# Patient Record
Sex: MALE | Race: BLACK OR AFRICAN AMERICAN | Employment: OTHER | URBAN - METROPOLITAN AREA
[De-identification: names, ages, dates, MRNs, and addresses within clinical notes are randomized per-mention and may not be internally consistent; named-entity substitution may affect disease eponyms.]

---

## 2017-01-02 ENCOUNTER — ANTICOAGULATION THERAPY VISIT (OUTPATIENT)
Dept: ANTICOAGULATION | Facility: CLINIC | Age: 77
End: 2017-01-02

## 2017-01-02 DIAGNOSIS — Z79.01 LONG-TERM (CURRENT) USE OF ANTICOAGULANTS: ICD-10-CM

## 2017-01-02 DIAGNOSIS — I82.503 CHRONIC DEEP VEIN THROMBOSIS (DVT) OF BOTH LOWER EXTREMITIES, UNSPECIFIED VEIN (H): Primary | ICD-10-CM

## 2017-01-02 DIAGNOSIS — N17.9 ACUTE KIDNEY INJURY (H): ICD-10-CM

## 2017-01-02 LAB — INR PPP: 1.6

## 2017-01-02 NOTE — PROGRESS NOTES
ANTICOAGULATION FOLLOW-UP CLINIC VISIT    Patient Name:  Priscilla Way  Date:  1/2/2017  Contact Type:  Telephone    SUBJECTIVE:     Patient Findings     Positives Missed doses (missed warfarin dose 12/31/16)           OBJECTIVE    INR   Date Value Ref Range Status   01/02/2017 1.6  Final       ASSESSMENT / PLAN  INR assessment SUB    Recheck INR In: 4 DAYS    [facility]R Location [facility]      Anticoagulation Summary as of 1/2/2017     INR goal 2.0-3.0   Selected INR 1.6! (1/2/2017)   Maintenance plan 2.5 mg (2.5 mg x 1), then 3.75 mg (2.5 mg x 1.5) repeating every 2 days   Full instructions 1/2: 3.75 mg; Otherwise 2.5 mg, then 3.75 mg repeating every 2 days   Average weekly total 21.88 mg   Plan last modified Shabana Sorto RN (12/13/2016)   Next INR check 1/6/2017   Priority INR   Target end date     Indications   Acute kidney injury (H) [N17.9]  Deep vein thrombosis (DVT) (H) [I82.409] [I82.409]  Long-term (current) use of anticoagulants [Z79.01] [Z79.01]         Anticoagulation Episode Summary     INR check location     Preferred lab     Send [facility] reminders [facility]    Comments [facility] to draw INR's  Pt has dementia please contact daughter with any questions. Contact daughter May at [phone].  Has 2.5mg tablets       Anticoagulation Care Providers     Provider Role Specialty Phone number    Randy Fuentes MD [facility] [phone]            See the Encounter Report to view Anticoagulation Flowsheet and Dosing Calendar (Go to Encounters tab in chart review, and find the Anticoagulation Therapy Visit)    Spoke with Agapito PÉREZ.  Requested next INR in 1 week, but Priscilla's family wants it checked 1/6/17.      Jodee Tirado, RN

## 2017-01-03 ENCOUNTER — TELEPHONE (OUTPATIENT)
Dept: PHARMACY | Facility: CLINIC | Age: 77
End: 2017-01-03

## 2017-01-03 ENCOUNTER — MEDICAL CORRESPONDENCE (OUTPATIENT)
Dept: HEALTH INFORMATION MANAGEMENT | Facility: CLINIC | Age: 77
End: 2017-01-03

## 2017-01-04 NOTE — TELEPHONE ENCOUNTER
SAMI for jennifer Arroyo, to call me back to discuss enrollment in anemia service.     Call date: 1/10    Yanet ChurchD, [facility]  [phone]  January 4, 2017

## 2017-01-09 ENCOUNTER — ANTICOAGULATION THERAPY VISIT (OUTPATIENT)
Dept: ANTICOAGULATION | Facility: CLINIC | Age: 77
End: 2017-01-09

## 2017-01-09 DIAGNOSIS — N17.9 ACUTE KIDNEY INJURY (H): ICD-10-CM

## 2017-01-09 DIAGNOSIS — I82.503 CHRONIC DEEP VEIN THROMBOSIS (DVT) OF BOTH LOWER EXTREMITIES, UNSPECIFIED VEIN (H): Primary | ICD-10-CM

## 2017-01-09 DIAGNOSIS — Z79.01 LONG-TERM (CURRENT) USE OF ANTICOAGULANTS: ICD-10-CM

## 2017-01-10 ENCOUNTER — TELEPHONE (OUTPATIENT)
Dept: PHARMACY | Facility: CLINIC | Age: 77
End: 2017-01-10

## 2017-01-10 ENCOUNTER — ANTICOAGULATION THERAPY VISIT (OUTPATIENT)
Dept: ANTICOAGULATION | Facility: CLINIC | Age: 77
End: 2017-01-10

## 2017-01-10 DIAGNOSIS — I82.503 CHRONIC DEEP VEIN THROMBOSIS (DVT) OF BOTH LOWER EXTREMITIES, UNSPECIFIED VEIN (H): Primary | ICD-10-CM

## 2017-01-10 DIAGNOSIS — Z79.01 LONG-TERM (CURRENT) USE OF ANTICOAGULANTS: ICD-10-CM

## 2017-01-10 DIAGNOSIS — N17.9 ACUTE KIDNEY INJURY (H): ICD-10-CM

## 2017-01-10 LAB — INR PPP: 1.7

## 2017-01-10 NOTE — PROGRESS NOTES
ANTICOAGULATION FOLLOW-UP CLINIC VISIT    Patient Name:  Priscilla Way  Date:  1/10/2017  Contact Type:  Telephone    SUBJECTIVE:     Patient Findings     Positives Missed doses (missed coumadin dose 1/6/17)           OBJECTIVE    INR   Date Value Ref Range Status   01/10/2017 1.7  Final       ASSESSMENT / PLAN  INR assessment SUB    Recheck INR In: 1 WEEK    [facility]R Location [facility]      Anticoagulation Summary as of 1/10/2017     INR goal 2.0-3.0   Selected INR 1.7! (1/10/2017)   Maintenance plan 2.5 mg (2.5 mg x 1), then 3.75 mg (2.5 mg x 1.5) repeating every 2 days   Full instructions 1/10: 3.75 mg; 1/11: 2.5 mg; 1/12: 3.75 mg; 1/13: 2.5 mg; 1/14: 3.75 mg; 1/15: 2.5 mg; 1/16: 3.75 mg; Otherwise 2.5 mg, then 3.75 mg repeating every 2 days   Average weekly total 21.88 mg   Plan last modified Shabana Sorto RN (12/13/2016)   Next INR check 1/17/2017   Priority INR   Target end date     Indications   Acute kidney injury (H) [N17.9]  Deep vein thrombosis (DVT) (H) [I82.409] [I82.409]  Long-term (current) use of anticoagulants [Z79.01] [Z79.01]         Anticoagulation Episode Summary     INR check location     Preferred lab     [facility]    Comments [facility] to draw INR's  Pt has dementia please contact daughter with any questions. Contact daughter May at [phone].  Has 2.5mg tablets       Anticoagulation Care Providers     Provider Role Specialty Phone number    Randy Fuentes MD [facility] [phone]            See the Encounter Report to view Anticoagulation Flowsheet and Dosing Calendar (Go to Encounters tab in chart review, and find the Anticoagulation Therapy Visit)    Spoke with Sam MENDEZ.      Jodee Tirado RN

## 2017-01-10 NOTE — Clinical Note
[facility]  [address]  [address] [place] [facility] [address] [phone]      January 10, 2017      Priscilla Way  [address]  [address]  [place] [address]        Dear Priscilla,    My records show that I have been unable to contact you by phone. The primary task of the Anemia Clinic is to help you get optimal results from your anemia medication by regularly speaking with you and following lab values. I work in cooperation with you and your doctors to ensure the safe and effective use of the anemia medication.  Currently, I do not have current contact information for you. I would greatly appreciate it if you would contact me at your earliest convenience to discuss your recent health, and more specifically how I can help you get the most from your anemia therapy. The Anemia Clinic is open Monday thru Thursday, 8:30-5 and Friday 7-3:30.   If we do not hear back from you within the next two weeks, at that time we will take you off our call list and notify the referring provider that we are no longer following you in the Anemia Clinic.    I hope to hear from you soon!    Trish Hankins,PharmD  [phone]

## 2017-01-10 NOTE — TELEPHONE ENCOUNTER
We have been unable to reach patient for enrollment in anemia service.  Sent letter to patient.  If we do not hear from him in 4 weeks will remove from our list and notify provider.     Call date:  2/7    Trish Hankins, PharmD, [facility]  [phone]  January 10, 2017

## 2017-01-12 DIAGNOSIS — Z79.01 LONG TERM CURRENT USE OF ANTICOAGULANT THERAPY: Primary | ICD-10-CM

## 2017-01-13 RX ORDER — WARFARIN SODIUM 2.5 MG/1
TABLET ORAL
Qty: 113 TABLET | Refills: 3 | Status: SHIPPED | OUTPATIENT
Start: 2017-01-13 | End: 2017-12-05

## 2017-01-17 ENCOUNTER — TELEPHONE (OUTPATIENT)
Dept: INTERNAL MEDICINE | Facility: CLINIC | Age: 77
End: 2017-01-17

## 2017-01-17 ENCOUNTER — ANTICOAGULATION THERAPY VISIT (OUTPATIENT)
Dept: ANTICOAGULATION | Facility: CLINIC | Age: 77
End: 2017-01-17

## 2017-01-17 ENCOUNTER — MEDICAL CORRESPONDENCE (OUTPATIENT)
Dept: HEALTH INFORMATION MANAGEMENT | Facility: CLINIC | Age: 77
End: 2017-01-17

## 2017-01-17 DIAGNOSIS — I82.503 CHRONIC DEEP VEIN THROMBOSIS (DVT) OF BOTH LOWER EXTREMITIES, UNSPECIFIED VEIN (H): Primary | ICD-10-CM

## 2017-01-17 DIAGNOSIS — Z79.01 LONG-TERM (CURRENT) USE OF ANTICOAGULANTS: ICD-10-CM

## 2017-01-17 DIAGNOSIS — N17.9 ACUTE KIDNEY INJURY (H): ICD-10-CM

## 2017-01-17 LAB — INR PPP: 3.1

## 2017-01-17 NOTE — PROGRESS NOTES
ANTICOAGULATION FOLLOW-UP CLINIC VISIT    Patient Name:  Priscilla Way  Date:  1/17/2017  Contact Type:  Telephone    SUBJECTIVE:     Patient Findings     Comments Pt is bedridden, and has a heel ulcer that is healing.  It is not infected.           OBJECTIVE    INR   Date Value Ref Range Status   01/17/2017 3.1  Final       ASSESSMENT / PLAN  INR assessment THER    Recheck INR In: 1 WEEK    [facility]R Location [facility]      Anticoagulation Summary as of 1/17/2017     INR goal 2.0-3.0   Selected INR 3.1! (1/17/2017)   Maintenance plan 3.75 mg (2.5 mg x 1.5) on Mon, Wed, Fri; 2.5 mg (2.5 mg x 1) all other days   Full instructions 3.75 mg on Mon, Wed, Fri; 2.5 mg all other days   Weekly total 21.25 mg   Plan last modified Sherry Georges RN (1/17/2017)   Next INR check 1/24/2017   Priority INR   Target end date     Indications   Acute kidney injury (H) [N17.9]  Deep vein thrombosis (DVT) (H) [I82.409] [I82.409]  Long-term (current) use of anticoagulants [Z79.01] [Z79.01]         Anticoagulation Episode Summary     INR check location     Preferred lab     Send [facility]ers to [facility]    Comments [facility] to draw INR's  Pt has dementia please contact daughter with any questions. Contact daughter May at [phone].  Has 2.5mg tablets       Anticoagulation Care Providers     Provider Role Specialty Phone number    Beatrizpalmira Randy PERKINS MD [facility] [phone]            See the Encounter Report to view Anticoagulation Flowsheet and Dosing Calendar (Go to Encounters tab in chart review, and find the Anticoagulation Therapy Visit)  Spoke with Carina [facility] nurse.    Sherry Georges RN

## 2017-01-17 NOTE — TELEPHONE ENCOUNTER
Spoke with home care nurse ([phone]) and she will check BP again on 1/19 and call me.  -----------------------------------------    ----- Message from Mirlande Tellez RN sent at 1/17/2017  1:44 PM CST -----  Regarding: bp  Pt's home care nurse called to let  know that pt's bp was 158/90, pulse 88, offer no c/o.  Rx Amlodipine was d/c'd on 12/23/16.  Mirlande Tellez RN

## 2017-01-19 NOTE — TELEPHONE ENCOUNTER
Spoke with [facility], Carina again.  She updated the blood pressure reading today 156/88, pulse 88. Pt does not show any distress or symptoms.    Dr. Fuentes    Amlodipine was discontinued on 12/23 during the office visit.  I think it was a mistake by LISSA.  Did you mean to discontinue ?    Soon-Mi

## 2017-01-24 ENCOUNTER — ANTICOAGULATION THERAPY VISIT (OUTPATIENT)
Dept: ANTICOAGULATION | Facility: CLINIC | Age: 77
End: 2017-01-24

## 2017-01-24 ENCOUNTER — DOCUMENTATION ONLY (OUTPATIENT)
Dept: FAMILY MEDICINE | Facility: CLINIC | Age: 77
End: 2017-01-24

## 2017-01-24 DIAGNOSIS — N17.9 ACUTE KIDNEY INJURY (H): ICD-10-CM

## 2017-01-24 DIAGNOSIS — Z79.01 LONG-TERM (CURRENT) USE OF ANTICOAGULANTS: ICD-10-CM

## 2017-01-24 DIAGNOSIS — I82.503 CHRONIC DEEP VEIN THROMBOSIS (DVT) OF BOTH LOWER EXTREMITIES, UNSPECIFIED VEIN (H): Primary | ICD-10-CM

## 2017-01-24 LAB — INR PPP: 1.9

## 2017-01-24 NOTE — PROGRESS NOTES
"Visit to the client's home on 1/20/17 for annual health risk assessment and PCA assessment.  An  was present.  RP is also present due to member's confusion.    Current situation/living environment/hospitalization: Member lives alone in an independent apartment.  Has supportive family in area who work with PCA to provide 24 hour coverage for member.  Home is neat with clear pathways.  Member has had 2 ER visits/hospitalizations in the past year.    Activities of daily living (ADL)/instrumental activities of daily living (IADL) and functional issues: Member is in his hospital bed this visit with pressure relieving boots on both feet.  Member has a pressure ulcer on left heal currently being assessed and treated by nursing and exhibits much pain with attempt to assess it this visit.  It is covered with dressing, so did not uncover.  Reported to be a draining pressure ulcer.  Member is oriented to self, however verbalized that he \"was in the hospital\" and isn't able to verbalize day/time of day or season.  Has ongoing confusion and requires 24 hour supervision.  Member demonstrated weakness with any movement.  Moderate hand grasp, however was unable to raise his arms past the height of his chin.  RP reports member requires assistance with turning in bed- unable to position self at this time.  Daughter is working with [employer] to get a alexandrea lift, wheelchair and another hospital bed.  Dependent with dressing, bathing, grooming, transfers- can bear weight, but unable to ambulate.  Is also dependent with eating and toileting.  Is incontinent of both bowel and bladder.   Caregivers provide all shopping, meals prep, transportation, homemaking and laundry.  Nurse comes weekly to set medications and assess wound.  Caregivers give medications from med box.     Client's Plan of Care consists of:  PCA (12 hours a day) and nursing weekly.    Carri Anderson RN  [phone]        "

## 2017-01-24 NOTE — PROGRESS NOTES
ANTICOAGULATION FOLLOW-UP CLINIC VISIT    Patient Name:  Priscilla Way  Date:  1/24/2017  Contact Type:  Telephone    SUBJECTIVE:     Patient Findings     Positives No Problem Findings    Comments Abbi is never sure if pt takes all his medication, but they (home care nurse) fills the pill box           OBJECTIVE    INR   Date Value Ref Range Status   01/24/2017 1.90  Final     Comment:     Outside Lab        ASSESSMENT / PLAN  INR assessment THER    Recheck INR In: 1 WEEK    [facility]R Location [facility]      Anticoagulation Summary as of 1/24/2017     INR goal 2.0-3.0   Selected INR 1.90! (1/24/2017)   Maintenance plan 3.75 mg (2.5 mg x 1.5) on Mon, Wed, Fri; 2.5 mg (2.5 mg x 1) all other days   Full instructions 3.75 mg on Mon, Wed, Fri; 2.5 mg all other days   Weekly total 21.25 mg   Plan last modified Sherry Georges RN (1/17/2017)   Next INR check 1/31/2017   Priority INR   Target end date     Indications   Acute kidney injury (H) [N17.9]  Deep vein thrombosis (DVT) (H) [I82.409] [I82.409]  Long-term (current) use of anticoagulants [Z79.01] [Z79.01]         Anticoagulation Episode Summary     INR check location     Preferred lab     Send [facility] remind[facility] to [facility]    Comments [facility] to draw INR's  Pt has dementia please contact daughter with any questions. Contact daughter May at [phone].  Has 2.5mg tablets       Anticoagulation Care Providers     Provider Role Specialty Phone number    Randy Fuentes MD [facility] [phone]            See the Encounter Report to view Anticoagulation Flowsheet and Dosing Calendar (Go to Encounters tab in chart review, and find the Anticoagulation Therapy Visit)    Spoke with PRATIMA Go, TED

## 2017-01-25 ENCOUNTER — DOCUMENTATION ONLY (OUTPATIENT)
Dept: CARE COORDINATION | Facility: CLINIC | Age: 77
End: 2017-01-25

## 2017-01-25 NOTE — PROGRESS NOTES
[facility] now requests orders and shares plan of care/discharge summaries for some patients through [facility].  Please REPLY TO THIS MESSAGE in order to give authorization for orders when needed.  This is considered a verbal order, you will still receive a faxed copy of orders for signature.  Thank you for your assistance in improving collaboration for our patients.    ORDER    SN visits 1  Week 9   3 as needed

## 2017-01-28 ENCOUNTER — MEDICAL CORRESPONDENCE (OUTPATIENT)
Dept: HEALTH INFORMATION MANAGEMENT | Facility: CLINIC | Age: 77
End: 2017-01-28

## 2017-01-31 ENCOUNTER — ANTICOAGULATION THERAPY VISIT (OUTPATIENT)
Dept: ANTICOAGULATION | Facility: CLINIC | Age: 77
End: 2017-01-31

## 2017-01-31 DIAGNOSIS — I82.503 CHRONIC DEEP VEIN THROMBOSIS (DVT) OF BOTH LOWER EXTREMITIES, UNSPECIFIED VEIN (H): Primary | ICD-10-CM

## 2017-01-31 DIAGNOSIS — N17.9 ACUTE KIDNEY INJURY (H): ICD-10-CM

## 2017-01-31 DIAGNOSIS — Z79.01 LONG-TERM (CURRENT) USE OF ANTICOAGULANTS: ICD-10-CM

## 2017-01-31 LAB — INR PPP: 2.3

## 2017-01-31 NOTE — PROGRESS NOTES
ANTICOAGULATION FOLLOW-UP CLINIC VISIT    Patient Name:  Priscilla Way  Date:  1/31/2017  Contact Type:  Telephone    SUBJECTIVE:     Patient Findings     Positives No Problem Findings           OBJECTIVE    INR   Date Value Ref Range Status   01/31/2017 2.3  Final       ASSESSMENT / PLAN  INR assessment THER    Recheck INR In: 1 WEEK    INR Location Clinic      Anticoagulation Summary as of 1/31/2017     INR goal 2.0-3.0   Selected INR 2.3 (1/31/2017)   Maintenance plan 3.75 mg (2.5 mg x 1.5) on Mon, Wed, Fri; 2.5 mg (2.5 mg x 1) all other days   Full instructions 3.75 mg on Mon, Wed, Fri; 2.5 mg all other days   Weekly total 21.25 mg   Plan last modified Sherry Georges RN (1/17/2017)   Next INR check 2/7/2017   Priority INR   Target end date     Indications   Acute kidney injury (H) [N17.9]  Deep vein thrombosis (DVT) (H) [I82.409] [I82.409]  Long-term (current) use of anticoagulants [Z79.01] [Z79.01]         Anticoagulation Episode Summary     INR check location     Preferred lab     [facility]    Comments [facility] to draw INR's  Pt has dementia please contact daughter with any questions. Contact daughter May at [phone].  Has 2.5mg tablets       Anticoagulation Care Providers     Provider Role Specialty Phone number    Randy Fuentes MD [facility] [phone]            See the Encounter Report to view Anticoagulation Flowsheet and Dosing Calendar (Go to Encounters tab in chart review, and find the Anticoagulation Therapy Visit)    Spoke with Agapito PÉREZ.    Dayana Ervin RN

## 2017-02-03 ENCOUNTER — TELEPHONE (OUTPATIENT)
Dept: PHARMACY | Facility: CLINIC | Age: 77
End: 2017-02-03

## 2017-02-07 ENCOUNTER — ANTICOAGULATION THERAPY VISIT (OUTPATIENT)
Dept: ANTICOAGULATION | Facility: CLINIC | Age: 77
End: 2017-02-07

## 2017-02-07 DIAGNOSIS — Z79.01 LONG-TERM (CURRENT) USE OF ANTICOAGULANTS: ICD-10-CM

## 2017-02-07 DIAGNOSIS — I82.503 CHRONIC DEEP VEIN THROMBOSIS (DVT) OF BOTH LOWER EXTREMITIES, UNSPECIFIED VEIN (H): Primary | ICD-10-CM

## 2017-02-07 DIAGNOSIS — N17.9 ACUTE KIDNEY INJURY (H): ICD-10-CM

## 2017-02-07 LAB — INR PPP: 2.3

## 2017-02-07 NOTE — PROGRESS NOTES
ANTICOAGULATION FOLLOW-UP CLINIC VISIT    Patient Name:  Priscilla Way  Date:  2/7/2017  Contact Type:  Telephone    SUBJECTIVE:        OBJECTIVE    INR   Date Value Ref Range Status   02/07/2017 2.3  Final       ASSESSMENT / PLAN  No question data found.  Anticoagulation Summary as of 2/7/2017     INR goal 2.0-3.0   Selected INR 2.3 (2/7/2017)   Maintenance plan 3.75 mg (2.5 mg x 1.5) on Mon, Wed, Fri; 2.5 mg (2.5 mg x 1) all other days   Full instructions 3.75 mg on Mon, Wed, Fri; 2.5 mg all other days   Weekly total 21.25 mg   Plan last modified Sherry Georges RN (1/17/2017)   Next INR check 2/14/2017   Priority INR   Target end date     Indications   Acute kidney injury (H) [N17.9]  Deep vein thrombosis (DVT) (H) [I82.409] [I82.409]  Long-term (current) use of anticoagulants [Z79.01] [Z79.01]         Anticoagulation Episode Summary     INR check location     Preferred lab     Send INR reminders to [facility]    Comments [facility] to draw INR's  Pt has dementia please contact daughter with any questions. Contact daughter May at [phone].  Has 2.5mg tablets       Anticoagulation Care Providers     Provider Role Specialty Phone number    Randy Fuentes MD [facility] [phone]            See the Encounter Report to view Anticoagulation Flowsheet and Dosing Calendar (Go to Encounters tab in chart review, and find the Anticoagulation Therapy Visit)    [facility] today is 2.3, which is within goal of 2-3. Continue with current regimen, which is 3.75 mg on MWF and 2.5 mg all other days. Next INR is 2/14/17.    Laura Khanna, pharmacy intern  [facility]ccall[facility]

## 2017-02-07 NOTE — TELEPHONE ENCOUNTER
Follow-up with anemia management service:    We have made several calls to enroll in anemia service, sent f/u letter, have not had any communication from patient or daughter.  We will not continue to follow-up for enrollment in anemia service.  Please feel free to refer again in the future if needed.     [facility]    Anemia Management Service  Yanet GarrettD and Roxana Tay CPhT  Phone: [phone]  Fax: [phone]     Cc:  MD Randy Domínguez MD Jamie Budd, RN

## 2017-02-14 ENCOUNTER — ANTICOAGULATION THERAPY VISIT (OUTPATIENT)
Dept: ANTICOAGULATION | Facility: CLINIC | Age: 77
End: 2017-02-14

## 2017-02-14 DIAGNOSIS — I82.503 CHRONIC DEEP VEIN THROMBOSIS (DVT) OF BOTH LOWER EXTREMITIES, UNSPECIFIED VEIN (H): ICD-10-CM

## 2017-02-14 DIAGNOSIS — Z79.01 LONG-TERM (CURRENT) USE OF ANTICOAGULANTS: ICD-10-CM

## 2017-02-14 DIAGNOSIS — N17.9 ACUTE KIDNEY INJURY (H): ICD-10-CM

## 2017-02-14 LAB — INR PPP: 2.8

## 2017-02-14 NOTE — PROGRESS NOTES
ANTICOAGULATION FOLLOW-UP CLINIC VISIT    Patient Name:  Priscilla Way  Date:  2/14/2017  Contact Type:  Telephone    SUBJECTIVE:     Patient Findings     Comments Rocío is unsure of warfarin dose Priscilla took this week.  She found warfarin tablets in pill box on 2/10/17 and 2/12/17.  However, he has several caregivers who do not communicate very well with each other.  He may have been given warfarin by someone instead of out of his pill box.             OBJECTIVE    INR   Date Value Ref Range Status   02/14/2017 2.8  Final       ASSESSMENT / PLAN  INR assessment THER    Recheck INR In: 1 WEEK    [facility]R Location [facility]      Anticoagulation Summary as of 2/14/2017     INR goal 2.0-3.0   Today's INR 2.8   Maintenance plan 3.75 mg (2.5 mg x 1.5) on Mon, Fri; 2.5 mg (2.5 mg x 1) all other days   Full instructions 3.75 mg on Mon, Fri; 2.5 mg all other days   Weekly total 20 mg   Plan last modified Jodee Tirado RN (2/14/2017)   Next INR check 2/21/2017   Priority INR   Target end date     Indications   Acute kidney injury (H) [N17.9]  Deep vein thrombosis (DVT) (H) [I82.409] [I82.409]  Long-term (current) use of anticoagulants [Z79.01] [Z79.01]         Anticoagulation Episode Summary     INR check location     Preferred lab     Send INR reminders to [facility]    Comments [facility] to draw INR's  Pt has dementia please contact daughter with any questions. Contact daughter May at [phone].  Has 2.5mg tablets       Anticoagulation Care Providers     Provider Role Specialty Phone number    Randy Fuentes MD [facility] [phone]            See the Encounter Report to view Anticoagulation Flowsheet and Dosing Calendar (Go to Encounters tab in chart review, and find the Anticoagulation Therapy Visit)    Spoke with HHNRocío.  Unsure if Priscilla missed doses of warfarin this week.  Decreased dose of warfarin slightly and will recheck INR in one week.      Jodee Tirado,  RN

## 2017-02-14 NOTE — MR AVS SNAPSHOT
Priscilla CHAU Seamus   2/14/2017   Anticoagulation Therapy Visit    Description:  77 year old male   Provider:  Jodee Tirado, RN   Department:  [facility]           INR as of 2/14/2017     Today's INR 2.8      Anticoagulation Summary as of 2/14/2017     INR goal 2.0-3.0   Today's INR 2.8   Full instructions 3.75 mg on Mon, Fri; 2.5 mg all other days   Next INR check 2/21/2017    Indications   Acute kidney injury (H) [N17.9]  Deep vein thrombosis (DVT) (H) [I82.409] [I82.409]  Long-term (current) use of anticoagulants [Z79.01] [Z79.01]         Description     Rocío is unsure of warfarin dose Priscilla took this week.  She found warfarin tablets in pill box on 2/10/17 and 2/12/17.  However, he has several caregivers who do not communicate very well with each other.  He may have been given warfarin by someone instead of out of his pill box.       February 2017 Details    Sun Mon Tue Wed Thu Fri Sat        1               2               3               4                 5               6               7               8               9               10               11                 12               13               14      2.5 mg   See details      15      2.5 mg         16      2.5 mg         17      3.75 mg         18      2.5 mg           19      2.5 mg         20      3.75 mg         21            22               23               24               25                 26               27               28                    Date Details   02/14 This INR check       Date of next INR:  2/21/2017         How to take your warfarin dose     To take:  2.5 mg Take 1 of the 2.5 mg tablets.    To take:  3.75 mg Take 1.5 of the 2.5 mg tablets.

## 2017-02-21 ENCOUNTER — ANTICOAGULATION THERAPY VISIT (OUTPATIENT)
Dept: ANTICOAGULATION | Facility: CLINIC | Age: 77
End: 2017-02-21

## 2017-02-21 DIAGNOSIS — I82.503 CHRONIC DEEP VEIN THROMBOSIS (DVT) OF BOTH LOWER EXTREMITIES, UNSPECIFIED VEIN (H): ICD-10-CM

## 2017-02-21 DIAGNOSIS — Z79.01 LONG-TERM (CURRENT) USE OF ANTICOAGULANTS: ICD-10-CM

## 2017-02-21 DIAGNOSIS — N17.9 ACUTE KIDNEY INJURY (H): ICD-10-CM

## 2017-02-21 LAB — INR PPP: 3.9

## 2017-02-21 NOTE — PROGRESS NOTES
ANTICOAGULATION FOLLOW-UP CLINIC VISIT    Patient Name:  Priscilla Way  Date:  2/21/2017  Contact Type:  Telephone    SUBJECTIVE:     Patient Findings     Positives Other complaints (Priscilla is not himself today.  He is usually combative and today he is very mellow.  Rocío will let the [facility] know if anything changes before next Monday)           OBJECTIVE    INR   Date Value Ref Range Status   02/21/2017 3.9  Final       ASSESSMENT / PLAN  INR assessment SUPRA    Recheck INR In: 6 DAYS    [facility]R Location [facility]      Anticoagulation Summary as of 2/21/2017     INR goal 2.0-3.0   Today's INR 3.9!   Maintenance plan 3.75 mg (2.5 mg x 1.5) on Mon, Fri; 2.5 mg (2.5 mg x 1) all other days   Full instructions 2/21: Hold; Otherwise 3.75 mg on Mon, Fri; 2.5 mg all other days   Weekly total 20 mg   Plan last modified Jodee Tirado RN (2/14/2017)   Next INR check 2/27/2017   Priority INR   Target end date     Indications   Acute kidney injury (H) [N17.9]  Deep vein thrombosis (DVT) (H) [I82.409] [I82.409]  Long-term (current) use of anticoagulants [Z79.01] [Z79.01]         Anticoagulation Episode Summary     INR check location     Preferred lab     Send IN[facility] reminders to [facility]    Comments [facility] to draw INR's  Pt has dementia please contact daughter with any questions. Contact daughter May at [phone].  Has 2.5mg tablets       Anticoagulation Care Providers     Provider Role Specialty Phone number    Randy Fuentes MD [facility] [phone]            See the Encounter Report to view Anticoagulation Flowsheet and Dosing Calendar (Go to Encounters tab in chart review, and find the Anticoagulation Therapy Visit)    Spoke with Rocío BAER.  She reports Priscilla is not himself today, he is very mellow.  She will contact the [facility] before 2/27/17 if anything changes in his health, diet, medications.      Jodee Tirado RN

## 2017-02-21 NOTE — MR AVS SNAPSHOT
Priscilla Way   2/21/2017   Anticoagulation Therapy Visit    Description:  77 year old male   Provider:  Jodee Tirado, RN   Department:  [facility]           INR as of 2/21/2017     Today's INR 3.9!      Anticoagulation Summary as of 2/21/2017     INR goal 2.0-3.0   Today's INR 3.9!   Full instructions 2/21: Hold; Otherwise 3.75 mg on Mon, Fri; 2.5 mg all other days   Next INR check 2/27/2017    Indications   Acute kidney injury (H) [N17.9]  Deep vein thrombosis (DVT) (H) [I82.409] [I82.409]  Long-term (current) use of anticoagulants [Z79.01] [Z79.01]         Description     There were no warfarin tablets left in his pill box this week.  Usually, there are some left, so never know for sure what dose of coumadin he is getting.  Also, he has several caregivers that do not communicate very well.        February 2017 Details    Sun Mon Tue Wed Thu Fri Sat        1               2               3               4                 5               6               7               8               9               10               11                 12               13               14               15               16               17               18                 19               20               21      Hold   See details      22      2.5 mg         23      2.5 mg         24      3.75 mg         25      2.5 mg           26      2.5 mg         27            28                    Date Details   02/21 This INR check       Date of next INR:  2/27/2017         How to take your warfarin dose     To take:  2.5 mg Take 1 of the 2.5 mg tablets.    To take:  3.75 mg Take 1.5 of the 2.5 mg tablets.    Hold Do not take your warfarin dose. See the Details table to the right for additional instructions.

## 2017-02-27 ENCOUNTER — MEDICAL CORRESPONDENCE (OUTPATIENT)
Dept: HEALTH INFORMATION MANAGEMENT | Facility: CLINIC | Age: 77
End: 2017-02-27

## 2017-02-27 DIAGNOSIS — D64.9 ANEMIA, UNSPECIFIED TYPE: ICD-10-CM

## 2017-02-27 NOTE — TELEPHONE ENCOUNTER
Ferrous Sulfate 325mg      Last Written Prescription Date: 11/12/16  Last Fill Quantity: 100,  # refills: 0   Last Office Visit with [facility], [facility] or [facility] prescribing provider: 12/23/2016      Migel Valera  [facility]  [phone]

## 2017-03-01 RX ORDER — FERROUS SULFATE 325(65) MG
325 TABLET ORAL DAILY
Qty: 100 TABLET | Refills: 3 | Status: SHIPPED | OUTPATIENT
Start: 2017-03-01 | End: 2018-01-01

## 2017-03-03 ENCOUNTER — ANTICOAGULATION THERAPY VISIT (OUTPATIENT)
Dept: ANTICOAGULATION | Facility: CLINIC | Age: 77
End: 2017-03-03

## 2017-03-03 DIAGNOSIS — N17.9 ACUTE KIDNEY INJURY (H): ICD-10-CM

## 2017-03-03 DIAGNOSIS — Z79.01 LONG-TERM (CURRENT) USE OF ANTICOAGULANTS: ICD-10-CM

## 2017-03-03 DIAGNOSIS — I82.403 DEEP VEIN THROMBOSIS (DVT) OF BOTH LOWER EXTREMITIES, UNSPECIFIED CHRONICITY, UNSPECIFIED VEIN (H): ICD-10-CM

## 2017-03-03 LAB — INR PPP: 1.9

## 2017-03-03 NOTE — PROGRESS NOTES
ANTICOAGULATION FOLLOW-UP CLINIC VISIT    Patient Name:  Priscilla Way  Date:  3/3/2017  Contact Type:  Telephone    SUBJECTIVE:     Patient Findings     Comments Patient was more cooperative with fingerstick today.           OBJECTIVE    INR   Date Value Ref Range Status   03/03/2017 1.9  Final       ASSESSMENT / PLAN  INR assessment SUB    Recheck INR In: 1 WEEK    INR Location [facility]      Anticoagulation Summary as of 3/3/2017     INR goal 2.0-3.0   Today's INR 1.9!   Maintenance plan 3.75 mg (2.5 mg x 1.5) on Fri; 2.5 mg (2.5 mg x 1) all other days   Full instructions 3.75 mg on Fri; 2.5 mg all other days   Weekly total 18.75 mg   Plan last modified Sherry Georges RN (3/3/2017)   Next INR check 3/10/2017   Priority INR   Target end date     Indications   Acute kidney injury (H) [N17.9]  Deep vein thrombosis (DVT) (H) [I82.409] [I82.409]  Long-term (current) use of anticoagulants [Z79.01] [Z79.01]         Anticoagulation Episode Summary     INR check location     Preferred lab     Send [facility] reminders to [facility]    Comments [facility] to draw INR's  Pt has dementia please contact daughter with any questions. Contact daughter May at [phone].  Has 2.5mg tablets       Anticoagulation Care Providers     Provider Role Specialty Phone number    IngridromanRandy chavis MD [facility] [phone]            See the Encounter Report to view Anticoagulation Flowsheet and Dosing Calendar (Go to Encounters tab in chart review, and find the Anticoagulation Therapy Visit)    Spoke with Abbi [facility] nurse.    Sherry Georges, TED

## 2017-03-03 NOTE — MR AVS SNAPSHOT
Priscilla Way   3/3/2017   Anticoagulation Therapy Visit    Description:  77 year old male   Provider:  Sherry Georges, RN   Department:  [facility]           INR as of 3/3/2017     Today's INR 1.9!      Anticoagulation Summary as of 3/3/2017     INR goal 2.0-3.0   Today's INR 1.9!   Full instructions 3.75 mg on Fri; 2.5 mg all other days   Next INR check 3/10/2017    Indications   Acute kidney injury (H) [N17.9]  Deep vein thrombosis (DVT) (H) [I82.409] [I82.409]  Long-term (current) use of anticoagulants [Z79.01] [Z79.01]         March 2017 Details    Sun Mon Tue Wed Thu Fri Sat        1               2               3      3.75 mg   See details      4      2.5 mg           5      2.5 mg         6      2.5 mg         7      2.5 mg         8      2.5 mg         9      2.5 mg         10            11                 12               13               14               15               16               17               18                 19               20               21               22               23               24               25                 26               27               28               29               30               31                 Date Details   03/03 This INR check       Date of next INR:  3/10/2017         How to take your warfarin dose     To take:  2.5 mg Take 1 of the 2.5 mg tablets.    To take:  3.75 mg Take 1.5 of the 2.5 mg tablets.

## 2017-03-06 DIAGNOSIS — G47.00 INSOMNIA: ICD-10-CM

## 2017-03-07 NOTE — TELEPHONE ENCOUNTER
QUEtiapine (SEROQUEL) 25 MG      Last Written Prescription Date:  11/12/16  Last Fill Quantity: 180,   # refills: 1  Last Office Visit : 12/23/16  Future Office visit:  none  Routing refill request to provider for review/approval because:  Drug not active on patient's medication list  DC'D @ DISCHARGE  11/12/16

## 2017-03-08 RX ORDER — QUETIAPINE FUMARATE 25 MG/1
TABLET, FILM COATED ORAL
Qty: 180 TABLET | Refills: 1 | Status: SHIPPED | OUTPATIENT
Start: 2017-03-08 | End: 2017-09-11

## 2017-03-10 ENCOUNTER — ANTICOAGULATION THERAPY VISIT (OUTPATIENT)
Dept: ANTICOAGULATION | Facility: CLINIC | Age: 77
End: 2017-03-10

## 2017-03-10 DIAGNOSIS — I82.403 DEEP VEIN THROMBOSIS (DVT) OF BOTH LOWER EXTREMITIES, UNSPECIFIED CHRONICITY, UNSPECIFIED VEIN (H): ICD-10-CM

## 2017-03-10 DIAGNOSIS — N17.9 ACUTE KIDNEY INJURY (H): ICD-10-CM

## 2017-03-10 DIAGNOSIS — Z79.01 LONG-TERM (CURRENT) USE OF ANTICOAGULANTS: ICD-10-CM

## 2017-03-10 LAB — INR PPP: 2.3

## 2017-03-10 NOTE — PROGRESS NOTES
ANTICOAGULATION FOLLOW-UP CLINIC VISIT    Patient Name:  Priscilla Way  Date:  3/10/2017  Contact Type:  Telephone    SUBJECTIVE:     Patient Findings     Positives No Problem Findings           OBJECTIVE    INR   Date Value Ref Range Status   03/10/2017 2.3  Final       ASSESSMENT / PLAN  INR assessment THER    Recheck INR In: 1 WEEK    [facility]R Location [facility]      Anticoagulation Summary as of 3/10/2017     INR goal 2.0-3.0   Today's INR 2.3   Maintenance plan 3.75 mg (2.5 mg x 1.5) on Fri; 2.5 mg (2.5 mg x 1) all other days   Full instructions 3.75 mg on Fri; 2.5 mg all other days   Weekly total 18.75 mg   No change documented Jodee Tirado RN   Plan last modified Sherry Georges RN (3/3/2017)   Next INR check 3/17/2017   Priority INR   Target end date     Indications   Acute kidney injury (H) [N17.9]  Deep vein thrombosis (DVT) (H) [I82.409] [I82.409]  Long-term (current) use of anticoagulants [Z79.01] [Z79.01]         Anticoagulation Episode Summary     INR check location     Preferred lab     Send IN[facility] reminders to [facility]    Comments [facility] to draw INR's  Pt has dementia please contact daughter with any questions. Contact daughter May at [phone].  Has 2.5mg tablets       Anticoagulation Care Providers     Provider Role Specialty Phone number    Randy Fuentes MD [facility] [phone]            See the Encounter Report to view Anticoagulation Flowsheet and Dosing Calendar (Go to Encounters tab in chart review, and find the Anticoagulation Therapy Visit)    Spoke with Vickey PÉREZ.  She does not think Priscilla missed any doses of warfarin this week.    Jodee Tirado, RN

## 2017-03-10 NOTE — MR AVS SNAPSHOT
Priscilla Way   3/10/2017   Anticoagulation Therapy Visit    Description:  77 year old male   Provider:  Jodee Tirado RN   Department:  [facility]           INR as of 3/10/2017     Today's INR 2.3      Anticoagulation Summary as of 3/10/2017     INR goal 2.0-3.0   Today's INR 2.3   Full instructions 3.75 mg on Fri; 2.5 mg all other days   Next INR check 3/17/2017    Indications   Acute kidney injury (H) [N17.9]  Deep vein thrombosis (DVT) (H) [I82.409] [I82.409]  Long-term (current) use of anticoagulants [Z79.01] [Z79.01]         March 2017 Details    Sun Mon Tue Wed Thu Fri Sat        1               2               3               4                 5               6               7               8               9               10      3.75 mg   See details      11      2.5 mg           12      2.5 mg         13      2.5 mg         14      2.5 mg         15      2.5 mg         16      2.5 mg         17            18                 19               20               21               22               23               24               25                 26               27               28               29               30               31                 Date Details   03/10 This INR check       Date of next INR:  3/17/2017         How to take your warfarin dose     To take:  2.5 mg Take 1 of the 2.5 mg tablets.    To take:  3.75 mg Take 1.5 of the 2.5 mg tablets.

## 2017-03-13 ENCOUNTER — DOCUMENTATION ONLY (OUTPATIENT)
Dept: CARE COORDINATION | Facility: CLINIC | Age: 77
End: 2017-03-13

## 2017-03-14 ENCOUNTER — MEDICAL CORRESPONDENCE (OUTPATIENT)
Dept: HEALTH INFORMATION MANAGEMENT | Facility: CLINIC | Age: 77
End: 2017-03-14

## 2017-03-17 ENCOUNTER — TELEPHONE (OUTPATIENT)
Dept: INTERNAL MEDICINE | Facility: CLINIC | Age: 77
End: 2017-03-17

## 2017-03-17 ENCOUNTER — ANTICOAGULATION THERAPY VISIT (OUTPATIENT)
Dept: ANTICOAGULATION | Facility: CLINIC | Age: 77
End: 2017-03-17

## 2017-03-17 DIAGNOSIS — N17.9 ACUTE KIDNEY INJURY (H): ICD-10-CM

## 2017-03-17 DIAGNOSIS — I82.403 DEEP VEIN THROMBOSIS (DVT) OF BOTH LOWER EXTREMITIES, UNSPECIFIED CHRONICITY, UNSPECIFIED VEIN (H): ICD-10-CM

## 2017-03-17 DIAGNOSIS — Z79.01 LONG-TERM (CURRENT) USE OF ANTICOAGULANTS: ICD-10-CM

## 2017-03-17 DIAGNOSIS — L89.609: Primary | ICD-10-CM

## 2017-03-17 LAB — INR PPP: 1.8

## 2017-03-17 NOTE — TELEPHONE ENCOUNTER
[facility] nurse is reporting that pt is having strong odor from the left heel wound and encouraged the family to set up the appt with wound clinic.

## 2017-03-17 NOTE — PROGRESS NOTES
ANTICOAGULATION FOLLOW-UP CLINIC VISIT    Patient Name:  Priscilla Way  Date:  3/17/2017  Contact Type:  Telephone    SUBJECTIVE:     Patient Findings     Comments 3/16-missed coumadin 2.5mg dose.           OBJECTIVE    INR   Date Value Ref Range Status   03/17/2017 1.8  Final       ASSESSMENT / PLAN  No question data found.  Anticoagulation Summary as of 3/17/2017     INR goal 2.0-3.0   Today's INR 1.8!   Maintenance plan 3.75 mg (2.5 mg x 1.5) on Fri; 2.5 mg (2.5 mg x 1) all other days   Full instructions 3/18: 3.75 mg; 3/19: 3.75 mg; Otherwise 3.75 mg on Fri; 2.5 mg all other days   Weekly total 18.75 mg   Plan last modified Sherry Georges RN (3/3/2017)   Next INR check 3/24/2017   Priority INR   Target end date     Indications   Acute kidney injury (H) [N17.9]  Deep vein thrombosis (DVT) (H) [I82.409] [I82.409]  Long-term (current) use of anticoagulants [Z79.01] [Z79.01]         Anticoagulation Episode Summary     INR check location     Preferred lab     Send INR reminders to [facility]    Comments [facility] to draw INR's  Pt has dementia please contact daughter with any questions. Contact daughter May at [phone].  Has 2.5mg tablets       Anticoagulation Care Providers     Provider Role Specialty Phone number    Randy Fuentes MD [facility] [phone]            See the Encounter Report to view Anticoagulation Flowsheet and Dosing Calendar (Go to Encounters tab in chart review, and find the Anticoagulation Therapy Visit)    Spoke with Abbi [facility] nurse.    Sherry Georges, TED

## 2017-03-17 NOTE — MR AVS SNAPSHOT
Priscilla Way   3/17/2017   Anticoagulation Therapy Visit    Description:  77 year old male   Provider:  Sherry Georges RN   Department:  [facility]           INR as of 3/17/2017     Today's INR 1.8!      Anticoagulation Summary as of 3/17/2017     INR goal 2.0-3.0   Today's INR 1.8!   Full instructions 3/18: 3.75 mg; 3/19: 3.75 mg; Otherwise 3.75 mg on Fri; 2.5 mg all other days   Next INR check 3/24/2017    Indications   Acute kidney injury (H) [N17.9]  Deep vein thrombosis (DVT) (H) [I82.409] [I82.409]  Long-term (current) use of anticoagulants [Z79.01] [Z79.01]         March 2017 Details    Sun Mon Tue Wed Thu Fri Sat        1               2               3               4                 5               6               7               8               9               10               11                 12               13               14               15               16               17      3.75 mg   See details      18      3.75 mg           19      3.75 mg         20      2.5 mg         21      2.5 mg         22      2.5 mg         23      2.5 mg         24            25                 26               27               28               29               30               31                 Date Details   03/17 This INR check       Date of next INR:  3/24/2017         How to take your warfarin dose     To take:  2.5 mg Take 1 of the 2.5 mg tablets.    To take:  3.75 mg Take 1.5 of the 2.5 mg tablets.

## 2017-03-24 ENCOUNTER — HOSPITAL ENCOUNTER (OUTPATIENT)
Dept: WOUND CARE | Facility: CLINIC | Age: 77
Discharge: HOME | End: 2017-03-24
Attending: PODIATRIST | Admitting: PODIATRIST
Payer: COMMERCIAL

## 2017-03-24 ENCOUNTER — ANTICOAGULATION THERAPY VISIT (OUTPATIENT)
Dept: ANTICOAGULATION | Facility: CLINIC | Age: 77
End: 2017-03-24

## 2017-03-24 DIAGNOSIS — I82.403 DEEP VEIN THROMBOSIS (DVT) OF BOTH LOWER EXTREMITIES, UNSPECIFIED CHRONICITY, UNSPECIFIED VEIN (H): ICD-10-CM

## 2017-03-24 DIAGNOSIS — N17.9 ACUTE KIDNEY INJURY (H): ICD-10-CM

## 2017-03-24 DIAGNOSIS — Z79.01 LONG-TERM (CURRENT) USE OF ANTICOAGULANTS: ICD-10-CM

## 2017-03-24 DIAGNOSIS — L89.623 PRESSURE ULCER OF LEFT HEEL, STAGE 3 (H): Primary | ICD-10-CM

## 2017-03-24 LAB — INR PPP: 1.4

## 2017-03-24 PROCEDURE — 99202 OFFICE O/P NEW SF 15 MIN: CPT | Mod: 25 | Performed by: PODIATRIST

## 2017-03-24 PROCEDURE — 93923 UPR/LXTR ART STDY 3+ LVLS: CPT

## 2017-03-24 PROCEDURE — 11042 DBRDMT SUBQ TIS 1ST 20SQCM/<: CPT

## 2017-03-24 PROCEDURE — 27211158 ZZH IODOSORB GEL, 40 GM

## 2017-03-24 PROCEDURE — 99214 OFFICE O/P EST MOD 30 MIN: CPT | Mod: 25

## 2017-03-24 PROCEDURE — A6212 FOAM DRG <=16 SQ IN W/BORDER: HCPCS

## 2017-03-24 PROCEDURE — 11042 DBRDMT SUBQ TIS 1ST 20SQCM/<: CPT | Performed by: PODIATRIST

## 2017-03-24 NOTE — PROGRESS NOTES
[facility]      DATE OF VISIT:  03/24/2017      REQUESTING PHYSICIAN:  None stated.      SUBJECTIVE:  Mr. Priscilla Way is a 77-year-old male with dementia, we are seeing for a left posterior heel ulceration.  He is here with an  and his daughter.  He lives by himself with wound nurses that come in to help take care of him.  He has had this heel wound for a while and was told to come in to get it debrided.  Daughter is wondering about infection and notes that while they were here today, they accidentally caught his toe in his wheelchair and it was bleeding.  She would like that looked at as well.  Currently, no fevers noted.      PAST MEDICAL HISTORY:  Dementia, diabetes, liver and kidney transplants, chronic kidney disease.      MEDICATIONS:  Seroquel, Coumadin, Xanax, Prilosec, Prograf, Lasix, Coreg, Colace.      ALLERGIES:  No known drug allergies.      SOCIAL HISTORY:  Former smoker.      OBJECTIVE:    VITAL SIGNS:  Blood pressure is 184/74, respirations are 16, pulse is 81 and temperature is 98.4.     EXTREMITIES:  On physical exam, we are not able to palpate pulses, probably due to swelling; however, they appear biphasic on Doppler.  There is pitting edema noted to the feet and legs.  Small laceration noted to the medial aspect of the left great toe.  This is superficial.  The left posterior heel ulceration has a black eschar to the area.  After debridement measures 2.8 x 4.8 x 0.2 cm.  The base is fibrous.  No surrounding erythema, purulent drainage or malodor noted.  Fat layer is exposed but no necrosis of muscle or bone noted.      ASSESSMENT:  A 77-year-old diabetic male with edema and pressure ulceration, left heel with laceration of left great toe.      PLAN:  To the left great toe just put bacitracin and a bandage on it.  To the left heel we are doing Iodosorb dressing changes with Mepilex that will be changed by his home nurses 3 times a week.  We recommend offloading in a heel  suspension boot.  He was given an order for this.  He was given an order for ABIs to assess wound healing as he may need a possible vascular consult.  We will have him follow up in 1 month.         GENOVEVA PLEITEZ DPM             D: 2017 10:56   T: 2017 11:11   MT: SANCHEZ      Name:     YADIRA BREAUX   MRN:      [id number]        Account:      [id number]   :      1940           Visit Date:   2017      Document: [id number]

## 2017-03-24 NOTE — PROGRESS NOTES
ANTICOAGULATION FOLLOW-UP CLINIC VISIT    Patient Name:  Priscilla Way  Date:  3/24/2017  Contact Type:  Telephone    SUBJECTIVE:     Patient Findings     Positives Missed doses           OBJECTIVE    INR   Date Value Ref Range Status   03/24/2017 1.4  Final       ASSESSMENT / PLAN  INR assessment SUB    Recheck INR In: 3 DAYS    [facility] Location [facility]      Anticoagulation Summary as of 3/24/2017     INR goal 2.0-3.0   Today's INR 1.4!   Maintenance plan 3.75 mg (2.5 mg x 1.5) on Fri; 2.5 mg (2.5 mg x 1) all other days   Full instructions 3/24: 5 mg; 3/25: 3.75 mg; 3/26: 3.75 mg; Otherwise 3.75 mg on Fri; 2.5 mg all other days   Weekly total 18.75 mg   Plan last modified Sherry Georges RN (3/3/2017)   Next INR check 3/27/2017   Priority INR   Target end date     Indications   Acute kidney injury (H) [N17.9]  Deep vein thrombosis (DVT) (H) [I82.409] [I82.409]  Long-term (current) use of anticoagulants [Z79.01] [Z79.01]         Anticoagulation Episode Summary     INR check location     Preferred lab     [facility]    Comments [facility] to draw INR's  Pt has dementia please contact daughter with any questions. Contact daughter May at [phone].  Has 2.5mg tablets       Anticoagulation Care Providers     Provider Role Specialty Phone number    Randy Fuentes MD [facility] [phone]            See the Encounter Report to view Anticoagulation Flowsheet and Dosing Calendar (Go to Encounters tab in chart review, and find the Anticoagulation Therapy Visit)    Spoke with Home care nurse    Jodee Barron RN

## 2017-03-24 NOTE — MR AVS SNAPSHOT
Priscilla Way   3/24/2017   Anticoagulation Therapy Visit    Description:  77 year old male   Provider:  Jodee Barron, RN   Department:  [facility]           INR as of 3/24/2017     Today's INR 1.4!      Anticoagulation Summary as of 3/24/2017     INR goal 2.0-3.0   Today's INR 1.4!   Full instructions 3/24: 5 mg; 3/25: 3.75 mg; 3/26: 3.75 mg; Otherwise 3.75 mg on Fri; 2.5 mg all other days   Next INR check 3/27/2017    Indications   Acute kidney injury (H) [N17.9]  Deep vein thrombosis (DVT) (H) [I82.409] [I82.409]  Long-term (current) use of anticoagulants [Z79.01] [Z79.01]         March 2017 Details    Sun Mon Tue Wed Thu Fri Sat        1               2               3               4                 5               6               7               8               9               10               11                 12               13               14               15               16               17               18                 19               20               21               22               23               24      5 mg   See details      25      3.75 mg           26      3.75 mg         27            28               29               30               31                 Date Details   03/24 This INR check       Date of next INR:  3/27/2017         How to take your warfarin dose     To take:  2.5 mg Take 1 of the 2.5 mg tablets.    To take:  3.75 mg Take 1.5 of the 2.5 mg tablets.    To take:  5 mg Take 2 of the 2.5 mg tablets.

## 2017-03-27 ENCOUNTER — ANTICOAGULATION THERAPY VISIT (OUTPATIENT)
Dept: ANTICOAGULATION | Facility: CLINIC | Age: 77
End: 2017-03-27

## 2017-03-27 DIAGNOSIS — I82.403 DEEP VEIN THROMBOSIS (DVT) OF BOTH LOWER EXTREMITIES, UNSPECIFIED CHRONICITY, UNSPECIFIED VEIN (H): ICD-10-CM

## 2017-03-27 DIAGNOSIS — Z79.01 LONG-TERM (CURRENT) USE OF ANTICOAGULANTS: ICD-10-CM

## 2017-03-27 DIAGNOSIS — N17.9 ACUTE KIDNEY INJURY (H): ICD-10-CM

## 2017-03-27 LAB — INR POINT OF CARE: 1.7 (ref 0.86–1.14)

## 2017-03-27 NOTE — PROGRESS NOTES
ANTICOAGULATION FOLLOW-UP CLINIC VISIT    Patient Name:  Priscilla Way  Date:  3/27/2017  Contact Type:  Telephone    SUBJECTIVE:     Patient Findings     Positives No Problem Findings           OBJECTIVE    INR Protime   Date Value Ref Range Status   03/27/2017 1.7 (A) 0.86 - 1.14 Final       ASSESSMENT / PLAN  INR assessment SUB    Recheck INR In: 3 DAYS    [facility] Location [facility]      Anticoagulation Summary as of 3/27/2017     INR goal 2.0-3.0   Today's INR 1.7!   Maintenance plan 3.75 mg (2.5 mg x 1.5) on Fri; 2.5 mg (2.5 mg x 1) all other days   Full instructions 3/27: 5 mg; 3/28: 5 mg; 3/29: 3.75 mg; Otherwise 3.75 mg on Fri; 2.5 mg all other days   Weekly total 18.75 mg   Plan last modified Sherry Georges RN (3/3/2017)   Next INR check 3/30/2017   Priority INR   Target end date     Indications   Acute kidney injury (H) [N17.9]  Deep vein thrombosis (DVT) (H) [I82.409] [I82.409]  Long-term (current) use of anticoagulants [Z79.01] [Z79.01]         Anticoagulation Episode Summary     INR check location     Preferred lab     Send INR reminders to [facility]    Comments [facility] to draw INR's  Pt has dementia please contact daughter with any questions. Contact daughter May at [phone].  Has 2.5mg tablets       Anticoagulation Care Providers     Provider Role Specialty Phone number    Randy Fuentes MD [facility] [phone]            See the Encounter Report to view Anticoagulation Flowsheet and Dosing Calendar (Go to Encounters tab in chart review, and find the Anticoagulation Therapy Visit)    Message is left for home care nurse Amelia Bernal RN

## 2017-03-27 NOTE — MR AVS SNAPSHOT
Priscilla Way   3/27/2017   Anticoagulation Therapy Visit    Description:  77 year old male   Provider:  Jennifer Bernal, RN   Department:  [facility]           INR as of 3/27/2017     Today's INR 1.7!      Anticoagulation Summary as of 3/27/2017     INR goal 2.0-3.0   Today's INR 1.7!   Full instructions 3/27: 5 mg; 3/28: 5 mg; 3/29: 3.75 mg; Otherwise 3.75 mg on Fri; 2.5 mg all other days   Next INR check 3/30/2017    Indications   Acute kidney injury (H) [N17.9]  Deep vein thrombosis (DVT) (H) [I82.409] [I82.409]  Long-term (current) use of anticoagulants [Z79.01] [Z79.01]         March 2017 Details    Sun Mon Tue Wed Thu Fri Sat        1               2               3               4                 5               6               7               8               9               10               11                 12               13               14               15               16               17               18                 19               20               21               22               23               24               25                 26               27      5 mg   See details      28      5 mg         29      3.75 mg         30            31                 Date Details   03/27 This INR check       Date of next INR:  3/30/2017         How to take your warfarin dose     To take:  2.5 mg Take 1 of the 2.5 mg tablets.    To take:  3.75 mg Take 1.5 of the 2.5 mg tablets.    To take:  5 mg Take 2 of the 2.5 mg tablets.

## 2017-03-28 ENCOUNTER — TELEPHONE (OUTPATIENT)
Dept: INTERNAL MEDICINE | Facility: CLINIC | Age: 77
End: 2017-03-28

## 2017-03-28 NOTE — TELEPHONE ENCOUNTER
Called back with verbal auth.  ---------------------------------------      ----- Message from Fabienne Canela RN sent at 3/27/2017  7:25 PM CDT -----  From voice mail    Call from RN with [facility] requesting orders:   - Skilled nursing visits     - 1 time per week for 8 weeks     - 3 PRN    Phone #[phone]

## 2017-03-29 ENCOUNTER — MEDICAL CORRESPONDENCE (OUTPATIENT)
Dept: HEALTH INFORMATION MANAGEMENT | Facility: CLINIC | Age: 77
End: 2017-03-29

## 2017-03-29 DIAGNOSIS — Z76.0 MEDICATION REFILL: Primary | ICD-10-CM

## 2017-03-30 ENCOUNTER — TELEPHONE (OUTPATIENT)
Dept: INTERNAL MEDICINE | Facility: CLINIC | Age: 77
End: 2017-03-30

## 2017-03-30 NOTE — TELEPHONE ENCOUNTER
Called back with verbal auth.  --------------------------------------        ----- Message from August Linda sent at 3/29/2017  3:48 PM CDT -----  Regarding: orders  Contact: [phone]  Vickey calling from [facility]    Requesting verbal order for Wound care to R hallux trauma wound     Cleanse with NS, apply adaptic, cover with gauze and tape.     Dressing to be changed MWF and PRN.    Can leave voicemail if you can't get a hold of her.

## 2017-03-31 ENCOUNTER — ANTICOAGULATION THERAPY VISIT (OUTPATIENT)
Dept: ANTICOAGULATION | Facility: CLINIC | Age: 77
End: 2017-03-31

## 2017-03-31 DIAGNOSIS — K21.9 ESOPHAGEAL REFLUX: ICD-10-CM

## 2017-03-31 DIAGNOSIS — N17.9 ACUTE KIDNEY INJURY (H): ICD-10-CM

## 2017-03-31 DIAGNOSIS — Z79.01 LONG-TERM (CURRENT) USE OF ANTICOAGULANTS: ICD-10-CM

## 2017-03-31 DIAGNOSIS — I82.403 DEEP VEIN THROMBOSIS (DVT) OF BOTH LOWER EXTREMITIES, UNSPECIFIED CHRONICITY, UNSPECIFIED VEIN (H): ICD-10-CM

## 2017-03-31 DIAGNOSIS — R41.3 LOSS OF MEMORY: ICD-10-CM

## 2017-03-31 LAB — INR PPP: 3.2

## 2017-03-31 NOTE — PROGRESS NOTES
ANTICOAGULATION FOLLOW-UP CLINIC VISIT    Patient Name:  Priscilla Way  Date:  3/31/2017  Contact Type:  Telephone    SUBJECTIVE:     Patient Findings     Comments Pradeep reports that patient took all doses.           OBJECTIVE    INR   Date Value Ref Range Status   03/31/2017 3.2  Final       ASSESSMENT / PLAN  INR assessment SUPRA    Recheck INR In: 3 DAYS    [facility]R Location [facility]      Anticoagulation Summary as of 3/31/2017     INR goal 2.0-3.0   Today's INR 3.2!   Maintenance plan No maintenance plan   Full instructions 3/31: 2.5 mg; 4/1: 2.5 mg; 4/2: 3.75 mg   Plan last modified Dayana Ervin, RN (3/31/2017)   Next INR check 4/3/2017   Priority INR   Target end date     Indications   Acute kidney injury (H) [N17.9]  Deep vein thrombosis (DVT) (H) [I82.409] [I82.409]  Long-term (current) use of anticoagulants [Z79.01] [Z79.01]         Anticoagulation Episode Summary     INR check location     Preferred lab     Send INR reminders to [facility]    Comments [facility] to draw INR's  Pt has dementia please contact daughter with any questions. Contact daughter May at [phone].  Has 2.5mg tablets       Anticoagulation Care Providers     Provider Role Specialty Phone number    Randy Fuentes MD [facility] [phone]            See the Encounter Report to view Anticoagulation Flowsheet and Dosing Calendar (Go to Encounters tab in chart review, and find the Anticoagulation Therapy Visit)    Spoke with Pradeep.    Dayana Ervin, TED

## 2017-03-31 NOTE — MR AVS SNAPSHOT
Priscilla Way   3/31/2017   Anticoagulation Therapy Visit    Description:  77 year old male   Provider:  Dayana Ervin, RN   [facility]           INR as of 3/31/2017     Today's INR 3.2!      Anticoagulation Summary as of 3/31/2017     INR goal 2.0-3.0   Today's INR 3.2!   Full instructions 3/31: 2.5 mg; 4/1: 2.5 mg; 4/2: 3.75 mg   Next INR check 4/3/2017    Indications   Acute kidney injury (H) [N17.9]  Deep vein thrombosis (DVT) (H) [I82.409] [I82.409]  Long-term (current) use of anticoagulants [Z79.01] [Z79.01]         March 2017 Details    Sun Mon Tue Wed Thu Fri Sat        1               2               3               4                 5               6               7               8               9               10               11                 12               13               14               15               16               17               18                 19               20               21               22               23               24               25                 26               27               28               29               30               31      2.5 mg   See details        Date Details   03/31 This INR check               How to take your warfarin dose     To take:  2.5 mg Take 1 of the 2.5 mg tablets.           April 2017 Details    Sun Mon Tue Wed Thu Fri Sat           1      2.5 mg           2      3.75 mg         3            4               5               6               7               8                 9               10               11               12               13               14               15                 16               17               18               19               20               21               22                 23               24               25               26               27               28               29                 30                      Date Details   No additional details    Date of next INR:   4/3/2017         How to take your warfarin dose     To take:  2.5 mg Take 1 of the 2.5 mg tablets.    To take:  3.75 mg Take 1.5 of the 2.5 mg tablets.

## 2017-04-03 ENCOUNTER — ANTICOAGULATION THERAPY VISIT (OUTPATIENT)
Dept: ANTICOAGULATION | Facility: CLINIC | Age: 77
End: 2017-04-03

## 2017-04-03 DIAGNOSIS — Z79.01 LONG-TERM (CURRENT) USE OF ANTICOAGULANTS: ICD-10-CM

## 2017-04-03 DIAGNOSIS — I82.403 DEEP VEIN THROMBOSIS (DVT) OF BOTH LOWER EXTREMITIES, UNSPECIFIED CHRONICITY, UNSPECIFIED VEIN (H): ICD-10-CM

## 2017-04-03 DIAGNOSIS — N17.9 ACUTE KIDNEY INJURY (H): ICD-10-CM

## 2017-04-03 LAB — INR PPP: 3.3

## 2017-04-03 RX ORDER — MEMANTINE HYDROCHLORIDE 21 MG/1
21 CAPSULE, EXTENDED RELEASE ORAL DAILY
Qty: 90 CAPSULE | Refills: 1 | Status: SHIPPED | OUTPATIENT
Start: 2017-04-03 | End: 2017-09-28

## 2017-04-03 NOTE — TELEPHONE ENCOUNTER
omeprazole (PRILOSEC) 20 MG      Last Written Prescription Date:  12/9/16  Last Fill Quantity: 30,   # refills: 0  Last Office Visit : 12/23/16  Future Office visit:  0     memantine XR (NAMENDA XR) 21 MG      Last Written Prescription Date:  12/29/16  Last Fill Quantity: 90,   # refills: 0  Creatinine   Date Value Ref Range Status   12/23/2016 1.28 (H) 0.66 - 1.25 mg/dL Final

## 2017-04-03 NOTE — PROGRESS NOTES
ANTICOAGULATION FOLLOW-UP CLINIC VISIT    Patient Name:  Priscilla Way  Date:  4/3/2017  Contact Type:  Telephone    SUBJECTIVE:     Patient Findings     Positives No Problem Findings           OBJECTIVE    INR   Date Value Ref Range Status   04/03/2017 3.3  Final       ASSESSMENT / PLAN  INR assessment SUPRA    Recheck INR In: 1 WEEK    INR Location [facility]      Anticoagulation Summary as of 4/3/2017     INR goal 2.0-3.0   Today's INR 3.3!   Maintenance plan No maintenance plan   Full instructions 4/3: 2.5 mg; 4/4: 3.75 mg; 4/5: 2.5 mg; 4/6: 3.75 mg; 4/7: 2.5 mg; 4/8: 3.75 mg; 4/9: 2.5 mg   Plan last modified Dayana Ervin RN (3/31/2017)   Next INR check 4/10/2017   Priority INR   Target end date     Indications   Acute kidney injury (H) [N17.9]  Deep vein thrombosis (DVT) (H) [I82.409] [I82.409]  Long-term (current) use of anticoagulants [Z79.01] [Z79.01]         Anticoagulation Episode Summary     INR check location     Preferred lab     [facility] reminders [facility]    Comments [facility] to draw INR's  Pt has dementia please contact daughter with any questions. Contact daughter May at [phone].  Has 2.5mg tablets       Anticoagulation Care Providers     Provider Role Specialty Phone number    Randy Fuentes MD [facility] [phone]            See the Encounter Report to view Anticoagulation Flowsheet and Dosing Calendar (Go to Encounters tab in chart review, and find the Anticoagulation Therapy Visit)    Spoke with [facility] nurse.    Sherry Georges RN

## 2017-04-07 ENCOUNTER — TELEPHONE (OUTPATIENT)
Dept: TRANSPLANT | Facility: CLINIC | Age: 77
End: 2017-04-07

## 2017-04-07 DIAGNOSIS — Z94.4 HISTORY OF LIVER TRANSPLANT (H): Primary | ICD-10-CM

## 2017-04-07 DIAGNOSIS — Z79.899 ENCOUNTER FOR LONG-TERM (CURRENT) USE OF HIGH-RISK MEDICATION: ICD-10-CM

## 2017-04-07 RX ORDER — TACROLIMUS 0.5 MG/1
CAPSULE ORAL
Qty: 150 CAPSULE | Refills: 11 | Status: SHIPPED | OUTPATIENT
Start: 2017-04-07 | End: 2018-01-03

## 2017-04-10 ENCOUNTER — ANTICOAGULATION THERAPY VISIT (OUTPATIENT)
Dept: ANTICOAGULATION | Facility: CLINIC | Age: 77
End: 2017-04-10

## 2017-04-10 DIAGNOSIS — N17.9 ACUTE KIDNEY INJURY (H): ICD-10-CM

## 2017-04-10 DIAGNOSIS — Z79.01 LONG-TERM (CURRENT) USE OF ANTICOAGULANTS: ICD-10-CM

## 2017-04-10 DIAGNOSIS — I82.403 DEEP VEIN THROMBOSIS (DVT) OF BOTH LOWER EXTREMITIES, UNSPECIFIED CHRONICITY, UNSPECIFIED VEIN (H): ICD-10-CM

## 2017-04-10 LAB — INR PPP: 1.5

## 2017-04-10 NOTE — PROGRESS NOTES
ANTICOAGULATION FOLLOW-UP CLINIC VISIT    Patient Name:  Priscilla Way  Date:  4/10/2017  Contact Type:  Telephone    SUBJECTIVE:     Patient Findings     Comments 4/9-Missed a dose of coumadin 3.75mg.             OBJECTIVE    INR   Date Value Ref Range Status   04/03/2017 3.3  Final       ASSESSMENT / PLAN  INR assessment SUB    Recheck INR In: 4 DAYS    INR Location [facility]      Anticoagulation Summary as of 4/10/2017     INR goal 2.0-3.0   Today's INR    Maintenance plan No maintenance plan   Full instructions 4/10: 5 mg; 4/11: 5 mg; 4/12: 2.5 mg; 4/13: 3.75 mg   Plan last modified Dayana Ervin RN (3/31/2017)   Next INR check 4/14/2017   Priority INR   Target end date     Indications   Acute kidney injury (H) [N17.9]  Deep vein thrombosis (DVT) (H) [I82.409] [I82.409]  Long-term (current) use of anticoagulants [Z79.01] [Z79.01]         Anticoagulation Episode Summary     INR check location     Preferred lab     Send [facility]ers [facility]    Comments [facility] to draw INR's  Pt has dementia please contact daughter with any questions. Contact daughter May at [phone].  Has 2.5mg tablets       Anticoagulation Care Providers     Provider Role Specialty Phone number    Randy Fuentes MD [facility] [phone]            See the Encounter Report to view Anticoagulation Flowsheet and Dosing Calendar (Go to Encounters tab in chart review, and find the Anticoagulation Therapy Visit)  Spoke with Alison [facility] nurse.    Sherry Georges RN

## 2017-04-10 NOTE — MR AVS SNAPSHOT
Priscilla Way   4/10/2017   Anticoagulation Therapy Visit    Description:  77 year old male   Provider:  Sherry Georges, RN   Department:  [facility]           INR as of 4/10/2017     Today's INR       Anticoagulation Summary as of 4/10/2017     INR goal 2.0-3.0   Today's INR    Full instructions 4/10: 5 mg; 4/11: 5 mg; 4/12: 2.5 mg; 4/13: 3.75 mg   Next INR check 4/14/2017    Indications   Acute kidney injury (H) [N17.9]  Deep vein thrombosis (DVT) (H) [I82.409] [I82.409]  Long-term (current) use of anticoagulants [Z79.01] [Z79.01]         April 2017 Details    Sun Mon Tue Wed Thu Fri Sat           1                 2               3               4               5               6               7               8                 9               10      5 mg   See details      11      5 mg         12      2.5 mg         13      3.75 mg         14            15                 16               17               18               19               20               21               22                 23               24               25               26               27               28               29                 30                      Date Details   04/10 This INR check       Date of next INR:  4/14/2017         How to take your warfarin dose     To take:  2.5 mg Take 1 of the 2.5 mg tablets.    To take:  3.75 mg Take 1.5 of the 2.5 mg tablets.    To take:  5 mg Take 2 of the 2.5 mg tablets.

## 2017-04-14 ENCOUNTER — ANTICOAGULATION THERAPY VISIT (OUTPATIENT)
Dept: ANTICOAGULATION | Facility: CLINIC | Age: 77
End: 2017-04-14

## 2017-04-14 DIAGNOSIS — N17.9 ACUTE KIDNEY INJURY (H): ICD-10-CM

## 2017-04-14 DIAGNOSIS — I82.403 DEEP VEIN THROMBOSIS (DVT) OF BOTH LOWER EXTREMITIES, UNSPECIFIED CHRONICITY, UNSPECIFIED VEIN (H): ICD-10-CM

## 2017-04-14 DIAGNOSIS — Z79.01 LONG-TERM (CURRENT) USE OF ANTICOAGULANTS: ICD-10-CM

## 2017-04-14 LAB — INR PPP: 2.5

## 2017-04-14 NOTE — PROGRESS NOTES
ANTICOAGULATION FOLLOW-UP CLINIC VISIT    Patient Name:  Priscilla Way  Date:  4/14/2017  Contact Type:  Telephone    SUBJECTIVE:     Patient Findings     Positives No Problem Findings           OBJECTIVE    INR   Date Value Ref Range Status   04/14/2017 2.5  Final       ASSESSMENT / PLAN  INR assessment THER    Recheck INR In: 1 WEEK    INR Location [facility]      Anticoagulation Summary as of 4/14/2017     INR goal 2.0-3.0   Today's INR 2.5   Maintenance plan 3.75 mg (2.5 mg x 1.5) on Tue, Thu, Sat; 2.5 mg (2.5 mg x 1) all other days   Full instructions 3.75 mg on Tue, Thu, Sat; 2.5 mg all other days   Weekly total 21.25 mg   Plan last modified Jodee Barron RN (4/14/2017)   Next INR check 4/21/2017   Priority INR   Target end date     Indications   Acute kidney injury (H) [N17.9]  Deep vein thrombosis (DVT) (H) [I82.409] [I82.409]  Long-term (current) use of anticoagulants [Z79.01] [Z79.01]         Anticoagulation Episode Summary     INR check location     Preferred lab     Send [facility]ers [facility]    Comments [facility] to draw INR's  Pt has dementia please contact daughter with any questions. Contact daughter May at [phone].  Has 2.5mg tablets       Anticoagulation Care Providers     Provider Role Specialty Phone number    Randy Fuentes MD [facility] [phone]            See the Encounter Report to view Anticoagulation Flowsheet and Dosing Calendar (Go to Encounters tab in chart review, and find the Anticoagulation Therapy Visit)    Spoke with ELISA Alford. Set a maintenance dose today to try, as it has become difficult to follow his dosing. This can be changed as indicated. Assume that the INR on Monday was low due to missing the dose on 4/9. For this reason, propose trying again with that recommended dose to see if it is adequate when all doses are given.     Jodee Barron, TED

## 2017-04-14 NOTE — MR AVS SNAPSHOT
Priscilla Way   4/14/2017   Anticoagulation Therapy Visit    Description:  77 year old male   Provider:  Jodee Barron, RN   [facility]           INR as of 4/14/2017     Today's INR 2.5      Anticoagulation Summary as of 4/14/2017     INR goal 2.0-3.0   Today's INR 2.5   Full instructions 3.75 mg on Tue, Thu, Sat; 2.5 mg all other days   Next INR check 4/21/2017    Indications   Acute kidney injury (H) [N17.9]  Deep vein thrombosis (DVT) (H) [I82.409] [I82.409]  Long-term (current) use of anticoagulants [Z79.01] [Z79.01]         April 2017 Details    Sun Mon Tue Wed Thu Fri Sat           1                 2               3               4               5               6               7               8                 9               10               11               12               13               14      2.5 mg   See details      15      3.75 mg           16      2.5 mg         17      2.5 mg         18      3.75 mg         19      2.5 mg         20      3.75 mg         21            22                 23               24               25               26               27               28               29                 30                      Date Details   04/14 This INR check       Date of next INR:  4/21/2017         How to take your warfarin dose     To take:  2.5 mg Take 1 of the 2.5 mg tablets.    To take:  3.75 mg Take 1.5 of the 2.5 mg tablets.

## 2017-04-21 ENCOUNTER — ANTICOAGULATION THERAPY VISIT (OUTPATIENT)
Dept: ANTICOAGULATION | Facility: CLINIC | Age: 77
End: 2017-04-21

## 2017-04-21 DIAGNOSIS — Z94.4 HISTORY OF LIVER TRANSPLANT (H): Primary | ICD-10-CM

## 2017-04-21 DIAGNOSIS — Z79.01 LONG-TERM (CURRENT) USE OF ANTICOAGULANTS: ICD-10-CM

## 2017-04-21 DIAGNOSIS — I82.403 DEEP VEIN THROMBOSIS (DVT) OF BOTH LOWER EXTREMITIES, UNSPECIFIED CHRONICITY, UNSPECIFIED VEIN (H): ICD-10-CM

## 2017-04-21 DIAGNOSIS — N17.9 ACUTE KIDNEY INJURY (H): ICD-10-CM

## 2017-04-21 LAB
ERYTHROCYTE [DISTWIDTH] IN BLOOD BY AUTOMATED COUNT: 14.3 % (ref 10–15)
HCT VFR BLD AUTO: 31 % (ref 40–53)
HGB BLD-MCNC: 10 G/DL (ref 13.3–17.7)
INR PPP: 2.68 (ref 0.86–1.14)
MCH RBC QN AUTO: 30.3 PG (ref 26.5–33)
MCHC RBC AUTO-ENTMCNC: 32.3 G/DL (ref 31.5–36.5)
MCV RBC AUTO: 94 FL (ref 78–100)
PLATELET # BLD AUTO: 371 10E9/L (ref 150–450)
RBC # BLD AUTO: 3.3 10E12/L (ref 4.4–5.9)
WBC # BLD AUTO: 5.3 10E9/L (ref 4–11)

## 2017-04-21 PROCEDURE — 80197 ASSAY OF TACROLIMUS: CPT | Performed by: INTERNAL MEDICINE

## 2017-04-21 PROCEDURE — 85610 PROTHROMBIN TIME: CPT | Performed by: INTERNAL MEDICINE

## 2017-04-21 PROCEDURE — 85027 COMPLETE CBC AUTOMATED: CPT | Performed by: INTERNAL MEDICINE

## 2017-04-21 NOTE — PROGRESS NOTES
ANTICOAGULATION FOLLOW-UP CLINIC VISIT    Patient Name:  Priscilla Way  Date:  4/21/2017  Contact Type:  Telephone    SUBJECTIVE:     Patient Findings     Positives No Problem Findings           OBJECTIVE    INR   Date Value Ref Range Status   04/21/2017 2.68 (H) 0.86 - 1.14 Final       ASSESSMENT / PLAN  INR assessment THER    Recheck INR In: 1 WEEK    [facility] Location [facility]      Anticoagulation Summary as of 4/21/2017     INR goal 2.0-3.0   Today's INR 2.68   Maintenance plan 3.75 mg (2.5 mg x 1.5) on Tue, Thu, Sat; 2.5 mg (2.5 mg x 1) all other days   Full instructions 3.75 mg on Tue, Thu, Sat; 2.5 mg all other days   Weekly total 21.25 mg   No change documented Jodee Barron RN   Plan last modified Jodee Barron RN (4/14/2017)   Next INR check 4/28/2017   Priority INR   Target end date     Indications   Acute kidney injury (H) [N17.9]  Deep vein thrombosis (DVT) (H) [I82.409] [I82.409]  Long-term (current) use of anticoagulants [Z79.01] [Z79.01]         Anticoagulation Episode Summary     INR check location     Preferred lab     Send [facility]ers [facility]    Comments [facility] to draw INR's  Pt has dementia please contact daughter with any questions. Contact daughter May at [phone].  Has 2.5mg tablets       Anticoagulation Care Providers     Provider Role Specialty Phone number    Randy Fuentes MD [facility] [phone]            See the Encounter Report to view Anticoagulation Flowsheet and Dosing Calendar (Go to Encounters tab in chart review, and find the Anticoagulation Therapy Visit)    Spoke with ELISA Alford RN

## 2017-04-21 NOTE — MR AVS SNAPSHOT
Priscilla Way   4/21/2017   Anticoagulation Therapy Visit    Description:  77 year old male   Provider:  Jodee Barron, RN   [facility]           INR as of 4/21/2017     Today's INR 2.68      Anticoagulation Summary as of 4/21/2017     INR goal 2.0-3.0   Today's INR 2.68   Full instructions 3.75 mg on Tue, Thu, Sat; 2.5 mg all other days   Next INR check 4/28/2017    Indications   Acute kidney injury (H) [N17.9]  Deep vein thrombosis (DVT) (H) [I82.409] [I82.409]  Long-term (current) use of anticoagulants [Z79.01] [Z79.01]         April 2017 Details    Sun Mon Tue Wed Thu Fri Sat           1                 2               3               4               5               6               7               8                 9               10               11               12               13               14               15                 16               17               18               19               20               21      2.5 mg   See details      22      3.75 mg           23      2.5 mg         24      2.5 mg         25      3.75 mg         26      2.5 mg         27      3.75 mg         28            29                 30                      Date Details   04/21 This INR check       Date of next INR:  4/28/2017         How to take your warfarin dose     To take:  2.5 mg Take 1 of the 2.5 mg tablets.    To take:  3.75 mg Take 1.5 of the 2.5 mg tablets.

## 2017-04-22 LAB
TACROLIMUS BLD-MCNC: 3.5 UG/L (ref 5–15)
TME LAST DOSE: ABNORMAL H

## 2017-04-28 ENCOUNTER — ANTICOAGULATION THERAPY VISIT (OUTPATIENT)
Dept: ANTICOAGULATION | Facility: CLINIC | Age: 77
End: 2017-04-28

## 2017-04-28 DIAGNOSIS — I82.403 DEEP VEIN THROMBOSIS (DVT) OF BOTH LOWER EXTREMITIES, UNSPECIFIED CHRONICITY, UNSPECIFIED VEIN (H): ICD-10-CM

## 2017-04-28 DIAGNOSIS — Z79.01 LONG-TERM (CURRENT) USE OF ANTICOAGULANTS: ICD-10-CM

## 2017-04-28 DIAGNOSIS — N17.9 ACUTE KIDNEY INJURY (H): ICD-10-CM

## 2017-04-28 LAB — INR PPP: 3.9

## 2017-04-28 NOTE — MR AVS SNAPSHOT
Priscilla Way   4/28/2017   Anticoagulation Therapy Visit    Description:  77 year old male   Provider:  Shabana Sorto, RN   [facility]           INR as of 4/28/2017     Today's INR 3.90!      Anticoagulation Summary as of 4/28/2017     INR goal 2.0-3.0   Today's INR 3.90!   Full instructions 4/28: 1.25 mg; 4/29: 2.5 mg; Otherwise 3.75 mg on Tue, Thu, Sat; 2.5 mg all other days   Next INR check 5/2/2017    Indications   Acute kidney injury (H) [N17.9]  Deep vein thrombosis (DVT) (H) [I82.409] [I82.409]  Long-term (current) use of anticoagulants [Z79.01] [Z79.01]         April 2017 Details    Sun Mon Tue Wed Thu Fri Sat           1                 2               3               4               5               6               7               8                 9               10               11               12               13               14               15                 16               17               18               19               20               21               22                 23               24               25               26               27               28      1.25 mg   See details      29      2.5 mg           30      2.5 mg                Date Details   04/28 This INR check               How to take your warfarin dose     To take:  1.25 mg Take 0.5 of a 2.5 mg tablet.    To take:  2.5 mg Take 1 of the 2.5 mg tablets.           May 2017 Details    Sun Mon Tue Wed Thu Fri Sat      1      2.5 mg         2            3               4               5               6                 7               8               9               10               11               12               13                 14               15               16               17               18               19               20                 21               22               23               24               25               26               27                 28               29                30               31                   Date Details   No additional details    Date of next INR:  5/2/2017         How to take your warfarin dose     To take:  2.5 mg Take 1 of the 2.5 mg tablets.    To take:  3.75 mg Take 1.5 of the 2.5 mg tablets.

## 2017-04-28 NOTE — PROGRESS NOTES
ANTICOAGULATION FOLLOW-UP CLINIC VISIT    Patient Name:  Priscilla Way  Date:  4/28/2017  Contact Type:  Telephone    SUBJECTIVE:     Patient Findings     Positives Unexplained INR or factor level change    Comments Home Care Nurse assessed pt and denies any bleeding or bruising            OBJECTIVE    INR   Date Value Ref Range Status   04/28/2017 3.90  Final     Comment:     Home [facility]        ASSESSMENT / PLAN  INR assessment SUPRA    Recheck INR In: 4 DAYS    INR Location [facility]R      Anticoagulation Summary as of 4/28/2017     INR goal 2.0-3.0   Today's INR 3.90!   Maintenance plan 3.75 mg (2.5 mg x 1.5) on Tue, Thu, Sat; 2.5 mg (2.5 mg x 1) all other days   Full instructions 4/28: 1.25 mg; 4/29: 2.5 mg; Otherwise 3.75 mg on Tue, Thu, Sat; 2.5 mg all other days   Weekly total 21.25 mg   Plan last modified Jodee Barron RN (4/14/2017)   Next INR check 5/2/2017   Priority INR   Target end date     Indications   Acute kidney injury (H) [N17.9]  Deep vein thrombosis (DVT) (H) [I82.409] [I82.409]  Long-term (current) use of anticoagulants [Z79.01] [Z79.01]         Anticoagulation Episode Summary     INR check location     Preferred lab     Send INR reminders to [facility]    Comments [facility] to draw INR's  Pt has dementia please contact daughter with any questions. Contact daughter May at [phone].  Has 2.5mg tablets       Anticoagulation Care Providers     Provider Role Specialty Phone number    Randy Fuentes MD [facility] [phone]            See the Encounter Report to view Anticoagulation Flowsheet and Dosing Calendar (Go to Encounters tab in chart review, and find the Anticoagulation Therapy Visit)    Spoke with PRATIMA Rosario. Gave them their lab results and new warfarin recommendation.  No changes in health, medication, or diet. No missed doses, no falls. No signs or symptoms of bleed or clotting.     Shabana Sorto, RN

## 2017-05-02 ENCOUNTER — ANTICOAGULATION THERAPY VISIT (OUTPATIENT)
Dept: ANTICOAGULATION | Facility: CLINIC | Age: 77
End: 2017-05-02

## 2017-05-02 DIAGNOSIS — N17.9 ACUTE KIDNEY INJURY (H): ICD-10-CM

## 2017-05-02 DIAGNOSIS — Z79.01 LONG-TERM (CURRENT) USE OF ANTICOAGULANTS: ICD-10-CM

## 2017-05-02 DIAGNOSIS — I82.403 DEEP VEIN THROMBOSIS (DVT) OF BOTH LOWER EXTREMITIES, UNSPECIFIED CHRONICITY, UNSPECIFIED VEIN (H): ICD-10-CM

## 2017-05-02 LAB — INR PPP: 4.5

## 2017-05-02 NOTE — PROGRESS NOTES
ANTICOAGULATION FOLLOW-UP CLINIC VISIT    Patient Name:  Priscilla Way  Date:  5/2/2017  Contact Type:  Telephone    SUBJECTIVE:     Patient Findings     Positives No Problem Findings           OBJECTIVE    INR   Date Value Ref Range Status   05/02/2017 4.5  Final       ASSESSMENT / PLAN  INR assessment SUPRA    Recheck INR In: 3 DAYS    INR Location [facility]      Anticoagulation Summary as of 5/2/2017     INR goal 2.0-3.0   Today's INR 4.5!   Maintenance plan 3.75 mg (2.5 mg x 1.5) on Tue, Thu, Sat; 2.5 mg (2.5 mg x 1) all other days   Full instructions 5/2: Hold; Otherwise 3.75 mg on Tue, Thu, Sat; 2.5 mg all other days   Weekly total 21.25 mg   Plan last modified Jodee Barron RN (4/14/2017)   Next INR check 5/5/2017   Priority INR   Target end date     Indications   Acute kidney injury (H) [N17.9]  Deep vein thrombosis (DVT) (H) [I82.409] [I82.409]  Long-term (current) use of anticoagulants [Z79.01] [Z79.01]         Anticoagulation Episode Summary     INR check location     Preferred lab     [facility]    Comments [facility] to draw INR's  Pt has dementia please contact daughter with any questions. Contact daughter May at [phone].  Has 2.5mg tablets       Anticoagulation Care Providers     Provider Role Specialty Phone number    Randy Fuentes MD [facility] [phone]            See the Encounter Report to view Anticoagulation Flowsheet and Dosing Calendar (Go to Encounters tab in chart review, and find the Anticoagulation Therapy Visit)    Spoke with [facility] nurse.    Sherry Georges RN

## 2017-05-02 NOTE — MR AVS SNAPSHOT
Priscilla Way   5/2/2017   Anticoagulation Therapy Visit    Description:  77 year old male   Provider:  Sherry Georges RN   Department:  [facility]           INR as of 5/2/2017     Today's INR 4.5!      Anticoagulation Summary as of 5/2/2017     INR goal 2.0-3.0   Today's INR 4.5!   Full instructions 5/2: Hold; Otherwise 3.75 mg on Tue, Thu, Sat; 2.5 mg all other days   Next INR check 5/5/2017    Indications   Acute kidney injury (H) [N17.9]  Deep vein thrombosis (DVT) (H) [I82.409] [I82.409]  Long-term (current) use of anticoagulants [Z79.01] [Z79.01]         May 2017 Details    Sun Mon Tue Wed Thu Fri Sat      1               2      Hold   See details      3      2.5 mg         4      3.75 mg         5            6                 7               8               9               10               11               12               13                 14               15               16               17               18               19               20                 21               22               23               24               25               26               27                 28               29               30               31                   Date Details   05/02 This INR check       Date of next INR:  5/5/2017         How to take your warfarin dose     To take:  2.5 mg Take 1 of the 2.5 mg tablets.    To take:  3.75 mg Take 1.5 of the 2.5 mg tablets.    Hold Do not take your warfarin dose. See the Details table to the right for additional instructions.

## 2017-05-03 DIAGNOSIS — Z29.9 PREVENTIVE MEASURE: ICD-10-CM

## 2017-05-03 RX ORDER — PEDI MULTIVIT NO.25/FOLIC ACID 300 MCG
1 TABLET,CHEWABLE ORAL DAILY
Qty: 90 TABLET | Refills: 2 | Status: SHIPPED | OUTPATIENT
Start: 2017-05-03 | End: 2017-09-29

## 2017-05-03 NOTE — TELEPHONE ENCOUNTER
mult[employer]     Last Written Prescription Date:  4/26/16  Last Fill Quantity: 90,   # refills: 3  Last Office Visit : 12/23/16  Future Office visit:  no

## 2017-05-05 ENCOUNTER — ANTICOAGULATION THERAPY VISIT (OUTPATIENT)
Dept: ANTICOAGULATION | Facility: CLINIC | Age: 77
End: 2017-05-05

## 2017-05-05 DIAGNOSIS — I82.403 DEEP VEIN THROMBOSIS (DVT) OF BOTH LOWER EXTREMITIES, UNSPECIFIED CHRONICITY, UNSPECIFIED VEIN (H): ICD-10-CM

## 2017-05-05 DIAGNOSIS — Z79.01 LONG-TERM (CURRENT) USE OF ANTICOAGULANTS: ICD-10-CM

## 2017-05-05 DIAGNOSIS — N17.9 ACUTE KIDNEY INJURY (H): ICD-10-CM

## 2017-05-05 LAB — INR PPP: 2.4

## 2017-05-05 NOTE — PROGRESS NOTES
ANTICOAGULATION FOLLOW-UP CLINIC VISIT    Patient Name:  Priscilla Way  Date:  5/5/2017  Contact Type:  Telephone    SUBJECTIVE:     Patient Findings     Positives No Problem Findings           OBJECTIVE    INR   Date Value Ref Range Status   05/05/2017 2.4  Final       ASSESSMENT / PLAN  INR assessment THER    Recheck INR In: 4 DAYS    [facility]R Location [facility]      Anticoagulation Summary as of 5/5/2017     INR goal 2.0-3.0   Today's INR 2.4   Maintenance plan 3.75 mg (2.5 mg x 1.5) on Tue, Thu; 2.5 mg (2.5 mg x 1) all other days   Full instructions 3.75 mg on Tue, Thu; 2.5 mg all other days   Weekly total 20 mg   Plan last modified Jodee Barron RN (5/5/2017)   Next INR check 5/9/2017   Priority INR   Target end date     Indications   Acute kidney injury (H) [N17.9]  Deep vein thrombosis (DVT) (H) [I82.409] [I82.409]  Long-term (current) use of anticoagulants [Z79.01] [Z79.01]         Anticoagulation Episode Summary     INR check location     Preferred lab     [facility]    Comments [facility] to draw INR's  Pt has dementia please contact daughter with any questions. Contact daughter May at [phone].  Has 2.5mg tablets       Anticoagulation Care Providers     Provider Role Specialty Phone number    Randy Fuetnes MD [facility] [phone]            See the Encounter Report to view Anticoagulation Flowsheet and Dosing Calendar (Go to Encounters tab in chart review, and find the Anticoagulation Therapy Visit)    Spoke with home care nurse. There was no explanation for the recent high INRs.     Jodee Barron, TED

## 2017-05-05 NOTE — MR AVS SNAPSHOT
Priscilla Way   5/5/2017   Anticoagulation Therapy Visit    Description:  77 year old male   Provider:  Jodee Barron, RN   [facility]  [facility]           INR as of 5/5/2017     Today's INR 2.4      Anticoagulation Summary as of 5/5/2017     INR goal 2.0-3.0   Today's INR 2.4   Full instructions 3.75 mg on Tue, Thu; 2.5 mg all other days   Next INR check 5/9/2017    Indications   Acute kidney injury (H) [N17.9]  Deep vein thrombosis (DVT) (H) [I82.409] [I82.409]  Long-term (current) use of anticoagulants [Z79.01] [Z79.01]         May 2017 Details    Sun Mon Tue Wed Thu Fri Sat      1               2               3               4               5      2.5 mg   See details      6      2.5 mg           7      2.5 mg         8      2.5 mg         9            10               11               12               13                 14               15               16               17               18               19               20                 21               22               23               24               25               26               27                 28               29               30               31                   Date Details   05/05 This INR check       Date of next INR:  5/9/2017         How to take your warfarin dose     To take:  2.5 mg Take 1 of the 2.5 mg tablets.    To take:  3.75 mg Take 1.5 of the 2.5 mg tablets.

## 2017-05-09 ENCOUNTER — ANTICOAGULATION THERAPY VISIT (OUTPATIENT)
Dept: ANTICOAGULATION | Facility: CLINIC | Age: 77
End: 2017-05-09

## 2017-05-09 DIAGNOSIS — Z79.01 LONG-TERM (CURRENT) USE OF ANTICOAGULANTS: ICD-10-CM

## 2017-05-09 DIAGNOSIS — N17.9 ACUTE KIDNEY INJURY (H): ICD-10-CM

## 2017-05-09 DIAGNOSIS — I82.403 DEEP VEIN THROMBOSIS (DVT) OF BOTH LOWER EXTREMITIES, UNSPECIFIED CHRONICITY, UNSPECIFIED VEIN (H): ICD-10-CM

## 2017-05-09 LAB — INR PPP: 2.3

## 2017-05-09 NOTE — PROGRESS NOTES
ANTICOAGULATION FOLLOW-UP CLINIC VISIT    Patient Name:  Priscilla Way  Date:  5/9/2017  Contact Type:  Telephone    SUBJECTIVE:     Patient Findings     Positives No Problem Findings           OBJECTIVE    INR   Date Value Ref Range Status   05/09/2017 2.3  Final       ASSESSMENT / PLAN  INR assessment THER    Recheck INR In: 1 WEEK    [facility]R Location [facility]R      Anticoagulation Summary as of 5/9/2017     INR goal 2.0-3.0   Today's INR 2.3   Maintenance plan 3.75 mg (2.5 mg x 1.5) on Tue, Thu; 2.5 mg (2.5 mg x 1) all other days   Full instructions 3.75 mg on Tue, Thu; 2.5 mg all other days   Weekly total 20 mg   Plan last modified Jodee Barron RN (5/5/2017)   Next INR check 5/16/2017   Priority INR   Target end date     Indications   Acute kidney injury (H) [N17.9]  Deep vein thrombosis (DVT) (H) [I82.409] [I82.409]  Long-term (current) use of anticoagulants [Z79.01] [Z79.01]         Anticoagulation Episode Summary     INR check location     Preferred lab     Send [facility]ers to  [facility]    Comments [facility] to draw INR's  Pt has dementia please contact daughter with any questions. Contact daughter May at [phone].  Has 2.5mg tablets       Anticoagulation Care Providers     Provider Role Specialty Phone number    Randy Fuentes MD [facility] [phone]            See the Encounter Report to view Anticoagulation Flowsheet and Dosing Calendar (Go to Encounters tab in chart review, and find the Anticoagulation Therapy Visit)    Spoke with Alison [facility] nurse.    Sherry Georges RN

## 2017-05-09 NOTE — MR AVS SNAPSHOT
Priscilla Way   5/9/2017   Anticoagulation Therapy Visit    Description:  77 year old male   Provider:  Sherry Georges, RN   Department:  [facility]           INR as of 5/9/2017     Today's INR 2.3      Anticoagulation Summary as of 5/9/2017     INR goal 2.0-3.0   Today's INR 2.3   Full instructions 3.75 mg on Tue, Thu; 2.5 mg all other days   Next INR check 5/16/2017    Indications   Acute kidney injury (H) [N17.9]  Deep vein thrombosis (DVT) (H) [I82.409] [I82.409]  Long-term (current) use of anticoagulants [Z79.01] [Z79.01]         May 2017 Details    Sun Mon Tue Wed Thu Fri Sat      1               2               3               4               5               6                 7               8               9      3.75 mg   See details      10      2.5 mg         11      3.75 mg         12      2.5 mg         13      2.5 mg           14      2.5 mg         15      2.5 mg         16            17               18               19               20                 21               22               23               24               25               26               27                 28               29               30               31                   Date Details   05/09 This INR check       Date of next INR:  5/16/2017         How to take your warfarin dose     To take:  2.5 mg Take 1 of the 2.5 mg tablets.    To take:  3.75 mg Take 1.5 of the 2.5 mg tablets.

## 2017-05-16 ENCOUNTER — ANTICOAGULATION THERAPY VISIT (OUTPATIENT)
Dept: ANTICOAGULATION | Facility: CLINIC | Age: 77
End: 2017-05-16

## 2017-05-16 DIAGNOSIS — Z79.01 LONG-TERM (CURRENT) USE OF ANTICOAGULANTS: ICD-10-CM

## 2017-05-16 DIAGNOSIS — I82.403 DEEP VEIN THROMBOSIS (DVT) OF BOTH LOWER EXTREMITIES, UNSPECIFIED CHRONICITY, UNSPECIFIED VEIN (H): ICD-10-CM

## 2017-05-16 DIAGNOSIS — N17.9 ACUTE KIDNEY INJURY (H): ICD-10-CM

## 2017-05-16 LAB — INR PPP: 2

## 2017-05-16 NOTE — MR AVS SNAPSHOT
Priscilla Way   5/16/2017   Anticoagulation Therapy Visit    Description:  77 year old male   Provider:  Shabana Sorto, RN   Department:  [facility]           INR as of 5/16/2017     Today's INR 2.00      Anticoagulation Summary as of 5/16/2017     INR goal 2.0-3.0   Today's INR 2.00   Full instructions 3.75 mg on Tue, Thu; 2.5 mg all other days   Next INR check 5/23/2017    Indications   Acute kidney injury (H) [N17.9]  Deep vein thrombosis (DVT) (H) [I82.409] [I82.409]  Long-term (current) use of anticoagulants [Z79.01] [Z79.01]         May 2017 Details    Sun Mon Tue Wed Thu Fri Sat      1               2               3               4               5               6                 7               8               9               10               11               12               13                 14               15               16      3.75 mg   See details      17      2.5 mg         18      3.75 mg         19      2.5 mg         20      2.5 mg           21      2.5 mg         22      2.5 mg         23            24               25               26               27                 28               29               30               31                   Date Details   05/16 This INR check       Date of next INR:  5/23/2017         How to take your warfarin dose     To take:  2.5 mg Take 1 of the 2.5 mg tablets.    To take:  3.75 mg Take 1.5 of the 2.5 mg tablets.

## 2017-05-16 NOTE — PROGRESS NOTES
ANTICOAGULATION FOLLOW-UP CLINIC VISIT    Patient Name:  Priscilla Way  Date:  5/16/2017  Contact Type:  Telephone    SUBJECTIVE:     Patient Findings     Positives Med error    Comments Home care nurse Destiny reports that her orders are to have pt take 3.75mg Tu only and not TuTh. This could be the reason for the INR on the low end of goal range.            OBJECTIVE    INR   Date Value Ref Range Status   05/16/2017 2.00  Final     Comment:     Home Care        ASSESSMENT / PLAN  INR assessment THER    Recheck INR In: 1 WEEK    INR Location [facility]R      Anticoagulation Summary as of 5/16/2017     INR goal 2.0-3.0   Today's INR 2.00   Maintenance plan 3.75 mg (2.5 mg x 1.5) on Tue, Thu; 2.5 mg (2.5 mg x 1) all other days   Full instructions 3.75 mg on Tue, Thu; 2.5 mg all other days   Weekly total 20 mg   Plan last modified Jodee Barron RN (5/5/2017)   Next INR check 5/23/2017   Priority INR   Target end date     Indications   Acute kidney injury (H) [N17.9]  Deep vein thrombosis (DVT) (H) [I82.409] [I82.409]  Long-term (current) use of anticoagulants [Z79.01] [Z79.01]         Anticoagulation Episode Summary     INR check location     Preferred lab     [facility]    Comments [facility] to draw INR's  Pt has dementia please contact daughter with any questions. Contact daughter May at [phone].  Has 2.5mg tablets       Anticoagulation Care Providers     Provider Role Specialty Phone number    Randy Fuentes MD [facility] [phone]            See the Encounter Report to view Anticoagulation Flowsheet and Dosing Calendar (Go to Encounters tab in chart review, and find the Anticoagulation Therapy Visit)    Spoke with PRATIMA Dixon. Gave them new warfarin recommendation.  No changes in health, medication, or diet. No missed doses, no falls. No signs or symptoms of bleed or clotting.     Shabana Sorto RN

## 2017-05-23 ENCOUNTER — ANTICOAGULATION THERAPY VISIT (OUTPATIENT)
Dept: ANTICOAGULATION | Facility: CLINIC | Age: 77
End: 2017-05-23

## 2017-05-23 DIAGNOSIS — Z79.01 LONG-TERM (CURRENT) USE OF ANTICOAGULANTS: ICD-10-CM

## 2017-05-23 DIAGNOSIS — I82.403 DEEP VEIN THROMBOSIS (DVT) OF BOTH LOWER EXTREMITIES, UNSPECIFIED CHRONICITY, UNSPECIFIED VEIN (H): ICD-10-CM

## 2017-05-23 DIAGNOSIS — N17.9 ACUTE KIDNEY INJURY (H): ICD-10-CM

## 2017-05-23 LAB — INR PPP: 3.6

## 2017-05-23 NOTE — MR AVS SNAPSHOT
Priscilla Way   5/23/2017   Anticoagulation Therapy Visit    Description:  77 year old male   Provider:  Dayana Ervin, RN   Department:  [facility]           INR as of 5/23/2017     Today's INR 3.6!      Anticoagulation Summary as of 5/23/2017     INR goal 2.0-3.0   Today's INR 3.6!   Full instructions 5/23: 1.25 mg; Otherwise 3.75 mg on Tue, Thu; 2.5 mg all other days   Next INR check 5/30/2017    Indications   Acute kidney injury (H) [N17.9]  Deep vein thrombosis (DVT) (H) [I82.409] [I82.409]  Long-term (current) use of anticoagulants [Z79.01] [Z79.01]         May 2017 Details    Sun Mon Tue Wed Thu Fri Sat      1               2               3               4               5               6                 7               8               9               10               11               12               13                 14               15               16               17               18               19               20                 21               22               23      1.25 mg   See details      24      2.5 mg         25      3.75 mg         26      2.5 mg         27      2.5 mg           28      2.5 mg         29      2.5 mg         30            31                   Date Details   05/23 This INR check       Date of next INR:  5/30/2017         How to take your warfarin dose     To take:  1.25 mg Take 0.5 of a 2.5 mg tablet.    To take:  2.5 mg Take 1 of the 2.5 mg tablets.    To take:  3.75 mg Take 1.5 of the 2.5 mg tablets.

## 2017-05-23 NOTE — PROGRESS NOTES
ANTICOAGULATION FOLLOW-UP CLINIC VISIT    Patient Name:  Priscilla Way  Date:  5/23/2017  Contact Type:  Telephone    SUBJECTIVE:     Patient Findings     Positives Unexplained INR or factor level change           OBJECTIVE    INR   Date Value Ref Range Status   05/23/2017 3.6  Final       ASSESSMENT / PLAN  INR assessment SUPRA    Recheck INR In: 1 WEEK    INR Location [facility]R      Anticoagulation Summary as of 5/23/2017     INR goal 2.0-3.0   Today's INR 3.6!   Maintenance plan 3.75 mg (2.5 mg x 1.5) on Tue, Thu; 2.5 mg (2.5 mg x 1) all other days   Full instructions 5/23: 1.25 mg; Otherwise 3.75 mg on Tue, Thu; 2.5 mg all other days   Weekly total 20 mg   Plan last modified Jodee Barron RN (5/5/2017)   Next INR check 5/30/2017   Priority INR   Target end date     Indications   Acute kidney injury (H) [N17.9]  Deep vein thrombosis (DVT) (H) [I82.409] [I82.409]  Long-term (current) use of anticoagulants [Z79.01] [Z79.01]         Anticoagulation Episode Summary     INR check location     Preferred lab     [facility]    Comments [facility] to draw INR's  Pt has dementia please contact daughter with any questions. Contact daughter May at [phone].  Has 2.5mg tablets       Anticoagulation Care Providers     Provider Role Specialty Phone number    Randy Fuentes MD [facility] [phone]            See the Encounter Report to view Anticoagulation Flowsheet and Dosing Calendar (Go to Encounters tab in chart review, and find the Anticoagulation Therapy Visit)    Spoke with Abbi PÉREZ.    Dayana Ervin RN

## 2017-05-28 ENCOUNTER — MEDICAL CORRESPONDENCE (OUTPATIENT)
Dept: HEALTH INFORMATION MANAGEMENT | Facility: CLINIC | Age: 77
End: 2017-05-28

## 2017-05-30 ENCOUNTER — ANTICOAGULATION THERAPY VISIT (OUTPATIENT)
Dept: ANTICOAGULATION | Facility: CLINIC | Age: 77
End: 2017-05-30

## 2017-05-30 DIAGNOSIS — I82.403 DEEP VEIN THROMBOSIS (DVT) OF BOTH LOWER EXTREMITIES, UNSPECIFIED CHRONICITY, UNSPECIFIED VEIN (H): ICD-10-CM

## 2017-05-30 DIAGNOSIS — N17.9 ACUTE KIDNEY INJURY (H): ICD-10-CM

## 2017-05-30 DIAGNOSIS — Z79.01 LONG-TERM (CURRENT) USE OF ANTICOAGULANTS: ICD-10-CM

## 2017-05-30 LAB — INR PPP: 2.6

## 2017-05-30 NOTE — PROGRESS NOTES
ANTICOAGULATION FOLLOW-UP CLINIC VISIT    Patient Name:  Priscilla Way  Date:  5/30/2017  Contact Type:  Telephone    SUBJECTIVE:     Patient Findings     Positives No Problem Findings           OBJECTIVE    INR   Date Value Ref Range Status   05/30/2017 2.6  Final       ASSESSMENT / PLAN  INR assessment THER    Recheck INR In: 1 WEEK    [facility]R Location [facility]      Anticoagulation Summary as of 5/30/2017     INR goal 2.0-3.0   Today's INR 2.6   Maintenance plan 3.75 mg (2.5 mg x 1.5) on Tue, Thu; 2.5 mg (2.5 mg x 1) all other days   Full instructions 3.75 mg on Tue, Thu; 2.5 mg all other days   Weekly total 20 mg   No change documented Jodee Tirado RN   Plan last modified Jodee Barron RN (5/5/2017)   Next INR check 6/6/2017   Priority INR   Target end date     Indications   Acute kidney injury (H) [N17.9]  Deep vein thrombosis (DVT) (H) [I82.409] [I82.409]  Long-term (current) use of anticoagulants [Z79.01] [Z79.01]         Anticoagulation Episode Summary     INR check location     Preferred lab     [facility]    Comments [facility] to draw INR's  Pt has dementia please contact daughter with any questions. Contact daughter May at [phone].  Has 2.5mg tablets       Anticoagulation Care Providers     Provider Role Specialty Phone number    IngridromanRandy chavis MD [facility] [phone]            See the Encounter Report to view Anticoagulation Flowsheet and Dosing Calendar (Go to Encounters tab in chart review, and find the Anticoagulation Therapy Visit)    Spoke with Jennifer PÉREZ.  She reports no changes in health, diet, medications.      Jodee Tirado, RN

## 2017-05-30 NOTE — PROGRESS NOTES
Shelly from home care calling.  A home care nurse will not be able to see Priscilla until after 5 PM today.  For dosing, if his INR is between 1.9 - 3.1, he will take 3.75 mg today and they will call the [facility] with the result in the AM 5/31/17.  If INR is between 3.2 - 3.9, he will take 1.25 mg today (5/30/17) and will call [facility] in AM with result.  If INR is out of the above parameters, home care will call the on call provider for Dr. Fuentes for dosing.  Jodee Tirado RN  4:50 PM:  Jennifer PÉREZ was able to see Priscilla before 5 PM.  Dosing recommendations given.  Jodee Tirado RN

## 2017-06-02 ENCOUNTER — DOCUMENTATION ONLY (OUTPATIENT)
Dept: CARE COORDINATION | Facility: CLINIC | Age: 77
End: 2017-06-02

## 2017-06-02 NOTE — PROGRESS NOTES
[facility] now requests orders and shares plan of care/discharge summaries for some patients through [facility].  Please REPLY TO THIS MESSAGE in order to give authorization for orders when needed.  This is considered a verbal order, you will still receive a faxed copy of orders for signature.  Thank you for your assistance in improving collaboration for our patients.    ORDER    WOUND Care  Clean wound and the skin around it with saline or wound cleanser  Cover the left heel with Iodosorb gel  Apply 4x4 mepilex border dressing  Change dressing M, W, F  Offload with foam boots  Instruct Caregivers to perform on non nurse visit days

## 2017-06-06 ENCOUNTER — ANTICOAGULATION THERAPY VISIT (OUTPATIENT)
Dept: ANTICOAGULATION | Facility: CLINIC | Age: 77
End: 2017-06-06

## 2017-06-06 DIAGNOSIS — Z79.01 LONG-TERM (CURRENT) USE OF ANTICOAGULANTS: ICD-10-CM

## 2017-06-06 DIAGNOSIS — N17.9 ACUTE KIDNEY INJURY (H): ICD-10-CM

## 2017-06-06 DIAGNOSIS — I82.403 DEEP VEIN THROMBOSIS (DVT) OF BOTH LOWER EXTREMITIES, UNSPECIFIED CHRONICITY, UNSPECIFIED VEIN (H): ICD-10-CM

## 2017-06-06 LAB — INR PPP: 1.8

## 2017-06-06 NOTE — MR AVS SNAPSHOT
Priscilla Way   6/6/2017   Anticoagulation Therapy Visit    Description:  77 year old male   Provider:  Dayana Ervin, RN   Department:  [facility]           INR as of 6/6/2017     Today's INR 1.8!      Anticoagulation Summary as of 6/6/2017     INR goal 2.0-3.0   Today's INR 1.8!   Full instructions 6/6: 5 mg; Otherwise 3.75 mg on Tue, Thu; 2.5 mg all other days   Next INR check 6/13/2017    Indications   Acute kidney injury (H) [N17.9]  Deep vein thrombosis (DVT) (H) [I82.409] [I82.409]  Long-term (current) use of anticoagulants [Z79.01] [Z79.01]         June 2017 Details    Sun Mon Tue Wed Thu Fri Sat         1               2               3                 4               5               6      5 mg   See details      7      2.5 mg         8      3.75 mg         9      2.5 mg         10      2.5 mg           11      2.5 mg         12      2.5 mg         13            14               15               16               17                 18               19               20               21               22               23               24                 25               26               27               28               29               30                 Date Details   06/06 This INR check       Date of next INR:  6/13/2017         How to take your warfarin dose     To take:  2.5 mg Take 1 of the 2.5 mg tablets.    To take:  3.75 mg Take 1.5 of the 2.5 mg tablets.    To take:  5 mg Take 2 of the 2.5 mg tablets.

## 2017-06-06 NOTE — PROGRESS NOTES
ANTICOAGULATION FOLLOW-UP CLINIC VISIT    Patient Name:  Priscilla Way  Date:  6/6/2017  Contact Type:  Telephone    SUBJECTIVE:     Patient Findings     Positives Missed doses    Comments Missed warfarin on 6/1.           OBJECTIVE    INR   Date Value Ref Range Status   06/06/2017 1.8  Final       ASSESSMENT / PLAN  INR assessment SUB    Recheck INR In: 1 WEEK    INR Location [facility]      Anticoagulation Summary as of 6/6/2017     INR goal 2.0-3.0   Today's INR 1.8!   Maintenance plan 3.75 mg (2.5 mg x 1.5) on Tue, Thu; 2.5 mg (2.5 mg x 1) all other days   Full instructions 6/6: 5 mg; Otherwise 3.75 mg on Tue, Thu; 2.5 mg all other days   Weekly total 20 mg   Plan last modified Jodee Barron RN (5/5/2017)   Next INR check 6/13/2017   Priority INR   Target end date     Indications   Acute kidney injury (H) [N17.9]  Deep vein thrombosis (DVT) (H) [I82.409] [I82.409]  Long-term (current) use of anticoagulants [Z79.01] [Z79.01]         Anticoagulation Episode Summary     INR check location     Preferred lab     [facility]    Comments [facility] to [facility]'s  Pt has dementia please contact daughter with any questions. Contact daughter May at [phone].  Has 2.5mg tablets       Anticoagulation Care Providers     Provider Role Specialty Phone number    Randy Fuentes MD [facility] [phone]            See the Encounter Report to view Anticoagulation Flowsheet and Dosing Calendar (Go to Encounters tab in chart review, and find the Anticoagulation Therapy Visit)    Spoke with Monae PÉREZ.    Dayana Ervin RN

## 2017-06-13 ENCOUNTER — ANTICOAGULATION THERAPY VISIT (OUTPATIENT)
Dept: ANTICOAGULATION | Facility: CLINIC | Age: 77
End: 2017-06-13

## 2017-06-13 DIAGNOSIS — N17.9 ACUTE KIDNEY INJURY (H): ICD-10-CM

## 2017-06-13 DIAGNOSIS — I82.403 DEEP VEIN THROMBOSIS (DVT) OF BOTH LOWER EXTREMITIES, UNSPECIFIED CHRONICITY, UNSPECIFIED VEIN (H): ICD-10-CM

## 2017-06-13 DIAGNOSIS — Z79.01 LONG-TERM (CURRENT) USE OF ANTICOAGULANTS: ICD-10-CM

## 2017-06-13 LAB — INR PPP: 2.2

## 2017-06-13 NOTE — PROGRESS NOTES
ANTICOAGULATION FOLLOW-UP CLINIC VISIT    Patient Name:  Priscilla Way  Date:  6/13/2017  Contact Type:  Telephone    SUBJECTIVE:     Patient Findings     Positives No Problem Findings           OBJECTIVE    INR   Date Value Ref Range Status   06/13/2017 2.20  Final     Comment:     Home Care        ASSESSMENT / PLAN  INR assessment THER    Recheck INR In: 1 WEEK    INR Location [facility]R      Anticoagulation Summary as of 6/13/2017     INR goal 2.0-3.0   Today's INR 2.20   Maintenance plan 3.75 mg (2.5 mg x 1.5) on Tue, Thu; 2.5 mg (2.5 mg x 1) all other days   Full instructions 3.75 mg on Tue, Thu; 2.5 mg all other days   Weekly total 20 mg   Plan last modified Jodee Barron RN (5/5/2017)   Next INR check 6/20/2017   Priority INR   Target end date     Indications   Acute kidney injury (H) [N17.9]  Deep vein thrombosis (DVT) (H) [I82.409] [I82.409]  Long-term (current) use of anticoagulants [Z79.01] [Z79.01]         Anticoagulation Episode Summary     INR check location     Preferred lab     [facility]    Comments [facility] to draw INR's  Pt has dementia please contact daughter with any questions. Contact daughter May at [phone].  Has 2.5mg tablets       Anticoagulation Care Providers     Provider Role Specialty Phone number    Randy Fuentes MD [facility] [phone]            See the Encounter Report to view Anticoagulation Flowsheet and Dosing Calendar (Go to Encounters tab in chart review, and find the Anticoagulation Therapy Visit)    Spoke with PRATIMA Hoffmann. Gave them new warfarin recommendation.  No changes in health, medication, or diet. No missed doses, no falls. No signs or symptoms of bleed or clotting.     Shabana Sorto, TED

## 2017-06-13 NOTE — MR AVS SNAPSHOT
Priscilla Way   6/13/2017   Anticoagulation Therapy Visit    Description:  77 year old male   Provider:  Shabana Sorto, RN   [facility]           INR as of 6/13/2017     Today's INR 2.20      Anticoagulation Summary as of 6/13/2017     INR goal 2.0-3.0   Today's INR 2.20   Full instructions 3.75 mg on Tue, Thu; 2.5 mg all other days   Next INR check 6/20/2017    Indications   Acute kidney injury (H) [N17.9]  Deep vein thrombosis (DVT) (H) [I82.409] [I82.409]  Long-term (current) use of anticoagulants [Z79.01] [Z79.01]         June 2017 Details    Sun Mon Tue Wed Thu Fri Sat         1               2               3                 4               5               6               7               8               9               10                 11               12               13      3.75 mg   See details      14      2.5 mg         15      3.75 mg         16      2.5 mg         17      2.5 mg           18      2.5 mg         19      2.5 mg         20            21               22               23               24                 25               26               27               28               29               30                 Date Details   06/13 This INR check       Date of next INR:  6/20/2017         How to take your warfarin dose     To take:  2.5 mg Take 1 of the 2.5 mg tablets.    To take:  3.75 mg Take 1.5 of the 2.5 mg tablets.

## 2017-06-20 ENCOUNTER — ANTICOAGULATION THERAPY VISIT (OUTPATIENT)
Dept: ANTICOAGULATION | Facility: CLINIC | Age: 77
End: 2017-06-20

## 2017-06-20 DIAGNOSIS — Z79.01 LONG-TERM (CURRENT) USE OF ANTICOAGULANTS: ICD-10-CM

## 2017-06-20 DIAGNOSIS — I82.403 DEEP VEIN THROMBOSIS (DVT) OF BOTH LOWER EXTREMITIES, UNSPECIFIED CHRONICITY, UNSPECIFIED VEIN (H): ICD-10-CM

## 2017-06-20 DIAGNOSIS — N17.9 ACUTE KIDNEY INJURY (H): ICD-10-CM

## 2017-06-20 LAB — INR PPP: 2.1

## 2017-06-20 NOTE — PROGRESS NOTES
ANTICOAGULATION FOLLOW-UP CLINIC VISIT    Patient Name:  Priscilla Way  Date:  6/20/2017  Contact Type:  Telephone    SUBJECTIVE:     Patient Findings     Positives No Problem Findings           OBJECTIVE    INR   Date Value Ref Range Status   06/20/2017 2.10  Final     Comment:     Home Care        ASSESSMENT / PLAN  INR assessment THER    Recheck INR In: 1 WEEK    INR Location [facility] INR      Anticoagulation Summary as of 6/20/2017     INR goal 2.0-3.0   Today's INR 2.10   Maintenance plan 3.75 mg (2.5 mg x 1.5) on Tue, Thu; 2.5 mg (2.5 mg x 1) all other days   Full instructions 3.75 mg on Tue, Thu; 2.5 mg all other days   Weekly total 20 mg   Plan last modified Jodee Barron RN (5/5/2017)   Next INR check 6/27/2017   Priority INR   Target end date     Indications   Acute kidney injury (H) [N17.9]  Deep vein thrombosis (DVT) (H) [I82.409] [I82.409]  Long-term (current) use of anticoagulants [Z79.01] [Z79.01]         Anticoagulation Episode Summary     INR check location     Preferred lab     [facility]    Comments [facility] to draw INR's  Pt has dementia please contact daughter with any questions. Contact daughter May at [phone].  Has 2.5mg tablets       Anticoagulation Care Providers     Provider Role Specialty Phone number    Randy Fuentes MD [facility] [phone]            See the Encounter Report to view Anticoagulation Flowsheet and Dosing Calendar (Go to Encounters tab in chart review, and find the Anticoagulation Therapy Visit)    Spoke with PRATIMA Young. Gave them new warfarin recommendation.  No changes in health, medication, or diet. No missed doses, no falls. No signs or symptoms of bleed or clotting.     Shabana Sorto, TED

## 2017-06-20 NOTE — MR AVS SNAPSHOT
Priscilla Way   6/20/2017   Anticoagulation Therapy Visit    Description:  77 year old male   Provider:  Shabana Sorto, RN   [facility]           INR as of 6/20/2017     Today's INR 2.10      Anticoagulation Summary as of 6/20/2017     INR goal 2.0-3.0   Today's INR 2.10   Full instructions 3.75 mg on Tue, Thu; 2.5 mg all other days   Next INR check 6/27/2017    Indications   Acute kidney injury (H) [N17.9]  Deep vein thrombosis (DVT) (H) [I82.409] [I82.409]  Long-term (current) use of anticoagulants [Z79.01] [Z79.01]         June 2017 Details    Sun Mon Tue Wed Thu Fri Sat         1               2               3                 4               5               6               7               8               9               10                 11               12               13               14               15               16               17                 18               19               20      3.75 mg   See details      21      2.5 mg         22      3.75 mg         23      2.5 mg         24      2.5 mg           25      2.5 mg         26      2.5 mg         27            28               29               30                 Date Details   06/20 This INR check       Date of next INR:  6/27/2017         How to take your warfarin dose     To take:  2.5 mg Take 1 of the 2.5 mg tablets.    To take:  3.75 mg Take 1.5 of the 2.5 mg tablets.

## 2017-06-27 ENCOUNTER — ANTICOAGULATION THERAPY VISIT (OUTPATIENT)
Dept: ANTICOAGULATION | Facility: CLINIC | Age: 77
End: 2017-06-27

## 2017-06-27 DIAGNOSIS — I82.403 DEEP VEIN THROMBOSIS (DVT) OF BOTH LOWER EXTREMITIES, UNSPECIFIED CHRONICITY, UNSPECIFIED VEIN (H): ICD-10-CM

## 2017-06-27 DIAGNOSIS — N17.9 ACUTE KIDNEY INJURY (H): ICD-10-CM

## 2017-06-27 DIAGNOSIS — Z79.01 LONG-TERM (CURRENT) USE OF ANTICOAGULANTS: ICD-10-CM

## 2017-06-27 LAB — INR PPP: 1.7

## 2017-06-27 NOTE — PROGRESS NOTES
ANTICOAGULATION FOLLOW-UP CLINIC VISIT    Patient Name:  Priscilla Way  Date:  6/27/2017  Contact Type:  Telephone    SUBJECTIVE:     Patient Findings     Positives Unexplained INR or factor level change           OBJECTIVE    INR   Date Value Ref Range Status   06/27/2017 1.70  Final     Comment:     Home Care        ASSESSMENT / PLAN  INR assessment SUB    Recheck INR In: 2 WEEKS    INR Location [facility]R      Anticoagulation Summary as of 6/27/2017     INR goal 2.0-3.0   Today's INR 1.70!   Maintenance plan 3.75 mg (2.5 mg x 1.5) on Tue, Thu; 2.5 mg (2.5 mg x 1) all other days   Full instructions 6/27: 5 mg; Otherwise 3.75 mg on Tue, Thu; 2.5 mg all other days   Weekly total 20 mg   Plan last modified Jodee Barron RN (5/5/2017)   Next INR check 7/11/2017   Priority INR   Target end date     Indications   Acute kidney injury (H) [N17.9]  Deep vein thrombosis (DVT) (H) [I82.409] [I82.409]  Long-term (current) use of anticoagulants [Z79.01] [Z79.01]         Anticoagulation Episode Summary     INR check location     Preferred lab     [facility]    Comments [facility] to [facility]  Pt has dementia please contact daughter with any questions. Contact daughter May at [phone].  Has 2.5mg tablets       Anticoagulation Care Providers     Provider Role Specialty Phone number    Randy Fuentes MD [facility] [phone]            See the Encounter Report to view Anticoagulation Flowsheet and Dosing Calendar (Go to Encounters tab in chart review, and find the Anticoagulation Therapy Visit)    Spoke with PRATIMA Young. Gave them new warfarin recommendation.  No changes in health, medication, or diet. No missed doses, no falls. No signs or symptoms of bleed or clotting.     Shabana Sorto, RN

## 2017-06-27 NOTE — MR AVS SNAPSHOT
Priscilla Way   6/27/2017   Anticoagulation Therapy Visit    Description:  77 year old male   Provider:  Shabana Sorto, RN   [facility]           INR as of 6/27/2017     Today's INR 1.70!      Anticoagulation Summary as of 6/27/2017     INR goal 2.0-3.0   Today's INR 1.70!   Full instructions 6/27: 5 mg; Otherwise 3.75 mg on Tue, Thu; 2.5 mg all other days   Next INR check 7/11/2017    Indications   Acute kidney injury (H) [N17.9]  Deep vein thrombosis (DVT) (H) [I82.409] [I82.409]  Long-term (current) use of anticoagulants [Z79.01] [Z79.01]         June 2017 Details    Sun Mon Tue Wed Thu Fri Sat         1               2               3                 4               5               6               7               8               9               10                 11               12               13               14               15               16               17                 18               19               20               21               22               23               24                 25               26               27      5 mg   See details      28      2.5 mg         29      3.75 mg         30      2.5 mg           Date Details   06/27 This INR check               How to take your warfarin dose     To take:  2.5 mg Take 1 of the 2.5 mg tablets.    To take:  3.75 mg Take 1.5 of the 2.5 mg tablets.    To take:  5 mg Take 2 of the 2.5 mg tablets.           July 2017 Details    Sun Mon Tue Wed Thu Fri Sat           1      2.5 mg           2      2.5 mg         3      2.5 mg         4      3.75 mg         5      2.5 mg         6      3.75 mg         7      2.5 mg         8      2.5 mg           9      2.5 mg         10      2.5 mg         11            12               13               14               15                 16               17               18               19               20               21               22                 23               24                25               26               27               28               29                 30               31                     Date Details   No additional details    Date of next INR:  7/11/2017         How to take your warfarin dose     To take:  2.5 mg Take 1 of the 2.5 mg tablets.    To take:  3.75 mg Take 1.5 of the 2.5 mg tablets.

## 2017-07-11 ENCOUNTER — ANTICOAGULATION THERAPY VISIT (OUTPATIENT)
Dept: ANTICOAGULATION | Facility: CLINIC | Age: 77
End: 2017-07-11

## 2017-07-11 DIAGNOSIS — I82.403 DEEP VEIN THROMBOSIS (DVT) OF BOTH LOWER EXTREMITIES, UNSPECIFIED CHRONICITY, UNSPECIFIED VEIN (H): ICD-10-CM

## 2017-07-11 DIAGNOSIS — Z79.01 LONG-TERM (CURRENT) USE OF ANTICOAGULANTS: ICD-10-CM

## 2017-07-11 DIAGNOSIS — N17.9 ACUTE KIDNEY INJURY (H): ICD-10-CM

## 2017-07-11 LAB — INR PPP: 2.1

## 2017-07-11 NOTE — PROGRESS NOTES
ANTICOAGULATION FOLLOW-UP CLINIC VISIT    Patient Name:  Priscilla Way  Date:  7/11/2017  Contact Type:  Telephone    SUBJECTIVE:     Patient Findings     Positives No Problem Findings           OBJECTIVE    INR   Date Value Ref Range Status   07/11/2017 2.1  Final       ASSESSMENT / PLAN  INR assessment THER    Recheck INR In: 2 WEEKS    [facility] Location [facility]      Anticoagulation Summary as of 7/11/2017     INR goal 2.0-3.0   Today's INR 2.1   Maintenance plan 3.75 mg (2.5 mg x 1.5) on Tue, Thu; 2.5 mg (2.5 mg x 1) all other days   Full instructions 3.75 mg on Tue, Thu; 2.5 mg all other days   Weekly total 20 mg   Plan last modified Jodee Barron RN (5/5/2017)   Next INR check 7/25/2017   Priority INR   Target end date     Indications   Acute kidney injury (H) [N17.9]  Deep vein thrombosis (DVT) (H) [I82.409] [I82.409]  Long-term (current) use of anticoagulants [Z79.01] [Z79.01]         Anticoagulation Episode Summary     INR check location     Preferred lab     [facility]    Comments [facility] to draw INR's  Pt has dementia please contact daughter with any questions. Contact daughter May at [phone].  Has 2.5mg tablets       Anticoagulation Care Providers     Provider Role Specialty Phone number    Randy Fuentes MD [facility] [phone]            See the Encounter Report to view Anticoagulation Flowsheet and Dosing Calendar (Go to Encounters tab in chart review, and find the Anticoagulation Therapy Visit)  Spoke with [facility] nurse.    Sherry Georges RN

## 2017-07-11 NOTE — MR AVS SNAPSHOT
Priscilla Way   7/11/2017   Anticoagulation Therapy Visit    Description:  77 year old male   Provider:  Sherry Georges, RN   Department:  [facility]           INR as of 7/11/2017     Today's INR 2.1      Anticoagulation Summary as of 7/11/2017     INR goal 2.0-3.0   Today's INR 2.1   Full instructions 3.75 mg on Tue, Thu; 2.5 mg all other days   Next INR check 7/25/2017    Indications   Acute kidney injury (H) [N17.9]  Deep vein thrombosis (DVT) (H) [I82.409] [I82.409]  Long-term (current) use of anticoagulants [Z79.01] [Z79.01]         July 2017 Details    Sun Mon Tue Wed Thu Fri Sat           1                 2               3               4               5               6               7               8                 9               10               11      3.75 mg   See details      12      2.5 mg         13      3.75 mg         14      2.5 mg         15      2.5 mg           16      2.5 mg         17      2.5 mg         18      3.75 mg         19      2.5 mg         20      3.75 mg         21      2.5 mg         22      2.5 mg           23      2.5 mg         24      2.5 mg         25            26               27               28               29                 30               31                     Date Details   07/11 This INR check       Date of next INR:  7/25/2017         How to take your warfarin dose     To take:  2.5 mg Take 1 of the 2.5 mg tablets.    To take:  3.75 mg Take 1.5 of the 2.5 mg tablets.

## 2017-07-20 ENCOUNTER — TELEPHONE (OUTPATIENT)
Dept: INTERNAL MEDICINE | Facility: CLINIC | Age: 77
End: 2017-07-20

## 2017-07-20 NOTE — TELEPHONE ENCOUNTER
Noted.  ------------------------      ----- Message from Alicia Gross sent at 7/20/2017  1:06 PM CDT -----  Juan Manuel from [facility] [phone] calling to adivse that patient  wound dressing is no longer on, they are sending a Wound nurse out to see him this week to see if it is healed enough to not have dressing put back on

## 2017-07-21 DIAGNOSIS — K21.9 ESOPHAGEAL REFLUX: ICD-10-CM

## 2017-07-21 NOTE — TELEPHONE ENCOUNTER
Omeprazole 20 mg caps      Last Written Prescription Date:  4-3-17  Last Fill Quantity: 90,   # refills: 12-23-16  Last Office Visit : 90  Future Office visit:  0    Kathleen M Doege RN

## 2017-07-25 ENCOUNTER — ANTICOAGULATION THERAPY VISIT (OUTPATIENT)
Dept: ANTICOAGULATION | Facility: CLINIC | Age: 77
End: 2017-07-25

## 2017-07-25 DIAGNOSIS — I82.403 DEEP VEIN THROMBOSIS (DVT) OF BOTH LOWER EXTREMITIES, UNSPECIFIED CHRONICITY, UNSPECIFIED VEIN (H): ICD-10-CM

## 2017-07-25 DIAGNOSIS — N17.9 ACUTE KIDNEY INJURY (H): ICD-10-CM

## 2017-07-25 DIAGNOSIS — Z79.01 LONG-TERM (CURRENT) USE OF ANTICOAGULANTS: ICD-10-CM

## 2017-07-25 LAB — INR PPP: 1.9

## 2017-07-25 NOTE — PROGRESS NOTES
ANTICOAGULATION FOLLOW-UP CLINIC VISIT    Patient Name:  Priscilla Way  Date:  7/25/2017  Contact Type:  Telephone    SUBJECTIVE:     Patient Findings     Positives No Problem Findings           OBJECTIVE    INR   Date Value Ref Range Status   07/25/2017 1.90  Final     Comment:     [facility]        ASSESSMENT / PLAN  INR assessment THER    Recheck INR In: 2 WEEKS    [facility]R Location [facility]      Anticoagulation Summary as of 7/25/2017     INR goal 2.0-3.0   Today's INR 1.90!   Maintenance plan 3.75 mg (2.5 mg x 1.5) on Tue, Thu; 2.5 mg (2.5 mg x 1) all other days   Full instructions 7/25: 5 mg; Otherwise 3.75 mg on Tue, Thu; 2.5 mg all other days   Weekly total 20 mg   Plan last modified Jodee Barron RN (5/5/2017)   Next INR check 8/8/2017   Priority INR   Target end date     Indications   Acute kidney injury (H) [N17.9]  Deep vein thrombosis (DVT) (H) [I82.409] [I82.409]  Long-term (current) use of anticoagulants [Z79.01] [Z79.01]         Anticoagulation Episode Summary     INR check location     Preferred lab     Send [facility]    Comments [facility] to draw INR's  Pt has dementia please contact daughter with any questions. Contact daughter May at [phone].  Has 2.5mg tablets       Anticoagulation Care Providers     Provider Role Specialty Phone number    Randy Fuentes MD [facility] [phone]            See the Encounter Report to view Anticoagulation Flowsheet and Dosing Calendar (Go to Encounters tab in chart review, and find the Anticoagulation Therapy Visit)    Spoke with PRATIMA Young. Gave them new warfarin recommendation.  No changes in health, medication, or diet. No missed doses, no falls. No signs or symptoms of bleed or clotting.     Shabana Sorto RN

## 2017-07-25 NOTE — MR AVS SNAPSHOT
Priscilla Way   7/25/2017   Anticoagulation Therapy Visit    Description:  77 year old male   Provider:  Shabana Sorto, RN   Department:  [facility]           INR as of 7/25/2017     Today's INR 1.90!      Anticoagulation Summary as of 7/25/2017     INR goal 2.0-3.0   Today's INR 1.90!   Full instructions 7/25: 5 mg; Otherwise 3.75 mg on Tue, Thu; 2.5 mg all other days   Next INR check 8/8/2017    Indications   Acute kidney injury (H) [N17.9]  Deep vein thrombosis (DVT) (H) [I82.409] [I82.409]  Long-term (current) use of anticoagulants [Z79.01] [Z79.01]         July 2017 Details    Sun Mon Tue Wed Thu Fri Sat           1                 2               3               4               5               6               7               8                 9               10               11               12               13               14               15                 16               17               18               19               20               21               22                 23               24               25      5 mg   See details      26      2.5 mg         27      3.75 mg         28      2.5 mg         29      2.5 mg           30      2.5 mg         31      2.5 mg               Date Details   07/25 This INR check               How to take your warfarin dose     To take:  2.5 mg Take 1 of the 2.5 mg tablets.    To take:  3.75 mg Take 1.5 of the 2.5 mg tablets.    To take:  5 mg Take 2 of the 2.5 mg tablets.           August 2017 Details    Sun Mon Tue Wed Thu Fri Sat       1      3.75 mg         2      2.5 mg         3      3.75 mg         4      2.5 mg         5      2.5 mg           6      2.5 mg         7      2.5 mg         8            9               10               11               12                 13               14               15               16               17               18               19                 20               21               22               23                24               25               26                 27               28               29               30               31                  Date Details   No additional details    Date of next INR:  8/8/2017         How to take your warfarin dose     To take:  2.5 mg Take 1 of the 2.5 mg tablets.    To take:  3.75 mg Take 1.5 of the 2.5 mg tablets.

## 2017-07-27 ENCOUNTER — MEDICAL CORRESPONDENCE (OUTPATIENT)
Dept: HEALTH INFORMATION MANAGEMENT | Facility: CLINIC | Age: 77
End: 2017-07-27

## 2017-07-29 DIAGNOSIS — R60.1 GENERALIZED EDEMA: ICD-10-CM

## 2017-07-29 DIAGNOSIS — N18.9 CHRONIC KIDNEY DISEASE: ICD-10-CM

## 2017-07-29 DIAGNOSIS — I12.9 RENAL HYPERTENSION, STAGE 1-4 OR UNSPECIFIED CHRONIC KIDNEY DISEASE: Primary | ICD-10-CM

## 2017-07-29 NOTE — TELEPHONE ENCOUNTER
carvedilol (COREG) 6.25 MG tablet   Last Written Prescription Date:  9/12/16  Last Fill Quantity: 180,   # refills: 3  Last Office Visit with [facility], [facility] or [facility] prescribing provider: 12/23/16  Future Office visit:   None    BP Readings from Last 3 Encounters:   12/23/16 168/85   11/12/16 144/59   09/12/16 162/82       furosemide (LASIX) 20 MG tablet  Last Written Prescription Date:  9/12/16  Last Fill Quantity: 90,   # refills: 3    Potassium   Date Value Ref Range Status   12/23/2016 4.8 3.4 - 5.3 mmol/L Final   ]  Sodium 135  133 - 144 mmol/L Final 12/23/2016  3:20 PM 1740     Creatinine   Date Value Ref Range Status   12/23/2016 1.28 (H) 0.66 - 1.25 mg/dL Final   ]    Routing refill request to provider for review/approval because:   carvedilol (COREG) 6.25 MG tablet .  bp > 140/90.  Med end date  3/24/17.  If approved need   MD entry # qty, rfs?. Please verify entry.        furosemide (LASIX) 20 MG tablet . Creat  HIGH

## 2017-08-01 RX ORDER — CARVEDILOL 6.25 MG/1
6.25 TABLET ORAL 2 TIMES DAILY WITH MEALS
Qty: 180 TABLET | Refills: 1 | Status: SHIPPED | OUTPATIENT
Start: 2017-08-01 | End: 2018-01-01

## 2017-08-01 RX ORDER — FUROSEMIDE 20 MG
20 TABLET ORAL DAILY
Qty: 90 TABLET | Refills: 1 | Status: SHIPPED | OUTPATIENT
Start: 2017-08-01 | End: 2018-01-01

## 2017-08-08 ENCOUNTER — ANTICOAGULATION THERAPY VISIT (OUTPATIENT)
Dept: ANTICOAGULATION | Facility: CLINIC | Age: 77
End: 2017-08-08

## 2017-08-08 DIAGNOSIS — I82.403 DEEP VEIN THROMBOSIS (DVT) OF BOTH LOWER EXTREMITIES, UNSPECIFIED CHRONICITY, UNSPECIFIED VEIN (H): ICD-10-CM

## 2017-08-08 DIAGNOSIS — N17.9 ACUTE KIDNEY INJURY (H): ICD-10-CM

## 2017-08-08 DIAGNOSIS — Z79.01 LONG-TERM (CURRENT) USE OF ANTICOAGULANTS: ICD-10-CM

## 2017-08-08 LAB — INR PPP: 2.7

## 2017-08-08 NOTE — MR AVS SNAPSHOT
Priscilla Way   8/8/2017   Anticoagulation Therapy Visit    Description:  77 year old male   Provider:  Sherry Georges, RN   Department:  [facility]           INR as of 8/8/2017     Today's INR       Anticoagulation Summary as of 8/8/2017     INR goal 2.0-3.0   Today's INR    Full instructions 3.75 mg on Tue, Thu; 2.5 mg all other days   Next INR check 8/22/2017    Indications   Acute kidney injury (H) [N17.9]  Deep vein thrombosis (DVT) (H) [I82.409] [I82.409]  Long-term (current) use of anticoagulants [Z79.01] [Z79.01]         August 2017 Details    Sun Mon Tue Wed Thu Fri Sat       1               2               3               4               5                 6               7               8      3.75 mg   See details      9      2.5 mg         10      3.75 mg         11      2.5 mg         12      2.5 mg           13      2.5 mg         14      2.5 mg         15      3.75 mg         16      2.5 mg         17      3.75 mg         18      2.5 mg         19      2.5 mg           20      2.5 mg         21      2.5 mg         22            23               24               25               26                 27               28               29               30               31                  Date Details   08/08 This INR check       Date of next INR:  8/22/2017         How to take your warfarin dose     To take:  2.5 mg Take 1 of the 2.5 mg tablets.    To take:  3.75 mg Take 1.5 of the 2.5 mg tablets.

## 2017-08-22 ENCOUNTER — ANTICOAGULATION THERAPY VISIT (OUTPATIENT)
Dept: ANTICOAGULATION | Facility: CLINIC | Age: 77
End: 2017-08-22

## 2017-08-22 DIAGNOSIS — N17.9 ACUTE KIDNEY INJURY (H): ICD-10-CM

## 2017-08-22 DIAGNOSIS — Z79.01 LONG-TERM (CURRENT) USE OF ANTICOAGULANTS: ICD-10-CM

## 2017-08-22 DIAGNOSIS — I82.403 DEEP VEIN THROMBOSIS (DVT) OF BOTH LOWER EXTREMITIES, UNSPECIFIED CHRONICITY, UNSPECIFIED VEIN (H): ICD-10-CM

## 2017-08-22 LAB — INR PPP: 3.4

## 2017-08-22 NOTE — PROGRESS NOTES
ANTICOAGULATION FOLLOW-UP CLINIC VISIT    Patient Name:  Priscilla Way  Date:  8/22/2017  Contact Type:  Telephone    SUBJECTIVE:     Patient Findings     Positives No Problem Findings           OBJECTIVE    INR   Date Value Ref Range Status   08/22/2017 3.4  Final       ASSESSMENT / PLAN  INR assessment SUPRA    Recheck INR In: 2 WEEKS    [facility] Location [facility]      Anticoagulation Summary as of 8/22/2017     INR goal 2.0-3.0   Today's INR 3.4!   Maintenance plan 3.75 mg (2.5 mg x 1.5) on Tue, Thu; 2.5 mg (2.5 mg x 1) all other days   Full instructions 8/22: 2.5 mg; Otherwise 3.75 mg on Tue, Thu; 2.5 mg all other days   Weekly total 20 mg   Plan last modified Jodee Barron RN (5/5/2017)   Next INR check 9/5/2017   Priority INR   Target end date     Indications   Acute kidney injury (H) [N17.9]  Deep vein thrombosis (DVT) (H) [I82.409] [I82.409]  Long-term (current) use of anticoagulants [Z79.01] [Z79.01]         Anticoagulation Episode Summary     INR check location     Preferred lab     Send [facility]ers [facility]    Comments [facility] to draw INR's  Pt has dementia please contact daughter with any questions. Contact daughter May at [phone].  Has 2.5mg tablets       Anticoagulation Care Providers     Provider Role Specialty Phone number    Randy Fuentes MD [facility] [phone]            See the Encounter Report to view Anticoagulation Flowsheet and Dosing Calendar (Go to Encounters tab in chart review, and find the Anticoagulation Therapy Visit)  Spoke with Sam [facility] nurse.    Sherry Goerges RN

## 2017-08-22 NOTE — MR AVS SNAPSHOT
Priscilla Way   8/22/2017   Anticoagulation Therapy Visit    Description:  77 year old male   Provider:  Sherry Georges, RN   Department:  [facility]           INR as of 8/22/2017     Today's INR 3.4!      Anticoagulation Summary as of 8/22/2017     INR goal 2.0-3.0   Today's INR 3.4!   Full instructions 8/22: 2.5 mg; Otherwise 3.75 mg on Tue, Thu; 2.5 mg all other days   Next INR check 9/5/2017    Indications   Acute kidney injury (H) [N17.9]  Deep vein thrombosis (DVT) (H) [I82.409] [I82.409]  Long-term (current) use of anticoagulants [Z79.01] [Z79.01]         August 2017 Details    Sun Mon Tue Wed Thu Fri Sat       1               2               3               4               5                 6               7               8               9               10               11               12                 13               14               15               16               17               18               19                 20               21               22      2.5 mg   See details      23      2.5 mg         24      3.75 mg         25      2.5 mg         26      2.5 mg           27      2.5 mg         28      2.5 mg         29      3.75 mg         30      2.5 mg         31      3.75 mg            Date Details   08/22 This INR check               How to take your warfarin dose     To take:  2.5 mg Take 1 of the 2.5 mg tablets.    To take:  3.75 mg Take 1.5 of the 2.5 mg tablets.           September 2017 Details    Sun Mon Tue Wed Thu Fri Sat          1      2.5 mg         2      2.5 mg           3      2.5 mg         4      2.5 mg         5            6               7               8               9                 10               11               12               13               14               15               16                 17               18               19               20               21               22               23                 24               25                26               27               28               29               30                Date Details   No additional details    Date of next INR:  9/5/2017         How to take your warfarin dose     To take:  2.5 mg Take 1 of the 2.5 mg tablets.    To take:  3.75 mg Take 1.5 of the 2.5 mg tablets.

## 2017-09-05 ENCOUNTER — ANTICOAGULATION THERAPY VISIT (OUTPATIENT)
Dept: ANTICOAGULATION | Facility: CLINIC | Age: 77
End: 2017-09-05

## 2017-09-05 DIAGNOSIS — N17.9 ACUTE KIDNEY INJURY (H): ICD-10-CM

## 2017-09-05 DIAGNOSIS — I82.403 DEEP VEIN THROMBOSIS (DVT) OF BOTH LOWER EXTREMITIES, UNSPECIFIED CHRONICITY, UNSPECIFIED VEIN (H): ICD-10-CM

## 2017-09-05 DIAGNOSIS — Z79.01 LONG-TERM (CURRENT) USE OF ANTICOAGULANTS: ICD-10-CM

## 2017-09-05 LAB — INR PPP: 3

## 2017-09-05 NOTE — MR AVS SNAPSHOT
Priscilla Way   9/5/2017   Anticoagulation Therapy Visit    Description:  77 year old male   Provider:  Sherry Georges, RN   Department:  [facility]           INR as of 9/5/2017     Today's INR 3.0      Anticoagulation Summary as of 9/5/2017     INR goal 2.0-3.0   Today's INR 3.0   Full instructions 3.75 mg on Tue, Thu; 2.5 mg all other days   Next INR check 9/19/2017    Indications   Acute kidney injury (H) [N17.9]  Deep vein thrombosis (DVT) (H) [I82.409] [I82.409]  Long-term (current) use of anticoagulants [Z79.01] [Z79.01]         September 2017 Details    Sun Mon Tue Wed Thu Fri Sat          1               2                 3               4               5      3.75 mg   See details      6      2.5 mg         7      3.75 mg         8      2.5 mg         9      2.5 mg           10      2.5 mg         11      2.5 mg         12      3.75 mg         13      2.5 mg         14      3.75 mg         15      2.5 mg         16      2.5 mg           17      2.5 mg         18      2.5 mg         19            20               21               22               23                 24               25               26               27               28               29               30                Date Details   09/05 This INR check       Date of next INR:  9/19/2017         How to take your warfarin dose     To take:  2.5 mg Take 1 of the 2.5 mg tablets.    To take:  3.75 mg Take 1.5 of the 2.5 mg tablets.

## 2017-09-05 NOTE — PROGRESS NOTES
ANTICOAGULATION FOLLOW-UP CLINIC VISIT    Patient Name:  Priscilla Way  Date:  9/5/2017  Contact Type:  Telephone    SUBJECTIVE:     Patient Findings     Positives No Problem Findings           OBJECTIVE    INR   Date Value Ref Range Status   09/05/2017 3.0  Final       ASSESSMENT / PLAN  INR assessment THER    Recheck INR In: 2 WEEKS    [facility] Location [facility]      Anticoagulation Summary as of 9/5/2017     INR goal 2.0-3.0   Today's INR 3.0   Maintenance plan 3.75 mg (2.5 mg x 1.5) on Tue, Thu; 2.5 mg (2.5 mg x 1) all other days   Full instructions 3.75 mg on Tue, Thu; 2.5 mg all other days   Weekly total 20 mg   Plan last modified Jodee Barron RN (5/5/2017)   Next INR check 9/19/2017   Priority INR   Target end date     Indications   Acute kidney injury (H) [N17.9]  Deep vein thrombosis (DVT) (H) [I82.409] [I82.409]  Long-term (current) use of anticoagulants [Z79.01] [Z79.01]         Anticoagulation Episode Summary     INR check location     Preferred lab     Send [facility]ers [facility]    Comments [facility] to draw INR's  Pt has dementia please contact daughter with any questions. Contact daughter May at [phone].  Has 2.5mg tablets       Anticoagulation Care Providers     Provider Role Specialty Phone number    Randy Fuentes MD [facility] [phone]            See the Encounter Report to view Anticoagulation Flowsheet and Dosing Calendar (Go to Encounters tab in chart review, and find the Anticoagulation Therapy Visit)  Spoke with Sam [facility] nurse.    Sherry Georges RN

## 2017-09-11 DIAGNOSIS — G47.00 INSOMNIA: ICD-10-CM

## 2017-09-11 NOTE — TELEPHONE ENCOUNTER
QUEtiapine (SEROQUEL) 25 MG tablet  Last Written Prescription Date:  3/8/17  Last Fill Quantity: 180,   # refills: 1  Last Office Visit with [facility], [facility] or [facility] prescribing provider: 12/23/16  Future Office visit:   None    Lab Results   Component Value Date    A1C 7.3 08/22/2016    A1C 7.0 08/20/2016    A1C 7.5 04/14/2015    A1C 7.9 10/03/2014    A1C 7.9 07/25/2014     BP Readings from Last 3 Encounters:   12/23/16 168/85   11/12/16 144/59   09/12/16 162/82     Recent Labs   Lab Test  09/12/16   1532  04/14/15   1300  07/25/14   1019   CHOL  186  192  165   HDL  50  34*  22*   LDL  99  113  87   TRIG  185*  227*  283*   CHOLHDLRATIO   --   5.7*  7.4*         Routing refill request to provider for review/approval because:   QUEtiapine (SEROQUEL) 25 MG tablet. Labs past due. bp > 140/90 rf?

## 2017-09-12 DIAGNOSIS — G47.00 INSOMNIA: ICD-10-CM

## 2017-09-12 RX ORDER — QUETIAPINE FUMARATE 25 MG/1
TABLET, FILM COATED ORAL
Qty: 180 TABLET | Refills: 1 | Status: SHIPPED | OUTPATIENT
Start: 2017-09-12 | End: 2018-01-01

## 2017-09-15 RX ORDER — QUETIAPINE FUMARATE 25 MG/1
TABLET, FILM COATED ORAL
Qty: 180 TABLET | Refills: 1 | OUTPATIENT
Start: 2017-09-15

## 2017-09-19 ENCOUNTER — ANTICOAGULATION THERAPY VISIT (OUTPATIENT)
Dept: ANTICOAGULATION | Facility: CLINIC | Age: 77
End: 2017-09-19

## 2017-09-19 DIAGNOSIS — N17.9 ACUTE KIDNEY INJURY (H): ICD-10-CM

## 2017-09-19 DIAGNOSIS — I82.403 DEEP VEIN THROMBOSIS (DVT) OF BOTH LOWER EXTREMITIES, UNSPECIFIED CHRONICITY, UNSPECIFIED VEIN (H): ICD-10-CM

## 2017-09-19 DIAGNOSIS — Z79.01 LONG-TERM (CURRENT) USE OF ANTICOAGULANTS: ICD-10-CM

## 2017-09-19 LAB — INR PPP: 2.2

## 2017-09-19 NOTE — MR AVS SNAPSHOT
Priscilla Way   9/19/2017   Anticoagulation Therapy Visit    Description:  77 year old male   Provider:  Sherry Georges, RN   Department:  [facility]           INR as of 9/19/2017     Today's INR 2.2      Anticoagulation Summary as of 9/19/2017     INR goal 2.0-3.0   Today's INR 2.2   Full instructions 3.75 mg on Tue, Thu; 2.5 mg all other days   Next INR check 9/26/2017    Indications   Acute kidney injury (H) [N17.9]  Deep vein thrombosis (DVT) (H) [I82.409] [I82.409]  Long-term (current) use of anticoagulants [Z79.01] [Z79.01]         September 2017 Details    Sun Mon Tue Wed Thu Fri Sat          1               2                 3               4               5               6               7               8               9                 10               11               12               13               14               15               16                 17               18               19      3.75 mg   See details      20      2.5 mg         21      3.75 mg         22      2.5 mg         23      2.5 mg           24      2.5 mg         25      2.5 mg         26            27               28               29               30                Date Details   09/19 This INR check       Date of next INR:  9/26/2017         How to take your warfarin dose     To take:  2.5 mg Take 1 of the 2.5 mg tablets.    To take:  3.75 mg Take 1.5 of the 2.5 mg tablets.

## 2017-09-19 NOTE — PROGRESS NOTES
ANTICOAGULATION FOLLOW-UP CLINIC VISIT    Patient Name:  Priscilla Way  Date:  9/19/2017  Contact Type:  Telephone    SUBJECTIVE:     Patient Findings     Positives No Problem Findings           OBJECTIVE    INR   Date Value Ref Range Status   09/19/2017 2.2  Final       ASSESSMENT / PLAN  INR assessment THER    Recheck INR In: 1 WEEK    [facility] Location [facility]      Anticoagulation Summary as of 9/19/2017     INR goal 2.0-3.0   Today's INR 2.2   Maintenance plan 3.75 mg (2.5 mg x 1.5) on Tue, Thu; 2.5 mg (2.5 mg x 1) all other days   Full instructions 3.75 mg on Tue, Thu; 2.5 mg all other days   Weekly total 20 mg   Plan last modified Jodee Barron RN (5/5/2017)   Next INR check 9/26/2017   Priority INR   Target end date     Indications   Acute kidney injury (H) [N17.9]  Deep vein thrombosis (DVT) (H) [I82.409] [I82.409]  Long-term (current) use of anticoagulants [Z79.01] [Z79.01]         Anticoagulation Episode Summary     INR check location     Preferred lab     Send IN[facility] reminders to [facility]    Comments [facility] to draw INR's  Pt has dementia please contact daughter with any questions. Contact daughter May at [phone].  Has 2.5mg tablets       Anticoagulation Care Providers     Provider Role Specialty Phone number    Randy Fuentes MD [facility] [phone]            See the Encounter Report to view Anticoagulation Flowsheet and Dosing Calendar (Go to Encounters tab in chart review, and find the Anticoagulation Therapy Visit)  Spoke with Sam [facility] nurse.    Sherry Georges RN

## 2017-09-22 ENCOUNTER — TELEPHONE (OUTPATIENT)
Dept: INTERNAL MEDICINE | Facility: CLINIC | Age: 77
End: 2017-09-22

## 2017-09-22 NOTE — TELEPHONE ENCOUNTER
----- Message from Alicia Gross sent at 9/22/2017  8:31 AM CDT -----  Regarding: Home Care Orders  Sam from [facility] [phone] calling honme care orders      Skilled nursing    1x 9 weks  4 prns    9/22/17 Sam given verbal approval for orders above.  Leslie Gardner RN

## 2017-09-25 ENCOUNTER — MEDICAL CORRESPONDENCE (OUTPATIENT)
Dept: HEALTH INFORMATION MANAGEMENT | Facility: CLINIC | Age: 77
End: 2017-09-25

## 2017-09-26 ENCOUNTER — ANTICOAGULATION THERAPY VISIT (OUTPATIENT)
Dept: ANTICOAGULATION | Facility: CLINIC | Age: 77
End: 2017-09-26

## 2017-09-26 ENCOUNTER — TELEPHONE (OUTPATIENT)
Dept: TRANSPLANT | Facility: CLINIC | Age: 77
End: 2017-09-26

## 2017-09-26 DIAGNOSIS — Z79.01 LONG-TERM (CURRENT) USE OF ANTICOAGULANTS: ICD-10-CM

## 2017-09-26 DIAGNOSIS — N17.9 ACUTE KIDNEY INJURY (H): ICD-10-CM

## 2017-09-26 DIAGNOSIS — I82.403 DEEP VEIN THROMBOSIS (DVT) OF BOTH LOWER EXTREMITIES, UNSPECIFIED CHRONICITY, UNSPECIFIED VEIN (H): ICD-10-CM

## 2017-09-26 LAB — INR PPP: 1.2

## 2017-09-26 NOTE — MR AVS SNAPSHOT
Priscilla Way   9/26/2017   Anticoagulation Therapy Visit    Description:  77 year old male   Provider:  Dayana Ervin, RN   Department:  [facility]           INR as of 9/26/2017     Today's INR 1.2!      Anticoagulation Summary as of 9/26/2017     INR goal 2.0-3.0   Today's INR 1.2!   Full instructions 9/26: 5 mg; 9/27: 3.75 mg; Otherwise 3.75 mg on Tue, Thu; 2.5 mg all other days   Next INR check 10/3/2017    Indications   Acute kidney injury (H) [N17.9]  Deep vein thrombosis (DVT) (H) [I82.409] [I82.409]  Long-term (current) use of anticoagulants [Z79.01] [Z79.01]         September 2017 Details    Sun Mon Tue Wed Thu Fri Sat          1               2                 3               4               5               6               7               8               9                 10               11               12               13               14               15               16                 17               18               19               20               21               22               23                 24               25               26      5 mg   See details      27      3.75 mg         28      3.75 mg         29      2.5 mg         30      2.5 mg          Date Details   09/26 This INR check               How to take your warfarin dose     To take:  2.5 mg Take 1 of the 2.5 mg tablets.    To take:  3.75 mg Take 1.5 of the 2.5 mg tablets.    To take:  5 mg Take 2 of the 2.5 mg tablets.           October 2017 Details    Sun Mon Tue Wed Thu Fri Sat     1      2.5 mg         2      2.5 mg         3            4               5               6               7                 8               9               10               11               12               13               14                 15               16               17               18               19               20               21                 22               23               24               25               26                27               28                 29               30               31                    Date Details   No additional details    Date of next INR:  10/3/2017         How to take your warfarin dose     To take:  2.5 mg Take 1 of the 2.5 mg tablets.    To take:  3.75 mg Take 1.5 of the 2.5 mg tablets.

## 2017-09-26 NOTE — PROGRESS NOTES
ANTICOAGULATION FOLLOW-UP CLINIC VISIT    Patient Name:  Priscilla Way  Date:  9/26/2017  Contact Type:  Telephone    SUBJECTIVE:     Patient Findings     Positives Missed doses    Comments Sam confirmed that patient missed 2 doses.           OBJECTIVE    INR   Date Value Ref Range Status   09/26/2017 1.2  Final       ASSESSMENT / PLAN  INR assessment SUB    Recheck INR In: 1 WEEK    INR Location [facility]      Anticoagulation Summary as of 9/26/2017     INR goal 2.0-3.0   Today's INR 1.2!   Maintenance plan 3.75 mg (2.5 mg x 1.5) on Tue, Thu; 2.5 mg (2.5 mg x 1) all other days   Full instructions 9/26: 5 mg; 9/27: 3.75 mg; Otherwise 3.75 mg on Tue, Thu; 2.5 mg all other days   Weekly total 20 mg   Plan last modified Jodee Barron RN (5/5/2017)   Next INR check 10/3/2017   Priority INR   Target end date     Indications   Acute kidney injury (H) [N17.9]  Deep vein thrombosis (DVT) (H) [I82.409] [I82.409]  Long-term (current) use of anticoagulants [Z79.01] [Z79.01]         Anticoagulation Episode Summary     INR check location     Preferred lab     [facility]    Comments [facility] to draw INR's  Pt has dementia please contact daughter with any questions. Contact daughter May at [phone].  Has 2.5mg tablets       Anticoagulation Care Providers     Provider Role Specialty Phone number    Beatrizpalmira Randy PERKINS MD [facility] [phone]            See the Encounter Report to view Anticoagulation Flowsheet and Dosing Calendar (Go to Encounters tab in chart review, and find the Anticoagulation Therapy Visit)    Spoke with Sam Ervin RN

## 2017-09-28 DIAGNOSIS — R41.3 LOSS OF MEMORY: ICD-10-CM

## 2017-09-28 RX ORDER — MEMANTINE HYDROCHLORIDE 21 MG/1
21 CAPSULE, EXTENDED RELEASE ORAL DAILY
Qty: 90 CAPSULE | Refills: 0 | Status: SHIPPED | OUTPATIENT
Start: 2017-09-28 | End: 2017-12-25

## 2017-09-28 NOTE — TELEPHONE ENCOUNTER
Namenda XR 21 mg caps      Last Written Prescription Date:  4-3-17  Last Fill Quantity: 90,   # refills: 1  Last Office Visit : 12-23-16  Future Office visit:  none    Creatinine   Date Value Ref Range Status   12/23/2016 1.28 (H) 0.66 - 1.25 mg/dL Final   ]      Kathleen M Doege RN

## 2017-09-29 DIAGNOSIS — Z29.9 PREVENTIVE MEASURE: ICD-10-CM

## 2017-09-29 RX ORDER — PEDI MULTIVIT NO.25/FOLIC ACID 300 MCG
1 TABLET,CHEWABLE ORAL DAILY
Qty: 90 TABLET | Refills: 0 | Status: SHIPPED | OUTPATIENT
Start: 2017-09-29 | End: 2017-12-26

## 2017-09-29 NOTE — TELEPHONE ENCOUNTER
Multi vit  Last Written Prescription Date:  1/16/15  Last Fill Quantity: 90,   # refills: 3  Last Office Visit : 12/23/16  Future Office visit:  No future appt

## 2017-10-03 ENCOUNTER — ANTICOAGULATION THERAPY VISIT (OUTPATIENT)
Dept: ANTICOAGULATION | Facility: CLINIC | Age: 77
End: 2017-10-03

## 2017-10-03 DIAGNOSIS — N17.9 ACUTE KIDNEY INJURY (H): ICD-10-CM

## 2017-10-03 DIAGNOSIS — Z79.01 LONG-TERM (CURRENT) USE OF ANTICOAGULANTS: ICD-10-CM

## 2017-10-03 DIAGNOSIS — I82.403 DEEP VEIN THROMBOSIS (DVT) OF BOTH LOWER EXTREMITIES, UNSPECIFIED CHRONICITY, UNSPECIFIED VEIN (H): ICD-10-CM

## 2017-10-03 LAB — INR PPP: 1.6

## 2017-10-03 NOTE — MR AVS SNAPSHOT
Priscilla Way   10/3/2017   Anticoagulation Therapy Visit    Description:  77 year old male   Provider:  Jodee Tirado RN   [facility]           INR as of 10/3/2017     Today's INR 1.6!      Anticoagulation Summary as of 10/3/2017     INR goal 2.0-3.0   Today's INR 1.6!   Full instructions 10/3: 5 mg; Otherwise 3.75 mg on Tue, Thu; 2.5 mg all other days   Next INR check 10/10/2017    Indications   Acute kidney injury (H) [N17.9]  Deep vein thrombosis (DVT) (H) [I82.409] [I82.409]  Long-term (current) use of anticoagulants [Z79.01] [Z79.01]         October 2017 Details    Sun Mon Tue Wed Thu Fri Sat     1               2               3      5 mg   See details      4      2.5 mg         5      3.75 mg         6      2.5 mg         7      2.5 mg           8      2.5 mg         9      2.5 mg         10            11               12               13               14                 15               16               17               18               19               20               21                 22               23               24               25               26               27               28                 29               30               31                    Date Details   10/03 This INR check       Date of next INR:  10/10/2017         How to take your warfarin dose     To take:  2.5 mg Take 1 of the 2.5 mg tablets.    To take:  3.75 mg Take 1.5 of the 2.5 mg tablets.    To take:  5 mg Take 2 of the 2.5 mg tablets.

## 2017-10-03 NOTE — PROGRESS NOTES
ANTICOAGULATION FOLLOW-UP CLINIC VISIT    Patient Name:  Priscilla Way  Date:  10/3/2017  Contact Type:  Telephone    SUBJECTIVE:     Patient Findings     Positives Missed doses (missed part of his dose 9/26/17 (1.25 mg was in med box),  missed dose 9/29/17)    Comments It is difficult for home care to draw his INR because he does not cooperate/gets combative.  ELISA is going to speak with Priscilla's caregiver to make sure he gets his doses of warfarin.             OBJECTIVE    INR   Date Value Ref Range Status   10/03/2017 1.6  Final       ASSESSMENT / PLAN  INR assessment SUB    Recheck INR In: 1 WEEK    INR Location [facility]R      Anticoagulation Summary as of 10/3/2017     INR goal 2.0-3.0   Today's INR 1.6!   Maintenance plan 3.75 mg (2.5 mg x 1.5) on Tue, Thu; 2.5 mg (2.5 mg x 1) all other days   Full instructions 10/3: 5 mg; Otherwise 3.75 mg on Tue, Thu; 2.5 mg all other days   Weekly total 20 mg   Plan last modified Jodee Barron, RN (5/5/2017)   Next INR check 10/10/2017   Priority INR   Target end date     Indications   Acute kidney injury (H) [N17.9]  Deep vein thrombosis (DVT) (H) [I82.409] [I82.409]  Long-term (current) use of anticoagulants [Z79.01] [Z79.01]         Anticoagulation Episode Summary     INR check location     Preferred lab     [facility]    Comments [facility] to [facility]'s  Pt has dementia please contact daughter with any questions. Contact daughter May at [phone].  Has 2.5mg tablets       Anticoagulation Care Providers     Provider Role Specialty Phone number    Randy Fuentes MD Responsible [facility] [phone]            See the Encounter Report to view Anticoagulation Flowsheet and Dosing Calendar (Go to Encounters tab in chart review, and find the Anticoagulation Therapy Visit)    Spoke with Delia PÉREZ.  She will try to draw INR in one week.  Priscilla is more combative with lab draws, so it is difficult to draw.  If he is in  range, she would like 2 weeks between INR checks.      Jodee Tirado RN

## 2017-10-10 ENCOUNTER — ANTICOAGULATION THERAPY VISIT (OUTPATIENT)
Dept: ANTICOAGULATION | Facility: CLINIC | Age: 77
End: 2017-10-10

## 2017-10-10 DIAGNOSIS — Z79.01 LONG-TERM (CURRENT) USE OF ANTICOAGULANTS: ICD-10-CM

## 2017-10-10 DIAGNOSIS — I82.403 DEEP VEIN THROMBOSIS (DVT) OF BOTH LOWER EXTREMITIES, UNSPECIFIED CHRONICITY, UNSPECIFIED VEIN (H): ICD-10-CM

## 2017-10-10 DIAGNOSIS — N17.9 ACUTE KIDNEY INJURY (H): ICD-10-CM

## 2017-10-10 LAB — INR PPP: 2.3

## 2017-10-10 NOTE — MR AVS SNAPSHOT
Priscilla Way   10/10/2017   Anticoagulation Therapy Visit    Description:  77 year old male   Provider:  Dayana Ervin, RN   [facility]           INR as of 10/10/2017     Today's INR 2.3      Anticoagulation Summary as of 10/10/2017     INR goal 2.0-3.0   Today's INR 2.3   Full instructions 3.75 mg on Tue, Thu; 2.5 mg all other days   Next INR check 10/24/2017    Indications   Acute kidney injury (H) [N17.9]  Deep vein thrombosis (DVT) (H) [I82.409] [I82.409]  Long-term (current) use of anticoagulants [Z79.01] [Z79.01]         October 2017 Details    Sun Mon Tue Wed Thu Fri Sat     1               2               3               4               5               6               7                 8               9               10      3.75 mg   See details      11      2.5 mg         12      3.75 mg         13      2.5 mg         14      2.5 mg           15      2.5 mg         16      2.5 mg         17      3.75 mg         18      2.5 mg         19      3.75 mg         20      2.5 mg         21      2.5 mg           22      2.5 mg         23      2.5 mg         24            25               26               27               28                 29               30               31                    Date Details   10/10 This INR check       Date of next INR:  10/24/2017         How to take your warfarin dose     To take:  2.5 mg Take 1 of the 2.5 mg tablets.    To take:  3.75 mg Take 1.5 of the 2.5 mg tablets.

## 2017-10-10 NOTE — PROGRESS NOTES
ANTICOAGULATION FOLLOW-UP CLINIC VISIT    Patient Name:  Priscilla Way  Date:  10/10/2017  Contact Type:  Telephone    SUBJECTIVE:     Patient Findings     Positives No Problem Findings           OBJECTIVE    INR   Date Value Ref Range Status   10/10/2017 2.3  Final       ASSESSMENT / PLAN  INR assessment THER    Recheck INR In: 2 WEEKS    [facility]R Location [facility]      Anticoagulation Summary as of 10/10/2017     INR goal 2.0-3.0   Today's INR 2.3   Maintenance plan 3.75 mg (2.5 mg x 1.5) on Tue, Thu; 2.5 mg (2.5 mg x 1) all other days   Full instructions 3.75 mg on Tue, Thu; 2.5 mg all other days   Weekly total 20 mg   Plan last modified Jodee Barron RN (5/5/2017)   Next INR check 10/24/2017   Priority INR   Target end date     Indications   Acute kidney injury (H) [N17.9]  Deep vein thrombosis (DVT) (H) [I82.409] [I82.409]  Long-term (current) use of anticoagulants [Z79.01] [Z79.01]         Anticoagulation Episode Summary     INR check location     Preferred lab     [facility]    Comments [facility] to draw INR's  Pt has dementia please contact daughter with any questions. Contact daughter May at [phone].  Has 2.5mg tablets       Anticoagulation Care Providers     Provider Role Specialty Phone number    Randy Fuentes MD [facility] [phone]            See the Encounter Report to view Anticoagulation Flowsheet and Dosing Calendar (Go to Encounters tab in chart review, and find the Anticoagulation Therapy Visit)    Spoke with Delia PÉREZ.    Dayana Ervin RN

## 2017-10-24 ENCOUNTER — ANTICOAGULATION THERAPY VISIT (OUTPATIENT)
Dept: ANTICOAGULATION | Facility: CLINIC | Age: 77
End: 2017-10-24

## 2017-10-24 DIAGNOSIS — Z79.01 LONG-TERM (CURRENT) USE OF ANTICOAGULANTS: ICD-10-CM

## 2017-10-24 DIAGNOSIS — N17.9 ACUTE KIDNEY INJURY (H): ICD-10-CM

## 2017-10-24 DIAGNOSIS — I82.403 DEEP VEIN THROMBOSIS (DVT) OF BOTH LOWER EXTREMITIES, UNSPECIFIED CHRONICITY, UNSPECIFIED VEIN (H): ICD-10-CM

## 2017-10-24 LAB — INR PPP: 2.9

## 2017-10-24 NOTE — PROGRESS NOTES
ANTICOAGULATION FOLLOW-UP CLINIC VISIT    Patient Name:  Priscilla Way  Date:  10/24/2017  Contact Type:  Telephone    SUBJECTIVE:     Patient Findings     Positives No Problem Findings           OBJECTIVE    INR   Date Value Ref Range Status   10/24/2017 2.9  Final       ASSESSMENT / PLAN  INR assessment THER    Recheck INR In: 2 WEEKS    INR Location [facility]      Anticoagulation Summary as of 10/24/2017     INR goal 2.0-3.0   Today's INR 2.9   Maintenance plan 3.75 mg (2.5 mg x 1.5) on Tue, Thu; 2.5 mg (2.5 mg x 1) all other days   Full instructions 3.75 mg on Tue, Thu; 2.5 mg all other days   Weekly total 20 mg   No change documented Dayana Ervin, RN   Plan last modified Jodee Barron RN (5/5/2017)   Next INR check 11/7/2017   Priority INR   Target end date     Indications   Acute kidney injury (H) [N17.9]  Deep vein thrombosis (DVT) (H) [I82.409] [I82.409]  Long-term (current) use of anticoagulants [Z79.01] [Z79.01]         Anticoagulation Episode Summary     INR check location     Preferred lab     [facility]    Comments [facility] to draw INR's  Pt has dementia please contact daughter with any questions. Contact daughter May at [phone].  Has 2.5mg tablets       Anticoagulation Care Providers     Provider Role Specialty Phone number    Randy Fuentes MD [facility] [phone]            See the Encounter Report to view Anticoagulation Flowsheet and Dosing Calendar (Go to Encounters tab in chart review, and find the Anticoagulation Therapy Visit)    Spoke with Sam PÉREZ.    Dayana Ervin, RN

## 2017-10-24 NOTE — MR AVS SNAPSHOT
Priscilla Way   10/24/2017   Anticoagulation Therapy Visit    Description:  77 year old male   Provider:  Dayana Ervin, RN   Department:  [facility]           INR as of 10/24/2017     Today's INR 2.9      Anticoagulation Summary as of 10/24/2017     INR goal 2.0-3.0   Today's INR 2.9   Full instructions 3.75 mg on Tue, Thu; 2.5 mg all other days   Next INR check 11/7/2017    Indications   Acute kidney injury (H) [N17.9]  Deep vein thrombosis (DVT) (H) [I82.409] [I82.409]  Long-term (current) use of anticoagulants [Z79.01] [Z79.01]         October 2017 Details    Sun Mon Tue Wed Thu Fri Sat     1               2               3               4               5               6               7                 8               9               10               11               12               13               14                 15               16               17               18               19               20               21                 22               23               24      3.75 mg   See details      25      2.5 mg         26      3.75 mg         27      2.5 mg         28      2.5 mg           29      2.5 mg         30      2.5 mg         31      3.75 mg              Date Details   10/24 This INR check               How to take your warfarin dose     To take:  2.5 mg Take 1 of the 2.5 mg tablets.    To take:  3.75 mg Take 1.5 of the 2.5 mg tablets.           November 2017 Details    Sun Mon Tue Wed Thu Fri Sat        1      2.5 mg         2      3.75 mg         3      2.5 mg         4      2.5 mg           5      2.5 mg         6      2.5 mg         7            8               9               10               11                 12               13               14               15               16               17               18                 19               20               21               22               23               24               25                 26               27                28               29               30                  Date Details   No additional details    Date of next INR:  11/7/2017         How to take your warfarin dose     To take:  2.5 mg Take 1 of the 2.5 mg tablets.    To take:  3.75 mg Take 1.5 of the 2.5 mg tablets.

## 2017-11-06 ENCOUNTER — ANTICOAGULATION THERAPY VISIT (OUTPATIENT)
Dept: ANTICOAGULATION | Facility: CLINIC | Age: 77
End: 2017-11-06

## 2017-11-06 DIAGNOSIS — N17.9 ACUTE KIDNEY INJURY (H): ICD-10-CM

## 2017-11-06 DIAGNOSIS — I82.403 DEEP VEIN THROMBOSIS (DVT) OF BOTH LOWER EXTREMITIES, UNSPECIFIED CHRONICITY, UNSPECIFIED VEIN (H): ICD-10-CM

## 2017-11-06 DIAGNOSIS — Z79.01 LONG-TERM (CURRENT) USE OF ANTICOAGULANTS: ICD-10-CM

## 2017-11-06 LAB — INR PPP: 3.5

## 2017-11-06 NOTE — MR AVS SNAPSHOT
Priscilla Way   11/6/2017   Anticoagulation Therapy Visit    Description:  77 year old male   Provider:  Shabana Sorto, RN   [facility]  [facility]           INR as of 11/6/2017     Today's INR 3.50!      Anticoagulation Summary as of 11/6/2017     INR goal 2.0-3.0   Today's INR 3.50!   Full instructions 11/6: Hold; Otherwise 3.75 mg on Tue, Thu; 2.5 mg all other days   Next INR check 11/14/2017    Indications   Acute kidney injury (H) [N17.9]  Deep vein thrombosis (DVT) (H) [I82.409] [I82.409]  Long-term (current) use of anticoagulants [Z79.01] [Z79.01]         November 2017 Details    Sun Mon Tue Wed Thu Fri Sat        1               2               3               4                 5               6      Hold   See details      7      3.75 mg         8      2.5 mg         9      3.75 mg         10      2.5 mg         11      2.5 mg           12      2.5 mg         13      2.5 mg         14            15               16               17               18                 19               20               21               22               23               24               25                 26               27               28               29               30                  Date Details   11/06 This INR check       Date of next INR:  11/14/2017         How to take your warfarin dose     To take:  2.5 mg Take 1 of the 2.5 mg tablets.    To take:  3.75 mg Take 1.5 of the 2.5 mg tablets.    Hold Do not take your warfarin dose. See the Details table to the right for additional instructions.

## 2017-11-06 NOTE — PROGRESS NOTES
ANTICOAGULATION FOLLOW-UP CLINIC VISIT    Patient Name:  Priscilla Way  Date:  11/6/2017  Contact Type:  Telephone    SUBJECTIVE:     Patient Findings     Positives Change in diet/appetite    Comments Pt has poor appetite. Home health nurse is unable to do another visit for pt and pt is hard to bring into clinic.           OBJECTIVE    INR   Date Value Ref Range Status   11/06/2017 3.50  Final     Comment:     Home Care        ASSESSMENT / PLAN  INR assessment SUPRA    Recheck INR In: 1 WEEK    INR Location [facility] INR      Anticoagulation Summary as of 11/6/2017     INR goal 2.0-3.0   Today's INR 3.50!   Maintenance plan 3.75 mg (2.5 mg x 1.5) on Tue, Thu; 2.5 mg (2.5 mg x 1) all other days   Full instructions 11/6: Hold; Otherwise 3.75 mg on Tue, Thu; 2.5 mg all other days   Weekly total 20 mg   Plan last modified Jodee Barron RN (5/5/2017)   Next INR check 11/14/2017   Priority INR   Target end date     Indications   Acute kidney injury (H) [N17.9]  Deep vein thrombosis (DVT) (H) [I82.409] [I82.409]  Long-term (current) use of anticoagulants [Z79.01] [Z79.01]         Anticoagulation Episode Summary     INR check location     Preferred lab     Send INR reminders to [facility]    Comments [facility] to draw INR's  Pt has dementia please contact daughter with any questions. Contact daughter May at [phone].  Has 2.5mg tablets       Anticoagulation Care Providers     Provider Role Specialty Phone number    Randy Fuentes MD [facility] [phone]            See the Encounter Report to view Anticoagulation Flowsheet and Dosing Calendar (Go to Encounters tab in chart review, and find the Anticoagulation Therapy Visit)    Spoke with PRATIMA Vargas. Gave them new warfarin recommendation.  No changes in health, medication, or diet. No missed doses, no falls. No signs or symptoms of bleed or clotting.     Shabana Sorto RN

## 2017-11-14 ENCOUNTER — ANTICOAGULATION THERAPY VISIT (OUTPATIENT)
Dept: ANTICOAGULATION | Facility: CLINIC | Age: 77
End: 2017-11-14

## 2017-11-14 DIAGNOSIS — Z79.01 LONG-TERM (CURRENT) USE OF ANTICOAGULANTS: ICD-10-CM

## 2017-11-14 DIAGNOSIS — N17.9 ACUTE KIDNEY INJURY (H): ICD-10-CM

## 2017-11-14 DIAGNOSIS — I82.403 DEEP VEIN THROMBOSIS (DVT) OF BOTH LOWER EXTREMITIES, UNSPECIFIED CHRONICITY, UNSPECIFIED VEIN (H): ICD-10-CM

## 2017-11-14 LAB — INR PPP: 2.4

## 2017-11-14 NOTE — MR AVS SNAPSHOT
Priscilla Way   11/14/2017   Anticoagulation Therapy Visit    Description:  77 year old male   Provider:  Sherry Georges, RN   Department:  [facility]           INR as of 11/14/2017     Today's INR 2.4      Anticoagulation Summary as of 11/14/2017     INR goal 2.0-3.0   Today's INR 2.4   Full instructions 3.75 mg on Tue, Thu; 2.5 mg all other days   Next INR check 11/28/2017    Indications   Acute kidney injury (H) [N17.9]  Deep vein thrombosis (DVT) (H) [I82.409] [I82.409]  Long-term (current) use of anticoagulants [Z79.01] [Z79.01]         November 2017 Details    Sun Mon Tue Wed Thu Fri Sat        1               2               3               4                 5               6               7               8               9               10               11                 12               13               14      3.75 mg   See details      15      2.5 mg         16      3.75 mg         17      2.5 mg         18      2.5 mg           19      2.5 mg         20      2.5 mg         21      3.75 mg         22      2.5 mg         23      3.75 mg         24      2.5 mg         25      2.5 mg           26      2.5 mg         27      2.5 mg         28            29               30                  Date Details   11/14 This INR check       Date of next INR:  11/28/2017         How to take your warfarin dose     To take:  2.5 mg Take 1 of the 2.5 mg tablets.    To take:  3.75 mg Take 1.5 of the 2.5 mg tablets.

## 2017-11-14 NOTE — PROGRESS NOTES
ANTICOAGULATION FOLLOW-UP CLINIC VISIT    Patient Name:  Priscilla Way  Date:  11/14/2017  Contact Type:  Telephone    SUBJECTIVE:     Patient Findings     Positives Change in diet/appetite (Appetite slightly better.)           OBJECTIVE    INR   Date Value Ref Range Status   11/14/2017 2.4  Final       ASSESSMENT / PLAN  INR assessment THER    Recheck INR In: 2 WEEKS    INR Location [facility]      Anticoagulation Summary as of 11/14/2017     INR goal 2.0-3.0   Today's INR 2.4   Maintenance plan 3.75 mg (2.5 mg x 1.5) on Tue, Thu; 2.5 mg (2.5 mg x 1) all other days   Full instructions 3.75 mg on Tue, Thu; 2.5 mg all other days   Weekly total 20 mg   Plan last modified Jodee Barron RN (5/5/2017)   Next INR check 11/28/2017   Priority INR   Target end date     Indications   Acute kidney injury (H) [N17.9]  Deep vein thrombosis (DVT) (H) [I82.409] [I82.409]  Long-term (current) use of anticoagulants [Z79.01] [Z79.01]         Anticoagulation Episode Summary     INR check location     Preferred lab     [facility]    Comments [facility] to draw INR's  Pt has dementia please contact daughter with any questions. Contact daughter May at [phone].  Has 2.5mg tablets       Anticoagulation Care Providers     Provider Role Specialty Phone number    Randy Fuentes MD [facility] [phone]            See the Encounter Report to view Anticoagulation Flowsheet and Dosing Calendar (Go to Encounters tab in chart review, and find the Anticoagulation Therapy Visit)    Spoke with Sam [facility] nurse.    Sherry Georges RN

## 2017-11-22 ENCOUNTER — TELEPHONE (OUTPATIENT)
Dept: INTERNAL MEDICINE | Facility: CLINIC | Age: 77
End: 2017-11-22

## 2017-11-22 NOTE — TELEPHONE ENCOUNTER
----- Message from Alicia Gross sent at 11/22/2017 10:46 AM CST -----  Regarding: Verbal Orders  Sam [facility]  [phone]    Skilled Nursing    1 x 9 weeks  3prns    11/22/17 Sam given verbal approval for orders above.  Leslie Gardner, RN

## 2017-11-24 ENCOUNTER — MEDICAL CORRESPONDENCE (OUTPATIENT)
Dept: HEALTH INFORMATION MANAGEMENT | Facility: CLINIC | Age: 77
End: 2017-11-24

## 2017-11-28 ENCOUNTER — ANTICOAGULATION THERAPY VISIT (OUTPATIENT)
Dept: ANTICOAGULATION | Facility: CLINIC | Age: 77
End: 2017-11-28

## 2017-11-28 DIAGNOSIS — Z79.01 LONG-TERM (CURRENT) USE OF ANTICOAGULANTS: ICD-10-CM

## 2017-11-28 DIAGNOSIS — I82.403 DEEP VEIN THROMBOSIS (DVT) OF BOTH LOWER EXTREMITIES, UNSPECIFIED CHRONICITY, UNSPECIFIED VEIN (H): ICD-10-CM

## 2017-11-28 DIAGNOSIS — N17.9 ACUTE KIDNEY INJURY (H): ICD-10-CM

## 2017-11-28 LAB — INR PPP: 3.2

## 2017-11-28 NOTE — PROGRESS NOTES
ANTICOAGULATION FOLLOW-UP CLINIC VISIT    Patient Name:  Priscilla Way  Date:  11/28/2017  Contact Type:  Telephone    SUBJECTIVE:     Patient Findings     Positives Unexplained INR or factor level change           OBJECTIVE    INR   Date Value Ref Range Status   11/28/2017 3.2  Final       ASSESSMENT / PLAN  INR assessment SUPRA    Recheck INR In: 2 WEEKS    INR Location [facility]      Anticoagulation Summary as of 11/28/2017     INR goal 2.0-3.0   Today's INR 3.2!   Maintenance plan 3.75 mg (2.5 mg x 1.5) on Thu; 2.5 mg (2.5 mg x 1) all other days   Full instructions 3.75 mg on Thu; 2.5 mg all other days   Weekly total 18.75 mg   Plan last modified Jodee Tirado RN (11/28/2017)   Next INR check 12/12/2017   Priority INR   Target end date     Indications   Acute kidney injury (H) [N17.9]  Deep vein thrombosis (DVT) (H) [I82.409] [I82.409]  Long-term (current) use of anticoagulants [Z79.01] [Z79.01]         Anticoagulation Episode Summary     INR check location     Preferred lab     Send INR reminders to [facility]    Comments [facility] to draw INR's  Pt has dementia please contact daughter with any questions. Contact daughter May at [phone].  Has 2.5mg tablets       Anticoagulation Care Providers     Provider Role Specialty Phone number    Randy Fuentes MD [facility] [phone]            See the Encounter Report to view Anticoagulation Flowsheet and Dosing Calendar (Go to Encounters tab in chart review, and find the Anticoagulation Therapy Visit)    Spoke with Ivy PÉREZ.  She reports no changes in health, diet, medications.  His INR has been higher lately, will decrease warfarin dose by 6 % and will recheck INR in 2 weeks.      Jodee Tirado, TED

## 2017-11-28 NOTE — MR AVS SNAPSHOT
Priscilla Way   11/28/2017   Anticoagulation Therapy Visit    Description:  77 year old male   Provider:  Jodee Tirado, RN   Department:  [facility]           INR as of 11/28/2017     Today's INR 3.2!      Anticoagulation Summary as of 11/28/2017     INR goal 2.0-3.0   Today's INR 3.2!   Full instructions 3.75 mg on Thu; 2.5 mg all other days   Next INR check 12/12/2017    Indications   Acute kidney injury (H) [N17.9]  Deep vein thrombosis (DVT) (H) [I82.409] [I82.409]  Long-term (current) use of anticoagulants [Z79.01] [Z79.01]         November 2017 Details    Sun Mon Tue Wed Thu Fri Sat        1               2               3               4                 5               6               7               8               9               10               11                 12               13               14               15               16               17               18                 19               20               21               22               23               24               25                 26               27               28      2.5 mg   See details      29      2.5 mg         30      3.75 mg            Date Details   11/28 This INR check               How to take your warfarin dose     To take:  2.5 mg Take 1 of the 2.5 mg tablets.    To take:  3.75 mg Take 1.5 of the 2.5 mg tablets.           December 2017 Details    Sun Mon Tue Wed Thu Fri Sat          1      2.5 mg         2      2.5 mg           3      2.5 mg         4      2.5 mg         5      2.5 mg         6      2.5 mg         7      3.75 mg         8      2.5 mg         9      2.5 mg           10      2.5 mg         11      2.5 mg         12            13               14               15               16                 17               18               19               20               21               22               23                 24               25               26               27               28                29               30                 31                      Date Details   No additional details    Date of next INR:  12/12/2017         How to take your warfarin dose     To take:  2.5 mg Take 1 of the 2.5 mg tablets.    To take:  3.75 mg Take 1.5 of the 2.5 mg tablets.

## 2017-12-05 DIAGNOSIS — Z79.01 LONG TERM CURRENT USE OF ANTICOAGULANT THERAPY: ICD-10-CM

## 2017-12-05 RX ORDER — WARFARIN SODIUM 2.5 MG/1
TABLET ORAL
Qty: 113 TABLET | Refills: 3 | Status: ON HOLD | OUTPATIENT
Start: 2017-12-05 | End: 2018-01-01

## 2017-12-12 ENCOUNTER — ANTICOAGULATION THERAPY VISIT (OUTPATIENT)
Dept: ANTICOAGULATION | Facility: CLINIC | Age: 77
End: 2017-12-12

## 2017-12-12 DIAGNOSIS — N17.9 ACUTE KIDNEY INJURY (H): ICD-10-CM

## 2017-12-12 DIAGNOSIS — Z79.01 LONG-TERM (CURRENT) USE OF ANTICOAGULANTS: ICD-10-CM

## 2017-12-12 DIAGNOSIS — I82.403 DEEP VEIN THROMBOSIS (DVT) OF BOTH LOWER EXTREMITIES, UNSPECIFIED CHRONICITY, UNSPECIFIED VEIN (H): ICD-10-CM

## 2017-12-12 LAB — INR PPP: 3.4

## 2017-12-12 NOTE — PROGRESS NOTES
ANTICOAGULATION FOLLOW-UP CLINIC VISIT    Patient Name:  Priscilla Way  Date:  12/12/2017  Contact Type:  Telephone    SUBJECTIVE:     Patient Findings     Positives No Problem Findings           OBJECTIVE    INR   Date Value Ref Range Status   12/12/2017 3.4  Final       ASSESSMENT / PLAN  INR assessment SUPRA    Recheck INR In: 1 WEEK    [facility] Location [facility]      Anticoagulation Summary as of 12/12/2017     INR goal 2.0-3.0   Today's INR 3.4!   Maintenance plan 3.75 mg (2.5 mg x 1.5) on Thu; 2.5 mg (2.5 mg x 1) all other days   Full instructions 12/12: 1.25 mg; 12/14: 2.5 mg; Otherwise 3.75 mg on Thu; 2.5 mg all other days   Weekly total 18.75 mg   Plan last modified Jodee Tirado RN (11/28/2017)   Next INR check 12/19/2017   Priority INR   Target end date     Indications   Acute kidney injury (H) [N17.9]  Deep vein thrombosis (DVT) (H) [I82.409] [I82.409]  Long-term (current) use of anticoagulants [Z79.01] [Z79.01]         Anticoagulation Episode Summary     INR check location     Preferred lab     Send INR reminders to [facility]    Comments [facility] to draw INR's  Pt has dementia please contact daughter with any questions. Contact daughter May at [phone].  Has 2.5mg tablets       Anticoagulation Care Providers     Provider Role Specialty Phone number    Randy Fuentes MD [facility] [phone]            See the Encounter Report to view Anticoagulation Flowsheet and Dosing Calendar (Go to Encounters tab in chart review, and find the Anticoagulation Therapy Visit)    Spoke with Sam [facility] nurse.    Sherry Georges RN

## 2017-12-12 NOTE — MR AVS SNAPSHOT
Priscilla Way   12/12/2017   Anticoagulation Therapy Visit    Description:  77 year old male   Provider:  Sherry Georges RN   Department:  [facility]           INR as of 12/12/2017     Today's INR 3.4!      Anticoagulation Summary as of 12/12/2017     INR goal 2.0-3.0   Today's INR 3.4!   Full instructions 12/12: 1.25 mg; 12/14: 2.5 mg; Otherwise 3.75 mg on Thu; 2.5 mg all other days   Next INR check 12/19/2017    Indications   Acute kidney injury (H) [N17.9]  Deep vein thrombosis (DVT) (H) [I82.409] [I82.409]  Long-term (current) use of anticoagulants [Z79.01] [Z79.01]         December 2017 Details    Sun Mon Tue Wed Thu Fri Sat          1               2                 3               4               5               6               7               8               9                 10               11               12      1.25 mg   See details      13      2.5 mg         14      2.5 mg         15      2.5 mg         16      2.5 mg           17      2.5 mg         18      2.5 mg         19            20               21               22               23                 24               25               26               27               28               29               30                 31                      Date Details   12/12 This INR check       Date of next INR:  12/19/2017         How to take your warfarin dose     To take:  1.25 mg Take 0.5 of a 2.5 mg tablet.    To take:  2.5 mg Take 1 of the 2.5 mg tablets.

## 2017-12-13 DIAGNOSIS — E55.9 VITAMIN D DEFICIENCY: ICD-10-CM

## 2017-12-13 NOTE — LETTER
Priscilla Way  [address]  [facility] [address]        December 15, 2017    This letter is a reminder that you are due to see your Primary Care Provider for an Annual Visit and needed labs. You must be seen by your Primary Care Provider on a yearly basis and have appropriate labs drawn for continued care and prescription refills. Please call [phone] to schedule an appointment for an Annual Visit with Dr Alfredo FERNANDEZ.       You have been given a 30 day supply/refill of your  Cholecalciferol (VITAMIN D-400) 400 UNITS TABS   while you get your clinic visit/labs completed.      Regards,    [facility]

## 2017-12-14 ENCOUNTER — MEDICAL CORRESPONDENCE (OUTPATIENT)
Dept: HEALTH INFORMATION MANAGEMENT | Facility: CLINIC | Age: 77
End: 2017-12-14

## 2017-12-15 NOTE — TELEPHONE ENCOUNTER
Cholecalciferol (VITAMIN D-400) 400 UNITS TABS     Last Written Prescription Date:  11/2/16  Last Fill Quantity: 60,   # refills: 11  Last Office Visit :12/23/16  Future Office visit:  none    appt letter sent. 30 day to pharmacy.

## 2017-12-19 ENCOUNTER — ANTICOAGULATION THERAPY VISIT (OUTPATIENT)
Dept: ANTICOAGULATION | Facility: CLINIC | Age: 77
End: 2017-12-19

## 2017-12-19 DIAGNOSIS — Z79.01 LONG-TERM (CURRENT) USE OF ANTICOAGULANTS: ICD-10-CM

## 2017-12-19 DIAGNOSIS — N17.9 ACUTE KIDNEY INJURY (H): ICD-10-CM

## 2017-12-19 DIAGNOSIS — I82.403 DEEP VEIN THROMBOSIS (DVT) OF BOTH LOWER EXTREMITIES, UNSPECIFIED CHRONICITY, UNSPECIFIED VEIN (H): ICD-10-CM

## 2017-12-19 LAB — INR PPP: 2.1

## 2017-12-19 NOTE — MR AVS SNAPSHOT
Priscilla Way   12/19/2017   Anticoagulation Therapy Visit    Description:  77 year old male   Provider:  Jodee Tirado, RN   Department:  [facility]           INR as of 12/19/2017     Today's INR 2.1      Anticoagulation Summary as of 12/19/2017     INR goal 2.0-3.0   Today's INR 2.1   Full instructions 3.75 mg on Thu; 2.5 mg all other days   Next INR check 1/2/2018    Indications   Acute kidney injury (H) [N17.9]  Deep vein thrombosis (DVT) (H) [I82.409] [I82.409]  Long-term (current) use of anticoagulants [Z79.01] [Z79.01]         December 2017 Details    Sun Mon Tue Wed Thu Fri Sat          1               2                 3               4               5               6               7               8               9                 10               11               12               13               14               15               16                 17               18               19      2.5 mg   See details      20      2.5 mg         21      3.75 mg         22      2.5 mg         23      2.5 mg           24      2.5 mg         25      2.5 mg         26      2.5 mg         27      2.5 mg         28      3.75 mg         29      2.5 mg         30      2.5 mg           31      2.5 mg                Date Details   12/19 This INR check               How to take your warfarin dose     To take:  2.5 mg Take 1 of the 2.5 mg tablets.    To take:  3.75 mg Take 1.5 of the 2.5 mg tablets.           January 2018 Details    Sun Mon Tue Wed Thu Fri Sat      1      2.5 mg         2            3               4               5               6                 7               8               9               10               11               12               13                 14               15               16               17               18               19               20                 21               22               23               24               25               26               27                  28               29               30               31                   Date Details   No additional details    Date of next INR:  1/2/2018         How to take your warfarin dose     To take:  2.5 mg Take 1 of the 2.5 mg tablets.

## 2017-12-19 NOTE — PROGRESS NOTES
ANTICOAGULATION FOLLOW-UP CLINIC VISIT    Patient Name:  Priscilla Way  Date:  12/19/2017  Contact Type:  Telephone    SUBJECTIVE:     Patient Findings     Positives No Problem Findings           OBJECTIVE    INR   Date Value Ref Range Status   12/19/2017 2.1  Final       ASSESSMENT / PLAN  INR assessment THER    Recheck INR In: 2 WEEKS    [facility]R Location [facility]      Anticoagulation Summary as of 12/19/2017     INR goal 2.0-3.0   Today's INR 2.1   Maintenance plan 3.75 mg (2.5 mg x 1.5) on Thu; 2.5 mg (2.5 mg x 1) all other days   Full instructions 3.75 mg on Thu; 2.5 mg all other days   Weekly total 18.75 mg   No change documented Jodee Tirado RN   Plan last modified Jodee Tirado RN (11/28/2017)   Next INR check 1/2/2018   Priority INR   Target end date     Indications   Acute kidney injury (H) [N17.9]  Deep vein thrombosis (DVT) (H) [I82.409] [I82.409]  Long-term (current) use of anticoagulants [Z79.01] [Z79.01]         Anticoagulation Episode Summary     INR check location     Preferred lab     Send INR reminders to [facility]    Comments [facility] to draw INR's  Pt has dementia please contact daughter with any questions. Contact daughter May at [phone].  Has 2.5mg tablets       Anticoagulation Care Providers     Provider Role Specialty Phone number    IngridromanRandy chavis MD [facility] [phone]            See the Encounter Report to view Anticoagulation Flowsheet and Dosing Calendar (Go to Encounters tab in chart review, and find the Anticoagulation Therapy Visit)    Spoke with Sam PÉREZ.  His INR went from 3.4 to 2.1 in one week.  Will try warfarin 3.75 mg Th and 2.5 mg all other days of the week.  Sam is requesting 2 weeks between INR checks.    Jodee Tirado RN

## 2017-12-25 DIAGNOSIS — R41.3 LOSS OF MEMORY: ICD-10-CM

## 2017-12-26 DIAGNOSIS — Z29.9 PREVENTIVE MEASURE: ICD-10-CM

## 2017-12-26 DIAGNOSIS — K21.9 ESOPHAGEAL REFLUX: ICD-10-CM

## 2017-12-26 NOTE — LETTER
Priscilla Way  [address]  [facility] [address]        December 27, 2017    This letter is a reminder that you are overdue to see your Primary Care Provider for an Annual Visit and needed labs. You must be seen by your Primary Care Provider on a yearly basis and have appropriate labs drawn for continued care and prescription refills. Please call [phone] to schedule an appointment for an Annual Visit with Dr Alfredo FERNANDEZ.     [facility],    Primary Care Center

## 2017-12-27 DIAGNOSIS — K21.9 ESOPHAGEAL REFLUX: ICD-10-CM

## 2017-12-27 RX ORDER — MEMANTINE HYDROCHLORIDE 21 MG/1
21 CAPSULE, EXTENDED RELEASE ORAL DAILY
Qty: 30 CAPSULE | Refills: 0 | Status: SHIPPED | OUTPATIENT
Start: 2017-12-27 | End: 2018-01-01

## 2017-12-27 NOTE — TELEPHONE ENCOUNTER
omeprazole (PRILOSEC) 20 MG CR     Last Written Prescription Date:  7/21/17  Last Fill Quantity: 90,   # refills: 1  Last Office Visit : 12/23/16  Future Office visit:  NONE    Routing refill request for review/approval because: OVER DUE MD APPT.  REMINDER LETTER SENT  12/15/17   NO PT RTC APPT.

## 2017-12-27 NOTE — TELEPHONE ENCOUNTER
memantine XR (NAMENDA XR) 21 MG    Last Written Prescription Date:  9/28/17  Last Fill Quantity: 90,   # refills: 0  Last Office Visit : 12/23/16  Future Office visit:  NONE    Routing refill request for review/approval because:  OVER DUE MD APPT. LETTER SENT  12/15/17    NO PT RTC APPT.

## 2017-12-27 NOTE — TELEPHONE ENCOUNTER
Pediatric Multiple Vit-C-FA (CHILDRENS CHEWABLE MULTI VITS) CHEW  Last Written Prescription Date:  9/29/17  Last Fill Quantity: 90,   # refills: 0  Last Office Visit : 12/23/16  Future Office visit: none      appt letter sent.

## 2018-01-01 ENCOUNTER — OFFICE VISIT (OUTPATIENT)
Dept: INTERPRETER SERVICES | Facility: CLINIC | Age: 78
End: 2018-01-01
Payer: COMMERCIAL

## 2018-01-01 ENCOUNTER — MEDICAL CORRESPONDENCE (OUTPATIENT)
Dept: HEALTH INFORMATION MANAGEMENT | Facility: CLINIC | Age: 78
End: 2018-01-01

## 2018-01-01 ENCOUNTER — TELEPHONE (OUTPATIENT)
Dept: INTERNAL MEDICINE | Facility: CLINIC | Age: 78
End: 2018-01-01

## 2018-01-01 ENCOUNTER — ANTICOAGULATION THERAPY VISIT (OUTPATIENT)
Dept: ANTICOAGULATION | Facility: CLINIC | Age: 78
End: 2018-01-01

## 2018-01-01 ENCOUNTER — DOCUMENTATION ONLY (OUTPATIENT)
Dept: FAMILY MEDICINE | Facility: CLINIC | Age: 78
End: 2018-01-01

## 2018-01-01 ENCOUNTER — ALLIED HEALTH/NURSE VISIT (OUTPATIENT)
Dept: NEUROLOGY | Facility: CLINIC | Age: 78
DRG: 698 | End: 2018-01-01
Attending: PSYCHIATRY & NEUROLOGY
Payer: COMMERCIAL

## 2018-01-01 ENCOUNTER — APPOINTMENT (OUTPATIENT)
Dept: SPEECH THERAPY | Facility: CLINIC | Age: 78
DRG: 177 | End: 2018-01-01
Attending: INTERNAL MEDICINE
Payer: COMMERCIAL

## 2018-01-01 ENCOUNTER — APPOINTMENT (OUTPATIENT)
Dept: ULTRASOUND IMAGING | Facility: CLINIC | Age: 78
DRG: 371 | End: 2018-01-01
Attending: PHYSICIAN ASSISTANT
Payer: COMMERCIAL

## 2018-01-01 ENCOUNTER — ALLIED HEALTH/NURSE VISIT (OUTPATIENT)
Dept: NEUROLOGY | Facility: CLINIC | Age: 78
DRG: 177 | End: 2018-01-01
Attending: PSYCHIATRY & NEUROLOGY
Payer: COMMERCIAL

## 2018-01-01 ENCOUNTER — APPOINTMENT (OUTPATIENT)
Dept: GENERAL RADIOLOGY | Facility: CLINIC | Age: 78
DRG: 177 | End: 2018-01-01
Attending: INTERNAL MEDICINE
Payer: COMMERCIAL

## 2018-01-01 ENCOUNTER — APPOINTMENT (OUTPATIENT)
Dept: SPEECH THERAPY | Facility: CLINIC | Age: 78
DRG: 177 | End: 2018-01-01
Attending: PEDIATRICS
Payer: COMMERCIAL

## 2018-01-01 ENCOUNTER — APPOINTMENT (OUTPATIENT)
Dept: ULTRASOUND IMAGING | Facility: CLINIC | Age: 78
DRG: 698 | End: 2018-01-01
Attending: STUDENT IN AN ORGANIZED HEALTH CARE EDUCATION/TRAINING PROGRAM
Payer: COMMERCIAL

## 2018-01-01 ENCOUNTER — TELEPHONE (OUTPATIENT)
Dept: FAMILY MEDICINE | Facility: CLINIC | Age: 78
End: 2018-01-01

## 2018-01-01 ENCOUNTER — HOME INFUSION (PRE-WILLOW HOME INFUSION) (OUTPATIENT)
Dept: PHARMACY | Facility: CLINIC | Age: 78
End: 2018-01-01

## 2018-01-01 ENCOUNTER — APPOINTMENT (OUTPATIENT)
Dept: GENERAL RADIOLOGY | Facility: CLINIC | Age: 78
DRG: 698 | End: 2018-01-01
Attending: INTERNAL MEDICINE
Payer: COMMERCIAL

## 2018-01-01 ENCOUNTER — APPOINTMENT (OUTPATIENT)
Dept: GENERAL RADIOLOGY | Facility: CLINIC | Age: 78
DRG: 698 | End: 2018-01-01
Attending: STUDENT IN AN ORGANIZED HEALTH CARE EDUCATION/TRAINING PROGRAM
Payer: COMMERCIAL

## 2018-01-01 ENCOUNTER — APPOINTMENT (OUTPATIENT)
Dept: GENERAL RADIOLOGY | Facility: CLINIC | Age: 78
DRG: 177 | End: 2018-01-01
Attending: EMERGENCY MEDICINE
Payer: COMMERCIAL

## 2018-01-01 ENCOUNTER — TELEPHONE (OUTPATIENT)
Dept: TRANSPLANT | Facility: CLINIC | Age: 78
End: 2018-01-01

## 2018-01-01 ENCOUNTER — OFFICE VISIT (OUTPATIENT)
Dept: INTERPRETER SERVICES | Facility: CLINIC | Age: 78
End: 2018-01-01

## 2018-01-01 ENCOUNTER — APPOINTMENT (OUTPATIENT)
Dept: GENERAL RADIOLOGY | Facility: CLINIC | Age: 78
DRG: 177 | End: 2018-01-01
Attending: PHYSICIAN ASSISTANT
Payer: COMMERCIAL

## 2018-01-01 ENCOUNTER — APPOINTMENT (OUTPATIENT)
Dept: GENERAL RADIOLOGY | Facility: CLINIC | Age: 78
DRG: 371 | End: 2018-01-01
Attending: EMERGENCY MEDICINE
Payer: COMMERCIAL

## 2018-01-01 ENCOUNTER — DOCUMENTATION ONLY (OUTPATIENT)
Dept: CARE COORDINATION | Facility: CLINIC | Age: 78
End: 2018-01-01

## 2018-01-01 ENCOUNTER — APPOINTMENT (OUTPATIENT)
Dept: ULTRASOUND IMAGING | Facility: CLINIC | Age: 78
DRG: 371 | End: 2018-01-01
Attending: EMERGENCY MEDICINE
Payer: COMMERCIAL

## 2018-01-01 ENCOUNTER — DOCUMENTATION ONLY (OUTPATIENT)
Dept: TRANSPLANT | Facility: CLINIC | Age: 78
End: 2018-01-01

## 2018-01-01 ENCOUNTER — CARE COORDINATION (OUTPATIENT)
Dept: CARE COORDINATION | Facility: CLINIC | Age: 78
End: 2018-01-01

## 2018-01-01 ENCOUNTER — APPOINTMENT (OUTPATIENT)
Dept: CT IMAGING | Facility: CLINIC | Age: 78
DRG: 698 | End: 2018-01-01
Attending: EMERGENCY MEDICINE
Payer: COMMERCIAL

## 2018-01-01 ENCOUNTER — PATIENT OUTREACH (OUTPATIENT)
Dept: CARE COORDINATION | Facility: CLINIC | Age: 78
End: 2018-01-01

## 2018-01-01 ENCOUNTER — APPOINTMENT (OUTPATIENT)
Dept: GENERAL RADIOLOGY | Facility: CLINIC | Age: 78
DRG: 698 | End: 2018-01-01
Attending: EMERGENCY MEDICINE
Payer: COMMERCIAL

## 2018-01-01 ENCOUNTER — APPOINTMENT (OUTPATIENT)
Dept: GENERAL RADIOLOGY | Facility: CLINIC | Age: 78
DRG: 177 | End: 2018-01-01
Attending: PEDIATRICS
Payer: COMMERCIAL

## 2018-01-01 ENCOUNTER — APPOINTMENT (OUTPATIENT)
Dept: SPEECH THERAPY | Facility: CLINIC | Age: 78
DRG: 371 | End: 2018-01-01
Attending: PHYSICIAN ASSISTANT
Payer: COMMERCIAL

## 2018-01-01 ENCOUNTER — HOSPITAL ENCOUNTER (INPATIENT)
Facility: CLINIC | Age: 78
LOS: 48 days | Discharge: HOME HEALTH | DRG: 177 | End: 2018-10-29
Attending: EMERGENCY MEDICINE | Admitting: INTERNAL MEDICINE
Payer: COMMERCIAL

## 2018-01-01 ENCOUNTER — APPOINTMENT (OUTPATIENT)
Dept: SPEECH THERAPY | Facility: CLINIC | Age: 78
DRG: 371 | End: 2018-01-01
Attending: PSYCHIATRY & NEUROLOGY
Payer: COMMERCIAL

## 2018-01-01 ENCOUNTER — APPOINTMENT (OUTPATIENT)
Dept: MRI IMAGING | Facility: CLINIC | Age: 78
DRG: 177 | End: 2018-01-01
Attending: INTERNAL MEDICINE
Payer: COMMERCIAL

## 2018-01-01 ENCOUNTER — TELEPHONE (OUTPATIENT)
Dept: ORTHOPEDICS | Facility: CLINIC | Age: 78
End: 2018-01-01

## 2018-01-01 ENCOUNTER — TELEPHONE (OUTPATIENT)
Dept: NEPHROLOGY | Facility: CLINIC | Age: 78
End: 2018-01-01

## 2018-01-01 ENCOUNTER — APPOINTMENT (OUTPATIENT)
Dept: CARDIOLOGY | Facility: CLINIC | Age: 78
DRG: 177 | End: 2018-01-01
Attending: INTERNAL MEDICINE
Payer: COMMERCIAL

## 2018-01-01 ENCOUNTER — APPOINTMENT (OUTPATIENT)
Dept: GENERAL RADIOLOGY | Facility: CLINIC | Age: 78
DRG: 177 | End: 2018-01-01
Attending: HOSPITALIST
Payer: COMMERCIAL

## 2018-01-01 ENCOUNTER — HOSPITAL ENCOUNTER (INPATIENT)
Facility: CLINIC | Age: 78
LOS: 3 days | Discharge: HOME HEALTH | DRG: 371 | End: 2018-03-24
Attending: EMERGENCY MEDICINE | Admitting: INTERNAL MEDICINE
Payer: COMMERCIAL

## 2018-01-01 ENCOUNTER — HOSPITAL ENCOUNTER (INPATIENT)
Facility: CLINIC | Age: 78
LOS: 7 days | Discharge: HOME HEALTH | DRG: 371 | End: 2018-06-07
Attending: EMERGENCY MEDICINE | Admitting: HOSPITALIST
Payer: COMMERCIAL

## 2018-01-01 ENCOUNTER — TELEPHONE (OUTPATIENT)
Dept: PHARMACY | Facility: OTHER | Age: 78
End: 2018-01-01

## 2018-01-01 ENCOUNTER — OFFICE VISIT (OUTPATIENT)
Dept: FAMILY MEDICINE | Facility: CLINIC | Age: 78
End: 2018-01-01
Payer: COMMERCIAL

## 2018-01-01 ENCOUNTER — APPOINTMENT (OUTPATIENT)
Dept: CT IMAGING | Facility: CLINIC | Age: 78
DRG: 371 | End: 2018-01-01
Attending: EMERGENCY MEDICINE
Payer: COMMERCIAL

## 2018-01-01 ENCOUNTER — APPOINTMENT (OUTPATIENT)
Dept: CT IMAGING | Facility: CLINIC | Age: 78
DRG: 177 | End: 2018-01-01
Attending: EMERGENCY MEDICINE
Payer: COMMERCIAL

## 2018-01-01 ENCOUNTER — DOCUMENTATION ONLY (OUTPATIENT)
Dept: PHARMACY | Facility: CLINIC | Age: 78
End: 2018-01-01

## 2018-01-01 ENCOUNTER — ALLIED HEALTH/NURSE VISIT (OUTPATIENT)
Dept: NEUROLOGY | Facility: CLINIC | Age: 78
DRG: 371 | End: 2018-01-01
Attending: PSYCHIATRY & NEUROLOGY
Payer: COMMERCIAL

## 2018-01-01 ENCOUNTER — HOSPITAL ENCOUNTER (INPATIENT)
Facility: CLINIC | Age: 78
LOS: 20 days | Discharge: HOME HEALTH | DRG: 698 | End: 2018-11-22
Attending: EMERGENCY MEDICINE | Admitting: INTERNAL MEDICINE
Payer: COMMERCIAL

## 2018-01-01 ENCOUNTER — TELEPHONE (OUTPATIENT)
Dept: CARE COORDINATION | Facility: CLINIC | Age: 78
End: 2018-01-01

## 2018-01-01 ENCOUNTER — APPOINTMENT (OUTPATIENT)
Dept: SPEECH THERAPY | Facility: CLINIC | Age: 78
DRG: 177 | End: 2018-01-01
Attending: PHYSICIAN ASSISTANT
Payer: COMMERCIAL

## 2018-01-01 ENCOUNTER — APPOINTMENT (OUTPATIENT)
Dept: ULTRASOUND IMAGING | Facility: CLINIC | Age: 78
DRG: 177 | End: 2018-01-01
Attending: INTERNAL MEDICINE
Payer: COMMERCIAL

## 2018-01-01 VITALS
HEART RATE: 76 BPM | OXYGEN SATURATION: 99 % | DIASTOLIC BLOOD PRESSURE: 74 MMHG | BODY MASS INDEX: 23.09 KG/M2 | TEMPERATURE: 96.5 F | RESPIRATION RATE: 12 BRPM | WEIGHT: 160.94 LBS | SYSTOLIC BLOOD PRESSURE: 166 MMHG

## 2018-01-01 VITALS
RESPIRATION RATE: 16 BRPM | DIASTOLIC BLOOD PRESSURE: 79 MMHG | SYSTOLIC BLOOD PRESSURE: 199 MMHG | HEART RATE: 76 BPM | TEMPERATURE: 99.1 F

## 2018-01-01 VITALS
HEIGHT: 70 IN | DIASTOLIC BLOOD PRESSURE: 54 MMHG | HEART RATE: 73 BPM | TEMPERATURE: 96.1 F | BODY MASS INDEX: 22.35 KG/M2 | SYSTOLIC BLOOD PRESSURE: 142 MMHG | RESPIRATION RATE: 16 BRPM | OXYGEN SATURATION: 100 % | WEIGHT: 156.1 LBS

## 2018-01-01 VITALS
DIASTOLIC BLOOD PRESSURE: 60 MMHG | HEART RATE: 83 BPM | RESPIRATION RATE: 18 BRPM | TEMPERATURE: 96 F | WEIGHT: 171.8 LBS | BODY MASS INDEX: 26.04 KG/M2 | HEIGHT: 68 IN | OXYGEN SATURATION: 99 % | SYSTOLIC BLOOD PRESSURE: 135 MMHG

## 2018-01-01 VITALS
TEMPERATURE: 96.8 F | HEIGHT: 68 IN | RESPIRATION RATE: 18 BRPM | WEIGHT: 154.1 LBS | SYSTOLIC BLOOD PRESSURE: 149 MMHG | BODY MASS INDEX: 23.36 KG/M2 | DIASTOLIC BLOOD PRESSURE: 78 MMHG | HEART RATE: 77 BPM | OXYGEN SATURATION: 100 %

## 2018-01-01 DIAGNOSIS — I82.409 DEEP VEIN THROMBOSIS (DVT) (H): ICD-10-CM

## 2018-01-01 DIAGNOSIS — N17.9 ACUTE KIDNEY INJURY (H): ICD-10-CM

## 2018-01-01 DIAGNOSIS — Z79.01 LONG-TERM (CURRENT) USE OF ANTICOAGULANTS: ICD-10-CM

## 2018-01-01 DIAGNOSIS — I82.403 DEEP VEIN THROMBOSIS (DVT) OF BOTH LOWER EXTREMITIES, UNSPECIFIED CHRONICITY, UNSPECIFIED VEIN (H): ICD-10-CM

## 2018-01-01 DIAGNOSIS — R41.82 ALTERED MENTAL STATUS, UNSPECIFIED ALTERED MENTAL STATUS TYPE: Primary | ICD-10-CM

## 2018-01-01 DIAGNOSIS — K21.9 ESOPHAGEAL REFLUX: ICD-10-CM

## 2018-01-01 DIAGNOSIS — R56.9 SEIZURE (H): Primary | ICD-10-CM

## 2018-01-01 DIAGNOSIS — Z94.4 HISTORY OF LIVER TRANSPLANT (H): Primary | ICD-10-CM

## 2018-01-01 DIAGNOSIS — Z94.0 KIDNEY REPLACED BY TRANSPLANT: ICD-10-CM

## 2018-01-01 DIAGNOSIS — E55.9 VITAMIN D DEFICIENCY: ICD-10-CM

## 2018-01-01 DIAGNOSIS — I25.10 CARDIOVASCULAR DISEASE: ICD-10-CM

## 2018-01-01 DIAGNOSIS — N17.9 AKI (ACUTE KIDNEY INJURY) (H): Primary | ICD-10-CM

## 2018-01-01 DIAGNOSIS — G47.00 INSOMNIA: ICD-10-CM

## 2018-01-01 DIAGNOSIS — Z94.4 HISTORY OF LIVER TRANSPLANT (H): ICD-10-CM

## 2018-01-01 DIAGNOSIS — F01.53 VASCULAR DEMENTIA WITH DEPRESSED MOOD (H): ICD-10-CM

## 2018-01-01 DIAGNOSIS — Z29.9 PREVENTIVE MEASURE: ICD-10-CM

## 2018-01-01 DIAGNOSIS — I10 HYPERTENSION, UNSPECIFIED TYPE: ICD-10-CM

## 2018-01-01 DIAGNOSIS — R41.3 LOSS OF MEMORY: ICD-10-CM

## 2018-01-01 DIAGNOSIS — K59.01 SLOW TRANSIT CONSTIPATION: ICD-10-CM

## 2018-01-01 DIAGNOSIS — R56.9 SEIZURE (H): ICD-10-CM

## 2018-01-01 DIAGNOSIS — R41.0 DELIRIUM: ICD-10-CM

## 2018-01-01 DIAGNOSIS — E87.1 HYPONATREMIA: ICD-10-CM

## 2018-01-01 DIAGNOSIS — R32 URINARY INCONTINENCE, UNSPECIFIED TYPE: ICD-10-CM

## 2018-01-01 DIAGNOSIS — R19.7 DIARRHEA, UNSPECIFIED TYPE: ICD-10-CM

## 2018-01-01 DIAGNOSIS — N39.42 URINARY INCONTINENCE WITHOUT SENSORY AWARENESS: ICD-10-CM

## 2018-01-01 DIAGNOSIS — R60.1 GENERALIZED EDEMA: ICD-10-CM

## 2018-01-01 DIAGNOSIS — R41.82 ALTERED MENTAL STATUS: Primary | ICD-10-CM

## 2018-01-01 DIAGNOSIS — R41.89 DECREASED LEVEL OF CONSCIOUSNESS: ICD-10-CM

## 2018-01-01 DIAGNOSIS — Z78.9 ON TUBE FEEDING DIET: ICD-10-CM

## 2018-01-01 DIAGNOSIS — J18.9 PNEUMONIA OF RIGHT MIDDLE LOBE DUE TO INFECTIOUS ORGANISM: ICD-10-CM

## 2018-01-01 DIAGNOSIS — R41.82 ALTERED MENTAL STATE: ICD-10-CM

## 2018-01-01 DIAGNOSIS — D63.1 ANEMIA IN STAGE 3 CHRONIC KIDNEY DISEASE (H): ICD-10-CM

## 2018-01-01 DIAGNOSIS — J69.0 ASPIRATION PNEUMONIA DUE TO REGURGITATED FOOD, UNSPECIFIED LATERALITY, UNSPECIFIED PART OF LUNG (H): ICD-10-CM

## 2018-01-01 DIAGNOSIS — Z79.899 ENCOUNTER FOR LONG-TERM (CURRENT) USE OF HIGH-RISK MEDICATION: ICD-10-CM

## 2018-01-01 DIAGNOSIS — Z13.5 SCREENING FOR DIABETIC RETINOPATHY: ICD-10-CM

## 2018-01-01 DIAGNOSIS — N28.9 FUNCTION KIDNEY DECREASED: ICD-10-CM

## 2018-01-01 DIAGNOSIS — F01.53 VASCULAR DEMENTIA WITH DEPRESSED MOOD (H): Primary | ICD-10-CM

## 2018-01-01 DIAGNOSIS — N17.9 ACUTE RENAL FAILURE, UNSPECIFIED ACUTE RENAL FAILURE TYPE (H): ICD-10-CM

## 2018-01-01 DIAGNOSIS — Z79.60 LONG-TERM USE OF IMMUNOSUPPRESSANT MEDICATION: ICD-10-CM

## 2018-01-01 DIAGNOSIS — Z94.0 STATUS POST KIDNEY TRANSPLANT: ICD-10-CM

## 2018-01-01 DIAGNOSIS — Z79.01 LONG TERM CURRENT USE OF ANTICOAGULANT THERAPY: ICD-10-CM

## 2018-01-01 DIAGNOSIS — Z13.89 SCREENING FOR DIABETIC PERIPHERAL NEUROPATHY: ICD-10-CM

## 2018-01-01 DIAGNOSIS — A04.72 COLITIS DUE TO CLOSTRIDIUM DIFFICILE: ICD-10-CM

## 2018-01-01 DIAGNOSIS — R40.4 TRANSIENT ALTERATION OF AWARENESS: Primary | ICD-10-CM

## 2018-01-01 DIAGNOSIS — F03.91 DEMENTIA WITH BEHAVIORAL DISTURBANCE, UNSPECIFIED DEMENTIA TYPE: ICD-10-CM

## 2018-01-01 DIAGNOSIS — E11.8 TYPE 2 DIABETES MELLITUS WITH COMPLICATION, WITHOUT LONG-TERM CURRENT USE OF INSULIN (H): ICD-10-CM

## 2018-01-01 DIAGNOSIS — E86.0 DEHYDRATION: ICD-10-CM

## 2018-01-01 DIAGNOSIS — J69.0 ASPIRATION PNEUMONITIS (H): ICD-10-CM

## 2018-01-01 DIAGNOSIS — I10 ESSENTIAL HYPERTENSION: ICD-10-CM

## 2018-01-01 DIAGNOSIS — A04.72 COLITIS DUE TO CLOSTRIDIUM DIFFICILE: Primary | ICD-10-CM

## 2018-01-01 DIAGNOSIS — Z86.19 H/O CLOSTRIDIUM DIFFICILE INFECTION: ICD-10-CM

## 2018-01-01 DIAGNOSIS — N18.30 ANEMIA IN STAGE 3 CHRONIC KIDNEY DISEASE (H): ICD-10-CM

## 2018-01-01 DIAGNOSIS — I12.9 RENAL HYPERTENSION, STAGE 1-4 OR UNSPECIFIED CHRONIC KIDNEY DISEASE: ICD-10-CM

## 2018-01-01 DIAGNOSIS — R45.1 AGITATION: Primary | ICD-10-CM

## 2018-01-01 DIAGNOSIS — D50.9 ANEMIA, IRON DEFICIENCY: ICD-10-CM

## 2018-01-01 DIAGNOSIS — R63.4 LOSS OF WEIGHT: ICD-10-CM

## 2018-01-01 DIAGNOSIS — R46.89 COMBATIVE BEHAVIOR: ICD-10-CM

## 2018-01-01 DIAGNOSIS — N39.0 URINARY TRACT INFECTION WITHOUT HEMATURIA, SITE UNSPECIFIED: ICD-10-CM

## 2018-01-01 DIAGNOSIS — N18.30 CKD (CHRONIC KIDNEY DISEASE) STAGE 3, GFR 30-59 ML/MIN (H): ICD-10-CM

## 2018-01-01 DIAGNOSIS — A04.72 C. DIFFICILE COLITIS: Primary | ICD-10-CM

## 2018-01-01 DIAGNOSIS — Z94.4 LIVER REPLACED BY TRANSPLANT (H): ICD-10-CM

## 2018-01-01 LAB
ABO + RH BLD: NORMAL
ABO + RH BLD: NORMAL
ALBUMIN SERPL-MCNC: 1.9 G/DL (ref 3.4–5)
ALBUMIN SERPL-MCNC: 2 G/DL (ref 3.4–5)
ALBUMIN SERPL-MCNC: 2 G/DL (ref 3.4–5)
ALBUMIN SERPL-MCNC: 2.1 G/DL (ref 3.4–5)
ALBUMIN SERPL-MCNC: 2.2 G/DL (ref 3.4–5)
ALBUMIN SERPL-MCNC: 2.3 G/DL (ref 3.4–5)
ALBUMIN SERPL-MCNC: 2.3 G/DL (ref 3.4–5)
ALBUMIN SERPL-MCNC: 2.4 G/DL (ref 3.4–5)
ALBUMIN SERPL-MCNC: 2.4 G/DL (ref 3.4–5)
ALBUMIN SERPL-MCNC: 2.5 G/DL (ref 3.4–5)
ALBUMIN SERPL-MCNC: 2.6 G/DL (ref 3.4–5)
ALBUMIN UR-MCNC: 10 MG/DL
ALBUMIN UR-MCNC: 10 MG/DL
ALBUMIN UR-MCNC: 100 MG/DL
ALBUMIN UR-MCNC: 30 MG/DL
ALBUMIN UR-MCNC: >600 MG/DL
ALBUMIN UR-MCNC: NEGATIVE MG/DL
ALP SERPL-CCNC: 102 U/L (ref 40–150)
ALP SERPL-CCNC: 106 U/L (ref 40–150)
ALP SERPL-CCNC: 113 U/L (ref 40–150)
ALP SERPL-CCNC: 115 U/L (ref 40–150)
ALP SERPL-CCNC: 130 U/L (ref 40–150)
ALP SERPL-CCNC: 77 U/L (ref 40–150)
ALP SERPL-CCNC: 78 U/L (ref 40–150)
ALP SERPL-CCNC: 80 U/L (ref 40–150)
ALP SERPL-CCNC: 80 U/L (ref 40–150)
ALP SERPL-CCNC: 90 U/L (ref 40–150)
ALP SERPL-CCNC: 94 U/L (ref 40–150)
ALP SERPL-CCNC: 94 U/L (ref 40–150)
ALP SERPL-CCNC: 95 U/L (ref 40–150)
ALP SERPL-CCNC: 97 U/L (ref 40–150)
ALT SERPL W P-5'-P-CCNC: 15 U/L (ref 0–70)
ALT SERPL W P-5'-P-CCNC: 15 U/L (ref 0–70)
ALT SERPL W P-5'-P-CCNC: 17 U/L (ref 0–70)
ALT SERPL W P-5'-P-CCNC: 18 U/L (ref 0–70)
ALT SERPL W P-5'-P-CCNC: 18 U/L (ref 0–70)
ALT SERPL W P-5'-P-CCNC: 20 U/L (ref 0–70)
ALT SERPL W P-5'-P-CCNC: 21 U/L (ref 0–70)
ALT SERPL W P-5'-P-CCNC: 24 U/L (ref 0–70)
ALT SERPL W P-5'-P-CCNC: 26 U/L (ref 0–70)
ALT SERPL W P-5'-P-CCNC: 27 U/L (ref 0–70)
ALT SERPL W P-5'-P-CCNC: 32 U/L (ref 0–70)
ALT SERPL W P-5'-P-CCNC: 8 U/L (ref 0–70)
AMMONIA PLAS-SCNC: 29 UMOL/L (ref 10–50)
AMMONIA PLAS-SCNC: <10 UMOL/L (ref 10–50)
ANION GAP SERPL CALCULATED.3IONS-SCNC: 10 MMOL/L (ref 3–14)
ANION GAP SERPL CALCULATED.3IONS-SCNC: 11 MMOL/L (ref 3–14)
ANION GAP SERPL CALCULATED.3IONS-SCNC: 12 MMOL/L (ref 3–14)
ANION GAP SERPL CALCULATED.3IONS-SCNC: 13 MMOL/L (ref 3–14)
ANION GAP SERPL CALCULATED.3IONS-SCNC: 14 MMOL/L (ref 3–14)
ANION GAP SERPL CALCULATED.3IONS-SCNC: 15 MMOL/L (ref 3–14)
ANION GAP SERPL CALCULATED.3IONS-SCNC: 5 MMOL/L (ref 3–14)
ANION GAP SERPL CALCULATED.3IONS-SCNC: 6 MMOL/L (ref 3–14)
ANION GAP SERPL CALCULATED.3IONS-SCNC: 7 MMOL/L (ref 3–14)
ANION GAP SERPL CALCULATED.3IONS-SCNC: 8 MMOL/L (ref 3–14)
ANION GAP SERPL CALCULATED.3IONS-SCNC: 9 MMOL/L (ref 3–14)
APPEARANCE UR: ABNORMAL
APPEARANCE UR: CLEAR
APTT PPP: 22 SEC (ref 22–37)
AST SERPL W P-5'-P-CCNC: 13 U/L (ref 0–45)
AST SERPL W P-5'-P-CCNC: 19 U/L (ref 0–45)
AST SERPL W P-5'-P-CCNC: 19 U/L (ref 0–45)
AST SERPL W P-5'-P-CCNC: 23 U/L (ref 0–45)
AST SERPL W P-5'-P-CCNC: 24 U/L (ref 0–45)
AST SERPL W P-5'-P-CCNC: 24 U/L (ref 0–45)
AST SERPL W P-5'-P-CCNC: 25 U/L (ref 0–45)
AST SERPL W P-5'-P-CCNC: 26 U/L (ref 0–45)
AST SERPL W P-5'-P-CCNC: 26 U/L (ref 0–45)
AST SERPL W P-5'-P-CCNC: 28 U/L (ref 0–45)
AST SERPL W P-5'-P-CCNC: 28 U/L (ref 0–45)
AST SERPL W P-5'-P-CCNC: 29 U/L (ref 0–45)
AST SERPL W P-5'-P-CCNC: 30 U/L (ref 0–45)
AST SERPL W P-5'-P-CCNC: 34 U/L (ref 0–45)
B-HCG FREE SERPL-ACNC: 1.2 [IU]/L (ref 0.86–1.14)
BACTERIA #/AREA URNS HPF: ABNORMAL /HPF
BACTERIA SPEC CULT: ABNORMAL
BACTERIA SPEC CULT: NO GROWTH
BACTERIA SPEC CULT: NORMAL
BACTERIA SPEC CULT: NORMAL
BASE EXCESS BLDV CALC-SCNC: 4.2 MMOL/L
BASOPHILS # BLD AUTO: 0 10E9/L (ref 0–0.2)
BASOPHILS NFR BLD AUTO: 0.1 %
BASOPHILS NFR BLD AUTO: 0.2 %
BASOPHILS NFR BLD AUTO: 0.3 %
BASOPHILS NFR BLD AUTO: 0.4 %
BASOPHILS NFR BLD AUTO: 0.4 %
BILIRUB DIRECT SERPL-MCNC: <0.1 MG/DL (ref 0–0.2)
BILIRUB SERPL-MCNC: 0.2 MG/DL (ref 0.2–1.3)
BILIRUB SERPL-MCNC: 0.3 MG/DL (ref 0.2–1.3)
BILIRUB SERPL-MCNC: 0.4 MG/DL (ref 0.2–1.3)
BILIRUB SERPL-MCNC: 0.6 MG/DL (ref 0.2–1.3)
BILIRUB UR QL STRIP: NEGATIVE
BKV DNA # SPEC NAA+PROBE: NORMAL COPIES/ML
BKV DNA SPEC NAA+PROBE-LOG#: NORMAL LOG COPIES/ML
BLD GP AB SCN SERPL QL: NORMAL
BLD PROD TYP BPU: NORMAL
BLD PROD TYP BPU: NORMAL
BLD UNIT ID BPU: 0
BLOOD BANK CMNT PATIENT-IMP: NORMAL
BLOOD PRODUCT CODE: NORMAL
BPU ID: NORMAL
BUN SERPL-MCNC: 10 MG/DL (ref 7–30)
BUN SERPL-MCNC: 10 MG/DL (ref 7–30)
BUN SERPL-MCNC: 105 MG/DL (ref 7–30)
BUN SERPL-MCNC: 11 MG/DL (ref 7–30)
BUN SERPL-MCNC: 115 MG/DL (ref 7–30)
BUN SERPL-MCNC: 116 MG/DL (ref 7–30)
BUN SERPL-MCNC: 117 MG/DL (ref 7–30)
BUN SERPL-MCNC: 119 MG/DL (ref 7–30)
BUN SERPL-MCNC: 120 MG/DL (ref 7–30)
BUN SERPL-MCNC: 121 MG/DL (ref 7–30)
BUN SERPL-MCNC: 122 MG/DL (ref 7–30)
BUN SERPL-MCNC: 122 MG/DL (ref 7–30)
BUN SERPL-MCNC: 124 MG/DL (ref 7–30)
BUN SERPL-MCNC: 124 MG/DL (ref 7–30)
BUN SERPL-MCNC: 125 MG/DL (ref 7–30)
BUN SERPL-MCNC: 126 MG/DL (ref 7–30)
BUN SERPL-MCNC: 126 MG/DL (ref 7–30)
BUN SERPL-MCNC: 129 MG/DL (ref 7–30)
BUN SERPL-MCNC: 13 MG/DL (ref 7–30)
BUN SERPL-MCNC: 130 MG/DL (ref 7–30)
BUN SERPL-MCNC: 131 MG/DL (ref 7–30)
BUN SERPL-MCNC: 132 MG/DL (ref 7–30)
BUN SERPL-MCNC: 132 MG/DL (ref 7–30)
BUN SERPL-MCNC: 134 MG/DL (ref 7–30)
BUN SERPL-MCNC: 137 MG/DL (ref 7–30)
BUN SERPL-MCNC: 137 MG/DL (ref 7–30)
BUN SERPL-MCNC: 138 MG/DL (ref 7–30)
BUN SERPL-MCNC: 14 MG/DL (ref 7–30)
BUN SERPL-MCNC: 14 MG/DL (ref 7–30)
BUN SERPL-MCNC: 142 MG/DL (ref 7–30)
BUN SERPL-MCNC: 144 MG/DL (ref 7–30)
BUN SERPL-MCNC: 17 MG/DL (ref 7–30)
BUN SERPL-MCNC: 17 MG/DL (ref 7–30)
BUN SERPL-MCNC: 19 MG/DL (ref 7–30)
BUN SERPL-MCNC: 20 MG/DL (ref 7–30)
BUN SERPL-MCNC: 21 MG/DL (ref 7–30)
BUN SERPL-MCNC: 21 MG/DL (ref 7–30)
BUN SERPL-MCNC: 22 MG/DL (ref 7–30)
BUN SERPL-MCNC: 23 MG/DL (ref 7–30)
BUN SERPL-MCNC: 24 MG/DL (ref 7–30)
BUN SERPL-MCNC: 24 MG/DL (ref 7–30)
BUN SERPL-MCNC: 26 MG/DL (ref 7–30)
BUN SERPL-MCNC: 27 MG/DL (ref 7–30)
BUN SERPL-MCNC: 28 MG/DL (ref 7–30)
BUN SERPL-MCNC: 28 MG/DL (ref 7–30)
BUN SERPL-MCNC: 29 MG/DL (ref 7–30)
BUN SERPL-MCNC: 31 MG/DL (ref 7–30)
BUN SERPL-MCNC: 32 MG/DL (ref 7–30)
BUN SERPL-MCNC: 34 MG/DL (ref 7–30)
BUN SERPL-MCNC: 34 MG/DL (ref 7–30)
BUN SERPL-MCNC: 35 MG/DL (ref 7–30)
BUN SERPL-MCNC: 36 MG/DL (ref 7–30)
BUN SERPL-MCNC: 40 MG/DL (ref 7–30)
BUN SERPL-MCNC: 40 MG/DL (ref 7–30)
BUN SERPL-MCNC: 41 MG/DL (ref 7–30)
BUN SERPL-MCNC: 42 MG/DL (ref 7–30)
BUN SERPL-MCNC: 44 MG/DL (ref 7–30)
BUN SERPL-MCNC: 45 MG/DL (ref 7–30)
BUN SERPL-MCNC: 47 MG/DL (ref 7–30)
BUN SERPL-MCNC: 47 MG/DL (ref 7–30)
BUN SERPL-MCNC: 50 MG/DL (ref 7–30)
BUN SERPL-MCNC: 50 MG/DL (ref 7–30)
BUN SERPL-MCNC: 54 MG/DL (ref 7–30)
BUN SERPL-MCNC: 58 MG/DL (ref 7–30)
BUN SERPL-MCNC: 58 MG/DL (ref 7–30)
BUN SERPL-MCNC: 59 MG/DL (ref 7–30)
BUN SERPL-MCNC: 60 MG/DL (ref 7–30)
BUN SERPL-MCNC: 60 MG/DL (ref 7–30)
BUN SERPL-MCNC: 64 MG/DL (ref 7–30)
BUN SERPL-MCNC: 66 MG/DL (ref 7–30)
BUN SERPL-MCNC: 74 MG/DL (ref 7–30)
BUN SERPL-MCNC: 8 MG/DL (ref 7–30)
BUN SERPL-MCNC: 82 MG/DL (ref 7–30)
BUN SERPL-MCNC: 84 MG/DL (ref 7–30)
BUN SERPL-MCNC: 85 MG/DL (ref 7–30)
BUN SERPL-MCNC: 85 MG/DL (ref 7–30)
BUN SERPL-MCNC: 87 MG/DL (ref 7–30)
BUN SERPL-MCNC: 88 MG/DL (ref 7–30)
BUN SERPL-MCNC: 9 MG/DL (ref 7–30)
BUN SERPL-MCNC: 9 MG/DL (ref 7–30)
BUN SERPL-MCNC: 90 MG/DL (ref 7–30)
BUN SERPL-MCNC: 91 MG/DL (ref 7–30)
BUN SERPL-MCNC: 92 MG/DL (ref 7–30)
BUN SERPL-MCNC: 94 MG/DL (ref 7–30)
BUN SERPL-MCNC: 98 MG/DL (ref 7–30)
C COLI+JEJUNI+LARI FUSA STL QL NAA+PROBE: NOT DETECTED
C DIFF TOX B STL QL: NEGATIVE
C DIFF TOX B STL QL: POSITIVE
CA-I BLD-SCNC: 4.6 MG/DL (ref 4.4–5.2)
CALCIUM SERPL-MCNC: 7.2 MG/DL (ref 8.5–10.1)
CALCIUM SERPL-MCNC: 7.2 MG/DL (ref 8.5–10.1)
CALCIUM SERPL-MCNC: 7.3 MG/DL (ref 8.5–10.1)
CALCIUM SERPL-MCNC: 7.3 MG/DL (ref 8.5–10.1)
CALCIUM SERPL-MCNC: 7.4 MG/DL (ref 8.5–10.1)
CALCIUM SERPL-MCNC: 7.6 MG/DL (ref 8.5–10.1)
CALCIUM SERPL-MCNC: 7.7 MG/DL (ref 8.5–10.1)
CALCIUM SERPL-MCNC: 7.7 MG/DL (ref 8.5–10.1)
CALCIUM SERPL-MCNC: 7.8 MG/DL (ref 8.5–10.1)
CALCIUM SERPL-MCNC: 7.9 MG/DL (ref 8.5–10.1)
CALCIUM SERPL-MCNC: 8 MG/DL (ref 8.5–10.1)
CALCIUM SERPL-MCNC: 8.1 MG/DL (ref 8.5–10.1)
CALCIUM SERPL-MCNC: 8.2 MG/DL (ref 8.5–10.1)
CALCIUM SERPL-MCNC: 8.3 MG/DL (ref 8.5–10.1)
CALCIUM SERPL-MCNC: 8.4 MG/DL (ref 8.5–10.1)
CALCIUM SERPL-MCNC: 8.5 MG/DL (ref 8.5–10.1)
CALCIUM SERPL-MCNC: 8.6 MG/DL (ref 8.5–10.1)
CALCIUM SERPL-MCNC: 8.7 MG/DL (ref 8.5–10.1)
CALCIUM SERPL-MCNC: 8.8 MG/DL (ref 8.5–10.1)
CALCIUM SERPL-MCNC: 8.9 MG/DL (ref 8.5–10.1)
CALCIUM SERPL-MCNC: 9 MG/DL (ref 8.5–10.1)
CALCIUM SERPL-MCNC: 9.1 MG/DL (ref 8.5–10.1)
CALCIUM SERPL-MCNC: 9.2 MG/DL (ref 8.5–10.1)
CALCIUM SERPL-MCNC: 9.2 MG/DL (ref 8.5–10.1)
CALCIUM SERPL-MCNC: 9.3 MG/DL (ref 8.5–10.1)
CHLORIDE SERPL-SCNC: 100 MMOL/L (ref 94–109)
CHLORIDE SERPL-SCNC: 101 MMOL/L (ref 94–109)
CHLORIDE SERPL-SCNC: 102 MMOL/L (ref 94–109)
CHLORIDE SERPL-SCNC: 103 MMOL/L (ref 94–109)
CHLORIDE SERPL-SCNC: 104 MMOL/L (ref 94–109)
CHLORIDE SERPL-SCNC: 105 MMOL/L (ref 94–109)
CHLORIDE SERPL-SCNC: 106 MMOL/L (ref 94–109)
CHLORIDE SERPL-SCNC: 107 MMOL/L (ref 94–109)
CHLORIDE SERPL-SCNC: 108 MMOL/L (ref 94–109)
CHLORIDE SERPL-SCNC: 109 MMOL/L (ref 94–109)
CHLORIDE SERPL-SCNC: 109 MMOL/L (ref 94–109)
CHLORIDE SERPL-SCNC: 110 MMOL/L (ref 94–109)
CHLORIDE SERPL-SCNC: 110 MMOL/L (ref 94–109)
CHLORIDE SERPL-SCNC: 111 MMOL/L (ref 94–109)
CHLORIDE SERPL-SCNC: 111 MMOL/L (ref 94–109)
CHLORIDE SERPL-SCNC: 112 MMOL/L (ref 94–109)
CHLORIDE SERPL-SCNC: 114 MMOL/L (ref 94–109)
CHLORIDE SERPL-SCNC: 115 MMOL/L (ref 94–109)
CHLORIDE SERPL-SCNC: 116 MMOL/L (ref 94–109)
CHLORIDE SERPL-SCNC: 118 MMOL/L (ref 94–109)
CHLORIDE SERPL-SCNC: 83 MMOL/L (ref 94–109)
CHLORIDE SERPL-SCNC: 86 MMOL/L (ref 94–109)
CHLORIDE SERPL-SCNC: 89 MMOL/L (ref 94–109)
CHLORIDE SERPL-SCNC: 91 MMOL/L (ref 94–109)
CHLORIDE SERPL-SCNC: 92 MMOL/L (ref 94–109)
CHLORIDE SERPL-SCNC: 93 MMOL/L (ref 94–109)
CHLORIDE SERPL-SCNC: 93 MMOL/L (ref 94–109)
CHLORIDE SERPL-SCNC: 95 MMOL/L (ref 94–109)
CHLORIDE SERPL-SCNC: 96 MMOL/L (ref 94–109)
CHLORIDE SERPL-SCNC: 96 MMOL/L (ref 94–109)
CHLORIDE SERPL-SCNC: 97 MMOL/L (ref 94–109)
CHLORIDE SERPL-SCNC: 97 MMOL/L (ref 94–109)
CHLORIDE SERPL-SCNC: 98 MMOL/L (ref 94–109)
CHLORIDE SERPL-SCNC: 99 MMOL/L (ref 94–109)
CK SERPL-CCNC: 28 U/L (ref 30–300)
CK SERPL-CCNC: 30 U/L (ref 30–300)
CO2 BLDCOV-SCNC: 22 MMOL/L (ref 21–28)
CO2 BLDCOV-SCNC: 23 MMOL/L (ref 21–28)
CO2 SERPL-SCNC: 16 MMOL/L (ref 20–32)
CO2 SERPL-SCNC: 17 MMOL/L (ref 20–32)
CO2 SERPL-SCNC: 18 MMOL/L (ref 20–32)
CO2 SERPL-SCNC: 19 MMOL/L (ref 20–32)
CO2 SERPL-SCNC: 20 MMOL/L (ref 20–32)
CO2 SERPL-SCNC: 21 MMOL/L (ref 20–32)
CO2 SERPL-SCNC: 22 MMOL/L (ref 20–32)
CO2 SERPL-SCNC: 23 MMOL/L (ref 20–32)
CO2 SERPL-SCNC: 24 MMOL/L (ref 20–32)
CO2 SERPL-SCNC: 25 MMOL/L (ref 20–32)
CO2 SERPL-SCNC: 26 MMOL/L (ref 20–32)
CO2 SERPL-SCNC: 27 MMOL/L (ref 20–32)
CO2 SERPL-SCNC: 28 MMOL/L (ref 20–32)
CO2 SERPL-SCNC: 29 MMOL/L (ref 20–32)
CO2 SERPL-SCNC: 30 MMOL/L (ref 20–32)
CO2 SERPL-SCNC: 31 MMOL/L (ref 20–32)
CO2 SERPL-SCNC: 34 MMOL/L (ref 20–32)
CO2 SERPL-SCNC: 35 MMOL/L (ref 20–32)
COLOR UR AUTO: ABNORMAL
COLOR UR AUTO: YELLOW
COPATH REPORT: NORMAL
CREAT BLD-MCNC: 1.3 MG/DL (ref 0.66–1.25)
CREAT SERPL-MCNC: 0.85 MG/DL (ref 0.66–1.25)
CREAT SERPL-MCNC: 0.92 MG/DL (ref 0.66–1.25)
CREAT SERPL-MCNC: 1.03 MG/DL (ref 0.66–1.25)
CREAT SERPL-MCNC: 1.07 MG/DL (ref 0.66–1.25)
CREAT SERPL-MCNC: 1.11 MG/DL (ref 0.66–1.25)
CREAT SERPL-MCNC: 1.12 MG/DL (ref 0.66–1.25)
CREAT SERPL-MCNC: 1.22 MG/DL (ref 0.66–1.25)
CREAT SERPL-MCNC: 1.26 MG/DL (ref 0.66–1.25)
CREAT SERPL-MCNC: 1.27 MG/DL (ref 0.66–1.25)
CREAT SERPL-MCNC: 1.28 MG/DL (ref 0.66–1.25)
CREAT SERPL-MCNC: 1.35 MG/DL (ref 0.66–1.25)
CREAT SERPL-MCNC: 1.36 MG/DL (ref 0.66–1.25)
CREAT SERPL-MCNC: 1.37 MG/DL (ref 0.66–1.25)
CREAT SERPL-MCNC: 1.39 MG/DL (ref 0.66–1.25)
CREAT SERPL-MCNC: 1.41 MG/DL (ref 0.66–1.25)
CREAT SERPL-MCNC: 1.42 MG/DL (ref 0.66–1.25)
CREAT SERPL-MCNC: 1.43 MG/DL (ref 0.66–1.25)
CREAT SERPL-MCNC: 1.44 MG/DL (ref 0.66–1.25)
CREAT SERPL-MCNC: 1.45 MG/DL (ref 0.66–1.25)
CREAT SERPL-MCNC: 1.47 MG/DL (ref 0.66–1.25)
CREAT SERPL-MCNC: 1.47 MG/DL (ref 0.66–1.25)
CREAT SERPL-MCNC: 1.5 MG/DL (ref 0.66–1.25)
CREAT SERPL-MCNC: 1.51 MG/DL (ref 0.66–1.25)
CREAT SERPL-MCNC: 1.51 MG/DL (ref 0.66–1.25)
CREAT SERPL-MCNC: 1.53 MG/DL (ref 0.66–1.25)
CREAT SERPL-MCNC: 1.55 MG/DL (ref 0.66–1.25)
CREAT SERPL-MCNC: 1.56 MG/DL (ref 0.66–1.25)
CREAT SERPL-MCNC: 1.57 MG/DL (ref 0.66–1.25)
CREAT SERPL-MCNC: 1.58 MG/DL (ref 0.66–1.25)
CREAT SERPL-MCNC: 1.59 MG/DL (ref 0.66–1.25)
CREAT SERPL-MCNC: 1.6 MG/DL (ref 0.66–1.25)
CREAT SERPL-MCNC: 1.6 MG/DL (ref 0.66–1.25)
CREAT SERPL-MCNC: 1.61 MG/DL (ref 0.66–1.25)
CREAT SERPL-MCNC: 1.67 MG/DL (ref 0.66–1.25)
CREAT SERPL-MCNC: 1.68 MG/DL (ref 0.66–1.25)
CREAT SERPL-MCNC: 1.69 MG/DL (ref 0.66–1.25)
CREAT SERPL-MCNC: 1.72 MG/DL (ref 0.66–1.25)
CREAT SERPL-MCNC: 1.76 MG/DL (ref 0.66–1.25)
CREAT SERPL-MCNC: 1.84 MG/DL (ref 0.66–1.25)
CREAT SERPL-MCNC: 1.84 MG/DL (ref 0.66–1.25)
CREAT SERPL-MCNC: 1.85 MG/DL (ref 0.66–1.25)
CREAT SERPL-MCNC: 1.86 MG/DL (ref 0.66–1.25)
CREAT SERPL-MCNC: 1.88 MG/DL (ref 0.66–1.25)
CREAT SERPL-MCNC: 1.89 MG/DL (ref 0.66–1.25)
CREAT SERPL-MCNC: 1.9 MG/DL (ref 0.66–1.25)
CREAT SERPL-MCNC: 1.92 MG/DL (ref 0.66–1.25)
CREAT SERPL-MCNC: 1.93 MG/DL (ref 0.66–1.25)
CREAT SERPL-MCNC: 1.94 MG/DL (ref 0.66–1.25)
CREAT SERPL-MCNC: 1.97 MG/DL (ref 0.66–1.25)
CREAT SERPL-MCNC: 1.98 MG/DL (ref 0.66–1.25)
CREAT SERPL-MCNC: 1.99 MG/DL (ref 0.66–1.25)
CREAT SERPL-MCNC: 2 MG/DL (ref 0.66–1.25)
CREAT SERPL-MCNC: 2 MG/DL (ref 0.66–1.25)
CREAT SERPL-MCNC: 2.02 MG/DL (ref 0.66–1.25)
CREAT SERPL-MCNC: 2.03 MG/DL (ref 0.66–1.25)
CREAT SERPL-MCNC: 2.05 MG/DL (ref 0.66–1.25)
CREAT SERPL-MCNC: 2.06 MG/DL (ref 0.66–1.25)
CREAT SERPL-MCNC: 2.08 MG/DL (ref 0.66–1.25)
CREAT SERPL-MCNC: 2.08 MG/DL (ref 0.66–1.25)
CREAT SERPL-MCNC: 2.1 MG/DL (ref 0.66–1.25)
CREAT SERPL-MCNC: 2.12 MG/DL (ref 0.66–1.25)
CREAT SERPL-MCNC: 2.13 MG/DL (ref 0.66–1.25)
CREAT SERPL-MCNC: 2.13 MG/DL (ref 0.66–1.25)
CREAT SERPL-MCNC: 2.14 MG/DL (ref 0.66–1.25)
CREAT SERPL-MCNC: 2.16 MG/DL (ref 0.66–1.25)
CREAT SERPL-MCNC: 2.19 MG/DL (ref 0.66–1.25)
CREAT SERPL-MCNC: 2.19 MG/DL (ref 0.66–1.25)
CREAT SERPL-MCNC: 2.21 MG/DL (ref 0.66–1.25)
CREAT SERPL-MCNC: 2.23 MG/DL (ref 0.66–1.25)
CREAT SERPL-MCNC: 2.25 MG/DL (ref 0.66–1.25)
CREAT SERPL-MCNC: 2.26 MG/DL (ref 0.66–1.25)
CREAT SERPL-MCNC: 2.28 MG/DL (ref 0.66–1.25)
CREAT SERPL-MCNC: 2.32 MG/DL (ref 0.66–1.25)
CREAT SERPL-MCNC: 2.45 MG/DL (ref 0.66–1.25)
CREAT SERPL-MCNC: 2.51 MG/DL (ref 0.66–1.25)
CREAT SERPL-MCNC: 2.51 MG/DL (ref 0.66–1.25)
CREAT SERPL-MCNC: 2.59 MG/DL (ref 0.66–1.25)
CREAT SERPL-MCNC: 2.69 MG/DL (ref 0.66–1.25)
CREAT SERPL-MCNC: 2.89 MG/DL (ref 0.66–1.25)
CREAT SERPL-MCNC: 2.91 MG/DL (ref 0.66–1.25)
CREAT SERPL-MCNC: 3.31 MG/DL (ref 0.66–1.25)
CREAT UR-MCNC: 150 MG/DL
CREAT UR-MCNC: 17 MG/DL
CREAT UR-MCNC: 23 MG/DL
CREAT UR-MCNC: 66 MG/DL
CRP SERPL-MCNC: 16 MG/L (ref 0–8)
CRP SERPL-MCNC: 23 MG/L (ref 0–8)
DEPRECATED CALCIDIOL+CALCIFEROL SERPL-MC: <30 UG/L (ref 20–75)
DIFFERENTIAL METHOD BLD: ABNORMAL
EC STX1 GENE STL QL NAA+PROBE: NOT DETECTED
EC STX2 GENE STL QL NAA+PROBE: NOT DETECTED
ENTERIC PATHOGEN COMMENT: NORMAL
EOSINOPHIL # BLD AUTO: 0 10E9/L (ref 0–0.7)
EOSINOPHIL # BLD AUTO: 0.1 10E9/L (ref 0–0.7)
EOSINOPHIL # BLD AUTO: 0.2 10E9/L (ref 0–0.7)
EOSINOPHIL # BLD AUTO: 0.3 10E9/L (ref 0–0.7)
EOSINOPHIL # BLD AUTO: 0.3 10E9/L (ref 0–0.7)
EOSINOPHIL # BLD AUTO: 0.4 10E9/L (ref 0–0.7)
EOSINOPHIL NFR BLD AUTO: 0.3 %
EOSINOPHIL NFR BLD AUTO: 1.2 %
EOSINOPHIL NFR BLD AUTO: 1.8 %
EOSINOPHIL NFR BLD AUTO: 1.9 %
EOSINOPHIL NFR BLD AUTO: 1.9 %
EOSINOPHIL NFR BLD AUTO: 2 %
EOSINOPHIL NFR BLD AUTO: 2.2 %
EOSINOPHIL NFR BLD AUTO: 2.3 %
EOSINOPHIL NFR BLD AUTO: 2.7 %
EOSINOPHIL NFR BLD AUTO: 2.9 %
EOSINOPHIL NFR BLD AUTO: 2.9 %
EOSINOPHIL NFR BLD AUTO: 6.3 %
EOSINOPHIL NFR BLD AUTO: 8 %
EPO SERPL-ACNC: 38 MU/ML (ref 4–27)
EPO SERPL-ACNC: 56 MU/ML (ref 4–27)
ERYTHROCYTE [DISTWIDTH] IN BLOOD BY AUTOMATED COUNT: 14.3 % (ref 10–15)
ERYTHROCYTE [DISTWIDTH] IN BLOOD BY AUTOMATED COUNT: 14.7 % (ref 10–15)
ERYTHROCYTE [DISTWIDTH] IN BLOOD BY AUTOMATED COUNT: 14.8 % (ref 10–15)
ERYTHROCYTE [DISTWIDTH] IN BLOOD BY AUTOMATED COUNT: 14.9 % (ref 10–15)
ERYTHROCYTE [DISTWIDTH] IN BLOOD BY AUTOMATED COUNT: 15.1 % (ref 10–15)
ERYTHROCYTE [DISTWIDTH] IN BLOOD BY AUTOMATED COUNT: 15.5 % (ref 10–15)
ERYTHROCYTE [DISTWIDTH] IN BLOOD BY AUTOMATED COUNT: 16.1 % (ref 10–15)
ERYTHROCYTE [DISTWIDTH] IN BLOOD BY AUTOMATED COUNT: 16.2 % (ref 10–15)
ERYTHROCYTE [DISTWIDTH] IN BLOOD BY AUTOMATED COUNT: 16.3 % (ref 10–15)
ERYTHROCYTE [DISTWIDTH] IN BLOOD BY AUTOMATED COUNT: 16.3 % (ref 10–15)
ERYTHROCYTE [DISTWIDTH] IN BLOOD BY AUTOMATED COUNT: 16.4 % (ref 10–15)
ERYTHROCYTE [DISTWIDTH] IN BLOOD BY AUTOMATED COUNT: 16.5 % (ref 10–15)
ERYTHROCYTE [DISTWIDTH] IN BLOOD BY AUTOMATED COUNT: 16.6 % (ref 10–15)
ERYTHROCYTE [DISTWIDTH] IN BLOOD BY AUTOMATED COUNT: 16.7 % (ref 10–15)
ERYTHROCYTE [DISTWIDTH] IN BLOOD BY AUTOMATED COUNT: 16.8 % (ref 10–15)
ERYTHROCYTE [DISTWIDTH] IN BLOOD BY AUTOMATED COUNT: 16.8 % (ref 10–15)
ERYTHROCYTE [DISTWIDTH] IN BLOOD BY AUTOMATED COUNT: 17 % (ref 10–15)
ERYTHROCYTE [DISTWIDTH] IN BLOOD BY AUTOMATED COUNT: 17.1 % (ref 10–15)
ERYTHROCYTE [DISTWIDTH] IN BLOOD BY AUTOMATED COUNT: 17.2 % (ref 10–15)
ERYTHROCYTE [DISTWIDTH] IN BLOOD BY AUTOMATED COUNT: 17.2 % (ref 10–15)
ERYTHROCYTE [DISTWIDTH] IN BLOOD BY AUTOMATED COUNT: 17.4 % (ref 10–15)
ERYTHROCYTE [DISTWIDTH] IN BLOOD BY AUTOMATED COUNT: 17.5 % (ref 10–15)
ERYTHROCYTE [DISTWIDTH] IN BLOOD BY AUTOMATED COUNT: 17.5 % (ref 10–15)
ERYTHROCYTE [DISTWIDTH] IN BLOOD BY AUTOMATED COUNT: 17.7 % (ref 10–15)
ERYTHROCYTE [DISTWIDTH] IN BLOOD BY AUTOMATED COUNT: 17.8 % (ref 10–15)
ERYTHROCYTE [DISTWIDTH] IN BLOOD BY AUTOMATED COUNT: 17.9 % (ref 10–15)
ERYTHROCYTE [DISTWIDTH] IN BLOOD BY AUTOMATED COUNT: 17.9 % (ref 10–15)
ERYTHROCYTE [DISTWIDTH] IN BLOOD BY AUTOMATED COUNT: 18 % (ref 10–15)
ERYTHROCYTE [DISTWIDTH] IN BLOOD BY AUTOMATED COUNT: 18.3 % (ref 10–15)
ERYTHROCYTE [DISTWIDTH] IN BLOOD BY AUTOMATED COUNT: 18.5 % (ref 10–15)
ERYTHROCYTE [DISTWIDTH] IN BLOOD BY AUTOMATED COUNT: 18.5 % (ref 10–15)
ERYTHROCYTE [DISTWIDTH] IN BLOOD BY AUTOMATED COUNT: 18.8 % (ref 10–15)
ERYTHROCYTE [DISTWIDTH] IN BLOOD BY AUTOMATED COUNT: 19 % (ref 10–15)
ERYTHROCYTE [DISTWIDTH] IN BLOOD BY AUTOMATED COUNT: 19 % (ref 10–15)
ERYTHROCYTE [DISTWIDTH] IN BLOOD BY AUTOMATED COUNT: 19.6 % (ref 10–15)
ERYTHROCYTE [DISTWIDTH] IN BLOOD BY AUTOMATED COUNT: 19.7 % (ref 10–15)
ERYTHROCYTE [DISTWIDTH] IN BLOOD BY AUTOMATED COUNT: 19.8 % (ref 10–15)
ERYTHROCYTE [DISTWIDTH] IN BLOOD BY AUTOMATED COUNT: 20 % (ref 10–15)
ERYTHROCYTE [DISTWIDTH] IN BLOOD BY AUTOMATED COUNT: 20 % (ref 10–15)
ERYTHROCYTE [DISTWIDTH] IN BLOOD BY AUTOMATED COUNT: 20.1 % (ref 10–15)
ERYTHROCYTE [DISTWIDTH] IN BLOOD BY AUTOMATED COUNT: 20.1 % (ref 10–15)
FERRITIN SERPL-MCNC: 502 NG/ML (ref 26–388)
FIBRINOGEN PPP-MCNC: 428 MG/DL (ref 200–420)
FLUAV H1 2009 PAND RNA SPEC QL NAA+PROBE: NEGATIVE
FLUAV H1 RNA SPEC QL NAA+PROBE: NEGATIVE
FLUAV H3 RNA SPEC QL NAA+PROBE: NEGATIVE
FLUAV RNA SPEC QL NAA+PROBE: NEGATIVE
FLUAV+FLUBV RNA SPEC QL NAA+PROBE: NEGATIVE
FLUAV+FLUBV RNA SPEC QL NAA+PROBE: NEGATIVE
FLUBV RNA SPEC QL NAA+PROBE: NEGATIVE
FRACT EXCRET NA UR+SERPL-RTO: 0.9 %
FRACT EXCRET NA UR+SERPL-RTO: 5.1 %
GFR SERPL CREATININE-BSD FRML MDRD: 18 ML/MIN/1.7M2
GFR SERPL CREATININE-BSD FRML MDRD: 21 ML/MIN/1.7M2
GFR SERPL CREATININE-BSD FRML MDRD: 21 ML/MIN/1.7M2
GFR SERPL CREATININE-BSD FRML MDRD: 24 ML/MIN/1.7M2
GFR SERPL CREATININE-BSD FRML MDRD: 25 ML/MIN/1.7M2
GFR SERPL CREATININE-BSD FRML MDRD: 25 ML/MIN/1.7M2
GFR SERPL CREATININE-BSD FRML MDRD: 26 ML/MIN/1.7M2
GFR SERPL CREATININE-BSD FRML MDRD: 27 ML/MIN/1.7M2
GFR SERPL CREATININE-BSD FRML MDRD: 28 ML/MIN/1.7M2
GFR SERPL CREATININE-BSD FRML MDRD: 29 ML/MIN/1.7M2
GFR SERPL CREATININE-BSD FRML MDRD: 30 ML/MIN/1.7M2
GFR SERPL CREATININE-BSD FRML MDRD: 31 ML/MIN/1.7M2
GFR SERPL CREATININE-BSD FRML MDRD: 32 ML/MIN/1.7M2
GFR SERPL CREATININE-BSD FRML MDRD: 33 ML/MIN/1.7M2
GFR SERPL CREATININE-BSD FRML MDRD: 34 ML/MIN/1.7M2
GFR SERPL CREATININE-BSD FRML MDRD: 35 ML/MIN/1.7M2
GFR SERPL CREATININE-BSD FRML MDRD: 36 ML/MIN/1.7M2
GFR SERPL CREATININE-BSD FRML MDRD: 36 ML/MIN/1.7M2
GFR SERPL CREATININE-BSD FRML MDRD: 37 ML/MIN/1.7M2
GFR SERPL CREATININE-BSD FRML MDRD: 38 ML/MIN/1.7M2
GFR SERPL CREATININE-BSD FRML MDRD: 39 ML/MIN/1.7M2
GFR SERPL CREATININE-BSD FRML MDRD: 40 ML/MIN/1.7M2
GFR SERPL CREATININE-BSD FRML MDRD: 40 ML/MIN/1.7M2
GFR SERPL CREATININE-BSD FRML MDRD: 42 ML/MIN/1.7M2
GFR SERPL CREATININE-BSD FRML MDRD: 43 ML/MIN/1.7M2
GFR SERPL CREATININE-BSD FRML MDRD: 44 ML/MIN/1.7M2
GFR SERPL CREATININE-BSD FRML MDRD: 45 ML/MIN/1.7M2
GFR SERPL CREATININE-BSD FRML MDRD: 46 ML/MIN/1.7M2
GFR SERPL CREATININE-BSD FRML MDRD: 46 ML/MIN/1.7M2
GFR SERPL CREATININE-BSD FRML MDRD: 47 ML/MIN/1.7M2
GFR SERPL CREATININE-BSD FRML MDRD: 47 ML/MIN/1.7M2
GFR SERPL CREATININE-BSD FRML MDRD: 48 ML/MIN/1.7M2
GFR SERPL CREATININE-BSD FRML MDRD: 49 ML/MIN/1.7M2
GFR SERPL CREATININE-BSD FRML MDRD: 50 ML/MIN/1.7M2
GFR SERPL CREATININE-BSD FRML MDRD: 50 ML/MIN/1.7M2
GFR SERPL CREATININE-BSD FRML MDRD: 51 ML/MIN/1.7M2
GFR SERPL CREATININE-BSD FRML MDRD: 53 ML/MIN/1.7M2
GFR SERPL CREATININE-BSD FRML MDRD: 54 ML/MIN/1.7M2
GFR SERPL CREATININE-BSD FRML MDRD: 55 ML/MIN/1.7M2
GFR SERPL CREATININE-BSD FRML MDRD: 55 ML/MIN/1.7M2
GFR SERPL CREATININE-BSD FRML MDRD: 57 ML/MIN/1.7M2
GFR SERPL CREATININE-BSD FRML MDRD: 63 ML/MIN/1.7M2
GFR SERPL CREATININE-BSD FRML MDRD: 64 ML/MIN/1.7M2
GFR SERPL CREATININE-BSD FRML MDRD: 67 ML/MIN/1.7M2
GFR SERPL CREATININE-BSD FRML MDRD: 70 ML/MIN/1.7M2
GFR SERPL CREATININE-BSD FRML MDRD: 79 ML/MIN/1.7M2
GFR SERPL CREATININE-BSD FRML MDRD: 87 ML/MIN/1.7M2
GLUCOSE BLD-MCNC: 176 MG/DL (ref 70–99)
GLUCOSE BLDC GLUCOMTR-MCNC: 100 MG/DL (ref 70–99)
GLUCOSE BLDC GLUCOMTR-MCNC: 102 MG/DL (ref 70–99)
GLUCOSE BLDC GLUCOMTR-MCNC: 103 MG/DL (ref 70–99)
GLUCOSE BLDC GLUCOMTR-MCNC: 104 MG/DL (ref 70–99)
GLUCOSE BLDC GLUCOMTR-MCNC: 105 MG/DL (ref 70–99)
GLUCOSE BLDC GLUCOMTR-MCNC: 105 MG/DL (ref 70–99)
GLUCOSE BLDC GLUCOMTR-MCNC: 106 MG/DL (ref 70–99)
GLUCOSE BLDC GLUCOMTR-MCNC: 108 MG/DL (ref 70–99)
GLUCOSE BLDC GLUCOMTR-MCNC: 109 MG/DL (ref 70–99)
GLUCOSE BLDC GLUCOMTR-MCNC: 110 MG/DL (ref 70–99)
GLUCOSE BLDC GLUCOMTR-MCNC: 111 MG/DL (ref 70–99)
GLUCOSE BLDC GLUCOMTR-MCNC: 112 MG/DL (ref 70–99)
GLUCOSE BLDC GLUCOMTR-MCNC: 113 MG/DL (ref 70–99)
GLUCOSE BLDC GLUCOMTR-MCNC: 113 MG/DL (ref 70–99)
GLUCOSE BLDC GLUCOMTR-MCNC: 114 MG/DL (ref 70–99)
GLUCOSE BLDC GLUCOMTR-MCNC: 114 MG/DL (ref 70–99)
GLUCOSE BLDC GLUCOMTR-MCNC: 115 MG/DL (ref 70–99)
GLUCOSE BLDC GLUCOMTR-MCNC: 116 MG/DL (ref 70–99)
GLUCOSE BLDC GLUCOMTR-MCNC: 117 MG/DL (ref 70–99)
GLUCOSE BLDC GLUCOMTR-MCNC: 118 MG/DL (ref 70–99)
GLUCOSE BLDC GLUCOMTR-MCNC: 119 MG/DL (ref 70–99)
GLUCOSE BLDC GLUCOMTR-MCNC: 119 MG/DL (ref 70–99)
GLUCOSE BLDC GLUCOMTR-MCNC: 120 MG/DL (ref 70–99)
GLUCOSE BLDC GLUCOMTR-MCNC: 120 MG/DL (ref 70–99)
GLUCOSE BLDC GLUCOMTR-MCNC: 121 MG/DL (ref 70–99)
GLUCOSE BLDC GLUCOMTR-MCNC: 122 MG/DL (ref 70–99)
GLUCOSE BLDC GLUCOMTR-MCNC: 122 MG/DL (ref 70–99)
GLUCOSE BLDC GLUCOMTR-MCNC: 123 MG/DL (ref 70–99)
GLUCOSE BLDC GLUCOMTR-MCNC: 123 MG/DL (ref 70–99)
GLUCOSE BLDC GLUCOMTR-MCNC: 124 MG/DL (ref 70–99)
GLUCOSE BLDC GLUCOMTR-MCNC: 124 MG/DL (ref 70–99)
GLUCOSE BLDC GLUCOMTR-MCNC: 125 MG/DL (ref 70–99)
GLUCOSE BLDC GLUCOMTR-MCNC: 126 MG/DL (ref 70–99)
GLUCOSE BLDC GLUCOMTR-MCNC: 127 MG/DL (ref 70–99)
GLUCOSE BLDC GLUCOMTR-MCNC: 128 MG/DL (ref 70–99)
GLUCOSE BLDC GLUCOMTR-MCNC: 129 MG/DL (ref 70–99)
GLUCOSE BLDC GLUCOMTR-MCNC: 130 MG/DL (ref 70–99)
GLUCOSE BLDC GLUCOMTR-MCNC: 131 MG/DL (ref 70–99)
GLUCOSE BLDC GLUCOMTR-MCNC: 132 MG/DL (ref 70–99)
GLUCOSE BLDC GLUCOMTR-MCNC: 133 MG/DL (ref 70–99)
GLUCOSE BLDC GLUCOMTR-MCNC: 134 MG/DL (ref 70–99)
GLUCOSE BLDC GLUCOMTR-MCNC: 135 MG/DL (ref 70–99)
GLUCOSE BLDC GLUCOMTR-MCNC: 135 MG/DL (ref 70–99)
GLUCOSE BLDC GLUCOMTR-MCNC: 136 MG/DL (ref 70–99)
GLUCOSE BLDC GLUCOMTR-MCNC: 137 MG/DL (ref 70–99)
GLUCOSE BLDC GLUCOMTR-MCNC: 138 MG/DL (ref 70–99)
GLUCOSE BLDC GLUCOMTR-MCNC: 139 MG/DL (ref 70–99)
GLUCOSE BLDC GLUCOMTR-MCNC: 139 MG/DL (ref 70–99)
GLUCOSE BLDC GLUCOMTR-MCNC: 140 MG/DL (ref 70–99)
GLUCOSE BLDC GLUCOMTR-MCNC: 141 MG/DL (ref 70–99)
GLUCOSE BLDC GLUCOMTR-MCNC: 142 MG/DL (ref 70–99)
GLUCOSE BLDC GLUCOMTR-MCNC: 143 MG/DL (ref 70–99)
GLUCOSE BLDC GLUCOMTR-MCNC: 143 MG/DL (ref 70–99)
GLUCOSE BLDC GLUCOMTR-MCNC: 144 MG/DL (ref 70–99)
GLUCOSE BLDC GLUCOMTR-MCNC: 145 MG/DL (ref 70–99)
GLUCOSE BLDC GLUCOMTR-MCNC: 146 MG/DL (ref 70–99)
GLUCOSE BLDC GLUCOMTR-MCNC: 147 MG/DL (ref 70–99)
GLUCOSE BLDC GLUCOMTR-MCNC: 148 MG/DL (ref 70–99)
GLUCOSE BLDC GLUCOMTR-MCNC: 149 MG/DL (ref 70–99)
GLUCOSE BLDC GLUCOMTR-MCNC: 150 MG/DL (ref 70–99)
GLUCOSE BLDC GLUCOMTR-MCNC: 151 MG/DL (ref 70–99)
GLUCOSE BLDC GLUCOMTR-MCNC: 152 MG/DL (ref 70–99)
GLUCOSE BLDC GLUCOMTR-MCNC: 153 MG/DL (ref 70–99)
GLUCOSE BLDC GLUCOMTR-MCNC: 153 MG/DL (ref 70–99)
GLUCOSE BLDC GLUCOMTR-MCNC: 154 MG/DL (ref 70–99)
GLUCOSE BLDC GLUCOMTR-MCNC: 155 MG/DL (ref 70–99)
GLUCOSE BLDC GLUCOMTR-MCNC: 156 MG/DL (ref 70–99)
GLUCOSE BLDC GLUCOMTR-MCNC: 157 MG/DL (ref 70–99)
GLUCOSE BLDC GLUCOMTR-MCNC: 158 MG/DL (ref 70–99)
GLUCOSE BLDC GLUCOMTR-MCNC: 159 MG/DL (ref 70–99)
GLUCOSE BLDC GLUCOMTR-MCNC: 159 MG/DL (ref 70–99)
GLUCOSE BLDC GLUCOMTR-MCNC: 160 MG/DL (ref 70–99)
GLUCOSE BLDC GLUCOMTR-MCNC: 161 MG/DL (ref 70–99)
GLUCOSE BLDC GLUCOMTR-MCNC: 162 MG/DL (ref 70–99)
GLUCOSE BLDC GLUCOMTR-MCNC: 163 MG/DL (ref 70–99)
GLUCOSE BLDC GLUCOMTR-MCNC: 164 MG/DL (ref 70–99)
GLUCOSE BLDC GLUCOMTR-MCNC: 165 MG/DL (ref 70–99)
GLUCOSE BLDC GLUCOMTR-MCNC: 166 MG/DL (ref 70–99)
GLUCOSE BLDC GLUCOMTR-MCNC: 166 MG/DL (ref 70–99)
GLUCOSE BLDC GLUCOMTR-MCNC: 167 MG/DL (ref 70–99)
GLUCOSE BLDC GLUCOMTR-MCNC: 168 MG/DL (ref 70–99)
GLUCOSE BLDC GLUCOMTR-MCNC: 169 MG/DL (ref 70–99)
GLUCOSE BLDC GLUCOMTR-MCNC: 170 MG/DL (ref 70–99)
GLUCOSE BLDC GLUCOMTR-MCNC: 172 MG/DL (ref 70–99)
GLUCOSE BLDC GLUCOMTR-MCNC: 172 MG/DL (ref 70–99)
GLUCOSE BLDC GLUCOMTR-MCNC: 173 MG/DL (ref 70–99)
GLUCOSE BLDC GLUCOMTR-MCNC: 174 MG/DL (ref 70–99)
GLUCOSE BLDC GLUCOMTR-MCNC: 175 MG/DL (ref 70–99)
GLUCOSE BLDC GLUCOMTR-MCNC: 176 MG/DL (ref 70–99)
GLUCOSE BLDC GLUCOMTR-MCNC: 177 MG/DL (ref 70–99)
GLUCOSE BLDC GLUCOMTR-MCNC: 178 MG/DL (ref 70–99)
GLUCOSE BLDC GLUCOMTR-MCNC: 178 MG/DL (ref 70–99)
GLUCOSE BLDC GLUCOMTR-MCNC: 179 MG/DL (ref 70–99)
GLUCOSE BLDC GLUCOMTR-MCNC: 180 MG/DL (ref 70–99)
GLUCOSE BLDC GLUCOMTR-MCNC: 181 MG/DL (ref 70–99)
GLUCOSE BLDC GLUCOMTR-MCNC: 182 MG/DL (ref 70–99)
GLUCOSE BLDC GLUCOMTR-MCNC: 182 MG/DL (ref 70–99)
GLUCOSE BLDC GLUCOMTR-MCNC: 183 MG/DL (ref 70–99)
GLUCOSE BLDC GLUCOMTR-MCNC: 183 MG/DL (ref 70–99)
GLUCOSE BLDC GLUCOMTR-MCNC: 184 MG/DL (ref 70–99)
GLUCOSE BLDC GLUCOMTR-MCNC: 185 MG/DL (ref 70–99)
GLUCOSE BLDC GLUCOMTR-MCNC: 185 MG/DL (ref 70–99)
GLUCOSE BLDC GLUCOMTR-MCNC: 186 MG/DL (ref 70–99)
GLUCOSE BLDC GLUCOMTR-MCNC: 186 MG/DL (ref 70–99)
GLUCOSE BLDC GLUCOMTR-MCNC: 187 MG/DL (ref 70–99)
GLUCOSE BLDC GLUCOMTR-MCNC: 188 MG/DL (ref 70–99)
GLUCOSE BLDC GLUCOMTR-MCNC: 189 MG/DL (ref 70–99)
GLUCOSE BLDC GLUCOMTR-MCNC: 190 MG/DL (ref 70–99)
GLUCOSE BLDC GLUCOMTR-MCNC: 191 MG/DL (ref 70–99)
GLUCOSE BLDC GLUCOMTR-MCNC: 191 MG/DL (ref 70–99)
GLUCOSE BLDC GLUCOMTR-MCNC: 192 MG/DL (ref 70–99)
GLUCOSE BLDC GLUCOMTR-MCNC: 192 MG/DL (ref 70–99)
GLUCOSE BLDC GLUCOMTR-MCNC: 193 MG/DL (ref 70–99)
GLUCOSE BLDC GLUCOMTR-MCNC: 194 MG/DL (ref 70–99)
GLUCOSE BLDC GLUCOMTR-MCNC: 194 MG/DL (ref 70–99)
GLUCOSE BLDC GLUCOMTR-MCNC: 195 MG/DL (ref 70–99)
GLUCOSE BLDC GLUCOMTR-MCNC: 196 MG/DL (ref 70–99)
GLUCOSE BLDC GLUCOMTR-MCNC: 197 MG/DL (ref 70–99)
GLUCOSE BLDC GLUCOMTR-MCNC: 199 MG/DL (ref 70–99)
GLUCOSE BLDC GLUCOMTR-MCNC: 200 MG/DL (ref 70–99)
GLUCOSE BLDC GLUCOMTR-MCNC: 200 MG/DL (ref 70–99)
GLUCOSE BLDC GLUCOMTR-MCNC: 201 MG/DL (ref 70–99)
GLUCOSE BLDC GLUCOMTR-MCNC: 202 MG/DL (ref 70–99)
GLUCOSE BLDC GLUCOMTR-MCNC: 204 MG/DL (ref 70–99)
GLUCOSE BLDC GLUCOMTR-MCNC: 205 MG/DL (ref 70–99)
GLUCOSE BLDC GLUCOMTR-MCNC: 206 MG/DL (ref 70–99)
GLUCOSE BLDC GLUCOMTR-MCNC: 207 MG/DL (ref 70–99)
GLUCOSE BLDC GLUCOMTR-MCNC: 207 MG/DL (ref 70–99)
GLUCOSE BLDC GLUCOMTR-MCNC: 208 MG/DL (ref 70–99)
GLUCOSE BLDC GLUCOMTR-MCNC: 214 MG/DL (ref 70–99)
GLUCOSE BLDC GLUCOMTR-MCNC: 215 MG/DL (ref 70–99)
GLUCOSE BLDC GLUCOMTR-MCNC: 219 MG/DL (ref 70–99)
GLUCOSE BLDC GLUCOMTR-MCNC: 223 MG/DL (ref 70–99)
GLUCOSE BLDC GLUCOMTR-MCNC: 226 MG/DL (ref 70–99)
GLUCOSE BLDC GLUCOMTR-MCNC: 228 MG/DL (ref 70–99)
GLUCOSE BLDC GLUCOMTR-MCNC: 231 MG/DL (ref 70–99)
GLUCOSE BLDC GLUCOMTR-MCNC: 232 MG/DL (ref 70–99)
GLUCOSE BLDC GLUCOMTR-MCNC: 235 MG/DL (ref 70–99)
GLUCOSE BLDC GLUCOMTR-MCNC: 236 MG/DL (ref 70–99)
GLUCOSE BLDC GLUCOMTR-MCNC: 236 MG/DL (ref 70–99)
GLUCOSE BLDC GLUCOMTR-MCNC: 243 MG/DL (ref 70–99)
GLUCOSE BLDC GLUCOMTR-MCNC: 264 MG/DL (ref 70–99)
GLUCOSE BLDC GLUCOMTR-MCNC: 68 MG/DL (ref 70–99)
GLUCOSE BLDC GLUCOMTR-MCNC: 74 MG/DL (ref 70–99)
GLUCOSE BLDC GLUCOMTR-MCNC: 75 MG/DL (ref 70–99)
GLUCOSE BLDC GLUCOMTR-MCNC: 77 MG/DL (ref 70–99)
GLUCOSE BLDC GLUCOMTR-MCNC: 77 MG/DL (ref 70–99)
GLUCOSE BLDC GLUCOMTR-MCNC: 78 MG/DL (ref 70–99)
GLUCOSE BLDC GLUCOMTR-MCNC: 79 MG/DL (ref 70–99)
GLUCOSE BLDC GLUCOMTR-MCNC: 79 MG/DL (ref 70–99)
GLUCOSE BLDC GLUCOMTR-MCNC: 80 MG/DL (ref 70–99)
GLUCOSE BLDC GLUCOMTR-MCNC: 81 MG/DL (ref 70–99)
GLUCOSE BLDC GLUCOMTR-MCNC: 82 MG/DL (ref 70–99)
GLUCOSE BLDC GLUCOMTR-MCNC: 82 MG/DL (ref 70–99)
GLUCOSE BLDC GLUCOMTR-MCNC: 85 MG/DL (ref 70–99)
GLUCOSE BLDC GLUCOMTR-MCNC: 86 MG/DL (ref 70–99)
GLUCOSE BLDC GLUCOMTR-MCNC: 88 MG/DL (ref 70–99)
GLUCOSE BLDC GLUCOMTR-MCNC: 88 MG/DL (ref 70–99)
GLUCOSE BLDC GLUCOMTR-MCNC: 90 MG/DL (ref 70–99)
GLUCOSE BLDC GLUCOMTR-MCNC: 92 MG/DL (ref 70–99)
GLUCOSE BLDC GLUCOMTR-MCNC: 94 MG/DL (ref 70–99)
GLUCOSE BLDC GLUCOMTR-MCNC: 94 MG/DL (ref 70–99)
GLUCOSE BLDC GLUCOMTR-MCNC: 96 MG/DL (ref 70–99)
GLUCOSE BLDC GLUCOMTR-MCNC: 97 MG/DL (ref 70–99)
GLUCOSE BLDC GLUCOMTR-MCNC: 98 MG/DL (ref 70–99)
GLUCOSE SERPL-MCNC: 102 MG/DL (ref 70–99)
GLUCOSE SERPL-MCNC: 102 MG/DL (ref 70–99)
GLUCOSE SERPL-MCNC: 110 MG/DL (ref 70–99)
GLUCOSE SERPL-MCNC: 112 MG/DL (ref 70–99)
GLUCOSE SERPL-MCNC: 112 MG/DL (ref 70–99)
GLUCOSE SERPL-MCNC: 114 MG/DL (ref 70–99)
GLUCOSE SERPL-MCNC: 114 MG/DL (ref 70–99)
GLUCOSE SERPL-MCNC: 116 MG/DL (ref 70–99)
GLUCOSE SERPL-MCNC: 118 MG/DL (ref 70–99)
GLUCOSE SERPL-MCNC: 119 MG/DL (ref 70–99)
GLUCOSE SERPL-MCNC: 120 MG/DL (ref 70–99)
GLUCOSE SERPL-MCNC: 121 MG/DL (ref 70–99)
GLUCOSE SERPL-MCNC: 121 MG/DL (ref 70–99)
GLUCOSE SERPL-MCNC: 122 MG/DL (ref 70–99)
GLUCOSE SERPL-MCNC: 122 MG/DL (ref 70–99)
GLUCOSE SERPL-MCNC: 123 MG/DL (ref 70–99)
GLUCOSE SERPL-MCNC: 124 MG/DL (ref 70–99)
GLUCOSE SERPL-MCNC: 124 MG/DL (ref 70–99)
GLUCOSE SERPL-MCNC: 126 MG/DL (ref 70–99)
GLUCOSE SERPL-MCNC: 127 MG/DL (ref 70–99)
GLUCOSE SERPL-MCNC: 128 MG/DL (ref 70–99)
GLUCOSE SERPL-MCNC: 129 MG/DL (ref 70–99)
GLUCOSE SERPL-MCNC: 129 MG/DL (ref 70–99)
GLUCOSE SERPL-MCNC: 130 MG/DL (ref 70–99)
GLUCOSE SERPL-MCNC: 131 MG/DL (ref 70–99)
GLUCOSE SERPL-MCNC: 132 MG/DL (ref 70–99)
GLUCOSE SERPL-MCNC: 132 MG/DL (ref 70–99)
GLUCOSE SERPL-MCNC: 134 MG/DL (ref 70–99)
GLUCOSE SERPL-MCNC: 135 MG/DL (ref 70–99)
GLUCOSE SERPL-MCNC: 137 MG/DL (ref 70–99)
GLUCOSE SERPL-MCNC: 139 MG/DL (ref 70–99)
GLUCOSE SERPL-MCNC: 142 MG/DL (ref 70–99)
GLUCOSE SERPL-MCNC: 143 MG/DL (ref 70–99)
GLUCOSE SERPL-MCNC: 143 MG/DL (ref 70–99)
GLUCOSE SERPL-MCNC: 144 MG/DL (ref 70–99)
GLUCOSE SERPL-MCNC: 145 MG/DL (ref 70–99)
GLUCOSE SERPL-MCNC: 147 MG/DL (ref 70–99)
GLUCOSE SERPL-MCNC: 149 MG/DL (ref 70–99)
GLUCOSE SERPL-MCNC: 151 MG/DL (ref 70–99)
GLUCOSE SERPL-MCNC: 152 MG/DL (ref 70–99)
GLUCOSE SERPL-MCNC: 152 MG/DL (ref 70–99)
GLUCOSE SERPL-MCNC: 155 MG/DL (ref 70–99)
GLUCOSE SERPL-MCNC: 155 MG/DL (ref 70–99)
GLUCOSE SERPL-MCNC: 156 MG/DL (ref 70–99)
GLUCOSE SERPL-MCNC: 157 MG/DL (ref 70–99)
GLUCOSE SERPL-MCNC: 159 MG/DL (ref 70–99)
GLUCOSE SERPL-MCNC: 161 MG/DL (ref 70–99)
GLUCOSE SERPL-MCNC: 162 MG/DL (ref 70–99)
GLUCOSE SERPL-MCNC: 164 MG/DL (ref 70–99)
GLUCOSE SERPL-MCNC: 165 MG/DL (ref 70–99)
GLUCOSE SERPL-MCNC: 165 MG/DL (ref 70–99)
GLUCOSE SERPL-MCNC: 167 MG/DL (ref 70–99)
GLUCOSE SERPL-MCNC: 168 MG/DL (ref 70–99)
GLUCOSE SERPL-MCNC: 171 MG/DL (ref 70–99)
GLUCOSE SERPL-MCNC: 173 MG/DL (ref 70–99)
GLUCOSE SERPL-MCNC: 174 MG/DL (ref 70–99)
GLUCOSE SERPL-MCNC: 176 MG/DL (ref 70–99)
GLUCOSE SERPL-MCNC: 177 MG/DL (ref 70–99)
GLUCOSE SERPL-MCNC: 178 MG/DL (ref 70–99)
GLUCOSE SERPL-MCNC: 180 MG/DL (ref 70–99)
GLUCOSE SERPL-MCNC: 180 MG/DL (ref 70–99)
GLUCOSE SERPL-MCNC: 181 MG/DL (ref 70–99)
GLUCOSE SERPL-MCNC: 181 MG/DL (ref 70–99)
GLUCOSE SERPL-MCNC: 183 MG/DL (ref 70–99)
GLUCOSE SERPL-MCNC: 186 MG/DL (ref 70–99)
GLUCOSE SERPL-MCNC: 187 MG/DL (ref 70–99)
GLUCOSE SERPL-MCNC: 192 MG/DL (ref 70–99)
GLUCOSE SERPL-MCNC: 192 MG/DL (ref 70–99)
GLUCOSE SERPL-MCNC: 193 MG/DL (ref 70–99)
GLUCOSE SERPL-MCNC: 195 MG/DL (ref 70–99)
GLUCOSE SERPL-MCNC: 199 MG/DL (ref 70–99)
GLUCOSE SERPL-MCNC: 201 MG/DL (ref 70–99)
GLUCOSE SERPL-MCNC: 206 MG/DL (ref 70–99)
GLUCOSE SERPL-MCNC: 213 MG/DL (ref 70–99)
GLUCOSE SERPL-MCNC: 226 MG/DL (ref 70–99)
GLUCOSE SERPL-MCNC: 228 MG/DL (ref 70–99)
GLUCOSE SERPL-MCNC: 285 MG/DL (ref 70–99)
GLUCOSE SERPL-MCNC: 78 MG/DL (ref 70–99)
GLUCOSE SERPL-MCNC: 80 MG/DL (ref 70–99)
GLUCOSE SERPL-MCNC: 90 MG/DL (ref 70–99)
GLUCOSE SERPL-MCNC: 91 MG/DL (ref 70–99)
GLUCOSE SERPL-MCNC: 92 MG/DL (ref 70–99)
GLUCOSE SERPL-MCNC: 96 MG/DL (ref 70–99)
GLUCOSE SERPL-MCNC: 97 MG/DL (ref 70–99)
GLUCOSE UR STRIP-MCNC: 150 MG/DL
GLUCOSE UR STRIP-MCNC: NEGATIVE MG/DL
HADV DNA SPEC QL NAA+PROBE: NEGATIVE
HADV DNA SPEC QL NAA+PROBE: NEGATIVE
HAPTOGLOB SERPL-MCNC: 194 MG/DL (ref 35–175)
HBA1C MFR BLD: 5.4 % (ref 0–5.6)
HBA1C MFR BLD: 6.1 % (ref 0–5.6)
HBA1C MFR BLD: 6.4 % (ref 4.3–6)
HBV SURFACE AB SERPL IA-ACNC: 0.26 M[IU]/ML
HBV SURFACE AG SERPL QL IA: NONREACTIVE
HCO3 BLDV-SCNC: 30 MMOL/L (ref 21–28)
HCT VFR BLD AUTO: 20.2 % (ref 40–53)
HCT VFR BLD AUTO: 21.3 % (ref 40–53)
HCT VFR BLD AUTO: 21.6 % (ref 40–53)
HCT VFR BLD AUTO: 21.8 % (ref 40–53)
HCT VFR BLD AUTO: 22.2 % (ref 40–53)
HCT VFR BLD AUTO: 24.4 % (ref 40–53)
HCT VFR BLD AUTO: 24.8 % (ref 40–53)
HCT VFR BLD AUTO: 24.8 % (ref 40–53)
HCT VFR BLD AUTO: 24.9 % (ref 40–53)
HCT VFR BLD AUTO: 25 % (ref 40–53)
HCT VFR BLD AUTO: 25.4 % (ref 40–53)
HCT VFR BLD AUTO: 25.5 % (ref 40–53)
HCT VFR BLD AUTO: 25.6 % (ref 40–53)
HCT VFR BLD AUTO: 25.6 % (ref 40–53)
HCT VFR BLD AUTO: 25.7 % (ref 40–53)
HCT VFR BLD AUTO: 25.8 % (ref 40–53)
HCT VFR BLD AUTO: 25.9 % (ref 40–53)
HCT VFR BLD AUTO: 26 % (ref 40–53)
HCT VFR BLD AUTO: 26.1 % (ref 40–53)
HCT VFR BLD AUTO: 26.2 % (ref 40–53)
HCT VFR BLD AUTO: 26.3 % (ref 40–53)
HCT VFR BLD AUTO: 26.3 % (ref 40–53)
HCT VFR BLD AUTO: 26.7 % (ref 40–53)
HCT VFR BLD AUTO: 26.8 % (ref 40–53)
HCT VFR BLD AUTO: 27 % (ref 40–53)
HCT VFR BLD AUTO: 27 % (ref 40–53)
HCT VFR BLD AUTO: 27.2 % (ref 40–53)
HCT VFR BLD AUTO: 27.2 % (ref 40–53)
HCT VFR BLD AUTO: 27.3 % (ref 40–53)
HCT VFR BLD AUTO: 27.3 % (ref 40–53)
HCT VFR BLD AUTO: 27.4 % (ref 40–53)
HCT VFR BLD AUTO: 27.5 % (ref 40–53)
HCT VFR BLD AUTO: 27.6 % (ref 40–53)
HCT VFR BLD AUTO: 27.8 % (ref 40–53)
HCT VFR BLD AUTO: 28 % (ref 40–53)
HCT VFR BLD AUTO: 28.2 % (ref 40–53)
HCT VFR BLD AUTO: 28.6 % (ref 40–53)
HCT VFR BLD AUTO: 29.2 % (ref 40–53)
HCT VFR BLD AUTO: 29.2 % (ref 40–53)
HCT VFR BLD AUTO: 29.3 % (ref 40–53)
HCT VFR BLD AUTO: 29.8 % (ref 40–53)
HCT VFR BLD AUTO: 29.8 % (ref 40–53)
HCT VFR BLD AUTO: 30 % (ref 40–53)
HCT VFR BLD AUTO: 30.1 % (ref 40–53)
HCT VFR BLD AUTO: 30.5 % (ref 40–53)
HCT VFR BLD AUTO: 30.6 % (ref 40–53)
HCT VFR BLD AUTO: 31 % (ref 40–53)
HCT VFR BLD AUTO: 31.1 % (ref 40–53)
HCT VFR BLD AUTO: 31.6 % (ref 40–53)
HCT VFR BLD CALC: 29 %PCV (ref 40–53)
HEMOCCULT STL QL IA: NEGATIVE
HGB BLD CALC-MCNC: 9.9 G/DL (ref 13.3–17.7)
HGB BLD-MCNC: 10 G/DL (ref 13.3–17.7)
HGB BLD-MCNC: 10 G/DL (ref 13.3–17.7)
HGB BLD-MCNC: 6.7 G/DL (ref 13.3–17.7)
HGB BLD-MCNC: 6.9 G/DL (ref 13.3–17.7)
HGB BLD-MCNC: 7.2 G/DL (ref 13.3–17.7)
HGB BLD-MCNC: 7.2 G/DL (ref 13.3–17.7)
HGB BLD-MCNC: 7.4 G/DL (ref 13.3–17.7)
HGB BLD-MCNC: 7.7 G/DL (ref 13.3–17.7)
HGB BLD-MCNC: 7.7 G/DL (ref 13.3–17.7)
HGB BLD-MCNC: 7.8 G/DL (ref 13.3–17.7)
HGB BLD-MCNC: 7.9 G/DL (ref 13.3–17.7)
HGB BLD-MCNC: 8 G/DL (ref 13.3–17.7)
HGB BLD-MCNC: 8.1 G/DL (ref 13.3–17.7)
HGB BLD-MCNC: 8.2 G/DL (ref 13.3–17.7)
HGB BLD-MCNC: 8.3 G/DL (ref 13.3–17.7)
HGB BLD-MCNC: 8.4 G/DL (ref 13.3–17.7)
HGB BLD-MCNC: 8.5 G/DL (ref 13.3–17.7)
HGB BLD-MCNC: 8.6 G/DL (ref 13.3–17.7)
HGB BLD-MCNC: 8.7 G/DL (ref 13.3–17.7)
HGB BLD-MCNC: 8.8 G/DL (ref 13.3–17.7)
HGB BLD-MCNC: 8.9 G/DL (ref 13.3–17.7)
HGB BLD-MCNC: 9 G/DL (ref 13.3–17.7)
HGB BLD-MCNC: 9.2 G/DL (ref 13.3–17.7)
HGB BLD-MCNC: 9.2 G/DL (ref 13.3–17.7)
HGB BLD-MCNC: 9.3 G/DL (ref 13.3–17.7)
HGB BLD-MCNC: 9.4 G/DL (ref 13.3–17.7)
HGB BLD-MCNC: 9.5 G/DL (ref 13.3–17.7)
HGB BLD-MCNC: 9.5 G/DL (ref 13.3–17.7)
HGB BLD-MCNC: 9.6 G/DL (ref 13.3–17.7)
HGB BLD-MCNC: 9.6 G/DL (ref 13.3–17.7)
HGB BLD-MCNC: 9.8 G/DL (ref 13.3–17.7)
HGB UR QL STRIP: ABNORMAL
HMPV RNA SPEC QL NAA+PROBE: NEGATIVE
HPIV1 RNA SPEC QL NAA+PROBE: NEGATIVE
HPIV2 RNA SPEC QL NAA+PROBE: NEGATIVE
HPIV3 RNA SPEC QL NAA+PROBE: NEGATIVE
HYALINE CASTS #/AREA URNS LPF: 1 /LPF (ref 0–2)
HYALINE CASTS #/AREA URNS LPF: 126 /LPF (ref 0–2)
HYALINE CASTS #/AREA URNS LPF: 6 /LPF (ref 0–2)
HYALINE CASTS #/AREA URNS LPF: 60 /LPF (ref 0–2)
HYALINE CASTS #/AREA URNS LPF: <1 /LPF (ref 0–2)
IMM GRANULOCYTES # BLD: 0 10E9/L (ref 0–0.4)
IMM GRANULOCYTES # BLD: 0.1 10E9/L (ref 0–0.4)
IMM GRANULOCYTES # BLD: 0.3 10E9/L (ref 0–0.4)
IMM GRANULOCYTES NFR BLD: 0.2 %
IMM GRANULOCYTES NFR BLD: 0.4 %
IMM GRANULOCYTES NFR BLD: 0.4 %
IMM GRANULOCYTES NFR BLD: 0.5 %
IMM GRANULOCYTES NFR BLD: 0.7 %
IMM GRANULOCYTES NFR BLD: 1.1 %
IMM GRANULOCYTES NFR BLD: 3.1 %
INR POINT OF CARE: 1.4 (ref 0.86–1.14)
INR POINT OF CARE: 1.5 (ref 0.86–1.14)
INR POINT OF CARE: 1.9 (ref 0.86–1.14)
INR POINT OF CARE: 2 (ref 0.86–1.14)
INR POINT OF CARE: 2.1 (ref 0.86–1.14)
INR POINT OF CARE: 3.5 (ref 0.86–1.14)
INR PPP: 1.06 (ref 0.86–1.14)
INR PPP: 1.07 (ref 0.86–1.14)
INR PPP: 1.1
INR PPP: 1.1 (ref 0.86–1.14)
INR PPP: 1.16 (ref 0.86–1.14)
INR PPP: 1.2
INR PPP: 1.2 (ref 0.86–1.14)
INR PPP: 1.2 (ref 0.8–1.2)
INR PPP: 1.21 (ref 0.86–1.14)
INR PPP: 1.22 (ref 0.86–1.14)
INR PPP: 1.22 (ref 0.86–1.14)
INR PPP: 1.24 (ref 0.86–1.14)
INR PPP: 1.27 (ref 0.86–1.14)
INR PPP: 1.27 (ref 0.86–1.14)
INR PPP: 1.28 (ref 0.86–1.14)
INR PPP: 1.3
INR PPP: 1.3
INR PPP: 1.33 (ref 0.86–1.14)
INR PPP: 1.34 (ref 0.86–1.14)
INR PPP: 1.4 (ref 0.86–1.14)
INR PPP: 1.4 (ref 0.86–1.14)
INR PPP: 1.4 (ref 0.8–1.1)
INR PPP: 1.44 (ref 0.86–1.14)
INR PPP: 1.48 (ref 0.86–1.14)
INR PPP: 1.5
INR PPP: 1.5
INR PPP: 1.51 (ref 0.86–1.14)
INR PPP: 1.54 (ref 0.86–1.14)
INR PPP: 1.55 (ref 0.86–1.14)
INR PPP: 1.6 (ref 0.86–1.14)
INR PPP: 1.63 (ref 0.86–1.14)
INR PPP: 1.65 (ref 0.86–1.14)
INR PPP: 1.69 (ref 0.86–1.14)
INR PPP: 1.7
INR PPP: 1.7 (ref 0.86–1.14)
INR PPP: 1.71 (ref 0.86–1.14)
INR PPP: 1.71 (ref 0.86–1.14)
INR PPP: 1.75 (ref 0.86–1.14)
INR PPP: 1.76 (ref 0.86–1.14)
INR PPP: 1.82 (ref 0.86–1.14)
INR PPP: 1.83 (ref 0.86–1.14)
INR PPP: 1.86 (ref 0.86–1.14)
INR PPP: 1.9
INR PPP: 1.95 (ref 0.86–1.14)
INR PPP: 1.95 (ref 0.86–1.14)
INR PPP: 1.96 (ref 0.86–1.14)
INR PPP: 1.96 (ref 0.86–1.14)
INR PPP: 2 (ref 0.8–1.2)
INR PPP: 2.02 (ref 0.86–1.14)
INR PPP: 2.06 (ref 0.86–1.14)
INR PPP: 2.08 (ref 0.86–1.14)
INR PPP: 2.1
INR PPP: 2.1
INR PPP: 2.12 (ref 0.86–1.14)
INR PPP: 2.16 (ref 0.86–1.14)
INR PPP: 2.16 (ref 0.86–1.14)
INR PPP: 2.19 (ref 0.86–1.14)
INR PPP: 2.2 (ref 0.86–1.14)
INR PPP: 2.22 (ref 0.86–1.14)
INR PPP: 2.23 (ref 0.86–1.14)
INR PPP: 2.24 (ref 0.86–1.14)
INR PPP: 2.26 (ref 0.86–1.14)
INR PPP: 2.26 (ref 0.86–1.14)
INR PPP: 2.27 (ref 0.86–1.14)
INR PPP: 2.29 (ref 0.86–1.14)
INR PPP: 2.29 (ref 0.86–1.14)
INR PPP: 2.3
INR PPP: 2.3
INR PPP: 2.3 (ref 0.86–1.14)
INR PPP: 2.3 (ref 0.86–1.14)
INR PPP: 2.31 (ref 0.86–1.14)
INR PPP: 2.33 (ref 0.86–1.14)
INR PPP: 2.34 (ref 0.86–1.14)
INR PPP: 2.37 (ref 0.86–1.14)
INR PPP: 2.44 (ref 0.86–1.14)
INR PPP: 2.56 (ref 0.86–1.14)
INR PPP: 2.6
INR PPP: 2.63 (ref 0.86–1.14)
INR PPP: 2.71 (ref 0.86–1.14)
INR PPP: 2.76 (ref 0.86–1.14)
INR PPP: 2.8
INR PPP: 2.9
INR PPP: 2.9
INR PPP: 2.91 (ref 0.86–1.14)
INR PPP: 2.92 (ref 0.86–1.14)
INR PPP: 3
INR PPP: 3
INR PPP: 3.1 (ref 0.86–1.14)
INR PPP: 3.13 (ref 0.86–1.14)
INR PPP: 3.14 (ref 0.86–1.14)
INR PPP: 3.2
INR PPP: 3.2
INR PPP: 3.3
INR PPP: 3.4
INR PPP: 3.46 (ref 0.86–1.14)
INR PPP: 3.7
INR PPP: 3.8
INR PPP: 3.9
INR PPP: 4 (ref 0.86–1.14)
INR PPP: 4.03 (ref 0.86–1.14)
INR PPP: 4.17 (ref 0.86–1.14)
INR PPP: 4.18 (ref 0.86–1.14)
INR PPP: 4.22 (ref 0.86–1.14)
INR PPP: 4.23 (ref 0.86–1.14)
INR PPP: 4.28 (ref 0.86–1.14)
INR PPP: 4.52 (ref 0.86–1.14)
INR PPP: 4.57 (ref 0.86–1.14)
INR PPP: 4.7
INR PPP: 4.9
INR PPP: 5
INR PPP: 5.5
INR PPP: 6.4
INTERPRETATION ECG - MUSE: NORMAL
INTERPRETATION ECG - MUSE: NORMAL
IRON SATN MFR SERPL: 11 % (ref 15–46)
IRON SERPL-MCNC: 29 UG/DL (ref 35–180)
KETONES UR STRIP-MCNC: 10 MG/DL
KETONES UR STRIP-MCNC: 10 MG/DL
KETONES UR STRIP-MCNC: NEGATIVE MG/DL
L PNEUMO1 AG UR QL IA: NORMAL
LACOSAMIDE SERPL-MCNC: 10.1 UG/ML (ref 5–10)
LACTATE BLD-SCNC: 0.6 MMOL/L (ref 0.7–2)
LACTATE BLD-SCNC: 0.7 MMOL/L (ref 0.7–2)
LACTATE BLD-SCNC: 0.8 MMOL/L (ref 0.7–2)
LACTATE BLD-SCNC: 1 MMOL/L (ref 0.7–2)
LACTATE BLD-SCNC: 1 MMOL/L (ref 0.7–2)
LACTATE BLD-SCNC: 1.1 MMOL/L (ref 0.7–2)
LACTATE BLD-SCNC: 1.2 MMOL/L (ref 0.7–2)
LACTATE BLD-SCNC: 1.2 MMOL/L (ref 0.7–2)
LACTATE BLD-SCNC: 2.5 MMOL/L (ref 0.7–2.1)
LDH SERPL L TO P-CCNC: 141 U/L (ref 85–227)
LEUKOCYTE ESTERASE UR QL STRIP: ABNORMAL
LEUKOCYTE ESTERASE UR QL STRIP: NEGATIVE
LEVETIRACETAM SERPL-MCNC: 62 UG/ML (ref 12–46)
LEVETIRACETAM SERPL-MCNC: 77 UG/ML (ref 12–46)
LEVETIRACETAM SERPL-MCNC: 85 UG/ML (ref 12–46)
LEVETIRACETAM SERPL-MCNC: 88 UG/ML (ref 12–46)
LEVETIRACETAM SERPL-MCNC: 95 UG/ML (ref 12–46)
LMWH PPP CHRO-ACNC: 0.17 IU/ML
LMWH PPP CHRO-ACNC: 0.21 IU/ML
LMWH PPP CHRO-ACNC: 0.21 IU/ML
LMWH PPP CHRO-ACNC: 0.24 IU/ML
LMWH PPP CHRO-ACNC: 0.31 IU/ML
LMWH PPP CHRO-ACNC: 0.32 IU/ML
LMWH PPP CHRO-ACNC: 0.32 IU/ML
LMWH PPP CHRO-ACNC: 0.34 IU/ML
LMWH PPP CHRO-ACNC: 0.35 IU/ML
LMWH PPP CHRO-ACNC: 0.4 IU/ML
LMWH PPP CHRO-ACNC: 0.41 IU/ML
LMWH PPP CHRO-ACNC: 0.43 IU/ML
LMWH PPP CHRO-ACNC: 0.5 IU/ML
LMWH PPP CHRO-ACNC: 0.51 IU/ML
LMWH PPP CHRO-ACNC: 0.56 IU/ML
LMWH PPP CHRO-ACNC: 0.57 IU/ML
LMWH PPP CHRO-ACNC: 0.57 IU/ML
LMWH PPP CHRO-ACNC: 0.61 IU/ML
LMWH PPP CHRO-ACNC: 0.64 IU/ML
LMWH PPP CHRO-ACNC: 0.7 IU/ML
LMWH PPP CHRO-ACNC: 0.78 IU/ML
LMWH PPP CHRO-ACNC: 0.91 IU/ML
LMWH PPP CHRO-ACNC: 0.93 IU/ML
LMWH PPP CHRO-ACNC: 0.94 IU/ML
LMWH PPP CHRO-ACNC: 1.13 IU/ML
LMWH PPP CHRO-ACNC: 1.19 IU/ML
LMWH PPP CHRO-ACNC: 1.23 IU/ML
LMWH PPP CHRO-ACNC: 1.24 IU/ML
LMWH PPP CHRO-ACNC: 1.28 IU/ML
LMWH PPP CHRO-ACNC: 1.48 IU/ML
LMWH PPP CHRO-ACNC: <0.1 IU/ML
LYMPHOCYTES # BLD AUTO: 1 10E9/L (ref 0.8–5.3)
LYMPHOCYTES # BLD AUTO: 1.2 10E9/L (ref 0.8–5.3)
LYMPHOCYTES # BLD AUTO: 1.3 10E9/L (ref 0.8–5.3)
LYMPHOCYTES # BLD AUTO: 1.3 10E9/L (ref 0.8–5.3)
LYMPHOCYTES # BLD AUTO: 1.6 10E9/L (ref 0.8–5.3)
LYMPHOCYTES # BLD AUTO: 1.6 10E9/L (ref 0.8–5.3)
LYMPHOCYTES # BLD AUTO: 1.8 10E9/L (ref 0.8–5.3)
LYMPHOCYTES # BLD AUTO: 1.9 10E9/L (ref 0.8–5.3)
LYMPHOCYTES # BLD AUTO: 1.9 10E9/L (ref 0.8–5.3)
LYMPHOCYTES # BLD AUTO: 2 10E9/L (ref 0.8–5.3)
LYMPHOCYTES # BLD AUTO: 2.3 10E9/L (ref 0.8–5.3)
LYMPHOCYTES # BLD AUTO: 2.3 10E9/L (ref 0.8–5.3)
LYMPHOCYTES # BLD AUTO: 3.2 10E9/L (ref 0.8–5.3)
LYMPHOCYTES NFR BLD AUTO: 10.7 %
LYMPHOCYTES NFR BLD AUTO: 15.1 %
LYMPHOCYTES NFR BLD AUTO: 15.7 %
LYMPHOCYTES NFR BLD AUTO: 19.5 %
LYMPHOCYTES NFR BLD AUTO: 22.3 %
LYMPHOCYTES NFR BLD AUTO: 26.9 %
LYMPHOCYTES NFR BLD AUTO: 31.7 %
LYMPHOCYTES NFR BLD AUTO: 31.7 %
LYMPHOCYTES NFR BLD AUTO: 34.4 %
LYMPHOCYTES NFR BLD AUTO: 39.3 %
LYMPHOCYTES NFR BLD AUTO: 40 %
LYMPHOCYTES NFR BLD AUTO: 40.3 %
LYMPHOCYTES NFR BLD AUTO: 45.7 %
Lab: ABNORMAL
Lab: NORMAL
MAGNESIUM SERPL-MCNC: 1.5 MG/DL (ref 1.6–2.3)
MAGNESIUM SERPL-MCNC: 1.6 MG/DL (ref 1.6–2.3)
MAGNESIUM SERPL-MCNC: 1.6 MG/DL (ref 1.6–2.3)
MAGNESIUM SERPL-MCNC: 1.8 MG/DL (ref 1.6–2.3)
MAGNESIUM SERPL-MCNC: 2 MG/DL (ref 1.6–2.3)
MAGNESIUM SERPL-MCNC: 2.1 MG/DL (ref 1.6–2.3)
MAGNESIUM SERPL-MCNC: 2.2 MG/DL (ref 1.6–2.3)
MAGNESIUM SERPL-MCNC: 2.3 MG/DL (ref 1.6–2.3)
MAGNESIUM SERPL-MCNC: 2.4 MG/DL (ref 1.6–2.3)
MAGNESIUM SERPL-MCNC: 2.5 MG/DL (ref 1.6–2.3)
MAGNESIUM SERPL-MCNC: 2.7 MG/DL (ref 1.6–2.3)
MCH RBC QN AUTO: 29.2 PG (ref 26.5–33)
MCH RBC QN AUTO: 29.3 PG (ref 26.5–33)
MCH RBC QN AUTO: 29.3 PG (ref 26.5–33)
MCH RBC QN AUTO: 29.4 PG (ref 26.5–33)
MCH RBC QN AUTO: 29.4 PG (ref 26.5–33)
MCH RBC QN AUTO: 29.5 PG (ref 26.5–33)
MCH RBC QN AUTO: 29.6 PG (ref 26.5–33)
MCH RBC QN AUTO: 29.7 PG (ref 26.5–33)
MCH RBC QN AUTO: 29.7 PG (ref 26.5–33)
MCH RBC QN AUTO: 29.8 PG (ref 26.5–33)
MCH RBC QN AUTO: 29.9 PG (ref 26.5–33)
MCH RBC QN AUTO: 29.9 PG (ref 26.5–33)
MCH RBC QN AUTO: 30 PG (ref 26.5–33)
MCH RBC QN AUTO: 30.1 PG (ref 26.5–33)
MCH RBC QN AUTO: 30.2 PG (ref 26.5–33)
MCH RBC QN AUTO: 30.3 PG (ref 26.5–33)
MCH RBC QN AUTO: 30.4 PG (ref 26.5–33)
MCH RBC QN AUTO: 30.5 PG (ref 26.5–33)
MCH RBC QN AUTO: 30.6 PG (ref 26.5–33)
MCH RBC QN AUTO: 30.6 PG (ref 26.5–33)
MCH RBC QN AUTO: 30.7 PG (ref 26.5–33)
MCH RBC QN AUTO: 30.8 PG (ref 26.5–33)
MCH RBC QN AUTO: 30.8 PG (ref 26.5–33)
MCH RBC QN AUTO: 30.9 PG (ref 26.5–33)
MCH RBC QN AUTO: 31 PG (ref 26.5–33)
MCH RBC QN AUTO: 31 PG (ref 26.5–33)
MCH RBC QN AUTO: 31.4 PG (ref 26.5–33)
MCHC RBC AUTO-ENTMCNC: 29.9 G/DL (ref 31.5–36.5)
MCHC RBC AUTO-ENTMCNC: 30.1 G/DL (ref 31.5–36.5)
MCHC RBC AUTO-ENTMCNC: 30.2 G/DL (ref 31.5–36.5)
MCHC RBC AUTO-ENTMCNC: 30.4 G/DL (ref 31.5–36.5)
MCHC RBC AUTO-ENTMCNC: 30.5 G/DL (ref 31.5–36.5)
MCHC RBC AUTO-ENTMCNC: 30.8 G/DL (ref 31.5–36.5)
MCHC RBC AUTO-ENTMCNC: 30.8 G/DL (ref 31.5–36.5)
MCHC RBC AUTO-ENTMCNC: 30.9 G/DL (ref 31.5–36.5)
MCHC RBC AUTO-ENTMCNC: 31 G/DL (ref 31.5–36.5)
MCHC RBC AUTO-ENTMCNC: 31 G/DL (ref 31.5–36.5)
MCHC RBC AUTO-ENTMCNC: 31.1 G/DL (ref 31.5–36.5)
MCHC RBC AUTO-ENTMCNC: 31.2 G/DL (ref 31.5–36.5)
MCHC RBC AUTO-ENTMCNC: 31.3 G/DL (ref 31.5–36.5)
MCHC RBC AUTO-ENTMCNC: 31.4 G/DL (ref 31.5–36.5)
MCHC RBC AUTO-ENTMCNC: 31.5 G/DL (ref 31.5–36.5)
MCHC RBC AUTO-ENTMCNC: 31.6 G/DL (ref 31.5–36.5)
MCHC RBC AUTO-ENTMCNC: 31.7 G/DL (ref 31.5–36.5)
MCHC RBC AUTO-ENTMCNC: 31.7 G/DL (ref 31.5–36.5)
MCHC RBC AUTO-ENTMCNC: 31.8 G/DL (ref 31.5–36.5)
MCHC RBC AUTO-ENTMCNC: 31.9 G/DL (ref 31.5–36.5)
MCHC RBC AUTO-ENTMCNC: 32 G/DL (ref 31.5–36.5)
MCHC RBC AUTO-ENTMCNC: 32.1 G/DL (ref 31.5–36.5)
MCHC RBC AUTO-ENTMCNC: 32.2 G/DL (ref 31.5–36.5)
MCHC RBC AUTO-ENTMCNC: 32.3 G/DL (ref 31.5–36.5)
MCHC RBC AUTO-ENTMCNC: 32.4 G/DL (ref 31.5–36.5)
MCHC RBC AUTO-ENTMCNC: 32.6 G/DL (ref 31.5–36.5)
MCHC RBC AUTO-ENTMCNC: 32.9 G/DL (ref 31.5–36.5)
MCHC RBC AUTO-ENTMCNC: 32.9 G/DL (ref 31.5–36.5)
MCHC RBC AUTO-ENTMCNC: 33 G/DL (ref 31.5–36.5)
MCHC RBC AUTO-ENTMCNC: 33 G/DL (ref 31.5–36.5)
MCHC RBC AUTO-ENTMCNC: 33.2 G/DL (ref 31.5–36.5)
MCHC RBC AUTO-ENTMCNC: 33.3 G/DL (ref 31.5–36.5)
MCHC RBC AUTO-ENTMCNC: 33.8 G/DL (ref 31.5–36.5)
MCV RBC AUTO: 100 FL (ref 78–100)
MCV RBC AUTO: 101 FL (ref 78–100)
MCV RBC AUTO: 102 FL (ref 78–100)
MCV RBC AUTO: 88 FL (ref 78–100)
MCV RBC AUTO: 90 FL (ref 78–100)
MCV RBC AUTO: 90 FL (ref 78–100)
MCV RBC AUTO: 91 FL (ref 78–100)
MCV RBC AUTO: 91 FL (ref 78–100)
MCV RBC AUTO: 92 FL (ref 78–100)
MCV RBC AUTO: 93 FL (ref 78–100)
MCV RBC AUTO: 94 FL (ref 78–100)
MCV RBC AUTO: 95 FL (ref 78–100)
MCV RBC AUTO: 96 FL (ref 78–100)
MCV RBC AUTO: 97 FL (ref 78–100)
MCV RBC AUTO: 98 FL (ref 78–100)
MCV RBC AUTO: 99 FL (ref 78–100)
MICROBIOLOGIST REVIEW: NORMAL
MONOCYTES # BLD AUTO: 0.1 10E9/L (ref 0–1.3)
MONOCYTES # BLD AUTO: 0.4 10E9/L (ref 0–1.3)
MONOCYTES # BLD AUTO: 0.4 10E9/L (ref 0–1.3)
MONOCYTES # BLD AUTO: 0.5 10E9/L (ref 0–1.3)
MONOCYTES # BLD AUTO: 0.7 10E9/L (ref 0–1.3)
MONOCYTES # BLD AUTO: 0.8 10E9/L (ref 0–1.3)
MONOCYTES # BLD AUTO: 0.9 10E9/L (ref 0–1.3)
MONOCYTES # BLD AUTO: 1 10E9/L (ref 0–1.3)
MONOCYTES # BLD AUTO: 1.6 10E9/L (ref 0–1.3)
MONOCYTES NFR BLD AUTO: 10 %
MONOCYTES NFR BLD AUTO: 10.2 %
MONOCYTES NFR BLD AUTO: 13.1 %
MONOCYTES NFR BLD AUTO: 13.2 %
MONOCYTES NFR BLD AUTO: 13.4 %
MONOCYTES NFR BLD AUTO: 13.6 %
MONOCYTES NFR BLD AUTO: 13.9 %
MONOCYTES NFR BLD AUTO: 14.1 %
MONOCYTES NFR BLD AUTO: 2.1 %
MONOCYTES NFR BLD AUTO: 7.3 %
MONOCYTES NFR BLD AUTO: 7.8 %
MONOCYTES NFR BLD AUTO: 7.9 %
MONOCYTES NFR BLD AUTO: 8.9 %
MRSA DNA SPEC QL NAA+PROBE: NEGATIVE
MUCOUS THREADS #/AREA URNS LPF: PRESENT /LPF
NEUTROPHILS # BLD AUTO: 1.6 10E9/L (ref 1.6–8.3)
NEUTROPHILS # BLD AUTO: 11 10E9/L (ref 1.6–8.3)
NEUTROPHILS # BLD AUTO: 2.4 10E9/L (ref 1.6–8.3)
NEUTROPHILS # BLD AUTO: 3 10E9/L (ref 1.6–8.3)
NEUTROPHILS # BLD AUTO: 3.2 10E9/L (ref 1.6–8.3)
NEUTROPHILS # BLD AUTO: 3.2 10E9/L (ref 1.6–8.3)
NEUTROPHILS # BLD AUTO: 3.3 10E9/L (ref 1.6–8.3)
NEUTROPHILS # BLD AUTO: 3.8 10E9/L (ref 1.6–8.3)
NEUTROPHILS # BLD AUTO: 4.3 10E9/L (ref 1.6–8.3)
NEUTROPHILS # BLD AUTO: 5 10E9/L (ref 1.6–8.3)
NEUTROPHILS # BLD AUTO: 9.7 10E9/L (ref 1.6–8.3)
NEUTROPHILS NFR BLD AUTO: 31.6 %
NEUTROPHILS NFR BLD AUTO: 47.3 %
NEUTROPHILS NFR BLD AUTO: 49.7 %
NEUTROPHILS NFR BLD AUTO: 49.9 %
NEUTROPHILS NFR BLD AUTO: 51.8 %
NEUTROPHILS NFR BLD AUTO: 52.1 %
NEUTROPHILS NFR BLD AUTO: 52.4 %
NEUTROPHILS NFR BLD AUTO: 55.1 %
NEUTROPHILS NFR BLD AUTO: 60.6 %
NEUTROPHILS NFR BLD AUTO: 67.5 %
NEUTROPHILS NFR BLD AUTO: 72.6 %
NEUTROPHILS NFR BLD AUTO: 73 %
NEUTROPHILS NFR BLD AUTO: 80.8 %
NITRATE UR QL: NEGATIVE
NOROV GI+II ORF1-ORF2 JNC STL QL NAA+PR: NOT DETECTED
NRBC # BLD AUTO: 0 10*3/UL
NRBC BLD AUTO-RTO: 0 /100
NUM BPU REQUESTED: 1
O2/TOTAL GAS SETTING VFR VENT: 21 %
OSMOLALITY SERPL: 298 MMOL/KG (ref 280–301)
OSMOLALITY SERPL: 301 MMOL/KG (ref 280–301)
OSMOLALITY UR: 284 MMOL/KG (ref 100–1200)
OSMOLALITY UR: 405 MMOL/KG (ref 100–1200)
PCO2 BLDV: 32 MM HG (ref 40–50)
PCO2 BLDV: 33 MM HG (ref 40–50)
PCO2 BLDV: 47 MM HG (ref 40–50)
PH BLDV: 7.4 PH (ref 7.32–7.43)
PH BLDV: 7.44 PH (ref 7.32–7.43)
PH BLDV: 7.44 PH (ref 7.32–7.43)
PH UR STRIP: 5 PH (ref 5–7)
PH UR STRIP: 5 PH (ref 5–7)
PH UR STRIP: 5.5 PH (ref 5–7)
PH UR STRIP: 5.5 PH (ref 5–7)
PH UR STRIP: 6 PH (ref 5–7)
PH UR STRIP: 6 PH (ref 5–7)
PH UR STRIP: 6.5 PH (ref 5–7)
PHOSPHATE SERPL-MCNC: 1.2 MG/DL (ref 2.5–4.5)
PHOSPHATE SERPL-MCNC: 1.2 MG/DL (ref 2.5–4.5)
PHOSPHATE SERPL-MCNC: 1.6 MG/DL (ref 2.5–4.5)
PHOSPHATE SERPL-MCNC: 1.9 MG/DL (ref 2.5–4.5)
PHOSPHATE SERPL-MCNC: 2.3 MG/DL (ref 2.5–4.5)
PHOSPHATE SERPL-MCNC: 2.4 MG/DL (ref 2.5–4.5)
PHOSPHATE SERPL-MCNC: 2.4 MG/DL (ref 2.5–4.5)
PHOSPHATE SERPL-MCNC: 2.5 MG/DL (ref 2.5–4.5)
PHOSPHATE SERPL-MCNC: 2.5 MG/DL (ref 2.5–4.5)
PHOSPHATE SERPL-MCNC: 2.8 MG/DL (ref 2.5–4.5)
PHOSPHATE SERPL-MCNC: 3 MG/DL (ref 2.5–4.5)
PHOSPHATE SERPL-MCNC: 3 MG/DL (ref 2.5–4.5)
PHOSPHATE SERPL-MCNC: 3.1 MG/DL (ref 2.5–4.5)
PHOSPHATE SERPL-MCNC: 3.3 MG/DL (ref 2.5–4.5)
PHOSPHATE SERPL-MCNC: 3.4 MG/DL (ref 2.5–4.5)
PHOSPHATE SERPL-MCNC: 3.4 MG/DL (ref 2.5–4.5)
PHOSPHATE SERPL-MCNC: 3.8 MG/DL (ref 2.5–4.5)
PHOSPHATE SERPL-MCNC: 3.9 MG/DL (ref 2.5–4.5)
PHOSPHATE SERPL-MCNC: 4 MG/DL (ref 2.5–4.5)
PHOSPHATE SERPL-MCNC: 4 MG/DL (ref 2.5–4.5)
PHOSPHATE SERPL-MCNC: 4.3 MG/DL (ref 2.5–4.5)
PHOSPHATE SERPL-MCNC: 4.6 MG/DL (ref 2.5–4.5)
PHOSPHATE SERPL-MCNC: 5 MG/DL (ref 2.5–4.5)
PHOSPHATE SERPL-MCNC: 5.2 MG/DL (ref 2.5–4.5)
PHOSPHATE SERPL-MCNC: 5.4 MG/DL (ref 2.5–4.5)
PHOSPHATE SERPL-MCNC: 6.3 MG/DL (ref 2.5–4.5)
PHOSPHATE SERPL-MCNC: NORMAL MG/DL (ref 2.5–4.5)
PLATELET # BLD AUTO: 133 10E9/L (ref 150–450)
PLATELET # BLD AUTO: 142 10E9/L (ref 150–450)
PLATELET # BLD AUTO: 153 10E9/L (ref 150–450)
PLATELET # BLD AUTO: 160 10E9/L (ref 150–450)
PLATELET # BLD AUTO: 160 10E9/L (ref 150–450)
PLATELET # BLD AUTO: 166 10E9/L (ref 150–450)
PLATELET # BLD AUTO: 170 10E9/L (ref 150–450)
PLATELET # BLD AUTO: 170 10E9/L (ref 150–450)
PLATELET # BLD AUTO: 184 10E9/L (ref 150–450)
PLATELET # BLD AUTO: 195 10E9/L (ref 150–450)
PLATELET # BLD AUTO: 199 10E9/L (ref 150–450)
PLATELET # BLD AUTO: 212 10E9/L (ref 150–450)
PLATELET # BLD AUTO: 228 10E9/L (ref 150–450)
PLATELET # BLD AUTO: 233 10E9/L (ref 150–450)
PLATELET # BLD AUTO: 237 10E9/L (ref 150–450)
PLATELET # BLD AUTO: 242 10E9/L (ref 150–450)
PLATELET # BLD AUTO: 242 10E9/L (ref 150–450)
PLATELET # BLD AUTO: 244 10E9/L (ref 150–450)
PLATELET # BLD AUTO: 246 10E9/L (ref 150–450)
PLATELET # BLD AUTO: 250 10E9/L (ref 150–450)
PLATELET # BLD AUTO: 250 10E9/L (ref 150–450)
PLATELET # BLD AUTO: 254 10E9/L (ref 150–450)
PLATELET # BLD AUTO: 255 10E9/L (ref 150–450)
PLATELET # BLD AUTO: 255 10E9/L (ref 150–450)
PLATELET # BLD AUTO: 258 10E9/L (ref 150–450)
PLATELET # BLD AUTO: 263 10E9/L (ref 150–450)
PLATELET # BLD AUTO: 265 10E9/L (ref 150–450)
PLATELET # BLD AUTO: 270 10E9/L (ref 150–450)
PLATELET # BLD AUTO: 272 10E9/L (ref 150–450)
PLATELET # BLD AUTO: 278 10E9/L (ref 150–450)
PLATELET # BLD AUTO: 280 10E9/L (ref 150–450)
PLATELET # BLD AUTO: 282 10E9/L (ref 150–450)
PLATELET # BLD AUTO: 286 10E9/L (ref 150–450)
PLATELET # BLD AUTO: 287 10E9/L (ref 150–450)
PLATELET # BLD AUTO: 292 10E9/L (ref 150–450)
PLATELET # BLD AUTO: 292 10E9/L (ref 150–450)
PLATELET # BLD AUTO: 296 10E9/L (ref 150–450)
PLATELET # BLD AUTO: 302 10E9/L (ref 150–450)
PLATELET # BLD AUTO: 302 10E9/L (ref 150–450)
PLATELET # BLD AUTO: 306 10E9/L (ref 150–450)
PLATELET # BLD AUTO: 317 10E9/L (ref 150–450)
PLATELET # BLD AUTO: 331 10E9/L (ref 150–450)
PLATELET # BLD AUTO: 333 10E9/L (ref 150–450)
PLATELET # BLD AUTO: 345 10E9/L (ref 150–450)
PLATELET # BLD AUTO: 352 10E9/L (ref 150–450)
PLATELET # BLD AUTO: 361 10E9/L (ref 150–450)
PLATELET # BLD AUTO: 364 10E9/L (ref 150–450)
PLATELET # BLD AUTO: 365 10E9/L (ref 150–450)
PLATELET # BLD AUTO: 369 10E9/L (ref 150–450)
PLATELET # BLD AUTO: 372 10E9/L (ref 150–450)
PLATELET # BLD AUTO: 380 10E9/L (ref 150–450)
PLATELET # BLD AUTO: 380 10E9/L (ref 150–450)
PLATELET # BLD AUTO: 382 10E9/L (ref 150–450)
PLATELET # BLD AUTO: 386 10E9/L (ref 150–450)
PLATELET # BLD AUTO: 398 10E9/L (ref 150–450)
PLATELET # BLD AUTO: 407 10E9/L (ref 150–450)
PLATELET # BLD AUTO: 415 10E9/L (ref 150–450)
PLATELET # BLD AUTO: 423 10E9/L (ref 150–450)
PLATELET # BLD AUTO: 425 10E9/L (ref 150–450)
PLATELET # BLD AUTO: 444 10E9/L (ref 150–450)
PLATELET # BLD AUTO: 449 10E9/L (ref 150–450)
PLATELET # BLD AUTO: 480 10E9/L (ref 150–450)
PLATELET # BLD AUTO: 533 10E9/L (ref 150–450)
PLATELET # BLD EST: ABNORMAL 10*3/UL
PO2 BLDV: 31 MM HG (ref 25–47)
PO2 BLDV: 47 MM HG (ref 25–47)
PO2 BLDV: 48 MM HG (ref 25–47)
POTASSIUM BLD-SCNC: 4.4 MMOL/L (ref 3.4–5.3)
POTASSIUM SERPL-SCNC: 3.3 MMOL/L (ref 3.4–5.3)
POTASSIUM SERPL-SCNC: 3.4 MMOL/L (ref 3.4–5.3)
POTASSIUM SERPL-SCNC: 3.5 MMOL/L (ref 3.4–5.3)
POTASSIUM SERPL-SCNC: 3.6 MMOL/L (ref 3.4–5.3)
POTASSIUM SERPL-SCNC: 3.7 MMOL/L (ref 3.4–5.3)
POTASSIUM SERPL-SCNC: 3.8 MMOL/L (ref 3.4–5.3)
POTASSIUM SERPL-SCNC: 3.9 MMOL/L (ref 3.4–5.3)
POTASSIUM SERPL-SCNC: 4 MMOL/L (ref 3.4–5.3)
POTASSIUM SERPL-SCNC: 4.1 MMOL/L (ref 3.4–5.3)
POTASSIUM SERPL-SCNC: 4.2 MMOL/L (ref 3.4–5.3)
POTASSIUM SERPL-SCNC: 4.3 MMOL/L (ref 3.4–5.3)
POTASSIUM SERPL-SCNC: 4.4 MMOL/L (ref 3.4–5.3)
POTASSIUM SERPL-SCNC: 4.5 MMOL/L (ref 3.4–5.3)
POTASSIUM SERPL-SCNC: 4.6 MMOL/L (ref 3.4–5.3)
POTASSIUM SERPL-SCNC: 4.7 MMOL/L (ref 3.4–5.3)
POTASSIUM SERPL-SCNC: 4.9 MMOL/L (ref 3.4–5.3)
POTASSIUM SERPL-SCNC: 5 MMOL/L (ref 3.4–5.3)
POTASSIUM SERPL-SCNC: 5.1 MMOL/L (ref 3.4–5.3)
POTASSIUM SERPL-SCNC: 5.1 MMOL/L (ref 3.4–5.3)
POTASSIUM SERPL-SCNC: 5.2 MMOL/L (ref 3.4–5.3)
POTASSIUM SERPL-SCNC: 5.3 MMOL/L (ref 3.4–5.3)
POTASSIUM SERPL-SCNC: 5.6 MMOL/L (ref 3.4–5.3)
POTASSIUM SERPL-SCNC: 5.6 MMOL/L (ref 3.4–5.3)
POTASSIUM SERPL-SCNC: 5.7 MMOL/L (ref 3.4–5.3)
POTASSIUM SERPL-SCNC: 5.8 MMOL/L (ref 3.4–5.3)
POTASSIUM UR-SCNC: 9 MMOL/L
PREALB SERPL IA-MCNC: 8 MG/DL (ref 15–45)
PROCALCITONIN SERPL-MCNC: 0.23 NG/ML
PROCALCITONIN SERPL-MCNC: 0.26 NG/ML
PROCALCITONIN SERPL-MCNC: 0.31 NG/ML
PROCALCITONIN SERPL-MCNC: 0.35 NG/ML
PROCALCITONIN SERPL-MCNC: 0.39 NG/ML
PROCALCITONIN SERPL-MCNC: 0.47 NG/ML
PROCALCITONIN SERPL-MCNC: 0.77 NG/ML
PROCALCITONIN SERPL-MCNC: 0.87 NG/ML
PROCALCITONIN SERPL-MCNC: 1.04 NG/ML
PROCALCITONIN SERPL-MCNC: 1.67 NG/ML
PROCALCITONIN SERPL-MCNC: 1.93 NG/ML
PROCALCITONIN SERPL-MCNC: 2.69 NG/ML
PROT SERPL-MCNC: 5.5 G/DL (ref 6.8–8.8)
PROT SERPL-MCNC: 5.9 G/DL (ref 6.8–8.8)
PROT SERPL-MCNC: 6.1 G/DL (ref 6.8–8.8)
PROT SERPL-MCNC: 6.4 G/DL (ref 6.8–8.8)
PROT SERPL-MCNC: 6.7 G/DL (ref 6.8–8.8)
PROT SERPL-MCNC: 6.7 G/DL (ref 6.8–8.8)
PROT SERPL-MCNC: 6.8 G/DL (ref 6.8–8.8)
PROT SERPL-MCNC: 6.8 G/DL (ref 6.8–8.8)
PROT SERPL-MCNC: 6.9 G/DL (ref 6.8–8.8)
PROT SERPL-MCNC: 6.9 G/DL (ref 6.8–8.8)
PROT SERPL-MCNC: 7 G/DL (ref 6.8–8.8)
PROT SERPL-MCNC: 7.2 G/DL (ref 6.8–8.8)
PROT UR-MCNC: 0.11 G/L
PROT UR-MCNC: 16.34 G/L
PROT/CREAT 24H UR: 0.47 G/G CR (ref 0–0.2)
PROT/CREAT 24H UR: 24.91 G/G CR (ref 0–0.2)
PTH-INTACT SERPL-MCNC: 78 PG/ML (ref 18–80)
RBC # BLD AUTO: 2.23 10E12/L (ref 4.4–5.9)
RBC # BLD AUTO: 2.29 10E12/L (ref 4.4–5.9)
RBC # BLD AUTO: 2.32 10E12/L (ref 4.4–5.9)
RBC # BLD AUTO: 2.41 10E12/L (ref 4.4–5.9)
RBC # BLD AUTO: 2.52 10E12/L (ref 4.4–5.9)
RBC # BLD AUTO: 2.58 10E12/L (ref 4.4–5.9)
RBC # BLD AUTO: 2.58 10E12/L (ref 4.4–5.9)
RBC # BLD AUTO: 2.59 10E12/L (ref 4.4–5.9)
RBC # BLD AUTO: 2.6 10E12/L (ref 4.4–5.9)
RBC # BLD AUTO: 2.64 10E12/L (ref 4.4–5.9)
RBC # BLD AUTO: 2.65 10E12/L (ref 4.4–5.9)
RBC # BLD AUTO: 2.66 10E12/L (ref 4.4–5.9)
RBC # BLD AUTO: 2.68 10E12/L (ref 4.4–5.9)
RBC # BLD AUTO: 2.69 10E12/L (ref 4.4–5.9)
RBC # BLD AUTO: 2.7 10E12/L (ref 4.4–5.9)
RBC # BLD AUTO: 2.71 10E12/L (ref 4.4–5.9)
RBC # BLD AUTO: 2.72 10E12/L (ref 4.4–5.9)
RBC # BLD AUTO: 2.73 10E12/L (ref 4.4–5.9)
RBC # BLD AUTO: 2.73 10E12/L (ref 4.4–5.9)
RBC # BLD AUTO: 2.74 10E12/L (ref 4.4–5.9)
RBC # BLD AUTO: 2.75 10E12/L (ref 4.4–5.9)
RBC # BLD AUTO: 2.76 10E12/L (ref 4.4–5.9)
RBC # BLD AUTO: 2.77 10E12/L (ref 4.4–5.9)
RBC # BLD AUTO: 2.77 10E12/L (ref 4.4–5.9)
RBC # BLD AUTO: 2.78 10E12/L (ref 4.4–5.9)
RBC # BLD AUTO: 2.79 10E12/L (ref 4.4–5.9)
RBC # BLD AUTO: 2.84 10E12/L (ref 4.4–5.9)
RBC # BLD AUTO: 2.86 10E12/L (ref 4.4–5.9)
RBC # BLD AUTO: 2.87 10E12/L (ref 4.4–5.9)
RBC # BLD AUTO: 2.88 10E12/L (ref 4.4–5.9)
RBC # BLD AUTO: 2.88 10E12/L (ref 4.4–5.9)
RBC # BLD AUTO: 2.89 10E12/L (ref 4.4–5.9)
RBC # BLD AUTO: 2.9 10E12/L (ref 4.4–5.9)
RBC # BLD AUTO: 2.9 10E12/L (ref 4.4–5.9)
RBC # BLD AUTO: 2.91 10E12/L (ref 4.4–5.9)
RBC # BLD AUTO: 2.92 10E12/L (ref 4.4–5.9)
RBC # BLD AUTO: 2.92 10E12/L (ref 4.4–5.9)
RBC # BLD AUTO: 2.95 10E12/L (ref 4.4–5.9)
RBC # BLD AUTO: 2.97 10E12/L (ref 4.4–5.9)
RBC # BLD AUTO: 2.97 10E12/L (ref 4.4–5.9)
RBC # BLD AUTO: 2.99 10E12/L (ref 4.4–5.9)
RBC # BLD AUTO: 3.02 10E12/L (ref 4.4–5.9)
RBC # BLD AUTO: 3.04 10E12/L (ref 4.4–5.9)
RBC # BLD AUTO: 3.09 10E12/L (ref 4.4–5.9)
RBC # BLD AUTO: 3.11 10E12/L (ref 4.4–5.9)
RBC # BLD AUTO: 3.15 10E12/L (ref 4.4–5.9)
RBC # BLD AUTO: 3.16 10E12/L (ref 4.4–5.9)
RBC # BLD AUTO: 3.21 10E12/L (ref 4.4–5.9)
RBC # BLD AUTO: 3.25 10E12/L (ref 4.4–5.9)
RBC # BLD AUTO: 3.29 10E12/L (ref 4.4–5.9)
RBC # BLD AUTO: 3.35 10E12/L (ref 4.4–5.9)
RBC # BLD AUTO: 3.4 10E12/L (ref 4.4–5.9)
RBC #/AREA URNS AUTO: 1 /HPF (ref 0–2)
RBC #/AREA URNS AUTO: 118 /HPF (ref 0–2)
RBC #/AREA URNS AUTO: 182 /HPF (ref 0–2)
RBC #/AREA URNS AUTO: 19 /HPF (ref 0–2)
RBC #/AREA URNS AUTO: 2 /HPF (ref 0–2)
RBC #/AREA URNS AUTO: 5 /HPF (ref 0–2)
RBC #/AREA URNS AUTO: 7 /HPF (ref 0–2)
RBC #/AREA URNS AUTO: 9 /HPF (ref 0–2)
RBC #/AREA URNS AUTO: >182 /HPF (ref 0–2)
RETICS # AUTO: 26.2 10E9/L (ref 25–95)
RETICS/RBC NFR AUTO: 0.9 % (ref 0.5–2)
RHINOVIRUS RNA SPEC QL NAA+PROBE: NEGATIVE
RSV RNA SPEC NAA+PROBE: NEGATIVE
RSV RNA SPEC QL NAA+PROBE: NEGATIVE
RSV RNA SPEC QL NAA+PROBE: NEGATIVE
RVA NSP5 STL QL NAA+PROBE: NOT DETECTED
S PNEUM AG SPEC QL: NORMAL
SALMONELLA SP RPOD STL QL NAA+PROBE: NOT DETECTED
SAO2 % BLDV FROM PO2: 85 %
SAO2 % BLDV FROM PO2: 86 %
SHIGELLA SP+EIEC IPAH STL QL NAA+PROBE: NOT DETECTED
SODIUM BLD-SCNC: 138 MMOL/L (ref 133–144)
SODIUM SERPL-SCNC: 121 MMOL/L (ref 133–144)
SODIUM SERPL-SCNC: 122 MMOL/L (ref 133–144)
SODIUM SERPL-SCNC: 123 MMOL/L (ref 133–144)
SODIUM SERPL-SCNC: 125 MMOL/L (ref 133–144)
SODIUM SERPL-SCNC: 128 MMOL/L (ref 133–144)
SODIUM SERPL-SCNC: 128 MMOL/L (ref 133–144)
SODIUM SERPL-SCNC: 129 MMOL/L (ref 133–144)
SODIUM SERPL-SCNC: 130 MMOL/L (ref 133–144)
SODIUM SERPL-SCNC: 132 MMOL/L (ref 133–144)
SODIUM SERPL-SCNC: 132 MMOL/L (ref 133–144)
SODIUM SERPL-SCNC: 133 MMOL/L (ref 133–144)
SODIUM SERPL-SCNC: 134 MMOL/L (ref 133–144)
SODIUM SERPL-SCNC: 135 MMOL/L (ref 133–144)
SODIUM SERPL-SCNC: 136 MMOL/L (ref 133–144)
SODIUM SERPL-SCNC: 137 MMOL/L (ref 133–144)
SODIUM SERPL-SCNC: 138 MMOL/L (ref 133–144)
SODIUM SERPL-SCNC: 139 MMOL/L (ref 133–144)
SODIUM SERPL-SCNC: 140 MMOL/L (ref 133–144)
SODIUM SERPL-SCNC: 141 MMOL/L (ref 133–144)
SODIUM SERPL-SCNC: 142 MMOL/L (ref 133–144)
SODIUM SERPL-SCNC: 143 MMOL/L (ref 133–144)
SODIUM SERPL-SCNC: 144 MMOL/L (ref 133–144)
SODIUM SERPL-SCNC: 145 MMOL/L (ref 133–144)
SODIUM SERPL-SCNC: 146 MMOL/L (ref 133–144)
SODIUM UR-SCNC: 31 MMOL/L
SODIUM UR-SCNC: 54 MMOL/L
SODIUM UR-SCNC: 70 MMOL/L
SOURCE: ABNORMAL
SP GR UR STRIP: 1.01 (ref 1–1.03)
SP GR UR STRIP: 1.02 (ref 1–1.03)
SP GR UR STRIP: 1.02 (ref 1–1.03)
SP GR UR STRIP: 1.03 (ref 1–1.03)
SPECIMEN EXP DATE BLD: NORMAL
SPECIMEN SOURCE: ABNORMAL
SPECIMEN SOURCE: NORMAL
SQUAMOUS #/AREA URNS AUTO: 1 /HPF (ref 0–1)
SQUAMOUS #/AREA URNS AUTO: 1 /HPF (ref 0–1)
SQUAMOUS #/AREA URNS AUTO: 2 /HPF (ref 0–1)
SQUAMOUS #/AREA URNS AUTO: <1 /HPF (ref 0–1)
T4 FREE SERPL-MCNC: 1.39 NG/DL (ref 0.76–1.46)
TACROLIMUS BLD-MCNC: 3.3 UG/L (ref 5–15)
TACROLIMUS BLD-MCNC: 3.7 UG/L (ref 5–15)
TACROLIMUS BLD-MCNC: 3.9 UG/L (ref 5–15)
TACROLIMUS BLD-MCNC: 4 UG/L (ref 5–15)
TACROLIMUS BLD-MCNC: 4.9 UG/L (ref 5–15)
TACROLIMUS BLD-MCNC: 5.3 UG/L (ref 5–15)
TACROLIMUS BLD-MCNC: 5.6 UG/L (ref 5–15)
TACROLIMUS BLD-MCNC: 5.7 UG/L (ref 5–15)
TACROLIMUS BLD-MCNC: 5.7 UG/L (ref 5–15)
TACROLIMUS BLD-MCNC: 5.9 UG/L (ref 5–15)
TACROLIMUS BLD-MCNC: 5.9 UG/L (ref 5–15)
TACROLIMUS BLD-MCNC: 6.8 UG/L (ref 5–15)
TACROLIMUS BLD-MCNC: 7.1 UG/L (ref 5–15)
TACROLIMUS BLD-MCNC: 8.1 UG/L (ref 5–15)
TACROLIMUS BLD-MCNC: 9 UG/L (ref 5–15)
TACROLIMUS BLD-MCNC: 9.9 UG/L (ref 5–15)
TACROLIMUS BLD-MCNC: <3 UG/L (ref 5–15)
TIBC SERPL-MCNC: 271 UG/DL (ref 240–430)
TME LAST DOSE: ABNORMAL H
TME LAST DOSE: NORMAL H
TRANS CELLS #/AREA URNS HPF: <1 /HPF (ref 0–1)
TRANSFUSION STATUS PATIENT QL: NORMAL
TRANSFUSION STATUS PATIENT QL: NORMAL
TROPONIN I BLD-MCNC: 0.01 UG/L (ref 0–0.1)
TROPONIN I SERPL-MCNC: 0.02 UG/L (ref 0–0.04)
TROPONIN I SERPL-MCNC: <0.015 UG/L (ref 0–0.04)
TSH SERPL DL<=0.005 MIU/L-ACNC: 1.46 MU/L (ref 0.4–4)
TSH SERPL DL<=0.005 MIU/L-ACNC: 2.56 MU/L (ref 0.4–4)
TSH SERPL DL<=0.005 MIU/L-ACNC: 5.44 MU/L (ref 0.4–4)
UROBILINOGEN UR STRIP-MCNC: 0.2 MG/DL (ref 0–2)
UROBILINOGEN UR STRIP-MCNC: NORMAL MG/DL (ref 0–2)
UUN UR-MCNC: 640 MG/DL
UUN/CREAT 24H UR: 4 G/G CR
V CHOL+PARA RFBL+TRKH+TNAA STL QL NAA+PR: NOT DETECTED
VANCOMYCIN SERPL-MCNC: 11.2 MG/L
VANCOMYCIN SERPL-MCNC: 17.6 MG/L
VANCOMYCIN SERPL-MCNC: 17.8 MG/L
VANCOMYCIN SERPL-MCNC: 20 MG/L
VITAMIN D2 SERPL-MCNC: <5 UG/L
VITAMIN D3 SERPL-MCNC: 25 UG/L
WBC # BLD AUTO: 10.4 10E9/L (ref 4–11)
WBC # BLD AUTO: 12 10E9/L (ref 4–11)
WBC # BLD AUTO: 12.8 10E9/L (ref 4–11)
WBC # BLD AUTO: 15.2 10E9/L (ref 4–11)
WBC # BLD AUTO: 15.3 10E9/L (ref 4–11)
WBC # BLD AUTO: 3.4 10E9/L (ref 4–11)
WBC # BLD AUTO: 3.7 10E9/L (ref 4–11)
WBC # BLD AUTO: 4 10E9/L (ref 4–11)
WBC # BLD AUTO: 4.4 10E9/L (ref 4–11)
WBC # BLD AUTO: 4.4 10E9/L (ref 4–11)
WBC # BLD AUTO: 4.5 10E9/L (ref 4–11)
WBC # BLD AUTO: 4.6 10E9/L (ref 4–11)
WBC # BLD AUTO: 4.7 10E9/L (ref 4–11)
WBC # BLD AUTO: 4.8 10E9/L (ref 4–11)
WBC # BLD AUTO: 4.9 10E9/L (ref 4–11)
WBC # BLD AUTO: 5 10E9/L (ref 4–11)
WBC # BLD AUTO: 5 10E9/L (ref 4–11)
WBC # BLD AUTO: 5.1 10E9/L (ref 4–11)
WBC # BLD AUTO: 5.2 10E9/L (ref 4–11)
WBC # BLD AUTO: 5.3 10E9/L (ref 4–11)
WBC # BLD AUTO: 5.4 10E9/L (ref 4–11)
WBC # BLD AUTO: 5.5 10E9/L (ref 4–11)
WBC # BLD AUTO: 5.6 10E9/L (ref 4–11)
WBC # BLD AUTO: 5.7 10E9/L (ref 4–11)
WBC # BLD AUTO: 5.7 10E9/L (ref 4–11)
WBC # BLD AUTO: 5.8 10E9/L (ref 4–11)
WBC # BLD AUTO: 5.9 10E9/L (ref 4–11)
WBC # BLD AUTO: 5.9 10E9/L (ref 4–11)
WBC # BLD AUTO: 6.3 10E9/L (ref 4–11)
WBC # BLD AUTO: 6.4 10E9/L (ref 4–11)
WBC # BLD AUTO: 6.8 10E9/L (ref 4–11)
WBC # BLD AUTO: 6.9 10E9/L (ref 4–11)
WBC # BLD AUTO: 7.3 10E9/L (ref 4–11)
WBC # BLD AUTO: 7.4 10E9/L (ref 4–11)
WBC # BLD AUTO: 7.4 10E9/L (ref 4–11)
WBC # BLD AUTO: 7.6 10E9/L (ref 4–11)
WBC # BLD AUTO: 8 10E9/L (ref 4–11)
WBC # BLD AUTO: 8.3 10E9/L (ref 4–11)
WBC # BLD AUTO: 8.4 10E9/L (ref 4–11)
WBC # BLD AUTO: 8.4 10E9/L (ref 4–11)
WBC # BLD AUTO: 8.5 10E9/L (ref 4–11)
WBC # BLD AUTO: 8.5 10E9/L (ref 4–11)
WBC # BLD AUTO: 9 10E9/L (ref 4–11)
WBC # BLD AUTO: 9.1 10E9/L (ref 4–11)
WBC # BLD AUTO: 9.2 10E9/L (ref 4–11)
WBC # BLD AUTO: 9.4 10E9/L (ref 4–11)
WBC # BLD AUTO: 9.6 10E9/L (ref 4–11)
WBC # BLD AUTO: 9.7 10E9/L (ref 4–11)
WBC #/AREA URNS AUTO: 159 /HPF (ref 0–5)
WBC #/AREA URNS AUTO: 2 /HPF (ref 0–5)
WBC #/AREA URNS AUTO: 2 /HPF (ref 0–5)
WBC #/AREA URNS AUTO: 34 /HPF (ref 0–5)
WBC #/AREA URNS AUTO: 38 /HPF (ref 0–5)
WBC #/AREA URNS AUTO: 49 /HPF (ref 0–5)
WBC #/AREA URNS AUTO: 7 /HPF (ref 0–5)
WBC #/AREA URNS AUTO: >182 /HPF (ref 0–5)
WBC #/AREA URNS AUTO: >182 /HPF (ref 0–5)
WBC CLUMPS #/AREA URNS HPF: PRESENT /HPF
Y ENTERO RECN STL QL NAA+PROBE: NOT DETECTED
YEAST #/AREA URNS HPF: ABNORMAL /HPF

## 2018-01-01 PROCEDURE — 96365 THER/PROPH/DIAG IV INF INIT: CPT | Performed by: EMERGENCY MEDICINE

## 2018-01-01 PROCEDURE — 40000275 ZZH STATISTIC RCP TIME EA 10 MIN

## 2018-01-01 PROCEDURE — 99233 SBSQ HOSP IP/OBS HIGH 50: CPT | Performed by: HOSPITALIST

## 2018-01-01 PROCEDURE — 83735 ASSAY OF MAGNESIUM: CPT | Performed by: STUDENT IN AN ORGANIZED HEALTH CARE EDUCATION/TRAINING PROGRAM

## 2018-01-01 PROCEDURE — 40000225 ZZH STATISTIC SLP WARD VISIT

## 2018-01-01 PROCEDURE — 87186 SC STD MICRODIL/AGAR DIL: CPT | Performed by: EMERGENCY MEDICINE

## 2018-01-01 PROCEDURE — 25000132 ZZH RX MED GY IP 250 OP 250 PS 637: Performed by: INTERNAL MEDICINE

## 2018-01-01 PROCEDURE — 87040 BLOOD CULTURE FOR BACTERIA: CPT | Performed by: PHYSICIAN ASSISTANT

## 2018-01-01 PROCEDURE — 25000131 ZZH RX MED GY IP 250 OP 636 PS 637: Performed by: STUDENT IN AN ORGANIZED HEALTH CARE EDUCATION/TRAINING PROGRAM

## 2018-01-01 PROCEDURE — 92526 ORAL FUNCTION THERAPY: CPT | Mod: GN

## 2018-01-01 PROCEDURE — 25000125 ZZHC RX 250: Performed by: INTERNAL MEDICINE

## 2018-01-01 PROCEDURE — 25000128 H RX IP 250 OP 636: Performed by: STUDENT IN AN ORGANIZED HEALTH CARE EDUCATION/TRAINING PROGRAM

## 2018-01-01 PROCEDURE — 25000125 ZZHC RX 250: Performed by: PEDIATRICS

## 2018-01-01 PROCEDURE — 00000146 ZZHCL STATISTIC GLUCOSE BY METER IP

## 2018-01-01 PROCEDURE — 85610 PROTHROMBIN TIME: CPT | Performed by: INTERNAL MEDICINE

## 2018-01-01 PROCEDURE — 25000132 ZZH RX MED GY IP 250 OP 250 PS 637: Performed by: HOSPITALIST

## 2018-01-01 PROCEDURE — C9113 INJ PANTOPRAZOLE SODIUM, VIA: HCPCS | Performed by: PHYSICIAN ASSISTANT

## 2018-01-01 PROCEDURE — 25000132 ZZH RX MED GY IP 250 OP 250 PS 637: Performed by: STUDENT IN AN ORGANIZED HEALTH CARE EDUCATION/TRAINING PROGRAM

## 2018-01-01 PROCEDURE — 12000001 ZZH R&B MED SURG/OB UMMC

## 2018-01-01 PROCEDURE — 36415 COLL VENOUS BLD VENIPUNCTURE: CPT | Performed by: INTERNAL MEDICINE

## 2018-01-01 PROCEDURE — 25000128 H RX IP 250 OP 636: Performed by: INTERNAL MEDICINE

## 2018-01-01 PROCEDURE — 94640 AIRWAY INHALATION TREATMENT: CPT

## 2018-01-01 PROCEDURE — 85520 HEPARIN ASSAY: CPT | Performed by: INTERNAL MEDICINE

## 2018-01-01 PROCEDURE — 25000128 H RX IP 250 OP 636: Performed by: HOSPITALIST

## 2018-01-01 PROCEDURE — 84443 ASSAY THYROID STIM HORMONE: CPT | Performed by: EMERGENCY MEDICINE

## 2018-01-01 PROCEDURE — 99233 SBSQ HOSP IP/OBS HIGH 50: CPT | Performed by: PEDIATRICS

## 2018-01-01 PROCEDURE — 25000125 ZZHC RX 250: Performed by: PHYSICIAN ASSISTANT

## 2018-01-01 PROCEDURE — 83735 ASSAY OF MAGNESIUM: CPT | Performed by: INTERNAL MEDICINE

## 2018-01-01 PROCEDURE — 84145 PROCALCITONIN (PCT): CPT | Performed by: STUDENT IN AN ORGANIZED HEALTH CARE EDUCATION/TRAINING PROGRAM

## 2018-01-01 PROCEDURE — 87088 URINE BACTERIA CULTURE: CPT | Performed by: EMERGENCY MEDICINE

## 2018-01-01 PROCEDURE — 99207 ZZC APP CREDIT; MD BILLING SHARED VISIT: CPT | Performed by: NURSE PRACTITIONER

## 2018-01-01 PROCEDURE — 25000131 ZZH RX MED GY IP 250 OP 636 PS 637: Performed by: PHYSICIAN ASSISTANT

## 2018-01-01 PROCEDURE — 25000128 H RX IP 250 OP 636: Performed by: EMERGENCY MEDICINE

## 2018-01-01 PROCEDURE — 40000141 ZZH STATISTIC PERIPHERAL IV START W/O US GUIDANCE

## 2018-01-01 PROCEDURE — 87493 C DIFF AMPLIFIED PROBE: CPT | Performed by: HOSPITALIST

## 2018-01-01 PROCEDURE — 85027 COMPLETE CBC AUTOMATED: CPT | Performed by: NURSE PRACTITIONER

## 2018-01-01 PROCEDURE — 92526 ORAL FUNCTION THERAPY: CPT | Mod: GN | Performed by: SPEECH-LANGUAGE PATHOLOGIST

## 2018-01-01 PROCEDURE — 25000131 ZZH RX MED GY IP 250 OP 636 PS 637: Performed by: INTERNAL MEDICINE

## 2018-01-01 PROCEDURE — 99232 SBSQ HOSP IP/OBS MODERATE 35: CPT | Performed by: INTERNAL MEDICINE

## 2018-01-01 PROCEDURE — 71046 X-RAY EXAM CHEST 2 VIEWS: CPT

## 2018-01-01 PROCEDURE — T1013 SIGN LANG/ORAL INTERPRETER: HCPCS | Mod: U3

## 2018-01-01 PROCEDURE — 25000128 H RX IP 250 OP 636: Performed by: NURSE PRACTITIONER

## 2018-01-01 PROCEDURE — 36415 COLL VENOUS BLD VENIPUNCTURE: CPT | Performed by: PEDIATRICS

## 2018-01-01 PROCEDURE — 93005 ELECTROCARDIOGRAM TRACING: CPT | Performed by: EMERGENCY MEDICINE

## 2018-01-01 PROCEDURE — C9254 INJECTION, LACOSAMIDE: HCPCS | Performed by: INTERNAL MEDICINE

## 2018-01-01 PROCEDURE — 85025 COMPLETE CBC W/AUTO DIFF WBC: CPT | Performed by: INTERNAL MEDICINE

## 2018-01-01 PROCEDURE — 99207 ZZC DOWN CODE DUE TO SUBSEQUENT EXAM: CPT | Performed by: INTERNAL MEDICINE

## 2018-01-01 PROCEDURE — 93975 VASCULAR STUDY: CPT | Mod: TC

## 2018-01-01 PROCEDURE — 85520 HEPARIN ASSAY: CPT | Performed by: PEDIATRICS

## 2018-01-01 PROCEDURE — 85027 COMPLETE CBC AUTOMATED: CPT | Performed by: INTERNAL MEDICINE

## 2018-01-01 PROCEDURE — 83605 ASSAY OF LACTIC ACID: CPT | Performed by: PHYSICIAN ASSISTANT

## 2018-01-01 PROCEDURE — 87086 URINE CULTURE/COLONY COUNT: CPT | Performed by: EMERGENCY MEDICINE

## 2018-01-01 PROCEDURE — 81001 URINALYSIS AUTO W/SCOPE: CPT | Performed by: HOSPITALIST

## 2018-01-01 PROCEDURE — 25000132 ZZH RX MED GY IP 250 OP 250 PS 637: Performed by: PEDIATRICS

## 2018-01-01 PROCEDURE — 36415 COLL VENOUS BLD VENIPUNCTURE: CPT | Performed by: PHYSICIAN ASSISTANT

## 2018-01-01 PROCEDURE — 94640 AIRWAY INHALATION TREATMENT: CPT | Mod: 76

## 2018-01-01 PROCEDURE — 99233 SBSQ HOSP IP/OBS HIGH 50: CPT | Mod: GC | Performed by: INTERNAL MEDICINE

## 2018-01-01 PROCEDURE — 85018 HEMOGLOBIN: CPT | Performed by: INTERNAL MEDICINE

## 2018-01-01 PROCEDURE — 96367 TX/PROPH/DG ADDL SEQ IV INF: CPT | Performed by: EMERGENCY MEDICINE

## 2018-01-01 PROCEDURE — 93010 ELECTROCARDIOGRAM REPORT: CPT | Mod: Z6 | Performed by: EMERGENCY MEDICINE

## 2018-01-01 PROCEDURE — 84300 ASSAY OF URINE SODIUM: CPT | Performed by: INTERNAL MEDICINE

## 2018-01-01 PROCEDURE — 76776 US EXAM K TRANSPL W/DOPPLER: CPT

## 2018-01-01 PROCEDURE — 80048 BASIC METABOLIC PNL TOTAL CA: CPT | Performed by: PHYSICIAN ASSISTANT

## 2018-01-01 PROCEDURE — 87340 HEPATITIS B SURFACE AG IA: CPT | Performed by: INTERNAL MEDICINE

## 2018-01-01 PROCEDURE — 84100 ASSAY OF PHOSPHORUS: CPT | Performed by: INTERNAL MEDICINE

## 2018-01-01 PROCEDURE — 80197 ASSAY OF TACROLIMUS: CPT | Performed by: INTERNAL MEDICINE

## 2018-01-01 PROCEDURE — 99233 SBSQ HOSP IP/OBS HIGH 50: CPT | Mod: GC | Performed by: STUDENT IN AN ORGANIZED HEALTH CARE EDUCATION/TRAINING PROGRAM

## 2018-01-01 PROCEDURE — 96375 TX/PRO/DX INJ NEW DRUG ADDON: CPT | Performed by: EMERGENCY MEDICINE

## 2018-01-01 PROCEDURE — 87493 C DIFF AMPLIFIED PROBE: CPT | Performed by: STUDENT IN AN ORGANIZED HEALTH CARE EDUCATION/TRAINING PROGRAM

## 2018-01-01 PROCEDURE — 40000556 ZZH STATISTIC PERIPHERAL IV START W US GUIDANCE

## 2018-01-01 PROCEDURE — 36415 COLL VENOUS BLD VENIPUNCTURE: CPT | Performed by: STUDENT IN AN ORGANIZED HEALTH CARE EDUCATION/TRAINING PROGRAM

## 2018-01-01 PROCEDURE — 87506 IADNA-DNA/RNA PROBE TQ 6-11: CPT | Performed by: EMERGENCY MEDICINE

## 2018-01-01 PROCEDURE — 71045 X-RAY EXAM CHEST 1 VIEW: CPT

## 2018-01-01 PROCEDURE — 80048 BASIC METABOLIC PNL TOTAL CA: CPT | Performed by: INTERNAL MEDICINE

## 2018-01-01 PROCEDURE — 80177 DRUG SCRN QUAN LEVETIRACETAM: CPT | Performed by: STUDENT IN AN ORGANIZED HEALTH CARE EDUCATION/TRAINING PROGRAM

## 2018-01-01 PROCEDURE — 99207 ZZC CDG-CHARGE REQUIRED MANUAL ENTRY: CPT | Performed by: HOSPITALIST

## 2018-01-01 PROCEDURE — 25000128 H RX IP 250 OP 636: Performed by: GENERAL ACUTE CARE HOSPITAL

## 2018-01-01 PROCEDURE — 84134 ASSAY OF PREALBUMIN: CPT | Performed by: PEDIATRICS

## 2018-01-01 PROCEDURE — 25000128 H RX IP 250 OP 636

## 2018-01-01 PROCEDURE — 81001 URINALYSIS AUTO W/SCOPE: CPT | Performed by: PHYSICIAN ASSISTANT

## 2018-01-01 PROCEDURE — 27210429 ZZH NUTRITION PRODUCT INTERMEDIATE LITER

## 2018-01-01 PROCEDURE — 83036 HEMOGLOBIN GLYCOSYLATED A1C: CPT | Performed by: EMERGENCY MEDICINE

## 2018-01-01 PROCEDURE — 95951 ZZHC EEG VIDEO < 12 HR: CPT | Mod: 52,ZF

## 2018-01-01 PROCEDURE — 84100 ASSAY OF PHOSPHORUS: CPT | Performed by: STUDENT IN AN ORGANIZED HEALTH CARE EDUCATION/TRAINING PROGRAM

## 2018-01-01 PROCEDURE — 25000125 ZZHC RX 250: Performed by: NURSE PRACTITIONER

## 2018-01-01 PROCEDURE — 25000128 H RX IP 250 OP 636: Performed by: PHYSICIAN ASSISTANT

## 2018-01-01 PROCEDURE — 84156 ASSAY OF PROTEIN URINE: CPT | Performed by: INTERNAL MEDICINE

## 2018-01-01 PROCEDURE — 85610 PROTHROMBIN TIME: CPT | Performed by: NURSE PRACTITIONER

## 2018-01-01 PROCEDURE — 99233 SBSQ HOSP IP/OBS HIGH 50: CPT | Performed by: INTERNAL MEDICINE

## 2018-01-01 PROCEDURE — 83930 ASSAY OF BLOOD OSMOLALITY: CPT | Performed by: EMERGENCY MEDICINE

## 2018-01-01 PROCEDURE — 25000132 ZZH RX MED GY IP 250 OP 250 PS 637: Performed by: GENERAL ACUTE CARE HOSPITAL

## 2018-01-01 PROCEDURE — 83970 ASSAY OF PARATHORMONE: CPT | Performed by: PHYSICIAN ASSISTANT

## 2018-01-01 PROCEDURE — 25000131 ZZH RX MED GY IP 250 OP 636 PS 637: Performed by: PEDIATRICS

## 2018-01-01 PROCEDURE — 85610 PROTHROMBIN TIME: CPT | Performed by: PHYSICIAN ASSISTANT

## 2018-01-01 PROCEDURE — 99285 EMERGENCY DEPT VISIT HI MDM: CPT | Mod: Z6 | Performed by: EMERGENCY MEDICINE

## 2018-01-01 PROCEDURE — 40000257 ZZH STATISTIC CONSULT NO CHARGE VASC ACCESS

## 2018-01-01 PROCEDURE — 85610 PROTHROMBIN TIME: CPT | Performed by: STUDENT IN AN ORGANIZED HEALTH CARE EDUCATION/TRAINING PROGRAM

## 2018-01-01 PROCEDURE — 25000131 ZZH RX MED GY IP 250 OP 636 PS 637: Performed by: HOSPITALIST

## 2018-01-01 PROCEDURE — 25000131 ZZH RX MED GY IP 250 OP 636 PS 637: Performed by: EMERGENCY MEDICINE

## 2018-01-01 PROCEDURE — G0463 HOSPITAL OUTPT CLINIC VISIT: HCPCS

## 2018-01-01 PROCEDURE — 80048 BASIC METABOLIC PNL TOTAL CA: CPT | Performed by: STUDENT IN AN ORGANIZED HEALTH CARE EDUCATION/TRAINING PROGRAM

## 2018-01-01 PROCEDURE — 87040 BLOOD CULTURE FOR BACTERIA: CPT | Performed by: HOSPITALIST

## 2018-01-01 PROCEDURE — 25000125 ZZHC RX 250: Performed by: STUDENT IN AN ORGANIZED HEALTH CARE EDUCATION/TRAINING PROGRAM

## 2018-01-01 PROCEDURE — 80197 ASSAY OF TACROLIMUS: CPT | Performed by: NURSE PRACTITIONER

## 2018-01-01 PROCEDURE — 80202 ASSAY OF VANCOMYCIN: CPT | Performed by: INTERNAL MEDICINE

## 2018-01-01 PROCEDURE — 92610 EVALUATE SWALLOWING FUNCTION: CPT | Mod: GN | Performed by: SPEECH-LANGUAGE PATHOLOGIST

## 2018-01-01 PROCEDURE — 99285 EMERGENCY DEPT VISIT HI MDM: CPT | Mod: 25 | Performed by: EMERGENCY MEDICINE

## 2018-01-01 PROCEDURE — 40000225 ZZH STATISTIC SLP WARD VISIT: Performed by: SPEECH-LANGUAGE PATHOLOGIST

## 2018-01-01 PROCEDURE — 85027 COMPLETE CBC AUTOMATED: CPT | Performed by: STUDENT IN AN ORGANIZED HEALTH CARE EDUCATION/TRAINING PROGRAM

## 2018-01-01 PROCEDURE — 87641 MR-STAPH DNA AMP PROBE: CPT | Performed by: STUDENT IN AN ORGANIZED HEALTH CARE EDUCATION/TRAINING PROGRAM

## 2018-01-01 PROCEDURE — 82803 BLOOD GASES ANY COMBINATION: CPT

## 2018-01-01 PROCEDURE — 84443 ASSAY THYROID STIM HORMONE: CPT | Performed by: PHYSICIAN ASSISTANT

## 2018-01-01 PROCEDURE — 83605 ASSAY OF LACTIC ACID: CPT

## 2018-01-01 PROCEDURE — 80053 COMPREHEN METABOLIC PANEL: CPT | Performed by: EMERGENCY MEDICINE

## 2018-01-01 PROCEDURE — 99223 1ST HOSP IP/OBS HIGH 75: CPT | Mod: AI | Performed by: HOSPITALIST

## 2018-01-01 PROCEDURE — 83735 ASSAY OF MAGNESIUM: CPT | Performed by: PEDIATRICS

## 2018-01-01 PROCEDURE — 25000131 ZZH RX MED GY IP 250 OP 636 PS 637: Performed by: NURSE PRACTITIONER

## 2018-01-01 PROCEDURE — 85520 HEPARIN ASSAY: CPT | Performed by: STUDENT IN AN ORGANIZED HEALTH CARE EDUCATION/TRAINING PROGRAM

## 2018-01-01 PROCEDURE — 82140 ASSAY OF AMMONIA: CPT | Performed by: EMERGENCY MEDICINE

## 2018-01-01 PROCEDURE — 80202 ASSAY OF VANCOMYCIN: CPT | Performed by: HOSPITALIST

## 2018-01-01 PROCEDURE — 99223 1ST HOSP IP/OBS HIGH 75: CPT | Mod: AI | Performed by: INTERNAL MEDICINE

## 2018-01-01 PROCEDURE — 25000125 ZZHC RX 250: Performed by: HOSPITALIST

## 2018-01-01 PROCEDURE — 36415 COLL VENOUS BLD VENIPUNCTURE: CPT | Performed by: NURSE PRACTITIONER

## 2018-01-01 PROCEDURE — 86140 C-REACTIVE PROTEIN: CPT | Performed by: PEDIATRICS

## 2018-01-01 PROCEDURE — 83540 ASSAY OF IRON: CPT | Performed by: STUDENT IN AN ORGANIZED HEALTH CARE EDUCATION/TRAINING PROGRAM

## 2018-01-01 PROCEDURE — 82565 ASSAY OF CREATININE: CPT

## 2018-01-01 PROCEDURE — 95951 ZZHC EEG VIDEO EACH 24 HR: CPT | Mod: ZF

## 2018-01-01 PROCEDURE — 86923 COMPATIBILITY TEST ELECTRIC: CPT | Performed by: PHYSICIAN ASSISTANT

## 2018-01-01 PROCEDURE — 85610 PROTHROMBIN TIME: CPT

## 2018-01-01 PROCEDURE — 84145 PROCALCITONIN (PCT): CPT | Performed by: INTERNAL MEDICINE

## 2018-01-01 PROCEDURE — 40000274 ZZH STATISTIC RCP CONSULT EA 30 MIN

## 2018-01-01 PROCEDURE — 85520 HEPARIN ASSAY: CPT | Performed by: NURSE PRACTITIONER

## 2018-01-01 PROCEDURE — 83605 ASSAY OF LACTIC ACID: CPT | Performed by: NURSE PRACTITIONER

## 2018-01-01 PROCEDURE — 25000125 ZZHC RX 250: Performed by: RADIOLOGY

## 2018-01-01 PROCEDURE — 83605 ASSAY OF LACTIC ACID: CPT | Performed by: PEDIATRICS

## 2018-01-01 PROCEDURE — 99232 SBSQ HOSP IP/OBS MODERATE 35: CPT | Performed by: HOSPITALIST

## 2018-01-01 PROCEDURE — 99239 HOSP IP/OBS DSCHRG MGMT >30: CPT | Mod: GC | Performed by: INTERNAL MEDICINE

## 2018-01-01 PROCEDURE — 85027 COMPLETE CBC AUTOMATED: CPT | Performed by: FAMILY MEDICINE

## 2018-01-01 PROCEDURE — 83615 LACTATE (LD) (LDH) ENZYME: CPT | Performed by: STUDENT IN AN ORGANIZED HEALTH CARE EDUCATION/TRAINING PROGRAM

## 2018-01-01 PROCEDURE — 25000132 ZZH RX MED GY IP 250 OP 250 PS 637: Performed by: PHYSICIAN ASSISTANT

## 2018-01-01 PROCEDURE — C9254 INJECTION, LACOSAMIDE: HCPCS | Performed by: STUDENT IN AN ORGANIZED HEALTH CARE EDUCATION/TRAINING PROGRAM

## 2018-01-01 PROCEDURE — 36415 COLL VENOUS BLD VENIPUNCTURE: CPT | Performed by: HOSPITALIST

## 2018-01-01 PROCEDURE — 83550 IRON BINDING TEST: CPT | Performed by: STUDENT IN AN ORGANIZED HEALTH CARE EDUCATION/TRAINING PROGRAM

## 2018-01-01 PROCEDURE — 36415 COLL VENOUS BLD VENIPUNCTURE: CPT | Performed by: GENERAL ACUTE CARE HOSPITAL

## 2018-01-01 PROCEDURE — 25000132 ZZH RX MED GY IP 250 OP 250 PS 637

## 2018-01-01 PROCEDURE — 87631 RESP VIRUS 3-5 TARGETS: CPT | Performed by: STUDENT IN AN ORGANIZED HEALTH CARE EDUCATION/TRAINING PROGRAM

## 2018-01-01 PROCEDURE — 80069 RENAL FUNCTION PANEL: CPT | Performed by: INTERNAL MEDICINE

## 2018-01-01 PROCEDURE — 90662 IIV NO PRSV INCREASED AG IM: CPT | Performed by: INTERNAL MEDICINE

## 2018-01-01 PROCEDURE — 83930 ASSAY OF BLOOD OSMOLALITY: CPT | Performed by: PHYSICIAN ASSISTANT

## 2018-01-01 PROCEDURE — 27210432 ZZH NUTRITION PRODUCT RENAL BASIC LITER

## 2018-01-01 PROCEDURE — 85018 HEMOGLOBIN: CPT | Performed by: PEDIATRICS

## 2018-01-01 PROCEDURE — 40000502 ZZHCL STATISTIC GLUCOSE ED POCT

## 2018-01-01 PROCEDURE — 96361 HYDRATE IV INFUSION ADD-ON: CPT | Performed by: EMERGENCY MEDICINE

## 2018-01-01 PROCEDURE — 82550 ASSAY OF CK (CPK): CPT | Performed by: STUDENT IN AN ORGANIZED HEALTH CARE EDUCATION/TRAINING PROGRAM

## 2018-01-01 PROCEDURE — 36416 COLLJ CAPILLARY BLOOD SPEC: CPT | Performed by: INTERNAL MEDICINE

## 2018-01-01 PROCEDURE — 25000128 H RX IP 250 OP 636: Performed by: PSYCHIATRY & NEUROLOGY

## 2018-01-01 PROCEDURE — 25000128 H RX IP 250 OP 636: Performed by: PEDIATRICS

## 2018-01-01 PROCEDURE — 85384 FIBRINOGEN ACTIVITY: CPT | Performed by: STUDENT IN AN ORGANIZED HEALTH CARE EDUCATION/TRAINING PROGRAM

## 2018-01-01 PROCEDURE — P9047 ALBUMIN (HUMAN), 25%, 50ML: HCPCS | Performed by: INTERNAL MEDICINE

## 2018-01-01 PROCEDURE — 82728 ASSAY OF FERRITIN: CPT | Performed by: STUDENT IN AN ORGANIZED HEALTH CARE EDUCATION/TRAINING PROGRAM

## 2018-01-01 PROCEDURE — 80048 BASIC METABOLIC PNL TOTAL CA: CPT | Performed by: NURSE PRACTITIONER

## 2018-01-01 PROCEDURE — 84100 ASSAY OF PHOSPHORUS: CPT | Performed by: PEDIATRICS

## 2018-01-01 PROCEDURE — 84300 ASSAY OF URINE SODIUM: CPT | Performed by: EMERGENCY MEDICINE

## 2018-01-01 PROCEDURE — 81001 URINALYSIS AUTO W/SCOPE: CPT | Performed by: EMERGENCY MEDICINE

## 2018-01-01 PROCEDURE — 12000008 ZZH R&B INTERMEDIATE UMMC

## 2018-01-01 PROCEDURE — 85610 PROTHROMBIN TIME: CPT | Performed by: EMERGENCY MEDICINE

## 2018-01-01 PROCEDURE — 87633 RESP VIRUS 12-25 TARGETS: CPT | Performed by: STUDENT IN AN ORGANIZED HEALTH CARE EDUCATION/TRAINING PROGRAM

## 2018-01-01 PROCEDURE — 81001 URINALYSIS AUTO W/SCOPE: CPT | Performed by: STUDENT IN AN ORGANIZED HEALTH CARE EDUCATION/TRAINING PROGRAM

## 2018-01-01 PROCEDURE — 87086 URINE CULTURE/COLONY COUNT: CPT | Performed by: INTERNAL MEDICINE

## 2018-01-01 PROCEDURE — 84540 ASSAY OF URINE/UREA-N: CPT | Performed by: EMERGENCY MEDICINE

## 2018-01-01 PROCEDURE — 80053 COMPREHEN METABOLIC PANEL: CPT | Performed by: PEDIATRICS

## 2018-01-01 PROCEDURE — 87493 C DIFF AMPLIFIED PROBE: CPT | Performed by: EMERGENCY MEDICINE

## 2018-01-01 PROCEDURE — 99239 HOSP IP/OBS DSCHRG MGMT >30: CPT | Performed by: INTERNAL MEDICINE

## 2018-01-01 PROCEDURE — 73090 X-RAY EXAM OF FOREARM: CPT | Mod: LT

## 2018-01-01 PROCEDURE — 74340 X-RAY GUIDE FOR GI TUBE: CPT

## 2018-01-01 PROCEDURE — 36592 COLLECT BLOOD FROM PICC: CPT | Performed by: PHYSICIAN ASSISTANT

## 2018-01-01 PROCEDURE — 85004 AUTOMATED DIFF WBC COUNT: CPT | Performed by: STUDENT IN AN ORGANIZED HEALTH CARE EDUCATION/TRAINING PROGRAM

## 2018-01-01 PROCEDURE — 85027 COMPLETE CBC AUTOMATED: CPT | Performed by: PHYSICIAN ASSISTANT

## 2018-01-01 PROCEDURE — 83010 ASSAY OF HAPTOGLOBIN QUANT: CPT | Performed by: STUDENT IN AN ORGANIZED HEALTH CARE EDUCATION/TRAINING PROGRAM

## 2018-01-01 PROCEDURE — 80053 COMPREHEN METABOLIC PANEL: CPT | Performed by: INTERNAL MEDICINE

## 2018-01-01 PROCEDURE — 85025 COMPLETE CBC W/AUTO DIFF WBC: CPT | Performed by: EMERGENCY MEDICINE

## 2018-01-01 PROCEDURE — 27211246 XR FEEDING TUBE PLACEMENT

## 2018-01-01 PROCEDURE — 27210432 ZZH NUTRITION PRODUCT RENAL BASIC LITER: Performed by: DIETITIAN, REGISTERED

## 2018-01-01 PROCEDURE — 82306 VITAMIN D 25 HYDROXY: CPT | Performed by: PHYSICIAN ASSISTANT

## 2018-01-01 PROCEDURE — 87106 FUNGI IDENTIFICATION YEAST: CPT | Performed by: INTERNAL MEDICINE

## 2018-01-01 PROCEDURE — 05HM33Z INSERTION OF INFUSION DEVICE INTO RIGHT INTERNAL JUGULAR VEIN, PERCUTANEOUS APPROACH: ICD-10-PCS | Performed by: INTERNAL MEDICINE

## 2018-01-01 PROCEDURE — 83935 ASSAY OF URINE OSMOLALITY: CPT | Performed by: INTERNAL MEDICINE

## 2018-01-01 PROCEDURE — 99239 HOSP IP/OBS DSCHRG MGMT >30: CPT | Performed by: PEDIATRICS

## 2018-01-01 PROCEDURE — 96374 THER/PROPH/DIAG INJ IV PUSH: CPT | Performed by: EMERGENCY MEDICINE

## 2018-01-01 PROCEDURE — 25800025 ZZH RX 258: Performed by: PEDIATRICS

## 2018-01-01 PROCEDURE — 85730 THROMBOPLASTIN TIME PARTIAL: CPT | Performed by: EMERGENCY MEDICINE

## 2018-01-01 PROCEDURE — 93970 EXTREMITY STUDY: CPT

## 2018-01-01 PROCEDURE — 96360 HYDRATION IV INFUSION INIT: CPT | Performed by: EMERGENCY MEDICINE

## 2018-01-01 PROCEDURE — 93971 EXTREMITY STUDY: CPT | Mod: RT

## 2018-01-01 PROCEDURE — 84295 ASSAY OF SERUM SODIUM: CPT | Performed by: STUDENT IN AN ORGANIZED HEALTH CARE EDUCATION/TRAINING PROGRAM

## 2018-01-01 PROCEDURE — 82330 ASSAY OF CALCIUM: CPT

## 2018-01-01 PROCEDURE — 70450 CT HEAD/BRAIN W/O DYE: CPT

## 2018-01-01 PROCEDURE — 80197 ASSAY OF TACROLIMUS: CPT | Performed by: FAMILY MEDICINE

## 2018-01-01 PROCEDURE — 90937 HEMODIALYSIS REPEATED EVAL: CPT

## 2018-01-01 PROCEDURE — 80076 HEPATIC FUNCTION PANEL: CPT | Performed by: INTERNAL MEDICINE

## 2018-01-01 PROCEDURE — 80197 ASSAY OF TACROLIMUS: CPT | Performed by: PEDIATRICS

## 2018-01-01 PROCEDURE — 87040 BLOOD CULTURE FOR BACTERIA: CPT | Performed by: EMERGENCY MEDICINE

## 2018-01-01 PROCEDURE — 84484 ASSAY OF TROPONIN QUANT: CPT

## 2018-01-01 PROCEDURE — 84100 ASSAY OF PHOSPHORUS: CPT | Performed by: PHYSICIAN ASSISTANT

## 2018-01-01 PROCEDURE — 40000986 XR ABDOMEN PORT 1 VW

## 2018-01-01 PROCEDURE — 86140 C-REACTIVE PROTEIN: CPT | Performed by: STUDENT IN AN ORGANIZED HEALTH CARE EDUCATION/TRAINING PROGRAM

## 2018-01-01 PROCEDURE — 99233 SBSQ HOSP IP/OBS HIGH 50: CPT | Performed by: CLINICAL NURSE SPECIALIST

## 2018-01-01 PROCEDURE — 85025 COMPLETE CBC W/AUTO DIFF WBC: CPT | Performed by: STUDENT IN AN ORGANIZED HEALTH CARE EDUCATION/TRAINING PROGRAM

## 2018-01-01 PROCEDURE — 25000125 ZZHC RX 250

## 2018-01-01 PROCEDURE — 80197 ASSAY OF TACROLIMUS: CPT | Performed by: STUDENT IN AN ORGANIZED HEALTH CARE EDUCATION/TRAINING PROGRAM

## 2018-01-01 PROCEDURE — 40000895 ZZH STATISTIC SLP IP EVAL DEFER

## 2018-01-01 PROCEDURE — 85610 PROTHROMBIN TIME: CPT | Mod: QW

## 2018-01-01 PROCEDURE — 86850 RBC ANTIBODY SCREEN: CPT | Performed by: PHYSICIAN ASSISTANT

## 2018-01-01 PROCEDURE — 40000611 ZZHCL STATISTIC MORPHOLOGY W/INTERP HEMEPATH TC 85060: Performed by: STUDENT IN AN ORGANIZED HEALTH CARE EDUCATION/TRAINING PROGRAM

## 2018-01-01 PROCEDURE — 40000275 ZZH STATISTIC RCP TIME EA 10 MIN: Performed by: OPTOMETRIST

## 2018-01-01 PROCEDURE — P9041 ALBUMIN (HUMAN),5%, 50ML: HCPCS | Performed by: INTERNAL MEDICINE

## 2018-01-01 PROCEDURE — 82570 ASSAY OF URINE CREATININE: CPT | Performed by: EMERGENCY MEDICINE

## 2018-01-01 PROCEDURE — 87040 BLOOD CULTURE FOR BACTERIA: CPT | Performed by: INTERNAL MEDICINE

## 2018-01-01 PROCEDURE — 87899 AGENT NOS ASSAY W/OPTIC: CPT | Performed by: STUDENT IN AN ORGANIZED HEALTH CARE EDUCATION/TRAINING PROGRAM

## 2018-01-01 PROCEDURE — 25000132 ZZH RX MED GY IP 250 OP 250 PS 637: Performed by: NURSE PRACTITIONER

## 2018-01-01 PROCEDURE — 36415 COLL VENOUS BLD VENIPUNCTURE: CPT

## 2018-01-01 PROCEDURE — 80197 ASSAY OF TACROLIMUS: CPT | Performed by: HOSPITALIST

## 2018-01-01 PROCEDURE — 87800 DETECT AGNT MULT DNA DIREC: CPT | Performed by: EMERGENCY MEDICINE

## 2018-01-01 PROCEDURE — 84484 ASSAY OF TROPONIN QUANT: CPT | Performed by: EMERGENCY MEDICINE

## 2018-01-01 PROCEDURE — 81001 URINALYSIS AUTO W/SCOPE: CPT | Performed by: INTERNAL MEDICINE

## 2018-01-01 PROCEDURE — 99223 1ST HOSP IP/OBS HIGH 75: CPT | Performed by: INTERNAL MEDICINE

## 2018-01-01 PROCEDURE — P9047 ALBUMIN (HUMAN), 25%, 50ML: HCPCS | Performed by: STUDENT IN AN ORGANIZED HEALTH CARE EDUCATION/TRAINING PROGRAM

## 2018-01-01 PROCEDURE — 99232 SBSQ HOSP IP/OBS MODERATE 35: CPT | Mod: GC | Performed by: INTERNAL MEDICINE

## 2018-01-01 PROCEDURE — 40000264 ECHO COMPLETE WITH OPTISON

## 2018-01-01 PROCEDURE — 73060 X-RAY EXAM OF HUMERUS: CPT | Mod: LT

## 2018-01-01 PROCEDURE — 70551 MRI BRAIN STEM W/O DYE: CPT

## 2018-01-01 PROCEDURE — 99223 1ST HOSP IP/OBS HIGH 75: CPT | Mod: GC | Performed by: INTERNAL MEDICINE

## 2018-01-01 PROCEDURE — 87040 BLOOD CULTURE FOR BACTERIA: CPT | Performed by: NURSE PRACTITIONER

## 2018-01-01 PROCEDURE — 80202 ASSAY OF VANCOMYCIN: CPT | Performed by: STUDENT IN AN ORGANIZED HEALTH CARE EDUCATION/TRAINING PROGRAM

## 2018-01-01 PROCEDURE — 87186 SC STD MICRODIL/AGAR DIL: CPT | Performed by: INTERNAL MEDICINE

## 2018-01-01 PROCEDURE — 87077 CULTURE AEROBIC IDENTIFY: CPT | Performed by: PHYSICIAN ASSISTANT

## 2018-01-01 PROCEDURE — 82668 ASSAY OF ERYTHROPOIETIN: CPT | Performed by: PHYSICIAN ASSISTANT

## 2018-01-01 PROCEDURE — 92610 EVALUATE SWALLOWING FUNCTION: CPT | Mod: GN

## 2018-01-01 PROCEDURE — 70498 CT ANGIOGRAPHY NECK: CPT

## 2018-01-01 PROCEDURE — 93971 EXTREMITY STUDY: CPT | Mod: LT

## 2018-01-01 PROCEDURE — 25500064 ZZH RX 255 OP 636: Performed by: INTERNAL MEDICINE

## 2018-01-01 PROCEDURE — 87506 IADNA-DNA/RNA PROBE TQ 6-11: CPT | Performed by: STUDENT IN AN ORGANIZED HEALTH CARE EDUCATION/TRAINING PROGRAM

## 2018-01-01 PROCEDURE — 87186 SC STD MICRODIL/AGAR DIL: CPT | Performed by: PHYSICIAN ASSISTANT

## 2018-01-01 PROCEDURE — 40000894 ZZH STATISTIC OT IP EVAL DEFER: Performed by: OCCUPATIONAL THERAPIST

## 2018-01-01 PROCEDURE — 85025 COMPLETE CBC W/AUTO DIFF WBC: CPT | Performed by: NURSE PRACTITIONER

## 2018-01-01 PROCEDURE — 83605 ASSAY OF LACTIC ACID: CPT | Performed by: EMERGENCY MEDICINE

## 2018-01-01 PROCEDURE — 84145 PROCALCITONIN (PCT): CPT | Performed by: NURSE PRACTITIONER

## 2018-01-01 PROCEDURE — 85018 HEMOGLOBIN: CPT | Performed by: STUDENT IN AN ORGANIZED HEALTH CARE EDUCATION/TRAINING PROGRAM

## 2018-01-01 PROCEDURE — 83605 ASSAY OF LACTIC ACID: CPT | Performed by: INTERNAL MEDICINE

## 2018-01-01 PROCEDURE — 80053 COMPREHEN METABOLIC PANEL: CPT | Performed by: STUDENT IN AN ORGANIZED HEALTH CARE EDUCATION/TRAINING PROGRAM

## 2018-01-01 PROCEDURE — 84295 ASSAY OF SERUM SODIUM: CPT | Performed by: INTERNAL MEDICINE

## 2018-01-01 PROCEDURE — 80048 BASIC METABOLIC PNL TOTAL CA: CPT | Performed by: PEDIATRICS

## 2018-01-01 PROCEDURE — 80299 QUANTITATIVE ASSAY DRUG: CPT | Performed by: STUDENT IN AN ORGANIZED HEALTH CARE EDUCATION/TRAINING PROGRAM

## 2018-01-01 PROCEDURE — 74018 RADEX ABDOMEN 1 VIEW: CPT

## 2018-01-01 PROCEDURE — 86901 BLOOD TYPING SEROLOGIC RH(D): CPT | Performed by: PHYSICIAN ASSISTANT

## 2018-01-01 PROCEDURE — 84132 ASSAY OF SERUM POTASSIUM: CPT | Performed by: STUDENT IN AN ORGANIZED HEALTH CARE EDUCATION/TRAINING PROGRAM

## 2018-01-01 PROCEDURE — 25000132 ZZH RX MED GY IP 250 OP 250 PS 637: Performed by: EMERGENCY MEDICINE

## 2018-01-01 PROCEDURE — 40000498 ZZHCL STATISTIC POTASSIUM ED POCT

## 2018-01-01 PROCEDURE — 87800 DETECT AGNT MULT DNA DIREC: CPT | Performed by: PHYSICIAN ASSISTANT

## 2018-01-01 PROCEDURE — 94640 AIRWAY INHALATION TREATMENT: CPT | Mod: 76 | Performed by: OPTOMETRIST

## 2018-01-01 PROCEDURE — 40000501 ZZHCL STATISTIC HEMATOCRIT ED POCT

## 2018-01-01 PROCEDURE — 86900 BLOOD TYPING SEROLOGIC ABO: CPT | Performed by: PHYSICIAN ASSISTANT

## 2018-01-01 PROCEDURE — 40000986 XR CHEST PORT 1 VW

## 2018-01-01 PROCEDURE — 82565 ASSAY OF CREATININE: CPT | Performed by: INTERNAL MEDICINE

## 2018-01-01 PROCEDURE — 63400005 ZZH RX 634: Performed by: INTERNAL MEDICINE

## 2018-01-01 PROCEDURE — 93306 TTE W/DOPPLER COMPLETE: CPT | Mod: 26 | Performed by: INTERNAL MEDICINE

## 2018-01-01 PROCEDURE — 85018 HEMOGLOBIN: CPT | Performed by: PHYSICIAN ASSISTANT

## 2018-01-01 PROCEDURE — 80048 BASIC METABOLIC PNL TOTAL CA: CPT | Performed by: GENERAL ACUTE CARE HOSPITAL

## 2018-01-01 PROCEDURE — 12000006 ZZH R&B IMCU INTERMEDIATE UMMC

## 2018-01-01 PROCEDURE — 87077 CULTURE AEROBIC IDENTIFY: CPT | Performed by: EMERGENCY MEDICINE

## 2018-01-01 PROCEDURE — 87799 DETECT AGENT NOS DNA QUANT: CPT | Performed by: INTERNAL MEDICINE

## 2018-01-01 PROCEDURE — 87493 C DIFF AMPLIFIED PROBE: CPT | Performed by: NURSE PRACTITIONER

## 2018-01-01 PROCEDURE — 83036 HEMOGLOBIN GLYCOSYLATED A1C: CPT | Performed by: HOSPITALIST

## 2018-01-01 PROCEDURE — 85045 AUTOMATED RETICULOCYTE COUNT: CPT | Performed by: STUDENT IN AN ORGANIZED HEALTH CARE EDUCATION/TRAINING PROGRAM

## 2018-01-01 PROCEDURE — 83735 ASSAY OF MAGNESIUM: CPT | Performed by: PHYSICIAN ASSISTANT

## 2018-01-01 PROCEDURE — 82803 BLOOD GASES ANY COMBINATION: CPT | Performed by: STUDENT IN AN ORGANIZED HEALTH CARE EDUCATION/TRAINING PROGRAM

## 2018-01-01 PROCEDURE — P9016 RBC LEUKOCYTES REDUCED: HCPCS | Performed by: PHYSICIAN ASSISTANT

## 2018-01-01 PROCEDURE — 83935 ASSAY OF URINE OSMOLALITY: CPT | Performed by: EMERGENCY MEDICINE

## 2018-01-01 PROCEDURE — 82550 ASSAY OF CK (CPK): CPT | Performed by: INTERNAL MEDICINE

## 2018-01-01 PROCEDURE — 80053 COMPREHEN METABOLIC PANEL: CPT | Performed by: NURSE PRACTITIONER

## 2018-01-01 PROCEDURE — 83605 ASSAY OF LACTIC ACID: CPT | Performed by: HOSPITALIST

## 2018-01-01 PROCEDURE — 25000128 H RX IP 250 OP 636: Performed by: RADIOLOGY

## 2018-01-01 PROCEDURE — 82274 ASSAY TEST FOR BLOOD FECAL: CPT | Performed by: HOSPITALIST

## 2018-01-01 PROCEDURE — 84133 ASSAY OF URINE POTASSIUM: CPT | Performed by: INTERNAL MEDICINE

## 2018-01-01 PROCEDURE — 40000497 ZZHCL STATISTIC SODIUM ED POCT

## 2018-01-01 PROCEDURE — 87640 STAPH A DNA AMP PROBE: CPT | Performed by: STUDENT IN AN ORGANIZED HEALTH CARE EDUCATION/TRAINING PROGRAM

## 2018-01-01 PROCEDURE — 82565 ASSAY OF CREATININE: CPT | Performed by: HOSPITALIST

## 2018-01-01 PROCEDURE — 83036 HEMOGLOBIN GLYCOSYLATED A1C: CPT | Performed by: PHYSICIAN ASSISTANT

## 2018-01-01 PROCEDURE — 5A1D70Z PERFORMANCE OF URINARY FILTRATION, INTERMITTENT, LESS THAN 6 HOURS PER DAY: ICD-10-PCS | Performed by: INTERNAL MEDICINE

## 2018-01-01 PROCEDURE — 87088 URINE BACTERIA CULTURE: CPT | Performed by: INTERNAL MEDICINE

## 2018-01-01 PROCEDURE — 85025 COMPLETE CBC W/AUTO DIFF WBC: CPT | Performed by: PEDIATRICS

## 2018-01-01 PROCEDURE — 86706 HEP B SURFACE ANTIBODY: CPT | Performed by: INTERNAL MEDICINE

## 2018-01-01 PROCEDURE — 84439 ASSAY OF FREE THYROXINE: CPT | Performed by: PHYSICIAN ASSISTANT

## 2018-01-01 PROCEDURE — 40000894 ZZH STATISTIC OT IP EVAL DEFER

## 2018-01-01 PROCEDURE — 40000802 ZZH SITE CHECK

## 2018-01-01 PROCEDURE — 99207 ZZC APP CREDIT; MD BILLING SHARED VISIT: CPT | Performed by: PHYSICIAN ASSISTANT

## 2018-01-01 PROCEDURE — 99233 SBSQ HOSP IP/OBS HIGH 50: CPT | Performed by: STUDENT IN AN ORGANIZED HEALTH CARE EDUCATION/TRAINING PROGRAM

## 2018-01-01 RX ORDER — NICOTINE POLACRILEX 4 MG
15-30 LOZENGE BUCCAL
Status: DISCONTINUED | OUTPATIENT
Start: 2018-01-01 | End: 2018-01-01 | Stop reason: HOSPADM

## 2018-01-01 RX ORDER — FERROUS GLUCONATE 324(38)MG
648 TABLET ORAL
Status: ON HOLD | COMMUNITY
End: 2018-01-01

## 2018-01-01 RX ORDER — ONDANSETRON 2 MG/ML
4 INJECTION INTRAMUSCULAR; INTRAVENOUS EVERY 6 HOURS PRN
Status: DISCONTINUED | OUTPATIENT
Start: 2018-01-01 | End: 2018-01-01 | Stop reason: HOSPADM

## 2018-01-01 RX ORDER — SODIUM POLYSTYRENE SULFONATE 15 G/60ML
15 SUSPENSION ORAL; RECTAL 2 TIMES DAILY
Status: COMPLETED | OUTPATIENT
Start: 2018-01-01 | End: 2018-01-01

## 2018-01-01 RX ORDER — QUETIAPINE FUMARATE 25 MG/1
25 TABLET, FILM COATED ORAL
Status: DISCONTINUED | OUTPATIENT
Start: 2018-01-01 | End: 2018-01-01 | Stop reason: HOSPADM

## 2018-01-01 RX ORDER — LEVETIRACETAM 5 MG/ML
500 INJECTION INTRAVASCULAR 2 TIMES DAILY
Status: DISCONTINUED | OUTPATIENT
Start: 2018-01-01 | End: 2018-01-01

## 2018-01-01 RX ORDER — QUETIAPINE FUMARATE 25 MG/1
TABLET, FILM COATED ORAL
Qty: 60 TABLET | Refills: 0 | Status: SHIPPED | OUTPATIENT
Start: 2018-01-01 | End: 2018-01-01

## 2018-01-01 RX ORDER — LEVETIRACETAM 100 MG/ML
750 SOLUTION ORAL EVERY 12 HOURS
Status: DISCONTINUED | OUTPATIENT
Start: 2018-01-01 | End: 2018-01-01

## 2018-01-01 RX ORDER — WARFARIN SODIUM 5 MG/1
5 TABLET ORAL
Status: COMPLETED | OUTPATIENT
Start: 2018-01-01 | End: 2018-01-01

## 2018-01-01 RX ORDER — WARFARIN SODIUM 3 MG/1
3 TABLET ORAL
Status: COMPLETED | OUTPATIENT
Start: 2018-01-01 | End: 2018-01-01

## 2018-01-01 RX ORDER — WARFARIN SODIUM 2.5 MG/1
2.5 TABLET ORAL
Status: COMPLETED | OUTPATIENT
Start: 2018-01-01 | End: 2018-01-01

## 2018-01-01 RX ORDER — TACROLIMUS 0.5 MG/1
CAPSULE ORAL
Qty: 150 CAPSULE | Refills: 11 | Status: ON HOLD | OUTPATIENT
Start: 2018-01-01 | End: 2018-01-01

## 2018-01-01 RX ORDER — TACROLIMUS 0.5 MG/1
1 CAPSULE, GELATIN COATED ORAL 2 TIMES DAILY
Qty: 120 CAPSULE | Refills: 0 | Status: SHIPPED | OUTPATIENT
Start: 2018-01-01 | End: 2018-01-01

## 2018-01-01 RX ORDER — TACROLIMUS 0.5 MG/1
1.5 CAPSULE, GELATIN COATED ORAL 2 TIMES DAILY
Status: ON HOLD | COMMUNITY
End: 2018-01-01

## 2018-01-01 RX ORDER — PIPERACILLIN SODIUM, TAZOBACTAM SODIUM 2; .25 G/10ML; G/10ML
2.25 INJECTION, POWDER, LYOPHILIZED, FOR SOLUTION INTRAVENOUS EVERY 6 HOURS
Status: COMPLETED | OUTPATIENT
Start: 2018-01-01 | End: 2018-01-01

## 2018-01-01 RX ORDER — METHYLPREDNISOLONE SODIUM SUCCINATE 40 MG/ML
16 INJECTION, POWDER, LYOPHILIZED, FOR SOLUTION INTRAMUSCULAR; INTRAVENOUS ONCE
Status: COMPLETED | OUTPATIENT
Start: 2018-01-01 | End: 2018-01-01

## 2018-01-01 RX ORDER — PROCHLORPERAZINE 25 MG
12.5 SUPPOSITORY, RECTAL RECTAL EVERY 12 HOURS PRN
Status: DISCONTINUED | OUTPATIENT
Start: 2018-01-01 | End: 2018-01-01 | Stop reason: HOSPADM

## 2018-01-01 RX ORDER — AMLODIPINE BESYLATE 10 MG/1
10 TABLET ORAL DAILY
Status: DISCONTINUED | OUTPATIENT
Start: 2018-01-01 | End: 2018-01-01 | Stop reason: HOSPADM

## 2018-01-01 RX ORDER — TACROLIMUS 0.5 MG/1
CAPSULE, GELATIN COATED ORAL
Qty: 150 CAPSULE | Refills: 11 | Status: SHIPPED | OUTPATIENT
Start: 2018-01-01 | End: 2018-01-01

## 2018-01-01 RX ORDER — GUAR GUM
1 PACKET (EA) ORAL 3 TIMES DAILY
Status: DISCONTINUED | OUTPATIENT
Start: 2018-01-01 | End: 2018-01-01 | Stop reason: HOSPADM

## 2018-01-01 RX ORDER — BUMETANIDE 1 MG/1
4 TABLET ORAL
Status: DISCONTINUED | OUTPATIENT
Start: 2018-01-01 | End: 2018-01-01

## 2018-01-01 RX ORDER — OMEGA-3S/DHA/EPA/FISH OIL/D3 300MG-1000
CAPSULE ORAL
Qty: 100 TABLET | Refills: 0 | OUTPATIENT
Start: 2018-01-01

## 2018-01-01 RX ORDER — ALBUTEROL SULFATE 0.83 MG/ML
SOLUTION RESPIRATORY (INHALATION)
Status: COMPLETED
Start: 2018-01-01 | End: 2018-01-01

## 2018-01-01 RX ORDER — LEVETIRACETAM 100 MG/ML
500 SOLUTION ORAL 2 TIMES DAILY
Status: DISCONTINUED | OUTPATIENT
Start: 2018-01-01 | End: 2018-01-01

## 2018-01-01 RX ORDER — BUMETANIDE 0.25 MG/ML
2 INJECTION INTRAMUSCULAR; INTRAVENOUS ONCE
Status: COMPLETED | OUTPATIENT
Start: 2018-01-01 | End: 2018-01-01

## 2018-01-01 RX ORDER — ALBUMIN (HUMAN) 12.5 G/50ML
50 SOLUTION INTRAVENOUS
Status: DISCONTINUED | OUTPATIENT
Start: 2018-01-01 | End: 2018-01-01

## 2018-01-01 RX ORDER — WARFARIN SODIUM 2.5 MG/1
TABLET ORAL
Qty: 15 TABLET | Refills: 1 | Status: SHIPPED | OUTPATIENT
Start: 2018-01-01 | End: 2018-01-01

## 2018-01-01 RX ORDER — GINSENG 100 MG
CAPSULE ORAL 2 TIMES DAILY
Status: ACTIVE | OUTPATIENT
Start: 2018-01-01 | End: 2018-01-01

## 2018-01-01 RX ORDER — FOLIC ACID 5 MG/ML
1 INJECTION, SOLUTION INTRAMUSCULAR; INTRAVENOUS; SUBCUTANEOUS DAILY
Status: COMPLETED | OUTPATIENT
Start: 2018-01-01 | End: 2018-01-01

## 2018-01-01 RX ORDER — CEFTRIAXONE 1 G/1
1 INJECTION, POWDER, FOR SOLUTION INTRAMUSCULAR; INTRAVENOUS EVERY 24 HOURS
Status: DISCONTINUED | OUTPATIENT
Start: 2018-01-01 | End: 2018-01-01

## 2018-01-01 RX ORDER — WARFARIN SODIUM 4 MG/1
4 TABLET ORAL
Status: COMPLETED | OUTPATIENT
Start: 2018-01-01 | End: 2018-01-01

## 2018-01-01 RX ORDER — WARFARIN SODIUM 2.5 MG/1
2.5 TABLET ORAL
Status: DISCONTINUED | OUTPATIENT
Start: 2018-01-01 | End: 2018-01-01 | Stop reason: CLARIF

## 2018-01-01 RX ORDER — MYCOPHENOLATE MOFETIL 200 MG/ML
250 POWDER, FOR SUSPENSION ORAL
Status: DISCONTINUED | OUTPATIENT
Start: 2018-01-01 | End: 2018-01-01 | Stop reason: HOSPADM

## 2018-01-01 RX ORDER — HYDROMORPHONE HYDROCHLORIDE 1 MG/ML
0.5 SOLUTION ORAL EVERY 4 HOURS PRN
Status: DISCONTINUED | OUTPATIENT
Start: 2018-01-01 | End: 2018-01-01

## 2018-01-01 RX ORDER — MYCOPHENOLATE MOFETIL 200 MG/ML
250 POWDER, FOR SUSPENSION ORAL EVERY 12 HOURS SCHEDULED
Status: DISCONTINUED | OUTPATIENT
Start: 2018-01-01 | End: 2018-01-01

## 2018-01-01 RX ORDER — CARVEDILOL 6.25 MG/1
6.25 TABLET ORAL ONCE
Status: COMPLETED | OUTPATIENT
Start: 2018-01-01 | End: 2018-01-01

## 2018-01-01 RX ORDER — POTASSIUM CHLORIDE 1.5 G/1.58G
60 POWDER, FOR SOLUTION ORAL ONCE
Status: COMPLETED | OUTPATIENT
Start: 2018-01-01 | End: 2018-01-01

## 2018-01-01 RX ORDER — CEFAZOLIN SODIUM 1 G/50ML
1250 SOLUTION INTRAVENOUS ONCE
Status: COMPLETED | OUTPATIENT
Start: 2018-01-01 | End: 2018-01-01

## 2018-01-01 RX ORDER — WARFARIN SODIUM 5 MG/1
5 TABLET ORAL
Status: DISCONTINUED | OUTPATIENT
Start: 2018-01-01 | End: 2018-01-01 | Stop reason: HOSPADM

## 2018-01-01 RX ORDER — LEVETIRACETAM 100 MG/ML
750 SOLUTION ORAL 2 TIMES DAILY
Status: DISCONTINUED | OUTPATIENT
Start: 2018-01-01 | End: 2018-01-01

## 2018-01-01 RX ORDER — FERROUS GLUCONATE 324(38)MG
324 TABLET ORAL
Qty: 270 TABLET | Refills: 3 | Status: SHIPPED | OUTPATIENT
Start: 2018-01-01 | End: 2018-01-01

## 2018-01-01 RX ORDER — CEPHALEXIN 500 MG/1
500 CAPSULE ORAL EVERY 12 HOURS SCHEDULED
Status: DISCONTINUED | OUTPATIENT
Start: 2018-01-01 | End: 2018-01-01

## 2018-01-01 RX ORDER — MYCOPHENOLATE MOFETIL 200 MG/ML
500 POWDER, FOR SUSPENSION ORAL EVERY 12 HOURS SCHEDULED
Status: DISCONTINUED | OUTPATIENT
Start: 2018-01-01 | End: 2018-01-01

## 2018-01-01 RX ORDER — HYDROMORPHONE HYDROCHLORIDE 1 MG/ML
1-2 SOLUTION ORAL EVERY 4 HOURS PRN
Status: DISCONTINUED | OUTPATIENT
Start: 2018-01-01 | End: 2018-01-01 | Stop reason: HOSPADM

## 2018-01-01 RX ORDER — MEMANTINE HYDROCHLORIDE 21 MG/1
21 CAPSULE, EXTENDED RELEASE ORAL DAILY
Status: DISCONTINUED | OUTPATIENT
Start: 2018-01-01 | End: 2018-01-01 | Stop reason: HOSPADM

## 2018-01-01 RX ORDER — FUROSEMIDE 20 MG
20 TABLET ORAL DAILY
Qty: 90 TABLET | Refills: 3 | Status: ON HOLD | OUTPATIENT
Start: 2018-01-01 | End: 2018-01-01

## 2018-01-01 RX ORDER — ACETAMINOPHEN 325 MG/1
650 TABLET ORAL EVERY 6 HOURS PRN
Status: DISCONTINUED | OUTPATIENT
Start: 2018-01-01 | End: 2018-01-01 | Stop reason: HOSPADM

## 2018-01-01 RX ORDER — DEXTROSE MONOHYDRATE 50 MG/ML
INJECTION, SOLUTION INTRAVENOUS
Status: COMPLETED
Start: 2018-01-01 | End: 2018-01-01

## 2018-01-01 RX ORDER — LABETALOL HYDROCHLORIDE 5 MG/ML
10 INJECTION, SOLUTION INTRAVENOUS ONCE
Status: COMPLETED | OUTPATIENT
Start: 2018-01-01 | End: 2018-01-01

## 2018-01-01 RX ORDER — WARFARIN SODIUM 2.5 MG/1
2.5 TABLET ORAL
Status: DISCONTINUED | OUTPATIENT
Start: 2018-01-01 | End: 2018-01-01

## 2018-01-01 RX ORDER — AMLODIPINE BESYLATE 5 MG/1
5 TABLET ORAL DAILY
Status: DISCONTINUED | OUTPATIENT
Start: 2018-01-01 | End: 2018-01-01

## 2018-01-01 RX ORDER — CARVEDILOL 3.12 MG/1
6.25 TABLET ORAL
Status: DISCONTINUED | OUTPATIENT
Start: 2018-01-01 | End: 2018-01-01

## 2018-01-01 RX ORDER — PIPERACILLIN SODIUM, TAZOBACTAM SODIUM 2; .25 G/10ML; G/10ML
2.25 INJECTION, POWDER, LYOPHILIZED, FOR SOLUTION INTRAVENOUS EVERY 6 HOURS
Status: DISCONTINUED | OUTPATIENT
Start: 2018-01-01 | End: 2018-01-01

## 2018-01-01 RX ORDER — TACROLIMUS 1 MG/1
1 CAPSULE ORAL AT BEDTIME
Status: DISCONTINUED | OUTPATIENT
Start: 2018-01-01 | End: 2018-01-01

## 2018-01-01 RX ORDER — TACROLIMUS 1 MG/1
1 CAPSULE ORAL 2 TIMES DAILY
Status: DISCONTINUED | OUTPATIENT
Start: 2018-01-01 | End: 2018-01-01

## 2018-01-01 RX ORDER — LORAZEPAM 2 MG/ML
2 INJECTION INTRAMUSCULAR ONCE
Status: COMPLETED | OUTPATIENT
Start: 2018-01-01 | End: 2018-01-01

## 2018-01-01 RX ORDER — AMPICILLIN AND SULBACTAM 2; 1 G/1; G/1
3 INJECTION, POWDER, FOR SOLUTION INTRAMUSCULAR; INTRAVENOUS ONCE
Status: COMPLETED | OUTPATIENT
Start: 2018-01-01 | End: 2018-01-01

## 2018-01-01 RX ORDER — FUROSEMIDE 40 MG
80 TABLET ORAL DAILY
Status: DISCONTINUED | OUTPATIENT
Start: 2018-01-01 | End: 2018-01-01

## 2018-01-01 RX ORDER — VANCOMYCIN HYDROCHLORIDE 50 MG/ML
125 KIT ORAL 4 TIMES DAILY
Status: DISCONTINUED | OUTPATIENT
Start: 2018-01-01 | End: 2018-01-01

## 2018-01-01 RX ORDER — AMPICILLIN AND SULBACTAM 1; .5 G/1; G/1
1.5 INJECTION, POWDER, FOR SOLUTION INTRAMUSCULAR; INTRAVENOUS EVERY 12 HOURS
Status: DISCONTINUED | OUTPATIENT
Start: 2018-01-01 | End: 2018-01-01

## 2018-01-01 RX ORDER — LACTULOSE 10 G/15ML
20 SOLUTION ORAL ONCE
Status: COMPLETED | OUTPATIENT
Start: 2018-01-01 | End: 2018-01-01

## 2018-01-01 RX ORDER — SODIUM CHLORIDE 9 MG/ML
INJECTION, SOLUTION INTRAVENOUS CONTINUOUS
Status: DISCONTINUED | OUTPATIENT
Start: 2018-01-01 | End: 2018-01-01

## 2018-01-01 RX ORDER — LEVETIRACETAM 10 MG/ML
1000 INJECTION INTRAVASCULAR ONCE
Status: COMPLETED | OUTPATIENT
Start: 2018-01-01 | End: 2018-01-01

## 2018-01-01 RX ORDER — MEMANTINE HYDROCHLORIDE 21 MG/1
21 CAPSULE, EXTENDED RELEASE ORAL DAILY
Status: DISCONTINUED | OUTPATIENT
Start: 2018-01-01 | End: 2018-01-01

## 2018-01-01 RX ORDER — ACETYLCYSTEINE 200 MG/ML
2 SOLUTION ORAL; RESPIRATORY (INHALATION) 3 TIMES DAILY
Status: DISCONTINUED | OUTPATIENT
Start: 2018-01-01 | End: 2018-01-01

## 2018-01-01 RX ORDER — HEPARIN SODIUM 10000 [USP'U]/100ML
0-3500 INJECTION, SOLUTION INTRAVENOUS CONTINUOUS
Status: DISCONTINUED | OUTPATIENT
Start: 2018-01-01 | End: 2018-01-01

## 2018-01-01 RX ORDER — MEMANTINE HYDROCHLORIDE 21 MG/1
CAPSULE, EXTENDED RELEASE ORAL
Qty: 90 CAPSULE | Refills: 0 | OUTPATIENT
Start: 2018-01-01

## 2018-01-01 RX ORDER — GUAR GUM
1 PACKET (EA) ORAL 3 TIMES DAILY
Qty: 90 PACKET | Refills: 0 | Status: ON HOLD | OUTPATIENT
Start: 2018-01-01 | End: 2018-01-01

## 2018-01-01 RX ORDER — POLYETHYLENE GLYCOL 3350 17 G/17G
17 POWDER, FOR SOLUTION ORAL DAILY PRN
Qty: 30 PACKET | Refills: 1 | Status: SHIPPED | OUTPATIENT
Start: 2018-01-01 | End: 2018-01-01

## 2018-01-01 RX ORDER — TACROLIMUS 0.5 MG/1
CAPSULE, GELATIN COATED ORAL
Qty: 150 CAPSULE | Refills: 11 | OUTPATIENT
Start: 2018-01-01

## 2018-01-01 RX ORDER — DEXTROSE MONOHYDRATE 25 G/50ML
25-50 INJECTION, SOLUTION INTRAVENOUS
Status: DISCONTINUED | OUTPATIENT
Start: 2018-01-01 | End: 2018-01-01 | Stop reason: HOSPADM

## 2018-01-01 RX ORDER — PROCHLORPERAZINE MALEATE 5 MG
5 TABLET ORAL EVERY 6 HOURS PRN
Status: DISCONTINUED | OUTPATIENT
Start: 2018-01-01 | End: 2018-01-01 | Stop reason: HOSPADM

## 2018-01-01 RX ORDER — LEVETIRACETAM 500 MG/1
500 TABLET ORAL 2 TIMES DAILY
Status: DISCONTINUED | OUTPATIENT
Start: 2018-01-01 | End: 2018-01-01

## 2018-01-01 RX ORDER — ACETAMINOPHEN 325 MG/1
650 TABLET ORAL EVERY 4 HOURS PRN
Status: DISCONTINUED | OUTPATIENT
Start: 2018-01-01 | End: 2018-01-01 | Stop reason: HOSPADM

## 2018-01-01 RX ORDER — TACROLIMUS 0.5 MG/1
1.5 CAPSULE, GELATIN COATED ORAL EVERY 12 HOURS
Qty: 180 CAPSULE | Refills: 11 | Status: ON HOLD | OUTPATIENT
Start: 2018-01-01 | End: 2018-01-01

## 2018-01-01 RX ORDER — WARFARIN SODIUM 2 MG/1
2 TABLET ORAL
Status: COMPLETED | OUTPATIENT
Start: 2018-01-01 | End: 2018-01-01

## 2018-01-01 RX ORDER — AMLODIPINE BESYLATE 10 MG/1
10 TABLET ORAL DAILY
Status: ON HOLD | COMMUNITY
End: 2018-01-01

## 2018-01-01 RX ORDER — MAGNESIUM SULFATE HEPTAHYDRATE 40 MG/ML
4 INJECTION, SOLUTION INTRAVENOUS EVERY 4 HOURS PRN
Status: DISCONTINUED | OUTPATIENT
Start: 2018-01-01 | End: 2018-01-01 | Stop reason: HOSPADM

## 2018-01-01 RX ORDER — AMINO ACIDS/PROTEIN HYDROLYS 11G-40/45
1 LIQUID IN PACKET (ML) ORAL DAILY
Status: DISCONTINUED | OUTPATIENT
Start: 2018-01-01 | End: 2018-01-01

## 2018-01-01 RX ORDER — VANCOMYCIN HYDROCHLORIDE 50 MG/ML
125 KIT ORAL 2 TIMES DAILY
Status: COMPLETED | OUTPATIENT
Start: 2018-01-01 | End: 2018-01-01

## 2018-01-01 RX ORDER — ZINC SULFATE 50(220)MG
220 CAPSULE ORAL DAILY
Status: COMPLETED | OUTPATIENT
Start: 2018-01-01 | End: 2018-01-01

## 2018-01-01 RX ORDER — LEVETIRACETAM 100 MG/ML
750 SOLUTION ORAL EVERY 12 HOURS
Status: DISCONTINUED | OUTPATIENT
Start: 2018-01-01 | End: 2018-01-01 | Stop reason: HOSPADM

## 2018-01-01 RX ORDER — HALOPERIDOL 5 MG/ML
1 INJECTION INTRAMUSCULAR ONCE
Status: COMPLETED | OUTPATIENT
Start: 2018-01-01 | End: 2018-01-01

## 2018-01-01 RX ORDER — MEMANTINE HYDROCHLORIDE 21 MG/1
CAPSULE, EXTENDED RELEASE ORAL
Qty: 30 CAPSULE | Refills: 0 | OUTPATIENT
Start: 2018-01-01

## 2018-01-01 RX ORDER — AMLODIPINE BESYLATE 10 MG/1
10 TABLET ORAL DAILY
Qty: 30 TABLET | Refills: 1 | Status: SHIPPED | OUTPATIENT
Start: 2018-01-01 | End: 2018-01-01

## 2018-01-01 RX ORDER — CARVEDILOL 6.25 MG/1
6.25 TABLET ORAL 2 TIMES DAILY WITH MEALS
Status: DISCONTINUED | OUTPATIENT
Start: 2018-01-01 | End: 2018-01-01 | Stop reason: HOSPADM

## 2018-01-01 RX ORDER — SODIUM CHLORIDE, SODIUM LACTATE, POTASSIUM CHLORIDE, CALCIUM CHLORIDE 600; 310; 30; 20 MG/100ML; MG/100ML; MG/100ML; MG/100ML
INJECTION, SOLUTION INTRAVENOUS CONTINUOUS
Status: DISCONTINUED | OUTPATIENT
Start: 2018-01-01 | End: 2018-01-01

## 2018-01-01 RX ORDER — ALBUTEROL SULFATE 0.83 MG/ML
2.5 SOLUTION RESPIRATORY (INHALATION) EVERY 6 HOURS PRN
Status: DISCONTINUED | OUTPATIENT
Start: 2018-01-01 | End: 2018-01-01 | Stop reason: HOSPADM

## 2018-01-01 RX ORDER — CARVEDILOL 6.25 MG/1
6.25 TABLET ORAL 2 TIMES DAILY WITH MEALS
Status: DISCONTINUED | OUTPATIENT
Start: 2018-01-01 | End: 2018-01-01

## 2018-01-01 RX ORDER — QUETIAPINE FUMARATE 25 MG/1
25 TABLET, FILM COATED ORAL
Qty: 30 TABLET | Refills: 1 | Status: SHIPPED | OUTPATIENT
Start: 2018-01-01 | End: 2018-01-01

## 2018-01-01 RX ORDER — ONDANSETRON 4 MG/1
4 TABLET, ORALLY DISINTEGRATING ORAL EVERY 6 HOURS PRN
Status: DISCONTINUED | OUTPATIENT
Start: 2018-01-01 | End: 2018-01-01 | Stop reason: HOSPADM

## 2018-01-01 RX ORDER — WARFARIN SODIUM 1 MG/1
1 TABLET ORAL
Status: COMPLETED | OUTPATIENT
Start: 2018-01-01 | End: 2018-01-01

## 2018-01-01 RX ORDER — MEMANTINE HYDROCHLORIDE 21 MG/1
21 CAPSULE, EXTENDED RELEASE ORAL DAILY
Qty: 90 CAPSULE | Refills: 3 | Status: SHIPPED | OUTPATIENT
Start: 2018-01-01 | End: 2018-01-01

## 2018-01-01 RX ORDER — HYDROMORPHONE HCL/0.9% NACL/PF 0.2MG/0.2
.2-.4 SYRINGE (ML) INTRAVENOUS
Status: DISCONTINUED | OUTPATIENT
Start: 2018-01-01 | End: 2018-01-01 | Stop reason: HOSPADM

## 2018-01-01 RX ORDER — PHYTONADIONE 5 MG/1
5 TABLET ORAL ONCE
Status: DISCONTINUED | OUTPATIENT
Start: 2018-01-01 | End: 2018-01-01

## 2018-01-01 RX ORDER — NALOXONE HYDROCHLORIDE 0.4 MG/ML
.1-.4 INJECTION, SOLUTION INTRAMUSCULAR; INTRAVENOUS; SUBCUTANEOUS
Status: DISCONTINUED | OUTPATIENT
Start: 2018-01-01 | End: 2018-01-01 | Stop reason: HOSPADM

## 2018-01-01 RX ORDER — SODIUM CHLORIDE 9 MG/ML
INJECTION, SOLUTION INTRAVENOUS
Status: COMPLETED
Start: 2018-01-01 | End: 2018-01-01

## 2018-01-01 RX ORDER — HYDROMORPHONE HYDROCHLORIDE 1 MG/ML
1-2 SOLUTION ORAL EVERY 6 HOURS PRN
Qty: 80 ML | Refills: 0 | Status: SHIPPED | OUTPATIENT
Start: 2018-01-01

## 2018-01-01 RX ORDER — ASPIRIN 81 MG
TABLET, DELAYED RELEASE (ENTERIC COATED) ORAL
Qty: 30 TABLET | Refills: 0 | OUTPATIENT
Start: 2018-01-01

## 2018-01-01 RX ORDER — CEPHALEXIN 500 MG/1
500 CAPSULE ORAL ONCE
Status: COMPLETED | OUTPATIENT
Start: 2018-01-01 | End: 2018-01-01

## 2018-01-01 RX ORDER — ASPIRIN 81 MG/1
81 TABLET, CHEWABLE ORAL DAILY
Status: DISCONTINUED | OUTPATIENT
Start: 2018-01-01 | End: 2018-01-01 | Stop reason: HOSPADM

## 2018-01-01 RX ORDER — DEXTROSE MONOHYDRATE 25 G/50ML
25-50 INJECTION, SOLUTION INTRAVENOUS
Status: DISCONTINUED | OUTPATIENT
Start: 2018-01-01 | End: 2018-01-01

## 2018-01-01 RX ORDER — FUROSEMIDE 20 MG
20 TABLET ORAL DAILY
Status: DISCONTINUED | OUTPATIENT
Start: 2018-01-01 | End: 2018-01-01 | Stop reason: HOSPADM

## 2018-01-01 RX ORDER — NITROFURANTOIN 25; 75 MG/1; MG/1
100 CAPSULE ORAL ONCE
Status: DISCONTINUED | OUTPATIENT
Start: 2018-01-01 | End: 2018-01-01

## 2018-01-01 RX ORDER — METHYLPREDNISOLONE SODIUM SUCCINATE 40 MG/ML
16 INJECTION, POWDER, LYOPHILIZED, FOR SOLUTION INTRAMUSCULAR; INTRAVENOUS EVERY 24 HOURS
Status: DISCONTINUED | OUTPATIENT
Start: 2018-01-01 | End: 2018-01-01

## 2018-01-01 RX ORDER — FUROSEMIDE 10 MG/ML
40 INJECTION INTRAMUSCULAR; INTRAVENOUS ONCE
Status: COMPLETED | OUTPATIENT
Start: 2018-01-01 | End: 2018-01-01

## 2018-01-01 RX ORDER — ALBUMIN (HUMAN) 12.5 G/50ML
25 SOLUTION INTRAVENOUS ONCE
Status: COMPLETED | OUTPATIENT
Start: 2018-01-01 | End: 2018-01-01

## 2018-01-01 RX ORDER — METOLAZONE 5 MG/1
5 TABLET ORAL DAILY
Qty: 30 TABLET | Refills: 0 | Status: SHIPPED | OUTPATIENT
Start: 2018-01-01 | End: 2019-01-01

## 2018-01-01 RX ORDER — TACROLIMUS 1 MG/1
1 CAPSULE ORAL EVERY EVENING
Status: DISCONTINUED | OUTPATIENT
Start: 2018-01-01 | End: 2018-01-01

## 2018-01-01 RX ORDER — BUMETANIDE 0.25 MG/ML
3 INJECTION INTRAMUSCULAR; INTRAVENOUS ONCE
Status: COMPLETED | OUTPATIENT
Start: 2018-01-01 | End: 2018-01-01

## 2018-01-01 RX ORDER — PREDNISONE 5 MG/1
5 TABLET ORAL EVERY 24 HOURS
Status: DISCONTINUED | OUTPATIENT
Start: 2018-01-01 | End: 2018-01-01

## 2018-01-01 RX ORDER — WARFARIN SODIUM 3 MG/1
3 TABLET ORAL
Status: DISCONTINUED | OUTPATIENT
Start: 2018-01-01 | End: 2018-01-01

## 2018-01-01 RX ORDER — FERROUS GLUCONATE 324(38)MG
648 TABLET ORAL
Qty: 100 TABLET | Refills: 0 | Status: SHIPPED | OUTPATIENT
Start: 2018-01-01

## 2018-01-01 RX ORDER — HYDRALAZINE HYDROCHLORIDE 20 MG/ML
10 INJECTION INTRAMUSCULAR; INTRAVENOUS EVERY 6 HOURS PRN
Status: DISCONTINUED | OUTPATIENT
Start: 2018-01-01 | End: 2018-01-01

## 2018-01-01 RX ORDER — B COMPLEX C NO.10/FOLIC ACID 900MCG/5ML
5 LIQUID (ML) ORAL DAILY
Status: DISCONTINUED | OUTPATIENT
Start: 2018-01-01 | End: 2018-01-01

## 2018-01-01 RX ORDER — LEVETIRACETAM 100 MG/ML
500 SOLUTION ORAL 2 TIMES DAILY
Status: DISCONTINUED | OUTPATIENT
Start: 2018-01-01 | End: 2018-01-01 | Stop reason: HOSPADM

## 2018-01-01 RX ORDER — HYDRALAZINE HYDROCHLORIDE 20 MG/ML
20 INJECTION INTRAMUSCULAR; INTRAVENOUS ONCE
Status: COMPLETED | OUTPATIENT
Start: 2018-01-01 | End: 2018-01-01

## 2018-01-01 RX ORDER — TACROLIMUS 0.5 MG/1
CAPSULE ORAL
Qty: 150 CAPSULE | Refills: 11 | Status: CANCELLED | OUTPATIENT
Start: 2018-01-01

## 2018-01-01 RX ORDER — CIPROFLOXACIN 500 MG/1
500 TABLET, FILM COATED ORAL EVERY 12 HOURS SCHEDULED
Status: DISCONTINUED | OUTPATIENT
Start: 2018-01-01 | End: 2018-01-01

## 2018-01-01 RX ORDER — FUROSEMIDE 10 MG/ML
40 INJECTION INTRAMUSCULAR; INTRAVENOUS EVERY 12 HOURS
Status: DISCONTINUED | OUTPATIENT
Start: 2018-01-01 | End: 2018-01-01

## 2018-01-01 RX ORDER — FUROSEMIDE 20 MG
TABLET ORAL
Qty: 90 TABLET | Refills: 0 | OUTPATIENT
Start: 2018-01-01

## 2018-01-01 RX ORDER — PREDNISONE 5 MG/ML
5 SOLUTION ORAL DAILY
Status: DISCONTINUED | OUTPATIENT
Start: 2018-01-01 | End: 2018-01-01 | Stop reason: HOSPADM

## 2018-01-01 RX ORDER — LACTOBACILLUS RHAMNOSUS GG 10B CELL
1 CAPSULE ORAL 2 TIMES DAILY
Status: DISCONTINUED | OUTPATIENT
Start: 2018-01-01 | End: 2018-01-01 | Stop reason: HOSPADM

## 2018-01-01 RX ORDER — POTASSIUM CHLORIDE 1.5 G/1.58G
40 POWDER, FOR SOLUTION ORAL ONCE
Status: COMPLETED | OUTPATIENT
Start: 2018-01-01 | End: 2018-01-01

## 2018-01-01 RX ORDER — CARVEDILOL 6.25 MG/1
6.25 TABLET ORAL 2 TIMES DAILY WITH MEALS
Qty: 180 TABLET | Refills: 0 | Status: SHIPPED | OUTPATIENT
Start: 2018-01-01 | End: 2018-01-01

## 2018-01-01 RX ORDER — QUETIAPINE FUMARATE 25 MG/1
25 TABLET, FILM COATED ORAL DAILY
Status: DISCONTINUED | OUTPATIENT
Start: 2018-01-01 | End: 2018-01-01 | Stop reason: HOSPADM

## 2018-01-01 RX ORDER — AMOXICILLIN 250 MG
1 CAPSULE ORAL 2 TIMES DAILY PRN
Status: DISCONTINUED | OUTPATIENT
Start: 2018-01-01 | End: 2018-01-01 | Stop reason: HOSPADM

## 2018-01-01 RX ORDER — FUROSEMIDE 20 MG
20 TABLET ORAL DAILY
Status: ON HOLD | COMMUNITY
End: 2018-01-01

## 2018-01-01 RX ORDER — SCOLOPAMINE TRANSDERMAL SYSTEM 1 MG/1
1 PATCH, EXTENDED RELEASE TRANSDERMAL
Status: DISCONTINUED | OUTPATIENT
Start: 2018-01-01 | End: 2018-01-01 | Stop reason: HOSPADM

## 2018-01-01 RX ORDER — IPRATROPIUM BROMIDE AND ALBUTEROL SULFATE 2.5; .5 MG/3ML; MG/3ML
3 SOLUTION RESPIRATORY (INHALATION) EVERY 4 HOURS PRN
Status: DISCONTINUED | OUTPATIENT
Start: 2018-01-01 | End: 2018-01-01 | Stop reason: HOSPADM

## 2018-01-01 RX ORDER — LIDOCAINE 40 MG/G
CREAM TOPICAL
Status: DISCONTINUED | OUTPATIENT
Start: 2018-01-01 | End: 2018-01-01 | Stop reason: HOSPADM

## 2018-01-01 RX ORDER — ONDANSETRON 4 MG/1
4 TABLET, ORALLY DISINTEGRATING ORAL EVERY 6 HOURS PRN
Qty: 30 TABLET | Refills: 0 | Status: SHIPPED | OUTPATIENT
Start: 2018-01-01

## 2018-01-01 RX ORDER — WARFARIN SODIUM 2.5 MG/1
2.5 TABLET ORAL DAILY
Qty: 30 TABLET | Refills: 0 | Status: SHIPPED | OUTPATIENT
Start: 2018-01-01 | End: 2018-01-01

## 2018-01-01 RX ORDER — LEVETIRACETAM 100 MG/ML
500 SOLUTION ORAL EVERY 12 HOURS
Qty: 450 ML | Refills: 0 | Status: SHIPPED | OUTPATIENT
Start: 2018-01-01 | End: 2018-01-01

## 2018-01-01 RX ORDER — BUMETANIDE 0.25 MG/ML
4 INJECTION INTRAMUSCULAR; INTRAVENOUS 2 TIMES DAILY
Status: DISCONTINUED | OUTPATIENT
Start: 2018-01-01 | End: 2018-01-01 | Stop reason: DRUGHIGH

## 2018-01-01 RX ORDER — PREDNISONE 5 MG/1
5 TABLET ORAL DAILY
Status: DISCONTINUED | OUTPATIENT
Start: 2018-01-01 | End: 2018-01-01

## 2018-01-01 RX ORDER — AMLODIPINE BESYLATE 10 MG/1
10 TABLET ORAL DAILY
Qty: 90 TABLET | Refills: 1 | Status: SHIPPED | OUTPATIENT
Start: 2018-01-01 | End: 2018-01-01

## 2018-01-01 RX ORDER — WARFARIN SODIUM 2.5 MG/1
2.5 TABLET ORAL
Status: DISCONTINUED | OUTPATIENT
Start: 2018-01-01 | End: 2018-01-01 | Stop reason: HOSPADM

## 2018-01-01 RX ORDER — PREDNISONE 5 MG/1
5 TABLET ORAL EVERY 24 HOURS
Status: DISCONTINUED | OUTPATIENT
Start: 2018-01-01 | End: 2018-01-01 | Stop reason: HOSPADM

## 2018-01-01 RX ORDER — CARVEDILOL 6.25 MG/1
6.25 TABLET ORAL ONCE
Status: DISCONTINUED | OUTPATIENT
Start: 2018-01-01 | End: 2018-01-01 | Stop reason: HOSPADM

## 2018-01-01 RX ORDER — FUROSEMIDE 20 MG
20 TABLET ORAL DAILY
Qty: 30 TABLET | Refills: 0 | Status: SHIPPED | OUTPATIENT
Start: 2018-01-01 | End: 2018-01-01

## 2018-01-01 RX ORDER — HYDRALAZINE HYDROCHLORIDE 20 MG/ML
10 INJECTION INTRAMUSCULAR; INTRAVENOUS EVERY 6 HOURS
Status: DISCONTINUED | OUTPATIENT
Start: 2018-01-01 | End: 2018-01-01

## 2018-01-01 RX ORDER — NICOTINE POLACRILEX 4 MG
15-30 LOZENGE BUCCAL
Status: DISCONTINUED | OUTPATIENT
Start: 2018-01-01 | End: 2018-01-01

## 2018-01-01 RX ORDER — HYDRALAZINE HYDROCHLORIDE 20 MG/ML
10-20 INJECTION INTRAMUSCULAR; INTRAVENOUS EVERY 4 HOURS PRN
Status: DISCONTINUED | OUTPATIENT
Start: 2018-01-01 | End: 2018-01-01

## 2018-01-01 RX ORDER — IOPAMIDOL 755 MG/ML
75 INJECTION, SOLUTION INTRAVASCULAR ONCE
Status: COMPLETED | OUTPATIENT
Start: 2018-01-01 | End: 2018-01-01

## 2018-01-01 RX ORDER — LABETALOL HYDROCHLORIDE 5 MG/ML
10 INJECTION, SOLUTION INTRAVENOUS EVERY 6 HOURS
Status: DISCONTINUED | OUTPATIENT
Start: 2018-01-01 | End: 2018-01-01

## 2018-01-01 RX ORDER — ALBUMIN, HUMAN INJ 5% 5 %
250 SOLUTION INTRAVENOUS
Status: COMPLETED | OUTPATIENT
Start: 2018-01-01 | End: 2018-01-01

## 2018-01-01 RX ORDER — ALBUMIN, HUMAN INJ 5% 5 %
250 SOLUTION INTRAVENOUS
Status: DISCONTINUED | OUTPATIENT
Start: 2018-01-01 | End: 2018-01-01

## 2018-01-01 RX ORDER — GUAR GUM
1 PACKET (EA) ORAL 3 TIMES DAILY
Status: DISCONTINUED | OUTPATIENT
Start: 2018-01-01 | End: 2018-01-01

## 2018-01-01 RX ORDER — GLUCOSAMINE HCL/CHONDROITIN SU 500-400 MG
CAPSULE ORAL
Qty: 100 EACH | Refills: 11 | Status: SHIPPED | OUTPATIENT
Start: 2018-01-01

## 2018-01-01 RX ORDER — METHYLPREDNISOLONE SODIUM SUCCINATE 40 MG/ML
4 INJECTION, POWDER, LYOPHILIZED, FOR SOLUTION INTRAMUSCULAR; INTRAVENOUS EVERY 24 HOURS
Status: DISCONTINUED | OUTPATIENT
Start: 2018-01-01 | End: 2018-01-01

## 2018-01-01 RX ORDER — METOLAZONE 5 MG/1
5 TABLET ORAL DAILY
Status: DISCONTINUED | OUTPATIENT
Start: 2018-01-01 | End: 2018-01-01 | Stop reason: HOSPADM

## 2018-01-01 RX ORDER — LEVETIRACETAM 100 MG/ML
750 SOLUTION ORAL EVERY 12 HOURS
Qty: 450 ML | Refills: 0 | Status: ON HOLD | OUTPATIENT
Start: 2018-01-01 | End: 2018-01-01

## 2018-01-01 RX ORDER — AMPICILLIN AND SULBACTAM 2; 1 G/1; G/1
3 INJECTION, POWDER, FOR SOLUTION INTRAMUSCULAR; INTRAVENOUS EVERY 12 HOURS
Status: DISCONTINUED | OUTPATIENT
Start: 2018-01-01 | End: 2018-01-01

## 2018-01-01 RX ORDER — ALBUMIN (HUMAN) 12.5 G/50ML
12.5 SOLUTION INTRAVENOUS ONCE
Status: COMPLETED | OUTPATIENT
Start: 2018-01-01 | End: 2018-01-01

## 2018-01-01 RX ORDER — GUAR GUM
1 PACKET (EA) ORAL 3 TIMES DAILY
Qty: 90 PACKET | Refills: 0 | Status: SHIPPED | OUTPATIENT
Start: 2018-01-01

## 2018-01-01 RX ORDER — ACETYLCYSTEINE 200 MG/ML
2 SOLUTION ORAL; RESPIRATORY (INHALATION) 2 TIMES DAILY
Status: DISCONTINUED | OUTPATIENT
Start: 2018-01-01 | End: 2018-01-01

## 2018-01-01 RX ORDER — AMLODIPINE BESYLATE 10 MG/1
10 TABLET ORAL DAILY
Qty: 30 TABLET | Refills: 1 | OUTPATIENT
Start: 2018-01-01

## 2018-01-01 RX ORDER — ACETYLCYSTEINE 200 MG/ML
2 SOLUTION ORAL; RESPIRATORY (INHALATION) EVERY 6 HOURS PRN
Status: DISCONTINUED | OUTPATIENT
Start: 2018-01-01 | End: 2018-01-01

## 2018-01-01 RX ORDER — WARFARIN SODIUM 2.5 MG/1
1.25 TABLET ORAL DAILY
Qty: 15 TABLET | Refills: 1 | Status: SHIPPED | OUTPATIENT
Start: 2018-01-01 | End: 2018-01-01

## 2018-01-01 RX ORDER — MYCOPHENOLATE MOFETIL 250 MG/1
250 CAPSULE ORAL
Status: DISCONTINUED | OUTPATIENT
Start: 2018-01-01 | End: 2018-01-01

## 2018-01-01 RX ORDER — ELECTROLYTES/DEXTROSE
1 SOLUTION, ORAL ORAL DAILY
Status: ON HOLD | COMMUNITY
End: 2018-01-01

## 2018-01-01 RX ORDER — ACETYLCYSTEINE 200 MG/ML
2 SOLUTION ORAL; RESPIRATORY (INHALATION) DAILY
Status: DISCONTINUED | OUTPATIENT
Start: 2018-01-01 | End: 2018-01-01

## 2018-01-01 RX ORDER — IPRATROPIUM BROMIDE AND ALBUTEROL SULFATE 2.5; .5 MG/3ML; MG/3ML
3 SOLUTION RESPIRATORY (INHALATION)
Status: DISCONTINUED | OUTPATIENT
Start: 2018-01-01 | End: 2018-01-01

## 2018-01-01 RX ORDER — VANCOMYCIN HYDROCHLORIDE 50 MG/ML
125 KIT ORAL 4 TIMES DAILY
Status: COMPLETED | OUTPATIENT
Start: 2018-01-01 | End: 2018-01-01

## 2018-01-01 RX ORDER — HYDROMORPHONE HCL/0.9% NACL/PF 0.2MG/0.2
0.1 SYRINGE (ML) INTRAVENOUS
Status: COMPLETED | OUTPATIENT
Start: 2018-01-01 | End: 2018-01-01

## 2018-01-01 RX ORDER — LACOSAMIDE 10 MG/ML
100 SOLUTION ORAL 2 TIMES DAILY
Qty: 600 ML | Refills: 5 | Status: SHIPPED | OUTPATIENT
Start: 2018-01-01

## 2018-01-01 RX ORDER — CARVEDILOL 6.25 MG/1
6.25 TABLET ORAL ONCE
Status: DISCONTINUED | OUTPATIENT
Start: 2018-01-01 | End: 2018-01-01

## 2018-01-01 RX ORDER — BISACODYL 10 MG
10 SUPPOSITORY, RECTAL RECTAL DAILY PRN
Status: DISCONTINUED | OUTPATIENT
Start: 2018-01-01 | End: 2018-01-01 | Stop reason: HOSPADM

## 2018-01-01 RX ORDER — HYDRALAZINE HYDROCHLORIDE 20 MG/ML
10 INJECTION INTRAMUSCULAR; INTRAVENOUS EVERY 4 HOURS PRN
Status: DISCONTINUED | OUTPATIENT
Start: 2018-01-01 | End: 2018-01-01 | Stop reason: HOSPADM

## 2018-01-01 RX ORDER — MEMANTINE HYDROCHLORIDE 14 MG/1
14 CAPSULE, EXTENDED RELEASE ORAL DAILY
Status: DISCONTINUED | OUTPATIENT
Start: 2018-01-01 | End: 2018-01-01

## 2018-01-01 RX ORDER — ALBUTEROL SULFATE 0.83 MG/ML
2.5 SOLUTION RESPIRATORY (INHALATION) DAILY
Status: DISCONTINUED | OUTPATIENT
Start: 2018-01-01 | End: 2018-01-01

## 2018-01-01 RX ORDER — ONDANSETRON 2 MG/ML
4 INJECTION INTRAMUSCULAR; INTRAVENOUS ONCE
Status: COMPLETED | OUTPATIENT
Start: 2018-01-01 | End: 2018-01-01

## 2018-01-01 RX ORDER — ALBUTEROL SULFATE 0.83 MG/ML
2.5 SOLUTION RESPIRATORY (INHALATION) 3 TIMES DAILY
Status: DISCONTINUED | OUTPATIENT
Start: 2018-01-01 | End: 2018-01-01

## 2018-01-01 RX ORDER — CEPHALEXIN 500 MG/1
500 CAPSULE ORAL ONCE
Status: DISCONTINUED | OUTPATIENT
Start: 2018-01-01 | End: 2018-01-01

## 2018-01-01 RX ORDER — AMOXICILLIN 250 MG
2 CAPSULE ORAL 2 TIMES DAILY PRN
Status: DISCONTINUED | OUTPATIENT
Start: 2018-01-01 | End: 2018-01-01 | Stop reason: HOSPADM

## 2018-01-01 RX ORDER — SODIUM POLYSTYRENE SULFONATE 15 G/60ML
15 SUSPENSION ORAL; RECTAL ONCE
Status: COMPLETED | OUTPATIENT
Start: 2018-01-01 | End: 2018-01-01

## 2018-01-01 RX ORDER — CEFDINIR 125 MG/5ML
300 POWDER, FOR SUSPENSION ORAL 2 TIMES DAILY
Status: DISCONTINUED | OUTPATIENT
Start: 2018-01-01 | End: 2018-01-01 | Stop reason: HOSPADM

## 2018-01-01 RX ORDER — MEMANTINE HYDROCHLORIDE 21 MG/1
21 CAPSULE, EXTENDED RELEASE ORAL DAILY
Qty: 30 CAPSULE | Refills: 0 | Status: SHIPPED | OUTPATIENT
Start: 2018-01-01 | End: 2018-01-01

## 2018-01-01 RX ORDER — DOCUSATE SODIUM 100 MG/1
100 CAPSULE, LIQUID FILLED ORAL 2 TIMES DAILY PRN
Status: ON HOLD | COMMUNITY
End: 2018-01-01

## 2018-01-01 RX ORDER — MEMANTINE HYDROCHLORIDE 21 MG/1
21 CAPSULE, EXTENDED RELEASE ORAL DAILY
Qty: 30 CAPSULE | Refills: 1 | Status: SHIPPED | OUTPATIENT
Start: 2018-01-01 | End: 2018-01-01

## 2018-01-01 RX ORDER — CEFDINIR 125 MG/5ML
300 POWDER, FOR SUSPENSION ORAL 2 TIMES DAILY
Qty: 96 ML | Refills: 0 | Status: SHIPPED | OUTPATIENT
Start: 2018-01-01 | End: 2018-01-01

## 2018-01-01 RX ORDER — PIPERACILLIN SODIUM, TAZOBACTAM SODIUM 3; .375 G/15ML; G/15ML
3.38 INJECTION, POWDER, LYOPHILIZED, FOR SOLUTION INTRAVENOUS ONCE
Status: COMPLETED | OUTPATIENT
Start: 2018-01-01 | End: 2018-01-01

## 2018-01-01 RX ORDER — CONTAINER,EMPTY
EACH MISCELLANEOUS
Qty: 1 EACH | Refills: 0 | Status: SHIPPED | OUTPATIENT
Start: 2018-01-01

## 2018-01-01 RX ORDER — HYDROMORPHONE HCL/0.9% NACL/PF 0.2MG/0.2
0.2 SYRINGE (ML) INTRAVENOUS
Status: DISCONTINUED | OUTPATIENT
Start: 2018-01-01 | End: 2018-01-01

## 2018-01-01 RX ORDER — ONDANSETRON 2 MG/ML
4 INJECTION INTRAMUSCULAR; INTRAVENOUS ONCE
Status: DISCONTINUED | OUTPATIENT
Start: 2018-01-01 | End: 2018-01-01

## 2018-01-01 RX ORDER — AMPICILLIN AND SULBACTAM 2; 1 G/1; G/1
3 INJECTION, POWDER, FOR SOLUTION INTRAMUSCULAR; INTRAVENOUS EVERY 6 HOURS
Status: DISCONTINUED | OUTPATIENT
Start: 2018-01-01 | End: 2018-01-01

## 2018-01-01 RX ORDER — WARFARIN SODIUM 2.5 MG/1
TABLET ORAL
Status: ON HOLD | COMMUNITY
End: 2018-01-01

## 2018-01-01 RX ORDER — FERROUS SULFATE 325(65) MG
325 TABLET ORAL DAILY
Status: DISCONTINUED | OUTPATIENT
Start: 2018-01-01 | End: 2018-01-01

## 2018-01-01 RX ORDER — CARVEDILOL 6.25 MG/1
6.25 TABLET ORAL 2 TIMES DAILY
Status: DISCONTINUED | OUTPATIENT
Start: 2018-01-01 | End: 2018-01-01

## 2018-01-01 RX ORDER — HYDRALAZINE HYDROCHLORIDE 20 MG/ML
10 INJECTION INTRAMUSCULAR; INTRAVENOUS EVERY 6 HOURS PRN
Status: DISCONTINUED | OUTPATIENT
Start: 2018-01-01 | End: 2018-01-01 | Stop reason: HOSPADM

## 2018-01-01 RX ORDER — AMLODIPINE BESYLATE 10 MG/1
10 TABLET ORAL DAILY
Status: DISCONTINUED | OUTPATIENT
Start: 2018-01-01 | End: 2018-01-01

## 2018-01-01 RX ORDER — FUROSEMIDE 10 MG/ML
40 INJECTION INTRAMUSCULAR; INTRAVENOUS DAILY
Status: DISCONTINUED | OUTPATIENT
Start: 2018-01-01 | End: 2018-01-01

## 2018-01-01 RX ORDER — FUROSEMIDE 10 MG/ML
80 INJECTION INTRAMUSCULAR; INTRAVENOUS EVERY 12 HOURS
Status: DISCONTINUED | OUTPATIENT
Start: 2018-01-01 | End: 2018-01-01

## 2018-01-01 RX ORDER — TACROLIMUS 0.5 MG/1
CAPSULE ORAL
Qty: 150 CAPSULE | Refills: 11 | Status: SHIPPED | OUTPATIENT
Start: 2018-01-01 | End: 2018-01-01

## 2018-01-01 RX ORDER — BUMETANIDE 0.25 MG/ML
1 INJECTION INTRAMUSCULAR; INTRAVENOUS ONCE
Status: COMPLETED | OUTPATIENT
Start: 2018-01-01 | End: 2018-01-01

## 2018-01-01 RX ORDER — ASCORBIC ACID 500 MG
500 TABLET ORAL
Qty: 270 TABLET | Refills: 3 | Status: SHIPPED | OUTPATIENT
Start: 2018-01-01 | End: 2018-01-01

## 2018-01-01 RX ORDER — ALBUTEROL SULFATE 0.83 MG/ML
2.5 SOLUTION RESPIRATORY (INHALATION) 2 TIMES DAILY
Status: DISCONTINUED | OUTPATIENT
Start: 2018-01-01 | End: 2018-01-01

## 2018-01-01 RX ORDER — SODIUM CHLORIDE 9 MG/ML
1000 INJECTION, SOLUTION INTRAVENOUS CONTINUOUS
Status: DISCONTINUED | OUTPATIENT
Start: 2018-01-01 | End: 2018-01-01

## 2018-01-01 RX ORDER — POLYETHYLENE GLYCOL 3350 17 G/17G
17 POWDER, FOR SOLUTION ORAL DAILY PRN
Status: DISCONTINUED | OUTPATIENT
Start: 2018-01-01 | End: 2018-01-01 | Stop reason: HOSPADM

## 2018-01-01 RX ORDER — CEFDINIR 250 MG/5ML
300 POWDER, FOR SUSPENSION ORAL 2 TIMES DAILY
Status: DISCONTINUED | OUTPATIENT
Start: 2018-01-01 | End: 2018-01-01

## 2018-01-01 RX ORDER — LEVETIRACETAM 5 MG/ML
500 INJECTION INTRAVASCULAR EVERY 12 HOURS
Status: DISCONTINUED | OUTPATIENT
Start: 2018-01-01 | End: 2018-01-01

## 2018-01-01 RX ORDER — POTASSIUM CHLORIDE 1.5 G/1.58G
20 POWDER, FOR SOLUTION ORAL ONCE
Status: COMPLETED | OUTPATIENT
Start: 2018-01-01 | End: 2018-01-01

## 2018-01-01 RX ORDER — VANCOMYCIN HYDROCHLORIDE 50 MG/ML
250 KIT ORAL 4 TIMES DAILY
Status: DISCONTINUED | OUTPATIENT
Start: 2018-01-01 | End: 2018-01-01

## 2018-01-01 RX ORDER — LACTOBACILLUS RHAMNOSUS GG 10B CELL
1 CAPSULE ORAL 2 TIMES DAILY
Qty: 60 CAPSULE | Refills: 0 | Status: SHIPPED | OUTPATIENT
Start: 2018-01-01

## 2018-01-01 RX ORDER — PREDNISONE 5 MG/ML
5 SOLUTION ORAL DAILY
Status: DISCONTINUED | OUTPATIENT
Start: 2018-01-01 | End: 2018-01-01

## 2018-01-01 RX ORDER — ACETYLCYSTEINE 100 MG/ML
4 SOLUTION ORAL; RESPIRATORY (INHALATION) EVERY 4 HOURS
Status: DISCONTINUED | OUTPATIENT
Start: 2018-01-01 | End: 2018-01-01

## 2018-01-01 RX ORDER — THIAMINE HYDROCHLORIDE 100 MG/ML
100 INJECTION, SOLUTION INTRAMUSCULAR; INTRAVENOUS DAILY
Status: DISCONTINUED | OUTPATIENT
Start: 2018-01-01 | End: 2018-01-01

## 2018-01-01 RX ORDER — SODIUM CHLORIDE, SODIUM LACTATE, POTASSIUM CHLORIDE, CALCIUM CHLORIDE 600; 310; 30; 20 MG/100ML; MG/100ML; MG/100ML; MG/100ML
INJECTION, SOLUTION INTRAVENOUS
Status: COMPLETED
Start: 2018-01-01 | End: 2018-01-01

## 2018-01-01 RX ORDER — LORAZEPAM 2 MG/ML
1-2 INJECTION INTRAMUSCULAR
Status: DISCONTINUED | OUTPATIENT
Start: 2018-01-01 | End: 2018-01-01

## 2018-01-01 RX ORDER — PREDNISONE 5 MG/1
10 TABLET ORAL EVERY 24 HOURS
Status: DISCONTINUED | OUTPATIENT
Start: 2018-01-01 | End: 2018-01-01

## 2018-01-01 RX ORDER — ACETYLCYSTEINE 100 MG/ML
4 SOLUTION ORAL; RESPIRATORY (INHALATION) EVERY 4 HOURS PRN
Status: DISCONTINUED | OUTPATIENT
Start: 2018-01-01 | End: 2018-01-01 | Stop reason: HOSPADM

## 2018-01-01 RX ORDER — LEVETIRACETAM 100 MG/ML
500 SOLUTION ORAL EVERY 12 HOURS
Qty: 300 ML | Refills: 0 | Status: SHIPPED | OUTPATIENT
Start: 2018-01-01

## 2018-01-01 RX ORDER — TACROLIMUS 0.5 MG/1
0.5 CAPSULE ORAL
Status: DISCONTINUED | OUTPATIENT
Start: 2018-01-01 | End: 2018-01-01

## 2018-01-01 RX ORDER — QUETIAPINE FUMARATE 25 MG/1
TABLET, FILM COATED ORAL
Qty: 30 TABLET | Refills: 1 | Status: ON HOLD | OUTPATIENT
Start: 2018-01-01 | End: 2018-01-01

## 2018-01-01 RX ORDER — ASPIRIN 81 MG/1
81 TABLET ORAL DAILY
Status: DISCONTINUED | OUTPATIENT
Start: 2018-01-01 | End: 2018-01-01

## 2018-01-01 RX ORDER — CARVEDILOL 12.5 MG/1
12.5 TABLET ORAL 2 TIMES DAILY WITH MEALS
Status: DISCONTINUED | OUTPATIENT
Start: 2018-01-01 | End: 2018-01-01 | Stop reason: HOSPADM

## 2018-01-01 RX ORDER — BUMETANIDE 0.25 MG/ML
4 INJECTION INTRAMUSCULAR; INTRAVENOUS EVERY 12 HOURS
Status: DISCONTINUED | OUTPATIENT
Start: 2018-01-01 | End: 2018-01-01

## 2018-01-01 RX ORDER — BUMETANIDE 0.25 MG/ML
4 INJECTION INTRAMUSCULAR; INTRAVENOUS
Status: DISCONTINUED | OUTPATIENT
Start: 2018-01-01 | End: 2018-01-01

## 2018-01-01 RX ORDER — FUROSEMIDE 20 MG
TABLET ORAL
Qty: 30 TABLET | Refills: 0 | OUTPATIENT
Start: 2018-01-01

## 2018-01-01 RX ORDER — SCOLOPAMINE TRANSDERMAL SYSTEM 1 MG/1
1 PATCH, EXTENDED RELEASE TRANSDERMAL
Qty: 1 PATCH | Refills: 11 | Status: SHIPPED | OUTPATIENT
Start: 2018-01-01

## 2018-01-01 RX ORDER — WARFARIN SODIUM 2.5 MG/1
TABLET ORAL
Qty: 90 TABLET | Refills: 5 | Status: SHIPPED | OUTPATIENT
Start: 2018-01-01

## 2018-01-01 RX ORDER — LEVETIRACETAM 100 MG/ML
250 SOLUTION ORAL 2 TIMES DAILY
Status: DISCONTINUED | OUTPATIENT
Start: 2018-01-01 | End: 2018-01-01

## 2018-01-01 RX ORDER — METRONIDAZOLE 500 MG/1
500 TABLET ORAL EVERY 8 HOURS
Status: DISCONTINUED | OUTPATIENT
Start: 2018-01-01 | End: 2018-01-01

## 2018-01-01 RX ORDER — ERGOCALCIFEROL (VITAMIN D2) 10 MCG
800 TABLET ORAL DAILY
Status: ON HOLD | COMMUNITY
End: 2018-01-01

## 2018-01-01 RX ORDER — QUETIAPINE FUMARATE 25 MG/1
TABLET, FILM COATED ORAL
Qty: 30 TABLET | Refills: 0 | Status: SHIPPED | OUTPATIENT
Start: 2018-01-01 | End: 2018-01-01

## 2018-01-01 RX ORDER — DEXTROSE MONOHYDRATE 50 MG/ML
INJECTION, SOLUTION INTRAVENOUS
Status: DISPENSED
Start: 2018-01-01 | End: 2018-01-01

## 2018-01-01 RX ORDER — BUMETANIDE 0.25 MG/ML
2 INJECTION INTRAMUSCULAR; INTRAVENOUS
Status: DISCONTINUED | OUTPATIENT
Start: 2018-01-01 | End: 2018-01-01

## 2018-01-01 RX ORDER — ASPIRIN 81 MG/1
81 TABLET ORAL DAILY
Status: ON HOLD | COMMUNITY
End: 2018-01-01

## 2018-01-01 RX ORDER — NYSTATIN 100000/ML
500000 SUSPENSION, ORAL (FINAL DOSE FORM) ORAL 4 TIMES DAILY
Qty: 60 ML | Refills: 0 | Status: SHIPPED | OUTPATIENT
Start: 2018-01-01

## 2018-01-01 RX ORDER — VANCOMYCIN HYDROCHLORIDE 25 MG/ML
5 KIT ORAL 4 TIMES DAILY
Qty: 20 ML | Refills: 0 | Status: SHIPPED | OUTPATIENT
Start: 2018-01-01 | End: 2018-01-01

## 2018-01-01 RX ORDER — FUROSEMIDE 40 MG
80 TABLET ORAL
Status: DISCONTINUED | OUTPATIENT
Start: 2018-01-01 | End: 2018-01-01

## 2018-01-01 RX ORDER — CARVEDILOL 6.25 MG/1
6.25 TABLET ORAL
Qty: 180 TABLET | Refills: 1 | Status: ON HOLD | OUTPATIENT
Start: 2018-01-01 | End: 2018-01-01

## 2018-01-01 RX ORDER — TACROLIMUS 0.5 MG/1
CAPSULE ORAL
Qty: 150 CAPSULE | Refills: 11
Start: 2018-01-01 | End: 2018-01-01

## 2018-01-01 RX ORDER — MYCOPHENOLATE MOFETIL 200 MG/ML
250 POWDER, FOR SUSPENSION ORAL
Status: DISCONTINUED | OUTPATIENT
Start: 2018-01-01 | End: 2018-01-01

## 2018-01-01 RX ORDER — ACETAMINOPHEN 325 MG/1
650 TABLET ORAL EVERY 4 HOURS PRN
Status: DISCONTINUED | OUTPATIENT
Start: 2018-01-01 | End: 2018-01-01 | Stop reason: ALTCHOICE

## 2018-01-01 RX ORDER — PREDNISONE 5 MG/ML
5 SOLUTION ORAL DAILY
Qty: 150 ML | Refills: 0 | Status: SHIPPED | OUTPATIENT
Start: 2018-01-01

## 2018-01-01 RX ORDER — LABETALOL HYDROCHLORIDE 5 MG/ML
10 INJECTION, SOLUTION INTRAVENOUS EVERY 4 HOURS PRN
Status: DISCONTINUED | OUTPATIENT
Start: 2018-01-01 | End: 2018-01-01

## 2018-01-01 RX ORDER — VANCOMYCIN HYDROCHLORIDE 50 MG/ML
125 KIT ORAL
Status: DISCONTINUED | OUTPATIENT
Start: 2018-01-01 | End: 2018-01-01

## 2018-01-01 RX ORDER — ATROPINE SULFATE 10 MG/ML
1-2 SOLUTION/ DROPS OPHTHALMIC
Status: DISCONTINUED | OUTPATIENT
Start: 2018-01-01 | End: 2018-01-01 | Stop reason: HOSPADM

## 2018-01-01 RX ADMIN — WARFARIN SODIUM 4 MG: 4 TABLET ORAL at 18:25

## 2018-01-01 RX ADMIN — Medication 1 CAPSULE: at 09:59

## 2018-01-01 RX ADMIN — PANTOPRAZOLE SODIUM 40 MG: 40 TABLET, DELAYED RELEASE ORAL at 16:54

## 2018-01-01 RX ADMIN — FUROSEMIDE 40 MG: 10 INJECTION, SOLUTION INTRAVENOUS at 12:25

## 2018-01-01 RX ADMIN — MYCOPHENOLATE MOFETIL 500 MG: 500 INJECTION, POWDER, LYOPHILIZED, FOR SOLUTION INTRAVENOUS at 11:19

## 2018-01-01 RX ADMIN — INSULIN ASPART 2 UNITS: 100 INJECTION, SOLUTION INTRAVENOUS; SUBCUTANEOUS at 16:39

## 2018-01-01 RX ADMIN — SODIUM PHOSPHATE, MONOBASIC, MONOHYDRATE AND SODIUM PHOSPHATE, DIBASIC, ANHYDROUS 20 MMOL: 276; 142 INJECTION, SOLUTION INTRAVENOUS at 00:35

## 2018-01-01 RX ADMIN — Medication 4 MG: at 10:01

## 2018-01-01 RX ADMIN — Medication 400 UNITS: at 08:48

## 2018-01-01 RX ADMIN — MICONAZOLE NITRATE: 2 POWDER TOPICAL at 10:15

## 2018-01-01 RX ADMIN — VANCOMYCIN HYDROCHLORIDE 125 MG: KIT at 19:22

## 2018-01-01 RX ADMIN — INSULIN ASPART 2 UNITS: 100 INJECTION, SOLUTION INTRAVENOUS; SUBCUTANEOUS at 05:01

## 2018-01-01 RX ADMIN — LEVETIRACETAM 250 MG: 100 SOLUTION ORAL at 08:48

## 2018-01-01 RX ADMIN — CARVEDILOL 6.25 MG: 25 TABLET, FILM COATED ORAL at 20:05

## 2018-01-01 RX ADMIN — MICONAZOLE NITRATE: 2 POWDER TOPICAL at 20:46

## 2018-01-01 RX ADMIN — INSULIN ASPART 1 UNITS: 100 INJECTION, SOLUTION INTRAVENOUS; SUBCUTANEOUS at 20:51

## 2018-01-01 RX ADMIN — Medication 1 PACKET: at 13:19

## 2018-01-01 RX ADMIN — FERROUS SULFATE TAB 325 MG (65 MG ELEMENTAL FE) 325 MG: 325 (65 FE) TAB at 08:49

## 2018-01-01 RX ADMIN — RIFAXIMIN 400 MG: 200 TABLET ORAL at 08:48

## 2018-01-01 RX ADMIN — AMPICILLIN SODIUM AND SULBACTAM SODIUM 3 G: 2; 1 INJECTION, POWDER, FOR SOLUTION INTRAMUSCULAR; INTRAVENOUS at 03:34

## 2018-01-01 RX ADMIN — MICONAZOLE NITRATE: 2 POWDER TOPICAL at 08:36

## 2018-01-01 RX ADMIN — METHYLPREDNISOLONE SODIUM SUCCINATE 16 MG: 40 INJECTION, POWDER, FOR SOLUTION INTRAMUSCULAR; INTRAVENOUS at 13:07

## 2018-01-01 RX ADMIN — ALBUTEROL SULFATE 2.5 MG: 2.5 SOLUTION RESPIRATORY (INHALATION) at 09:49

## 2018-01-01 RX ADMIN — METHYLPREDNISOLONE SODIUM SUCCINATE 16 MG: 40 INJECTION, POWDER, FOR SOLUTION INTRAMUSCULAR; INTRAVENOUS at 13:12

## 2018-01-01 RX ADMIN — Medication 1 PACKET: at 13:43

## 2018-01-01 RX ADMIN — Medication 5 ML: at 09:38

## 2018-01-01 RX ADMIN — INSULIN ASPART 1 UNITS: 100 INJECTION, SOLUTION INTRAVENOUS; SUBCUTANEOUS at 13:20

## 2018-01-01 RX ADMIN — AMLODIPINE BESYLATE 5 MG: 5 TABLET ORAL at 09:10

## 2018-01-01 RX ADMIN — LEVETIRACETAM 500 MG: 5 INJECTION INTRAVENOUS at 21:00

## 2018-01-01 RX ADMIN — LORAZEPAM 2 MG: 2 INJECTION INTRAMUSCULAR; INTRAVENOUS at 16:10

## 2018-01-01 RX ADMIN — CARVEDILOL 6.25 MG: 6.25 TABLET, FILM COATED ORAL at 16:19

## 2018-01-01 RX ADMIN — Medication 0.2 MG: at 20:46

## 2018-01-01 RX ADMIN — VANCOMYCIN HYDROCHLORIDE 1750 MG: 1 INJECTION, POWDER, LYOPHILIZED, FOR SOLUTION INTRAVENOUS at 20:57

## 2018-01-01 RX ADMIN — DEXTROSE MONOHYDRATE 1000 ML: 50 INJECTION, SOLUTION INTRAVENOUS at 20:11

## 2018-01-01 RX ADMIN — PANTOPRAZOLE SODIUM 40 MG: 40 TABLET, DELAYED RELEASE ORAL at 07:58

## 2018-01-01 RX ADMIN — ACETYLCYSTEINE 2 ML: 200 SOLUTION ORAL; RESPIRATORY (INHALATION) at 18:30

## 2018-01-01 RX ADMIN — PANTOPRAZOLE SODIUM 40 MG: 40 TABLET, DELAYED RELEASE ORAL at 08:37

## 2018-01-01 RX ADMIN — CARVEDILOL 6.25 MG: 6.25 TABLET, FILM COATED ORAL at 22:23

## 2018-01-01 RX ADMIN — PANTOPRAZOLE SODIUM 40 MG: 40 TABLET, DELAYED RELEASE ORAL at 09:25

## 2018-01-01 RX ADMIN — Medication 1.5 MG: at 13:19

## 2018-01-01 RX ADMIN — INSULIN ASPART 1 UNITS: 100 INJECTION, SOLUTION INTRAVENOUS; SUBCUTANEOUS at 06:01

## 2018-01-01 RX ADMIN — METRONIDAZOLE 500 MG: 500 INJECTION, SOLUTION INTRAVENOUS at 17:11

## 2018-01-01 RX ADMIN — LEVETIRACETAM 500 MG: 5 INJECTION INTRAVENOUS at 07:59

## 2018-01-01 RX ADMIN — FUROSEMIDE 40 MG: 10 INJECTION, SOLUTION INTRAVENOUS at 15:13

## 2018-01-01 RX ADMIN — PIPERACILLIN SODIUM AND TAZOBACTAM SODIUM 2.25 G: 2; .25 INJECTION, POWDER, LYOPHILIZED, FOR SOLUTION INTRAVENOUS at 12:52

## 2018-01-01 RX ADMIN — MULTIVITAMIN 15 ML: LIQUID ORAL at 08:49

## 2018-01-01 RX ADMIN — MINERAL SUPPLEMENT IRON 300 MG / 5 ML STRENGTH LIQUID 100 PER BOX UNFLAVORED 600 MG: at 09:49

## 2018-01-01 RX ADMIN — ALBUTEROL SULFATE 2.5 MG: 2.5 SOLUTION RESPIRATORY (INHALATION) at 21:37

## 2018-01-01 RX ADMIN — Medication 1 CAPSULE: at 20:15

## 2018-01-01 RX ADMIN — Medication 2 MG: at 16:05

## 2018-01-01 RX ADMIN — CEFEPIME HYDROCHLORIDE 2 G: 2 INJECTION, POWDER, FOR SOLUTION INTRAVENOUS at 10:30

## 2018-01-01 RX ADMIN — BUMETANIDE 4 MG: 0.25 INJECTION INTRAMUSCULAR; INTRAVENOUS at 16:56

## 2018-01-01 RX ADMIN — Medication 125 MG: at 00:13

## 2018-01-01 RX ADMIN — PANTOPRAZOLE SODIUM 40 MG: 40 TABLET, DELAYED RELEASE ORAL at 09:14

## 2018-01-01 RX ADMIN — PANTOPRAZOLE SODIUM 40 MG: 40 TABLET, DELAYED RELEASE ORAL at 16:44

## 2018-01-01 RX ADMIN — CARVEDILOL 6.25 MG: 6.25 TABLET, FILM COATED ORAL at 20:41

## 2018-01-01 RX ADMIN — ACETYLCYSTEINE 2 ML: 200 SOLUTION ORAL; RESPIRATORY (INHALATION) at 08:58

## 2018-01-01 RX ADMIN — Medication 125 MG: at 12:36

## 2018-01-01 RX ADMIN — MYCOPHENOLATE MOFETIL 250 MG: 200 POWDER, FOR SUSPENSION ORAL at 20:19

## 2018-01-01 RX ADMIN — Medication 5 MG: at 13:19

## 2018-01-01 RX ADMIN — Medication 1 MG: at 18:33

## 2018-01-01 RX ADMIN — IPRATROPIUM BROMIDE AND ALBUTEROL SULFATE 3 ML: .5; 3 SOLUTION RESPIRATORY (INHALATION) at 12:48

## 2018-01-01 RX ADMIN — Medication 1 CAPSULE: at 08:35

## 2018-01-01 RX ADMIN — METRONIDAZOLE 500 MG: 500 INJECTION, SOLUTION INTRAVENOUS at 22:50

## 2018-01-01 RX ADMIN — Medication 1.5 MG: at 08:30

## 2018-01-01 RX ADMIN — HEPARIN SODIUM 850 UNITS/HR: 10000 INJECTION, SOLUTION INTRAVENOUS at 15:06

## 2018-01-01 RX ADMIN — CARVEDILOL 6.25 MG: 25 TABLET, FILM COATED ORAL at 21:03

## 2018-01-01 RX ADMIN — Medication 1 PACKET: at 19:04

## 2018-01-01 RX ADMIN — Medication 2 MG: at 09:01

## 2018-01-01 RX ADMIN — PANTOPRAZOLE SODIUM 40 MG: 40 TABLET, DELAYED RELEASE ORAL at 16:05

## 2018-01-01 RX ADMIN — Medication 1 PACKET: at 08:49

## 2018-01-01 RX ADMIN — VANCOMYCIN HYDROCHLORIDE 125 MG: KIT at 09:28

## 2018-01-01 RX ADMIN — Medication 1 CAPSULE: at 08:22

## 2018-01-01 RX ADMIN — VANCOMYCIN HYDROCHLORIDE 125 MG: KIT at 09:44

## 2018-01-01 RX ADMIN — HEPARIN SODIUM 700 UNITS/HR: 10000 INJECTION, SOLUTION INTRAVENOUS at 19:32

## 2018-01-01 RX ADMIN — Medication 1 PACKET: at 09:52

## 2018-01-01 RX ADMIN — LEVETIRACETAM 500 MG: 100 SOLUTION ORAL at 23:04

## 2018-01-01 RX ADMIN — CARVEDILOL 6.25 MG: 6.25 TABLET, FILM COATED ORAL at 18:06

## 2018-01-01 RX ADMIN — SODIUM POLYSTYRENE SULFONATE 15 G: 15 SUSPENSION ORAL; RECTAL at 13:31

## 2018-01-01 RX ADMIN — METRONIDAZOLE 500 MG: 500 INJECTION, SOLUTION INTRAVENOUS at 17:54

## 2018-01-01 RX ADMIN — WARFARIN SODIUM 2.5 MG: 2.5 TABLET ORAL at 17:43

## 2018-01-01 RX ADMIN — PANTOPRAZOLE SODIUM 40 MG: 40 TABLET, DELAYED RELEASE ORAL at 08:44

## 2018-01-01 RX ADMIN — ALBUTEROL SULFATE 2.5 MG: 2.5 SOLUTION RESPIRATORY (INHALATION) at 08:28

## 2018-01-01 RX ADMIN — PIPERACILLIN AND TAZOBACTAM 2.25 G: 2; .25 INJECTION, POWDER, FOR SOLUTION INTRAVENOUS at 18:11

## 2018-01-01 RX ADMIN — ALBUTEROL SULFATE 2.5 MG: 2.5 SOLUTION RESPIRATORY (INHALATION) at 19:41

## 2018-01-01 RX ADMIN — SODIUM CHLORIDE 500 ML: 0.9 INJECTION, SOLUTION INTRAVENOUS at 13:42

## 2018-01-01 RX ADMIN — Medication 0.2 MG: at 01:22

## 2018-01-01 RX ADMIN — Medication: at 18:59

## 2018-01-01 RX ADMIN — LEVETIRACETAM 500 MG: 100 SOLUTION ORAL at 09:39

## 2018-01-01 RX ADMIN — PREDNISONE 5 MG: 5 SOLUTION ORAL at 20:39

## 2018-01-01 RX ADMIN — METRONIDAZOLE 500 MG: 500 INJECTION, SOLUTION INTRAVENOUS at 10:43

## 2018-01-01 RX ADMIN — MYCOPHENOLATE MOFETIL 500 MG: 200 POWDER, FOR SUSPENSION ORAL at 20:13

## 2018-01-01 RX ADMIN — PREDNISONE 5 MG: 5 TABLET ORAL at 17:01

## 2018-01-01 RX ADMIN — ALBUTEROL SULFATE 2.5 MG: 2.5 SOLUTION RESPIRATORY (INHALATION) at 08:00

## 2018-01-01 RX ADMIN — Medication 1 PACKET: at 20:24

## 2018-01-01 RX ADMIN — SODIUM CHLORIDE: 9 INJECTION, SOLUTION INTRAVENOUS at 02:10

## 2018-01-01 RX ADMIN — ACETYLCYSTEINE 2 ML: 200 SOLUTION ORAL; RESPIRATORY (INHALATION) at 08:40

## 2018-01-01 RX ADMIN — INSULIN ASPART 1 UNITS: 100 INJECTION, SOLUTION INTRAVENOUS; SUBCUTANEOUS at 00:46

## 2018-01-01 RX ADMIN — METRONIDAZOLE 500 MG: 500 INJECTION, SOLUTION INTRAVENOUS at 22:30

## 2018-01-01 RX ADMIN — PANTOPRAZOLE SODIUM 40 MG: 40 TABLET, DELAYED RELEASE ORAL at 21:09

## 2018-01-01 RX ADMIN — BUMETANIDE 4 MG: 0.25 INJECTION INTRAMUSCULAR; INTRAVENOUS at 16:31

## 2018-01-01 RX ADMIN — Medication 1.5 MG: at 22:15

## 2018-01-01 RX ADMIN — ALBUTEROL SULFATE 2.5 MG: 2.5 SOLUTION RESPIRATORY (INHALATION) at 13:40

## 2018-01-01 RX ADMIN — BUMETANIDE 4 MG: 1 TABLET ORAL at 15:37

## 2018-01-01 RX ADMIN — MICONAZOLE NITRATE: 2 POWDER TOPICAL at 14:13

## 2018-01-01 RX ADMIN — Medication 5 ML: at 08:45

## 2018-01-01 RX ADMIN — Medication 1.5 MG: at 18:36

## 2018-01-01 RX ADMIN — MYCOPHENOLATE MOFETIL 250 MG: 500 INJECTION, POWDER, LYOPHILIZED, FOR SOLUTION INTRAVENOUS at 23:21

## 2018-01-01 RX ADMIN — Medication 1 CAPSULE: at 19:35

## 2018-01-01 RX ADMIN — Medication 1.5 MG: at 18:01

## 2018-01-01 RX ADMIN — RIFAXIMIN 400 MG: 200 TABLET ORAL at 16:25

## 2018-01-01 RX ADMIN — Medication 20 MG: at 13:13

## 2018-01-01 RX ADMIN — BUMETANIDE 4 MG: 0.25 INJECTION INTRAMUSCULAR; INTRAVENOUS at 04:15

## 2018-01-01 RX ADMIN — ALBUTEROL SULFATE 2.5 MG: 2.5 SOLUTION RESPIRATORY (INHALATION) at 19:46

## 2018-01-01 RX ADMIN — MYCOPHENOLATE MOFETIL 250 MG: 200 POWDER, FOR SUSPENSION ORAL at 20:05

## 2018-01-01 RX ADMIN — PREDNISONE 5 MG: 5 SOLUTION ORAL at 10:02

## 2018-01-01 RX ADMIN — Medication 1 CAPSULE: at 20:30

## 2018-01-01 RX ADMIN — CARVEDILOL 6.25 MG: 25 TABLET, FILM COATED ORAL at 19:37

## 2018-01-01 RX ADMIN — IPRATROPIUM BROMIDE AND ALBUTEROL SULFATE 3 ML: .5; 3 SOLUTION RESPIRATORY (INHALATION) at 11:51

## 2018-01-01 RX ADMIN — Medication 2 MG: at 08:55

## 2018-01-01 RX ADMIN — CARVEDILOL 6.25 MG: 25 TABLET, FILM COATED ORAL at 20:03

## 2018-01-01 RX ADMIN — Medication 1 CAPSULE: at 08:38

## 2018-01-01 RX ADMIN — CARVEDILOL 12.5 MG: 12.5 TABLET, FILM COATED ORAL at 10:46

## 2018-01-01 RX ADMIN — METHYLCELLULOSE 500 MG: 500 TABLET ORAL at 20:24

## 2018-01-01 RX ADMIN — CARVEDILOL 6.25 MG: 25 TABLET, FILM COATED ORAL at 21:14

## 2018-01-01 RX ADMIN — MULTIVITAMIN 15 ML: LIQUID ORAL at 08:48

## 2018-01-01 RX ADMIN — MYCOPHENOLATE MOFETIL 500 MG: 500 INJECTION, POWDER, LYOPHILIZED, FOR SOLUTION INTRAVENOUS at 08:01

## 2018-01-01 RX ADMIN — VANCOMYCIN HYDROCHLORIDE 125 MG: KIT at 04:25

## 2018-01-01 RX ADMIN — PANTOPRAZOLE SODIUM 40 MG: 40 TABLET, DELAYED RELEASE ORAL at 08:54

## 2018-01-01 RX ADMIN — Medication 1.5 MG: at 10:46

## 2018-01-01 RX ADMIN — VANCOMYCIN HYDROCHLORIDE 125 MG: KIT at 09:59

## 2018-01-01 RX ADMIN — CARVEDILOL 6.25 MG: 25 TABLET, FILM COATED ORAL at 09:59

## 2018-01-01 RX ADMIN — BUMETANIDE 4 MG: 1 TABLET ORAL at 18:19

## 2018-01-01 RX ADMIN — FUROSEMIDE 40 MG: 10 INJECTION, SOLUTION INTRAVENOUS at 16:22

## 2018-01-01 RX ADMIN — Medication 1 CAPSULE: at 09:50

## 2018-01-01 RX ADMIN — HYDRALAZINE HYDROCHLORIDE 10 MG: 20 INJECTION INTRAMUSCULAR; INTRAVENOUS at 15:53

## 2018-01-01 RX ADMIN — ACETYLCYSTEINE 2 ML: 200 SOLUTION ORAL; RESPIRATORY (INHALATION) at 09:08

## 2018-01-01 RX ADMIN — PANTOPRAZOLE SODIUM 40 MG: 40 TABLET, DELAYED RELEASE ORAL at 16:46

## 2018-01-01 RX ADMIN — Medication 5 MG: at 03:01

## 2018-01-01 RX ADMIN — MYCOPHENOLATE MOFETIL 250 MG: 500 INJECTION, POWDER, LYOPHILIZED, FOR SOLUTION INTRAVENOUS at 09:34

## 2018-01-01 RX ADMIN — MYCOPHENOLATE MOFETIL 250 MG: 500 INJECTION, POWDER, LYOPHILIZED, FOR SOLUTION INTRAVENOUS at 20:03

## 2018-01-01 RX ADMIN — MYCOPHENOLATE MOFETIL 500 MG: 500 INJECTION, POWDER, LYOPHILIZED, FOR SOLUTION INTRAVENOUS at 10:18

## 2018-01-01 RX ADMIN — RIFAXIMIN 400 MG: 200 TABLET ORAL at 23:06

## 2018-01-01 RX ADMIN — RIFAXIMIN 400 MG: 200 TABLET ORAL at 16:37

## 2018-01-01 RX ADMIN — ACETYLCYSTEINE 2 ML: 200 SOLUTION ORAL; RESPIRATORY (INHALATION) at 08:01

## 2018-01-01 RX ADMIN — LEVETIRACETAM 750 MG: 100 SOLUTION ORAL at 09:26

## 2018-01-01 RX ADMIN — VANCOMYCIN HYDROCHLORIDE 125 MG: KIT at 08:46

## 2018-01-01 RX ADMIN — MYCOPHENOLATE MOFETIL 250 MG: 200 POWDER, FOR SUSPENSION ORAL at 21:00

## 2018-01-01 RX ADMIN — ALBUMIN HUMAN 25 G: 0.25 SOLUTION INTRAVENOUS at 18:15

## 2018-01-01 RX ADMIN — Medication 100 MG: at 08:23

## 2018-01-01 RX ADMIN — BUMETANIDE 4 MG: 1 TABLET ORAL at 08:31

## 2018-01-01 RX ADMIN — MYCOPHENOLATE MOFETIL 500 MG: 500 INJECTION, POWDER, LYOPHILIZED, FOR SOLUTION INTRAVENOUS at 09:31

## 2018-01-01 RX ADMIN — LEVETIRACETAM 750 MG: 100 SOLUTION ORAL at 08:13

## 2018-01-01 RX ADMIN — LEVETIRACETAM 500 MG: 100 SOLUTION ORAL at 21:18

## 2018-01-01 RX ADMIN — CARVEDILOL 6.25 MG: 6.25 TABLET, FILM COATED ORAL at 09:13

## 2018-01-01 RX ADMIN — BACITRACIN: 500 OINTMENT TOPICAL at 08:02

## 2018-01-01 RX ADMIN — METRONIDAZOLE 500 MG: 500 INJECTION, SOLUTION INTRAVENOUS at 17:53

## 2018-01-01 RX ADMIN — Medication 1 PACKET: at 15:11

## 2018-01-01 RX ADMIN — METRONIDAZOLE 500 MG: 500 INJECTION, SOLUTION INTRAVENOUS at 02:56

## 2018-01-01 RX ADMIN — Medication 1 PACKET: at 07:48

## 2018-01-01 RX ADMIN — VANCOMYCIN HYDROCHLORIDE 125 MG: KIT at 04:02

## 2018-01-01 RX ADMIN — ACETYLCYSTEINE 2 ML: 200 SOLUTION ORAL; RESPIRATORY (INHALATION) at 08:28

## 2018-01-01 RX ADMIN — PANTOPRAZOLE SODIUM 40 MG: 40 INJECTION, POWDER, FOR SOLUTION INTRAVENOUS at 02:05

## 2018-01-01 RX ADMIN — WARFARIN SODIUM 3 MG: 3 TABLET ORAL at 18:01

## 2018-01-01 RX ADMIN — Medication 1 CAPSULE: at 20:25

## 2018-01-01 RX ADMIN — PANTOPRAZOLE SODIUM 40 MG: 40 INJECTION, POWDER, FOR SOLUTION INTRAVENOUS at 15:35

## 2018-01-01 RX ADMIN — METRONIDAZOLE 500 MG: 500 INJECTION, SOLUTION INTRAVENOUS at 12:19

## 2018-01-01 RX ADMIN — Medication 1.5 MG: at 23:06

## 2018-01-01 RX ADMIN — LEVETIRACETAM 500 MG: 100 SOLUTION ORAL at 19:47

## 2018-01-01 RX ADMIN — Medication 100 MG: at 19:37

## 2018-01-01 RX ADMIN — MYCOPHENOLATE MOFETIL 500 MG: 200 POWDER, FOR SUSPENSION ORAL at 21:44

## 2018-01-01 RX ADMIN — WARFARIN SODIUM 1 MG: 1 TABLET ORAL at 18:37

## 2018-01-01 RX ADMIN — WARFARIN SODIUM 2 MG: 2 TABLET ORAL at 17:43

## 2018-01-01 RX ADMIN — PANTOPRAZOLE SODIUM 40 MG: 40 INJECTION, POWDER, FOR SOLUTION INTRAVENOUS at 20:58

## 2018-01-01 RX ADMIN — ALBUTEROL SULFATE 2.5 MG: 2.5 SOLUTION RESPIRATORY (INHALATION) at 19:48

## 2018-01-01 RX ADMIN — INSULIN ASPART 2 UNITS: 100 INJECTION, SOLUTION INTRAVENOUS; SUBCUTANEOUS at 00:10

## 2018-01-01 RX ADMIN — MYCOPHENOLATE MOFETIL 250 MG: 200 POWDER, FOR SUSPENSION ORAL at 19:14

## 2018-01-01 RX ADMIN — VANCOMYCIN HYDROCHLORIDE 125 MG: KIT at 16:32

## 2018-01-01 RX ADMIN — LEVETIRACETAM 750 MG: 100 SOLUTION ORAL at 20:22

## 2018-01-01 RX ADMIN — AMPICILLIN SODIUM AND SULBACTAM SODIUM 3 G: 2; 1 INJECTION, POWDER, FOR SOLUTION INTRAMUSCULAR; INTRAVENOUS at 23:47

## 2018-01-01 RX ADMIN — BUMETANIDE 2 MG: 0.25 INJECTION INTRAMUSCULAR; INTRAVENOUS at 10:18

## 2018-01-01 RX ADMIN — Medication 1.5 MG: at 08:01

## 2018-01-01 RX ADMIN — CARVEDILOL 6.25 MG: 25 TABLET, FILM COATED ORAL at 20:17

## 2018-01-01 RX ADMIN — Medication 1.5 MG: at 18:48

## 2018-01-01 RX ADMIN — PANTOPRAZOLE SODIUM 40 MG: 40 TABLET, DELAYED RELEASE ORAL at 08:49

## 2018-01-01 RX ADMIN — METRONIDAZOLE 500 MG: 500 INJECTION, SOLUTION INTRAVENOUS at 11:20

## 2018-01-01 RX ADMIN — INSULIN ASPART 2 UNITS: 100 INJECTION, SOLUTION INTRAVENOUS; SUBCUTANEOUS at 12:32

## 2018-01-01 RX ADMIN — Medication 1.5 MG: at 07:31

## 2018-01-01 RX ADMIN — Medication 100 MG: at 19:47

## 2018-01-01 RX ADMIN — MYCOPHENOLATE MOFETIL 250 MG: 500 INJECTION, POWDER, LYOPHILIZED, FOR SOLUTION INTRAVENOUS at 20:18

## 2018-01-01 RX ADMIN — PIPERACILLIN SODIUM AND TAZOBACTAM SODIUM 2.25 G: 2; .25 INJECTION, POWDER, LYOPHILIZED, FOR SOLUTION INTRAVENOUS at 18:22

## 2018-01-01 RX ADMIN — QUETIAPINE FUMARATE 25 MG: 25 TABLET ORAL at 21:23

## 2018-01-01 RX ADMIN — METRONIDAZOLE 500 MG: 500 INJECTION, SOLUTION INTRAVENOUS at 19:31

## 2018-01-01 RX ADMIN — Medication 0.5 MG: at 23:45

## 2018-01-01 RX ADMIN — INSULIN ASPART 1 UNITS: 100 INJECTION, SOLUTION INTRAVENOUS; SUBCUTANEOUS at 18:37

## 2018-01-01 RX ADMIN — MICONAZOLE NITRATE: 2 POWDER TOPICAL at 10:01

## 2018-01-01 RX ADMIN — CARVEDILOL 6.25 MG: 25 TABLET, FILM COATED ORAL at 09:26

## 2018-01-01 RX ADMIN — PANTOPRAZOLE SODIUM 40 MG: 40 TABLET, DELAYED RELEASE ORAL at 09:49

## 2018-01-01 RX ADMIN — BUMETANIDE 4 MG: 0.25 INJECTION INTRAMUSCULAR; INTRAVENOUS at 17:24

## 2018-01-01 RX ADMIN — CEFEPIME HYDROCHLORIDE 2 G: 2 INJECTION, POWDER, FOR SOLUTION INTRAVENOUS at 10:41

## 2018-01-01 RX ADMIN — ALBUTEROL SULFATE 2.5 MG: 2.5 SOLUTION RESPIRATORY (INHALATION) at 09:19

## 2018-01-01 RX ADMIN — CARVEDILOL 6.25 MG: 25 TABLET, FILM COATED ORAL at 20:24

## 2018-01-01 RX ADMIN — PIPERACILLIN SODIUM AND TAZOBACTAM SODIUM 2.25 G: 2; .25 INJECTION, POWDER, LYOPHILIZED, FOR SOLUTION INTRAVENOUS at 11:17

## 2018-01-01 RX ADMIN — METRONIDAZOLE 500 MG: 500 INJECTION, SOLUTION INTRAVENOUS at 03:16

## 2018-01-01 RX ADMIN — Medication 4 MG: at 11:37

## 2018-01-01 RX ADMIN — LEVETIRACETAM 750 MG: 100 SOLUTION ORAL at 21:09

## 2018-01-01 RX ADMIN — Medication 1.5 MG: at 09:16

## 2018-01-01 RX ADMIN — CARVEDILOL 6.25 MG: 6.25 TABLET, FILM COATED ORAL at 10:32

## 2018-01-01 RX ADMIN — Medication 0.2 MG: at 13:02

## 2018-01-01 RX ADMIN — HYDRALAZINE HYDROCHLORIDE 20 MG: 20 INJECTION INTRAMUSCULAR; INTRAVENOUS at 18:48

## 2018-01-01 RX ADMIN — CARVEDILOL 6.25 MG: 6.25 TABLET, FILM COATED ORAL at 21:09

## 2018-01-01 RX ADMIN — METRONIDAZOLE 500 MG: 500 INJECTION, SOLUTION INTRAVENOUS at 18:42

## 2018-01-01 RX ADMIN — ACETYLCYSTEINE 4 ML: 100 SOLUTION ORAL; RESPIRATORY (INHALATION) at 19:50

## 2018-01-01 RX ADMIN — Medication 5 MG: at 04:27

## 2018-01-01 RX ADMIN — Medication: at 07:59

## 2018-01-01 RX ADMIN — BACITRACIN: 500 OINTMENT TOPICAL at 21:10

## 2018-01-01 RX ADMIN — Medication 1.5 MG: at 08:24

## 2018-01-01 RX ADMIN — AMPICILLIN SODIUM AND SULBACTAM SODIUM 3 G: 2; 1 INJECTION, POWDER, FOR SOLUTION INTRAMUSCULAR; INTRAVENOUS at 22:58

## 2018-01-01 RX ADMIN — PANTOPRAZOLE SODIUM 40 MG: 40 TABLET, DELAYED RELEASE ORAL at 15:38

## 2018-01-01 RX ADMIN — Medication 1 CAPSULE: at 09:02

## 2018-01-01 RX ADMIN — Medication 5 ML: at 09:06

## 2018-01-01 RX ADMIN — SODIUM CHLORIDE 300 ML: 9 INJECTION, SOLUTION INTRAVENOUS at 13:23

## 2018-01-01 RX ADMIN — SODIUM PHOSPHATE, MONOBASIC, MONOHYDRATE AND SODIUM PHOSPHATE, DIBASIC, ANHYDROUS 20 MMOL: 276; 142 INJECTION, SOLUTION INTRAVENOUS at 11:58

## 2018-01-01 RX ADMIN — Medication 10 MG: at 00:48

## 2018-01-01 RX ADMIN — ACETYLCYSTEINE 2 ML: 200 SOLUTION ORAL; RESPIRATORY (INHALATION) at 08:04

## 2018-01-01 RX ADMIN — INSULIN ASPART 1 UNITS: 100 INJECTION, SOLUTION INTRAVENOUS; SUBCUTANEOUS at 14:41

## 2018-01-01 RX ADMIN — MYCOPHENOLATE MOFETIL 250 MG: 200 POWDER, FOR SUSPENSION ORAL at 07:30

## 2018-01-01 RX ADMIN — INSULIN ASPART 2 UNITS: 100 INJECTION, SOLUTION INTRAVENOUS; SUBCUTANEOUS at 16:18

## 2018-01-01 RX ADMIN — LEVETIRACETAM 750 MG: 100 SOLUTION ORAL at 10:02

## 2018-01-01 RX ADMIN — MYCOPHENOLATE MOFETIL 500 MG: 500 INJECTION, POWDER, LYOPHILIZED, FOR SOLUTION INTRAVENOUS at 23:16

## 2018-01-01 RX ADMIN — HYDRALAZINE HYDROCHLORIDE 10 MG: 20 INJECTION INTRAMUSCULAR; INTRAVENOUS at 06:20

## 2018-01-01 RX ADMIN — HYDRALAZINE HYDROCHLORIDE 10 MG: 20 INJECTION INTRAMUSCULAR; INTRAVENOUS at 22:42

## 2018-01-01 RX ADMIN — ALBUTEROL SULFATE 2.5 MG: 2.5 SOLUTION RESPIRATORY (INHALATION) at 09:08

## 2018-01-01 RX ADMIN — Medication: at 14:19

## 2018-01-01 RX ADMIN — PANTOPRAZOLE SODIUM 40 MG: 40 INJECTION, POWDER, FOR SOLUTION INTRAVENOUS at 09:36

## 2018-01-01 RX ADMIN — VANCOMYCIN HYDROCHLORIDE 125 MG: KIT at 20:25

## 2018-01-01 RX ADMIN — Medication 1 CAPSULE: at 20:14

## 2018-01-01 RX ADMIN — LACTULOSE 20 G: 20 SOLUTION ORAL at 13:31

## 2018-01-01 RX ADMIN — VANCOMYCIN HYDROCHLORIDE 125 MG: KIT at 11:56

## 2018-01-01 RX ADMIN — POTASSIUM CHLORIDE 40 MEQ: 1.5 POWDER, FOR SOLUTION ORAL at 08:26

## 2018-01-01 RX ADMIN — MULTIVITAMIN 15 ML: LIQUID ORAL at 08:35

## 2018-01-01 RX ADMIN — BACITRACIN: 500 OINTMENT TOPICAL at 20:16

## 2018-01-01 RX ADMIN — Medication 1 PACKET: at 08:25

## 2018-01-01 RX ADMIN — Medication 1 PACKET: at 14:26

## 2018-01-01 RX ADMIN — INSULIN ASPART 1 UNITS: 100 INJECTION, SOLUTION INTRAVENOUS; SUBCUTANEOUS at 12:35

## 2018-01-01 RX ADMIN — CARVEDILOL 6.25 MG: 25 TABLET, FILM COATED ORAL at 20:19

## 2018-01-01 RX ADMIN — VANCOMYCIN HYDROCHLORIDE 125 MG: KIT at 09:53

## 2018-01-01 RX ADMIN — AMPICILLIN SODIUM AND SULBACTAM SODIUM 1.5 G: 1; .5 INJECTION, POWDER, FOR SOLUTION INTRAMUSCULAR; INTRAVENOUS at 21:03

## 2018-01-01 RX ADMIN — ALBUTEROL SULFATE 2.5 MG: 2.5 SOLUTION RESPIRATORY (INHALATION) at 13:36

## 2018-01-01 RX ADMIN — ALBUTEROL SULFATE 2.5 MG: 2.5 SOLUTION RESPIRATORY (INHALATION) at 08:58

## 2018-01-01 RX ADMIN — Medication 1.5 MG: at 17:57

## 2018-01-01 RX ADMIN — INSULIN ASPART 1 UNITS: 100 INJECTION, SOLUTION INTRAVENOUS; SUBCUTANEOUS at 07:07

## 2018-01-01 RX ADMIN — Medication 0.2 MG: at 12:47

## 2018-01-01 RX ADMIN — QUETIAPINE FUMARATE 25 MG: 25 TABLET ORAL at 20:27

## 2018-01-01 RX ADMIN — PANTOPRAZOLE SODIUM 40 MG: 40 INJECTION, POWDER, FOR SOLUTION INTRAVENOUS at 09:19

## 2018-01-01 RX ADMIN — Medication 1 PACKET: at 16:20

## 2018-01-01 RX ADMIN — AMLODIPINE BESYLATE 5 MG: 10 TABLET ORAL at 08:29

## 2018-01-01 RX ADMIN — METRONIDAZOLE 500 MG: 500 INJECTION, SOLUTION INTRAVENOUS at 00:02

## 2018-01-01 RX ADMIN — PIPERACILLIN SODIUM AND TAZOBACTAM SODIUM 2.25 G: 2; .25 INJECTION, POWDER, LYOPHILIZED, FOR SOLUTION INTRAVENOUS at 23:57

## 2018-01-01 RX ADMIN — INSULIN ASPART 1 UNITS: 100 INJECTION, SOLUTION INTRAVENOUS; SUBCUTANEOUS at 00:48

## 2018-01-01 RX ADMIN — MICONAZOLE NITRATE: 2 POWDER TOPICAL at 21:33

## 2018-01-01 RX ADMIN — VANCOMYCIN HYDROCHLORIDE 125 MG: KIT at 08:40

## 2018-01-01 RX ADMIN — MYCOPHENOLATE MOFETIL 250 MG: 500 INJECTION, POWDER, LYOPHILIZED, FOR SOLUTION INTRAVENOUS at 20:41

## 2018-01-01 RX ADMIN — TACROLIMUS 1 MG: 1 CAPSULE ORAL at 22:05

## 2018-01-01 RX ADMIN — PANTOPRAZOLE SODIUM 40 MG: 40 INJECTION, POWDER, FOR SOLUTION INTRAVENOUS at 17:22

## 2018-01-01 RX ADMIN — MEMANTINE HYDROCHLORIDE 14 MG: 14 CAPSULE, EXTENDED RELEASE ORAL at 09:36

## 2018-01-01 RX ADMIN — CEFTRIAXONE 1 G: 1 INJECTION, POWDER, FOR SOLUTION INTRAMUSCULAR; INTRAVENOUS at 12:23

## 2018-01-01 RX ADMIN — HEPARIN SODIUM 750 UNITS/HR: 10000 INJECTION, SOLUTION INTRAVENOUS at 09:42

## 2018-01-01 RX ADMIN — INSULIN ASPART 1 UNITS: 100 INJECTION, SOLUTION INTRAVENOUS; SUBCUTANEOUS at 22:09

## 2018-01-01 RX ADMIN — BUMETANIDE 4 MG: 0.25 INJECTION INTRAMUSCULAR; INTRAVENOUS at 03:30

## 2018-01-01 RX ADMIN — MYCOPHENOLATE MOFETIL 250 MG: 500 INJECTION, POWDER, LYOPHILIZED, FOR SOLUTION INTRAVENOUS at 22:22

## 2018-01-01 RX ADMIN — MYCOPHENOLATE MOFETIL 250 MG: 200 POWDER, FOR SUSPENSION ORAL at 09:58

## 2018-01-01 RX ADMIN — MICONAZOLE NITRATE: 2 POWDER TOPICAL at 08:26

## 2018-01-01 RX ADMIN — MICONAZOLE NITRATE: 2 POWDER TOPICAL at 20:04

## 2018-01-01 RX ADMIN — ACETYLCYSTEINE 2 ML: 200 SOLUTION ORAL; RESPIRATORY (INHALATION) at 09:04

## 2018-01-01 RX ADMIN — INSULIN ASPART 1 UNITS: 100 INJECTION, SOLUTION INTRAVENOUS; SUBCUTANEOUS at 13:30

## 2018-01-01 RX ADMIN — LEVETIRACETAM 500 MG: 100 SOLUTION ORAL at 19:37

## 2018-01-01 RX ADMIN — FUROSEMIDE 40 MG: 10 INJECTION, SOLUTION INTRAVENOUS at 12:30

## 2018-01-01 RX ADMIN — INSULIN ASPART 2 UNITS: 100 INJECTION, SOLUTION INTRAVENOUS; SUBCUTANEOUS at 00:00

## 2018-01-01 RX ADMIN — INSULIN ASPART 1 UNITS: 100 INJECTION, SOLUTION INTRAVENOUS; SUBCUTANEOUS at 09:04

## 2018-01-01 RX ADMIN — CARVEDILOL 6.25 MG: 25 TABLET, FILM COATED ORAL at 08:51

## 2018-01-01 RX ADMIN — Medication 0.2 MG: at 17:29

## 2018-01-01 RX ADMIN — CARVEDILOL 6.25 MG: 25 TABLET, FILM COATED ORAL at 20:18

## 2018-01-01 RX ADMIN — MULTIVITAMIN 15 ML: LIQUID ORAL at 08:56

## 2018-01-01 RX ADMIN — MYCOPHENOLATE MOFETIL 250 MG: 500 INJECTION, POWDER, LYOPHILIZED, FOR SOLUTION INTRAVENOUS at 21:15

## 2018-01-01 RX ADMIN — SODIUM CHLORIDE: 9 INJECTION, SOLUTION INTRAVENOUS at 10:55

## 2018-01-01 RX ADMIN — ALBUTEROL SULFATE 2.5 MG: 2.5 SOLUTION RESPIRATORY (INHALATION) at 08:24

## 2018-01-01 RX ADMIN — SODIUM PHOSPHATE, MONOBASIC, MONOHYDRATE AND SODIUM PHOSPHATE, DIBASIC, ANHYDROUS 20 MMOL: 276; 142 INJECTION, SOLUTION INTRAVENOUS at 17:34

## 2018-01-01 RX ADMIN — PREDNISONE 5 MG: 5 SOLUTION ORAL at 09:13

## 2018-01-01 RX ADMIN — PIPERACILLIN SODIUM AND TAZOBACTAM SODIUM 2.25 G: 2; .25 INJECTION, POWDER, LYOPHILIZED, FOR SOLUTION INTRAVENOUS at 05:53

## 2018-01-01 RX ADMIN — MULTIVITAMIN 15 ML: LIQUID ORAL at 08:44

## 2018-01-01 RX ADMIN — PIPERACILLIN AND TAZOBACTAM 2.25 G: 2; .25 INJECTION, POWDER, FOR SOLUTION INTRAVENOUS at 06:04

## 2018-01-01 RX ADMIN — METRONIDAZOLE 500 MG: 500 INJECTION, SOLUTION INTRAVENOUS at 00:30

## 2018-01-01 RX ADMIN — MINERAL SUPPLEMENT IRON 300 MG / 5 ML STRENGTH LIQUID 100 PER BOX UNFLAVORED 600 MG: at 08:43

## 2018-01-01 RX ADMIN — MICONAZOLE NITRATE: 2 POWDER TOPICAL at 08:55

## 2018-01-01 RX ADMIN — HEPARIN SODIUM 700 UNITS/HR: 10000 INJECTION, SOLUTION INTRAVENOUS at 22:17

## 2018-01-01 RX ADMIN — Medication 1 CAPSULE: at 08:55

## 2018-01-01 RX ADMIN — INSULIN ASPART 2 UNITS: 100 INJECTION, SOLUTION INTRAVENOUS; SUBCUTANEOUS at 22:40

## 2018-01-01 RX ADMIN — PANTOPRAZOLE SODIUM 40 MG: 40 TABLET, DELAYED RELEASE ORAL at 16:27

## 2018-01-01 RX ADMIN — HYDRALAZINE HYDROCHLORIDE 10 MG: 20 INJECTION INTRAMUSCULAR; INTRAVENOUS at 13:12

## 2018-01-01 RX ADMIN — PIPERACILLIN AND TAZOBACTAM 2.25 G: 2; .25 INJECTION, POWDER, FOR SOLUTION INTRAVENOUS at 11:47

## 2018-01-01 RX ADMIN — Medication 500 ML: at 08:11

## 2018-01-01 RX ADMIN — PANTOPRAZOLE SODIUM 40 MG: 40 INJECTION, POWDER, FOR SOLUTION INTRAVENOUS at 23:10

## 2018-01-01 RX ADMIN — FERROUS SULFATE TAB 325 MG (65 MG ELEMENTAL FE) 325 MG: 325 (65 FE) TAB at 09:25

## 2018-01-01 RX ADMIN — ASPIRIN 81 MG CHEWABLE TABLET 81 MG: 81 TABLET CHEWABLE at 09:16

## 2018-01-01 RX ADMIN — ACETYLCYSTEINE 2 ML: 200 SOLUTION ORAL; RESPIRATORY (INHALATION) at 09:25

## 2018-01-01 RX ADMIN — LEVETIRACETAM 750 MG: 100 SOLUTION ORAL at 20:30

## 2018-01-01 RX ADMIN — HYDROMORPHONE HYDROCHLORIDE 1 MG: 1 SOLUTION ORAL at 21:41

## 2018-01-01 RX ADMIN — Medication 125 MG: at 16:58

## 2018-01-01 RX ADMIN — Medication 1.5 MG: at 17:43

## 2018-01-01 RX ADMIN — ATROPINE SULFATE 2 DROP: 10 SOLUTION/ DROPS OPHTHALMIC at 21:16

## 2018-01-01 RX ADMIN — CARVEDILOL 6.25 MG: 25 TABLET, FILM COATED ORAL at 19:04

## 2018-01-01 RX ADMIN — INSULIN ASPART 1 UNITS: 100 INJECTION, SOLUTION INTRAVENOUS; SUBCUTANEOUS at 22:12

## 2018-01-01 RX ADMIN — INSULIN ASPART 1 UNITS: 100 INJECTION, SOLUTION INTRAVENOUS; SUBCUTANEOUS at 09:52

## 2018-01-01 RX ADMIN — INSULIN ASPART 1 UNITS: 100 INJECTION, SOLUTION INTRAVENOUS; SUBCUTANEOUS at 16:40

## 2018-01-01 RX ADMIN — INSULIN ASPART 1 UNITS: 100 INJECTION, SOLUTION INTRAVENOUS; SUBCUTANEOUS at 16:50

## 2018-01-01 RX ADMIN — MYCOPHENOLATE MOFETIL 500 MG: 500 INJECTION, POWDER, LYOPHILIZED, FOR SOLUTION INTRAVENOUS at 22:29

## 2018-01-01 RX ADMIN — VANCOMYCIN HYDROCHLORIDE 125 MG: KIT at 21:34

## 2018-01-01 RX ADMIN — HYDRALAZINE HYDROCHLORIDE 10 MG: 20 INJECTION INTRAMUSCULAR; INTRAVENOUS at 17:17

## 2018-01-01 RX ADMIN — Medication 1.5 MG: at 17:47

## 2018-01-01 RX ADMIN — HEPARIN SODIUM 1250 UNITS/HR: 10000 INJECTION, SOLUTION INTRAVENOUS at 11:02

## 2018-01-01 RX ADMIN — FUROSEMIDE 40 MG: 10 INJECTION, SOLUTION INTRAVENOUS at 09:39

## 2018-01-01 RX ADMIN — INSULIN ASPART 1 UNITS: 100 INJECTION, SOLUTION INTRAVENOUS; SUBCUTANEOUS at 04:15

## 2018-01-01 RX ADMIN — Medication 5 MG: at 02:41

## 2018-01-01 RX ADMIN — VANCOMYCIN HYDROCHLORIDE 125 MG: KIT at 21:31

## 2018-01-01 RX ADMIN — METOLAZONE 5 MG: 5 TABLET ORAL at 08:51

## 2018-01-01 RX ADMIN — MYCOPHENOLATE MOFETIL 250 MG: 500 INJECTION, POWDER, LYOPHILIZED, FOR SOLUTION INTRAVENOUS at 09:02

## 2018-01-01 RX ADMIN — VANCOMYCIN HYDROCHLORIDE 125 MG: KIT at 10:02

## 2018-01-01 RX ADMIN — Medication 125 MG: at 17:11

## 2018-01-01 RX ADMIN — Medication 1.5 MG: at 18:18

## 2018-01-01 RX ADMIN — SODIUM PHOSPHATE, MONOBASIC, MONOHYDRATE AND SODIUM PHOSPHATE, DIBASIC, ANHYDROUS 15 MMOL: 276; 142 INJECTION, SOLUTION INTRAVENOUS at 20:15

## 2018-01-01 RX ADMIN — MYCOPHENOLATE MOFETIL 500 MG: 500 INJECTION, POWDER, LYOPHILIZED, FOR SOLUTION INTRAVENOUS at 23:59

## 2018-01-01 RX ADMIN — BUMETANIDE 4 MG: 0.25 INJECTION INTRAMUSCULAR; INTRAVENOUS at 17:35

## 2018-01-01 RX ADMIN — LIDOCAINE HYDROCHLORIDE 1 G: 10 INJECTION, SOLUTION INFILTRATION; PERINEURAL at 20:04

## 2018-01-01 RX ADMIN — POTASSIUM CHLORIDE 20 MEQ: 1.5 POWDER, FOR SOLUTION ORAL at 09:41

## 2018-01-01 RX ADMIN — MICONAZOLE NITRATE: 2 POWDER TOPICAL at 09:58

## 2018-01-01 RX ADMIN — Medication 1.5 MG: at 09:15

## 2018-01-01 RX ADMIN — PANTOPRAZOLE SODIUM 40 MG: 40 TABLET, DELAYED RELEASE ORAL at 15:40

## 2018-01-01 RX ADMIN — Medication 400 UNITS: at 09:27

## 2018-01-01 RX ADMIN — HYDRALAZINE HYDROCHLORIDE 10 MG: 20 INJECTION INTRAMUSCULAR; INTRAVENOUS at 21:51

## 2018-01-01 RX ADMIN — FUROSEMIDE 40 MG: 10 INJECTION, SOLUTION INTRAVENOUS at 14:51

## 2018-01-01 RX ADMIN — PANTOPRAZOLE SODIUM 40 MG: 40 TABLET, DELAYED RELEASE ORAL at 16:10

## 2018-01-01 RX ADMIN — ALBUMIN HUMAN 25 G: 0.25 SOLUTION INTRAVENOUS at 12:25

## 2018-01-01 RX ADMIN — CEFDINIR 300 MG: 125 POWDER, FOR SUSPENSION ORAL at 21:24

## 2018-01-01 RX ADMIN — PREDNISONE 5 MG: 5 SOLUTION ORAL at 08:33

## 2018-01-01 RX ADMIN — Medication 125 MG: at 12:44

## 2018-01-01 RX ADMIN — VANCOMYCIN HYDROCHLORIDE 125 MG: KIT at 16:05

## 2018-01-01 RX ADMIN — LEVETIRACETAM 500 MG: 500 TABLET ORAL at 20:14

## 2018-01-01 RX ADMIN — Medication 1 PACKET: at 11:11

## 2018-01-01 RX ADMIN — Medication 1 PACKET: at 08:51

## 2018-01-01 RX ADMIN — Medication 1.5 MG: at 09:27

## 2018-01-01 RX ADMIN — PANTOPRAZOLE SODIUM 20 MG: 40 TABLET, DELAYED RELEASE ORAL at 09:16

## 2018-01-01 RX ADMIN — METRONIDAZOLE 500 MG: 500 INJECTION, SOLUTION INTRAVENOUS at 11:52

## 2018-01-01 RX ADMIN — PREDNISONE 5 MG: 5 TABLET ORAL at 17:37

## 2018-01-01 RX ADMIN — Medication 125 MG: at 09:35

## 2018-01-01 RX ADMIN — LEVETIRACETAM 750 MG: 100 INJECTION, SOLUTION INTRAVENOUS at 19:50

## 2018-01-01 RX ADMIN — INSULIN ASPART 1 UNITS: 100 INJECTION, SOLUTION INTRAVENOUS; SUBCUTANEOUS at 08:00

## 2018-01-01 RX ADMIN — ACETYLCYSTEINE 4 ML: 100 SOLUTION ORAL; RESPIRATORY (INHALATION) at 12:37

## 2018-01-01 RX ADMIN — SODIUM CHLORIDE, POTASSIUM CHLORIDE, SODIUM LACTATE AND CALCIUM CHLORIDE: 600; 310; 30; 20 INJECTION, SOLUTION INTRAVENOUS at 23:48

## 2018-01-01 RX ADMIN — Medication 5 MG: at 07:57

## 2018-01-01 RX ADMIN — VANCOMYCIN HYDROCHLORIDE 125 MG: KIT at 22:08

## 2018-01-01 RX ADMIN — ACETYLCYSTEINE 2 ML: 200 SOLUTION ORAL; RESPIRATORY (INHALATION) at 20:56

## 2018-01-01 RX ADMIN — MINERAL SUPPLEMENT IRON 300 MG / 5 ML STRENGTH LIQUID 100 PER BOX UNFLAVORED 600 MG: at 08:35

## 2018-01-01 RX ADMIN — Medication 1 PACKET: at 14:10

## 2018-01-01 RX ADMIN — TACROLIMUS 1.5 MG: 1 CAPSULE ORAL at 09:59

## 2018-01-01 RX ADMIN — PANTOPRAZOLE SODIUM 40 MG: 40 TABLET, DELAYED RELEASE ORAL at 12:14

## 2018-01-01 RX ADMIN — CIPROFLOXACIN HYDROCHLORIDE 500 MG: 500 TABLET, FILM COATED ORAL at 19:14

## 2018-01-01 RX ADMIN — HYDRALAZINE HYDROCHLORIDE 10 MG: 20 INJECTION INTRAMUSCULAR; INTRAVENOUS at 20:47

## 2018-01-01 RX ADMIN — MULTIVITAMIN 15 ML: LIQUID ORAL at 09:28

## 2018-01-01 RX ADMIN — Medication 5 ML: at 08:24

## 2018-01-01 RX ADMIN — Medication 1 PACKET: at 21:21

## 2018-01-01 RX ADMIN — BUMETANIDE 3 MG: 0.25 INJECTION INTRAMUSCULAR; INTRAVENOUS at 16:28

## 2018-01-01 RX ADMIN — BUMETANIDE 4 MG: 0.25 INJECTION INTRAMUSCULAR; INTRAVENOUS at 03:49

## 2018-01-01 RX ADMIN — INSULIN ASPART 1 UNITS: 100 INJECTION, SOLUTION INTRAVENOUS; SUBCUTANEOUS at 09:07

## 2018-01-01 RX ADMIN — INSULIN ASPART 2 UNITS: 100 INJECTION, SOLUTION INTRAVENOUS; SUBCUTANEOUS at 16:34

## 2018-01-01 RX ADMIN — MYCOPHENOLATE MOFETIL 500 MG: 500 INJECTION, POWDER, LYOPHILIZED, FOR SOLUTION INTRAVENOUS at 21:52

## 2018-01-01 RX ADMIN — METRONIDAZOLE 500 MG: 500 INJECTION, SOLUTION INTRAVENOUS at 12:41

## 2018-01-01 RX ADMIN — BUMETANIDE 4 MG: 1 TABLET ORAL at 09:57

## 2018-01-01 RX ADMIN — HYDROMORPHONE HYDROCHLORIDE 1 MG: 1 SOLUTION ORAL at 22:13

## 2018-01-01 RX ADMIN — MULTIVITAMIN 15 ML: LIQUID ORAL at 09:49

## 2018-01-01 RX ADMIN — Medication 5 MG: at 15:48

## 2018-01-01 RX ADMIN — PANTOPRAZOLE SODIUM 40 MG: 40 TABLET, DELAYED RELEASE ORAL at 08:41

## 2018-01-01 RX ADMIN — VANCOMYCIN HYDROCHLORIDE 125 MG: KIT at 21:37

## 2018-01-01 RX ADMIN — Medication 1.5 MG: at 18:17

## 2018-01-01 RX ADMIN — ACETYLCYSTEINE 2 ML: 200 SOLUTION ORAL; RESPIRATORY (INHALATION) at 08:23

## 2018-01-01 RX ADMIN — Medication 1 CAPSULE: at 08:47

## 2018-01-01 RX ADMIN — PREDNISONE 5 MG: 5 TABLET ORAL at 16:48

## 2018-01-01 RX ADMIN — CEFTRIAXONE 1 G: 1 INJECTION, POWDER, FOR SOLUTION INTRAMUSCULAR; INTRAVENOUS at 13:12

## 2018-01-01 RX ADMIN — Medication 5 ML: at 10:00

## 2018-01-01 RX ADMIN — PANTOPRAZOLE SODIUM 40 MG: 40 INJECTION, POWDER, FOR SOLUTION INTRAVENOUS at 01:42

## 2018-01-01 RX ADMIN — ALBUTEROL SULFATE 2.5 MG: 2.5 SOLUTION RESPIRATORY (INHALATION) at 09:35

## 2018-01-01 RX ADMIN — VANCOMYCIN HYDROCHLORIDE 125 MG: KIT at 18:17

## 2018-01-01 RX ADMIN — Medication 1 PACKET: at 10:05

## 2018-01-01 RX ADMIN — Medication 1 CAPSULE: at 20:04

## 2018-01-01 RX ADMIN — INSULIN ASPART 1 UNITS: 100 INJECTION, SOLUTION INTRAVENOUS; SUBCUTANEOUS at 01:47

## 2018-01-01 RX ADMIN — METRONIDAZOLE 500 MG: 500 INJECTION, SOLUTION INTRAVENOUS at 14:48

## 2018-01-01 RX ADMIN — INSULIN ASPART 1 UNITS: 100 INJECTION, SOLUTION INTRAVENOUS; SUBCUTANEOUS at 12:43

## 2018-01-01 RX ADMIN — WARFARIN SODIUM 2.5 MG: 2.5 TABLET ORAL at 17:10

## 2018-01-01 RX ADMIN — INSULIN ASPART 1 UNITS: 100 INJECTION, SOLUTION INTRAVENOUS; SUBCUTANEOUS at 09:00

## 2018-01-01 RX ADMIN — INSULIN ASPART 1 UNITS: 100 INJECTION, SOLUTION INTRAVENOUS; SUBCUTANEOUS at 00:37

## 2018-01-01 RX ADMIN — SODIUM CHLORIDE 250 ML: 9 INJECTION, SOLUTION INTRAVENOUS at 07:40

## 2018-01-01 RX ADMIN — PANTOPRAZOLE SODIUM 40 MG: 40 TABLET, DELAYED RELEASE ORAL at 17:29

## 2018-01-01 RX ADMIN — INSULIN ASPART 1 UNITS: 100 INJECTION, SOLUTION INTRAVENOUS; SUBCUTANEOUS at 16:04

## 2018-01-01 RX ADMIN — METRONIDAZOLE 500 MG: 500 INJECTION, SOLUTION INTRAVENOUS at 06:02

## 2018-01-01 RX ADMIN — METRONIDAZOLE 500 MG: 500 INJECTION, SOLUTION INTRAVENOUS at 02:15

## 2018-01-01 RX ADMIN — Medication 1 PACKET: at 20:30

## 2018-01-01 RX ADMIN — INSULIN ASPART 2 UNITS: 100 INJECTION, SOLUTION INTRAVENOUS; SUBCUTANEOUS at 12:35

## 2018-01-01 RX ADMIN — PANTOPRAZOLE SODIUM 40 MG: 40 INJECTION, POWDER, FOR SOLUTION INTRAVENOUS at 15:13

## 2018-01-01 RX ADMIN — Medication 1 MG: at 21:09

## 2018-01-01 RX ADMIN — VANCOMYCIN HYDROCHLORIDE 125 MG: KIT at 00:23

## 2018-01-01 RX ADMIN — IPRATROPIUM BROMIDE AND ALBUTEROL SULFATE 3 ML: .5; 3 SOLUTION RESPIRATORY (INHALATION) at 21:22

## 2018-01-01 RX ADMIN — Medication 100 MG: at 19:35

## 2018-01-01 RX ADMIN — FERROUS SULFATE TAB 325 MG (65 MG ELEMENTAL FE) 325 MG: 325 (65 FE) TAB at 08:45

## 2018-01-01 RX ADMIN — Medication: at 15:51

## 2018-01-01 RX ADMIN — METRONIDAZOLE 500 MG: 500 INJECTION, SOLUTION INTRAVENOUS at 02:14

## 2018-01-01 RX ADMIN — MYCOPHENOLATE MOFETIL 500 MG: 500 INJECTION, POWDER, LYOPHILIZED, FOR SOLUTION INTRAVENOUS at 15:28

## 2018-01-01 RX ADMIN — Medication 80 MG: at 20:40

## 2018-01-01 RX ADMIN — Medication 1 CAPSULE: at 21:22

## 2018-01-01 RX ADMIN — Medication 0.2 MG: at 02:30

## 2018-01-01 RX ADMIN — METRONIDAZOLE 500 MG: 500 INJECTION, SOLUTION INTRAVENOUS at 19:00

## 2018-01-01 RX ADMIN — METRONIDAZOLE 500 MG: 500 INJECTION, SOLUTION INTRAVENOUS at 18:29

## 2018-01-01 RX ADMIN — THIAMINE HYDROCHLORIDE 100 MG: 100 INJECTION, SOLUTION INTRAMUSCULAR; INTRAVENOUS at 09:26

## 2018-01-01 RX ADMIN — ACETAMINOPHEN 650 MG: 325 TABLET, FILM COATED ORAL at 16:56

## 2018-01-01 RX ADMIN — PANTOPRAZOLE SODIUM 40 MG: 40 TABLET, DELAYED RELEASE ORAL at 09:58

## 2018-01-01 RX ADMIN — BUMETANIDE 4 MG: 0.25 INJECTION INTRAMUSCULAR; INTRAVENOUS at 15:58

## 2018-01-01 RX ADMIN — MYCOPHENOLATE MOFETIL 500 MG: 500 INJECTION, POWDER, LYOPHILIZED, FOR SOLUTION INTRAVENOUS at 20:59

## 2018-01-01 RX ADMIN — METOLAZONE 5 MG: 5 TABLET ORAL at 09:17

## 2018-01-01 RX ADMIN — Medication 0.2 MG: at 00:50

## 2018-01-01 RX ADMIN — FUROSEMIDE 40 MG: 10 INJECTION, SOLUTION INTRAVENOUS at 01:21

## 2018-01-01 RX ADMIN — ACETAMINOPHEN 650 MG: 325 SOLUTION ORAL at 06:27

## 2018-01-01 RX ADMIN — HYDRALAZINE HYDROCHLORIDE 20 MG: 20 INJECTION INTRAMUSCULAR; INTRAVENOUS at 22:27

## 2018-01-01 RX ADMIN — METRONIDAZOLE 500 MG: 500 INJECTION, SOLUTION INTRAVENOUS at 18:49

## 2018-01-01 RX ADMIN — CARVEDILOL 6.25 MG: 6.25 TABLET, FILM COATED ORAL at 10:00

## 2018-01-01 RX ADMIN — MULTIVITAMIN 15 ML: LIQUID ORAL at 09:56

## 2018-01-01 RX ADMIN — METRONIDAZOLE 500 MG: 500 INJECTION, SOLUTION INTRAVENOUS at 08:47

## 2018-01-01 RX ADMIN — IPRATROPIUM BROMIDE AND ALBUTEROL SULFATE 3 ML: .5; 3 SOLUTION RESPIRATORY (INHALATION) at 21:01

## 2018-01-01 RX ADMIN — Medication 400 UNITS: at 08:12

## 2018-01-01 RX ADMIN — METRONIDAZOLE 500 MG: 500 INJECTION, SOLUTION INTRAVENOUS at 00:01

## 2018-01-01 RX ADMIN — Medication 0.2 MG: at 17:20

## 2018-01-01 RX ADMIN — WARFARIN SODIUM 2.5 MG: 2.5 TABLET ORAL at 20:41

## 2018-01-01 RX ADMIN — PREDNISONE 5 MG: 5 SOLUTION ORAL at 09:27

## 2018-01-01 RX ADMIN — Medication 400 UNITS: at 09:40

## 2018-01-01 RX ADMIN — SCOPOLAMINE 1 PATCH: 1 PATCH, EXTENDED RELEASE TRANSDERMAL at 21:18

## 2018-01-01 RX ADMIN — MEMANTINE HYDROCHLORIDE 21 MG: 21 CAPSULE, EXTENDED RELEASE ORAL at 09:06

## 2018-01-01 RX ADMIN — METRONIDAZOLE 500 MG: 500 INJECTION, SOLUTION INTRAVENOUS at 00:10

## 2018-01-01 RX ADMIN — Medication 5 ML: at 08:41

## 2018-01-01 RX ADMIN — HYDRALAZINE HYDROCHLORIDE 10 MG: 20 INJECTION INTRAMUSCULAR; INTRAVENOUS at 16:16

## 2018-01-01 RX ADMIN — PANTOPRAZOLE SODIUM 40 MG: 40 TABLET, DELAYED RELEASE ORAL at 07:49

## 2018-01-01 RX ADMIN — Medication 0.2 MG: at 10:49

## 2018-01-01 RX ADMIN — Medication 0.2 MG: at 00:17

## 2018-01-01 RX ADMIN — LEVETIRACETAM 750 MG: 100 SOLUTION ORAL at 08:40

## 2018-01-01 RX ADMIN — RIFAXIMIN 400 MG: 200 TABLET ORAL at 09:12

## 2018-01-01 RX ADMIN — INSULIN ASPART 1 UNITS: 100 INJECTION, SOLUTION INTRAVENOUS; SUBCUTANEOUS at 12:24

## 2018-01-01 RX ADMIN — MICONAZOLE NITRATE: 2 POWDER TOPICAL at 19:36

## 2018-01-01 RX ADMIN — BUMETANIDE 4 MG: 0.25 INJECTION INTRAMUSCULAR; INTRAVENOUS at 09:41

## 2018-01-01 RX ADMIN — Medication 2 MG: at 09:13

## 2018-01-01 RX ADMIN — Medication 1 PACKET: at 13:58

## 2018-01-01 RX ADMIN — Medication 0.5 MG: at 19:22

## 2018-01-01 RX ADMIN — METRONIDAZOLE 500 MG: 500 INJECTION, SOLUTION INTRAVENOUS at 17:06

## 2018-01-01 RX ADMIN — INSULIN ASPART 1 UNITS: 100 INJECTION, SOLUTION INTRAVENOUS; SUBCUTANEOUS at 17:25

## 2018-01-01 RX ADMIN — CARVEDILOL 6.25 MG: 25 TABLET, FILM COATED ORAL at 21:31

## 2018-01-01 RX ADMIN — METRONIDAZOLE 500 MG: 500 INJECTION, SOLUTION INTRAVENOUS at 14:14

## 2018-01-01 RX ADMIN — CARVEDILOL 6.25 MG: 6.25 TABLET, FILM COATED ORAL at 08:45

## 2018-01-01 RX ADMIN — METRONIDAZOLE 500 MG: 500 INJECTION, SOLUTION INTRAVENOUS at 09:28

## 2018-01-01 RX ADMIN — Medication 2 MG: at 15:40

## 2018-01-01 RX ADMIN — MYCOPHENOLATE MOFETIL 500 MG: 500 INJECTION, POWDER, LYOPHILIZED, FOR SOLUTION INTRAVENOUS at 19:47

## 2018-01-01 RX ADMIN — LIDOCAINE HYDROCHLORIDE 15 ML: 20 SOLUTION ORAL; TOPICAL at 11:18

## 2018-01-01 RX ADMIN — WARFARIN SODIUM 2.5 MG: 2.5 TABLET ORAL at 18:14

## 2018-01-01 RX ADMIN — Medication 5 MG: at 05:31

## 2018-01-01 RX ADMIN — METHYLCELLULOSE 500 MG: 500 TABLET ORAL at 09:33

## 2018-01-01 RX ADMIN — Medication 400 UNITS: at 08:07

## 2018-01-01 RX ADMIN — BACITRACIN: 500 OINTMENT TOPICAL at 22:12

## 2018-01-01 RX ADMIN — Medication 2 MG: at 09:58

## 2018-01-01 RX ADMIN — BUMETANIDE 4 MG: 0.25 INJECTION INTRAMUSCULAR; INTRAVENOUS at 16:49

## 2018-01-01 RX ADMIN — Medication 400 UNITS: at 08:42

## 2018-01-01 RX ADMIN — MINERAL SUPPLEMENT IRON 300 MG / 5 ML STRENGTH LIQUID 100 PER BOX UNFLAVORED 600 MG: at 08:12

## 2018-01-01 RX ADMIN — INSULIN ASPART 2 UNITS: 100 INJECTION, SOLUTION INTRAVENOUS; SUBCUTANEOUS at 16:06

## 2018-01-01 RX ADMIN — METHYLPREDNISOLONE SODIUM SUCCINATE 16 MG: 40 INJECTION, POWDER, FOR SOLUTION INTRAMUSCULAR; INTRAVENOUS at 15:54

## 2018-01-01 RX ADMIN — Medication 1 MG: at 23:11

## 2018-01-01 RX ADMIN — Medication 1.5 MG: at 09:01

## 2018-01-01 RX ADMIN — HYDRALAZINE HYDROCHLORIDE 10 MG: 20 INJECTION INTRAMUSCULAR; INTRAVENOUS at 22:13

## 2018-01-01 RX ADMIN — Medication 1.5 MG: at 17:24

## 2018-01-01 RX ADMIN — FERROUS SULFATE TAB 325 MG (65 MG ELEMENTAL FE) 325 MG: 325 (65 FE) TAB at 10:03

## 2018-01-01 RX ADMIN — CARVEDILOL 6.25 MG: 25 TABLET, FILM COATED ORAL at 21:17

## 2018-01-01 RX ADMIN — PANTOPRAZOLE SODIUM 40 MG: 40 TABLET, DELAYED RELEASE ORAL at 16:30

## 2018-01-01 RX ADMIN — Medication 1 MG: at 17:36

## 2018-01-01 RX ADMIN — INSULIN ASPART 1 UNITS: 100 INJECTION, SOLUTION INTRAVENOUS; SUBCUTANEOUS at 19:46

## 2018-01-01 RX ADMIN — SODIUM PHOSPHATE, MONOBASIC, MONOHYDRATE AND SODIUM PHOSPHATE, DIBASIC, ANHYDROUS 15 MMOL: 276; 142 INJECTION, SOLUTION INTRAVENOUS at 22:56

## 2018-01-01 RX ADMIN — VANCOMYCIN HYDROCHLORIDE 125 MG: KIT at 19:14

## 2018-01-01 RX ADMIN — PREDNISONE 5 MG: 5 SOLUTION ORAL at 08:46

## 2018-01-01 RX ADMIN — MICONAZOLE NITRATE 1 APPLICATOR: 2 POWDER TOPICAL at 20:51

## 2018-01-01 RX ADMIN — FUROSEMIDE 40 MG: 10 INJECTION, SOLUTION INTRAVENOUS at 13:51

## 2018-01-01 RX ADMIN — INSULIN ASPART 2 UNITS: 100 INJECTION, SOLUTION INTRAVENOUS; SUBCUTANEOUS at 16:03

## 2018-01-01 RX ADMIN — ACETYLCYSTEINE 2 ML: 200 SOLUTION ORAL; RESPIRATORY (INHALATION) at 08:52

## 2018-01-01 RX ADMIN — Medication 125 MG: at 16:45

## 2018-01-01 RX ADMIN — FOLIC ACID 1 MG: 5 INJECTION, SOLUTION INTRAMUSCULAR; INTRAVENOUS; SUBCUTANEOUS at 09:37

## 2018-01-01 RX ADMIN — CARVEDILOL 6.25 MG: 25 TABLET, FILM COATED ORAL at 20:43

## 2018-01-01 RX ADMIN — HEPARIN SODIUM 950 UNITS/HR: 10000 INJECTION, SOLUTION INTRAVENOUS at 18:19

## 2018-01-01 RX ADMIN — METHYLCELLULOSE 500 MG: 500 TABLET ORAL at 20:14

## 2018-01-01 RX ADMIN — CARVEDILOL 6.25 MG: 25 TABLET, FILM COATED ORAL at 20:46

## 2018-01-01 RX ADMIN — ACETYLCYSTEINE 2 ML: 200 SOLUTION ORAL; RESPIRATORY (INHALATION) at 09:19

## 2018-01-01 RX ADMIN — Medication 5 MG: at 16:35

## 2018-01-01 RX ADMIN — VANCOMYCIN HYDROCHLORIDE 125 MG: KIT at 09:20

## 2018-01-01 RX ADMIN — Medication 1 PACKET: at 13:01

## 2018-01-01 RX ADMIN — BUMETANIDE 4 MG: 0.25 INJECTION INTRAMUSCULAR; INTRAVENOUS at 15:57

## 2018-01-01 RX ADMIN — MYCOPHENOLATE MOFETIL 500 MG: 200 POWDER, FOR SUSPENSION ORAL at 20:25

## 2018-01-01 RX ADMIN — ALBUTEROL SULFATE 2.5 MG: 2.5 SOLUTION RESPIRATORY (INHALATION) at 18:30

## 2018-01-01 RX ADMIN — Medication 1 PACKET: at 13:37

## 2018-01-01 RX ADMIN — INSULIN ASPART 1 UNITS: 100 INJECTION, SOLUTION INTRAVENOUS; SUBCUTANEOUS at 12:39

## 2018-01-01 RX ADMIN — MULTIVITAMIN 15 ML: LIQUID ORAL at 08:39

## 2018-01-01 RX ADMIN — VANCOMYCIN HYDROCHLORIDE 125 MG: KIT at 00:31

## 2018-01-01 RX ADMIN — MYCOPHENOLATE MOFETIL 250 MG: 500 INJECTION, POWDER, LYOPHILIZED, FOR SOLUTION INTRAVENOUS at 20:17

## 2018-01-01 RX ADMIN — RIFAXIMIN 400 MG: 200 TABLET ORAL at 21:23

## 2018-01-01 RX ADMIN — LEVETIRACETAM 500 MG: 5 INJECTION INTRAVENOUS at 08:36

## 2018-01-01 RX ADMIN — METRONIDAZOLE 500 MG: 500 INJECTION, SOLUTION INTRAVENOUS at 15:51

## 2018-01-01 RX ADMIN — METRONIDAZOLE 500 MG: 500 INJECTION, SOLUTION INTRAVENOUS at 17:26

## 2018-01-01 RX ADMIN — Medication 1 CAPSULE: at 08:31

## 2018-01-01 RX ADMIN — MYCOPHENOLATE MOFETIL 250 MG: 200 POWDER, FOR SUSPENSION ORAL at 20:23

## 2018-01-01 RX ADMIN — VANCOMYCIN HYDROCHLORIDE 125 MG: KIT at 08:01

## 2018-01-01 RX ADMIN — INSULIN ASPART 2 UNITS: 100 INJECTION, SOLUTION INTRAVENOUS; SUBCUTANEOUS at 16:14

## 2018-01-01 RX ADMIN — MYCOPHENOLATE MOFETIL 250 MG: 200 POWDER, FOR SUSPENSION ORAL at 08:46

## 2018-01-01 RX ADMIN — PREDNISONE 5 MG: 5 SOLUTION ORAL at 08:35

## 2018-01-01 RX ADMIN — METRONIDAZOLE 500 MG: 500 INJECTION, SOLUTION INTRAVENOUS at 13:14

## 2018-01-01 RX ADMIN — FUROSEMIDE 80 MG: 40 TABLET ORAL at 16:44

## 2018-01-01 RX ADMIN — AMPICILLIN SODIUM AND SULBACTAM SODIUM 1.5 G: 1; .5 INJECTION, POWDER, FOR SOLUTION INTRAMUSCULAR; INTRAVENOUS at 15:46

## 2018-01-01 RX ADMIN — PANTOPRAZOLE SODIUM 40 MG: 40 TABLET, DELAYED RELEASE ORAL at 17:17

## 2018-01-01 RX ADMIN — WARFARIN SODIUM 2 MG: 2 TABLET ORAL at 18:11

## 2018-01-01 RX ADMIN — METRONIDAZOLE 500 MG: 500 INJECTION, SOLUTION INTRAVENOUS at 10:54

## 2018-01-01 RX ADMIN — CARVEDILOL 6.25 MG: 25 TABLET, FILM COATED ORAL at 08:49

## 2018-01-01 RX ADMIN — INSULIN ASPART 1 UNITS: 100 INJECTION, SOLUTION INTRAVENOUS; SUBCUTANEOUS at 10:35

## 2018-01-01 RX ADMIN — Medication 1.5 MG: at 08:31

## 2018-01-01 RX ADMIN — Medication 2 MG: at 15:38

## 2018-01-01 RX ADMIN — DEXTROSE MONOHYDRATE 10 ML: 50 INJECTION, SOLUTION INTRAVENOUS at 15:40

## 2018-01-01 RX ADMIN — PANTOPRAZOLE SODIUM 40 MG: 40 TABLET, DELAYED RELEASE ORAL at 13:18

## 2018-01-01 RX ADMIN — Medication 5 ML: at 09:09

## 2018-01-01 RX ADMIN — CIPROFLOXACIN HYDROCHLORIDE 500 MG: 500 TABLET, FILM COATED ORAL at 09:25

## 2018-01-01 RX ADMIN — Medication 5 ML: at 09:39

## 2018-01-01 RX ADMIN — Medication 2 G: at 10:23

## 2018-01-01 RX ADMIN — Medication 1.5 MG: at 08:55

## 2018-01-01 RX ADMIN — FOLIC ACID 1 MG: 5 INJECTION, SOLUTION INTRAMUSCULAR; INTRAVENOUS; SUBCUTANEOUS at 09:59

## 2018-01-01 RX ADMIN — CIPROFLOXACIN HYDROCHLORIDE 500 MG: 500 TABLET, FILM COATED ORAL at 19:11

## 2018-01-01 RX ADMIN — HYDRALAZINE HYDROCHLORIDE 10 MG: 20 INJECTION INTRAMUSCULAR; INTRAVENOUS at 21:19

## 2018-01-01 RX ADMIN — CARVEDILOL 6.25 MG: 25 TABLET, FILM COATED ORAL at 09:40

## 2018-01-01 RX ADMIN — SODIUM CHLORIDE 250 ML: 9 INJECTION, SOLUTION INTRAVENOUS at 14:10

## 2018-01-01 RX ADMIN — PANTOPRAZOLE SODIUM 40 MG: 40 TABLET, DELAYED RELEASE ORAL at 09:00

## 2018-01-01 RX ADMIN — BUMETANIDE 4 MG: 0.25 INJECTION INTRAMUSCULAR; INTRAVENOUS at 04:08

## 2018-01-01 RX ADMIN — ALBUTEROL SULFATE 2.5 MG: 2.5 SOLUTION RESPIRATORY (INHALATION) at 21:03

## 2018-01-01 RX ADMIN — FUROSEMIDE 80 MG: 10 INJECTION, SOLUTION INTRAVENOUS at 00:55

## 2018-01-01 RX ADMIN — PREDNISONE 5 MG: 5 TABLET ORAL at 16:46

## 2018-01-01 RX ADMIN — RIFAXIMIN 400 MG: 200 TABLET ORAL at 21:17

## 2018-01-01 RX ADMIN — PREDNISONE 5 MG: 5 SOLUTION ORAL at 08:59

## 2018-01-01 RX ADMIN — ZINC SULFATE CAP 220 MG (50 MG ELEMENTAL ZN) 220 MG: 220 (50 ZN) CAP at 09:09

## 2018-01-01 RX ADMIN — PREDNISONE 5 MG: 5 TABLET ORAL at 17:32

## 2018-01-01 RX ADMIN — MICONAZOLE NITRATE: 2 POWDER TOPICAL at 08:48

## 2018-01-01 RX ADMIN — INSULIN ASPART 1 UNITS: 100 INJECTION, SOLUTION INTRAVENOUS; SUBCUTANEOUS at 18:06

## 2018-01-01 RX ADMIN — MYCOPHENOLATE MOFETIL 500 MG: 500 INJECTION, POWDER, LYOPHILIZED, FOR SOLUTION INTRAVENOUS at 07:59

## 2018-01-01 RX ADMIN — Medication 100 MG: at 08:59

## 2018-01-01 RX ADMIN — Medication 0.2 MG: at 05:59

## 2018-01-01 RX ADMIN — CARVEDILOL 6.25 MG: 25 TABLET, FILM COATED ORAL at 07:58

## 2018-01-01 RX ADMIN — Medication 100 MG: at 22:25

## 2018-01-01 RX ADMIN — BUMETANIDE 4 MG: 0.25 INJECTION INTRAMUSCULAR; INTRAVENOUS at 17:49

## 2018-01-01 RX ADMIN — INSULIN ASPART 1 UNITS: 100 INJECTION, SOLUTION INTRAVENOUS; SUBCUTANEOUS at 16:19

## 2018-01-01 RX ADMIN — METRONIDAZOLE 500 MG: 500 INJECTION, SOLUTION INTRAVENOUS at 14:09

## 2018-01-01 RX ADMIN — AMPICILLIN SODIUM AND SULBACTAM SODIUM 3 G: 2; 1 INJECTION, POWDER, FOR SOLUTION INTRAMUSCULAR; INTRAVENOUS at 06:20

## 2018-01-01 RX ADMIN — Medication 1.5 MG: at 21:13

## 2018-01-01 RX ADMIN — INSULIN ASPART 2 UNITS: 100 INJECTION, SOLUTION INTRAVENOUS; SUBCUTANEOUS at 13:42

## 2018-01-01 RX ADMIN — LEVETIRACETAM 250 MG: 100 INJECTION, SOLUTION INTRAVENOUS at 13:43

## 2018-01-01 RX ADMIN — Medication 125 MG: at 13:14

## 2018-01-01 RX ADMIN — ALBUTEROL SULFATE 2.5 MG: 2.5 SOLUTION RESPIRATORY (INHALATION) at 08:01

## 2018-01-01 RX ADMIN — RIFAXIMIN 400 MG: 200 TABLET ORAL at 09:58

## 2018-01-01 RX ADMIN — LEVETIRACETAM 250 MG: 100 SOLUTION ORAL at 19:56

## 2018-01-01 RX ADMIN — CARVEDILOL 6.25 MG: 6.25 TABLET, FILM COATED ORAL at 14:20

## 2018-01-01 RX ADMIN — PIPERACILLIN AND TAZOBACTAM 2.25 G: 2; .25 INJECTION, POWDER, FOR SOLUTION INTRAVENOUS at 18:12

## 2018-01-01 RX ADMIN — MYCOPHENOLATE MOFETIL 250 MG: 500 INJECTION, POWDER, LYOPHILIZED, FOR SOLUTION INTRAVENOUS at 09:08

## 2018-01-01 RX ADMIN — INSULIN ASPART 1 UNITS: 100 INJECTION, SOLUTION INTRAVENOUS; SUBCUTANEOUS at 20:15

## 2018-01-01 RX ADMIN — METRONIDAZOLE 500 MG: 500 INJECTION, SOLUTION INTRAVENOUS at 12:00

## 2018-01-01 RX ADMIN — Medication 0.4 MG: at 03:30

## 2018-01-01 RX ADMIN — METRONIDAZOLE 500 MG: 500 INJECTION, SOLUTION INTRAVENOUS at 19:22

## 2018-01-01 RX ADMIN — Medication 1.5 MG: at 10:36

## 2018-01-01 RX ADMIN — Medication 1.5 MG: at 17:10

## 2018-01-01 RX ADMIN — Medication 0.5 MG: at 23:31

## 2018-01-01 RX ADMIN — THIAMINE HYDROCHLORIDE 100 MG: 100 INJECTION, SOLUTION INTRAMUSCULAR; INTRAVENOUS at 09:22

## 2018-01-01 RX ADMIN — VANCOMYCIN HYDROCHLORIDE 125 MG: KIT at 22:15

## 2018-01-01 RX ADMIN — CIPROFLOXACIN HYDROCHLORIDE 500 MG: 500 TABLET, FILM COATED ORAL at 10:02

## 2018-01-01 RX ADMIN — WARFARIN SODIUM 2.5 MG: 2.5 TABLET ORAL at 18:33

## 2018-01-01 RX ADMIN — SODIUM CHLORIDE 1000 ML: 900 INJECTION, SOLUTION INTRAVENOUS at 11:46

## 2018-01-01 RX ADMIN — Medication 0.2 MG: at 11:43

## 2018-01-01 RX ADMIN — WARFARIN SODIUM 4 MG: 4 TABLET ORAL at 18:19

## 2018-01-01 RX ADMIN — ALBUTEROL SULFATE 2.5 MG: 2.5 SOLUTION RESPIRATORY (INHALATION) at 20:01

## 2018-01-01 RX ADMIN — RIFAXIMIN 400 MG: 200 TABLET ORAL at 20:25

## 2018-01-01 RX ADMIN — Medication 1 MG: at 19:11

## 2018-01-01 RX ADMIN — LEVETIRACETAM 750 MG: 100 SOLUTION ORAL at 19:38

## 2018-01-01 RX ADMIN — CARVEDILOL 6.25 MG: 25 TABLET, FILM COATED ORAL at 10:10

## 2018-01-01 RX ADMIN — BUMETANIDE 2 MG: 0.25 INJECTION INTRAMUSCULAR; INTRAVENOUS at 16:32

## 2018-01-01 RX ADMIN — IPRATROPIUM BROMIDE AND ALBUTEROL SULFATE 3 ML: .5; 3 SOLUTION RESPIRATORY (INHALATION) at 19:49

## 2018-01-01 RX ADMIN — INSULIN ASPART 2 UNITS: 100 INJECTION, SOLUTION INTRAVENOUS; SUBCUTANEOUS at 08:41

## 2018-01-01 RX ADMIN — Medication 400 UNITS: at 07:59

## 2018-01-01 RX ADMIN — ACETYLCYSTEINE 2 ML: 200 SOLUTION ORAL; RESPIRATORY (INHALATION) at 13:46

## 2018-01-01 RX ADMIN — ALBUTEROL SULFATE 2.5 MG: 2.5 SOLUTION RESPIRATORY (INHALATION) at 08:21

## 2018-01-01 RX ADMIN — ZINC SULFATE CAP 220 MG (50 MG ELEMENTAL ZN) 220 MG: 220 (50 ZN) CAP at 08:23

## 2018-01-01 RX ADMIN — Medication 0.4 MG: at 06:26

## 2018-01-01 RX ADMIN — BUMETANIDE 4 MG: 0.25 INJECTION INTRAMUSCULAR; INTRAVENOUS at 03:37

## 2018-01-01 RX ADMIN — METRONIDAZOLE 500 MG: 500 INJECTION, SOLUTION INTRAVENOUS at 16:26

## 2018-01-01 RX ADMIN — Medication 400 UNITS: at 08:51

## 2018-01-01 RX ADMIN — MINERAL SUPPLEMENT IRON 300 MG / 5 ML STRENGTH LIQUID 100 PER BOX UNFLAVORED 600 MG: at 08:59

## 2018-01-01 RX ADMIN — CIPROFLOXACIN HYDROCHLORIDE 500 MG: 500 TABLET, FILM COATED ORAL at 09:06

## 2018-01-01 RX ADMIN — Medication 1 CAPSULE: at 09:40

## 2018-01-01 RX ADMIN — MINERAL SUPPLEMENT IRON 300 MG / 5 ML STRENGTH LIQUID 100 PER BOX UNFLAVORED 600 MG: at 08:54

## 2018-01-01 RX ADMIN — INSULIN ASPART 1 UNITS: 100 INJECTION, SOLUTION INTRAVENOUS; SUBCUTANEOUS at 21:23

## 2018-01-01 RX ADMIN — AMLODIPINE BESYLATE 5 MG: 5 TABLET ORAL at 08:38

## 2018-01-01 RX ADMIN — MYCOPHENOLATE MOFETIL 250 MG: 200 POWDER, FOR SUSPENSION ORAL at 08:24

## 2018-01-01 RX ADMIN — AMPICILLIN SODIUM AND SULBACTAM SODIUM 1.5 G: 1; .5 INJECTION, POWDER, FOR SOLUTION INTRAMUSCULAR; INTRAVENOUS at 16:42

## 2018-01-01 RX ADMIN — MULTIVITAMIN 15 ML: LIQUID ORAL at 08:12

## 2018-01-01 RX ADMIN — MYCOPHENOLATE MOFETIL 500 MG: 500 INJECTION, POWDER, LYOPHILIZED, FOR SOLUTION INTRAVENOUS at 03:39

## 2018-01-01 RX ADMIN — Medication 1 PACKET: at 10:10

## 2018-01-01 RX ADMIN — Medication 1 CAPSULE: at 08:09

## 2018-01-01 RX ADMIN — Medication 1.5 MG: at 18:14

## 2018-01-01 RX ADMIN — CARVEDILOL 6.25 MG: 25 TABLET, FILM COATED ORAL at 22:25

## 2018-01-01 RX ADMIN — LEVETIRACETAM 500 MG: 100 SOLUTION ORAL at 22:43

## 2018-01-01 RX ADMIN — VANCOMYCIN HYDROCHLORIDE 125 MG: KIT at 20:19

## 2018-01-01 RX ADMIN — ALBUTEROL SULFATE 2.5 MG: 2.5 SOLUTION RESPIRATORY (INHALATION) at 20:10

## 2018-01-01 RX ADMIN — BUMETANIDE 2 MG: 0.25 INJECTION INTRAMUSCULAR; INTRAVENOUS at 08:39

## 2018-01-01 RX ADMIN — WARFARIN SODIUM 2.5 MG: 2.5 TABLET ORAL at 17:21

## 2018-01-01 RX ADMIN — MICONAZOLE NITRATE: 2 POWDER TOPICAL at 09:30

## 2018-01-01 RX ADMIN — PIPERACILLIN AND TAZOBACTAM 2.25 G: 2; .25 INJECTION, POWDER, FOR SOLUTION INTRAVENOUS at 23:41

## 2018-01-01 RX ADMIN — CARVEDILOL 6.25 MG: 25 TABLET, FILM COATED ORAL at 21:28

## 2018-01-01 RX ADMIN — MEMANTINE HYDROCHLORIDE 14 MG: 14 CAPSULE, EXTENDED RELEASE ORAL at 09:19

## 2018-01-01 RX ADMIN — METRONIDAZOLE 500 MG: 500 INJECTION, SOLUTION INTRAVENOUS at 18:47

## 2018-01-01 RX ADMIN — AMPICILLIN SODIUM AND SULBACTAM SODIUM 3 G: 2; 1 INJECTION, POWDER, FOR SOLUTION INTRAMUSCULAR; INTRAVENOUS at 09:28

## 2018-01-01 RX ADMIN — ATROPINE SULFATE 2 DROP: 10 SOLUTION/ DROPS OPHTHALMIC at 23:38

## 2018-01-01 RX ADMIN — Medication 125 MG: at 23:48

## 2018-01-01 RX ADMIN — PREDNISONE 5 MG: 5 SOLUTION ORAL at 08:54

## 2018-01-01 RX ADMIN — VANCOMYCIN HYDROCHLORIDE 125 MG: KIT at 08:23

## 2018-01-01 RX ADMIN — Medication 1 CAPSULE: at 08:00

## 2018-01-01 RX ADMIN — VANCOMYCIN HYDROCHLORIDE 125 MG: KIT at 15:57

## 2018-01-01 RX ADMIN — FUROSEMIDE 20 MG: 20 TABLET ORAL at 09:16

## 2018-01-01 RX ADMIN — VANCOMYCIN HYDROCHLORIDE 125 MG: KIT at 21:43

## 2018-01-01 RX ADMIN — CARVEDILOL 6.25 MG: 25 TABLET, FILM COATED ORAL at 08:59

## 2018-01-01 RX ADMIN — SODIUM CHLORIDE: 9 INJECTION, SOLUTION INTRAVENOUS at 19:50

## 2018-01-01 RX ADMIN — Medication 1.5 MG: at 08:00

## 2018-01-01 RX ADMIN — PANTOPRAZOLE SODIUM 40 MG: 40 TABLET, DELAYED RELEASE ORAL at 15:57

## 2018-01-01 RX ADMIN — Medication 2 MG: at 16:02

## 2018-01-01 RX ADMIN — ACETYLCYSTEINE 2 ML: 200 SOLUTION ORAL; RESPIRATORY (INHALATION) at 20:01

## 2018-01-01 RX ADMIN — METRONIDAZOLE 500 MG: 500 INJECTION, SOLUTION INTRAVENOUS at 06:44

## 2018-01-01 RX ADMIN — RIFAXIMIN 400 MG: 200 TABLET ORAL at 14:58

## 2018-01-01 RX ADMIN — Medication 1.5 MG: at 18:41

## 2018-01-01 RX ADMIN — MYCOPHENOLATE MOFETIL 250 MG: 200 POWDER, FOR SUSPENSION ORAL at 19:11

## 2018-01-01 RX ADMIN — VANCOMYCIN HYDROCHLORIDE 125 MG: KIT at 12:33

## 2018-01-01 RX ADMIN — LEVETIRACETAM 500 MG: 100 SOLUTION ORAL at 09:00

## 2018-01-01 RX ADMIN — AMLODIPINE BESYLATE 5 MG: 5 TABLET ORAL at 13:11

## 2018-01-01 RX ADMIN — INSULIN ASPART 1 UNITS: 100 INJECTION, SOLUTION INTRAVENOUS; SUBCUTANEOUS at 12:02

## 2018-01-01 RX ADMIN — CIPROFLOXACIN HYDROCHLORIDE 500 MG: 500 TABLET, FILM COATED ORAL at 08:23

## 2018-01-01 RX ADMIN — LEVETIRACETAM 500 MG: 100 SOLUTION ORAL at 20:18

## 2018-01-01 RX ADMIN — ZINC SULFATE CAP 220 MG (50 MG ELEMENTAL ZN) 220 MG: 220 (50 ZN) CAP at 09:39

## 2018-01-01 RX ADMIN — VANCOMYCIN HYDROCHLORIDE 125 MG: KIT at 16:45

## 2018-01-01 RX ADMIN — PANTOPRAZOLE SODIUM 40 MG: 40 TABLET, DELAYED RELEASE ORAL at 08:19

## 2018-01-01 RX ADMIN — Medication 125 MG: at 11:55

## 2018-01-01 RX ADMIN — Medication 1.5 MG: at 09:38

## 2018-01-01 RX ADMIN — PANTOPRAZOLE SODIUM 40 MG: 40 TABLET, DELAYED RELEASE ORAL at 09:40

## 2018-01-01 RX ADMIN — Medication 1 MG: at 19:04

## 2018-01-01 RX ADMIN — Medication 100 MG: at 09:09

## 2018-01-01 RX ADMIN — MYCOPHENOLATE MOFETIL 250 MG: 500 INJECTION, POWDER, LYOPHILIZED, FOR SOLUTION INTRAVENOUS at 22:15

## 2018-01-01 RX ADMIN — VANCOMYCIN HYDROCHLORIDE 125 MG: KIT at 04:07

## 2018-01-01 RX ADMIN — Medication 80 MG: at 08:00

## 2018-01-01 RX ADMIN — INSULIN ASPART 1 UNITS: 100 INJECTION, SOLUTION INTRAVENOUS; SUBCUTANEOUS at 08:56

## 2018-01-01 RX ADMIN — SODIUM CHLORIDE 500 ML: 9 INJECTION, SOLUTION INTRAVENOUS at 03:31

## 2018-01-01 RX ADMIN — Medication 1.5 MG: at 17:45

## 2018-01-01 RX ADMIN — INSULIN ASPART 1 UNITS: 100 INJECTION, SOLUTION INTRAVENOUS; SUBCUTANEOUS at 12:08

## 2018-01-01 RX ADMIN — CEFEPIME HYDROCHLORIDE 2 G: 2 INJECTION, POWDER, FOR SOLUTION INTRAVENOUS at 10:25

## 2018-01-01 RX ADMIN — Medication 1 PACKET: at 20:16

## 2018-01-01 RX ADMIN — Medication 5 ML: at 08:49

## 2018-01-01 RX ADMIN — MICONAZOLE NITRATE: 2 POWDER TOPICAL at 22:14

## 2018-01-01 RX ADMIN — HYDRALAZINE HYDROCHLORIDE 10 MG: 20 INJECTION INTRAMUSCULAR; INTRAVENOUS at 20:58

## 2018-01-01 RX ADMIN — SODIUM CHLORIDE: 9 INJECTION, SOLUTION INTRAVENOUS at 04:36

## 2018-01-01 RX ADMIN — THIAMINE HYDROCHLORIDE 100 MG: 100 INJECTION, SOLUTION INTRAMUSCULAR; INTRAVENOUS at 09:35

## 2018-01-01 RX ADMIN — Medication 1 CAPSULE: at 10:15

## 2018-01-01 RX ADMIN — PREDNISONE 5 MG: 5 SOLUTION ORAL at 08:48

## 2018-01-01 RX ADMIN — BUMETANIDE 4 MG: 0.25 INJECTION INTRAMUSCULAR; INTRAVENOUS at 17:05

## 2018-01-01 RX ADMIN — Medication 1.5 MG: at 17:32

## 2018-01-01 RX ADMIN — AMLODIPINE BESYLATE 5 MG: 5 TABLET ORAL at 20:41

## 2018-01-01 RX ADMIN — HYDRALAZINE HYDROCHLORIDE 10 MG: 20 INJECTION INTRAMUSCULAR; INTRAVENOUS at 02:16

## 2018-01-01 RX ADMIN — CARVEDILOL 6.25 MG: 25 TABLET, FILM COATED ORAL at 07:31

## 2018-01-01 RX ADMIN — INSULIN ASPART 1 UNITS: 100 INJECTION, SOLUTION INTRAVENOUS; SUBCUTANEOUS at 06:07

## 2018-01-01 RX ADMIN — Medication 1.5 MG: at 10:07

## 2018-01-01 RX ADMIN — Medication 100 MG: at 08:49

## 2018-01-01 RX ADMIN — BUMETANIDE 4 MG: 1 TABLET ORAL at 17:25

## 2018-01-01 RX ADMIN — FUROSEMIDE 80 MG: 40 TABLET ORAL at 08:45

## 2018-01-01 RX ADMIN — Medication 1.5 MG: at 09:05

## 2018-01-01 RX ADMIN — ALBUTEROL SULFATE 2.5 MG: 2.5 SOLUTION RESPIRATORY (INHALATION) at 20:56

## 2018-01-01 RX ADMIN — LEVETIRACETAM 500 MG: 100 SOLUTION ORAL at 08:34

## 2018-01-01 RX ADMIN — Medication 1 PACKET: at 09:22

## 2018-01-01 RX ADMIN — Medication 20 MG: at 21:13

## 2018-01-01 RX ADMIN — PANTOPRAZOLE SODIUM 40 MG: 40 TABLET, DELAYED RELEASE ORAL at 08:50

## 2018-01-01 RX ADMIN — AMLODIPINE BESYLATE 5 MG: 10 TABLET ORAL at 08:32

## 2018-01-01 RX ADMIN — CARVEDILOL 6.25 MG: 25 TABLET, FILM COATED ORAL at 09:27

## 2018-01-01 RX ADMIN — Medication 1 CAPSULE: at 09:01

## 2018-01-01 RX ADMIN — METRONIDAZOLE 500 MG: 500 INJECTION, SOLUTION INTRAVENOUS at 01:15

## 2018-01-01 RX ADMIN — PREDNISONE 10 MG: 5 TABLET ORAL at 16:01

## 2018-01-01 RX ADMIN — INSULIN ASPART 1 UNITS: 100 INJECTION, SOLUTION INTRAVENOUS; SUBCUTANEOUS at 17:43

## 2018-01-01 RX ADMIN — CARVEDILOL 6.25 MG: 25 TABLET, FILM COATED ORAL at 19:36

## 2018-01-01 RX ADMIN — INSULIN ASPART 1 UNITS: 100 INJECTION, SOLUTION INTRAVENOUS; SUBCUTANEOUS at 04:08

## 2018-01-01 RX ADMIN — MICONAZOLE NITRATE: 2 POWDER TOPICAL at 20:24

## 2018-01-01 RX ADMIN — RIFAXIMIN 400 MG: 200 TABLET ORAL at 19:47

## 2018-01-01 RX ADMIN — CARVEDILOL 6.25 MG: 25 TABLET, FILM COATED ORAL at 08:36

## 2018-01-01 RX ADMIN — INSULIN ASPART 1 UNITS: 100 INJECTION, SOLUTION INTRAVENOUS; SUBCUTANEOUS at 08:23

## 2018-01-01 RX ADMIN — SODIUM PHOSPHATE, MONOBASIC, MONOHYDRATE AND SODIUM PHOSPHATE, DIBASIC, ANHYDROUS 25 MMOL: 276; 142 INJECTION, SOLUTION INTRAVENOUS at 19:34

## 2018-01-01 RX ADMIN — Medication 1.5 MG: at 08:35

## 2018-01-01 RX ADMIN — SODIUM CHLORIDE 300 ML: 9 INJECTION, SOLUTION INTRAVENOUS at 15:37

## 2018-01-01 RX ADMIN — MICONAZOLE NITRATE: 2 POWDER TOPICAL at 14:39

## 2018-01-01 RX ADMIN — Medication 0.2 MG: at 21:38

## 2018-01-01 RX ADMIN — MYCOPHENOLATE MOFETIL 500 MG: 200 POWDER, FOR SUSPENSION ORAL at 21:13

## 2018-01-01 RX ADMIN — CIPROFLOXACIN HYDROCHLORIDE 500 MG: 500 TABLET, FILM COATED ORAL at 12:15

## 2018-01-01 RX ADMIN — RIFAXIMIN 400 MG: 200 TABLET ORAL at 14:30

## 2018-01-01 RX ADMIN — INSULIN ASPART 1 UNITS: 100 INJECTION, SOLUTION INTRAVENOUS; SUBCUTANEOUS at 08:26

## 2018-01-01 RX ADMIN — ALBUTEROL SULFATE 2.5 MG: 2.5 SOLUTION RESPIRATORY (INHALATION) at 09:03

## 2018-01-01 RX ADMIN — CARVEDILOL 12.5 MG: 12.5 TABLET, FILM COATED ORAL at 09:06

## 2018-01-01 RX ADMIN — Medication 1 PACKET: at 10:38

## 2018-01-01 RX ADMIN — BUMETANIDE 4 MG: 1 TABLET ORAL at 07:47

## 2018-01-01 RX ADMIN — Medication 5 MG: at 16:23

## 2018-01-01 RX ADMIN — Medication 10 MG: at 10:59

## 2018-01-01 RX ADMIN — Medication 100 MG: at 10:07

## 2018-01-01 RX ADMIN — Medication 400 UNITS: at 08:34

## 2018-01-01 RX ADMIN — INSULIN ASPART 1 UNITS: 100 INJECTION, SOLUTION INTRAVENOUS; SUBCUTANEOUS at 05:24

## 2018-01-01 RX ADMIN — Medication 1.5 MG: at 08:48

## 2018-01-01 RX ADMIN — ACETYLCYSTEINE 2 ML: 200 SOLUTION ORAL; RESPIRATORY (INHALATION) at 08:02

## 2018-01-01 RX ADMIN — Medication 125 MG: at 21:41

## 2018-01-01 RX ADMIN — AMLODIPINE BESYLATE 5 MG: 5 TABLET ORAL at 09:39

## 2018-01-01 RX ADMIN — Medication 100 MG: at 20:15

## 2018-01-01 RX ADMIN — Medication 5 MG: at 16:10

## 2018-01-01 RX ADMIN — MULTIVITAMIN 15 ML: LIQUID ORAL at 09:01

## 2018-01-01 RX ADMIN — MAGNESIUM SULFATE IN WATER 4 G: 40 INJECTION, SOLUTION INTRAVENOUS at 17:29

## 2018-01-01 RX ADMIN — MYCOPHENOLATE MOFETIL 250 MG: 200 POWDER, FOR SUSPENSION ORAL at 20:26

## 2018-01-01 RX ADMIN — PANTOPRAZOLE SODIUM 40 MG: 40 TABLET, DELAYED RELEASE ORAL at 21:14

## 2018-01-01 RX ADMIN — Medication 0.2 MG: at 02:11

## 2018-01-01 RX ADMIN — HYDRALAZINE HYDROCHLORIDE 10 MG: 20 INJECTION INTRAMUSCULAR; INTRAVENOUS at 19:32

## 2018-01-01 RX ADMIN — SODIUM CHLORIDE, POTASSIUM CHLORIDE, SODIUM LACTATE AND CALCIUM CHLORIDE: 600; 310; 30; 20 INJECTION, SOLUTION INTRAVENOUS at 17:26

## 2018-01-01 RX ADMIN — INSULIN ASPART 2 UNITS: 100 INJECTION, SOLUTION INTRAVENOUS; SUBCUTANEOUS at 15:58

## 2018-01-01 RX ADMIN — INSULIN ASPART 1 UNITS: 100 INJECTION, SOLUTION INTRAVENOUS; SUBCUTANEOUS at 05:00

## 2018-01-01 RX ADMIN — LEVETIRACETAM 500 MG: 100 SOLUTION ORAL at 09:56

## 2018-01-01 RX ADMIN — MINERAL SUPPLEMENT IRON 300 MG / 5 ML STRENGTH LIQUID 100 PER BOX UNFLAVORED 600 MG: at 10:04

## 2018-01-01 RX ADMIN — Medication 5 MG: at 17:27

## 2018-01-01 RX ADMIN — MICONAZOLE NITRATE: 2 POWDER TOPICAL at 21:23

## 2018-01-01 RX ADMIN — Medication 1 CAPSULE: at 21:32

## 2018-01-01 RX ADMIN — AMLODIPINE BESYLATE 5 MG: 10 TABLET ORAL at 14:41

## 2018-01-01 RX ADMIN — BUMETANIDE 4 MG: 1 TABLET ORAL at 16:23

## 2018-01-01 RX ADMIN — Medication 1.5 MG: at 17:52

## 2018-01-01 RX ADMIN — RIFAXIMIN 400 MG: 200 TABLET ORAL at 08:55

## 2018-01-01 RX ADMIN — PREDNISONE 5 MG: 5 SOLUTION ORAL at 08:22

## 2018-01-01 RX ADMIN — PANTOPRAZOLE SODIUM 40 MG: 40 TABLET, DELAYED RELEASE ORAL at 09:33

## 2018-01-01 RX ADMIN — FUROSEMIDE 40 MG: 10 INJECTION, SOLUTION INTRAVENOUS at 14:50

## 2018-01-01 RX ADMIN — ACETYLCYSTEINE 2 ML: 200 SOLUTION ORAL; RESPIRATORY (INHALATION) at 13:36

## 2018-01-01 RX ADMIN — METHYLPREDNISOLONE SODIUM SUCCINATE 4 MG: 40 INJECTION, POWDER, FOR SOLUTION INTRAMUSCULAR; INTRAVENOUS at 16:49

## 2018-01-01 RX ADMIN — Medication 1.5 MG: at 10:32

## 2018-01-01 RX ADMIN — HEPARIN SODIUM 700 UNITS/HR: 10000 INJECTION, SOLUTION INTRAVENOUS at 02:19

## 2018-01-01 RX ADMIN — SODIUM CHLORIDE 300 ML: 9 INJECTION, SOLUTION INTRAVENOUS at 18:45

## 2018-01-01 RX ADMIN — CARVEDILOL 6.25 MG: 25 TABLET, FILM COATED ORAL at 21:21

## 2018-01-01 RX ADMIN — PANTOPRAZOLE SODIUM 40 MG: 40 TABLET, DELAYED RELEASE ORAL at 16:40

## 2018-01-01 RX ADMIN — VANCOMYCIN HYDROCHLORIDE 125 MG: KIT at 05:00

## 2018-01-01 RX ADMIN — SODIUM CHLORIDE 1000 ML: 9 INJECTION, SOLUTION INTRAVENOUS at 22:39

## 2018-01-01 RX ADMIN — Medication 1.5 MG: at 18:33

## 2018-01-01 RX ADMIN — ATROPINE SULFATE 2 DROP: 10 SOLUTION/ DROPS OPHTHALMIC at 04:15

## 2018-01-01 RX ADMIN — FERROUS SULFATE TAB 325 MG (65 MG ELEMENTAL FE) 325 MG: 325 (65 FE) TAB at 08:23

## 2018-01-01 RX ADMIN — INSULIN ASPART 1 UNITS: 100 INJECTION, SOLUTION INTRAVENOUS; SUBCUTANEOUS at 23:48

## 2018-01-01 RX ADMIN — ACETYLCYSTEINE 2 ML: 200 SOLUTION ORAL; RESPIRATORY (INHALATION) at 09:35

## 2018-01-01 RX ADMIN — ACETAMINOPHEN 650 MG: 160 SOLUTION ORAL at 14:06

## 2018-01-01 RX ADMIN — CIPROFLOXACIN HYDROCHLORIDE 500 MG: 500 TABLET, FILM COATED ORAL at 21:26

## 2018-01-01 RX ADMIN — Medication 0.5 MG: at 03:30

## 2018-01-01 RX ADMIN — Medication 1.5 MG: at 19:01

## 2018-01-01 RX ADMIN — MINERAL SUPPLEMENT IRON 300 MG / 5 ML STRENGTH LIQUID 100 PER BOX UNFLAVORED 600 MG: at 08:39

## 2018-01-01 RX ADMIN — BUMETANIDE 4 MG: 1 TABLET ORAL at 16:50

## 2018-01-01 RX ADMIN — HYDRALAZINE HYDROCHLORIDE 10 MG: 20 INJECTION INTRAMUSCULAR; INTRAVENOUS at 04:36

## 2018-01-01 RX ADMIN — METOLAZONE 5 MG: 5 TABLET ORAL at 14:58

## 2018-01-01 RX ADMIN — INSULIN ASPART 1 UNITS: 100 INJECTION, SOLUTION INTRAVENOUS; SUBCUTANEOUS at 17:02

## 2018-01-01 RX ADMIN — MICONAZOLE NITRATE: 2 POWDER TOPICAL at 19:48

## 2018-01-01 RX ADMIN — ONDANSETRON 4 MG: 2 INJECTION INTRAMUSCULAR; INTRAVENOUS at 10:39

## 2018-01-01 RX ADMIN — MICONAZOLE NITRATE: 2 POWDER TOPICAL at 21:14

## 2018-01-01 RX ADMIN — AMLODIPINE BESYLATE 5 MG: 5 TABLET ORAL at 08:24

## 2018-01-01 RX ADMIN — Medication 1 MG: at 18:41

## 2018-01-01 RX ADMIN — PREDNISONE 5 MG: 5 SOLUTION ORAL at 09:39

## 2018-01-01 RX ADMIN — Medication 1 PACKET: at 14:39

## 2018-01-01 RX ADMIN — Medication 1 PACKET: at 21:12

## 2018-01-01 RX ADMIN — ACETAMINOPHEN 650 MG: 325 TABLET, FILM COATED ORAL at 21:23

## 2018-01-01 RX ADMIN — HYDROMORPHONE HYDROCHLORIDE 1 MG: 1 SOLUTION ORAL at 21:10

## 2018-01-01 RX ADMIN — Medication 400 UNITS: at 08:41

## 2018-01-01 RX ADMIN — Medication 1 CAPSULE: at 22:24

## 2018-01-01 RX ADMIN — HYDROMORPHONE HYDROCHLORIDE 2 MG: 1 SOLUTION ORAL at 11:03

## 2018-01-01 RX ADMIN — ACETYLCYSTEINE 2 ML: 200 SOLUTION ORAL; RESPIRATORY (INHALATION) at 08:37

## 2018-01-01 RX ADMIN — Medication 5 ML: at 09:32

## 2018-01-01 RX ADMIN — Medication 1 PACKET: at 21:45

## 2018-01-01 RX ADMIN — INSULIN ASPART 2 UNITS: 100 INJECTION, SOLUTION INTRAVENOUS; SUBCUTANEOUS at 20:04

## 2018-01-01 RX ADMIN — ALBUTEROL SULFATE 2.5 MG: 2.5 SOLUTION RESPIRATORY (INHALATION) at 11:14

## 2018-01-01 RX ADMIN — Medication 1 CAPSULE: at 09:15

## 2018-01-01 RX ADMIN — PANTOPRAZOLE SODIUM 40 MG: 40 TABLET, DELAYED RELEASE ORAL at 15:51

## 2018-01-01 RX ADMIN — CARVEDILOL 6.25 MG: 6.25 TABLET, FILM COATED ORAL at 09:36

## 2018-01-01 RX ADMIN — CARVEDILOL 6.25 MG: 25 TABLET, FILM COATED ORAL at 10:05

## 2018-01-01 RX ADMIN — METHYLPREDNISOLONE SODIUM SUCCINATE 16 MG: 40 INJECTION, POWDER, FOR SOLUTION INTRAMUSCULAR; INTRAVENOUS at 12:44

## 2018-01-01 RX ADMIN — SODIUM CHLORIDE, POTASSIUM CHLORIDE, SODIUM LACTATE AND CALCIUM CHLORIDE: 600; 310; 30; 20 INJECTION, SOLUTION INTRAVENOUS at 02:08

## 2018-01-01 RX ADMIN — Medication 1.5 MG: at 19:00

## 2018-01-01 RX ADMIN — MICONAZOLE NITRATE: 2 POWDER TOPICAL at 22:27

## 2018-01-01 RX ADMIN — THIAMINE HYDROCHLORIDE 100 MG: 100 INJECTION, SOLUTION INTRAMUSCULAR; INTRAVENOUS at 10:04

## 2018-01-01 RX ADMIN — Medication 5 ML: at 10:06

## 2018-01-01 RX ADMIN — Medication 1.5 MG: at 07:58

## 2018-01-01 RX ADMIN — AMLODIPINE BESYLATE 5 MG: 5 TABLET ORAL at 08:35

## 2018-01-01 RX ADMIN — VANCOMYCIN HYDROCHLORIDE 125 MG: KIT at 11:51

## 2018-01-01 RX ADMIN — BACITRACIN: 500 OINTMENT TOPICAL at 20:35

## 2018-01-01 RX ADMIN — PREDNISONE 10 MG: 5 TABLET ORAL at 16:12

## 2018-01-01 RX ADMIN — HYDROMORPHONE HYDROCHLORIDE 2 MG: 1 SOLUTION ORAL at 09:54

## 2018-01-01 RX ADMIN — Medication 125 MG: at 21:24

## 2018-01-01 RX ADMIN — PANTOPRAZOLE SODIUM 40 MG: 40 TABLET, DELAYED RELEASE ORAL at 16:34

## 2018-01-01 RX ADMIN — Medication 1 PACKET: at 21:08

## 2018-01-01 RX ADMIN — CIPROFLOXACIN HYDROCHLORIDE 500 MG: 500 TABLET, FILM COATED ORAL at 20:43

## 2018-01-01 RX ADMIN — HYDROMORPHONE HYDROCHLORIDE 1 MG: 1 SOLUTION ORAL at 02:26

## 2018-01-01 RX ADMIN — HYDRALAZINE HYDROCHLORIDE 10 MG: 20 INJECTION INTRAMUSCULAR; INTRAVENOUS at 08:37

## 2018-01-01 RX ADMIN — PANTOPRAZOLE SODIUM 40 MG: 40 TABLET, DELAYED RELEASE ORAL at 07:59

## 2018-01-01 RX ADMIN — PANTOPRAZOLE SODIUM 40 MG: 40 TABLET, DELAYED RELEASE ORAL at 07:57

## 2018-01-01 RX ADMIN — IPRATROPIUM BROMIDE AND ALBUTEROL SULFATE 3 ML: .5; 3 SOLUTION RESPIRATORY (INHALATION) at 19:50

## 2018-01-01 RX ADMIN — INSULIN ASPART 1 UNITS: 100 INJECTION, SOLUTION INTRAVENOUS; SUBCUTANEOUS at 02:20

## 2018-01-01 RX ADMIN — LEVETIRACETAM 750 MG: 100 INJECTION, SOLUTION INTRAVENOUS at 08:40

## 2018-01-01 RX ADMIN — SODIUM CHLORIDE 250 ML: 9 INJECTION, SOLUTION INTRAVENOUS at 09:15

## 2018-01-01 RX ADMIN — Medication 1.5 MG: at 18:06

## 2018-01-01 RX ADMIN — Medication 0.5 MG: at 18:36

## 2018-01-01 RX ADMIN — VANCOMYCIN HYDROCHLORIDE 125 MG: KIT at 03:32

## 2018-01-01 RX ADMIN — INSULIN ASPART 2 UNITS: 100 INJECTION, SOLUTION INTRAVENOUS; SUBCUTANEOUS at 00:57

## 2018-01-01 RX ADMIN — Medication 125 MG: at 16:52

## 2018-01-01 RX ADMIN — IPRATROPIUM BROMIDE AND ALBUTEROL SULFATE 3 ML: .5; 3 SOLUTION RESPIRATORY (INHALATION) at 08:40

## 2018-01-01 RX ADMIN — AMLODIPINE BESYLATE 5 MG: 5 TABLET ORAL at 08:45

## 2018-01-01 RX ADMIN — METOLAZONE 5 MG: 5 TABLET ORAL at 09:01

## 2018-01-01 RX ADMIN — BACITRACIN: 500 OINTMENT TOPICAL at 08:47

## 2018-01-01 RX ADMIN — Medication 0.2 MG: at 22:30

## 2018-01-01 RX ADMIN — Medication 1 CAPSULE: at 19:47

## 2018-01-01 RX ADMIN — EPOETIN ALFA 4000 UNITS: 10000 SOLUTION INTRAVENOUS; SUBCUTANEOUS at 09:37

## 2018-01-01 RX ADMIN — Medication 1 CAPSULE: at 21:09

## 2018-01-01 RX ADMIN — MYCOPHENOLATE MOFETIL 500 MG: 200 POWDER, FOR SUSPENSION ORAL at 09:00

## 2018-01-01 RX ADMIN — INSULIN ASPART 1 UNITS: 100 INJECTION, SOLUTION INTRAVENOUS; SUBCUTANEOUS at 16:17

## 2018-01-01 RX ADMIN — Medication 100 MG: at 08:46

## 2018-01-01 RX ADMIN — VANCOMYCIN HYDROCHLORIDE 125 MG: KIT at 16:30

## 2018-01-01 RX ADMIN — MYCOPHENOLATE MOFETIL 250 MG: 200 POWDER, FOR SUSPENSION ORAL at 08:31

## 2018-01-01 RX ADMIN — WARFARIN SODIUM 1.25 MG: 2.5 TABLET ORAL at 22:51

## 2018-01-01 RX ADMIN — ATROPINE SULFATE 1 DROP: 10 SOLUTION/ DROPS OPHTHALMIC at 21:18

## 2018-01-01 RX ADMIN — CARVEDILOL 6.25 MG: 25 TABLET, FILM COATED ORAL at 19:22

## 2018-01-01 RX ADMIN — VANCOMYCIN HYDROCHLORIDE 125 MG: KIT at 16:17

## 2018-01-01 RX ADMIN — BUMETANIDE 4 MG: 1 TABLET ORAL at 15:51

## 2018-01-01 RX ADMIN — MYCOPHENOLATE MOFETIL 250 MG: 200 POWDER, FOR SUSPENSION ORAL at 09:26

## 2018-01-01 RX ADMIN — PANTOPRAZOLE SODIUM 40 MG: 40 TABLET, DELAYED RELEASE ORAL at 16:49

## 2018-01-01 RX ADMIN — CEFDINIR 300 MG: 125 POWDER, FOR SUSPENSION ORAL at 09:16

## 2018-01-01 RX ADMIN — Medication 20 MG: at 21:43

## 2018-01-01 RX ADMIN — Medication 400 UNITS: at 08:36

## 2018-01-01 RX ADMIN — ACETYLCYSTEINE 2 ML: 200 SOLUTION ORAL; RESPIRATORY (INHALATION) at 09:49

## 2018-01-01 RX ADMIN — INSULIN ASPART 1 UNITS: 100 INJECTION, SOLUTION INTRAVENOUS; SUBCUTANEOUS at 04:25

## 2018-01-01 RX ADMIN — Medication 5 MG: at 13:30

## 2018-01-01 RX ADMIN — MYCOPHENOLATE MOFETIL 500 MG: 500 INJECTION, POWDER, LYOPHILIZED, FOR SOLUTION INTRAVENOUS at 07:58

## 2018-01-01 RX ADMIN — Medication 1 CAPSULE: at 21:30

## 2018-01-01 RX ADMIN — BUMETANIDE 4 MG: 0.25 INJECTION INTRAMUSCULAR; INTRAVENOUS at 08:35

## 2018-01-01 RX ADMIN — PIPERACILLIN AND TAZOBACTAM 2.25 G: 2; .25 INJECTION, POWDER, FOR SOLUTION INTRAVENOUS at 00:23

## 2018-01-01 RX ADMIN — PIPERACILLIN AND TAZOBACTAM 2.25 G: 2; .25 INJECTION, POWDER, FOR SOLUTION INTRAVENOUS at 17:24

## 2018-01-01 RX ADMIN — Medication 1 CAPSULE: at 08:45

## 2018-01-01 RX ADMIN — LEVETIRACETAM 750 MG: 100 INJECTION, SOLUTION INTRAVENOUS at 21:19

## 2018-01-01 RX ADMIN — MEMANTINE HYDROCHLORIDE 21 MG: 21 CAPSULE, EXTENDED RELEASE ORAL at 20:27

## 2018-01-01 RX ADMIN — Medication 5 MG: at 03:34

## 2018-01-01 RX ADMIN — Medication 20 MG: at 09:47

## 2018-01-01 RX ADMIN — CARVEDILOL 6.25 MG: 25 TABLET, FILM COATED ORAL at 09:38

## 2018-01-01 RX ADMIN — BUMETANIDE 4 MG: 0.25 INJECTION INTRAMUSCULAR; INTRAVENOUS at 16:35

## 2018-01-01 RX ADMIN — LEVETIRACETAM 500 MG: 100 SOLUTION ORAL at 08:36

## 2018-01-01 RX ADMIN — Medication 1 PACKET: at 20:04

## 2018-01-01 RX ADMIN — VANCOMYCIN HYDROCHLORIDE 125 MG: KIT at 10:34

## 2018-01-01 RX ADMIN — IPRATROPIUM BROMIDE AND ALBUTEROL SULFATE 3 ML: .5; 3 SOLUTION RESPIRATORY (INHALATION) at 16:24

## 2018-01-01 RX ADMIN — MICONAZOLE NITRATE: 2 POWDER TOPICAL at 10:04

## 2018-01-01 RX ADMIN — Medication 1.5 MG: at 18:42

## 2018-01-01 RX ADMIN — PREDNISONE 5 MG: 5 TABLET ORAL at 16:31

## 2018-01-01 RX ADMIN — MICONAZOLE NITRATE: 2 POWDER TOPICAL at 20:14

## 2018-01-01 RX ADMIN — CIPROFLOXACIN HYDROCHLORIDE 500 MG: 500 TABLET, FILM COATED ORAL at 20:22

## 2018-01-01 RX ADMIN — CARVEDILOL 6.25 MG: 25 TABLET, FILM COATED ORAL at 08:07

## 2018-01-01 RX ADMIN — INSULIN ASPART 2 UNITS: 100 INJECTION, SOLUTION INTRAVENOUS; SUBCUTANEOUS at 12:08

## 2018-01-01 RX ADMIN — Medication 1 CAPSULE: at 21:17

## 2018-01-01 RX ADMIN — PREDNISONE 5 MG: 5 TABLET ORAL at 16:40

## 2018-01-01 RX ADMIN — CARVEDILOL 6.25 MG: 25 TABLET, FILM COATED ORAL at 22:15

## 2018-01-01 RX ADMIN — VANCOMYCIN HYDROCHLORIDE 125 MG: KIT at 09:22

## 2018-01-01 RX ADMIN — AMLODIPINE BESYLATE 5 MG: 5 TABLET ORAL at 07:59

## 2018-01-01 RX ADMIN — Medication 1.5 MG: at 18:19

## 2018-01-01 RX ADMIN — CARVEDILOL 6.25 MG: 25 TABLET, FILM COATED ORAL at 19:47

## 2018-01-01 RX ADMIN — Medication 100 MG: at 21:18

## 2018-01-01 RX ADMIN — WARFARIN SODIUM 2.5 MG: 2.5 TABLET ORAL at 17:47

## 2018-01-01 RX ADMIN — METRONIDAZOLE 500 MG: 500 INJECTION, SOLUTION INTRAVENOUS at 01:51

## 2018-01-01 RX ADMIN — HEPARIN SODIUM 700 UNITS/HR: 10000 INJECTION, SOLUTION INTRAVENOUS at 10:20

## 2018-01-01 RX ADMIN — BUMETANIDE 4 MG: 1 TABLET ORAL at 07:30

## 2018-01-01 RX ADMIN — AMPICILLIN SODIUM AND SULBACTAM SODIUM 3 G: 2; 1 INJECTION, POWDER, FOR SOLUTION INTRAMUSCULAR; INTRAVENOUS at 17:13

## 2018-01-01 RX ADMIN — ACETYLCYSTEINE 4 ML: 100 SOLUTION ORAL; RESPIRATORY (INHALATION) at 16:14

## 2018-01-01 RX ADMIN — INSULIN ASPART 1 UNITS: 100 INJECTION, SOLUTION INTRAVENOUS; SUBCUTANEOUS at 08:45

## 2018-01-01 RX ADMIN — LEVETIRACETAM 250 MG: 100 SOLUTION ORAL at 19:55

## 2018-01-01 RX ADMIN — Medication 0.2 MG: at 02:02

## 2018-01-01 RX ADMIN — CEFTRIAXONE 1 G: 1 INJECTION, POWDER, FOR SOLUTION INTRAMUSCULAR; INTRAVENOUS at 12:57

## 2018-01-01 RX ADMIN — Medication 0.1 MG: at 12:55

## 2018-01-01 RX ADMIN — BUMETANIDE 4 MG: 1 TABLET ORAL at 13:44

## 2018-01-01 RX ADMIN — HYDRALAZINE HYDROCHLORIDE 10 MG: 20 INJECTION INTRAMUSCULAR; INTRAVENOUS at 00:20

## 2018-01-01 RX ADMIN — METHYLPREDNISOLONE SODIUM SUCCINATE 16 MG: 40 INJECTION, POWDER, FOR SOLUTION INTRAMUSCULAR; INTRAVENOUS at 18:22

## 2018-01-01 RX ADMIN — Medication 1 PACKET: at 20:20

## 2018-01-01 RX ADMIN — VANCOMYCIN HYDROCHLORIDE 125 MG: KIT at 21:09

## 2018-01-01 RX ADMIN — MICONAZOLE NITRATE: 2 POWDER TOPICAL at 20:30

## 2018-01-01 RX ADMIN — SODIUM CHLORIDE 250 ML: 9 INJECTION, SOLUTION INTRAVENOUS at 13:24

## 2018-01-01 RX ADMIN — CEPHALEXIN 500 MG: 500 CAPSULE ORAL at 13:56

## 2018-01-01 RX ADMIN — INSULIN ASPART 2 UNITS: 100 INJECTION, SOLUTION INTRAVENOUS; SUBCUTANEOUS at 20:00

## 2018-01-01 RX ADMIN — METHYLCELLULOSE 500 MG: 500 TABLET ORAL at 09:39

## 2018-01-01 RX ADMIN — FUROSEMIDE 40 MG: 10 INJECTION, SOLUTION INTRAVENOUS at 04:18

## 2018-01-01 RX ADMIN — Medication 1 PACKET: at 14:06

## 2018-01-01 RX ADMIN — INSULIN ASPART 2 UNITS: 100 INJECTION, SOLUTION INTRAVENOUS; SUBCUTANEOUS at 21:25

## 2018-01-01 RX ADMIN — Medication 1 PACKET: at 22:06

## 2018-01-01 RX ADMIN — PREDNISONE 5 MG: 5 TABLET ORAL at 15:44

## 2018-01-01 RX ADMIN — VANCOMYCIN HYDROCHLORIDE 125 MG: KIT at 21:21

## 2018-01-01 RX ADMIN — CARVEDILOL 6.25 MG: 6.25 TABLET, FILM COATED ORAL at 08:38

## 2018-01-01 RX ADMIN — PIPERACILLIN AND TAZOBACTAM 2.25 G: 2; .25 INJECTION, POWDER, FOR SOLUTION INTRAVENOUS at 12:32

## 2018-01-01 RX ADMIN — Medication 125 MG: at 10:32

## 2018-01-01 RX ADMIN — Medication 1.5 MG: at 17:37

## 2018-01-01 RX ADMIN — BUMETANIDE 4 MG: 1 TABLET ORAL at 08:17

## 2018-01-01 RX ADMIN — MYCOPHENOLATE MOFETIL 500 MG: 500 INJECTION, POWDER, LYOPHILIZED, FOR SOLUTION INTRAVENOUS at 12:33

## 2018-01-01 RX ADMIN — Medication 4 MG: at 08:34

## 2018-01-01 RX ADMIN — DEXTROSE MONOHYDRATE 1000 ML: 50 INJECTION, SOLUTION INTRAVENOUS at 10:56

## 2018-01-01 RX ADMIN — Medication 125 MG: at 16:46

## 2018-01-01 RX ADMIN — Medication 100 MG: at 09:41

## 2018-01-01 RX ADMIN — PIPERACILLIN SODIUM AND TAZOBACTAM SODIUM 2.25 G: 2; .25 INJECTION, POWDER, LYOPHILIZED, FOR SOLUTION INTRAVENOUS at 12:25

## 2018-01-01 RX ADMIN — Medication 125 MG: at 16:44

## 2018-01-01 RX ADMIN — METHYLPREDNISOLONE SODIUM SUCCINATE 4 MG: 40 INJECTION, POWDER, FOR SOLUTION INTRAMUSCULAR; INTRAVENOUS at 17:22

## 2018-01-01 RX ADMIN — SODIUM CHLORIDE, POTASSIUM CHLORIDE, SODIUM LACTATE AND CALCIUM CHLORIDE: 600; 310; 30; 20 INJECTION, SOLUTION INTRAVENOUS at 05:19

## 2018-01-01 RX ADMIN — METRONIDAZOLE 500 MG: 500 INJECTION, SOLUTION INTRAVENOUS at 04:27

## 2018-01-01 RX ADMIN — Medication 125 MG: at 10:02

## 2018-01-01 RX ADMIN — METRONIDAZOLE 500 MG: 500 INJECTION, SOLUTION INTRAVENOUS at 12:49

## 2018-01-01 RX ADMIN — HYDRALAZINE HYDROCHLORIDE 10 MG: 20 INJECTION INTRAMUSCULAR; INTRAVENOUS at 02:14

## 2018-01-01 RX ADMIN — LEVETIRACETAM 500 MG: 100 SOLUTION ORAL at 09:13

## 2018-01-01 RX ADMIN — INSULIN ASPART 1 UNITS: 100 INJECTION, SOLUTION INTRAVENOUS; SUBCUTANEOUS at 07:59

## 2018-01-01 RX ADMIN — LEVETIRACETAM 500 MG: 100 SOLUTION ORAL at 08:59

## 2018-01-01 RX ADMIN — Medication 1.5 MG: at 09:28

## 2018-01-01 RX ADMIN — ACETYLCYSTEINE 2 ML: 200 SOLUTION ORAL; RESPIRATORY (INHALATION) at 20:10

## 2018-01-01 RX ADMIN — ALBUTEROL SULFATE 2.5 MG: 2.5 SOLUTION RESPIRATORY (INHALATION) at 13:53

## 2018-01-01 RX ADMIN — Medication 5 MG: at 16:11

## 2018-01-01 RX ADMIN — MULTIVITAMIN 15 ML: LIQUID ORAL at 08:55

## 2018-01-01 RX ADMIN — METRONIDAZOLE 500 MG: 500 INJECTION, SOLUTION INTRAVENOUS at 10:10

## 2018-01-01 RX ADMIN — VANCOMYCIN HYDROCHLORIDE 125 MG: KIT at 09:09

## 2018-01-01 RX ADMIN — VANCOMYCIN HYDROCHLORIDE 1500 MG: 10 INJECTION, POWDER, LYOPHILIZED, FOR SOLUTION INTRAVENOUS at 08:59

## 2018-01-01 RX ADMIN — BUMETANIDE 4 MG: 0.25 INJECTION INTRAMUSCULAR; INTRAVENOUS at 15:37

## 2018-01-01 RX ADMIN — METRONIDAZOLE 500 MG: 500 INJECTION, SOLUTION INTRAVENOUS at 02:58

## 2018-01-01 RX ADMIN — MICONAZOLE NITRATE: 2 POWDER TOPICAL at 08:46

## 2018-01-01 RX ADMIN — MICONAZOLE NITRATE: 2 POWDER TOPICAL at 20:06

## 2018-01-01 RX ADMIN — FUROSEMIDE 20 MG: 20 TABLET ORAL at 16:15

## 2018-01-01 RX ADMIN — ACETYLCYSTEINE 2 ML: 200 SOLUTION ORAL; RESPIRATORY (INHALATION) at 08:00

## 2018-01-01 RX ADMIN — INSULIN ASPART 1 UNITS: 100 INJECTION, SOLUTION INTRAVENOUS; SUBCUTANEOUS at 00:25

## 2018-01-01 RX ADMIN — LEVETIRACETAM 500 MG: 100 SOLUTION ORAL at 22:24

## 2018-01-01 RX ADMIN — Medication 1.5 MG: at 16:40

## 2018-01-01 RX ADMIN — VANCOMYCIN HYDROCHLORIDE 125 MG: KIT at 00:04

## 2018-01-01 RX ADMIN — Medication 125 MG: at 09:13

## 2018-01-01 RX ADMIN — Medication 1.5 MG: at 09:39

## 2018-01-01 RX ADMIN — INSULIN ASPART 1 UNITS: 100 INJECTION, SOLUTION INTRAVENOUS; SUBCUTANEOUS at 04:04

## 2018-01-01 RX ADMIN — WARFARIN SODIUM 3 MG: 3 TABLET ORAL at 19:35

## 2018-01-01 RX ADMIN — PANTOPRAZOLE SODIUM 40 MG: 40 INJECTION, POWDER, FOR SOLUTION INTRAVENOUS at 20:49

## 2018-01-01 RX ADMIN — Medication 1 PACKET: at 19:38

## 2018-01-01 RX ADMIN — METRONIDAZOLE 500 MG: 500 INJECTION, SOLUTION INTRAVENOUS at 02:51

## 2018-01-01 RX ADMIN — VANCOMYCIN HYDROCHLORIDE 125 MG: KIT at 12:08

## 2018-01-01 RX ADMIN — METRONIDAZOLE 500 MG: 500 INJECTION, SOLUTION INTRAVENOUS at 18:06

## 2018-01-01 RX ADMIN — SODIUM CHLORIDE 500 ML: 9 INJECTION, SOLUTION INTRAVENOUS at 08:11

## 2018-01-01 RX ADMIN — ACETYLCYSTEINE 2 ML: 200 SOLUTION ORAL; RESPIRATORY (INHALATION) at 20:57

## 2018-01-01 RX ADMIN — INSULIN ASPART 1 UNITS: 100 INJECTION, SOLUTION INTRAVENOUS; SUBCUTANEOUS at 04:33

## 2018-01-01 RX ADMIN — VANCOMYCIN HYDROCHLORIDE 125 MG: KIT at 22:09

## 2018-01-01 RX ADMIN — MYCOPHENOLATE MOFETIL 500 MG: 500 INJECTION, POWDER, LYOPHILIZED, FOR SOLUTION INTRAVENOUS at 09:36

## 2018-01-01 RX ADMIN — Medication 125 MG: at 19:58

## 2018-01-01 RX ADMIN — VANCOMYCIN HYDROCHLORIDE 125 MG: KIT at 00:14

## 2018-01-01 RX ADMIN — Medication 100 MG: at 09:10

## 2018-01-01 RX ADMIN — ONDANSETRON 4 MG: 2 INJECTION INTRAMUSCULAR; INTRAVENOUS at 17:02

## 2018-01-01 RX ADMIN — Medication 10 MG: at 18:11

## 2018-01-01 RX ADMIN — METRONIDAZOLE 500 MG: 500 INJECTION, SOLUTION INTRAVENOUS at 12:45

## 2018-01-01 RX ADMIN — INSULIN ASPART 1 UNITS: 100 INJECTION, SOLUTION INTRAVENOUS; SUBCUTANEOUS at 02:17

## 2018-01-01 RX ADMIN — Medication 20 MG: at 23:30

## 2018-01-01 RX ADMIN — PREDNISONE 5 MG: 5 SOLUTION ORAL at 07:58

## 2018-01-01 RX ADMIN — VANCOMYCIN HYDROCHLORIDE 125 MG: KIT at 20:05

## 2018-01-01 RX ADMIN — PANTOPRAZOLE SODIUM 40 MG: 40 TABLET, DELAYED RELEASE ORAL at 07:31

## 2018-01-01 RX ADMIN — METRONIDAZOLE 500 MG: 500 INJECTION, SOLUTION INTRAVENOUS at 08:04

## 2018-01-01 RX ADMIN — LEVETIRACETAM 500 MG: 100 SOLUTION ORAL at 09:49

## 2018-01-01 RX ADMIN — Medication 5 MG: at 09:21

## 2018-01-01 RX ADMIN — Medication 1.5 MG: at 18:11

## 2018-01-01 RX ADMIN — Medication 1 CAPSULE: at 13:19

## 2018-01-01 RX ADMIN — BACITRACIN: 500 OINTMENT TOPICAL at 19:04

## 2018-01-01 RX ADMIN — Medication 10 MG: at 18:29

## 2018-01-01 RX ADMIN — LEVETIRACETAM 500 MG: 5 INJECTION INTRAVENOUS at 19:31

## 2018-01-01 RX ADMIN — MYCOPHENOLATE MOFETIL 500 MG: 200 POWDER, FOR SUSPENSION ORAL at 10:59

## 2018-01-01 RX ADMIN — HYDRALAZINE HYDROCHLORIDE 20 MG: 20 INJECTION INTRAMUSCULAR; INTRAVENOUS at 08:19

## 2018-01-01 RX ADMIN — RIFAXIMIN 400 MG: 200 TABLET ORAL at 15:38

## 2018-01-01 RX ADMIN — BUMETANIDE 4 MG: 0.25 INJECTION INTRAMUSCULAR; INTRAVENOUS at 04:42

## 2018-01-01 RX ADMIN — CARVEDILOL 6.25 MG: 25 TABLET, FILM COATED ORAL at 22:05

## 2018-01-01 RX ADMIN — METRONIDAZOLE 500 MG: 500 INJECTION, SOLUTION INTRAVENOUS at 11:23

## 2018-01-01 RX ADMIN — WARFARIN SODIUM 2.5 MG: 2.5 TABLET ORAL at 18:07

## 2018-01-01 RX ADMIN — Medication 125 MG: at 12:48

## 2018-01-01 RX ADMIN — CARVEDILOL 6.25 MG: 25 TABLET, FILM COATED ORAL at 09:57

## 2018-01-01 RX ADMIN — Medication 1.5 MG: at 09:11

## 2018-01-01 RX ADMIN — METOLAZONE 5 MG: 5 TABLET ORAL at 08:55

## 2018-01-01 RX ADMIN — MYCOPHENOLATE MOFETIL 500 MG: 200 POWDER, FOR SUSPENSION ORAL at 09:40

## 2018-01-01 RX ADMIN — HALOPERIDOL LACTATE 1 MG: 5 INJECTION, SOLUTION INTRAMUSCULAR at 22:34

## 2018-01-01 RX ADMIN — PANTOPRAZOLE SODIUM 40 MG: 40 TABLET, DELAYED RELEASE ORAL at 15:44

## 2018-01-01 RX ADMIN — HYDRALAZINE HYDROCHLORIDE 10 MG: 20 INJECTION INTRAMUSCULAR; INTRAVENOUS at 00:50

## 2018-01-01 RX ADMIN — INSULIN ASPART 1 UNITS: 100 INJECTION, SOLUTION INTRAVENOUS; SUBCUTANEOUS at 00:12

## 2018-01-01 RX ADMIN — CARVEDILOL 6.25 MG: 25 TABLET, FILM COATED ORAL at 20:27

## 2018-01-01 RX ADMIN — CARVEDILOL 6.25 MG: 25 TABLET, FILM COATED ORAL at 23:03

## 2018-01-01 RX ADMIN — INSULIN ASPART 1 UNITS: 100 INJECTION, SOLUTION INTRAVENOUS; SUBCUTANEOUS at 04:13

## 2018-01-01 RX ADMIN — INSULIN ASPART 2 UNITS: 100 INJECTION, SOLUTION INTRAVENOUS; SUBCUTANEOUS at 13:13

## 2018-01-01 RX ADMIN — METRONIDAZOLE 500 MG: 500 INJECTION, SOLUTION INTRAVENOUS at 16:33

## 2018-01-01 RX ADMIN — CARVEDILOL 6.25 MG: 6.25 TABLET, FILM COATED ORAL at 09:16

## 2018-01-01 RX ADMIN — ALBUTEROL SULFATE 2.5 MG: 2.5 SOLUTION RESPIRATORY (INHALATION) at 12:48

## 2018-01-01 RX ADMIN — RIFAXIMIN 400 MG: 200 TABLET ORAL at 20:15

## 2018-01-01 RX ADMIN — PANTOPRAZOLE SODIUM 40 MG: 40 TABLET, DELAYED RELEASE ORAL at 16:23

## 2018-01-01 RX ADMIN — Medication 1 MG: at 23:30

## 2018-01-01 RX ADMIN — METHYLPREDNISOLONE SODIUM SUCCINATE 16 MG: 40 INJECTION, POWDER, FOR SOLUTION INTRAMUSCULAR; INTRAVENOUS at 14:14

## 2018-01-01 RX ADMIN — WARFARIN SODIUM 5 MG: 5 TABLET ORAL at 17:35

## 2018-01-01 RX ADMIN — Medication 1 PACKET: at 09:07

## 2018-01-01 RX ADMIN — VANCOMYCIN HYDROCHLORIDE 125 MG: KIT at 21:19

## 2018-01-01 RX ADMIN — LEVETIRACETAM 500 MG: 100 SOLUTION ORAL at 19:57

## 2018-01-01 RX ADMIN — LEVETIRACETAM 750 MG: 100 SOLUTION ORAL at 20:40

## 2018-01-01 RX ADMIN — QUETIAPINE FUMARATE 25 MG: 25 TABLET ORAL at 22:24

## 2018-01-01 RX ADMIN — INSULIN ASPART 2 UNITS: 100 INJECTION, SOLUTION INTRAVENOUS; SUBCUTANEOUS at 19:20

## 2018-01-01 RX ADMIN — Medication 1 CAPSULE: at 20:18

## 2018-01-01 RX ADMIN — VANCOMYCIN HYDROCHLORIDE 125 MG: KIT at 20:03

## 2018-01-01 RX ADMIN — Medication 100 MG: at 23:04

## 2018-01-01 RX ADMIN — HUMAN ALBUMIN MICROSPHERES AND PERFLUTREN 6 ML: 10; .22 INJECTION, SOLUTION INTRAVENOUS at 09:30

## 2018-01-01 RX ADMIN — CARVEDILOL 6.25 MG: 25 TABLET, FILM COATED ORAL at 08:19

## 2018-01-01 RX ADMIN — VANCOMYCIN HYDROCHLORIDE 125 MG: KIT at 16:34

## 2018-01-01 RX ADMIN — FERROUS SULFATE TAB 325 MG (65 MG ELEMENTAL FE) 325 MG: 325 (65 FE) TAB at 08:42

## 2018-01-01 RX ADMIN — ALBUTEROL SULFATE 2.5 MG: 2.5 SOLUTION RESPIRATORY (INHALATION) at 14:25

## 2018-01-01 RX ADMIN — AMPICILLIN SODIUM AND SULBACTAM SODIUM 3 G: 2; 1 INJECTION, POWDER, FOR SOLUTION INTRAMUSCULAR; INTRAVENOUS at 17:32

## 2018-01-01 RX ADMIN — PREDNISONE 5 MG: 5 TABLET ORAL at 16:56

## 2018-01-01 RX ADMIN — VANCOMYCIN HYDROCHLORIDE 125 MG: KIT at 04:54

## 2018-01-01 RX ADMIN — LEVETIRACETAM 500 MG: 100 SOLUTION ORAL at 20:24

## 2018-01-01 RX ADMIN — RIFAXIMIN 400 MG: 200 TABLET ORAL at 09:01

## 2018-01-01 RX ADMIN — HYDRALAZINE HYDROCHLORIDE 10 MG: 20 INJECTION INTRAMUSCULAR; INTRAVENOUS at 20:45

## 2018-01-01 RX ADMIN — Medication 1 PACKET: at 13:26

## 2018-01-01 RX ADMIN — FERROUS SULFATE TAB 325 MG (65 MG ELEMENTAL FE) 325 MG: 325 (65 FE) TAB at 09:39

## 2018-01-01 RX ADMIN — LEVETIRACETAM 500 MG: 100 SOLUTION ORAL at 09:26

## 2018-01-01 RX ADMIN — Medication 1.5 MG: at 17:50

## 2018-01-01 RX ADMIN — HEPARIN SODIUM 650 UNITS/HR: 10000 INJECTION, SOLUTION INTRAVENOUS at 03:33

## 2018-01-01 RX ADMIN — ONDANSETRON 4 MG: 2 INJECTION INTRAMUSCULAR; INTRAVENOUS at 20:49

## 2018-01-01 RX ADMIN — HEPARIN SODIUM 650 UNITS/HR: 10000 INJECTION, SOLUTION INTRAVENOUS at 18:50

## 2018-01-01 RX ADMIN — INSULIN ASPART 1 UNITS: 100 INJECTION, SOLUTION INTRAVENOUS; SUBCUTANEOUS at 20:07

## 2018-01-01 RX ADMIN — Medication 1.5 MG: at 20:40

## 2018-01-01 RX ADMIN — MYCOPHENOLATE MOFETIL 250 MG: 200 POWDER, FOR SUSPENSION ORAL at 08:18

## 2018-01-01 RX ADMIN — ACETYLCYSTEINE 4 ML: 100 SOLUTION ORAL; RESPIRATORY (INHALATION) at 15:44

## 2018-01-01 RX ADMIN — VANCOMYCIN HYDROCHLORIDE 125 MG: KIT at 22:05

## 2018-01-01 RX ADMIN — PANTOPRAZOLE SODIUM 40 MG: 40 TABLET, DELAYED RELEASE ORAL at 08:35

## 2018-01-01 RX ADMIN — MYCOPHENOLATE MOFETIL 250 MG: 500 INJECTION, POWDER, LYOPHILIZED, FOR SOLUTION INTRAVENOUS at 08:19

## 2018-01-01 RX ADMIN — INSULIN ASPART 1 UNITS: 100 INJECTION, SOLUTION INTRAVENOUS; SUBCUTANEOUS at 20:00

## 2018-01-01 RX ADMIN — MYCOPHENOLATE MOFETIL 250 MG: 200 POWDER, FOR SUSPENSION ORAL at 08:39

## 2018-01-01 RX ADMIN — ASPIRIN 81 MG CHEWABLE TABLET 81 MG: 81 TABLET CHEWABLE at 10:31

## 2018-01-01 RX ADMIN — Medication 1 CAPSULE: at 09:27

## 2018-01-01 RX ADMIN — PANTOPRAZOLE SODIUM 40 MG: 40 TABLET, DELAYED RELEASE ORAL at 08:23

## 2018-01-01 RX ADMIN — ZINC SULFATE CAP 220 MG (50 MG ELEMENTAL ZN) 220 MG: 220 (50 ZN) CAP at 11:08

## 2018-01-01 RX ADMIN — VANCOMYCIN HYDROCHLORIDE 125 MG: KIT at 17:37

## 2018-01-01 RX ADMIN — VANCOMYCIN HYDROCHLORIDE 125 MG: KIT at 22:07

## 2018-01-01 RX ADMIN — Medication 1 PACKET: at 13:54

## 2018-01-01 RX ADMIN — PREDNISONE 5 MG: 5 SOLUTION ORAL at 09:55

## 2018-01-01 RX ADMIN — INSULIN ASPART 1 UNITS: 100 INJECTION, SOLUTION INTRAVENOUS; SUBCUTANEOUS at 05:06

## 2018-01-01 RX ADMIN — RIFAXIMIN 400 MG: 200 TABLET ORAL at 08:59

## 2018-01-01 RX ADMIN — Medication 2 MG: at 23:07

## 2018-01-01 RX ADMIN — BUMETANIDE 4 MG: 1 TABLET ORAL at 17:31

## 2018-01-01 RX ADMIN — HEPARIN SODIUM 800 UNITS/HR: 10000 INJECTION, SOLUTION INTRAVENOUS at 17:12

## 2018-01-01 RX ADMIN — FUROSEMIDE 40 MG: 10 INJECTION, SOLUTION INTRAVENOUS at 16:43

## 2018-01-01 RX ADMIN — WARFARIN SODIUM 3 MG: 3 TABLET ORAL at 17:27

## 2018-01-01 RX ADMIN — Medication 1 MG: at 17:27

## 2018-01-01 RX ADMIN — PIPERACILLIN AND TAZOBACTAM 2.25 G: 2; .25 INJECTION, POWDER, FOR SOLUTION INTRAVENOUS at 12:19

## 2018-01-01 RX ADMIN — IPRATROPIUM BROMIDE AND ALBUTEROL SULFATE 3 ML: .5; 3 SOLUTION RESPIRATORY (INHALATION) at 12:30

## 2018-01-01 RX ADMIN — MICONAZOLE NITRATE: 2 POWDER TOPICAL at 20:42

## 2018-01-01 RX ADMIN — Medication 1 PACKET: at 08:19

## 2018-01-01 RX ADMIN — ACETYLCYSTEINE 2 ML: 200 SOLUTION ORAL; RESPIRATORY (INHALATION) at 19:41

## 2018-01-01 RX ADMIN — WARFARIN SODIUM 3 MG: 3 TABLET ORAL at 18:18

## 2018-01-01 RX ADMIN — BUMETANIDE 4 MG: 1 TABLET ORAL at 09:12

## 2018-01-01 RX ADMIN — METRONIDAZOLE 500 MG: 500 INJECTION, SOLUTION INTRAVENOUS at 09:26

## 2018-01-01 RX ADMIN — MICONAZOLE NITRATE: 2 POWDER TOPICAL at 08:38

## 2018-01-01 RX ADMIN — CEFEPIME HYDROCHLORIDE 2 G: 2 INJECTION, POWDER, FOR SOLUTION INTRAVENOUS at 11:38

## 2018-01-01 RX ADMIN — ZINC SULFATE CAP 220 MG (50 MG ELEMENTAL ZN) 220 MG: 220 (50 ZN) CAP at 10:02

## 2018-01-01 RX ADMIN — MICONAZOLE NITRATE: 2 POWDER TOPICAL at 08:33

## 2018-01-01 RX ADMIN — INSULIN ASPART 2 UNITS: 100 INJECTION, SOLUTION INTRAVENOUS; SUBCUTANEOUS at 12:13

## 2018-01-01 RX ADMIN — Medication 1 PACKET: at 21:19

## 2018-01-01 RX ADMIN — MYCOPHENOLATE MOFETIL 500 MG: 500 INJECTION, POWDER, LYOPHILIZED, FOR SOLUTION INTRAVENOUS at 17:16

## 2018-01-01 RX ADMIN — HEPARIN SODIUM 700 UNITS/HR: 10000 INJECTION, SOLUTION INTRAVENOUS at 14:41

## 2018-01-01 RX ADMIN — Medication 1 PACKET: at 19:13

## 2018-01-01 RX ADMIN — PANTOPRAZOLE SODIUM 40 MG: 40 TABLET, DELAYED RELEASE ORAL at 08:18

## 2018-01-01 RX ADMIN — CARVEDILOL 6.25 MG: 25 TABLET, FILM COATED ORAL at 08:40

## 2018-01-01 RX ADMIN — QUETIAPINE FUMARATE 25 MG: 25 TABLET ORAL at 10:01

## 2018-01-01 RX ADMIN — LORAZEPAM 2 MG: 2 INJECTION INTRAMUSCULAR; INTRAVENOUS at 15:26

## 2018-01-01 RX ADMIN — SODIUM POLYSTYRENE SULFONATE 15 G: 15 SUSPENSION ORAL; RECTAL at 21:58

## 2018-01-01 RX ADMIN — Medication 25 MG: at 23:30

## 2018-01-01 RX ADMIN — Medication 1 MG: at 18:25

## 2018-01-01 RX ADMIN — ALBUTEROL SULFATE 2.5 MG: 2.5 SOLUTION RESPIRATORY (INHALATION) at 08:02

## 2018-01-01 RX ADMIN — MYCOPHENOLATE MOFETIL 250 MG: 200 POWDER, FOR SUSPENSION ORAL at 19:01

## 2018-01-01 RX ADMIN — Medication 80 MG: at 08:43

## 2018-01-01 RX ADMIN — WARFARIN SODIUM 2 MG: 2 TABLET ORAL at 17:24

## 2018-01-01 RX ADMIN — PANTOPRAZOLE SODIUM 40 MG: 40 TABLET, DELAYED RELEASE ORAL at 10:03

## 2018-01-01 RX ADMIN — Medication 1.5 MG: at 17:48

## 2018-01-01 RX ADMIN — SODIUM CHLORIDE 1000 ML: 9 INJECTION, SOLUTION INTRAVENOUS at 20:44

## 2018-01-01 RX ADMIN — ACETYLCYSTEINE 4 ML: 100 SOLUTION ORAL; RESPIRATORY (INHALATION) at 08:08

## 2018-01-01 RX ADMIN — LEVETIRACETAM 750 MG: 100 SOLUTION ORAL at 08:32

## 2018-01-01 RX ADMIN — Medication 0.2 MG: at 18:23

## 2018-01-01 RX ADMIN — INSULIN ASPART 1 UNITS: 100 INJECTION, SOLUTION INTRAVENOUS; SUBCUTANEOUS at 16:16

## 2018-01-01 RX ADMIN — METRONIDAZOLE 500 MG: 500 INJECTION, SOLUTION INTRAVENOUS at 13:29

## 2018-01-01 RX ADMIN — MEMANTINE HYDROCHLORIDE 21 MG: 21 CAPSULE, EXTENDED RELEASE ORAL at 21:23

## 2018-01-01 RX ADMIN — MINERAL SUPPLEMENT IRON 300 MG / 5 ML STRENGTH LIQUID 100 PER BOX UNFLAVORED 600 MG: at 08:55

## 2018-01-01 RX ADMIN — PANTOPRAZOLE SODIUM 40 MG: 40 TABLET, DELAYED RELEASE ORAL at 09:02

## 2018-01-01 RX ADMIN — VANCOMYCIN HYDROCHLORIDE 125 MG: KIT at 16:27

## 2018-01-01 RX ADMIN — ACETYLCYSTEINE 4 ML: 100 SOLUTION ORAL; RESPIRATORY (INHALATION) at 12:44

## 2018-01-01 RX ADMIN — PIPERACILLIN SODIUM AND TAZOBACTAM SODIUM 2.25 G: 2; .25 INJECTION, POWDER, LYOPHILIZED, FOR SOLUTION INTRAVENOUS at 00:12

## 2018-01-01 RX ADMIN — HEPARIN SODIUM 650 UNITS/HR: 10000 INJECTION, SOLUTION INTRAVENOUS at 07:25

## 2018-01-01 RX ADMIN — SODIUM CHLORIDE 300 ML: 9 INJECTION, SOLUTION INTRAVENOUS at 07:40

## 2018-01-01 RX ADMIN — HYDRALAZINE HYDROCHLORIDE 10 MG: 20 INJECTION INTRAMUSCULAR; INTRAVENOUS at 23:11

## 2018-01-01 RX ADMIN — HEPARIN SODIUM 1000 UNITS/HR: 10000 INJECTION, SOLUTION INTRAVENOUS at 06:13

## 2018-01-01 RX ADMIN — BUMETANIDE 4 MG: 0.25 INJECTION INTRAMUSCULAR; INTRAVENOUS at 16:05

## 2018-01-01 RX ADMIN — CEFEPIME HYDROCHLORIDE 2 G: 2 INJECTION, POWDER, FOR SOLUTION INTRAVENOUS at 12:33

## 2018-01-01 RX ADMIN — PANTOPRAZOLE SODIUM 40 MG: 40 TABLET, DELAYED RELEASE ORAL at 16:02

## 2018-01-01 RX ADMIN — PIPERACILLIN SODIUM AND TAZOBACTAM SODIUM 2.25 G: 2; .25 INJECTION, POWDER, LYOPHILIZED, FOR SOLUTION INTRAVENOUS at 16:39

## 2018-01-01 RX ADMIN — DEXTROSE MONOHYDRATE 10 ML: 50 INJECTION, SOLUTION INTRAVENOUS at 22:15

## 2018-01-01 RX ADMIN — Medication 0.2 MG: at 15:36

## 2018-01-01 RX ADMIN — PIPERACILLIN SODIUM AND TAZOBACTAM SODIUM 2.25 G: 2; .25 INJECTION, POWDER, LYOPHILIZED, FOR SOLUTION INTRAVENOUS at 06:18

## 2018-01-01 RX ADMIN — CARVEDILOL 6.25 MG: 6.25 TABLET, FILM COATED ORAL at 17:54

## 2018-01-01 RX ADMIN — AMLODIPINE BESYLATE 5 MG: 5 TABLET ORAL at 09:32

## 2018-01-01 RX ADMIN — ZINC SULFATE CAP 220 MG (50 MG ELEMENTAL ZN) 220 MG: 220 (50 ZN) CAP at 16:12

## 2018-01-01 RX ADMIN — MYCOPHENOLATE MOFETIL 250 MG: 200 POWDER, FOR SUSPENSION ORAL at 10:39

## 2018-01-01 RX ADMIN — Medication 5 MG: at 21:10

## 2018-01-01 RX ADMIN — Medication 100 MG: at 10:41

## 2018-01-01 RX ADMIN — INSULIN ASPART 1 UNITS: 100 INJECTION, SOLUTION INTRAVENOUS; SUBCUTANEOUS at 12:18

## 2018-01-01 RX ADMIN — BACITRACIN: 500 OINTMENT TOPICAL at 20:17

## 2018-01-01 RX ADMIN — LEVETIRACETAM 2000 MG: 100 INJECTION, SOLUTION INTRAVENOUS at 19:57

## 2018-01-01 RX ADMIN — INSULIN ASPART 1 UNITS: 100 INJECTION, SOLUTION INTRAVENOUS; SUBCUTANEOUS at 04:17

## 2018-01-01 RX ADMIN — MYCOPHENOLATE MOFETIL 500 MG: 200 POWDER, FOR SUSPENSION ORAL at 20:43

## 2018-01-01 RX ADMIN — LEVETIRACETAM 750 MG: 100 INJECTION, SOLUTION INTRAVENOUS at 22:03

## 2018-01-01 RX ADMIN — Medication 1 PACKET: at 08:40

## 2018-01-01 RX ADMIN — LEVETIRACETAM 750 MG: 100 SOLUTION ORAL at 08:01

## 2018-01-01 RX ADMIN — VANCOMYCIN HYDROCHLORIDE 125 MG: KIT at 23:39

## 2018-01-01 RX ADMIN — INSULIN ASPART 1 UNITS: 100 INJECTION, SOLUTION INTRAVENOUS; SUBCUTANEOUS at 08:55

## 2018-01-01 RX ADMIN — PANTOPRAZOLE SODIUM 40 MG: 40 TABLET, DELAYED RELEASE ORAL at 09:28

## 2018-01-01 RX ADMIN — Medication 1 MG: at 20:29

## 2018-01-01 RX ADMIN — Medication 1 PACKET: at 13:38

## 2018-01-01 RX ADMIN — METRONIDAZOLE 500 MG: 500 INJECTION, SOLUTION INTRAVENOUS at 23:48

## 2018-01-01 RX ADMIN — ZINC SULFATE CAP 220 MG (50 MG ELEMENTAL ZN) 220 MG: 220 (50 ZN) CAP at 09:24

## 2018-01-01 RX ADMIN — RIFAXIMIN 400 MG: 200 TABLET ORAL at 17:32

## 2018-01-01 RX ADMIN — INSULIN ASPART 1 UNITS: 100 INJECTION, SOLUTION INTRAVENOUS; SUBCUTANEOUS at 12:05

## 2018-01-01 RX ADMIN — PANTOPRAZOLE SODIUM 40 MG: 40 TABLET, DELAYED RELEASE ORAL at 17:01

## 2018-01-01 RX ADMIN — VANCOMYCIN HYDROCHLORIDE 125 MG: KIT at 15:51

## 2018-01-01 RX ADMIN — PIPERACILLIN AND TAZOBACTAM 2.25 G: 2; .25 INJECTION, POWDER, FOR SOLUTION INTRAVENOUS at 05:50

## 2018-01-01 RX ADMIN — METRONIDAZOLE 500 MG: 500 INJECTION, SOLUTION INTRAVENOUS at 01:02

## 2018-01-01 RX ADMIN — WARFARIN SODIUM 5 MG: 5 TABLET ORAL at 19:00

## 2018-01-01 RX ADMIN — Medication 1 CAPSULE: at 08:36

## 2018-01-01 RX ADMIN — MICONAZOLE NITRATE: 2 POWDER TOPICAL at 20:28

## 2018-01-01 RX ADMIN — Medication 10 MG: at 02:02

## 2018-01-01 RX ADMIN — VANCOMYCIN HYDROCHLORIDE 125 MG: KIT at 00:35

## 2018-01-01 RX ADMIN — SODIUM CHLORIDE 100 MG: 9 INJECTION, SOLUTION INTRAVENOUS at 10:35

## 2018-01-01 RX ADMIN — RIFAXIMIN 400 MG: 200 TABLET ORAL at 13:48

## 2018-01-01 RX ADMIN — CIPROFLOXACIN HYDROCHLORIDE 500 MG: 500 TABLET, FILM COATED ORAL at 21:18

## 2018-01-01 RX ADMIN — CARVEDILOL 6.25 MG: 25 TABLET, FILM COATED ORAL at 08:54

## 2018-01-01 RX ADMIN — Medication 1 PACKET: at 10:12

## 2018-01-01 RX ADMIN — VANCOMYCIN HYDROCHLORIDE 125 MG: KIT at 21:00

## 2018-01-01 RX ADMIN — VANCOMYCIN HYDROCHLORIDE 125 MG: KIT at 16:59

## 2018-01-01 RX ADMIN — Medication 100 MG: at 08:39

## 2018-01-01 RX ADMIN — Medication 1.5 MG: at 09:31

## 2018-01-01 RX ADMIN — METRONIDAZOLE 500 MG: 500 INJECTION, SOLUTION INTRAVENOUS at 02:50

## 2018-01-01 RX ADMIN — Medication 1.5 MG: at 09:25

## 2018-01-01 RX ADMIN — ZINC SULFATE CAP 220 MG (50 MG ELEMENTAL ZN) 220 MG: 220 (50 ZN) CAP at 09:32

## 2018-01-01 RX ADMIN — HYDRALAZINE HYDROCHLORIDE 10 MG: 20 INJECTION INTRAMUSCULAR; INTRAVENOUS at 16:00

## 2018-01-01 RX ADMIN — INSULIN ASPART 1 UNITS: 100 INJECTION, SOLUTION INTRAVENOUS; SUBCUTANEOUS at 17:11

## 2018-01-01 RX ADMIN — SODIUM CHLORIDE, POTASSIUM CHLORIDE, SODIUM LACTATE AND CALCIUM CHLORIDE: 600; 310; 30; 20 INJECTION, SOLUTION INTRAVENOUS at 01:46

## 2018-01-01 RX ADMIN — TACROLIMUS 1 MG: 1 CAPSULE ORAL at 22:24

## 2018-01-01 RX ADMIN — Medication 0.2 MG: at 00:39

## 2018-01-01 RX ADMIN — FUROSEMIDE 40 MG: 10 INJECTION, SOLUTION INTRAVENOUS at 09:31

## 2018-01-01 RX ADMIN — WARFARIN SODIUM 4 MG: 4 TABLET ORAL at 19:04

## 2018-01-01 RX ADMIN — INSULIN ASPART 2 UNITS: 100 INJECTION, SOLUTION INTRAVENOUS; SUBCUTANEOUS at 12:27

## 2018-01-01 RX ADMIN — RIFAXIMIN 400 MG: 200 TABLET ORAL at 22:24

## 2018-01-01 RX ADMIN — WARFARIN SODIUM 4 MG: 4 TABLET ORAL at 17:46

## 2018-01-01 RX ADMIN — HEPARIN SODIUM 900 UNITS/HR: 10000 INJECTION, SOLUTION INTRAVENOUS at 06:33

## 2018-01-01 RX ADMIN — VANCOMYCIN HYDROCHLORIDE 125 MG: KIT at 12:48

## 2018-01-01 RX ADMIN — ACETYLCYSTEINE 2 ML: 200 SOLUTION ORAL; RESPIRATORY (INHALATION) at 08:51

## 2018-01-01 RX ADMIN — LEVETIRACETAM 1000 MG: 10 INJECTION INTRAVENOUS at 17:41

## 2018-01-01 RX ADMIN — VANCOMYCIN HYDROCHLORIDE 125 MG: KIT at 10:06

## 2018-01-01 RX ADMIN — ONDANSETRON 4 MG: 2 INJECTION INTRAMUSCULAR; INTRAVENOUS at 03:27

## 2018-01-01 RX ADMIN — INSULIN ASPART 1 UNITS: 100 INJECTION, SOLUTION INTRAVENOUS; SUBCUTANEOUS at 12:33

## 2018-01-01 RX ADMIN — HYDRALAZINE HYDROCHLORIDE 10 MG: 20 INJECTION INTRAMUSCULAR; INTRAVENOUS at 03:28

## 2018-01-01 RX ADMIN — INSULIN ASPART 1 UNITS: 100 INJECTION, SOLUTION INTRAVENOUS; SUBCUTANEOUS at 16:46

## 2018-01-01 RX ADMIN — CARVEDILOL 6.25 MG: 25 TABLET, FILM COATED ORAL at 08:45

## 2018-01-01 RX ADMIN — HEPARIN SODIUM 700 UNITS/HR: 10000 INJECTION, SOLUTION INTRAVENOUS at 13:37

## 2018-01-01 RX ADMIN — INSULIN ASPART 2 UNITS: 100 INJECTION, SOLUTION INTRAVENOUS; SUBCUTANEOUS at 20:15

## 2018-01-01 RX ADMIN — BUMETANIDE 2 MG: 0.25 INJECTION INTRAMUSCULAR; INTRAVENOUS at 16:30

## 2018-01-01 RX ADMIN — Medication 5 MG: at 16:14

## 2018-01-01 RX ADMIN — Medication 5 MG: at 03:16

## 2018-01-01 RX ADMIN — Medication 2 G: at 18:52

## 2018-01-01 RX ADMIN — CIPROFLOXACIN HYDROCHLORIDE 500 MG: 500 TABLET, FILM COATED ORAL at 09:01

## 2018-01-01 RX ADMIN — INSULIN ASPART 1 UNITS: 100 INJECTION, SOLUTION INTRAVENOUS; SUBCUTANEOUS at 20:26

## 2018-01-01 RX ADMIN — PANTOPRAZOLE SODIUM 40 MG: 40 TABLET, DELAYED RELEASE ORAL at 20:41

## 2018-01-01 RX ADMIN — MYCOPHENOLATE MOFETIL 250 MG: 500 INJECTION, POWDER, LYOPHILIZED, FOR SOLUTION INTRAVENOUS at 09:31

## 2018-01-01 RX ADMIN — LEVETIRACETAM 250 MG: 100 SOLUTION ORAL at 09:26

## 2018-01-01 RX ADMIN — CARVEDILOL 6.25 MG: 25 TABLET, FILM COATED ORAL at 21:30

## 2018-01-01 RX ADMIN — VANCOMYCIN HYDROCHLORIDE 125 MG: KIT at 10:33

## 2018-01-01 RX ADMIN — LORAZEPAM 2 MG: 2 INJECTION INTRAMUSCULAR; INTRAVENOUS at 19:50

## 2018-01-01 RX ADMIN — Medication 400 UNITS: at 09:00

## 2018-01-01 RX ADMIN — Medication: at 12:14

## 2018-01-01 RX ADMIN — Medication 1.5 MG: at 09:21

## 2018-01-01 RX ADMIN — CARVEDILOL 6.25 MG: 25 TABLET, FILM COATED ORAL at 08:23

## 2018-01-01 RX ADMIN — SCOPOLAMINE 1 PATCH: 1 PATCH, EXTENDED RELEASE TRANSDERMAL at 00:06

## 2018-01-01 RX ADMIN — Medication 3.5 MG: at 19:11

## 2018-01-01 RX ADMIN — BUMETANIDE 4 MG: 0.25 INJECTION INTRAMUSCULAR; INTRAVENOUS at 16:37

## 2018-01-01 RX ADMIN — MICONAZOLE NITRATE: 2 POWDER TOPICAL at 08:00

## 2018-01-01 RX ADMIN — METRONIDAZOLE 500 MG: 500 INJECTION, SOLUTION INTRAVENOUS at 17:18

## 2018-01-01 RX ADMIN — INSULIN ASPART 2 UNITS: 100 INJECTION, SOLUTION INTRAVENOUS; SUBCUTANEOUS at 14:18

## 2018-01-01 RX ADMIN — BUMETANIDE 4 MG: 0.25 INJECTION INTRAMUSCULAR; INTRAVENOUS at 16:17

## 2018-01-01 RX ADMIN — PANTOPRAZOLE SODIUM 40 MG: 40 TABLET, DELAYED RELEASE ORAL at 08:46

## 2018-01-01 RX ADMIN — CIPROFLOXACIN HYDROCHLORIDE 500 MG: 500 TABLET, FILM COATED ORAL at 08:47

## 2018-01-01 RX ADMIN — MYCOPHENOLATE MOFETIL 250 MG: 200 POWDER, FOR SUSPENSION ORAL at 08:30

## 2018-01-01 RX ADMIN — ACETYLCYSTEINE 2 ML: 200 SOLUTION ORAL; RESPIRATORY (INHALATION) at 19:47

## 2018-01-01 RX ADMIN — ACETAMINOPHEN 650 MG: 160 SOLUTION ORAL at 18:24

## 2018-01-01 RX ADMIN — INSULIN ASPART 1 UNITS: 100 INJECTION, SOLUTION INTRAVENOUS; SUBCUTANEOUS at 04:02

## 2018-01-01 RX ADMIN — INSULIN ASPART 1 UNITS: 100 INJECTION, SOLUTION INTRAVENOUS; SUBCUTANEOUS at 01:49

## 2018-01-01 RX ADMIN — CARVEDILOL 6.25 MG: 25 TABLET, FILM COATED ORAL at 09:22

## 2018-01-01 RX ADMIN — DEXTROSE MONOHYDRATE 25 ML: 500 INJECTION PARENTERAL at 13:48

## 2018-01-01 RX ADMIN — VANCOMYCIN HYDROCHLORIDE 125 MG: KIT at 10:41

## 2018-01-01 RX ADMIN — INSULIN ASPART 1 UNITS: 100 INJECTION, SOLUTION INTRAVENOUS; SUBCUTANEOUS at 13:19

## 2018-01-01 RX ADMIN — CARVEDILOL 6.25 MG: 6.25 TABLET, FILM COATED ORAL at 20:08

## 2018-01-01 RX ADMIN — IPRATROPIUM BROMIDE AND ALBUTEROL SULFATE 3 ML: .5; 3 SOLUTION RESPIRATORY (INHALATION) at 08:08

## 2018-01-01 RX ADMIN — METRONIDAZOLE 500 MG: 500 INJECTION, SOLUTION INTRAVENOUS at 22:53

## 2018-01-01 RX ADMIN — MYCOPHENOLATE MOFETIL 500 MG: 200 POWDER, FOR SUSPENSION ORAL at 08:49

## 2018-01-01 RX ADMIN — ATROPINE SULFATE 1 DROP: 10 SOLUTION/ DROPS OPHTHALMIC at 01:39

## 2018-01-01 RX ADMIN — Medication 1.5 MG: at 08:58

## 2018-01-01 RX ADMIN — PANTOPRAZOLE SODIUM 40 MG: 40 TABLET, DELAYED RELEASE ORAL at 10:08

## 2018-01-01 RX ADMIN — INSULIN ASPART 1 UNITS: 100 INJECTION, SOLUTION INTRAVENOUS; SUBCUTANEOUS at 09:30

## 2018-01-01 RX ADMIN — PREDNISONE 5 MG: 5 TABLET ORAL at 16:23

## 2018-01-01 RX ADMIN — SODIUM CHLORIDE 100 ML: 9 INJECTION, SOLUTION INTRAVENOUS at 19:48

## 2018-01-01 RX ADMIN — PANTOPRAZOLE SODIUM 40 MG: 40 INJECTION, POWDER, FOR SOLUTION INTRAVENOUS at 13:47

## 2018-01-01 RX ADMIN — BUMETANIDE 4 MG: 1 TABLET ORAL at 21:10

## 2018-01-01 RX ADMIN — Medication 1 PACKET: at 12:16

## 2018-01-01 RX ADMIN — SODIUM CHLORIDE 50 MG: 9 INJECTION, SOLUTION INTRAVENOUS at 10:36

## 2018-01-01 RX ADMIN — Medication 1 CAPSULE: at 19:37

## 2018-01-01 RX ADMIN — Medication 1 PACKET: at 14:41

## 2018-01-01 RX ADMIN — Medication 125 MG: at 19:33

## 2018-01-01 RX ADMIN — WARFARIN SODIUM 5 MG: 5 TABLET ORAL at 16:38

## 2018-01-01 RX ADMIN — INSULIN ASPART 3 UNITS: 100 INJECTION, SOLUTION INTRAVENOUS; SUBCUTANEOUS at 16:25

## 2018-01-01 RX ADMIN — HYDRALAZINE HYDROCHLORIDE 10 MG: 20 INJECTION INTRAMUSCULAR; INTRAVENOUS at 09:26

## 2018-01-01 RX ADMIN — Medication 125 MG: at 15:43

## 2018-01-01 RX ADMIN — CARVEDILOL 6.25 MG: 25 TABLET, FILM COATED ORAL at 19:57

## 2018-01-01 RX ADMIN — ALBUTEROL SULFATE 2.5 MG: 2.5 SOLUTION RESPIRATORY (INHALATION) at 08:50

## 2018-01-01 RX ADMIN — CARVEDILOL 6.25 MG: 25 TABLET, FILM COATED ORAL at 07:50

## 2018-01-01 RX ADMIN — METRONIDAZOLE 500 MG: 500 INJECTION, SOLUTION INTRAVENOUS at 05:14

## 2018-01-01 RX ADMIN — MYCOPHENOLATE MOFETIL 250 MG: 500 INJECTION, POWDER, LYOPHILIZED, FOR SOLUTION INTRAVENOUS at 10:13

## 2018-01-01 RX ADMIN — VANCOMYCIN HYDROCHLORIDE 125 MG: KIT at 03:29

## 2018-01-01 RX ADMIN — WARFARIN SODIUM 2.5 MG: 2.5 TABLET ORAL at 18:17

## 2018-01-01 RX ADMIN — MICONAZOLE NITRATE: 2 POWDER TOPICAL at 21:31

## 2018-01-01 RX ADMIN — BACITRACIN: 500 OINTMENT TOPICAL at 14:14

## 2018-01-01 RX ADMIN — Medication 5 MG: at 16:56

## 2018-01-01 RX ADMIN — BUMETANIDE 2 MG: 0.25 INJECTION INTRAMUSCULAR; INTRAVENOUS at 08:26

## 2018-01-01 RX ADMIN — ACETYLCYSTEINE 2 ML: 200 SOLUTION ORAL; RESPIRATORY (INHALATION) at 13:05

## 2018-01-01 RX ADMIN — METHYLCELLULOSE 500 MG: 500 TABLET ORAL at 09:40

## 2018-01-01 RX ADMIN — WARFARIN SODIUM 1 MG: 1 TABLET ORAL at 17:27

## 2018-01-01 RX ADMIN — CARVEDILOL 6.25 MG: 25 TABLET, FILM COATED ORAL at 08:47

## 2018-01-01 RX ADMIN — VANCOMYCIN HYDROCHLORIDE 125 MG: KIT at 13:18

## 2018-01-01 RX ADMIN — CARVEDILOL 6.25 MG: 25 TABLET, FILM COATED ORAL at 19:38

## 2018-01-01 RX ADMIN — INSULIN ASPART 2 UNITS: 100 INJECTION, SOLUTION INTRAVENOUS; SUBCUTANEOUS at 01:11

## 2018-01-01 RX ADMIN — Medication 1.5 MG: at 18:08

## 2018-01-01 RX ADMIN — Medication 0.4 MG: at 12:27

## 2018-01-01 RX ADMIN — INSULIN ASPART 1 UNITS: 100 INJECTION, SOLUTION INTRAVENOUS; SUBCUTANEOUS at 21:18

## 2018-01-01 RX ADMIN — Medication 1 CAPSULE: at 19:57

## 2018-01-01 RX ADMIN — Medication 100 MG: at 09:34

## 2018-01-01 RX ADMIN — CARVEDILOL 6.25 MG: 25 TABLET, FILM COATED ORAL at 20:15

## 2018-01-01 RX ADMIN — SODIUM PHOSPHATE, MONOBASIC, MONOHYDRATE AND SODIUM PHOSPHATE, DIBASIC, ANHYDROUS 15 MMOL: 276; 142 INJECTION, SOLUTION INTRAVENOUS at 16:40

## 2018-01-01 RX ADMIN — LEVETIRACETAM 500 MG: 100 SOLUTION ORAL at 07:57

## 2018-01-01 RX ADMIN — CARVEDILOL 6.25 MG: 25 TABLET, FILM COATED ORAL at 19:56

## 2018-01-01 RX ADMIN — ALBUTEROL SULFATE 2.5 MG: 2.5 SOLUTION RESPIRATORY (INHALATION) at 08:36

## 2018-01-01 RX ADMIN — IPRATROPIUM BROMIDE AND ALBUTEROL SULFATE 3 ML: .5; 3 SOLUTION RESPIRATORY (INHALATION) at 21:54

## 2018-01-01 RX ADMIN — Medication 25 MG: at 00:10

## 2018-01-01 RX ADMIN — SODIUM POLYSTYRENE SULFONATE 15 G: 15 SUSPENSION ORAL; RECTAL at 19:11

## 2018-01-01 RX ADMIN — FERROUS SULFATE TAB 325 MG (65 MG ELEMENTAL FE) 325 MG: 325 (65 FE) TAB at 09:09

## 2018-01-01 RX ADMIN — CIPROFLOXACIN HYDROCHLORIDE 500 MG: 500 TABLET, FILM COATED ORAL at 08:49

## 2018-01-01 RX ADMIN — CARVEDILOL 6.25 MG: 25 TABLET, FILM COATED ORAL at 08:48

## 2018-01-01 RX ADMIN — INSULIN ASPART 1 UNITS: 100 INJECTION, SOLUTION INTRAVENOUS; SUBCUTANEOUS at 00:09

## 2018-01-01 RX ADMIN — MYCOPHENOLATE MOFETIL 250 MG: 500 INJECTION, POWDER, LYOPHILIZED, FOR SOLUTION INTRAVENOUS at 09:00

## 2018-01-01 RX ADMIN — CEFTRIAXONE 1 G: 1 INJECTION, POWDER, FOR SOLUTION INTRAMUSCULAR; INTRAVENOUS at 19:49

## 2018-01-01 RX ADMIN — MEMANTINE HYDROCHLORIDE 21 MG: 21 CAPSULE, EXTENDED RELEASE ORAL at 09:13

## 2018-01-01 RX ADMIN — PREDNISONE 5 MG: 5 TABLET ORAL at 16:44

## 2018-01-01 RX ADMIN — INSULIN ASPART 1 UNITS: 100 INJECTION, SOLUTION INTRAVENOUS; SUBCUTANEOUS at 16:49

## 2018-01-01 RX ADMIN — INSULIN ASPART 1 UNITS: 100 INJECTION, SOLUTION INTRAVENOUS; SUBCUTANEOUS at 17:00

## 2018-01-01 RX ADMIN — Medication 1 MG: at 00:10

## 2018-01-01 RX ADMIN — ALBUTEROL SULFATE 2.5 MG: 2.5 SOLUTION RESPIRATORY (INHALATION) at 08:30

## 2018-01-01 RX ADMIN — ALBUTEROL SULFATE 2.5 MG: 2.5 SOLUTION RESPIRATORY (INHALATION) at 20:18

## 2018-01-01 RX ADMIN — PREDNISONE 5 MG: 5 SOLUTION ORAL at 08:09

## 2018-01-01 RX ADMIN — RIFAXIMIN 400 MG: 200 TABLET ORAL at 10:14

## 2018-01-01 RX ADMIN — BUMETANIDE 4 MG: 0.25 INJECTION INTRAMUSCULAR; INTRAVENOUS at 03:45

## 2018-01-01 RX ADMIN — MICONAZOLE NITRATE: 2 POWDER TOPICAL at 20:27

## 2018-01-01 RX ADMIN — PIPERACILLIN AND TAZOBACTAM 2.25 G: 2; .25 INJECTION, POWDER, FOR SOLUTION INTRAVENOUS at 04:49

## 2018-01-01 RX ADMIN — HEPARIN SODIUM 700 UNITS/HR: 10000 INJECTION, SOLUTION INTRAVENOUS at 02:53

## 2018-01-01 RX ADMIN — ALBUTEROL SULFATE 2.5 MG: 2.5 SOLUTION RESPIRATORY (INHALATION) at 13:45

## 2018-01-01 RX ADMIN — INSULIN ASPART 1 UNITS: 100 INJECTION, SOLUTION INTRAVENOUS; SUBCUTANEOUS at 09:29

## 2018-01-01 RX ADMIN — PANTOPRAZOLE SODIUM 40 MG: 40 INJECTION, POWDER, FOR SOLUTION INTRAVENOUS at 01:21

## 2018-01-01 RX ADMIN — Medication 1.5 MG: at 20:02

## 2018-01-01 RX ADMIN — METRONIDAZOLE 500 MG: 500 INJECTION, SOLUTION INTRAVENOUS at 10:56

## 2018-01-01 RX ADMIN — METRONIDAZOLE 500 MG: 500 INJECTION, SOLUTION INTRAVENOUS at 11:27

## 2018-01-01 RX ADMIN — Medication 1 CAPSULE: at 07:59

## 2018-01-01 RX ADMIN — Medication 125 MG: at 10:46

## 2018-01-01 RX ADMIN — MYCOPHENOLATE MOFETIL 250 MG: 200 POWDER, FOR SUSPENSION ORAL at 21:19

## 2018-01-01 RX ADMIN — PREDNISONE 5 MG: 5 TABLET ORAL at 17:28

## 2018-01-01 RX ADMIN — Medication 125 MG: at 16:15

## 2018-01-01 RX ADMIN — MYCOPHENOLATE MOFETIL 500 MG: 500 INJECTION, POWDER, LYOPHILIZED, FOR SOLUTION INTRAVENOUS at 23:27

## 2018-01-01 RX ADMIN — Medication 1 PACKET: at 10:14

## 2018-01-01 RX ADMIN — Medication 125 MG: at 22:26

## 2018-01-01 RX ADMIN — INSULIN ASPART 2 UNITS: 100 INJECTION, SOLUTION INTRAVENOUS; SUBCUTANEOUS at 15:40

## 2018-01-01 RX ADMIN — Medication 0.2 MG: at 01:38

## 2018-01-01 RX ADMIN — VANCOMYCIN HYDROCHLORIDE 125 MG: KIT at 12:15

## 2018-01-01 RX ADMIN — HYDRALAZINE HYDROCHLORIDE 10 MG: 20 INJECTION INTRAMUSCULAR; INTRAVENOUS at 13:19

## 2018-01-01 RX ADMIN — VANCOMYCIN HYDROCHLORIDE 125 MG: KIT at 15:50

## 2018-01-01 RX ADMIN — VANCOMYCIN HYDROCHLORIDE 125 MG: KIT at 04:18

## 2018-01-01 RX ADMIN — INSULIN ASPART 2 UNITS: 100 INJECTION, SOLUTION INTRAVENOUS; SUBCUTANEOUS at 20:18

## 2018-01-01 RX ADMIN — INSULIN ASPART 1 UNITS: 100 INJECTION, SOLUTION INTRAVENOUS; SUBCUTANEOUS at 04:28

## 2018-01-01 RX ADMIN — MICONAZOLE NITRATE: 2 POWDER TOPICAL at 07:30

## 2018-01-01 RX ADMIN — MYCOPHENOLATE MOFETIL 250 MG: 200 POWDER, FOR SUSPENSION ORAL at 10:07

## 2018-01-01 RX ADMIN — ACETAMINOPHEN 650 MG: 160 SOLUTION ORAL at 22:14

## 2018-01-01 RX ADMIN — FERROUS SULFATE TAB 325 MG (65 MG ELEMENTAL FE) 325 MG: 325 (65 FE) TAB at 09:06

## 2018-01-01 RX ADMIN — LEVETIRACETAM 500 MG: 5 INJECTION INTRAVENOUS at 08:47

## 2018-01-01 RX ADMIN — MICONAZOLE NITRATE: 2 POWDER TOPICAL at 20:07

## 2018-01-01 RX ADMIN — INSULIN ASPART 1 UNITS: 100 INJECTION, SOLUTION INTRAVENOUS; SUBCUTANEOUS at 21:00

## 2018-01-01 RX ADMIN — WARFARIN SODIUM 2.5 MG: 2.5 TABLET ORAL at 18:23

## 2018-01-01 RX ADMIN — MULTIVITAMIN 15 ML: LIQUID ORAL at 08:00

## 2018-01-01 RX ADMIN — Medication 5 MG: at 04:10

## 2018-01-01 RX ADMIN — DEXTROSE MONOHYDRATE 1000 ML: 50 INJECTION, SOLUTION INTRAVENOUS at 10:18

## 2018-01-01 RX ADMIN — PANTOPRAZOLE SODIUM 40 MG: 40 INJECTION, POWDER, FOR SOLUTION INTRAVENOUS at 08:05

## 2018-01-01 RX ADMIN — WARFARIN SODIUM 2.5 MG: 2.5 TABLET ORAL at 18:41

## 2018-01-01 RX ADMIN — WARFARIN SODIUM 2.5 MG: 2.5 TABLET ORAL at 17:22

## 2018-01-01 RX ADMIN — CIPROFLOXACIN HYDROCHLORIDE 500 MG: 500 TABLET, FILM COATED ORAL at 10:00

## 2018-01-01 RX ADMIN — DEXTROSE MONOHYDRATE 1000 ML: 50 INJECTION, SOLUTION INTRAVENOUS at 08:05

## 2018-01-01 RX ADMIN — BUMETANIDE 4 MG: 0.25 INJECTION INTRAMUSCULAR; INTRAVENOUS at 08:08

## 2018-01-01 RX ADMIN — WARFARIN SODIUM 5 MG: 5 TABLET ORAL at 17:32

## 2018-01-01 RX ADMIN — Medication 1.5 MG: at 08:40

## 2018-01-01 RX ADMIN — Medication 1.5 MG: at 18:28

## 2018-01-01 RX ADMIN — IPRATROPIUM BROMIDE AND ALBUTEROL SULFATE 3 ML: .5; 3 SOLUTION RESPIRATORY (INHALATION) at 15:42

## 2018-01-01 RX ADMIN — ACETYLCYSTEINE 4 ML: 100 SOLUTION ORAL; RESPIRATORY (INHALATION) at 08:40

## 2018-01-01 RX ADMIN — Medication 0.2 MG: at 00:10

## 2018-01-01 RX ADMIN — SODIUM CHLORIDE 250 ML: 9 INJECTION, SOLUTION INTRAVENOUS at 18:45

## 2018-01-01 RX ADMIN — PREDNISONE 5 MG: 5 SOLUTION ORAL at 09:50

## 2018-01-01 RX ADMIN — TACROLIMUS 1.5 MG: 1 CAPSULE, GELATIN COATED ORAL at 13:48

## 2018-01-01 RX ADMIN — WARFARIN SODIUM 5 MG: 5 TABLET ORAL at 20:02

## 2018-01-01 RX ADMIN — ACETYLCYSTEINE 2 ML: 200 SOLUTION ORAL; RESPIRATORY (INHALATION) at 08:30

## 2018-01-01 RX ADMIN — PANTOPRAZOLE SODIUM 40 MG: 40 TABLET, DELAYED RELEASE ORAL at 08:09

## 2018-01-01 RX ADMIN — AMPICILLIN SODIUM AND SULBACTAM SODIUM 3 G: 2; 1 INJECTION, POWDER, FOR SOLUTION INTRAMUSCULAR; INTRAVENOUS at 12:26

## 2018-01-01 RX ADMIN — Medication 100 MG: at 08:22

## 2018-01-01 RX ADMIN — INSULIN ASPART 1 UNITS: 100 INJECTION, SOLUTION INTRAVENOUS; SUBCUTANEOUS at 19:00

## 2018-01-01 RX ADMIN — LEVETIRACETAM 500 MG: 100 SOLUTION ORAL at 08:55

## 2018-01-01 RX ADMIN — Medication 100 MG: at 09:56

## 2018-01-01 RX ADMIN — Medication 1.5 MG: at 18:37

## 2018-01-01 RX ADMIN — PREDNISONE 5 MG: 5 SOLUTION ORAL at 09:26

## 2018-01-01 RX ADMIN — CARVEDILOL 6.25 MG: 25 TABLET, FILM COATED ORAL at 20:25

## 2018-01-01 RX ADMIN — Medication 1 MG: at 19:25

## 2018-01-01 RX ADMIN — Medication 1 PACKET: at 13:52

## 2018-01-01 RX ADMIN — Medication 1 CAPSULE: at 21:10

## 2018-01-01 RX ADMIN — SODIUM CHLORIDE 100 MG: 9 INJECTION, SOLUTION INTRAVENOUS at 00:38

## 2018-01-01 RX ADMIN — Medication 5 MG: at 05:50

## 2018-01-01 RX ADMIN — Medication 100 MG: at 09:59

## 2018-01-01 RX ADMIN — PANTOPRAZOLE SODIUM 40 MG: 40 TABLET, DELAYED RELEASE ORAL at 09:45

## 2018-01-01 RX ADMIN — PIPERACILLIN AND TAZOBACTAM 2.25 G: 2; .25 INJECTION, POWDER, FOR SOLUTION INTRAVENOUS at 23:47

## 2018-01-01 RX ADMIN — ATROPINE SULFATE 1 DROP: 10 SOLUTION/ DROPS OPHTHALMIC at 01:31

## 2018-01-01 RX ADMIN — Medication 100 MG: at 09:32

## 2018-01-01 RX ADMIN — MICONAZOLE NITRATE: 2 POWDER TOPICAL at 20:20

## 2018-01-01 RX ADMIN — PIPERACILLIN AND TAZOBACTAM 2.25 G: 2; .25 INJECTION, POWDER, FOR SOLUTION INTRAVENOUS at 00:47

## 2018-01-01 RX ADMIN — Medication 1.5 MG: at 10:02

## 2018-01-01 RX ADMIN — PANTOPRAZOLE SODIUM 40 MG: 40 TABLET, DELAYED RELEASE ORAL at 16:39

## 2018-01-01 RX ADMIN — AMLODIPINE BESYLATE 10 MG: 10 TABLET ORAL at 10:53

## 2018-01-01 RX ADMIN — INFLUENZA A VIRUSA/MICHIGAN/45/2015 X-275 (H1N1) ANTIGEN (FORMALDEHYDE INACTIVATED), INFLUENZA A VIRUS A/HONG KONG/4801/2014 X-263B (H3N2) ANTIGEN (FORMALDEHYDE INACTIVATED), AND INFLUENZA B VIRUS B/BRISBANE/60/2008 ANTIGEN (FORMALDEHYDE INACTIVATED) 0.5 ML: 60; 60; 60 INJECTION, SUSPENSION INTRAMUSCULAR at 15:36

## 2018-01-01 RX ADMIN — MYCOPHENOLATE MOFETIL 250 MG: 200 POWDER, FOR SUSPENSION ORAL at 20:13

## 2018-01-01 RX ADMIN — INSULIN ASPART 1 UNITS: 100 INJECTION, SOLUTION INTRAVENOUS; SUBCUTANEOUS at 21:42

## 2018-01-01 RX ADMIN — INSULIN ASPART 1 UNITS: 100 INJECTION, SOLUTION INTRAVENOUS; SUBCUTANEOUS at 22:25

## 2018-01-01 RX ADMIN — AMLODIPINE BESYLATE 5 MG: 5 TABLET ORAL at 09:06

## 2018-01-01 RX ADMIN — LEVETIRACETAM 750 MG: 100 SOLUTION ORAL at 20:15

## 2018-01-01 RX ADMIN — Medication 1.5 MG: at 11:06

## 2018-01-01 RX ADMIN — HYDRALAZINE HYDROCHLORIDE 10 MG: 20 INJECTION INTRAMUSCULAR; INTRAVENOUS at 23:30

## 2018-01-01 RX ADMIN — Medication 125 MG: at 21:11

## 2018-01-01 RX ADMIN — HYDRALAZINE HYDROCHLORIDE 10 MG: 20 INJECTION INTRAMUSCULAR; INTRAVENOUS at 06:17

## 2018-01-01 RX ADMIN — CARVEDILOL 6.25 MG: 25 TABLET, FILM COATED ORAL at 09:00

## 2018-01-01 RX ADMIN — WARFARIN SODIUM 2.5 MG: 2.5 TABLET ORAL at 17:57

## 2018-01-01 RX ADMIN — MINERAL SUPPLEMENT IRON 300 MG / 5 ML STRENGTH LIQUID 100 PER BOX UNFLAVORED 600 MG: at 08:49

## 2018-01-01 RX ADMIN — INSULIN ASPART 1 UNITS: 100 INJECTION, SOLUTION INTRAVENOUS; SUBCUTANEOUS at 22:37

## 2018-01-01 RX ADMIN — BUMETANIDE 4 MG: 0.25 INJECTION INTRAMUSCULAR; INTRAVENOUS at 16:46

## 2018-01-01 RX ADMIN — Medication 100 MG: at 09:38

## 2018-01-01 RX ADMIN — CARVEDILOL 6.25 MG: 25 TABLET, FILM COATED ORAL at 09:05

## 2018-01-01 RX ADMIN — WARFARIN SODIUM 2 MG: 2 TABLET ORAL at 18:06

## 2018-01-01 RX ADMIN — Medication 1 PACKET: at 13:55

## 2018-01-01 RX ADMIN — AMLODIPINE BESYLATE 5 MG: 5 TABLET ORAL at 09:50

## 2018-01-01 RX ADMIN — LORAZEPAM 2 MG: 2 INJECTION INTRAMUSCULAR; INTRAVENOUS at 22:59

## 2018-01-01 RX ADMIN — Medication 0.2 MG: at 22:31

## 2018-01-01 RX ADMIN — RIFAXIMIN 400 MG: 200 TABLET ORAL at 08:22

## 2018-01-01 RX ADMIN — MYCOPHENOLATE MOFETIL 500 MG: 500 INJECTION, POWDER, LYOPHILIZED, FOR SOLUTION INTRAVENOUS at 20:46

## 2018-01-01 RX ADMIN — PREDNISONE 5 MG: 5 TABLET ORAL at 15:49

## 2018-01-01 RX ADMIN — MICONAZOLE NITRATE: 2 POWDER TOPICAL at 13:24

## 2018-01-01 RX ADMIN — SODIUM CHLORIDE 200 MG: 9 INJECTION, SOLUTION INTRAVENOUS at 21:42

## 2018-01-01 RX ADMIN — CARVEDILOL 6.25 MG: 6.25 TABLET, FILM COATED ORAL at 09:19

## 2018-01-01 RX ADMIN — INSULIN ASPART 1 UNITS: 100 INJECTION, SOLUTION INTRAVENOUS; SUBCUTANEOUS at 00:21

## 2018-01-01 RX ADMIN — SCOPOLAMINE 1 PATCH: 1 PATCH, EXTENDED RELEASE TRANSDERMAL at 23:26

## 2018-01-01 RX ADMIN — METRONIDAZOLE 500 MG: 500 INJECTION, SOLUTION INTRAVENOUS at 05:29

## 2018-01-01 RX ADMIN — Medication 0.4 MG: at 16:37

## 2018-01-01 RX ADMIN — ACETYLCYSTEINE 2 ML: 200 SOLUTION ORAL; RESPIRATORY (INHALATION) at 14:18

## 2018-01-01 RX ADMIN — LEVETIRACETAM 250 MG: 100 SOLUTION ORAL at 08:07

## 2018-01-01 RX ADMIN — MICONAZOLE NITRATE: 2 POWDER TOPICAL at 09:55

## 2018-01-01 RX ADMIN — SODIUM CHLORIDE 300 ML: 9 INJECTION, SOLUTION INTRAVENOUS at 09:15

## 2018-01-01 RX ADMIN — PIPERACILLIN SODIUM AND TAZOBACTAM SODIUM 2.25 G: 2; .25 INJECTION, POWDER, LYOPHILIZED, FOR SOLUTION INTRAVENOUS at 21:45

## 2018-01-01 RX ADMIN — CARVEDILOL 6.25 MG: 25 TABLET, FILM COATED ORAL at 19:55

## 2018-01-01 RX ADMIN — MICONAZOLE NITRATE: 2 POWDER TOPICAL at 20:35

## 2018-01-01 RX ADMIN — LEVETIRACETAM 500 MG: 100 SOLUTION ORAL at 19:36

## 2018-01-01 RX ADMIN — PIPERACILLIN AND TAZOBACTAM 2.25 G: 2; .25 INJECTION, POWDER, FOR SOLUTION INTRAVENOUS at 06:39

## 2018-01-01 RX ADMIN — METRONIDAZOLE 500 MG: 500 INJECTION, SOLUTION INTRAVENOUS at 23:36

## 2018-01-01 RX ADMIN — SODIUM CHLORIDE 300 ML: 9 INJECTION, SOLUTION INTRAVENOUS at 09:09

## 2018-01-01 RX ADMIN — Medication 125 MG: at 09:06

## 2018-01-01 RX ADMIN — VANCOMYCIN HYDROCHLORIDE 125 MG: KIT at 04:53

## 2018-01-01 RX ADMIN — Medication 125 MG: at 00:10

## 2018-01-01 RX ADMIN — VANCOMYCIN HYDROCHLORIDE 125 MG: KIT at 12:58

## 2018-01-01 RX ADMIN — Medication 5 MG: at 13:34

## 2018-01-01 RX ADMIN — Medication 1.5 MG: at 09:26

## 2018-01-01 RX ADMIN — Medication 1 PACKET: at 20:26

## 2018-01-01 RX ADMIN — CARVEDILOL 6.25 MG: 25 TABLET, FILM COATED ORAL at 20:04

## 2018-01-01 RX ADMIN — MICONAZOLE NITRATE: 2 POWDER TOPICAL at 20:23

## 2018-01-01 RX ADMIN — CEFTRIAXONE 1 G: 1 INJECTION, POWDER, FOR SOLUTION INTRAMUSCULAR; INTRAVENOUS at 11:43

## 2018-01-01 RX ADMIN — Medication 125 MG: at 12:06

## 2018-01-01 RX ADMIN — Medication 100 MG: at 08:55

## 2018-01-01 RX ADMIN — INSULIN ASPART 2 UNITS: 100 INJECTION, SOLUTION INTRAVENOUS; SUBCUTANEOUS at 16:07

## 2018-01-01 RX ADMIN — MULTIVITAMIN 15 ML: LIQUID ORAL at 08:08

## 2018-01-01 RX ADMIN — PANTOPRAZOLE SODIUM 40 MG: 40 TABLET, DELAYED RELEASE ORAL at 17:28

## 2018-01-01 RX ADMIN — ACETAMINOPHEN 650 MG: 160 SOLUTION ORAL at 09:25

## 2018-01-01 RX ADMIN — INSULIN ASPART 1 UNITS: 100 INJECTION, SOLUTION INTRAVENOUS; SUBCUTANEOUS at 14:03

## 2018-01-01 RX ADMIN — METRONIDAZOLE 500 MG: 500 INJECTION, SOLUTION INTRAVENOUS at 06:11

## 2018-01-01 RX ADMIN — Medication 1 PACKET: at 09:26

## 2018-01-01 RX ADMIN — Medication 25 MG: at 01:44

## 2018-01-01 RX ADMIN — FUROSEMIDE 40 MG: 10 INJECTION, SOLUTION INTRAVENOUS at 03:54

## 2018-01-01 RX ADMIN — ATROPINE SULFATE 1 DROP: 10 SOLUTION/ DROPS OPHTHALMIC at 19:47

## 2018-01-01 RX ADMIN — INSULIN ASPART 2 UNITS: 100 INJECTION, SOLUTION INTRAVENOUS; SUBCUTANEOUS at 21:30

## 2018-01-01 RX ADMIN — Medication 1.5 MG: at 09:22

## 2018-01-01 RX ADMIN — AMLODIPINE BESYLATE 5 MG: 10 TABLET ORAL at 11:12

## 2018-01-01 RX ADMIN — PANTOPRAZOLE SODIUM 40 MG: 40 INJECTION, POWDER, FOR SOLUTION INTRAVENOUS at 03:54

## 2018-01-01 RX ADMIN — HYDROMORPHONE HYDROCHLORIDE 1 MG: 1 SOLUTION ORAL at 17:25

## 2018-01-01 RX ADMIN — PANTOPRAZOLE SODIUM 40 MG: 40 TABLET, DELAYED RELEASE ORAL at 09:41

## 2018-01-01 RX ADMIN — MULTIVITAMIN 15 ML: LIQUID ORAL at 10:03

## 2018-01-01 RX ADMIN — IPRATROPIUM BROMIDE AND ALBUTEROL SULFATE 3 ML: .5; 3 SOLUTION RESPIRATORY (INHALATION) at 08:39

## 2018-01-01 RX ADMIN — MYCOPHENOLATE MOFETIL 500 MG: 200 POWDER, FOR SUSPENSION ORAL at 21:08

## 2018-01-01 RX ADMIN — BUMETANIDE 4 MG: 0.25 INJECTION INTRAMUSCULAR; INTRAVENOUS at 03:06

## 2018-01-01 RX ADMIN — SODIUM CHLORIDE, POTASSIUM CHLORIDE, SODIUM LACTATE AND CALCIUM CHLORIDE: 600; 310; 30; 20 INJECTION, SOLUTION INTRAVENOUS at 15:12

## 2018-01-01 RX ADMIN — BUMETANIDE 4 MG: 0.25 INJECTION INTRAMUSCULAR; INTRAVENOUS at 09:08

## 2018-01-01 RX ADMIN — PANTOPRAZOLE SODIUM 40 MG: 40 TABLET, DELAYED RELEASE ORAL at 17:32

## 2018-01-01 RX ADMIN — INSULIN ASPART 2 UNITS: 100 INJECTION, SOLUTION INTRAVENOUS; SUBCUTANEOUS at 12:44

## 2018-01-01 RX ADMIN — MYCOPHENOLATE MOFETIL 250 MG: 250 CAPSULE ORAL at 18:17

## 2018-01-01 RX ADMIN — MEMANTINE HYDROCHLORIDE 21 MG: 21 CAPSULE, EXTENDED RELEASE ORAL at 10:46

## 2018-01-01 RX ADMIN — PANTOPRAZOLE SODIUM 40 MG: 40 TABLET, DELAYED RELEASE ORAL at 16:38

## 2018-01-01 RX ADMIN — PANTOPRAZOLE SODIUM 40 MG: 40 TABLET, DELAYED RELEASE ORAL at 18:23

## 2018-01-01 RX ADMIN — INSULIN ASPART 1 UNITS: 100 INJECTION, SOLUTION INTRAVENOUS; SUBCUTANEOUS at 21:31

## 2018-01-01 RX ADMIN — FUROSEMIDE 80 MG: 40 TABLET ORAL at 09:09

## 2018-01-01 RX ADMIN — HYDRALAZINE HYDROCHLORIDE 20 MG: 20 INJECTION INTRAMUSCULAR; INTRAVENOUS at 04:51

## 2018-01-01 RX ADMIN — AMPICILLIN SODIUM AND SULBACTAM SODIUM 3 G: 2; 1 INJECTION, POWDER, FOR SOLUTION INTRAMUSCULAR; INTRAVENOUS at 10:53

## 2018-01-01 RX ADMIN — BACITRACIN: 500 OINTMENT TOPICAL at 08:45

## 2018-01-01 RX ADMIN — CARVEDILOL 6.25 MG: 25 TABLET, FILM COATED ORAL at 09:13

## 2018-01-01 RX ADMIN — Medication 2 G: at 16:32

## 2018-01-01 RX ADMIN — INSULIN ASPART 1 UNITS: 100 INJECTION, SOLUTION INTRAVENOUS; SUBCUTANEOUS at 10:03

## 2018-01-01 RX ADMIN — Medication 1 MG: at 18:08

## 2018-01-01 RX ADMIN — INSULIN ASPART 2 UNITS: 100 INJECTION, SOLUTION INTRAVENOUS; SUBCUTANEOUS at 15:54

## 2018-01-01 RX ADMIN — TACROLIMUS 40 MCG/HR: 5 INJECTION, SOLUTION INTRAVENOUS at 02:06

## 2018-01-01 RX ADMIN — MICONAZOLE NITRATE: 2 POWDER TOPICAL at 19:55

## 2018-01-01 RX ADMIN — Medication 0.2 MG: at 21:27

## 2018-01-01 RX ADMIN — CIPROFLOXACIN HYDROCHLORIDE 500 MG: 500 TABLET, FILM COATED ORAL at 21:10

## 2018-01-01 RX ADMIN — Medication 1 PACKET: at 13:22

## 2018-01-01 RX ADMIN — MICONAZOLE NITRATE: 2 POWDER TOPICAL at 19:49

## 2018-01-01 RX ADMIN — Medication 2 G: at 08:31

## 2018-01-01 RX ADMIN — Medication 1.5 MG: at 09:10

## 2018-01-01 RX ADMIN — AMPICILLIN SODIUM AND SULBACTAM SODIUM 1.5 G: 1; .5 INJECTION, POWDER, FOR SOLUTION INTRAMUSCULAR; INTRAVENOUS at 03:54

## 2018-01-01 RX ADMIN — FUROSEMIDE 40 MG: 10 INJECTION, SOLUTION INTRAVENOUS at 02:27

## 2018-01-01 RX ADMIN — Medication 400 UNITS: at 08:55

## 2018-01-01 RX ADMIN — LEVETIRACETAM 500 MG: 100 SOLUTION ORAL at 22:09

## 2018-01-01 RX ADMIN — MYCOPHENOLATE MOFETIL 250 MG: 500 INJECTION, POWDER, LYOPHILIZED, FOR SOLUTION INTRAVENOUS at 13:19

## 2018-01-01 RX ADMIN — Medication 125 MG: at 13:32

## 2018-01-01 RX ADMIN — Medication 1.5 MG: at 09:06

## 2018-01-01 RX ADMIN — MINERAL SUPPLEMENT IRON 300 MG / 5 ML STRENGTH LIQUID 100 PER BOX UNFLAVORED 600 MG: at 08:44

## 2018-01-01 RX ADMIN — CEFEPIME HYDROCHLORIDE 2 G: 2 INJECTION, POWDER, FOR SOLUTION INTRAVENOUS at 10:18

## 2018-01-01 RX ADMIN — Medication 1.5 MG: at 08:33

## 2018-01-01 RX ADMIN — Medication 100 MG: at 20:24

## 2018-01-01 RX ADMIN — Medication 5 MG: at 09:46

## 2018-01-01 RX ADMIN — AMPICILLIN SODIUM AND SULBACTAM SODIUM 3 G: 2; 1 INJECTION, POWDER, FOR SOLUTION INTRAMUSCULAR; INTRAVENOUS at 03:37

## 2018-01-01 RX ADMIN — Medication 20 MG: at 19:33

## 2018-01-01 RX ADMIN — Medication 1.5 MG: at 08:19

## 2018-01-01 RX ADMIN — LEVETIRACETAM 500 MG: 500 TABLET ORAL at 08:45

## 2018-01-01 RX ADMIN — PANTOPRAZOLE SODIUM 40 MG: 40 TABLET, DELAYED RELEASE ORAL at 17:37

## 2018-01-01 RX ADMIN — Medication 25 MG: at 23:18

## 2018-01-01 RX ADMIN — Medication 400 UNITS: at 08:01

## 2018-01-01 RX ADMIN — MINERAL SUPPLEMENT IRON 300 MG / 5 ML STRENGTH LIQUID 100 PER BOX UNFLAVORED 600 MG: at 09:11

## 2018-01-01 RX ADMIN — Medication 1 CAPSULE: at 09:25

## 2018-01-01 RX ADMIN — SODIUM CHLORIDE 300 ML: 9 INJECTION, SOLUTION INTRAVENOUS at 14:10

## 2018-01-01 RX ADMIN — Medication 1.5 MG: at 09:45

## 2018-01-01 RX ADMIN — INSULIN ASPART 1 UNITS: 100 INJECTION, SOLUTION INTRAVENOUS; SUBCUTANEOUS at 12:13

## 2018-01-01 RX ADMIN — VANCOMYCIN HYDROCHLORIDE 125 MG: KIT at 20:13

## 2018-01-01 RX ADMIN — PANTOPRAZOLE SODIUM 40 MG: 40 TABLET, DELAYED RELEASE ORAL at 08:27

## 2018-01-01 RX ADMIN — Medication 125 MG: at 09:19

## 2018-01-01 RX ADMIN — METRONIDAZOLE 500 MG: 500 INJECTION, SOLUTION INTRAVENOUS at 08:08

## 2018-01-01 RX ADMIN — HYDROMORPHONE HYDROCHLORIDE 2 MG: 1 SOLUTION ORAL at 06:09

## 2018-01-01 RX ADMIN — Medication 5 MG: at 08:34

## 2018-01-01 RX ADMIN — METRONIDAZOLE 500 MG: 500 INJECTION, SOLUTION INTRAVENOUS at 10:20

## 2018-01-01 RX ADMIN — METHYLCELLULOSE 500 MG: 500 TABLET ORAL at 20:18

## 2018-01-01 RX ADMIN — PANTOPRAZOLE SODIUM 40 MG: 40 TABLET, DELAYED RELEASE ORAL at 09:08

## 2018-01-01 RX ADMIN — Medication 1.5 MG: at 08:22

## 2018-01-01 RX ADMIN — MYCOPHENOLATE MOFETIL 250 MG: 200 POWDER, FOR SUSPENSION ORAL at 09:32

## 2018-01-01 RX ADMIN — ACETYLCYSTEINE 2 ML: 200 SOLUTION ORAL; RESPIRATORY (INHALATION) at 11:15

## 2018-01-01 RX ADMIN — Medication 1 PACKET: at 09:51

## 2018-01-01 RX ADMIN — MYCOPHENOLATE MOFETIL 250 MG: 200 POWDER, FOR SUSPENSION ORAL at 17:50

## 2018-01-01 RX ADMIN — PREDNISONE 10 MG: 5 TABLET ORAL at 16:10

## 2018-01-01 RX ADMIN — Medication 1 CAPSULE: at 20:41

## 2018-01-01 RX ADMIN — METHYLPREDNISOLONE SODIUM SUCCINATE 16 MG: 40 INJECTION, POWDER, LYOPHILIZED, FOR SOLUTION INTRAMUSCULAR; INTRAVENOUS at 12:42

## 2018-01-01 RX ADMIN — Medication 1.5 MG: at 17:22

## 2018-01-01 RX ADMIN — LIDOCAINE HYDROCHLORIDE 15 ML: 20 SOLUTION ORAL; TOPICAL at 15:23

## 2018-01-01 RX ADMIN — BUMETANIDE 4 MG: 0.25 INJECTION INTRAMUSCULAR; INTRAVENOUS at 17:09

## 2018-01-01 RX ADMIN — LEVETIRACETAM 750 MG: 100 SOLUTION ORAL at 08:30

## 2018-01-01 RX ADMIN — LEVETIRACETAM 500 MG: 5 INJECTION INTRAVENOUS at 10:39

## 2018-01-01 RX ADMIN — IPRATROPIUM BROMIDE AND ALBUTEROL SULFATE 3 ML: .5; 3 SOLUTION RESPIRATORY (INHALATION) at 16:14

## 2018-01-01 RX ADMIN — FERROUS SULFATE TAB 325 MG (65 MG ELEMENTAL FE) 325 MG: 325 (65 FE) TAB at 10:00

## 2018-01-01 RX ADMIN — INSULIN ASPART 1 UNITS: 100 INJECTION, SOLUTION INTRAVENOUS; SUBCUTANEOUS at 08:35

## 2018-01-01 RX ADMIN — SODIUM CHLORIDE 100 MG: 9 INJECTION, SOLUTION INTRAVENOUS at 09:31

## 2018-01-01 RX ADMIN — ALBUTEROL SULFATE 2.5 MG: 2.5 SOLUTION RESPIRATORY (INHALATION) at 09:25

## 2018-01-01 RX ADMIN — BUMETANIDE 4 MG: 1 TABLET ORAL at 15:56

## 2018-01-01 RX ADMIN — PANTOPRAZOLE SODIUM 40 MG: 40 INJECTION, POWDER, FOR SOLUTION INTRAVENOUS at 01:22

## 2018-01-01 RX ADMIN — PIPERACILLIN SODIUM AND TAZOBACTAM SODIUM 2.25 G: 2; .25 INJECTION, POWDER, LYOPHILIZED, FOR SOLUTION INTRAVENOUS at 18:27

## 2018-01-01 RX ADMIN — MICONAZOLE NITRATE: 2 POWDER TOPICAL at 09:16

## 2018-01-01 RX ADMIN — METRONIDAZOLE 500 MG: 500 INJECTION, SOLUTION INTRAVENOUS at 05:59

## 2018-01-01 RX ADMIN — METHYLPREDNISOLONE SODIUM SUCCINATE 4 MG: 40 INJECTION, POWDER, FOR SOLUTION INTRAMUSCULAR; INTRAVENOUS at 17:53

## 2018-01-01 RX ADMIN — MYCOPHENOLATE MOFETIL 250 MG: 200 POWDER, FOR SUSPENSION ORAL at 09:38

## 2018-01-01 RX ADMIN — BUMETANIDE 2 MG: 0.25 INJECTION INTRAMUSCULAR; INTRAVENOUS at 10:03

## 2018-01-01 RX ADMIN — MICONAZOLE NITRATE: 2 POWDER TOPICAL at 08:02

## 2018-01-01 RX ADMIN — ALBUTEROL SULFATE 2.5 MG: 2.5 SOLUTION RESPIRATORY (INHALATION) at 13:32

## 2018-01-01 RX ADMIN — MYCOPHENOLATE MOFETIL 500 MG: 500 INJECTION, POWDER, LYOPHILIZED, FOR SOLUTION INTRAVENOUS at 03:19

## 2018-01-01 RX ADMIN — SODIUM CHLORIDE, POTASSIUM CHLORIDE, SODIUM LACTATE AND CALCIUM CHLORIDE 250 ML: 600; 310; 30; 20 INJECTION, SOLUTION INTRAVENOUS at 13:01

## 2018-01-01 RX ADMIN — SODIUM CHLORIDE 250 ML: 9 INJECTION, SOLUTION INTRAVENOUS at 15:37

## 2018-01-01 RX ADMIN — PANTOPRAZOLE SODIUM 40 MG: 40 TABLET, DELAYED RELEASE ORAL at 16:25

## 2018-01-01 RX ADMIN — METRONIDAZOLE 500 MG: 500 INJECTION, SOLUTION INTRAVENOUS at 12:43

## 2018-01-01 RX ADMIN — Medication 5 ML: at 14:51

## 2018-01-01 RX ADMIN — MINERAL SUPPLEMENT IRON 300 MG / 5 ML STRENGTH LIQUID 100 PER BOX UNFLAVORED 600 MG: at 07:58

## 2018-01-01 RX ADMIN — MICONAZOLE NITRATE: 2 POWDER TOPICAL at 19:05

## 2018-01-01 RX ADMIN — LEVETIRACETAM 500 MG: 100 SOLUTION ORAL at 08:47

## 2018-01-01 RX ADMIN — METHYLPREDNISOLONE SODIUM SUCCINATE 4 MG: 40 INJECTION, POWDER, FOR SOLUTION INTRAMUSCULAR; INTRAVENOUS at 16:17

## 2018-01-01 RX ADMIN — CARVEDILOL 6.25 MG: 25 TABLET, FILM COATED ORAL at 08:30

## 2018-01-01 RX ADMIN — MYCOPHENOLATE MOFETIL 500 MG: 200 POWDER, FOR SUSPENSION ORAL at 09:27

## 2018-01-01 RX ADMIN — PIPERACILLIN SODIUM AND TAZOBACTAM SODIUM 2.25 G: 2; .25 INJECTION, POWDER, LYOPHILIZED, FOR SOLUTION INTRAVENOUS at 20:50

## 2018-01-01 RX ADMIN — AMLODIPINE BESYLATE 5 MG: 10 TABLET ORAL at 09:26

## 2018-01-01 RX ADMIN — Medication 2 MG: at 16:25

## 2018-01-01 RX ADMIN — Medication 0.4 MG: at 01:20

## 2018-01-01 RX ADMIN — Medication 20 MG: at 09:06

## 2018-01-01 RX ADMIN — CARVEDILOL 6.25 MG: 25 TABLET, FILM COATED ORAL at 09:46

## 2018-01-01 RX ADMIN — Medication 1 PACKET: at 09:15

## 2018-01-01 RX ADMIN — Medication 400 UNITS: at 09:57

## 2018-01-01 RX ADMIN — BUMETANIDE 2 MG: 0.25 INJECTION INTRAMUSCULAR; INTRAVENOUS at 13:07

## 2018-01-01 RX ADMIN — ACETYLCYSTEINE 2 ML: 200 SOLUTION ORAL; RESPIRATORY (INHALATION) at 21:14

## 2018-01-01 RX ADMIN — ZINC SULFATE CAP 220 MG (50 MG ELEMENTAL ZN) 220 MG: 220 (50 ZN) CAP at 08:43

## 2018-01-01 RX ADMIN — ALBUTEROL SULFATE 2.5 MG: 2.5 SOLUTION RESPIRATORY (INHALATION) at 09:45

## 2018-01-01 RX ADMIN — LEVETIRACETAM 500 MG: 100 SOLUTION ORAL at 21:30

## 2018-01-01 RX ADMIN — Medication 1 MG: at 08:49

## 2018-01-01 RX ADMIN — VANCOMYCIN HYDROCHLORIDE 125 MG: KIT at 21:30

## 2018-01-01 RX ADMIN — VANCOMYCIN HYDROCHLORIDE 125 MG: KIT at 10:49

## 2018-01-01 RX ADMIN — SODIUM POLYSTYRENE SULFONATE 15 G: 15 SUSPENSION ORAL; RECTAL at 08:51

## 2018-01-01 RX ADMIN — LORAZEPAM 2 MG: 2 INJECTION INTRAMUSCULAR; INTRAVENOUS at 22:08

## 2018-01-01 RX ADMIN — VANCOMYCIN HYDROCHLORIDE 125 MG: KIT at 06:04

## 2018-01-01 RX ADMIN — METHYLCELLULOSE 500 MG: 500 TABLET ORAL at 08:45

## 2018-01-01 RX ADMIN — Medication 5 MG: at 03:30

## 2018-01-01 RX ADMIN — ACETYLCYSTEINE 2 ML: 200 SOLUTION ORAL; RESPIRATORY (INHALATION) at 13:54

## 2018-01-01 RX ADMIN — Medication 1.5 MG: at 08:49

## 2018-01-01 RX ADMIN — PANTOPRAZOLE SODIUM 40 MG: 40 TABLET, DELAYED RELEASE ORAL at 08:57

## 2018-01-01 RX ADMIN — VANCOMYCIN HYDROCHLORIDE 125 MG: KIT at 16:12

## 2018-01-01 RX ADMIN — CARVEDILOL 6.25 MG: 6.25 TABLET, FILM COATED ORAL at 17:10

## 2018-01-01 RX ADMIN — VANCOMYCIN HYDROCHLORIDE 125 MG: KIT at 03:16

## 2018-01-01 RX ADMIN — ACETYLCYSTEINE 2 ML: 200 SOLUTION ORAL; RESPIRATORY (INHALATION) at 13:41

## 2018-01-01 RX ADMIN — IPRATROPIUM BROMIDE AND ALBUTEROL SULFATE 3 ML: .5; 3 SOLUTION RESPIRATORY (INHALATION) at 08:52

## 2018-01-01 RX ADMIN — PREDNISONE 5 MG: 5 SOLUTION ORAL at 08:41

## 2018-01-01 RX ADMIN — MICONAZOLE NITRATE: 2 POWDER TOPICAL at 21:12

## 2018-01-01 RX ADMIN — PANTOPRAZOLE SODIUM 40 MG: 40 TABLET, DELAYED RELEASE ORAL at 15:58

## 2018-01-01 RX ADMIN — CARVEDILOL 6.25 MG: 25 TABLET, FILM COATED ORAL at 08:34

## 2018-01-01 RX ADMIN — INSULIN ASPART 1 UNITS: 100 INJECTION, SOLUTION INTRAVENOUS; SUBCUTANEOUS at 12:14

## 2018-01-01 RX ADMIN — PANTOPRAZOLE SODIUM 40 MG: 40 INJECTION, POWDER, FOR SOLUTION INTRAVENOUS at 16:13

## 2018-01-01 RX ADMIN — METRONIDAZOLE 500 MG: 500 INJECTION, SOLUTION INTRAVENOUS at 16:49

## 2018-01-01 RX ADMIN — AMLODIPINE BESYLATE 5 MG: 5 TABLET ORAL at 09:40

## 2018-01-01 RX ADMIN — MICONAZOLE NITRATE: 2 POWDER TOPICAL at 09:26

## 2018-01-01 RX ADMIN — INSULIN ASPART 1 UNITS: 100 INJECTION, SOLUTION INTRAVENOUS; SUBCUTANEOUS at 19:36

## 2018-01-01 RX ADMIN — CARVEDILOL 6.25 MG: 25 TABLET, FILM COATED ORAL at 21:45

## 2018-01-01 RX ADMIN — METRONIDAZOLE 500 MG: 500 INJECTION, SOLUTION INTRAVENOUS at 22:10

## 2018-01-01 RX ADMIN — PANTOPRAZOLE SODIUM 40 MG: 40 INJECTION, POWDER, FOR SOLUTION INTRAVENOUS at 03:59

## 2018-01-01 RX ADMIN — METHYLPREDNISOLONE SODIUM SUCCINATE 16 MG: 40 INJECTION, POWDER, FOR SOLUTION INTRAMUSCULAR; INTRAVENOUS at 12:19

## 2018-01-01 RX ADMIN — INSULIN ASPART 1 UNITS: 100 INJECTION, SOLUTION INTRAVENOUS; SUBCUTANEOUS at 04:50

## 2018-01-01 RX ADMIN — PIPERACILLIN SODIUM AND TAZOBACTAM SODIUM 2.25 G: 2; .25 INJECTION, POWDER, LYOPHILIZED, FOR SOLUTION INTRAVENOUS at 23:53

## 2018-01-01 RX ADMIN — Medication 1 CAPSULE: at 08:51

## 2018-01-01 RX ADMIN — CARVEDILOL 6.25 MG: 25 TABLET, FILM COATED ORAL at 08:12

## 2018-01-01 RX ADMIN — PANTOPRAZOLE SODIUM 40 MG: 40 TABLET, DELAYED RELEASE ORAL at 08:47

## 2018-01-01 RX ADMIN — SODIUM CHLORIDE 50 MG: 9 INJECTION, SOLUTION INTRAVENOUS at 21:35

## 2018-01-01 RX ADMIN — MICONAZOLE NITRATE: 2 POWDER TOPICAL at 08:20

## 2018-01-01 RX ADMIN — ALBUTEROL SULFATE 2.5 MG: 2.5 SOLUTION RESPIRATORY (INHALATION) at 15:02

## 2018-01-01 RX ADMIN — MINERAL SUPPLEMENT IRON 300 MG / 5 ML STRENGTH LIQUID 100 PER BOX UNFLAVORED 600 MG: at 08:00

## 2018-01-01 RX ADMIN — INSULIN ASPART 2 UNITS: 100 INJECTION, SOLUTION INTRAVENOUS; SUBCUTANEOUS at 05:46

## 2018-01-01 RX ADMIN — PIPERACILLIN SODIUM AND TAZOBACTAM SODIUM 2.25 G: 2; .25 INJECTION, POWDER, LYOPHILIZED, FOR SOLUTION INTRAVENOUS at 03:03

## 2018-01-01 RX ADMIN — METOLAZONE 5 MG: 5 TABLET ORAL at 09:28

## 2018-01-01 RX ADMIN — SODIUM CHLORIDE 50 MG: 9 INJECTION, SOLUTION INTRAVENOUS at 09:45

## 2018-01-01 RX ADMIN — CARVEDILOL 6.25 MG: 25 TABLET, FILM COATED ORAL at 20:26

## 2018-01-01 RX ADMIN — Medication 10 MG: at 06:07

## 2018-01-01 RX ADMIN — CARVEDILOL 6.25 MG: 6.25 TABLET, FILM COATED ORAL at 09:50

## 2018-01-01 RX ADMIN — HYDROMORPHONE HYDROCHLORIDE 2 MG: 1 SOLUTION ORAL at 00:03

## 2018-01-01 RX ADMIN — PREDNISONE 5 MG: 5 TABLET ORAL at 16:26

## 2018-01-01 RX ADMIN — CARVEDILOL 6.25 MG: 25 TABLET, FILM COATED ORAL at 21:08

## 2018-01-01 RX ADMIN — ZINC SULFATE CAP 220 MG (50 MG ELEMENTAL ZN) 220 MG: 220 (50 ZN) CAP at 08:45

## 2018-01-01 RX ADMIN — LEVETIRACETAM 750 MG: 100 INJECTION, SOLUTION INTRAVENOUS at 19:53

## 2018-01-01 RX ADMIN — CARVEDILOL 6.25 MG: 6.25 TABLET, FILM COATED ORAL at 17:02

## 2018-01-01 RX ADMIN — PANTOPRAZOLE SODIUM 40 MG: 40 INJECTION, POWDER, FOR SOLUTION INTRAVENOUS at 01:45

## 2018-01-01 RX ADMIN — Medication 25 MG: at 23:11

## 2018-01-01 RX ADMIN — Medication 2 MG: at 08:50

## 2018-01-01 RX ADMIN — SODIUM CHLORIDE, POTASSIUM CHLORIDE, SODIUM LACTATE AND CALCIUM CHLORIDE 500 ML: 600; 310; 30; 20 INJECTION, SOLUTION INTRAVENOUS at 08:46

## 2018-01-01 RX ADMIN — MICONAZOLE NITRATE: 2 POWDER TOPICAL at 08:41

## 2018-01-01 RX ADMIN — MYCOPHENOLATE MOFETIL 250 MG: 200 POWDER, FOR SUSPENSION ORAL at 21:20

## 2018-01-01 RX ADMIN — ACETYLCYSTEINE 4 ML: 100 SOLUTION ORAL; RESPIRATORY (INHALATION) at 21:01

## 2018-01-01 RX ADMIN — Medication 0.5 MG: at 10:46

## 2018-01-01 RX ADMIN — Medication 2 MG: at 09:00

## 2018-01-01 RX ADMIN — CARVEDILOL 6.25 MG: 25 TABLET, FILM COATED ORAL at 20:30

## 2018-01-01 RX ADMIN — Medication 1.5 MG: at 09:41

## 2018-01-01 RX ADMIN — Medication 5 MG: at 16:12

## 2018-01-01 RX ADMIN — CARVEDILOL 6.25 MG: 6.25 TABLET, FILM COATED ORAL at 08:01

## 2018-01-01 RX ADMIN — WARFARIN SODIUM 4 MG: 4 TABLET ORAL at 18:28

## 2018-01-01 RX ADMIN — Medication 1.5 MG: at 10:06

## 2018-01-01 RX ADMIN — Medication 1 CAPSULE: at 20:23

## 2018-01-01 RX ADMIN — METHYLPREDNISOLONE SODIUM SUCCINATE 4 MG: 40 INJECTION, POWDER, FOR SOLUTION INTRAMUSCULAR; INTRAVENOUS at 20:46

## 2018-01-01 RX ADMIN — Medication 100 MG: at 09:05

## 2018-01-01 RX ADMIN — ALBUMIN HUMAN 250 ML: 0.05 INJECTION, SOLUTION INTRAVENOUS at 09:19

## 2018-01-01 RX ADMIN — Medication 5 MG: at 09:40

## 2018-01-01 RX ADMIN — Medication 400 UNITS: at 09:49

## 2018-01-01 RX ADMIN — INSULIN ASPART 1 UNITS: 100 INJECTION, SOLUTION INTRAVENOUS; SUBCUTANEOUS at 09:28

## 2018-01-01 RX ADMIN — BUMETANIDE 2 MG: 0.25 INJECTION INTRAMUSCULAR; INTRAVENOUS at 02:56

## 2018-01-01 RX ADMIN — Medication 125 MG: at 14:20

## 2018-01-01 RX ADMIN — Medication 20 MG: at 10:47

## 2018-01-01 RX ADMIN — MYCOPHENOLATE MOFETIL 250 MG: 500 INJECTION, POWDER, LYOPHILIZED, FOR SOLUTION INTRAVENOUS at 23:05

## 2018-01-01 RX ADMIN — METRONIDAZOLE 500 MG: 500 INJECTION, SOLUTION INTRAVENOUS at 05:04

## 2018-01-01 RX ADMIN — IPRATROPIUM BROMIDE AND ALBUTEROL SULFATE 3 ML: .5; 3 SOLUTION RESPIRATORY (INHALATION) at 08:06

## 2018-01-01 RX ADMIN — PANTOPRAZOLE SODIUM 40 MG: 40 TABLET, DELAYED RELEASE ORAL at 15:49

## 2018-01-01 RX ADMIN — ACETYLCYSTEINE 4 ML: 100 SOLUTION ORAL; RESPIRATORY (INHALATION) at 19:49

## 2018-01-01 RX ADMIN — HEPARIN SODIUM 1250 UNITS/HR: 10000 INJECTION, SOLUTION INTRAVENOUS at 14:07

## 2018-01-01 RX ADMIN — CARVEDILOL 6.25 MG: 25 TABLET, FILM COATED ORAL at 13:19

## 2018-01-01 RX ADMIN — MICONAZOLE NITRATE: 2 POWDER TOPICAL at 09:03

## 2018-01-01 RX ADMIN — FUROSEMIDE 40 MG: 10 INJECTION, SOLUTION INTRAVENOUS at 03:58

## 2018-01-01 RX ADMIN — PANTOPRAZOLE SODIUM 40 MG: 40 TABLET, DELAYED RELEASE ORAL at 17:21

## 2018-01-01 RX ADMIN — MINERAL SUPPLEMENT IRON 300 MG / 5 ML STRENGTH LIQUID 100 PER BOX UNFLAVORED 600 MG: at 09:27

## 2018-01-01 RX ADMIN — CEFEPIME HYDROCHLORIDE 2 G: 2 INJECTION, POWDER, FOR SOLUTION INTRAVENOUS at 09:54

## 2018-01-01 RX ADMIN — PREDNISONE 5 MG: 5 TABLET ORAL at 16:02

## 2018-01-01 RX ADMIN — ACETYLCYSTEINE 2 ML: 200 SOLUTION ORAL; RESPIRATORY (INHALATION) at 21:37

## 2018-01-01 RX ADMIN — METRONIDAZOLE 500 MG: 500 INJECTION, SOLUTION INTRAVENOUS at 09:20

## 2018-01-01 RX ADMIN — Medication 1 PACKET: at 14:03

## 2018-01-01 RX ADMIN — ALBUTEROL SULFATE 2.5 MG: 2.5 SOLUTION RESPIRATORY (INHALATION) at 14:18

## 2018-01-01 RX ADMIN — METRONIDAZOLE 500 MG: 500 INJECTION, SOLUTION INTRAVENOUS at 22:59

## 2018-01-01 RX ADMIN — MINERAL SUPPLEMENT IRON 300 MG / 5 ML STRENGTH LIQUID 100 PER BOX UNFLAVORED 600 MG: at 09:55

## 2018-01-01 RX ADMIN — ALBUTEROL SULFATE 2.5 MG: 2.5 SOLUTION RESPIRATORY (INHALATION) at 08:40

## 2018-01-01 RX ADMIN — Medication 0.75 MG: at 18:12

## 2018-01-01 RX ADMIN — BUMETANIDE 1 MG: 0.25 INJECTION INTRAMUSCULAR; INTRAVENOUS at 01:58

## 2018-01-01 RX ADMIN — HYDRALAZINE HYDROCHLORIDE 10 MG: 20 INJECTION INTRAMUSCULAR; INTRAVENOUS at 20:15

## 2018-01-01 RX ADMIN — INSULIN ASPART 2 UNITS: 100 INJECTION, SOLUTION INTRAVENOUS; SUBCUTANEOUS at 02:56

## 2018-01-01 RX ADMIN — INSULIN ASPART 1 UNITS: 100 INJECTION, SOLUTION INTRAVENOUS; SUBCUTANEOUS at 00:20

## 2018-01-01 RX ADMIN — MYCOPHENOLATE MOFETIL 250 MG: 200 POWDER, FOR SUSPENSION ORAL at 09:05

## 2018-01-01 RX ADMIN — MYCOPHENOLATE MOFETIL 250 MG: 500 INJECTION, POWDER, LYOPHILIZED, FOR SOLUTION INTRAVENOUS at 10:00

## 2018-01-01 RX ADMIN — Medication 125 MG: at 12:07

## 2018-01-01 RX ADMIN — CARVEDILOL 12.5 MG: 12.5 TABLET, FILM COATED ORAL at 18:42

## 2018-01-01 RX ADMIN — CARVEDILOL 6.25 MG: 25 TABLET, FILM COATED ORAL at 08:32

## 2018-01-01 RX ADMIN — IPRATROPIUM BROMIDE AND ALBUTEROL SULFATE 3 ML: .5; 3 SOLUTION RESPIRATORY (INHALATION) at 15:44

## 2018-01-01 RX ADMIN — Medication 0.2 MG: at 22:48

## 2018-01-01 RX ADMIN — MINERAL SUPPLEMENT IRON 300 MG / 5 ML STRENGTH LIQUID 100 PER BOX UNFLAVORED 600 MG: at 08:09

## 2018-01-01 RX ADMIN — PANTOPRAZOLE SODIUM 40 MG: 40 TABLET, DELAYED RELEASE ORAL at 16:31

## 2018-01-01 RX ADMIN — Medication 1 PACKET: at 20:43

## 2018-01-01 RX ADMIN — AMLODIPINE BESYLATE 5 MG: 5 TABLET ORAL at 10:02

## 2018-01-01 RX ADMIN — MYCOPHENOLATE MOFETIL 250 MG: 200 POWDER, FOR SUSPENSION ORAL at 21:12

## 2018-01-01 RX ADMIN — INSULIN ASPART 2 UNITS: 100 INJECTION, SOLUTION INTRAVENOUS; SUBCUTANEOUS at 01:16

## 2018-01-01 RX ADMIN — MULTIVITAMIN 15 ML: LIQUID ORAL at 09:27

## 2018-01-01 RX ADMIN — Medication 1.5 MG: at 09:48

## 2018-01-01 RX ADMIN — OMEPRAZOLE 20 MG: 20 CAPSULE, DELAYED RELEASE ORAL at 10:01

## 2018-01-01 RX ADMIN — METRONIDAZOLE 500 MG: 500 INJECTION, SOLUTION INTRAVENOUS at 16:22

## 2018-01-01 RX ADMIN — CARVEDILOL 6.25 MG: 25 TABLET, FILM COATED ORAL at 09:24

## 2018-01-01 RX ADMIN — LEVETIRACETAM 750 MG: 100 SOLUTION ORAL at 21:21

## 2018-01-01 RX ADMIN — AMPICILLIN SODIUM AND SULBACTAM SODIUM 3 G: 2; 1 INJECTION, POWDER, FOR SOLUTION INTRAMUSCULAR; INTRAVENOUS at 18:16

## 2018-01-01 RX ADMIN — RIFAXIMIN 400 MG: 200 TABLET ORAL at 20:46

## 2018-01-01 RX ADMIN — ALBUTEROL SULFATE 2.5 MG: 2.5 SOLUTION RESPIRATORY (INHALATION) at 08:52

## 2018-01-01 RX ADMIN — MULTIVITAMIN 15 ML: LIQUID ORAL at 07:59

## 2018-01-01 RX ADMIN — ACETYLCYSTEINE 4 ML: 100 SOLUTION ORAL; RESPIRATORY (INHALATION) at 12:48

## 2018-01-01 RX ADMIN — Medication 1 CAPSULE: at 09:53

## 2018-01-01 RX ADMIN — PANTOPRAZOLE SODIUM 40 MG: 40 TABLET, DELAYED RELEASE ORAL at 16:36

## 2018-01-01 RX ADMIN — Medication 0.2 MG: at 01:41

## 2018-01-01 RX ADMIN — INSULIN ASPART 1 UNITS: 100 INJECTION, SOLUTION INTRAVENOUS; SUBCUTANEOUS at 23:42

## 2018-01-01 RX ADMIN — IOPAMIDOL 75 ML: 755 INJECTION, SOLUTION INTRAVENOUS at 19:06

## 2018-01-01 RX ADMIN — ACETYLCYSTEINE 2 ML: 200 SOLUTION ORAL; RESPIRATORY (INHALATION) at 14:26

## 2018-01-01 RX ADMIN — Medication 1 PACKET: at 08:47

## 2018-01-01 RX ADMIN — METHYLCELLULOSE 500 MG: 500 TABLET ORAL at 08:44

## 2018-01-01 RX ADMIN — IPRATROPIUM BROMIDE AND ALBUTEROL SULFATE 3 ML: .5; 3 SOLUTION RESPIRATORY (INHALATION) at 12:44

## 2018-01-01 RX ADMIN — HEPARIN SODIUM 800 UNITS/HR: 10000 INJECTION, SOLUTION INTRAVENOUS at 18:28

## 2018-01-01 RX ADMIN — DEXTROSE MONOHYDRATE 25 ML: 500 INJECTION PARENTERAL at 20:36

## 2018-01-01 RX ADMIN — PANTOPRAZOLE SODIUM 40 MG: 40 TABLET, DELAYED RELEASE ORAL at 09:05

## 2018-01-01 RX ADMIN — BUMETANIDE 4 MG: 0.25 INJECTION INTRAMUSCULAR; INTRAVENOUS at 08:00

## 2018-01-01 RX ADMIN — PREDNISONE 5 MG: 5 TABLET ORAL at 15:37

## 2018-01-01 RX ADMIN — WARFARIN SODIUM 2 MG: 2 TABLET ORAL at 18:19

## 2018-01-01 RX ADMIN — INSULIN ASPART 1 UNITS: 100 INJECTION, SOLUTION INTRAVENOUS; SUBCUTANEOUS at 10:13

## 2018-01-01 RX ADMIN — CIPROFLOXACIN HYDROCHLORIDE 500 MG: 500 TABLET, FILM COATED ORAL at 19:22

## 2018-01-01 RX ADMIN — PANTOPRAZOLE SODIUM 40 MG: 40 TABLET, DELAYED RELEASE ORAL at 09:10

## 2018-01-01 RX ADMIN — INSULIN ASPART 1 UNITS: 100 INJECTION, SOLUTION INTRAVENOUS; SUBCUTANEOUS at 10:23

## 2018-01-01 RX ADMIN — AMLODIPINE BESYLATE 10 MG: 10 TABLET ORAL at 10:46

## 2018-01-01 RX ADMIN — BACITRACIN: 500 OINTMENT TOPICAL at 22:04

## 2018-01-01 RX ADMIN — PANTOPRAZOLE SODIUM 40 MG: 40 TABLET, DELAYED RELEASE ORAL at 16:16

## 2018-01-01 RX ADMIN — ALBUMIN HUMAN 25 G: 0.25 SOLUTION INTRAVENOUS at 18:37

## 2018-01-01 RX ADMIN — ACETAMINOPHEN 650 MG: 160 SOLUTION ORAL at 20:19

## 2018-01-01 RX ADMIN — METHYLCELLULOSE 500 MG: 500 TABLET ORAL at 21:00

## 2018-01-01 RX ADMIN — THIAMINE HYDROCHLORIDE 100 MG: 100 INJECTION, SOLUTION INTRAMUSCULAR; INTRAVENOUS at 11:14

## 2018-01-01 RX ADMIN — CARVEDILOL 6.25 MG: 25 TABLET, FILM COATED ORAL at 09:15

## 2018-01-01 RX ADMIN — Medication 1.5 MG: at 08:46

## 2018-01-01 RX ADMIN — Medication 2 MG: at 09:27

## 2018-01-01 RX ADMIN — Medication 5 MG: at 16:01

## 2018-01-01 RX ADMIN — Medication: at 09:21

## 2018-01-01 RX ADMIN — PREDNISONE 10 MG: 5 TABLET ORAL at 16:11

## 2018-01-01 RX ADMIN — CARVEDILOL 6.25 MG: 25 TABLET, FILM COATED ORAL at 19:14

## 2018-01-01 RX ADMIN — CEFEPIME HYDROCHLORIDE 2 G: 2 INJECTION, POWDER, FOR SOLUTION INTRAVENOUS at 09:46

## 2018-01-01 RX ADMIN — VANCOMYCIN HYDROCHLORIDE 125 MG: KIT at 06:23

## 2018-01-01 RX ADMIN — MULTIVITAMIN 15 ML: LIQUID ORAL at 09:10

## 2018-01-01 RX ADMIN — MYCOPHENOLATE MOFETIL 250 MG: 200 POWDER, FOR SUSPENSION ORAL at 07:50

## 2018-01-01 RX ADMIN — Medication 25 MG: at 00:34

## 2018-01-01 RX ADMIN — Medication 5 ML: at 09:24

## 2018-01-01 RX ADMIN — METRONIDAZOLE 500 MG: 500 INJECTION, SOLUTION INTRAVENOUS at 16:17

## 2018-01-01 RX ADMIN — BUMETANIDE 2 MG: 0.25 INJECTION INTRAMUSCULAR; INTRAVENOUS at 20:40

## 2018-01-01 RX ADMIN — ACETYLCYSTEINE 2 ML: 200 SOLUTION ORAL; RESPIRATORY (INHALATION) at 20:18

## 2018-01-01 RX ADMIN — POTASSIUM CHLORIDE 60 MEQ: 1.5 POWDER, FOR SOLUTION ORAL at 08:34

## 2018-01-01 RX ADMIN — VANCOMYCIN HYDROCHLORIDE 125 MG: KIT at 16:54

## 2018-01-01 RX ADMIN — METRONIDAZOLE 500 MG: 500 INJECTION, SOLUTION INTRAVENOUS at 03:57

## 2018-01-01 RX ADMIN — PREDNISONE 5 MG: 5 TABLET ORAL at 16:35

## 2018-01-01 RX ADMIN — ALBUTEROL SULFATE 2.5 MG: 2.5 SOLUTION RESPIRATORY (INHALATION) at 20:57

## 2018-01-01 RX ADMIN — LEVETIRACETAM 250 MG: 100 SOLUTION ORAL at 20:04

## 2018-01-01 RX ADMIN — VANCOMYCIN HYDROCHLORIDE 125 MG: KIT at 22:34

## 2018-01-01 RX ADMIN — Medication 1 PACKET: at 14:07

## 2018-01-01 RX ADMIN — WARFARIN SODIUM 2 MG: 2 TABLET ORAL at 19:01

## 2018-01-01 RX ADMIN — METRONIDAZOLE 500 MG: 500 INJECTION, SOLUTION INTRAVENOUS at 23:30

## 2018-01-01 RX ADMIN — METRONIDAZOLE 500 MG: 500 INJECTION, SOLUTION INTRAVENOUS at 06:22

## 2018-01-01 RX ADMIN — CARVEDILOL 6.25 MG: 25 TABLET, FILM COATED ORAL at 09:32

## 2018-01-01 RX ADMIN — VANCOMYCIN HYDROCHLORIDE 1250 MG: 10 INJECTION, POWDER, LYOPHILIZED, FOR SOLUTION INTRAVENOUS at 16:46

## 2018-01-01 RX ADMIN — CARVEDILOL 6.25 MG: 25 TABLET, FILM COATED ORAL at 21:10

## 2018-01-01 RX ADMIN — ACETAMINOPHEN 325 MG: 325 TABLET, FILM COATED ORAL at 22:24

## 2018-01-01 RX ADMIN — SODIUM CHLORIDE 100 MG: 9 INJECTION, SOLUTION INTRAVENOUS at 22:07

## 2018-01-01 RX ADMIN — Medication 400 UNITS: at 09:13

## 2018-01-01 RX ADMIN — Medication 5 MG: at 15:36

## 2018-01-01 RX ADMIN — PREDNISONE 5 MG: 5 SOLUTION ORAL at 08:01

## 2018-01-01 RX ADMIN — MICONAZOLE NITRATE: 2 POWDER TOPICAL at 09:41

## 2018-01-01 RX ADMIN — ALBUMIN HUMAN 12.5 G: 0.25 SOLUTION INTRAVENOUS at 02:57

## 2018-01-01 RX ADMIN — Medication 0.5 MG: at 22:05

## 2018-01-01 RX ADMIN — WARFARIN SODIUM 2.5 MG: 2.5 TABLET ORAL at 17:24

## 2018-01-01 RX ADMIN — Medication 1 CAPSULE: at 08:30

## 2018-01-01 RX ADMIN — Medication 100 MG: at 09:07

## 2018-01-01 RX ADMIN — Medication 1.5 MG: at 08:45

## 2018-01-01 RX ADMIN — PANTOPRAZOLE SODIUM 40 MG: 40 INJECTION, POWDER, FOR SOLUTION INTRAVENOUS at 16:50

## 2018-01-01 RX ADMIN — Medication 0.2 MG: at 21:09

## 2018-01-01 RX ADMIN — PANTOPRAZOLE SODIUM 40 MG: 40 TABLET, DELAYED RELEASE ORAL at 16:32

## 2018-01-01 RX ADMIN — PANTOPRAZOLE SODIUM 40 MG: 40 TABLET, DELAYED RELEASE ORAL at 17:22

## 2018-01-01 RX ADMIN — MYCOPHENOLATE MOFETIL 500 MG: 200 POWDER, FOR SUSPENSION ORAL at 08:47

## 2018-01-01 RX ADMIN — ASPIRIN 81 MG: 81 TABLET, COATED ORAL at 10:00

## 2018-01-01 RX ADMIN — Medication 1 CAPSULE: at 19:55

## 2018-01-01 RX ADMIN — HYDROMORPHONE HYDROCHLORIDE 1 MG: 1 SOLUTION ORAL at 02:43

## 2018-01-01 RX ADMIN — LEVETIRACETAM 750 MG: 100 INJECTION, SOLUTION INTRAVENOUS at 09:26

## 2018-01-01 RX ADMIN — INSULIN ASPART 1 UNITS: 100 INJECTION, SOLUTION INTRAVENOUS; SUBCUTANEOUS at 11:55

## 2018-01-01 RX ADMIN — TACROLIMUS 40 MCG/HR: 5 INJECTION, SOLUTION INTRAVENOUS at 05:59

## 2018-01-01 RX ADMIN — MICONAZOLE NITRATE: 2 POWDER TOPICAL at 21:21

## 2018-01-01 RX ADMIN — Medication 125 MG: at 09:08

## 2018-01-01 RX ADMIN — PANTOPRAZOLE SODIUM 40 MG: 40 INJECTION, POWDER, FOR SOLUTION INTRAVENOUS at 12:06

## 2018-01-01 RX ADMIN — ALBUTEROL SULFATE 2.5 MG: 2.5 SOLUTION RESPIRATORY (INHALATION) at 08:04

## 2018-01-01 RX ADMIN — PIPERACILLIN SODIUM,TAZOBACTAM SODIUM 3.38 G: 3; .375 INJECTION, POWDER, FOR SOLUTION INTRAVENOUS at 16:39

## 2018-01-01 RX ADMIN — PANTOPRAZOLE SODIUM 40 MG: 40 INJECTION, POWDER, FOR SOLUTION INTRAVENOUS at 22:22

## 2018-01-01 RX ADMIN — Medication 1 MG: at 17:28

## 2018-01-01 RX ADMIN — Medication 1.5 MG: at 09:59

## 2018-01-01 RX ADMIN — VANCOMYCIN HYDROCHLORIDE 125 MG: KIT at 03:54

## 2018-01-01 RX ADMIN — METRONIDAZOLE 500 MG: 500 INJECTION, SOLUTION INTRAVENOUS at 05:54

## 2018-01-01 RX ADMIN — Medication 1.5 MG: at 18:07

## 2018-01-01 RX ADMIN — Medication 1.5 MG: at 09:57

## 2018-01-01 RX ADMIN — WARFARIN SODIUM 2.5 MG: 2.5 TABLET ORAL at 17:52

## 2018-01-01 RX ADMIN — Medication 2 G: at 19:08

## 2018-01-01 RX ADMIN — MYCOPHENOLATE MOFETIL 250 MG: 200 POWDER, FOR SUSPENSION ORAL at 09:22

## 2018-01-01 RX ADMIN — INSULIN ASPART 2 UNITS: 100 INJECTION, SOLUTION INTRAVENOUS; SUBCUTANEOUS at 16:45

## 2018-01-01 RX ADMIN — MICONAZOLE NITRATE: 2 POWDER TOPICAL at 09:37

## 2018-01-01 RX ADMIN — Medication 5 MG: at 16:41

## 2018-01-01 RX ADMIN — ALBUTEROL SULFATE 2.5 MG: 2.5 SOLUTION RESPIRATORY (INHALATION) at 21:14

## 2018-01-01 RX ADMIN — Medication 80 MG: at 08:40

## 2018-01-01 RX ADMIN — METHYLPREDNISOLONE SODIUM SUCCINATE 16 MG: 40 INJECTION, POWDER, FOR SOLUTION INTRAMUSCULAR; INTRAVENOUS at 16:44

## 2018-01-01 RX ADMIN — Medication 5 MG: at 02:43

## 2018-01-01 RX ADMIN — Medication 1 CAPSULE: at 20:05

## 2018-01-01 RX ADMIN — CARVEDILOL 6.25 MG: 25 TABLET, FILM COATED ORAL at 10:02

## 2018-01-01 RX ADMIN — Medication 1.5 MG: at 08:41

## 2018-01-01 RX ADMIN — Medication 1 CAPSULE: at 19:56

## 2018-01-01 RX ADMIN — Medication 1.5 MG: at 08:09

## 2018-01-01 RX ADMIN — MINERAL SUPPLEMENT IRON 300 MG / 5 ML STRENGTH LIQUID 100 PER BOX UNFLAVORED 600 MG: at 09:40

## 2018-01-01 RX ADMIN — RIFAXIMIN 400 MG: 200 TABLET ORAL at 19:35

## 2018-01-01 RX ADMIN — ACETYLCYSTEINE 2 ML: 200 SOLUTION ORAL; RESPIRATORY (INHALATION) at 15:02

## 2018-01-01 RX ADMIN — BUMETANIDE 4 MG: 0.25 INJECTION INTRAMUSCULAR; INTRAVENOUS at 04:05

## 2018-01-01 RX ADMIN — ACETYLCYSTEINE 2 ML: 200 SOLUTION ORAL; RESPIRATORY (INHALATION) at 21:06

## 2018-01-01 RX ADMIN — INSULIN ASPART 1 UNITS: 100 INJECTION, SOLUTION INTRAVENOUS; SUBCUTANEOUS at 04:54

## 2018-01-01 RX ADMIN — CARVEDILOL 6.25 MG: 25 TABLET, FILM COATED ORAL at 10:52

## 2018-01-01 RX ADMIN — CARVEDILOL 6.25 MG: 25 TABLET, FILM COATED ORAL at 09:10

## 2018-01-01 RX ADMIN — METHYLCELLULOSE 500 MG: 500 TABLET ORAL at 20:05

## 2018-01-01 RX ADMIN — ACETYLCYSTEINE 2 ML: 200 SOLUTION ORAL; RESPIRATORY (INHALATION) at 09:45

## 2018-01-01 RX ADMIN — PANTOPRAZOLE SODIUM 40 MG: 40 INJECTION, POWDER, FOR SOLUTION INTRAVENOUS at 02:26

## 2018-01-01 RX ADMIN — VANCOMYCIN HYDROCHLORIDE 125 MG: KIT at 20:46

## 2018-01-01 RX ADMIN — PANTOPRAZOLE SODIUM 40 MG: 40 TABLET, DELAYED RELEASE ORAL at 17:50

## 2018-01-01 RX ADMIN — VANCOMYCIN HYDROCHLORIDE 125 MG: KIT at 10:11

## 2018-01-01 RX ADMIN — MYCOPHENOLATE MOFETIL 500 MG: 200 POWDER, FOR SUSPENSION ORAL at 20:46

## 2018-01-01 RX ADMIN — WARFARIN SODIUM 2.5 MG: 2.5 TABLET ORAL at 17:50

## 2018-01-01 RX ADMIN — MYCOPHENOLATE MOFETIL 500 MG: 500 INJECTION, POWDER, LYOPHILIZED, FOR SOLUTION INTRAVENOUS at 16:19

## 2018-01-01 RX ADMIN — INSULIN ASPART 1 UNITS: 100 INJECTION, SOLUTION INTRAVENOUS; SUBCUTANEOUS at 04:14

## 2018-01-01 RX ADMIN — INSULIN ASPART 1 UNITS: 100 INJECTION, SOLUTION INTRAVENOUS; SUBCUTANEOUS at 14:09

## 2018-01-01 RX ADMIN — SODIUM CHLORIDE, POTASSIUM CHLORIDE, SODIUM LACTATE AND CALCIUM CHLORIDE 1000 ML: 600; 310; 30; 20 INJECTION, SOLUTION INTRAVENOUS at 13:37

## 2018-01-01 RX ADMIN — MULTIVITAMIN 15 ML: LIQUID ORAL at 09:40

## 2018-01-01 RX ADMIN — PANTOPRAZOLE SODIUM 40 MG: 40 TABLET, DELAYED RELEASE ORAL at 08:48

## 2018-01-01 RX ADMIN — Medication 1.5 MG: at 09:13

## 2018-01-01 RX ADMIN — FERROUS SULFATE TAB 325 MG (65 MG ELEMENTAL FE) 325 MG: 325 (65 FE) TAB at 18:17

## 2018-01-01 RX ADMIN — VANCOMYCIN HYDROCHLORIDE 125 MG: KIT at 01:09

## 2018-01-01 RX ADMIN — MYCOPHENOLATE MOFETIL 250 MG: 200 POWDER, FOR SUSPENSION ORAL at 19:22

## 2018-01-01 RX ADMIN — INSULIN ASPART 2 UNITS: 100 INJECTION, SOLUTION INTRAVENOUS; SUBCUTANEOUS at 13:08

## 2018-01-01 RX ADMIN — Medication 1 PACKET: at 07:32

## 2018-01-01 RX ADMIN — Medication 400 UNITS: at 10:01

## 2018-01-01 RX ADMIN — Medication 2 MG: at 17:32

## 2018-01-01 RX ADMIN — PANTOPRAZOLE SODIUM 20 MG: 40 TABLET, DELAYED RELEASE ORAL at 10:32

## 2018-01-01 RX ADMIN — RIFAXIMIN 400 MG: 200 TABLET ORAL at 09:26

## 2018-01-01 RX ADMIN — Medication 1 CAPSULE: at 08:52

## 2018-01-01 RX ADMIN — Medication 1 PACKET: at 15:04

## 2018-01-01 RX ADMIN — Medication 5 ML: at 10:59

## 2018-01-01 RX ADMIN — MYCOPHENOLATE MOFETIL 250 MG: 200 POWDER, FOR SUSPENSION ORAL at 09:09

## 2018-01-01 RX ADMIN — VANCOMYCIN HYDROCHLORIDE 125 MG: KIT at 04:34

## 2018-01-01 RX ADMIN — BACITRACIN: 500 OINTMENT TOPICAL at 07:33

## 2018-01-01 RX ADMIN — SODIUM CHLORIDE, POTASSIUM CHLORIDE, SODIUM LACTATE AND CALCIUM CHLORIDE: 600; 310; 30; 20 INJECTION, SOLUTION INTRAVENOUS at 13:14

## 2018-01-01 RX ADMIN — CARVEDILOL 6.25 MG: 6.25 TABLET, FILM COATED ORAL at 20:14

## 2018-01-01 RX ADMIN — Medication 1 CAPSULE: at 09:28

## 2018-01-01 RX ADMIN — PREDNISONE 5 MG: 5 TABLET ORAL at 16:49

## 2018-01-01 RX ADMIN — FOLIC ACID 1 MG: 5 INJECTION, SOLUTION INTRAMUSCULAR; INTRAVENOUS; SUBCUTANEOUS at 09:29

## 2018-01-01 RX ADMIN — CARVEDILOL 12.5 MG: 12.5 TABLET, FILM COATED ORAL at 17:49

## 2018-01-01 RX ADMIN — SODIUM CHLORIDE: 9 INJECTION, SOLUTION INTRAVENOUS at 13:24

## 2018-01-01 RX ADMIN — MINERAL SUPPLEMENT IRON 300 MG / 5 ML STRENGTH LIQUID 100 PER BOX UNFLAVORED 600 MG: at 09:28

## 2018-01-01 RX ADMIN — INSULIN ASPART 1 UNITS: 100 INJECTION, SOLUTION INTRAVENOUS; SUBCUTANEOUS at 17:10

## 2018-01-01 RX ADMIN — BUMETANIDE 4 MG: 1 TABLET ORAL at 08:48

## 2018-01-01 RX ADMIN — Medication 1 MG: at 18:12

## 2018-01-01 RX ADMIN — VANCOMYCIN HYDROCHLORIDE 1250 MG: 10 INJECTION, POWDER, LYOPHILIZED, FOR SOLUTION INTRAVENOUS at 21:07

## 2018-01-01 RX ADMIN — PREDNISONE 5 MG: 5 TABLET ORAL at 16:50

## 2018-01-01 RX ADMIN — Medication 5 MG: at 17:28

## 2018-01-01 ASSESSMENT — ACTIVITIES OF DAILY LIVING (ADL)
ADLS_ACUITY_SCORE: 21.5
ADLS_ACUITY_SCORE: 22.5
ADLS_ACUITY_SCORE: 41
ADLS_ACUITY_SCORE: 24.5
ADLS_ACUITY_SCORE: 22.5
ADLS_ACUITY_SCORE: 22.5
ADLS_ACUITY_SCORE: 41
ADLS_ACUITY_SCORE: 22.5
ADLS_ACUITY_SCORE: 24.5
ADLS_ACUITY_SCORE: 22.5
ADLS_ACUITY_SCORE: 41
BATHING: 4-->COMPLETELY DEPENDENT
ADLS_ACUITY_SCORE: 22.5
ADLS_ACUITY_SCORE: 24.5
ADLS_ACUITY_SCORE: 24.5
ADLS_ACUITY_SCORE: 22.5
ADLS_ACUITY_SCORE: 24.5
ADLS_ACUITY_SCORE: 22.5
ADLS_ACUITY_SCORE: 24.5
ADLS_ACUITY_SCORE: 43
ADLS_ACUITY_SCORE: 24.5
ADLS_ACUITY_SCORE: 22.5
ADLS_ACUITY_SCORE: 41
ADLS_ACUITY_SCORE: 22.5
ADLS_ACUITY_SCORE: 22.5
ADLS_ACUITY_SCORE: 41
ADLS_ACUITY_SCORE: 22.5
ADLS_ACUITY_SCORE: 41
ADLS_ACUITY_SCORE: 22.5
ADLS_ACUITY_SCORE: 22.5
TOILETING: 4-->COMPLETELY DEPENDENT
ADLS_ACUITY_SCORE: 22.5
ADLS_ACUITY_SCORE: 43
ADLS_ACUITY_SCORE: 22.5
ADLS_ACUITY_SCORE: 21.5
ADLS_ACUITY_SCORE: 41
ADLS_ACUITY_SCORE: 41
ADLS_ACUITY_SCORE: 22.5
ADLS_ACUITY_SCORE: 22.5
ADLS_ACUITY_SCORE: 41
ADLS_ACUITY_SCORE: 41
ADLS_ACUITY_SCORE: 22.5
ADLS_ACUITY_SCORE: 41
ADLS_ACUITY_SCORE: 22.5
ADLS_ACUITY_SCORE: 22.5
ADLS_ACUITY_SCORE: 24.5
ADLS_ACUITY_SCORE: 22.5
ADLS_ACUITY_SCORE: 22.5
ADLS_ACUITY_SCORE: 41
ADLS_ACUITY_SCORE: 22.5
ADLS_ACUITY_SCORE: 41
ADLS_ACUITY_SCORE: 22.5
ADLS_ACUITY_SCORE: 41
ADLS_ACUITY_SCORE: 22.5
ADLS_ACUITY_SCORE: 41
ADLS_ACUITY_SCORE: 22.5
ADLS_ACUITY_SCORE: 41
ADLS_ACUITY_SCORE: 41
ADLS_ACUITY_SCORE: 24.5
ADLS_ACUITY_SCORE: 22.5
ADLS_ACUITY_SCORE: 41
ADLS_ACUITY_SCORE: 22.5
ADLS_ACUITY_SCORE: 22.5
RETIRED_EATING: 4-->COMPLETELY DEPENDENT
ADLS_ACUITY_SCORE: 22.5
ADLS_ACUITY_SCORE: 41
ADLS_ACUITY_SCORE: 22.5
ADLS_ACUITY_SCORE: 41
ADLS_ACUITY_SCORE: 22.5
ADLS_ACUITY_SCORE: 41
ADLS_ACUITY_SCORE: 22.5
ADLS_ACUITY_SCORE: 41
ADLS_ACUITY_SCORE: 41
ADLS_ACUITY_SCORE: 22.5
ADLS_ACUITY_SCORE: 41
ADLS_ACUITY_SCORE: 22.5
ADLS_ACUITY_SCORE: 24.5
ADLS_ACUITY_SCORE: 21.5
ADLS_ACUITY_SCORE: 41
ADLS_ACUITY_SCORE: 41
ADLS_ACUITY_SCORE: 22.5
ADLS_ACUITY_SCORE: 21.5
ADLS_ACUITY_SCORE: 22.5
ADLS_ACUITY_SCORE: 24.5
ADLS_ACUITY_SCORE: 24.5
ADLS_ACUITY_SCORE: 22.5
ADLS_ACUITY_SCORE: 41
ADLS_ACUITY_SCORE: 41
ADLS_ACUITY_SCORE: 22.5
ADLS_ACUITY_SCORE: 22.5
ADLS_ACUITY_SCORE: 41
ADLS_ACUITY_SCORE: 22.5
ADLS_ACUITY_SCORE: 24.5
ADLS_ACUITY_SCORE: 22.5
COGNITION: 2 - DIFFICULTY WITH ORGANIZING THOUGHTS
ADLS_ACUITY_SCORE: 22.5
ADLS_ACUITY_SCORE: 41
ADLS_ACUITY_SCORE: 41
ADLS_ACUITY_SCORE: 24.5
ADLS_ACUITY_SCORE: 22.5
ADLS_ACUITY_SCORE: 24.5
ADLS_ACUITY_SCORE: 22.5
ADLS_ACUITY_SCORE: 24.5
ADLS_ACUITY_SCORE: 41
ADLS_ACUITY_SCORE: 22.5
ADLS_ACUITY_SCORE: 22.5
ADLS_ACUITY_SCORE: 24.5
ADLS_ACUITY_SCORE: 22.5
ADLS_ACUITY_SCORE: 41
ADLS_ACUITY_SCORE: 22.5
ADLS_ACUITY_SCORE: 41
ADLS_ACUITY_SCORE: 22.5
ADLS_ACUITY_SCORE: 22.5
ADLS_ACUITY_SCORE: 24.5
ADLS_ACUITY_SCORE: 22.5
ADLS_ACUITY_SCORE: 41
ADLS_ACUITY_SCORE: 22.5
ADLS_ACUITY_SCORE: 41
ADLS_ACUITY_SCORE: 22.5
ADLS_ACUITY_SCORE: 24.5
ADLS_ACUITY_SCORE: 22.5
ADLS_ACUITY_SCORE: 22.5
ADLS_ACUITY_SCORE: 41
ADLS_ACUITY_SCORE: 22.5
ADLS_ACUITY_SCORE: 22.5
ADLS_ACUITY_SCORE: 24.5
ADLS_ACUITY_SCORE: 41
ADLS_ACUITY_SCORE: 22.5
ADLS_ACUITY_SCORE: 41
ADLS_ACUITY_SCORE: 22.5
ADLS_ACUITY_SCORE: 41
ADLS_ACUITY_SCORE: 22.5
ADLS_ACUITY_SCORE: 22.5
ADLS_ACUITY_SCORE: 41
ADLS_ACUITY_SCORE: 41
ADLS_ACUITY_SCORE: 22.5
ADLS_ACUITY_SCORE: 41
ADLS_ACUITY_SCORE: 22.5
ADLS_ACUITY_SCORE: 22.5
ADLS_ACUITY_SCORE: 41
ADLS_ACUITY_SCORE: 41
ADLS_ACUITY_SCORE: 22.5
ADLS_ACUITY_SCORE: 41
ADLS_ACUITY_SCORE: 22.5
ADLS_ACUITY_SCORE: 24.5
ADLS_ACUITY_SCORE: 22.5
ADLS_ACUITY_SCORE: 24.5
ADLS_ACUITY_SCORE: 41
ADLS_ACUITY_SCORE: 22.5
ADLS_ACUITY_SCORE: 24.5
ADLS_ACUITY_SCORE: 22.5
ADLS_ACUITY_SCORE: 41
ADLS_ACUITY_SCORE: 22.5
ADLS_ACUITY_SCORE: 41
ADLS_ACUITY_SCORE: 22.5
ADLS_ACUITY_SCORE: 24.5
ADLS_ACUITY_SCORE: 22.5
ADLS_ACUITY_SCORE: 41
ADLS_ACUITY_SCORE: 22.5
ADLS_ACUITY_SCORE: 24.5
ADLS_ACUITY_SCORE: 24.5
ADLS_ACUITY_SCORE: 22.5
ADLS_ACUITY_SCORE: 41
ADLS_ACUITY_SCORE: 22.5
AMBULATION: 4-->COMPLETELY DEPENDENT
ADLS_ACUITY_SCORE: 22.5
ADLS_ACUITY_SCORE: 41
ADLS_ACUITY_SCORE: 22.5
TRANSFERRING: 4-->COMPLETELY DEPENDENT
ADLS_ACUITY_SCORE: 22.5
ADLS_ACUITY_SCORE: 41
ADLS_ACUITY_SCORE: 22.5
ADLS_ACUITY_SCORE: 22.5
ADLS_ACUITY_SCORE: 24.5
ADLS_ACUITY_SCORE: 41
ADLS_ACUITY_SCORE: 24.5
ADLS_ACUITY_SCORE: 22.5
ADLS_ACUITY_SCORE: 24.5
ADLS_ACUITY_SCORE: 24.5
ADLS_ACUITY_SCORE: 22.5
FALL_HISTORY_WITHIN_LAST_SIX_MONTHS: NO
ADLS_ACUITY_SCORE: 21.5
ADLS_ACUITY_SCORE: 22.5
ADLS_ACUITY_SCORE: 22.5
ADLS_ACUITY_SCORE: 24.5
ADLS_ACUITY_SCORE: 41
ADLS_ACUITY_SCORE: 22.5
ADLS_ACUITY_SCORE: 41
ADLS_ACUITY_SCORE: 22.5
ADLS_ACUITY_SCORE: 24.5
ADLS_ACUITY_SCORE: 22.5
ADLS_ACUITY_SCORE: 22.5
ADLS_ACUITY_SCORE: 41
ADLS_ACUITY_SCORE: 24.5
ADLS_ACUITY_SCORE: 22.5
ADLS_ACUITY_SCORE: 22.5
ADLS_ACUITY_SCORE: 41
ADLS_ACUITY_SCORE: 41
ADLS_ACUITY_SCORE: 22.5
ADLS_ACUITY_SCORE: 41
ADLS_ACUITY_SCORE: 22.5
ADLS_ACUITY_SCORE: 41
ADLS_ACUITY_SCORE: 41
ADLS_ACUITY_SCORE: 22.5
ADLS_ACUITY_SCORE: 41
ADLS_ACUITY_SCORE: 24.5
ADLS_ACUITY_SCORE: 41
ADLS_ACUITY_SCORE: 22.5
ADLS_ACUITY_SCORE: 22.5
ADLS_ACUITY_SCORE: 41
ADLS_ACUITY_SCORE: 24.5
ADLS_ACUITY_SCORE: 24.5
ADLS_ACUITY_SCORE: 22.5
ADLS_ACUITY_SCORE: 24.5
ADLS_ACUITY_SCORE: 22.5
ADLS_ACUITY_SCORE: 22.5
ADLS_ACUITY_SCORE: 41
ADLS_ACUITY_SCORE: 22.5
ADLS_ACUITY_SCORE: 41
ADLS_ACUITY_SCORE: 22.5
ADLS_ACUITY_SCORE: 41
ADLS_ACUITY_SCORE: 21.5
ADLS_ACUITY_SCORE: 24.5
ADLS_ACUITY_SCORE: 22.5
ADLS_ACUITY_SCORE: 41
ADLS_ACUITY_SCORE: 22.5
ADLS_ACUITY_SCORE: 22.5
ADLS_ACUITY_SCORE: 24.5
ADLS_ACUITY_SCORE: 41
ADLS_ACUITY_SCORE: 22.5
ADLS_ACUITY_SCORE: 41
ADLS_ACUITY_SCORE: 22.5
ADLS_ACUITY_SCORE: 22.5
ADLS_ACUITY_SCORE: 41
ADLS_ACUITY_SCORE: 22.5
ADLS_ACUITY_SCORE: 41
ADLS_ACUITY_SCORE: 22.5
ADLS_ACUITY_SCORE: 41
ADLS_ACUITY_SCORE: 24.5
ADLS_ACUITY_SCORE: 41
ADLS_ACUITY_SCORE: 22.5
ADLS_ACUITY_SCORE: 41
ADLS_ACUITY_SCORE: 22.5
ADLS_ACUITY_SCORE: 41
ADLS_ACUITY_SCORE: 22.5
ADLS_ACUITY_SCORE: 24.5
ADLS_ACUITY_SCORE: 41
ADLS_ACUITY_SCORE: 22.5
ADLS_ACUITY_SCORE: 22.5
ADLS_ACUITY_SCORE: 41
ADLS_ACUITY_SCORE: 22.5
ADLS_ACUITY_SCORE: 41
ADLS_ACUITY_SCORE: 21.5
ADLS_ACUITY_SCORE: 22.5
ADLS_ACUITY_SCORE: 24.5
ADLS_ACUITY_SCORE: 41
ADLS_ACUITY_SCORE: 22.5
ADLS_ACUITY_SCORE: 41
ADLS_ACUITY_SCORE: 41
ADLS_ACUITY_SCORE: 22.5
ADLS_ACUITY_SCORE: 22.5
DRESS: 4-->COMPLETELY DEPENDENT
ADLS_ACUITY_SCORE: 41
ADLS_ACUITY_SCORE: 41
ADLS_ACUITY_SCORE: 22.5
ADLS_ACUITY_SCORE: 24.5
ADLS_ACUITY_SCORE: 41
ADLS_ACUITY_SCORE: 22.5
ADLS_ACUITY_SCORE: 24.5
ADLS_ACUITY_SCORE: 41
ADLS_ACUITY_SCORE: 41
ADLS_ACUITY_SCORE: 22.5
ADLS_ACUITY_SCORE: 41
ADLS_ACUITY_SCORE: 22.5
RETIRED_COMMUNICATION: 3-->UNABLE TO UNDERSTAND OR SPEAK (NOT RELATED TO LANGUAGE BARRIER)
ADLS_ACUITY_SCORE: 41
ADLS_ACUITY_SCORE: 22.5
ADLS_ACUITY_SCORE: 24.5
ADLS_ACUITY_SCORE: 22.5
ADLS_ACUITY_SCORE: 41
ADLS_ACUITY_SCORE: 41
ADLS_ACUITY_SCORE: 22.5
ADLS_ACUITY_SCORE: 21.5
ADLS_ACUITY_SCORE: 22.5
ADLS_ACUITY_SCORE: 41
ADLS_ACUITY_SCORE: 22.5
ADLS_ACUITY_SCORE: 24.5
ADLS_ACUITY_SCORE: 22.5
ADLS_ACUITY_SCORE: 24.5
ADLS_ACUITY_SCORE: 41
ADLS_ACUITY_SCORE: 22.5
ADLS_ACUITY_SCORE: 41
ADLS_ACUITY_SCORE: 22.5
ADLS_ACUITY_SCORE: 22.5
ADLS_ACUITY_SCORE: 41
ADLS_ACUITY_SCORE: 24.5
ADLS_ACUITY_SCORE: 41
ADLS_ACUITY_SCORE: 24.5
ADLS_ACUITY_SCORE: 22.5
ADLS_ACUITY_SCORE: 22.5
ADLS_ACUITY_SCORE: 24.5
ADLS_ACUITY_SCORE: 22.5
SWALLOWING: 2-->DIFFICULTY SWALLOWING LIQUIDS
ADLS_ACUITY_SCORE: 24.5
ADLS_ACUITY_SCORE: 41
ADLS_ACUITY_SCORE: 24.5
ADLS_ACUITY_SCORE: 41
ADLS_ACUITY_SCORE: 22.5
ADLS_ACUITY_SCORE: 22.5
ADLS_ACUITY_SCORE: 41

## 2018-01-01 ASSESSMENT — PAIN DESCRIPTION - DESCRIPTORS
DESCRIPTORS: ACHING
DESCRIPTORS: ACHING
DESCRIPTORS: HEADACHE
DESCRIPTORS: ACHING
DESCRIPTORS: ACHING

## 2018-01-01 ASSESSMENT — ENCOUNTER SYMPTOMS
FEVER: 0
COUGH: 1
FEVER: 0

## 2018-01-02 DIAGNOSIS — Z79.899 ENCOUNTER FOR LONG-TERM (CURRENT) USE OF HIGH-RISK MEDICATION: ICD-10-CM

## 2018-01-02 DIAGNOSIS — Z94.4 HISTORY OF LIVER TRANSPLANT (H): ICD-10-CM

## 2018-01-02 NOTE — TELEPHONE ENCOUNTER
[facility] reporting Prograf PA  17, asking if Dr. Pineda wants it to be renewed. Please advise at [phone]. Sent to [facility].

## 2018-01-03 ENCOUNTER — ANTICOAGULATION THERAPY VISIT (OUTPATIENT)
Dept: ANTICOAGULATION | Facility: CLINIC | Age: 78
End: 2018-01-03

## 2018-01-03 DIAGNOSIS — N17.9 ACUTE KIDNEY INJURY (H): ICD-10-CM

## 2018-01-03 DIAGNOSIS — Z79.01 LONG-TERM (CURRENT) USE OF ANTICOAGULANTS: ICD-10-CM

## 2018-01-03 DIAGNOSIS — I82.403 DEEP VEIN THROMBOSIS (DVT) OF BOTH LOWER EXTREMITIES, UNSPECIFIED CHRONICITY, UNSPECIFIED VEIN (H): ICD-10-CM

## 2018-01-03 LAB — INR PPP: 2.6

## 2018-01-03 RX ORDER — TACROLIMUS 0.5 MG/1
CAPSULE ORAL
Qty: 150 CAPSULE | Refills: 11 | Status: SHIPPED | OUTPATIENT
Start: 2018-01-03 | End: 2018-01-01

## 2018-01-03 NOTE — PROGRESS NOTES
ANTICOAGULATION FOLLOW-UP CLINIC VISIT    Patient Name:  Priscilla Way  Date:  1/3/2018  Contact Type:  Telephone    SUBJECTIVE:     Patient Findings     Positives No Problem Findings           OBJECTIVE    INR   Date Value Ref Range Status   01/03/2018 2.60  Final     Comment:     Home Care        ASSESSMENT / PLAN  INR assessment THER    Recheck INR In: 2 WEEKS    INR Location [facility]R      Anticoagulation Summary as of 1/3/2018     INR goal 2.0-3.0   Today's INR 2.60   Maintenance plan 3.75 mg (2.5 mg x 1.5) on Thu; 2.5 mg (2.5 mg x 1) all other days   Full instructions 3.75 mg on Thu; 2.5 mg all other days   Weekly total 18.75 mg   Plan last modified Jodee Tirado RN (11/28/2017)   Next INR check 1/16/2018   Priority INR   Target end date     Indications   Acute kidney injury (H) [N17.9]  Deep vein thrombosis (DVT) (H) [I82.409] [I82.409]  Long-term (current) use of anticoagulants [Z79.01] [Z79.01]         Anticoagulation Episode Summary     INR check location     Preferred lab     Send INR reminders to [facility]    Comments [facility] to draw INR's  Pt has dementia please contact daughter with any questions. Contact daughter May at [phone].  Has 2.5mg tablets       Anticoagulation Care Providers     Provider Role Specialty Phone number    Randy Fuentes MD [facility] [phone]            See the Encounter Report to view Anticoagulation Flowsheet and Dosing Calendar (Go to Encounters tab in chart review, and find the Anticoagulation Therapy Visit)    Spoke with ELISA Vargas. Gave them new warfarin recommendation.  No changes in health, medication, or diet. No missed doses, no falls. No signs or symptoms of bleed or clotting.     Shabana Sorto, TED        ADDENDUM:  Pt was seen in ED on 1/12 for episodes of rectal bleeding.  Labs were stable, with no active bleeding & normal rectal exam.  INR was: 2.29.  Pt was DC to home.  Pt had 1 L of IV fluids while in the ED.   Will continue with same plan for INR with home care on 1/16.  Eleanor JEAN

## 2018-01-03 NOTE — MR AVS SNAPSHOT
Priscilla Way   1/3/2018   Anticoagulation Therapy Visit    Description:  78 year old male   Provider:  Shabana Sorto, RN   Department:  [facility]           INR as of 1/3/2018     Today's INR 2.60      Anticoagulation Summary as of 1/3/2018     INR goal 2.0-3.0   Today's INR 2.60   Full instructions 3.75 mg on Thu; 2.5 mg all other days   Next INR check 1/16/2018    Indications   Acute kidney injury (H) [N17.9]  Deep vein thrombosis (DVT) (H) [I82.409] [I82.409]  Long-term (current) use of anticoagulants [Z79.01] [Z79.01]         January 2018 Details    Sun Mon Tue Wed Thu Fri Sat      1               2               3      2.5 mg   See details      4      3.75 mg         5      2.5 mg         6      2.5 mg           7      2.5 mg         8      2.5 mg         9      2.5 mg         10      2.5 mg         11      3.75 mg         12      2.5 mg         13      2.5 mg           14      2.5 mg         15      2.5 mg         16            17               18               19               20                 21               22               23               24               25               26               27                 28               29               30               31                   Date Details   01/03 This INR check       Date of next INR:  1/16/2018         How to take your warfarin dose     To take:  2.5 mg Take 1 of the 2.5 mg tablets.    To take:  3.75 mg Take 1.5 of the 2.5 mg tablets.

## 2018-01-05 ENCOUNTER — TELEPHONE (OUTPATIENT)
Dept: TRANSPLANT | Facility: CLINIC | Age: 78
End: 2018-01-05

## 2018-01-05 NOTE — TELEPHONE ENCOUNTER
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

## 2018-01-05 NOTE — TELEPHONE ENCOUNTER
PA Initiation    Medication: tacrolimus  Insurance Company: [employer] [phone] Fax [phone]  Pharmacy Filling the Rx:    Filling Pharmacy Phone:    Filling Pharmacy Fax:    Start Date: 1/5/2018    Prior Authorization Approval    Authorization Effective Date: 12/6/2017  Authorization Expiration Date: 12/31/2099  Medication: tacrolimus  Approved Dose/Quantity: 150  Reference #: [id number]   Insurance Company: [employer] [phone] Fax [phone]  Expected CoPay: $1.25     CoPay Card Available:      Foundation Assistance Needed:    Which Pharmacy is filling the prescription (Not needed for infusion/clinic administered):    Pharmacy Notified:    Patient Notified:        [facility] Prior Authorization Team   Phone: [phone]  Fax: [phone]

## 2018-01-12 ENCOUNTER — HOSPITAL ENCOUNTER (EMERGENCY)
Facility: CLINIC | Age: 78
Discharge: HOME | End: 2018-01-12
Attending: EMERGENCY MEDICINE | Admitting: EMERGENCY MEDICINE
Payer: COMMERCIAL

## 2018-01-12 VITALS
RESPIRATION RATE: 6 BRPM | DIASTOLIC BLOOD PRESSURE: 88 MMHG | TEMPERATURE: 98 F | OXYGEN SATURATION: 92 % | SYSTOLIC BLOOD PRESSURE: 135 MMHG

## 2018-01-12 DIAGNOSIS — K92.1 BLOOD IN STOOL: ICD-10-CM

## 2018-01-12 LAB
ABO + RH BLD: NORMAL
ABO + RH BLD: NORMAL
ALBUMIN SERPL-MCNC: 2.4 G/DL (ref 3.4–5)
ALP SERPL-CCNC: 95 U/L (ref 40–150)
ALT SERPL W P-5'-P-CCNC: 20 U/L (ref 0–70)
AMMONIA PLAS-SCNC: 21 UMOL/L (ref 10–50)
ANION GAP SERPL CALCULATED.3IONS-SCNC: 8 MMOL/L (ref 3–14)
ANISOCYTOSIS BLD QL SMEAR: SLIGHT
APTT PPP: 34 SEC (ref 22–37)
AST SERPL W P-5'-P-CCNC: 39 U/L (ref 0–45)
BASOPHILS # BLD AUTO: 0 10E9/L (ref 0–0.2)
BASOPHILS NFR BLD AUTO: 0 %
BILIRUB SERPL-MCNC: 0.3 MG/DL (ref 0.2–1.3)
BLD GP AB SCN SERPL QL: NORMAL
BLOOD BANK CMNT PATIENT-IMP: NORMAL
BUN SERPL-MCNC: 49 MG/DL (ref 7–30)
CALCIUM SERPL-MCNC: 8.2 MG/DL (ref 8.5–10.1)
CHLORIDE SERPL-SCNC: 110 MMOL/L (ref 94–109)
CO2 SERPL-SCNC: 23 MMOL/L (ref 20–32)
CREAT SERPL-MCNC: 1.33 MG/DL (ref 0.66–1.25)
DIFFERENTIAL METHOD BLD: ABNORMAL
EOSINOPHIL # BLD AUTO: 0.2 10E9/L (ref 0–0.7)
EOSINOPHIL NFR BLD AUTO: 2.8 %
ERYTHROCYTE [DISTWIDTH] IN BLOOD BY AUTOMATED COUNT: 15.8 % (ref 10–15)
GFR SERPL CREATININE-BSD FRML MDRD: 52 ML/MIN/1.7M2
GLUCOSE SERPL-MCNC: 145 MG/DL (ref 70–99)
HCT VFR BLD AUTO: 30.9 % (ref 40–53)
HGB BLD-MCNC: 9.8 G/DL (ref 13.3–17.7)
INR PPP: 2.29 (ref 0.86–1.14)
LYMPHOCYTES # BLD AUTO: 1.1 10E9/L (ref 0.8–5.3)
LYMPHOCYTES NFR BLD AUTO: 16.5 %
MACROCYTES BLD QL SMEAR: PRESENT
MCH RBC QN AUTO: 31 PG (ref 26.5–33)
MCHC RBC AUTO-ENTMCNC: 31.7 G/DL (ref 31.5–36.5)
MCV RBC AUTO: 98 FL (ref 78–100)
MONOCYTES # BLD AUTO: 0.9 10E9/L (ref 0–1.3)
MONOCYTES NFR BLD AUTO: 13.8 %
MYELOCYTES # BLD: 0.1 10E9/L
MYELOCYTES NFR BLD MANUAL: 0.9 %
NEUTROPHILS # BLD AUTO: 4.3 10E9/L (ref 1.6–8.3)
NEUTROPHILS NFR BLD AUTO: 66 %
PLATELET # BLD AUTO: 346 10E9/L (ref 150–450)
PLATELET # BLD EST: ABNORMAL 10*3/UL
POIKILOCYTOSIS BLD QL SMEAR: SLIGHT
POTASSIUM SERPL-SCNC: 4.9 MMOL/L (ref 3.4–5.3)
PROT SERPL-MCNC: 7.3 G/DL (ref 6.8–8.8)
RBC # BLD AUTO: 3.16 10E12/L (ref 4.4–5.9)
SODIUM SERPL-SCNC: 140 MMOL/L (ref 133–144)
SPECIMEN EXP DATE BLD: NORMAL
WBC # BLD AUTO: 6.5 10E9/L (ref 4–11)

## 2018-01-12 PROCEDURE — 96361 HYDRATE IV INFUSION ADD-ON: CPT | Performed by: EMERGENCY MEDICINE

## 2018-01-12 PROCEDURE — 86901 BLOOD TYPING SEROLOGIC RH(D): CPT | Performed by: EMERGENCY MEDICINE

## 2018-01-12 PROCEDURE — 99283 EMERGENCY DEPT VISIT LOW MDM: CPT | Mod: Z6 | Performed by: EMERGENCY MEDICINE

## 2018-01-12 PROCEDURE — 86850 RBC ANTIBODY SCREEN: CPT | Performed by: EMERGENCY MEDICINE

## 2018-01-12 PROCEDURE — 85610 PROTHROMBIN TIME: CPT | Performed by: EMERGENCY MEDICINE

## 2018-01-12 PROCEDURE — 82140 ASSAY OF AMMONIA: CPT | Performed by: EMERGENCY MEDICINE

## 2018-01-12 PROCEDURE — 25000128 H RX IP 250 OP 636: Performed by: EMERGENCY MEDICINE

## 2018-01-12 PROCEDURE — 87209 SMEAR COMPLEX STAIN: CPT | Performed by: EMERGENCY MEDICINE

## 2018-01-12 PROCEDURE — 80053 COMPREHEN METABOLIC PANEL: CPT | Performed by: EMERGENCY MEDICINE

## 2018-01-12 PROCEDURE — 85025 COMPLETE CBC W/AUTO DIFF WBC: CPT | Performed by: EMERGENCY MEDICINE

## 2018-01-12 PROCEDURE — 99283 EMERGENCY DEPT VISIT LOW MDM: CPT | Mod: 25 | Performed by: EMERGENCY MEDICINE

## 2018-01-12 PROCEDURE — 85730 THROMBOPLASTIN TIME PARTIAL: CPT | Performed by: EMERGENCY MEDICINE

## 2018-01-12 PROCEDURE — 96360 HYDRATION IV INFUSION INIT: CPT | Performed by: EMERGENCY MEDICINE

## 2018-01-12 PROCEDURE — 87177 OVA AND PARASITES SMEARS: CPT | Performed by: EMERGENCY MEDICINE

## 2018-01-12 PROCEDURE — 87506 IADNA-DNA/RNA PROBE TQ 6-11: CPT | Performed by: EMERGENCY MEDICINE

## 2018-01-12 PROCEDURE — 86900 BLOOD TYPING SEROLOGIC ABO: CPT | Performed by: EMERGENCY MEDICINE

## 2018-01-12 RX ORDER — SODIUM CHLORIDE 9 MG/ML
1000 INJECTION, SOLUTION INTRAVENOUS CONTINUOUS
Status: DISCONTINUED | OUTPATIENT
Start: 2018-01-12 | End: 2018-01-12 | Stop reason: HOSPADM

## 2018-01-12 RX ORDER — LIDOCAINE HYDROCHLORIDE 10 MG/ML
INJECTION, SOLUTION EPIDURAL; INFILTRATION; INTRACAUDAL; PERINEURAL
Status: DISCONTINUED
Start: 2018-01-12 | End: 2018-01-12 | Stop reason: HOSPADM

## 2018-01-12 RX ADMIN — SODIUM CHLORIDE 1000 ML: 9 INJECTION, SOLUTION INTRAVENOUS at 18:38

## 2018-01-12 RX ADMIN — SODIUM CHLORIDE 1000 ML: 9 INJECTION, SOLUTION INTRAVENOUS at 17:27

## 2018-01-12 NOTE — ED AVS SNAPSHOT
[facility], [place], Emergency Department    [address] [place] [id number]    Phone:  [phone]                                       Priscilla Way   MRN: [id number]    Department:  [facility], Emergency Department   Date of Visit:  1/12/2018           After Visit Summary Signature Page     I have received my discharge instructions, and my questions have been answered. I have discussed any challenges I see with this plan with the nurse or doctor.    ..........................................................................................................................................  Patient/Patient Representative Signature      ..........................................................................................................................................  Patient Representative Print Name and Relationship to Patient    ..................................................               ................................................  Date                                            Time    ..........................................................................................................................................  Reviewed by Signature/Title    ...................................................              ..............................................  Date                                                            Time

## 2018-01-12 NOTE — ED NOTES
Bed: [facility]  Expected date: 1/12/18  Expected time:   Means of arrival: Ambulance  Comments:  [facility]  78 y M  Possible GI bleed  Hx of Dementia  Yellow

## 2018-01-12 NOTE — ED NOTES
Family called EMS as they reported patient has had velia blood in his stool. Patient is Omani speaking, has dementia at baseline. No family present,  paged.

## 2018-01-12 NOTE — ED AVS SNAPSHOT
[facility], Emergency Department    [address] [address]    Phone:  [phone]                                       [address]   MRN: [id number]    Department:  [facility], Emergency Department   Date of Visit:  1/12/2018           Patient Information     Date Of Birth          1940        Your diagnoses for this visit were:     Blood in stool        You were seen by Mann Alvarenga MD.        Discharge Instructions       Call your clinic Monday or Tuesday morning to touch base with them regarding his condition.    Stay well hydrated by pushing plenty of fluids.    Return to the emergency department for worsening symptoms or any other problems.    24 Hour Appointment Hotline       To make an appointment at any [facility] clinic, call [phone] ([phone]). If you don't have a family doctor or clinic, we will help you find one. [facility] clinics are conveniently located to serve the needs of you and your family.             Review of your medicines      Our records show that you are taking the medicines listed below. If these are incorrect, please call your family doctor or clinic.        Dose / Directions Last dose taken    acetaminophen 500 MG tablet   Commonly known as:  TYLENOL   Quantity:  100 tablet        Take  by mouth. Take one tablet by mouth every 4-6 hours as needed.   Refills:  9        ALPRAZolam 0.25 MG tablet   Commonly known as:  XANAX   Quantity:  3 tablet        Give 1/2 half tab po half an hour before home lab draw   Refills:  1        aspirin 81 MG tablet   Dose:  81 mg   Quantity:  100 tablet        Take 1 tablet (81 mg) by mouth daily   Refills:  3        blood glucose monitor Kit   Quantity:  1 kit        Use to test blood sugar 1-2 times daily or as directed.   Refills:  0        blood glucose monitoring test strip   Commonly known as:  BL TEST STRIP PACK   Quantity:  100 strip        Use to test blood sugar 1-2 times daily or as directed.    Refills:  3        carvedilol 6.25 MG tablet   Commonly known as:  COREG   Dose:  6.25 mg   Quantity:  180 tablet        Take 1 tablet (6.25 mg) by mouth 2 times daily (with meals)   Refills:  1        DAILY VITES/IRON Tabs   Dose:  1 tablet   Quantity:  90 tablet        Take 1 tablet by mouth daily   Refills:  3        darbepoetin matthew 40 MCG/0.4ML injection   Commonly known as:  ARANESP (ALBUMIN FREE)   Dose:  40 mcg   Quantity:  0.4 mL        Inject 0.4 mLs (40 mcg) Subcutaneous every 28 days As needed for hgb<10g/dL.  If Hgb increases >1 point in 2 weeks (if blood transfusion given, use hgb PRIOR to this), SYSTOLIC BP > 180 mmHg or hgb>=10g/dL, HOLD DOSE. Dose must be within 1 week of Hgb.  Per anemia protocol with Mariluz Martinez MD   Refills:  99        dimethicone-zinc oxide cream   Quantity:  142 g        Apply topically 2 times daily as needed for dry skin or other   Refills:  0        docusate sodium 100 MG capsule   Commonly known as:  COLACE   Dose:  100 mg   Quantity:  60 capsule        Take 1 capsule by mouth 2 times daily.   Refills:  12        ferrous sulfate 325 (65 FE) MG tablet   Commonly known as:  IRON   Dose:  325 mg   Quantity:  100 tablet        Take 1 tablet (325 mg) by mouth daily   Refills:  3        furosemide 20 MG tablet   Commonly known as:  LASIX   Dose:  20 mg   Quantity:  90 tablet        Take 1 tablet (20 mg) by mouth daily   Refills:  1        LANCETS MICRO THIN 33G Misc   Dose:  1 Device   Quantity:  100 each        1 Device 2 times daily Use to test blood sugar 1-2 times daily or as directed.   Refills:  3        memantine XR 21 MG 24 hr capsule   Commonly known as:  NAMENDA XR   Dose:  21 mg   Quantity:  30 capsule        Take 1 capsule (21 mg) by mouth daily .Patient needs to see primary provider for further refills.   Refills:  0        multivitamin with C and FA Chew chewable tablet   Dose:  1 tablet   Quantity:  30 tablet        Take 1 tablet by mouth daily   Refills:  0         omeprazole 20 MG CR capsule   Commonly known as:  priLOSEC   Dose:  20 mg   Quantity:  30 capsule        Take 1 capsule (20 mg) by mouth daily MUST BE SEEN IN CLINIC FOR FURTHER REFILLS* LETTER SENT 12/15/17   Refills:  0        order for DME   Quantity:  1 Units        Equipment being ordered: Other: Damian lift Treatment Diagnosis: memory loss, dementia, weakness, ARF   Refills:  0        order for DME   Quantity:  1 each        Equipment being ordered: Hospital Bed and damian lift   Refills:  0        QUEtiapine 25 MG tablet   Commonly known as:  SEROquel   Quantity:  180 tablet        TAKE 1 TABLET BY MOUTH DURING THE DAY AS NEEDED FOR AGITATION, AND ONE AT BEDTIME FOR INSOMNIA   Refills:  1        tacrolimus 0.5 MG capsule   Commonly known as:  GENERIC EQUIVALENT   Quantity:  150 capsule        Take 3 capsules (1.5mg) in the am, take 2 capsules (1.0mg) in the pm, take every 12 hours   Refills:  11        * VITAMIN D-400 400 UNITS Tabs   Quantity:  180 tablet        TAKE 2 TABLETS BY MOUTH DAILY   Refills:  0        * VITAMIN D-400 400 UNITS Tabs   Dose:  2 tablet   Quantity:  60 tablet        Take 2 tablets by mouth daily   Refills:  0        * warfarin 1 MG tablet   Commonly known as:  COUMADIN   Dose:  2.5 mg   Quantity:  30 tablet        Take 2.5 tablets (2.5 mg) by mouth daily Take 1.25 mg on Wednesday and 2.5 mg all other days.   Refills:  0        * warfarin 2.5 MG tablet   Commonly known as:  COUMADIN   Quantity:  113 tablet        Take 3.75mgs on Thursdays and 2.5mgs on all other days or as directed by the [facility] Clinic   Refills:  3        * Notice:  This list has 4 medication(s) that are the same as other medications prescribed for you. Read the directions carefully, and ask your doctor or other care provider to review them with you.            Procedures and tests performed during your visit     ABO/Rh type and screen    Ammonia    CBC with platelets differential    Cardiac Continuous  Monitoring    Comprehensive metabolic panel    Enteric Bacteria and Virus Panel by [facility] Stool    INR    Ova and Parasite Exam Routine    Partial thromboplastin time    Peripheral IV: Standard      Orders Needing Specimen Collection     None      Pending Results     No orders found from 1/10/2018 to 1/13/2018.            Pending Culture Results     No orders found from 1/10/2018 to 1/13/2018.            Pending Results Instructions     If you had any lab results that were not finalized at the time of your Discharge, you can call the ED Lab Result RN at [phone]. You will be contacted by this team for any positive Lab results or changes in treatment. The nurses are available 7 days a week from 10A to 6:30P.  You can leave a message 24 hours per day and they will return your call.        Thank you for choosing [facility]       Thank you for choosing [facility] for your care. Our goal is always to provide you with excellent care. Hearing back from our patients is one way we can continue to improve our services. Please take a few minutes to complete the written survey that you may receive in the mail after you visit with us. Thank you!        [facility] Information     [facility] gives you secure access to your electronic health record. If you see a primary care provider, you can also send messages to your care team and make appointments. If you have questions, please call your primary care clinic.  If you do not have a primary care provider, please call [phone] and they will assist you.        Care EveryWhere ID     This is your Care EveryWhere ID. This could be used by other organizations to access your [facility] medical records  [id number]        Equal Access to Services     EDUARDO PALACIOS AH: Hadii yefri Villela, savannah sibley, annmarie morrell. So [facility] [phone].    ATENCIÓN: Si habla español, tiene a staples disposición servicios gratuitos de asistencia  mitchell Reynagerber al [phone].    We comply with applicable federal civil rights laws and [place] laws. We do not discriminate on the basis of race, color, national origin, age, disability, sex, sexual orientation, or gender identity.            After Visit Summary       This is your record. Keep this with you and show to your community pharmacist(s) and doctor(s) at your next visit.

## 2018-01-13 NOTE — PROGRESS NOTES
Social Work: Assessment with Discharge Plan    Patient Name:  Yadira Way  :  1940  Age:  78 year old  MRN:  [id number]  Risk/Complexity Score:     Completed assessment with:  Chart review, ED MD, patient's PCA, Indonesian speaking     Presenting Information   Reason for Referral:  Discharge plan  Date of Intake:  2018  Referral Source:  Physician  Decision Maker:  Daughters - Cruz Way (p) [phone] and May Paige (p) [phone]  Alternate Decision Maker:  -  Health Care Directive: none on file, patient not appropriate to completed HCD given his dementia  Living Situation:  Apartment  Previous Functional Status:  Assistance with Other:  bed bound and requires 24 hour care  Patient and family understanding of hospitalization:  Blood in stool  Cultural/Language/Spiritual Considerations:  Patient is Indonesian speaking  Adjustment to Illness:  Appears confused but comfortable in bed    Physical Health  Reason for Admission:  No diagnosis found.  Services Needed/Recommended:  Other:  home with PCA and family support    Mental Health/Chemical Dependency  Diagnosis:  None reported.  Support/Services in Place:  None reported.  Services Needed/Recommended:  none    Support System  Significant relationship at present time:  PCA and daughters  Family of origin is available for support:  yes  Other support available:  Nephews, brothers  Gaps in support system:  none  Patient is caregiver to:  None     Provider Information   Primary Care Physician:  Randy Fuentes   [phone]   Clinic:  [address] [place] [address] / [facility] [address]      :  -    Financial   Income Source:  Not discussed  Financial Concerns:  None reported.  Insurance:    Payor/Plan Subscriber Name Rel Member # Group #   UCARE - [employer]* YADIRA WAY  [id number]       [id number]       Discharge Plan   Patient and family discharge goal:  Home with PCA and family support  Provided education on  discharge plan:  YES  Patient agreeable to discharge plan:  Yes  A list of Medicare Certified Facilities was provided to the patient and/or family to encourage patient choice. Patient's choices for facility are:  Not applicable at time of assessment.   Will NH provide Skilled rehabilitation or complex medical:  Not applicable at time of assessment.   General information regarding anticipated insurance coverage and possible out of pocket cost was discussed. Patient and patient's family are aware patient may incur the cost of transportation to the facility, pending insurance payment: YES  Barriers to discharge:  TBD - Pending patient's progress and further recommendation.    Discharge Recommendations   Anticipated Disposition:  Home with services  Transportation Needs:  Medical:  Wheelchair  Name of Transportation Company and Phone:  [employer] (Ph: [phone])    Additional comments   SW consulted by ED MD to assess patient's home situation to ensure he has adequate home supports. SW met with patient in the ED along with his PCA and Palauan speaking . Patient, Priscilla, is alert although not oriented and responds only briefly to verbal prompts.     Safia is a 78 year old Palauan male who typically resides in a [place] apartment. He is bed bound and is either in bed or seated in a chair. He requires a alexandrea lift for transfers. He has 12 hours of PCA services and family (incl daughters, nephews, brothers) provide remainder of 24 hour care. He has [facility] skilled RN also.     Plan: TBD - Pending patient's progress and further recommendation. If medically stable, anticipate d/c back to home setting with PCA and family support. PCA noted Priscilla would be able to use w/c transportation home.     [employer] (Ph: [phone])    MORTEZA Whitney, [facility]  Social Work Services, Emergency Dept [facility]  Pager: [phone] Mon-Sat 9 am - 9 pm,  on-call/after hours pager [phone]

## 2018-01-13 NOTE — DISCHARGE INSTRUCTIONS
Call your clinic Monday or Tuesday morning to touch base with them regarding his condition.    Stay well hydrated by pushing plenty of fluids.    Return to the emergency department for worsening symptoms or any other problems.

## 2018-01-13 NOTE — ED PROVIDER NOTES
History     Chief Complaint   Patient presents with     Rectal Bleeding     HPI  Priscilla Way is a 78 year old male with a history of type II diabetes, hepatitis C, status-post kidney and liver transplant (), CVA with residual left-sided weakness and dementia who presents via ambulance with bloody stool. The patient's PCA has noticed he has had 4 episodes of bright red blood mixed with stool, 3 yesterday and 1 today. The PCA became concerned prompting him to call 911 and the patient was brought here to the ED for further evaluation. He is anticoagulated on warfarin.     Past Medical History:   Diagnosis Date     Dementia     multiple acute infarcts, SV dis on CT     Diabetes type 2, controlled (H)      Hepatitis C      Kidney transplanted      Liver transplant      Unspecified cerebral artery occlusion with cerebral infarction        Past Surgical History:   Procedure Laterality Date     TRANSPLANT KIDNEY RECIPIENT  DONOR       TRANSPLANT LIVER RECIPIENT  DONOR         No family history on file.    Social History   Substance Use Topics     Smoking status: Former Smoker     Types: Cigarettes     Smokeless tobacco: Never Used      Comment: quit 10 years ago     Alcohol use No     Current Facility-Administered Medications   Medication     0.9% sodium chloride infusion     lidocaine (PF) (XYLOCAINE) 1 % injection     Current Outpatient Prescriptions   Medication     tacrolimus (GENERIC EQUIVALENT) 0.5 MG capsule     memantine XR (NAMENDA XR) 21 MG 24 hr capsule     multivitamin with C and FA (ANIMAL SHAPES) CHEW chewable tablet     omeprazole (PRILOSEC) 20 MG CR capsule     Cholecalciferol (VITAMIN D-400) 400 UNITS TABS     warfarin (COUMADIN) 2.5 MG tablet     QUEtiapine (SEROQUEL) 25 MG tablet     carvedilol (COREG) 6.25 MG tablet     furosemide (LASIX) 20 MG tablet     ferrous sulfate (IRON) 325 (65 FE) MG tablet     darbepoetin matthew (ARANESP, ALBUMIN FREE,) 40 MCG/0.4ML injection      ALPRAZolam (XANAX) 0.25 MG tablet     aspirin 81 MG tablet     order for DME     warfarin (COUMADIN) 1 MG tablet     dimethicone-zinc oxide (EUCERIN) cream     Cholecalciferol (VITAMIN D-400) 400 UNITS TABS     order for DME     Multiple Vitamins-Iron (DAILY VITES/IRON) TABS     blood glucose monitor KIT     blood glucose (BL TEST STRIP PACK) test strip     LANCETS MICRO THIN 33G MISC     acetaminophen (TYLENOL) 500 MG tablet     docusate sodium (COLACE) 100 MG capsule      No Known Allergies     I have reviewed the Medications, Allergies, Past Medical and Surgical History, and Social History in the Epic system.    Review of Systems   Unable to perform ROS: Dementia       Physical Exam   BP: (!) 148/95  Heart Rate: 75  Temp: 98  F (36.7  C)  Resp: 22  SpO2: 97 %      Physical Exam   Constitutional: He is oriented to person, place, and time. Vital signs are normal. He appears well-developed and well-nourished.  Non-toxic appearance. He does not appear ill. No distress.   HENT:   Head: Normocephalic and atraumatic.   Mouth/Throat: Oropharynx is clear and moist. No oropharyngeal exudate.   Eyes: Conjunctivae and EOM are normal. Pupils are equal, round, and reactive to light. No scleral icterus.   Neck: Normal range of motion. Neck supple. No JVD present. No tracheal deviation present. No thyromegaly present.   Cardiovascular: Normal rate, regular rhythm, normal heart sounds and intact distal pulses.  Exam reveals no gallop and no friction rub.    No murmur heard.  Pulmonary/Chest: Effort normal and breath sounds normal. No respiratory distress.   Abdominal: Soft. Bowel sounds are normal. He exhibits no distension and no mass. There is no tenderness.   Genitourinary: Rectum normal. Rectal exam shows no external hemorrhoid, no fissure and guaiac negative stool.   Musculoskeletal: Normal range of motion. He exhibits no edema or tenderness.   Lymphadenopathy:     He has no cervical adenopathy.   Neurological: He is  alert and oriented to person, place, and time. No cranial nerve deficit or sensory deficit.   Left-sided hemiplegia noted.  Patient at baseline mental status according to family.   Skin: Skin is warm and dry. No rash noted. No erythema. No pallor.   Psychiatric: He has a normal mood and affect. His behavior is normal.   Nursing note and vitals reviewed.      ED Course     ED Course     Procedures        Results for orders placed or performed during the hospital encounter of 01/12/18   CBC with platelets differential   Result Value Ref Range    WBC 6.5 4.0 - 11.0 10e9/L    RBC Count 3.16 (L) 4.4 - 5.9 10e12/L    Hemoglobin 9.8 (L) 13.3 - 17.7 g/dL    Hematocrit 30.9 (L) 40.0 - 53.0 %    MCV 98 78 - 100 fl    MCH 31.0 26.5 - 33.0 pg    MCHC 31.7 31.5 - 36.5 g/dL    RDW 15.8 (H) 10.0 - 15.0 %    Platelet Count 346 150 - 450 10e9/L    Diff Method Manual Differential     % Neutrophils 66.0 %    % Lymphocytes 16.5 %    % Monocytes 13.8 %    % Eosinophils 2.8 %    % Basophils 0.0 %    % Myelocytes 0.9 %    Absolute Neutrophil 4.3 1.6 - 8.3 10e9/L    Absolute Lymphocytes 1.1 0.8 - 5.3 10e9/L    Absolute Monocytes 0.9 0.0 - 1.3 10e9/L    Absolute Eosinophils 0.2 0.0 - 0.7 10e9/L    Absolute Basophils 0.0 0.0 - 0.2 10e9/L    Absolute Myelocytes 0.1 (H) 0 10e9/L    Anisocytosis Slight     Poikilocytosis Slight     Macrocytes Present     Platelet Estimate Confirming automated cell count    Comprehensive metabolic panel   Result Value Ref Range    Sodium 140 133 - 144 mmol/L    Potassium 4.9 3.4 - 5.3 mmol/L    Chloride 110 (H) 94 - 109 mmol/L    Carbon Dioxide 23 20 - 32 mmol/L    Anion Gap 8 3 - 14 mmol/L    Glucose 145 (H) 70 - 99 mg/dL    Urea Nitrogen 49 (H) 7 - 30 mg/dL    Creatinine 1.33 (H) 0.66 - 1.25 mg/dL    GFR Estimate 52 (L) >60 mL/min/1.7m2    GFR Estimate If Black 63 >60 mL/min/1.7m2    Calcium 8.2 (L) 8.5 - 10.1 mg/dL    Bilirubin Total 0.3 0.2 - 1.3 mg/dL    Albumin 2.4 (L) 3.4 - 5.0 g/dL    Protein Total 7.3  6.8 - 8.8 g/dL    Alkaline Phosphatase 95 40 - 150 U/L    ALT 20 0 - 70 U/L    AST 39 0 - 45 U/L   INR   Result Value Ref Range    INR 2.29 (H) 0.86 - 1.14   Partial thromboplastin time   Result Value Ref Range    PTT 34 22 - 37 sec   Ammonia   Result Value Ref Range    Ammonia 21 10 - 50 umol/L   ABO/Rh type and screen   Result Value Ref Range    ABO A     RH(D) Pos     Antibody Screen Neg     Test Valid Only At          [facility]    Specimen Expires 01/15/2018        Consults  Other (Comment): Responded ( Service) (01/12/18 1107)    Assessments & Plan (with Medical Decision Making)   This patient presented to the emergency department with reported blood in stool. Daughter had provided some photos and there did appear to be some red blood in his bowel movements. He did have a bowel movement here which did not demonstrate any signs of active bleeding, and had no active bleeding on rectal exam. Hemoglobin is stable and INR is in the therapeutic range. BUN is somewhat elevated, but I suspect that this is more likely from dehydration, as the patient has not been drinking much at home according to his daughter. He was given 1 L of IV fluid here in the emergency department. His exam was otherwise benign, and at this point in time I am comfortable with discharge as he does have round the clock care and the family can get him back to the emergency department should he begin to show any signs of any further bleeding. Patient's family was comfortable with this plan, and he was discharged in their care in good condition.    This part of the medical record was transcribed by Elif Marie Scribe, from a dictation done by Mann Alvarenga MD.      I have reviewed the nursing notes.    I have reviewed the findings, diagnosis, plan and need for follow up with the patient.    New Prescriptions    No medications on file       Final diagnoses:   Blood in stool        1/12/2018   [facility], [place], EMERGENCY DEPARTMENT     Mann Alvarenga MD  01/12/18 1957

## 2018-01-15 LAB
O+P STL MICRO: NORMAL
O+P STL MICRO: NORMAL
SPECIMEN SOURCE: NORMAL

## 2018-01-16 ENCOUNTER — ANTICOAGULATION THERAPY VISIT (OUTPATIENT)
Dept: ANTICOAGULATION | Facility: CLINIC | Age: 78
End: 2018-01-16

## 2018-01-16 DIAGNOSIS — N17.9 ACUTE KIDNEY INJURY (H): ICD-10-CM

## 2018-01-16 DIAGNOSIS — Z79.01 LONG-TERM (CURRENT) USE OF ANTICOAGULANTS: ICD-10-CM

## 2018-01-16 DIAGNOSIS — I82.403 DEEP VEIN THROMBOSIS (DVT) OF BOTH LOWER EXTREMITIES, UNSPECIFIED CHRONICITY, UNSPECIFIED VEIN (H): ICD-10-CM

## 2018-01-16 LAB — INR PPP: 2.6

## 2018-01-16 NOTE — PROGRESS NOTES
ANTICOAGULATION FOLLOW-UP CLINIC VISIT    Patient Name:  Priscilla Way  Date:  1/16/2018  Contact Type:  Telephone    SUBJECTIVE:     Patient Findings     Positives No Problem Findings           OBJECTIVE    INR   Date Value Ref Range Status   01/16/2018 2.60  Final     Comment:     Home Care        ASSESSMENT / PLAN  INR assessment THER    Recheck INR In: 2 WEEKS    INR Location Homecare INR      Anticoagulation Summary as of 1/16/2018     INR goal 2.0-3.0   Today's INR 2.60   Maintenance plan 3.75 mg (2.5 mg x 1.5) on Thu; 2.5 mg (2.5 mg x 1) all other days   Full instructions 3.75 mg on Thu; 2.5 mg all other days   Weekly total 18.75 mg   Plan last modified Jodee Tirado RN (11/28/2017)   Next INR check 1/30/2018   Priority INR   Target end date     Indications   Acute kidney injury (H) [N17.9]  Deep vein thrombosis (DVT) (H) [I82.409] [I82.409]  Long-term (current) use of anticoagulants [Z79.01] [Z79.01]         Anticoagulation Episode Summary     INR check location     Preferred lab     Send INR reminders to [facility]    Comments [facility] to draw INR's  Pt has dementia please contact daughter with any questions. Contact daughter May at [phone].  Has 2.5mg tablets       Anticoagulation Care Providers     Provider Role Specialty Phone number    Randy Fuentes MD [facility] [phone]            See the Encounter Report to view Anticoagulation Flowsheet and Dosing Calendar (Go to Encounters tab in chart review, and find the Anticoagulation Therapy Visit)    Spoke with ELISA Vargas. Gave them new warfarin recommendation.  No changes in health, medication, or diet. No missed doses, no falls. No signs or symptoms of bleed or clotting.     Shabana Sorot, RN

## 2018-01-16 NOTE — MR AVS SNAPSHOT
Priscilla Way   1/16/2018   Anticoagulation Therapy Visit    Description:  78 year old male   Provider:  Shabana Sorto, RN   Department:  [facility]           INR as of 1/16/2018     Today's INR 2.60      Anticoagulation Summary as of 1/16/2018     INR goal 2.0-3.0   Today's INR 2.60   Full instructions 3.75 mg on Thu; 2.5 mg all other days   Next INR check 1/30/2018    Indications   Acute kidney injury (H) [N17.9]  Deep vein thrombosis (DVT) (H) [I82.409] [I82.409]  Long-term (current) use of anticoagulants [Z79.01] [Z79.01]         January 2018 Details    Sun Mon Tue Wed Thu Fri Sat      1               2               3               4               5               6                 7               8               9               10               11               12               13                 14               15               16      2.5 mg   See details      17      2.5 mg         18      3.75 mg         19      2.5 mg         20      2.5 mg           21      2.5 mg         22      2.5 mg         23      2.5 mg         24      2.5 mg         25      3.75 mg         26      2.5 mg         27      2.5 mg           28      2.5 mg         29      2.5 mg         30            31                   Date Details   01/16 This INR check       Date of next INR:  1/30/2018         How to take your warfarin dose     To take:  2.5 mg Take 1 of the 2.5 mg tablets.    To take:  3.75 mg Take 1.5 of the 2.5 mg tablets.

## 2018-01-17 DIAGNOSIS — E55.9 VITAMIN D DEFICIENCY: ICD-10-CM

## 2018-01-18 RX ORDER — OMEGA-3S/DHA/EPA/FISH OIL/D3 300MG-1000
800 CAPSULE ORAL DAILY
Qty: 60 TABLET | Refills: 0 | Status: SHIPPED | OUTPATIENT
Start: 2018-01-18 | End: 2018-01-01

## 2018-01-18 NOTE — TELEPHONE ENCOUNTER
Cholecalciferol (VITAMIN D-400)   Last Written Prescription Date:  12/15/17  Last Fill Quantity: 60,   # refills:0  Last Office Visit : 12/23/16  Future Office visit: none    Routing  Because:    12/16. letter sent previously.  rf or refuse?

## 2018-01-18 NOTE — MR AVS SNAPSHOT
Priscilla Way   5/30/2017   Anticoagulation Therapy Visit    Description:  77 year old male   Provider:  Jodee Tirado, RN   Department:  [facility]           INR as of 5/30/2017     Today's INR No new INR was available at the time of this encounter.      Anticoagulation Summary as of 5/30/2017     INR goal 2.0-3.0   Today's INR No new INR was available at the time of this encounter.   Full instructions 3.75 mg on Tue, Thu; 2.5 mg all other days   Next INR check     Indications   Acute kidney injury (H) [N17.9]  Deep vein thrombosis (DVT) (H) [I82.409] [I82.409]  Long-term (current) use of anticoagulants [Z79.01] [Z79.01]         May 2017 Details    Sun Mon Tue Wed Thu Fri Sat      1               2               3               4               5               6                 7               8               9               10               11               12               13                 14               15               16               17               18               19               20                 21               22               23               24               25               26               27                 28               29               30      3.75 mg   See details      31      2.5 mg             Date Details   05/30 This INR check      Date of next INR: No date specified         How to take your warfarin dose     To take:  2.5 mg Take 1 of the 2.5 mg tablets.    To take:  3.75 mg Take 1.5 of the 2.5 mg tablets.           
Monthly

## 2018-01-19 ENCOUNTER — DOCUMENTATION ONLY (OUTPATIENT)
Dept: FAMILY MEDICINE | Facility: CLINIC | Age: 78
End: 2018-01-19

## 2018-01-19 NOTE — PROGRESS NOTES
Visit to the client's home on 1/18/18 for annual health risk assessment and PCA assessment.  An  was present.    Current situation/living environment  Member lives alone in a subsidized one bedroom apartment.  Member needs and receives 24 hour support with a combination of very supportive family and PCA/friends in the apartment complex in which he lives.  Home is neat, clean with clear pathways.      Activities of daily living (ADL)/instrumental activities of daily living (IADL) and functional issues  Client needs help with the following ADL's: dressing, grooming, bathing, eating, bed mobility, transfers, walking, wheeling wheelchair and toileting  Client needs help with the following IADL's: shopping, cooking, housekeeping, laundry, managing finances/bills and transportation.  Member's daughter is present and is his responsible party (RP) for this assessment. She states he is unable to perform the above due to ongoing issues with advanced dementia which appears to have progressed even further in the passed year.  Member is unable to follow direction; does not know immediate family anymore per RP.  She reports that he is unable to change positions in bed and must be repositioned regularly day and night.  Must be fed and per RP will frequently hold food in his mouth, clamping his mouth shut and not swallow requiring caregivers to direct member to swallow.  Member is incontinent of both bowel and bladder and is no longer ambulatory or able to bear weight.  Damian is utilized to transfer member between chair in living room and his bed.  Family must cover certain cares themselves as member will become combative with others during certain tasks which per RP is worse in the morning and with certain tasks like foot care.  Medications are set up by home care nurse and administered by caregivers.      Health concerns for today  Member had a recent ER visit for rectal bleeding and per RP- cause is still unknown, so  family will be getting member in to MD to be seen further.  Discussed with RP the benefit of a pressure relieving mattress for bed, since member unable to position self.  Daughter states will discuss with MD during visit.  Has patient fallen 2 or more times in the last year? No  Has patient fallen with injury in the last year? No    Cognition/mental health  CM did ask member whether he felt anyone was taking advantage of him or hurting him physically or emotionally and he shook his head 'no'.  Also asked if he was having any pain to which he also shook his head 'no', however did not respond to any other question.  Any other question was just met with a stare.  There is question as to how much member can comprehend and how the accuracy of his reporting.  RP states member doesn't appear to comprehend family either- stating he doesn't recognize his children/grandchildren anymore and called his PCA, with whom he has had as a caregiver for years, his 'uncle'.    STARS/Med Adherence  Member is not listed as non-compliant with any Stars measure.    Client's Plan of Care consists of:  Personal care assistance (PCA) (11.5 hours per day) and SNV (1 visit per week)    Carri Anderson RN  [phone]

## 2018-01-25 NOTE — TELEPHONE ENCOUNTER
Sam with [facility], [phone]  Requesting verbal order for home care.     Skilled nursing   1 X a week for 9 weeks  with 3 PRN visit    Called back with verbal auth of orders

## 2018-01-30 NOTE — TELEPHONE ENCOUNTER
Omeprazole 20 MG   Last Written Prescription Date:  12/27/17  Last Fill Quantity: 30,   # refills: 0  Last Office Visit : 12/23/16  Future Office visit:  NONE    memantine XR   21 MG  Last Written Prescription Date:  12/27/17  Last Fill Quantity: 30,   # refills: 0    2 MEDS   Routing refill request to provider for review/approval because:  12/27/17  30 DAY KRISTEN VAZQUEZ APPT. F/U

## 2018-01-30 NOTE — MR AVS SNAPSHOT
Priscilla Way   1/30/2018   Anticoagulation Therapy Visit    Description:  78 year old male   Provider:  Jodee Tirado, RN   [facility]:  [facility]           INR as of 1/30/2018     Today's INR 6.4!      Anticoagulation Summary as of 1/30/2018     INR goal 2.0-3.0   Today's INR 6.4!   Full instructions 1/30: Hold; 1/31: Hold; Otherwise 3.75 mg on Thu; 2.5 mg all other days   Next INR check 2/1/2018    Indications   Acute kidney injury (H) [N17.9]  Deep vein thrombosis (DVT) (H) [I82.409] [I82.409]  Long-term (current) use of anticoagulants [Z79.01] [Z79.01]         January 2018 Details    Sun Mon Tue Wed Thu Fri Sat      1               2               3               4               5               6                 7               8               9               10               11               12               13                 14               15               16               17               18               19               20                 21               22               23               24               25               26               27                 28               29               30      Hold   See details      31      Hold             Date Details   01/30 This INR check               How to take your warfarin dose     Hold Do not take your warfarin dose. See the Details table to the right for additional instructions.                February 2018 Details    Sun Mon Tue Wed Thu Fri Sat         1            2               3                 4               5               6               7               8               9               10                 11               12               13               14               15               16               17                 18               19               20               21               22               23               24                 25               26               27               28                   Date Details    No additional details    Date of next INR:  2/1/2018         How to take your warfarin dose     To take:  3.75 mg Take 1.5 of the 2.5 mg tablets.

## 2018-02-01 NOTE — PROGRESS NOTES
ANTICOAGULATION FOLLOW-UP CLINIC VISIT    Patient Name:  Priscilla Way  Date:  2/1/2018  Contact Type:  Telephone    SUBJECTIVE:     Patient Findings     Comments Sam reports that patient's diarrhea had subsided.           OBJECTIVE    INR   Date Value Ref Range Status   02/01/2018 3.7  Final       ASSESSMENT / PLAN  INR assessment SUPRA    Recheck INR In: 5 DAYS    INR Location [facility] INR      Anticoagulation Summary as of 2/1/2018     INR goal 2.0-3.0   Today's INR 3.7!   Maintenance plan No maintenance plan   Full instructions 2/1: 2.5 mg; 2/2: 2.5 mg; 2/3: 2.5 mg; 2/4: 2.5 mg; 2/5: 2.5 mg   Plan last modified Dayana Ervin RN (2/1/2018)   Next INR check 2/6/2018   Priority INR   Target end date     Indications   Acute kidney injury (H) [N17.9]  Deep vein thrombosis (DVT) (H) [I82.409] [I82.409]  Long-term (current) use of anticoagulants [Z79.01] [Z79.01]         Anticoagulation Episode Summary     INR check location     Preferred lab     Send INR reminders to [facility]    Comments [facility] to draw INR's  Pt has dementia please contact daughter with any questions. Contact daughter May at [phone].  Has 2.5mg tablets       Anticoagulation Care Providers     Provider Role Specialty Phone number    Randy Fuentes MD [facility] [phone]            See the Encounter Report to view Anticoagulation Flowsheet and Dosing Calendar (Go to Encounters tab in chart review, and find the Anticoagulation Therapy Visit)    Spoke with Sam PÉREZ.    Dayana Ervin, TED

## 2018-02-01 NOTE — MR AVS SNAPSHOT
Priscilla aWy   2/1/2018   Anticoagulation Therapy Visit    Description:  78 year old male   Provider:  Dayana Ervin, RN   Department:  [facility]           INR as of 2/1/2018     Today's INR 3.7!      Anticoagulation Summary as of 2/1/2018     INR goal 2.0-3.0   Today's INR 3.7!   Full instructions 2/1: 2.5 mg; 2/2: 2.5 mg; 2/3: 2.5 mg; 2/4: 2.5 mg; 2/5: 2.5 mg   Next INR check 2/6/2018    Indications   Acute kidney injury (H) [N17.9]  Deep vein thrombosis (DVT) (H) [I82.409] [I82.409]  Long-term (current) use of anticoagulants [Z79.01] [Z79.01]         February 2018 Details    Sun Mon Tue Wed Thu Fri Sat         1      2.5 mg   See details      2      2.5 mg         3      2.5 mg           4      2.5 mg         5      2.5 mg         6            7               8               9               10                 11               12               13               14               15               16               17                 18               19               20               21               22               23               24                 25               26               27               28                   Date Details   02/01 This INR check       Date of next INR:  2/6/2018         How to take your warfarin dose     To take:  2.5 mg Take 1 of the 2.5 mg tablets.

## 2018-02-06 NOTE — TELEPHONE ENCOUNTER
aspirin 81 MG tablet    Last Written Prescription Date:  12/21/16  Last Fill Quantity: 100,   # refills: 3  Last Office Visit :12/23/16  Future Office visit:  None      Routing  Because: pt on coumadin. lv 12/16

## 2018-02-06 NOTE — MR AVS SNAPSHOT
Priscilla Way   2/6/2018   Anticoagulation Therapy Visit    Description:  78 year old male   Provider:  Sherry Georges RN   Department:  [facility]           INR as of 2/6/2018     Today's INR 1.9!      Anticoagulation Summary as of 2/6/2018     INR goal 2.0-3.0   Today's INR 1.9!   Full instructions 2/6: 2.5 mg; 2/7: 2.5 mg; 2/8: 3.75 mg; 2/9: 2.5 mg; 2/10: 2.5 mg; 2/11: 2.5 mg; 2/12: 2.5 mg; 2/13: 2.5 mg; 2/14: 2.5 mg; 2/15: 3.75 mg; 2/16: 2.5 mg; 2/17: 2.5 mg; 2/18: 2.5 mg; 2/19: 2.5 mg; 2/20: 2.5 mg   Next INR check 2/20/2018    Indications   Acute kidney injury (H) [N17.9]  Deep vein thrombosis (DVT) (H) [I82.409] [I82.409]  Long-term (current) use of anticoagulants [Z79.01] [Z79.01]         February 2018 Details    Sun Mon Tue Wed Thu Fri Sat         1               2               3                 4               5               6      2.5 mg   See details      7      2.5 mg         8      3.75 mg         9      2.5 mg         10      2.5 mg           11      2.5 mg         12      2.5 mg         13      2.5 mg         14      2.5 mg         15      3.75 mg         16      2.5 mg         17      2.5 mg           18      2.5 mg         19      2.5 mg         20            21               22               23               24                 25               26               27               28                   Date Details   02/06 This INR check       Date of next INR:  2/20/2018         How to take your warfarin dose     To take:  2.5 mg Take 1 of the 2.5 mg tablets.    To take:  3.75 mg Take 1.5 of the 2.5 mg tablets.

## 2018-02-06 NOTE — PROGRESS NOTES
ANTICOAGULATION FOLLOW-UP CLINIC VISIT    Patient Name:  Priscilla Way  Date:  2/6/2018  Contact Type:  Telephone    SUBJECTIVE:     Patient Findings     Comments Diarrhea has resolved.           OBJECTIVE    INR   Date Value Ref Range Status   02/06/2018 1.9  Final       ASSESSMENT / PLAN  INR assessment THER    Recheck INR In: 2 WEEKS    INR Location [facility]R      Anticoagulation Summary as of 2/6/2018     INR goal 2.0-3.0   Today's INR 1.9!   Maintenance plan No maintenance plan   Full instructions 2/6: 2.5 mg; 2/7: 2.5 mg; 2/8: 3.75 mg; 2/9: 2.5 mg; 2/10: 2.5 mg; 2/11: 2.5 mg; 2/12: 2.5 mg; 2/13: 2.5 mg; 2/14: 2.5 mg; 2/15: 3.75 mg; 2/16: 2.5 mg; 2/17: 2.5 mg; 2/18: 2.5 mg; 2/19: 2.5 mg; 2/20: 2.5 mg   Plan last modified Dayana Ervin RN (2/1/2018)   Next INR check 2/20/2018   Priority INR   Target end date     Indications   Acute kidney injury (H) [N17.9]  Deep vein thrombosis (DVT) (H) [I82.409] [I82.409]  Long-term (current) use of anticoagulants [Z79.01] [Z79.01]         Anticoagulation Episode Summary     INR check location     Preferred lab     Send INR reminders to [facility]    Comments [facility] to draw INR's  Pt has dementia please contact daughter with any questions. Contact daughter May at [phone].  Has 2.5mg tablets       Anticoagulation Care Providers     Provider Role Specialty Phone number    Randy Fuentes MD [facility] [phone]            See the Encounter Report to view Anticoagulation Flowsheet and Dosing Calendar (Go to Encounters tab in chart review, and find the Anticoagulation Therapy Visit)  Spoke with Sam [facility] nurse.    Sherry Georges RN

## 2018-02-20 NOTE — MR AVS SNAPSHOT
Priscilla Way   2/20/2018   Anticoagulation Therapy Visit    Description:  78 year old male   Provider:  Iraj Shelby   Department:  [facility]           INR as of 2/20/2018     Today's INR 5.5!      Anticoagulation Summary as of 2/20/2018     INR goal 2.0-3.0   Today's INR 5.5!   Full instructions 2/20: Hold; 2/21: Hold   Next INR check 2/22/2018    Indications   Acute kidney injury (H) [N17.9]  Deep vein thrombosis (DVT) (H) [I82.409] [I82.409]  Long-term (current) use of anticoagulants [Z79.01] [Z79.01]         February 2018 Details    Sun Mon Tue Wed Thu Fri Sat         1               2               3                 4               5               6               7               8               9               10                 11               12               13               14               15               16               17                 18               19               20      Hold   See details      21      Hold         22            23               24                 25               26               27               28                   Date Details   02/20 This INR check       Date of next INR:  2/22/2018         How to take your warfarin dose     Hold Do not take your warfarin dose. See the Details table to the right for additional instructions.

## 2018-02-20 NOTE — PROGRESS NOTES
ANTICOAGULATION FOLLOW-UP CLINIC VISIT    Patient Name:  Priscilla Way  Date:  2/20/2018  Contact Type:  Telephone    SUBJECTIVE:     Patient Findings     Positives Intentional hold of therapy (Holding coumadin today and tomorrow for supratherapeutic INR result.), Unexplained INR or factor level change (Supratherapeutic INR result is 5.5 today 2/20.)    Comments Spoke to ELISA Sanders.  Patient interviewed through  on reason for supratherpeutic INR result.  Patient has had loose stools.  He takes Omeprazole.  Will hold Coumadin today and tomorrow.  Home care will draw an INR on Thursday.           OBJECTIVE    INR   Date Value Ref Range Status   02/20/2018 5.5  Final     Comment:     Home Care INR draw-Marilyn reported       ASSESSMENT / PLAN  INR assessment SUPRA    Recheck INR In: 2 DAYS    INR Location [facility] INR      Anticoagulation Summary as of 2/20/2018     INR goal 2.0-3.0   Today's INR 5.5!   Maintenance plan No maintenance plan   Full instructions 2/20: Hold; 2/21: Hold   Plan last modified Dayana Ervin RN (2/1/2018)   Next INR check 2/22/2018   Priority INR   Target end date     Indications   Acute kidney injury (H) [N17.9]  Deep vein thrombosis (DVT) (H) [I82.409] [I82.409]  Long-term (current) use of anticoagulants [Z79.01] [Z79.01]         Anticoagulation Episode Summary     INR check location     Preferred lab     Send INR reminders to [facility]    Comments [facility] to draw INR's  Pt has dementia please contact daughter with any questions. Contact daughter May at [phone].  Has 2.5mg tablets       Anticoagulation Care Providers     Provider Role Specialty Phone number    Randy Fuentes MD [facility] [phone]            See the Encounter Report to view Anticoagulation Flowsheet and Dosing Calendar (Go to Encounters tab in chart review, and find the Anticoagulation Therapy Visit)    Spoke with patient and home care nurse with .  Gave them their lab results and new warfarin recommendation.  No changes in health, medication, or diet. No missed doses, no falls. No signs or symptoms of bleed or clotting.    Iraj Shelby, RN

## 2018-02-20 NOTE — TELEPHONE ENCOUNTER
2nd Request  Routing to provider because: Drug warning HIGH: interaction with Warfarin    *Scheduling has been notified to contact the pt for appointment.  * NEEDED.

## 2018-02-21 NOTE — TELEPHONE ENCOUNTER
cholecalciferol (VITAMIN D) 400 UNITS tablet  Last Written Prescription Date:  1/18/18  Last Fill Quantity: 60,   # refills: 0  Last Office Visit : 12/23/16  Future Office visit: none    Routing because:  letter sent previously. No appt. notations on previous rf. rf or refuse

## 2018-02-22 NOTE — PROGRESS NOTES
ANTICOAGULATION FOLLOW-UP CLINIC VISIT    Patient Name:  Priscilla Way  Date:  2/22/2018  Contact Type:  Telephone    SUBJECTIVE:        OBJECTIVE    INR   Date Value Ref Range Status   02/22/2018 3.2  Final       ASSESSMENT / PLAN  No question data found.  Anticoagulation Summary as of 2/22/2018     INR goal 2.0-3.0   Today's INR 3.2!   Maintenance plan No maintenance plan   Full instructions 2/22: 1.25 mg; 2/23: 2.5 mg; 2/24: 2.5 mg; 2/25: 2.5 mg   Plan last modified Dayana Ervin RN (2/1/2018)   Next INR check 2/26/2018   Priority INR   Target end date     Indications   Acute kidney injury (H) [N17.9]  Deep vein thrombosis (DVT) (H) [I82.409] [I82.409]  Long-term (current) use of anticoagulants [Z79.01] [Z79.01]         Anticoagulation Episode Summary     INR check location     Preferred lab     [facility]    Comments [facility] to [facility]'s  Pt has dementia please contact daughter with any questions. Contact daughter May at [phone].  Has 2.5mg tablets       Anticoagulation Care Providers     Provider Role Specialty Phone number    Randy Fuentes MD [facility] [phone]            See the Encounter Report to view Anticoagulation Flowsheet and Dosing Calendar (Go to Encounters tab in chart review, and find the Anticoagulation Therapy Visit)    Spoke with Luann.    Dayana Ervin RN

## 2018-02-22 NOTE — MR AVS SNAPSHOT
Priscilla Way   2/22/2018   Anticoagulation Therapy Visit    Description:  78 year old male   Provider:  Dayana Ervin, RN   Department:  [facility]           INR as of 2/22/2018     Today's INR 3.2!      Anticoagulation Summary as of 2/22/2018     INR goal 2.0-3.0   Today's INR 3.2!   Full instructions 2/22: 1.25 mg; 2/23: 2.5 mg; 2/24: 2.5 mg; 2/25: 2.5 mg   Next INR check 2/26/2018    Indications   Acute kidney injury (H) [N17.9]  Deep vein thrombosis (DVT) (H) [I82.409] [I82.409]  Long-term (current) use of anticoagulants [Z79.01] [Z79.01]         February 2018 Details    Sun Mon Tue Wed Thu Fri Sat         1               2               3                 4               5               6               7               8               9               10                 11               12               13               14               15               16               17                 18               19               20               21               22      1.25 mg   See details      23      2.5 mg         24      2.5 mg           25      2.5 mg         26            27               28                   Date Details   02/22 This INR check       Date of next INR:  2/26/2018         How to take your warfarin dose     To take:  1.25 mg Take 0.5 of a 2.5 mg tablet.    To take:  2.5 mg Take 1 of the 2.5 mg tablets.

## 2018-02-26 NOTE — PROGRESS NOTES
ANTICOAGULATION FOLLOW-UP CLINIC VISIT    Patient Name:  Priscilla Way  Date:  2/26/2018  Contact Type:  Telephone    SUBJECTIVE:     Patient Findings     Positives Other complaints    Comments Home care RN reports pt is having flu like symptoms & a poor appetite.  A temperature cannot be taken, as the pt bites on the thermometer           OBJECTIVE    INR Protime   Date Value Ref Range Status   02/26/2018 2.0 (A) 0.86 - 1.14 Final       ASSESSMENT / PLAN  INR assessment THER    Recheck INR In: 4 DAYS    INR Location [facility]R      Anticoagulation Summary as of 2/26/2018     INR goal 2.0-3.0   Today's INR 2.0   Maintenance plan No maintenance plan   Full instructions 2/26: 2.5 mg; 2/27: 2.5 mg; 2/28: 2.5 mg; 3/1: 2.5 mg   Plan last modified Dayana Ervin RN (2/1/2018)   Next INR check 3/2/2018   Priority INR   Target end date     Indications   Acute kidney injury (H) [N17.9]  Deep vein thrombosis (DVT) (H) [I82.409] [I82.409]  Long-term (current) use of anticoagulants [Z79.01] [Z79.01]         Anticoagulation Episode Summary     INR check location     Preferred lab     Send IN[facility] reminders to [facility]    Comments [facility] to draw INR's  Pt has dementia please contact daughter with any questions. Contact daughter May at [phone].  Has 2.5mg tablets       Anticoagulation Care Providers     Provider Role Specialty Phone number    WessophieRandy chavis MD [facility] [phone]            See the Encounter Report to view Anticoagulation Flowsheet and Dosing Calendar (Go to Encounters tab in chart review, and find the Anticoagulation Therapy Visit)    Spoke with home care nurse Sam Bernal RN

## 2018-02-26 NOTE — MR AVS SNAPSHOT
Priscilla Way   2/26/2018   Anticoagulation Therapy Visit    Description:  78 year old male   Provider:  Jennifer Bernal, RN   Department:  [facility]           INR as of 2/26/2018     Today's INR 2.0      Anticoagulation Summary as of 2/26/2018     INR goal 2.0-3.0   Today's INR 2.0   Full instructions 2/26: 2.5 mg; 2/27: 2.5 mg; 2/28: 2.5 mg; 3/1: 2.5 mg   Next INR check 3/2/2018    Indications   Acute kidney injury (H) [N17.9]  Deep vein thrombosis (DVT) (H) [I82.409] [I82.409]  Long-term (current) use of anticoagulants [Z79.01] [Z79.01]         February 2018 Details    Sun Mon Tue Wed Thu Fri Sat         1               2               3                 4               5               6               7               8               9               10                 11               12               13               14               15               16               17                 18               19               20               21               22               23               24                 25               26      2.5 mg   See details      27      2.5 mg         28      2.5 mg             Date Details   02/26 This INR check               How to take your warfarin dose     To take:  2.5 mg Take 1 of the 2.5 mg tablets.           March 2018 Details    Sun Mon Tue Wed Thu Fri Sat         1      2.5 mg         2            3                 4               5               6               7               8               9               10                 11               12               13               14               15               16               17                 18               19               20               21               22               23               24                 25               26               27               28               29               30               31                Date Details   No additional details    Date of next INR:  3/2/2018         How to  take your warfarin dose     To take:  2.5 mg Take 1 of the 2.5 mg tablets.

## 2018-02-26 NOTE — TELEPHONE ENCOUNTER
QUEtiapine (SEROQUEL) 25 MG tablet  Last Written Prescription Date:  9/12/17   Last Fill Quantity: 180,   # refills: 1  Last Office Visit :12/23/16  Future Office visit:  None        Has been contacted by scheduling in past and rf notation,  no f/u scheduled  rf or refuse?

## 2018-03-02 NOTE — PROGRESS NOTES
ANTICOAGULATION FOLLOW-UP CLINIC VISIT    Patient Name:  Prsicilla Way  Date:  3/2/2018  Contact Type:  Telephone    SUBJECTIVE:     Patient Findings     Positives No Problem Findings           OBJECTIVE    INR   Date Value Ref Range Status   03/02/2018 2.90  Final     Comment:     Home Care        ASSESSMENT / PLAN  INR assessment THER    Recheck INR In: 4 DAYS    INR Location [facility] INR      Anticoagulation Summary as of 3/2/2018     INR goal 2.0-3.0   Today's INR 2.90   Maintenance plan No maintenance plan   Full instructions 3/2: 2.5 mg; 3/3: 2.5 mg; 3/4: 2.5 mg; 3/5: 2.5 mg   Plan last modified Dayana Ervin RN (2/1/2018)   Next INR check 3/6/2018   Priority INR   Target end date     Indications   Acute kidney injury (H) [N17.9]  Deep vein thrombosis (DVT) (H) [I82.409] [I82.409]  Long-term (current) use of anticoagulants [Z79.01] [Z79.01]         Anticoagulation Episode Summary     INR check location     Preferred lab     Send [facility] reminders to [facility]    Comments [facility] to draw INR's  Pt has dementia please contact daughter with any questions. Contact daughter May at [phone].  Has 2.5mg tablets       Anticoagulation Care Providers     Provider Role Specialty Phone number    Randy Fuentes MD [facility] [phone]            See the Encounter Report to view Anticoagulation Flowsheet and Dosing Calendar (Go to Encounters tab in chart review, and find the Anticoagulation Therapy Visit)    Spoke with ELISA Vargas. Gave them new warfarin recommendation.  No changes in health, medication, or diet. No missed doses, no falls. No signs or symptoms of bleed or clotting.     Shabana Sorto RN

## 2018-03-02 NOTE — MR AVS SNAPSHOT
Priscilla Way   3/2/2018   Anticoagulation Therapy Visit    Description:  78 year old male   Provider:  Shabana Sorto, RN   [facility]           INR as of 3/2/2018     Today's INR 2.90      Anticoagulation Summary as of 3/2/2018     INR goal 2.0-3.0   Today's INR 2.90   Full instructions 3/2: 2.5 mg; 3/3: 2.5 mg; 3/4: 2.5 mg; 3/5: 2.5 mg   Next INR check 3/6/2018    Indications   Acute kidney injury (H) [N17.9]  Deep vein thrombosis (DVT) (H) [I82.409] [I82.409]  Long-term (current) use of anticoagulants [Z79.01] [Z79.01]         March 2018 Details    Sun Mon Tue Wed Thu Fri Sat         1               2      2.5 mg   See details      3      2.5 mg           4      2.5 mg         5      2.5 mg         6            7               8               9               10                 11               12               13               14               15               16               17                 18               19               20               21               22               23               24                 25               26               27               28               29               30               31                Date Details   03/02 This INR check       Date of next INR:  3/6/2018         How to take your warfarin dose     To take:  2.5 mg Take 1 of the 2.5 mg tablets.

## 2018-03-05 NOTE — MR AVS SNAPSHOT
Priscilla Way   3/5/2018   Anticoagulation Therapy Visit    Description:  78 year old male   Provider:  Jennifer Bernal, RN   Department:  [facility]           INR as of 3/5/2018     Today's INR 3.5!      Anticoagulation Summary as of 3/5/2018     INR goal 2.0-3.0   Today's INR 3.5!   Full instructions 3/5: 1.25 mg; 3/6: 2.5 mg; 3/7: 2.5 mg; 3/8: 1.25 mg; 3/9: 2.5 mg; 3/10: 2.5 mg; 3/11: 2.5 mg   Next INR check 3/12/2018    Indications   Acute kidney injury (H) [N17.9]  Deep vein thrombosis (DVT) (H) [I82.409] [I82.409]  Long-term (current) use of anticoagulants [Z79.01] [Z79.01]         March 2018 Details    Sun Mon Tue Wed Thu Fri Sat         1               2               3                 4               5      1.25 mg   See details      6      2.5 mg         7      2.5 mg         8      1.25 mg         9      2.5 mg         10      2.5 mg           11      2.5 mg         12            13               14               15               16               17                 18               19               20               21               22               23               24                 25               26               27               28               29               30               31                Date Details   03/05 This INR check       Date of next INR:  3/12/2018         How to take your warfarin dose     To take:  1.25 mg Take 0.5 of a 2.5 mg tablet.    To take:  2.5 mg Take 1 of the 2.5 mg tablets.

## 2018-03-05 NOTE — PROGRESS NOTES
ANTICOAGULATION FOLLOW-UP CLINIC VISIT    Patient Name:  Priscilla Way  Date:  3/5/2018  Contact Type:  Telephone    SUBJECTIVE:     Patient Findings     Positives Unexplained INR or factor level change    Comments The home care nurse decided to do the INR one day early           OBJECTIVE    INR Protime   Date Value Ref Range Status   03/05/2018 3.5 (A) 0.86 - 1.14 Final       ASSESSMENT / PLAN  INR assessment SUPRA    Recheck INR In: 1 WEEK    INR Location [facility] INR      Anticoagulation Summary as of 3/5/2018     INR goal 2.0-3.0   Today's INR 3.5!   Maintenance plan No maintenance plan   Full instructions 3/5: 1.25 mg; 3/6: 2.5 mg; 3/7: 2.5 mg; 3/8: 1.25 mg; 3/9: 2.5 mg; 3/10: 2.5 mg; 3/11: 2.5 mg   Plan last modified Dayana Ervin RN (2/1/2018)   Next INR check 3/12/2018   Priority INR   Target end date     Indications   Acute kidney injury (H) [N17.9]  Deep vein thrombosis (DVT) (H) [I82.409] [I82.409]  Long-term (current) use of anticoagulants [Z79.01] [Z79.01]         Anticoagulation Episode Summary     INR check location     Preferred lab     Send INR reminders to [facility]    Comments [facility] to draw INR's  Pt has dementia please contact daughter with any questions. Contact daughter May at [phone].  Has 2.5mg tablets       Anticoagulation Care Providers     Provider Role Specialty Phone number    Randy Fuentes MD [facility] [phone]            See the Encounter Report to view Anticoagulation Flowsheet and Dosing Calendar (Go to Encounters tab in chart review, and find the Anticoagulation Therapy Visit)    Spoke with patient's home care nurse Ivy. Gave them their new warfarin recommendation.  No changes in health, medication, or diet. No missed doses, no falls. No signs or symptoms of bleed or clotting.      Jennifer Bernal RN

## 2018-03-09 NOTE — TELEPHONE ENCOUNTER
's family insisting that pt be prescribed Brand prograf.  Spoke with homecare nurse, let her know that brand can be ordered, but no problems have occurred with pt's on generic prograf.    [facility] states that family is most insistent that pt be on brand.   Sam is aware that and has told family that generic has the same amount of medication as brand.    Orders changed to Brand, and sent to [facility] pharmacy.
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
## 2018-03-09 NOTE — PROGRESS NOTES
Annual chart review completed.    Pt is not doing labs at regular advised interval:  Pt has not had regular labs done for past few years, no post transplant labs done in 2017, per homecare pt is difficult to draw, becomes agitated, has to be restrained to do labs, so labs have been deferred unless pt is inpt.      [facility].   His last tacrolimus level done was 4/2017, and was 3.5.   Per note from Home health care , who visited pt in his home, pt is bedridden, with dementia, does not know his family.      He does have a future appt with  3/23/2018.  Updated post transplant orders placed.

## 2018-03-12 NOTE — MR AVS SNAPSHOT
Priscilla Way   3/12/2018   Anticoagulation Therapy Visit    Description:  78 year old male   Provider:  Shabana Sorto, RN   Department:  [facility]           INR as of 3/12/2018     Today's INR 3.00      Anticoagulation Summary as of 3/12/2018     INR goal 2.0-3.0   Today's INR 3.00   Full instructions 3/12: 1.25 mg; 3/13: 2.5 mg; 3/14: 2.5 mg; 3/15: 1.25 mg; 3/16: 2.5 mg; 3/17: 2.5 mg; 3/18: 2.5 mg; 3/19: 1.25 mg   Next INR check 3/20/2018    Indications   Acute kidney injury (H) [N17.9]  Deep vein thrombosis (DVT) (H) [I82.409] [I82.409]  Long-term (current) use of anticoagulants [Z79.01] [Z79.01]         March 2018 Details    Sun Mon Tue Wed Thu Fri Sat         1               2               3                 4               5               6               7               8               9               10                 11               12      1.25 mg   See details      13      2.5 mg         14      2.5 mg         15      1.25 mg         16      2.5 mg         17      2.5 mg           18      2.5 mg         19      1.25 mg         20            21               22               23               24                 25               26               27               28               29               30               31                Date Details   03/12 This INR check       Date of next INR:  3/20/2018         How to take your warfarin dose     To take:  1.25 mg Take 0.5 of a 2.5 mg tablet.    To take:  2.5 mg Take 1 of the 2.5 mg tablets.

## 2018-03-12 NOTE — PROGRESS NOTES
ANTICOAGULATION FOLLOW-UP CLINIC VISIT    Patient Name:  Priscilla Way  Date:  3/12/2018  Contact Type:  Telephone    SUBJECTIVE:     Patient Findings     Positives No Problem Findings           OBJECTIVE    INR   Date Value Ref Range Status   03/12/2018 3.00  Final     Comment:     Home Care       ASSESSMENT / PLAN  INR assessment THER    Recheck INR In: 1 WEEK    INR Location [facility] INR      Anticoagulation Summary as of 3/12/2018     INR goal 2.0-3.0   Today's INR 3.00   Maintenance plan No maintenance plan   Full instructions 3/12: 1.25 mg; 3/13: 2.5 mg; 3/14: 2.5 mg; 3/15: 1.25 mg; 3/16: 2.5 mg; 3/17: 2.5 mg; 3/18: 2.5 mg; 3/19: 1.25 mg   Plan last modified Dayana Ervin RN (2/1/2018)   Next INR check 3/20/2018   Priority INR   Target end date     Indications   Acute kidney injury (H) [N17.9]  Deep vein thrombosis (DVT) (H) [I82.409] [I82.409]  Long-term (current) use of anticoagulants [Z79.01] [Z79.01]         Anticoagulation Episode Summary     INR check location     Preferred lab     Send INR reminders to [facility]    Comments [facility] to draw INR's  Pt has dementia please contact daughter with any questions. Contact daughter May at [phone].  Has 2.5mg tablets       Anticoagulation Care Providers     Provider Role Specialty Phone number    Randy Fuentes MD [facility] [phone]            See the Encounter Report to view Anticoagulation Flowsheet and Dosing Calendar (Go to Encounters tab in chart review, and find the Anticoagulation Therapy Visit)    Spoke with ELISA Vargas. Gave them new warfarin recommendation.  No changes in health, medication, or diet. No missed doses, no falls. No signs or symptoms of bleed or clotting.     Shabana Sorto, TED

## 2018-03-20 NOTE — MR AVS SNAPSHOT
Priscilla Way   3/20/2018   Anticoagulation Therapy Visit    Description:  78 year old male   Provider:  Iraj Shelby   Department:  [facility]           INR as of 3/20/2018     Today's INR 3.0      Anticoagulation Summary as of 3/20/2018     INR goal 2.0-3.0   Today's INR 3.0   Full instructions 2.5 mg on Sun, Tue, Sat; 3.75 mg on Thu; 1.25 mg all other days   Next INR check 3/27/2018    Indications   Acute kidney injury (H) [N17.9]  Deep vein thrombosis (DVT) (H) [I82.409] [I82.409]  Long-term (current) use of anticoagulants [Z79.01] [Z79.01]         March 2018 Details    Sun Mon Tue Wed Thu Fri Sat         1               2               3                 4               5               6               7               8               9               10                 11               12               13               14               15               16               17                 18               19               20      2.5 mg   See details      21      1.25 mg         22      3.75 mg         23      1.25 mg         24      2.5 mg           25      2.5 mg         26      1.25 mg         27            28               29               30               31                Date Details   03/20 This INR check       Date of next INR:  3/27/2018         How to take your warfarin dose     To take:  1.25 mg Take 0.5 of a 2.5 mg tablet.    To take:  2.5 mg Take 1 of the 2.5 mg tablets.    To take:  3.75 mg Take 1.5 of the 2.5 mg tablets.

## 2018-03-20 NOTE — TELEPHONE ENCOUNTER
Last Clinic Visit: 12/23/2016  [facility]  Pending appt 3/28/18- med's passed protocol

## 2018-03-20 NOTE — PROGRESS NOTES
ANTICOAGULATION FOLLOW-UP CLINIC VISIT    Patient Name:  Priscilla Way  Date:  3/20/2018  Contact Type:  Telephone    SUBJECTIVE:     Patient Findings     Positives No Problem Findings    Comments Recommended patient include three lower doses of coumadin weekly.  Will recheck at home visit next Tuesday.           OBJECTIVE    INR   Date Value Ref Range Status   03/20/2018 3.0  Final     Comment:     Home Care INR draw       ASSESSMENT / PLAN  INR assessment THER    Recheck INR In: 1 WEEK    INR Location [facility] INR      Anticoagulation Summary as of 3/20/2018     INR goal 2.0-3.0   Today's INR 3.0   Maintenance plan 2.5 mg (2.5 mg x 1) on Sun, Tue, Sat; 3.75 mg (2.5 mg x 1.5) on Thu; 1.25 mg (2.5 mg x 0.5) all other days   Full instructions 2.5 mg on Sun, Tue, Sat; 3.75 mg on Thu; 1.25 mg all other days   Weekly total 15 mg   Plan last modified Iraj Shelby RN (3/20/2018)   Next INR check 3/27/2018   Priority INR   Target end date     Indications   Acute kidney injury (H) [N17.9]  Deep vein thrombosis (DVT) (H) [I82.409] [I82.409]  Long-term (current) use of anticoagulants [Z79.01] [Z79.01]         Anticoagulation Episode Summary     INR check location     Preferred lab     [facility]    Comments [facility] to [facility]'s  Pt has dementia please contact daughter with any questions. Contact daughter [facility] at [phone].  Has 2.5mg tablets       Anticoagulation Care Providers     Provider Role Specialty Phone number    Randy Fuentes MD [facility] [phone]            See the Encounter Report to view Anticoagulation Flowsheet and Dosing Calendar (Go to Encounters tab in chart review, and find the Anticoagulation Therapy Visit)    Spoke with home care nurse. Gave them their lab results and new warfarin recommendation.  No changes in health, medication, or diet. No missed doses, no falls. No signs or symptoms of bleed or clotting.    Iraj Shelby,  RN

## 2018-03-21 PROBLEM — N17.9 ACUTE KIDNEY FAILURE (H): Status: ACTIVE | Noted: 2018-01-01

## 2018-03-21 NOTE — IP AVS SNAPSHOT
` ` Patient Information     Patient Name Sex     Priscilla Way ([id number]) Male 1940       Room Bed    [facility]9 5202-25      Patient Demographics     Address Phone E-mail Address    [address]  [facility] [address] [phone] (Home)  [phone] (Mobile) *Preferred* [email or web address]      Patient Ethnicity & Race     Ethnic Group Patient Race    Sammarinese       Emergency Contact(s)     Name Relation Home Work Mobile    JENNY WAY Daughter [phone]    GIANNA ABRAHAM Daughter [phone]      Documents on File        Status Date Received Description       Documents for the Patient    Privacy Notice - [place] Received 11     Insurance Card Received 17 Did not bring     External Medication Information Consent       Patient ID       Consent for Services - Hospital/Clinic Received () 11     Face Sheet Received () 11/06/10     Consent for Services - Hospital/Clinic Received () 12     CMS IM for Patient Signature Received 18     Consent for Services - Hospital/Clinic Received () 12     Consent for Services - [facility] Received () 10/30/12 GENERAL CONSENT FORM: SHARED EHR - ENGLISH    Consent for Services - [facility]  12 GENERAL CONSENT FORM: SHARED EHR - ENGLISH    Consent to Communicate Received () 02/15/13     Consent to Communicate  () 13 AUTHORIZATION TO DISCUSS PROTECTED HEALTH INFORMATION    Consent for EHR Access  13 Copied from existing Consent for services - IP/ED collected on 2011    [facility] Specified Other       Consent for Services - Hospital/Clinic Received () 10/04/13     Consent for Services - [facility] Received 10/04/13 imaging center general consent    Consent for Services - Hospital/Clinic  () 10/04/13 CONSENT FOR SERVICE    Consent for Services - [facility]  10/17/13 GENERAL CONSENT FORM: SHARED EHR - ENGLISH     Business/Insurance/Care Coordination/Health Form - Patient  01/13/15 [employer], PRIOR AUTHORIZATION APPROVAL- METHOCARBAMOL, 14    Consent for Services - Hospital/Clinic Received () 04/14/15     Consent for Services - Hospital/Clinic  () 04/15/15 CONSENT FOR SERVICE    Consent for Services/Privacy Notice - Hospital/Clinic-Esign Received () 16     HIM NKECHI Authorization  10/04/16     HIM NKECHI Authorization  () 17 Authorization for batch [employer] 17-4    Consent for Services/Privacy Notice - Hospital/Clinic Received 18     HIM NKECHI Authorization  () 10/03/17 Authorization for batch [employer] 10-03-17 - 2    Care Everywhere Prospective Auth Received 18     Consent for Services/Privacy Notice - Hospital/Clinic       Consent for Services/Privacy Notice - Hospital/Clinic  (Deleted)         Documents for the Encounter    CMS IM for Patient Signature         Admission Information     Attending Provider Admitting Provider Admission Type Admission Date/Time    Dania Alcala MD Torok, Haruka Matsubara, MD Emergency 18  1037    Discharge Date Hospital Service Auth/Cert Status Service Area     Internal Medicine Incomplete [facility] SERVICES    Unit Room/Bed Admission Status       [facility] 5202/5202-01 Admission (Confirmed)       Admission     Complaint    Acute kidney failure (H)      Hospital Account     Name Acct ID Class Status Primary Coverage    Priscilla Way [id number] Inpatient Open [facility]            Guarantor Account (for Hospital Account #[id number])     Name Relation to Pt Service Area Active? Acct Type    Priscilla Way  FCS Yes Personal/Family    Address Phone          [address]  [place] [address] [phone](H)  none(O)              Coverage Information (for Hospital Account #[id number])     F/O Payor/Plan Precert #    [facility]     Subscriber  Subscriber #    Priscilla Way [id number]    Address Phone    [address]  [place] [address] [phone]

## 2018-03-21 NOTE — TELEPHONE ENCOUNTER
[facility], [phone]    He has edema on his left and right upper leg.  She just saw him today    SKilled nursing    1 x 9 weeks  3prns    Called back with verbal auth of orders.    Pt needs to keep the appt on 3/23

## 2018-03-21 NOTE — ED NOTES
Patient's daughter now here; asked to come into room as daughter had concern about patient's left arm.  Daughter states that arm appears more swollen than it normally is at home; also daughter notes that patient is limiting the use of his arm.  Dr London notified and evaluated arm for possible imaging prior to patient moving up to admission PCU. Patient repositioned in bed with two RNS.

## 2018-03-21 NOTE — H&P
[facility], [place]    Internal Medicine History and Physical - Gold Service       Date of Admission:  3/21/2018    Assessment & Plan   Priscilla Way is a 78 year old male  admitted on 3/21/2018. He has a history of dementia, liver transplant in 2000, kidney transplant in 2000, DVT, DM II and CVA and is admitted for increased agitation, diarrhea and extremity swelling.    1) Acute encephalopathy with baseline dementia - Presents with acutely worsened agitation on top of his baseline dementia.  Per PCA is normally WC bound and able to feed himself but unable to do so today.  Speaks [place]araguan but per PCA rarely able to hold coherent conversations.  Work up so far significant for LILIAM with lab evidence for dehydration, UTI, C diff infection, all of which are likely contributing to his encephalopathy.  - Will treat C diff, UTI with antibiotics  - Will treat LILIAM with fluids  - Please cluster care, minimize night time interruptions  - Continue home seroquel namenda  - Family concerned that his mental status seems to deteriorate during hospital stays, would consider discharge home as early as possible (patient lives at home with PCA and family with 24/7 supervision)    2) C diff infection  - Start oral vanco, 125 mg QID, plan for 10 days of therapy    3) LILIAM - Suspect hypovolemia given BUN/CR elevation and history of loose stools over the past few days.    - Hold home lasix  - Will check tacro level  - 1 L NS now  - EF normal on 11/2016 echo but patient has some pulmonary vascular congestion and limb swelling.  Will need to monitor for volume overload.    4) UTI   - Started on keflex in the ED, will switch to Ceftriaxone    5) History of liver and kidney transplant - Transplanted in 2000 per past records.  - Continue home tacro    6) DM II  - Sliding scale insulin while inpatient, normally not on insulin    7) History of DVT  - Continue coumadin, pharmacy to dose    # Pain Assessment:   Patient  unable to answer pain questions due to delirium/dementia    Diet:  Regular  Fluids: Bolus now, no maintenance  DVT Prophylaxis: Warfarin  Code Status: Full    Disposition Plan   Expected discharge: 2 - 3 days; recommended to prior living arrangement once LILIAM resolving, diarrhea manageable.     Entered: Patel Gillespie 03/21/2018, 3:18 PM   Information in the above section will display in the discharge planner report.    The patient's care was discussed with the Attending Physician, Dr. Alcala.    Patel Gillespie NP  Internal Medicine Staff Hospitalist Service  [facility]  Pager: [phone]  Please see sticky note for cross cover information  ______________________________________________________________________    Chief Complaint   Patient more combative    History obtained from PCA, charting    History of Present Illness   Priscilla Way is a 78 year old male  admitted on 3/21/2018. He has a history of dementia, liver transplant in 2000, kidney transplant in 2000, DVT, DM II and CVA and is admitted for increased agitation, diarrhea and extremity swelling.    The patient is unable to contribute to his history.  I spoke with his PCA who confirms he has been more altered over the past day or two.  Normally he is wheelchair bound and able to feed himself, but is now no longer able to do so.  At his baseline he understands Icelandic but is unable to hold conversations and does not recognize family.    Per chart review, he has also been having loose stools over the past four days.  He has also been more combative than usual, in particular in the evenings.  He also has worsened extremity swelling.    I spoke with his daughter who was very reluctant to have him admitted as his mental status seems to worsen with each hospitalization.  I explained that with clostridium difficile and worsening renal function he would need inpatient treatment and she agreed with the hope we would discharge him as soon as  he was safe.    Review of Systems   Review of systems not obtained due to patient factors - confusion    Past Medical History    I have reviewed this patient's medical history and updated it with pertinent information if needed.   Past Medical History:   Diagnosis Date     Dementia     multiple acute infarcts, SV dis on CT     Diabetes type 2, controlled (H)      Hepatitis C      Kidney transplanted      Liver transplant      Unspecified cerebral artery occlusion with cerebral infarction        Past Surgical History   I have reviewed this patient's surgical history and updated it with pertinent information if needed.  Past Surgical History:   Procedure Laterality Date     TRANSPLANT KIDNEY RECIPIENT  DONOR       TRANSPLANT LIVER RECIPIENT  DONOR        Social History   Social History   Substance Use Topics     Smoking status: Former Smoker     Types: Cigarettes     Smokeless tobacco: Never Used      Comment: quit 10 years ago     Alcohol use No     Family History   I have reviewed this patient's family history and updated it with pertinent information if needed.     Unable to obtain, patient delirious     Prior to Admission Medications   Prior to Admission Medications   Prescriptions Last Dose Informant Patient Reported? Taking?   ALPRAZolam (XANAX) 0.25 MG tablet Unknown at Unknown time  No No   Sig: Give 1/2 half tab po half an hour before home lab draw   Cholecalciferol (VITAMIN D-400) 400 UNITS TABS Unknown at Unknown time  No No   Sig: TAKE 2 TABLETS BY MOUTH DAILY   Cholecalciferol (VITAMIN D-400) 400 UNITS TABS Unknown at Unknown time  No No   Sig: Take 2 tablets by mouth daily   Cholecalciferol (VITAMIN D-400) 400 UNITS TABS Unknown at Unknown time  No No   Sig: Take 2 tablets by mouth daily   LANCETS MICRO THIN 33G MISC Unknown at Unknown time  No No   Si Device 2 times daily Use to test blood sugar 1-2 times daily or as directed.   Multiple Vitamins-Iron (DAILY VITES/IRON)  TABS Unknown at Unknown time  No No   Sig: Take 1 tablet by mouth daily   QUEtiapine (SEROQUEL) 25 MG tablet Unknown at Unknown time  No No   Sig: TAKE 1 TABLET BY MOUTH DURING THE DAY AS NEEDED FOR AGITATION, AND ONE AT BEDTIME FOR INSOMNIA   acetaminophen (TYLENOL) 500 MG tablet Unknown at Unknown time  No No   Sig: Take  by mouth. Take one tablet by mouth every 4-6 hours as needed.   aspirin 81 MG tablet Unknown at Unknown time  No No   Sig: Take 1 tablet (81 mg) by mouth daily   blood glucose (BL TEST STRIP PACK) test strip Unknown at Unknown time  No No   Sig: Use to test blood sugar 1-2 times daily or as directed.   blood glucose monitor KIT Unknown at Unknown time  No No   Sig: Use to test blood sugar 1-2 times daily or as directed.   carvedilol (COREG) 6.25 MG tablet Unknown at Unknown time  No No   Sig: Take 1 tablet (6.25 mg) by mouth 2 times daily (with meals)   darbepoetin matthew (ARANESP, ALBUMIN FREE,) 40 MCG/0.4ML injection Unknown at Unknown time  No No   Sig: Inject 0.4 mLs (40 mcg) Subcutaneous every 28 days As needed for hgb<10g/dL.  If Hgb increases >1 point in 2 weeks (if blood transfusion given, use hgb PRIOR to this), SYSTOLIC BP > 180 mmHg or hgb>=10g/dL, HOLD DOSE. Dose must be within 1 week of Hgb.  Per anemia protocol with Mariluz Martinez MD   dimethicone-zinc oxide (EUCERIN) cream Unknown at Unknown time  No No   Sig: Apply topically 2 times daily as needed for dry skin or other   docusate sodium (COLACE) 100 MG capsule Unknown at Unknown time  No No   Sig: Take 1 capsule by mouth 2 times daily.   ferrous sulfate (IRON) 325 (65 FE) MG tablet Unknown at Unknown time  No No   Sig: Take 1 tablet (325 mg) by mouth daily   furosemide (LASIX) 20 MG tablet Unknown at Unknown time  No No   Sig: Take 1 tablet (20 mg) by mouth daily   memantine XR (NAMENDA XR) 21 MG 24 hr capsule Unknown at Unknown time  No No   Sig: Take 1 capsule (21 mg) by mouth daily Patient needs to see primary provider for  further refills. NO more refills   multivitamin with C and FA (ANIMAL SHAPES) CHEW chewable tablet Unknown at Unknown time  No No   Sig: CHEW AND SWALLOW 1 TABLET EVERY DAY   omeprazole (PRILOSEC) 20 MG CR capsule Unknown at Unknown time  No No   Sig: Take 1 capsule (20 mg) by mouth daily Patient needs to see primary provider for further refills. NO more refills   order for DME Unknown at Unknown time  No No   Sig: Equipment being ordered: Other: Damian lift  Treatment Diagnosis: memory loss, dementia, weakness, ARF   order for DME Unknown at Unknown time  No No   Sig: Equipment being ordered: Hospital Bed and damian lift   tacrolimus (GENERIC EQUIVALENT) 0.5 MG capsule Unknown at Unknown time  No No   Sig: Take 3 capsules (1.5mg) in the am,  Take 2 capsules (1.0mg) in the pm, take every 12 hours.   warfarin (COUMADIN) 2.5 MG tablet Unknown at Unknown time  No No   Sig: Take 3.75mgs on Thursdays and 2.5mgs on all other days or as directed by the [facility]      Facility-Administered Medications: None     Allergies   No Known Allergies    Physical Exam   Vital Signs: Temp: 98.1  F (36.7  C) Temp src: Axillary BP: (!) 150/95 Pulse: 62     SpO2: 100 % O2 Device: None (Room air)      Physical Exam   Constitutional:   Chronically ill appearing elderly man  Head: Normocephalic and atraumatic.   Eyes: Pupils are equal, round, and reactive to light.    Neck:   No adenopathy, no bony tenderness  Cardiovascular: Regular rate and rhythm without murmurs or gallops  Pulmonary/Chest: Clear to auscultation bilaterally, with no wheezes or retractions. No respiratory distress.  GI: Soft with good bowel sounds.  Non-tender, non-distended, with no guarding, no rebound, no peritoneal signs.   Musculoskeletal:  1+ extremity edema   Skin: Skin is warm and dry. No rash noted.   Neurological: Drowsy.  Does not cooperate with neuro exam but moves purposefully  Psychiatric:  Unable to assess      Data   Data reviewed today: I reviewed all  medications, new labs and imaging results over the last 24 hours. I personally reviewed his CXR and past echocardiogram.

## 2018-03-21 NOTE — ED NOTES
[facility], [place]   ED Nurse to Floor Handoff     Priscilla Way is a 78 year old male who speaks Australian and lives with family members,  in a home  They arrived in the ED by ambulance from home    ED Chief Complaint: Altered Mental Status; Leg Swelling; and Diarrhea    ED Dx;   Final diagnoses:   Combative behavior   Dementia with behavioral disturbance, unspecified dementia type   Dehydration   Urinary tract infection without hematuria, site unspecified         Needed?: Yes    Allergies: No Known Allergies.  Past Medical Hx:   Past Medical History:   Diagnosis Date     Dementia 2004    multiple acute infarcts, SV dis on CT     Diabetes type 2, controlled (H)      Hepatitis C      Kidney transplanted 2000     Liver transplant 2000     Unspecified cerebral artery occlusion with cerebral infarction       Baseline Mental status: cognitively impaired, alert to self only  Current Mental Status changes: baseline but having increased agitation    Infection present or suspected this encounter: yes urinary and c-diff positive  Sepsis suspected: No  Isolation type: Enteric     Activity level - Baseline/Home:  Total Care  Activity Level - Current:   Total Care    Bariatric equipment needed?: No    In the ED these meds were given:   Medications   0.9% sodium chloride BOLUS (0 mLs Intravenous Stopped 3/21/18 1524)   tacrolimus (PROGRAF BRAND) capsule 1.5 mg (1.5 mg Oral Given 3/21/18 1348)   cephALEXin (KEFLEX) capsule 500 mg (500 mg Oral Given 3/21/18 1356)       Drips running?  No    Home pump  No    Current LDAs  Peripheral IV 03/21/18 Right Upper forearm (Active)   Number of days:0       Pressure Injury 11/10/16 Left Heel Stage 2 (Active)   Number of days:496       Wound 11/09/16 Rectum Suspected pressure ulcer sm pinkish wounds on buttocks  (Active)   Number of days:497       Incision/Surgical Site 08/21/16 Right;Lower Abdomen (Active)   Number of days:577       Labs results:    Labs Ordered and Resulted from Time of ED Arrival Up to the Time of Departure from the ED   CBC WITH PLATELETS DIFFERENTIAL - Abnormal; Notable for the following:        Result Value    RBC Count 2.92 (*)     Hemoglobin 9.0 (*)     Hematocrit 27.6 (*)     All other components within normal limits   COMPREHENSIVE METABOLIC PANEL - Abnormal; Notable for the following:     Glucose 164 (*)     Urea Nitrogen 74 (*)     Creatinine 1.99 (*)     GFR Estimate 33 (*)     GFR Estimate If Black 39 (*)     Calcium 8.4 (*)     Albumin 2.3 (*)     All other components within normal limits   INR - Abnormal; Notable for the following:     INR 2.33 (*)     All other components within normal limits   ROUTINE UA WITH MICROSCOPIC REFLEX TO CULTURE - Abnormal; Notable for the following:     Blood Urine Trace (*)     Protein Albumin Urine 10 (*)     Leukocyte Esterase Urine Large (*)     WBC Urine 159 (*)     WBC Clumps Present (*)     Bacteria Urine Few (*)     Mucous Urine Present (*)     Hyaline Casts 6 (*)     All other components within normal limits   CLOSTRIDIUM DIFFICILE TOXIN B - Abnormal; Notable for the following:     C Diff Toxin B PCR Positive (*)     All other components within normal limits   ENTERIC BACTERIA AND VIRUS PANEL BY LUKE STOOL   URINE CULTURE AEROBIC BACTERIAL       Imaging Studies:   Recent Results (from the past 24 hour(s))   XR Chest 2 Views    Narrative    XR CHEST 2 VW  3/21/2018 12:49 PM      HISTORY: cough;     COMPARISON: 11/10/2016    FINDINGS: Cardiac silhouette is within normal limits. Low lung  volumes, unchanged. No pleural effusion or pneumothorax. Pulmonary  vascular congestion. No focal airspace opacity.      Impression    IMPRESSION: Low lung volumes and pulmonary vascular congestion. No  focal airspace opacity.    I have personally reviewed the examination and initial interpretation  and I agree with the findings.    MAYA YUEN MD   US Upper Extremity Venous Duplex Left    Narrative     EXAMINATION: DOPPLER VENOUS ULTRASOUND OF THE LEFT UPPER EXTREMITY,  3/21/2018 1:30 PM     COMPARISON: No relevant exam available.    HISTORY: Left upper extremity swelling.    TECHNIQUE:  Gray-scale evaluation with compression, spectral flow and  color Doppler assessment of the deep venous system of the left upper  extremity.    FINDINGS:  Patient was unable to cooperate, would not abduct his arm for  evaluation of the left brachial and basilic veins.   Normal blood flow and waveforms are demonstrated in the left internal  jugular, innominate, subclavian, and axillary veins.The cephalic vein  was not visualized.       Impression    IMPRESSION:  1.  No evidence of deep vein thrombosis in the visualized left upper  extremity veins. Unable to evaluate brachial and basilic veins.   2.  Cephalic vein not visualized.    I have personally reviewed the examination and initial interpretation  and I agree with the findings.    RUDY FONTANEZ MD   US Lower Extremity Venous Duplex Bilateral    Narrative    EXAMINATION: DOPPLER VENOUS ULTRASOUND OF BILATERAL LOWER EXTREMITIES,  3/21/2018 1:26 PM     COMPARISON: Ultrasound examination of the pelvis and lower extremities  dated 11/9/2016.    HISTORY: Bilateral lower extremity swelling    TECHNIQUE:  Gray-scale evaluation with compression, spectral flow and  color Doppler assessment of the deep venous system of both legs from  groin to knee, and then at the ankles.    FINDINGS:  Patient was unable to cooperate with the ultrasonographer, and the  right femoral vein could not be evaluated from the groin to the knee.       Bilateral common femoral, popliteal, posterior tibial veins and left  femoral vein demonstrate normal compressibility and blood flow.    Note: Images for concurrent upper extremity study were attached by  mistake. Please see concurrent study report for details on the left  upper extremity      Impression    IMPRESSION:  No evidence of deep venous thrombosis in  "the visualized veins of  either lower extremity. Unable to assess right femoral vein.    I have personally reviewed the examination and initial interpretation  and I agree with the findings.    RUDY FONTANEZ MD       Recent vital signs:   BP (!) 150/95  Pulse 62  Temp 98.1  F (36.7  C) (Axillary)  Ht 1.727 m (5' 8\")  SpO2 100%    Cardiac Rhythm: Normal Sinus  Pt needs tele? No  Skin/wound Issues: sore on left buttocks    Code Status: Full Code    Pain control: Pt unable to communicate pain, has shown no nonverbal cues of pain    Nausea control: pt had none    Abnormal labs/tests/findings requiring intervention: Receiving oral ABX for UTI, awaiting MD orders for c-diff treatement    Family present during ED course? Family member here earlier, PCA has been at bedside throughout most of ED course   Family Comments/Social Situation comments: NA    Tasks needing completion: None    Pt gets agitated when trying to reposition or do tono-care, made attempts to hit and bite at staff.  De-escalates once he is no longer stimulated.  Has made no attempts to get out of cart on his own.  This agitation is not baseline.  Pt has been incontinent of urine and stool x 2 while in ED.    David Garcia RN  ascom-- [phone] [facility]  [phone] [facility]    "

## 2018-03-21 NOTE — IP AVS SNAPSHOT
MRN:[id number]                      After Visit Summary   3/21/2018    Priscilla Breaux    MRN: [id number]           Thank you!     Thank you for choosing [facility] for your care. Our goal is always to provide you with excellent care. Hearing back from our patients is one way we can continue to improve our services. Please take a few minutes to complete the written survey that you may receive in the mail after you visit with us. Thank you!        Patient Information     Date Of Birth          1940        Designated Caregiver       Most Recent Value    Caregiver    Will someone help with your care after discharge? yes    Name of designated caregiver JENNY BREAUX    Phone number of caregiver [phone]    Caregiver address Pt's address      About your hospital stay     You were admitted on:  March 21, 2018 You last received care in the:  [address] [facility]    You were discharged on:  March 24, 2018        Reason for your hospital stay       Priscilla was admitted to [facility] for worsening confusion and was found to have infections of the urine and colon.  He is being sent home on oral antibiotics for each infection which he should complete.                  Who to Call     For medical emergencies, please call 911.  For non-urgent questions about your medical care, please call your primary care provider or clinic, [phone]          Attending Provider     Provider Specialty    Kristen London MD Emergency Medicine    [facility], Dania Hicks MD Internal Medicine       Primary Care Provider Office Phone # Fax #    Randy Fuentes [phone]       When to contact your care team       Call your primary doctor if you have any of the following: temperature greater than 100.4,  increased shortness of breath, increased swelling, increased pain or increasing confusion.                  After Care Instructions     Activity       Your activity upon discharge: activity as  tolerated            Diet       Follow this diet upon discharge: Resume prior to admission diet.            Discharge Instructions       Hold (do not take) the dose of warfarin (coumadin) tonight.  Resume your regular dosing on [place] 3/25/18.   Your Tacrolimus dosing has been adjusted to 1 mg (2 tablets) twice daily.    Home health RN will need to draw tacrolimus levels (12 hour trough) on Mondays and Thursdays to be sent to the transplant team.  On Monday the home health RN should also send a INR to your PCP                  Follow-up Appointments     Follow Up and recommended labs and tests       Follow up with primary care provider, Randy Fuentes, within 7 days to evaluate medication change and for hospital follow- up.  The following labs/tests are recommended: INR, BMP. Consider repeat LUE ultrasound pending improvement of swelling with furosemide.                  Additional Services     Home care nursing referral       RN skilled nursing visit. RN to assess vital signs and weight, respiratory and cardiac status, pain level and activity tolerance, hydration, nutrition and bowel status and tolerance to new ABX for UTI/C-dif.  RN to teach medication management/ wound care/ INR blood draws. .  _______________________  [facility]  Phone  [phone]  Fax  [phone]  ______________________     Your provider has ordered home care nursing services. If you have not been contacted within 2 days of your discharge please call the inpatient department phone number at [phone] .                  Pending Results     No orders found from 3/19/2018 to 3/22/2018.            Statement of Approval     Ordered          03/24/18 1406  I have reviewed and agree with all the recommendations and orders detailed in this document.  EFFECTIVE NOW     Approved and electronically signed by:  Katharine Mitchell MD             Admission Information     Date & Time Provider Department Dept. Phone    3/21/2018 Dania Alcala  "MD [facility] [address] [facility] [phone]      Your Vitals Were     Blood Pressure Pulse Temperature Respirations Height Weight    149/78 (BP Location: Left arm) 77 96.8  F (36  C) (Axillary) 18 1.727 m (5' 8\") 69.9 kg (154 lb 1.6 oz)    Pulse Oximetry BMI (Body Mass Index)                100% 23.43 kg/m2          [employer] Information     [employer] gives you secure access to your electronic health record. If you see a primary care provider, you can also send messages to your care team and make appointments. If you have questions, please call your primary care clinic.  If you do not have a primary care provider, please call [phone] and they will assist you.        Care EveryWhere ID     This is your Care EveryWhere ID. This could be used by other organizations to access your [facility] medical records  [id number]        Equal Access to Services     [facility] : Eduardo Villela, savannah sibley, gisselle juarez, annmarie cline . So [facility] [phone].    ATENCIÓN: Si habla español, tiene a staples disposición servicios gratuitos de asistencia lingüística. Llame al [phone].    We comply with applicable federal civil rights laws and [place] laws. We do not discriminate on the basis of race, color, national origin, age, disability, sex, sexual orientation, or gender identity.               Review of your medicines      START taking        Dose / Directions    cefdinir 125 MG/5ML suspension   Commonly known as:  OMNICEF   Indication:  Urinary Tract Infection   Used for:  Urinary tract infection without hematuria, site unspecified        Dose:  300 mg   Take 12 mLs (300 mg) by mouth 2 times daily for 4 days   Quantity:  96 mL   Refills:  0       vancomycin 50 mg/mL Liqd solution   Commonly known as:  VANOCIN   Indication:  Clostridium difficile Bacteria   Used for:  Colitis due to Clostridium difficile        Dose:  125 mg   Take 2.5 mLs (125 mg) by mouth 4 " times daily for 6 days   Quantity:  60 mL   Refills:  0         CONTINUE these medicines which may have CHANGED, or have new prescriptions. If we are uncertain of the size of tablets/capsules you have at home, strength may be listed as something that might have changed.        Dose / Directions    docusate sodium 100 MG capsule   Commonly known as:  COLACE   This may have changed:    - when to take this  - reasons to take this   Used for:  Unspecified constipation        Dose:  100 mg   Take 1 capsule by mouth 2 times daily.   Quantity:  60 capsule   Refills:  12       QUEtiapine 25 MG tablet   Commonly known as:  SEROquel   This may have changed:    - how much to take  - how to take this  - when to take this  - additional instructions   Used for:  Insomnia        TAKE 1 TABLET BY MOUTH DURING THE DAY AS NEEDED FOR AGITATION, AND ONE AT BEDTIME FOR INSOMNIA   Quantity:  60 tablet   Refills:  0       tacrolimus 0.5 MG capsule   Commonly known as:  GENERIC EQUIVALENT   This may have changed:  additional instructions   Used for:  History of liver transplant (H), Encounter for long-term (current) use of high-risk medication        Take 2 capsules (1.0mg) in the am,  Take 2 capsules (1.0mg) in the pm, take every 12 hours.   Quantity:  150 capsule   Refills:  11       warfarin 2.5 MG tablet   Commonly known as:  COUMADIN   This may have changed:  additional instructions   Used for:  Long term current use of anticoagulant therapy        Take 3.75mgs on Thursdays and 2.5mgs on all other days or as directed by the Coumadin Clinic   Quantity:  113 tablet   Refills:  3         CONTINUE these medicines which have NOT CHANGED        Dose / Directions    acetaminophen 500 MG tablet   Commonly known as:  TYLENOL   Used for:  Pain in joint, pelvic region and thigh        Take  by mouth. Take one tablet by mouth every 4-6 hours as needed.   Quantity:  100 tablet   Refills:  9       ALPRAZolam 0.25 MG tablet   Commonly known as:   XANAX   Used for:  Severe needle phobia        Give 1/2 half tab po half an hour before home lab draw   Quantity:  3 tablet   Refills:  1       aspirin 81 MG tablet   Used for:  Cardiovascular disease        Dose:  81 mg   Take 1 tablet (81 mg) by mouth daily   Quantity:  30 tablet   Refills:  0       blood glucose monitor Kit   Used for:  High blood sugar        Use to test blood sugar 1-2 times daily or as directed.   Quantity:  1 kit   Refills:  0       blood glucose monitoring test strip   Commonly known as:  BL TEST STRIP PACK   Used for:  High blood sugar        Use to test blood sugar 1-2 times daily or as directed.   Quantity:  100 strip   Refills:  3       carvedilol 6.25 MG tablet   Commonly known as:  COREG   Used for:  Renal hypertension, stage 1-4 or unspecified chronic kidney disease        Dose:  6.25 mg   Take 1 tablet (6.25 mg) by mouth 2 times daily (with meals)   Quantity:  180 tablet   Refills:  1       DAILY VITES/IRON Tabs   Used for:  Preventive measure        Dose:  1 tablet   Take 1 tablet by mouth daily   Quantity:  90 tablet   Refills:  3       darbepoetin matthew 40 MCG/0.4ML injection   Commonly known as:  ARANESP (ALBUMIN FREE)   Used for:  Anemia in stage 3 chronic kidney disease, CKD (chronic kidney disease) stage 3, GFR 30-59 ml/min        Dose:  40 mcg   Inject 0.4 mLs (40 mcg) Subcutaneous every 28 days As needed for hgb<10g/dL.  If Hgb increases >1 point in 2 weeks (if blood transfusion given, use hgb PRIOR to this), SYSTOLIC BP > 180 mmHg or hgb>=10g/dL, HOLD DOSE. Dose must be within 1 week of Hgb.  Per anemia protocol with Marilzu Martinez MD   Quantity:  0.4 mL   Refills:  99       dimethicone-zinc oxide cream   Used for:  Dermatitis        Apply topically 2 times daily as needed for dry skin or other   Quantity:  142 g   Refills:  0       furosemide 20 MG tablet   Commonly known as:  LASIX   Used for:  Generalized edema        Dose:  20 mg   Take 1 tablet (20 mg) by mouth daily    Quantity:  90 tablet   Refills:  1       LANCETS MICRO THIN 33G Misc   Used for:  High blood sugar        Dose:  1 Device   1 Device 2 times daily Use to test blood sugar 1-2 times daily or as directed.   Quantity:  100 each   Refills:  3       memantine XR 21 MG 24 hr capsule   Commonly known as:  NAMENDA XR   Used for:  Loss of memory        Dose:  21 mg   Take 1 capsule (21 mg) by mouth daily Patient needs to see primary provider for further refills. NO more refills   Quantity:  30 capsule   Refills:  0       omeprazole 20 MG CR capsule   Commonly known as:  priLOSEC   Used for:  Esophageal reflux        Dose:  20 mg   Take 1 capsule (20 mg) by mouth daily Patient needs to see primary provider for further refills. NO more refills   Quantity:  30 capsule   Refills:  0       order for DME   Used for:  Acute renal failure, unspecified acute renal failure type (H), Memory loss, Acute kidney injury (H)        Equipment being ordered: Other: Damian lift Treatment Diagnosis: memory loss, dementia, weakness, ARF   Quantity:  1 Units   Refills:  0       order for DME   Used for:  Alzheimer's dementia with behavioral disturbance, unspecified timing of dementia onset        Equipment being ordered: Hospital Bed and damian lift   Quantity:  1 each   Refills:  0       * VITAMIN D-400 400 UNITS Tabs   Used for:  Vitamin D deficiency        TAKE 2 TABLETS BY MOUTH DAILY   Quantity:  180 tablet   Refills:  0       * VITAMIN D-400 400 UNITS Tabs   Used for:  Vitamin D deficiency        Dose:  2 tablet   Take 2 tablets by mouth daily   Quantity:  180 tablet   Refills:  0       * Notice:  This list has 2 medication(s) that are the same as other medications prescribed for you. Read the directions carefully, and ask your doctor or other care provider to review them with you.         Where to get your medicines      [facility] - [place], [place] - [address] [place] [address]  [address] [place], [facility] [address]     Phone:  [phone]     cefdinir 125 MG/5ML suspension    vancomycin 50 mg/mL Liqd solution         These medications were sent to [facility] - [place], [place] - [address]  [address] [address]     Phone:  [phone]     tacrolimus 0.5 MG capsule                Protect others around you: Learn how to safely use, store and throw away your medicines at www.disposemymeds.org.        ANTIBIOTIC INSTRUCTION     You've Been Prescribed an Antibiotic - Now What?  Your healthcare team thinks that you or your loved one might have an infection. Some infections can be treated with antibiotics, which are powerful, life-saving drugs. Like all medications, antibiotics have side effects and should only be used when necessary. There are some important things you should know about your antibiotic treatment.      Your healthcare team may run tests before you start taking an antibiotic.    Your team may take samples (e.g., from your blood, urine or other areas) to run tests to look for bacteria. These test can be important to determine if you need an antibiotic at all and, if you do, which antibiotic will work best.      Within a few days, your healthcare team might change or even stop your antibiotic.    Your team may start you on an antibiotic while they are working to find out what is making you sick.    Your team might change your antibiotic because test results show that a different antibiotic would be better to treat your infection.    In some cases, once your team has more information, they learn that you do not need an antibiotic at all. They may find out that you don't have an infection, or that the antibiotic you're taking won't work against your infection. For example, an infection caused by a virus can't be treated with antibiotics. Staying on an antibiotic when you don't need it is more likely to  be harmful than helpful.      You may experience side effects from your antibiotic.    Like all medications, antibiotics have side effects. Some of these can be serious.    Let you healthcare team know if you have any known allergies when you are admitted to the hospital.    One significant side effect of nearly all antibiotics is the risk of severe and sometimes deadly diarrhea caused by Clostridium difficile (C. Difficile). This occurs when a person takes antibiotics because some good germs are destroyed. Antibiotic use allows C. diificile to take over, putting patients at high risk for this serious infection.    As a patient or caregiver, it is important to understand your or your loved one's antibiotic treatment. It is especially important for caregivers to speak up when patients can't speak for themselves. Here are some important questions to ask your healthcare team.    What infection is this antibiotic treating and how do you know I have that infection?    What side effects might occur from this antibiotic?    How long will I need to take this antibiotic?    Is it safe to take this antibiotic with other medications or supplements (e.g., vitamins) that I am taking?     Are there any special directions I need to know about taking this antibiotic? For example, should I take it with food?    How will I be monitored to know whether my infection is responding to the antibiotic?    What tests may help to make sure the right antibiotic is prescribed for me?      Information provided by:  www.cdc.gov/getsmart  U.S. Department of Health and Human Services  Centers for disease Control and Prevention  National Center for Emerging and Zoonotic Infectious Diseases  Division of Healthcare Quality Promotion             Medication List: This is a list of all your medications and when to take them. Check marks below indicate your daily home schedule. Keep this list as a reference.      Medications           Morning Afternoon  Evening Bedtime As Needed    acetaminophen 500 MG tablet   Commonly known as:  TYLENOL   Take  by mouth. Take one tablet by mouth every 4-6 hours as needed.   Last time this was given:  650 mg on 3/23/2018  9:23 PM                                ALPRAZolam 0.25 MG tablet   Commonly known as:  XANAX   Give 1/2 half tab po half an hour before home lab draw                                aspirin 81 MG tablet   Take 1 tablet (81 mg) by mouth daily                                blood glucose monitor Kit   Use to test blood sugar 1-2 times daily or as directed.                                blood glucose monitoring test strip   Commonly known as:  BL TEST STRIP PACK   Use to test blood sugar 1-2 times daily or as directed.                                carvedilol 6.25 MG tablet   Commonly known as:  COREG   Take 1 tablet (6.25 mg) by mouth 2 times daily (with meals)   Last time this was given:  6.25 mg on 3/24/2018  9:16 AM                                cefdinir 125 MG/5ML suspension   Commonly known as:  OMNICEF   Take 12 mLs (300 mg) by mouth 2 times daily for 4 days   Last time this was given:  300 mg on 3/24/2018  9:16 AM                                DAILY VITES/IRON Tabs   Take 1 tablet by mouth daily                                darbepoetin matthew 40 MCG/0.4ML injection   Commonly known as:  ARANESP (ALBUMIN FREE)   Inject 0.4 mLs (40 mcg) Subcutaneous every 28 days As needed for hgb<10g/dL.  If Hgb increases >1 point in 2 weeks (if blood transfusion given, use hgb PRIOR to this), SYSTOLIC BP > 180 mmHg or hgb>=10g/dL, HOLD DOSE. Dose must be within 1 week of Hgb.  Per anemia protocol with Mariluz Martinez MD                                dimethicone-zinc oxide cream   Apply topically 2 times daily as needed for dry skin or other                                docusate sodium 100 MG capsule   Commonly known as:  COLACE   Take 1 capsule by mouth 2 times daily.                                furosemide 20 MG  tablet   Commonly known as:  LASIX   Take 1 tablet (20 mg) by mouth daily   Last time this was given:  20 mg on 3/24/2018  9:16 AM                                LANCETS MICRO THIN 33G Misc   1 Device 2 times daily Use to test blood sugar 1-2 times daily or as directed.                                memantine XR 21 MG 24 hr capsule   Commonly known as:  NAMENDA XR   Take 1 capsule (21 mg) by mouth daily Patient needs to see primary provider for further refills. NO more refills   Last time this was given:  21 mg on 3/23/2018  9:23 PM                                omeprazole 20 MG CR capsule   Commonly known as:  priLOSEC   Take 1 capsule (20 mg) by mouth daily Patient needs to see primary provider for further refills. NO more refills   Last time this was given:  20 mg on 3/22/2018 10:01 AM                                order for DME   Equipment being ordered: Other: Damian lift Treatment Diagnosis: memory loss, dementia, weakness, ARF                                order for DME   Equipment being ordered: Hospital Bed and damian lift                                QUEtiapine 25 MG tablet   Commonly known as:  SEROquel   TAKE 1 TABLET BY MOUTH DURING THE DAY AS NEEDED FOR AGITATION, AND ONE AT BEDTIME FOR INSOMNIA   Last time this was given:  25 mg on 3/23/2018  9:23 PM                                tacrolimus 0.5 MG capsule   Commonly known as:  GENERIC EQUIVALENT   Take 2 capsules (1.0mg) in the am,  Take 2 capsules (1.0mg) in the pm, take every 12 hours.   Last time this was given:  1.5 mg on 3/22/2018  9:59 AM                                vancomycin 50 mg/mL Liqd solution   Commonly known as:  VANOCIN   Take 2.5 mLs (125 mg) by mouth 4 times daily for 6 days   Last time this was given:  125 mg on 3/24/2018 11:55 AM                                * VITAMIN D-400 400 UNITS Tabs   TAKE 2 TABLETS BY MOUTH DAILY                                * VITAMIN D-400 400 UNITS Tabs   Take 2 tablets by mouth daily                                 warfarin 2.5 MG tablet   Commonly known as:  COUMADIN   Take 3.75mgs on Thursdays and 2.5mgs on all other days or as directed by the Coumadin Clinic   Last time this was given:  2.5 mg on 3/23/2018  6:41 PM                                * Notice:  This list has 2 medication(s) that are the same as other medications prescribed for you. Read the directions carefully, and ask your doctor or other care provider to review them with you.

## 2018-03-21 NOTE — IP AVS SNAPSHOT
[address] [address]    Phone:  [phone]                                       After Visit Summary   3/21/2018    Priscilla Way    MRN: [id number]           After Visit Summary Signature Page     I have received my discharge instructions, and my questions have been answered. I have discussed any challenges I see with this plan with the nurse or doctor.    ..........................................................................................................................................  Patient/Patient Representative Signature      ..........................................................................................................................................  Patient Representative Print Name and Relationship to Patient    ..................................................               ................................................  Date                                            Time    ..........................................................................................................................................  Reviewed by Signature/Title    ...................................................              ..............................................  Date                                                            Time

## 2018-03-21 NOTE — ED NOTES
EMS reports patient family has noticed increase confusion over the last three days. He is more combative with family. Reported he is having diarrhea.

## 2018-03-21 NOTE — PHARMACY-ADMISSION MEDICATION HISTORY
Admission medication history interview status for the 3/21/2018 admission is complete. See Epic admission navigator for allergy information, pharmacy, prior to admission medications and immunization status.     Medication history interview sources:  [facility] [place] Home care nurse    Changes made to PTA medication list (reason)  Added: None  Deleted: duplicate vitamin D, duplicate warfarin, prograf brand, ferrous sulfate  Changed: None    Additional medication history information (including reliability of information, actions taken by pharmacist):  - between juliano and the PRATIMA home care nurse, all meds have been accounted for, unsure of last doses, the patient's PCA was here, but missed them  - Per home care nurse, patient is supposed to have ferrous sulfate but family did not buy OTC and patient has not been taking  - recent switch from Prograf to generic tacrolimus, dose verified with all sources  - Patient has not gotten aranesp in months per home care nurse  - Unsure if patient got flu vaccine    Prior to Admission medications    Medication Sig Last Dose Taking? Auth Provider   Cholecalciferol (VITAMIN D-400) 400 UNITS TABS Take 2 tablets by mouth daily  Yes Randy Fuentes MD   tacrolimus (GENERIC EQUIVALENT) 0.5 MG capsule Take 3 capsules (1.5mg) in the am,  Take 2 capsules (1.0mg) in the pm, take every 12 hours.  Yes Kevyn Pineda MD   QUEtiapine (SEROQUEL) 25 MG tablet TAKE 1 TABLET BY MOUTH DURING THE DAY AS NEEDED FOR AGITATION, AND ONE AT BEDTIME FOR INSOMNIA  Patient taking differently: Take 25 mg by mouth At Bedtime   Yes Randy Fuentes MD   aspirin 81 MG tablet Take 1 tablet (81 mg) by mouth daily  Yes Randy Fuentes MD   omeprazole (PRILOSEC) 20 MG CR capsule Take 1 capsule (20 mg) by mouth daily Patient needs to see primary provider for further refills. NO more refills  Yes Randy Fuentes MD   memantine XR (NAMENDA XR) 21 MG 24 hr capsule Take 1 capsule (21 mg) by mouth daily  Patient needs to see primary provider for further refills. NO more refills  Yes Randy Fuentes MD   carvedilol (COREG) 6.25 MG tablet Take 1 tablet (6.25 mg) by mouth 2 times daily (with meals)  Yes Randy Fuentes MD   furosemide (LASIX) 20 MG tablet Take 1 tablet (20 mg) by mouth daily  Yes Randy Fuentes MD   Cholecalciferol (VITAMIN D-400) 400 UNITS TABS TAKE 2 TABLETS BY MOUTH DAILY  Yes Randy Fuentes MD   Multiple Vitamins-Iron (DAILY VITES/IRON) TABS Take 1 tablet by mouth daily  Yes Randy Fuentes MD   acetaminophen (TYLENOL) 500 MG tablet Take  by mouth. Take one tablet by mouth every 4-6 hours as needed.  Yes Randy Fuentes MD   docusate sodium (COLACE) 100 MG capsule Take 1 capsule by mouth 2 times daily.  Yes Randy Fuentes MD   warfarin (COUMADIN) 2.5 MG tablet Take 3.75mgs on Thursdays and 2.5mgs on all other days or as directed by the [facility]  Patient taking differently: Take 1.25mg on MWF and 2.5mg on all other days or as directed by the [facility] Unknown at Unknown time  Randy Fuentes MD   darbepoetin matthew (ARANESP, ALBUMIN FREE,) 40 MCG/0.4ML injection Inject 0.4 mLs (40 mcg) Subcutaneous every 28 days As needed for hgb<10g/dL.  If Hgb increases >1 point in 2 weeks (if blood transfusion given, use hgb PRIOR to this), SYSTOLIC BP > 180 mmHg or hgb>=10g/dL, HOLD DOSE. Dose must be within 1 week of Hgb.  Per anemia protocol with Mariluz Martinez MD Unknown at Unknown time  Mariluz Martinez MD   ALPRAZolam (XANAX) 0.25 MG tablet Give 1/2 half tab po half an hour before home lab draw   Randy Fuentes MD   order for DME Equipment being ordered: Hospital Bed and damian lift Unknown at Unknown time  Liya Dowling DO   dimethicone-zinc oxide (EUCERIN) cream Apply topically 2 times daily as needed for dry skin or other Unknown at Unknown time  Caroline Silveira PA-C   order for DME Equipment being ordered: Other: Damian lift  Treatment  Diagnosis: memory loss, dementia, weakness, ARF Unknown at Unknown time  Michel Maynard MD   blood glucose monitor KIT Use to test blood sugar 1-2 times daily or as directed. Unknown at Unknown time  Randy Fuentes MD   blood glucose (BL TEST STRIP PACK) test strip Use to test blood sugar 1-2 times daily or as directed. Unknown at Unknown time  Randy Fuentes MD   LANCETS MICRO THIN 33G MISC 1 Device 2 times daily Use to test blood sugar 1-2 times daily or as directed. Unknown at Unknown time  Randy Fuentes MD       Medication history completed by: Poonam Gasca, PharmD Resident

## 2018-03-21 NOTE — ED PROVIDER NOTES
History     Chief Complaint   Patient presents with     Altered Mental Status     Leg Swelling     Diarrhea     HPI  Priscilla Way is a 78 year old male who presents from home via ambulance for increased combativeness, diarrhea and swelling in both legs and the left arm for the last several days to 1 week.  The history is provided by the patient's son-in-law and by the patient's PCA.  Patient has a history of dementia and at baseline would not be able to participate in giving the history.  Patient lives in his own home and has PCA care and family stays with him at night.  Patient's son-in-law and PCA report that at baseline he is rather combative during his morning cares and this is his typical behavior for several years, however in the afternoons he is typically cooperative and calm.  Over the last 1 week or so, patient has continued to be combative even in the afternoons and for most of the day.  His level of confusion is unchanged from baseline.  He typically does not recognize his family members and this also is unchanged from baseline.  He has been having loose, watery stools about 3 times per day for the last 3-4 days.  He has been having some posttussive emesis as well.  There is no blood in the stool or in the emesis.  Family members deny fever, shortness of breath or any specific pain.  They have noticed swelling in both legs on the left arm for the last 2-3 days.  Patient's son-in-law also explains that the patient was changed from the brand name for Prograf and was given the generic tablets about 2 weeks ago.  He reports that this happened about a year and a half ago where he was changed to the generic tablets and he became more combative at that time as well.  Son-in-law is asking that we change him back to the brand name of Prograf.    I have reviewed the Medications, Allergies, Past Medical and Surgical History, and Social History in the [employer] system.  Past Medical History:   Diagnosis Date      "Dementia 2004    multiple acute infarcts, SV dis on CT     Diabetes type 2, controlled (H)      Hepatitis C      Kidney transplanted      Liver transplant      Unspecified cerebral artery occlusion with cerebral infarction        Past Surgical History:   Procedure Laterality Date     TRANSPLANT KIDNEY RECIPIENT  DONOR       TRANSPLANT LIVER RECIPIENT  DONOR         No family history on file.    Social History   Substance Use Topics     Smoking status: Former Smoker     Types: Cigarettes     Smokeless tobacco: Never Used      Comment: quit 10 years ago     Alcohol use No         Review of Systems   Unable to perform ROS: Dementia   Family members are giving the history and is reported in HPI.    Physical Exam   BP: 167/75  Pulse: 62  Temp: 98.1  F (36.7  C)  Height: 172.7 cm (5' 8\")  SpO2: 97 %      Physical Exam    GEN:  Alert, well developed, no acute distress, but tries to grab my hands and tightly squeeze my fingers during the exam.  HEENT:  PERRL, Mucous membranes are moist.   Cardio:  RRR, no murmur, radial pulses equal bilaterally  PULM:  Lungs clear, good air movement, no wheezes, rales   Abd:  Soft, normal bowel sounds, no focal tenderness  Back exam:  No CVA tenderness, there is a tran size shallow ulcer on the right buttock near the anal verge.  Musculoskeletal: There is bilateral lower extremity swelling, primarily in the thighs, no calf tenderness or lower leg swelling.  The entire length of the left arm and left hand are swollen compared to the right. No erythema or warmth of swollen extremities, no ecchymosis or bony step-offs.  Neuro:  Alert, not oriented, does not follow commands, moving all extremities spontaneously   Skin:  Warm, dry     ED Course     ED Course     Procedures           Critical Care time:  none    Ultrasound of both legs in the left upper extremity were done and results are shown here:  Also shown below is the results of the chest x-ray which was also " reviewed by me.    Results for orders placed or performed during the hospital encounter of 03/21/18   XR Chest 2 Views    Narrative    XR CHEST 2 VW  3/21/2018 12:49 PM      HISTORY: cough;     COMPARISON: 11/10/2016    FINDINGS: Cardiac silhouette is within normal limits. Low lung  volumes, unchanged. No pleural effusion or pneumothorax. Pulmonary  vascular congestion. No focal airspace opacity.      Impression    IMPRESSION: Low lung volumes and pulmonary vascular congestion. No  focal airspace opacity.    I have personally reviewed the examination and initial interpretation  and I agree with the findings.    MAYA YUEN MD   US Lower Extremity Venous Duplex Bilateral    Narrative    EXAMINATION: DOPPLER VENOUS ULTRASOUND OF BILATERAL LOWER EXTREMITIES,  3/21/2018 1:26 PM     COMPARISON: Ultrasound examination of the pelvis and lower extremities  dated 11/9/2016.    HISTORY: Bilateral lower extremity swelling    TECHNIQUE:  Gray-scale evaluation with compression, spectral flow and  color Doppler assessment of the deep venous system of both legs from  groin to knee, and then at the ankles.    FINDINGS:  Patient was unable to cooperate with the ultrasonographer, and the  right femoral vein could not be evaluated from the groin to the knee.       Bilateral common femoral, popliteal, posterior tibial veins and left  femoral vein demonstrate normal compressibility and blood flow.    Note: Images for concurrent upper extremity study were attached by  mistake. Please see concurrent study report for details on the left  upper extremity      Impression    IMPRESSION:  No evidence of deep venous thrombosis in the visualized veins of  either lower extremity. Unable to assess right femoral vein.    I have personally reviewed the examination and initial interpretation  and I agree with the findings.    RUDY FONTANEZ MD   US Upper Extremity Venous Duplex Left    Narrative    EXAMINATION: DOPPLER VENOUS ULTRASOUND OF THE  LEFT UPPER EXTREMITY,  3/21/2018 1:30 PM     COMPARISON: No relevant exam available.    HISTORY: Left upper extremity swelling.    TECHNIQUE:  Gray-scale evaluation with compression, spectral flow and  color Doppler assessment of the deep venous system of the left upper  extremity.    FINDINGS:  Patient was unable to cooperate, would not abduct his arm for  evaluation of the left brachial and basilic veins.   Normal blood flow and waveforms are demonstrated in the left internal  jugular, innominate, subclavian, and axillary veins.The cephalic vein  was not visualized.       Impression    IMPRESSION:  1.  No evidence of deep vein thrombosis in the visualized left upper  extremity veins. Unable to evaluate brachial and basilic veins.   2.  Cephalic vein not visualized.    I have personally reviewed the examination and initial interpretation  and I agree with the findings.    RUDY FONTANEZ MD     *Note: Due to a large number of results and/or encounters for the requested time period, some results have not been displayed. A complete set of results can be found in Results Review.        Labs are normal except as shown.  Hemoglobin is close to baseline.  Creatinine is increased compared to baseline of around 1.5.  Patient was given IV fluids for dehydration.    X-rays of the left humerus and forearm were done and reviewed by me as well.  They are negative for acute fracture or destructive bony lesion.  Patient was given p.o. Keflex in the ED for urinary tract infection.  He was given p.o. vancomycin for C. difficile.      Labs Ordered and Resulted from Time of ED Arrival Up to the Time of Departure from the ED   CBC WITH PLATELETS DIFFERENTIAL - Abnormal; Notable for the following:        Result Value    RBC Count 2.92 (*)     Hemoglobin 9.0 (*)     Hematocrit 27.6 (*)     All other components within normal limits   COMPREHENSIVE METABOLIC PANEL - Abnormal; Notable for the following:     Glucose 164 (*)     Urea  Nitrogen 74 (*)     Creatinine 1.99 (*)     GFR Estimate 33 (*)     GFR Estimate If Black 39 (*)     Calcium 8.4 (*)     Albumin 2.3 (*)     All other components within normal limits   INR - Abnormal; Notable for the following:     INR 2.33 (*)     All other components within normal limits   ROUTINE UA WITH MICROSCOPIC REFLEX TO CULTURE - Abnormal; Notable for the following:     Blood Urine Trace (*)     Protein Albumin Urine 10 (*)     Leukocyte Esterase Urine Large (*)     WBC Urine 159 (*)     WBC Clumps Present (*)     Bacteria Urine Few (*)     Mucous Urine Present (*)     Hyaline Casts 6 (*)     All other components within normal limits   CLOSTRIDIUM DIFFICILE TOXIN B - Abnormal; Notable for the following:     C Diff Toxin B PCR Positive (*)     All other components within normal limits   ENTERIC BACTERIA AND VIRUS PANEL BY LUKE STOOL   URINE CULTURE AEROBIC BACTERIAL            Assessments & Plan (with Medical Decision Making)   Patient presents with diarrhea and increased combativeness compared to baseline.  Due to his dementia, he does not participate in providing history.  Unclear what is causing the left arm swelling and bilateral leg swelling however there is no sign of DVT or bony lesion.  The ultrasounds were not able to check all of the deep veins due to poor patient cooperation, however the patient is therapeutic with his INR, so acute clot is less likely.  There is no sign of acute infection in any of his extremities and exam is not concerning for compartment syndrome at this time.  Plan at this point is for the patient be admitted to the medicine service for further evaluation and treatment.  He has remained hemodynamically stable in the emergency department.    I have reviewed the nursing notes.    I have reviewed the findings, diagnosis, plan and need for follow up with the patient.    New Prescriptions    No medications on file       Final diagnoses:   Combative behavior   Dementia with  behavioral disturbance, unspecified dementia type   Dehydration   Urinary tract infection without hematuria, site unspecified       3/21/2018   [facility], [place], EMERGENCY DEPARTMENT     Kristen London MD  03/21/18 2045

## 2018-03-22 NOTE — PHARMACY-ANTICOAGULATION SERVICE
Clinical Pharmacy - Warfarin Dosing Consult     Pharmacy has been consulted to manage this patient s warfarin therapy.  Indication: DVT/ PE Treatment  Therapy Goal: INR 2-3  Warfarin Prior to Admission: Yes  Warfarin PTA Regimen: 1.25 mg on Mon, Wed, Fri; 2.5 mg daily rest of week  Significant drug interactions: aspirin, omeprazole  Recent documented change in oral intake/nutrition:  (presents with diarrhea and dehydration)    INR   Date Value Ref Range Status   03/21/2018 2.33 (H) 0.86 - 1.14 Final   03/20/2018 3.0  Final     Comment:     Home Care INR draw       Recommend warfarin 1.25 mg today.  Pharmacy will monitor Priscilla Way daily and order warfarin doses to achieve specified goal.      Please contact pharmacy as soon as possible if the warfarin needs to be held for a procedure or if the warfarin goals change.

## 2018-03-22 NOTE — PLAN OF CARE
Problem: Patient Care Overview  Goal: Plan of Care/Patient Progress Review  Pt alert w/ intermittent lethargy. VSS ex HTN, MD aware. Gave a second scheduled dose of coreg, Pt spit out most of it, MD aware. Incontinent of cares, 1 Bm and saturated brief this shift. Repo q2h. Bladder scanned end of shift, 35ml. Daughter at bedside and helps w/ cares. Wound care consult, see notes. Pt takes medications crushed in applesauce and prompted with fluid intake of tea. R PIV SL. On SSI,  and 195. Will continue w/ POC

## 2018-03-22 NOTE — PROGRESS NOTES
D/I/A: Received consult for suspected pressure injury on perianal area. Assessed the area and noted superficial 0.3x 0.3 x 0.1 cm on left perianal area with recently resurfaced surrounded skin r/t moisture. Cleansed and applied criticaid barrier paste. No pressure injury detected. Needs assistance shift weight from side to side.  P. Continue to follow pressure injury prevention protocol and incontinence protocol. No further visit planned, will sign off.

## 2018-03-22 NOTE — PROGRESS NOTES
[facility], [place]    Internal Medicine Progress Note - Gold Service      Assessment & Plan   Priscilla Way is a 78 year old male with dementia, T2DM, history of DVT, liver and kidney transplants in 2000m and CVA who was admitted on 3/21/2018 following worsening confusion at home, found to have C. Diff, UTI, and LILIAM.    # Acute toxic encephalopathy with baseline dementia - Presents with acutely worsened agitation on top of his baseline dementia.  Per PCA is normally WC bound and able to feed himself but unable to do so on presentation.  Speaks Marshallese but per PCA rarely able to hold coherent conversations.  Work up so far significant for LILIAM with lab evidence for UTI, C diff infection, all of which are likely contributing to his encephalopathy.  - Please cluster care, minimize night time interruptions  - Continue home seroquel, namenda  - Family concerned that his mental status seems to deteriorate during hospital stays, would consider discharge home as early as possible (patient lives at home with PCA and family with 24/7 supervision)  - Infectious and LILIAM treatment as below.     # C diff infection  - Start oral vanco, 125 mg QID, plan for 10 days of therapy     # LILIAM - Total body volume up but was intravascularly depleted at presentation, will continue to monitor.  Baseline creatinine unclear.  - Hold home lasix, anticipate restart in AM  - BMP in AM  - No further IVF at present  - Will alert transplant nephrology in AM.    # UTI   - Ceftriaxone changed to IM per family request  - Awaiting UCx results     # History of liver and kidney transplant - Transplanted in 2000 per past records.  - Continue home tacro     # DM II - No acute issues.  - Sliding scale insulin while inpatient, normally not on insulin     # History of DVT - No evidence of new DVT, therapeutic INR.  - Continue coumadin, pharmacy to dose    # Pain Assessment:   Current Pain Score 3/22/2018 3/22/2018 3/22/2018    Patient currently in pain? ADRIA sleeping: patient not able to self report sleeping: patient not able to self report   Pain score (0-10) - - -   Pain location - - -   Pain descriptors - - -    - Priscilla is unable to participate in a plan for pain management; however, the plan  was discussed in a collaborative fashion with his daughter.     Diet: Combination Diet Regular Diet Adult  Fluids: PO hydration  DVT Prophylaxis: Warfarin  Code Status: Full Code    Disposition Plan   Expected discharge: 2 - 3 days, recommended to prior living arrangement once antibiotic plan established.     Entered: Katharine Mitchell 03/22/2018, 7:32 AM   Information in the above section will display in the discharge planner report.      The patient's care was discussed with the Bedside Nurse, Care Coordinator/, Patient and Patient's Family.    Katharine Mitchell  Internal Medicine Staff Hospitalist Service  [facility]  Pager: [phone]  Please see sticky note for cross cover information    Interval History   Priscilla did well overnight with no acute issues, today he was not cooperative with medications but family reports that improves as his acute illnesses are treated.  Priscilla was unable to answer questions consistently but family had no other concerns at this time.    Unable to complete a review of systems due to patient's mental status    Data reviewed today: I reviewed all medications, new labs and imaging results over the last 24 hours.    Physical Exam   Vital Signs: Temp: 95.7  F (35.4  C) Temp src: Axillary BP: 170/76 Pulse: 76   Resp: 16 SpO2: 100 % O2 Device: None (Room air)    Weight: 0 lbs 0 oz  General Appearance: Asleep but easily awoken chronically ill appearing elderly male  Respiratory: LCTAB, no increased WOB on RA  Cardiovascular: RRR, no m/r/g  GI: Soft, non-tender, non-distended  Skin: No rashes or lesions  Other: UE and LE swelling bilaterally  Neuro: Asleep but easily awoken, not cooperative with exam  but cooperative with family for feeding etc.

## 2018-03-22 NOTE — PROVIDER NOTIFICATION
Patient spit out most of 10:00 am medications administered. Per physician no reorder of medications to be administered at this time. Will continue to monitor patient.

## 2018-03-22 NOTE — PROGRESS NOTES
Care Coordinator Progress Note     Admission Date/Time:  3/21/2018  Attending MD:  Dania Alcala,*     Data  Chart reviewed, discussed with interdisciplinary team.   Patient was admitted for:    Combative behavior  Dementia with behavioral disturbance, unspecified dementia type  Dehydration  Urinary tract infection without hematuria, site unspecified.    Concerns with insurance coverage for discharge needs: None.  Current Living Situation: Patient lives alone.family stays with pt all of the time  Support System: Supportive and Involved  Services Involved: Home Care  Transportation: [place] transportation,  [facility] [phone]/ will need [facility] medical transport when dcing.   Barriers to Discharge: infections, IV abx, pending medical clearance.       Assessment  11.5 hours of PCA hours are in place- family state that they fill in for a total of 24 hour care giving at p[facility] . [facility]  U care coordinator is [facility] Managed Care Coordinator: [phone]; [phone]  _______________________  [facility]- RN only 3 times weekly- INR draws/wound on foot- resumption order placed for pt.    Phone  [phone]  Fax  [phone]  ______________________    [facility], Anticoagulation Clinic    Plan  Anticipated Discharge Date:  3/24/18   Anticipated Discharge Plan:  Dc to home with continued family support, if pt dcs with abx daughter would like them to be liquid form please.      Heather Hoskins, RNCC, BSN    [facility]    Medicine Group  [address] [place] [id number]    [email or web address]    Office: [phone] Pager: [phone]

## 2018-03-22 NOTE — PLAN OF CARE
Problem: Patient Care Overview  Goal: Plan of Care/Patient Progress Review  Outcome: Improving    Temp: 95.7  F (35.4  C) Temp src: Axillary BP: 170/76 Pulse: 76 Resp: 16 SpO2: 100 % O2 Device: None (Room air)     Elevated blood pressures (168/76, 170/76); patient very tense and restless while taking vital signs. Alert, disoriented x3. Venezuelan speaking. Patient not following commands and combative; hand mitts in place. Bed alarm on while daughter out of room. Per MD note, patient normally WC bound. Daughter refusing to allow RN and NA to reposition patient overnight; daughter wanted father to sleep uninterrupted; educated daughter on risks of pressure ulcers. Patient finally repositioned at 0610 with 2 assist. Regular diet. No signs/symptoms of nausea, no emesis. Blood sugar spot-check 199. Saline locked to PIV. No void overnight; bladder scan completed at 0610 for 279 mL's. One incontinent smear of stool. No non-verbal indicators of pain present overnight; slept well.

## 2018-03-22 NOTE — PROGRESS NOTES
[facility]  Patient is currently open to home care services with [facility].  The patient is currently receiving RN  services.  [facility]  and team have been notified of patient admission.  [facility] liaison will continue to follow patient during stay.  If appropriate provide orders to resume home care at time of discharge.    Thank you  Vonda Rankin RN, BSN  [facility] Liaison  [phone]

## 2018-03-22 NOTE — PLAN OF CARE
Problem: Patient Care Overview  Goal: Plan of Care/Patient Progress Review  Outcome: No Change  Pt. admitted for UTI and + cdiff infection. Pt disoriented at baseline x4 and speaks Dominican. Family at bedside translating basic needs and assisting with cares. Pt combative during brief changes and repositioning. Incontinent of loose stools and odorous urine. Pt able to tolerated PO intake, w/ . Small suspected pressure ulcer on perianal area prior to admission. PRN Seroquel given at bedtime for agitation. Half of PRN PO Tylenol given for mild pain, but spat out half of dose. PIV running NS bolus currently. Left arm remains swollen, US and XR negative. Nursing will continue to monitor and follow POC.

## 2018-03-23 NOTE — PROGRESS NOTES
[facility], [place]    Internal Medicine Progress Note - Gold Service      Assessment & Plan   Priscilla Way is a 78 year old male with dementia, T2DM, history of DVT, liver and kidney transplants in 2000m and CVA who was admitted on 3/21/2018 following worsening confusion at home, found to have C. Diff, UTI, and LILIAM.     # Acute toxic encephalopathy with baseline dementia - Presents with acutely worsened agitation on top of his baseline dementia.  Per PCA is normally WC bound and able to feed himself but unable to do so on presentation.  Speaks Albanian but per PCA rarely able to hold coherent conversations.  Work up so far significant for LILIAM with lab evidence for UTI, C diff infection, all of which are likely contributing to his encephalopathy.  - Please cluster care, minimize night time interruptions  - Continue home seroquel, namenda  - Family concerned that his mental status seems to deteriorate during hospital stays, would consider discharge home as early as possible (patient lives at home with PCA and family with 24/7 supervision)  - Infectious and LILIAM treatment as below.  - Anticipate discharge tomorrow.      # C diff infection  - Continue oral vanco, 125 mg QID, plan for 10 days of therapy      # LILIAM - Total body volume up but was intravascularly depleted at presentation, will continue to monitor.  Baseline creatinine unclear.  - Restart home furosemide  - BMP in AM     # E. Coli UTI - Pan-sensitive  - Transition to PO cefdinir      # History of liver and kidney transplant - Transplanted in 2000 per past records.  - Continue home tacro (time adjusted to facilitate trough in AM)  - Reviewed with kidney transplant team  - Tacro level in AM      # DM II - No acute issues.  - Sliding scale insulin while inpatient, normally not on insulin      # History of DVT - No evidence of new DVT, therapeutic INR.  - Continue coumadin, pharmacy to dose    # Pain Assessment:  Current Pain Score  3/23/2018   Patient currently in pain? ADRIA   Pain score (0-10) -   Pain location -   Pain descriptors -    - Priscilla is unable to participate in a plan for pain management; however, the plan  was discussed in a collaborative fashion with his caregiver.   - Please see the plan for pain management as documented above    Diet: Combination Diet Regular Diet Adult  Snacks/Supplements Adult: Other - Please comment; Boost; Between Meals  Fluids: PO hydration  DVT Prophylaxis: Warfarin  Code Status: Full Code    Disposition Plan   Expected discharge: Tomorrow, recommended to prior living arrangement once safe disposition with family possible.     Entered: Katharine Mitchell 03/23/2018, 7:58 AM   Information in the above section will display in the discharge planner report.      The patient's care was discussed with the Bedside Nurse, Care Coordinator/, Patient and Patient's Family.    Katharine Mitchell  Internal Medicine Staff Hospitalist Service  [facility]  Pager: [phone]  Please see sticky note for cross cover information    Interval History   Priscilla did well overnight with no acute issues.  Today he was more awake and alert.  Family had questions about discharge to home and are in agreement with this.    Unable to complete ROS due to patient mental status.    Data reviewed today: I reviewed all medications, new labs and imaging results over the last 24 hours. I personally reviewed no images or EKG's today.    Physical Exam   Vital Signs: Temp: 97.9  F (36.6  C) Temp src: Axillary BP: 184/76 Pulse: 95   Resp: 16 SpO2: 100 % O2 Device: None (Room air)    Weight: 0 lbs 0 oz  General Appearance:  Awake but alert, chronically ill appearing male sitting up in chair  Respiratory: LCTAB, no increased WOB on RA  Cardiovascular: RRR, no m/r/g  GI: Soft, non-tender, non-distended  Skin: No rashes or lesions  Other: UE and LE swelling bilaterally, LUE>RUE  Neuro: Awake and alert, not cooperative with exam

## 2018-03-23 NOTE — PLAN OF CARE
Problem: Patient Care Overview  Goal: Plan of Care/Patient Progress Review  Outcome: No Change  Pt is confused, agitated and combative with cares. VSS on RA ex HTN, unclear of accuracy as pt tenses and difficult to keep still for BP checks. Turn q2 hrs. Incontinent B/B, loose stools. PO vanco for C-diff. IM Rocephin for UTI. Daughter May at bedside, assists with cares and feeding.

## 2018-03-23 NOTE — PLAN OF CARE
Problem: Patient Care Overview  Goal: Plan of Care/Patient Progress Review  Pt combative and aggressive with staff. VSS on RA except for HTN. Daughter refused PRN hydralazine, and will have the pt wait to take his 0800 carvedilol instead. Daughter does not want pt to take anything at the moment and wants her father to sleep. Incontinent of urine and stool. One incontinent smear of stool. PIV-SL. Will continue to monitor and follow POC.

## 2018-03-23 NOTE — PLAN OF CARE
Problem: Patient Care Overview  Goal: Plan of Care/Patient Progress Review  Outcome: No Change  [id number]:     Reason for admission: acute renal failure/agitation/diarrhea  Vitals: VSS ex HTN on RA (unclear if HTN is false positive, pt flexes arm and gets agitated with BP checks)  Activity: lift, full dependent  Pain: no non-verbal s/s of pain  Neuro: pt Guamanian speaking with hx dementia; ADRIA - per family, pt confused x3 at baseline. Pt can be combative with cares, including grabbing and scratching  Cardiac: WNL  Respiratory: lungs diminished, no cough  GI/: incontinent of B&B, last BM today  Diet: Regular, total feed, fair appetite  Lines: R PIV SL  Wounds: coccyx  Labs/imaging: K 4.9, Creat 1.42, BUN 58      New changes this shift: pt up to chair this AM with family encouragement (per family, pt spends each day 11 am - 5 pm in recliner, family would like this continued while in hospital), family refusing BG checks - able to get BG check with AM labs, SSI given.      Plan: dc tomorrow AM with 24/7 family cares      Continue to monitor and follow POC

## 2018-03-24 NOTE — PLAN OF CARE
Problem: Patient Care Overview  Goal: Plan of Care/Patient Progress Review  Outcome: Adequate for Discharge Date Met: 03/24/18  1501-0905:      Reason for admission: acute renal failure/agitation/diarrhea  Vitals: VSS on RA   Activity: lift, fully dependent  Pain: no non-verbal s/s of pain  Neuro: pt Indian speaking with hx dementia; ADRIA - per family, pt confused x3 at baseline. Pt can be combative with cares, including grabbing and scratching  Cardiac: WNL  Respiratory: lungs diminished, no cough  GI/: incontinent of B&B, last BM today  Diet: Regular, total feed, fair appetite  Lines: R PIV SL  Wounds: coccyx  Labs/imaging: INR 3.1, Tacro 9.0      New changes this shift: pt adequate for discharge today, HE stretcher scheduled for 5 pm, R PIV removed    To do: discharge teaching with family, give flu shot, dc HE stretcher @ 5 pm

## 2018-03-24 NOTE — PROGRESS NOTES
Pt given influenza vaccine to R deltoid. Pt to discharge at 1700 via [facility] stretcher to home with planned [facility] transfer to wheelchair once home for the elevator. Tacro filled here and to fill antibiotics at own pharmacy. Report given to [facility] and pt left unit at 1705.

## 2018-03-24 NOTE — DISCHARGE SUMMARY
"  [facility] - Internal Medicine Discharge Summary   Date of Service: 3/24/2018    Priscilla Way MRN# [id number]   YOB: 1940 Age: 78 year old     Date of Admission:  3/21/2018  Date of Discharge:  3/24/2018  5:30 PM  Admitting Physician:  Dania Alcala MD  Discharge Physician:  Katharine Mitchell MD  Discharging Service:  Internal Medicine, [facility]     Primary Provider: Randy Fuentes         Reason for Admission:   Per the admission H&P: \"Priscilla Way is a 78 year old male  admitted on 3/21/2018. He has a history of dementia, liver transplant in 2000, kidney transplant in 2000, DVT, DM II and CVA and is admitted for increased agitation, diarrhea and extremity swelling.\" For further details please see the H&P from 3/21/18.          Discharge Diagnosis:   # Acute toxic encephalopathy with baseline dementia    # C diff infection    # LILIAM    # E. Coli UTI     # History of liver and kidney transplant     # DM II     # History of DVT         Procedures & Significant Findings:   3/21/18 C. Diff PCR - Positive    3/21/18 Urine Culture:   ESCHERICHIA COLI      Antibiotic Interpretation Sensitivity Unit Method Status     AMPICILLIN Sensitive 8 ug/mL BRITT Final     AMPICILLIN/SULBACTAM Sensitive 4 ug/mL BRITT Final     CEFAZOLIN Sensitive <=4 ug/mL BRITT Final     Comment: Cefazolin BRITT breakpoints are for the treatment of uncomplicated urinary tract   infections.  For the treatment of systemic infections, please contact the   laboratory for additional testing.     CEFEPIME Sensitive <=1 ug/mL BRITT Final     CEFOXITIN Sensitive <=4 ug/mL BRITT Final     CEFTAZIDIME Sensitive <=1 ug/mL BRITT Final     CEFTRIAXONE Sensitive <=1 ug/mL BRITT Final     CIPROFLOXACIN Sensitive <=0.25 ug/mL BRITT Final     GENTAMICIN Sensitive <=1 ug/mL BRITT Final     LEVOFLOXACIN Sensitive <=0.12 ug/mL BRITT Final     NITROFURANTOIN Sensitive <=16 ug/mL BRITT Final     Piperacillin/Tazo Sensitive <=4 ug/mL BRITT Final     TOBRAMYCIN " Sensitive <=1 ug/mL BRITT Final     Trimethoprim/Sulfa Sensitive <=1/19 ug/mL BRITT Final            Consultations:   None         Hospital Course by Problem:    # Acute toxic encephalopathy with baseline dementia - Priscilla presented with acutely worsened agitation on top of his baseline dementia.  Per PCA, he is normally wheelchair bound and able to feed himself but was unable to do so on presentation. On further discussion with family, while inpatient an etiology for Priscilla's worsened confusion and encephalopathy is typically found inpatient but he has limited cognitive improvement until his return home.  They also feel that longer hospital stays precipitate more decline for him and expressed desire for discharge as soon as safe and reasonable.  While inpatient, Priscilla's seroquel and nameda were continued without change and he was found to have 2 infections (C. Diff and a UTI) which were treated.  These infections likely precipitated an acute toxic encephalopathy.  Priscilla's LUE swelling showed no DVTs on a limited ultrasound and he had labile blood pressures, but given family concerns about prolonged hospital stays, we discussed risks and benefits of discharge on 3/24/18 and family was in agreement with discharge.  Follow up with PCP as noted below.    # C diff infection -  Diarrhea on presentation was found to be positive for C. Diff by PCR.  Priscilla was started on oral vanco with a plan for 10 days of therapy to be completed after discharge.      # LILIAM - Total body volume up but was intravascularly depleted at presentation with slight elevation in creatinine.  Improved with initial fluid resuscitation and home medications were resumed without change other than tacrolimus as noted below.      # E. Coli UTI - Pan-sensitive E. Coli was found on urine sample with UA consistent with UTI.  He was treated with IV antibiotics and transitioned to cefdinir on discharge for an uncomplicated UTI.      # History of liver and kidney  "transplant - Transplanted in 2000 per past records but was found to have a supra-therapeutic tacro level on the day of discharge.  This was reviewed with the transplant team who recommended decrease to 1 mg BID on tacro and recheck as noted below.  Family additionally requested refill of brand name Prograf and expressed concerns about side effects for Priscilla for generic tacrolimus.  A prescription was sent for Prograf and will attempt to use brand name in future per family's concerns/preferences.      # DM II - No acute issues this admission and no changes on discharge.      # History of DVT - No evidence of new DVT on ultrasound of LUE though unable to complete full set of images due to patient compliance.  No clinical evidence of PE and therapeutic INR.  No changes on discharge.      Physical Exam on day of Discharge:  Blood pressure 149/78, pulse 77, temperature 96.8  F (36  C), temperature source Axillary, resp. rate 18, height 1.727 m (5' 8\"), SpO2 100 %.  General Appearance:  Awake but alert, chronically ill appearing male laying in bed  Respiratory:Lungs clear to auscultation bilaterally, no increased work of breathing on room air  Cardiovascular: Regular rate and rhythm without murmurs, rubs, or gallops  GI: Soft, non-tender, non-distended  Skin: No rashes or lesions  Other: Upper and lower extremity swelling bilaterally, with left upper extremity greater than right, slightly improved from yesterday.  No erythema or tenderness to the extremities  Neuro: Awake and alert, not cooperative with exam      Lines/Tubes/Drains: None         Pending Results:   None         Discharge Medications:     Discharge Medication List as of 3/24/2018  4:38 PM      START taking these medications    Details   cefdinir (OMNICEF) 125 MG/5ML suspension Take 12 mLs (300 mg) by mouth 2 times daily for 4 days, Disp-96 mL, R-0, E-Prescribe      vancomycin (VANOCIN) 50 mg/mL LIQD solution Take 2.5 mLs (125 mg) by mouth 4 times daily for " 6 days, Disp-60 mL, R-0, E-Prescribe         CONTINUE these medications which have CHANGED    Details   tacrolimus (GENERIC EQUIVALENT) 0.5 MG capsule Take 2 capsules (1.0mg) in the am,  Take 2 capsules (1.0mg) in the pm, take every 12 hours., Disp-150 capsule, R-11, E-Prescribe         CONTINUE these medications which have NOT CHANGED    Details   !! Cholecalciferol (VITAMIN D-400) 400 UNITS TABS Take 2 tablets by mouth daily, Disp-180 tablet, R-0, E-PrescribeREMINDER: CLINIC APPOINTMENT 3/28/18      QUEtiapine (SEROQUEL) 25 MG tablet TAKE 1 TABLET BY MOUTH DURING THE DAY AS NEEDED FOR AGITATION, AND ONE AT BEDTIME FOR INSOMNIA, Disp-60 tablet, R-0, E-PrescribePatient needs to see primary provider for further refills. NO more refills.      aspirin 81 MG tablet Take 1 tablet (81 mg) by mouth daily, Disp-30 tablet, R-0, E-PrescribeMUST BE SEEN IN CLINIC FOR ANY FURTHER REFILLS. [phone] TO SCHEDULED APPOINTMENT      omeprazole (PRILOSEC) 20 MG CR capsule Take 1 capsule (20 mg) by mouth daily Patient needs to see primary provider for further refills. NO more refills, Disp-30 capsule, R-0, E-PrescribePatient needs to see primary provider for further refills. NO more refills.      memantine XR (NAMENDA XR) 21 MG 24 hr capsule Take 1 capsule (21 mg) by mouth daily Patient needs to see primary provider for further refills. NO more refills, Disp-30 capsule, R-0, E-PrescribePatient needs to see primary provider for further refills. NO more refills      carvedilol (COREG) 6.25 MG tablet Take 1 tablet (6.25 mg) by mouth 2 times daily (with meals), Disp-180 tablet, R-1, E-Prescribe      furosemide (LASIX) 20 MG tablet Take 1 tablet (20 mg) by mouth daily, Disp-90 tablet, R-1, E-Prescribe      !! Cholecalciferol (VITAMIN D-400) 400 UNITS TABS TAKE 2 TABLETS BY MOUTH DAILY, Disp-180 tablet, R-0, DeniedDUPLICATE      Multiple Vitamins-Iron (DAILY VITES/IRON) TABS Take 1 tablet by mouth daily, Disp-90 tablet, R-3, E-Prescribe       acetaminophen (TYLENOL) 500 MG tablet Take  by mouth. Take one tablet by mouth every 4-6 hours as needed., Disp-100 tablet, R-9, Fax      docusate sodium (COLACE) 100 MG capsule Take 1 capsule by mouth 2 times daily., Disp-60 capsule, R-12, E-PrescribeFor constipation      warfarin (COUMADIN) 2.5 MG tablet Take 3.75mgs on Thursdays and 2.5mgs on all other days or as directed by the Coumadin [facility], Disp-113 tablet, R-3, E-PrescribeYoel Mccallum [facility] per warfarin protocol with Dr Alfredo munsonbepoetin matthew (ARANESP, ALBUMIN FREE,) 40 MCG/0.4ML injection Inject 0.4 mLs (40 mcg) Subcutaneous every 28 days As needed for hgb<10g/dL.  If Hgb increases >1 point in 2 weeks (if blood transfusion given, use hgb PRIOR to this), SYSTOLIC BP > 180 mmHg or hgb>=10g/dL, HOLD DOSE. Dose must be within 1 week of Hgb.   Per anemia protocol with Mariluz Martinez MD, Disp-0.4 mL, R-99, Injection      ALPRAZolam (XANAX) 0.25 MG tablet Give 1/2 half tab po half an hour before home lab draw, Disp-3 tablet, R-1, Local Print      !! order for DME Equipment being ordered: Hospital Bed and damian liftDisp-1 each, R-0, Local Print      dimethicone-zinc oxide (EUCERIN) cream Apply topically 2 times daily as needed for dry skin or otherDisp-142 g, [id number]      !! order for DME Equipment being ordered: Other: Damian lift  Treatment Diagnosis: memory loss, dementia, weakness, ARFDisp-1 Units, R-0, Local Print      blood glucose monitor KIT Use to test blood sugar 1-2 times daily or as directed., Disp-1 kit, R-0, E-PrescribePlease provide the proper supplies for insurance      blood glucose (BL TEST STRIP PACK) test strip Use to test blood sugar 1-2 times daily or as directed., Disp-100 strip, R-3, E-PrescribePlease provide the proper supplies for insurance      LANCETS MICRO THIN 33G MISC 1 Device 2 times daily Use to test blood sugar 1-2 times daily or as directed., Disp-100 each, R-3, E-PrescribePlease provide the proper supplies for  insurance       !! - Potential duplicate medications found. Please discuss with provider.               Discharge Instructions and Follow-Up:     Discharge Procedure Orders  Home care nursing referral   Referral Type: Home Health Therapies & Aides     Reason for your hospital stay   Order Comments: Priscilla was admitted to [facility] for worsening confusion and was found to have infections of the urine and colon.  He is being sent home on oral antibiotics for each infection which he should complete.     Activity   Order Comments: Your activity upon discharge: activity as tolerated   Order Specific Question Answer Comments   Is discharge order? Yes      When to contact your care team   Order Comments: Call your primary doctor if you have any of the following: temperature greater than 100.4,  increased shortness of breath, increased swelling, increased pain or increasing confusion.     Discharge Instructions   Order Comments: Hold (do not take) the dose of warfarin (coumadin) tonight.  Resume your regular dosing on [place] 3/25/18.   Your Tacrolimus dosing has been adjusted to 1 mg (2 tablets) twice daily.    Home health RN will need to draw tacrolimus levels (12 hour trough) on Mondays and Thursdays to be sent to the transplant team.  On Monday the home health RN should also send a INR to your PCP     Follow Up and recommended labs and tests   Order Comments: Follow up with primary care provider, Randy Fuentes, within 7 days to evaluate medication change and for hospital follow- up.  The following labs/tests are recommended: INR, BMP. Consider repeat LUE ultrasound pending improvement of swelling with furosemide.     Full Code     Diet   Order Comments: Follow this diet upon discharge: Resume prior to admission diet.   Order Specific Question Answer Comments   Is discharge order? Yes                 Discharge Disposition:   To Home With Family and 24 hour care         Condition on Discharge:   Discharge condition: Stable    Code status on discharge: Full Code        Date of service: 3/24/2018    60 minutes spent in discharge, including >50% in counseling and coordination of care, medication review and plan of care recommended on follow up. Questions were answered.   Randy Bautista  (PCP) was contacted at the time of discharge via Epic Inbox Message, so as to bridge from hospital to outpatient care.   It was our pleasure to care for Priscilla during his hospitalization. Please do not hesitate to contact me should there be questions regarding the hospital course or discharge plan.      Katharine Mitchell  Internal Medicine Hospitalist & Staff Physician  [facility]  Pager: [phone]

## 2018-03-24 NOTE — PLAN OF CARE
Problem: Patient Care Overview  Goal: Plan of Care/Patient Progress Review  Outcome: No Change  Pt alert, confused. Combative. VSS on RA, continued HTN. Colombian speaking, family at bedside assisting with basic cares per home routine. Turned and repositioned q2h. Blue boots on. Incontinent of B/B. Void x 2, small BM x 2. Right PIV, SL. Left arm edematous. Plan for continued oral abx, possible d/c home today with family pending labs this am.

## 2018-03-24 NOTE — PLAN OF CARE
Problem: Patient Care Overview  Goal: Plan of Care/Patient Progress Review  Outcome: Improving  Pt confused. Agitated and combative with cares. VSS on RA, ex HTN. Up in recliner for couple hrs this evening. Transfers using lift. Daughter Manuela at bedside, assists with cares. Tolerating PO with total feeding assist. Incontinent of B/B. Anticipate d/c home tomorrow.

## 2018-03-26 NOTE — TELEPHONE ENCOUNTER
[facility]   [phone]       Skilled Nursing     2 x 1 week   1 x 7 weeks   4 prns      03/26/2018 5:13 PM  Verbal authorization for requested orders provided to Raza Rey RN

## 2018-03-26 NOTE — PROGRESS NOTES
Dates of hospitalization: 3/21 to 3/24  Reason for hospitalization: increased combativeness, diarrhea and swelling, c diff  Procedures performed: none   Vitamin K or FFP administered? no  Inpatient warfarin doses added to calendar? yes  Medication changes at discharge: patient is on vanco and omnicef  Warfarin dosing after DC: resume home dosing  Patient discharged on Lovenox? no  Next INR date: 3/27/18  Where is the patient discharging to? (home, TCU, staying locally, etc.): home  Will patient have home care? Yes      ADDENDUM:  3/26/18:   home care nurse, Shelly [place].  She just got Priscilla's home care orders now, so he will be seen tomorrow, 3/27/18.  He will take warfarin 1.25 mg of warfarin tonivet.  Jodee Tirado RN

## 2018-03-26 NOTE — MR AVS SNAPSHOT
Priscilla Way   3/26/2018   Anticoagulation Therapy Visit    Description:  78 year old male   Provider:  Dayana Ervin, RN   Department:  [facility]           INR as of 3/26/2018     Today's INR       Anticoagulation Summary as of 3/26/2018     INR goal 2.0-3.0   Today's INR    Full instructions 2.5 mg on Sun, Tue, Sat; 3.75 mg on Thu; 1.25 mg all other days   Next INR check 3/27/2018    Indications   Acute kidney injury (H) [N17.9]  Deep vein thrombosis (DVT) (H) [I82.409] [I82.409]  Long-term (current) use of anticoagulants [Z79.01] [Z79.01]         March 2018 Details    Sun Mon Tue Wed Thu Fri Sat         1               2               3                 4               5               6               7               8               9               10                 11               12               13               14               15               16               17                 18               19               20               21               22               23               24                 25               26      1.25 mg   See details      27            28               29               30               31                Date Details   03/26 This INR check       Date of next INR:  3/27/2018         How to take your warfarin dose     To take:  1.25 mg Take 0.5 of a 2.5 mg tablet.    To take:  2.5 mg Take 1 of the 2.5 mg tablets.

## 2018-03-27 NOTE — MR AVS SNAPSHOT
Priscilla Way   3/27/2018   Anticoagulation Therapy Visit    Description:  78 year old male   Provider:  Sherry Georges, RN   Department:  [facility]           INR as of 3/27/2018     Today's INR 1.9!      Anticoagulation Summary as of 3/27/2018     INR goal 2.0-3.0   Today's INR 1.9!   Full instructions 1.25 mg on Fri; 2.5 mg all other days   Next INR check 4/3/2018    Indications   Acute kidney injury (H) [N17.9]  Deep vein thrombosis (DVT) (H) [I82.409] [I82.409]  Long-term (current) use of anticoagulants [Z79.01] [Z79.01]         March 2018 Details    Sun Mon Tue Wed Thu Fri Sat         1               2               3                 4               5               6               7               8               9               10                 11               12               13               14               15               16               17                 18               19               20               21               22               23               24                 25               26               27      2.5 mg   See details      28      2.5 mg         29      2.5 mg         30      1.25 mg         31      2.5 mg          Date Details   03/27 This INR check               How to take your warfarin dose     To take:  1.25 mg Take 0.5 of a 2.5 mg tablet.    To take:  2.5 mg Take 1 of the 2.5 mg tablets.           April 2018 Details    Sun Mon Tue Wed Thu Fri Sat     1      2.5 mg         2      2.5 mg         3            4               5               6               7                 8               9               10               11               12               13               14                 15               16               17               18               19               20               21                 22               23               24               25               26               27               28                 29               30                      Date Details   No additional details    Date of next INR:  4/3/2018         How to take your warfarin dose     To take:  2.5 mg Take 1 of the 2.5 mg tablets.

## 2018-03-27 NOTE — TELEPHONE ENCOUNTER
"QUEtiapine (SEROQUEL) 25 MG tablet      Last Written Prescription Date: 2/27/18  Last Fill Quantity: 60,   # refills: 0  Last Office Visit : 2/23/16  Future Office visit:  none    Routing refill request to provider for review/approval because:  1. Appt overdue- 3/23/18 appt cancelled \"inpatient\" and not yet rescheduled. *discharged 3/26  2.  Lab overdue    Scheduling has been notified to contact the pt for appointment.-  needed.        "

## 2018-03-27 NOTE — PROGRESS NOTES
ANTICOAGULATION FOLLOW-UP CLINIC VISIT    Patient Name:  Priscilla Way  Date:  3/27/2018  Contact Type:  Telephone    SUBJECTIVE:     Patient Findings     Positives No Problem Findings           OBJECTIVE    INR   Date Value Ref Range Status   03/27/2018 1.9  Final       ASSESSMENT / PLAN  INR assessment THER    Recheck INR In: 1 WEEK    INR Location [facility]      Anticoagulation Summary as of 3/27/2018     INR goal 2.0-3.0   Today's INR 1.9!   Maintenance plan 1.25 mg (2.5 mg x 0.5) on Fri; 2.5 mg (2.5 mg x 1) all other days   Full instructions 1.25 mg on Fri; 2.5 mg all other days   Weekly total 16.25 mg   Plan last modified Sherry Georges RN (3/27/2018)   Next INR check 4/3/2018   Priority INR   Target end date     Indications   Acute kidney injury (H) [N17.9]  Deep vein thrombosis (DVT) (H) [I82.409] [I82.409]  Long-term (current) use of anticoagulants [Z79.01] [Z79.01]         Anticoagulation Episode Summary     INR check location     Preferred lab     Send INR reminders to [facility]    Comments [facility] to draw INR's  Pt has dementia please contact daughter with any questions. Contact daughter May at [phone].  Has 2.5mg tablets       Anticoagulation Care Providers     Provider Role Specialty Phone number    Randy Fuentes MD [facility] [phone]            See the Encounter Report to view Anticoagulation Flowsheet and Dosing Calendar (Go to Encounters tab in chart review, and find the Anticoagulation Therapy Visit)    Spoke with Sonja [facility] nurse    Sherry Georges RN

## 2018-03-28 NOTE — TELEPHONE ENCOUNTER
[facility] contacting regarding lab draws, and f/u labs for pt.   Discussed plan for labs to be done weekly, not 2x/week, if pt combative (due to dementia),okay to defer lab draws.   Pt would need to come into clinic for labs if unable to draw in the home.

## 2018-03-28 NOTE — TELEPHONE ENCOUNTER
Spoke with Cruz, pt's daughter about lab results, specifically that hgb is low at 7.2, asking her about making a return appt for her dad to be seen by  or transplant nephrology.  Cruz states that she and her sister would be bringing her dad to see  only for history and physical, stating he needs to have that done.  They are not able to bring him for multiple appointments.    He is homebound, has PCA services 11.5 hours/day and family members care for him the rest of the time.   He has dementia, and combative at times, making it difficult to care for him and bring him to appointments.  Cruz was made aware of hgb of 7.2, with need to be seen and assessed.   Pt is on coumadin.  Did discuss with Cruz the difficulty homecare has with drawing labs, they will continue to try and do labs, but it is unsafe if he struggles and if has to be held down, labs will be deferred on that home visit.  Cruz states that she or her sister will make an appt for him to be seen in pcp clinic.

## 2018-04-03 NOTE — PROGRESS NOTES
ANTICOAGULATION FOLLOW-UP CLINIC VISIT    Patient Name:  Priscilla Way  Date:  4/3/2018  Contact Type:  Telephone    SUBJECTIVE:     Patient Findings     Positives Change in diet/appetite (decreased appetite.), Other complaints (diarrhea.  (C-diff)), OTC meds (took tylenol last night.)           OBJECTIVE    INR   Date Value Ref Range Status   04/03/2018 4.9  Final       ASSESSMENT / PLAN  INR assessment SUPRA    Recheck INR In: 2 DAYS    INR Location [facility]R      Anticoagulation Summary as of 4/3/2018     INR goal 2.0-3.0   Today's INR 4.9!   Maintenance plan 1.25 mg (2.5 mg x 0.5) on Fri; 2.5 mg (2.5 mg x 1) all other days   Full instructions 4/3: Hold; 4/4: 1.25 mg; Otherwise 1.25 mg on Fri; 2.5 mg all other days   Weekly total 16.25 mg   Plan last modified Sherry Georges RN (3/27/2018)   Next INR check 4/5/2018   Priority INR   Target end date     Indications   Acute kidney injury (H) [N17.9]  Deep vein thrombosis (DVT) (H) [I82.409] [I82.409]  Long-term (current) use of anticoagulants [Z79.01] [Z79.01]         Anticoagulation Episode Summary     INR check location     Preferred lab     Send INR reminders to [facility]    Comments [facility] to draw INR's  Pt has dementia please contact daughter with any questions. Contact daughter May at [phone].  Has 2.5mg tablets       Anticoagulation Care Providers     Provider Role Specialty Phone number    Randy Fuentes MD [facility] [phone]            See the Encounter Report to view Anticoagulation Flowsheet and Dosing Calendar (Go to Encounters tab in chart review, and find the Anticoagulation Therapy Visit)    Spoke with Margo [facility] nurse.    Sherry Georges, TED

## 2018-04-03 NOTE — TELEPHONE ENCOUNTER
Last Clinic Visit: 12/23/2016  [facility]  Instructions with last refill was for pt to be seen for further refills - not seen and no appointment scheduled.  Refill refused and FYI sent to clinic RN and Provider.

## 2018-04-03 NOTE — MR AVS SNAPSHOT
Priscilla Way   4/3/2018   Anticoagulation Therapy Visit    Description:  78 year old male   Provider:  Sherry Georges, RN   Department:  [facility]           INR as of 4/3/2018     Today's INR 4.9!      Anticoagulation Summary as of 4/3/2018     INR goal 2.0-3.0   Today's INR 4.9!   Full instructions 4/3: Hold; 4/4: 1.25 mg; Otherwise 1.25 mg on Fri; 2.5 mg all other days   Next INR check 4/5/2018    Indications   Acute kidney injury (H) [N17.9]  Deep vein thrombosis (DVT) (H) [I82.409] [I82.409]  Long-term (current) use of anticoagulants [Z79.01] [Z79.01]         April 2018 Details    Sun Mon Tue Wed Thu Fri Sat     1               2               3      Hold   See details      4      1.25 mg         5            6               7                 8               9               10               11               12               13               14                 15               16               17               18               19               20               21                 22               23               24               25               26               27               28                 29               30                     Date Details   04/03 This INR check       Date of next INR:  4/5/2018         How to take your warfarin dose     To take:  1.25 mg Take 0.5 of a 2.5 mg tablet.    To take:  2.5 mg Take 1 of the 2.5 mg tablets.    Hold Do not take your warfarin dose. See the Details table to the right for additional instructions.

## 2018-04-04 NOTE — TELEPHONE ENCOUNTER
furosemide (LASIX) 20 MG   Last Written Prescription Date:  8/1/17  Last Fill Quantity: 90,   # refills: 1  Last Office Visit : 12/23/16  Future Office visit:  NONE    Routing refill request to provider for review/approval because:  OVER DUE MD APPT.      30 DAY RF   Scheduling has been notified to contact the pt for appointment.

## 2018-04-06 NOTE — TELEPHONE ENCOUNTER
[facility] Call Center    Phone Message    May a detailed message be left on voicemail: yes    Reason for Call: Other: Noncompliance.  Pt is not answering phone or door.  Transplant patient.  no labs/INR done.  FYI     Action Taken: Message routed to:  [facility] ([facility] maribell mckay

## 2018-04-06 NOTE — TELEPHONE ENCOUNTER
"[facility] RN calling about home visit, she was at pt's apt, no answer, has called May, pt's daughter [place] and not helpful in ensuring home visit today to do labs and assessment.  Provided phone number for Cruz, pt's other daughter.   Discussed with homecare that if pt is combative, and not cooperative with labs draw, okay to not pursue labs on day of the visit, explaining that this has been an ongoing issue for homecare.   Pt is on coumadin, INR check also needed, and could be done with a finger stick.    Explained that pt's family aware of need for labs, but unable to bring him to clinic lab, due to disability of pt.    "

## 2018-04-10 NOTE — PROGRESS NOTES
ANTICOAGULATION FOLLOW-UP CLINIC VISIT    Patient Name:  Priscilla Way  Date:  4/10/2018  Contact Type:  Telephone    SUBJECTIVE:     Patient Findings     Positives Med error, Missed doses    Comments Pt was suppose to get an INR on 4/5, but instead of home care nurse calling the Coumadin clinic called the primary clinic with the info that pt is combative and unable to do an INR. No Coumadin dosing was given and home care nurse never thought to call the Coumadin clinic to find out dosing so missed dose from 4/5-4/9.            OBJECTIVE    INR   Date Value Ref Range Status   04/10/2018 1.30  Final     Comment:     Home Care        ASSESSMENT / PLAN  INR assessment SUB    Recheck INR In: 1 WEEK    INR Location [facility]R      Anticoagulation Summary as of 4/10/2018     INR goal 2.0-3.0   Today's INR 1.30!   Maintenance plan 1.25 mg (2.5 mg x 0.5) on Fri; 2.5 mg (2.5 mg x 1) all other days   Full instructions 1.25 mg on Fri; 2.5 mg all other days   Weekly total 16.25 mg   Plan last modified Sherry Georges RN (3/27/2018)   Next INR check 4/17/2018   Priority INR   Target end date     Indications   Acute kidney injury (H) [N17.9]  Deep vein thrombosis (DVT) (H) [I82.409] [I82.409]  Long-term (current) use of anticoagulants [Z79.01] [Z79.01]         Anticoagulation Episode Summary     INR check location     Preferred lab     Send [facility] remind[facility]    Comments [facility] to draw INR's  Pt has dementia please contact daughter with any questions. Contact daughter May at [phone].  Has 2.5mg tablets       Anticoagulation Care Providers     Provider Role Specialty Phone number    Randy Fuentes MD Responsible Family Practice [phone]            See the Encounter Report to view Anticoagulation Flowsheet and Dosing Calendar (Go to Encounters tab in chart review, and find the Anticoagulation Therapy Visit)    Spoke with ELISA Dasilva. Gave them new warfarin recommendation.  No changes in  health, medication, or diet. No missed doses, no falls. No signs or symptoms of bleed or clotting.     Shabana Sorto RN

## 2018-04-10 NOTE — MR AVS SNAPSHOT
Priscilla Way   4/10/2018   Anticoagulation Therapy Visit    Description:  78 year old male   Provider:  Shabana Sorto, RN   Department:  [facility]           INR as of 4/10/2018     Today's INR 1.30!      Anticoagulation Summary as of 4/10/2018     INR goal 2.0-3.0   Today's INR 1.30!   Full instructions 1.25 mg on Fri; 2.5 mg all other days   Next INR check 4/17/2018    Indications   Acute kidney injury (H) [N17.9]  Deep vein thrombosis (DVT) (H) [I82.409] [I82.409]  Long-term (current) use of anticoagulants [Z79.01] [Z79.01]         April 2018 Details    Sun Mon Tue Wed Thu Fri Sat     1               2               3               4               5               6               7                 8               9               10      2.5 mg   See details      11      2.5 mg         12      2.5 mg         13      1.25 mg         14      2.5 mg           15      2.5 mg         16      2.5 mg         17            18               19               20               21                 22               23               24               25               26               27               28                 29               30                     Date Details   04/10 This INR check       Date of next INR:  4/17/2018         How to take your warfarin dose     To take:  1.25 mg Take 0.5 of a 2.5 mg tablet.    To take:  2.5 mg Take 1 of the 2.5 mg tablets.

## 2018-04-17 NOTE — MR AVS SNAPSHOT
Priscilla Way   4/17/2018   Anticoagulation Therapy Visit    Description:  78 year old male   Provider:  Jodee Tirado, RN   Department:  [facility]           INR as of 4/17/2018     Today's INR 2.9      Anticoagulation Summary as of 4/17/2018     INR goal 2.0-3.0   Today's INR 2.9   Full instructions 1.25 mg on Tue, Fri; 2.5 mg all other days   Next INR check 4/24/2018    Indications   Acute kidney injury (H) [N17.9]  Deep vein thrombosis (DVT) (H) [I82.409] [I82.409]  Long-term (current) use of anticoagulants [Z79.01] [Z79.01]         April 2018 Details    Sun Mon Tue Wed Thu Fri Sat     1               2               3               4               5               6               7                 8               9               10               11               12               13               14                 15               16               17      1.25 mg   See details      18      2.5 mg         19      2.5 mg         20      1.25 mg         21      2.5 mg           22      2.5 mg         23      2.5 mg         24            25               26               27               28                 29               30                     Date Details   04/17 This INR check       Date of next INR:  4/24/2018         How to take your warfarin dose     To take:  1.25 mg Take 0.5 of a 2.5 mg tablet.    To take:  2.5 mg Take 1 of the 2.5 mg tablets.

## 2018-04-17 NOTE — PROGRESS NOTES
ANTICOAGULATION FOLLOW-UP CLINIC VISIT    Patient Name:  Priscilla Way  Date:  4/17/2018  Contact Type:  Telephone    SUBJECTIVE:     Patient Findings     Positives No Problem Findings           OBJECTIVE    INR   Date Value Ref Range Status   04/17/2018 2.9  Final       ASSESSMENT / PLAN  INR assessment THER    Recheck INR In: 1 WEEK    INR Location [facility]      Anticoagulation Summary as of 4/17/2018     INR goal 2.0-3.0   Today's INR 2.9   Maintenance plan 1.25 mg (2.5 mg x 0.5) on Tue, Fri; 2.5 mg (2.5 mg x 1) all other days   Full instructions 1.25 mg on Tue, Fri; 2.5 mg all other days   Weekly total 15 mg   Plan last modified Jodee Tirado RN (4/17/2018)   Next INR check 4/24/2018   Priority INR   Target end date     Indications   Acute kidney injury (H) [N17.9]  Deep vein thrombosis (DVT) (H) [I82.409] [I82.409]  Long-term (current) use of anticoagulants [Z79.01] [Z79.01]         Anticoagulation Episode Summary     INR check location     Preferred lab     Send INR reminders to [facility]    Comments [facility] to draw INR's  Pt has dementia please contact daughter with any questions. Contact daughter May at [phone].  Has 2.5mg tablets       Anticoagulation Care Providers     Provider Role Specialty Phone number    Randy Fuentes MD [facility] [phone]            See the Encounter Report to view Anticoagulation Flowsheet and Dosing Calendar (Go to Encounters tab in chart review, and find the Anticoagulation Therapy Visit)    Spoke with Kyle PÉREZ.  She reports no changes in health, diet, medications.  Will decrease warfarin dose slightly; INR increased from 1.3 to 2.9 in one week.      Jodee Tirado, RN

## 2018-04-24 NOTE — PROGRESS NOTES
ANTICOAGULATION FOLLOW-UP CLINIC VISIT    Patient Name:  Priscilla Way  Date:  4/24/2018  Contact Type:  Telephone    SUBJECTIVE:     Patient Findings     Positives Other complaints (some diarrhea.)           OBJECTIVE    INR   Date Value Ref Range Status   04/24/2018 1.9  Final       ASSESSMENT / PLAN  INR assessment THER    Recheck INR In: 1 WEEK    INR Location [facility]R      Anticoagulation Summary as of 4/24/2018     INR goal 2.0-3.0   Today's INR 1.9!   Maintenance plan 1.25 mg (2.5 mg x 0.5) on Fri; 2.5 mg (2.5 mg x 1) all other days   Full instructions 1.25 mg on Fri; 2.5 mg all other days   Weekly total 16.25 mg   Plan last modified Sherry Georges RN (4/24/2018)   Next INR check 5/1/2018   Priority INR   Target end date     Indications   Acute kidney injury (H) [N17.9]  Deep vein thrombosis (DVT) (H) [I82.409] [I82.409]  Long-term (current) use of anticoagulants [Z79.01] [Z79.01]         Anticoagulation Episode Summary     INR check location     Preferred lab     Send INR reminders to [facility]    Comments [facility] to draw INR's  Pt has dementia please contact daughter with any questions. Contact daughter May at [phone].  Has 2.5mg tablets       Anticoagulation Care Providers     Provider Role Specialty Phone number    Randy Fuentes MD [facility] [phone]            See the Encounter Report to view Anticoagulation Flowsheet and Dosing Calendar (Go to Encounters tab in chart review, and find the Anticoagulation Therapy Visit)    Spoke with MARY Moore nurse.    Sherry Georges, TED

## 2018-04-24 NOTE — MR AVS SNAPSHOT
Priscilla Way   4/24/2018   Anticoagulation Therapy Visit    Description:  78 year old male   Provider:  Sherry Georges, RN   Department:  [facility]           INR as of 4/24/2018     Today's INR 1.9!      Anticoagulation Summary as of 4/24/2018     INR goal 2.0-3.0   Today's INR 1.9!   Full instructions 1.25 mg on Fri; 2.5 mg all other days   Next INR check 5/1/2018    Indications   Acute kidney injury (H) [N17.9]  Deep vein thrombosis (DVT) (H) [I82.409] [I82.409]  Long-term (current) use of anticoagulants [Z79.01] [Z79.01]         April 2018 Details    Sun Mon Tue Wed Thu Fri Sat     1               2               3               4               5               6               7                 8               9               10               11               12               13               14                 15               16               17               18               19               20               21                 22               23               24      2.5 mg   See details      25      2.5 mg         26      2.5 mg         27      1.25 mg         28      2.5 mg           29      2.5 mg         30      2.5 mg               Date Details   04/24 This INR check               How to take your warfarin dose     To take:  1.25 mg Take 0.5 of a 2.5 mg tablet.    To take:  2.5 mg Take 1 of the 2.5 mg tablets.           May 2018 Details    Sun Mon Tue Wed Thu Fri Sat       1            2               3               4               5                 6               7               8               9               10               11               12                 13               14               15               16               17               18               19                 20               21               22               23               24               25               26                 27               28               29               30               31                   Date Details   No additional details    Date of next INR:  5/1/2018         How to take your warfarin dose     To take:  2.5 mg Take 1 of the 2.5 mg tablets.

## 2018-05-01 NOTE — PROGRESS NOTES
ANTICOAGULATION FOLLOW-UP CLINIC VISIT    Patient Name:  Priscilla Way  Date:  5/1/2018  Contact Type:  Telephone    SUBJECTIVE:     Patient Findings     Comments Pt was in today and retesting for C-Diff           OBJECTIVE    INR   Date Value Ref Range Status   05/01/2018 2.19 (H) 0.86 - 1.14 Final       ASSESSMENT / PLAN  INR assessment THER    Recheck INR In: 1 WEEK    INR Location Clinic      Anticoagulation Summary as of 5/1/2018     INR goal 2.0-3.0   Today's INR 2.19   Maintenance plan 1.25 mg (2.5 mg x 0.5) on Fri; 2.5 mg (2.5 mg x 1) all other days   Full instructions 1.25 mg on Fri; 2.5 mg all other days   Weekly total 16.25 mg   Plan last modified Sherry Georges RN (4/24/2018)   Next INR check 5/8/2018   Priority INR   Target end date     Indications   Acute kidney injury (H) [N17.9]  Deep vein thrombosis (DVT) (H) [I82.409] [I82.409]  Long-term (current) use of anticoagulants [Z79.01] [Z79.01]         Anticoagulation Episode Summary     INR check location     Preferred lab     Send [facility] reminders to [facility]    Comments [facility] to draw INR's  Pt has dementia please contact daughter with any questions. Contact kareem Olvera at [phone].  Has 2.5mg tablets       Anticoagulation Care Providers     Provider Role Specialty Phone number    Randy Fuentes MD [facility] [phone]            See the Encounter Report to view Anticoagulation Flowsheet and Dosing Calendar (Go to Encounters tab in chart review, and find the Anticoagulation Therapy Visit)    Spoke with kareem Olvera. Gave them lab results and new warfarin recommendation.  No changes in health, medication, or diet. No missed doses, no falls. No signs or symptoms of bleed or clotting.     Shabana Sorto, RN        Addendum 5/2/18.  [facility] [phone] left message with Dr. Fuentes and then routed to [facility] clinic.  Message left for Carina informing her INR was done yesterday.  Dosing info left and  to check INR on 5/8/18. [facility]

## 2018-05-01 NOTE — MR AVS SNAPSHOT
Priscilla Way   5/1/2018   Anticoagulation Therapy Visit    Description:  78 year old male   Provider:  Shabana Sorto, RN   Department:  [facility]           INR as of 5/1/2018     Today's INR 2.19      Anticoagulation Summary as of 5/1/2018     INR goal 2.0-3.0   Today's INR 2.19   Full instructions 1.25 mg on Fri; 2.5 mg all other days   Next INR check 5/8/2018    Indications   Acute kidney injury (H) [N17.9]  Deep vein thrombosis (DVT) (H) [I82.409] [I82.409]  Long-term (current) use of anticoagulants [Z79.01] [Z79.01]         May 2018 Details    Sun Mon Tue Wed Thu Fri Sat       1      2.5 mg   See details      2      2.5 mg         3      2.5 mg         4      1.25 mg         5      2.5 mg           6      2.5 mg         7      2.5 mg         8            9               10               11               12                 13               14               15               16               17               18               19                 20               21               22               23               24               25               26                 27               28               29               30               31                  Date Details   05/01 This INR check       Date of next INR:  5/8/2018         How to take your warfarin dose     To take:  1.25 mg Take 0.5 of a 2.5 mg tablet.    To take:  2.5 mg Take 1 of the 2.5 mg tablets.

## 2018-05-01 NOTE — MR AVS SNAPSHOT
After Visit Summary   5/1/2018    Priscilla Way    MRN: [id number]           Patient Information     Date Of Birth          1940        Visit Information        Provider Department      5/1/2018 11:30 AM Randy Fuentes MD [facility]        Today's Diagnoses     C. difficile colitis    -  1    Screening for diabetic retinopathy        Screening for diabetic peripheral neuropathy        Loss of weight        Diarrhea, unspecified type           Follow-ups after your visit        Who to contact     Please call your clinic at [phone] to:    Ask questions about your health    Make or cancel appointments    Discuss your medicines    Learn about your test results    Speak to your doctor            Additional Information About Your Visit        [employer] Information     [email or web address] gives you secure access to your electronic health record. If you see a primary care provider, you can also send messages to your care team and make appointments. If you have questions, please call your primary care clinic.  If you do not have a primary care provider, please call [phone] and they will assist you.      [email or web address] is an electronic gateway that provides easy, online access to your medical records. With mapp2link, you can request a clinic appointment, read your test results, renew a prescription or communicate with your care team.     To access your existing account, please contact your [facility] or call [phone] for assistance.        Care EveryWhere ID     This is your Care EveryWhere ID. This could be used by other organizations to access your [place] medical records  [id number]        Your Vitals Were     Pulse Temperature Respirations             76 99.1  F (37.3  C) (Axillary) 16          Blood Pressure from Last 3 Encounters:   05/01/18 199/79   03/24/18 149/78   01/12/18 135/88    Weight from Last 3 Encounters:   03/24/18 69.9 kg (154 lb 1.6 oz)    11/11/16 83.9 kg (185 lb)   09/12/16 76.7 kg (169 lb 3.2 oz)              We Performed the Following     Clostridium difficile toxin B PCR     Enteric Bacteria and Virus Panel by [facility]          Today's Medication Changes          These changes are accurate as of 5/1/18 11:59 PM.  If you have any questions, ask your nurse or doctor.               Start taking these medicines.        Dose/Directions    Vancomycin HCl 25 MG/ML Solr   Used for:  C. difficile colitis   Started by:  Randy Fuentes MD        Dose:  5 mL   Take 5 mLs by mouth 4 times daily   Quantity:  20 mL   Refills:  0         These medicines have changed or have updated prescriptions.        Dose/Directions    docusate sodium 100 MG capsule   Commonly known as:  COLACE   This may have changed:    - when to take this  - reasons to take this   Used for:  Unspecified constipation        Dose:  100 mg   Take 1 capsule by mouth 2 times daily.   Quantity:  60 capsule   Refills:  12       warfarin 2.5 MG tablet   Commonly known as:  COUMADIN   This may have changed:  additional instructions   Used for:  Long term current use of anticoagulant therapy        Take 3.75mgs on Thursdays and 2.5mgs on all other days or as directed by the [facility]   Quantity:  113 tablet   Refills:  3            Where to get your medicines      These medications were sent to [place] [id number] [facility] [address] [place] [address] [address] [place], [place] [id number]    Hours:  TRANSPLANT PHONE NUMBER [phone] Phone:  [phone]     Vancomycin HCl 25 MG/ML Solr                Primary Care Provider Office Phone # Fax #    Randy Fuentes [phone]       [address] [place] [address]        Equal Access to Services     EDUARDO PALACIOS : savannah Duran, annmarie morrell. So [facility] [phone].    ATENCIÓN: Si  bonnie schafer, tiene a staples disposición servicios gratuitos de asistencia lingüística. Kaye fleming [phone].    We comply with applicable federal civil rights laws and [place] laws. We do not discriminate on the basis of race, color, national origin, age, disability, sex, sexual orientation, or gender identity.            Thank you!     Thank you for choosing [facility]  for your care. Our goal is always to provide you with excellent care. Hearing back from our patients is one way we can continue to improve our services. Please take a few minutes to complete the written survey that you may receive in the mail after your visit with us. Thank you!             Your Updated Medication List - Protect others around you: Learn how to safely use, store and throw away your medicines at www.disposemymeds.org.          This list is accurate as of 5/1/18 11:59 PM.  Always use your most recent med list.                   Brand Name Dispense Instructions for use Diagnosis    acetaminophen 500 MG tablet    TYLENOL    100 tablet    Take  by mouth. Take one tablet by mouth every 4-6 hours as needed.    Pain in joint, pelvic region and thigh       ALPRAZolam 0.25 MG tablet    XANAX    3 tablet    Give 1/2 half tab po half an hour before home lab draw    Severe needle phobia       aspirin 81 MG EC tablet    ASPIRIN LOW DOSE    10 tablet    Take 1 tablet (81 mg) by mouth daily    Cardiovascular disease       blood glucose monitor Kit     1 kit    Use to test blood sugar 1-2 times daily or as directed.    High blood sugar       blood glucose monitoring test strip    BL TEST STRIP PACK    100 strip    Use to test blood sugar 1-2 times daily or as directed.    High blood sugar       carvedilol 6.25 MG tablet    COREG    180 tablet    Take 1 tablet (6.25 mg) by mouth 2 times daily (with meals)    Renal hypertension, stage 1-4 or unspecified chronic kidney disease       DAILY VITES/IRON Tabs     90 tablet    Take 1 tablet  by mouth daily    Preventive measure       darbepoetin matthew 40 MCG/0.4ML injection    ARANESP (ALBUMIN FREE)    0.4 mL    Inject 0.4 mLs (40 mcg) Subcutaneous every 28 days As needed for hgb<10g/dL.  If Hgb increases >1 point in 2 weeks (if blood transfusion given, use hgb PRIOR to this), SYSTOLIC BP > 180 mmHg or hgb>=10g/dL, HOLD DOSE. Dose must be within 1 week of Hgb.  Per anemia protocol with Mariluz Martinez MD    Anemia in stage 3 chronic kidney disease, CKD (chronic kidney disease) stage 3, GFR 30-59 ml/min       dimethicone-zinc oxide cream     142 g    Apply topically 2 times daily as needed for dry skin or other    Dermatitis       docusate sodium 100 MG capsule    COLACE    60 capsule    Take 1 capsule by mouth 2 times daily.    Unspecified constipation       furosemide 20 MG tablet    LASIX    30 tablet    Take 1 tablet (20 mg) by mouth daily *MUST BE SEEN FOR FURTHER REFILLS    Generalized edema       LANCETS MICRO THIN 33G Misc     100 each    1 Device 2 times daily Use to test blood sugar 1-2 times daily or as directed.    High blood sugar       order for DME     1 Units    Equipment being ordered: Other: Damian lift Treatment Diagnosis: memory loss, dementia, weakness, ARF    Acute renal failure, unspecified acute renal failure type (H), Memory loss, Acute kidney injury (H)       order for DME     1 each    Equipment being ordered: Hospital Bed and damian lift    Alzheimer's dementia with behavioral disturbance, unspecified timing of dementia onset       QUEtiapine 25 MG tablet    SEROquel    30 tablet    TAKE 1 TABLET (25 MG) EVERY DAY AS NEEDED FOR AGITATION AND 1 TABLET (25 MG) AT BEDTIME FOR INSOMNIA    Insomnia, Agitation       tacrolimus 0.5 MG capsule     120 capsule    Take 2 capsules (1 mg) by mouth 2 times daily    History of liver transplant (H), Status post kidney transplant       Vancomycin HCl 25 MG/ML Solr     20 mL    Take 5 mLs by mouth 4 times daily    C. difficile colitis       *  VITAMIN D-400 400 units Tabs     180 tablet    TAKE 2 TABLETS BY MOUTH DAILY    Vitamin D deficiency       * VITAMIN D-400 400 units Tabs     180 tablet    Take 2 tablets by mouth daily    Vitamin D deficiency       warfarin 2.5 MG tablet    COUMADIN    113 tablet    Take 3.75mgs on Thursdays and 2.5mgs on all other days or as directed by the [facility]mad[facility]    Long term current use of anticoagulant therapy       * Notice:  This list has 2 medication(s) that are the same as other medications prescribed for you. Read the directions carefully, and ask your doctor or other care provider to review them with you.

## 2018-05-01 NOTE — PROGRESS NOTES
[facility]  Primary Care Center   Randy Fuentes MD  05/01/2018      Chief Complaint:   Diarrhea       History of Present Illness:   Priscilla Way is a 78 year old male with a history of s/p liver transplant in 2000, s/p kidney transplant in 2000, multiple-infarct dementia, type 2 diabetes mellitus, memory loss, and DVT  who presents for evaluation of diarrhea.    He is accompanied by May, his daughter, who provides the entire history. A couple weeks piror to the hospitalization, she took him to the ED, where she took a picture of his bloody, foul-smelling stool. They did not treat him for C. Diff then. One month ago, he was hospitalized for C. Diff for 3 days (3/21-3/24/18). On 3/21/18, a C. Diff PCR resulted positive, and his urine cultured Escherichia Coli. He was discharged on 2 different oral antibiotics, Vancomycin for the C. Diff, and Cefdinir for the E. Coli UTI. The IV antibiotic did not work, and his daughter thinks he should have stayed hospitalized. Upon returning home, he was improved from having diarrhea to once per day or none. He was having loose stool here and there, which worsened this last week. He has pooped 3X/day. It is mucousy, and the smell is terrible.  She reports there has not been vomiting. He is non verbal now, so it is hard to know how much pain he has. He moans and groans sometimes.   He does not eat and drink a lot. It would be easier to get him to drink a liquid medicine.      Other concerns discussed:  1. She reports that he has a home care aid. His daughter is wondering if he can have a home health nurse practitioner come visit him at home.  2. His INR was 2.15.     Review of Systems:   Pertinent items are noted in HPI, remainder of complete ROS is negative.      Active Medications:     Current Outpatient Prescriptions:      acetaminophen (TYLENOL) 500 MG tablet, Take  by mouth. Take one tablet by mouth every 4-6 hours as needed., Disp: 100 tablet, Rfl: 9     ALPRAZolam  (XANAX) 0.25 MG tablet, Give 1/2 half tab po half an hour before home lab draw, Disp: 3 tablet, Rfl: 1     aspirin (ASPIRIN LOW DOSE) 81 MG EC tablet, Take 1 tablet (81 mg) by mouth daily, Disp: 10 tablet, Rfl: 0     blood glucose (BL TEST STRIP PACK) test strip, Use to test blood sugar 1-2 times daily or as directed., Disp: 100 strip, Rfl: 3     blood glucose monitor KIT, Use to test blood sugar 1-2 times daily or as directed., Disp: 1 kit, Rfl: 0     carvedilol (COREG) 6.25 MG tablet, Take 1 tablet (6.25 mg) by mouth 2 times daily (with meals), Disp: 180 tablet, Rfl: 1     Cholecalciferol (VITAMIN D-400) 400 UNITS TABS, TAKE 2 TABLETS BY MOUTH DAILY, Disp: 180 tablet, Rfl: 0     Cholecalciferol (VITAMIN D-400) 400 UNITS TABS, Take 2 tablets by mouth daily, Disp: 180 tablet, Rfl: 0     darbepoetin matthew (ARANESP, ALBUMIN FREE,) 40 MCG/0.4ML injection, Inject 0.4 mLs (40 mcg) Subcutaneous every 28 days As needed for hgb<10g/dL.  If Hgb increases >1 point in 2 weeks (if blood transfusion given, use hgb PRIOR to this), SYSTOLIC BP > 180 mmHg or hgb>=10g/dL, HOLD DOSE. Dose must be within 1 week of Hgb.  Per anemia protocol with Mariluz Martinez MD, Disp: 0.4 mL, Rfl: 99     dimethicone-zinc oxide (EUCERIN) cream, Apply topically 2 times daily as needed for dry skin or other, Disp: 142 g, Rfl: 0     docusate sodium (COLACE) 100 MG capsule, Take 1 capsule by mouth 2 times daily. (Patient taking differently: Take 100 mg by mouth 2 times daily as needed ), Disp: 60 capsule, Rfl: 12     furosemide (LASIX) 20 MG tablet, Take 1 tablet (20 mg) by mouth daily *MUST BE SEEN FOR FURTHER REFILLS, Disp: 30 tablet, Rfl: 0     LANCETS MICRO THIN 33G MISC, 1 Device 2 times daily Use to test blood sugar 1-2 times daily or as directed., Disp: 100 each, Rfl: 3     memantine XR (NAMENDA XR) 21 MG 24 hr capsule, Take 1 capsule (21 mg) by mouth daily Patient needs to see primary provider for further refills. NO more refills, Disp: 30  capsule, Rfl: 0     Multiple Vitamins-Iron (DAILY VITES/IRON) TABS, Take 1 tablet by mouth daily, Disp: 90 tablet, Rfl: 3     omeprazole (PRILOSEC) 20 MG CR capsule, Take 1 capsule (20 mg) by mouth daily Patient needs to see primary provider for further refills. NO more refills, Disp: 30 capsule, Rfl: 0     order for DME, Equipment being ordered: Other: Damian lift Treatment Diagnosis: memory loss, dementia, weakness, ARF, Disp: 1 Units, Rfl: 0     order for DME, Equipment being ordered: Hospital Bed and damian lift, Disp: 1 each, Rfl: 0     QUEtiapine (SEROQUEL) 25 MG tablet, TAKE 1 TABLET (25 MG) EVERY DAY AS NEEDED FOR AGITATION AND 1 TABLET (25 MG) AT BEDTIME FOR INSOMNIA, Disp: 30 tablet, Rfl: 0     warfarin (COUMADIN) 2.5 MG tablet, Take 3.75mgs on  and 2.5mgs on all other days or as directed by the Coumadin Clinic (Patient taking differently: Take 1.25mg on  and 2.5mg on all other days or as directed by the [facility]mad[facility] Clinic), Disp: 113 tablet, Rfl: 3     PROGRAF (BRAND) 0.5 MG CAPSULE, Take 2 capsules (1 mg) by mouth 2 times daily, Disp: 120 capsule, Rfl: 0      Allergies:   Review of patient's allergies indicates no known allergies.      Past Medical History:  Dementia (multiple acute infarcts, SV dis on CT)   Diabetes type 2, controlled   Hepatitis C  Memory loss  Acute kidney injury  Deep vein thrombosis  Generalized edema  Anemia in stage 3 chronic kidney   CKD (chronic kidney disease) stage 3, GFR 30-59 ml/min  Acute kidney failure (H)     Past Surgical History:  Transplant kidney recipient  donor  Transplant liver recipient  donor    Family History:   No family history on file.      Social History:   Former cigarette smoker, quit 10 years ago. Never used smokeless tobacco, and no alcohol use.     Physical Exam:   /79 (BP Location: Left arm, Patient Position: Sitting, Cuff Size: Adult Regular)  Pulse 76  Temp 99.1  F (37.3  C) (Axillary)  Resp 16   Constitutional:  Awake. In no distress. Appears well hydrated.   Head: The scalp, face, and head appear normal.  Musculoskeletal: Extremities appear normal. No gross deformities noted.   Neurologic: Gait normal. Non verbal. Long term post stroke.  Psychiatric: Mentation appears normal. Normal affect.    Gastrointestinal: Abdomen is soft and non tender.      Assessment and Plan:  Screening for diabetic retinopathy  His family does an excellent job of caring for him, and note that it is hard to get out. They wonder about home health, and I have asked social work to contact them about this.     Screening for diabetic peripheral neuropathy  His family does an excellent job of caring for him, and note that it is hard to get out. They wonder about home health, and I have asked social work to contact them about this.     C. difficile colitis  His daughter shows me a photo of mucousy and bloody stool consistent with C. Difficile.  It also has a unique and foul odor. He likely has a C. Diff relapse. We will test for other infections, CBC, CMP for his transplants and hydration status. Clinically, he did not appear to need hospital or ED services right now. However, if there are any lab abnormalities, I will reconsider.  - Vancomycin HCl 25 MG/ML SOLR  Dispense: 20 mL; Refill: 0    Loss of weight   I will check a thyroid for his weight loss.   - CBC with platelets differential  - Comprehensive metabolic panel  - TSH with free T4 reflex    Diarrhea, unspecified type  His daughter shows me a photo of mucousy and bloody stool consistent with C. Difficile.  It also has a unique and foul odor. He likely has a C. Diff relapse. We will test for other infections, CBC, CMP for his transplants and hydration status. Clinically, he did not appear to need hospital or ED services right now. However, if there are any lab abnormalities, I will reconsider.  - Clostridium difficile toxin B PCR  - Enteric Bacteria and Virus Panel by [facility]             Follow-up: Data Unavailable         Scribe Disclosure:   I, Hemalatha KAILEY Gary, am serving as a scribe to document services personally performed by Randy Fuentes MD at this visit, based upon the provider's statements to me. All documentation has been reviewed by the aforementioned provider prior to being entered into the official medical record.     Portions of this medical record were completed by a scribe. UPON MY REVIEW AND AUTHENTICATION BY ELECTRONIC SIGNATURE, this confirms (a) I performed the applicable clinical services, and (b) the record is accurate.   Randy Fuentes MD

## 2018-05-01 NOTE — NURSING NOTE
Chief Complaint   Patient presents with     Diarrhea     Patient with hx of c-diff, symptoms are back     Chandrika Zapata CMA 11:17 AM on 5/1/2018.

## 2018-05-01 NOTE — TELEPHONE ENCOUNTER
Spoke with daughter, Katerin and informed the lab results.: WBC is good and other labs are OK or improved from previous one.

## 2018-05-03 NOTE — TELEPHONE ENCOUNTER
[facility] Call Center    Phone Message    May a detailed message be left on voicemail: yes    Reason for Call: Other: Ingret from homecare nurse would like a prescription sent to [facility] in patient chart, Iron 325 mg      Action Taken: Message routed to:  [facility] maribell [facility]

## 2018-05-03 NOTE — TELEPHONE ENCOUNTER
Social Work Intervention  [facility]    Data/Intervention:    Patient Name:  Priscilla Way  /Age:  1940 (78 year old)    Visit Type: telephone  Referral Source: Dr Fuentes  Reason for Referral:  Info re providers that do home visits    Collaborated With:    -May- Pt's dtr  -Jimbo Borden RN  -Dr Fuentes    Patient Concerns/Issues:   Pt's dtr May indicated to Dr Fuentes that it would be helpful if Pt could be seen by primary care provider at home due to the difficulty of getting him to clinic in his debilitated state. She indicated that not only has [employer] transportation been unreliable but as a result, she has paid for his transportation to get here. Pt's family care for him as well as [facility] providers.   Reviewed with May that there are primary care NP/Dr's avail that can do home visits but typically they would take over as his primary care providers and it would create confusion if he also came here for primary care. She really wants him to keep his PCP here and she clearly understands that there would be plan of care communication that could create confusion if there are 2 PCPs involved.  Discussed that we could seek approval for a higher level of transportation for Priscilla so that they would contact a provider of their choice and not use the [employer] system to arrange rides. It's called STS or door thru door. She would like me to move forward on that approval    Intervention/Education/Resources Provided:  Discussion of home PCP providers and benefits/burdens of that as well as alternative transportation options to make it hopefully easier to get to clinic visits timely.    Assessment/Plan:  Completed document and faxed to [facility] re STS transportation. Will f/u with [facility] and [facility] in a week re results.     Provided patient/family with contact information and availability.    Tonia Lindsey, HAMLET, [facility]    [facility]  [phone]

## 2018-05-09 NOTE — MR AVS SNAPSHOT
Priscilla Way   5/9/2018   Anticoagulation Therapy Visit    Description:  78 year old male   Provider:  Shabana Sorto, RN   Department:  [facility]           INR as of 5/9/2018     Today's INR 1.30!      Anticoagulation Summary as of 5/9/2018     INR goal 2.0-3.0   Today's INR 1.30!   Full instructions 5/9: 5 mg; 5/10: 5 mg; 5/11: 2.5 mg; Otherwise 1.25 mg on Fri; 2.5 mg all other days   Next INR check 5/16/2018    Indications   Acute kidney injury (H) [N17.9]  Deep vein thrombosis (DVT) (H) [I82.409] [I82.409]  Long-term (current) use of anticoagulants [Z79.01] [Z79.01]         May 2018 Details    Sun Mon Tue Wed Thu Fri Sat       1               2               3               4               5                 6               7               8               9      5 mg   See details      10      5 mg         11      2.5 mg         12      2.5 mg           13      2.5 mg         14      2.5 mg         15      2.5 mg         16            17               18               19                 20               21               22               23               24               25               26                 27               28               29               30               31                  Date Details   05/09 This INR check       Date of next INR:  5/16/2018         How to take your warfarin dose     To take:  2.5 mg Take 1 of the 2.5 mg tablets.    To take:  5 mg Take 2 of the 2.5 mg tablets.

## 2018-05-09 NOTE — PROGRESS NOTES
ANTICOAGULATION FOLLOW-UP CLINIC VISIT    Patient Name:  Priscilla Way  Date:  5/9/2018  Contact Type:  Telephone    SUBJECTIVE:     Patient Findings     Positives Unexplained INR or factor level change    Comments Denied missed doses, but pt has dementia and in the past pt has shown aggressive behavior (biting) when PCA is trying to administer medication or drawing an INR. Last INR was drawn at clinic via venous draw              OBJECTIVE    INR   Date Value Ref Range Status   05/09/2018 1.30  Final     Comment:     Home Care        ASSESSMENT / PLAN  INR assessment SUB    Recheck INR In: 1 WEEK    INR Location Homecare INR      Anticoagulation Summary as of 5/9/2018     INR goal 2.0-3.0   Today's INR 1.30!   Maintenance plan 1.25 mg (2.5 mg x 0.5) on Fri; 2.5 mg (2.5 mg x 1) all other days   Full instructions 5/9: 5 mg; 5/10: 5 mg; 5/11: 2.5 mg; Otherwise 1.25 mg on Fri; 2.5 mg all other days   Weekly total 16.25 mg   Plan last modified Sherry Georges RN (4/24/2018)   Next INR check 5/16/2018   Priority INR   Target end date     Indications   Acute kidney injury (H) [N17.9]  Deep vein thrombosis (DVT) (H) [I82.409] [I82.409]  Long-term (current) use of anticoagulants [Z79.01] [Z79.01]         Anticoagulation Episode Summary     INR check location     Preferred lab     Send INR reminders to [facility]    Comments [facility] to draw INR's  Pt has dementia please contact daughter with any questions. Contact daughter May at [phone].  Has 2.5mg tablets       Anticoagulation Care Providers     Provider Role Specialty Phone number    Randy Fuentes MD [facility] [phone]            See the Encounter Report to view Anticoagulation Flowsheet and Dosing Calendar (Go to Encounters tab in chart review, and find the Anticoagulation Therapy Visit)    Spoke with ELISA Cifuentes. Gave them new warfarin recommendation.  No changes in health, medication, or diet. No missed doses, no  falls. No signs or symptoms of bleed or clotting.     Shabana Sorto RN

## 2018-05-14 NOTE — TELEPHONE ENCOUNTER
[facility] Call Center    Phone Message    May a detailed message be left on voicemail: yes    Reason for Call: Order(s): Other:   Reason for requested: please  follow up for verbal orders for continuing homecare, one visit for one week and one prn visit.  Date needed: 05/14/2018  Provider name: Dr. Fuentes      Action Taken: Message routed to:  [facility] maribell mckay

## 2018-05-14 NOTE — TELEPHONE ENCOUNTER
[facility] Call [facility]    Phone Message    May a detailed message be left on voicemail: yes    Reason for Call: Order(s): Home Care Orders: Other: Jacqueline needs an RN to give her verbal orders, not an LPN. Please call her.    Action Taken: Message routed to:  [facility] maribell [facility] med

## 2018-05-15 NOTE — PROGRESS NOTES
ANTICOAGULATION FOLLOW-UP CLINIC VISIT    Patient Name:  Priscilla Way  Date:  5/15/2018  Contact Type:  Telephone    SUBJECTIVE:     Patient Findings     Positives OTC meds (tylenol yesterday)           OBJECTIVE    INR   Date Value Ref Range Status   05/15/2018 3.9  Final       ASSESSMENT / PLAN  INR assessment SUPRA    Recheck INR In: 1 WEEK    INR Location [facility] INR      Anticoagulation Summary as of 5/15/2018     INR goal 2.0-3.0   Today's INR 3.9!   Maintenance plan 1.25 mg (2.5 mg x 0.5) on Fri; 2.5 mg (2.5 mg x 1) all other days   Full instructions 5/15: Hold; Otherwise 1.25 mg on Fri; 2.5 mg all other days   Weekly total 16.25 mg   Plan last modified Sherry Georges RN (4/24/2018)   Next INR check 5/22/2018   Priority INR   Target end date     Indications   Acute kidney injury (H) [N17.9]  Deep vein thrombosis (DVT) (H) [I82.409] [I82.409]  Long-term (current) use of anticoagulants [Z79.01] [Z79.01]         Anticoagulation Episode Summary     INR check location     Preferred lab     Send INR reminders to [facility]    Comments [facility] to draw INR's  Pt has dementia please contact daughter with any questions. Contact daughter May at [phone].  Has 2.5mg tablets       Anticoagulation Care Providers     Provider Role Specialty Phone number    Randy Fuentes MD [facility] [phone]            See the Encounter Report to view Anticoagulation Flowsheet and Dosing Calendar (Go to Encounters tab in chart review, and find the Anticoagulation Therapy Visit)    Spoke with Margo [facility] nurse.    Sherry Georges, TED

## 2018-05-15 NOTE — MR AVS SNAPSHOT
Priscilla Way   5/15/2018   Anticoagulation Therapy Visit    Description:  78 year old male   Provider:  Sherry Georges RN   Department:  [facility]           INR as of 5/15/2018     Today's INR 3.9!      Anticoagulation Summary as of 5/15/2018     INR goal 2.0-3.0   Today's INR 3.9!   Full instructions 5/15: Hold; Otherwise 1.25 mg on Fri; 2.5 mg all other days   Next INR check 5/22/2018    Indications   Acute kidney injury (H) [N17.9]  Deep vein thrombosis (DVT) (H) [I82.409] [I82.409]  Long-term (current) use of anticoagulants [Z79.01] [Z79.01]         May 2018 Details    Sun Mon Tue Wed Thu Fri Sat       1               2               3               4               5                 6               7               8               9               10               11               12                 13               14               15      Hold   See details      16      2.5 mg         17      2.5 mg         18      1.25 mg         19      2.5 mg           20      2.5 mg         21      2.5 mg         22            23               24               25               26                 27               28               29               30               31                  Date Details   05/15 This INR check       Date of next INR:  5/22/2018         How to take your warfarin dose     To take:  1.25 mg Take 0.5 of a 2.5 mg tablet.    To take:  2.5 mg Take 1 of the 2.5 mg tablets.    Hold Do not take your warfarin dose. See the Details table to the right for additional instructions.

## 2018-05-17 NOTE — TELEPHONE ENCOUNTER
LASIX 20 mg daily   Last Written Prescription Date:  4/6/2018  Last Fill Quantity: 30,   # refills: 0  Last Office Visit : 5/1/2018  Future Office visit:  none    Routing refill request to provider for review/approval because:  BP and Creatinine for Lasix  INR of 3.9 on 5/15 followed by Anti-coag clinic for ASA

## 2018-05-19 NOTE — TELEPHONE ENCOUNTER
carvedilol (COREG) 6.25 MG tablet  Last Written Prescription Date:  8/1/17  Last Fill Quantity: 180,   # refills: 1  Last Office Visit : 5/2/18  Future Office visit: none      Routing  Because:  FYI  bp > 140/90    90 day to pharmacy

## 2018-05-22 NOTE — PROGRESS NOTES
ANTICOAGULATION FOLLOW-UP CLINIC VISIT    Patient Name:  Priscilla Way  Date:  5/22/2018  Contact Type:  Telephone    SUBJECTIVE:        OBJECTIVE    INR   Date Value Ref Range Status   05/22/2018 2.1  Final       ASSESSMENT / PLAN  No question data found.  Anticoagulation Summary as of 5/22/2018     INR goal 2.0-3.0   Today's INR 2.1   Maintenance plan 1.25 mg (2.5 mg x 0.5) on Fri; 2.5 mg (2.5 mg x 1) all other days   Full instructions 5/22: 1.25 mg; Otherwise 1.25 mg on Fri; 2.5 mg all other days   Weekly total 16.25 mg   Plan last modified Sherry Georges RN (4/24/2018)   Next INR check 5/29/2018   Priority INR   Target end date     Indications   Acute kidney injury (H) [N17.9]  Deep vein thrombosis (DVT) (H) [I82.409] [I82.409]  Long-term (current) use of anticoagulants [Z79.01] [Z79.01]         Anticoagulation Episode Summary     INR check location     Preferred lab     [facility]    Comments [facility] to draw INR's  Pt has dementia please contact daughter with any questions. Contact daughter May at [phone].  Has 2.5mg tablets       Anticoagulation Care Providers     Provider Role Specialty Phone number    Randy Fuentes MD [facility] [phone]            See the Encounter Report to view Anticoagulation Flowsheet and Dosing Calendar (Go to Encounters tab in chart review, and find the Anticoagulation Therapy Visit)    Spoke with Jacqueline PÉREZ.    Dayana Ervin RN

## 2018-05-22 NOTE — MR AVS SNAPSHOT
Priscilla Way   5/22/2018   Anticoagulation Therapy Visit    Description:  78 year old male   Provider:  Dayana Ervin, RN   Department:  [facility]           INR as of 5/22/2018     Today's INR 2.1      Anticoagulation Summary as of 5/22/2018     INR goal 2.0-3.0   Today's INR 2.1   Full instructions 5/22: 1.25 mg; Otherwise 1.25 mg on Fri; 2.5 mg all other days   Next INR check 5/29/2018    Indications   Acute kidney injury (H) [N17.9]  Deep vein thrombosis (DVT) (H) [I82.409] [I82.409]  Long-term (current) use of anticoagulants [Z79.01] [Z79.01]         May 2018 Details    Sun Mon Tue Wed Thu Fri Sat       1               2               3               4               5                 6               7               8               9               10               11               12                 13               14               15               16               17               18               19                 20               21               22      1.25 mg   See details      23      2.5 mg         24      2.5 mg         25      1.25 mg         26      2.5 mg           27      2.5 mg         28      2.5 mg         29            30               31                  Date Details   05/22 This INR check       Date of next INR:  5/29/2018         How to take your warfarin dose     To take:  1.25 mg Take 0.5 of a 2.5 mg tablet.    To take:  2.5 mg Take 1 of the 2.5 mg tablets.

## 2018-05-22 NOTE — TELEPHONE ENCOUNTER
[facility] Call Center    Phone Message    May a detailed message be left on voicemail: no    Reason for Call: Order(s): Other:   Reason for requested: Jacqueline from  Homecare called to request verbal for continued homecare, 1 visit/week for 7 weeks, and an order for speech therapy since the Pt is aspirating. Please call her back at [phone].  Date needed: Asap  Provider name: Dr. Fuentes      Action Taken: Message routed to:  [facility]     May 23, 2018  7:44 AM    Jacqueline has been given verbal approval for orders above.  Leslie Gardner RN

## 2018-05-29 NOTE — MR AVS SNAPSHOT
Priscilla Way   5/29/2018   Anticoagulation Therapy Visit    Description:  78 year old male   Provider:  Sherry Georges, RN   Department:  [facility]           INR as of 5/29/2018     Today's INR No new INR was available at the time of this encounter.      Anticoagulation Summary as of 5/29/2018     INR goal 2.0-3.0   Today's INR No new INR was available at the time of this encounter.   Full warfarin instructions 1.25 mg on Fri; 2.5 mg all other days   Next INR check 5/31/2018    Indications   Acute kidney injury (H) [N17.9]  Deep vein thrombosis (DVT) (H) [I82.409] [I82.409]  Long-term (current) use of anticoagulants [Z79.01] [Z79.01]         May 2018 Details    Sun Mon Tue Wed Thu Fri Sat       1               2               3               4               5                 6               7               8               9               10               11               12                 13               14               15               16               17               18               19                 20               21               22               23               24               25               26                 27               28               29      2.5 mg   See details      30      2.5 mg         31               Date Details   05/29 This INR check       Date of next INR:  5/31/2018         How to take your warfarin dose     To take:  2.5 mg Take 1 of the 2.5 mg tablets.

## 2018-05-29 NOTE — PROGRESS NOTES
Jacqueline, [facility] nurse, called to say that she was unable to get an IRN on pt today, and will try again on Thursday when he has other labs ordered.

## 2018-05-31 PROBLEM — R41.82 ALTERED MENTAL STATUS: Status: ACTIVE | Noted: 2018-01-01

## 2018-05-31 NOTE — IP AVS SNAPSHOT
MRN:[id number]                      After Visit Summary   5/31/2018    Priscilla Way    MRN: [id number]           Thank you!     Thank you for choosing [facility] for your care. Our goal is always to provide you with excellent care. Hearing back from our patients is one way we can continue to improve our services. Please take a few minutes to complete the written survey that you may receive in the mail after you visit with us. Thank you!        Patient Information     Date Of Birth          1940        Designated Caregiver       Most Recent Value    Caregiver    Will someone help with your care after discharge? yes    Name of designated caregiver [facility]    Phone number of caregiver [phone]    Caregiver address lives with pt      About your hospital stay     You were admitted on:  May 31, 2018 You last received care in the:  [address] [facility]    You were discharged on:  June 7, 2018        Reason for your hospital stay       You were admitted to the hospital for worsening mental status and severe diarrhea. 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                  Who to Call     For medical emergencies, please call 911.  For non-urgent questions about your medical care, please call your primary care provider or clinic, [phone]          Attending Provider     Provider Specialty    Zaki Kern MD Emergency Medicine    Aubrey Marroquin MD Emergency Medicine    Alli Groves MD Internal Medicine    Jeff, MD Phyllis Internal Medicine    Boby Tirado MD Internal Medicine       Primary Care Provider Office Phone # Fax #    Randy Fuentes [phone]       When to contact your care team       Call your primary doctor if you have any of the following: temperature greater than 100.4, worsening diarrhea, inability to take oral intake                  After Care Instructions     Activity       Your activity upon discharge: bedrest, falls  "precautions            Diet       Follow this diet upon discharge: Orders Placed This Encounter      Room Service      Calorie Counts      Regular Diet Adult            Discharge Instructions       Left Heel wound: Every 3 days   1. Clean intact skin with gentle soap and water, dry and dry again.  2. Paint with No Sting Skin Prep and allow to dry thoroughly  3. Press a Mepilex  Border Dressingto the area, making sure to conform nicely to skin curvatures (begin placing the Mepilex at the most distal aspect first, smooth into place in an upward direction, then smooth side to side)   4. Time and date dressing change and gilmar with a \"P\" for prevention.  5. Reposition pt every 1 to 2 hours when in bed and keep prevalon boots on at all times            Monitor and record       Stool output                  Follow-up Appointments     Adult [facility] Follow-up and recommended labs and tests       Follow up with primary care provider, Randy Fuentes, within 7 days for hospital follow- up.  The following labs/tests are recommended: BMP.  *Patient is scheduled with Dr. Fuentes for his next available appointment on 6/25 at 7:35a. He can contact the clinic directly at [phone] if he would like to see another provider for a sooner appointment*  Resume warfarin monitoring with INR follow up on Friday 6/8.   Follow up with transplant nephrology as previously scheduled      Appointments on [place] and/or [facility] (with [facility] or [facility] provider or service). Call [phone] if you haven't heard regarding these appointments within 7 days of discharge.                  Your next 10 appointments already scheduled     Jun 25, 2018  7:35 AM CDT   (Arrive by 7:20 AM)   Return Visit with Randy Fuentes MD   [facility] ([facility])    [address] [place] [address]  4th [facility] [id number] [phone]            Jul 09, 2018  4:20 PM CDT   (Arrive by 4:05 PM)   INFLAMMATORY " BOWEL DISEASE with Leo Sahu MD   [facility] ([facility])    [address]  4th Floor  [facility] [address] [phone]              Additional Services     Home care nursing referral       Resume home care services:  _______________________  [facility]  Phone  [phone]  Fax  [phone]  ______________________      Resume previous skilled RN orders.   Pt will need INR and BMP drawn on Monday 6/11/18.     Your provider has ordered home care nursing services. If you have not been contacted within 2 days of your discharge please call the inpatient department phone number at [phone] .            Medication Therapy Management Referral       MTM referral reason            Patient has 5 PTA or Discharge Medications AND one of the following   diagnoses: DM,HF,COPD,AMI DX,PULM HTN       This service is designed to help you get the most from your medications.  A specially trained pharmacist will work closely with you and your doctors  to solve any problems related to your medications and to help you get the   best results from taking them.      The Medication Therapy Management staff will call you to schedule an appointment.                  Warfarin Instruction     You have started taking a medicine called warfarin. This is a blood-thinning medicine (anticoagulant). It helps prevent and treat blood clots.      Before leaving the hospital, make sure you know how much to take and how long to take it.      You will need regular blood tests to make sure your blood is clotting safely. It is very important to see your doctor for regular blood tests.    Talk to your doctor before taking any new medicine (this includes over-the-counter drugs and herbal products). Many medicines can interact with warfarin. This may cause more bleeding or too much clotting.     Eating a lot of vitamin K--found in green, leafy vegetables--can change the  "way warfarin works in your body. Do NOT avoid these foods. Instead, try to eat the same amount each day.     Bleeding is the most common side-effect of warfarin. You may notice bleeding gums, a bloody nose, bruises and bleeding longer when you cut yourself. See a doctor at once if:   o You cough up blood  o You find blood in your stool (poop)  o You have a deep cut, or a cut that bleeds longer than 10 minutes   o You have a bad cut, hard fall, accident or hit your head (go to urgent care or the emergency room).    For women who can get pregnant: This medicine can harm an unborn baby. Be very careful not to get pregnant while taking this medicine. If you think you might be pregnant, call your doctor right away.    For more information, read \"Guide to Warfarin Therapy,  the booklet you received in the hospital.        Pending Results     No orders found from 5/29/2018 to 6/1/2018.            Statement of Approval     Ordered          06/07/18 1043  I have reviewed and agree with all the recommendations and orders detailed in this document.  EFFECTIVE NOW     Approved and electronically signed by:  Boby Tirado MD             Admission Information     Date & Time Provider Department Dept. Phone    5/31/2018 Boby Tirado MD [address] [facility] [phone]      Your Vitals Were     Blood Pressure Pulse Temperature Respirations Height Weight    135/60 (BP Location: Left arm) 83 96  F (35.6  C) (Axillary) 18 1.727 m (5' 8\") 77.9 kg (171 lb 12.8 oz)    Pulse Oximetry BMI (Body Mass Index)                99% 26.12 kg/m2          Informed [employer] Information     [employer] gives you secure access to your electronic health record. If you see a primary care provider, you can also send messages to your care team and make appointments. If you have questions, please call your primary care clinic.  If you do not have a primary care provider, please call [phone] and they will assist you.        Care EveryWhere ID     This " is your Care EveryWhere ID. This could be used by other organizations to access your [facility] medical records  [id number]        Equal Access to Services     EDUARDO PALACIOS : Hadii yefri Villela, savannah sibley, rianpepper carpioalisiatejinder pastorlourdes, waxamy howardin hayaaangela pastorchuck iniguez labaironangela boateng. So [facility] [phone].    ATENCIÓN: Si habla español, tiene a staples disposición servicios gratuitos de asistencia lingüística. Llame al [phone].    We comply with applicable federal civil rights laws and [place] laws. We do not discriminate on the basis of race, color, national origin, age, disability, sex, sexual orientation, or gender identity.               Review of your medicines      START taking        Dose / Directions    amLODIPine 10 MG tablet   Commonly known as:  NORVASC   Used for:  Hypertension, unspecified type        Dose:  10 mg   Take 1 tablet (10 mg) by mouth daily   Quantity:  30 tablet   Refills:  1       melatonin 5 MG tablet   Used for:  Vascular dementia with depressed mood        Dose:  5 mg   Take 1 tablet (5 mg) by mouth nightly as needed for sleep   Refills:  0       memantine XR 21 MG 24 hr capsule   Commonly known as:  NAMENDA XR   Used for:  Vascular dementia with depressed mood        Dose:  21 mg   Take 1 capsule (21 mg) by mouth daily   Quantity:  30 capsule   Refills:  1       omeprazole 2 mg/mL Susp   Commonly known as:  priLOSEC   Used for:  Colitis due to Clostridium difficile   Replaces:  omeprazole 20 MG CR capsule        Dose:  20 mg   Take 10 mLs (20 mg) by mouth 2 times daily   Quantity:  400 mL   Refills:  3       polyethylene glycol Packet   Commonly known as:  MIRALAX/GLYCOLAX   Used for:  Slow transit constipation        Dose:  17 g   Take 17 g by mouth daily as needed for constipation   Quantity:  30 packet   Refills:  1       QUEtiapine 25 MG tablet   Commonly known as:  SEROquel   Used for:  Vascular dementia with depressed mood        Dose:  25 mg   Take 1 tablet (25 mg) by  mouth nightly as needed (Agitation)   Quantity:  30 tablet   Refills:  1       tacrolimus 1 mg/mL Susp   Commonly known as:  PROGRAF BRAND   Used for:  Kidney replaced by transplant   Replaces:  tacrolimus 0.5 MG capsule        Dose:  1.5 mg   Take 1.5 mLs (1.5 mg) by mouth every 12 hours   Quantity:  100 mL   Refills:  3       vancomycin 50 mg/mL Liqd solution   Commonly known as:  VANOCIN   Indication:  Clostridium difficile Bacteria   Used for:  Colitis due to Clostridium difficile        125mg oral Vancomycin QID until 6/14, 125 mg TID until 6/20, 125 mg BID until 6/27, 125 mg daily until 7/4   Quantity:  185 mL   Refills:  0         CONTINUE these medicines which may have CHANGED, or have new prescriptions. If we are uncertain of the size of tablets/capsules you have at home, strength may be listed as something that might have changed.        Dose / Directions    docusate sodium 100 MG capsule   Commonly known as:  COLACE   This may have changed:    - when to take this  - reasons to take this   Used for:  Unspecified constipation        Dose:  100 mg   Take 1 capsule by mouth 2 times daily.   Quantity:  60 capsule   Refills:  12       warfarin 2.5 MG tablet   Commonly known as:  COUMADIN   This may have changed:  additional instructions   Used for:  Deep vein thrombosis (DVT) of both lower extremities, unspecified chronicity, unspecified vein (H)        Take 2.5mg 6/7, take 1.25mg on 6/8, take 2.5mg on 6/9, and take 1.25mg on 6/10. 2. INR check on 6/11 and subsequent warfarin doses pending INR results.   Quantity:  15 tablet   Refills:  1         CONTINUE these medicines which have NOT CHANGED        Dose / Directions    acetaminophen 500 MG tablet   Commonly known as:  TYLENOL   Used for:  Pain in joint, pelvic region and thigh        Take  by mouth. Take one tablet by mouth every 4-6 hours as needed.   Quantity:  100 tablet   Refills:  9       blood glucose monitor Kit   Used for:  High blood sugar        Use  to test blood sugar 1-2 times daily or as directed.   Quantity:  1 kit   Refills:  0       blood glucose monitoring test strip   Commonly known as:  BL TEST STRIP PACK   Used for:  High blood sugar        Use to test blood sugar 1-2 times daily or as directed.   Quantity:  100 strip   Refills:  3       carvedilol 6.25 MG tablet   Commonly known as:  COREG   Used for:  Renal hypertension, stage 1-4 or unspecified chronic kidney disease        Dose:  6.25 mg   Take 1 tablet (6.25 mg) by mouth 2 times daily (with meals)   Quantity:  180 tablet   Refills:  0       DAILY VITES/IRON Tabs   Used for:  Preventive measure        Dose:  1 tablet   Take 1 tablet by mouth daily   Quantity:  90 tablet   Refills:  3       dimethicone-zinc oxide cream   Used for:  Dermatitis        Apply topically 2 times daily as needed for dry skin or other   Quantity:  142 g   Refills:  0       LANCETS MICRO THIN 33G Misc   Used for:  High blood sugar        Dose:  1 Device   1 Device 2 times daily Use to test blood sugar 1-2 times daily or as directed.   Quantity:  100 each   Refills:  3       order for DME   Used for:  Acute renal failure, unspecified acute renal failure type (H), Memory loss, Acute kidney injury (H)        Equipment being ordered: Other: Damian lift Treatment Diagnosis: memory loss, dementia, weakness, ARF   Quantity:  1 Units   Refills:  0       order for DME   Used for:  Alzheimer's dementia with behavioral disturbance, unspecified timing of dementia onset        Equipment being ordered: Hospital Bed and damian lift   Quantity:  1 each   Refills:  0       VITAMIN D-400 400 units Tabs   Used for:  Vitamin D deficiency        Dose:  2 tablet   Take 2 tablets by mouth daily   Quantity:  180 tablet   Refills:  0         STOP taking     ALPRAZolam 0.25 MG tablet   Commonly known as:  XANAX           aspirin 81 MG EC tablet           furosemide 20 MG tablet   Commonly known as:  LASIX           omeprazole 20 MG CR capsule    Commonly known as:  priLOSEC   Replaced by:  omeprazole 2 mg/mL Susp           tacrolimus 0.5 MG capsule   Replaced by:  tacrolimus 1 mg/mL Susp                Where to get your medicines      These medications were sent to [facility] - [place], [place] - [address]  [address], [facility] [address]     Phone:  [phone]     amLODIPine 10 MG tablet    memantine XR 21 MG 24 hr capsule    omeprazole 2 mg/mL Susp    polyethylene glycol Packet    QUEtiapine 25 MG tablet    tacrolimus 1 mg/mL Susp    vancomycin 50 mg/mL Liqd solution         Some of these will need a paper prescription and others can be bought over the counter. Ask your nurse if you have questions.     Bring a paper prescription for each of these medications     warfarin 2.5 MG tablet       You don't need a prescription for these medications     melatonin 5 MG tablet                Protect others around you: Learn how to safely use, store and throw away your medicines at www.disposemymeds.org.        ANTIBIOTIC INSTRUCTION     You've Been Prescribed an Antibiotic - Now What?  Your healthcare team thinks that you or your loved one might have an infection. Some infections can be treated with antibiotics, which are powerful, life-saving drugs. Like all medications, antibiotics have side effects and should only be used when necessary. There are some important things you should know about your antibiotic treatment.      Your healthcare team may run tests before you start taking an antibiotic.    Your team may take samples (e.g., from your blood, urine or other areas) to run tests to look for bacteria. These test can be important to determine if you need an antibiotic at all and, if you do, which antibiotic will work best.      Within a few days, your healthcare team might change or even stop your antibiotic.    Your team may start you on an antibiotic while they are working to find out what is making you sick.    Your  team might change your antibiotic because test results show that a different antibiotic would be better to treat your infection.    In some cases, once your team has more information, they learn that you do not need an antibiotic at all. They may find out that you don't have an infection, or that the antibiotic you're taking won't work against your infection. For example, an infection caused by a virus can't be treated with antibiotics. Staying on an antibiotic when you don't need it is more likely to be harmful than helpful.      You may experience side effects from your antibiotic.    Like all medications, antibiotics have side effects. Some of these can be serious.    Let you healthcare team know if you have any known allergies when you are admitted to the hospital.    One significant side effect of nearly all antibiotics is the risk of severe and sometimes deadly diarrhea caused by Clostridium difficile (C. Difficile). This occurs when a person takes antibiotics because some good germs are destroyed. Antibiotic use allows C. diificile to take over, putting patients at high risk for this serious infection.    As a patient or caregiver, it is important to understand your or your loved one's antibiotic treatment. It is especially important for caregivers to speak up when patients can't speak for themselves. Here are some important questions to ask your healthcare team.    What infection is this antibiotic treating and how do you know I have that infection?    What side effects might occur from this antibiotic?    How long will I need to take this antibiotic?    Is it safe to take this antibiotic with other medications or supplements (e.g., vitamins) that I am taking?     Are there any special directions I need to know about taking this antibiotic? For example, should I take it with food?    How will I be monitored to know whether my infection is responding to the antibiotic?    What tests may help to make sure the  right antibiotic is prescribed for me?      Information provided by:  www.cdc.gov/getsmart  U.S. Department of Health and Human Services  Centers for disease Control and Prevention  National Center for Emerging and Zoonotic Infectious Diseases  Division of Healthcare Quality Promotion             Medication List: This is a list of all your medications and when to take them. Check marks below indicate your daily home schedule. Keep this list as a reference.      Medications           Morning Afternoon Evening Bedtime As Needed    acetaminophen 500 MG tablet   Commonly known as:  TYLENOL   Take  by mouth. Take one tablet by mouth every 4-6 hours as needed.                                amLODIPine 10 MG tablet   Commonly known as:  NORVASC   Take 1 tablet (10 mg) by mouth daily   Last time this was given:  10 mg on 6/6/2018 10:46 AM                                blood glucose monitor Kit   Use to test blood sugar 1-2 times daily or as directed.                                blood glucose monitoring test strip   Commonly known as:  BL TEST STRIP PACK   Use to test blood sugar 1-2 times daily or as directed.                                carvedilol 6.25 MG tablet   Commonly known as:  COREG   Take 1 tablet (6.25 mg) by mouth 2 times daily (with meals)   Last time this was given:  12.5 mg on 6/6/2018  6:42 PM                                DAILY VITES/IRON Tabs   Take 1 tablet by mouth daily                                dimethicone-zinc oxide cream   Apply topically 2 times daily as needed for dry skin or other                                docusate sodium 100 MG capsule   Commonly known as:  COLACE   Take 1 capsule by mouth 2 times daily.                                LANCETS MICRO THIN 33G Misc   1 Device 2 times daily Use to test blood sugar 1-2 times daily or as directed.                                melatonin 5 MG tablet   Take 1 tablet (5 mg) by mouth nightly as needed for sleep   Last time this was given:   1 mg on 6/3/2018 11:30 PM                                memantine XR 21 MG 24 hr capsule   Commonly known as:  NAMENDA XR   Take 1 capsule (21 mg) by mouth daily   Last time this was given:  21 mg on 6/6/2018 10:46 AM                                omeprazole 2 mg/mL Susp   Commonly known as:  priLOSEC   Take 10 mLs (20 mg) by mouth 2 times daily   Last time this was given:  20 mg on 6/7/2018  9:47 AM                                order for DME   Equipment being ordered: Other: Damian lift Treatment Diagnosis: memory loss, dementia, weakness, ARF                                order for DME   Equipment being ordered: Hospital Bed and damian lift                                polyethylene glycol Packet   Commonly known as:  MIRALAX/GLYCOLAX   Take 17 g by mouth daily as needed for constipation                                QUEtiapine 25 MG tablet   Commonly known as:  SEROquel   Take 1 tablet (25 mg) by mouth nightly as needed (Agitation)   Last time this was given:  25 mg on 6/7/2018  1:44 AM                                tacrolimus 1 mg/mL Susp   Commonly known as:  PROGRAF BRAND   Take 1.5 mLs (1.5 mg) by mouth every 12 hours   Last time this was given:  1.5 mg on 6/7/2018  9:45 AM                                vancomycin 50 mg/mL Liqd solution   Commonly known as:  VANOCIN   125mg oral Vancomycin QID until 6/14, 125 mg TID until 6/20, 125 mg BID until 6/27, 125 mg daily until 7/4   Last time this was given:  125 mg on 6/7/2018 12:07 PM                                VITAMIN D-400 400 units Tabs   Take 2 tablets by mouth daily                                warfarin 2.5 MG tablet   Commonly known as:  COUMADIN   Take 2.5mg 6/7, take 1.25mg on 6/8, take 2.5mg on 6/9, and take 1.25mg on 6/10. 2. INR check on 6/11 and subsequent warfarin doses pending INR results.   Last time this was given:  1.5 mg on 6/6/2018  6:42 PM

## 2018-05-31 NOTE — ED NOTES
Bed: ED12  Expected date: 5/31/18  Expected time: 10:24 AM  Means of arrival: Ambulance  Comments:  Hen 428  79 y/o M  AMS  Triaged: Yellow  ETA 5 min

## 2018-05-31 NOTE — IP AVS SNAPSHOT
[address] [address]    Phone:  [phone]                                       After Visit Summary   5/31/2018    Priscilla Way    MRN: [id number]           After Visit Summary Signature Page     I have received my discharge instructions, and my questions have been answered. I have discussed any challenges I see with this plan with the nurse or doctor.    ..........................................................................................................................................  Patient/Patient Representative Signature      ..........................................................................................................................................  Patient Representative Print Name and Relationship to Patient    ..................................................               ................................................  Date                                            Time    ..........................................................................................................................................  Reviewed by Signature/Title    ...................................................              ..............................................  Date                                                            Time

## 2018-05-31 NOTE — ED TRIAGE NOTES
Priscilla presents by ambulance with c/o AMS over last 24 hours. History of multiple strokes with deficits, dementia, diabetes, kidney and liver transplant. Per EMS, he usually responds to voice, but is no longer following commands. VSS.

## 2018-05-31 NOTE — ED NOTES
General acute hospital, [place]   ED Nurse to Floor Handoff     Priscilla Way is a 78 year old male who speaks Malaysian and lives with family members,  in a home  They arrived in the ED by ambulance from home    ED Chief Complaint: Altered Mental Status    ED Dx;   Final diagnoses:   Acute renal failure, unspecified acute renal failure type (H)   Dehydration   Diarrhea, unspecified type         Needed?: Yes    Allergies: No Known Allergies.  Past Medical Hx:   Past Medical History:   Diagnosis Date     Dementia 2004    multiple acute infarcts, SV dis on CT     Diabetes type 2, controlled (H)      Hepatitis C      Kidney transplanted 2000     Liver transplant 2000     Unspecified cerebral artery occlusion with cerebral infarction       Baseline Mental status: Stroke history  Current Mental Status changes: other unresponsive to voice, grabbs at staff when attempting to do cares, altered    Infection present or suspected this encounter: yes urinary  Sepsis suspected: No  Isolation type: Enteric     Activity level - Baseline/Home:  Total Care  Activity Level - Current:   Total Care    Bariatric equipment needed?: No    In the ED these meds were given:   Medications   0.9% sodium chloride BOLUS (0 mLs Intravenous Stopped 5/31/18 1445)     Followed by   sodium chloride 0.9% infusion (not administered)   carvedilol (COREG) tablet 6.25 mg (not administered)       Drips running?  No    Home pump  No    Current LDAs  Peripheral IV 05/31/18 Right;Lateral Lower forearm (Active)   Site Assessment WDL 5/31/2018 11:24 AM   Line Status Saline locked 5/31/2018 11:24 AM   Phlebitis Scale 0-->no symptoms 5/31/2018 11:24 AM   Number of days:0       Pressure Injury 11/10/16 Left Heel Stage 2 (Active)   Number of days:567       Wound 11/09/16 Rectum Suspected pressure ulcer sm pinkish wounds on buttocks  (Active)   Number of days:568       Incision/Surgical Site 08/21/16 Right;Lower Abdomen (Active)   Number  of days:648       Labs results:   Labs Ordered and Resulted from Time of ED Arrival Up to the Time of Departure from the ED   CBC WITH PLATELETS DIFFERENTIAL - Abnormal; Notable for the following:        Result Value    WBC 15.2 (*)     RBC Count 3.21 (*)     Hemoglobin 9.8 (*)     Hematocrit 29.8 (*)     Absolute Neutrophil 11.0 (*)     Absolute Monocytes 1.6 (*)     All other components within normal limits   COMPREHENSIVE METABOLIC PANEL - Abnormal; Notable for the following:     Glucose 131 (*)     Urea Nitrogen 60 (*)     Creatinine 2.51 (*)     GFR Estimate 25 (*)     GFR Estimate If Black 30 (*)     Calcium 8.2 (*)     Albumin 2.5 (*)     All other components within normal limits   INR - Abnormal; Notable for the following:     INR 4.00 (*)     All other components within normal limits   LACTIC ACID WHOLE BLOOD   ROUTINE UA WITH MICROSCOPIC   PERIPHERAL IV CATHETER   PERIPHERAL IV CATHETER   NURSING DRAW AND HOLD   CLOSTRIDIUM DIFFICILE TOXIN B       Imaging Studies:   Recent Results (from the past 24 hour(s))   CT Head w/o Contrast    Narrative    CT head without: 5/31/2018 12:25 PM    Provided History: Altered level of consciousness    Comparison: CT head 11/8/2016.    Technique: Using multidetector thin collimation helical acquisition  technique, axial, coronal and sagittal CT images from the skull base  to the vertex were obtained without intravenous contrast.     Findings:    No intracranial hemorrhage, mass effect, midline shift or abnormal  extra axial fluid collection. Stable chronic right thalamic lacunar  infarct. Stable moderate to advanced leukoaraiosis and moderate  generalized parenchymal volume loss. Ventricles are not enlarged out  of proportion to the cerebral sulci. Gray-white matter differentiation  is intact.    Bilateral mastoid effusions, left greater than right. Paranasal  sinuses are clear. Calvarium is intact. Debris in left external  auditory canal.      Impression    Impression:    1. No acute intracranial pathology.  2. Stable chronic right thalamic infarct and moderate to advanced  chronic small vessel ischemic disease  3. Stable moderate generalized parenchymal volume loss.  4. New bilateral mastoid effusions, left greater than right.    I have personally reviewed the examination and initial interpretation  and I agree with the findings.    LAWRENCE MONTES DE OCA MD   XR Chest 2 Views    Narrative    XR CHEST 2 VW  5/31/2018 1:43 PM      HISTORY: coughing;     COMPARISON: Chest x-ray 3/21/2018    TECHNIQUE: Upright PA and lateral views of the chest    FINDINGS: No focal airspace opacity or pneumothorax or pleural  effusion. Prominent pulmonary vasculature. Cardiomedial silhouette is  within normal limits. Trachea is midline. IVC filter is in place.  Surgical clips in the upper abdomen. No significant degenerative  change of the thoracic spine.      Impression    IMPRESSION: Pulmonary venous congestion. No focal airspace opacity.    I have personally reviewed the examination and initial interpretation  and I agree with the findings.    MAYA YUEN MD       Recent vital signs:   /88  Pulse 78  Temp 98.4  F (36.9  C) (Axillary)  Resp 16  SpO2 98%    Cardiac Rhythm: Normal Sinus  Pt needs tele? No  Skin/wound Issues: none, monitor buttocks, pink area noted near rectum. Needs complete skin assessment on arrival to unit.    Code Status: Full Code    Pain control: pt had none    Nausea control: pt had none    Abnormal labs/tests/findings requiring intervention: Altered mental status, pending UA.    Family present during ED course? Yes   Family Comments/Social Situation comments: Lives with family who cares for pt. Son in law at bedside in ED.    Tasks needing completion: None    Livia Stokes RN  [facility]-- [phone]  [phone] [place] [facility]  [phone] Health system

## 2018-05-31 NOTE — PROGRESS NOTES
".  Social Work: Assessment with Discharge Plan    Patient Name:  Priscilla Way  :  1940  Age:  78 year old  MRN:  [id number]  Risk/Complexity Score:     Completed assessment with:  Chart review, ED RN, patient's son in law    Presenting Information   Reason for Referral:  Potential need for community services upon discharge  Date of Intake:  May 31, 2018  Referral Source:  Chart Review  Decision Maker:  Daughters: Cruz Way (p) [phone] and May Paige (p) [phone]  Alternate Decision Maker:    Health Care Directive:  Health Care Directive Agent (if patient not able to make decisions). No advance directive is on file.  Patient not appropriate to make decisions.  Living Situation:  Apartment  Previous Functional Status:  Assistance with Other:  requires assistance with all ADLs/IADLs.  Patient is bedbound and requiring 24 hour care. Patient has a alexandrea lift and wheelchair in the home.  Son in law stated he generally goes transfers between the bed and a recliner through the day with assistance from family members and the PCA.  Son in law indicated patient typically requires an assist of 2 with transfers and cares due to \"acting out behaviors.\"  Patient and family understanding of hospitalization:  Son-in-law indicated patient \"has an infection like last time.\" He also reported concerns as he noticed patient \"looked stuck\" this morning.  He stated patient has not been responding to any verbal communication or movement. \"He doesn't even look when you wave in front of his face.\"  Cultural/Language/Spiritual Considerations:  Patient is American speaking.  Adjustment to Illness:  Patient does not appear to respond to any commands.  Son-in-law described patient as being \"in a vegetative state.\" Per RN, patient not oriented to person, place or time.    Physical Health  Reason for Admission:    1. Acute renal failure, unspecified acute renal failure type (H)    2. Dehydration    3. Diarrhea, unspecified type  "     Services Needed/Recommended:  Other:  TBD. Patient currently has PCA and family support.     Mental Health/Chemical Dependency  Diagnosis:  None reported.  Son-in-law indicated this has been difficult to determine as patient has dementia and is non-verbal.  Support/Services in Place:  NA  Services Needed/Recommended:  NA    Support System  Significant relationship at present time:  PCA and family members (daughters, son-in-law)  Family of origin is available for support:  Yes  Other support available:  2 brothers and grandchildren  Gaps in support system:   None identified  Patient is caregiver to:  None     Provider Information   Primary Care Physician:  Randy Fuentes   [phone]   Clinic:  [address] [place] [address] / [facility] [address]      :  Not dicussed    Financial   Income Source:  Son-in-law was not sure of this information as he indicated patient's daughters may know more updated information.  Son-in-law guessed that patient is receiving social security.  Financial Concerns:  None were presented at this time.  Insurance:    Payor/Plan Subscriber Name Rel Member # Group #   [employer]* YADIRA BREAUX  [id number]       [id number]       Discharge Plan   Patient and family discharge goal:  Son-in-law reported home with PCA and family support  Provided education on discharge plan:  YES  Patient agreeable to discharge plan:  YES  A list of Medicare Certified Facilities was provided to the patient and/or family to encourage patient choice. Patient's choices for facility are:  Not discussed at this time.  NA  Will NH provide Skilled rehabilitation or complex medical:  NA  General information regarding anticipated insurance coverage and possible out of pocket cost was discussed. Patient and patient's family are aware patient may incur the cost of transportation to the facility, pending insurance payment: YES  Barriers to discharge:  TBD-pending medical evaluation and team  recommendations    Discharge Recommendations   Anticipated Disposition:  TBD-pending recommendations from medical team  Transportation Needs:  Medical:  Wheelchair and Stretcher. TBD by patient's status at discharge.  Name of Transportation Company and Phone:  TBD    Additional comments   Social work met with patient and his son in law in ED upon admission.  Patient was not responsive to verbal statements from social work.  Per ED RN, patient is not oriented X 3.  Son in law indicated patient has been cared for in his apartment in [place] with assistance from a PCA and rotating schedule of family members.  He is bed bound, being transferred to a wheelchair or a recliner at home with the use of a alexandrea lift.  Per chart review, patient reportedly also had [facility] skilled RN services which was not confirmed by son in law.  Son in law indicated concerns about patient's recent decline in status which led to hospital admission.  Plan for disposition unknown at this time but son in law indicates family open to medical team recommendations.    Soo Yeon Han, MSW, LICSW  Covering SW for [facility] ED   Pager: [phone] Mon-Sat 9am-9p, on-call/after hours pager [phone]

## 2018-05-31 NOTE — LETTER
Transition Communication Hand-off for Care Transitions to Next Level of Care Provider    Name: Priscilla Way  : 1940  MRN #: [id number]  Primary Care Provider: Randy Fuentes     Primary Clinic: [address] [place] [address]  [facility] [address]     Reason for Hospitalization:  Dehydration [E86.0]  Acute renal failure, unspecified acute renal failure type (H) [N17.9]  Diarrhea, unspecified type [R19.7]  Admit Date/Time: 2018 10:36 AM  Discharge Date: 2018  Payor Source: Payor: [facility] / Plan: [employer] [facility] / Product Type: HMO /          Reason for Communication Hand-off Referral: Fragility  Other Continuity of care    Discharge Plan: discharge to home with resumption of home services as family continues to evaluate next steps when it comes to pt's nutrition        Concern for non-adherence with plan of care:   NO  Discharge Needs Assessment:  Needs       Most Recent Value    Equipment Currently Used at Home lift device, hospital bed          Already enrolled in Tele-monitoring program and name of program: NA  Follow-up specialty is recommended: No    Follow-up plan:  Future Appointments  Date Time Provider Department Center   2018 10:00 AM Reva Mcgraw [facility]   2018 10:00 AM Gurjit Sen [facility]   6/10/2018 10:00 AM Gurjit Sen [facility]   2018 10:00 AM Reva Mcgraw [facility]   2018 10:00 AM Anisha Diane [facility]   2018 7:35 AM Randy Funetes MD [facility]   2018 4:20 PM Leo Sahu MD [facility]       Any outstanding tests or procedures:        Referrals     Future Labs/Procedures    Home care nursing referral     Comments:    Resume home care services:  _______________________  [facility]  Phone  [phone]  Fax  [phone]  ______________________      Resume previous skilled RN orders.   Pt will need INR and BMP drawn on 18.     Your provider has  ordered home care nursing services. If you have not been contacted within 2 days of your discharge please call the inpatient department phone number at [phone] .    Medication Therapy Management Referral     Comments:    MTM referral reason            Patient has 5 PTA or Discharge Medications AND one of the following   diagnoses: DM,HF,COPD,AMI DX,PULM HTN       This service is designed to help you get the most from your medications.  A specially trained pharmacist will work closely with you and your doctors  to solve any problems related to your medications and to help you get the   best results from taking them.      The Medication Therapy Management staff will call you to schedule an appointment.          Pam Nickerson RN Care Coordinator  [phone]  AVS/Discharge Summary is the source of truth; this is a helpful guide for improved communication of patient story

## 2018-05-31 NOTE — IP AVS SNAPSHOT
"    [address] [facility]: [phone]                                              INTERAGENCY TRANSFER FORM - PHYSICIAN ORDERS   2018                    Hospital Admission Date: 2018  YADIRA BREAUX   : 1940  Sex: Male        Attending Provider: (none)    Allergies:  No Known Allergies    Infection:  None   Service:  GENERAL MEDI    Ht:  1.727 m (5' 8\")   Wt:  77.9 kg (171 lb 12.8 oz)   Admission Wt:  66.2 kg (146 lb)    BMI:  26.12 kg/m 2   BSA:  1.93 m 2            Patient PCP Information     Provider PCP Type    Randy Fuentes MD General      ED Clinical Impression     Diagnosis Description Comment Added By Time Added    Acute renal failure, unspecified acute renal failure type (H) [N17.9] Acute renal failure, unspecified acute renal failure type (H) [N17.9]  Zaki Kern MD 2018  1:51 PM    Dehydration [E86.0] Dehydration [E86.0]  Zaki Kern MD 2018  1:51 PM    Diarrhea, unspecified type [R19.7] Diarrhea, unspecified type [R19.7]  Zaki Kern MD 2018  1:51 PM      Hospital Problems as of 2018              Priority Class Noted POA    LILIAM (acute kidney injury) (H)   2016 Yes    H/O Clostridium difficile infection   3/1/2018 Yes    Altered mental status   2018 Yes      Non-Hospital Problems as of 2018              Priority Class Noted    Memory loss   2012    HCV (hepatitis C virus)   2012    Liver replaced by transplant (H)   2012    Diabetes mellitus, type 2 (H)   2015    Acute kidney injury (H)   2016    Deep vein thrombosis (DVT) (H) [I82.409]   2016    Long-term (current) use of anticoagulants [Z79.01]   2016    Delirium   2016    Generalized edema   2016    Altered mental state   2016    Anemia in stage 3 chronic kidney disease   2016    CKD (chronic kidney disease) stage 3, GFR 30-59 ml/min   2016    Acute kidney failure (H)   3/21/2018      Code " Status History     Date Active Date Inactive Code Status Order ID Comments User Context    6/6/2018 11:47 AM  Full Code [id number]  Boby Tirado MD Outpatient    5/31/2018  8:05 PM 6/6/2018 11:47 AM Full Code [id number]  Phoebe Lamb PA-C Inpatient    3/24/2018 10:02 AM 5/31/2018  8:05 PM Full Code [id number]  Katharine Mitchell MD Outpatient    3/21/2018  9:01 PM 3/24/2018 10:02 AM Full Code [id number]  Patel Gillespie, APRN CNP Inpatient    11/12/2016 12:41 PM 3/21/2018  9:01 PM Full Code [id number]  Caroline Silveira PA-C Outpatient    11/8/2016  8:30 PM 11/12/2016 12:41 PM Full Code [id number]  Patel Gillespie, APRN CNP Inpatient    8/23/2016  5:40 PM 9/1/2016  9:55 PM Full Code [id number]  Myesha Maria, [facility] Inpatient    8/21/2016  3:52 PM 8/23/2016  5:40 PM DNR/DNI [id number]  Caroline Kwan PA-C Inpatient    8/21/2016  3:50 PM 8/21/2016  3:52 PM DNI [id number]  Ciaran Gonzalez MD Inpatient    8/20/2016  9:32 PM 8/21/2016  3:50 PM Full Code [id number]  Monique Sorenson PA-C Inpatient    1/3/2012  2:44 PM 8/20/2016  9:32 PM Full Code [id number]  Maty Reynolds MD Outpatient    12/30/2011 10:05 PM 1/3/2012  2:44 PM Full Code [id number]  Suzette Ramirez Outpatient         Medication Review      START taking        Dose / Directions Comments    amLODIPine 10 MG tablet   Commonly known as:  NORVASC   Used for:  Hypertension, unspecified type        Dose:  10 mg   Take 1 tablet (10 mg) by mouth daily   Quantity:  30 tablet   Refills:  1        melatonin 5 MG tablet   Used for:  Vascular dementia with depressed mood        Dose:  5 mg   Take 1 tablet (5 mg) by mouth nightly as needed for sleep   Refills:  0        memantine XR 21 MG 24 hr capsule   Commonly known as:  NAMENDA XR   Used for:  Vascular dementia with depressed mood        Dose:  21 mg   Take 1 capsule (21 mg) by mouth daily   Quantity:  30 capsule   Refills:  1        omeprazole 2 mg/mL Susp   Commonly known as:   priLOSEC   Used for:  Colitis due to Clostridium difficile   Replaces:  omeprazole 20 MG CR capsule        Dose:  20 mg   Take 10 mLs (20 mg) by mouth 2 times daily   Quantity:  400 mL   Refills:  3        polyethylene glycol Packet   Commonly known as:  MIRALAX/GLYCOLAX   Used for:  Slow transit constipation        Dose:  17 g   Take 17 g by mouth daily as needed for constipation   Quantity:  30 packet   Refills:  1        QUEtiapine 25 MG tablet   Commonly known as:  SEROquel   Used for:  Vascular dementia with depressed mood        Dose:  25 mg   Take 1 tablet (25 mg) by mouth nightly as needed (Agitation)   Quantity:  30 tablet   Refills:  1        tacrolimus 1 mg/mL Susp   Commonly known as:  PROGRAF BRAND   Used for:  Kidney replaced by transplant   Replaces:  tacrolimus 0.5 MG capsule        Dose:  1.5 mg   Take 1.5 mLs (1.5 mg) by mouth every 12 hours   Quantity:  100 mL   Refills:  3        vancomycin 50 mg/mL Liqd solution   Commonly known as:  VANOCIN   Indication:  Clostridium difficile Bacteria   Used for:  Colitis due to Clostridium difficile        125mg oral Vancomycin QID until 6/14, 125 mg TID until 6/20, 125 mg BID until 6/27, 125 mg daily until 7/4   Quantity:  185 mL   Refills:  0          CONTINUE these medications which may have CHANGED, or have new prescriptions. If we are uncertain of the size of tablets/capsules you have at home, strength may be listed as something that might have changed.        Dose / Directions Comments    docusate sodium 100 MG capsule   Commonly known as:  COLACE   This may have changed:    - when to take this  - reasons to take this   Used for:  Unspecified constipation        Dose:  100 mg   Take 1 capsule by mouth 2 times daily.   Quantity:  60 capsule   Refills:  12    For constipation       warfarin 2.5 MG tablet   Commonly known as:  COUMADIN   This may have changed:  additional instructions   Used for:  Deep vein thrombosis (DVT) of both lower extremities,  unspecified chronicity, unspecified vein (H)        Take 2.5mg 6/7, take 1.25mg on 6/8, take 2.5mg on 6/9, and take 1.25mg on 6/10. 2. INR check on 6/11 and subsequent warfarin doses pending INR results.   Quantity:  15 tablet   Refills:  1          CONTINUE these medications which have NOT CHANGED        Dose / Directions Comments    acetaminophen 500 MG tablet   Commonly known as:  TYLENOL   Used for:  Pain in joint, pelvic region and thigh        Take  by mouth. Take one tablet by mouth every 4-6 hours as needed.   Quantity:  100 tablet   Refills:  9        blood glucose monitor Kit   Used for:  High blood sugar        Use to test blood sugar 1-2 times daily or as directed.   Quantity:  1 kit   Refills:  0    Please provide the proper supplies for insurance       blood glucose monitoring test strip   Commonly known as:  BL TEST STRIP PACK   Used for:  High blood sugar        Use to test blood sugar 1-2 times daily or as directed.   Quantity:  100 strip   Refills:  3    Please provide the proper supplies for insurance       carvedilol 6.25 MG tablet   Commonly known as:  COREG   Used for:  Renal hypertension, stage 1-4 or unspecified chronic kidney disease        Dose:  6.25 mg   Take 1 tablet (6.25 mg) by mouth 2 times daily (with meals)   Quantity:  180 tablet   Refills:  0        DAILY VITES/IRON Tabs   Used for:  Preventive measure        Dose:  1 tablet   Take 1 tablet by mouth daily   Quantity:  90 tablet   Refills:  3        dimethicone-zinc oxide cream   Used for:  Dermatitis        Apply topically 2 times daily as needed for dry skin or other   Quantity:  142 g   Refills:  0        LANCETS MICRO THIN 33G Misc   Used for:  High blood sugar        Dose:  1 Device   1 Device 2 times daily Use to test blood sugar 1-2 times daily or as directed.   Quantity:  100 each   Refills:  3    Please provide the proper supplies for insurance       order for DME   Used for:  Acute renal failure, unspecified acute renal  "failure type (H), Memory loss, Acute kidney injury (H)        Equipment being ordered: Other: Damian lift Treatment Diagnosis: memory loss, dementia, weakness, ARF   Quantity:  1 Units   Refills:  0        order for DME   Used for:  Alzheimer's dementia with behavioral disturbance, unspecified timing of dementia onset        Equipment being ordered: Hospital Bed and damian lift   Quantity:  1 each   Refills:  0        VITAMIN D-400 400 units Tabs   Used for:  Vitamin D deficiency        Dose:  2 tablet   Take 2 tablets by mouth daily   Quantity:  180 tablet   Refills:  0    REMINDER: CLINIC APPOINTMENT 3/28/18         STOP taking     ALPRAZolam 0.25 MG tablet   Commonly known as:  XANAX           aspirin 81 MG EC tablet           furosemide 20 MG tablet   Commonly known as:  LASIX           omeprazole 20 MG CR capsule   Commonly known as:  priLOSEC   Replaced by:  omeprazole 2 mg/mL Susp           tacrolimus 0.5 MG capsule   Replaced by:  tacrolimus 1 mg/mL Susp                   Summary of Visit     Reason for your hospital stay       You were admitted to the hospital for worsening mental status and severe diarrhea. 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             After Care     Activity       Your activity upon discharge: bedrest, falls precautions       Diet       Follow this diet upon discharge: Orders Placed This Encounter      Room Service      Calorie Counts      Regular Diet Adult       Discharge Instructions       Left Heel wound: Every 3 days   1. Clean intact skin with gentle soap and water, dry and dry again.  2. Paint with No Sting Skin Prep and allow to dry thoroughly  3. Press a Mepilex  Border Dressingto the area, making sure to conform nicely to skin curvatures (begin placing the Mepilex at the most distal aspect first, smooth into place in an upward direction, then smooth side to side)   4. Time and date dressing change and gilmar with a \"P\" for prevention.  5. Reposition pt every 1 to 2 " hours when in bed and keep prevalon boots on at all times       Monitor and record       Stool output             Referrals     Home care nursing referral       Resume home care services:  _______________________  [facility]  Phone  [phone]  Fax  [phone]  ______________________      Resume previous skilled RN orders.   Pt will need INR and BMP drawn on Monday 6/11/18.     Your provider has ordered home care nursing services. If you have not been contacted within 2 days of your discharge please call the inpatient department phone number at [phone] .       Medication Therapy Management Referral       MTM referral reason            Patient has 5 PTA or Discharge Medications AND one of the following   diagnoses: DM,HF,COPD,AMI DX,PULM HTN       This service is designed to help you get the most from your medications.  A specially trained pharmacist will work closely with you and your doctors  to solve any problems related to your medications and to help you get the   best results from taking them.      The Medication Therapy Management staff will call you to schedule an appointment.             Your next 10 appointments already scheduled     Jun 25, 2018  7:35 AM CDT   (Arrive by 7:20 AM)   Return Visit with Randy Fuentes MD   [facility] [facility]    [address] [place] [id number] [phone]            Jul 09, 2018  4:20 PM CDT   (Arrive by 4:05 PM)   INFLAMMATORY BOWEL DISEASE with Leo Sahu MD   [facility] ([place] [facility])    [address] [place] [id number] [phone]              Follow-Up Appointment Instructions     Future Labs/Procedures    Adult [facility] Follow-up and recommended labs and tests     Comments:    Follow up with primary care provider, Randy Fuentes, within 7 days for hospital follow- up.  The  following labs/tests are recommended: BMP.  *Patient is scheduled with Dr. Fuentes for his next available appointment on 6/25 at 7:35a. He can contact the clinic directly at [phone] if he would like to see another provider for a sooner appointment*  Resume warfarin monitoring with INR follow up on Friday 6/8.   Follow up with transplant nephrology as previously scheduled      Appointments on [facility]/or [facility] (with [facility] or [facility] provider or service). Call [phone] if you haven't heard regarding these appointments within 7 days of discharge.      Follow-Up Appointment Instructions     Adult [facility] Follow-up and recommended labs and tests       Follow up with primary care provider, Randy Fuentes, within 7 days for hospital follow- up.  The following labs/tests are recommended: BMP.  *Patient is scheduled with Dr. Fuentes for his next available appointment on 6/25 at 7:35a. He can contact the clinic directly at [phone] if he would like to see another provider for a sooner appointment*  Resume warfarin monitoring with INR follow up on Friday 6/8.   Follow up with transplant nephrology as previously scheduled      Appointments on [facility]/or [facility] (with [facility] or [facility] provider or service). Call [phone] if you haven't heard regarding these appointments within 7 days of discharge.             Statement of Approval     Ordered          06/07/18 1043  I have reviewed and agree with all the recommendations and orders detailed in this document.  EFFECTIVE NOW     Approved and electronically signed by:  Boby Tirado MD

## 2018-05-31 NOTE — PHARMACY-ADMISSION MEDICATION HISTORY
Admission medication history interview status for the 5/31/2018 admission is complete. See Epic admission navigator for allergy information, pharmacy, prior to admission medications and immunization status.     Medication history interview sources:  son (who also called patient's PCA and daughter to help answer questions throughout interview) and pharmacy fill records.    Changes made to PTA medication list (reason)  Added: n/a  Deleted: memantine (not taking), quetiapine (not taking), darbepoetin matthew (therapy complete) and vancomycin oral solution (therapy complete).  Changed: Updated warfarin dosing, detailed information below.    Additional medication history information (including reliability of information, actions taken by pharmacist):  - Patient was non-verbal.  Medication history completed with patient's son who also called the PCA and patient's daughter for assistance.  - Confirmed influenza vaccination received 3/24/2018.  - No known drug allergies.  - Home pharmacy is [facility] in [place], [place].  - Patient takes BRAND Prograf 1.5 mg every 12 hours.  - Patient's warfarin's INR goal 2-3.  Patient takes 1.25 mg on Friday.  Patient takes 2.5 mg every Sunday, Monday, Tuesday, Wednesday, Thursday and Saturday.  Last dose 2.5 mg 5/30/2018.      Prior to Admission medications    Medication Sig Last Dose Taking? Auth Provider   acetaminophen (TYLENOL) 500 MG tablet Take  by mouth. Take one tablet by mouth every 4-6 hours as needed. Past Week at PRN Yes Randy Fuentes MD   ALPRAZolam (XANAX) 0.25 MG tablet Give 1/2 half tab po half an hour before home lab draw Past Month at PRN Yes Randy Fuentes MD   aspirin 81 MG EC tablet Take 1 tablet (81 mg) by mouth daily 5/30/2018 at AM Yes Randy Fuentes MD   carvedilol (COREG) 6.25 MG tablet Take 1 tablet (6.25 mg) by mouth 2 times daily (with meals) 5/30/2018 at AM/PM Yes Randy Fuentes MD   Cholecalciferol (VITAMIN D-400) 400 UNITS TABS  Take 2 tablets by mouth daily 5/30/2018 at AM Yes Randy Fuentes MD   dimethicone-zinc oxide (EUCERIN) cream Apply topically 2 times daily as needed for dry skin or other Past Week at PRN Yes Caroline Silveira PA-C   docusate sodium (COLACE) 100 MG capsule Take 1 capsule by mouth 2 times daily.  Patient taking differently: Take 100 mg by mouth 2 times daily as needed  Past Week at PRN Yes Randy Fuentes MD   furosemide (LASIX) 20 MG tablet Take 1 tablet (20 mg) by mouth daily 5/30/2018 at AM Yes Randy Fuentes MD   Multiple Vitamins-Iron (DAILY VITES/IRON) TABS Take 1 tablet by mouth daily 5/30/2018 at AM Yes Randy Fuentes MD   omeprazole (PRILOSEC) 20 MG CR capsule Take 1 capsule (20 mg) by mouth daily 5/30/2018 at AM Yes Randy Fuentes MD   PROGRAF (BRAND) 0.5 MG CAPSULE Take 3 capsules (1.5 mg) by mouth every 12 hours 5/30/2018 at AM/PM Yes Kevyn Pineda MD   warfarin (COUMADIN) 2.5 MG tablet Take 3.75mgs on Thursdays and 2.5mgs on all other days or as directed by the [facility]  Patient taking differently: Take 1.25 mg on Fridays  Take 2.5 mg Sunday, Monday, Tuesday, Wednesday, Thursday and Saturday 5/30/2018 at AM Yes Randy Fuentes MD   blood glucose (BL TEST STRIP PACK) test strip Use to test blood sugar 1-2 times daily or as directed.   Randy Fuentes MD   blood glucose monitor KIT Use to test blood sugar 1-2 times daily or as directed.   Randy Fuentes MD   LANCETS MICRO THIN 33G MISC 1 Device 2 times daily Use to test blood sugar 1-2 times daily or as directed.   Randy Fuentes MD   order for DME Equipment being ordered: Hospital Bed and damian lift   Liya Dowling DO   order for DME Equipment being ordered: Other: Damian lift  Treatment Diagnosis: memory loss, dementia, weakness, ARF   Khot, Michel Mercado MD         Medication history completed by: Aidan Laguna PD4 Student

## 2018-05-31 NOTE — IP AVS SNAPSHOT
` `     UNIT 5B [facility]: [phone]            Medication Administration Report for Priscilla Way as of 18 1402   Legend:    Given Hold Not Given Due Canceled Entry Other Actions    Time Time (Time) Time  Time-Action       Inactive    Active    Linked        Medications 18    acetaminophen (TYLENOL) tablet 650 mg  Dose: 650 mg  Freq: EVERY 6 HOURS PRN Route: PO  PRN Reason: fever  Start: 18   Admin Instructions: Maximum acetaminophen dose from all sources = 75 mg/kg/day not to exceed 4 grams/day.    Admin. Amount: 2 tablet (2 × 325 mg tablet)  Dispense Loc: [facility] ADS 5B2               amLODIPine (NORVASC) tablet 10 mg  Dose: 10 mg  Freq: DAILY Route: PO  Start: 18 0800   Admin. Amount: 1 tablet (1 × 10 mg tablet)  Last Admin: 18 1046  Dispense Loc: [facility] ADS 5B2          1046 (10 mg)-Given        (0945)-Not Given           carvedilol (COREG) tablet 12.5 mg  Dose: 12.5 mg  Freq: 2 TIMES DAILY WITH MEALS Route: PO  Start: 18 1800   Admin Instructions: Hold for SBP<105    Admin. Amount: 1 tablet (1 × 12.5 mg tablet)  Last Admin: 18 1842  Dispense Loc: [facility] ADS 5B2        (183)-Not Given [C]        0906 (12.5 mg)-Given       1749 (12.5 mg)-Given        1046 (12.5 mg)-Given       1842 (12.5 mg)-Given        (0946)-Not Given       [ ] 1800           carvedilol (COREG) tablet 6.25 mg  Dose: 6.25 mg  Freq: ONCE Route: PO  Start: 18 1045   Admin. Amount: 1 tablet (1 × 6.25 mg tablet)  Dispense Loc: [facility] ADS 5B2  Administrations Remainin        (1121)-Not Given [C]              dimethicone-zinc oxide (EUCERIN) cream  Freq: 2 TIMES DAILY PRN Route: Top  PRN Reasons: dry skin,other  Start: 18   Admin Instructions: Apply to dry skin    Dispense Loc: [facility]               glucose gel 15-30 g  Dose: 15-30 g  Freq: EVERY 15 MIN PRN Route: PO  PRN Reason: low blood  sugar  Start: 05/31/18 2005   Admin Instructions: Give 15 g for BG 51 to 69 mg/dL IF patient is conscious and able to swallow. Give 30 g for BG less than or equal to 50 mg/dL IF patient is conscious and able to swallow. Do NOT give glucose gel via enteral tube.  IF patient has enteral tube: give apple juice 120 mL (4 oz or 15 g of CHO) via enteral tube for BG 51 to 69 mg/dL.  Give apple juice 240 mL (8 oz or 30 g of CHO) via enteral tube for BG less than or equal to 50 mg/dL.    ~Oral gel is preferable for conscious and able to swallow patient.   ~IF gel unavailable or patient refuses may provide apple juice 120 mL (4 oz or 15 g of CHO). Document juice on I and O flowsheet.    Admin. Amount: 15-30 g  Dispense Loc: [facility] ADS 5B2  Volume: 93.75 mL              Or  dextrose 50 % injection 25-50 mL  Dose: 25-50 mL  Freq: EVERY 15 MIN PRN Route: IV  PRN Reason: low blood sugar  Start: 05/31/18 2005   Admin Instructions: Use if have IV access, BG less than 70 mg/dL and meet dose criteria below:  Dose if conscious and alert (or disorientated) and NPO = 25 mL  Dose if unconscious / not alert = 50 mL  Vesicant. For ordered doses up to 25 g, give IV Push undiluted. Give each 5g over 1 minute.    Admin. Amount: 25-50 mL  Dispense Loc: [facility] ADS 5B2  Infused Over: 1-5 Minutes  Volume: 50 mL              Or  glucagon injection 1 mg  Dose: 1 mg  Freq: EVERY 15 MIN PRN Route: SC  PRN Reason: low blood sugar  PRN Comment: May repeat x 1 only  Start: 05/31/18 2005   Admin Instructions: May give SQ or IM. ONLY use glucagon IF patient has NO IV access AND is UNABLE to swallow AND blood glucose is LESS than or EQUAL to 50 mg/dL.  If ordered IV, give IV Push over 1 minute. Reconstitute with 1mL sterile water.    Admin. Amount: 1 mg  Dispense Loc: [facility] ADS 5B2               hydrALAZINE (APRESOLINE) injection 10 mg  Dose: 10 mg  Freq: EVERY 4 HOURS PRN Route: IV  PRN Reason: high blood pressure  PRN Comment: SBP>170 or  DBP>110  Start: 06/02/18 1345   Admin Instructions: For ordered doses up to 40 mg, give IV Push undiluted over 1 minute.    Admin. Amount: 10 mg = 0.5 mL Conc: 20 mg/mL  Last Admin: 06/05/18 2242  Dispense Loc: [facility] ADS 5B2  Volume: 0.5 mL      2311 (10 mg)-Given        1319 (10 mg)-Given       2330 (10 mg)-Given        1553 (10 mg)-Given        0020 (10 mg)-Given       0620 (10 mg)-Given       2242 (10 mg)-Given             insulin aspart (NovoLOG) inj (RAPID ACTING)  Dose: 1-5 Units  Freq: AT BEDTIME Route: SC  Start: 05/31/18 2200   Admin Instructions: MEDIUM INSULIN RESISTANCE DOSING    Do Not give Bedtime Correction Insulin if BG less than  200.   For  - 249 give 1 units.   For  - 299 give 2 units.   For  - 349 give 3 units.   For  -399 give 4 units.   For BG greater than or equal to 400 give 5 units.  Notify provider if glucose greater than or equal to 350 mg/dL after administration of correction dose.  If given at mealtime, must be administered 5 min before meal or immediately after.    Admin. Amount: 1-5 Units  Dispense Loc: Contact Rx for dose  Volume: 3 mL     (2214)-Not Given        (2240)-Not Given        (2155)-Not Given        (2123)-Not Given [C]        (2137)-Not Given [C]        (2142)-Not Given        [ ] 2200           insulin aspart (NovoLOG) inj (RAPID ACTING)  Dose: 1-7 Units  Freq: 3 TIMES DAILY BEFORE MEALS Route: SC  Start: 06/01/18 0730   Admin Instructions: Correction Scale - MEDIUM INSULIN RESISTANCE DOSING     Do Not give Correction Insulin if Pre-Meal BG less than 140.   For Pre-Meal  - 189 give 1 unit.   For Pre-Meal  - 239 give 2 units.   For Pre-Meal  - 289 give 3 units.   For Pre-Meal  - 339 give 4 units.   For Pre-Meal - 399 give 5 units.   For Pre-Meal -449 give 6 units  For Pre-Meal BG greater than or equal to 450 give 7 units.   To be given with prandial insulin, and based on pre-meal blood glucose.    Notify  provider if glucose greater than or equal to 350 mg/dL after administration of correction dose.  If given at mealtime, must be administered 5 min before meal or immediately after.    Admin. Amount: 1-7 Units  Last Admin: 06/07/18 1213  Dispense Loc: Contact Rx for dose  Volume: 3 mL     (0854)-Not Given       (1304)-Not Given [C]       (1708)-Not Given        (0853)-Not Given       (1249)-Not Given [C]       1711 (1 Units)-Given        (0936)-Not Given       (1314)-Not Given       (1720)-Not Given        (0901)-Not Given [C]       1205 (1 Units)-Given [C]       (2100)-Not Given [C]        0907 (1 Units)-Given       1244 (2 Units)-Given [C]       1649 (1 Units)-Given [C]        (0834)-Not Given       (1155)-Not Given [C]       1900 (1 Units)-Given        (0912)-Not Given [C]       1213 (1 Units)-Given [C]       [ ] 1700           melatonin tablet 1 mg  Dose: 1 mg  Freq: AT BEDTIME PRN Route: PO  PRN Reason: sleep  Start: 05/31/18 2004   Admin Instructions: Do not give unless at least 6 hours of uninterrupted sleep is expected.    Admin. Amount: 1 tablet (1 × 1 mg tablet)  Last Admin: 06/03/18 2330  Dispense Loc: [facility] ADS 5B2      0010 (1 mg)-Given       2311 (1 mg)-Given        2330 (1 mg)-Given               memantine XR (NAMENDA XR) 24 hr capsule 21 mg  Dose: 21 mg  Freq: DAILY Route: PO  Start: 06/04/18 0800   Admin Instructions: DO NOT CHEW OR CRUSH. Capsule may be opened and used as sprinkles.    Admin. Amount: 1 capsule (1 × 21 mg capsule)  Last Admin: 06/06/18 1046  Dispense Loc: [facility]        0913 (21 mg)-Given        0906 (21 mg)-Given        1046 (21 mg)-Given        (0945)-Not Given           naloxone (NARCAN) injection 0.1-0.4 mg  Dose: 0.1-0.4 mg  Freq: EVERY 2 MIN PRN Route: IV  PRN Reason: opioid reversal  Start: 05/31/18 2004   Admin Instructions: For respiratory rate LESS than or EQUAL to 8.  Partial reversal dose:  0.1 mg titrated q 2 minutes for Analgesia Side Effects Monitoring  Sedation Level of 3 (frequently drowsy, arousable, drifts to sleep during conversation).Full reversal dose:  0.4 mg bolus for Analgesia Side Effects Monitoring Sedation Level of 4 (somnolent, minimal or no response to stimulation).  For ordered doses up to 2mg give IVP. Give each 0.4mg over 15 seconds in emergency situations. For non-emergent situations further dilute in 9mL of NS to facilitate titration of response.    Admin. Amount: 0.1-0.4 mg = 0.25-1 mL Conc: 0.4 mg/mL  Dispense Loc: [facility] ADS 5B2  Volume: 1 mL               omeprazole (priLOSEC) suspension 20 mg  Dose: 20 mg  Freq: 2 TIMES DAILY Route: PO  Start: 06/03/18 1200   Admin Instructions: Shake well.    Admin. Amount: 20 mg = 10 mL Conc: 2 mg/mL  Last Admin: 06/07/18 0947  Dispense Loc: [facility]  Volume: 10 mL       1313 (20 mg)-Given       2330 (20 mg)-Given        (0928)-Not Given       2113 (20 mg)-Given        0906 (20 mg)-Given       1933 (20 mg)-Given by Patient/Family        1047 (20 mg)-Given       2143 (20 mg)-Given        0947 (20 mg)-Given       [ ] 2000           ondansetron (ZOFRAN-ODT) ODT tab 4 mg  Dose: 4 mg  Freq: EVERY 6 HOURS PRN Route: PO  PRN Reasons: nausea,vomiting  Start: 05/31/18 2004   Admin Instructions: This is Step 1 of nausea and vomiting management.  If nausea not resolved in 15 minutes, go to Step 2 prochlorperazine (COMPAZINE). Do not push through foil backing. Peel back foil and gently remove. Place on tongue immediately. Administration with liquid unnecessary  With dry hands, peel back foil backing and gently remove tablet; do not push oral disintegrating tablet through foil backing; administer immediately on tongue and oral disintegrating tablet dissolves in seconds; then swallow with saliva; liquid not required.    Admin. Amount: 1 tablet (1 × 4 mg tablet)  Dispense Loc: [facility] ADS 5B2              Or  ondansetron (ZOFRAN) injection 4 mg  Dose: 4 mg  Freq: EVERY 6 HOURS PRN Route: IV  PRN Reasons:  nausea,vomiting  Start: 05/31/18 2004   Admin Instructions: This is Step 1 of nausea and vomiting management.  If nausea not resolved in 15 minutes, go to Step 2 prochlorperazine (COMPAZINE).  Irritant. For ordered doses up to 4 mg, give IV Push undiluted over 2-5 minutes.    Admin. Amount: 4 mg = 2 mL Conc: 4 mg/2 mL  Dispense Loc: [facility] ADS 5B2  Infused Over: 2-5 Minutes  Volume: 2 mL               Patient is already receiving anticoagulation with heparin, enoxaparin (LOVENOX), warfarin (COUMADIN)  or other anticoagulant medication  Freq: CONTINUOUS PRN Route: XX  Start: 05/31/18 2004   Dispense Loc: [facility]               polyethylene glycol (MIRALAX/GLYCOLAX) Packet 17 g  Dose: 17 g  Freq: DAILY PRN Route: PO  PRN Reason: constipation  Start: 05/31/18 2004   Admin Instructions: Give in 8oz of  water, juice, or soda. Hold for loose stools.  This is the second step of a three step constipation treatment.  1 Packet = 17 grams. Mixed prescribed dose in 8 ounces of water. Follow with 8 oz. of water.    Admin. Amount: 17 g  Dispense Loc: [facility] ADS 5B2               QUEtiapine (SEROquel) half-tab 25 mg  Dose: 25 mg  Freq: AT BEDTIME PRN Route: PO  PRN Comment: Agitation  Start: 05/31/18 2310   Admin. Amount: 2 half-tab (2 × 12.5 mg half-tab)  Last Admin: 06/07/18 0144  Dispense Loc: [facility] ADS 5B2      0010 (25 mg)-Given       2311 (25 mg)-Given        2330 (25 mg)-Given          0034 (25 mg)-Given        0144 (25 mg)-Given           tacrolimus (PROGRAF BRAND) suspension 1.5 mg  Dose: 1.5 mg  Freq: 2 TIMES DAILY. Route: PO  Start: 06/03/18 1800   Admin Instructions: Shake well. Check if BLOOD LEVEL is needed BEFORE administering dose.  As this medication is not commercially available as a liquid, [place] manufactures this product using tacrolimus (PROGRAF BRAND) capsules.    Admin. Amount: 1.5 mg = 1.5 mL Conc: 1 mg/mL  Last Admin: 06/07/18 0945  Dispense Loc: [facility]  Volume: 1.5 mL        1848 (1.5 mg)-Given        0913 (1.5 mg)-Given       2113 (1.5 mg)-Given        0906 (1.5 mg)-Given       1745 (1.5 mg)-Given        1046 (1.5 mg)-Given       1841 (1.5 mg)-Given        0945 (1.5 mg)-Given       [ ] 1800           vancomycin (VANOCIN) solution 125 mg  Dose: 125 mg  Freq: 4 TIMES DAILY Route: PO  Indications of Use: CLOSTRIDIUM DIFFICILE  Start: 18 1600   Admin. Amount: 125 mg = 2.5 mL Conc: 50 mg/mL  Last Admin: 18 1207  Dispense Loc: [facility]  Volume: 2.5 mL     1646 (125 mg)-Given        0010 (125 mg)-Given       0919 (125 mg)-Given       1248 (125 mg)-Given       1711 (125 mg)-Given       2348 (125 mg)-Given        0935 (125 mg)-Given       1314 (125 mg)-Given       1644 (125 mg)-Given        0013 (125 mg)-Given       0913 (125 mg)-Given       1206 (125 mg)-Given       (1640)-Not Given [C]       2111 (125 mg)-Given        0906 (125 mg)-Given       1244 (125 mg)-Given       1645 (125 mg)-Given       1933 (125 mg)-Given by Patient/Family        1046 (125 mg)-Given       1332 (125 mg)-Given       1543 (125 mg)-Given       2141 (125 mg)-Given        0908 (125 mg)-Given       1207 (125 mg)-Given       [ ] 1600       [ ]            Warfarin Therapy Reminder (Check START DATE - warfarin may be starting in the FUTURE)  Dose: 1 each  Freq: CONTINUOUS PRN Route: XX  Start: 18   Admin Instructions: *Note to reorder warfarin daily*  Pharmacy Warfarin Dosing Service  Patient is on Warfarin Therapy - check for daily order    Admin. Amount: 1 each  Dispense Loc: [facility]              Future Medications  Medications 18       warfarin (COUMADIN) tablet 2.5 mg  Dose: 2.5 mg  Freq: ONCE AT 6PM Route: PO  Start: 18 1800   Admin. Amount: 1 tablet (1 × 2.5 mg tablet)  Dispense Loc: [facility]  Administrations Remainin           [ ] 1800          Completed Medications  Medications  18         Dose: 1.5 mg  Freq: ONCE AT 6PM Route: PO  Start: 18 1800   End: 18 1842   Admin. Amount: 1 half-tab (1 × 1.5 mg half-tab)  Last Admin: 18 1842  Dispense Loc: [facility]  Administrations Remainin          1842 (1.5 mg)-Given           Discontinued Medications  Medications 18         Dose: 5 mg  Freq: DAILY Route: PO  Start: 18 0800   End: 18 0739   Admin. Amount: 1 tablet (1 × 5 mg tablet)  Last Admin: 18 0906  Dispense Loc: [facility] ADS 5B2         0906 (5 mg)-Given        0739-Med Discontinued          Dose: 5 mg  Freq: ONCE Route: PO  Start: 18 1045   End: 18 1133   Admin. Amount: 1 tablet (1 × 5 mg tablet)  Dispense Loc: [facility]  Administrations Remainin        ()-Not Given        1133-Med Discontinued           Dose: 1 each  Freq: NO DOSE TODAY (WARFARIN) Route: XX  Start: 18 1406   End: 18 2305   Admin Instructions: No dose of Warfarin due today per order    Admin. Amount: 1 each  Dispense Loc: [facility]         2305-Med Discontinued           Dose: 1 each  Freq: NO DOSE TODAY (WARFARIN) Route: XX  Start: 18 1306   End: 18 2305   Admin Instructions: No dose of Warfarin due today per order    Admin. Amount: 1 each  Dispense Loc: [facility]        2305-Med Discontinued

## 2018-05-31 NOTE — ED PROVIDER NOTES
"  History     Chief Complaint   Patient presents with     Altered Mental Status     The history is provided by a relative (son). The history is limited by the condition of the patient (altered mental status).     Priscilla Way is a 78 year old male with a history of vascular dementia, multiple CVAs, DM2, hepatitis C s/p kidney and liver transplant who presents for evaluation of altered mental status. Patient is unable to provide history due to altered mental status making him non-verbal. History is provided by the patient's son in law. Patient's son reports at baseline the patient is able to answer questions, but over the past two days he and the patient's PCA have noticed the patient has been altered. His son reports the patient will just \"stare off into space\" and is no longer responsive to questions. He also reports the patient has been passing multiple mucusy, bloody, smelly stools per day, and has had a productive cough with associated shortness of breath. They state the patient has had decreased appetite with decreased PO intake, but has still been drinking Ensure. His son states the patient's current symptoms are very similar to previous episodes of C. Diff the patient has had. No trauma or fall. Patient sleeps in a medical bed. Patient is anticoagulated on warfarin.     I have reviewed the Medications, Allergies, Past Medical and Surgical History, and Social History in the [employer] system.  Past Medical History:   Diagnosis Date     Dementia     multiple acute infarcts, SV dis on CT     Diabetes type 2, controlled (H)      Hepatitis C      Kidney transplanted      Liver transplant      Unspecified cerebral artery occlusion with cerebral infarction        Past Surgical History:   Procedure Laterality Date     TRANSPLANT KIDNEY RECIPIENT  DONOR       TRANSPLANT LIVER RECIPIENT  DONOR         No family history on file.    Social History   Substance Use Topics     Smoking status: Former " Smoker     Types: Cigarettes     Smokeless tobacco: Never Used      Comment: quit 10 years ago     Alcohol use No       Current Facility-Administered Medications   Medication     carvedilol (COREG) tablet 6.25 mg     sodium chloride 0.9% infusion     Current Outpatient Prescriptions   Medication     acetaminophen (TYLENOL) 500 MG tablet     ALPRAZolam (XANAX) 0.25 MG tablet     aspirin 81 MG EC tablet     blood glucose (BL TEST STRIP PACK) test strip     blood glucose monitor KIT     carvedilol (COREG) 6.25 MG tablet     Cholecalciferol (VITAMIN D-400) 400 UNITS TABS     Cholecalciferol (VITAMIN D-400) 400 UNITS TABS     darbepoetin matthew (ARANESP, ALBUMIN FREE,) 40 MCG/0.4ML injection     dimethicone-zinc oxide (EUCERIN) cream     docusate sodium (COLACE) 100 MG capsule     furosemide (LASIX) 20 MG tablet     LANCETS MICRO THIN 33G MISC     memantine XR (NAMENDA XR) 21 MG 24 hr capsule     Multiple Vitamins-Iron (DAILY VITES/IRON) TABS     omeprazole (PRILOSEC) 20 MG CR capsule     order for DME     order for DME     PROGRAF (BRAND) 0.5 MG CAPSULE     QUEtiapine (SEROQUEL) 25 MG tablet     Vancomycin HCl 25 MG/ML SOLR     warfarin (COUMADIN) 2.5 MG tablet      No Known Allergies    Review of Systems   Unable to perform ROS: Mental status change   Constitutional: Negative for fever.       Physical Exam   BP: (!) 191/118  Pulse: 78  Temp: 98.4  F (36.9  C)  Resp: 16  SpO2: 97 %      Physical Exam   Constitutional: He appears listless. He appears ill.   HENT:   Head: Atraumatic.   Mouth/Throat: Oropharynx is clear and moist. No oropharyngeal exudate.   Eyes: Pupils are equal, round, and reactive to light. No scleral icterus.   Neck: Neck supple.   Cardiovascular: Normal heart sounds and intact distal pulses.    Pulmonary/Chest: Breath sounds normal. No respiratory distress. He has no wheezes. He has no rales.   Abdominal: Soft. Bowel sounds are normal. He exhibits no distension. There is no tenderness. There is no  rebound and no guarding.   Musculoskeletal: He exhibits no edema or tenderness.   Neurological: He appears listless.   Left-sided contractures.  Eyes are open but minimally responsive to verbal stimuli   Skin: Skin is warm. No rash noted. He is not diaphoretic.   Nursing note and vitals reviewed.      ED Course     ED Course     Procedures             EKG Interpretation:      Interpreted by Zaki Kern  Time reviewed: 1215  Symptoms at time of EKG: Ventricular paced rhythm  Rhythm: normal sinus   Rate: 85  Axis: normal  Ectopy: none  Conduction: normal  ST Segments/ T Waves: No ST-T wave changes  Q Waves: none  Comparison to prior: Unchanged    Clinical Impression: normal paced rhythm on EKG          Critical Care time:  none             Labs Ordered and Resulted from Time of ED Arrival Up to the Time of Departure from the ED   CBC WITH PLATELETS DIFFERENTIAL - Abnormal; Notable for the following:        Result Value    WBC 15.2 (*)     RBC Count 3.21 (*)     Hemoglobin 9.8 (*)     Hematocrit 29.8 (*)     Absolute Neutrophil 11.0 (*)     Absolute Monocytes 1.6 (*)     All other components within normal limits   COMPREHENSIVE METABOLIC PANEL - Abnormal; Notable for the following:     Glucose 131 (*)     Urea Nitrogen 60 (*)     Creatinine 2.51 (*)     GFR Estimate 25 (*)     GFR Estimate If Black 30 (*)     Calcium 8.2 (*)     Albumin 2.5 (*)     All other components within normal limits   ROUTINE UA WITH MICROSCOPIC - Abnormal; Notable for the following:     Blood Urine Trace (*)     Protein Albumin Urine 30 (*)     RBC Urine 9 (*)     Mucous Urine Present (*)     Hyaline Casts 6 (*)     All other components within normal limits   INR - Abnormal; Notable for the following:     INR 4.00 (*)     All other components within normal limits   LACTIC ACID WHOLE BLOOD   PERIPHERAL IV CATHETER   PERIPHERAL IV CATHETER   NURSING DRAW AND HOLD   CLOSTRIDIUM DIFFICILE TOXIN B            Assessments & Plan (with  Medical Decision Making)   The patient has worsening renal function in his transplanted kidney.  He does not appear to have any acute liver failure.  He is dehydrated secondary to his diarrhea and given his recent C. difficile infection and recently stopping antibiotics this is the likely etiology of his dehydration causing the acute renal failure.  He does have a history of having UTIs diagnosed during his last hospitalization and cath specimen is pending at this time.  He was hypertensive initially but his blood pressure improved without medication.  He will be admitted to the hospital for IV hydration.  I spoke with the admitting team who will discuss antibiotics depending on the C. difficile result and urine result.  At this time he does not appear to be septic, has a normal lactic acid level but elevated white count which could be related to the C. difficile.    I have reviewed the nursing notes.    I have reviewed the findings, diagnosis, plan and need for follow up with the patient.    New Prescriptions    No medications on file       Final diagnoses:   Acute renal failure, unspecified acute renal failure type (H)   Dehydration   Diarrhea, unspecified type   I, Jodi Layne, am serving as a trained medical scribe to document services personally performed by Edmond Kern MD, based on the provider's statements to me.   I, Edmond Kern MD, was physically present and have reviewed and verified the accuracy of this note documented by Jodi Layne.      5/31/2018   [facility], [place], EMERGENCY DEPARTMENT     Zaki Kern MD  05/31/18 0863

## 2018-05-31 NOTE — H&P
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 Per family, presentation c/w prior c diff. Completed Vanc earlier this month. Severe dementia but able to answer basic questions at BL. Left lateral gaze on exam, but follows verbal cues. INR supratheraputic to 4.00 on admit. TSH normal 5/1. CXR vascular congestion, no acute infiltrate. HCT no acute changes. Lytes stable. Lactic acid normal. WBC elevated to 15.2. VSS, afebrile. Etiology most likely infectious c diff vs UTI, although cannot rule out seizure/non-convulsive status, liver transplant failure, GIB (although unlikely), ingestion, other   - Obtain C diff, UA, ammonia, BCx x2   - Will get 2 hour video EEG  - Abdominal US  - NPO while altered, SLP consult  - 1L bolus over 2 hours, then  cc/hr  - Holding Xanax   - Continue Seroquel, Nemenda **Patient IS taking although pharmacy said he is not  - PT/OT consults   - Consider OP GI consult for possible fecal transplant if c diff positive     LILIAM on CKD III: BL Cr labile around 1.2 to 1.6. Cr elevated to 2.51 on admission in the setting of high volume GI losses  - IVF as above  - Hold lasix   - Urine studies, UA  - I&Os, daily weights  - Renal US of transplanted kidney     Chronic anemia: BL hgb 9.5-10. PTA on Aranesp q28 days for hgb <10. Last dose unknown. Presented with bloody BMs, hgb stable at BL 9.8  - Repeat hgb now to ensure no brisk GIB     H/o liver/kidney transplant: 2000, 2/2 HCT. PTA on Prograf (brand name)  - Prograf  gtt given AMS and inability to swallow  - Check prograf trough once taking PO, would be inaccurate with gtt  - Abdominal and renal US    DMII: No PTA meds. No recent A1c. Glucose stable.   - Obtain A1c  - SSI, hypoglycemia protocol     HTN, CAD: PTA on Coreg, lasix, ASA  - Continue Coreg with hold parameters  - Hold lasix given LILIAM  - Hold ASA given some blood in stool     Anxiety, depression: In the setting of advanced dementia. PTA on prn Xanax   - Holding Xanax given AMS    GERD: PTA on PPI  - IV PPI for now    H/o recurrent DVTs: On chronic coumadin. INR elevated to 4.00 on admission. Has IVC filter in with plan to continue for life. Bloody diarrhea as above. Stable on room air  - Pharmacy to dose coumadin  - Check hgb now    Pain assessment:  Current Pain Score 3/24/2018   Patient currently in pain? sleeping: patient not able to self report   Pain score (0-10) -   Pain location -   Pain descriptors -    - Priscilla is unable to participate in a plan for pain management; however, the plan  was discussed in a collaborative fashion with his son-in-law.   - No pain medications prescribed at this time     Diet:  NPO  Fluids: 1L bolus, then 100 cc/hr  DVT Prophylaxis: Pneumatic Compression Devices, Coumadin on hold for now  Code Status: Prior    Disposition Plan   Expected discharge: 2-4 days; recommended to prior living arrangement once workup complete, AMS improving, treatment plan determined.     Entered: Phoebe Lamb 05/31/2018, 3:07 PM   Information in the above section will display in the discharge planner report.    The patient's care was discussed with the patient, family, and attending physician, Dr. Yaneli Lamb  Internal Medicine Staff Hospitalist Service  [facility]  Pager: [phone]  Please see sticky note for cross cover information  ______________________________________________________________________    Chief Complaint   AMS    History is obtained from the  patient, family and medical record     History of Present Illness   Priscilla Way is a 78 year old male with history of advanced vascular dementia, liver/kidney transplant  HCV, DVT on Coumadin, DMII, GERD, CVA, chronic anemia, who was admitted  with altered mental status    Patient unable to participate in communication. Per son-in-law, at BL patient significantly waxes and wanes. Sometimes he known who his grandchildren are and converses with family, other times he is silent and cannot determined family members. Over the past 1-2 days patient has been more lethargic and less interactive. He has been minimally communicative. Patient's PCA noted high volume, frequent BMs with foul odor/blood/mucous. BMs are c/w prior c diff infections. Patient has not had a fever. He has a chronic dry cough without production. No noted abdominal pain, urinary symptoms, new rash. PO intake has declined in the past 48 hours. Denies known h/o seizures. Patient has been compliant with meds to the knowledge of family. No seizure-like activity. No gross bleeding aside from some in BMs. Chronic L sided weakness is at BL    Review of Systems   The 10 point Review of Systems is negative other than noted in the HPI or here.     Past Medical History    I have reviewed this patient's medical history and updated it with pertinent information if needed.   Past Medical History:   Diagnosis Date     Dementia     multiple acute infarcts, SV dis on CT     Diabetes type 2, controlled (H)      Hepatitis C      Kidney transplanted      Liver transplant      Unspecified cerebral artery occlusion with cerebral infarction         Past Surgical History   I have reviewed this patient's surgical history and updated it with pertinent information if needed.  Past Surgical History:   Procedure Laterality Date     TRANSPLANT KIDNEY RECIPIENT  DONOR       TRANSPLANT LIVER RECIPIENT  DONOR          Social History   Social  History   Substance Use Topics     Smoking status: Former Smoker     Types: Cigarettes     Smokeless tobacco: Never Used      Comment: quit 10 years ago     Alcohol use No       Family History   I have reviewed this patient's family history and updated it with pertinent information if needed.   No family history on file.    Prior to Admission Medications   Prior to Admission Medications   Prescriptions Last Dose Informant Patient Reported? Taking?   ALPRAZolam (XANAX) 0.25 MG tablet   No No   Sig: Give 1/2 half tab po half an hour before home lab draw   Cholecalciferol (VITAMIN D-400) 400 UNITS TABS   No No   Sig: TAKE 2 TABLETS BY MOUTH DAILY   Cholecalciferol (VITAMIN D-400) 400 UNITS TABS   No No   Sig: Take 2 tablets by mouth daily   LANCETS MICRO THIN 33G MISC   No No   Si Device 2 times daily Use to test blood sugar 1-2 times daily or as directed.   Multiple Vitamins-Iron (DAILY VITES/IRON) TABS   No No   Sig: Take 1 tablet by mouth daily   PROGRAF (BRAND) 0.5 MG CAPSULE   No No   Sig: Take 3 capsules (1.5 mg) by mouth every 12 hours   QUEtiapine (SEROQUEL) 25 MG tablet   No No   Sig: TAKE 1 TABLET (25 MG) EVERY DAY AS NEEDED FOR AGITATION AND 1 TABLET (25 MG) AT BEDTIME FOR INSOMNIA   Vancomycin HCl 25 MG/ML SOLR   No No   Sig: Take 5 mLs by mouth 4 times daily   acetaminophen (TYLENOL) 500 MG tablet   No No   Sig: Take  by mouth. Take one tablet by mouth every 4-6 hours as needed.   aspirin 81 MG EC tablet   No No   Sig: Take 1 tablet (81 mg) by mouth daily   blood glucose (BL TEST STRIP PACK) test strip   No No   Sig: Use to test blood sugar 1-2 times daily or as directed.   blood glucose monitor KIT   No No   Sig: Use to test blood sugar 1-2 times daily or as directed.   carvedilol (COREG) 6.25 MG tablet   No No   Sig: Take 1 tablet (6.25 mg) by mouth 2 times daily (with meals)   darbepoetin matthew (ARANESP, ALBUMIN FREE,) 40 MCG/0.4ML injection   No No   Sig: Inject 0.4 mLs (40 mcg) Subcutaneous every  28 days As needed for hgb<10g/dL.  If Hgb increases >1 point in 2 weeks (if blood transfusion given, use hgb PRIOR to this), SYSTOLIC BP > 180 mmHg or hgb>=10g/dL, HOLD DOSE. Dose must be within 1 week of Hgb.  Per anemia protocol with Mariluz Martinez MD   dimethicone-zinc oxide (EUCERIN) cream   No No   Sig: Apply topically 2 times daily as needed for dry skin or other   docusate sodium (COLACE) 100 MG capsule   No No   Sig: Take 1 capsule by mouth 2 times daily.   Patient taking differently: Take 100 mg by mouth 2 times daily as needed    furosemide (LASIX) 20 MG tablet   No No   Sig: Take 1 tablet (20 mg) by mouth daily   memantine XR (NAMENDA XR) 21 MG 24 hr capsule   No No   Sig: Take 1 capsule (21 mg) by mouth daily   omeprazole (PRILOSEC) 20 MG CR capsule   No No   Sig: Take 1 capsule (20 mg) by mouth daily   order for DME   No No   Sig: Equipment being ordered: Other: Damian lift  Treatment Diagnosis: memory loss, dementia, weakness, ARF   order for DME   No No   Sig: Equipment being ordered: Hospital Bed and damian lift   warfarin (COUMADIN) 2.5 MG tablet   No No   Sig: Take 3.75mgs on Thursdays and 2.5mgs on all other days or as directed by the [facility]   Patient taking differently: Take 1.25mg on MWF and 2.5mg on all other days or as directed by the [facility]      Facility-Administered Medications: None     Allergies   No Known Allergies    Physical Exam   Vital Signs: Temp: 98.4  F (36.9  C) Temp src: Axillary BP: 138/82 Pulse: 78   Resp: 16 SpO2: 100 % O2 Device: None (Room air)    Weight: 0 lbs 0 oz    General Appearance: Alert, not oriented  Eyes: PERRLA, cataracts. Does not track writer  HEENT: Membranes dry, no noted mucosal bleeding  Respiratory: CTAB without wheezing or crackles  Cardiovascular: RRR, S1, S2. No murmur noted  GI: Old incision scar. Abdomen soft, non-tender with active bowel sounds. No guarding or rebound   Lymph/Hematologic: Trace BLE peripheral edema. Distal pulses  palpable   Skin: No rash or jaundice   Neurologic:  Alert, not oriented. RUE strength 5/5, LUE strength 3/5. No facial droop. PERRLA. Fixed left lateral gaze without rhythmic jerking or twitching    Data   Data reviewed today: I reviewed all medications, new labs and imaging results over the last 24 hours. I personally reviewed HCT, CXR, labs, OP note, prior admission notes, information pertinent to this admission     Data     Recent Labs  Lab 05/31/18  1122 05/31/18  0830   WBC 15.2* 15.3*   HGB 9.8* 8.8*   MCV 93 94    270   INR 4.00* 4.23*    140   POTASSIUM 4.6 4.5   CHLORIDE 105 107   CO2 23 22   BUN 60* 58*   CR 2.51* 2.45*   ANIONGAP 10 11   JOSHUA 8.2* 8.1*   * 78   ALBUMIN 2.5* 2.2*   PROTTOTAL 6.9 6.4*   BILITOTAL 0.3 0.3   ALKPHOS 94 97   ALT 32 26   AST 24 28     Recent Results (from the past 24 hour(s))   CT Head w/o Contrast    Narrative    CT head without: 5/31/2018 12:25 PM    Provided History: Altered level of consciousness    Comparison: CT head 11/8/2016.    Technique: Using multidetector thin collimation helical acquisition  technique, axial, coronal and sagittal CT images from the skull base  to the vertex were obtained without intravenous contrast.     Findings:    No intracranial hemorrhage, mass effect, midline shift or abnormal  extra axial fluid collection. Stable chronic right thalamic lacunar  infarct. Stable moderate to advanced leukoaraiosis and moderate  generalized parenchymal volume loss. Ventricles are not enlarged out  of proportion to the cerebral sulci. Gray-white matter differentiation  is intact.    Bilateral mastoid effusions, left greater than right. Paranasal  sinuses are clear. Calvarium is intact. Debris in left external  auditory canal.      Impression    Impression:   1. No acute intracranial pathology.  2. Stable chronic right thalamic infarct and moderate to advanced  chronic small vessel ischemic disease  3. Stable moderate generalized parenchymal  volume loss.  4. New bilateral mastoid effusions, left greater than right.    I have personally reviewed the examination and initial interpretation  and I agree with the findings.    LAWRENCE MONTES DE OCA MD   XR Chest 2 Views    Narrative    XR CHEST 2 VW  5/31/2018 1:43 PM      HISTORY: coughing;     COMPARISON: Chest x-ray 3/21/2018    TECHNIQUE: Upright PA and lateral views of the chest    FINDINGS: No focal airspace opacity or pneumothorax or pleural  effusion. Prominent pulmonary vasculature. Cardiomedial silhouette is  within normal limits. Trachea is midline. IVC filter is in place.  Surgical clips in the upper abdomen. No significant degenerative  change of the thoracic spine.      Impression    IMPRESSION: Pulmonary venous congestion. No focal airspace opacity.    I have personally reviewed the examination and initial interpretation  and I agree with the findings.    MAYA YUEN MD

## 2018-06-01 PROBLEM — Z86.19 H/O CLOSTRIDIUM DIFFICILE INFECTION: Status: ACTIVE | Noted: 2018-01-01

## 2018-06-01 NOTE — CONSULTS
Nephrology Initial Consult  June 1, 2018      Priscilla Way MRN:[id number] YOB: 1940  Date of Admission:5/31/2018  Primary care provider: Randy Fuentes  Requesting physician: Alli Groves MD    ASSESSMENT AND RECOMMENDATIONS:   1.  Allograft function:  S/P kidney transplant 2000. BL Cr 1.3-1.6. HX LILIAM in 2016 with biopsy showing ATI, early diabetic changes, ASVD and no rejection.  Cr now elevated, likely due to dehydration.     - agree with IVF and monitor Cr daily    2.  IS: Single drug IS with tac since 2002   - agree with IC tac drip for now   - monitor levels     3.  C diff: hx c diff 3/2018 and 5/ 2018.  Treated X 10 days with vanco with apparent resolution.  Now getting flagyl   - consider po vanco     4.  Dementia: severely affected with multiple infracts and diffuse volume loss of CT   - would not be a dialysis candidate    5.  Anemia: hb 8.3 after hydration.  INR 4. Would do anemia evaluation and stool blood test   - check iron stores and epo level    6. Hyperparathyroidism.  PTH was 333 in 2016.     - check PTH and vit D level    Recommendations were communicated to primary team via note    Seen and discussed with Dr. 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  The patient is chronically immunosuppressed on tacrolimus -- typically 1.5 mg every 12 hours (most recent level is 5.6 yesterday AM).  He is currently admitted since yesterday and started on a tacrolimus gtt.    His baseline creatinine is about 1.3-1.6 and on admission was found to have an elevation in the creatinine to 2.5.    He presented to ED with with diarrhea and acute on chronic encephalopathy.  Typically he is able to answer questions at baseline  but for the past two days he has been staring off and is now unresponsive.  He has been passing multiple mucous and bloody stools similar to c diff presentations in 3/2018 and 5/1/2018.  He has been coughing as well and has decreased po intake.  His hospital course thus far is remarkable for cdiff diagnosis and he is on iv metronidazole.  Preveiously he was on oral vancomycin 125 mg qid from 3/21-3/30 (also on cefdinir for ecoli uti), followed by vancomycin 125 mg qid but just dispensed 20 ml (one day) per the chart review.    Medical history is additionally notable for chronic hepatitis c viremia, diabetes type 2, prior dvt on chronic anticoagulation.    He was admitted to [facility] in Aug 2016 with dehydration and LILIAM.  Cr peaked at 7.6 but he did not require HD.  Biopsy 8/2016 showed  -Acute tubular injury   -No diagnostic evidence for acute rejection   -Mild diffuse and early nodular diabetic nephropathy   -Focal segmental glomerulosclerosis, likely secondary   -Moderate to severe arterio- and moderate arteriolosclerosis   He recovered renal function but has not had regular clinic FU since DC.      Creatinine   Date Value Ref Range Status   05/31/2018 2.51 (H) 0.66 - 1.25 mg/dL Final   05/31/2018 2.45 (H) 0.66 - 1.25 mg/dL Final   05/15/2018 1.27 (H) 0.66 - 1.25 mg/dL Final   05/01/2018 1.56 (H) 0.66 - 1.25 mg/dL Final   03/23/2018 1.42 (H) 0.66 - 1.25 mg/dL Final   03/22/2018 1.58 (H) 0.66 - 1.25 mg/dL Final   03/21/2018 1.99 (H) 0.66 - 1.25 mg/dL Final   01/12/2018 1.33 (H) 0.66 - 1.25 mg/dL Final   12/23/2016 1.28 (H) 0.66 - 1.25 mg/dL Final   11/12/2016 1.58 (H) 0.66 - 1.25 mg/dL Final     Immunsupresent Medications     Immunosuppressive Agents Disp Start End    tacrolimus (PROGRAF) 5,000 mcg in D5W 250 mL  5/31/2018     Sig - Route: Inject 40 mcg/hr into the vein continuous - Intravenous    Class: E-Prescribe    tacrolimus (PROGRAF BRAND) capsule 1.5 mg (Discontinued)  5/31/2018 5/31/2018    Sig - Route: Take  1.5 mg by mouth every 12 hours - Oral    Class: E-Prescribe        Tacrolimus Level   Date Value Ref Range Status   2018 5.6 5.0 - 15.0 ug/L Final     Comment:         PAST MEDICAL HISTORY:  Reviewed in chart 2018     Past Medical History:   Diagnosis Date     Dementia     multiple acute infarcts, SV dis on CT     Diabetes type 2, controlled (H)      Hepatitis C      Kidney transplanted      Liver transplant      Unspecified cerebral artery occlusion with cerebral infarction         LILIAM 2016 resolved    Past Surgical History:   Procedure Laterality Date     TRANSPLANT KIDNEY RECIPIENT  DONOR       TRANSPLANT LIVER RECIPIENT  DONOR          MEDICATIONS:  PTA Meds  Prior to Admission medications    Medication Sig Last Dose Taking? Auth Provider   acetaminophen (TYLENOL) 500 MG tablet Take  by mouth. Take one tablet by mouth every 4-6 hours as needed. Past Week at PRN Yes Randy Fuentes MD   ALPRAZolam (XANAX) 0.25 MG tablet Give 1/2 half tab po half an hour before home lab draw Past Month at PRN Yes Randy Fuentes MD   aspirin 81 MG EC tablet Take 1 tablet (81 mg) by mouth daily 2018 at AM Yes Randy Fuentes MD   carvedilol (COREG) 6.25 MG tablet Take 1 tablet (6.25 mg) by mouth 2 times daily (with meals) 2018 at AM/PM Yes Randy Fuentes MD   Cholecalciferol (VITAMIN D-400) 400 UNITS TABS Take 2 tablets by mouth daily 2018 at AM Yes Randy Fuentes MD   dimethicone-zinc oxide (EUCERIN) cream Apply topically 2 times daily as needed for dry skin or other Past Week at PRN Yes Caroilne Silveira PA-C   docusate sodium (COLACE) 100 MG capsule Take 1 capsule by mouth 2 times daily.  Patient taking differently: Take 100 mg by mouth 2 times daily as needed  Past Week at PRN Yes Randy Fuentes MD   furosemide (LASIX) 20 MG tablet Take 1 tablet (20 mg) by mouth daily 2018 at AM Yes Randy Fuentes MD   Multiple Vitamins-Iron  (DAILY VITES/IRON) TABS Take 1 tablet by mouth daily 5/30/2018 at AM Yes Randy Fuentes MD   omeprazole (PRILOSEC) 20 MG CR capsule Take 1 capsule (20 mg) by mouth daily 5/30/2018 at AM Yes Randy Fuentes MD   PROGRAF (BRAND) 0.5 MG CAPSULE Take 3 capsules (1.5 mg) by mouth every 12 hours 5/30/2018 at AM/PM Yes Kevyn Pineda MD   warfarin (COUMADIN) 2.5 MG tablet Take 3.75mgs on Thursdays and 2.5mgs on all other days or as directed by the [facility]  Patient taking differently: Take 1.25 mg on Fridays  Take 2.5 mg Sunday, Monday, Tuesday, Wednesday, Thursday and Saturday 5/30/2018 at AM Yes Randy Fuentes MD   blood glucose (BL TEST STRIP PACK) test strip Use to test blood sugar 1-2 times daily or as directed.   Randy Fuentes MD   blood glucose monitor KIT Use to test blood sugar 1-2 times daily or as directed.   Randy Fuentes MD   LANCETS MICRO THIN 33G MISC 1 Device 2 times daily Use to test blood sugar 1-2 times daily or as directed.   Randy Fuentes MD   order for DME Equipment being ordered: Hospital Bed and damian lift   Liya Dowling DO   order for DME Equipment being ordered: Other: Damian lift  Treatment Diagnosis: memory loss, dementia, weakness, ARF   Michel Maynard MD      Current Meds    insulin aspart  1-7 Units Subcutaneous TID AC     insulin aspart  1-5 Units Subcutaneous At Bedtime     labetalol  10 mg Intravenous Q6H     memantine XR  14 mg Oral Daily     metroNIDAZOLE  500 mg Intravenous Q6H     pantoprazole (PROTONIX) IV  40 mg Intravenous BID     Infusion Meds    - MEDICATION INSTRUCTIONS -       sodium chloride       sodium chloride 100 mL/hr at 06/01/18 0210     tacrolimus (Prograf) infusion ADULT 40 mcg/hr (06/01/18 0206)     Warfarin Therapy Reminder         ALLERGIES:    No Known Allergies    REVIEW OF SYSTEMS:  Pt has dementia and cannot give ROS    SOCIAL HISTORY: Pt has 24 hour a day care in his own apartment provided by family and PCA.  Pt  does not get OOB    Social History     Social History     Marital status:      Spouse name: N/A     Number of children: N/A     Years of education: N/A     Occupational History     Not on file.     Social History Main Topics     Smoking status: Former Smoker     Types: Cigarettes     Smokeless tobacco: Never Used      Comment: quit 10 years ago     Alcohol use No     Drug use: No     Sexual activity: No     Other Topics Concern     Not on file     Social History Narrative     PCA accompanies Priscilla Way in hospital room.      FAMILY MEDICAL HISTORY:   No family history on file.  Parent health not known.  Multiple healthy children.  No kidney disease  PHYSICAL EXAM:   Temp  Av.8  F (36.6  C)  Min: 96.9  F (36.1  C)  Max: 99.1  F (37.3  C)      Pulse  Av.5  Min: 63  Max: 78 Resp  Av  Min: 16  Max: 16  SpO2  Av.6 %  Min: 94 %  Max: 100 %       BP (P) 126/46 (BP Location: Left arm)  Pulse (P) 64  Temp 97.5  F (36.4  C) (Axillary)  Resp 16  Wt 66.2 kg (146 lb)  SpO2 (P) 99%  BMI 22.2 kg/m2       Admit Weight: 66.2 kg (146 lb)     GENERAL APPEARANCE: opens eyes to chest pressure.  Cannot answer questions  Lymphatics: no cervical or supraclavicular LAD  Pulmonary: lungs clear to auscultation with equal breath sounds bilaterally,  CV: regular rhythm, normal rate, no rub   - JVD none   - Edema none  GI: soft, nontender, normal bowel sounds  MS: no evidence of inflammation in joints, no muscle tenderness  : no patino  SKIN: no rash, warm, dry, no cyanosis  NEURO: face symmetric, no asterixis     LABS:   CMP  Recent Labs  Lab 18  1122 18  0830    140   POTASSIUM 4.6 4.5   CHLORIDE 105 107   CO2 23 22   ANIONGAP 10 11   * 78   BUN 60* 58*   CR 2.51* 2.45*   GFRESTIMATED 25* 26*   GFRESTBLACK 30* 31*   JOSHUA 8.2* 8.1*   PROTTOTAL 6.9 6.4*   ALBUMIN 2.5* 2.2*   BILITOTAL 0.3 0.3   ALKPHOS 94 97   AST 24 28   ALT 32 26     CBC  Recent Labs  Lab 18  0730  05/31/18  2257 05/31/18  1122 05/31/18  0830   HGB 8.3* 9.8* 9.8* 8.8*   WBC 8.5 12.0* 15.2* 15.3*   RBC 2.70* 3.16* 3.21* 2.92*   HCT 25.7* 29.8* 29.8* 27.5*   MCV 95 94 93 94   MCH 30.7 31.0 30.5 30.1   MCHC 32.3 32.9 32.9 32.0   RDW 14.9 14.8 14.9 14.8    263 278 270     INR  Recent Labs  Lab 05/31/18  1122 05/31/18  0830   INR 4.00* 4.23*     ABGNo lab results found in last 7 days.   URINE STUDIES  Recent Labs   Lab Test  05/31/18   1643  03/21/18   1139  11/08/16   1446  08/20/16   1749   COLOR  Yellow  Yellow  Yellow  Dark Yellow   APPEARANCE  Clear  Slightly Cloudy  Clear  Cloudy   URINEGLC  Negative  Negative  Negative  30*   URINEBILI  Negative  Negative  Negative  Small   This is an unconfirmed screening test result. A positive result may be false.  *   URINEKETONE  Negative  Negative  Negative  Negative   SG  1.012  1.011  1.014  1.017   UBLD  Trace*  Trace*  Negative  Negative   URINEPH  5.5  5.0  5.0  5.5   PROTEIN  30*  10*  10*  300*   NITRITE  Negative  Negative  Negative  Negative   LEUKEST  Negative  Large*  Negative  Negative   RBCU  9*  2  1  6*   WBCU  2  159*  1  22*     Recent Labs   Lab Test  05/27/16   1409  04/29/11   1348  09/14/10   1134   [facility]  Specimen not received  NOTIFIED [facility]  WHO PAGED  RN, LOPEZ AT 1355. NO URINE   RECIEVED WITH BLOOD SPECIMENS. AB    0.04  <0.02     PTH  Recent Labs   Lab Test  08/24/16   0852   PTHI  333*     IRON STUDIES  Recent Labs   Lab Test  11/08/16   1209  08/24/16   0852   IRON  17*  20*   FEB  146*  197*   IRONSAT  11*  10*   BARTOLO   --   1025*     C Diff Toxin B PCR   Date Value Ref Range Status   06/01/2018 Positive (A) NEG^Negative Final     Comment:     Positive: Toxin producing Clostridium difficile target DNA sequences detected,   presumed positive for Clostridium difficile toxin B.  Clostridium difficile (Requires Enteric Isolation)  FDA approved assay performed using [employer] real-time PCR.  Critical  Value/Significant Value called to and read back by  Renu Thorpe R.N. on [id number] at 7:15am on 06.01.18 JT.     05/01/2018 Positive (A) NEG^Negative Final     Comment:     Positive: Toxin producing Clostridium difficile target DNA sequences detected,   presumed positive for Clostridium difficile toxin B.  Clostridium difficile (Requires Enteric Isolation)  FDA approved assay performed using [employer] GeneXpert real-time PCR.     03/21/2018 Positive (A) NEG^Negative Final     Comment:     Positive: Toxin producing Clostridium difficile target DNA sequences detected,   presumed positive for Clostridium difficile toxin B.  Clostridium difficile (Requires Enteric Isolation)  FDA approved assay performed using C[employer] GeneXpert real-time PCR.  Critical Value/Significant Value called to and read back by  Fabienne Donahue RN at 1504 3.21.18 [facility]     02/19/2013   Final    Negative: Clostridium difficile target DNA sequences NOT detected, presumed   negative for Clostridium difficile toxin B or the number of bacteria present   may be below the limit of detection for the test.   FDA approved assay performed using [employer] GeneXpert real-time PCR.   A negative result does not exclude actual disease due to Clostridium difficile   and may be due to improper collection, handling and storage of the specimen or   the number of organisms in the specimen is below the detection limit of the   assay.         IMAGING:  Head CT 5/31/2018  1. No acute intracranial pathology.  2. Stable chronic right thalamic infarct and moderate to advanced  chronic small vessel ischemic disease  3. Stable moderate generalized parenchymal volume loss.  4. New bilateral mastoid effusions, left greater than right    CXR: mild pulm congestion    Viral Jeff Rosenberg MD     I have seen and examined this patient with the resident.  This note reflects our joint assessment and plan.     Shelly Washington MD

## 2018-06-01 NOTE — PROGRESS NOTES
[facility], [place]    Internal Medicine Progress Note - Gold Service      Assessment & Plan     Priscilla Way is a 78 year old male with history of advanced vascular dementia, liver/kidney transplant 2000 2/2 HCV, DVT on Coumadin, DMII, GERD, CVA, chronic anemia, who was admitted 5/31 with altered mental status     #Toxic metabolic encephalopathy  #Advanced vascular dementia:   2 days PTA increased lethargy and minimal responsiveness with new onset foul, high volume, bloody BMs. Per family, presentation c/w prior c diff. Completed Vanc earlier this month.     - Treating C.diff as below  - Will get 2 hour video EEG  - Holding Xanax   - Continue Seroquel, Nemenda **Patient IS taking although pharmacy said he is not  - PT/OT consults     #C.diff colitis: Liquid stool without blood this morning.   - IV flagyl started while n.p.o. Will add p.o. Vancomycin as he is now started on regular diet per family request  -We will obtain gastroenterology consultation tomorrow to discuss how to prevent his recurrent episodes.?  Prolonged taper.     LILIAM on CKD III: BL Cr labile around 1.2 to 1.6. Cr elevated to 2.51 on admission in the setting of high volume GI losses. Improved to 1.8 with fluids.  - Continue  cc/hr  - Hold lasix   - I&Os, daily weights  - Renal US of transplanted kidney      Chronic anemia: BL hgb 9.5-10. PTA on Aranesp q28 days for hgb <10. Last dose unknown. Presented with bloody BMs, hgb down to 8.3 this morning.  - Hgb BID to ensure no brisk GIB      H/o liver/kidney transplant: 2000, 2/2 HCT. PTA on Prograf (brand name)  - Prograf gtt given AMS and inability to swallow  - Check prograf trough once taking PO, would be inaccurate with gtt  - Abdominal and renal US pending     DMII: No PTA meds. Glucose stable.   - SSI, hypoglycemia protocol      HTN, CAD: PTA on Coreg, lasix, ASA  - IV labetalol while NPO  - Hold lasix given LILIAM  - Hold ASA given some blood in stool       Anxiety, depression: In the setting of advanced dementia. 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 INR elevated to 4.00 on admission. Has IVC filter in with plan to continue for life. Bloody diarrhea as above.   - Holding coumadin given bloody stools.       # Pain Assessment:  Current Pain Score 3/24/2018   Patient currently in pain? sleeping: patient not able to self report   Pain score (0-10) -   Pain location -   Pain descriptors -   - Priscilla is unable to participate in a plan for pain management; however, the plan  was discussed in a collaborative fashion with his son-in-law.   - No pain medications prescribed at this time        Diet:   Regular diet.  Family request.  Fluids:  cc/hr  DVT Prophylaxis: Pneumatic Compression Devices, Coumadin on hold for now  Code Status: Prior     Disposition Plan   Expected discharge: 2 - 3 days, recommended to prior living arrangement once antibiotic plan established, mental status at baseline and renal function improved.     Entered: Phyllis Aguilar 06/01/2018, 8:38 AM   Information in the above section will display in the discharge planner report.      Phyllis Aguilar  Internal Medicine Staff Hospitalist Service  [facility]  Pager: [phone]  Please see sticky note for cross cover information    Interval History   Unable to provide due to ams. Spoke with trishter on the phone. Per daughter, he does not recognize his family members.  He cannot talk, and sometimes repeats the sentences of others.  However last 3 days he became more and more sleepy from his baseline.  He also has been having bloody and mucousy bowel movements resembling his prior C. difficile episodes last 3 days.  Daughter also states he eats regular texture diet and she absolutely refuses patient to be placed on any kind of dysphagia diet.      Data reviewed today: I reviewed all medications, new labs and imaging  results over the last 24 hours.    Physical Exam   Vital Signs: Temp: 97.5  F (36.4  C) Temp src: Axillary BP: (P) 126/46 Pulse: (P) 64   Resp: 16 SpO2: (P) 99 % O2 Device: (P) None (Room air)    Weight: 146 lbs 0 oz  General Appearance: Laying in bed, NAD  Eyes: PERRLA, cataracts.   Respiratory: CTAB without wheezing or crackles  Cardiovascular: RRR, S1, S2. No murmur noted  GI: Old incision scar. Abdomen soft, non-tender with active bowel sounds. No guarding or rebound   Lymph/Hematologic: no edema  Neurologic:  Awake, but confused, not following commands, making non-sensicle statements per .       Data     Recent Labs  Lab 06/01/18  0730 05/31/18  2257 05/31/18  1122 05/31/18  0830   WBC 8.5 12.0* 15.2* 15.3*   HGB 8.3* 9.8* 9.8* 8.8*   MCV 95 94 93 94    263 278 270   INR 4.18*  --  4.00* 4.23*   NA  --   --  137 140   POTASSIUM  --   --  4.6 4.5   CHLORIDE  --   --  105 107   CO2  --   --  23 22   BUN  --   --  60* 58*   CR  --   --  2.51* 2.45*   ANIONGAP  --   --  10 11   JOSHUA  --   --  8.2* 8.1*   GLC  --   --  131* 78   ALBUMIN  --   --  2.5* 2.2*   PROTTOTAL  --   --  6.9 6.4*   BILITOTAL  --   --  0.3 0.3   ALKPHOS  --   --  94 97   ALT  --   --  32 26   AST  --   --  24 28     Recent Results (from the past 24 hour(s))   CT Head w/o Contrast    Narrative    CT head without: 5/31/2018 12:25 PM    Provided History: Altered level of consciousness    Comparison: CT head 11/8/2016.    Technique: Using multidetector thin collimation helical acquisition  technique, axial, coronal and sagittal CT images from the skull base  to the vertex were obtained without intravenous contrast.     Findings:    No intracranial hemorrhage, mass effect, midline shift or abnormal  extra axial fluid collection. Stable chronic right thalamic lacunar  infarct. Stable moderate to advanced leukoaraiosis and moderate  generalized parenchymal volume loss. Ventricles are not enlarged out  of proportion to the cerebral  sulci. Gray-white matter differentiation  is intact.    Bilateral mastoid effusions, left greater than right. Paranasal  sinuses are clear. Calvarium is intact. Debris in left external  auditory canal.      Impression    Impression:   1. No acute intracranial pathology.  2. Stable chronic right thalamic infarct and moderate to advanced  chronic small vessel ischemic disease  3. Stable moderate generalized parenchymal volume loss.  4. New bilateral mastoid effusions, left greater than right.    I have personally reviewed the examination and initial interpretation  and I agree with the findings.    LAWRENCE MONTES DE OCA MD   XR Chest 2 Views    Narrative    XR CHEST 2 VW  5/31/2018 1:43 PM      HISTORY: coughing;     COMPARISON: Chest x-ray 3/21/2018    TECHNIQUE: Upright PA and lateral views of the chest    FINDINGS: No focal airspace opacity or pneumothorax or pleural  effusion. Prominent pulmonary vasculature. Cardiomedial silhouette is  within normal limits. Trachea is midline. IVC filter is in place.  Surgical clips in the upper abdomen. No significant degenerative  change of the thoracic spine.      Impression    IMPRESSION: Pulmonary venous congestion. No focal airspace opacity.    I have personally reviewed the examination and initial interpretation  and I agree with the findings.    MAYA YUEN MD

## 2018-06-01 NOTE — PROCEDURES
[facility] EEG # (In-Patient Video-EEG Monitoring)    Name:     PRISCILLA WAY   MRN:      [id number]    :      1940     Procedure Date: 2018  Duration of Recording:  3 hours, 17 minutes.      CLINICAL SUMMARY:  This diagnostic video-EEG monitoring procedure was performed in evaluation of encephalopathy in Priscilla Way.  He was reported to be receiving memantine and quetiapine at the time of this recording.      TECHNICAL SUMMARY:  This continuous EEG monitoring procedure was performed with 23 scalp electrodes in 10-20 system placements, and additional scalp, precordial and other surface electrodes used for electrical referencing and artifact detection.  A single channel of EKG was recorded for purposes of analyzing EKG artifacts in the EEG channels.  Video monitoring was utilized and periodically reviewed by EEG technologist and the physician for electroclinical correlation.    INTERICTAL EEG ACTIVITIES:  During waking, there was no definite posterior dominant rhythm.  Predominant electrocerebral activities consisted of generalized irregular 2-8 Hz delta-theta slowing, with frequently intermixed faster 8-12 Hz alpha activities.  During waking, there was bilateral independent frontotemporal polymorphic delta slowing occurring in runs of up to 4 seconds, occurring frequently on the right and occasionally on the left.      No interictal epileptiform abnormalities were recorded.    ICTAL RECORDINGS:  No electrographic seizures and no paroxysmal behavioral events occurred during this procedure.     SUMMARY OF VIDEO-EEG MONITORING:    This waking EEG recording was abnormal due to generalized delta-theta slowing, with bilateral independent frontotemporal delta slowing in brief runs.    No interictal epileptiform abnormalities, no electrographic seizures, and no paroxysmal behavioral events were recorded during the period of monitoring.    These findings indicate moderate electrographic encephalopathy  with additional bilateral frontotemporal neuronal dysfunctions, which are etiologically nonspecific findings.  Clinical correlation is recommended.   Fili Mercedes M.D., Professor of Neurology      D: 2018   T: 2018   MT: RITA      Name:     YADIRA BREAUX   MRN:      [id number]        Account:        [id number]   :      1940           Procedure Date: 2018      Document: [id number]

## 2018-06-01 NOTE — PROGRESS NOTES
[facility]  Patient is currently open to home care services with [facility].  The patient is currently receiving RN services. The family declined SLP eval.  [facility]  and team have been notified of patient admission.  [facility] liaison will continue to follow patient during stay.  If appropriate provide orders to resume home care at time of discharge.    Thank you  Vonda Rankin RN, BSN  [facility] Liaison  [phone]

## 2018-06-01 NOTE — PROGRESS NOTES
"[facility] Nurse Inpatient Pressure Injury Assessment   Reason for consultation: Evaluate and treat Left Heel and Buttocks      ASSESSMENT  Pressure Injury: DTPI on Left Heel , present on admission ,   Pressure Injury is Stage Deep Tissue Pressure Injury (DTPI)   Contributing factor of the pressure injury: pressure, shear, nutrition, age and immobility  Status: initial assessment     TREATMENT PLAN  Left Heel wound: Every 3 days   1. Clean intact skin with gentle soap and water, dry and dry again.  2. Paint with No Sting Skin Prep and allow to dry thoroughly  3. Press a Mepilex  Border Dressingto the area, making sure to conform nicely to skin curvatures (begin placing the Mepilex at the most distal aspect first, smooth into place in an upward direction, then smooth side to side)   4. Time and date dressing change and gilmar with a \"P\" for prevention.  5. Reposition pt every 1 to 2 hours when in bed and keep prevalon boots on at all times  NOTE** make sure to continue to assess under the Mepilex Dressing BID and document findings.  If epidermis  opens notify CWOCN's and change dressing to \"T\" for treatment    Orders Written  WO Nurse follow-up plan:weekly  Nursing to notify the Provider(s) and re-consult the WO Nurse if wound(s) deteriorates or new skin concern.    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, DVT on Coumadin, DMII, GERD, CVA, chronic anemia, who was admitted 5/31 with altered mental status    Objective Data  Containment of urine/stool: Diaper    Current Diet/ Nutrition:    Active Diet Order      NPO for Medical/Clinical Reasons Except for: Meds    Output:   I/O last 3 completed shifts:  In: -   Out: 250 [Urine:250]    Risk Assessment:   Sensory Perception: 2-->very limited  Moisture: 3-->occasionally moist  Activity: 2-->chairfast  Mobility: 2-->very limited  Nutrition: " 3-->adequate  Friction and Shear: 1-->problem  Stuart Score: 13    Labs:   Recent Labs  Lab 06/01/18  0730  05/31/18  2256 05/31/18  1122   ALBUMIN  --   --   --  2.5*   HGB 8.3*  < >  --  9.8*   INR 4.18*  --   --  4.00*   WBC 8.5  < >  --  15.2*   A1C  --   --  6.1*  --    < > = values in this interval not displayed.    Physical Exam  Skin inspection: Head to Toe    Wound Location:  Left Heel        Date of last Photo 6/1/18  Wound History: Pt has hx of pressure injury with scar tissue to L heel, pt is bed bound with dementia and often refuses repositioning per PCA.  Measurements (length x width x depth, in cm) 3 cm x 4 cm  x  0 cm   Wound Base:  Epidermis intact with prominent scar tissue covering most of heel. Darker black discoloration proximal towards achilles on edge of scarring. Heel feels boggy to the touch with dry flaky skin present.    Tunneling N/A  Undermining N/A  Palpation of the wound bed: boggy   Periwound skin: intact, dry, scar tissue  Color: normal and consistent with surrounding tissue  Temperature: normal   Drainage:, none  Description of drainage: none  Odor: none  Pain: absent     Wound Location:  Sacrum, coccyx, back, head: Pt has no open wounds currently, but has had Pressure injuries in the past around sacrum and coccyx evidenced by scar tissue. Pt is high risk for pressure injury and requires strict PIP.    Interventions  Current support surface: Standard  Atmos Air mattress  Current off-loading measures: Pillows under calves, Mepilex applied and prevalon boots ordered  Repositioning aid: Turn and Position System and Pillows  Visual inspection of wound(s) completed   Wound Care: was done per plan of care.  Supplies: ordered: Prevalon boots  Educated provided: importance of repositioning and plan of care  Education provided to: PCA and Nursing staff  Discussed importance of:repositioning every 2 hours, off-loading pressure to wound, wearing off-loading boots and their role in pressure  injury prevention  Discussed plan of care with PCA and nursing staff    Vickey Hunter RN

## 2018-06-01 NOTE — PHARMACY-VANCOMYCIN DOSING SERVICE
Pharmacy Vancomycin Initial Note  Date of Service 2018  Patient's  1940  78 year old, male    Indication: Bacteremia    Current estimated CrCl = Estimated Creatinine Clearance: 30.5 mL/min (based on Cr of 1.84).    Creatinine for last 3 days  2018:  8:30 AM Creatinine 2.45 mg/dL; 11:22 AM Creatinine 2.51 mg/dL  2018:  7:30 AM Creatinine 1.84 mg/dL    Recent Vancomycin Level(s) for last 3 days  No results found for requested labs within last 72 hours.      Vancomycin IV Administrations (past 72 hours)      No vancomycin orders with administrations in past 72 hours.                Nephrotoxins and other renal medications (Future)    Start     Dose/Rate Route Frequency Ordered Stop    18 1800  vancomycin (VANCOCIN) 1,750 mg in sodium chloride 0.9 % 500 mL intermittent infusion      1,750 mg  over 2 Hours Intravenous ONCE 18 1738      18 1739  vancomycin place hogue - receiving intermittent dosing      1 each Does not apply SEE ADMIN INSTRUCTIONS 18 1740      18 1600  vancomycin (VANOCIN) solution 125 mg      125 mg Oral 4 TIMES DAILY 18 1416      18 2145  tacrolimus (PROGRAF) 5,000 mcg in D5W 250 mL      40 mcg/hr  2 mL/hr  Intravenous CONTINUOUS 18 2144            Contrast Orders - past 72 hours     None                Plan:  1.  Give vancomycin 1750mg (~26mg/kg)  IV x1  dose now.  Will dose vancomycin intermittently based on levels for now due LILIAM on admission   2.  Goal Trough Level: 15-20 mg/L   3.  Pharmacy will check trough levels as appropriate in 1-3 Days.    4. Serum creatinine levels will be ordered daily for the first week of therapy and at least twice weekly for subsequent weeks.    5. Clayton method utilized to dose vancomycin therapy: Method 2    Vickey Fletcher, PharmD, BCPS

## 2018-06-01 NOTE — PLAN OF CARE
Problem: Patient Care Overview  Goal: Plan of Care/Patient Progress Review  Outcome: No Change  A:  Patient mumbling at times.  Incontinent of stool times one.  Stool mucous and foul smelling.  Sample sent to lab with positive results.  Incontinent of urine times one.  Assist of two with turning and cares.  Resists touch but quiet when snuggled into bed.  mitts on due to pulling at lines.  IV fluids due to npo and and tacrolimus drip due to npo pending swallow study.  Not able to follow instructions even when family helping.  Daughter states father does not recognize her anymore.  Toes black and heals soft and red/black.  Extremities cool and pale. WOC RN consult for skin issues and nonblanchable area on heel and rectal area. Family requesting air mattress.    R:  Continue to monitor and treat per plan of  Care.  NPO for u/s of liver and kidneys today. Also EEG placement planned.

## 2018-06-01 NOTE — PROVIDER NOTIFICATION
[facility]-Cover notified of positive blood cultures, gram positive cocci in clusters.  New orders placed.  Waiting for rapid ID.

## 2018-06-01 NOTE — PLAN OF CARE
Problem: Patient Care Overview  Goal: Plan of Care/Patient Progress Review  PT 5B: Cancel and Defer - Per chart review, pt lift dependent at baseline, no IP PT needs; PT to complete orders and sign off. OT to attempt evaluation as appropriate.

## 2018-06-01 NOTE — MR AVS SNAPSHOT
After Visit Summary   6/1/2018    Priscilla Way    MRN: [id number]           Patient Information     Date Of Birth          1940        Visit Information        Provider Department      6/1/2018 9:30 AM [facility] EEG TECH 4 [facility] EEG        Today's Diagnoses     Altered mental status    -  1       Follow-ups after your visit        Future tests that were ordered for you today     Open Standing Orders        Priority Remaining Interval Expires Ordered    Wound care Routine 5/6 EVERY THIRD DAY  6/1/2018    INR Routine 15/16 DAILY  5/31/2018    Glucose monitor nursing POCT Routine [id number] PRN  5/31/2018    Glucose monitor nursing POCT Routine [id number] PRN  5/31/2018    Oxygen: Nasal cannula, Oxygen mask Routine 19246/[id number] CONTINUOUS  5/31/2018    Activity: Up with assist Routine [id number] PRN  5/31/2018            Who to contact     Please call your clinic at [phone] to:    Ask questions about your health    Make or cancel appointments    Discuss your medicines    Learn about your test results    Speak to your doctor            Additional Information About Your Visit        [employer] Information     [employer] gives you secure access to your electronic health record. If you see a primary care provider, you can also send messages to your care team and make appointments. If you have questions, please call your primary care clinic.  If you do not have a primary care provider, please call [phone] and they will assist you.      [employer] is an electronic gateway that provides easy, online access to your medical records. With [employer], you can request a clinic appointment, read your test results, renew a prescription or communicate with your care team.     To access your existing account, please contact your [facility] or call [phone] for assistance.        Care EveryWhere ID     This is your Care EveryWhere ID. This could be used by other organizations to access  your [place] medical records  [id number]         Blood Pressure from Last 3 Encounters:   06/01/18 177/73   05/01/18 199/79   03/24/18 149/78    Weight from Last 3 Encounters:   05/31/18 66.2 kg (146 lb)   03/24/18 69.9 kg (154 lb 1.6 oz)   11/11/16 83.9 kg (185 lb)              We Performed the Following     Glucose by meter          Today's Medication Changes      Notice     This visit is during an admission. Changes to the med list made in this visit will be reflected in the After Visit Summary of the admission.             Primary Care Provider Office Phone # Fax #    Randy Fuentes [phone]       [address] [address]        Equal Access to Services     EDUARDO PALACIOS : Eduardo awano [place], waaxda luqadaha, qaybta kaalmada adeegyada, annmarie cline . So [facility] [phone].    ATENCIÓN: Si habla español, tiene a staples disposición servicios gratuitos de asistencia lingüística. Llame al [phone].    We comply with applicable federal civil rights laws and [place] laws. We do not discriminate on the basis of race, color, national origin, age, disability, sex, sexual orientation, or gender identity.            Thank you!     Thank you for choosing [facility]  for your care. Our goal is always to provide you with excellent care. Hearing back from our patients is one way we can continue to improve our services. Please take a few minutes to complete the written survey that you may receive in the mail after your visit with us. Thank you!             Your Updated Medication List - Protect others around you: Learn how to safely use, store and throw away your medicines at www.disposemymeds.org.      Notice     This visit is during an admission. Changes to the med list made in this visit will be reflected in the After Visit Summary of the admission.

## 2018-06-01 NOTE — PLAN OF CARE
Problem: Patient Care Overview  Goal: Plan of Care/Patient Progress Review  OT:  present. Pt not following commands for any BUE ROM or active movement. Per discussion w/ pt's family members, pt baseline lift transfers at home and 24/7 A. Pt has dementia at baseline. New AMS. OT will reschedule 6/4/18 to assess if AMS clears, if not OT will complete orders. Thank you for the referral, OT will hold until 6/4/18.

## 2018-06-01 NOTE — PLAN OF CARE
Problem: Patient Care Overview  Goal: Plan of Care/Patient Progress Review  Outcome: No Change  Pt admitted this evening for AMS. Pt continues to be alert but disoriented x4 (baseline dementia), currently not verbalizing (change from baseline). Pt has 2 pre-existing wounds- left heal and small area near rectum. WOCN consult placed. Pt turned Q2 hrs. Pt finishing 1L NS bolus and then will begin NS at 100ml/hr. Has not voided or stooled since transfer to unit. Due to void and also need stool sample. Daughter, May, visited this evening. One time dose of IV haldol given d/t agitation when lab attempting to collect samples. Labs collected and 2nd iv access obtained at this time. Pt will need prograf gtt overnight. Per provider- pt needs swallow eval. Also has EEG and US orders.    Report given to oncoming RN who will continue with POC

## 2018-06-01 NOTE — PLAN OF CARE
Problem: Patient Care Overview  Goal: Plan of Care/Patient Progress Review  Outcome: Therapy, progress towards functional goals is fair  Discharge Planner SLP   Patient plan for discharge: not stated  Current status: Clinical swallow evaluation completed per MD order. Patient presents with moderate oropharyngeal dysphagia characterized by reduced oral bolus control (poor oral acceptance and oral holding at times), swallow incoordination, coughing after sip of water and mild phlegmy vocal quality after sips of nectar thick liquid. No other overt aspiration signs occurred with small, controlled bites/sips of puree and nectar thick liquid. Recommend cautiously initiate full liquid diet (nectar consistency) given 1:1 assist and swallow strategies (fully alert and upright, small single sips/bites, no straws, slow rate, verify swallows). Hold PO if signs/symptoms of aspiration present or reduced LIZZY or ability to participate. Please crush pills if possible and serve with applesauce or consider alternate form. Discussed with pharmacist and RN. Zach BERNAL.  Barriers to return to prior living situation: medical stability  Recommendations for discharge: defer to medical team pending patient progress/POC  Rationale for recommendations: ST for dysphagia if below baseline at discharge       Entered by: Nae Basilio 06/01/2018 11:39 AM

## 2018-06-01 NOTE — PROGRESS NOTES
Page sent to [facility] team to update that lab has been unsuccessful with two different attempts at drawing labs and cultures. Awaiting call back    Update- Provider ordered haldol once for agitation to collect labs. Will continue to monitor

## 2018-06-01 NOTE — PHARMACY-ANTICOAGULATION SERVICE
Clinical Pharmacy - Warfarin Dosing Consult     Pharmacy has been consulted to manage this patient s warfarin therapy.  Indication: DVT/ PE Treatment  Therapy Goal: INR 2-3  Warfarin Prior to Admission: Yes  Warfarin PTA Regimen: 1.25 mg on Fridays, 2.5 mg ROW  Recent documented change in oral intake/nutrition: Unknown    INR   Date Value Ref Range Status   05/31/2018 4.00 (H) 0.86 - 1.14 Final   05/31/2018 4.23 (H) 0.86 - 1.14 Final       Recommend no warfarin dose today.  Pharmacy will monitor [address] daily and order warfarin doses to achieve specified goal.      Please contact pharmacy as soon as possible if the warfarin needs to be held for a procedure or if the warfarin goals change.

## 2018-06-01 NOTE — PROGRESS NOTES
Cross Cover Note:    Contacted by nursing regarding new +blood cultures growing GP cocci, sensitivities pending. Will start renally dosed Zosyn and obtain repeat cultures. D/w primary team, they would like to continue both PO van and IV flagyl. Afebrile. BP elevated to 203/88, please given 6 pm scheduled meds now. Please contact with any changes.    Phoebe Lamb PA-C  Internal Medicine [facility]  [phone]

## 2018-06-01 NOTE — PROGRESS NOTES
Care Coordinator Progress Note    Admission Date/Time:  5/31/2018  Attending MD:  Phyllis Aguilar MD    Data  Chart reviewed, discussed with interdisciplinary team.   Patient was admitted for:    Acute renal failure, unspecified acute renal failure type (H)  Dehydration  Diarrhea, unspecified type  Function kidney decreased  Kidney replaced by transplant.    Concerns with insurance coverage for discharge needs: None.  Current Living Situation: Patient lives alone but has 24/7 care  Support System: Supportive and Involved  Services Involved: Home Care and PCA  Transportation at Discharge: [facility]   Transportation to Medical Appointments:   - Name of caregiver: Keely (daughter)  Barriers to Discharge: medical clearance    Assessment  Pt is a 78 year old male with PMH significant for advanced vascular dementia, liver/kidney transplant in 2000 2/2 HCV, DVT on coumadin, DMII, GERD, CVA, and chronic anemia who was admitted with altered mental status and found to have C Diff. Per chart review, pt lives alone but has 11.5 hours of PCA services and family supplements the rest of this time. Pt has 24/7 care at home. Pt is also open to [facility] for skilled nursing visits twice/weekly. Pt is bed bound and has a lift and wheelchair at home. Spoke with pt's daughter, Keely, to discuss discharge planning. Keely requested that home services be resumed on discharge. Discussed transportation on discharge and Keely requested [facility] transportation on discharge. Pt will need a stretcher transport due to mental status and safety needs. Keely verbalized understanding of this.    _______________________  [facility]  Phone  [phone]  Fax  [phone]  ______________________    Plan  Anticipated Discharge Date:  2-3 days  Anticipated Discharge Plan:  Discharge to home with resumption of home services    Pam Nickerson RN

## 2018-06-01 NOTE — SUMMARY OF CARE
Pt arrived to unit 5B from ED at 2000. VSS besides elevated /73. Pt mumbling on arrival but was nonverbal in ED. Provider notified of elevated BP and pt arrival.     Pt transferred to unit with 1 white t-shirt. No other belongings as this time. Awaiting arrival of family to continue admission

## 2018-06-01 NOTE — PLAN OF CARE
Problem: Patient Care Overview  Goal: Plan of Care/Patient Progress Review  Pt awake and alert, disoriented x 4.  Speech garbled and illogical.  Resistant to cares but allows turning q2h.  BP consistently high throughout the day.  Added scheduled Coreg this evening.  PRN Labetolol also given and will recheck BP at around 1900.  SLP consult completed, new diet orders.  Pt takes Rx's crushed in apple sauce or pudding.  Incontinent urine x 3, no BM this shift.  WOC RN completed consult, Mepilex placed on left heel and boots on at all times.  Family at bedside throughout the day.   present for MD rounds and RN assessment, pt unable to communicate needs.  EEG completed today, results pending.  Continue IV and PO abx, monitor/treat BP.  Update physician with any changes.

## 2018-06-02 NOTE — PHARMACY-ANTICOAGULATION SERVICE
Clinical Pharmacy - Warfarin Dosing Consult     Pharmacy has been consulted to manage this patient s warfarin therapy.  Indication: DVT/ PE Treatment  Therapy Goal: INR 2-3  Warfarin Prior to Admission: Yes  Warfarin PTA Regimen: 1.25 mg on Fridays, 2.5 mg ROW  Recent documented change in oral intake/nutrition: Unknown    INR   Date Value Ref Range Status   06/02/2018 4.03 (H) 0.86 - 1.14 Final   06/01/2018 4.18 (H) 0.86 - 1.14 Final       Recommend NO warfarin dose today. INR remains supratherapeutic.  Pharmacy will monitor [address] daily and order warfarin doses to achieve specified goal.      Please contact pharmacy as soon as possible if the warfarin needs to be held for a procedure or if the warfarin goals change.

## 2018-06-02 NOTE — PLAN OF CARE
Problem: Patient Care Overview  Goal: Plan of Care/Patient Progress Review  Outcome: Therapy, progress towards functional goals is fair  Discharge Planner SLP   Patient plan for discharge: not discussed  Current status:  Patient seated upright in bed appearing drowsy. Patient with increased alertness when familiar family member present and assisting with feeding. Patient consumed 3-4 sips water by cup, 3 oz. nectar thick liquid by cup, 3 oz puree and 1 oz semi-solid texture. Note immediate cough after sip of water with associated poor oral bolus control and swallow incoordination. No coughing noted with other consistencies. Note occasional mild phlegmy vocal quality (although not able to consistently assess due to minimal verbal interactions). Note prolonged mastication of puree and semi-solid texture (moreso with semi-solids). Patient noted to chew applesauce prior to swallowing. Recommend dysphagia diet level 1 with nectar thick liquids given 1:1 assist and swallow precautions (fully alert and upright position, small single sips/bites, verify swallows, no straws, slow rate). Provided further education about diet modifications and swallow strategies to patient/family member present. Discussed with RN.  Barriers to return to prior living situation: medical stability  Recommendations for discharge: defer to medical team pending patient progress/POC  Rationale for recommendations: ST for dysphagia as indicated pending patient progress/POC       Entered by: Nae Basilio 06/02/2018 11:09 AM

## 2018-06-02 NOTE — PLAN OF CARE
Problem: Patient Care Overview  Goal: Plan of Care/Patient Progress Review  Outcome: No Change  Pt lethargic, disoriented x 4.  Speech garbled and illogical.  VSS, BP continues to be elevated but improved from yesterday.  Pt more awake this morning, slept most of the afternoon and evening. Resistant to cares but allows turning q2h.  Pt takes Rx's crushed in apple sauce or pudding.  Incontinent urine x 3, no BM this shift.  Family at bedside throughout the day, insistent on no SLP therapies.   present for MD rounds and RN assessment, pt unable to communicate needs.  Family very helpful with cares.  Continue IV and PO abx, monitor/treat BP.  Update physician with any changes.

## 2018-06-02 NOTE — PROGRESS NOTES
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 Per family, presentation c/w prior c diff. Completed Vanc earlier this month.     - Treating C.diff as below  -  2 hour video EEG w/o seizure activity  - Holding Xanax   - Continue Seroquel, Nemenda **Patient IS taking although pharmacy said he is not  - PT/OT consults     #C.diff colitis: Improving. No BM overnight  - IV flagyl started while n.p.o. Added p.o. Vancomycin as he is now started on regular diet per family request  -We will obtain gastroenterology consultation Monday to discuss how to prevent his recurrent episodes.?  Prolonged taper.     # CoNS in blood cx: Only 1/2 BC +. Subsequent cxs negative. No s/s of sepsis. Will stop IV vancomycin (started on 6/1) and monitor BC and clinical s/s of sepsis given concern for unnecessary ab may worsen his C.diff    LILIAM on CKD III: BL Cr labile around 1.2 to 1.6. Cr elevated to 2.51 on admission in the setting of high volume GI losses. Improved to 1.3 with fluids.  - Switch to  cc/hr from NS given NAGMA  - Hold lasix   - I&Os, daily weights     Chronic anemia: BL hgb 9.5-10. PTA on Aranesp q28 days for hgb <10. Last dose unknown. Presented with bloody BMs, hgb stable. Bloody stools resolved  - Monitor     H/o liver/kidney transplant: 2000, 2/2 HCT. PTA on Prograf (brand name)  - Prograf gtt given AMS and inability to reliably swallow  - Check prograf trough once taking PO, would be inaccurate with gtt     DMII: No PTA meds. Glucose stable.   - SSI,  hypoglycemia protocol      HTN, CAD: PTA on Coreg, lasix, ASA  - IV labetalol while NPO  - Hold lasix given LILIAM  - Hold ASA given some blood in stool      Anxiety, depression: In the setting of advanced dementia. 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 INR elevated to 4.00 on admission. Has IVC filter in with plan to continue for life. Bloody diarrhea as above.   - Holding coumadin given initial bloody stools and supratherapeutic INR.       # Pain Assessment:  Current Pain Score 3/24/2018   Patient currently in pain? sleeping: patient not able to self report   Pain score (0-10) -   Pain location -   Pain descriptors -   - Priscilla is unable to participate in a plan for pain management; however, the plan  was discussed in a collaborative fashion with his son-in-law.   - No pain medications prescribed at this time        Diet:   Regular diet.  Family request.  Fluids:  cc/hr  DVT Prophylaxis: Pneumatic Compression Devices, Coumadin on hold for now given supratherapeutic INR  Code Status: FULL     Disposition Plan   Expected discharge: 2 - 3 days, recommended to prior living arrangement once antibiotic plan established, mental status at baseline.     Entered: Phyllis Aguilar 06/02/2018, 1:57 PM   Information in the above section will display in the discharge planner report.      Phyllis Aguilar  Internal Medicine Staff Hospitalist Service  [facility]  Pager: [phone]  Please see sticky note for cross cover information    Interval History   Unable to provide due to ams. Updated daughter at bedside. RN notes reviewed. No BM overnight.       Data reviewed today: I reviewed all medications, new labs and imaging results over the last 24 hours.    Physical Exam   Vital Signs: Temp: 98.3  F (36.8  C) Temp src: Axillary BP: 172/67 Pulse: 72   Resp: 16 SpO2: 100 % O2 Device: None (Room air)    Weight: 148 lbs 2.39 oz  General  Appearance: Laying in bed, NAD  Eyes: PERRLA, cataracts.   Respiratory: CTAB without wheezing or crackles  Cardiovascular: RRR, S1, S2. No murmur noted  GI: Old incision scar. Abdomen soft, non-tender with active bowel sounds. No guarding or rebound   Lymph/Hematologic: no edema  Neurologic:  Lethargic, not following commands.      Data     Recent Labs  Lab 06/02/18  0539 06/01/18  2042 06/01/18  0730 05/31/18  2257 05/31/18  1122 05/31/18  0830   WBC 6.8  --  8.5 12.0* 15.2* 15.3*   HGB 9.0* 9.6* 8.3* 9.8* 9.8* 8.8*   MCV 94  --  95 94 93 94     --  228 263 278 270   INR 4.03*  --  4.18*  --  4.00* 4.23*     --  140  --  137 140   POTASSIUM 3.7  --  3.8  --  4.6 4.5   CHLORIDE 114*  --  112*  --  105 107   CO2 16*  --  17*  --  23 22   BUN 35*  --  50*  --  60* 58*   CR 1.39*  --  1.84*  --  2.51* 2.45*   ANIONGAP 10  --  11  --  10 11   JOSHUA 7.8*  --  7.6*  --  8.2* 8.1*   *  --  96  --  131* 78   ALBUMIN  --   --   --   --  2.5* 2.2*   PROTTOTAL  --   --   --   --  6.9 6.4*   BILITOTAL  --   --   --   --  0.3 0.3   ALKPHOS  --   --   --   --  94 97   ALT  --   --   --   --  32 26   AST  --   --   --   --  24 28     No results found for this or any previous visit (from the past 24 hour(s)).

## 2018-06-02 NOTE — PLAN OF CARE
Problem: Patient Care Overview  Goal: Plan of Care/Patient Progress Review  Outcome: Improving  Pt continues to be alert, but confused and mumbles words. Per family, pt not responding appropriately and not appropriate for  tools. Pt constricted and not following commands. BP elevated and PRN hydralazine given with recheck improved. Pt had poor tolerance of dinner tray. Pt was able to take crushed pills in apple sauce and drink nectar thickened liquids. HS seroquel and melatonin given to help regulate sleep cycle. Patient sleeping comfortably for majority of night. No BM's, pt incontinent of urine. Pt continues to receive IV prograf and IV flagyl/vanc. Hgb stable with 1 full set of BC's collected overnight.

## 2018-06-03 NOTE — PROGRESS NOTES
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 Likely back to baseline  #Advanced vascular dementia:   2 days PTA increased lethargy and minimal responsiveness with new onset foul, high volume, bloody BMs. Per family, presentation c/w prior c diff. Completed Vanc earlier this month.     - Treating C.diff as below  -  2 hour video EEG w/o seizure activity  - Holding Xanax   - Continue Seroquel, Nemenda **Patient IS taking although pharmacy said he is not  - PT/OT consults     #C.diff colitis: Improving. No BM overnight  - IV flagyl started while n.p.o. Added p.o. Vancomycin as he is now started on regular diet per family request  -We will obtain gastroenterology consultation Monday to discuss how to prevent his recurrent episodes.?  Prolonged taper.    # Supratherapeutic INR: No coumadin since admission but INR still trending up. No bleeding. Hgb and plt stable. Unlikely DIC. No stigmata or hx of chronic liver disease. Suspect nutritional component. Will monitpr w/o any intervention for now. Continue to hold coumadin.      # CoNS in blood cx: Only 1/2 BC +. Subsequent cxs negative. No s/s of sepsis. Stopped IV vancomycin on 6/2  (started on 6/1) and monitor BC and clinical s/s of sepsis given concern for unnecessary ab may worsen his C.diff    LILIAM on CKD III: BL Cr labile around 1.2 to 1.6. Cr elevated to 2.51 on admission in the setting of high volume GI losses. Improved to 1.3 with fluids.  -  cc/hr   - Hold lasix   - I&Os, daily weights     Chronic anemia: BL hgb 9.5-10. PTA on Aranesp q28 days for hgb <10. Last dose unknown. Presented with bloody BMs, hgb stable.  Bloody stools resolved  - Monitor     H/o liver/kidney transplant: 2000, 2/2 HCT. PTA on Prograf (brand name)  - Prograf gtt given AMS and inability to reliably swallow  - Check prograf trough once taking PO, would be inaccurate with gtt     DMII: No PTA meds. Glucose stable.   - SSI, hypoglycemia protocol      HTN, CAD: PTA on Coreg, lasix, ASA  - Continue Coreg  - Hold lasix given LILIAM and poor po intake  - Hold ASA w/ supratherapeutic INR     Anxiety, depression: In the setting of advanced dementia. PTA on prn Xanax   - Holding Xanax given AMS     GERD: PTA on PPI  - Continue PPI     H/o recurrent DVTs: On chronic coumadin. INR elevated to 4.00 on admission. Has IVC filter in with plan to continue for life. Bloody diarrhea as above.   - Holding coumadin given initial bloody stools and supratherapeutic INR.       # Pain Assessment:  Current Pain Score 3/24/2018   Patient currently in pain? sleeping: patient not able to self report   Pain score (0-10) -   Pain location -   Pain descriptors -   - Priscilla is unable to participate in a plan for pain management; however, the plan  was discussed in a collaborative fashion with his son-in-law.   - No pain medications prescribed at this time        Diet:   Regular diet.  Family request.  Fluids:  cc/hr  DVT Prophylaxis: Pneumatic Compression Devices, Coumadin on hold for now given supratherapeutic INR  Code Status: FULL     Disposition Plan   Expected discharge: 2 - 3 days, recommended to prior living arrangement once antibiotic plan established, mental status at baseline.     Entered: Esra Sit 06/03/2018, 8:38 AM   Information in the above section will display in the discharge planner report.      Phyllis Aguilar  Internal Medicine Staff Hospitalist Service  [facility]  Pager: [phone]  Please see sticky note for cross cover information    Interval History   Unable to provide due to ams. RN notes reviewed. Loose non-bloody stools overnight.       Data  reviewed today: I reviewed all medications, new labs and imaging results over the last 24 hours.    Physical Exam   Vital Signs: Temp: 96.5  F (35.8  C) Temp src: Axillary BP: 172/72 Pulse: 66   Resp: 16 SpO2: 100 % O2 Device: None (Room air)    Weight: 164 lbs 1.6 oz  General Appearance: Up in chair, grandson is feeding him breakfast, NAD  Eyes: PERRLA, cataracts.   Respiratory: CTAB without wheezing or crackles  Cardiovascular: RRR, S1, S2. No murmur noted  GI: Old incision scar. Abdomen soft, non-tender with active bowel sounds. No guarding or rebound   Lymph/Hematologic: no edema  Neurologic:  alert, not tracks or not following commands.      Data     Recent Labs  Lab 06/03/18  0605 06/02/18  0539 06/01/18  2042 06/01/18  0730  05/31/18  1122 05/31/18  0830   WBC 5.8 6.8  --  8.5  < > 15.2* 15.3*   HGB 8.7* 9.0* 9.6* 8.3*  < > 9.8* 8.8*   MCV 94 94  --  95  < > 93 94    292  --  228  < > 278 270   INR 4.52* 4.03*  --  4.18*  --  4.00* 4.23*    140  --  140  --  137 140   POTASSIUM 3.7 3.7  --  3.8  --  4.6 4.5   CHLORIDE 116* 114*  --  112*  --  105 107   CO2 16* 16*  --  17*  --  23 22   BUN 28 35*  --  50*  --  60* 58*   CR 1.35* 1.39*  --  1.84*  --  2.51* 2.45*   ANIONGAP 10 10  --  11  --  10 11   JOSHUA 7.4* 7.8*  --  7.6*  --  8.2* 8.1*   * 119*  --  96  --  131* 78   ALBUMIN  --   --   --   --   --  2.5* 2.2*   PROTTOTAL  --   --   --   --   --  6.9 6.4*   BILITOTAL  --   --   --   --   --  0.3 0.3   ALKPHOS  --   --   --   --   --  94 97   ALT  --   --   --   --   --  32 26   AST  --   --   --   --   --  24 28   < > = values in this interval not displayed.  No results found for this or any previous visit (from the past 24 hour(s)).

## 2018-06-03 NOTE — PROGRESS NOTES
Nephrology Progress Note  06/03/2018       77 yo M S/P combined liver and kidney transplant in 2000 adm 5/31 with c diff and LILIAM    ASSESSMENT AND RECOMMENDATIONS:   1.  Allograft function:  S/P kidney transplant 2000. BL Cr 1.3-1.6. HX LILIAM in 2016 with biopsy showing ATI, early diabetic changes, ASVD and no rejection.  LILIAM resolving with IVF                        - agree with IVF and monitor Cr daily     2.  IS: Single drug IS with tac since 2002.  IV tac given when admitted. Now on po with level 5.6 5/31                        - continue po tac 1.5 mg bid                        - monitor levels      3.  C diff: hx c diff 3/2018 and 5/ 2018.  Treated X 10 days with vanco with apparent resolution.  Now getting IV flagyl and po vanco with improvement                          4.  Dementia: severely affected with multiple infracts and diffuse volume loss of CT.  No seizure on EEG.                          - would not be a dialysis candidate     5.  Anemia: hb 8.3 after hydration.  INR 4.  GI blood loss   - check epo level     6. Hyperparathyroidism.  PTH was 333 in 2016.                          - check PTH and vit D level    7.  HBP:  BP high today with SBP up to 190.  Only on carvedilol 6.25.     - increase carvedilol to 12.5 bid   - can add amlodipine 5 mg  If needed in 24-48 hours     Recommendations were communicated to primary team via note     Shelly Washington MD   [phone]    Interval History :   In the last 24 hours Pricsilla Way has improved with less diarrhea. Now on po tac.  Getting flagyl and po vanco for c diff. BP is now high.    Review of Systems:   Pt has advanced dementia and cannot answer questions    Physical Exam:   I/O last 3 completed shifts:  In: 50 [P.O.:50]  Out: -    /71 (BP Location: Left arm)  Pulse 73  Temp 95.7  F (35.4  C) (Axillary)  Resp 16  Wt 74.4 kg (164 lb 1.6 oz)  SpO2 100%  BMI 24.95 kg/m2     GENERAL APPEARANCE: awake and being fed by family member  HENT: mouth  without ulcers or lesions  PULM: lungs cl to auscultation  CV: regular rhythm, normal rate, no rub     -JVD no     -edema none   GI: soft, nontender, nondistended, bowel sounds are present  INTEGUMENT: no cyanosis, no rash  NEURO:  Does not peak or follow commands.  Alert      Labs:   All labs reviewed by me  Electrolytes/Renal -   Recent Labs   Lab Test  06/03/18   0605  06/02/18   0539  06/01/18   0730   08/31/16   0559  08/30/16   0826  08/29/16   0739   NA  142  140  140   < >  135  136  136   POTASSIUM  3.7  3.7  3.8   < >  3.8  4.1  3.9   CHLORIDE  116*  114*  112*   < >  100  101  101   CO2  16*  16*  17*   < >  23  23  20   BUN  28  35*  50*   < >  86*  91*  93*   CR  1.35*  1.39*  1.84*   < >  3.99*  4.28*  5.19*   GLC  126*  119*  96   < >  140*  131*  144*   JOSHUA  7.4*  7.8*  7.6*   < >  8.3*  8.0*  7.8*   MAG   --    --    --    --   2.1  2.1  2.2   PHOS   --    --    --    --   4.6*  5.0*  5.9*    < > = values in this interval not displayed.       CBC -   Recent Labs   Lab Test  06/03/18   0605  06/02/18   0539  06/01/18   2042  06/01/18   0730   WBC  5.8  6.8   --   8.5   HGB  8.7*  9.0*  9.6*  8.3*   PLT  250  292   --   228       LFTs -   Recent Labs   Lab Test  05/31/18   1122  05/31/18   0830  05/15/18   0815   ALKPHOS  94  97  94   BILITOTAL  0.3  0.3  0.2   ALT  32  26  24   AST  24  28  30   PROTTOTAL  6.9  6.4*  7.0   ALBUMIN  2.5*  2.2*  2.6*       Iron Panel -   Recent Labs   Lab Test  11/08/16   1209  08/24/16   0852   IRON  17*  20*   IRONSAT  11*  10*   BARTOLO   --   1025*         Imaging:  none    Current Medications:    carvedilol  6.25 mg Oral BID w/meals     insulin aspart  1-7 Units Subcutaneous TID AC     insulin aspart  1-5 Units Subcutaneous At Bedtime     [START ON 6/4/2018] memantine XR  21 mg Oral Daily     metroNIDAZOLE  500 mg Intravenous Q6H     omeprazole  20 mg Oral BID     tacrolimus  1.5 mg Oral BID IS     vancomycin  125 mg Oral 4x Daily     warfarin-No DOSE today  1 each  Does not apply no dose today (warfarin)       lactated ringers 100 mL/hr at 06/03/18 1314     - MEDICATION INSTRUCTIONS -       Warfarin Therapy Reminder       Shelly Washington MD

## 2018-06-03 NOTE — PLAN OF CARE
Problem: Patient Care Overview  Goal: Plan of Care/Patient Progress Review  Outcome: Improving  Pt more alert today, continues to be confused and disoriented.  Asked family for something to drink this afternoon, other days pt has been unable to make any needs known.  Increased strength.  Up in chair for 5-6 hours today.  Poor appetite.  BP continues to be elevated, PRN hydralazine given x 1.  Tacro gtt d/c'd, started on PO suspension.  Continuing IVF's.  Mepilex on Left heel changed.  Incontinent urine x 2.  No BM this shift.  Continue Abx.  Denies further wants/needs.  Continue to monitor, update physician with any changes.

## 2018-06-03 NOTE — PLAN OF CARE
Problem: Patient Care Overview  Goal: Plan of Care/Patient Progress Review  Outcome: No Change  Pt alert, but mumbles illogical phrases per family report at bedside. Q 4 hour neuro checks completed with no new findings. BP elevated (pt stevo arm during reading). Recheck remained elevated and pt treated with PRN hydralazine with recheck WNL. Bed bath completed per daughters request. Continues to have loose BM's that produce a clear mucous color. Incontinent of bladder. Continue POC.

## 2018-06-04 NOTE — CONSULTS
GASTROENTEROLOGY CONSULTATION      Date of Admission: 5/31/2018       Date of Consult: 6/4/18          Chief Complaint:   We were asked by Phyllis Aguilar MD to evaluate this patient with recurrent C. Diff management (FMT eligibility vs Vanco taper).          ASSESSMENT AND RECOMMENDATIONS:   Assessment:  Priscilla Way is a 78 year old male with a history of vascular dementia, liver/kidney transplant in 2000 s/t HCV, DVT on Coumadin, DMII, hypertension. GERD, multi-infarct CVA, chronic anemia, who was admitted 5/31 with altered mental status, increased lethargy and minimal responsiveness with new onset foul, high volume, bloody BMs; and he was found to have C. Diff infection.    #Recurrent C. Diff infection  #Decreased oral intake   #Recent antibiotic use  Patient had C. Diff positive on 3/21/18, 5/1/18, and 6/1/18. First C. Diff infection he was treated with 10 days of Vancomycin while on UTI treatment keflex/ceftriaxone, which is a contributing factor for the recurrent C. Diff infection. Patient is stable, no leukocytosis or fever, and his ATLAS score is 3 (1 for gender and 2 for albumin in the setting of decreased oral intake). Plan is to treat patient with oral vancomycin with taper. He is not a candidate for FMT at this time as he does not have spontaneous C. Diff infection without ABX therapy and he has not failed oral vancomycin with taper. However, he will be seen in GI clinic for C. Diff follow up.      Recommendations  --If unable to take oral nutrition and medications, consider NG tube versus PEG tube  --Continue with IV Flagyl till patient is taking oral Vancomycin x4/day  --Continue with 125mg oral Vancomycin QID x14 days and then taper       -125 mg daily three times daily for one week      -125 mg daily two times daily for one week      -125 mg daily one time daily for one week  --GI will follow     Gastroenterology follow up recommendations: Follow up GI clinic in 4-5 weeks    Patient care plan  discussed with Dr. Quesada, GI staff physician. Thank you for involving us in this patient's care. Please do not hesitate to contact the GI service with any questions or concerns.     Presley King CNP  Department of Gastroenterology   -------------------------------------------------------------------------------------------------------------------   History is obtained from the patient and the medical record.          History of Present Illness:   Priscilla Way is a 78 year old male with a history of vascular dementia, liver/kidney transplant in  s/t HCV, DVT on Coumadin, DMII, hypertension. GERD, multi-infarct CVA, chronic anemia, who was admitted  with altered mental status, increased lethargy and minimal responsiveness with new onset foul, high volume, bloody BMs; and he was found to have C. Diff infection.     Patient is non-verbal but family feels that C. Diff related diarrhea improves its quantity while he is on Vanco therapy but then diarrhea returns once he finishes therapy. However, for about a month now his diarrhea has some blood and mucous with it. To family report he appears to have abdominal cramps at times as he moves in bed frequently.         Past Medical History:   Reviewed and edited as appropriate  Past Medical History:   Diagnosis Date     LILIAM (acute kidney injury) (H) 2016    kidney biopsy showed ATN     Dementia     multiple acute infarcts, SV dis on CT     Diabetes type 2, controlled (H)      H/O Clostridium difficile infection 2018    treated [facility] vanco     Hepatitis C      Kidney transplanted      Liver transplant      Unspecified cerebral artery occlusion with cerebral infarction             Past Surgical History:   Reviewed and edited as appropriate   Past Surgical History:   Procedure Laterality Date     TRANSPLANT KIDNEY RECIPIENT  DONOR       TRANSPLANT LIVER RECIPIENT  DONOR              Previous Endoscopy:   No results found.  However, due to the size of the patient record, not all encounters were searched. Please check Results Review for a complete set of results.         Social History:   Reviewed and edited as appropriate  Social History     Social History     Marital status:      Spouse name: N/A     Number of children: N/A     Years of education: N/A     Occupational History     Not on file.     Social History Main Topics     Smoking status: Former Smoker     Types: Cigarettes     Smokeless tobacco: Never Used      Comment: quit 10 years ago     Alcohol use No     Drug use: No     Sexual activity: No     Other Topics Concern     Not on file     Social History Narrative            Family History:   Reviewed and edited as appropriate  No family history on file.        Allergies:   Reviewed and edited as appropriate   No Known Allergies         Medications:     Current Facility-Administered Medications   Medication     acetaminophen (TYLENOL) tablet 650 mg     carvedilol (COREG) tablet 12.5 mg     carvedilol (COREG) tablet 6.25 mg     glucose gel 15-30 g    Or     dextrose 50 % injection 25-50 mL    Or     glucagon injection 1 mg     dimethicone-zinc oxide (EUCERIN) cream     hydrALAZINE (APRESOLINE) injection 10 mg     insulin aspart (NovoLOG) inj (RAPID ACTING)     insulin aspart (NovoLOG) inj (RAPID ACTING)     melatonin tablet 1 mg     memantine XR (NAMENDA XR) 24 hr capsule 21 mg     naloxone (NARCAN) injection 0.1-0.4 mg     omeprazole (priLOSEC) suspension 20 mg     ondansetron (ZOFRAN-ODT) ODT tab 4 mg    Or     ondansetron (ZOFRAN) injection 4 mg     Patient is already receiving anticoagulation with heparin, enoxaparin (LOVENOX), warfarin (COUMADIN)  or other anticoagulant medication     phytonadione (MEPHYTON) tablet 5 mg     polyethylene glycol (MIRALAX/GLYCOLAX) Packet 17 g     QUEtiapine (SEROquel) half-tab 25 mg     tacrolimus (PROGRAF BRAND) suspension 1.5 mg     vancomycin (VANOCIN) solution 125 mg     Warfarin  Therapy Reminder (Check START DATE - warfarin may be starting in the FUTURE)     warfarin-No DOSE today             Review of Systems:   A complete review of systems was performed and is negative except as noted in the HPI           Physical Exam:   /86 (BP Location: Left arm)  Pulse 88  Temp 97.6  F (36.4  C) (Axillary)  Resp 18  Wt 74 kg (163 lb 3.2 oz)  SpO2 99%  BMI 24.81 kg/m2  Wt:   Wt Readings from Last 2 Encounters:   06/03/18 74 kg (163 lb 3.2 oz)   03/24/18 69.9 kg (154 lb 1.6 oz)      Constitutional: Non-verbal, cooperative, pleasant, not dyspneic/diaphoretic, no acute distress  Eyes: Sclera anicteric/injected  Ears/nose/mouth/throat: Normal oropharynx without ulcers or exudate, mucus membranes moist, hearing intact  Neck: supple, thyroid normal size  CV: RRR. No edema in LE bilaterally   Respiratory: Unlabored breathing. CTA bilaterally   Lymph: No axillary, submandibular, supraclavicular or inguinal lymphadenopathy  Abd:  Nondistended, +bs, no hepatosplenomegaly, nontender, no peritoneal signs  Skin: warm, perfused, no jaundice  Neuro: AAO x 3, No asterixis  Psych: Flat affect          Data:   Labs and imaging below were independently reviewed and interpreted    BMP  Recent Labs  Lab 06/04/18 0625 06/03/18  0605 06/02/18  0539 06/01/18  0730    142 140 140   POTASSIUM 3.7 3.7 3.7 3.8   CHLORIDE 115* 116* 114* 112*   JOSHUA 7.6* 7.4* 7.8* 7.6*   CO2 17* 16* 16* 17*   BUN 22 28 35* 50*   CR 1.22 1.35* 1.39* 1.84*   * 126* 119* 96     CBC  Recent Labs  Lab 06/04/18  0625 06/03/18  0605 06/02/18  0539  06/01/18  0730   WBC 4.6 5.8 6.8  --  8.5   RBC 2.75* 2.89* 2.97*  --  2.70*   HGB 8.2* 8.7* 9.0*  < > 8.3*   HCT 25.4* 27.2* 27.8*  --  25.7*   MCV 92 94 94  --  95   MCH 29.8 30.1 30.3  --  30.7   MCHC 32.3 32.0 32.4  --  32.3   RDW 15.1* 14.8 14.7  --  14.9    250 292  --  228   < > = values in this interval not displayed.  INR  Recent Labs  Lab 06/04/18  0625 06/03/18  0605  06/02/18  0539 06/01/18  0730   INR 4.57* 4.52* 4.03* 4.18*     LFTs  Recent Labs  Lab 05/31/18  1122 05/31/18  0830   ALKPHOS 94 97   AST 24 28   ALT 32 26   BILITOTAL 0.3 0.3   PROTTOTAL 6.9 6.4*   ALBUMIN 2.5* 2.2*      PANCNo lab results found in last 7 days.

## 2018-06-04 NOTE — PROGRESS NOTES
Care Coordinator - Discharge Planning    Admission Date/Time:  5/31/2018  Attending MD:  Phyllis Aguilar MD     Data  Date of initial CC assessment:  6/1/18  Chart reviewed, discussed with interdisciplinary team.   Patient was admitted for:   1. Acute renal failure, unspecified acute renal failure type (H)    2. Dehydration    3. Diarrhea, unspecified type    4. Function kidney decreased    5. Kidney replaced by transplant         Assessment   Full assessment completed in previous note    Per MD team, pt will likely be medically stable for discharge tomorrow 6/5. Spoke with pt's daughter, Keely, to discuss discharge planning. Keely agreeable to writer arranging stretcher transport on discharge and requested a ride be arranged for 2pm so that family will be able to be waiting at pt's house for him. [facility]       Plan  Anticipated Discharge Date:  6/5/18  Anticipated Discharge Plan:  Discharge to home with resumption of services and 24/7 assist at home    CTS Handoff completed:  YES    Pam Nickerson RN

## 2018-06-04 NOTE — PLAN OF CARE
Problem: Patient Care Overview  Goal: Plan of Care/Patient Progress Review  Outcome: No Change  No significant events. Pt more alert and answering few questions appropriately with family friend in room. Pt able to swallow crushed pills in apple sauce. HS melatonin and Seroquel given with patient sleeping comfortably overnight. IV flagyl and LR at 100ml/hr. Clear colored loose stool x1. Incontinent of urine. Q 2 hour turns. Pt combative with staff during turns.

## 2018-06-04 NOTE — PROGRESS NOTES
Nephrology Progress Note  06/04/2018       79 yo M S/P combined liver and kidney transplant in 2000 adm 5/31 with c diff and LILIAM    ASSESSMENT AND RECOMMENDATIONS:   1.  Allograft function:  S/P kidney transplant 2000. BL Cr 1.3-1.6. HX LILIAM in 2016 with biopsy showing ATI, early diabetic changes, ASVD and no rejection.  LILIAM resolving with IVF                        - off IVF     2.  IS: Single drug IS with tac since 2002.  IV tac given when admitted. Now on po with level 5.6 5/31                        - continue po tac 1.5 mg bid                        - monitor levels      3.  C diff: hx c diff 3/2018 and 5/ 2018.  Treated X 10 days with vanco with apparent resolution in March and May.  Now getting IV flagyl and po vanco with improvement                          4.  Dementia: severely affected with multiple infracts and diffuse volume loss of CT.  No seizure on EEG.                          - would not be a dialysis candidate     5.  Anemia: hb 8.2 after hydration.  INR 4.  GI blood loss   - check epo level     6. Hyperparathyroidism.  PTH was 333 in 2016.                          - check PTH and vit D level    7.  HBP:  BP high today with SBP up to 190.  Only on carvedilol 6.25.     - increase carvedilol to 12.5 bid   - can add amlodipine 5 mg  If needed in 24-48 hours     Recommendations were communicated to primary team via note     Shelly Washington MD   [phone]    Interval History :   In the last 24 hours Priscilla Way has improved with less diarrhea. Now on po tac as a suspension (could not take pills).  Getting flagyl and po vanco for c diff. BP is intermitently high.    Review of Systems:   Pt has advanced dementia and cannot answer questions    Physical Exam:   I/O last 3 completed shifts:  In: 200 [P.O.:200]  Out: -    /86 (BP Location: Left arm)  Pulse 88  Temp 97.6  F (36.4  C) (Axillary)  Resp 18  Wt 74 kg (163 lb 3.2 oz)  SpO2 99%  BMI 24.81 kg/m2     GENERAL APPEARANCE: awake and  being fed by family member  HENT: mouth without ulcers or lesions  PULM: lungs cl to auscultation  CV: regular rhythm, normal rate, no rub     -JVD no     -edema none   GI: soft, nontender, nondistended, bowel sounds are present  INTEGUMENT: no cyanosis, no rash  NEURO:  Does not peak or follow commands.  Alert      Labs:   All labs reviewed by me  Electrolytes/Renal -   Recent Labs   Lab Test  06/04/18   0625  06/03/18   0605  06/02/18   0539   08/31/16   0559  08/30/16   0826  08/29/16   0739   NA  144  142  140   < >  135  136  136   POTASSIUM  3.7  3.7  3.7   < >  3.8  4.1  3.9   CHLORIDE  115*  116*  114*   < >  100  101  101   CO2  17*  16*  16*   < >  23  23  20   BUN  22  28  35*   < >  86*  91*  93*   CR  1.22  1.35*  1.39*   < >  3.99*  4.28*  5.19*   GLC  124*  126*  119*   < >  140*  131*  144*   JOSHUA  7.6*  7.4*  7.8*   < >  8.3*  8.0*  7.8*   MAG   --    --    --    --   2.1  2.1  2.2   PHOS   --    --    --    --   4.6*  5.0*  5.9*    < > = values in this interval not displayed.       CBC -   Recent Labs   Lab Test  06/04/18   0625  06/03/18   0605  06/02/18   0539   WBC  4.6  5.8  6.8   HGB  8.2*  8.7*  9.0*   PLT  242  250  292       LFTs -   Recent Labs   Lab Test  05/31/18   1122  05/31/18   0830  05/15/18   0815   ALKPHOS  94  97  94   BILITOTAL  0.3  0.3  0.2   ALT  32  26  24   AST  24  28  30   PROTTOTAL  6.9  6.4*  7.0   ALBUMIN  2.5*  2.2*  2.6*       Iron Panel -   Recent Labs   Lab Test  11/08/16   1209  08/24/16   0852   IRON  17*  20*   IRONSAT  11*  10*   BARTOLO   --   1025*         Imaging:  none    Current Medications:    carvedilol  12.5 mg Oral BID w/meals     carvedilol  6.25 mg Oral Once     insulin aspart  1-7 Units Subcutaneous TID AC     insulin aspart  1-5 Units Subcutaneous At Bedtime     memantine XR  21 mg Oral Daily     omeprazole  20 mg Oral BID     phytonadione  5 mg Oral Once     tacrolimus  1.5 mg Oral BID IS     vancomycin  125 mg Oral 4x Daily     warfarin-No DOSE today   1 each Does not apply no dose today (warfarin)       - MEDICATION INSTRUCTIONS -       Warfarin Therapy Reminder       Shelly Washington MD

## 2018-06-04 NOTE — PROGRESS NOTES
[facility], [place]    Internal Medicine Progress Note - Gold Service      Assessment & Plan     Priscilla Way is a 78 year old male with history of advanced vascular dementia, liver/kidney transplant 2000 2/2 HCV, DVT on Coumadin, DMII, GERD, CVA, chronic anemia, who was admitted 5/31 with altered mental status     #Toxic metabolic encephalopathy - improving. Likely back to baseline per daughter  #Advanced vascular dementia:   2 days PTA increased lethargy and minimal responsiveness with new onset foul, high volume, bloody BMs. Per family, presentation c/w prior c diff. Completed Vanc earlier this month.     - Treating C.diff as below  -  2 hour video EEG w/o seizure activity  - Holding Xanax   - Continue Seroquel, Nemenda **Patient IS taking although pharmacy said he is not  - PT/OT consults     #C.diff colitis: Improving.   - IV flagyl started 6/1 while n.p.o, and stopped on 6/3. Added p.o. Vancomycin 6/1 when able to take po later during the day. Continuing with po vancomycin alone.   - Gastroenterology consultation today to discuss how to prevent his recurrent episodes.?  Prolonged taper. ?FMT    # Supratherapeutic INR: No coumadin since admission but INR still trending up. No bleeding. Hgb and plt stable. Unlikely DIC. No stigmata or hx of chronic liver disease. Suspect nutritional component.   - Trial 5 mg po vit K today  - Continue to hold coumadin.      # CoNS in blood cx: Only 1/2 BC +. Subsequent cxs negative. No s/s of sepsis. Stopped IV vancomycin on 6/2  (started on 6/1) and monitor BC and clinical s/s of sepsis given concern for unnecessary ab may worsen his C.diff    # LILIAM on CKD III: BL Cr labile around 1.2 to 1.6. Cr elevated to 2.51 on admission in the setting of high volume GI losses. Improved to 1.2 with fluids.  - Stop IVF today and see if he can maintain his kidney function with po only  - Hold lasix      Chronic anemia: BL hgb 9.5-10. PTA on Aranesp q28 days for  hgb <10. Last dose unknown. Presented with bloody BMs, hgb stable. Bloody stools resolved  - Monitor     H/o liver/kidney transplant: 2000, 2/2 HCT. PTA on Prograf (brand name)  - Continue PTA prograf      DMII: No PTA meds. Glucose stable.   - SSI, hypoglycemia protocol      HTN, CAD: PTA on Coreg, lasix, ASA  - Continue Coreg at increased dose 12.5 mg BID given poorly controlled BPs  - Hold lasix given recent LILIAM and poor po intake  - Hold ASA w/ supratherapeutic INR     Anxiety, depression: In the setting of advanced dementia. PTA on prn Xanax   - Holding Xanax given AMS     GERD: PTA on PPI  - Continue PPI     H/o recurrent DVTs: On chronic coumadin. INR elevated to 4.00 on admission. Has IVC filter in with plan to continue for life. Bloody diarrhea as above.   - Holding coumadin given initial bloody stools and supratherapeutic INR.       # Pain Assessment:  Current Pain Score 3/24/2018   Patient currently in pain? sleeping: patient not able to self report   Pain score (0-10) -   Pain location -   Pain descriptors -   - Priscilla is unable to participate in a plan for pain management; however, the plan  was discussed in a collaborative fashion with his son-in-law.   - No pain medications prescribed at this time        Diet:   Regular diet.  Family request.  Fluids: None  DVT Prophylaxis: Pneumatic Compression Devices, Coumadin on hold for now given supratherapeutic INR  Code Status: FULL     Disposition Plan   Expected discharge: 1-2 days recommended to prior living arrangement once antibiotic plan established, mental status at baseline.     Entered: Phyllis Aguilar 06/04/2018, 1:46 PM   Information in the above section will display in the discharge planner report.      Phyllis Aguilar  Internal Medicine Staff Hospitalist Service  [facility]  Pager: [phone]  Please see sticky note for cross cover information    Interval History   Unable to provide due to ams. RN notes reviewed. Only 1 BM last 24 hours. Updated  his daughter May by phone.       Data reviewed today: I reviewed all medications, new labs and imaging results over the last 24 hours.    Physical Exam   Vital Signs: Temp: 97.6  F (36.4  C) Temp src: Axillary BP: 147/86 Pulse: 88   Resp: 18 SpO2: 99 % O2 Device: None (Room air)    Weight: 163 lbs 3.2 oz  General Appearance: laying in bed, NAD  Eyes: PERRLA, cataracts.   Respiratory: CTAB without wheezing or crackles  Cardiovascular: RRR, S1, S2. No murmur noted  GI: Old incision scar. Abdomen soft, non-tender with active bowel sounds. No guarding or rebound   Lymph/Hematologic: no edema  Neurologic:  alert, not tracks or not following commands.      Data     Recent Labs  Lab 06/04/18  0625 06/03/18  0605 06/02/18  0539  05/31/18  1122 05/31/18  0830   WBC 4.6 5.8 6.8  < > 15.2* 15.3*   HGB 8.2* 8.7* 9.0*  < > 9.8* 8.8*   MCV 92 94 94  < > 93 94    250 292  < > 278 270   INR 4.57* 4.52* 4.03*  < > 4.00* 4.23*    142 140  < > 137 140   POTASSIUM 3.7 3.7 3.7  < > 4.6 4.5   CHLORIDE 115* 116* 114*  < > 105 107   CO2 17* 16* 16*  < > 23 22   BUN 22 28 35*  < > 60* 58*   CR 1.22 1.35* 1.39*  < > 2.51* 2.45*   ANIONGAP 11 10 10  < > 10 11   JOSHUA 7.6* 7.4* 7.8*  < > 8.2* 8.1*   * 126* 119*  < > 131* 78   ALBUMIN  --   --   --   --  2.5* 2.2*   PROTTOTAL  --   --   --   --  6.9 6.4*   BILITOTAL  --   --   --   --  0.3 0.3   ALKPHOS  --   --   --   --  94 97   ALT  --   --   --   --  32 26   AST  --   --   --   --  24 28   < > = values in this interval not displayed.  No results found for this or any previous visit (from the past 24 hour(s)).

## 2018-06-04 NOTE — PLAN OF CARE
Problem: Patient Care Overview  Goal: Plan of Care/Patient Progress Review  Outcome: No Change  Pt disoriented and unresponsive to writer but responded slightly to family. Ate majority of breakfast being fed by family. Meds crushed and given with applesauce. VSS, no complaints of pain. IVF and IVATB discontinued. No BM's. Continue to monitor.

## 2018-06-04 NOTE — PROGRESS NOTES
ANTICOAGULATION FOLLOW-UP CLINIC VISIT    Patient Name:  Priscilla Way  Date:  8/8/2017  Contact Type:  Telephone    SUBJECTIVE:     Patient Findings     Positives No Problem Findings           OBJECTIVE    INR   Date Value Ref Range Status   07/25/2017 1.90  Final     Comment:     Home Care        ASSESSMENT / PLAN  INR assessment THER    Recheck INR In: 2 WEEKS    INR Location [facility] INR      Anticoagulation Summary as of 8/8/2017     INR goal 2.0-3.0   Today's INR    Maintenance plan 3.75 mg (2.5 mg x 1.5) on Tue, Thu; 2.5 mg (2.5 mg x 1) all other days   Full instructions 3.75 mg on Tue, Thu; 2.5 mg all other days   Weekly total 20 mg   Plan last modified Jodee Barron RN (5/5/2017)   Next INR check 8/22/2017   Priority INR   Target end date     Indications   Acute kidney injury (H) [N17.9]  Deep vein thrombosis (DVT) (H) [I82.409] [I82.409]  Long-term (current) use of anticoagulants [Z79.01] [Z79.01]         Anticoagulation Episode Summary     INR check location     Preferred lab     Send INR reminders to [facility]    Comments [facility] to draw INR's  Pt has dementia please contact daughter with any questions. Contact daughter May at [phone].  Has 2.5mg tablets       Anticoagulation Care Providers     Provider Role Specialty Phone number    Randy Fuentes MD [facility] [phone]            See the Encounter Report to view Anticoagulation Flowsheet and Dosing Calendar (Go to Encounters tab in chart review, and find the Anticoagulation Therapy Visit)  Spoke with Sam [facility] nurse.    Sherry Georges RN               
no difficulties

## 2018-06-05 NOTE — PLAN OF CARE
Problem: Patient Care Overview  Goal: Plan of Care/Patient Progress Review  Outcome: No Change  Pt slept quietly overnight.  VSS on RA. BP elevated, PRN hydralazine given x 2. Family in room overnight and fed pt around 0200, pt ate well.  Incontinent urine x 1.  Repositioned q2h.  Pt will discharge today, medical transport set for 1400.  Will continue to monitor and update physicians with any changes.

## 2018-06-05 NOTE — PROGRESS NOTES
Notified today of member's 5/31/18 hospital admission to [facility].  Reviewed EPIC today and found his admission was due to altered mental status and was found to have C-Diff.  Placed call to care coordinator with update on services member gets in home.  Will continue to follow up.    Carri Anderson RN  [phone]

## 2018-06-05 NOTE — PLAN OF CARE
Problem: Patient Care Overview  Goal: Plan of Care/Patient Progress Review  Outcome: No Change  Pt disoriented and unresponsive to writer but responded slightly to family. At beginning of shift pt's systolic BP was in the 190s. Hydralazine given. Systolic down to 170s. Was unable to give some meds during shift because family member didn't speak [place]. Missed one dose of vanco and one dose of coreg. Pt turned q2. During turning pt was very combative. Pt would punch and attempt to bite if arms held down. Pt had 1 incontinent BM and 1 incontinent urine. Will continue to monitor and follow POC.

## 2018-06-05 NOTE — PROGRESS NOTES
GASTROENTEROLOGY PROGRESS NOTE    ASSESSMENT:  Priscilla Way is a 78 year old male with a history of vascular dementia, liver/kidney transplant in 2000 s/t HCV, DVT on Coumadin, DMII, hypertension. GERD, multi-infarct CVA, chronic anemia, who was admitted 5/31 with altered mental status, increased lethargy and minimal responsiveness with new onset foul, high volume, bloody BMs; and he was found to have C. Diff infection.     #Recurrent C. Diff infection  #Decreased oral intake   #Recent antibiotic use  Patient had C. Diff positive on 3/21/18, 5/1/18, and 6/1/18. First C. Diff infection he was treated with 10 days of Vancomycin while on UTI treatment keflex/ceftriaxone, which is a contributing factor for the recurrent C. Diff infection. Patient is stable, no leukocytosis or fever, and his ATLAS score is 3 (1 for gender and 2 for albumin in the setting of decreased oral intake). Plan is to treat patient with oral vancomycin with taper. He is not a candidate for FMT at this time as he does not have spontaneous C. Diff infection without ABX therapy and he has not failed oral vancomycin with taper. However, he will be seen in GI clinic for C. Diff follow up, and at that time he can be evaluated for FMT candidacy.      Recommendations  --Diet as tolerated   --If unable to take oral nutrition and medications, consider NG tube versus PEG tube  --Continue with IV Flagyl till patient is taking oral Vancomycin x4/day  --Continue with 125mg oral Vancomycin QID x14 days and then taper       -125 mg daily three times daily for one week      -125 mg daily two times daily for one week      -125 mg daily one time daily for one week  --GI will sign off patient to primary team      Gastroenterology follow up recommendations: Follow up GI clinic in 4-5 weeks     Patient care plan discussed with Dr. Quesada, GI staff physician. Thank you for involving us in this patient's care. Please do not hesitate to contact the GI service  with any questions or concerns.     [facility]  Gastroenterology Nurse Practitioner  _______________________________________________________________  S: Patient is non-verbal.     O:  Blood pressure 185/77, pulse 81, temperature 98.7  F (37.1  C), temperature source Axillary, resp. rate 16, weight 77.6 kg (171 lb 1.6 oz), SpO2 99 %.    Constitutional: Non-verbal, cooperative, pleasant, not dyspneic/diaphoretic, no acute distress  Eyes: Sclera anicteric/injected  Ears/nose/mouth/throat: Normal oropharynx without ulcers or exudate, mucus membranes moist, hearing intact  Neck: supple, thyroid normal size  CV: RRR. No edema in LE bilaterally   Respiratory: Unlabored breathing. CTA bilaterally   Lymph: No axillary, submandibular, supraclavicular or inguinal lymphadenopathy  Abd:  Nondistended, +bs, no hepatosplenomegaly, nontender, no peritoneal signs  Skin: warm, perfused, no jaundice  Neuro: AAO x 3, No asterixis  Psych: Flat affect      LABS:  BMP  Recent Labs  Lab 06/05/18  0548 06/04/18  0625 06/03/18  0605 06/02/18  0539    144 142 140   POTASSIUM 4.0 3.7 3.7 3.7   CHLORIDE 116* 115* 116* 114*   JOSHUA 7.6* 7.6* 7.4* 7.8*   CO2 16* 17* 16* 16*   BUN 23 22 28 35*   CR 1.44* 1.22 1.35* 1.39*   * 124* 126* 119*     CBC  Recent Labs  Lab 06/05/18  0548 06/04/18  0625 06/03/18  0605 06/02/18  0539   WBC 5.6 4.6 5.8 6.8   RBC 2.79* 2.75* 2.89* 2.97*   HGB 8.4* 8.2* 8.7* 9.0*   HCT 26.3* 25.4* 27.2* 27.8*   MCV 94 92 94 94   MCH 30.1 29.8 30.1 30.3   MCHC 31.9 32.3 32.0 32.4   RDW 15.1* 15.1* 14.8 14.7    242 250 292     INR  Recent Labs  Lab 06/05/18  0548 06/04/18  0625 06/03/18  0605 06/02/18  0539   INR 3.46* 4.57* 4.52* 4.03*     LFTs  Recent Labs  Lab 05/31/18  1122 05/31/18  0830   ALKPHOS 94 97   AST 24 28   ALT 32 26   BILITOTAL 0.3 0.3   PROTTOTAL 6.9 6.4*   ALBUMIN 2.5* 2.2*      PANCNo lab results found in last 7 days.    HPI:  Priscilla Way is a 78 year old male with a history of  vascular dementia, liver/kidney transplant in  s/t HCV, DVT on Coumadin, DMII, hypertension. GERD, multi-infarct CVA, chronic anemia, who was admitted  with altered mental status, increased lethargy and minimal responsiveness with new onset foul, high volume, bloody BMs; and he was found to have C. Diff infection.      Patient is non-verbal but family feels that C. Diff related diarrhea improves its quantity while he is on Vanco therapy but then diarrhea returns once he finishes therapy. However, for about a month now his diarrhea has some blood and mucous with it. To family report he appears to have abdominal cramps at times as he moves in bed frequently.    ROS: A comprehensive Review of Systems was asked and answered in the negative unless specifically commented upon in the HPI    PMHx:  Past Medical History:   Diagnosis Date     LILIAM (acute kidney injury) (H) 2016    kidney biopsy showed ATN     Dementia     multiple acute infarcts, SV dis on CT     Diabetes type 2, controlled (H)      H/O Clostridium difficile infection 2018    treated [facility] vanco     Hepatitis C      Kidney transplanted      Liver transplant      Unspecified cerebral artery occlusion with cerebral infarction        PSurgHx:   Past Surgical History:   Procedure Laterality Date     TRANSPLANT KIDNEY RECIPIENT  DONOR       TRANSPLANT LIVER RECIPIENT  DONOR         MEDS:    No current facility-administered medications on file prior to encounter.   Current Outpatient Prescriptions on File Prior to Encounter:  acetaminophen (TYLENOL) 500 MG tablet Take  by mouth. Take one tablet by mouth every 4-6 hours as needed.   ALPRAZolam (XANAX) 0.25 MG tablet Give 1/2 half tab po half an hour before home lab draw   aspirin 81 MG EC tablet Take 1 tablet (81 mg) by mouth daily   carvedilol (COREG) 6.25 MG tablet Take 1 tablet (6.25 mg) by mouth 2 times daily (with meals)   Cholecalciferol (VITAMIN D-400) 400 UNITS TABS  Take 2 tablets by mouth daily   dimethicone-zinc oxide (EUCERIN) cream Apply topically 2 times daily as needed for dry skin or other   docusate sodium (COLACE) 100 MG capsule Take 1 capsule by mouth 2 times daily. (Patient taking differently: Take 100 mg by mouth 2 times daily as needed )   furosemide (LASIX) 20 MG tablet Take 1 tablet (20 mg) by mouth daily   Multiple Vitamins-Iron (DAILY VITES/IRON) TABS Take 1 tablet by mouth daily   omeprazole (PRILOSEC) 20 MG CR capsule Take 1 capsule (20 mg) by mouth daily   PROGRAF (BRAND) 0.5 MG CAPSULE Take 3 capsules (1.5 mg) by mouth every 12 hours   warfarin (COUMADIN) 2.5 MG tablet Take 3.75mgs on Thursdays and 2.5mgs on all other days or as directed by the Coumadin Clinic (Patient taking differently: Take 1.25 mg on Fridays  Take 2.5 mg Sunday, Monday, Tuesday, Wednesday, Thursday and Saturday)   blood glucose (BL TEST STRIP PACK) test strip Use to test blood sugar 1-2 times daily or as directed.   blood glucose monitor KIT Use to test blood sugar 1-2 times daily or as directed.   LANCETS MICRO THIN 33G MISC 1 Device 2 times daily Use to test blood sugar 1-2 times daily or as directed.   order for DME Equipment being ordered: Hospital Bed and damian lift   order for DME Equipment being ordered: Other: Damian liftTreatment Diagnosis: memory loss, dementia, weakness, ARF       ALLERGIES:    No Known Allergies    FHx:  No family history on file.    SOCIAL Hx:  Social History     Social History     Marital status:      Spouse name: N/A     Number of children: N/A     Years of education: N/A     Occupational History     Not on file.     Social History Main Topics     Smoking status: Former Smoker     Types: Cigarettes     Smokeless tobacco: Never Used      Comment: quit 10 years ago     Alcohol use No     Drug use: No     Sexual activity: No     Other Topics Concern     Not on file     Social History Narrative

## 2018-06-05 NOTE — PLAN OF CARE
Problem: Patient Care Overview  Goal: Plan of Care/Patient Progress Review  OT 5B;  eval orders received. Per chart review and discussion with interdisciplinary team pt. has 24/7 A at home and no IP acute OT needs identified at this time.  OT orders completed.

## 2018-06-05 NOTE — PLAN OF CARE
Problem: Patient Care Overview  Goal: Plan of Care/Patient Progress Review  Outcome: No Change  Pt disoriented and unresponsive to writer but responded slightly to family. Ate a quarter of breakfast being fed by family. Meds crushed and given with vanilla pudding, tolerated well. VSS, no complaints of pain. Oral vanco given x2 per POC. One BM. Continue to monitor. Possible d/c to home tomorrow.

## 2018-06-06 NOTE — PROGRESS NOTES
Nephrology Progress Note  06/06/2018       79 yo M S/P combined liver and kidney transplant in 2000 adm 5/31 with c diff and LILIAM    ASSESSMENT AND RECOMMENDATIONS:   1.  Allograft function:  S/P kidney transplant 2000. BL Cr 1.3-1.6. HX LILIAM in 2016 with biopsy showing ATI, early diabetic changes, ASVD and no rejection.  LILIAM resolved with IVF                        - off IVF and now his Cr is rising again             - rec restart IVF      2.  IS: Single drug IS with tac since 2002.  IV tac given when admitted. Now on po with level 6.8, slightly above target but he was recently in IV so may not be at steady state.                        - continue po tac 1.5 mg bid                        - monitor levels      3.  C diff: hx c diff 3/2018 and 5/ 2018.  Treated X 10 days with vanco with apparent resolution in March and May.  Now getting IV flagyl and po vanco with improvement                          4.  Dementia: severely affected with multiple infracts and diffuse volume loss of CT.  No seizure on EEG.                          - would not be a dialysis candidate     5.  Anemia: hb 8.4.  EPO level is 56 so he should be able to make new RBCs             - not a candidate for EPO     6. Hyperparathyroidism.  PTH was 333 in 2016.                          - PTH normal 6/5    7.  HBP:  BP better control today   - increased carvedilol to 12.5 bid 6/5   - on amlodipine 10 mg daily    8.  Low bicarb: likely from diarrhea.    - can start po bicarb as OP if acidosis does not resolve once diarrhea stops    Recommendations were communicated to primary team via note     Shelly Washington MD   [phone]    Interval History :   In the last 24 hours Priscilla Way has improved with less diarrhea. Now on po tac as a suspension (could not take pills).  Getting flagyl and po vanco for c diff. BP is intermitently high. No family members here to ask about po intake but seems to be low per the nursing reports.  Cr going up  again    Review of Systems:   Pt has advanced dementia and cannot answer questions    Physical Exam:   I/O last 3 completed shifts:  In: 240 [P.O.:240]  Out: -    /60 (BP Location: Left arm)  Pulse 80  Temp 97  F (36.1  C) (Axillary)  Resp 18  Wt 77.3 kg (170 lb 8 oz)  SpO2 97%  BMI 25.92 kg/m2     GENERAL APPEARANCE: arousable  HENT: mouth without ulcers or lesions  PULM: lungs cl to auscultation  CV: regular rhythm, normal rate, no rub     -JVD no     -edema none   GI: soft, nontender, nondistended, bowel sounds are present  INTEGUMENT: no cyanosis, no rash  NEURO:  Does not peak or follow commands.      Labs:   All labs reviewed by me  Electrolytes/Renal -   Recent Labs   Lab Test  06/06/18   0700  06/05/18   0548  06/04/18   0625   08/31/16   0559  08/30/16   0826  08/29/16   0739   NA  143  142  144   < >  135  136  136   POTASSIUM  4.1  4.0  3.7   < >  3.8  4.1  3.9   CHLORIDE  116*  116*  115*   < >  100  101  101   CO2  18*  16*  17*   < >  23  23  20   BUN  24  23  22   < >  86*  91*  93*   CR  1.45*  1.44*  1.22   < >  3.99*  4.28*  5.19*   GLC  121*  187*  124*   < >  140*  131*  144*   JOSHUA  8.0*  7.6*  7.6*   < >  8.3*  8.0*  7.8*   MAG   --    --    --    --   2.1  2.1  2.2   PHOS   --    --    --    --   4.6*  5.0*  5.9*    < > = values in this interval not displayed.       CBC -   Recent Labs   Lab Test  06/05/18   0548  06/04/18   0625  06/03/18   0605   WBC  5.6  4.6  5.8   HGB  8.4*  8.2*  8.7*   PLT  254  242  250       LFTs -   Recent Labs   Lab Test  05/31/18   1122  05/31/18   0830  05/15/18   0815   ALKPHOS  94  97  94   BILITOTAL  0.3  0.3  0.2   ALT  32  26  24   AST  24  28  30   PROTTOTAL  6.9  6.4*  7.0   ALBUMIN  2.5*  2.2*  2.6*       Iron Panel -   Recent Labs   Lab Test  11/08/16   1209  08/24/16   0852   IRON  17*  20*   IRONSAT  11*  10*   BARTOLO   --   1025*         Imaging:  none    Current Medications:    amLODIPine  10 mg Oral Daily     carvedilol  12.5 mg Oral BID  w/meals     carvedilol  6.25 mg Oral Once     insulin aspart  1-7 Units Subcutaneous TID AC     insulin aspart  1-5 Units Subcutaneous At Bedtime     memantine XR  21 mg Oral Daily     omeprazole  20 mg Oral BID     tacrolimus  1.5 mg Oral BID IS     vancomycin  125 mg Oral 4x Daily     warfarin  1.5 mg Oral ONCE at 18:00       - MEDICATION INSTRUCTIONS -       Warfarin Therapy Reminder       Shelly Washington MD

## 2018-06-06 NOTE — PLAN OF CARE
Problem: Patient Care Overview  Goal: Plan of Care/Patient Progress Review  Outcome: No Change  Pt disoriented and unresponsive to writer but responded slightly to family. Pt turned q2. Took meds for family. Pt had 1 incontinent BM and 1 incontinent urine. Hydralizine given x1 for high BP. Will continue to monitor and follow POC. DC tomorrow ?

## 2018-06-06 NOTE — PROGRESS NOTES
[facility] - Internal Medicine Daily Note   Date of Service: 06/06/2018    Patient: Priscilla Way  MRN: [id number]  Admission Date: 5/31/2018  Hospital Day # 6    Assessment & Plan:   Priscilla Way is a 78 year old male with history of advanced vascular dementia, liver/kidney transplant 2000 2/2 HCV, DVT on Coumadin, DMII, GERD, CVA, chronic anemia, who was admitted 5/31 with altered mental status. Now back to baseline but very poor PO intake remains.      #Advanced vascular dementia:    #Toxic metabolic encephalopathy - improving. Likely back to baseline per daughter  2 days PTA increased lethargy and minimal responsiveness with new onset foul, high volume, bloody BMs. 2 hour video EEG w/o seizure activity. Sx improved with treatment of C diff. Now back to severely compromised baseline per daughter May.   - Treating C.diff as below  - Holding Xanax   - Continue Seroquel, Nemenda   - PT/OT consults   - Family will discuss goals going forward including nutrition/hydration plan    # Nutrition  # Goals of care  Concern about poor PO intake going forward. Daughter finds that he will no longer open his mouth for her at times. Did eat some 6/5 and Cr remained somewhat stable. However is not eating at all today. Family having discussions re: whether they would want to pursue G tube vs hospice care.   - Calorie counts  - Will give him the chance to eat today. If no improvement in intake, will need to push for decision    # LILIAM on CKD III in transplanted kidney:   BL Cr labile around 1.2 to 1.6. Cr elevated to 2.51 on admission in the setting of high volume GI losses. Improved to 1.2 with fluids. Trending back up off of IV hydration. - continue to hold IVF. To see if he can maintain his kidney function with po only  - Hold lasix   - Assess need for ongoing supplemental nutrition/hydration. Will hold off on IVF for today to further drive PO intake. If no intake by tomorrow, will restart fluids.      #C.diff  colitis: Improving. IV flagyl started 6/1 while n.p.o, and stopped on 6/3. Added p.o. Vancomycin 6/1 when able to take po later during the day. Continuing with po vancomycin alone.   GI Consulted. Recs include:  --If unable to take oral nutrition and medications, consider NG tube versus PEG tube  --Continue with IV Flagyl till patient is taking oral Vancomycin x4/day  --Continue with 125mg oral Vancomycin QID x14 days and then taper       -125 mg daily three times daily for one week      -125 mg daily two times daily for one week      -125 mg daily one time daily for one week     # Supratherapeutic INR: No coumadin since admission but INR still trending up. No bleeding. Hgb and plt stable. Unlikely DIC. No stigmata or hx of chronic liver disease. Suspect nutritional component.   - Continue to hold coumadin  - Pharmacy following         # CoNS in blood cx: Only 1/2 BC +. Subsequent cxs negative. No s/s of sepsis. Stopped IV vancomycin on 6/2  (started on 6/1) and monitor BC and clinical s/s of sepsis given concern for unnecessary ab may worsen his C.diff      # Chronic anemia: BL hgb 9.5-10. PTA on Aranesp q28 days for hgb <10. Last dose unknown. Presented with bloody BMs, hgb stable. Bloody stools resolved  - Monitor      H/o liver/kidney transplant: 2000, 2/2 HCT. PTA on Prograf (brand name)  - Continue PTA prograf     Left Heel deep Tissue Pressure Injury (DTPI): present on admission. WOC following.        DMII: No PTA meds. Glucose stable.   - SSI, hypoglycemia protocol       HTN, CAD: PTA on Coreg, lasix, ASA  - Continue Coreg at increased dose 12.5 mg BID given poorly controlled BPs  - Hold lasix given recent LILIAM and poor po intake  - Hold ASA w/ supratherapeutic INR      Anxiety, depression: In the setting of advanced dementia. PTA on prn Xanax   - Holding Xanax given AMS      GERD: PTA on PPI  - Continue PPI      H/o recurrent DVTs: On chronic coumadin. INR elevated to 4.00 on admission. Has IVC filter in  with plan to continue for life.   - restarted warfarin today    CODE: full  Disposition: to home pending finalization of nutrition plan    # Pain Assessment:  Current Pain Score 6/4/2018   Patient currently in pain? ADRIA   Pain score (0-10) -   Pain location -   Pain descriptors -   - Priscilla is unable to participate in a collaborative plan for pain management due to altered mentation.       Jose Tirado MD   of Medicine  [facility] Hospitalist  Pager [phone]    Team: Medicine [facility] 4  Page Cross Cover after 5 pm: pager [phone]     ___________________________________________________________________    Subjective & Interval Hx:    No acute events. Actually ate and drank a fair amount yesterday afternoon. Was up for much of night and is sleepy again today. Not willing to take anything by mouth this am. No fevers. Diarrhea improving.     Last 24 hr care team notes reviewed.   Unable to obtain ROS due to dementia/AMS    Medications: Reviewed in EPIC. List below for reference    Physical Exam:    Blood pressure 144/60, pulse 80, temperature 97  F (36.1  C), temperature source Axillary, resp. rate 18, weight 77.3 kg (170 lb 8 oz), SpO2 97 %.    General: sleeping quietly, arouses minimally to shaking/voice. No distress  HEENT: NCAT, PERRL, anicteric  Chest/Resp: CTAB  Heart/CV: RRR, no MRG, 1+ LE edema  Abdomen/GI: soft, ND, +BS  Neuro/Psych: does not follow commands other than to open his eyes to voice    Lines/Tubes:   Peripheral IV 05/31/18 Right;Lateral Lower forearm (Active)   Site Assessment WDL 6/5/2018 12:00 AM   Line Status Saline locked 6/5/2018 12:00 AM   Phlebitis Scale 0-->no symptoms 6/5/2018 12:00 AM   Infiltration Scale 0 6/5/2018 12:00 AM   Extravasation? No 6/4/2018  4:00 PM   Number of days:5       Peripheral IV 05/31/18 Right (Active)   Site Assessment WDL 6/5/2018 12:00 AM   Line Status Saline locked 6/5/2018 12:00 AM   Phlebitis Scale 0-->no symptoms 6/5/2018 12:00 AM   Infiltration  Scale 0 6/5/2018 12:00 AM   Extravasation? No 6/4/2018  4:00 PM   Number of days:5       Labs & Studies of Note:  I personally reviewed all labs and imaging.  Unresulted Labs Ordered in the Past 30 Days of this Admission     Date and Time Order Name Status Description    6/5/2018 0000 25 Hydroxyvitamin D2 and D3 In process     6/1/2018 1714 Blood culture Preliminary     6/1/2018 1714 Blood culture Preliminary     5/31/2018 2005 Blood culture Preliminary           Medications list for Reference   Current Facility-Administered Medications   Medication     acetaminophen (TYLENOL) tablet 650 mg     amLODIPine (NORVASC) tablet 10 mg     carvedilol (COREG) tablet 12.5 mg     carvedilol (COREG) tablet 6.25 mg     glucose gel 15-30 g    Or     dextrose 50 % injection 25-50 mL    Or     glucagon injection 1 mg     dimethicone-zinc oxide (EUCERIN) cream     hydrALAZINE (APRESOLINE) injection 10 mg     insulin aspart (NovoLOG) inj (RAPID ACTING)     insulin aspart (NovoLOG) inj (RAPID ACTING)     melatonin tablet 1 mg     memantine XR (NAMENDA XR) 24 hr capsule 21 mg     naloxone (NARCAN) injection 0.1-0.4 mg     omeprazole (priLOSEC) suspension 20 mg     ondansetron (ZOFRAN-ODT) ODT tab 4 mg    Or     ondansetron (ZOFRAN) injection 4 mg     Patient is already receiving anticoagulation with heparin, enoxaparin (LOVENOX), warfarin (COUMADIN)  or other anticoagulant medication     polyethylene glycol (MIRALAX/GLYCOLAX) Packet 17 g     QUEtiapine (SEROquel) half-tab 25 mg     tacrolimus (PROGRAF BRAND) suspension 1.5 mg     vancomycin (VANOCIN) solution 125 mg     warfarin (COUMADIN) half-tab 1.5 mg     Warfarin Therapy Reminder (Check START DATE - warfarin may be starting in the FUTURE)

## 2018-06-06 NOTE — PLAN OF CARE
Problem: Patient Care Overview  Goal: Plan of Care/Patient Progress Review  Outcome: No Change  Pt disoriented x 4.  Difficulty falling asleep, PRN Seroquel given and pt slept quietly the rest of the shift. VSS on RA. BP elevated but improved after PRN hydralazine.  Incontinent urine x 1.  Repositioned q2h.  Potential d/c today.  Will continue to monitor and update physicians with any changes.

## 2018-06-06 NOTE — PLAN OF CARE
Problem: Patient Care Overview  Goal: Plan of Care/Patient Progress Review  Outcome: No Change  Pt lethargic this AM however more alert in the afternoon. A2 with assists. Medications crushed and put in yogurt or apple sauce. Nonverbal and has advanced vascular dementia. Hx of stroke and has general weakness; repositioned q2h. Incontinent of bowel and bladder. Had one small soft BM. No s/s of pain witnessed. Left heal skin intact, however nonblanchable; dressing per poc and elevated. Bilateral rooke boots in place. Daughter by bedside most of day and aiding with feeding and other ADLs. Pt is not eating however is taking in a little yogurt and ensure. PIVs SL. Bgs 121 and 128; no insulin given per poc. D/c plans for today cancelled to further assess oral intake; may d/c tomorrow. On calorie counts starting today.  R: Continue to encourage oral intact and implement poc.

## 2018-06-07 NOTE — DISCHARGE SUMMARY
[facility]  Internal Medicine Discharge Summary    Priscilla Way MRN# [id number]   YOB: 1940 Age: 78 year old     Date of Admission:  5/31/2018  Date of Discharge:  6/7/2018  Admitting Physician:  Alli Groves MD  Discharge Physician:  Jose Tirado MD (Contact: [phone])  Discharging Service:  [address]     Primary Provider: Randy Fuentes    Dear Dr. Fuentes,    Thank you for involving us in the care of Priscilla Way at [facility]            Outpatient To Do:   Repeat BMP in 4 days. Forward results to transplant coordinator  Repeat INR in 4 days. Forward results to warfarin therapy manager  Follow up with PCP in 1 - 2 weeks         Discharge Diagnosis:   #C.diff colitis:   #Advanced vascular dementia    #Toxic metabolic encephalopathy due to C. diff colitis  # LILIAM on CKD III in transplanted kidney  # S/p Liver and kidney transplant   # History of DVT with supratherapeutic INR             Discharge Disposition:   Discharged to home           Condition on Discharge:   Discharge condition: Guarded   Code status on discharge: Full Code           Procedures:   No procedures performed during this admission          Discharge Medications:     Current Discharge Medication List      START taking these medications    Details   amLODIPine (NORVASC) 10 MG tablet Take 1 tablet (10 mg) by mouth daily  Qty: 30 tablet, Refills: 1    Associated Diagnoses: Hypertension, unspecified type      melatonin 5 MG tablet Take 1 tablet (5 mg) by mouth nightly as needed for sleep    Associated Diagnoses: Vascular dementia with depressed mood      memantine XR (NAMENDA XR) 21 MG 24 hr capsule Take 1 capsule (21 mg) by mouth daily  Qty: 30 capsule, Refills: 1    Associated Diagnoses: Vascular dementia with depressed mood      omeprazole (PRILOSEC) 2 mg/mL SUSP Take 10 mLs (20 mg) by mouth 2 times daily  Qty: 400 mL, Refills: 3    Associated Diagnoses: Colitis due to  Clostridium difficile      polyethylene glycol (MIRALAX/GLYCOLAX) Packet Take 17 g by mouth daily as needed for constipation  Qty: 30 packet, Refills: 1    Associated Diagnoses: Slow transit constipation      PROGRAF (BRAND) 1 MG/ML SUSPENSION Take 1.5 mLs (1.5 mg) by mouth every 12 hours  Qty: 100 mL, Refills: 3    Associated Diagnoses: Kidney replaced by transplant      QUEtiapine (SEROQUEL) 25 MG tablet Take 1 tablet (25 mg) by mouth nightly as needed (Agitation)  Qty: 30 tablet, Refills: 1    Associated Diagnoses: Vascular dementia with depressed mood      vancomycin (VANOCIN) 50 mg/mL LIQD solution 125mg oral Vancomycin QID until 6/14, 125 mg TID until 6/20, 125 mg BID until 6/27, 125 mg daily until 7/4  Qty: 185 mL, Refills: 0    Associated Diagnoses: Colitis due to Clostridium difficile         CONTINUE these medications which have CHANGED    Details   warfarin (COUMADIN) 2.5 MG tablet Take 2.5mg 6/7, take 1.25mg on 6/8, take 2.5mg on 6/9, and take 1.25mg on 6/10.  2. INR check on 6/11 and subsequent warfarin doses pending INR results.  Qty: 15 tablet, Refills: 1    Associated Diagnoses: Deep vein thrombosis (DVT) of both lower extremities, unspecified chronicity, unspecified vein (H)         CONTINUE these medications which have NOT CHANGED    Details   acetaminophen (TYLENOL) 500 MG tablet Take  by mouth. Take one tablet by mouth every 4-6 hours as needed.  Qty: 100 tablet, Refills: 9    Associated Diagnoses: Pain in joint, pelvic region and thigh      carvedilol (COREG) 6.25 MG tablet Take 1 tablet (6.25 mg) by mouth 2 times daily (with meals)  Qty: 180 tablet, Refills: 0    Associated Diagnoses: Renal hypertension, stage 1-4 or unspecified chronic kidney disease      Cholecalciferol (VITAMIN D-400) 400 UNITS TABS Take 2 tablets by mouth daily  Qty: 180 tablet, Refills: 0    Comments: REMINDER: CLINIC APPOINTMENT 3/28/18  Associated Diagnoses: Vitamin D deficiency      dimethicone-zinc oxide (EUCERIN)  cream Apply topically 2 times daily as needed for dry skin or other  Qty: 142 g, Refills: 0    Associated Diagnoses: Dermatitis      docusate sodium (COLACE) 100 MG capsule Take 1 capsule by mouth 2 times daily.  Qty: 60 capsule, Refills: 12    Comments: For constipation  Associated Diagnoses: Unspecified constipation      Multiple Vitamins-Iron (DAILY VITES/IRON) TABS Take 1 tablet by mouth daily  Qty: 90 tablet, Refills: 3    Associated Diagnoses: Preventive measure      blood glucose (BL TEST STRIP PACK) test strip Use to test blood sugar 1-2 times daily or as directed.  Qty: 100 strip, Refills: 3    Comments: Please provide the proper supplies for insurance  Associated Diagnoses: High blood sugar      blood glucose monitor KIT Use to test blood sugar 1-2 times daily or as directed.  Qty: 1 kit, Refills: 0    Comments: Please provide the proper supplies for insurance  Associated Diagnoses: High blood sugar      LANCETS MICRO THIN 33G MISC 1 Device 2 times daily Use to test blood sugar 1-2 times daily or as directed.  Qty: 100 each, Refills: 3    Comments: Please provide the proper supplies for insurance  Associated Diagnoses: High blood sugar      !! order for DME Equipment being ordered: Hospital Bed and damian lift  Qty: 1 each, Refills: 0    Associated Diagnoses: Alzheimer's dementia with behavioral disturbance, unspecified timing of dementia onset      !! order for DME Equipment being ordered: Other: Damian lift  Treatment Diagnosis: memory loss, dementia, weakness, ARF  Qty: 1 Units, Refills: 0    Associated Diagnoses: Acute renal failure, unspecified acute renal failure type (H); Memory loss; Acute kidney injury (H)       !! - Potential duplicate medications found. Please discuss with provider.      STOP taking these medications       ALPRAZolam (XANAX) 0.25 MG tablet Comments:   Reason for Stopping:         aspirin 81 MG EC tablet Comments:   Reason for Stopping:         furosemide (LASIX) 20 MG tablet  Comments:   Reason for Stopping:         omeprazole (PRILOSEC) 20 MG CR capsule Comments:   Reason for Stopping:         PROGRAF (BRAND) 0.5 MG CAPSULE Comments:   Reason for Stopping:                     Consultations:   Consultation during this admission received from gastroenterology and oncology             Brief History of Illness:     Priscilla Way is a 78 year old male with history of advanced vascular dementia, liver/kidney transplant 2000 2/2 HCV, DVT on Coumadin, DMII, GERD, CVA, chronic anemia, who was admitted 5/31 with altered mental status     Patient unable to participate in communication. Per son-in-law, at BL patient significantly waxes and wanes. Sometimes he knows who his grandchildren are and converses with family, other times he is silent and cannot determined family members. Over the past 1-2 days patient has been more lethargic and less interactive. He has been minimally communicative. Patient's PCA noted high volume, frequent BMs with foul odor/blood/mucous. BMs are c/w prior c diff infections. Patient has not had a fever. He has a chronic dry cough without production. No noted abdominal pain, urinary symptoms, new rash. PO intake has declined in the past 48 hours. Denies known h/o seizures. Patient has been compliant with meds to the knowledge of family. No seizure-like activity. No gross bleeding aside from some in BMs. Chronic L sided weakness is at BL    For full admission details, please see H&P dated 5/31/2018           Hospital Course:   #C.diff colitis: Improving. IV flagyl started 6/1 while n.p.o, and stopped on 6/3. Added p.o. vancomycin 6/1 when able to take po later during the day. GI consulted and recommended continuing with po vancomycin alone with a long taper.   Vancomycin 125 mg QID x14 days (until 6/14) and then taper       -125 mg daily three times daily for one week (until 6/20)      -125 mg daily two times daily for one week (until 6/27)      -125 mg daily one time  daily for one week (until 7/4)  Stools normalized with this treatment.     #Advanced vascular dementia    #Toxic metabolic encephalopathy due to C. diff colitis   Likely back to baseline per daughter. 2 days PTA had increased lethargy and minimal responsiveness with new onset foul, high volume, bloody BMs. Initial report was that his baseline included recognizing family on some days, but according to daughter, May, it has been many months since he has recognized her. 2 hour video EEG w/o seizure activity. Became less combative and more arousable with treatment of C diff. Now back to his recent severely compromised baseline per daughter May but is minimally interactive and has very poor PO intake. Family desired to take him home in his current state.   - Continue seroquel and namenda as this seems to help with sleep and aggression.   - Treating C.diff as below  - discontinued xanax      # Nutrition  # Goals of care  Concern about his status going forward, particularly his very poor intake. Daughter finds that he eats okay sometime and will no longer open his mouth for her at others. I am very concerned that he is not taking enough in to remain hydrated. We did a trial off of IV fluids and he did eat a drink a little and was able to maintain his creatinine in the 1.4-1.5 range. I had multiple conversations with [facility] about what to do for him as his PO intake continues to be worrisome. We discussed intermittent visits to the infusion center for IV fluids, but the family would not be able to transport him here frequently for that.     We had several conversations regarding the options of pursuing a G tube vs transitioning to hospice care. Ultimately the family was not ready to make a decision either way. They would like to get him home to see if he will eat better at home in his own environment. The family is very good about offering and encouraging him to take small amounts frequently and have been able to get all of  his medications in at least. I think this will have to be an ongoing discussion and that they will have to make this decision eventually.      # LILIAM on CKD III in transplanted kidney:   Baseline Cr labile around 1.2 to 1.6. Cr elevated to 2.51 on admission in the setting of high volume GI losses. Improved to 1.2 with fluids. Trended back up off of IV hydration to 1.4 but then remained stable despite continued poor intake.   - Repeat BMP to be checked by home care 6/7.  - Continue prograf    # S/p Liver and kidney transplant (2000)  LFTs stable. Continued home prograf      # History of DVT with supratherapeutic INR:  IVC filter in place. INR 4.2 on admit and then trended up despite holding warfarin. Came down to goal with one dose of vitamin K. Resumed warfarin on 6/6.    - Repeat INR Monday.        # CoNS in blood cx: Only 1/2 BC +. Subsequent cxs negative. No s/s of sepsis. Stopped IV vancomycin on 6/2  (started on 6/1) and monitor BC and clinical s/s of sepsis given concern for unnecessary ab may worsen his C.diff      # Chronic anemia: BL hgb 9.5-10. PTA on Aranesp q28 days for hgb <10. Last dose unknown. Presented with bloody BMs, hgb stable. Bloody stools resolved      # Left Heel deep Tissue Pressure Injury (DTPI): present on admission. WOC following.  Recs in orders      # DMII: No PTA meds. Glucose stable.       # HTN, CAD: PTA on Coreg, lasix, ASA  - Increased Coreg to 12.5 mg BID given poorly controlled BPs  - Started amlodipine and titrated to 10 mg with good BP control  - Discontinued lasix given poor intake.       # Anxiety, depression: In the setting of advanced dementia. PTA on prn Xanax   - Holding Xanax given AMS      # GERD: PTA on PPI  - Continue PPI       # Pain Assessment:  Current Pain Score 6/7/2018   Patient currently in pain? ADRIA   Pain score (0-10) -   Pain location -   Pain descriptors -    - Priscilla is unable to participate in a plan for pain management; however, the plan  was discussed in  "a collaborative fashion with his daughter.   - Patient not apparently in pain              Discharge Exam:   /60 (BP Location: Left arm)  Pulse 83  Temp 96  F (35.6  C) (Axillary)  Resp 18  Ht 1.727 m (5' 8\")  Wt 77.9 kg (171 lb 12.8 oz)  SpO2 99%  BMI 26.12 kg/m2  EXAM:  General: More awake today but not interactive. No distress  HEENT: NCAT, PERRL, anicteric  Chest/Resp: CTAB  Heart/CV: RRR, no MRG, 1+ LE edema  Abdomen/GI: soft, ND, +BS  Neuro/Psych: does not follow commands other than to open his eyes to voice.               Significant Results:   See epic               Pending Results:   Unresulted Labs Ordered in the Past 30 Days of this Admission     No orders found from 4/1/2018 to 6/1/2018.                  Discharge Instructions and Follow-Up:     Discharge Procedure Orders  Home care nursing referral   Referral Type: Home Health Therapies & Aides     Medication Therapy Management Referral   Referral Type: Med Therapy Management     Reason for your hospital stay   Order Comments: You were admitted to the hospital for worsening mental status and severe diarrhea. 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     [facility] Follow-up and recommended labs and tests   Order Comments: Follow up with primary care provider, Randy Fuentes, within 7 days for hospital follow- up.  The following labs/tests are recommended: BMP.  *Patient is scheduled with Dr. Fuentes for his next available appointment on 6/25 at 7:35a. He can contact the clinic directly at [phone] if he would like to see another provider for a sooner appointment*  Resume warfarin monitoring with INR follow up on Friday 6/8.   Follow up with transplant nephrology as previously scheduled      Appointments on [place] and/or [facility] (with [facility] or [facility] provider or service). Call [phone] if you haven't heard regarding these appointments within 7 days of discharge.     Activity   Order Comments: Your activity " "upon discharge: bedrest, falls precautions   Order Specific Question Answer Comments   Is discharge order? Yes      Monitor and record   Order Comments: Stool output     When to contact your care team   Order Comments: Call your primary doctor if you have any of the following: temperature greater than 100.4, worsening diarrhea, inability to take oral intake     Discharge Instructions   Order Comments: Left Heel wound: Every 3 days   1. Clean intact skin with gentle soap and water, dry and dry again.  2. Paint with No Sting Skin Prep and allow to dry thoroughly  3. Press a Mepilex  Border Dressingto the area, making sure to conform nicely to skin curvatures (begin placing the Mepilex at the most distal aspect first, smooth into place in an upward direction, then smooth side to side)   4. Time and date dressing change and gilmar with a \"P\" for prevention.  5. Reposition pt every 1 to 2 hours when in bed and keep prevalon boots on at all times     Full Code     Diet   Order Comments: Follow this diet upon discharge: Orders Placed This Encounter     Room Service     Calorie Counts     Regular Diet Adult   Order Specific Question Answer Comments   Is discharge order? Yes             Thank you for allowing our team to assist in your patient's care.  If there are any questions or concerns, please do not hesitate to reach us at any time.      Time spent on patient: 45 minutes total including face to face and coordinating care time reviewing current illness, any medication changes, the care plan for today, and this note.    Jose Tirado MD   of Medicine  [facility]ist  Pager [phone]              "

## 2018-06-07 NOTE — PLAN OF CARE
Problem: Patient Care Overview  Goal: Plan of Care/Patient Progress Review  Outcome: Adequate for Discharge Date Met: 06/07/18  Resume cares from 9140-3520: Pt alert however jacque orientation because he's nonverbal. Eating well; ate 75% of breakfast. No bm or void at this time. Bg 174. Son by bedside assisting with ADLs. D/c'd to home today at 1420. AVS reviewed with son. All belongings packed and taken by son. Taken via stretcher and EMS.

## 2018-06-07 NOTE — PHARMACY-ANTICOAGULATION SERVICE
Clinical Pharmacy- Warfarin Discharge Note  This patient is currently on warfarin for the treatment of DVT.  INR Goal= 2-3  Expected length of therapy lifetime.    Warfarin PTA Regimen: 1.25 mg on Fridays, 2.5 mg ROW      Anticoagulation Dose History     Recent Dosing and Labs Latest Ref Rng & Units 6/1/2018 6/2/2018 6/3/2018 6/4/2018 6/5/2018 6/6/2018 6/7/2018    Warfarin 1.5 mg - - - - - - 1.5 mg -    INR 0.86 - 1.14 4.18(H) 4.03(H) 4.52(H) 4.57(H) 3.46(H) 2.30(H) 1.95(H)    INR 0.86 - 1.14 - - - - - - -          Vitamin K doses administered during the last 7 days: 0  FFP administered during the last 7 days: 0  Discharge plan is as follows:  1. Take a dose of 2.5mg 6/7, take 1.25mg on 6/8, take 2.5mg on 6/9, and take 1.25mg on 6/10.  2. INR check on 6/11 and subsequent warfarin doses pending INR results.     Blanche Blue, Pharm.D. IV Student

## 2018-06-07 NOTE — PROGRESS NOTES
Care Coordinator - Discharge Planning    Admission Date/Time:  5/31/2018  Attending MD:  Boby Tirado MD     Data  Chart reviewed, discussed with interdisciplinary team.   Patient was admitted for:   1. Vascular dementia with depressed mood    2. Acute renal failure, unspecified acute renal failure type (H)    3. Dehydration    4. Diarrhea, unspecified type    5. Function kidney decreased    6. Kidney replaced by transplant    7. Hypertension, unspecified type    8. Slow transit constipation    9. Colitis due to Clostridium difficile    10. Long term current use of anticoagulant therapy    11. Deep vein thrombosis (DVT) of both lower extremities, unspecified chronicity, unspecified vein (H)         Assessment   Full assessment completed in previous note    Per MD team, pt will discharge to home today. [facility] stretcher transport arranged for 2pm per family request. PCS form filled out and give to 5B charge RN. Updated bedside RN of pt's discharge time.       Plan  Anticipated Discharge Date:  6/7/18  Anticipated Discharge Plan:  Discharge to home with resumption of home services    CTS Handoff completed:  YES    Pam Nickerson, RN

## 2018-06-07 NOTE — PROGRESS NOTES
Pt lethargic and confused during shift. Did not take crushed meds in pudding. Took all solution medications. . Discharging at 1400.

## 2018-06-07 NOTE — PROGRESS NOTES
Visited with pt/family on the basis of spiritual support for the pt/family. Reflected with pt/ family around their hospital experience, sources of spiritual and emotional support and current spiritual health needs.  Pt s daughter talked about her dad current situation and what it means for her. During my conversation with her, she reported that, she worries about the health of her dad.  I let her know that I could be of support to the pt and his family during his hospitalization.  I would be able to coordinate and participate as a spiritual supporter for both pt and his family. Pt s daughter reported that her ally is important to her and hope for the future and trust in God. Pt receives support from his children.   Emotional support. Reflective conversation integrating illness elements and family spiritual narratives. I shared conversation that invite God into the room and to bless those present, support them in their suffering. I provided special prayer asking God to help her dad and to ease and eliminate any suffering and pain that he feels. I gave Islamic Prayer Booklet and several of Islamic Prayer Bookmarkers.  I introduced spiritual Health Services that the hospital offers. I also, introduced myself as Islam  in the hospital.     Pt/family received spiritual support and reflective conversation in the context of this hospitalization. Pt s dad expressed appreciation for the visit and the encouragement that she felt that she has God s support in their struggles. She agreed to turning over her worries to God and that is an important part of their own healing.  Will continue to provide support to pt/family during their hospitalization at least 1x/wk.

## 2018-06-07 NOTE — PROGRESS NOTES
"[facility] Nurse Inpatient Pressure Injury Assessment   Reason for consultation:Re  Evaluate and treat Left Heel        ASSESSMENT  Pressure Injury: DTPI on Left Heel , present on admission slightly less boggy today.   Pressure Injury is Stage Deep Tissue Pressure Injury (DTPI)   Contributing factor of the pressure injury: pressure, shear, nutrition, age and immobility  Status: Reassessment      TREATMENT PLAN  Left Heel wound: Every 3 days   1. Clean intact skin with gentle soap and water, dry and dry again.  2. Paint with No Sting Skin Prep and allow to dry thoroughly  3. Press a Mepilex  Border Dressingto the area, making sure to conform nicely to skin curvatures (begin placing the Mepilex at the most distal aspect first, smooth into place in an upward direction, then smooth side to side)   4. Time and date dressing change and gilmar with a \"P\" for prevention.  5. Reposition pt every 1 to 2 hours when in bed and keep prevalon boots on at all times  NOTE** make sure to continue to assess under the Mepilex Dressing BID and document findings.  If epidermis  opens notify CWOCN's and change dressing to \"T\" for treatment     Orders Written  WO Nurse follow-up plan:weekly  Nursing to notify the Provider(s) and re-consult the WO Nurse if wound(s) deteriorates or new skin concern.     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, DVT on Coumadin, DMII, GERD, CVA, chronic anemia, who was admitted 5/31 with altered mental status     Objective Data  Containment of urine/stool: Diaper     Current Diet/ Nutrition:     Active Diet Order      NPO for Medical/Clinical Reasons Except for: Meds     Output:   I/O last 3 completed shifts:  In: -   Out: 250 [Urine:250]     Risk Assessment:   Sensory Perception: 2-->very limited  Moisture: 3-->occasionally moist  Activity: 2-->chairfast  Mobility: 2-->very " limited  Nutrition: 3-->adequate  Friction and Shear: 1-->problem  Stuart Score: 13     Labs:   Recent Labs  Lab 06/01/18  0730   05/31/18  2256 05/31/18  1122   ALBUMIN  --   --   --  2.5*   HGB 8.3*  < >  --  9.8*   INR 4.18*  --   --  4.00*   WBC 8.5  < >  --  15.2*   A1C  --   --  6.1*  --    < > = values in this interval not displayed.     Physical Exam  Skin inspection: Head to Toe     Wound Location:  Left Heel    6/1/18 6/7/18      Date of last Photo 6/7/18  Wound History: Pt has hx of pressure injury with scar tissue to L heel, pt is bed bound with dementia and often refuses repositioning per PCA.  Measurements (length x width x depth, in cm) 3 cm x 4 cm  x  0 cm   Wound Base:  Epidermis intact with prominent scar tissue covering most of heel. Darker black discoloration proximal towards achilles on edge of scarring. Heel feels less boggy to the touch and tissue not as dry and flaky.    Tunneling N/A  Undermining N/A  Palpation of the wound bed: boggy   Periwound skin: intact, dry, scar tissue  Color: normal and consistent with surrounding tissue  Temperature: normal   Drainage:, none  Description of drainage: none  Odor: none  Pain: absent           Interventions  Current support surface: Standard  Atmos Air mattress  Current off-loading measures: Pillows under calves, prevalon boots, mepilex  Repositioning aid: Turn and Position System and Pillows  Visual inspection of wound(s) completed   Wound Care: was done per plan of care.  Supplies: ordered: Prevalon boots  Educated provided: importance of repositioning and plan of care  Education provided to: PCA and Nursing staff  Discussed importance of:repositioning every 2 hours, off-loading pressure to wound, wearing off-loading boots and their role in pressure injury prevention  Discussed plan of care with PCA and nursing staff

## 2018-06-07 NOTE — PLAN OF CARE
Problem: Patient Care Overview  Goal: Plan of Care/Patient Progress Review  Outcome: No Change  Neuro: Alert but disoriented x4. Citizen of [place] speaking, not communicating with family members.  Cognitive: Aggressive and combative. Punches, swings, bites and scratches staff and family. Mitts on.  Cardiac: HR 80s. /80s, probably elevated d/t agitation from BP cuff.  Resp: Sating high 90s on RA.  GI/: Incontinent of B&B, 1 stool overnight.   Diet/Appetite:  Advised nectar thick liquids DD1 diet but family not always compliant with feedings. Meds crushed in pudding. Poor appetite. Spitting food out. Patient drank 1 boost overnight and 1 pudding pack.  Skin: No deficits.  Access: 2 PIV SL  Drains: none  Activity: Ax2-3 d/t being combative.   Pain: patient does not appear to be in pain.  Possibly DC today pending feeding tube or palliative care discussion with family because of poor oral intake.    Will continue with plan of care and notify MD of any changes.

## 2018-06-07 NOTE — PLAN OF CARE
Problem: Confusion, Acute (Adult)  Goal: Identify Related Risk Factors and Signs and Symptoms  Related risk factors and signs and symptoms are identified upon initiation of [facility] [employer] Clinical Practice Guideline (CPG).   Outcome: No Change  Patient continues to be confused (dementia and post stroke in past). Pinching and hitting when brief was changed. Nursing cleaned and repositioned him every 2 hours in bed. Family gave medications under nursing instruction and attempted to feed him supper. Only took a few bites of pudding and mild and ensure. Spitting out food at times. Took all but prilosec. Has c-difficile. On oral vancomycin for this. Had 3 soft small to medium BM's this 8 hour shift. Voided twice. Family present most of the time. Patient unable to use the call light. Bed alarm on for safety. Continue to assist with ADL's. Encourage oral intake as able.

## 2018-06-07 NOTE — PROGRESS NOTES
Calorie Counts  Intake recorded for: 6/6       Kcal: 0 Protein: 0g  # Meals Recorded: 3 meals ordered, no intake recorded  # Supplements Recorded: 0

## 2018-06-08 NOTE — PROGRESS NOTES
Prior Authorization Approval    tacrolimus suspension compound  Date Initiated: 06/08/2018  Date Completed: 06/08/2018  Prior Auth Type: B vs D     Status: Approved    Effective Date: 05/09/2018 - 12/31/2099  Copay: 1.25  [place] Filled: Yes    Insurance: [employer]  Ph: [phone]  ID: [id number]  Case Number: [id number]  Submitted Via: Telephone    Note: This was approved under Medicare part D because pt did not have Medicare at the time of transplant (2000).     Laura Mills  Pharmacy Liaison  Ph: [phone] Page: [phone]

## 2018-06-08 NOTE — MR AVS SNAPSHOT
Priscilla Way   6/8/2018   Anticoagulation Therapy Visit    Description:  78 year old male   Provider:  Sherry Georges, RN   Department:  [facility]           INR as of 6/8/2018     Today's INR No new INR was available at the time of this encounter.      Anticoagulation Summary as of 6/8/2018     INR goal 2.0-3.0   Today's INR No new INR was available at the time of this encounter.   Full warfarin instructions 6/10: 1.25 mg; Otherwise 1.25 mg on Fri; 2.5 mg all other days   Next INR check 6/11/2018    Indications   Acute kidney injury (H) [N17.9]  Deep vein thrombosis (DVT) (H) [I82.409] [I82.409]  Long-term (current) use of anticoagulants [Z79.01] [Z79.01]         June 2018 Details    Sun Mon Tue Wed Thu Fri Sat          1               2                 3               4               5               6               7               8      1.25 mg   See details      9      2.5 mg           10      1.25 mg         11            12               13               14               15               16                 17               18               19               20               21               22               23                 24               25               26               27               28               29               30                Date Details   06/08 This INR check       Date of next INR:  6/11/2018         How to take your warfarin dose     To take:  1.25 mg Take 0.5 of a 2.5 mg tablet.    To take:  2.5 mg Take 1 of the 2.5 mg tablets.

## 2018-06-08 NOTE — PROGRESS NOTES
Patient has clinic visit within 24-48 hours of Discharge so no post DC follow up call is needed    Resume warfarin monitoring with INR follow up on Friday 6/8.

## 2018-06-08 NOTE — PROGRESS NOTES
Dates of hospitalization: 5/31 to 6/7  Reason for hospitalization: Worsening mental status and severe diarrhea  Procedures performed: -  Vitamin K or FFP administered? no  Inpatient warfarin doses added to calendar? yes  Medication changes at discharge: Start Norvasc, Melatonin, namenda, seroquel ,and vanco.  D/Ordger Xanax, ASA 81mg, lasix.  Warfarin dosing after DC: 6/7-2.5, 6/8-1.25,6/9-2.5, 6/10-1.25mg  Patient discharged on Lovenox? no  Next INR date: 6/11  Where is the patient discharging to? (home, TCU, staying locally, etc.): home  Will patient have home care? Yes.  [facility] nurse

## 2018-06-11 NOTE — MR AVS SNAPSHOT
Priscilla Way   6/11/2018   Anticoagulation Therapy Visit    Description:  78 year old male   Provider:  Jennifer Bernal, RN   Department:  [facility]           INR as of 6/11/2018     Today's INR 1.5!      Anticoagulation Summary as of 6/11/2018     INR goal 2.0-3.0   Today's INR 1.5!   Full warfarin instructions 1.25 mg on Fri; 2.5 mg all other days   Next INR check 6/13/2018    Indications   Acute kidney injury (H) [N17.9]  Deep vein thrombosis (DVT) (H) [I82.409] [I82.409]  Long-term (current) use of anticoagulants [Z79.01] [Z79.01]         June 2018 Details    Sun Mon Tue Wed Thu Fri Sat          1               2                 3               4               5               6               7               8               9                 10               11      2.5 mg   See details      12      2.5 mg         13            14               15               16                 17               18               19               20               21               22               23                 24               25               26               27               28               29               30                Date Details   06/11 This INR check       Date of next INR:  6/13/2018         How to take your warfarin dose     To take:  2.5 mg Take 1 of the 2.5 mg tablets.

## 2018-06-11 NOTE — TELEPHONE ENCOUNTER
MTM referral from: Transitions of Care (recent hospital discharge or ED visit)    MTM referral outreach attempt #1 on June 11, 2018 at 4:33 PM      Outcome: Patient is not interested at this time because doesn't feel it is necessary, will route to MTM Pharmacist/Provider as an FYI. Thank you for the referral.     Ernestina Alexander, MTM Coordinator

## 2018-06-11 NOTE — PROGRESS NOTES
ANTICOAGULATION FOLLOW-UP CLINIC VISIT    Patient Name:  Priscilla Way  Date:  6/11/2018  Contact Type:  Telephone    SUBJECTIVE:     Patient Findings     Comments Missed dose denied  Cautious dose for the next two days -  Pt was just DC from the hospital.  Stools are now formed and frequent  Will check again on 6/13 since home care sees pt again on that date.            OBJECTIVE    INR Protime   Date Value Ref Range Status   06/11/2018 1.5 (A) 0.86 - 1.14 Final       ASSESSMENT / PLAN  INR assessment SUB    Recheck INR In: 2 DAYS    INR Location Homecare INR      Anticoagulation Summary as of 6/11/2018     INR goal 2.0-3.0   Today's INR 1.5!   Warfarin maintenance plan 1.25 mg (2.5 mg x 0.5) on Fri; 2.5 mg (2.5 mg x 1) all other days   Full warfarin instructions 1.25 mg on Fri; 2.5 mg all other days   Weekly warfarin total 16.25 mg   Plan last modified Sherry Georges RN (4/24/2018)   Next INR check 6/13/2018   Priority INR   Target end date     Indications   Acute kidney injury (H) [N17.9]  Deep vein thrombosis (DVT) (H) [I82.409] [I82.409]  Long-term (current) use of anticoagulants [Z79.01] [Z79.01]         Anticoagulation Episode Summary     INR check location     Preferred lab     [facility]    Comments [facility] to draw INR's  Pt has dementia please contact daughter with any questions. Contact daughter May at [phone].  Has 2.5mg tablets       Anticoagulation Care Providers     Provider Role Specialty Phone number    Randy Fuentes MD [facility] [phone]            See the Encounter Report to view Anticoagulation Flowsheet and Dosing Calendar (Go to Encounters tab in chart review, and find the Anticoagulation Therapy Visit)    Spoke with patient's home care nurse Jacqueline. Gave them their new warfarin recommendation.  No changes in health, medication, or diet. No missed doses, no falls. No signs or symptoms of bleed or clotting.      Jennifer  Gabe, RN

## 2018-06-13 NOTE — TELEPHONE ENCOUNTER
[facility] Call Center    Phone Message    May a detailed message be left on voicemail: no    Reason for Call: Medication Refill Request    Has the patient contacted the pharmacy for the refill? Yes   Name of medication being requested: amLODIPine (NORVASC) 10 MG tablet  Provider who prescribed the medication: Dr. Tirado in the hospital, but apparently hospital is done prescribing for him  Pharmacy: [facility], on [address]  Date medication is needed: Asap, Pt never received original prescription        Action Taken: Message routed to:  [facility]

## 2018-06-13 NOTE — PROGRESS NOTES
ANTICOAGULATION FOLLOW-UP CLINIC VISIT    Patient Name:  Priscilla Way  Date:  6/13/2018  Contact Type:  Telephone    SUBJECTIVE:     Patient Findings     Comments Cabrera reports that patient is more feisty today.  He is biting, pinching and hitting. She reports that she thinks he is a bit dehydrated.           OBJECTIVE    INR   Date Value Ref Range Status   06/13/2018 2.1  Final       ASSESSMENT / PLAN  No question data found.  Anticoagulation Summary as of 6/13/2018     INR goal 2.0-3.0   Today's INR 2.1   Warfarin maintenance plan No maintenance plan   Full warfarin instructions 6/13: 1.25 mg; 6/14: 2.5 mg; 6/15: 2.5 mg; 6/16: 2.5 mg; 6/17: 1.25 mg   Weekly warfarin total 16.25 mg   Plan last modified Dayana Ervin RN (6/13/2018)   Next INR check 6/18/2018   Priority INR   Target end date     Indications   Acute kidney injury (H) [N17.9]  Deep vein thrombosis (DVT) (H) [I82.409] [I82.409]  Long-term (current) use of anticoagulants [Z79.01] [Z79.01]         Anticoagulation Episode Summary     INR check location     Preferred lab     [facility]    Comments [facility] to draw INR's  Pt has dementia please contact daughter with any questions. Contact daughter May at [phone].  Has 2.5mg tablets       Anticoagulation Care Providers     Provider Role Specialty Phone number    WessophieRandy chavis MD [facility] [phone]            See the Encounter Report to view Anticoagulation Flowsheet and Dosing Calendar (Go to Encounters tab in chart review, and find the Anticoagulation Therapy Visit)    Spoke with Cabrera PÉREZ.     Dayana Ervin, TED

## 2018-06-13 NOTE — MR AVS SNAPSHOT
Priscilla Way   6/13/2018   Anticoagulation Therapy Visit    Description:  78 year old male   Provider:  Dayana Ervin, RN   Department:  [facility]           INR as of 6/13/2018     Today's INR 2.1      Anticoagulation Summary as of 6/13/2018     INR goal 2.0-3.0   Today's INR 2.1   Full warfarin instructions 6/13: 1.25 mg; 6/14: 2.5 mg; 6/15: 2.5 mg; 6/16: 2.5 mg; 6/17: 1.25 mg   Next INR check 6/18/2018    Indications   Acute kidney injury (H) [N17.9]  Deep vein thrombosis (DVT) (H) [I82.409] [I82.409]  Long-term (current) use of anticoagulants [Z79.01] [Z79.01]         June 2018 Details    Sun Mon Tue Wed Thu Fri Sat          1               2                 3               4               5               6               7               8               9                 10               11               12               13      1.25 mg   See details      14      2.5 mg         15      2.5 mg         16      2.5 mg           17      1.25 mg         18            19               20               21               22               23                 24               25               26               27               28               29               30                Date Details   06/13 This INR check       Date of next INR:  6/18/2018         How to take your warfarin dose     To take:  1.25 mg Take 0.5 of a 2.5 mg tablet.    To take:  2.5 mg Take 1 of the 2.5 mg tablets.

## 2018-06-18 NOTE — MR AVS SNAPSHOT
Priscilla Way   6/18/2018   Anticoagulation Therapy Visit    Description:  78 year old male   Provider:  Iraj Shelby, RN   Department:  [facility]           INR as of 6/18/2018     Today's INR 1.5!      Anticoagulation Summary as of 6/18/2018     INR goal 2.0-3.0   Today's INR 1.5!   Full warfarin instructions 6/18: 2.5 mg; 6/19: 2.5 mg; 6/20: 2.5 mg; 6/21: 2.5 mg; 6/22: 1.25 mg; 6/23: 2.5 mg; 6/24: 2.5 mg   Next INR check 6/25/2018    Indications   Acute kidney injury (H) [N17.9]  Deep vein thrombosis (DVT) (H) [I82.409] [I82.409]  Long-term (current) use of anticoagulants [Z79.01] [Z79.01]         June 2018 Details    Sun Mon Tue Wed Thu Fri Sat          1               2                 3               4               5               6               7               8               9                 10               11               12               13               14               15               16                 17               18      2.5 mg   See details      19      2.5 mg         20      2.5 mg         21      2.5 mg         22      1.25 mg         23      2.5 mg           24      2.5 mg         25            26               27               28               29               30                Date Details   06/18 This INR check       Date of next INR:  6/25/2018         How to take your warfarin dose     To take:  1.25 mg Take 0.5 of a 2.5 mg tablet.    To take:  2.5 mg Take 1 of the 2.5 mg tablets.

## 2018-06-18 NOTE — PROGRESS NOTES
ANTICOAGULATION FOLLOW-UP CLINIC VISIT    Patient Name:  Priscilla Way  Date:  6/18/2018  Contact Type:  Telephone    SUBJECTIVE:     Patient Findings     Positives Unexplained INR or factor level change (Subtherapeutic INR result is 1.5 today.)    Comments Spoke to ELISA Alford.  Denies missed doses.  Assessment reveals cognitive changes post discharge from the hospital.  Home care reports this as new baseline.  Sets up pill box for two weeks.  Priscilla has an appointment at the  next Monday, the same day as Rocío is scheduled.  Will use home care result when called in.  At time of encounter, no lab appointment is scheduled for 6/25.           OBJECTIVE    INR   Date Value Ref Range Status   06/18/2018 1.5  Final     Comment:     Home Care INR draw       ASSESSMENT / PLAN  INR assessment SUB    Recheck INR In: 1 WEEK    INR Location [facility] INR      Anticoagulation Summary as of 6/18/2018     INR goal 2.0-3.0   Today's INR 1.5!   Warfarin maintenance plan No maintenance plan   Full warfarin instructions 6/18: 2.5 mg; 6/19: 2.5 mg; 6/20: 2.5 mg; 6/21: 2.5 mg; 6/22: 1.25 mg; 6/23: 2.5 mg; 6/24: 2.5 mg   Weekly warfarin total 16.25 mg   Plan last modified Dayana Ervin RN (6/13/2018)   Next INR check 6/25/2018   Priority INR   Target end date     Indications   Acute kidney injury (H) [N17.9]  Deep vein thrombosis (DVT) (H) [I82.409] [I82.409]  Long-term (current) use of anticoagulants [Z79.01] [Z79.01]         Anticoagulation Episode Summary     INR check location     Preferred lab     Send INR reminders to [facility]    Comments [facility] to draw INR's  Pt has dementia please contact daughter with any questions. Contact daughter May at [phone].  Has 2.5mg tablets       Anticoagulation Care Providers     Provider Role Specialty Phone number    Randy Fuentes MD [facility] [phone]            See the Encounter Report to view Anticoagulation Flowsheet and Dosing  Calendar (Go to Encounters tab in chart review, and find the Anticoagulation Therapy Visit)    Spoke to ELISA Alford.  Recorded updates in patient findings.    Iraj Shelby RN

## 2018-06-21 NOTE — TELEPHONE ENCOUNTER
VITAMIN D-400) 400 units  & multivitamin with C and FA (ANIMAL SHAPES) CHEW chewable   Last Written Prescription Date:  UNK  Last Fill Quantity: UNK,   # refills: UNK  Last Office Visit : 5/1/18  Future Office visit:  6/25/18    Routing refill request to provider for review/approval because: Drug not active on patient's medication list

## 2018-06-25 NOTE — MR AVS SNAPSHOT
Priscilla Way   6/25/2018   Anticoagulation Therapy Visit    Description:  78 year old male   Provider:  Jennifer Bernal, RN   Department:  [facility]           INR as of 6/25/2018     Today's INR 2.1      Anticoagulation Summary as of 6/25/2018     INR goal 2.0-3.0   Today's INR 2.1   Full warfarin instructions 6/25: 2.5 mg; 6/26: 2.5 mg; 6/27: 2.5 mg; 6/28: 1.25 mg; 6/29: 2.5 mg; 6/30: 2.5 mg; 7/1: 2.5 mg   Next INR check 7/2/2018    Indications   Acute kidney injury (H) [N17.9]  Deep vein thrombosis (DVT) (H) [I82.409] [I82.409]  Long-term (current) use of anticoagulants [Z79.01] [Z79.01]         June 2018 Details    Sun Mon Tue Wed Thu Fri Sat          1               2                 3               4               5               6               7               8               9                 10               11               12               13               14               15               16                 17               18               19               20               21               22               23                 24               25      2.5 mg   See details      26      2.5 mg         27      2.5 mg         28      1.25 mg         29      2.5 mg         30      2.5 mg          Date Details   06/25 This INR check               How to take your warfarin dose     To take:  1.25 mg Take 0.5 of a 2.5 mg tablet.    To take:  2.5 mg Take 1 of the 2.5 mg tablets.           July 2018 Details    Sun Mon Tue Wed Thu Fri Sat     1      2.5 mg         2            3               4               5               6               7                 8               9               10               11               12               13               14                 15               16               17               18               19               20               21                 22               23               24               25               26               27               28                  29               30               31                    Date Details   No additional details    Date of next INR:  7/2/2018         How to take your warfarin dose     To take:  2.5 mg Take 1 of the 2.5 mg tablets.

## 2018-06-25 NOTE — PROGRESS NOTES
ANTICOAGULATION FOLLOW-UP CLINIC VISIT    Patient Name:  Priscilla Way  Date:  6/25/2018  Contact Type:  Telephone    SUBJECTIVE:     Patient Findings     Positives No Problem Findings    Comments Will repeat the dosing pattern of last week, as the pt is at the low end of his goal range.   Home care sets up the pt's meds           OBJECTIVE    INR Protime   Date Value Ref Range Status   06/25/2018 2.1 (A) 0.86 - 1.14 Final       ASSESSMENT / PLAN  INR assessment THER    Recheck INR In: 1 WEEK    INR Location [facility] INR      Anticoagulation Summary as of 6/25/2018     INR goal 2.0-3.0   Today's INR 2.1   Warfarin maintenance plan No maintenance plan   Full warfarin instructions 6/25: 2.5 mg; 6/26: 2.5 mg; 6/27: 2.5 mg; 6/28: 1.25 mg; 6/29: 2.5 mg; 6/30: 2.5 mg; 7/1: 2.5 mg   Weekly warfarin total 16.25 mg   Plan last modified Dayana Ervin RN (6/13/2018)   Next INR check 7/2/2018   Priority INR   Target end date     Indications   Acute kidney injury (H) [N17.9]  Deep vein thrombosis (DVT) (H) [I82.409] [I82.409]  Long-term (current) use of anticoagulants [Z79.01] [Z79.01]         Anticoagulation Episode Summary     INR check location     Preferred lab     Send IN[facility] remind[facility] to [facility]    Comments [facility] to draw INR's  Pt has dementia please contact daughter with any questions. Contact daughter May at [phone].  Has 2.5mg tablets       Anticoagulation Care Providers     Provider Role Specialty Phone number    Randy Fuentes MD [facility] [phone]            See the Encounter Report to view Anticoagulation Flowsheet and Dosing Calendar (Go to Encounters tab in chart review, and find the Anticoagulation Therapy Visit)    Spoke with patient's home care nurse Jacqueline. Gave them their new warfarin recommendation.  No changes in health, medication, or diet. No missed doses, no falls. No signs or symptoms of bleed or clotting.      Jennifer Bernal RN

## 2018-06-27 NOTE — TELEPHONE ENCOUNTER
[facility]    Phone Message    May a detailed message be left on voicemail: no    Reason for Call: Other: PT would like to be seen sooner for swelling in the legs but can not do a same day appt. due to transportation      Action Taken: Message routed to:  [facility]

## 2018-06-27 NOTE — TELEPHONE ENCOUNTER
Spoke with patient and appointment scheduled for Friday 6/29 at 3:20pm. Poonam Flood CMA at 12:48 PM on 6/27/2018

## 2018-06-29 NOTE — PROGRESS NOTES
[facility] now requests orders and shares plan of care/discharge summaries for some patients through [employer].  Please REPLY TO THIS MESSAGE OR ROUTE BACK TO THE AUTHOR in order to give authorization for orders when needed.  This is considered a verbal order, you will still receive a faxed copy of orders for signature.  Thank you for your assistance in improving collaboration for our patients.    ORDER:     For coordination of care from Dr. Kevyn Pineda, transplant center    SN to draw transplant lab including CBC, hepatic panel, basic metabolic panel, and tacrolimus levels 12 hour trough every other month.     First lab draw to be Monday 7/2    TO Dr. Kevyn Pineda/ Enmanuel JEAN/ Jacqueline Jean Baptiste RN.

## 2018-07-02 NOTE — MR AVS SNAPSHOT
Priscilla Way   7/2/2018   Anticoagulation Therapy Visit    Description:  78 year old male   Provider:  Dayana Ervin, RN   [facility]           INR as of 7/2/2018     Today's INR 5.0!      Anticoagulation Summary as of 7/2/2018     INR goal 2.0-3.0   Today's INR 5.0!   Full warfarin instructions 7/2: Hold; 7/3: 1.25 mg; 7/4: 1.25 mg   Next INR check 7/5/2018    Indications   Acute kidney injury (H) [N17.9]  Deep vein thrombosis (DVT) (H) [I82.409] [I82.409]  Long-term (current) use of anticoagulants [Z79.01] [Z79.01]         July 2018 Details    Sun Mon Tue Wed Thu Fri Sat     1               2      Hold   See details      3      1.25 mg         4      1.25 mg         5            6               7                 8               9               10               11               12               13               14                 15               16               17               18               19               20               21                 22               23               24               25               26               27               28                 29               30               31                    Date Details   07/02 This INR check       Date of next INR:  7/5/2018         How to take your warfarin dose     To take:  1.25 mg Take 0.5 of a 2.5 mg tablet.    Hold Do not take your warfarin dose. See the Details table to the right for additional instructions.

## 2018-07-02 NOTE — PROGRESS NOTES
ANTICOAGULATION FOLLOW-UP CLINIC VISIT    Patient Name:  Priscilla Way  Date:  7/2/2018  Contact Type:  Telephone    SUBJECTIVE:     Patient Findings     Positives Unexplained INR or factor level change    Comments Shanice will review signs and symptoms of abnormal bruising and bleeding.           OBJECTIVE    INR   Date Value Ref Range Status   07/02/2018 5.0  Final       ASSESSMENT / PLAN  No question data found.  Anticoagulation Summary as of 7/2/2018     INR goal 2.0-3.0   Today's INR 5.0!   Warfarin maintenance plan No maintenance plan   Full warfarin instructions 7/2: Hold; 7/3: 1.25 mg; 7/4: 1.25 mg   Weekly warfarin total 16.25 mg   Plan last modified Dayana Ervin, RN (6/13/2018)   Next INR check 7/5/2018   Priority INR   Target end date     Indications   Acute kidney injury (H) [N17.9]  Deep vein thrombosis (DVT) (H) [I82.409] [I82.409]  Long-term (current) use of anticoagulants [Z79.01] [Z79.01]         Anticoagulation Episode Summary     INR check location     Preferred lab     [facility]    Comments [facility] to draw INR's  Pt has dementia please contact daughter with any questions. Contact daughter May at [phone].  Has 2.5mg tablets       Anticoagulation Care Providers     Provider Role Specialty Phone number    Randy Fuentes MD [facility] [phone]            See the Encounter Report to view Anticoagulation Flowsheet and Dosing Calendar (Go to Encounters tab in chart review, and find the Anticoagulation Therapy Visit)    Spoke with Shanice PÉREZ.     Dayana Ervin, TED

## 2018-07-05 NOTE — PROGRESS NOTES
ANTICOAGULATION FOLLOW-UP CLINIC VISIT    Patient Name:  Priscilla Way  Date:  7/5/2018  Contact Type:  Telephone    SUBJECTIVE:     Patient Findings     Positives No Problem Findings    Comments Patient will be drawn at home visit next week.           OBJECTIVE    INR   Date Value Ref Range Status   07/05/2018 2.3  Final     Comment:     Home Care INR result       ASSESSMENT / PLAN  INR assessment THER    Recheck INR In: 6 DAYS    INR Location [facility] INR      Anticoagulation Summary as of 7/5/2018     INR goal 2.0-3.0   Today's INR 2.3   Warfarin maintenance plan No maintenance plan   Full warfarin instructions 7/5: 2.5 mg; 7/6: 1.25 mg; 7/7: 2.5 mg; 7/8: 2.5 mg; 7/9: 2.5 mg; 7/10: 2.5 mg   Weekly warfarin total 16.25 mg   Plan last modified Dayana Ervin RN (6/13/2018)   Next INR check 7/11/2018   Priority INR   Target end date     Indications   Acute kidney injury (H) [N17.9]  Deep vein thrombosis (DVT) (H) [I82.409] [I82.409]  Long-term (current) use of anticoagulants [Z79.01] [Z79.01]         Anticoagulation Episode Summary     INR check location     Preferred lab     [facility]    Comments [facility] to draw INR's  Pt has dementia please contact daughter with any questions. Contact daughter May at [phone].  Has 2.5mg tablets       Anticoagulation Care Providers     Provider Role Specialty Phone number    WessophieRandy chavis MD [facility] [phone]            See the Encounter Report to view Anticoagulation Flowsheet and Dosing Calendar (Go to Encounters tab in chart review, and find the Anticoagulation Therapy Visit)    Spoke to ELISA Lopez.  Patient next INR draw will be at home visit on Wednesday.  Reviewed appointments with home care.  Reason for home visit blood draw is patient comfort.  Patient will allow a fingerstick, but gets agitated and aggressive with venous draws.  Settled on Wednesday between appointments.    Iraj Shelby,  RN

## 2018-07-05 NOTE — MR AVS SNAPSHOT
Priscilla Way   7/5/2018   Anticoagulation Therapy Visit    Description:  78 year old male   Provider:  Iraj Shelby, RN   Department:  [facility]           INR as of 7/5/2018     Today's INR 2.3      Anticoagulation Summary as of 7/5/2018     INR goal 2.0-3.0   Today's INR 2.3   Full warfarin instructions 7/5: 2.5 mg; 7/6: 1.25 mg; 7/7: 2.5 mg; 7/8: 2.5 mg; 7/9: 2.5 mg; 7/10: 2.5 mg   Next INR check 7/11/2018    Indications   Acute kidney injury (H) [N17.9]  Deep vein thrombosis (DVT) (H) [I82.409] [I82.409]  Long-term (current) use of anticoagulants [Z79.01] [Z79.01]         July 2018 Details    Sun Mon Tue Wed Thu Fri Sat     1               2               3               4               5      2.5 mg   See details      6      1.25 mg         7      2.5 mg           8      2.5 mg         9      2.5 mg         10      2.5 mg         11            12               13               14                 15               16               17               18               19               20               21                 22               23               24               25               26               27               28                 29               30               31                    Date Details   07/05 This INR check       Date of next INR:  7/11/2018         How to take your warfarin dose     To take:  1.25 mg Take 0.5 of a 2.5 mg tablet.    To take:  2.5 mg Take 1 of the 2.5 mg tablets.

## 2018-07-11 NOTE — MR AVS SNAPSHOT
Priscilla Way   7/11/2018   Anticoagulation Therapy Visit    Description:  78 year old male   Provider:  Shabana Sorto, RN   Department:  [facility]           INR as of 7/11/2018     Today's INR 3.80!      Anticoagulation Summary as of 7/11/2018     INR goal 2.0-3.0   Today's INR 3.80!   Full warfarin instructions 7/11: 1.25 mg; 7/12: 2.5 mg; 7/13: 1.25 mg; 7/14: 2.5 mg; 7/15: 2.5 mg; 7/16: 2.5 mg; 7/17: 2.5 mg   Next INR check 7/18/2018    Indications   Acute kidney injury (H) [N17.9]  Deep vein thrombosis (DVT) (H) [I82.409] [I82.409]  Long-term (current) use of anticoagulants [Z79.01] [Z79.01]         July 2018 Details    Sun Mon Tue Wed Thu Fri Sat     1               2               3               4               5               6               7                 8               9               10               11      1.25 mg   See details      12      2.5 mg         13      1.25 mg         14      2.5 mg           15      2.5 mg         16      2.5 mg         17      2.5 mg         18            19               20               21                 22               23               24               25               26               27               28                 29               30               31                    Date Details   07/11 This INR check       Date of next INR:  7/18/2018         How to take your warfarin dose     To take:  1.25 mg Take 0.5 of a 2.5 mg tablet.    To take:  2.5 mg Take 1 of the 2.5 mg tablets.

## 2018-07-11 NOTE — TELEPHONE ENCOUNTER
Per review by , Priscilla has anemia, and is iron deficient.    Please call pt's daughter about anemia, and plan per  to start taking     Iron tablet 1 tablet  three times a day  with meals,  Take with 1 tablet Vit C (500) three times a day.    Please review sideeffects of iron tablets, including stomach pains, constipation, discoloration of bowel movements (blackened).    Can start with 1 tablet of iron,and Vit C tablet twice a day, and add the 3rd dose after 1-2 weeks.   Ask family to call transplant center if he has difficulty taking these tablets.

## 2018-07-12 NOTE — TELEPHONE ENCOUNTER
Talked to Cruz about her fathers anemia, and explained in detail note from coordinator about it. Advised her to talk to pharmacist at [facility] when she picks medications up about signs and symptoms. She understands and has no further questions

## 2018-07-18 NOTE — MR AVS SNAPSHOT
Priscilla Way   7/18/2018   Anticoagulation Therapy Visit    Description:  78 year old male   Provider:  Dayana Ervin, RN   Department:  [facility]           INR as of 7/18/2018     Today's INR 4.7!      Anticoagulation Summary as of 7/18/2018     INR goal 2.0-3.0   Today's INR 4.7!   Full warfarin instructions 7/18: Hold; 7/19: 2.5 mg; 7/20: 1.25 mg; 7/21: 2.5 mg; 7/22: 2.5 mg; 7/23: 1.25 mg; 7/24: 2.5 mg   Next INR check 7/25/2018    Indications   Acute kidney injury (H) [N17.9]  Deep vein thrombosis (DVT) (H) [I82.409] [I82.409]  Long-term (current) use of anticoagulants [Z79.01] [Z79.01]         July 2018 Details    Sun Mon Tue Wed Thu Fri Sat     1               2               3               4               5               6               7                 8               9               10               11               12               13               14                 15               16               17               18      Hold   See details      19      2.5 mg         20      1.25 mg         21      2.5 mg           22      2.5 mg         23      1.25 mg         24      2.5 mg         25            26               27               28                 29               30               31                    Date Details   07/18 This INR check       Date of next INR:  7/25/2018         How to take your warfarin dose     To take:  1.25 mg Take 0.5 of a 2.5 mg tablet.    To take:  2.5 mg Take 1 of the 2.5 mg tablets.    Hold Do not take your warfarin dose. See the Details table to the right for additional instructions.

## 2018-07-18 NOTE — PROGRESS NOTES
ANTICOAGULATION FOLLOW-UP CLINIC VISIT    Patient Name:  Priscilla Way  Date:  7/18/2018  Contact Type:  Telephone    SUBJECTIVE:     Patient Findings     Positives Unexplained INR or factor level change           OBJECTIVE    INR   Date Value Ref Range Status   07/18/2018 4.7  Final       ASSESSMENT / PLAN  No question data found.  Anticoagulation Summary as of 7/18/2018     INR goal 2.0-3.0   Today's INR 4.7!   Warfarin maintenance plan No maintenance plan   Full warfarin instructions 7/18: Hold; 7/19: 2.5 mg; 7/20: 1.25 mg; 7/21: 2.5 mg; 7/22: 2.5 mg; 7/23: 1.25 mg; 7/24: 2.5 mg   Weekly warfarin total 16.25 mg   Plan last modified Dayana Ervin RN (6/13/2018)   Next INR check 7/25/2018   Priority INR   Target end date     Indications   Acute kidney injury (H) [N17.9]  Deep vein thrombosis (DVT) (H) [I82.409] [I82.409]  Long-term (current) use of anticoagulants [Z79.01] [Z79.01]         Anticoagulation Episode Summary     INR check location     Preferred lab     [facility]    Comments [facility] to draw INR's  Pt has dementia please contact daughter with any questions. Contact daughter May at [phone].  Has 2.5mg tablets       Anticoagulation Care Providers     Provider Role Specialty Phone number    Randy Fuentes MD [facility] [phone]            See the Encounter Report to view Anticoagulation Flowsheet and Dosing Calendar (Go to Encounters tab in chart review, and find the Anticoagulation Therapy Visit)    Spoke with Tonia PÉREZ.    Dayana Ervin RN

## 2018-07-23 NOTE — MR AVS SNAPSHOT
Priscilla Way   7/23/2018   Anticoagulation Therapy Visit    Description:  78 year old male   Provider:  Shabana Sorto, RN   Department:  [facility]           INR as of 7/23/2018     Today's INR 3.20!      Anticoagulation Summary as of 7/23/2018     INR goal 2.0-3.0   Today's INR 3.20!   Full warfarin instructions 7/23: 1.25 mg; 7/24: 2.5 mg; 7/25: 1.25 mg; 7/26: 2.5 mg; 7/27: 1.25 mg; 7/28: 2.5 mg; 7/29: 2.5 mg   Next INR check 7/30/2018    Indications   Acute kidney injury (H) [N17.9]  Deep vein thrombosis (DVT) (H) [I82.409] [I82.409]  Long-term (current) use of anticoagulants [Z79.01] [Z79.01]         July 2018 Details    Sun Mon Tue Wed Thu Fri Sat     1               2               3               4               5               6               7                 8               9               10               11               12               13               14                 15               16               17               18               19               20               21                 22               23      1.25 mg   See details      24      2.5 mg         25      1.25 mg         26      2.5 mg         27      1.25 mg         28      2.5 mg           29      2.5 mg         30            31                    Date Details   07/23 This INR check       Date of next INR:  7/30/2018         How to take your warfarin dose     To take:  1.25 mg Take 0.5 of a 2.5 mg tablet.    To take:  2.5 mg Take 1 of the 2.5 mg tablets.

## 2018-07-23 NOTE — TELEPHONE ENCOUNTER
[facility] Call Center    Phone Message    May a detailed message be left on voicemail: yes    Reason for Call: Order(s): Home Care Orders: Skilled Nursing:     Action Taken: Other: 1 Skilled nursing visit every week for 60 days with 3 as needed for medicaiton management . Verbal orders are okay

## 2018-07-23 NOTE — PROGRESS NOTES
ANTICOAGULATION FOLLOW-UP CLINIC VISIT    Patient Name:  Priscilla Way  Date:  7/23/2018  Contact Type:  Telephone    SUBJECTIVE:     Patient Findings     Positives No Problem Findings           OBJECTIVE    INR   Date Value Ref Range Status   07/23/2018 3.20  Final     Comment:     Home Care        ASSESSMENT / PLAN  INR assessment SUPRA    Recheck INR In: 1 WEEK    INR Location [facility] INR      Anticoagulation Summary as of 7/23/2018     INR goal 2.0-3.0   Today's INR 3.20!   Warfarin maintenance plan No maintenance plan   Full warfarin instructions 7/23: 1.25 mg; 7/24: 2.5 mg; 7/25: 1.25 mg; 7/26: 2.5 mg; 7/27: 1.25 mg; 7/28: 2.5 mg; 7/29: 2.5 mg   Weekly warfarin total 16.25 mg   Plan last modified Dayana Ervin RN (6/13/2018)   Next INR check 7/30/2018   Priority INR   Target end date     Indications   Acute kidney injury (H) [N17.9]  Deep vein thrombosis (DVT) (H) [I82.409] [I82.409]  Long-term (current) use of anticoagulants [Z79.01] [Z79.01]         Anticoagulation Episode Summary     INR check location     Preferred lab     Send INR reminders to [facility]    Comments [facility] to draw INR's  Pt has dementia please contact daughter with any questions. Contact daughter May at [phone].  Has 2.5mg tablets       Anticoagulation Care Providers     Provider Role Specialty Phone number    WesRandy castaneda MD [facility] [phone]            See the Encounter Report to view Anticoagulation Flowsheet and Dosing Calendar (Go to Encounters tab in chart review, and find the Anticoagulation Therapy Visit)    Spoke with ELISA Hernandez. Gave them new warfarin recommendation.  No changes in health, medication, or diet. No missed doses, no falls. No signs or symptoms of bleed or clotting.     Shabana Sorto, TED

## 2018-07-24 NOTE — TELEPHONE ENCOUNTER
Provider Call: Transplant Lab/Orders  Route to LPN  Post Transplant Days: 6418 (Kidney), 6418 (Liver)  When patient is less than 60 days post-transplant, route high priority  Reason for Call: Clarification; which order? Needs to know how often to draw labs  Liver patients reporting abnormal lab results: Route to RN and Page  Document lab facility information when provider is calling about annual lab orders. Delete facility wildcards when not needed.  [facility]  [facility]    Callback needed? Yes    Return Call Needed  Same as documented in contacts section  When to return call?: Greater than one day: Route standard priority

## 2018-07-24 NOTE — TELEPHONE ENCOUNTER
Please call [facility], requesting clarification about labs orders.  Pt should be on q 2months and prn labs, remind homecare that if pt is difficult to draw and would need restraining to do labs, that the lab draw can be deferred and attempted at another visit.     Pt's labs have been stable.  Check with Patricia THOMAS, if pt started taking iron tablets?  How he is doing with them?  And is he taking with Vit C tablets?

## 2018-07-25 NOTE — TELEPHONE ENCOUNTER
Called  homecare chong back and left message that PT is on every 2 months lab draw. Also asked if PT is on Iron and Vit C pills and to call us back if she has questions

## 2018-08-01 NOTE — PROGRESS NOTES
[facility] Nurse calls reporting that she is out of strips to check an INR and won't be able to test an INR until Friday 8/3. Writer explained that we were expecting an INR on 7/30 and was wondering if Shanice was aware of being out of strips then? Shanice reports she wrote down the wrong date for the recheck and apologizes for the mistake. Since home care sets up the meds pt missed his dose on 7/30 and 7/31. Documented this on the calendar to reflect this. Writer requested Shanice to fill pt's med box with 2.5mg 7/30 and 2.5mg 7/31. Shanice communicates understanding

## 2018-08-01 NOTE — MR AVS SNAPSHOT
Priscilla Way   8/1/2018   Anticoagulation Therapy Visit    Description:  78 year old male   Provider:  Shabana Sorto, RN   Department:  [facility]           INR as of 8/1/2018     Today's INR No new INR was available at the time of this encounter.      Anticoagulation Summary as of 8/1/2018     INR goal 2.0-3.0   Today's INR No new INR was available at the time of this encounter.   Full warfarin instructions 8/1: 2.5 mg; 8/2: 2.5 mg   Next INR check 8/3/2018    Indications   Acute kidney injury (H) [N17.9]  Deep vein thrombosis (DVT) (H) [I82.409] [I82.409]  Long-term (current) use of anticoagulants [Z79.01] [Z79.01]         August 2018 Details    Sun Mon Tue Wed Thu Fri Sat        1      2.5 mg   See details      2      2.5 mg         3            4                 5               6               7               8               9               10               11                 12               13               14               15               16               17               18                 19               20               21               22               23               24               25                 26               27               28               29               30               31                 Date Details   08/01 This INR check       Date of next INR:  8/3/2018         How to take your warfarin dose     To take:  2.5 mg Take 1 of the 2.5 mg tablets.

## 2018-08-02 NOTE — TELEPHONE ENCOUNTER
Received call from PRATIMA Phillips homecare nurse. Patient's orders for home visits have  and Jacqueline would like to know if we plan to continue with his care. Please call back Jacqueline at [phone].

## 2018-08-03 NOTE — MR AVS SNAPSHOT
Priscilla Way   8/3/2018   Anticoagulation Therapy Visit    Description:  78 year old male   Provider:  Sherry Georges, RN   Department:  [facility]           INR as of 8/3/2018     Today's INR       Anticoagulation Summary as of 8/3/2018     INR goal 2.0-3.0   Today's INR    Full warfarin instructions 8/3: 2.5 mg; 8/4: 2.5 mg; 8/5: 1.25 mg; 8/6: 2.5 mg; 8/7: 1.25 mg; 8/8: 2.5 mg; 8/9: 1.25 mg   Next INR check 8/10/2018    Indications   Acute kidney injury (H) [N17.9]  Deep vein thrombosis (DVT) (H) [I82.409] [I82.409]  Long-term (current) use of anticoagulants [Z79.01] [Z79.01]         August 2018 Details    Sun Mon Tue Wed Thu Fri Sat        1               2               3      2.5 mg   See details      4      2.5 mg           5      1.25 mg         6      2.5 mg         7      1.25 mg         8      2.5 mg         9      1.25 mg         10            11                 12               13               14               15               16               17               18                 19               20               21               22               23               24               25                 26               27               28               29               30               31                 Date Details   08/03 This INR check       Date of next INR:  8/10/2018         How to take your warfarin dose     To take:  1.25 mg Take 0.5 of a 2.5 mg tablet.    To take:  2.5 mg Take 1 of the 2.5 mg tablets.

## 2018-08-03 NOTE — PROGRESS NOTES
Two missed doses-Mon and Tues.    ANTICOAGULATION FOLLOW-UP CLINIC VISIT    Patient Name:  Priscilla Way  Date:  8/3/2018  Contact Type:  Telephone    SUBJECTIVE:     Patient Findings     Positives No Problem Findings           OBJECTIVE    INR   Date Value Ref Range Status   07/23/2018 3.20  Final     Comment:     Home Care        ASSESSMENT / PLAN  INR assessment SUB    Recheck INR In: 1 WEEK    INR Location [facility] INR      Anticoagulation Summary as of 8/3/2018     INR goal 2.0-3.0   Today's INR    Warfarin maintenance plan No maintenance plan   Full warfarin instructions 8/3: 2.5 mg; 8/4: 2.5 mg; 8/5: 1.25 mg; 8/6: 2.5 mg; 8/7: 1.25 mg; 8/8: 2.5 mg; 8/9: 1.25 mg   Weekly warfarin total 16.25 mg   Plan last modified Dayana Ervin RN (6/13/2018)   Next INR check 8/10/2018   Priority INR   Target end date     Indications   Acute kidney injury (H) [N17.9]  Deep vein thrombosis (DVT) (H) [I82.409] [I82.409]  Long-term (current) use of anticoagulants [Z79.01] [Z79.01]         Anticoagulation Episode Summary     INR check location     Preferred lab     [facility]    [facility] to [facility]'s  Pt has dementia please contact daughter with any questions. Contact daughter May at [phone].  Has 2.5mg tablets       Anticoagulation Care Providers     Provider Role Specialty Phone number    Randy Fuentes MD [facility] [phone]            See the Encounter Report to view Anticoagulation Flowsheet and Dosing Calendar (Go to Encounters tab in chart review, and find the Anticoagulation Therapy Visit)  Spoke with Patricia [facility] nurse.    Sherry Georges, TED

## 2018-08-03 NOTE — TELEPHONE ENCOUNTER
Spoke with Jacqueline THOMAS that she will be drawing labs, plan for post transplant labs every 2months, next labs to be done in Sept.   Gave verbal orders for labs to be done.

## 2018-08-13 NOTE — MR AVS SNAPSHOT
Priscilla Way   8/13/2018   Anticoagulation Therapy Visit    Description:  78 year old male   Provider:  Dayana Ervin, RN   Department:  [facility]           INR as of 8/13/2018     Today's INR 1.1!      Anticoagulation Summary as of 8/13/2018     INR goal 2.0-3.0   Today's INR 1.1!   Full warfarin instructions 8/13: 5 mg; 8/14: 2.5 mg; 8/15: 2.5 mg   Next INR check 8/16/2018    Indications   Acute kidney injury (H) [N17.9]  Deep vein thrombosis (DVT) (H) [I82.409] [I82.409]  Long-term (current) use of anticoagulants [Z79.01] [Z79.01]         August 2018 Details    Sun Mon Tue Wed Thu Fri Sat        1               2               3               4                 5               6               7               8               9               10               11                 12               13      5 mg   See details      14      2.5 mg         15      2.5 mg         16            17               18                 19               20               21               22               23               24               25                 26               27               28               29               30               31                 Date Details   08/13 This INR check       Date of next INR:  8/16/2018         How to take your warfarin dose     To take:  2.5 mg Take 1 of the 2.5 mg tablets.    To take:  5 mg Take 2 of the 2.5 mg tablets.

## 2018-08-13 NOTE — PROGRESS NOTES
ANTICOAGULATION FOLLOW-UP CLINIC VISIT    Patient Name:  Priscilla Way  Date:  8/13/2018  Contact Type:  Telephone    SUBJECTIVE:     Patient Findings     Positives Missed doses    Comments Patient missed warfarin over the weekend.            OBJECTIVE    INR   Date Value Ref Range Status   08/13/2018 1.1  Final       ASSESSMENT / PLAN  No question data found.  Anticoagulation Summary as of 8/13/2018     INR goal 2.0-3.0   Today's INR 1.1!   Warfarin maintenance plan No maintenance plan   Full warfarin instructions 8/13: 5 mg; 8/14: 2.5 mg; 8/15: 2.5 mg   Weekly warfarin total 16.25 mg   Plan last modified Dayana Ervin, RN (6/13/2018)   Next INR check 8/16/2018   Priority INR   Target end date     Indications   Acute kidney injury (H) [N17.9]  Deep vein thrombosis (DVT) (H) [I82.409] [I82.409]  Long-term (current) use of anticoagulants [Z79.01] [Z79.01]         Anticoagulation Episode Summary     INR check location     Preferred lab     Send INR reminders to [facility]    Comments [facility] to draw INR's  Pt has dementia please contact daughter with any questions. Contact daughter May at [phone].  Has 2.5mg tablets       Anticoagulation Care Providers     Provider Role Specialty Phone number    Randy Fuentes MD [facility] [phone]            See the Encounter Report to view Anticoagulation Flowsheet and Dosing Calendar (Go to Encounters tab in chart review, and find the Anticoagulation Therapy Visit)    Spoke with Cabrera PÉREZ.    Dayana Ervin, TED

## 2018-08-13 NOTE — PROGRESS NOTES
[facility] now requests orders and shares plan of care/discharge summaries for some patients through [employer].  Please REPLY TO THIS MESSAGE OR ROUTE BACK TO THE AUTHOR in order to give authorization for orders when needed.  This is considered a verbal order, you will still receive a faxed copy of orders for signature.  Thank you for your assistance in improving collaboration for our patients    MD SUMMARY/PLAN OF CARE    Cannot re-order amlodipine, namenda, omeprazole suspension, vitamin C, and colace because [facility] pharmacy says that they do not have these prescriptions. He was dischargd from the hospital with these in May, and there are no refills. If you want Priscilla to continue taking these, can you send an updated med list to [facility] pharmacy. Their phone number is .     Thanks very much!  Jacqueline Jean Baptiste RN  [phone]

## 2018-08-13 NOTE — PROGRESS NOTES
Ok to adjust med list, he's essentially comfort care and the daughter does a great job taking care of him

## 2018-08-14 NOTE — TELEPHONE ENCOUNTER
" MEMANTINE ER 21MG CAPSULES         Last Written Prescription Date:  6/6/2018  Last Fill Quantity: 30,   # refills: 1  Last Office Visit : 5/1/2018  Future Office visit: None    DC from hospital : admit 5/31/2018- 6/7/2018  C.diff colitis:   #Advanced vascular dementia    #Toxic metabolic encephalopathy due to C. diff colitis  # LILIAM on CKD III in transplanted kidney  # S/p Liver and kidney transplant   # History of DVT with supratherapeutic INR  Routing refill request to provider for review/approval because:   8/13/2018 Dr Fuentes note as below:Medications pended    Can you check w/ his daughter to see if reasonable to stay on these (is he eating, taking meds)  [facility]:\"Cannot re-order amlodipine, namenda, omeprazole suspension, vitamin C, and colace because [facility] pharmacy says that they do not have these prescriptions. He was dischargd from the hospital with these in May, and there are no refills. If you want Priscilla to continue taking these, can you send an updated med list to [facility] pharmacy. Their phone number is . \"    "

## 2018-08-14 NOTE — PROGRESS NOTES
Spoke with a daughter, Cruz. She states that pt is only taking coumadin, seroqual, prograff, amlodipine and carvedilol (5 meds) now and she wants to simplify pt's med list with these 5 meds only.  If you agree, I will adjust his med list.    Soon-Mi

## 2018-08-16 NOTE — PROGRESS NOTES
ANTICOAGULATION FOLLOW-UP CLINIC VISIT    Patient Name:  Priscilla Way  Date:  8/16/2018  Contact Type:  Telephone    SUBJECTIVE:     Patient Findings     Positives Missed doses    Comments Family used wrong pill box and warfarin was not in the box.  All doses were missed.            OBJECTIVE    INR   Date Value Ref Range Status   08/16/2018 1.1  Final       ASSESSMENT / PLAN  No question data found.  Anticoagulation Summary as of 8/16/2018     INR goal 2.0-3.0   Today's INR 1.1!   Warfarin maintenance plan No maintenance plan   Full warfarin instructions 8/16: 5 mg; 8/17: 2.5 mg; 8/18: 2.5 mg; 8/19: 2.5 mg   Weekly warfarin total 16.25 mg   Plan last modified Dayana Ervin, RN (6/13/2018)   Next INR check 8/20/2018   Priority INR   Target end date     Indications   Acute kidney injury (H) [N17.9]  Deep vein thrombosis (DVT) (H) [I82.409] [I82.409]  Long-term (current) use of anticoagulants [Z79.01] [Z79.01]         Anticoagulation Episode Summary     INR check location     Preferred lab     Send INR reminders to [facility]    Comments [facility] to draw INR's  Pt has dementia please contact daughter with any questions. Contact daughter May at [phone].  Has 2.5mg tablets       Anticoagulation Care Providers     Provider Role Specialty Phone number    Randy Fuentes MD [facility] [phone]            See the Encounter Report to view Anticoagulation Flowsheet and Dosing Calendar (Go to Encounters tab in chart review, and find the Anticoagulation Therapy Visit)    Spoke with Cami Ervin RN                 ANTICOAGULATION FOLLOW-UP CLINIC VISIT    Patient Name:  Priscilla Way  Date:  8/16/2018  Contact Type:  Telephone    SUBJECTIVE:     Patient Findings     Positives Missed doses    Comments Family used wrong pill box and warfarin was not in the box.  All doses were missed.            OBJECTIVE    INR   Date Value Ref Range Status   08/16/2018 1.1   Final       ASSESSMENT / PLAN  No question data found.  Anticoagulation Summary as of 8/16/2018     INR goal 2.0-3.0   Today's INR 1.1!   Warfarin maintenance plan No maintenance plan   Full warfarin instructions 8/16: 5 mg; 8/17: 2.5 mg; 8/18: 2.5 mg; 8/19: 2.5 mg   Weekly warfarin total 16.25 mg   Plan last modified Dayana Ervin RN (6/13/2018)   Next INR check 8/20/2018   Priority INR   Target end date     Indications   Acute kidney injury (H) [N17.9]  Deep vein thrombosis (DVT) (H) [I82.409] [I82.409]  Long-term (current) use of anticoagulants [Z79.01] [Z79.01]         Anticoagulation Episode Summary     INR check location     Preferred lab     Send [facility] to [facility]    Comments [facility] to draw INR's  Pt has dementia please contact daughter with any questions. Contact daughter May at [phone].  Has 2.5mg tablets       Anticoagulation Care Providers     Provider Role Specialty Phone number    Randy Fuentes MD [facility] [phone]            See the Encounter Report to view Anticoagulation Flowsheet and Dosing Calendar (Go to Encounters tab in chart review, and find the Anticoagulation Therapy Visit)    Spoke with Shanice.     Dayana Ervin, TED

## 2018-08-16 NOTE — MR AVS SNAPSHOT
Priscilla Way   8/16/2018   Anticoagulation Therapy Visit    Description:  78 year old male   Provider:  Dayana Ervin, RN   Department:  [facility]           INR as of 8/16/2018     Today's INR 1.1!      Anticoagulation Summary as of 8/16/2018     INR goal 2.0-3.0   Today's INR 1.1!   Full warfarin instructions 8/16: 5 mg; 8/17: 2.5 mg; 8/18: 2.5 mg; 8/19: 2.5 mg   Next INR check 8/20/2018    Indications   Acute kidney injury (H) [N17.9]  Deep vein thrombosis (DVT) (H) [I82.409] [I82.409]  Long-term (current) use of anticoagulants [Z79.01] [Z79.01]         August 2018 Details    Sun Mon Tue Wed Thu Fri Sat        1               2               3               4                 5               6               7               8               9               10               11                 12               13               14               15               16      5 mg   See details      17      2.5 mg         18      2.5 mg           19      2.5 mg         20            21               22               23               24               25                 26               27               28               29               30               31                 Date Details   08/16 This INR check       Date of next INR:  8/20/2018         How to take your warfarin dose     To take:  2.5 mg Take 1 of the 2.5 mg tablets.    To take:  5 mg Take 2 of the 2.5 mg tablets.

## 2018-08-20 NOTE — PROGRESS NOTES
8/20/18 [facility] to [facility].  Patient missed a dose on Friday.  Updated Anticoagulation tracking flowsheet.  Recommended a one time dose of 5mg today.  Will check an INR on Friday.    Iraj Shelby RN    ANTICOAGULATION FOLLOW-UP CLINIC VISIT    Patient Name:  Priscilla Way  Date:  8/20/2018  Contact Type:  Telephone    SUBJECTIVE:        OBJECTIVE    INR   Date Value Ref Range Status   08/20/2018 1.5  Final     Comment:     Home Care INR        ASSESSMENT / PLAN  INR assessment SUB    Recheck INR In: 4 DAYS    INR Location [facility]R      Anticoagulation Summary as of 8/20/2018     INR goal 2.0-3.0   Today's INR 1.5!   Warfarin maintenance plan No maintenance plan   Full warfarin instructions 8/20: 5 mg; 8/21: 2.5 mg; 8/22: 2.5 mg; 8/23: 2.5 mg   Weekly warfarin total 16.25 mg   Plan last modified Dayana Ervin RN (6/13/2018)   Next INR check 8/24/2018   Priority INR   Target end date     Indications   Acute kidney injury (H) [N17.9]  Deep vein thrombosis (DVT) (H) [I82.409] [I82.409]  Long-term (current) use of anticoagulants [Z79.01] [Z79.01]         Anticoagulation Episode Summary     INR check location     Preferred lab     Send INR reminders to [facility]    Comments [facility] to draw INR's  Pt has dementia please contact daughter with any questions. Contact daughter May at [phone].  Has 2.5mg tablets       Anticoagulation Care Providers     Provider Role Specialty Phone number    Randy Fuentes MD [facility] [phone]            See the Encounter Report to view Anticoagulation Flowsheet and Dosing Calendar (Go to Encounters tab in chart review, and find the Anticoagulation Therapy Visit)    Spoke with home care. Gave them the warfarin recommendation.  No changes in health, medication, or diet. No falls. No signs or symptoms of bleed or clotting.  Missed dose recorded.    Iraj Shelby RN

## 2018-08-20 NOTE — MR AVS SNAPSHOT
Priscilla Way   8/20/2018   Anticoagulation Therapy Visit    Description:  78 year old male   Provider:  Iraj Shelby, RN   Department:  [facility]           INR as of 8/20/2018     Today's INR 1.5!      Anticoagulation Summary as of 8/20/2018     INR goal 2.0-3.0   Today's INR 1.5!   Full warfarin instructions 8/20: 5 mg; 8/21: 2.5 mg; 8/22: 2.5 mg; 8/23: 2.5 mg   Next INR check 8/24/2018    Indications   Acute kidney injury (H) [N17.9]  Deep vein thrombosis (DVT) (H) [I82.409] [I82.409]  Long-term (current) use of anticoagulants [Z79.01] [Z79.01]         August 2018 Details    Sun Mon Tue Wed Thu Fri Sat        1               2               3               4                 5               6               7               8               9               10               11                 12               13               14               15               16               17               18                 19               20      5 mg   See details      21      2.5 mg         22      2.5 mg         23      2.5 mg         24            25                 26               27               28               29               30               31                 Date Details   08/20 This INR check       Date of next INR:  8/24/2018         How to take your warfarin dose     To take:  2.5 mg Take 1 of the 2.5 mg tablets.    To take:  5 mg Take 2 of the 2.5 mg tablets.

## 2018-08-24 NOTE — PROGRESS NOTES
ANTICOAGULATION FOLLOW-UP CLINIC VISIT    Patient Name:  Priscilla Way  Date:  8/24/2018  Contact Type:  Telephone    SUBJECTIVE:     Patient Findings     Positives No Problem Findings           OBJECTIVE    INR   Date Value Ref Range Status   08/24/2018 2.8  Final       ASSESSMENT / PLAN  INR assessment THER    Recheck INR In: 1 WEEK    INR Location [facility]R      Anticoagulation Summary as of 8/24/2018     INR goal 2.0-3.0   Today's INR 2.8   Warfarin maintenance plan No maintenance plan   Full warfarin instructions 8/24: 1.25 mg; 8/25: 2.5 mg; 8/26: 2.5 mg; 8/27: 2.5 mg; 8/28: 1.25 mg; 8/29: 2.5 mg; 8/30: 2.5 mg   Weekly warfarin total 16.25 mg   Plan last modified Dayana Ervin RN (6/13/2018)   Next INR check 8/31/2018   Priority INR   Target end date     Indications   Acute kidney injury (H) [N17.9]  Deep vein thrombosis (DVT) (H) [I82.409] [I82.409]  Long-term (current) use of anticoagulants [Z79.01] [Z79.01]         Anticoagulation Episode Summary     INR check location     Preferred lab     Send [facility]ers [facility]    Comments [facility] to draw INR's  Pt has dementia please contact daughter with any questions. Contact daughter May at [phone].  Has 2.5mg tablets       Anticoagulation Care Providers     Provider Role Specialty Phone number    IngridromanRandy chavis MD [facility] [phone]            See the Encounter Report to view Anticoagulation Flowsheet and Dosing Calendar (Go to Encounters tab in chart review, and find the Anticoagulation Therapy Visit)    Spoke with Carina [facility] nurse.    Sherry Georges RN

## 2018-08-24 NOTE — MR AVS SNAPSHOT
Priscilla Way   8/24/2018   Anticoagulation Therapy Visit    Description:  78 year old male   Provider:  Sherry Georges RN   Department:  [facility]           INR as of 8/24/2018     Today's INR 2.8      Anticoagulation Summary as of 8/24/2018     INR goal 2.0-3.0   Today's INR 2.8   Full warfarin instructions 8/24: 1.25 mg; 8/25: 2.5 mg; 8/26: 2.5 mg; 8/27: 2.5 mg; 8/28: 1.25 mg; 8/29: 2.5 mg; 8/30: 2.5 mg   Next INR check 8/31/2018    Indications   Acute kidney injury (H) [N17.9]  Deep vein thrombosis (DVT) (H) [I82.409] [I82.409]  Long-term (current) use of anticoagulants [Z79.01] [Z79.01]         August 2018 Details    Sun Mon Tue Wed Thu Fri Sat        1               2               3               4                 5               6               7               8               9               10               11                 12               13               14               15               16               17               18                 19               20               21               22               23               24      1.25 mg   See details      25      2.5 mg           26      2.5 mg         27      2.5 mg         28      1.25 mg         29      2.5 mg         30      2.5 mg         31              Date Details   08/24 This INR check       Date of next INR:  8/31/2018         How to take your warfarin dose     To take:  1.25 mg Take 0.5 of a 2.5 mg tablet.    To take:  2.5 mg Take 1 of the 2.5 mg tablets.

## 2018-08-27 NOTE — PROGRESS NOTES
ANTICOAGULATION FOLLOW-UP CLINIC VISIT    Patient Name:  Priscilla Way  Date:  8/27/2018  Contact Type:  Telephone    SUBJECTIVE:     Patient Findings     Comments INR today is unexpected.           OBJECTIVE    INR   Date Value Ref Range Status   08/27/2018 3.3  Final       ASSESSMENT / PLAN  No question data found.  Anticoagulation Summary as of 8/27/2018     INR goal 2.0-3.0   Today's INR 3.3!   Warfarin maintenance plan No maintenance plan   Full warfarin instructions 8/27: 1.25 mg; 8/28: 2.5 mg; 8/29: 1.25 mg; 8/30: 2.5 mg; 8/31: 1.25 mg; 9/1: 2.5 mg; 9/2: 2.5 mg; 9/3: 1.25 mg   Weekly warfarin total 16.25 mg   Plan last modified Dayana Ervin, RN (6/13/2018)   Next INR check 9/4/2018   Priority INR   Target end date     Indications   Acute kidney injury (H) [N17.9]  Deep vein thrombosis (DVT) (H) [I82.409] [I82.409]  Long-term (current) use of anticoagulants [Z79.01] [Z79.01]         Anticoagulation Episode Summary     INR check location     Preferred lab     [facility]    Comments [facility] to draw INR's  Pt has dementia please contact daughter with any questions. Contact daughter May at [phone].  Has 2.5mg tablets       Anticoagulation Care Providers     Provider Role Specialty Phone number    WessophieRandy chavis MD [facility] [phone]            See the Encounter Report to view Anticoagulation Flowsheet and Dosing Calendar (Go to Encounters tab in chart review, and find the Anticoagulation Therapy Visit)    Spoke with Shanice PÉREZ.    Dayana Ervin, RN

## 2018-08-27 NOTE — MR AVS SNAPSHOT
Priscilla Way   8/27/2018   Anticoagulation Therapy Visit    Description:  78 year old male   Provider:  Dayana Ervin, RN   Department:  [facility]           INR as of 8/27/2018     Today's INR 3.3!      Anticoagulation Summary as of 8/27/2018     INR goal 2.0-3.0   Today's INR 3.3!   Full warfarin instructions 8/27: 1.25 mg; 8/28: 2.5 mg; 8/29: 1.25 mg; 8/30: 2.5 mg; 8/31: 1.25 mg; 9/1: 2.5 mg; 9/2: 2.5 mg; 9/3: 1.25 mg   Next INR check 9/4/2018    Indications   Acute kidney injury (H) [N17.9]  Deep vein thrombosis (DVT) (H) [I82.409] [I82.409]  Long-term (current) use of anticoagulants [Z79.01] [Z79.01]         August 2018 Details    Sun Mon Tue Wed Thu Fri Sat        1               2               3               4                 5               6               7               8               9               10               11                 12               13               14               15               16               17               18                 19               20               21               22               23               24               25                 26               27      1.25 mg   See details      28      2.5 mg         29      1.25 mg         30      2.5 mg         31      1.25 mg           Date Details   08/27 This INR check               How to take your warfarin dose     To take:  1.25 mg Take 0.5 of a 2.5 mg tablet.    To take:  2.5 mg Take 1 of the 2.5 mg tablets.           September 2018 Details    Sun Mon Tue Wed Thu Fri Sat           1      2.5 mg           2      2.5 mg         3      1.25 mg         4            5               6               7               8                 9               10               11               12               13               14               15                 16               17               18               19               20               21               22                 23               24                25               26               27               28               29                 30                      Date Details   No additional details    Date of next INR:  9/4/2018         How to take your warfarin dose     To take:  1.25 mg Take 0.5 of a 2.5 mg tablet.    To take:  2.5 mg Take 1 of the 2.5 mg tablets.

## 2018-09-04 NOTE — MR AVS SNAPSHOT
Priscilla Way   9/4/2018   Anticoagulation Therapy Visit    Description:  78 year old male   Provider:  Sherry Georges RN   Department:  [facility]           INR as of 9/4/2018     Today's INR 3.4!      Anticoagulation Summary as of 9/4/2018     INR goal 2.0-3.0   Today's INR 3.4!   Full warfarin instructions 9/4: 1.25 mg; 9/5: 1.25 mg; 9/6: 2.5 mg; 9/7: 1.25 mg; 9/8: 1.25 mg; 9/9: 2.5 mg; 9/10: 1.25 mg   Next INR check 9/11/2018    Indications   Acute kidney injury (H) [N17.9]  Deep vein thrombosis (DVT) (H) [I82.409] [I82.409]  Long-term (current) use of anticoagulants [Z79.01] [Z79.01]         September 2018 Details    Sun Mon Tue Wed Thu Fri Sat           1                 2               3               4      1.25 mg   See details      5      1.25 mg         6      2.5 mg         7      1.25 mg         8      1.25 mg           9      2.5 mg         10      1.25 mg         11            12               13               14               15                 16               17               18               19               20               21               22                 23               24               25               26               27               28               29                 30                      Date Details   09/04 This INR check       Date of next INR:  9/11/2018         How to take your warfarin dose     To take:  1.25 mg Take 0.5 of a 2.5 mg tablet.    To take:  2.5 mg Take 1 of the 2.5 mg tablets.

## 2018-09-04 NOTE — PROGRESS NOTES
ANTICOAGULATION FOLLOW-UP CLINIC VISIT    Patient Name:  Priscilla Way  Date:  9/4/2018  Contact Type:  Telephone    SUBJECTIVE:     Patient Findings     Positives No Problem Findings           OBJECTIVE    INR   Date Value Ref Range Status   09/04/2018 3.4  Final       ASSESSMENT / PLAN  INR assessment SUPRA    Recheck INR In: 1 WEEK    INR Location [facility]R      Anticoagulation Summary as of 9/4/2018     INR goal 2.0-3.0   Today's INR 3.4!   Warfarin maintenance plan No maintenance plan   Full warfarin instructions 9/4: 1.25 mg; 9/5: 1.25 mg; 9/6: 2.5 mg; 9/7: 1.25 mg; 9/8: 1.25 mg; 9/9: 2.5 mg; 9/10: 1.25 mg   Weekly warfarin total 16.25 mg   Plan last modified Dayana Ervin RN (6/13/2018)   Next INR check 9/11/2018   Priority INR   Target end date     Indications   Acute kidney injury (H) [N17.9]  Deep vein thrombosis (DVT) (H) [I82.409] [I82.409]  Long-term (current) use of anticoagulants [Z79.01] [Z79.01]         Anticoagulation Episode Summary     INR check location     Preferred lab     Send [facility]ers [facility]    Comments [facility] to draw INR's  Pt has dementia please contact daughter with any questions. Contact daughter May at [phone].  Has 2.5mg tablets       Anticoagulation Care Providers     Provider Role Specialty Phone number    WessophieRandy chavis MD [facility] [phone]            See the Encounter Report to view Anticoagulation Flowsheet and Dosing Calendar (Go to Encounters tab in chart review, and find the Anticoagulation Therapy Visit)  Spoke with Carina [facility] nurse.    Sherry Georges RN

## 2018-09-06 NOTE — TELEPHONE ENCOUNTER
multivitamin with C and FA (ANIMAL SHAPES) CHEW chewable tablet    Last Written Prescription Date:  6/22/18  Last Fill Quantity: 90,   # refills: 3  Last Office Visit : 5/1/18  Future Office visit:  None     memantine XR (NAMENDA XR) 21 MG 24 hr capsule   Last Written Prescription Date:  6/6/18  Last Fill Quantity: 30,   # refills: 1     Cholecalciferol (VITAMIN D-400) 400 units TABS     Last Written Prescription Date:  6/22/18  Last Fill Quantity: 90,   # refills: 3       QUEtiapine (SEROQUEL) 25 MG tablet  Last Written Prescription Date:  8/15/18  Last Fill Quantity: 30,   # refills: 1        Routing because:  Vitamins: Med end date  8/15/18    memantine XR (NAMENDA  Med end date  8/15/18   Cholecalciferol (VITAMIN D-400)   Med end date  8/15/18   QUEtiapine (SEROQUEL)  FYI # 30 1 rf to pharmacy lipids past due

## 2018-09-06 NOTE — TELEPHONE ENCOUNTER
Please see my note on 8/14/18.   We adjusted med list with only 5 meds.  I called the pharmacy to update, therefore no refills on these meds.

## 2018-09-11 PROBLEM — R56.9 SEIZURE (H): Status: ACTIVE | Noted: 2018-01-01

## 2018-09-11 NOTE — MR AVS SNAPSHOT
After Visit Summary   9/11/2018    Priscilla Way    MRN: [id number]           Patient Information     Date Of Birth          1940        Visit Information        Provider Department      9/11/2018 9:30 PM [facility] EEG TECH 4 [facility]        Today's Diagnoses     Seizure (H)    -  1       Follow-ups after your visit        Your next 10 appointments already scheduled     Sep 12, 2018  7:00 AM CDT   24 Hour Video Visit with [facility] EEG TECH 4   [facility] ([facility])    [facility]  [address] [id number] [phone]           [place]: Your appointment is scheduled at [facility]. [address] [place] [address]              Future tests that were ordered for you today     Open Standing Orders        Priority Remaining Interval Expires Ordered    INR Routine 15/16 DAILY  9/11/2018    Oxygen: Nasal cannula, Oxygen mask Routine 33971/78469 CONTINUOUS  9/11/2018            Who to contact     Please call your clinic at [phone] to:    Ask questions about your health    Make or cancel appointments    Discuss your medicines    Learn about your test results    Speak to your doctor            Additional Information About Your Visit        MyChart Information     [employer] gives you secure access to your electronic health record. If you see a primary care provider, you can also send messages to your care team and make appointments. If you have questions, please call your primary care clinic.  If you do not have a primary care provider, please call [phone] and they will assist you.      Startpack is an electronic gateway that provides easy, online access to your medical records. With [employer], you can request a clinic appointment, read your test results, renew a prescription or communicate with your care team.     To access your existing account, please contact your [facility] or  call [phone] for assistance.        Care EveryWhere ID     This is your Care EveryWhere ID. This could be used by other organizations to access your [place] medical records  [id number]         Blood Pressure from Last 3 Encounters:   09/11/18 177/89   06/07/18 135/60   05/01/18 199/79    Weight from Last 3 Encounters:   09/11/18 83.9 kg (185 lb)   06/06/18 77.9 kg (171 lb 12.8 oz)   03/24/18 69.9 kg (154 lb 1.6 oz)              Today, you had the following     No orders found for display         Today's Medication Changes      Notice     This visit is during an admission. Changes to the med list made in this visit will be reflected in the After Visit Summary of the admission.             Primary Care Provider Office Phone # Fax #    Randy Fuentes [phone]       [address] [address]        Equal Access to Services     [facility] : Hadii yefri ernandez hadasho Soomaali, waaxda luqadaha, qaybta kaalmada adeegyada, annmarie cline . So [facility] [phone].    ATENCIÓN: Si habla español, tiene a staples disposición servicios gratuitos de asistencia lingüística. Llame al [phone].    We comply with applicable federal civil rights laws and [place] laws. We do not discriminate on the basis of race, color, national origin, age, disability, sex, sexual orientation, or gender identity.            Thank you!     Thank you for choosing [facility]  for your care. Our goal is always to provide you with excellent care. Hearing back from our patients is one way we can continue to improve our services. Please take a few minutes to complete the written survey that you may receive in the mail after your visit with us. Thank you!             Your Updated Medication List - Protect others around you: Learn how to safely use, store and throw away your medicines at www.disposemymeds.org.      Notice     This visit is during an admission. Changes to the med list made in  this visit will be reflected in the After Visit Summary of the admission.

## 2018-09-11 NOTE — PROGRESS NOTES
ANTICOAGULATION FOLLOW-UP CLINIC VISIT    Patient Name:  Priscilla Way  Date:  9/11/2018  Contact Type:  Telephone    SUBJECTIVE:     Patient Findings     Comments Patient did take all doses of warfarin.            OBJECTIVE    INR   Date Value Ref Range Status   09/11/2018 1.7  Final       ASSESSMENT / PLAN  No question data found.  Anticoagulation Summary as of 9/11/2018     INR goal 2.0-3.0   Today's INR 1.7!   Warfarin maintenance plan No maintenance plan   Full warfarin instructions 9/11: 2.5 mg; 9/12: 1.25 mg; 9/13: 2.5 mg; 9/14: 1.25 mg; 9/15: 1.25 mg; 9/16: 2.5 mg; 9/17: 1.25 mg   Weekly warfarin total 16.25 mg   Plan last modified Dayana Ervin RN (6/13/2018)   Next INR check 9/18/2018   Priority INR   Target end date     Indications   Acute kidney injury (H) [N17.9]  Deep vein thrombosis (DVT) (H) [I82.409] [I82.409]  Long-term (current) use of anticoagulants [Z79.01] [Z79.01]         Anticoagulation Episode Summary     INR check location     Preferred lab     [facility]    Comments [facility] to draw INR's  Pt has dementia please contact daughter with any questions. Contact daughter May at [phone].  Has 2.5mg tablets       Anticoagulation Care Providers     Provider Role Specialty Phone number    Randy Fuentes MD [facility] [phone]            See the Encounter Report to view Anticoagulation Flowsheet and Dosing Calendar (Go to Encounters tab in chart review, and find the Anticoagulation Therapy Visit)    Spoke with Shanice PÉREZ.     Dayana Ervin RN

## 2018-09-11 NOTE — LETTER
Transition Communication Hand-off for Care Transitions to Next Level of Care Provider    Name: Priscilla Way  : 1940  MRN #: [id number]  Primary Care Provider: Randy Fuentes     Primary Clinic: [address] [place] [address] 88, [facility] [address]     Reason for Hospitalization:    Aspiration pneumonia due to regurgitated food, unspecified laterality, unspecified part of lung (H) [J69.0]  Seizure (H) [R56.9]    Admit Date/Time: 2018  4:55 PM  Discharge Date: 10/29/2018    Payor Source: Payor: [facility] / Plan: [employer] / Product Type: HMO         Referrals     Future Labs/Procedures    Home care nursing referral     Comments:    [facility]  Phone  [phone]  Fax  [phone]  ______________________    [facility] to resume all Home Care Services    RN skilled nursing visit.   RN to assess vital signs and weight, respiratory and cardiac status, pain level and activity tolerance, hydration, nutrition and bowel status and home safety.  RN to complete weekly medication management and administration.  RN to assist with and provide tube feeding site care and management  RN to assist with assessment, administration and monitoring of enteral tube feeds.  RN to provide line care per agency protocol and INR lab draws as directed.    INR lab to be sent to:  [facility]  Phone: [phone]  Fax: [phone]     For INR and Warfarin monitoring (Goal = 2-3)  Indication for Anticoagulation: A fib, DVT/PE Treatment  MD Following: MANUELA Fuentes  Expected duration of therapy: indefinate  Next INR lab draw due on:  Tues 10/30 and , further checks after  to be determined by [facility]    Your provider has ordered home care nursing services.  If you have not been contacted within 2 days of your discharge please call    Home infusion referral     Comments:    Your provider has referred you to:    [facility] [place] [phone]  [phone]     [email or web address]    Local Address (if different from home address): N/A    Anticipated Length of Therapy: As ordered by physician.   Regimen: Nepro @75 ml/hr x 14 hour ( from 6:00 pm -- 8:00 am) --Free water flushes: via feed and flush @ 55 ml/hr x 14 hours + 120 ml free water flushes before and after each cycle feeds.     Home Infusion Pharmacist to adjust therapy based on labs and clinical assessments: Yes    Labs:  May draw labs from Venous Catheter: No  Home Infusion Pharmacist to order labs based on therapy type and clinical assessments: Yes  Call/Fax Lab Results to: Primary care clinic: [phone]    Agency Staff to assess nursing needs for Infusion Therapy.            Key Recommendations:  Please review AVS    Radha Ahuja RN, BSN, PHN  Medicine Care Coordinator  [facility], [facility]  Desk Phone: [phone]  Pager: [phone]        AVS/Discharge Summary is the source of truth; this is a helpful guide for improved communication of patient story

## 2018-09-11 NOTE — ED PROVIDER NOTES
History     Chief Complaint   Patient presents with     Shortness of Breath     The history is provided by the EMS personnel. The history is limited by the condition of the patient.     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 Notably, patient is non-verbal and non-ambulatory at baseline; therefore, history is provided by EMS. Per EMS report, patient's PCA was feeding him today when the patient had an episode of hemoptysis. His son was present at that time and notes that the patient has been decompensating for the last 4 days and per EMS report, had a blood sugar of 191 today.     Past Medical History:   Diagnosis Date     LILIAM (acute kidney injury) (H) 2016    kidney biopsy showed ATN     Dementia     multiple acute infarcts, SV dis on CT     Diabetes type 2, controlled (H)      H/O Clostridium difficile infection 2018    treated wiht vanco     Hepatitis C      Kidney transplanted      Liver transplant      Unspecified cerebral artery occlusion with cerebral infarction        Past Surgical History:   Procedure Laterality Date     TRANSPLANT KIDNEY RECIPIENT  DONOR       TRANSPLANT LIVER RECIPIENT  DONOR         No family history on file.    Social History   Substance Use Topics     Smoking status: Former Smoker     Types: Cigarettes     Smokeless tobacco: Never Used      Comment: quit 10 years ago     Alcohol use No       No current facility-administered medications for this encounter.      Current Outpatient Prescriptions   Medication     amLODIPine (NORVASC) 10 MG tablet     carvedilol (COREG) 6.25 MG tablet     PROGRAF (BRAND) 1 MG/ML SUSPENSION     QUEtiapine (SEROQUEL) 25 MG tablet     warfarin (COUMADIN) 2.5 MG tablet      No Known Allergies    I have reviewed the Medications,  Allergies, Past Medical and Surgical History, and Social History in the Epic system.    Review of Systems   Unable to perform ROS: Patient nonverbal   Respiratory: Positive for cough (hemoptysis).    All other systems reviewed and are negative.      Physical Exam   BP: (!) 196/98      Physical Exam  Physical Exam   Constitutional: Eyes open, breathing spontaneously, does not appear to be in distress, dried vomit on his chin pupils are equal reactive to light..   HENT:   Head: Normocephalic and atraumatic.   Neck: Normal range of motion.   Pulmonary/Chest: Coarse breath sounds throughout lower lung fields especially on the right.  Cardiac: No murmurs, rubs, gallops. RRR.  Abdominal: Abdomen soft, nontender, nondistended. No rebound tenderness.  MSK: Long bones without deformity or evidence of trauma  Neurological: Eyes open, withdraws in the left side to pain, right-sided facial droop chronic according to family.  Does not follow commands.  Skin: Skin is warm and dry.   Psychiatric:  normal mood and affect.  behavior is normal. Thought content normal.     ED Course     ED Course     Procedures       4:53 PM  The patient was seen and examined by Dr. Milian in Room 1.        EKG Interpretation:      Interpreted by Lj Milian  Time reviewed:1715   Symptoms at time of EKG: AMS   Rhythm: normal sinus   Rate: normal  Axis: NORMAL  Ectopy: none  Conduction: normal  ST Segments/ T Waves: No ST-T wave changes  Q Waves: none  Comparison to prior: Unchanged    Clinical Impression: normal EKG    Results for orders placed or performed during the hospital encounter of 09/11/18   Chest  XR, 1 view portable    Narrative    XR CHEST PORT 1 VW  9/11/2018 5:05 PM      HISTORY: aspiration;     COMPARISON: 5/31/2018    FINDINGS: Portable AP view of the chest. Cardiomegaly. Bibasilar  patchy opacities. Indistinct pulmonary vasculature. Small pleural  effusions. No pneumothorax. Tortuous thoracic aorta with  calcifications.       Impression    IMPRESSION:   1. Cardiomegaly with mild pulmonary edema and small pleural effusions.  2. Bibasilar patchy opacities which may represent pulmonary edema,  infection or aspiration.    I have personally reviewed the examination and initial interpretation  and I agree with the findings.    PASCUAL LEE MD   Head CT w/o contrast    Narrative    CT HEAD W/O CONTRAST 9/11/2018 5:20 PM    Provided History: AMS     Comparison: 5/31/2018.    Technique: Using multidetector thin collimation helical acquisition  technique, axial, coronal and sagittal CT images from the skull base  to the vertex were obtained without intravenous contrast.     Findings:    No intracranial hemorrhage, mass effect, or midline shift. Stable  chronic right thalamic lacunar infarct. Stable moderate to advanced  leukoaraiosis and moderate generalized parenchymal volume loss. The  ventricles are proportionate to the cerebral sulci. No acute loss of  gray-white differentiation. The basal cisterns are patent.    The visualized paranasal sinuses are clear. Mild dependent bilateral  mastoid effusions, decreased on the left. Debris in the left external  auditory canal.       Impression    Impression:   1. No acute intracranial pathology.  2. Stable chronic right thalamic infarct and moderate to advanced  chronic small vessel ischemic disease.  3. Unchanged moderate generalized parenchymal volume loss.  4. Decreased left mastoid effusion.    I have personally reviewed the examination and initial interpretation  and I agree with the findings.    NURA PEREZ MD   Creatinine POCT   Result Value Ref Range    Creatinine 1.3 (H) 0.66 - 1.25 mg/dL    GFR Estimate 53 (L) >60 mL/min/1.7m2    GFR Estimate If Black 64 >60 mL/min/1.7m2   INR   Result Value Ref Range    INR 1.75 (H) 0.86 - 1.14   Ammonia (on ice)   Result Value Ref Range    Ammonia 29 10 - 50 umol/L   CBC with platelets differential   Result Value Ref Range    WBC 8.0 4.0 - 11.0  10e9/L    RBC Count 3.25 (L) 4.4 - 5.9 10e12/L    Hemoglobin 9.5 (L) 13.3 - 17.7 g/dL    Hematocrit 30.0 (L) 40.0 - 53.0 %    MCV 92 78 - 100 fl    MCH 29.2 26.5 - 33.0 pg    MCHC 31.7 31.5 - 36.5 g/dL    RDW 16.2 (H) 10.0 - 15.0 %    Platelet Count 302 150 - 450 10e9/L    Diff Method Automated Method     % Neutrophils 47.3 %    % Lymphocytes 39.3 %    % Monocytes 8.9 %    % Eosinophils 1.2 %    % Basophils 0.2 %    % Immature Granulocytes 3.1 %    Nucleated RBCs 0 0 /100    Absolute Neutrophil 3.8 1.6 - 8.3 10e9/L    Absolute Lymphocytes 3.2 0.8 - 5.3 10e9/L    Absolute Monocytes 0.7 0.0 - 1.3 10e9/L    Absolute Eosinophils 0.1 0.0 - 0.7 10e9/L    Absolute Basophils 0.0 0.0 - 0.2 10e9/L    Abs Immature Granulocytes 0.3 0 - 0.4 10e9/L    Absolute Nucleated RBC 0.0    EKG 12 lead   Result Value Ref Range    Interpretation ECG Click View Image link to view waveform and result    ISTAT gases elec ica gluc noe POCT   Result Value Ref Range    Ph Venous 7.44 (H) 7.32 - 7.43 pH    PCO2 Venous 32 (L) 40 - 50 mm Hg    PO2 Venous 47 25 - 47 mm Hg    Bicarbonate Venous 22 21 - 28 mmol/L    O2 Sat Venous 85 %    Sodium 138 133 - 144 mmol/L    Potassium 4.4 3.4 - 5.3 mmol/L    Glucose 176 (H) 70 - 99 mg/dL    Calcium Ionized 4.6 4.4 - 5.2 mg/dL    Hemoglobin 9.9 (L) 13.3 - 17.7 g/dL    Hematocrit - POCT 29 (L) 40.0 - 53.0 %PCV   ISTAT gases lactate noe POCT   Result Value Ref Range    Ph Venous 7.44 (H) 7.32 - 7.43 pH    PCO2 Venous 33 (L) 40 - 50 mm Hg    PO2 Venous 48 (H) 25 - 47 mm Hg    Bicarbonate Venous 23 21 - 28 mmol/L    O2 Sat Venous 86 %    Lactic Acid 2.5 (H) 0.7 - 2.1 mmol/L   Troponin POCT   Result Value Ref Range    Troponin I 0.01 0.00 - 0.10 ug/L   ISTAT INR POCT   Result Value Ref Range    ISTAT INR 1.2 (H) 0.86 - 1.14     *Note: Due to a large number of results and/or encounters for the requested time period, some results have not been displayed. A complete set of results can be found in Results Review.          Assessments & Plan (with Medical Decision Making)   Differential includes intracranial hemorrhage, stroke, TIA, failure to thrive, aspiration      MDM and Plan  Patient presenting after aspiration event at home.  Here, the patient has oxygen saturation 91% on room air, 100% on 2 L.  Patient is not a candidate for BiPAP due to inability to take the mask off.  On review of notes and previous history, this does appear to be patient's baseline mental status status and he has been waxing and waning more according to recent medicine notes.  End-tidal CO2 is adequate.  Elected to wait for intubation initially.  No episodes of vomiting here, Zofran given.  Will cover possible aspiration pneumonia with Unasyn.  Chest x-ray does show infiltrates consistent with aspiration.  Patient does appear to have bitten his tongue, has had EEG which does not show seizure activity.  Will get head CT, patient is on blood thinners.  I do not believe this patient is having an acute stroke due to chronicity of mental status and I do not hear that there has been acute change in mental status.  Re eval: Head CT is negative for bleed.  Labs otherwise appear to be at the patient's baseline.  I discussed with neurology who felt the patient for seizure versus new onset stroke.  Neurology does not believe there is a change in the patient's overall exam her mental status however may put the patient on antiepileptic.  Will admit the patient to medicine for aspiration pneumonia and further care.      I have reviewed the nursing notes.    I have reviewed the findings, diagnosis, plan and need for follow up with the patient.    New Prescriptions    No medications on file       Final diagnoses:   Aspiration pneumonia due to regurgitated food, unspecified laterality, unspecified part of lung (H)   I, Valente Vega, am serving as a trained medical scribe to document services personally performed by Lj Milian MD, based on the provider's statements  to me.   I, Lj Milian MD, was physically present and have reviewed and verified the accuracy of this note documented by Valente Vega.      9/11/2018   [facility], [place], EMERGENCY DEPARTMENT     Lj Milian MD  09/11/18 9401

## 2018-09-11 NOTE — IP AVS SNAPSHOT
"    [address] [facility]: [phone]                                              INTERAGENCY TRANSFER FORM - PHYSICIAN ORDERS   2018                    Hospital Admission Date: 2018  YADIRA BREAUX   : 1940  Sex: Male        Attending Provider: Ryan Aponte MD     Allergies:  No Known Allergies    Infection:  None   Service:  HOSPITALIST    Ht:  1.778 m (5' 10\")   Wt:  70.8 kg (156 lb 1.6 oz)   Admission Wt:  83.9 kg (185 lb)    BMI:  22.4 kg/m 2   BSA:  1.87 m 2            Patient PCP Information     Provider PCP Type    Randy Fuentes MD General      ED Clinical Impression     Diagnosis Description Comment Added By Time Added    Aspiration pneumonia due to regurgitated food, unspecified laterality, unspecified part of lung (H) [J69.0] Aspiration pneumonia due to regurgitated food, unspecified laterality, unspecified part of lung (H) [J69.0]  Lj Milian MD 2018  6:35 PM      Hospital Problems as of 10/29/2018              Priority Class Noted POA    Seizure (H) Medium  2018 Yes      Non-Hospital Problems as of 10/29/2018              Priority Class Noted    Memory loss Medium  2012    HCV (hepatitis C virus) Medium  2012    Liver replaced by transplant (H) Medium  2012    Diabetes mellitus, type 2 (H) Medium  2015    LILIAM (acute kidney injury) (H) Medium  2016    Acute kidney injury (H) Medium  2016    Deep vein thrombosis (DVT) (H) [I82.409] Medium  2016    Long-term (current) use of anticoagulants [Z79.01] Medium  2016    Delirium Medium  2016    Generalized edema Medium  2016    Altered mental state Medium  2016    Anemia in stage 3 chronic kidney disease (H) Medium  2016    CKD (chronic kidney disease) stage 3, GFR 30-59 ml/min (H) Medium  2016    H/O Clostridium difficile infection Medium  3/1/2018    Acute kidney failure (H) Medium  3/21/2018    Altered mental status Medium  2018    "   Code Status History     Date Active Date Inactive Code Status Order ID Comments User Context    9/11/2018  9:54 PM 9/16/2018 11:46 AM Full Code [id number]  Patel Gillespie APRN CNP Inpatient    6/6/2018 11:47 AM 9/11/2018  9:54 PM Full Code [id number]  Boby Tirado MD Outpatient    5/31/2018  8:05 PM 6/6/2018 11:47 AM Full Code [id number]  Phoebe Lamb PA-C Inpatient    3/24/2018 10:02 AM 5/31/2018  8:05 PM Full Code [id number]  Katharine Mitchell MD Outpatient    3/21/2018  9:01 PM 3/24/2018 10:02 AM Full Code [id number]  Patel Gillespie APRN CNP Inpatient    11/12/2016 12:41 PM 3/21/2018  9:01 PM Full Code [id number]  Caroline Silveira PA-C Outpatient    11/8/2016  8:30 PM 11/12/2016 12:41 PM Full Code [id number]  VernaPatel APRN CNP Inpatient    8/23/2016  5:40 PM 9/1/2016  9:55 PM Full Code [id number]  Myesha Maria, [facility] Inpatient    8/21/2016  3:52 PM 8/23/2016  5:40 PM DNR/DNI [id number]  Caroline Kwan PA-C Inpatient    8/21/2016  3:50 PM 8/21/2016  3:52 PM DNI [id number]  Ciaran Gonzalez MD Inpatient    8/20/2016  9:32 PM 8/21/2016  3:50 PM Full Code [id number]  Monique Sorenson PA-C Inpatient    1/3/2012  2:44 PM 8/20/2016  9:32 PM Full Code [id number]  Maty Reynolds MD Outpatient    12/30/2011 10:05 PM 1/3/2012  2:44 PM Full Code [id number]  Suzette Ramirez Outpatient         Medication Review      START taking        Dose / Directions Comments    acetaminophen 32 mg/mL solution   Commonly known as:  TYLENOL   Used for:  Kidney replaced by transplant, Aspiration pneumonia due to regurgitated food, unspecified laterality, unspecified part of lung (H)        Dose:  650 mg   20.3 mLs (650 mg) by Oral or Feeding Tube route every 4 hours as needed for mild pain or fever   Quantity:  300 mL   Refills:  3        alcohol swab prep pads   Used for:  Type 2 diabetes mellitus with complication, without long-term current use of insulin (H)        Use to swab area  of injection/mark anthony as directed.   Quantity:  100 each   Refills:  11        amLODIPine 1 mg/mL Susp   Commonly known as:  NORVASC   Used for:  Kidney replaced by transplant, Essential hypertension   Replaces:  amLODIPine 10 MG tablet        Dose:  5 mg   Start taking on:  10/30/2018   5 mLs (5 mg) by Per Feeding Tube route daily   Quantity:  150 mL   Refills:  0        aspirin 10 mg/mL Susp   Used for:  Kidney replaced by transplant   Replaces:  aspirin 81 MG EC tablet        Dose:  80 mg   8 mLs (80 mg) by Per Feeding Tube route daily   Quantity:  240 mL   Refills:  0        blood glucose lancets standard   Commonly known as:  no brand specified   Used for:  Type 2 diabetes mellitus with complication, without long-term current use of insulin (H)        Use to test blood sugar 6 times daily or as directed.   Quantity:  100 each   Refills:  11        blood glucose monitoring meter device kit   Commonly known as:  no brand specified   Used for:  Type 2 diabetes mellitus with complication, without long-term current use of insulin (H)        Use to test blood sugar every 4 hours while on Tube feeding.   Quantity:  1 kit   Refills:  0        blood glucose monitoring test strip   Commonly known as:  no brand specified   Used for:  Type 2 diabetes mellitus with complication, without long-term current use of insulin (H)        Use to test blood sugars 6 times daily or as directed   Quantity:  100 strip   Refills:  11        bumetanide 0.25 mg/mL Susp   Commonly known as:  BUMEX   Used for:  Kidney replaced by transplant        Dose:  1 mg   4 mLs (1 mg) by Oral or Feeding Tube route daily   Quantity:  240 mL   Refills:  0    Hold for sbp<110       carvedilol 1 mg/mL Susp   Commonly known as:  COREG   Used for:  Kidney replaced by transplant, Essential hypertension   Replaces:  carvedilol 6.25 MG tablet        Dose:  6.25 mg   6.25 mLs (6.25 mg) by Oral or Feeding Tube route 2 times daily   Quantity:  375 mL   Refills:  0         cholecalciferol 400 UNIT/ML Liqd liquid   Commonly known as:  vitamin D/ D-VI-SOL   Used for:  Kidney replaced by transplant, On tube feeding diet   Replaces:  Vitamin D (Cholecalciferol) 400 units Tabs        Dose:  400 Units   Take 1 mL (400 Units) by mouth daily   Quantity:  30 mL   Refills:  0        fiber modular (NUTRISOURCE FIBER) packet   Used for:  Kidney replaced by transplant, On tube feeding diet        Dose:  1 packet   1 packet by Per Feeding Tube route 3 times daily   Quantity:  90 packet   Refills:  0        HYDROmorphone (STANDARD CONC) 1 MG/ML Liqd liquid   Commonly known as:  DILAUDID   Used for:  Kidney replaced by transplant        Dose:  1-2 mg   1-2 mLs (1-2 mg) by Oral or Feeding Tube route every 6 hours as needed for moderate to severe pain   Quantity:  80 mL   Refills:  0        insulin aspart 100 UNIT/ML injection   Commonly known as:  NovoLOG PEN   Used for:  Kidney replaced by transplant, On tube feeding diet, Type 2 diabetes mellitus with complication, without long-term current use of insulin (H)        Dose:  1-6 Units   Inject 1-6 Units Subcutaneous every 4 hours Correction Scale -MEDIUM INSULIN RESISTANCE,   Do Not give if BG less than 140. For  - 189 give 1 unit. For  - 239 give 2 units. For  - 289 give 3 units. For  - 339 give 4 units. For  - 399 give 5 units. For BG > 400 give 6 units. Check blood glucose Q4H  Notify provider if glucose> 350 mg/dL.   Quantity:  9 mL   Refills:  0        insulin pen needle 32G X 4 MM   Commonly known as:  BD SANDRA U/F   Used for:  Type 2 diabetes mellitus with complication, without long-term current use of insulin (H)        Use 6 (every 4 hours while on tube feeding) daily or as directed.   Quantity:  100 each   Refills:  11        lactobacillus rhamnosus (GG) capsule   Used for:  Kidney replaced by transplant, On tube feeding diet, H/O Clostridium difficile infection        Dose:  1 capsule   1 capsule by Per  Feeding Tube route 2 times daily   Quantity:  60 capsule   Refills:  0        levETIRAcetam 100 MG/ML solution   Commonly known as:  KEPPRA        Dose:  750 mg   7.5 mLs (750 mg) by Per Feeding Tube route every 12 hours   Quantity:  450 mL   Refills:  0        miconazole 2 % powder   Commonly known as:  MICATIN; MICRO GUARD   Used for:  Kidney replaced by transplant        Apply topically 2 times daily   Quantity:  90 g   Refills:  3    Bilateral groins rash.       multivitamins with minerals Liqd liquid   Used for:  Kidney replaced by transplant, On tube feeding diet   Replaces:  MULTIVITAMIN ADULT Tabs        Dose:  15 mL   Take 15 mLs by mouth daily   Quantity:  450 mL   Refills:  0        ondansetron 4 MG ODT tab   Commonly known as:  ZOFRAN-ODT   Used for:  Kidney replaced by transplant        Dose:  4 mg   Take 1 tablet (4 mg) by mouth every 6 hours as needed for nausea or vomiting   Quantity:  30 tablet   Refills:  0        pantoprazole 2 mg/mL Susp suspension   Commonly known as:  PROTONIX   Used for:  Kidney replaced by transplant        Dose:  40 mg   20 mLs (40 mg) by Per Feeding Tube route 2 times daily (before meals)   Quantity:  1200 mL   Refills:  0        predniSONE 5 MG/5ML solution   Used for:  Kidney replaced by transplant        Dose:  5 mg   Take 5 mLs (5 mg) by mouth daily   Quantity:  150 mL   Refills:  0        Sharps Container Misc   Used for:  Type 2 diabetes mellitus with complication, without long-term current use of insulin (H)        Sharps container for diabetes needles.   Quantity:  1 each   Refills:  0        tacrolimus 1 mg/mL suspension   Commonly known as:  GENERIC EQUIVALENT   Used for:  Kidney replaced by transplant   Replaces:  tacrolimus 0.5 MG capsule        Dose:  1.5 mg   1.5 mLs (1.5 mg) by Oral or Feeding Tube route 2 times daily   Quantity:  90 mL   Refills:  0          CONTINUE these medications which may have CHANGED, or have new prescriptions. If we are uncertain of  the size of tablets/capsules you have at home, strength may be listed as something that might have changed.        Dose / Directions Comments    ferrous gluconate 324 (38 Fe) MG tablet   Commonly known as:  FERGON   This may have changed:  how to take this   Used for:  Kidney replaced by transplant        Dose:  648 mg   2 tablets (648 mg) by Per Feeding Tube route daily (with breakfast)   Quantity:  100 tablet   Refills:  0    Can give liquid form if available. Equivalent dosing. Thanks.       warfarin 2.5 MG tablet   Commonly known as:  COUMADIN   This may have changed:    - how much to take  - how to take this  - when to take this  - additional instructions   Used for:  Kidney replaced by transplant, Deep vein thrombosis (DVT) of both lower extremities, unspecified chronicity, unspecified vein (H)        Dose:  2.5 mg   1 tablet (2.5 mg) by Oral or Feeding Tube route daily   Quantity:  30 tablet   Refills:  0          STOP taking     amLODIPine 10 MG tablet   Commonly known as:  NORVASC   Replaced by:  amLODIPine 1 mg/mL Susp           aspirin 81 MG EC tablet   Replaced by:  aspirin 10 mg/mL Susp           carvedilol 6.25 MG tablet   Commonly known as:  COREG   Replaced by:  carvedilol 1 mg/mL Susp           docusate sodium 100 MG capsule   Commonly known as:  COLACE           furosemide 20 MG tablet   Commonly known as:  LASIX           MULTIVITAMIN ADULT Tabs   Replaced by:  multivitamins with minerals Liqd liquid           omeprazole 20 MG CR capsule   Commonly known as:  priLOSEC           QUEtiapine 25 MG tablet   Commonly known as:  SEROquel           tacrolimus 0.5 MG capsule   Replaced by:  tacrolimus 1 mg/mL suspension           Vitamin D (Cholecalciferol) 400 units Tabs   Replaced by:  cholecalciferol 400 UNIT/ML Liqd liquid                     Further instructions from your care team       Bilateral Thigh skin tears: Every third day or as needed cleanse with microklenz and pat dry.  Apply mepilex  border.  Please change dressings day of discharge.  Family given 2 dressing changes, please send microklenz home with family.  If skin has not healed may use large band-aid (over the counter) until healed.            Summary of Visit     Reason for your hospital stay       Admitted 9/11/2018 for Seizure, altered mental status, aspiration pneumonia.Clostridium difficile colitis, treated.  Acute kidney injury, underwent dialysis. Now kidney function better and urinating well. Off hemodialysis. Started on Tube feeding. Started on Prednisone. Tacrolimus dose adjusted. Diuretics dose adjusted.  Code status changed to DNR/DNI.  Seen and followed by Transplant Nephrology team. Nutrition and others.             After Care     Activity       Your activity upon discharge: turn every 2 hours. Further movement as tolerated with assist. Fall precautions.       Diet       Follow this diet upon discharge:   Adult Formula Drip Feeding: Continuous Nepro; Nasojejunal; Goal Rate: 75 ml/hr x 14 hours final goal rate from (6:00 pm - 8:00 am); mL/hr; Medication - Tube Feeding Flush Frequency: At least 15-30 mL water before and after medication administratio...      NPO for Medical/Clinical Reasons. Dysphagia +       Monitor and record       blood glucose every 4 hours while on Tube feeding and as needed.   blood pressure 2-3 times/ week  weight 2-3 times/week             Referrals     Home care nursing referral       [facility]  Phone  [phone]  Fax  [phone]  ______________________    [facility] to resume all Home Care Services    RN skilled nursing visit.   RN to assess vital signs and weight, respiratory and cardiac status, pain level and activity tolerance, hydration, nutrition and bowel status and home safety.  RN to complete weekly medication management and administration.  RN to assist with and provide tube feeding site care and management  RN to assist with assessment, administration and monitoring of  enteral tube feeds.  RN to provide line care per agency protocol and INR lab draws as directed.    INR lab to be sent to:  [facility]  Phone: [phone]  Fax: [phone]     For INR and Warfarin monitoring (Goal = 2-3)  Indication for Anticoagulation: A fib, DVT/PE Treatment  MD Following: MANUELA Fuentes  Expected duration of therapy: indefinate  Next INR lab draw due on:  Tues 10/30 and Thurs 11/1, further checks after 11/1 to be determined by [facility]    Your provider has ordered home care nursing services.  If you have not been contacted within 2 days of your discharge please call       Home infusion referral       Your provider has referred you to:    [facility] [phone]     [email or web address]    Local Address (if different from home address): N/A    Anticipated Length of Therapy: As ordered by physician.   Regimen: Nepro @75 ml/hr x 14 hour ( from 6:00 pm -- 8:00 am) --Free water flushes: via feed and flush @ 55 ml/hr x 14 hours + 120 ml free water flushes before and after each cycle feeds.     Home Infusion Pharmacist to adjust therapy based on labs and clinical assessments: Yes    Labs:  May draw labs from Venous Catheter: No  Home Infusion Pharmacist to order labs based on therapy type and clinical assessments: Yes  Call/Fax Lab Results to: Primary care clinic: [phone]    Agency Staff to assess nursing needs for Infusion Therapy.              MD face to face encounter       Documentation of Face to Face and Certification for Home Health Services    I certify that patient: Priscilla Way is under my care and that I, or a nurse practitioner or physician's assistant working with me, had a face-to-face encounter that meets the physician face-to-face encounter requirements with this patient on: 10/29/2018.    This encounter with the patient was in whole, or in part, for the following medical  condition, which is the primary reason for home health care: dementia (Alzheimer and vascular), CVA.    I certify that, based on my findings, the following services are medically necessary home health services: Nursing.    My clinical findings support the need for the above services because: Nurse is needed: To assess vital signs and weight, respiratory and cardiac status, pain level, hydration, nutrition and bowel status and to provide caregiver training to assist with: enteral tube feeding.    Further, I certify that my clinical findings support that this patient is homebound (i.e. absences from home require considerable and taxing effort and are for medical reasons or Jainism services or infrequently or of short duration when for other reasons) because: Patient is bedbound due to: severe dementia (Alzheimer and vascular).    Based on the above findings. I certify that this patient is confined to the home and needs intermittent skilled nursing care, physical therapy and/or speech therapy.  The patient is under my care, and I have initiated the establishment of the plan of care.  This patient will be followed by a physician who will periodically review the plan of care.  Physician/Provider to provide follow up care: Randy Fuentes    Physician Signature: See electronic signature associated with these discharge orders.                  Follow-Up Appointment Instructions     Future Labs/Procedures    Adult [facility] Follow-up and recommended labs and tests     Comments:    Follow up with primary care provider, Randy Fuentes, or Transplant Nephrology within 7 days for hospital follow- up.  The following labs/tests are recommended:   Nov 01, 2018: CBC, CMP, Phos. Magnesium, Tacrolimus level. Then per your transplant team.     Follow up with transplant team as scheduled and as needed.     Appointments on [place] and/or [facility] (with [facility] or [facility] provider or service). Call [phone] if you haven't  heard regarding these appointments within 7 days of discharge.      Follow-Up Appointment Instructions     Adult [facility] Follow-up and recommended labs and tests       Follow up with primary care provider, Randy Fuentes, or Transplant Nephrology within 7 days for hospital follow- up.  The following labs/tests are recommended:   Nov 01, 2018: CBC, CMP, Phos. Magnesium, Tacrolimus level. Then per your transplant team.     Follow up with transplant team as scheduled and as needed.     Appointments on [place] and/or [facility] (with [facility] or [facility] provider or service). Call [phone] if you haven't heard regarding these appointments within 7 days of discharge.             Statement of Approval     Ordered          10/29/18 1246  I have reviewed and agree with all the recommendations and orders detailed in this document.  EFFECTIVE NOW     Approved and electronically signed by:  Ryan Aponte MD

## 2018-09-11 NOTE — IP AVS SNAPSHOT
[address] [address]    Phone:  [phone]                                       After Visit Summary   9/11/2018    Priscilla Way    MRN: [id number]           After Visit Summary Signature Page     I have received my discharge instructions, and my questions have been answered. I have discussed any challenges I see with this plan with the nurse or doctor.    ..........................................................................................................................................  Patient/Patient Representative Signature      ..........................................................................................................................................  Patient Representative Print Name and Relationship to Patient    ..................................................               ................................................  Date                                   Time    ..........................................................................................................................................  Reviewed by Signature/Title    ...................................................              ..............................................  Date                                               Time          22EPIC Rev 08/18

## 2018-09-11 NOTE — CONSULTS
[facility]  Neurology  Consultation    Patient Name:  Priscilla Way  MRN:  [id number]    :  1940  Date of Service:  2018  Primary care provider:  Randy Fuentes      Neurology consultation service was asked to see Priscilla Way by Dr. Maddox to evaluate for possible seizure.    History of Present Illness:   78 year old male h/o advanced vascular dementia, liver/kidney transplant in , DVT on warfarin, DMII, right thalamic stroke in  w residual left UE weakness and left facial droop who presents with seizure.     At baseline, the pt is non verbal, spontaneously moving 4 extremities, weaker on the left UE. Not following commands. It was very difficult to take history. All of the history was taken from his Son in law.     Per his Son in law, Today at 4 PM, the pt was sitting on the chair, and his PCA was trying to give him a drink then it was noticed that the pt started to have tonic clonic jerks of both legs and arms for around 15 seconds then he started to bleed from his mouth. no h/o seizure per son in law or per chart review. When the pt was seen in the ED, the son in law noticed that the pt is more confused than his baseline. As he used to be more interactive during the day and afternoon than he was when he was seen in the ED.     In term of the pt dementia, The son in law thinks that over the past 2 years the cognitive function of the patient got much worse and further deteriorated over the past 8 months to the level that he was not verbal like now.     Per chart review, the pt was admitted on 2018 w acute AMS. At that time he was found to have C diff that was treated. CTH no acute pathology. EEG showed generalized delta and theta slowing, but no seizure discharges.      In the ED, CTH with no acute IC pathology, but generalized atrophy and old right thalamic stroke. CXR showed bilateral chest infiltrates. Afebrile. SBP 190s. Cr 1.3. He  "started on IV antibiotics and the plan to admit the pt into medicine team.     ROS  A 10-point ROS was performed as per HPI.     PMH  Past Medical History:   Diagnosis Date     LILIAM (acute kidney injury) (H) 2016    kidney biopsy showed ATN     Dementia     multiple acute infarcts, SV dis on CT     Diabetes type 2, controlled (H)      H/O Clostridium difficile infection 2018    treated [facility]     Hepatitis C      Kidney transplanted      Liver transplant      Unspecified cerebral artery occlusion with cerebral infarction      Past Surgical History:   Procedure Laterality Date     TRANSPLANT KIDNEY RECIPIENT  DONOR       TRANSPLANT LIVER RECIPIENT  DONOR         Medications     (Not in a hospital admission)    Allergies  No Known Allergies    Social History  I have reviewed this patient's social history    Family History    This patient has no significant family history      Physical Examination   Vitals: /88  Pulse 85  Temp 98.6  F (37  C) (Axillary)  Resp 20  Ht 1.778 m (5' 10\")  Wt 83.9 kg (185 lb)  SpO2 100%  BMI 26.54 kg/m2  General: touch covered w blood .   HEENT: NC/AT, no icterus, op pink and moist  Cardiac: RRR, Distal pulses intact  Chest: CTAB, no respiratory distress  Abdomen: S/NT/ND  Extremities: +2 LL edema  Skin: No rash or lesion.   Psych: Mood pleasant, affect congruent  Neuro:  Mental status: Non verbal. Not following commands(baseline), less interactive per son in law(new).  Cranial nerves: left facial droop (old), VOR intact. PERRLA. Tongue in midline.   Motor: Moving 4 extremities spontaneously. New right UE weakness( weaker hand ). Was not wd the right arm to painful stimuli as much as the left arm did(supposed to be the weaker one).   Reflexes: +3/4 right biceps and brachioradialis. +2/4 left biceps. +1/4 bilateral patellar and achilles. babinski absence.   Sensory: wd all extremities to painful stimuli and localizes w the left " arm.  Coordination: UAT  Gait: UAT    Investigations   Cr. 1.3  CXR bilateral chest infiltrates.     Impression  The pt had history of multiple stroke w generalized brain atrophy on CTH but no acute pathology was noticed this time on imaging. The pt had GTC that lasted for 15 seconds that was witnessed by his PCA with presumed tongue biting. Based on the description of the son in law, he was more confused when he was seen in the ED after the seizure which would suggest postictal state. The new right UE weakness might be explained by either andrez's paralysis vs new stroke especially he is vasculopathic. My question why is this patient seizing right now?: it could be related to his old strokes(non doicumented to be cortical though) vs having new stroke with this new righ UE weakness that pw seizure vs his chest infection makes him at risk too. The pneumonia could be a consequence of his seizure though.      Recommendations  -Load w keppra 2 gm and then start 500 mg BID   -VEEG(EEG tech was contacted)  -CTA head and neck.   -Consider MRI brain in the AM.    Thank you for involving neurology in the care of Priscilla Way.  Please do not hesitate to call with questions/concerns (consult pager [phone]).      Patient was seen and discussed with Dr. Esteves.    Newton Riojas MD  Neurology-PGY2  [phone]    Attending physician: Dr. Maddox of ED requested neurologic consultation for seizures.     I saw and evaluated the patient on 9/12/2018 with the resident team and I agree with the findings and plan of care as per. Dr. Riojas above, with the following additions.     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  Tonic clonic movements with tongue biting highly suggestive of seizure.   We do not always treat 1st time seizure but with advanced neurodegenerative disease risk of recurrence is high and would recommend low dose  Keppra.  EEG does not appear epileptiform.   Given RUE weakness that has since resolved concern that this may represent Florencio's paralysis.  Would prefer MRI be done to evaluate for small cortical stroke or other new lesion that could be etiology of seizures.   With high grade stenosis of distal L MCA branches on CTA this is certainly a possibility.  He is on coumadin but not on statin at present.        Cindy Esteves, DO   of Neurology

## 2018-09-11 NOTE — IP AVS SNAPSHOT
MRN:[id number]                      After Visit Summary   9/11/2018    [address]illa [address]    MRN: [id number]           Thank you!     Thank you for choosing [facility] for your care. Our goal is always to provide you with excellent care. Hearing back from our patients is one way we can continue to improve our services. Please take a few minutes to complete the written survey that you may receive in the mail after you visit with us. Thank you!        Patient Information     Date Of Birth          1940        Designated Caregiver       Most Recent Value    Caregiver    Will someone help with your care after discharge? yes    Name of designated caregiver Mckinley    Phone number of caregiver [phone]    Caregiver address [place], [place]      About your hospital stay     You were admitted on:  September 11, 2018 You last received care in the:  [address] [facility]    You were discharged on:  October 29, 2018        Reason for your hospital stay       Admitted 9/11/2018 for Seizure, altered mental status, aspiration pneumonia.Clostridium difficile colitis, treated.  Acute kidney injury, underwent dialysis. Now kidney function better and urinating well. Off hemodialysis. Started on Tube feeding. Started on Prednisone. Tacrolimus dose adjusted. Diuretics dose adjusted.  Code status changed to DNR/DNI.  Seen and followed by Transplant Nephrology team. Nutrition and others.                  Who to Call     For medical emergencies, please call 911.  For non-urgent questions about your medical care, please call your primary care provider or clinic, [phone]          Attending Provider     Provider Specialty    Juan Alas MD Emergency Medicine    Lj Milian MD Emergency Medicine    Crystal Santiago DO Internal Medicine    Shahbaz Pizarro MD Internal Medicine    Katharine Mitchell MD Internal Medicine    Michel Maynard MD Internal Medicine    Joe Dean MD  Internal Medicine    Aron Perla MD Internal Medicine    [facility], MD Ryan Internal Medicine       Primary Care Provider Office Phone # Fax #    Randy Fuentes [phone]      After Care Instructions     Activity       Your activity upon discharge: turn every 2 hours. Further movement as tolerated with assist. Fall precautions.            Diet       Follow this diet upon discharge:   Adult Formula Drip Feeding: Continuous Nepro; Nasojejunal; Goal Rate: 75 ml/hr x 14 hours final goal rate from (6:00 pm - 8:00 am); mL/hr; Medication - Tube Feeding Flush Frequency: At least 15-30 mL water before and after medication administratio...      NPO for Medical/Clinical Reasons. Dysphagia +            Monitor and record       blood glucose every 4 hours while on Tube feeding and as needed.   blood pressure 2-3 times/ week  weight 2-3 times/week                  Follow-up Appointments     Adult [facility] Follow-up and recommended labs and tests       Follow up with primary care provider, Randy Fuentes, or Transplant Nephrology within 7 days for hospital follow- up.  The following labs/tests are recommended:   Nov 01, 2018: CBC, CMP, Phos. Magnesium, Tacrolimus level. Then per your transplant team.     Follow up with transplant team as scheduled and as needed.     Appointments on [place] and/or [facility] (with [facility] or [facility] provider or service). Call [phone] if you haven't heard regarding these appointments within 7 days of discharge.                  Additional Services     Home care nursing referral       [facility]  Phone  [phone]  Fax  [phone]  ______________________    [facility] Care to resume all Home Care Services    RN skilled nursing visit.   RN to assess vital signs and weight, respiratory and cardiac status, pain level and activity tolerance, hydration, nutrition and bowel status and home safety.  RN to complete weekly medication  management and administration.  RN to assist with and provide tube feeding site care and management  RN to assist with assessment, administration and monitoring of enteral tube feeds.  RN to provide line care per agency protocol and INR lab draws as directed.    INR lab to be sent to:  [facility]  Phone: [phone]  Fax: [phone]     For INR and Warfarin monitoring (Goal = 2-3)  Indication for Anticoagulation: A fib, DVT/PE Treatment  MD Following: MANUELA Fuentes  Expected duration of therapy: indefinate  Next INR lab draw due on:  Tues 10/30 and Thurs 11/1, further checks after 11/1 to be determined by [facility]    Your provider has ordered home care nursing services.  If you have not been contacted within 2 days of your discharge please call            Home infusion referral       Your provider has referred you to:    [facility] [phone]     [email or web address]    Local Address (if different from home address): N/A    Anticipated Length of Therapy: As ordered by physician.   Regimen: Nepro @75 ml/hr x 14 hour ( from 6:00 pm -- 8:00 am) --Free water flushes: via feed and flush @ 55 ml/hr x 14 hours + 120 ml free water flushes before and after each cycle feeds.     Home Infusion Pharmacist to adjust therapy based on labs and clinical assessments: Yes    Labs:  May draw labs from Venous Catheter: No  Home Infusion Pharmacist to order labs based on therapy type and clinical assessments: Yes  Call/Fax Lab Results to: Primary care clinic: [phone]    Agency Staff to assess nursing needs for Infusion Therapy.                  Further instructions from your care team       Bilateral Thigh skin tears: Every third day or as needed cleanse with microklenz and pat dry.  Apply mepilex border.  Please change dressings day of discharge.  Family given 2 dressing changes, please send microklenz home with family.  If  "skin has not healed may use large band-aid (over the counter) until healed.            Warfarin Instruction     You have started taking a medicine called warfarin. This is a blood-thinning medicine (anticoagulant). It helps prevent and treat blood clots.      Before leaving the hospital, make sure you know how much to take and how long to take it.      You will need regular blood tests to make sure your blood is clotting safely. It is very important to see your doctor for regular blood tests.    Talk to your doctor before taking any new medicine (this includes over-the-counter drugs and herbal products). Many medicines can interact with warfarin. This may cause more bleeding or too much clotting.     Eating a lot of vitamin K--found in green, leafy vegetables--can change the way warfarin works in your body. Do NOT avoid these foods. Instead, try to eat the same amount each day.     Bleeding is the most common side-effect of warfarin. You may notice bleeding gums, a bloody nose, bruises and bleeding longer when you cut yourself. See a doctor at once if:   o You cough up blood  o You find blood in your stool (poop)  o You have a deep cut, or a cut that bleeds longer than 10 minutes   o You have a bad cut, hard fall, accident or hit your head (go to urgent care or the emergency room).    For women who can get pregnant: This medicine can harm an unborn baby. Be very careful not to get pregnant while taking this medicine. If you think you might be pregnant, call your doctor right away.    For more information, read \"Guide to Warfarin Therapy,  the booklet you received in the hospital.        Pending Results     Date and Time Order Name Status Description    9/23/2018 2218 T4 free In process             Statement of Approval     Ordered          10/29/18 1246  I have reviewed and agree with all the recommendations and orders detailed in this document.  EFFECTIVE NOW     Approved and electronically signed by:  Fadi, " "MD Ryan             Admission Information     Date & Time Provider Department Dept. Phone    9/11/2018 Ryan Aponte MD [address] [facility] [phone]      Your Vitals Were     Blood Pressure Pulse Temperature Respirations Height Weight    142/54 (BP Location: Left leg) 73 96.1  F (35.6  C) (Oral) 16 1.778 m (5' 10\") 70.8 kg (156 lb 1.6 oz)    Pulse Oximetry BMI (Body Mass Index)                100% 22.4 kg/m2          MyChart Information     [facility] gives you secure access to your electronic health record. If you see a primary care provider, you can also send messages to your care team and make appointments. If you have questions, please call your primary care clinic.  If you do not have a primary care provider, please call [phone] and they will assist you.        Care EveryWhere ID     This is your Care EveryWhere ID. This could be used by other organizations to access your [place] medical records  [id number]        Equal Access to Services     EDUARDO PALACIOS : Hadii yefri awano [place], waaxda luqadaha, qaybta kaalmada adechuckyatejinder, annmarie cline . So [facility] [phone].    ATENCIÓN: Si habla español, tiene a staples disposición servicios gratuitos de asistencia lingüística. Llame al [phone].    We comply with applicable federal civil rights laws and [place] laws. We do not discriminate on the basis of race, color, national origin, age, disability, sex, sexual orientation, or gender identity.               Review of your medicines      START taking        Dose / Directions    acetaminophen 32 mg/mL solution   Commonly known as:  TYLENOL   Used for:  Kidney replaced by transplant, Aspiration pneumonia due to regurgitated food, unspecified laterality, unspecified part of lung (H)        Dose:  650 mg   20.3 mLs (650 mg) by Oral or Feeding Tube route every 4 hours as needed for mild pain or fever   Quantity:  300 mL   Refills:  3       alcohol swab prep pads   Used for: "  Type 2 diabetes mellitus with complication, without long-term current use of insulin (H)        Use to swab area of injection/mark anthony as directed.   Quantity:  100 each   Refills:  11       amLODIPine 1 mg/mL Susp   Commonly known as:  NORVASC   Used for:  Kidney replaced by transplant, Essential hypertension   Replaces:  amLODIPine 10 MG tablet        Dose:  5 mg   Start taking on:  10/30/2018   5 mLs (5 mg) by Per Feeding Tube route daily   Quantity:  150 mL   Refills:  0       aspirin 10 mg/mL Susp   Used for:  Kidney replaced by transplant   Replaces:  aspirin 81 MG EC tablet        Dose:  80 mg   8 mLs (80 mg) by Per Feeding Tube route daily   Quantity:  240 mL   Refills:  0       blood glucose lancets standard   Commonly known as:  no brand specified   Used for:  Type 2 diabetes mellitus with complication, without long-term current use of insulin (H)        Use to test blood sugar 6 times daily or as directed.   Quantity:  100 each   Refills:  11       blood glucose monitoring meter device kit   Commonly known as:  no brand specified   Used for:  Type 2 diabetes mellitus with complication, without long-term current use of insulin (H)        Use to test blood sugar every 4 hours while on Tube feeding.   Quantity:  1 kit   Refills:  0       blood glucose monitoring test strip   Commonly known as:  no brand specified   Used for:  Type 2 diabetes mellitus with complication, without long-term current use of insulin (H)        Use to test blood sugars 6 times daily or as directed   Quantity:  100 strip   Refills:  11       bumetanide 0.25 mg/mL Susp   Commonly known as:  BUMEX   Used for:  Kidney replaced by transplant        Dose:  1 mg   4 mLs (1 mg) by Oral or Feeding Tube route daily   Quantity:  240 mL   Refills:  0       carvedilol 1 mg/mL Susp   Commonly known as:  COREG   Used for:  Kidney replaced by transplant, Essential hypertension   Replaces:  carvedilol 6.25 MG tablet        Dose:  6.25 mg   6.25 mLs  (6.25 mg) by Oral or Feeding Tube route 2 times daily   Quantity:  375 mL   Refills:  0       cholecalciferol 400 UNIT/ML Liqd liquid   Commonly known as:  vitamin D/ D-VI-SOL   Used for:  Kidney replaced by transplant, On tube feeding diet   Replaces:  Vitamin D (Cholecalciferol) 400 units Tabs        Dose:  400 Units   Take 1 mL (400 Units) by mouth daily   Quantity:  30 mL   Refills:  0       fiber modular (NUTRISOURCE FIBER) packet   Used for:  Kidney replaced by transplant, On tube feeding diet        Dose:  1 packet   1 packet by Per Feeding Tube route 3 times daily   Quantity:  90 packet   Refills:  0       HYDROmorphone (STANDARD CONC) 1 MG/ML Liqd liquid   Commonly known as:  DILAUDID   Used for:  Kidney replaced by transplant        Dose:  1-2 mg   1-2 mLs (1-2 mg) by Oral or Feeding Tube route every 6 hours as needed for moderate to severe pain   Quantity:  80 mL   Refills:  0       insulin aspart 100 UNIT/ML injection   Commonly known as:  NovoLOG PEN   Used for:  Kidney replaced by transplant, On tube feeding diet, Type 2 diabetes mellitus with complication, without long-term current use of insulin (H)        Dose:  1-6 Units   Inject 1-6 Units Subcutaneous every 4 hours Correction Scale -MEDIUM INSULIN RESISTANCE,   Do Not give if BG less than 140. For  - 189 give 1 unit. For  - 239 give 2 units. For  - 289 give 3 units. For  - 339 give 4 units. For  - 399 give 5 units. For BG > 400 give 6 units. Check blood glucose Q4H  Notify provider if glucose> 350 mg/dL.   Quantity:  9 mL   Refills:  0       insulin pen needle 32G X 4 MM   Commonly known as:  BD SANDRA U/F   Used for:  Type 2 diabetes mellitus with complication, without long-term current use of insulin (H)        Use 6 (every 4 hours while on tube feeding) daily or as directed.   Quantity:  100 each   Refills:  11       lactobacillus rhamnosus (GG) capsule   Used for:  Kidney replaced by transplant, On tube feeding  diet, H/O Clostridium difficile infection        Dose:  1 capsule   1 capsule by Per Feeding Tube route 2 times daily   Quantity:  60 capsule   Refills:  0       levETIRAcetam 100 MG/ML solution   Commonly known as:  KEPPRA        Dose:  750 mg   7.5 mLs (750 mg) by Per Feeding Tube route every 12 hours   Quantity:  450 mL   Refills:  0       miconazole 2 % powder   Commonly known as:  MICATIN; MICRO GUARD   Used for:  Kidney replaced by transplant        Apply topically 2 times daily   Quantity:  90 g   Refills:  3       multivitamins with minerals Liqd liquid   Used for:  Kidney replaced by transplant, On tube feeding diet   Replaces:  MULTIVITAMIN ADULT Tabs        Dose:  15 mL   Take 15 mLs by mouth daily   Quantity:  450 mL   Refills:  0       ondansetron 4 MG ODT tab   Commonly known as:  ZOFRAN-ODT   Used for:  Kidney replaced by transplant        Dose:  4 mg   Take 1 tablet (4 mg) by mouth every 6 hours as needed for nausea or vomiting   Quantity:  30 tablet   Refills:  0       pantoprazole 2 mg/mL Susp suspension   Commonly known as:  PROTONIX   Used for:  Kidney replaced by transplant        Dose:  40 mg   20 mLs (40 mg) by Per Feeding Tube route 2 times daily (before meals)   Quantity:  1200 mL   Refills:  0       predniSONE 5 MG/5ML solution   Used for:  Kidney replaced by transplant        Dose:  5 mg   Take 5 mLs (5 mg) by mouth daily   Quantity:  150 mL   Refills:  0       Sharps Container Misc   Used for:  Type 2 diabetes mellitus with complication, without long-term current use of insulin (H)        Sharps container for diabetes needles.   Quantity:  1 each   Refills:  0       tacrolimus 1 mg/mL suspension   Commonly known as:  GENERIC EQUIVALENT   Used for:  Kidney replaced by transplant   Replaces:  tacrolimus 0.5 MG capsule        Dose:  1.5 mg   1.5 mLs (1.5 mg) by Oral or Feeding Tube route 2 times daily   Quantity:  90 mL   Refills:  0         CONTINUE these medicines which may have CHANGED,  or have new prescriptions. If we are uncertain of the size of tablets/capsules you have at home, strength may be listed as something that might have changed.        Dose / Directions    ferrous gluconate 324 (38 Fe) MG tablet   Commonly known as:  FERGON   This may have changed:  how to take this   Used for:  Kidney replaced by transplant        Dose:  648 mg   2 tablets (648 mg) by Per Feeding Tube route daily (with breakfast)   Quantity:  100 tablet   Refills:  0       warfarin 2.5 MG tablet   Commonly known as:  COUMADIN   This may have changed:    - how much to take  - how to take this  - when to take this  - additional instructions   Used for:  Kidney replaced by transplant, Deep vein thrombosis (DVT) of both lower extremities, unspecified chronicity, unspecified vein (H)        Dose:  2.5 mg   1 tablet (2.5 mg) by Oral or Feeding Tube route daily   Quantity:  30 tablet   Refills:  0         STOP taking     amLODIPine 10 MG tablet   Commonly known as:  NORVASC   Replaced by:  amLODIPine 1 mg/mL Susp           aspirin 81 MG EC tablet   Replaced by:  aspirin 10 mg/mL Susp           carvedilol 6.25 MG tablet   Commonly known as:  COREG   Replaced by:  carvedilol 1 mg/mL Susp           docusate sodium 100 MG capsule   Commonly known as:  COLACE           furosemide 20 MG tablet   Commonly known as:  LASIX           MULTIVITAMIN ADULT Tabs   Replaced by:  multivitamins with minerals Liqd liquid           omeprazole 20 MG CR capsule   Commonly known as:  priLOSEC           QUEtiapine 25 MG tablet   Commonly known as:  SEROquel           tacrolimus 0.5 MG capsule   Replaced by:  tacrolimus 1 mg/mL suspension           Vitamin D (Cholecalciferol) 400 units Tabs   Replaced by:  cholecalciferol 400 UNIT/ML Liqd liquid                Where to get your medicines      These medications were sent to [facility] [place], [place] - [address]  [address], [facility] [address]     Phone:   [phone]     acetaminophen 32 mg/mL solution    alcohol swab prep pads    amLODIPine 1 mg/mL Susp    aspirin 10 mg/mL Susp    blood glucose lancets standard    blood glucose monitoring meter device kit    blood glucose monitoring test strip    bumetanide 0.25 mg/mL Susp    carvedilol 1 mg/mL Susp    cholecalciferol 400 UNIT/ML Liqd liquid    ferrous gluconate 324 (38 Fe) MG tablet    fiber modular (NUTRISOURCE FIBER) packet    insulin pen needle 32G X 4 MM    levETIRAcetam 100 MG/ML solution    miconazole 2 % powder    multivitamins with minerals Liqd liquid    ondansetron 4 MG ODT tab    pantoprazole 2 mg/mL Susp suspension    predniSONE 5 MG/5ML solution    Sharps Container Misc    tacrolimus 1 mg/mL suspension    warfarin 2.5 MG tablet         Some of these will need a paper prescription and others can be bought over the counter. Ask your nurse if you have questions.     Bring a paper prescription for each of these medications     HYDROmorphone (STANDARD CONC) 1 MG/ML Liqd liquid    insulin aspart 100 UNIT/ML injection    lactobacillus rhamnosus (GG) capsule                Protect others around you: Learn how to safely use, store and throw away your medicines at www.disposemymeds.org.        Information about OPIOIDS     PRESCRIPTION OPIOIDS: WHAT YOU NEED TO KNOW   We gave you an opioid (narcotic) pain medicine. It is important to manage your pain, but opioids are not always the best choice. You should first try all the other options your care team gave you. Take this medicine for as short a time (and as few doses) as possible.    Some activities can increase your pain, such as bandage changes or therapy sessions. It may help to take your pain medicine 30 to 60 minutes before these activities. Reduce your stress by getting enough sleep, working on hobbies you enjoy and practicing relaxation or meditation. Talk to your care team about ways to manage your pain beyond prescription opioids.    These medicines  have risks:    DO NOT drive when on new or higher doses of pain medicine. These medicines can affect your alertness and reaction times, and you could be arrested for driving under the influence (DUI). If you need to use opioids long-term, talk to your care team about driving.    DO NOT operate heavy machinery    DO NOT do any other dangerous activities while taking these medicines.    DO NOT drink any alcohol while taking these medicines.     If the opioid prescribed includes acetaminophen, DO NOT take with any other medicines that contain acetaminophen. Read all labels carefully. Look for the word  acetaminophen  or  Tylenol.  Ask your pharmacist if you have questions or are unsure.    You can get addicted to pain medicines, especially if you have a history of addiction (chemical, alcohol or substance dependence). Talk to your care team about ways to reduce this risk.    All opioids tend to cause constipation. Drink plenty of water and eat foods that have a lot of fiber, such as fruits, vegetables, prune juice, apple juice and high-fiber cereal. Take a laxative (Miralax, milk of magnesia, Colace, Senna) if you don t move your bowels at least every other day. Other side effects include upset stomach, sleepiness, dizziness, throwing up, tolerance (needing more of the medicine to have the same effect), physical dependence and slowed breathing.    Store your pills in a secure place, locked if possible. We will not replace any lost or stolen medicine. If you don t finish your medicine, please throw away (dispose) as directed by your pharmacist. The [place] Pollution Control Agency has more information about safe disposal: https://www.pca.[facility].[place].us/living-green/managing-unwanted-medications             Medication List: This is a list of all your medications and when to take them. Check marks below indicate your daily home schedule. Keep this list as a reference.      Medications           Morning Afternoon Evening  Bedtime As Needed    acetaminophen 32 mg/mL solution   Commonly known as:  TYLENOL   20.3 mLs (650 mg) by Oral or Feeding Tube route every 4 hours as needed for mild pain or fever   Last time this was given:  650 mg on 10/28/2018  9:25 AM                                alcohol swab prep pads   Use to swab area of injection/mark anthony as directed.                                amLODIPine 1 mg/mL Susp   Commonly known as:  NORVASC   5 mLs (5 mg) by Per Feeding Tube route daily   Start taking on:  10/30/2018   Last time this was given:  5 mg on 10/29/2018  8:29 AM                                aspirin 10 mg/mL Susp   8 mLs (80 mg) by Per Feeding Tube route daily                                blood glucose lancets standard   Commonly known as:  no brand specified   Use to test blood sugar 6 times daily or as directed.                                blood glucose monitoring meter device kit   Commonly known as:  no brand specified   Use to test blood sugar every 4 hours while on Tube feeding.                                blood glucose monitoring test strip   Commonly known as:  no brand specified   Use to test blood sugars 6 times daily or as directed                                bumetanide 0.25 mg/mL Susp   Commonly known as:  BUMEX   4 mLs (1 mg) by Oral or Feeding Tube route daily                                carvedilol 1 mg/mL Susp   Commonly known as:  COREG   6.25 mLs (6.25 mg) by Oral or Feeding Tube route 2 times daily   Last time this was given:  6.25 mg on 10/29/2018  8:30 AM                                cholecalciferol 400 UNIT/ML Liqd liquid   Commonly known as:  vitamin D/ D-VI-SOL   Take 1 mL (400 Units) by mouth daily                                ferrous gluconate 324 (38 Fe) MG tablet   Commonly known as:  FERGON   2 tablets (648 mg) by Per Feeding Tube route daily (with breakfast)                                fiber modular (NUTRISOURCE FIBER) packet   1 packet by Per Feeding Tube route 3 times  daily   Last time this was given:  1 packet on 10/29/2018  1:38 PM                                HYDROmorphone (STANDARD CONC) 1 MG/ML Liqd liquid   Commonly known as:  DILAUDID   1-2 mLs (1-2 mg) by Oral or Feeding Tube route every 6 hours as needed for moderate to severe pain   Last time this was given:  2 mg on 10/25/2018  9:54 AM                                insulin aspart 100 UNIT/ML injection   Commonly known as:  NovoLOG PEN   Inject 1-6 Units Subcutaneous every 4 hours Correction Scale -MEDIUM INSULIN RESISTANCE,   Do Not give if BG less than 140. For  - 189 give 1 unit. For  - 239 give 2 units. For  - 289 give 3 units. For  - 339 give 4 units. For  - 399 give 5 units. For BG > 400 give 6 units. Check blood glucose Q4H  Notify provider if glucose> 350 mg/dL.   Last time this was given:  1 Units on 10/29/2018  1:17 AM                                insulin pen needle 32G X 4 MM   Commonly known as:  BD SANDRA U/F   Use 6 (every 4 hours while on tube feeding) daily or as directed.                                lactobacillus rhamnosus (GG) capsule   1 capsule by Per Feeding Tube route 2 times daily   Last time this was given:  1 capsule on 10/29/2018  8:30 AM                                levETIRAcetam 100 MG/ML solution   Commonly known as:  KEPPRA   7.5 mLs (750 mg) by Per Feeding Tube route every 12 hours   Last time this was given:  750 mg on 10/29/2018  8:30 AM                                miconazole 2 % powder   Commonly known as:  MICATIN; MICRO GUARD   Apply topically 2 times daily   Last time this was given:  10/29/2018  8:26 AM                                multivitamins with minerals Liqd liquid   Take 15 mLs by mouth daily                                ondansetron 4 MG ODT tab   Commonly known as:  ZOFRAN-ODT   Take 1 tablet (4 mg) by mouth every 6 hours as needed for nausea or vomiting                                pantoprazole 2 mg/mL Susp suspension    Commonly known as:  PROTONIX   20 mLs (40 mg) by Per Feeding Tube route 2 times daily (before meals)   Last time this was given:  40 mg on 10/29/2018  8:27 AM                                predniSONE 5 MG/5ML solution   Take 5 mLs (5 mg) by mouth daily                                Sharps Container Misc   Sharps container for diabetes needles.                                tacrolimus 1 mg/mL suspension   Commonly known as:  GENERIC EQUIVALENT   1.5 mLs (1.5 mg) by Oral or Feeding Tube route 2 times daily   Last time this was given:  1.5 mg on 10/29/2018  8:30 AM                                warfarin 2.5 MG tablet   Commonly known as:  COUMADIN   1 tablet (2.5 mg) by Oral or Feeding Tube route daily   Last time this was given:  2.5 mg on 10/28/2018  5:21 PM

## 2018-09-11 NOTE — MR AVS SNAPSHOT
Priscilla Way   9/11/2018   Anticoagulation Therapy Visit    Description:  78 year old male   Provider:  Dayana Ervin, RN   Department:  [facility]           INR as of 9/11/2018     Today's INR 1.7!      Anticoagulation Summary as of 9/11/2018     INR goal 2.0-3.0   Today's INR 1.7!   Full warfarin instructions 9/11: 2.5 mg; 9/12: 1.25 mg; 9/13: 2.5 mg; 9/14: 1.25 mg; 9/15: 1.25 mg; 9/16: 2.5 mg; 9/17: 1.25 mg   Next INR check 9/18/2018    Indications   Acute kidney injury (H) [N17.9]  Deep vein thrombosis (DVT) (H) [I82.409] [I82.409]  Long-term (current) use of anticoagulants [Z79.01] [Z79.01]         September 2018 Details    Sun Mon Tue Wed Thu Fri Sat           1                 2               3               4               5               6               7               8                 9               10               11      2.5 mg   See details      12      1.25 mg         13      2.5 mg         14      1.25 mg         15      1.25 mg           16      2.5 mg         17      1.25 mg         18            19               20               21               22                 23               24               25               26               27               28               29                 30                      Date Details   09/11 This INR check       Date of next INR:  9/18/2018         How to take your warfarin dose     To take:  1.25 mg Take 0.5 of a 2.5 mg tablet.    To take:  2.5 mg Take 1 of the 2.5 mg tablets.

## 2018-09-12 NOTE — PHARMACY-ANTICOAGULATION SERVICE
Clinical Pharmacy - Warfarin Dosing Consult     Pharmacy has been consulted to manage this patient s warfarin therapy.  Indication: DVT/PE Prophylaxis  Therapy Goal: INR 2-3  OP Anticoag Clinic: [facility]  Warfarin Prior to Admission: Yes  Warfarin PTA Regimen: 2.5 mg TUTHSU and 1.25 mg ROW  Significant drug interactions: aspirin    INR   Date Value Ref Range Status   09/11/2018 1.75 (H) 0.86 - 1.14 Final   09/11/2018 1.7  Final       Recommend warfarin 2.5 mg today.  Pharmacy will monitor Priscilla Way daily and order warfarin doses to achieve specified goal.      Please contact pharmacy as soon as possible if the warfarin needs to be held for a procedure or if the warfarin goals change.

## 2018-09-12 NOTE — PROGRESS NOTES
Admitted/transferred from: Transferred from [facility]  Reason for admission/transfer: +C.Diff need a private room  Patient status upon admission/transfer: Cognitive impairment and non verbal, does not follow commands but can track intermittently.  Interventions:   Plan:   2 RN skin assessment: completed by Jazlyn BAER and Jennifer MORELNAD  Result of skin assessment and interventions/actions: small pink ulcer left butt cheek.   Height, weight, drug calc weight: done  Patient belongings: With patient.  MDRO education (if applicable): Enteric Precaution

## 2018-09-12 NOTE — PROGRESS NOTES
09/12/18 0900   General Information   Onset Date 09/11/18   Start of Care Date 09/12/18   Referring Physician Vicky Alvarez PA-C   Patient Profile Review/OT: Additional Occupational Profile Info See Profile for full history and prior level of function   Patient/Family Goals Statement None stated    Swallowing Evaluation Bedside swallow evaluation   Behaviorial Observations Lethargic   Mode of current nutrition Oral diet   Type of oral diet Thin liquid;Regular   Respiratory Status Room air   Comments Priscilla Way is a 78 year old male admitted on 9/11/2018. He has a history of dementia, liver transplant in 2000, kidney transplant in 2000, DVT, DM II and CVA and is admitted for aspiration and possible seizure. Pt is known to our SLP department from previous admits. Most recently he was recommended on 6/2/18 to consume puree solids and nectar thick liquids, however pt's family requested pt be allowed to consume regular solids and thin liquids. Per family member present, pt was consuming regular solids and thin liquids, family admits it takes a very long time, and he pockets food. Family states he is very tired today as he did not sleep well last night. Pt minimally appropriate for attempted swallow evaluation.    Clinical Swallow Evaluation   Oral Musculature unable to assess due to poor participation/comprehension   Oral Labial Strength and Mobility (notable left droop at rest)   Lingual Strength and Mobility (unable to assess)   Oral Musculature Comments Difficulty to complete adequat OME d/t pt's inability to participate   Swallow Eval   Feeding Assistance dependent   Clinical Swallow Eval: Thin Liquid Texture Trial   Mode of Presentation, Thin Liquids spoon   Volume of Liquid or Food Presented ice chip x 1   Oral Phase of Swallow Premature pharyngeal entry;Poor AP movement  (Reduced oral control )   Oral Residue left lip drooling;left anterior lateral sulci   Pharyngeal Phase of Swallow impaired    Diagnostic Statement Pt made no active attempts to manipulate ice chip. Some fell anteriorly out of his mouth and suspect the remainder fell to his pharynx prematurely    Clinical Swallow Eval: Puree Solid Texture Trial   Mode of Presentation, Puree spoon   Volume of Puree Presented 1/4 teaspoon    Oral Phase, Puree (Pt did not open his mouth)   Pharyngeal Phase, Puree impaired   Diagnostic Statement Pt had difficulty accepting puree bolus, not following commands. No swallow initiated    Swallow Compensations   Swallow Compensations No compensations were used  (Pt unable to follow commands )   Esophageal Phase of Swallow   Patient reports or presents with symptoms of esophageal dysphagia No   General Therapy Interventions   Planned Therapy Interventions Dysphagia Treatment   Dysphagia treatment Modified diet education;Instruction of safe swallow strategies   Swallow Eval: Clinical Impressions   Skilled Criteria for Therapy Intervention Skilled criteria met.  Treatment indicated.   Functional Assessment Scale (FAS) 2   Treatment Diagnosis Severe oropharyngeal dysphagia    Diet texture recommendations NPO   Therapy Frequency 5 times/wk   Predicted Duration of Therapy Intervention (days/wks) 2 weeks   Anticipated Discharge Disposition extended care facility   Risks and Benefits of Treatment have been explained. Yes   Patient, family and/or staff in agreement with Plan of Care Yes   Clinical Impression Comments Pt seen for bedside swallow evaluation per provider's orders. Pt is minimal appropriate to participate. Family admits he is tired because he did not sleep well, but wants to try at least something. Pt does not follow commands and is nonverbal at baseline per family. He has difficulty accepting the bolus from spoon, suspected reduced oral control and strength. Loss of ice chip from anterior/left lateral side of his mouth. No active attempts to manipulate the ice chips. Suspect the remainder of the bolus that was  not lost anteriorly fell posteriorly to the pharynx with little pt awareness. No attempt to lick puree bolus from his lips. Pharyngeal swallow not initiated during evaluation. Pt is a high aspiration risk with all oral intake d/t inadequate alertness, dysphagia hx with aspiration risk, and inability to follow commands. Recommend pt be strict NPO. Of note - pt has been recommended a modified diet in the past, which family ultimately refused.    Total Evaluation Time   Total Evaluation Time (Minutes) 10

## 2018-09-12 NOTE — PROGRESS NOTES
Pt transferred to U due to needing a private room. Belongings and chart sent with pt. Family aware of transfer.

## 2018-09-12 NOTE — CONSULTS
I DID NOT SEE THE PATIENT, BUT DISCUSSED THE DETAILS WITH NP AND AGREE WITH NOTES, NO BILLING PLEASE  Transplant Surgery  Consult Note  09/12/2018    Assessment & Plan:  79yo with history of HCV s/p liver & kidney transplant 12/2000, baseline Cr ~1.3-1.4, advanced vascular dementia, CVA, DM2.  Admitted 9/11/18 with concern for seizure and aspiration after witnessed convulsions.  Transplant Surgery consulted for immunosuppression management by Dr. Aponte.    Graft function:  Liver:  LFTs normal.  Recheck Friday due to missed immunosuppression doses (ordered).  Kidney:  Cr 1.3, at baseline.  Recheck Friday (ordered).  Immunosuppression management: Patient is on tacrolimus monotherapy at home.  Per protocol, tac level goal should be 6-8.  Level added on to morning labs despite missed dose yesterday.  Concern for high level due to possible seizure.  Patient unable to take oral medications, and family does not wish to place any type of feeding tube at this time.  Will order sublingual tacrolimus at home dose and watch for pending level.  Complexity of management: Medium. Contributing factors: aspiration  : Incontinence, chronic.  Infectious disease:  On Unasyn for possible aspiration PNA.   C-diff +, also on flagyl (unable to take oral vanco).  ID consulted.    Medical Decision Making: Medium  Consult [id number] straight forward, 40 minutes    ZOEY/Fellow/Resident Provider: Narcisa Alexander     Faculty: Ji Bonilla M.D.    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    12/27/2000 (Kidney), 12/27/2000 (Liver), Postoperative day: 6468 (Kidney), 6468 (Liver)     Interval History: History is obtained from EMR and patient's daughter  Minimally verbal or interactive at baseline.  Not following commands or verbalizing this morning.  Awake at time of exam.  No eye contact.    ROS:   A  "10-point review of systems was negative except as noted above.    Curent Meds:    amLODIPine  10 mg Oral Daily     ampicillin-sulbactam  3 g Intravenous Q6H     carvedilol  6.25 mg Oral BID     levETIRAcetam  500 mg Intravenous Q12H     metroNIDAZOLE  500 mg Intravenous Q8H     NONFORMULARY 1.5 mg  1.5 mg Sublingual BID IS     sodium chloride (PF)  3 mL Intracatheter Q8H     tacrolimus  1.5 mg Oral BID IS     vancomycin  125 mg Oral 4x Daily     warfarin  2.5 mg Oral ONCE at 18:00     warfarin  3 mg Oral ONCE at 18:00       Physical Exam:     Admit Weight: 83.9 kg (185 lb)    Current Vitals:   BP (!) 168/108  Pulse 85  Temp 97.6  F (36.4  C)  Resp (!) 44  Ht 1.778 m (5' 10\")  Wt 68.5 kg (151 lb 0.2 oz)  SpO2 100%  BMI 21.67 kg/m2         Vital sign ranges:    Temp:  [97  F (36.1  C)-98.7  F (37.1  C)] 97.6  F (36.4  C)  Pulse:  [85] 85  Heart Rate:  [79-95] 84  Resp:  [14-44] 44  BP: (134-205)/() 168/108  SpO2:  [91 %-100 %] 100 %  Patient Vitals for the past 24 hrs:   BP Temp Temp src Pulse Heart Rate Resp SpO2 Height Weight   09/12/18 1200 (!) 168/108 97.6  F (36.4  C) - - 84 (!) 44 100 % - -   09/12/18 1100 (!) 172/100 - - - 85 15 100 % - -   09/12/18 1000 (!) 178/94 - - - 85 16 100 % - -   09/12/18 0900 173/85 97  F (36.1  C) Axillary - 88 16 99 % - -   09/12/18 0800 (!) 179/92 - - - 85 15 100 % - -   09/12/18 0700 166/88 - - - 87 15 100 % - -   09/12/18 0630 168/86 - - - 86 15 100 % - -   09/12/18 0600 165/79 - - - 85 15 100 % - -   09/12/18 0530 143/89 - - - 85 16 100 % - -   09/12/18 0515 146/77 - - - 84 14 100 % - -   09/12/18 0500 (!) 134/102 - - - 90 24 99 % - -   09/12/18 0445 (!) 167/92 - - - 87 17 99 % - -   09/12/18 0430 166/90 - - - 88 19 99 % - -   09/12/18 0415 164/88 - - - 87 18 100 % - -   09/12/18 0400 164/89 98.7  F (37.1  C) Axillary - 87 19 99 % - -   09/12/18 0345 (!) 148/99 - - - 86 18 100 % - -   09/12/18 0330 (!) 194/101 - - - 86 15 99 % - -   09/12/18 0300 (!) 182/105 - - - " "82 18 100 % - -   09/12/18 0245 - - - - 82 17 100 % - -   09/12/18 0230 (!) 161/105 - - - 82 16 99 % - -   09/12/18 0215 (!) 183/92 - - - 79 19 99 % - -   09/12/18 0200 (!) 189/115 - - - 95 20 100 % - -   09/12/18 0100 (!) 187/96 - - - 92 20 100 % - -   09/12/18 0030 (!) 187/99 - - - 91 17 100 % - -   09/12/18 0003 175/90 98.7  F (37.1  C) Axillary - 90 20 100 % - 68.5 kg (151 lb 0.2 oz)   09/11/18 2234 177/89 - - - 86 - 100 % - -   09/11/18 2213 (!) 185/103 - - - - - - - -   09/11/18 2200 189/90 - - - 85 20 - - -   09/11/18 2158 - 97.2  F (36.2  C) Axillary - 86 20 100 % - -   09/11/18 2032 (!) 177/94 - - 85 - 16 99 % - -   09/11/18 2030 (!) 190/92 - - - - - 100 % - -   09/11/18 2015 (!) 184/92 - - - - - 98 % - -   09/11/18 2000 (!) 177/94 - - - - - 99 % - -   09/11/18 1930 158/87 - - - - - 99 % - -   09/11/18 1850 (!) 203/96 - - - - - 100 % - -   09/11/18 1840 (!) 205/118 - - - - - 100 % - -   09/11/18 1830 200/88 - - - - - 100 % - -   09/11/18 1820 (!) 192/91 - - - - - 100 % - -   09/11/18 1810 (!) 205/155 - - - - - 100 % - -   09/11/18 1800 (!) 202/96 - - - - - 100 % - -   09/11/18 1750 (!) 204/104 - - - - - 100 % - -   09/11/18 1745 - - - - - - 100 % - -   09/11/18 1740 (!) 191/107 - - - - - 100 % - -   09/11/18 1730 (!) 205/110 - - - - - 100 % - -   09/11/18 1720 (!) 196/98 - - - - - 100 % - -   09/11/18 1715 (!) 197/94 - - - - - - - -   09/11/18 1707 - - - - - - - 1.778 m (5' 10\") 83.9 kg (185 lb)   09/11/18 1700 - 98.6  F (37  C) Axillary - - 20 91 % - -   09/11/18 1659 - - - 85 - - - - -   09/11/18 1655 (!) 196/98 - - - - - - - -     General Appearance: in no apparent distress.   Skin: Warm, no jaundice  Heart: NSR  Lungs: Non-labored  Abdomen: Soft  : Incontinent  Extremities: edema: +1 BLE  Neurologic: Awake, not following commands, not tracking    Data:   CMP  Recent Labs  Lab 09/12/18  0356 09/11/18  1700 09/11/18  1659 09/11/18 1657    138  --  136   POTASSIUM 4.4 4.4  --  4.2   CHLORIDE 106  " --   --  106   CO2 19*  --   --  18*   * 176*  --  165*   BUN 32*  --   --  29   CR 1.28*  --   --  1.28*   GFRESTIMATED 54*  --  53* 54*   GFRESTBLACK 65  --  64 65   JOSHUA 8.1*  --   --  8.0*   ICAPOC  --  4.6  --   --    ALBUMIN  --   --   --  2.3*   BILITOTAL  --   --   --  0.4   ALKPHOS  --   --   --  90   AST  --   --   --  26   ALT  --   --   --  18     CBC  Recent Labs  Lab 09/12/18  1120 09/12/18  0356   HGB 10.0* 9.8*   WBC 7.6 9.4    296     COAGS  Recent Labs  Lab 09/12/18  0356 09/11/18  1657   INR 1.63* 1.75*      Urinalysis  Recent Labs   Lab Test  05/31/18   1643  03/21/18   1139   05/27/16   1409   04/29/11   1348   COLOR  Yellow  Yellow   < >  Specimen not received  NOTIFIED HOMECARE  WHO NOTIFIED  LOPEZ JEAN AT [facility]. NO URINE   RECEIVED WITH BLOOD SPECIMENS. AB     < >  Yellow   APPEARANCE  Clear  Slightly Cloudy   < >  Specimen not received  NOTIFIED HOMECARE  WHO NOTIFIED  LOPEZ JEAN . NO URINE   RECEIVED WITH BLOOD SPECIMENS. AB     < >  Clear   URINEGLC  Negative  Negative   < >  Specimen not received  NOTIFIED HOMECARE  WHO NOTIFIED  LOPEZ JEAN AT 1355. NO URINE   RECEIVED WITH BLOOD SPECIMENS. AB  *   < >  Negative   URINEBILI  Negative  Negative   < >  Specimen not received  NOTIFIED HOMECARE  WHO NOTIFIED  LOPEZ JEAN AT 1355. NO URINE   RECEIVED WITH BLOOD SPECIMENS. AB  *   < >  Negative   URINEKETONE  Negative  Negative   < >  Specimen not received  NOTIFIED HOMECARE  WHO NOTIFIED  LOPEZ JEAN AT 1355. NO URINE   RECEIVED WITH BLOOD SPECIMENS. AB  *   < >  Negative   SG  1.012  1.011   < >  Specimen not received  NOTIFIED HOMECARE  WHO NOTIFIED  LOPEZ JEAN AT 1355. NO URINE   RECEIVED WITH BLOOD SPECIMENS. AB     < >  1.012   UBLD  Trace*  Trace*   < >  Specimen not received  NOTIFIED HOMECARE  WHO NOTIFIED  LOPEZ JEAN AT [facility]. NO URINE    RECEIVED WITH BLOOD SPECIMENS. AB  *   < >  Negative   URINEPH  5.5  5.0   < >  Specimen not received  NOTIFIED HOMECARE  WHO NOTIFIED  LOPEZ JEAN AT 1355. NO URINE   RECEIVED WITH BLOOD SPECIMENS. AB     < >  5.0   PROTEIN  30*  10*   < >  Specimen not received  NOTIFIED HOMECARE  WHO NOTIFIED  LOPEZ JEAN AT 1355. NO URINE   RECEIVED WITH BLOOD SPECIMENS. AB  *   < >  Negative   NITRITE  Negative  Negative   < >  Specimen not received  NOTIFIED HOMECARE  WHO NOTIFIED  LOPEZ JEAN AT 1355. NO URINE   RECEIVED WITH BLOOD SPECIMENS. AB  *   < >  Negative   LEUKEST  Negative  Large*   < >  Specimen not received  NOTIFIED HOMECARE  WHO NOTIFIED  LOPEZ JEAN AT 1355. NO URINE   RECEIVED WITH BLOOD SPECIMENS. AB  *   < >  Negative   RBCU  9*  2   < >  Specimen not received  NOTIFIED HOMECARE  WHO NOTIFIED  LOPEZ JEAN AT 1355. NO URINE   RECEIVED WITH BLOOD SPECIMENS. AB     < >  0   WBCU  2  159*   < >  Specimen not received  NOTIFIED HOMECARE  WHO NOTIFIED  LOPEZ JEAN AT 1355. NO URINE   RECEIVED WITH BLOOD SPECIMENS. AB  *   < >  <1   UTPG   --    --    --   Specimen not received  NOTIFIED HOMECARE  WHO PAGED  LOPEZ JEAN AT 1355. NO URINE   RECIEVED WITH BLOOD SPECIMENS. AB     --   0.04    < > = values in this interval not displayed.     Virology:  CMV DNA Quantitation Specimen   Date Value Ref Range Status   11/12/2016 EDTA PLASMA  Final     CMV Quantitative   Date Value Ref Range Status   12/02/2010 <100 <100 Copies/mL Final     Comment:     No CMV DNA detected.   11/04/2008 <100 <100 Copies/mL Final     Comment:     No CMV DNA detected.     CMV IgG Antibody   Date Value Ref Range Status   04/29/2011 >160.0  Positive for anti-CMV IgG EU/mL Final     Hepatitis C Antibody   Date Value Ref Range Status   04/29/2011 (A) NEG Final    Positive   High sample/cutoff ratio, confirmatory testing  available.     Hep B Surface Ashley   Date Value Ref Range Status   04/29/2011 0.0  Final     Comment:     Negative, No antibody detected when the value is less than 5.0 mlU/mL.     BK Virus Result   Date Value Ref Range Status   11/06/2006 <1000 <1000 copies/mL Final     Comment:     copies/mL of plasma

## 2018-09-12 NOTE — PLAN OF CARE
Problem: Patient Care Overview  Goal: Plan of Care/Patient Progress Review  Discharge Planner SLP   Patient plan for discharge: Did not discuss  Current status: Pt seen for bedside swallow evaluation per provider s orders. Pt is minimal appropriate to participate. Family admits he is tired because he did not sleep well, but wants to try at least something. Pt does not follow commands and is nonverbal at baseline per family. He has difficulty accepting the bolus from spoon, suspected reduced oral control and strength. Loss of ice chip from anterior/left lateral side of his mouth. No active attempts to manipulate the ice chips. Suspect the remainder of the bolus that was not lost anteriorly fell posteriorly to the pharynx with little pt awareness. No attempt to lick puree bolus from his lips. Pharyngeal swallow not initiated during evaluation. Pt is a high aspiration risk with all oral intake d/t inadequate alertness, dysphagia hx with aspiration risk, and inability to follow commands.     Recommend pt be strict NPO with diligent oral cares. Of note - pt has been recommended a modified diet in the past, which family ultimately refused.     Barriers to return to prior living situation: Below baseline, deconditioning   Recommendations for discharge: TCU  Rationale for recommendations: Deconditioning, aspiration risk        Entered by: Aby Gracia 09/12/2018 9:17 AM

## 2018-09-12 NOTE — ED NOTES
Daughter informed [facility] staff that the pt was C-diff positive.  After reviewing the pts chart pt last was C-diff positive in June and is not actively having loose stools.  Pt is not being treated for C-diff.  ID contacted and Dr. Perez said said that he can share a double as the pt is not actively having loose stools and was last treated in June.  Infection control will be consulted.

## 2018-09-12 NOTE — MR AVS SNAPSHOT
After Visit Summary   9/12/2018    Priscilla Way    MRN: [id number]           Patient Information     Date Of Birth          1940        Visit Information        Provider Department      9/12/2018 7:00 AM [facility] EEG TECH 4 [facility]        Today's Diagnoses     Transient alteration of awareness    -  1       Follow-ups after your visit        Future tests that were ordered for you today     Open Standing Orders        Priority Remaining Interval Expires Ordered    CBC with platelets differential Routine 1/1 AM DRAW  9/12/2018    Comprehensive metabolic panel Routine 1/1 AM DRAW  9/12/2018    INR Routine 1/1 AM DRAW  9/12/2018    Procalcitonin Routine 1/1 AM DRAW  9/12/2018    Heparin Xa (10a) Level STAT 1/1 CONDITIONAL X 1 STAT  9/12/2018    CBC with platelets Routine 5/5 EVERY THREE DAYS  9/12/2018    Heparin Xa (10a) Level Routine 16/16 DAILY  9/12/2018    Heparin Xa (10a) Level Routine 100/100 CONDITIONAL (SPECIFY)  9/12/2018    INR Routine 15/16 DAILY  9/11/2018    Oxygen: Nasal cannula, Oxygen mask Routine 89006/87047 CONTINUOUS  9/11/2018            Who to contact     Please call your clinic at [phone] to:    Ask questions about your health    Make or cancel appointments    Discuss your medicines    Learn about your test results    Speak to your doctor            Additional Information About Your Visit        Private Company Information     [employer] gives you secure access to your electronic health record. If you see a primary care provider, you can also send messages to your care team and make appointments. If you have questions, please call your primary care clinic.  If you do not have a primary care provider, please call [phone] and they will assist you.      [employer] is an electronic gateway that provides easy, online access to your medical records. With [employer], you can request a clinic appointment, read your test results, renew a prescription or communicate with your care team.     To  access your existing account, please contact your [facility] or call [phone] for assistance.        Care EveryWhere ID     This is your Care EveryWhere ID. This could be used by other organizations to access your [facility] medical records  [id number]         Blood Pressure from Last 3 Encounters:   09/12/18 (!) 178/94   06/07/18 135/60   05/01/18 199/79    Weight from Last 3 Encounters:   09/12/18 68.5 kg (151 lb 0.2 oz)   06/06/18 77.9 kg (171 lb 12.8 oz)   03/24/18 69.9 kg (154 lb 1.6 oz)              Today, you had the following     No orders found for display         Today's Medication Changes      Notice     This visit is during an admission. Changes to the med list made in this visit will be reflected in the After Visit Summary of the admission.             Primary Care Provider Office Phone # Fax #    Randy Fuentes [phone]       [address] [address]        Equal Access to Services     [facility]: Hadii yefri ku hadasho Soomaali, waaxda luqadaha, qaybta kaalmada adeegyada, annmarie cline . So [facility] [phone].    ATENCIÓN: Si habla español, tiene a staples disposición servicios gratuitos de asistencia lingüística. Llame al [phone].    We comply with applicable federal civil rights laws and [place] laws. We do not discriminate on the basis of race, color, national origin, age, disability, sex, sexual orientation, or gender identity.            Thank you!     Thank you for choosing [facility]  for your care. Our goal is always to provide you with excellent care. Hearing back from our patients is one way we can continue to improve our services. Please take a few minutes to complete the written survey that you may receive in the mail after your visit with us. Thank you!             Your Updated Medication List - Protect others around you: Learn how to safely use, store and throw away your medicines at  www.disposemymeds.org.      Notice     This visit is during an admission. Changes to the med list made in this visit will be reflected in the After Visit Summary of the admission.

## 2018-09-12 NOTE — ED NOTES
[facility], [place]   ED Nurse to Floor Handoff     Priscilla Way is a 78 year old male who speaks Wallisian and lives with family members,  in a home  They arrived in the ED by ambulance from home    ED Chief Complaint: Shortness of Breath    ED Dx;   Final diagnoses:   Aspiration pneumonia due to regurgitated food, unspecified laterality, unspecified part of lung (H)         Needed?: Yes    Allergies: No Known Allergies.  Past Medical Hx:   Past Medical History:   Diagnosis Date     LILIAM (acute kidney injury) (H) 08/2016    kidney biopsy showed ATN     Dementia 2004    multiple acute infarcts, SV dis on CT     Diabetes type 2, controlled (H)      H/O Clostridium difficile infection 03/2018    treated wiht vanco     Hepatitis C      Kidney transplanted 2000     Liver transplant 2000     Unspecified cerebral artery occlusion with cerebral infarction       Baseline Mental status: cognitively impaired and other baseline pt is non-verbal r/t a stroke, pt does not follow commands, and can track intermittently, L arm moves minimally but no movement on R arm r/t stroke  Current Mental Status changes: at basesline    Infection present or suspected this encounter: yes respiratory  Sepsis suspected: No  Isolation type: No active isolations     Activity level - Baseline/Home:  Total Care  Activity Level - Current:   Total Care    Bariatric equipment needed?: No    In the ED these meds were given:   Medications   ondansetron (ZOFRAN) injection 4 mg (4 mg Intravenous Not Given 9/11/18 1819)   0.9% sodium chloride BOLUS (1,000 mLs Intravenous Not Given 9/11/18 1809)     Followed by   sodium chloride 0.9% infusion (1,000 mLs Intravenous Not Given 9/11/18 1816)   levETIRAcetam (KEPPRA) 2,000 mg in sodium chloride 0.9 % 250 mL intermittent infusion (2,000 mg Intravenous Given 9/11/18 1957)   ondansetron (ZOFRAN) injection 4 mg (4 mg Intravenous Given 9/11/18 1702)   ampicillin-sulbactam  (UNASYN) 3 g vial to attach to  mL bag (0 g Intravenous Stopped 9/11/18 1841)   labetalol (NORMODYNE/TRANDATE) injection 10 mg (10 mg Intravenous Given 9/11/18 1811)   hydrALAZINE (APRESOLINE) injection 20 mg (20 mg Intravenous Given 9/11/18 1848)   sodium chloride (PF) 0.9% PF flush 90 mL (90 mLs Intravenous Given 9/11/18 1909)   iopamidol (ISOVUE-370) solution 75 mL (75 mLs Intravenous Given 9/11/18 1906)       Drips running?  No    Home pump  No    Current LDAs  Peripheral IV 05/31/18 Right;Lateral Lower forearm (Active)   Number of days:103       Peripheral IV 05/31/18 Right (Active)   Number of days:103       Pressure Injury 05/31/18 Left Heel (Active)   Number of days:103       Wound 05/31/18 Rectum Suspected pressure ulcer sm pinkish wounds on buttocks  (Active)   Number of days:103       Incision/Surgical Site 08/21/16 Right;Lower Abdomen (Active)   Number of days:751       Labs results:   Labs Ordered and Resulted from Time of ED Arrival Up to the Time of Departure from the ED   CREATININE POCT - Abnormal; Notable for the following:        Result Value    Creatinine 1.3 (*)     GFR Estimate 53 (*)     All other components within normal limits   INR - Abnormal; Notable for the following:     INR 1.75 (*)     All other components within normal limits   CBC WITH PLATELETS DIFFERENTIAL - Abnormal; Notable for the following:     RBC Count 3.25 (*)     Hemoglobin 9.5 (*)     Hematocrit 30.0 (*)     RDW 16.2 (*)     All other components within normal limits   COMPREHENSIVE METABOLIC PANEL - Abnormal; Notable for the following:     Carbon Dioxide 18 (*)     Glucose 165 (*)     Creatinine 1.28 (*)     GFR Estimate 54 (*)     Calcium 8.0 (*)     Albumin 2.3 (*)     All other components within normal limits   ISTAT GASES ELEC ICA GLUC MERCEDES POCT - Abnormal; Notable for the following:     Ph Venous 7.44 (*)     PCO2 Venous 32 (*)     Glucose 176 (*)     Hemoglobin 9.9 (*)     Hematocrit - POCT 29 (*)     All other  components within normal limits   ISTAT  GASES LACTATE MERCEDES POCT - Abnormal; Notable for the following:     Ph Venous 7.44 (*)     PCO2 Venous 33 (*)     PO2 Venous 48 (*)     Lactic Acid 2.5 (*)     All other components within normal limits   ISTAT INR POCT - Abnormal; Notable for the following:     ISTAT INR 1.2 (*)     All other components within normal limits   AMMONIA   TROPONIN I   ISTAT CG4 GASES LACTATE MERCEDES NURSING POCT   TROPONIN POCT       Imaging Studies:   Recent Results (from the past 24 hour(s))   Chest  XR, 1 view portable    Narrative    XR CHEST PORT 1 VW  9/11/2018 5:05 PM      HISTORY: aspiration;     COMPARISON: 5/31/2018    FINDINGS: Portable AP view of the chest. Cardiomegaly. Bibasilar  patchy opacities. Indistinct pulmonary vasculature. Small pleural  effusions. No pneumothorax. Tortuous thoracic aorta with  calcifications.      Impression    IMPRESSION:   1. Cardiomegaly with mild pulmonary edema and small pleural effusions.  2. Bibasilar patchy opacities which may represent pulmonary edema,  infection or aspiration.    I have personally reviewed the examination and initial interpretation  and I agree with the findings.    PASCUAL LEE MD   Head CT w/o contrast    Narrative    CT HEAD W/O CONTRAST 9/11/2018 5:20 PM    Provided History: AMS     Comparison: 5/31/2018.    Technique: Using multidetector thin collimation helical acquisition  technique, axial, coronal and sagittal CT images from the skull base  to the vertex were obtained without intravenous contrast.     Findings:    No intracranial hemorrhage, mass effect, or midline shift. Stable  chronic right thalamic lacunar infarct. Stable moderate to advanced  leukoaraiosis and moderate generalized parenchymal volume loss. The  ventricles are proportionate to the cerebral sulci. No acute loss of  gray-white differentiation. The basal cisterns are patent.    The visualized paranasal sinuses are clear. Mild dependent bilateral  mastoid  "effusions, decreased on the left. Debris in the left external  auditory canal.       Impression    Impression:   1. No acute intracranial pathology.  2. Stable chronic right thalamic infarct and moderate to advanced  chronic small vessel ischemic disease.  3. Unchanged moderate generalized parenchymal volume loss.  4. Decreased left mastoid effusion.    I have personally reviewed the examination and initial interpretation  and I agree with the findings.    NURA PEREZ MD   CTA Head Neck with Contrast    Narrative    PRELIMINARY REPORT - The following report is a preliminary  interpretation.      Impression    Impression:    1. Head CTA demonstrates no aneurysm or stenosis of the major  intracranial arteries.   2. Neck CTA demonstrates no stenosis of the major cervical arteries.   3. No intracranial hemorrhage on the noncontrast head CT.   4. Stable chronic right limb infarct in moderate to advanced chronic  small vessel ischemic disease.  5. The visualized lung apices demonstrate layering bilateral pleural  effusions, left greater than right, with multifocal groundglass  opacities concerning for infection.          Recent vital signs:   BP (!) 203/96  Pulse 85  Temp 98.6  F (37  C) (Axillary)  Resp 20  Ht 1.778 m (5' 10\")  Wt 83.9 kg (185 lb)  SpO2 100%  BMI 26.54 kg/m2    Cardiac Rhythm: Normal Sinus  Pt needs tele? No  Skin/wound Issues: pt bit the right side of his tongue.     Code Status: Full Code    Pain control: pt had none    Nausea control: pt had none    Abnormal labs/tests/findings requiring intervention: pt needs to have to have EEG monitoring hooked up when pt gets to the Unit.  All the wires were attached in the ED.     Family present during ED course? Yes   Family Comments/Social Situation comments: none    Tasks needing completion: start EEG    Daylin Chicas RN  [facility]-- [phone] [facility]      "

## 2018-09-12 NOTE — PROGRESS NOTES
[facility]  Patient is currently open to home care services with [facility].  The patient is currently receiving RN services.  [facility]  and team have been notified of patient admission.  [facility] liaison will continue to follow patient during stay.  If appropriate provide orders to resume home care at time of discharge.    Thank you  Vonda Rankin RN, BSN  [facility] Liaison  [facility]  [phone]

## 2018-09-12 NOTE — PLAN OF CARE
"Problem: Patient Care Overview  Goal: Plan of Care/Patient Progress Review  Outcome: No Change  Neuro: Patient is close to neurological baseline \"on bad days\" per family. Nonverbal for the most part, occasionally mumbling. Pupils equal and reactive. Not following commands. Brain MRI completed.   Cardiac: SR. VSS. PRN hydralazine available for SBP >170. Heparin gtt infusing.   Respiratory: Tolerating RA.   GI/: Adequate urine output. BM X1  Diet/appetite: NPO, failed swallow study.   Activity:  Turned and repositioned q2h.   Pain: At acceptable level on current regimen.   Skin: WOC consult ordered for skin abrasions from EEG electrode removal.     Plan: Continue with POC. Notify primary team with changes.        "

## 2018-09-12 NOTE — PLAN OF CARE
Problem: Patient Care Overview  Goal: Plan of Care/Patient Progress Review  D: Patient admitted for aspiration and possible seizure.  A/I Neuro: dementia patient who is nonverbal, does not follow commands and will track intermittently as baseline. There is slight movement in his left arm and will contract with stimulation. Patient EEG video monitoring d/t suspected seizure activity. Will continue to monitor patient.  Resp: Patient had pink/red streaked oral secretions from when he bit his tongue. Did NT suction for a small amount of blood tinged secretions. LS coarse/clear on room air and maintaining sats>90%.  CV: Afebrile, SR 70-90's and elevated BP's.  sBP>180 when patient transferred to the ICU, provider paged and labetalol 10mg one time dose ordered and given with minimal changes in addition a dose of hydralazine 10mg given.       P: Follow up with provider on BP management. Continue to follow POC.

## 2018-09-12 NOTE — H&P
[facility]    Internal Medicine History and Physical - Gold Service       Date of Admission:  9/11/2018    Assessment & Plan   Priscilla Way is a 78 year old male admitted on 9/11/2018. He has a history of dementia, liver transplant in 2000, kidney transplant in 2000, DVT, DM II and CVA and is admitted for aspiration and possible seizure.    1) Seizure-like activity - Patient presents following an episode of full body convulsions while eating today.  Reportedly this lasted one minute.  He has no prior history of seizures.  - Neurology consulted, appreciate involvement.  - CT head without evidence for mass.  - EEG already ordered per neuro service.  - Started on Keppra in the ED    2) Possible aspiration pneumonia - CXR from today shows bibasilar patchy opacities concerning for pneumonia vs aspiration.  - Started on Unasyn in the ED, will continue    3) History of liver and kidney transplant  - Continue home prograf    4) History of DM II  - Not on any medications.  Follow daily blood sugars    5) History of hypertension  - Continue home norvasc    6) History of DVT  - Continue coumadin, pharmacy to dose    7) History of dementia, CVA - At baseline he is nonverbal and minimally interactive, at times making eye contact.  He is total cares and taken care of at home by his family.    Diet:  Regular (with asstance)  Fluids: NS @ 75 ccs/hr  Virk Catheter: not present    DVT Prophylaxis: On coumadin  Code Status: Full    Disposition Plan   Expected discharge: 2 - 3 days, recommended to prior living arrangement once EEG complete, pneumonia improving.     Entered: VEL Menendez CNP 09/11/2018, 7:07 PM   Information in the above section will display in the discharge planner report.      The patient's care was discussed with the Attending Physician, Dr. Santiago.    VEL Menendez CNP  Internal Medicine Staff Hospitalist Service  [facility]  Health  Pager: [phone]  Please see sticky note for cross cover information  ______________________________________________________________________    Chief Complaint   Seizure, aspiration    History is obtained from the patient's family    History of Present Illness   Priscilla Way is a 78 year old male admitted on 2018. He has a history of dementia, liver transplant in , kidney transplant in , DVT, DM II and CVA and is admitted for aspiration and possible seizure.    The patient is unable to provide a history.  He has dementia as well as a history of CVA and at baseline is able to make eye contact but otherwise not communicate his needs.  I obtained my history from speaking to family members.    Per their report, earlier today the patient was being fed when he had full-body convulsions lasting close to a minute.  He spat up a small amount of blood.    Per earlier documentation the patient has also been deteriorating over the past 4 days with less eye contact than usual.    Review of Systems   Unable to obtain ROS    Past Medical History    I have reviewed this patient's medical history and updated it with pertinent information if needed.   Past Medical History:   Diagnosis Date     LILIAM (acute kidney injury) (H) 2016    kidney biopsy showed ATN     Dementia     multiple acute infarcts, SV dis on CT     Diabetes type 2, controlled (H)      H/O Clostridium difficile infection 2018    treated [facility] vanco     Hepatitis C      Kidney transplanted      Liver transplant      Unspecified cerebral artery occlusion with cerebral infarction       Past Surgical History   I have reviewed this patient's surgical history and updated it with pertinent information if needed.  Past Surgical History:   Procedure Laterality Date     TRANSPLANT KIDNEY RECIPIENT  DONOR       TRANSPLANT LIVER RECIPIENT  DONOR        Social History   Social History   Substance Use Topics     Smoking status:  Former Smoker     Types: Cigarettes     Smokeless tobacco: Never Used      Comment: quit 10 years ago     Alcohol use No     Family History   I have reviewed this patient's family history and updated it with pertinent information if needed.     Unable to obtain secondary to patient mental status      Prior to Admission Medications   Prior to Admission Medications   Prescriptions Last Dose Informant Patient Reported? Taking?   PROGRAF (BRAND) 1 MG/ML SUSPENSION 9/11/2018  No Yes   Sig: Take 1.5 mLs (1.5 mg) by mouth every 12 hours   QUEtiapine (SEROQUEL) 25 MG tablet 9/11/2018  No Yes   Sig: Take 1 tablet (25 mg) by mouth nightly as needed (Agitation) - Oral   amLODIPine (NORVASC) 10 MG tablet 9/11/2018  No Yes   Sig: Take 1 tablet (10 mg) by mouth daily   carvedilol (COREG) 6.25 MG tablet 9/11/2018  No Yes   Sig: Take 1 tablet (6.25 mg) by mouth 2 times daily   warfarin (COUMADIN) 2.5 MG tablet 9/10/2018 at Unknown time  No Yes   Sig: Take 2.5mg 6/7, take 1.25mg on 6/8, take 2.5mg on 6/9, and take 1.25mg on 6/10.  2. INR check on 6/11 and subsequent warfarin doses pending INR results.      Facility-Administered Medications: None     Allergies   No Known Allergies    Physical Exam   Vital Signs: Temp: 98.6  F (37  C) Temp src: Axillary BP: (!) 203/96 Pulse: 85   Resp: 20 SpO2: 100 % O2 Device: None (Room air)    Weight: 185 lbs 0 oz    Physical Exam   Constitutional:   Chronically ill appearing older man lying in bed, minimally interacive  Head: Normocephalic and atraumatic.  Poor dentition.  Small amounts of dried blood around lips.  Eyes: Conjunctivae are normal.   Cardiovascular: Regular rate and rhythm without murmurs or gallops  Pulmonary/Chest: Clear to auscultation bilaterally, with no wheezes or retractions. No respiratory distress.  GI: Soft with good bowel sounds.  Non-tender, non-distended, with no guarding, no rebound, no peritoneal signs.   Back:  No bony or CVA tenderness   Musculoskeletal:  No edema or  clubbing   Skin: Skin is warm and dry. No rash noted.   Neurological: Rarely makes eye contact, does not follow commands.  Psychiatric:  Unable to assess.      Data   Data reviewed today: I reviewed all medications, new labs and imaging results over the last 24 hours. I personally reviewed his CT scan and CXR.

## 2018-09-12 NOTE — PROCEDURES
Procedure Date: 2018      EEG #:  -1      DATE OF RECORDING/SERVICE DATE:  2018      TYPE OF STUDY:  This is day 1 of 24-hour video EEG.      DURATION OF STUDY:  2 hours 26 minutes 17 seconds.      CLINICAL SUMMARY:  The patient is a 78-year-old male with history of asthma and vascular dementia, liver/kidney transplant, diabetes, right thalamic stroke and history of seizures.  EEG was performed to evaluate for seizures.        MEDICATIONS:  The patient is reportedly receiving p.r.n. Seroquel.      TECHNICAL SUMMARY: This continuous video- EEG monitoring procedure was performed with 23 scalp electrodes in 10-20 electrode system placement, and additional scalp, precordial and other surface electrodes used for electrical referencing and artifact detection.  Video monitoring was utilized and periodically reviewed by EEG technologists and the physician for electroclinical correlations.     INTERICTAL ACTIVITY.  This study is very limited due to significant myogenic artifact which confounded the majority of the EEG.  During the portions that are interpretable, diffuse polymorphic theta-delta slowing was seen.  No well-organized posterior dominant rhythm was appreciated.  No epileptiform discharges were present.      Activation maneuvers were not performed.      CLINICAL/ICTAL EVENTS:  No electrographic or clinical seizures were recorded.      IMPRESSION:  Very limited study.  Myogenic artifact confounded approximately 80% of the recording.  During the portions that were interpretable, there was evidence of mild to moderate diffuse nonspecific encephalopathy.  No epileptiform discharges or seizures were noticed during the EEG.         ANGELES PIÑA MD             D: 2018   T: 2018   MT: RITA      Name:     YADIRA BREAUX   MRN:      [id number]        Account:        [id number]   :      1940           Procedure Date: 2018      Document: [id number]

## 2018-09-12 NOTE — PHARMACY-ADMISSION MEDICATION HISTORY
Admission medication history interview status for the 9/11/2018 admission is complete. See Epic admission navigator for allergy information, pharmacy, prior to admission medications and immunization status.     Medication history interview sources:  Daughter, Chart Review note from 8/13, [facility], Medication bottles (in plastic bag in room)    Changes made to PTA medication list (reason)  Added: Aspirin   Iron supplement   Vitamin D   Omeprazole   Furosemide   Docusate   Multivitamin  Deleted: None  Changed: Tacrolimus - uses 0.5 mg capsules now, instead of suspension    Additional medication history information (including reliability of information, actions taken by pharmacist):    Priscilla's daughter manages his medications at home and was able to verify his regimen. Priscilla also has a home care nurse that sets up his medications. Priscilla's daughter confirmed that Priscilla adheres to his current regimen. Confirmed not taking memantine as seen through [facility].    Priscilla manages his warfarin through [facility]. Goal INR 2-3. Most recent regimen is 2.5 mg Sunday, Tuesday, Thursday and 1.25 mg all other days.    Allergies confirmed.    Home pharmacy: unknown      Prior to Admission medications    Medication Sig Last Dose Taking? Auth Provider   amLODIPine (NORVASC) 10 MG tablet Take 10 mg by mouth daily 9/11/2018 at AM Yes Unknown, Entered By History   aspirin 81 MG EC tablet Take 81 mg by mouth daily 9/11/2018 at AM Yes Unknown, Entered By History   carvedilol (COREG) 6.25 MG tablet Take 1 tablet (6.25 mg) by mouth 2 times daily 9/11/2018 at AM Yes Randy Fuentes MD   ferrous gluconate (FERGON) 324 (38 Fe) MG tablet Take 648 mg by mouth daily (with breakfast) 9/11/2018 at AM Yes Unknown, Entered By History   furosemide (LASIX) 20 MG tablet Take 20 mg by mouth daily 9/10/2018 at PM Yes Unknown, Entered By History   Multiple Vitamins-Minerals (MULTIVITAMIN ADULT) TABS Take 1 tablet by mouth daily 9/11/2018 at AM Yes  Unknown, Entered By History   omeprazole (PRILOSEC) 20 MG CR capsule Take 20 mg by mouth daily 9/11/2018 at AM Yes Unknown, Entered By History   PROGRAF (BRAND) 0.5 MG CAPSULE Take 1.5 mg by mouth 2 times daily 9/11/2018 at AM Yes Unknown, Entered By History   QUEtiapine (SEROQUEL) 25 MG tablet Take 1 tablet (25 mg) by mouth nightly as needed (Agitation) - Oral 9/10/2018 at PM Yes Randy Fuentes MD   Vitamin D, Cholecalciferol, 400 units TABS Take 800 Units by mouth daily 9/11/2018 at AM Yes Unknown, Entered By History   warfarin (COUMADIN) 2.5 MG tablet TAKE 2.5 MG BY MOUTH SUN, TUES, THURS. TAKE 1.25 MG BY MOUTH ALL OTHER DAYS. 9/10/2018 at PM Yes Unknown, Entered By History   docusate sodium (COLACE) 100 MG capsule Take 100 mg by mouth 2 times daily as needed for constipation More than a month at Unknown time  Unknown, Entered By History         Medication history completed by: Marilyn Merchant, 3rd Year Student Pharmacist

## 2018-09-12 NOTE — PROGRESS NOTES
[facility], [place]   Hospitalist Daily Progress Note                                                 Date of Admission:2018  ___________________________________  INTERVAL HISTORY (24 Hrs)/SUBJECTIVE:   Last 24 hr events, care team notes reviewed.   HPI reviewed.   Neurology consult note reviewed.   Daughter, interpretor present at bedside.     Patient sleeping per daughter  On RA, breathing fine.   Intermittently opens his eyes, at times moves his L UE. Otherwise no other meaningful interaction.   Loose stools- ongoing 2-3 times overnight, this am.   No report of focal weakness, fever or chills. No cough at current.   Patient not taking any PO for now. (normally does at home), daughter prefers to wait for NG tube placement.   Also declined palliative team involvement.   Pt normally at home w PCA and family taking care of him   Incontinent of bowel or bladder  Denies any bed sore or open sore.   No other concern reported.     ROS: 4 point ROS neg other than the symptoms noted above in the interval history.    OBJECTIVE :   VITALS:    Temp:  [97  F (36.1  C)-98.7  F (37.1  C)] 97  F (36.1  C)  Pulse:  [85] 85  Heart Rate:  [79-95] 88  Resp:  [14-24] 16  BP: (134-205)/() 173/85  SpO2:  [91 %-100 %] 99 %     Temp (24hrs), Av  F (36.7  C), Min:97  F (36.1  C), Max:98.7  F (37.1  C)    Wt Readings from Last 5 Encounters:   18 68.5 kg (151 lb 0.2 oz)   18 77.9 kg (171 lb 12.8 oz)   18 69.9 kg (154 lb 1.6 oz)   16 83.9 kg (185 lb)   16 76.7 kg (169 lb 3.2 oz)        Intake/Output Summary (Last 24 hours) at 18 0952  Last data filed at 18 0900   Gross per 24 hour   Intake              130 ml   Output                0 ml   Net              130 ml       PHYSICAL EXAM:  General: sleepy, open eyes occasionally and spontaneous movement of L UE.   HEENT: AT/NC, anicteric, dry MM  Neck: Supple, no JVD or Lymphadenopathy noted.   Respi/Chest:  Non labored. Basal crackles.   CVS/Heart: S1S2 regular,   Gi/Abd: Soft,  non distended, BS +  MSK/Ext: Distally wwf. bl pedal edema.     Neuro: sleepy as above. Limited neuro exam.      Medications:   I have reviewed this patient's current medications.    Data:   All laboratory and imaging data in the past 24 hours reviewed:    LABS:  CMP  Recent Labs  Lab 09/12/18  0356 09/11/18  1700 09/11/18  1659 09/11/18  1657    138  --  136   POTASSIUM 4.4 4.4  --  4.2   CHLORIDE 106  --   --  106   CO2 19*  --   --  18*   ANIONGAP 10  --   --  11   * 176*  --  165*   BUN 32*  --   --  29   CR 1.28*  --   --  1.28*   GFRESTIMATED 54*  --  53* 54*   GFRESTBLACK 65  --  64 65   JOSHUA 8.1*  --   --  8.0*   PROTTOTAL  --   --   --  6.9   ALBUMIN  --   --   --  2.3*   BILITOTAL  --   --   --  0.4   ALKPHOS  --   --   --  90   AST  --   --   --  26   ALT  --   --   --  18     CBC  Recent Labs  Lab 09/12/18 0356 09/11/18 1700 09/11/18 1657   WBC 9.4  --  8.0   RBC 3.35*  --  3.25*   HGB 9.8* 9.9* 9.5*   HCT 31.1*  --  30.0*   MCV 93  --  92   MCH 29.3  --  29.2   MCHC 31.5  --  31.7   RDW 16.1*  --  16.2*     --  302     INR  Recent Labs  Lab 09/12/18  0356 09/11/18  1657 09/11/18   INR 1.63* 1.75* 1.7     Unresulted Labs Ordered in the Past 30 Days of this Admission     No orders found for last 61 day(s).          Recent Results (from the past 24 hour(s))   Chest  XR, 1 view portable    Narrative    XR CHEST PORT 1 VW  9/11/2018 5:05 PM      HISTORY: aspiration;       Impression    IMPRESSION:   1. Cardiomegaly with mild pulmonary edema and small pleural effusions.  2. Bibasilar patchy opacities which may represent pulmonary edema,  infection or aspiration.     Head CT w/o contrast    Narrative    CT HEAD W/O CONTRAST 9/11/2018 5:20 PM           Impression:   1. No acute intracranial pathology.  2. Stable chronic right thalamic infarct and moderate to advanced  chronic small vessel ischemic disease.  3.  Unchanged moderate generalized parenchymal volume loss.  4. Decreased left mastoid effusion.     CTA Head Neck with Contrast    Narrative    CTA HEAD NECK WITH CONTRAST 9/11/2018 7:20 PM      Impression:    1. Head CTA demonstrates moderate atherosclerotic narrowing of the  left P2 segment, nearly the entire right V4 segment, and of left M2  and M3 branches (predominantly in the inferior division).  2. Neck CTA demonstrates focal kinking of the tortuous left common  carotid artery with mild to moderate narrowing. No extracranial  internal carotid artery or vertebral artery stenosis.  3. No intracranial hemorrhage on the noncontrast head CT.   4. Stable chronic right limb infarct in moderate to advanced chronic  small vessel ischemic disease.  5. Bilateral pleural effusions, left greater than right, with  multifocal groundglass opacities concerning for infection.            ASSESSMENT & PLAN :    Priscilla Way is a 78 year old male admitted on 9/11/2018. He has a history of dementia (vascular plus Alzheimers), liver transplant in 2000, kidney transplant in 2000, DVT, DM II and CVA and is admitted for aspiration and possible seizure episode.      # Seizure-like activity - New Onset.     Patient presents following an episode of full body convulsions while eating today.  Reportedly this lasted one minute.  He has no prior history of seizures.  CT head and CTA as above. No e/o acute stroke.   - Loaded and started on Keppra 500 bid iv.   - Ct Video EEG  - frequent neuro checks   - Per Neuro: MRI brain w/o contrast for stroke. Iv Lorazepam 2 mg prior to the procedure for hx of Claustrophobia.   - NPO until SLP eval, gentle ivf. (daughter prefers to wait for NG tube)  - Tele  - Monitor Vitals Q4hr  - fall, seizure precautions.     # Possible aspiration pneumonia vs Pneumonitis - CXR from today shows bibasilar patchy opacities concerning for pneumonia vs aspiration.  - Started on Unasyn in the ED, will continue  - ID  consult.     # Recurrent C.diff colitis:   - IV metronidazole. With plan to switch to po Vanco when able  - follow-up ID recs       # History of liver and kidney transplant  - Continue home prograf (SL until can take po per Transplant sx)  - Transplant Sx consult.   D.w transplant team  Check Level. (added)     # History of DM II  - Not on any medications.  Follow daily blood sugars     # History of hypertension  - Continue home norvasc, Coreg.   - iv Hydralazine prn for sbp>170  - Monitor.      # History of DVT  - Continue coumadin, pharmacy to dose  - inr sub therapeutic. Pt not taking po. Start on heparin gtt (standard with no loading dose)     # History of dementia, CVA - At baseline he is nonverbal and minimally interactive, at times making eye contact.  He is total cares and taken care of at home by his family.  - discussed about goals of care.   - Daughter declined palliative consultation.   - expect him to be able to get to baseline and take home.      Diet: npo while encephalopathic. SLp eval. IV RL 75/hr  DVT Prophylaxis: On anticoagulation.   Code Status: Full    # Pain Assessment:  Current Pain Score 9/12/2018   Patient currently in pain? ADRIA   Pain score (0-10) -   Pain location -   Pain descriptors -   CPOT pain score 2   difficult to assess.   Per daughter, he could be in pain.   Iv hydromorphone 0.2 mg prn.   Tylenol         Dispo: Disposition Plan   Expected discharge in 2-3 days to prior living arrangement once medically ready (at baseline).     Entered: Ryan Aponte 09/12/2018, 9:52 AM     May tf to 6a or 6b/c w tele bed.     Plan discussed with patient, bedside RN and [facility] CC during Care Team Rounds.  [facility]  D/w ID.       Ryan Aponte MD   Hospitalist (Internal Medicine)  Pager: [phone].

## 2018-09-12 NOTE — CONSULTS
[facility]  Transplant Infectious Disease Consult Note - New Patient     Patient:  Priscilla Way, Date of birth 1940, Medical record number [id number]  Date of Visit:  09/12/2018  Consult requested by Dr. Ryan Aponte for evaluation of recurrent C diff infection, admitted with concern for seizure, aspiration pneumonitis vs. pneumonia.     Recommendations:  1. Recommend three more days of IV Unasyn with transition to PO Augmentin when patient is able to tolerate PO medications.  2. Recommend IV Flagyl or transition to PO Vancomycin for a duration of 7 days after the completion of above antibiotics.    The Infectious Disease SOT Service will now sign off but we are available to answer any questions as needed.    Thank you for involving our team in the care of Mr. Priscilla Way.     Patient seen and discussed with Dr. Nadira Sy, staff attending.    Xochilt Fonseca MD  Medicine-Pediatrics, PGY-4     Assessment:    Transplants:  12/27/2000 (Kidney), 12/27/2000 (Liver), Postoperative day:  6468 (Kidney), 6468 (Liver)    Mr. aWy is a 79yo M with a PMH significant for advanced vascular dementia, Liver/Kidney transplant in 2000 for ESRD attributed to Hepatitis C on tacrolimus immunosuppression, DVT on warfarin, DMII, right thalamic stroke in 2009 with residual left upper extremity weakness/right-sided facial droop who presented to the ED on 9/11 for evaluation of new single generalized tonic clonic seizure at home.        Active Problems and Infectious Diseases Issues:     1. Non-severe C diff infection, recurrent  Family describes two loose, non-watery, non-bloody stools daily without mucous since mid-July without fevers or appreciable abdominal pain. His work up thus far was negative for leukocytosis or Cr >1.5, which would be suggestive of severe C diff infection. Today, he was noted to have 4 small loose green stools per bedside nursing. Given his clinical course of two prior  non-severe C diff infections (03/2018 and 06/2018) and recent oral vancomycin taper over 4 weeks, we recommend treating with either IV Flagyl 500mg TID OR Oral Vancomycin 125mg QID (when able to tolerate PO) for a total duration of 4 weeks after the completion of IV Unasyn/PO Augmentin.     2. Aspiration pneumonitis  Suspicion on presentation was high for aspiration given the history that he was eating at the time that he had his seizure yesterday afternoon. Additionally, CXR in The ED was concerning for bibasilar patchy opacities. However, he had no signs of respiratory distress or acute hypoxic respiratory failure. Therefore, we recommend treating with IV Unasyn or PO Augmentin (when able to tolerate PO) for a total of 3 more days to prevent developing aspiration pneumonia. It is important to use antibiotics sparingly in this gentleman with a predisposition to recurrent C diff infections.     Old infections:  -6/1/18 C diff toxin B PCR: Positive (History at the time: Patient's PCA noted high volume, frequent BMs with foul odor/blood/mucous. No fever with presentation. He was initially treated with IV Flagyl followed by PO Vancomycin until 6/14/18 with a taper until 7/4/18 [125 mg daily three times daily for one week (6/14- 6/20); 125 mg daily two times daily for one week (6/21- 6/27);125 mg daily one time daily for one week (6/28 - 7/4)].    -5/1/18 C diff toxin B PCR: Positive. Per chart review, it appears he had been prescribed PO Vancomycin on 5/1/18, but did not tolerate the bitter taste.     -3/21/18 Urine Cx > 100K Escherichia coli, hospitalized between 3/21-3/24 and treated with a 7 day course of antibiotics, initially IV and then transitioned to Cefdinir to complete the course. He was treated with oral vancomycin for a ten day course during this admission and completed on discharge.         History of Infectious Disease Illness:     Mr. Way is a 79yo M who is non-verbal at baseline currently and family  describes a steady decline in cognitive function over the last two years, but particularly over the last 8 months. Regarding diarrhea, daughter May reports that his diarrhea had improved with last oral vancomycin course completed 7/4. His stool remained well formed for about two weeks before noticing diarrhea again. Initially, it alternated between solid and diarrhea stools but lately he has persistent loose but non-watery, non-bloody stools without mucous. No fevers/chills noted at home. He does eat by mouth with family feeding him. He was eating yesterday afternoon when he had a single episode of full body convulsions yesterday around 4p lasting about 1 minute in duration. Family initially thought he had coughed up blood, but examination in the ED revealed he had bitten the side of his mouth during the seizure. He did have a cough for a few days a week ago but that had since resolved. No SOB or discomfort that the family noted from his time at home. He lives in a home with 8 hours of PCA care in addition to family assistance.     In the ED, CT head did not show any acute intracranial pathology. CXR revealed bilateral chest infiltrates. He was initiated on IV Unasyn, which the primary team continued therapy in addition to IV Flagyl and PO Vancomycin. Neurology was consulted and patient was loaded with IV Keppra 2g followed by Keppra 500mg PO BID. CTA head and neck was obtained for evaluation, which revealed chronic atherosclerotic changes.     Since admission, he has continued to be hypertensive, but maintained on RA without additional oxygen needs. His evaluation revealed no leukocytosis, mild respiratory alkalosis and CXR 1 view imaging showing bibasilar patchy opacities.      Past Medical History:   Diagnosis Date     LILIAM (acute kidney injury) (H) 08/2016    kidney biopsy showed ATN     Dementia 2004    multiple acute infarcts, SV dis on CT     Diabetes type 2, controlled (H)      H/O Clostridium difficile  infection 2018    treated [facility]     Hepatitis C      Kidney transplanted      Liver transplant      Unspecified cerebral artery occlusion with cerebral infarction      Past Surgical History:   Procedure Laterality Date     TRANSPLANT KIDNEY RECIPIENT  DONOR       TRANSPLANT LIVER RECIPIENT  DONOR       No family history on file.  Social History     Social History     Marital status:      Spouse name: N/A     Number of children: N/A     Years of education: N/A     Occupational History     Not on file.     Social History Main Topics     Smoking status: Former Smoker     Types: Cigarettes     Smokeless tobacco: Never Used      Comment: quit 10 years ago     Alcohol use No     Drug use: No     Sexual activity: No     Other Topics Concern     Not on file     Social History Narrative     Immunization History   Administered Date(s) Administered     Influenza (High Dose) 3 valent vaccine 10/03/2014, 2016, 2018     Influenza (IIV3) PF 2004, 2007, 2008, 10/30/2012, 10/04/2013     Pneumo Conj 13-V (2010&after) 2016     Pneumococcal 23 valent 2007     TDAP Vaccine (Boostrix) 2016     Patient Active Problem List   Diagnosis     Memory loss     HCV (hepatitis C virus)     Liver replaced by transplant (H)     Diabetes mellitus, type 2 (H)     Acute kidney injury (H)     Deep vein thrombosis (DVT) (H) [I82.409]     Long-term (current) use of anticoagulants [Z79.01]     Delirium     Generalized edema     Altered mental state     Anemia in stage 3 chronic kidney disease     CKD (chronic kidney disease) stage 3, GFR 30-59 ml/min     Acute kidney failure (H)     Altered mental status     LILIAM (acute kidney injury) (H)     H/O Clostridium difficile infection     Seizure (H)            Review of Systems:     10 pt ROS negative except as stated above in HPI         Current Medications (antimicrobials listed in bold):       amLODIPine  10 mg Oral Daily      ampicillin-sulbactam  3 g Intravenous Q6H     carvedilol  6.25 mg Oral BID     levETIRAcetam  500 mg Intravenous Q12H     metroNIDAZOLE  500 mg Intravenous Q8H     sodium chloride (PF)  3 mL Intracatheter Q8H     tacrolimus  1.5 mg Oral BID IS     vancomycin  125 mg Oral 4x Daily     warfarin  2.5 mg Oral ONCE at 18:00     warfarin  3 mg Oral ONCE at 18:00     Infusions:    HEParin       - MEDICATION INSTRUCTIONS -       Warfarin Therapy Reminder            Allergies:   No Known Allergies       Physical Exam:   Vitals were reviewed.  All vitals stable.  Patient Vitals for the past 24 hrs:   BP Temp Temp src Pulse Heart Rate Resp SpO2 Height Weight   09/12/18 1000 (!) 178/94 - - - 85 16 100 % - -   09/12/18 0900 173/85 97  F (36.1  C) Axillary - 88 16 99 % - -   09/12/18 0800 (!) 179/92 - - - 85 15 100 % - -   09/12/18 0700 166/88 - - - 87 15 100 % - -   09/12/18 0630 168/86 - - - 86 15 100 % - -   09/12/18 0600 165/79 - - - 85 15 100 % - -   09/12/18 0530 143/89 - - - 85 16 100 % - -   09/12/18 0515 146/77 - - - 84 14 100 % - -   09/12/18 0500 (!) 134/102 - - - 90 24 99 % - -   09/12/18 0445 (!) 167/92 - - - 87 17 99 % - -   09/12/18 0430 166/90 - - - 88 19 99 % - -   09/12/18 0415 164/88 - - - 87 18 100 % - -   09/12/18 0400 164/89 98.7  F (37.1  C) Axillary - 87 19 99 % - -   09/12/18 0345 (!) 148/99 - - - 86 18 100 % - -   09/12/18 0330 (!) 194/101 - - - 86 15 99 % - -   09/12/18 0300 (!) 182/105 - - - 82 18 100 % - -   09/12/18 0245 - - - - 82 17 100 % - -   09/12/18 0230 (!) 161/105 - - - 82 16 99 % - -   09/12/18 0215 (!) 183/92 - - - 79 19 99 % - -   09/12/18 0200 (!) 189/115 - - - 95 20 100 % - -   09/12/18 0100 (!) 187/96 - - - 92 20 100 % - -   09/12/18 0030 (!) 187/99 - - - 91 17 100 % - -   09/12/18 0003 175/90 98.7  F (37.1  C) Axillary - 90 20 100 % - 68.5 kg (151 lb 0.2 oz)   09/11/18 2234 177/89 - - - 86 - 100 % - -   09/11/18 2213 (!) 185/103 - - - - - - - -   09/11/18 2200 189/90 - - - 85 20 -  "- -   09/11/18 2158 - 97.2  F (36.2  C) Axillary - 86 20 100 % - -   09/11/18 2032 (!) 177/94 - - 85 - 16 99 % - -   09/11/18 2030 (!) 190/92 - - - - - 100 % - -   09/11/18 2015 (!) 184/92 - - - - - 98 % - -   09/11/18 2000 (!) 177/94 - - - - - 99 % - -   09/11/18 1930 158/87 - - - - - 99 % - -   09/11/18 1850 (!) 203/96 - - - - - 100 % - -   09/11/18 1840 (!) 205/118 - - - - - 100 % - -   09/11/18 1830 200/88 - - - - - 100 % - -   09/11/18 1820 (!) 192/91 - - - - - 100 % - -   09/11/18 1810 (!) 205/155 - - - - - 100 % - -   09/11/18 1800 (!) 202/96 - - - - - 100 % - -   09/11/18 1750 (!) 204/104 - - - - - 100 % - -   09/11/18 1745 - - - - - - 100 % - -   09/11/18 1740 (!) 191/107 - - - - - 100 % - -   09/11/18 1730 (!) 205/110 - - - - - 100 % - -   09/11/18 1720 (!) 196/98 - - - - - 100 % - -   09/11/18 1715 (!) 197/94 - - - - - - - -   09/11/18 1707 - - - - - - - 1.778 m (5' 10\") 83.9 kg (185 lb)   09/11/18 1700 - 98.6  F (37  C) Axillary - - 20 91 % - -   09/11/18 1659 - - - 85 - - - - -   09/11/18 1655 (!) 196/98 - - - - - - - -     Ranges for his vital signs:  Temp:  [97  F (36.1  C)-98.7  F (37.1  C)] 97  F (36.1  C)  Pulse:  [85] 85  Heart Rate:  [79-95] 85  Resp:  [14-24] 16  BP: (134-205)/() 178/94  SpO2:  [91 %-100 %] 100 %  Physical Examination:  GENERAL:  Lying in bed in no acute distress.  HEENT:  Head is normocephalic, atraumatic, skin tears noted on temples bilaterally (presumably from removal of EEG leads)  EYES:  Eyes have anicteric sclerae without conjunctival injection or stigmata of endocarditis.    ENT:  Unable to assess oropharynx as patient is unable to follow commands to open mouth  NECK:  Supple. No cervical lymphadenopathy  LUNGS:  No respiratory distress, breathes to pursed lips at times. Crackles appreciated in left lung> right lung upon anterior auscultation.   CARDIOVASCULAR:  Regular rate and rhythm with no murmurs, gallops or rubs.  ABDOMEN:  Normal bowel sounds, soft, " nontender. No appreciable hepatosplenomegaly.  SKIN:  No acute rashes. No stigmata of endocarditis.  NEUROLOGIC:  Does not respond to commands, but has spontaneous movement of his upper extremities during examination.         Laboratory Data:   Inflammatory Markers    Recent Labs   Lab Test  07/25/14   1019  12/20/11   1538  12/01/10   2355   SED  55*  39*  64*     Immune Globulin Studies  Recent Labs   Lab Test  04/29/11   1253   IGG  1120   IGM  56*     Metabolic Studies     Recent Labs   Lab Test  09/12/18   0356  09/11/18   1700  09/11/18   1659  09/11/18   1657  07/02/18   0947  06/13/18   0833  06/07/18   0610  06/06/18   0700   08/31/16   0559  08/30/16   0826   NA  136  138   --   136  135  135  145*  143   < >  135  136   POTASSIUM  4.4  4.4   --   4.2  5.3  5.8*  4.1  4.1   < >  3.8  4.1   CHLORIDE  106   --    --   106  103  109  118*  116*   < >  100  101   CO2  19*   --    --   18*  22  17*  19*  18*   < >  23  23   ANIONGAP  10   --    --   11  9  9  8  8   < >  12  12   BUN  32*   --    --   29  45*  26  28  24   < >  86*  91*   CR  1.28*   --    --   1.28*  1.36*  1.12  1.47*  1.45*   < >  3.99*  4.28*   GFRESTIMATED  54*   --   53*  54*  50*  63  46*  47*   < >  15*  14*   GLC  181*  176*   --   165*  147*  80  162*  121*   < >  140*  131*   JOSHUA  8.1*   --    --   8.0*  8.2*  8.0*  7.6*  8.0*   < >  8.3*  8.0*   PHOS   --    --    --    --    --    --    --    --    --   4.6*  5.0*   MAG   --    --    --    --    --    --    --    --    --   2.1  2.1    < > = values in this interval not displayed.     Hepatic Studies  Recent Labs   Lab Test  09/11/18   1657  07/02/18   0947  05/31/18   1122  05/31/18   0830  05/15/18   0815  05/01/18   1036   BILITOTAL  0.4  0.3  0.3  0.3  0.2  0.2   ALKPHOS  90  102  94  97  94  106   ALBUMIN  2.3*  2.0*  2.5*  2.2*  2.6*  2.5*   AST  26  28  24  28  30  26   ALT  18  21  32  26  24  17     Pancreatitis testing  Recent Labs   Lab Test  09/12/16   1532  04/14/15    1300  07/25/14   1019  04/29/11   1253  09/14/10   1056   TRIG  185*  227*  283*  144  187*     Hematology Studies    Recent Labs   Lab Test  09/12/18   0356  09/11/18   1700  09/11/18   1657  07/02/18   0947  06/05/18   0548  06/04/18   0625  06/03/18   0605   05/31/18   1122   05/15/18   0815  05/01/18   1036   03/21/18   1148  01/12/18   1709   WBC  9.4   --   8.0  8.4  5.6  4.6  5.8   < >  15.2*   < >  5.1  5.8   < >  7.4  6.5   ANEU   --    --   3.8   --    --    --    --    --   11.0*   --   1.6  3.2   --   5.0  4.3   ALYM   --    --   3.2   --    --    --    --    --   2.3   --   2.3  1.6   --   1.2  1.1   LEONEL   --    --   0.7   --    --    --    --    --   1.6*   --   0.7  0.8   --   1.0  0.9   AEOS   --    --   0.1   --    --    --    --    --   0.3   --   0.4  0.2   --   0.2  0.2   HGB  9.8*  9.9*  9.5*  8.0*  8.4*  8.2*  8.7*   < >  9.8*   < >  10.0*  9.4*   < >  9.0*  9.8*   HCT  31.1*   --   30.0*  24.8*  26.3*  25.4*  27.2*   < >  29.8*   < >  31.0*  29.2*   < >  27.6*  30.9*   MCV  93   --   92  94  94  92  94   < >  93   < >  94  96   < >  95  98   PLT  296   --   302  533*  254  242  250   < >  278   < >  265  302   < >  292  346    < > = values in this interval not displayed.     Clotting Studies    Recent Labs   Lab Test  09/12/18   0356  09/11/18 1657 09/11/18 09/04/18 01/12/18   1709   11/06/10   2207   INR  1.63*  1.75*  1.7  3.4   < >  2.29*   < >  1.05   PTT   --    --    --    --    --   34   --   31    < > = values in this interval not displayed.     Autoimmune Testing   No lab results found.  Arterial Blood Gas Testing  Recent Labs   Lab Test  08/23/16   1356  08/20/16   2243   O2PER  21.0  21      Thyroid Studies     Recent Labs   Lab Test  05/01/18   1036  12/23/16   1520  10/03/14   1618  10/04/13   1253  04/26/12   0930   04/29/11   1253   09/14/10   1056   TSH  2.56  2.83  2.17  2.28  2.73   < >  2.17   < >  2.24   T4   --    --    --    --    --    --   1.21   --   1.08    < > =  values in this interval not displayed.     Urine Studies  Recent Labs   Lab Test  05/31/18   1643  03/21/18   1139  11/08/16   1446  08/20/16   1749  05/27/16   1409   URINEPH  5.5  5.0  5.0  5.5  Specimen not received  NOTIFIED [facility]  WHO NOTIFIED  LOPEZ JEAN AT 1355. NO URINE   RECEIVED WITH BLOOD SPECIMENS. AB     NITRITE  Negative  Negative  Negative  Negative  Specimen not received  NOTIFIED HOMECARE  WHO NOTIFIED  LOPEZ JEAN AT 1355. NO URINE   RECEIVED WITH BLOOD SPECIMENS. AB  *   LEUKEST  Negative  Large*  Negative  Negative  Specimen not received  NOTIFIED [facility]OMECARE  WHO NOTIFIED  LOPEZ JEAN AT 1355. NO URINE   RECEIVED WITH BLOOD SPECIMENS. AB  *   WBCU  2  159*  1  22*  Specimen not received  NOTIFIED HOMECARE  WHO NOTIFIED  LOPEZ JEAN AT 1355. NO URINE   RECEIVED WITH BLOOD SPECIMENS. AB  *     Microbiology:  -9/11/18 C diff toxin B PCR: Positive  -6/1/18 C diff toxin B PCR: Positive  -5/31 Blood Cx: Staphylococcus capitis   -5/1/18 C diff toxin B PCR: Positive   -3/21/18 Urine Cx > 100K Escherichia coli  -1/12/2018 Stool O&P x 1: negative     Virology:  CMV viral loads    Recent Labs   Lab Test  11/12/16   0823  12/02/10   0950   CSPEC  EDTA PLASMA  Whole blood, EDTA anticoagulant   CMQNT   --   <100     CMV Quantitative   Date Value Ref Range Status   12/02/2010 <100 <100 Copies/mL Final     Comment:     No CMV DNA detected.   11/04/2008 <100 <100 Copies/mL Final     Comment:     No CMV DNA detected.     EBV viral loads     Recent Labs   Lab Test  11/12/16   0823   EBRES  2250*     EBV DNA Copies/mL   Date Value Ref Range Status   11/12/2016 2250 (A) EBVNEG [Copies]/mL Final   11/05/2008 <1000 <1000 Copies/mL Final     Hepatitis B Testing   Recent Labs   Lab Test  04/29/11   1253  09/14/10   1056   HBSAB  0.0  0.1   HBCAB  Positive*  Positive*   HEPBANG  Negative   --    HBCM  Negative  Negative     Hepatitis C Testing    Hepatitis C Antibody   Date Value Ref Range Status   2011 (A) NEG Final    Positive   High sample/cutoff ratio, confirmatory testing available.   2010 Positive (A) NEG Final     Comment:      High sample/cutoff ratio, confirmatory testing available.     Cryoglobulin   Date Value Ref Range Status   2009 Trace (A) NEG % Final     Serostatus:  CMV IgG Antibody   Date Value Ref Range Status   2011 >160.0  Positive for anti-CMV IgG EU/mL Final     CMV IgM Antibody   Date Value Ref Range Status   2011 <0.90  No detectable antibody. <0.90 Final     EBV IgG Antibody Interpretation   Date Value Ref Range Status   2011 Positive, suggests immunologic exposure.  Final   2010 Positive, suggests immunologic exposure.  Final     Imagin/11 CTA Head and Neck  Impression:    1. Head CTA demonstrates moderate atherosclerotic narrowing of the  left P2 segment, nearly the entire right V4 segment, and of left M2  and M3 branches (predominantly in the inferior division).  2. Neck CTA demonstrates focal kinking of the tortuous left common  carotid artery with mild to moderate narrowing. No extracranial  internal carotid artery or vertebral artery stenosis.  3. No intracranial hemorrhage on the noncontrast head CT.   4. Stable chronic right limb infarct in moderate to advanced chronic  small vessel ischemic disease.  5. Bilateral pleural effusions, left greater than right, with  multifocal groundglass opacities concerning for infection.      CT head  Impression:   1. No acute intracranial pathology.  2. Stable chronic right thalamic infarct and moderate to advanced  chronic small vessel ischemic disease.  3. Unchanged moderate generalized parenchymal volume loss.  4. Decreased left mastoid effusion.     CXR 1 view portable   IMPRESSION:   1. Cardiomegaly with mild pulmonary edema and small pleural effusions.  2. Bibasilar patchy opacities which may represent pulmonary edema,  infection  or aspiration.

## 2018-09-12 NOTE — PROGRESS NOTES
Focus- Coccyx/sacrum, perianal area and buttocks     Received consult for suspected pressure injury on coccyx. Assessed the area and noted 0.3 x 0.2 x 0.1cm open area around perianal area. Pt has a hx of 3 weeks of loose incontinent stools r/t C-diff. Coccyx/sacrum and BL buttocks with intact epidermis without any erythema. No pressure injury detected. Need assistance with turning and changing.  P. Continue to follow pressure injury prevention and incontinence protocol. No further visit planned, will sign off.    Jessica Bonilla RN on 9/12/2018 at 11:19 AM

## 2018-09-13 NOTE — PROGRESS NOTES
[facility], [place]   Hospitalist Daily Progress Note                                                 Date of Admission:2018  ___________________________________  INTERVAL HISTORY (24 Hrs)/SUBJECTIVE:   Last 24 hr events, care team notes reviewed.     Family member at bedside. Daughter not available today.     Patient remains encephalopathic with no oral intake.   On RA, breathing fine.   Intermittently opens his eyes, Otherwise no other meaningful interaction.   Ongoing loose stools.   Incontinent of bowel or bladder    No other new concern.     ROS: 4 point ROS neg other than the symptoms noted above in the interval history.    OBJECTIVE :   VITALS:    Temp:  [94.7  F (34.8  C)-98.5  F (36.9  C)] 98.4  F (36.9  C)  Heart Rate:  [62-95] 95  Resp:  [12-18] 12  BP: (115-198)/(59-97) 163/76  SpO2:  [97 %-100 %] 98 %     Temp (24hrs), Av.3  F (35.7  C), Min:94.7  F (34.8  C), Max:98.5  F (36.9  C)      Wt Readings from Last 5 Encounters:   18 70.3 kg (154 lb 15.7 oz)   18 77.9 kg (171 lb 12.8 oz)   18 69.9 kg (154 lb 1.6 oz)   16 83.9 kg (185 lb)   16 76.7 kg (169 lb 3.2 oz)        PHYSICAL EXAM:  General: encephalopathic, non verbal, open eyes occasionally.    HEENT: AT/NC, anicteric, dry MM  Neck: Supple, no JVD or Lymphadenopathy noted.   Respi/Chest: Non labored. Basal crackles.   CVS/Heart: S1S2 regular,   Gi/Abd: Soft,  non distended, BS +  MSK/Ext: Distally wwf. bl 2+ pedal edema. L UE edema.    Neuro: Encephalopathic. Non verbal     Medications:   I have reviewed this patient's current medications.    Data:   All laboratory and imaging data in the past 24 hours reviewed:    LABS:  CMP    Recent Labs  Lab 18  0405 18  0356 18  1700 18  1659 18  1657    136 138  --  136   POTASSIUM 4.1 4.4 4.4  --  4.2   CHLORIDE 110* 106  --   --  106   CO2 23 19*  --   --  18*   ANIONGAP 8 10  --   --  11   * 181*  176*  --  165*   BUN 34* 32*  --   --  29   CR 1.76* 1.28*  --   --  1.28*   GFRESTIMATED 37* 54*  --  53* 54*   GFRESTBLACK 45* 65  --  64 65   JOSHUA 7.8* 8.1*  --   --  8.0*   PROTTOTAL 6.1*  --   --   --  6.9   ALBUMIN 2.1*  --   --   --  2.3*   BILITOTAL 0.3  --   --   --  0.4   ALKPHOS 77  --   --   --  90   AST 19  --   --   --  26   ALT 15  --   --   --  18     CBC    Recent Labs  Lab 09/13/18  0405 09/12/18  1120 09/12/18  0356 09/11/18  1700 09/11/18  1657   WBC 5.2 7.6 9.4  --  8.0   RBC 2.95* 3.40* 3.35*  --  3.25*   HGB 8.6* 10.0* 9.8* 9.9* 9.5*   HCT 27.3* 31.6* 31.1*  --  30.0*   MCV 93 93 93  --  92   MCH 29.2 29.4 29.3  --  29.2   MCHC 31.5 31.6 31.5  --  31.7   RDW 16.8* 16.5* 16.1*  --  16.2*    280 296  --  302     INR    Recent Labs  Lab 09/13/18  0405 09/12/18  0356 09/11/18  1657 09/11/18   INR 2.23* 1.63* 1.75* 1.7     Unresulted Labs Ordered in the Past 30 Days of this Admission     No orders found from 7/13/2018 to 9/12/2018.          Chest  XR, 1 view portable    Narrative    XR CHEST PORT 1 VW  9/11/2018 5:05 PM      HISTORY: aspiration;       Impression    IMPRESSION:   1. Cardiomegaly with mild pulmonary edema and small pleural effusions.  2. Bibasilar patchy opacities which may represent pulmonary edema,  infection or aspiration.     Head CT w/o contrast    Narrative    CT HEAD W/O CONTRAST 9/11/2018 5:20 PM           Impression:   1. No acute intracranial pathology.  2. Stable chronic right thalamic infarct and moderate to advanced  chronic small vessel ischemic disease.  3. Unchanged moderate generalized parenchymal volume loss.  4. Decreased left mastoid effusion.     CTA Head Neck with Contrast    Narrative    CTA HEAD NECK WITH CONTRAST 9/11/2018 7:20 PM      Impression:    1. Head CTA demonstrates moderate atherosclerotic narrowing of the  left P2 segment, nearly the entire right V4 segment, and of left M2  and M3 branches (predominantly in the inferior division).  2. Neck  CTA demonstrates focal kinking of the tortuous left common  carotid artery with mild to moderate narrowing. No extracranial  internal carotid artery or vertebral artery stenosis.  3. No intracranial hemorrhage on the noncontrast head CT.   4. Stable chronic right limb infarct in moderate to advanced chronic  small vessel ischemic disease.  5. Bilateral pleural effusions, left greater than right, with  multifocal groundglass opacities concerning for infection.        MR Brain 9/12/2018  1. No acute infarct.  2. Progression of moderate to advanced chronic small vessel ischemic  disease and moderate generalized parenchymal volume loss since  11/6/2010.    ASSESSMENT & PLAN :    Priscilla Way is a 78 year old male admitted on 9/11/2018. He has a history of dementia (vascular plus Alzheimers), liver transplant in 2000, kidney transplant in 2000, DVT, DM II and CVA and is admitted for aspiration and possible seizure episode.      # Seizure-like activity - New Onset.   # Acute Encephalopathy    Patient presents following an episode of full body convulsions while eating today.  Reportedly this lasted one minute.  He has no prior history of seizures.  CT head and CTA as above. MR brain:  No e/o acute stroke.   Pt remains encephalopathic. Likely related to seizure plus pna, c.diff. Hospitalization. Hx of likely advanced dementia.    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   Video EEg; no ongoing seizure activity. Dc. 09.12.   Neurology on board. Appreciate consult.     - Loaded and started on Keppra 500 bid iv, continue.   - NPO until SLP clearance, gentle ivf. (daughter prefers to wait for NG tube)  - Monitor Vitals Q4hr- frequent neuro checks - Tele  - fall, seizure precautions.       # Possible aspiration pneumonia vs Pneumonitis - CXR from today shows bibasilar patchy opacities concerning for pneumonia vs aspiration.  - Started on Unasyn in the ED, will continue  - ID consult appreciated.     # Recurrent C.diff colitis:    - IV metronidazole. With plan to switch to po Vanco when able    # History of liver and kidney transplant  - Continue home prograf   - Transplant Sx on board.   - patient unable to get po or SL prograf. Family (POA) not wanting NG for now. Iv steroid per transplant team.   - follow-up tacro jacques Mckeon transplant team  Further per transplant team    # LILIAM on CKD: Cr up today 9/13/2018 suspect related to intravascular dehydration, sepsis, immunosuppresants.   - ct ivf  - I/o as able. Patino if family agrees  - renally dose meds  - follow-up BMP     # History of DM II  - Not on any medications.  Follow daily blood sugars       Recent Labs  Lab 09/13/18  0405 09/12/18  0356 09/11/18  1700 09/11/18  1657   * 181* 176* 165*     # History of hypertension  - Continue home norvasc, Coreg (not able to take po)  - iv Hydralazine scheduled and prn for sbp>170  - Monitor.      # History of DVT  - Unable to take coumadin po.   - inr sub therapeutic. 09.12 Started on heparin gtt (standard with no loading dose)     # History of dementia, CVA - At baseline he is nonverbal and minimally interactive, at times making eye contact.  He is total cares and taken care of at home by his family.  - discussed about goals of care.   - Daughter has declined palliative consultation.   - She expects him to be able to get to baseline and take home.   - holding NG tube, patino for now pending family decision.      Diet: npo while encephalopathic. SLp eval. IV RL 75/hr  DVT Prophylaxis: On anticoagulation.   Code Status: Full    # Pain Assessment:  Current Pain Score 9/13/2018   Patient currently in pain? no   Pain score (0-10) -   Pain location -   Pain descriptors -   CPOT pain score 1   difficult to assess.   Per daughter, he could be in pain.   Iv hydromorphone 0.2 mg prn.   Tylenol         Dispo: Disposition Plan   Expected discharge in 2-3 days to prior living arrangement once medically ready (at baseline).     Entered: Ryan Aponte  09/13/2018, 4:54 PM     May tf to 6a or 6b/c w tele bed.     Plan discussed with patient, bedside RN and SANJUANA CC during Care Team Rounds.  D/w transplant surgery team      Ryan Aponte MD   Hospitalist (Internal Medicine)  Pager: [phone].

## 2018-09-13 NOTE — PROGRESS NOTES
Brief progress note:    No acute events overnight. Exam remains unchanged. Suspect episode prior to admission was seizure. Likely remote symptomatic in the setting of vascular vs Alzheimer's dementia and h/o stroke. EEG negative for ongoing seizure activity. MRI brain did not show evidence of new stroke or other acute pathology.The patient is not quite yet back to baseline per family. Suspect underlying C diff and aspiration pneumonia/pneumonitis is attributing. If patient does not improve despite adequate treatment of underlying infections can consider further neurological w/u at that time.  Continue keppra  500mg BID. Follow up in Neurology clinic as outpatient. Neurology will sign off at this time.  Please call if any questions or concerns.    Patient discussed with staff Dr. Danielito Camacho MD   Neurology PGY3      Patient discussed but not seen today.  First time seizure but feel risk for recurrence is high.  Currently felt to have aspiration pneumonitis and underlying C. Diff.  Near but not back to baseline likely related to underlying infections.  CNS infection is felt highly unlikely but if concern for mental status with treatment of underlying systemic infection please re consult.      Cindy Esteves DO

## 2018-09-13 NOTE — PROGRESS NOTES
Visited with pt/family on the basis of spiritual support for the pt/family. Reflected with pt/ family around their hospital experience, sources of spiritual and emotional support and current spiritual health needs.  Pt s nephew talked about his uncle current situation and what it means for him and his uncle. During my conversation with him, he reported that, he worries about the health of his uncle.  I let him know that I could be of support to the pt and his family during his hospitalization. I would be able to coordinate and participate as a spiritual supporter for both pt and his family. Pt s nephew reported that his ally is important to him and hope for the future and trust in God. Pt receives support from his children.   Emotional support. Reflective conversation integrating illness elements and family spiritual narratives. I shared reading and conversation that invite God into the room and to bless those present, support them in their suffering. I provided special prayer asking God to help him and to ease and eliminate any suffering and pain that he feels. I gave Islamic Prayer Booklet and several of Islamic Prayer Bookmarkers.  I introduced spiritual Health Services that the hospital offers. I also, introduced myself as Moravian  in the hospital.     Pt/family received spiritual support and reflective conversation in the context of this hospitalization. Pt s nephew expressed appreciation for the visit and the encouragement that he felt that he has God s support in their struggles. Pt s nephew agreed to turning over their worries to God and that is an important part of their own healings.  Will continue to provide support to pt/family during their hospitalization at least 1x/wk.

## 2018-09-13 NOTE — PLAN OF CARE
Problem: Patient Care Overview  Goal: Plan of Care/Patient Progress Review  Discharge Planner SLP   Patient plan for discharge: none stated  Current status: SLP: Pt with ongoing decreased LIZZY and did not attempt to open mouth and swallow despite maximum encouragement from ST and family. Pt remains at high risk for aspiration given reduced LIZZY. Recommend continue NPO status. Please consider excellent oral cares. Pt would benefit from goals of care discussion given ongoing aspiration risk and previous noncompliance with ST recommendations.   Barriers to return to prior living situation: high aspiration risk; decreased LIZZY  Recommendations for discharge: rehab  Rationale for recommendations: pt would benefit from continued ST to safely return to PO intake        Entered by: Fabienne Harrison 09/13/2018 10:06 AM

## 2018-09-13 NOTE — PROGRESS NOTES
I did not see the patient but agree with notes, no billing please  Transplant Surgery  Consult Note  09/13/2018    Assessment & Plan:  79yo with history of HCV s/p liver & kidney transplant 12/2000, baseline Cr ~1.3-1.4, advanced vascular dementia, CVA, DM2.  Admitted 9/11/18 with concern for seizure and aspiration after witnessed convulsions.  Transplant Surgery consulted for immunosuppression management by Dr. Aponte.    Graft function:  Liver:  LFTs normal.  Recheck Friday due to missed immunosuppression doses (ordered).  Kidney:  Cr 1.3->1.8, likely secondary to dehydration.  Primary team giving IVF today.  Recheck tomorrow.  Immunosuppression management: Patient is on tacrolimus monotherapy at home.  Per protocol, tac level goal should be 6-8.  24+ trough level 7.1 on 9/12.  Likely significantly supra therpeutic prior to admission secondary to recent diarrhea.  Possibly a contributing factor in seizure.  Patient unable to take oral medications, and family did not wish to place any type of feeding tube on admission.  Trial of SL tacro failed due to patient clenching jaw.  48h (?) tac level 5.9 today.  Options going forward are limited and somewhat problematic.  IV tac will further exacerbate HTN and is vasoconstrictive to the tx kidney.  Steroids will also increase BP and glucose.  Any IV option is limited to short term use.  Will give methylpred 16mg IV today (eqiv to prednisone 20mg) and discuss feeding tube and future care options with family.  Complexity of management: Medium. Contributing factors: aspiration, inability to give oral medications.  : Incontinence, chronic.  Infectious disease:  Afebrile.    On Unasyn for possible aspiration PNA.   C-diff +, also on flagyl (unable to take oral vanco).  ID consulted.    Medical Decision Making: Medium    ZOEY/Fellow/Resident Provider: Narcisa Alexander NP [id number]    Faculty: Ji Bonilla M.D.    Update 0223:  Decision-making daughter Cruz declined to discuss  "patient status with writer over the phone.  Made an appointment for a family meeting at noon, but Aba did not show up.  Discussed with transplant coordinator as well.  They have had significant issues getting lab work due to patient aggression toward with Home Health care workers, and due to family declining to have patient seen in Transplant Clinic.  At this point given his advanced dementia and overall decline, if the pt does not improve over the next day or two, the choice is ultimately to place a feeding tube for nutrition/immunosuppression or pursue a hospice/comfort care plan.  We feel that Hepatology may have a better relationship with this family and should be consulted for further management.  Transplant Surgery will sign off.    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    12/27/2000 (Kidney), 12/27/2000 (Liver), Postoperative day: 6469 (Kidney), 6469 (Liver)     Interval History: History is obtained from EMR and patient's daughter  Not following commands or verbalizing this morning (baseline per family).  Wakes briefly to voice.  No eye contact.    ROS:   A 10-point review of systems was negative except as noted above.    Curent Meds:    amLODIPine  10 mg Oral Daily     ampicillin-sulbactam  3 g Intravenous Q6H     carvedilol  6.25 mg Oral BID     levETIRAcetam  500 mg Intravenous Q12H     methylPREDNISolone  16 mg Intravenous Once     metroNIDAZOLE  500 mg Intravenous Q8H     sodium chloride (PF)  3 mL Intracatheter Q8H     vancomycin  125 mg Oral 4x Daily     warfarin  2.5 mg Oral ONCE at 18:00     warfarin  3 mg Oral ONCE at 18:00       Physical Exam:     Admit Weight: 83.9 kg (185 lb)    Current Vitals:   /66  Pulse 85  Temp 96  F (35.6  C) (Axillary)  Resp 12  Ht 1.778 m (5' 10\")  Wt 70.3 kg (154 lb 15.7 oz)  SpO2 98%  BMI 22.24 kg/m2    Vital sign " ranges:    Temp:  [94.7  F (34.8  C)-97.8  F (36.6  C)] 96  F (35.6  C)  Heart Rate:  [62-84] 74  Resp:  [12-18] 12  BP: (115-185)/(59-97) 141/66  SpO2:  [97 %-100 %] 98 %  Patient Vitals for the past 24 hrs:   BP Temp Temp src Heart Rate Resp SpO2 Weight   09/13/18 0900 141/66 - - 74 - 98 % -   09/13/18 0800 168/79 96  F (35.6  C) Axillary 84 12 100 % -   09/13/18 0700 - - - 74 12 99 % -   09/13/18 0600 115/59 96.6  F (35.9  C) Tympanic 68 - 98 % -   09/13/18 0500 126/62 - - 66 - 97 % -   09/13/18 0400 167/82 - - 68 16 99 % -   09/13/18 0330 161/82 95.1  F (35.1  C) Tympanic 65 - 100 % -   09/13/18 0300 182/77 - - 73 - 99 % -   09/13/18 0200 165/78 - - 63 - 100 % -   09/13/18 0100 161/75 - - 64 - 100 % -   09/13/18 0000 133/77 94.7  F (34.8  C) Tympanic 63 16 99 % 70.3 kg (154 lb 15.7 oz)   09/12/18 2300 127/64 - - 62 - 99 % -   09/12/18 2200 135/74 - - 63 - 99 % -   09/12/18 2100 161/81 - - 66 - 99 % -   09/12/18 2000 171/81 95  F (35  C) Axillary 66 16 98 % -   09/12/18 1900 173/87 - - 70 - 100 % -   09/12/18 1800 168/79 - - 71 18 98 % -   09/12/18 1600 184/85 - - 77 12 100 % -   09/12/18 1500 179/82 97.8  F (36.6  C) Axillary 74 18 100 % -   09/12/18 1400 (!) 184/97 - - 83 14 100 % -   09/12/18 1300 (!) 185/91 - - 84 14 100 % -     General Appearance: in no apparent distress.   Skin: Warm, no jaundice  Heart: RRR  Lungs: Non-labored  Abdomen: Soft  : Incontinent  Extremities: edema: +1 BLE  Neurologic: Awake, not following commands, not tracking    Data:   CMP  Recent Labs  Lab 09/13/18  0405 09/12/18  0356 09/11/18  1700  09/11/18  1657    136 138  --  136   POTASSIUM 4.1 4.4 4.4  --  4.2   CHLORIDE 110* 106  --   --  106   CO2 23 19*  --   --  18*   * 181* 176*  --  165*   BUN 34* 32*  --   --  29   CR 1.76* 1.28*  --   --  1.28*   GFRESTIMATED 37* 54*  --   < > 54*   GFRESTBLACK 45* 65  --   < > 65   JOSHUA 7.8* 8.1*  --   --  8.0*   ICAPOC  --   --  4.6  --   --    ALBUMIN 2.1*  --   --   --   2.3*   BILITOTAL 0.3  --   --   --  0.4   ALKPHOS 77  --   --   --  90   AST 19  --   --   --  26   ALT 15  --   --   --  18   < > = values in this interval not displayed.  CBC    Recent Labs  Lab 09/13/18  0405 09/12/18  1120   HGB 8.6* 10.0*   WBC 5.2 7.6    280     COAGS    Recent Labs  Lab 09/13/18  0405 09/12/18  0356   INR 2.23* 1.63*

## 2018-09-13 NOTE — PROGRESS NOTES
[facility] Nurse Inpatient Wound Assessment   Reason for consultation: Evaluate and treat head wound     Assessment  Head wounds due to Skin Tear 2/2 EEG leads  Status: initial assessment    Treatment Plan  Head wounds from EEG leads: Every shift cleanse with saline and pat dry.  Apply thin layer of vaseline, no cover dressing needed.   Orders Written  WO Nurse follow-up plan:weekly  Nursing to notify the Provider(s) and re-consult the WO Nurse if wound(s) deteriorates or new skin concern.    Patient History  According to provider note(s):  Priscilla Way is a 78 year old male admitted on 9/11/2018. He has a history of dementia, liver transplant in 2000, kidney transplant in 2000, DVT, DM II and CVA and is admitted for aspiration and possible seizure    Objective Data  Containment of urine/stool: Incontinence Protocol    Active Diet Order    Active Diet Order      NPO for Medical/Clinical Reasons Except for: Meds, Ice Chips    Output:   I/O last 3 completed shifts:  In: 1483.53 [I.V.:1483.53]  Out: -     Risk Assessment:   Sensory Perception: 2-->very limited  Moisture: 2-->very moist  Activity: 1-->bedfast  Mobility: 2-->very limited  Nutrition: 2-->probably inadequate  Friction and Shear: 2-->potential problem  Stuart Score: 11                          Labs:   Recent Labs  Lab 09/13/18  0405   ALBUMIN 2.1*   HGB 8.6*   INR 2.23*   WBC 5.2       Physical Exam  Skin inspection: focused head         Wound Location:  head  Date of last photo 9/13/18  Wound History: EEG lasting 2.5 hrs yesterday.  Likely skin tear 2/2 removal of EEG leads and not pressure. 3 wound on left side of face/temple and one over right temple.  Measurements (length x width x depth, in cm) Largest of left measures 0.8 x 0.5 x <0.1 cm, Right side measures 1 x 0.5 x 0.1 cm  Wound Base:  dermis  Palpation of the wound bed: normal   Periwound skin: intact  Color: normal and consistent with surrounding tissue  Temperature: normal   Drainage:,  none  Description of drainage: none  Odor: none  Pain: no grimacing or signs of discomfort    Interventions  Current support surface: Standard  Low air loss mattress  Current off-loading measures: Pillows under calves  Visual inspection of wound(s) completed  Wound Care: done per plan of care  Supplies: at bedside and floor stock  Education provided: importance of repositioning, plan of care and wound progress  Discussed plan of care with Nurse    Holli Magallanes RN

## 2018-09-13 NOTE — PLAN OF CARE
Problem: Patient Care Overview  Goal: Plan of Care/Patient Progress Review  A/I: non-verbal, does not follow commands, unable to give tacrolimus sublingual 2/2 pt clamped mouth shut.  Strict NPO.  Very difficult lab draw, lab attempted x4, vascular access used ultrasound for labs.  Temp 94.7, josie hugger applied.  Loose stool x2,  Incontinent of urine x2.  Heparin Xa 1.28, gtt held 1631-5775 and dose decreased per order.    P: May need midline or PICC for lab draws

## 2018-09-13 NOTE — PLAN OF CARE
Problem: Patient Care Overview  Goal: Plan of Care/Patient Progress Review  Outcome: Improving  Transferred to: 6A at 1800.   Status at time of transfer: VSS. SpO2 >90% on room air. HTN relieved with 10mg Hydralazine x2. Otherwise SBPs <170s - RN aware. Per MD Pena continuing to hold all oral medications and pt NPO. Refer to speech note. Family refuses NJ/NG insertion. Per MD Pena no patino for strict I&Os. Per MD salgado midline for lab draws at this time. MD neurology aware R smaller than L and ok with this in morning. Oral care done q 4 hrs, but difficult with pt resistance and closing mouth. MD aware. Family aware of plan of care. Skin care done. Family notified of transfer. Heparin hled d/t INR at 1.24 for 1 hour, restarted at 850, RN aware to recheck 10A in 6 hours. 1x 0.2 dilaudid given with relief prior to transfer to bed, d/t pt increase HR and RR per family and moaning, HR and RR decreased after settled.   Belongings: Per family no belongings in room. Family collected all of their belongings.   Chart and medications: Sent.   Family notified: Per nephew was going to notify family members of transfer.     Problem: Diabetes Comorbidity  Goal: Diabetes  Patient comorbidity will be monitored for signs and symptoms of hyperglycemia or hypoglycemia. Problems will be absent, minimized or managed by discharge/transition of care.   Outcome: No Change  Bgls maintained on current regimen. No NJ/NG per family. Speech does not approve oral intake.      Problem: Feed Dysfunction/Swallow Impair (Infant)  Goal: Identify Related Risk Factors and Signs and Symptoms  Related risk factors and signs and symptoms are identified upon initiation of Human Response Clinical Practice Guideline (CPG).   Outcome: No Change  Patient closes mouth every time try to administer sublingual tacMD veronica changed to IV steriods. - per MD administer no oral meds.

## 2018-09-14 NOTE — PLAN OF CARE
Problem: Patient Care Overview  Goal: Plan of Care/Patient Progress Review  Pt transferred on unit around 1800. Pt is non-verbal and does not follow commands. VSS, sating.95% at RA. Pt is strict NPO but family members are not compliant with order and feeding patient with nutrition supplement despite providing education on aspiration risk. Gold team notified of the situation. Oral suction performed frequently as needed. Heparin infusing via PIV at 850u/hr, Hep10a recheck in process. Incontinent with BM and urine. Turned and repositioned q2hrs. LR infusing at 75cc/hr. Continue with cares and update MD with any changes.

## 2018-09-14 NOTE — PLAN OF CARE
Problem: Patient Care Overview  Goal: Plan of Care/Patient Progress Review  Discharge Planner SLP   Patient plan for discharge: none stated  Current status: SLP: Pt continues to be at high risk for aspiration on any PO trials. Trial of 1/2 tsp of pureed textures resulted in significant oral holding of bolus and pt with no attempt at swallow response. Pt with eventual overt s/sx of aspiration marked by congested coughing. Educated pt and family on high aspiration risk vs comfort eating. Pts family electing to continue PO intake despite high aspiration risk. Recommend continue NPO status. Pt is at high risk for aspiration on all PO intake at this time. Recommend ongoing goals of care discussion given chronic severe dysphagia and noncompliance with ST recommendations. MD requesting ST to continue to follow to assess PO readiness and provide education re: aspiration risk.   Barriers to return to prior living situation: decreased LIZZY; high aspiration risk  Recommendations for discharge: defer to MD team  Rationale for recommendations: pt would benefit from continue ST to safely return to PO intake       Entered by: Fabienne Harrison 09/14/2018 2:23 PM

## 2018-09-14 NOTE — PROGRESS NOTES
[facility], [place]   Hospitalist Daily Progress Note                                                 Date of Admission:2018  ___________________________________  INTERVAL HISTORY (24 Hrs)/SUBJECTIVE:   Last 24 hr events, care team notes reviewed.     Family member (son) at bedside. Talked to daughters on phone.     Patient more alert today. Talking oral med, liquid to family per daughter.    On RA, Oxygenating fine.   Ongoing loose stools.   Low UOP per RN  No fever.   Incontinent of bowel or bladder    No other new concern.     ROS: 4 point ROS neg other than the symptoms noted above in the interval history.    OBJECTIVE :   VITALS:    Temp:  [95.2  F (35.1  C)-98.4  F (36.9  C)] 95.2  F (35.1  C)  Pulse:  [93] 93  Heart Rate:  [85-97] 85  Resp:  [12-18] 16  BP: (127-198)/(58-97) 153/75  SpO2:  [98 %-99 %] 98 %     Temp (24hrs), Av.3  F (35.7  C), Min:95.2  F (35.1  C), Max:98.4  F (36.9  C)    Wt Readings from Last 5 Encounters:   18 70.3 kg (154 lb 15.7 oz)   18 77.9 kg (171 lb 12.8 oz)   18 69.9 kg (154 lb 1.6 oz)   16 83.9 kg (185 lb)   16 76.7 kg (169 lb 3.2 oz)        PHYSICAL EXAM:  General: more alert non verbal, moving L UE.    HEENT: AT/NC, anicteric, dry MM  Neck: Supple, no JVD or Lymphadenopathy noted.   Respi/Chest: Non labored. Clear anterior bl.   CVS/Heart: S1S2 regular,   Gi/Abd: Soft,  non distended, BS +  MSK/Ext: Distally wwf. bl 2+ pedal edema.     Neuro: Encephalopathic.      Medications:   I have reviewed this patient's current medications.    Data:   All laboratory and imaging data in the past 24 hours reviewed:    LABS:  CMP    Recent Labs  Lab 18  0733 18  0405 18  0356 18  1700 18  1659 18  1657    142 136 138  --  136   POTASSIUM 4.4 4.1 4.4 4.4  --  4.2   CHLORIDE 111* 110* 106  --   --  106   CO2 19* 23 19*  --   --  18*   ANIONGAP 13 8 10  --   --  11   * 112* 181*  176*  --  165*   BUN 40* 34* 32*  --   --  29   CR 2.51* 1.76* 1.28*  --   --  1.28*   GFRESTIMATED 25* 37* 54*  --  53* 54*   GFRESTBLACK 30* 45* 65  --  64 65   JOSHUA 7.9* 7.8* 8.1*  --   --  8.0*   PROTTOTAL 6.7* 6.1*  --   --   --  6.9   ALBUMIN 2.2* 2.1*  --   --   --  2.3*   BILITOTAL 0.3 0.3  --   --   --  0.4   ALKPHOS 78 77  --   --   --  90   AST 25 19  --   --   --  26   ALT 17 15  --   --   --  18     CBC    Recent Labs  Lab 09/14/18  0733 09/13/18  0405 09/12/18  1120 09/12/18  0356   WBC 9.0 5.2 7.6 9.4   RBC 2.97* 2.95* 3.40* 3.35*   HGB 8.8* 8.6* 10.0* 9.8*   HCT 26.7* 27.3* 31.6* 31.1*   MCV 90 93 93 93   MCH 29.6 29.2 29.4 29.3   MCHC 33.0 31.5 31.6 31.5   RDW 17.0* 16.8* 16.5* 16.1*    246 280 296     INR    Recent Labs  Lab 09/14/18  0733 09/13/18  0405 09/12/18  0356 09/11/18  1657   INR 2.06* 2.23* 1.63* 1.75*     Unresulted Labs Ordered in the Past 30 Days of this Admission     Date and Time Order Name Status Description    9/14/2018 0302 Tacrolimus level In process           Chest  XR, 1 view portable    Narrative    XR CHEST PORT 1 VW  9/11/2018 5:05 PM      HISTORY: aspiration;       IMPRESSION:   1. Cardiomegaly with mild pulmonary edema and small pleural effusions.  2. Bibasilar patchy opacities which may represent pulmonary edema,  infection or aspiration.     Head CT w/o contrast    Narrative    CT HEAD W/O CONTRAST 9/11/2018 5:20 PM           Impression:   1. No acute intracranial pathology.  2. Stable chronic right thalamic infarct and moderate to advanced  chronic small vessel ischemic disease.  3. Unchanged moderate generalized parenchymal volume loss.  4. Decreased left mastoid effusion.     CTA Head Neck with Contrast    Narrative    CTA HEAD NECK WITH CONTRAST 9/11/2018 7:20 PM      Impression:    1. Head CTA demonstrates moderate atherosclerotic narrowing of the  left P2 segment, nearly the entire right V4 segment, and of left M2  and M3 branches (predominantly in the  inferior division).  2. Neck CTA demonstrates focal kinking of the tortuous left common  carotid artery with mild to moderate narrowing. No extracranial  internal carotid artery or vertebral artery stenosis.  3. No intracranial hemorrhage on the noncontrast head CT.   4. Stable chronic right limb infarct in moderate to advanced chronic  small vessel ischemic disease.  5. Bilateral pleural effusions, left greater than right, with  multifocal groundglass opacities concerning for infection.        MR Brain 9/12/2018  1. No acute infarct.  2. Progression of moderate to advanced chronic small vessel ischemic  disease and moderate generalized parenchymal volume loss since  11/6/2010.    ASSESSMENT & PLAN :    Priscilla Way is a 78 year old male admitted on 9/11/2018. He has a history of dementia (vascular plus Alzheimers), liver transplant in 2000, kidney transplant in 2000, DVT, DM II and CVA and is admitted for aspiration and possible seizure episode.      # Seizure-like activity - New Onset.   # Acute Encephalopathy    Patient presents following an episode of full body convulsions while eating on day of admission.  Reportedly this lasted one minute.  He has no prior history of seizures.  CT head and CTA as above. MR brain:  No e/o acute stroke.   Pt remains encephalopathic. Likely related to seizure plus pna, c.diff. Hospitalization. Hx of likely advanced dementia.    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   Video EEg; no ongoing seizure activity. Dc. 09.12.   Neurology on board. Appreciate consult.     - Loaded and started on Keppra 500 bid iv, continue.   9/14/2018: patient's daughter Cruz asked to stop seizure medicines. Explained her the risk of recurrent seizure, neuro recs, she still wanted me to stop. Discontinued.     - NPO until SLP clearance, gentle ivf. (daughter declined NG tube, says family can feed him/ give him pills)  - ct neuro checks, Vitals. I/o  - fall, seizure precautions.       # Possible  aspiration pneumonia vs Pneumonitis - CXR from today shows bibasilar patchy opacities concerning for pneumonia vs aspiration.  - Started on Unasyn in the ED, will continue  - ID consult appreciated.     # Recurrent C.diff colitis:   - IV metronidazole. With plan to switch to po Vanco when able    # History of liver and kidney transplant  - Continue home prograf   - Transplant Sx on board.   - patient unable to get po or SL prograf. Family (POA) does not want NG . Iv steroid per transplant team 09.13 9/14/2018: more alert, d/w transplant NP regarding resuming tacrolimus suspension. Says will adjust after tacro lvl from today comes back.   - follow-up tacro lvl daily    D.w transplant team  Further per transplant team    # LILIAM on CKD: Cr continues to trend up. Now with decreased output. No accurate I/o as incontinent. D/w family agrees for condom catheter. Virk only if absolutely needed.  suspect related to intravascular dehydration, sepsis, immunosuppresants.   - holding diuretics.   - check UA, fena, Renal Transplant US  - condom catheter  - Strict I/o  - Renally dose meds (pharm consult)   today 9/13/2018  - ct ivf  - I/o as able. Virk if family agrees  - renally dose meds- pharmacy consult.   - daily BMP    * Transplant Nephrology consult.      # History of DM II  - Not on any medications.  Follow daily blood sugars       Recent Labs  Lab 09/14/18  0733 09/13/18  0405 09/12/18  0356 09/11/18  1700 09/11/18  1657   * 112* 181* 176* 165*     # History of hypertension  - Continue home norvasc, Coreg as able to take po   - iv Hydralazine scheduled and prn for sbp>170  - Monitor.      # History of DVT  - inr sub therapeutic. 09.12 Started on heparin gtt (standard with no loading dose)  - pharmacy consult for coumadin.      # History of dementia, CVA - At baseline he is nonverbal and minimally interactive, at times making eye contact.  He is total cares and taken care of at home by his family.  - discussed  about goals of care.   - Daughter has declined palliative consultation.   - She expects him to be able to get to baseline and take home.   - Declined NG tube. Does not want seizure medications at current.   - Family giving meds and food to patient. (despite SLP rec of NPO)    # LE edema: holding diuretics 2.2 zaki.   - Lymphedema therapy consult     Diet: npo per SLP. SLp eval. IV RL 50/hr  DVT Prophylaxis: On anticoagulation.   Code Status: Full    # Pain Assessment:  Current Pain Score 9/14/2018   Patient currently in pain? no   Pain score (0-10) -   Pain location -   Pain descriptors -   CPOT pain score -   difficult to assess.   Per daughter, he could be in pain.   Iv hydromorphone 0.2 mg prn.   Tylenol         Dispo: Disposition Plan   Expected discharge in 2-3 days to prior living arrangement once medically ready (at baseline).     Entered: Ryan Aponte 09/14/2018, 2:40 PM       Plan discussed with patient, bedside RN and SANJUANA CC during Care Team Rounds.  D/w transplant surgery team  D/w SLP      Ryan Aponte MD   Hospitalist (Internal Medicine)  Pager: [phone].

## 2018-09-14 NOTE — PHARMACY-ANTICOAGULATION SERVICE
Clinical Pharmacy - Warfarin Dosing Consult     Pharmacy has been consulted to manage this patient s warfarin therapy.  Indication: Atrial Fibrillation;DVT/ PE Treatment  Therapy Goal: INR 2-3  OP Anticoag Clinic: [facility]  Warfarin Prior to Admission: Yes  Warfarin PTA Regimen: 2.5 mg on Tue/Thu/Sun and 1.25 mg all other days  Significant drug interactions: Unasyn metronidazole, quetiapine, heparin gtt may all increase the risk of bleeding  Recent documented change in oral intake/nutrition: Unknown  Dose Comments: INR 2.06, plan to resume home regimen and give 1.25 mg tonight. Of note, pt's INR has increased since admission without any warfarin given. Speech recommending NPO.    INR   Date Value Ref Range Status   09/14/2018 2.06 (H) 0.86 - 1.14 Final   09/13/2018 2.23 (H) 0.86 - 1.14 Final       Recommend warfarin 1.25 mg today.  Pharmacy will monitor Priscilla Way daily and order warfarin doses to achieve specified goal.      Please contact pharmacy as soon as possible if the warfarin needs to be held for a procedure or if the warfarin goals change.

## 2018-09-14 NOTE — PROVIDER NOTIFICATION
"MD paged. \"Incontinence of urine & BM but very small amount of urine in brief, bladder scanned for 40 mL @ 0600, legs w/ moderate edema, BP w/n parameters\"   "

## 2018-09-14 NOTE — PLAN OF CARE
Problem: Patient Care Overview  Goal: Individualization & Mutuality  Outcome: No Change  Patient is lethargic,unable to follow simple command, does not  answerer orientation question.  Appears comfortable.On room air. Vital signs stable. Severe generalized edema present. I/A Repositioned Q 2 hours. Completed incontinent (stool) care.change linen. Placed dry pad between groins. Inserted patino for strict I/A and sample sent to lab. NPO. However patient's family putting food and fluid in patient's mouth. Patient does not look like he is swallowing it.Family was provided with  education on aspiration and complication. Patient is on Heparin drip with no change since. Had ultrasound of the kidneys, Nephrology consult and Speech& swallow consult. Family is present at the bedside, supportive of patient.Plan:Continue care.

## 2018-09-14 NOTE — PLAN OF CARE
Problem: Patient Care Overview  Goal: Plan of Care/Patient Progress Review  Outcome: No Change  Status: Admitted for aspiration & possible seizure. Hx of liver & kidney transplant (2000), dementia, DVT, DM II, & CVA.  VS: Hypetensive but w/n MD parameters (SBP <180), O2 sats in high 90s on RA, on continuous pulse oximetry  Neuros: Difficult to do neuro exam as pt is nonverbal & not responding to command per family.   GI: Fecal incontinenece, dark-brown medium BM x1  : edema on bilateral legs and thigh. Bladder scanned for 40 ml.  IV: 3 PIVs; LR infusing @ 75mL/hr, TKO 10 mL/hr between antiobiotic infusion, & Heparin drip currently infusing at 700 units/hr.   Activity: T&R q2h.  Labs: Heparin 10A level was 1.13 last night, stopped for 1 hr, & restarted at 700 units/hr per order.  Pain: withdraws hands when repositioning, otherwise no nonverbal signs of pain observed.  Respiratory: Unlabored, no shortness of breath observed.  Skin: Pressure ulcer to coccyx as noted, Barrier cream applied. Moderate edema on bilateral legs.  Social: Son at bedside overnight.?  Plan of care: Heparin 10A level recheck timed for 0700.

## 2018-09-14 NOTE — PROGRESS NOTES
Notified by [facility] report that member was admitted to [facility] on 9/11/18.  Reviewed EPIC and noted member was admitted with possible aspiration pneumonia due to possible seizure.  Placed call to SW on unit and left message on vm with services member is receiving at home- PCA 11.5 hours/day.  Requested return call with any discharge plans.    Carri Anderson RN  [facility] Care Coordinator  [phone]

## 2018-09-14 NOTE — PROGRESS NOTES
SANJUANA received a voice mail from JAGJIT Rogers Care Manager for pt. Per Carri, pt was receiving the following services in his home:    PCA, 11.5 hours per day    Carri's contact number is:  [phone].    Discharge plan:  To be determined.    If placement is needed, pt will be followed by SANJUANA.  If pt is able to discharge directly to home, pt will be followed by the 6A RN Care Coordinator for discharge planning.    EDIN Cooney  Social Work, 6A  Phone:  [phone]  Pager:  [phone]  9/14/2018

## 2018-09-14 NOTE — PROVIDER NOTIFICATION
Primary team notified about  patient has been lethargic and unresponsive since previous shifts. Nurse unable to administer morning oral medications .

## 2018-09-14 NOTE — PLAN OF CARE
Problem: Patient Care Overview  Goal: Plan of Care/Patient Progress Review  SLP: Attempted to see pt to assess PO readiness at scheduled  time, however pt out of room receiving ultrasound. Will re-attempt later as able pending  availability.

## 2018-09-15 NOTE — PROGRESS NOTES
Patient has transfer order to 5 A. Patient daughter May  was  updated by phone with the plan to transfer patient to Unit 5 A but May refused Iv fluid. Called Unit 5 A/charge  for nurse to nurse hand off.Plan; Transfer patient to unit 5 A.

## 2018-09-15 NOTE — PLAN OF CARE
Problem: Patient Care Overview  Goal: Plan of Care/Patient Progress Review  Outcome: Declining  VSS ex low temp and BP of 91/50- MD notified. Pt received lasix and albumin today to try to promote kidney function. Pt shows signs of edema in extremities and scrotum. Pt has three PIV's. DC'ed maintenance fluid due to edema per MD order. Did not give 12:00 does of vanco due to med not on unit-oncoming nurse aware. Will continue to monitor and follow POC.

## 2018-09-15 NOTE — PROGRESS NOTES
[facility], [place]   Hospitalist Daily Progress Note                                                 Date of Admission:2018  ___________________________________  INTERVAL HISTORY (24 Hrs)/SUBJECTIVE:   Last 24 hr events, care team notes reviewed.     Family member (daughter, May) at bedside.     Patient remains encephalopathic.   Anuric now.   On RA, Oxygenating fine.   No loose stool today  No fever.     Family could give ensure by mouth last evening. However has not taken anything by mouth this am.     No other new concern reported.     ROS: 4 point ROS neg other than the symptoms noted above in the interval history.    OBJECTIVE :   VITALS:    Temp:  [93.5  F (34.2  C)-94.3  F (34.6  C)] 93.6  F (34.2  C)  Heart Rate:  [71-88] 71  Resp:  [16] 16  BP: ()/(48-93) 91/50  SpO2:  [99 %-100 %] 100 %     Temp (24hrs), Av.8  F (34.3  C), Min:93.5  F (34.2  C), Max:94.3  F (34.6  C)      Wt Readings from Last 5 Encounters:   18 70.3 kg (154 lb 15.7 oz)   18 77.9 kg (171 lb 12.8 oz)   18 69.9 kg (154 lb 1.6 oz)   16 83.9 kg (185 lb)   16 76.7 kg (169 lb 3.2 oz)        PHYSICAL EXAM:  General:encephalopathic, non verbal, intermittent spontaneous eye opening .    HEENT: AT/NC, anicteric, dry MM  Neck: Supple, no JVD or Lymphadenopathy noted.   Respi/Chest: Non labored. Basal crackles.   CVS/Heart: S1S2 regular,   Gi/Abd: Soft,  non distended, BS +  MSK/Ext: Distally wwf. bl bl LE pedal edema upto thighs. Pitting+     Neuro: Encephalopathic.      Medications:   I have reviewed this patient's current medications.    Data:   All laboratory and imaging data in the past 24 hours reviewed:    LABS:  [facility]    Recent Labs  Lab 09/15/18  0705 18  1736 18  0733 18  0405 18  0356  18  1657    142 142 142 136  < > 136   POTASSIUM 4.2  --  4.4 4.1 4.4  < > 4.2   CHLORIDE 110*  --  111* 110* 106  --  106   CO2 18*  --  19* 23  19*  --  18*   ANIONGAP 13  --  13 8 10  --  11   *  --  145* 112* 181*  < > 165*   BUN 41*  --  40* 34* 32*  --  29   CR 2.89* 2.69* 2.51* 1.76* 1.28*  --  1.28*   GFRESTIMATED 21*  --  25* 37* 54*  < > 54*   GFRESTBLACK 26*  --  30* 45* 65  < > 65   JOSHUA 7.8*  --  7.9* 7.8* 8.1*  --  8.0*   PROTTOTAL  --   --  6.7* 6.1*  --   --  6.9   ALBUMIN  --   --  2.2* 2.1*  --   --  2.3*   BILITOTAL  --   --  0.3 0.3  --   --  0.4   ALKPHOS  --   --  78 77  --   --  90   AST  --   --  25 19  --   --  26   ALT  --   --  17 15  --   --  18   < > = values in this interval not displayed.  CBC    Recent Labs  Lab 09/15/18  0705 09/14/18  0733 09/13/18  0405 09/12/18  1120   WBC 7.3 9.0 5.2 7.6   RBC 2.95* 2.97* 2.95* 3.40*   HGB 8.6* 8.8* 8.6* 10.0*   HCT 27.0* 26.7* 27.3* 31.6*   MCV 92 90 93 93   MCH 29.2 29.6 29.2 29.4   MCHC 31.9 33.0 31.5 31.6   RDW 17.4* 17.0* 16.8* 16.5*    255 246 280     INR    Recent Labs  Lab 09/15/18  0705 09/14/18  0733 09/13/18  0405 09/12/18  0356   INR 1.96* 2.06* 2.23* 1.63*     Unresulted Labs Ordered in the Past 30 Days of this Admission     Date and Time Order Name Status Description    9/14/2018 2103 Blood culture Preliminary     9/14/2018 2103 Blood culture Preliminary     9/14/2018 1418 Urine Culture Aerobic Bacterial Preliminary           Chest  XR, 1 view portable    Narrative    XR CHEST PORT 1 VW  9/11/2018 5:05 PM      HISTORY: aspiration;       IMPRESSION:   1. Cardiomegaly with mild pulmonary edema and small pleural effusions.  2. Bibasilar patchy opacities which may represent pulmonary edema,  infection or aspiration.     Head CT w/o contrast    Narrative    CT HEAD W/O CONTRAST 9/11/2018 5:20 PM           Impression:   1. No acute intracranial pathology.  2. Stable chronic right thalamic infarct and moderate to advanced  chronic small vessel ischemic disease.  3. Unchanged moderate generalized parenchymal volume loss.  4. Decreased left mastoid effusion.     CTA Head  Neck with Contrast    Narrative    CTA HEAD NECK WITH CONTRAST 9/11/2018 7:20 PM      Impression:    1. Head CTA demonstrates moderate atherosclerotic narrowing of the  left P2 segment, nearly the entire right V4 segment, and of left M2  and M3 branches (predominantly in the inferior division).  2. Neck CTA demonstrates focal kinking of the tortuous left common  carotid artery with mild to moderate narrowing. No extracranial  internal carotid artery or vertebral artery stenosis.  3. No intracranial hemorrhage on the noncontrast head CT.   4. Stable chronic right limb infarct in moderate to advanced chronic  small vessel ischemic disease.  5. Bilateral pleural effusions, left greater than right, with  multifocal groundglass opacities concerning for infection.        MR Brain 9/12/2018  1. No acute infarct.  2. Progression of moderate to advanced chronic small vessel ischemic  disease and moderate generalized parenchymal volume loss since  11/6/2010.    ASSESSMENT & PLAN :    Priscilla Way is a 78 year old male admitted on 9/11/2018. He has a history of dementia (vascular plus Alzheimers), liver transplant in 2000, kidney transplant in 2000, DVT, DM II and CVA and is admitted for aspiration and possible seizure episode.      # Seizure-like activity - New Onset.   # Acute Encephalopathy    Patient presents following an episode of full body convulsions while eating on day of admission.  Reportedly this lasted one minute.  He has no prior history of seizures.  CT head and CTA as above. MR brain:  No e/o acute stroke.   Pt remains encephalopathic. Likely related to seizure plus pna, c.diff. Hospitalization. Hx of likely advanced dementia.    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   Video EEg; no ongoing seizure activity. Dc. 09.12.   Neurology on board. Appreciate consult.     - Loaded and started on Keppra 500 bid iv, continue.   9/14/2018: patient's daughter Cruz asked to stop seizure medicines. Explained her the risk  of recurrent seizure, neuro recs, she still wanted me to stop. Discontinued.     - NPO until SLP clearance, gentle ivf. (daughter declined NG tube, says family can feed him/ give him pills)  - ct neuro checks, Vitals. I/o  - fall, seizure precautions.       # Possible aspiration pneumonia vs Pneumonitis - CXR from today shows bibasilar patchy opacities concerning for pneumonia vs aspiration.  - Started on Unasyn in the ED, switched to iv Zosyn. 09.14  - ID consult appreciated.     # Recurrent C.diff colitis:   - IV metronidazole. With plan to switch to po Vanco when able    # History of liver and kidney transplant  - Continue home prograf   - Transplant Sx on board.   - patient unable to get po or SL prograf. Family (POA) does not want NG . Iv steroid per transplant team 09.13 9/14/2018: more alert, d/w transplant NP regarding resuming tacrolimus suspension. Says will adjust after tacro lvl from today comes back.   - follow-up tacro lvl daily    D.w transplant team  Further per transplant team    # LILIAM on CKD: Cr continues to trend up.   Now with anuria. Proteinuria+ vol overload. suspect related to intravascular dehydration, sepsis on adm, plus  immunosuppresants (high tacro lvl). Contrast.    Renal Transplant US- reviewed. No hydro.   Lytes wnl.   Pt still oxygenating well on RA.     Nephrology on board. MIMI.w team.     - try lasix 40 iv x 1 albumin 25 gm.   - dc ivf.  - patino : strict I/o  - Renally dose meds (pharm consult)  - daily bmp.    * dasiya May (daughter) at bedside. She does not want dialysis and more leaning towards comfort cares however per her POA Cruz wants everything done including dialysis, full code.   I called Cruz couple times, left voice message with call back number, saying urgent however she never called back.   I tried again later in the day, did not .   Nephrology team Updated.   Daughter May to try to talk to her and other family member as well.        # History of DM II  - Not on  any medications.  Follow daily blood sugars       Recent Labs  Lab 09/15/18  1245 09/15/18  0705 09/15/18  0625 09/14/18  0733 09/13/18  0405 09/12/18  0356 09/11/18  1700 09/11/18  1657   GLC  --  145*  --  145* 112* 181* 176* 165*   *  --  135*  --   --   --   --   --      # History of hypertension  - Continue home norvasc, Coreg as able to take po   - iv Hydralazine prn for sbp>170  - dc scheduled hydralazine.   - Monitor.      # History of DVT  - inr sub therapeutic. 09.12 Started on heparin gtt (standard with no loading dose)  - pharmacy consult for coumadin.      # History of dementia, CVA - At baseline he is nonverbal and minimally interactive, at times making eye contact.  He is total cares and taken care of at home by his family.  - discussed about goals of care.   - Daughter has declined palliative consultation.   - She expects him to be able to get to baseline and take home.   - Declined NG tube. Does not want seizure medications at current.   - Family giving meds and food to patient. (despite SLP rec of NPO) and risk of aspiration explained.       # LE edema: holding diuretics 2.2 zaki.   - Lymphedema therapy consult     Diet: npo per SLP. Dc ivf  DVT Prophylaxis: On anticoagulation.   Code Status: Full    # Pain Assessment:  Current Pain Score 9/15/2018   Patient currently in pain? no   Pain score (0-10) -   Pain location -   Pain descriptors -   CPOT pain score -   difficult to assess.   Per daughter, he could be in pain.   Iv hydromorphone 0.2 mg prn.   Tylenol when able to take po.     Goals of Care:     As mentioned above. Abah is POA. Still full code. Family discussing about dialysis and code status, so far POA wants dialysis, full code per May. They all dont want feeding tube. No IV fluids.   Contact POA for any interval change.   - Declined Palliative consult.     Dispo: Disposition Plan   Expected discharge in 5-7 days to prior living arrangement once medically stable.      Entered:  Ryan Aponte 09/15/2018, 5:45 PM       Plan discussed with patient, bedside RN and SANJUANA CC during Care Team Rounds.  D/w Tx Nephrology.   Signed out to Cross Cover.       Ryan Aponte MD   Hospitalist (Internal Medicine)  Pager: [phone].

## 2018-09-15 NOTE — PROVIDER NOTIFICATION
Cross cover Doctor ([phone]) notified about temp 93.5 Ax, zero urine output. worsening penile and scrotal edema. Did not take oral medications, too drowsy.Would you come and see patient.

## 2018-09-15 NOTE — PLAN OF CARE
Problem: Patient Care Overview  Goal: Plan of Care/Patient Progress Review  Outcome: Declining  Pt transferred from 6A to 5A ~ 2300.  Daughter, Manuela, at bedside, attentive to pt's needs. Pt has low temp other VSS on RA.  Pt alert but does not respond to commands or answer questions, mostly nonverbal.  Turn q2h as daughter allow. Slept through most of the night.  No BM this shift.  Virk patent, 5ml OP this shift, Gold Night made aware. IV flagyl for c diff infection.  IV zosyn for sepsis, last lactic 0.8.  IV mycophenolate as scheduled, liver txp hx.  LR infusing 100 ml/hr.  Heparin infusing 700 units/hr, heparin 10a draw this am.  R PIV x2 and L PIV all infusing.  No indicators of pain, pt withdraws from any stimulation, unable to do full neuro assessment, pupils sluggish.  Moderate-severe edema in BLE up to hips, scrotum, penis, and mild edema in BUE.  Strict NPO per speech note, pt unable to swallow safely.  BG spot check, 135.  Neurology following.  Continue to monitor and follow POC.

## 2018-09-15 NOTE — PROGRESS NOTES
Medicine cross cover note -    Called re: no urine output, low temps.    I went to see the patient.  He has no urine on bladder scan and minimal output over the course of the day following patino placement.  On multiple rechecks his temperature is 94 which is slightly down from yesterday.  On review of his chart his procalcitonin is slightly up-trending despite antibiotics.  Question ongoing aspiration vs resistant organism.    - Will increase LR to 100 ccs/hr as fluid intake is minimal.  Will give one liter fluid bolus  - Will broaden antibiotics from Unasyn to Zosyn.  - Repeat blood cultures, lactic acid    Addendum - Called by nursing and told that one of the daughters (May) called and told them the patient should not receive any additional fluids.  She believes she spoke to a nephrologist earlier today who advised against fluid, but I see no nephrology notes and transplant nephrology appears to have signed off yesterday so I am unsure of who this might have been.  I spoke with the daughter present and she told me to contact [facility], the patient's medical decision maker.  I did so, explained my rationale and she was agreeable to trying a bolus and slightly increased maintenance rate.  Will order.

## 2018-09-15 NOTE — PROVIDER NOTIFICATION
Provider notified via web paging [phone]) about Iv fluid stopped for patient's daughter May refused any fluid to be administer  other than the 50cc/hr that the patient has been recieving.

## 2018-09-15 NOTE — PLAN OF CARE
Problem: Patient Care Overview  Goal: Plan of Care/Patient Progress Review  Edema/5A: Cancel. Pt not appropriate for edema services and use of compression at this time as pt unable to respond appropriately to commands or questions, will reschedule to 9/17.

## 2018-09-15 NOTE — PLAN OF CARE
Problem: Patient Care Overview  Goal: Individualization & Mutuality  Outcome: No Change  Hand off report was given to receiving RN on 5 A. Restarted iv fluid per provider and his conversation with family. Plan:transfere to unit 5 A.Continue care.

## 2018-09-15 NOTE — CONSULTS
[facility], [place]  Transplant Nephrology Consult  Date of Admission:  9/11/2018  Requesting physician: Ryan Aponte MD    Assessment & Plan   # SLK: martínez Cr ~ 1.2- 1,4; Increased - likely due to contrast which he received on 9/11/2018. Now anuric and fluid overloaded. BP > 150. UA  With 34 WBC and 7 RBC and large LE. Cx NGTD. On zosyn. UA with no hydronephrosis. Would give 80 IV lasix  with 25% albumin. Discussed requiring RRT if he continues to be anuric but daughter would not like any life prolonging measures which would not increase patient quality of life. Daughter aware this might lead to decreased survival and early death.    - Proteinuria: Nephrotic range - new which is likely due to concentrated urine in the setting of acute renal failure with background DM nephropathy.    - Latest DSA: No Date of DSA last checked: 8/2016   - BK: No   - Kidney Tx Biopsy: Yes- 8/2015- ATN, DM nephropathy with features of FSGS and moderate to severe arterisclerosis    # Liver transplant :LFTs and Alk phos normal.     # Immunosuppression: would continue IV Mycophenolate mofetil 1 gram BID and continue IV  Pmethylprednisone 8mg IV. Was on tacrolimus montherapy  but not tolerating oral now. Would not given IV tacrolimus.     # Hypertension: Inadequate control; Goal BP: 150/90   - Changes: Yes - will try to diurese as above. in addition to continuing his amlodipine and coreg.     # Anemia in chronic renal disease: Hgb: Stable   - Iron studies: Low. Replete when patient more stable    # Mineral Bone Disorder:   - Secondary renal hyperparathyroidism; PTH level is:  Normal at 78 on 6/2018  - Vitamin D; level is:  normal  - Calcium; level is:  Normal when corrected for albumin   - Phosphorus; level is: was elevated due to LILIAM      # Electrolytes:   - Potassium; level: normal    # Other Medical Issues: {  # seziure disorder- admitted with AMS. Seen by neurology. On keppra.   # h/o CVA:  Minimally  interactive at baseline. Palliative care on consult. Family does not want feeding tube.   # aspiration PNA- on Zosyn now.   # Cdiff- on po vanc  # DM- management per primary.     # Transplant History:  Etiology of kidney failure: Hepatitis C  Transplant: 2000 (Kidney), 2000 (Liver)  Donor Type:  - Brain Death Donor Class: Standard Criteria Donor  Significant changes in immunosuppression: see above  Significant Complications: Now anuric LILIAM    Recommendations were communicated to the primary team verbally    Seen and discussed with Dr. Rosenberg.     Darryl Abernathy MD  Pager: [phone]  Transplant Office Phone Number: [phone]    REASON FOR CONSULT   LILIAM and IS managmenet.     History of Present Illness   Priscilla Way is a 78 year old male with h/o simultaneous liver and kidney transplant in  for hepatitis C.He was admitted  for AMS and seizures. Underwent CTA with contrast of brain neck for evaluation of the same on . Was seen by neurology and on keppra BID. Was also seen by ID- on abx now for aspiration PNA and c diff. We are on consult now for anuric renal failure.     History cannot be obtained from patient due to medical condition    Review of Systems    Review of Systems    Past Medical History    I have reviewed this patient's medical history and updated it with pertinent information if needed.   Past Medical History:   Diagnosis Date     LILIAM (acute kidney injury) (H) 2016    kidney biopsy showed ATN     Dementia     multiple acute infarcts, SV dis on CT     Diabetes type 2, controlled (H)      H/O Clostridium difficile infection 2018    treated [facility] vanco     Hepatitis C      Kidney transplanted      Liver transplant      Unspecified cerebral artery occlusion with cerebral infarction         Past Surgical History   I have reviewed this patient's surgical history and updated it with pertinent information if needed.  Past Surgical History:   Procedure Laterality Date      TRANSPLANT KIDNEY RECIPIENT  DONOR       TRANSPLANT LIVER RECIPIENT  DONOR          Social History   Social History   Substance Use Topics     Smoking status: Former Smoker     Types: Cigarettes     Smokeless tobacco: Never Used      Comment: quit 10 years ago     Alcohol use No       Family History   I have reviewed this patient's family history and updated it with pertinent information if needed.       Prior to Admission Medications   Prior to Admission Medications   Prescriptions Last Dose Informant Patient Reported? Taking?   Multiple Vitamins-Minerals (MULTIVITAMIN ADULT) TABS 2018 at AM  Yes Yes   Sig: Take 1 tablet by mouth daily   PROGRAF (BRAND) 0.5 MG CAPSULE 2018 at AM  Yes Yes   Sig: Take 1.5 mg by mouth 2 times daily   QUEtiapine (SEROQUEL) 25 MG tablet 9/10/2018 at PM  No Yes   Sig: Take 1 tablet (25 mg) by mouth nightly as needed (Agitation) - Oral   Vitamin D, Cholecalciferol, 400 units TABS 2018 at AM  Yes Yes   Sig: Take 800 Units by mouth daily   amLODIPine (NORVASC) 10 MG tablet 2018 at AM  Yes Yes   Sig: Take 10 mg by mouth daily   aspirin 81 MG EC tablet 2018 at AM  Yes Yes   Sig: Take 81 mg by mouth daily   carvedilol (COREG) 6.25 MG tablet 2018 at AM  No Yes   Sig: Take 1 tablet (6.25 mg) by mouth 2 times daily   docusate sodium (COLACE) 100 MG capsule More than a month at Unknown time  Yes No   Sig: Take 100 mg by mouth 2 times daily as needed for constipation   ferrous gluconate (FERGON) 324 (38 Fe) MG tablet 2018 at AM  Yes Yes   Sig: Take 648 mg by mouth daily (with breakfast)   furosemide (LASIX) 20 MG tablet 9/10/2018 at PM  Yes Yes   Sig: Take 20 mg by mouth daily   omeprazole (PRILOSEC) 20 MG CR capsule 2018 at AM  Yes Yes   Sig: Take 20 mg by mouth daily   warfarin (COUMADIN) 2.5 MG tablet 9/10/2018 at PM  Yes Yes   Sig: TAKE 2.5 MG BY MOUTH SUN, TUES, THURS. TAKE 1.25 MG BY MOUTH ALL OTHER DAYS.      Facility-Administered  "Medications: None     Allergies   No Known Allergies    Physical Exam   Temp  Av.2  F (35.7  C)  Min: 93.5  F (34.2  C)  Max: 98.7  F (37.1  C)      Pulse  Av.7  Min: 85  Max: 93 Resp  Av.6  Min: 12  Max: 24  SpO2  Av.3 %  Min: 91 %  Max: 100 %     /75 (BP Location: Left arm)  Pulse 93  Temp 93.8  F (34.3  C)  Resp 16  Ht 1.778 m (5' 10\")  Wt 70.3 kg (154 lb 15.7 oz)  SpO2 100%  BMI 22.24 kg/m2   Date 09/15/18 07 - 18 0659   Shift 5815-0157 1353-9548 9138-0212 24 Hour Total   I  N  T  A  K  E   I.V. 800   800    Shift Total  (mL/kg) 800  (11.38)   800  (11.38)   O  U  T  P  U  T   Shift Total  (mL/kg)       Weight (kg) 70.3 70.3 70.3 70.3        Admit Weight: 83.9 kg (185 lb)     GENERAL APPEARANCE: lethargic and not awake.   HENT: mouth without ulcers or lesions  LYMPHATICS: no cervical or supraclavicular nodes  RESP: lungs clear to auscultation - no rales, rhonchi or wheezes  CV: regular rhythm, normal rate, no rub, no murmur  EDEMA:2+  LE edema bilaterally  ABDOMEN: soft, nondistended, nontender, bowel sounds normal  MS: extremities normal - no gross deformities noted, no evidence of inflammation in joints, no muscle tenderness  SKIN: no rash  DIALYSIS ACCESS:  None    Data   CMP  Recent Labs  Lab 09/15/18  0705 18  1736 18  0733 18  0405 18  0356  18  1657    142 142 142 136  < > 136   POTASSIUM 4.2  --  4.4 4.1 4.4  < > 4.2   CHLORIDE 110*  --  111* 110* 106  --  106   CO2 18*  --  19* 23 19*  --  18*   ANIONGAP 13  --  13 8 10  --  11   *  --  145* 112* 181*  < > 165*   BUN 41*  --  40* 34* 32*  --  29   CR 2.89* 2.69* 2.51* 1.76* 1.28*  --  1.28*   GFRESTIMATED 21*  --  25* 37* 54*  < > 54*   GFRESTBLACK 26*  --  30* 45* 65  < > 65   JOSHUA 7.8*  --  7.9* 7.8* 8.1*  --  8.0*   PROTTOTAL  --   --  6.7* 6.1*  --   --  6.9   ALBUMIN  --   --  2.2* 2.1*  --   --  2.3*   BILITOTAL  --   --  0.3 0.3  --   --  0.4   ALKPHOS  --   --  78 " 77  --   --  90   AST  --   --  25 19  --   --  26   ALT  --   --  17 15  --   --  18   < > = values in this interval not displayed.  CBC  Recent Labs  Lab 09/15/18  0705 09/14/18  0733 09/13/18  0405 09/12/18  1120   HGB 8.6* 8.8* 8.6* 10.0*   WBC 7.3 9.0 5.2 7.6   RBC 2.95* 2.97* 2.95* 3.40*   HCT 27.0* 26.7* 27.3* 31.6*   MCV 92 90 93 93   MCH 29.2 29.6 29.2 29.4   MCHC 31.9 33.0 31.5 31.6   RDW 17.4* 17.0* 16.8* 16.5*    255 246 280     INR  Recent Labs  Lab 09/15/18  0705 09/14/18  0733 09/13/18  0405 09/12/18  0356   INR 1.96* 2.06* 2.23* 1.63*     ABGNo lab results found in last 7 days.   Urine Studies  Recent Labs   Lab Test  09/14/18   1418  05/31/18   1643  03/21/18   1139  11/08/16   1446   COLOR  Yellow  Yellow  Yellow  Yellow   APPEARANCE  Cloudy  Clear  Slightly Cloudy  Clear   URINEGLC  150*  Negative  Negative  Negative   URINEBILI  Negative  Negative  Negative  Negative   URINEKETONE  10*  Negative  Negative  Negative   SG  1.028  1.012  1.011  1.014   UBLD  Moderate*  Trace*  Trace*  Negative   URINEPH  6.5  5.5  5.0  5.0   PROTEIN  >600*  30*  10*  10*   NITRITE  Negative  Negative  Negative  Negative   LEUKEST  Large*  Negative  Large*  Negative   RBCU  7*  9*  2  1   WBCU  34*  2  159*  1     Recent Labs   Lab Test  09/14/18   1418  05/27/16   1409  04/29/11   1348  09/14/10   1134   UTPG  24.91*  Specimen not received  NOTIFIED [facility]  WHO PAGED  RN, LOPEZ AT 1355. NO URINE   RECIEVED WITH BLOOD SPECIMENS. AB    0.04  <0.02     PTH  Recent Labs   Lab Test  06/05/18   0548  08/24/16   0852   PTHI  78  333*     Iron Studies  Recent Labs   Lab Test  11/08/16   1209  08/24/16   0852   IRON  17*  20*   FEB  146*  197*   IRONSAT  11*  10*   BARTOLO   --   1025*       IMAGING:  I personally reviewed these imaging films.  A formal report from radiology will follow.  Renal ultrasound:   No masses, cysts, stones, hydronephrosis or significant abnormalities seen

## 2018-09-16 NOTE — PROGRESS NOTES
Medicine cross cover note -    Notified by nursing that patient is increasingly unable to take oral medications.  He choked taking his coreg and coumadin tonight.  Will put amlodipine, coreg and coumadin on hold.  Patient already on heparin drip so should be covered for anticoagulation.  Blood pressures are actually lower than prior now so will hold off on ordering any replacement.

## 2018-09-16 NOTE — PLAN OF CARE
Problem: Patient Care Overview  Goal: Plan of Care/Patient Progress Review  Outcome: No Change  VSS on RA. Pt is now DNR/DNI. Strict NPO- oral meds held.  Pt still has significant edema- after neph and primary docs consulted with family, pt had dialysis tube placed and is going down for a dialysis run. Zosyn and methylprednisolone held for dialysis. Creatinine is 3.31. PIV's intact. Will continue to monitor and follow POC.

## 2018-09-16 NOTE — PROGRESS NOTES
I and Dr Rosenberg from Transplant Nephrology discussed with Cruz, patient's daughter and POA, in details about ongoing renal failure, dialysis, feeding tube, code status and goals of care.     Per Ms Arroyo:     - Code status to be changed to DNR, DNI.   - Likes to try Dialysis, Nephrology team to put temporary HD catheter and dialyze him later today.   - No feeding tube for now.   - Family member to remain available at bedside to help RN to give pills.   - Cruz to remain available in person or phone for future updates and changes as it happens.     Blanca long RN.       Ryan Aponte MD   Hospitalist (Internal Medicine)  [facility]  Pager: [phone].

## 2018-09-16 NOTE — PROGRESS NOTES
09/16/2018     Procedure right temporary dual lumen dialysis catheter  Indications: for HD  Consent obtained   The indications and risks of the temporary dialysis catheter placement, including infection, bleeding severe enough to require transfusion, unintended trauma to adjacent organs or blood vessels, pneumothorax, or death, were explained, understood and agreed. Consent form was signed by the patient (or proxy) and a copy is in the paper chart.   Physician Darryl Abernathy MD   Patient prepped and draped in sterile fashion.  Right IJ identified with ultrasound, anesthesia with 1% lidocaine, vessel cannulated with 18 g needle under direct ultrasound guidance, Using Seldinger technique dialysis catheter was placed in Right IJ.  Blood flow in both ports. Patient tolerated with no apparent complications.    Post procedure CXR ordered

## 2018-09-16 NOTE — PLAN OF CARE
Problem: Patient Care Overview  Goal: Plan of Care/Patient Progress Review  Outcome: Declining  Pt admitted 9/11 for aspiration and seizure-like activity. VSS except temp of 93.0F, obtunded, non-verbal. Lung sounds fine crackles in lower lobes and deep respirations. Abdomen distended and firm. +4 edema in LE, +3 in penile/scrotum and right arm, +2 in left arm. 15ml Rhea urine output via patino catheter. No BM this shift. Pt unable to take oral medications, choked on 2000 medication. Daughter orally suctioned with younker, no changed in O2 sats. Provider notified and medications put on hold. Family at bedside and helpful with cares. Maintained heparin drip of 700 units/hr. IV antibiotics continued. Will continue with plan of care.

## 2018-09-16 NOTE — PROGRESS NOTES
[facility], [place]   Hospitalist Daily Progress Note                                                 Date of Admission:2018  ___________________________________  INTERVAL HISTORY (24 Hrs)/SUBJECTIVE:   Last 24 hr events, care team notes reviewed.     Patient remains encephalopathic.   Anuric now.   On RA, Oxygenating fine.   No loose stool today  Afebrile.     Family member (daughter, Wero LE) and her  at bedside.   Dr Rosenberg and I discussed with her goals of care including dialysis plan.   She wants dialysis, no feeding tube for now and DNR/DNI.       ROS: 4 point ROS neg other than the symptoms noted above in the interval history.    OBJECTIVE :   VITALS:    Temp:  [93  F (33.9  C)-95.3  F (35.2  C)] 95.3  F (35.2  C)  Pulse:  [74] 74  Heart Rate:  [72-86] 86  Resp:  [16-18] 18  BP: ()/(53-78) 144/58  Cuff Mean (mmHg):  [75-76] 76  SpO2:  [100 %] 100 %     Temp (24hrs), Av.5  F (34.2  C), Min:93  F (33.9  C), Max:95.3  F (35.2  C)      Wt Readings from Last 5 Encounters:   18 87.4 kg (192 lb 10.9 oz)   18 77.9 kg (171 lb 12.8 oz)   18 69.9 kg (154 lb 1.6 oz)   16 83.9 kg (185 lb)   16 76.7 kg (169 lb 3.2 oz)        PHYSICAL EXAM:  General:encephalopathic, non verbal,   HEENT: AT/NC, anicteric, dry MM  Neck: Supple, no JVD or Lymphadenopathy   Respi/Chest: Non labored. Basal crackles.   CVS/Heart: S1S2 regular,   Gi/Abd: Soft,  non distended  MSK/Ext: Distally wwf. bl bl LE pedal edema upto thighs.     Neuro: Encephalopathic.      Medications:   I have reviewed this patient's current medications.    Data:   All laboratory and imaging data in the past 24 hours reviewed:    LABS:  CMP    Recent Labs  Lab 18  0825 09/15/18  0705 18  1736 18  0733 18  0405  18  1657    141 142 142 142  < > 136   POTASSIUM 4.1 4.2  --  4.4 4.1  < > 4.2   CHLORIDE 111* 110*  --  111* 110*  < > 106   CO2 18* 18*  --   19* 23  < > 18*   ANIONGAP 14 13  --  13 8  < > 11   * 145*  --  145* 112*  < > 165*   BUN 42* 41*  --  40* 34*  < > 29   CR 3.31* 2.89* 2.69* 2.51* 1.76*  < > 1.28*   GFRESTIMATED 18* 21*  --  25* 37*  < > 54*   GFRESTBLACK 22* 26*  --  30* 45*  < > 65   JOSHUA 7.9* 7.8*  --  7.9* 7.8*  < > 8.0*   PROTTOTAL  --   --   --  6.7* 6.1*  --  6.9   ALBUMIN  --   --   --  2.2* 2.1*  --  2.3*   BILITOTAL  --   --   --  0.3 0.3  --  0.4   ALKPHOS  --   --   --  78 77  --  90   AST  --   --   --  25 19  --  26   ALT  --   --   --  17 15  --  18   < > = values in this interval not displayed.  CBC    Recent Labs  Lab 09/15/18  0705 09/14/18  0733 09/13/18  0405 09/12/18  1120   WBC 7.3 9.0 5.2 7.6   RBC 2.95* 2.97* 2.95* 3.40*   HGB 8.6* 8.8* 8.6* 10.0*   HCT 27.0* 26.7* 27.3* 31.6*   MCV 92 90 93 93   MCH 29.2 29.6 29.2 29.4   MCHC 31.9 33.0 31.5 31.6   RDW 17.4* 17.0* 16.8* 16.5*    255 246 280     INR    Recent Labs  Lab 09/16/18  0825 09/15/18  0705 09/14/18  0733 09/13/18  0405   INR 2.08* 1.96* 2.06* 2.23*     Unresulted Labs Ordered in the Past 30 Days of this Admission     Date and Time Order Name Status Description    9/16/2018 1553 Hepatitis B surface antigen In process     9/16/2018 1553 Hepatitis B Surface Antibody In process     9/14/2018 2103 Blood culture Preliminary     9/14/2018 2103 Blood culture Preliminary           Chest  XR, 1 view portable    Narrative    XR CHEST PORT 1 VW  9/11/2018 5:05 PM      IMPRESSION:   1. Cardiomegaly with mild pulmonary edema and small pleural effusions.  2. Bibasilar patchy opacities which may represent pulmonary edema,  infection or aspiration.     Head CT w/o contrast    Narrative    CT HEAD W/O CONTRAST 9/11/2018 5:20 PM       Impression:   1. No acute intracranial pathology.  2. Stable chronic right thalamic infarct and moderate to advanced  chronic small vessel ischemic disease.  3. Unchanged moderate generalized parenchymal volume loss.  4. Decreased left  mastoid effusion.     CTA Head Neck with Contrast    Narrative    CTA HEAD NECK WITH CONTRAST 9/11/2018 7:20 PM      Impression:    1. Head CTA demonstrates moderate atherosclerotic narrowing of the  left P2 segment, nearly the entire right V4 segment, and of left M2  and M3 branches (predominantly in the inferior division).  2. Neck CTA demonstrates focal kinking of the tortuous left common  carotid artery with mild to moderate narrowing. No extracranial  internal carotid artery or vertebral artery stenosis.  3. No intracranial hemorrhage on the noncontrast head CT.   4. Stable chronic right limb infarct in moderate to advanced chronic  small vessel ischemic disease.  5. Bilateral pleural effusions, left greater than right, with  multifocal groundglass opacities concerning for infection.        MR Brain 9/12/2018  1. No acute infarct.  2. Progression of moderate to advanced chronic small vessel ischemic  disease and moderate generalized parenchymal volume loss since  11/6/2010.    ASSESSMENT & PLAN :    Priscilla Way is a 78 year old male admitted on 9/11/2018. He has a history of dementia (vascular plus Alzheimers), liver transplant in 2000, kidney transplant in 2000, DVT, DM II and CVA and is admitted for aspiration and possible seizure episode.      # History of liver and kidney transplant  # LILIAM on CKD- anuric.   # Proteinuria-nephrotic range.  # Vol overload      Renal Transplant US- reviewed. No hydro.   Etiology for LILIAM: suspect MF: related to intravascular dehydration, sepsis on admission, plus  immunosuppresants (high tacro lvl). Contrast.  Tx Nephrology on board.   No response to iv fluid challenge, lasix plus albumin challenge. Remained anuric, worsening Cr.   Lytes : wnl.   Pt still oxygenating well on RA.  However appears tachypneic and vol overload.     9/16/2018: Nephrology discussed in detail with POA regarding dialysis. She made informed decision to proceed with dialysis  Nephrology to place temp  HD catheter and proceed with HD.   Rest per Nephrology.     - patient unable to get po or SL prograf. Family (POA) does not want NG . Iv steroid 09.13  - @ current iv Mycophenolate. No iv tacro given zaki    - patino : strict I/o  - Renally dose meds (pharm consult)  - daily bmp.        # Seizure-like activity - New Onset.   # Acute Encephalopathy    Patient presents following an episode of full body convulsions while eating on day of admission.  Reportedly this lasted one minute.  He has no prior history of seizures.  CT head and CTA as above. MR brain:  No e/o acute stroke.   Pt remains encephalopathic. Likely related to seizure plus pna, c.diff. Hospitalization. Hx of likely advanced dementia.    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   Video EEg; no ongoing seizure activity. Dc. 09.12.   Neurology on board. Appreciate consult.     - Loaded and started on Keppra 500 bid iv, continue.   9/14/2018: patient's daughter Cruz asked to stop seizure medicines. Explained her the risk of recurrent seizure, neuro recs, she still wanted me to stop. Discontinued.     - NPO until SLP clearance, gentle ivf. (daughter declined NG tube, says family can feed him/ give him pills)  - ct neuro checks, Vitals. I/o  - fall, seizure precautions.       # Possible aspiration pneumonia vs Pneumonitis - CXR from today shows bibasilar patchy opacities concerning for pneumonia vs aspiration.  - Started on Unasyn in the ED, switched to iv Zosyn. 09.14  - ID consult appreciated.     # Recurrent C.diff colitis:   - IV metronidazole. With plan to switch to po Vanco when able     # History of DM II  - Not on any medications.  Follow daily blood sugars       Recent Labs  Lab 09/16/18  0825 09/15/18  1245 09/15/18  0705 09/15/18  0625 09/14/18  0733 09/13/18  0405 09/12/18  0356 09/11/18  1700   *  --  145*  --  145* 112* 181* 176*   BGM  --  138*  --  135*  --   --   --   --      # History of hypertension  - Continue home norvasc, Coreg as able  to take po   - iv Hydralazine prn for sbp>170  - dc scheduled hydralazine.   - Monitor.      # History of DVT  - inr sub therapeutic. 09.12 Started on heparin gtt (standard with no loading dose)  - pharmacy consult for coumadin.      # History of dementia, CVA - At baseline he is nonverbal and minimally interactive, at times making eye contact.  He is total cares and taken care of at home by his family.  - discussed about goals of care.   - Daughter has declined palliative consultation.   - She expects him to be able to get to baseline and take home.   - Declined NG tube. Does not want seizure medications at current.   - Family giving meds and food to patient. (despite SLP rec of NPO) and risk of aspiration explained.       # LE edema: holding diuretics 2.2 zaki.   - Lymphedema therapy consult     Diet: npo per SLP. Dc ivf  DVT Prophylaxis: On anticoagulation.     Goals of Care: 9/16/2018: per POA : DNR/DNI. Dialysis YES. Feeding tube: NO.   Other daughter May prefers comfort care measures.   Declined Palliative Consult.   Will get Ethics Consult.     # Pain Assessment:  Current Pain Score 9/16/2018   Patient currently in pain? ADRIA   Pain score (0-10) -   Pain location -   Pain descriptors -   CPOT pain score -   difficult to assess.   Per daughter, he could be in pain.   Iv hydromorphone 0.2 mg prn.   Tylenol when able to take po.       Dispo: Disposition Plan   Expected discharge in 5-7 days to prior living arrangement once medically stable.      Entered: Ryan Aponte 09/16/2018, 6:53 PM       Plan discussed with patient, bedside RN   [facility].        Ryan Aponte MD   Hospitalist (Internal Medicine)  Pager: [phone].

## 2018-09-16 NOTE — PROGRESS NOTES
Called Fredisabel, daughter, POA. She said she will try to come before noon and happy to talk to us regarding dialysis and other goals of care. I text paged Transplant Nephrology attending as well.

## 2018-09-16 NOTE — PLAN OF CARE
Problem: Patient Care Overview  Goal: Plan of Care/Patient Progress Review  Outcome: No Change  VSS except temp 93.1F on RA. Nonverbal and lethargic; responds to touch. Unable to assess orientation. Pupils are dilated. Enteric precautions maintained for positive C.diff. Pt admitted with aspiration. PO meds are on hold because of risk for choking. NPO. Pt has had zero urine output from patino. MD aware. 3 PIV's in place. Heparin infusing 700units/hr. L PIV TKO with NS for IV Abx ; Generalized edema; +4 bilateral edema in knees, calves, ankles and feet. Repositioned Q2 hours with pillows. Suctioned mouth PRN. Mepilex on buttocks for blanchable redness area. No BM. Family attentive at bedside. Will continue to monitor and follow POC.

## 2018-09-16 NOTE — PROGRESS NOTES
[facility], [place]   Transplant Nephrology Progress Note  Date of Admission:  9/11/2018    Assessment & Plan      # SLK: basline Cr ~ 1.2- 1,4; Increased - likely due to contrast which he received on 9/11/2018. Now anuric and fluid overloaded. BP > 150. UA  With 34 WBC and 7 RBC and large LE. Urine Cx NGTD. On zosyn. UA with no hydronephrosis.      Had an extensive family meeting with elder daughter who is POA. Explained in detail that dailysis will not improve his quality of life and will likely only prolong his survival for unclear amount of time. Also explained that death secondary to uremic failure will be painless. Discussed futility of initiating him on RRT without feeding tube But POA would like to be initiated him on RRT and believes she can feed him orally.  Will plan temporary line today and initiation on HD.                         - Proteinuria: Nephrotic range - new which is likely due to concentrated urine in the setting of acute renal failure with background DM nephropathy.                         - Latest DSA: No                   Date of DSA last checked: 8/2016                        - BK: No                        - Kidney Tx Biopsy: Yes- 8/2015- ATN, DM nephropathy with features of FSGS and moderate to severe arterisclerosis     # Liver transplant :LFTs and Alk phos normal.      # Immunosuppression: would continue IV Mycophenolate mofetil 500 gram BID and continue IV methylprednisone 8mg IV. Was on tacrolimus montherapy  but not tolerating oral now. Would not given IV tacrolimus.      # Hypertension: Inadequate control; Goal BP: 150/90                        - Changes: Yes - will try to diurese during H. in addition to continuing his amlodipine and coreg.      # Anemia in chronic renal disease: Hgb: Stable                        - Iron studies: Low. Replete when patient more stable     # Mineral Bone Disorder:   - Secondary renal hyperparathyroidism; PTH level is:   "Normal at 78 on 2018  - Vitamin D; level is:  normal  - Calcium; level is:  Normal when corrected for albumin   - Phosphorus; level is: was elevated due to LILIAM        # Electrolytes:   - Potassium; level: normal     # Other Medical Issues:   # seziure disorder- admitted with AMS. Seen by neurology. On kera.   # h/o CVA:  Minimally interactive at baseline. Palliative care on consult. Family does not want feeding tube.   # aspiration PNA- on Zosyn now.   # Cdiff- pn iV flagyl  # DM- management per primary.      # Transplant History:  Etiology of kidney failure: Hepatitis C  Transplant: 2000 (Kidney), 2000 (Liver)  Donor Type:  - Brain Death                      Donor Class: Standard Criteria Donor  Significant changes in immunosuppression: see above  Significant Complications: Now anuric LILIAM     Recommendations were communicated to the primary team verbally     Seen and discussed with Dr. Rosenberg.      Darryl Abernathy MD  Pager: [phone]  Transplant Office Phone Number: [phone]    Interval History   Continues to be anuric. No increasing oxygen requirement. Continues to be non interactive.     Physical Exam   Temp  Av.7  F (35.4  C)  Min: 93  F (33.9  C)  Max: 98.7  F (37.1  C)      Pulse  Av.3  Min: 74  Max: 93 Resp  Av.6  Min: 12  Max: 24  SpO2  Av.3 %  Min: 91 %  Max: 100 %     /59 (BP Location: Left arm)  Pulse 74  Temp 93.1  F (33.9  C) (Axillary)  Resp 16  Ht 1.778 m (5' 10\")  Wt 70.3 kg (154 lb 15.7 oz)  SpO2 100%  BMI 22.24 kg/m2     Admit Weight: 83.9 kg (185 lb)   GENERAL APPEARANCE: lethargic and not awake.   HENT: mouth without ulcers or lesions  LYMPHATICS: no cervical or supraclavicular nodes  RESP: lungs clear to auscultation - no rales, rhonchi or wheezes  CV: regular rhythm, normal rate, no rub, no murmur  EDEMA:2+  LE edema bilaterally  ABDOMEN: soft, nondistended, nontender, bowel sounds normal  MS: extremities normal - no gross deformities " noted, no evidence of inflammation in joints, no muscle tenderness  SKIN: no rash  DIALYSIS ACCESS:  None       Data   All labs reviewed by me.  CMP  Recent Labs  Lab 09/16/18  0825 09/15/18  0705 09/14/18  1736 09/14/18  0733 09/13/18  0405  09/11/18  1657    141 142 142 142  < > 136   POTASSIUM 4.1 4.2  --  4.4 4.1  < > 4.2   CHLORIDE 111* 110*  --  111* 110*  < > 106   CO2 18* 18*  --  19* 23  < > 18*   ANIONGAP 14 13  --  13 8  < > 11   * 145*  --  145* 112*  < > 165*   BUN 42* 41*  --  40* 34*  < > 29   CR 3.31* 2.89* 2.69* 2.51* 1.76*  < > 1.28*   GFRESTIMATED 18* 21*  --  25* 37*  < > 54*   GFRESTBLACK 22* 26*  --  30* 45*  < > 65   JOSHUA 7.9* 7.8*  --  7.9* 7.8*  < > 8.0*   PROTTOTAL  --   --   --  6.7* 6.1*  --  6.9   ALBUMIN  --   --   --  2.2* 2.1*  --  2.3*   BILITOTAL  --   --   --  0.3 0.3  --  0.4   ALKPHOS  --   --   --  78 77  --  90   AST  --   --   --  25 19  --  26   ALT  --   --   --  17 15  --  18   < > = values in this interval not displayed.  CBC  Recent Labs  Lab 09/15/18  0705 09/14/18  0733 09/13/18 0405 09/12/18  1120   HGB 8.6* 8.8* 8.6* 10.0*   WBC 7.3 9.0 5.2 7.6   RBC 2.95* 2.97* 2.95* 3.40*   HCT 27.0* 26.7* 27.3* 31.6*   MCV 92 90 93 93   MCH 29.2 29.6 29.2 29.4   MCHC 31.9 33.0 31.5 31.6   RDW 17.4* 17.0* 16.8* 16.5*    255 246 280     INR  Recent Labs  Lab 09/16/18  0825 09/15/18  0705 09/14/18  0733 09/13/18  0405   INR 2.08* 1.96* 2.06* 2.23*     ABGNo lab results found in last 7 days.   Urine Studies  Recent Labs   Lab Test  09/14/18   1418  05/31/18   1643  03/21/18   1139  11/08/16   1446   COLOR  Yellow  Yellow  Yellow  Yellow   APPEARANCE  Cloudy  Clear  Slightly Cloudy  Clear   URINEGLC  150*  Negative  Negative  Negative   URINEBILI  Negative  Negative  Negative  Negative   URINEKETONE  10*  Negative  Negative  Negative   SG  1.028  1.012  1.011  1.014   UBLD  Moderate*  Trace*  Trace*  Negative   URINEPH  6.5  5.5  5.0  5.0   PROTEIN  >600*  30*  10*   10*   NITRITE  Negative  Negative  Negative  Negative   LEUKEST  Large*  Negative  Large*  Negative   RBCU  7*  9*  2  1   WBCU  34*  2  159*  1     Recent Labs   Lab Test  09/14/18   1418  05/27/16   1409  04/29/11   1348  09/14/10   1134   UTPG  24.91*  Specimen not received  NOTIFIED [facility]  WHO PAGED  RN, LOPEZ AT 1355. NO URINE   RECIEVED WITH BLOOD SPECIMENS. AB    0.04  <0.02     PTH  Recent Labs   Lab Test  06/05/18   0548  08/24/16   0852   PTHI  78  333*     Iron Studies  Recent Labs   Lab Test  11/08/16   1209  08/24/16   0852   IRON  17*  20*   FEB  146*  197*   IRONSAT  11*  10*   BARTOLO   --   1025*       IMAGING:   All imaging studies reviewed by me.

## 2018-09-16 NOTE — PROGRESS NOTES
HEMODIALYSIS TREATMENT NOTE    Date: 9/16/2018  Time: 5:09 PM    Data:  Pre Wt:   87.4 kg  Desired Wt: 86.4 kg   Ultrafiltration - Post Run Net Total Removed (mL):  0  Ultrafiltration - Post Run Net Total Gain (mL):  250   Vascular Access Status: Yes, secured and visible  Dialyzer Rinse:    Total Blood Volume Processed:    Total Dialysis (Treatment) Time:  2 hours    Lab:   Yes - Hep B Surface Antigen & Antibody (per protocol)    Assessment/Interventions:  2 hours of HD via temporary non-tunneled RIJ CVC (placed today) on K3Ca3 bath. 250 BFR as ordered. Original UF goal of 1 kg but SBP dropped to 78 within first 30 minutes of run so UF stopped and 250 mL saline bolus given. Patient remained asymptomatic. SBP rebounded to 100s within 5 minutes. UF off and VSS on room air for remainder of treatment. Heparin gtt running @ 700 units/hr. Patient net positive 250mLs. CVC saline locked and tegos placed post-treatment. See MAR for medication details. Report given to primary RN on 5A.       Plan:    Per renal team.

## 2018-09-16 NOTE — PROVIDER NOTIFICATION
Notified Gold Team of pt inability to take oral meds. Pt unable to take tablets, and choked on liquid suspension. Daughter orally suctioned with hinaker. Provider put a hold on oral medications.

## 2018-09-17 NOTE — PROGRESS NOTES
"  Care Coordinator Progress Note    Admission Date/Time:  9/11/2018  Attending MD:  Ryan Aponte MD    Data  Chart reviewed, discussed with interdisciplinary team.   Patient was admitted for:    Aspiration pneumonia due to regurgitated food, unspecified laterality, unspecified part of lung (H)  Kidney replaced by transplant.    Concerns with insurance coverage for discharge needs: None.  Current Living Situation: Patient lives alone; has 24/7 care at home between PCA and family.  Support System: Supportive and Involved  Services Involved: Home Care and PCA  Transportation at Discharge: [facility] stretcher  Transportation to Medical Appointments:  - Name of caregiver: Benjamin (daughter)  Barriers to Discharge: medical needs    Coordination of Care  9/19: Ethics Consult    9/18: Per Dr. Dumont \"In the grand scheme of things, given patient's co-morbidities, he is not an deal candidate for long term dialysis. Significant differences in opinions in the family. Daughter Cruz agrees to DNR/DNI, refuses feeding tube and refuses anti epeleptics, but does want dialysis.\"     **Daughter Cruz claims to be POA: no legal documentation has been received or on file that proves she is patients POA. Dr. Dumont asked Cruz directly about the documentation and she did not answer the question. Estranged/seperated spouse is legal decision maker if no POA paperwork is produced.**    9/17: D: Chart reviewed and plan of care discussed with Dr Aponte.   I/A: Patient had RIJ placed for dialysis access (pt does not have HD at baseline), dialysis today, Hep gtt, receiving Zosyn IV for aspiration pneumonia, strict NPO due to aspiration (nursing notes indicate pt daughters is not in agreement that pt is aspirating at all, wants pt to be eating and drinking). As with all previous admissions anticipate pt will dc home with family.    Baseline Home Assessment  pt lives alone but has 11.5 hours of PCA services and family supplements the rest of " this time therefore 24/7 care at home. Pt is also open to [facility] for skilled nursing visits twice weekly. Chronic Coumadin; INR labs drawn by [facility], monitored by [facility].  Baseline pt is nonverbal, minimally interactive, bed bound/total cares, has a lift, wheelchair and hospital bed at home (through [facility]).      Referrals: Patients daughter requesting resumption of existing services at [facility]  Phone  [phone]  Fax  [phone]      [facility]  Phone: [phone]  Fax: [phone]    For INR and Warfarin monitoring (Goal = 2-3)  Indication for Anticoagulation: A fib, DVT/PE Treatment  MD Following: MANUELA Fuentes  Expected duration of therapy: indefinate  Next INR lab draw due on:       Plan  Anticipated Discharge Date:  TBD  Anticipated Discharge Plan:  Home      Radha Ahuja RN, BSN, PHN  Medicine Care Coordinator  [facility], [address], [address] & [address] and [facility]  Desk Phone: [phone]  Pager: [phone]    To contact Weekend RNCC, dial * [phone] and enter job code [phone] at prompt.   This pager can not be contacted by text page or outside line.

## 2018-09-17 NOTE — PROGRESS NOTES
[facility], [place]   Transplant Nephrology Progress Note  Date of Admission:  9/11/2018    Assessment & Plan :    # SLK:   - Basline Cr ~ 1.2- 1,4; Increased, today 2.59,  likely due to contrast which he received on 9/11/2018.   - Patient has HD yesterday for 2 hours, No fluid removal. Plan was for a second HD today but due to bleeding from the site of dialysis catheter. Decision to postponed HD till tomorrow.  IF re-bleeding overnight then we have to reverse it ,m     UA  With 34 WBC and 7 RBC and large LE. Urine Cx NGTD. On zosyn. UA with no hydronephrosis.      Had an extensive family meeting with elder daughter who is POA. Explained in detail that dailysis will not improve his quality of life and will likely only prolong his survival for unclear amount of time. Also explained that death secondary to uremic failure will be painless. Discussed futility of initiating him on RRT without feeding tube But POA would like to be initiated him on RRT and believes she can feed him orally.  Will plan temporary line today and initiation on HD.     - Proteinuria: Nephrotic range - new which is likely due to concentrated urine in the setting of acute renal failure with background DM nephropathy.   - Latest DSA: No                   Date of DSA last checked: 8/2016  - BK: No  - Kidney Tx Biopsy: Yes- 8/2015- ATN, DM nephropathy with features of FSGS and moderate to severe arterisclerosis        # Liver transplant :  - LFTs and Alk phos normal WNL.          # Immunosuppression:   - Continue IV Mycophenolate mofetil 500 gram BID and continue IV methylprednisone 8mg IV.   - Was on tacrolimus montherapy  but not tolerating oral now. Would not given IV tacrolimus.         # Hypertension:   - Controled today; BP today: 130s/80s  - Continue with amlodipine 10 mg daily and coreg 6.25 mg BID.        # Anemia in chronic renal disease:  - Hgb: slightly decrease today due to bleeding from the central line,  "continue monitoring.   - Iron studies: Low. Replete when patient more stable        # Mineral Bone Disorder:   - Secondary renal hyperparathyroidism; PTH level is:  Normal at 78 on 2018  - Vitamin D; level is:  normal  - Calcium; level is:  Normal when corrected for albumin   - Phosphorus; level is: was elevated due to LILIAM            # Electrolytes:   - Potassium; level: normal      # Other Medical Issues:   # seziure disorder- admitted with AMS. Seen by neurology. On keppra.   # h/o CVA:  Minimally interactive at baseline. Palliative care on consult. Family does not want feeding tube.   # aspiration PNA-   # Cdiff- pn iV flagyl  # DM- management per primary.       # Transplant History:  Etiology of kidney failure: Hepatitis C  Transplant: 2000 (Kidney), 2000 (Liver)  Donor Type:  - Brain Death                      Donor Class: Standard Criteria Donor  Significant changes in immunosuppression: see above  Significant Complications: Now anuric LILIAM      Recommendations were communicated to the primary team via this note    Seen and discussed with Dr. Chet Augustin MD  Pager: [phone]  Transplant Office Phone Number: [phone]    Interval History   Patient was seen and examined at bedside this morning, his daughter ,the POA, was at bedside. Patient is bedridden, respond tactile stimuli by arm movements. Changing the dressing of the dialysis cath was done at bedside. Cancel the HD for today, plan for HD tomorrow.       Physical Exam   Temp  Av.4  F (35.2  C)  Min: 93  F (33.9  C)  Max: 98.7  F (37.1  C)      Pulse  Av.9  Min: 74  Max: 93 Resp  Av.3  Min: 10  Max: 24  SpO2  Av.4 %  Min: 91 %  Max: 100 %     /82 (BP Location: Left arm)  Pulse 86  Temp 94  F (34.4  C) (Oral)  Resp 14  Ht 1.778 m (5' 10\")  Wt 83 kg (182 lb 15.7 oz)  SpO2 100%  BMI 26.26 kg/m2     Admit Weight: 83.9 kg (185 lb)   GENERAL APPEARANCE: Bedridden, not able to do any " communication, respond to tactile stimuli by arm contraction.   HENT: Sluggish pupil, head in normocephalic/atrumatic, dialysis cath place to right internal jugular with mild oozing around, dialysis line itself is in good position.   LYMPHATICS: no cervical or supraclavicular nodes  RESP: lungs clear to auscultation anteriorly, no rales, rhonchi or wheezes  CV: regular rhythm, normal rate, no rub, no murmur  EDEMA: Trace LE edema bilaterally  ABDOMEN: soft, nondistended, nontender, bowel sounds normal  MS: upper extremities in half contraction position, overall muscle atrophy and joint stiffness  SKIN: head dandruff.     Data   All labs reviewed by me.  CMP  Recent Labs  Lab 09/17/18  0549 09/16/18  0825 09/15/18  0705 09/14/18  1736 09/14/18  0733 09/13/18  0405  09/11/18  1657    143 141 142 142 142  < > 136   POTASSIUM 4.3 4.1 4.2  --  4.4 4.1  < > 4.2   CHLORIDE 107 111* 110*  --  111* 110*  < > 106   CO2 19* 18* 18*  --  19* 23  < > 18*   ANIONGAP 13 14 13  --  13 8  < > 11   * 118* 145*  --  145* 112*  < > 165*   BUN 27 42* 41*  --  40* 34*  < > 29   CR 2.59* 3.31* 2.89* 2.69* 2.51* 1.76*  < > 1.28*   GFRESTIMATED 24* 18* 21*  --  25* 37*  < > 54*   GFRESTBLACK 29* 22* 26*  --  30* 45*  < > 65   JOSHUA 7.9* 7.9* 7.8*  --  7.9* 7.8*  < > 8.0*   PROTTOTAL  --   --   --   --  6.7* 6.1*  --  6.9   ALBUMIN  --   --   --   --  2.2* 2.1*  --  2.3*   BILITOTAL  --   --   --   --  0.3 0.3  --  0.4   ALKPHOS  --   --   --   --  78 77  --  90   AST  --   --   --   --  25 19  --  26   ALT  --   --   --   --  17 15  --  18   < > = values in this interval not displayed.  CBC  Recent Labs  Lab 09/17/18  0549 09/15/18  0705 09/14/18  0733 09/13/18  0405   HGB 8.0* 8.6* 8.8* 8.6*   WBC 5.1 7.3 9.0 5.2   RBC 2.73* 2.95* 2.97* 2.95*   HCT 25.5* 27.0* 26.7* 27.3*   MCV 93 92 90 93   MCH 29.3 29.2 29.6 29.2   MCHC 31.4* 31.9 33.0 31.5   RDW 17.7* 17.4* 17.0* 16.8*    242 255 246     INR  Recent Labs  Lab  09/17/18  0549 09/16/18  0825 09/15/18  0705 09/14/18  0733   INR 2.16* 2.08* 1.96* 2.06*     ABGNo lab results found in last 7 days.   Urine Studies  Recent Labs   Lab Test  09/14/18   1418  05/31/18   1643  03/21/18   1139  11/08/16   1446   COLOR  Yellow  Yellow  Yellow  Yellow   APPEARANCE  Cloudy  Clear  Slightly Cloudy  Clear   URINEGLC  150*  Negative  Negative  Negative   URINEBILI  Negative  Negative  Negative  Negative   URINEKETONE  10*  Negative  Negative  Negative   SG  1.028  1.012  1.011  1.014   UBLD  Moderate*  Trace*  Trace*  Negative   URINEPH  6.5  5.5  5.0  5.0   PROTEIN  >600*  30*  10*  10*   NITRITE  Negative  Negative  Negative  Negative   LEUKEST  Large*  Negative  Large*  Negative   RBCU  7*  9*  2  1   WBCU  34*  2  159*  1     Recent Labs   Lab Test  09/14/18   1418  05/27/16   1409  04/29/11   1348  09/14/10   1134   UTPG  24.91*  Specimen not received  NOTIFIED [facility]  WHO PAGED  RN, LOPEZ AT 1355. NO URINE   RECIEVED WITH BLOOD SPECIMENS. AB    0.04  <0.02     PTH  Recent Labs   Lab Test  06/05/18   0548  08/24/16   0852   PTHI  78  333*     Iron Studies  Recent Labs   Lab Test  11/08/16   1209  08/24/16   0852   IRON  17*  20*   FEB  146*  197*   IRONSAT  11*  10*   BARTOLO   --   1025*       IMAGING:  All imaging studies reviewed by me.

## 2018-09-17 NOTE — PLAN OF CARE
Problem: Patient Care Overview  Goal: Plan of Care/Patient Progress Review  Outcome: Declining  Pt admitted with aspiration pneumonia and siezure-like activity. VSS except temp of 95.3F. Obtunded and lethargic, nonverbal. Guarding and moaning on palpation of distended abdomen. Fine crackles heard in lower lobes on ausculation, weak cough. Daughter concerned about inability to cough up secretions and risk of aspiration, Gold team notified and ordered atropine sublingual drops. +4 edema in LE, +3-+2 in UE, +3 in perineal area. Large loose BM this shift. RIJ dialysis access placed today, continues to actively bleed. Attempted to dialyze today, Systolic BP dropped to 78, dialysis RN gave IV fluid bolus, pt returned to 5a with +250ml of fluid, Gold team aware of fluid status. 15 ml of dark reed urine emptied from patino catheter. Multiple family members at bedside throughout the shift. Will continue with plan of care.     At 2245 HD line continued to bleed. Float float paged to help with dressing change. Quick clot applied, along with gauze and new Tegaderm to help slow bleeding.

## 2018-09-17 NOTE — PROVIDER NOTIFICATION
Notified MD about the continuous bleeding from the dialysis access. MD came and saw pt. Quick clot and new dressing placed. Going to continue to monitor.

## 2018-09-17 NOTE — PROGRESS NOTES
[facility], [place]   Hospitalist Daily Progress Note                                                 Date of Admission:2018  ___________________________________  INTERVAL HISTORY (24 Hrs)/SUBJECTIVE:   Last 24 hr events, care team notes reviewed.     Bleeding around RIJ site.   No other new concern.     Patient remains encephalopathic.   Anuric.   On RA, Oxygenating fine.   No loose stool today  Afebrile.   Pt continues to have difficulty swallowing including medications.     : RIJ placed. HD started.      Daughter (May) at bedside.     ROS: 4 point ROS neg other than the symptoms noted above in the interval history.    OBJECTIVE :   VITALS:    Temp:  [93.5  F (34.2  C)-94.6  F (34.8  C)] 94  F (34.4  C)  Pulse:  [75-86] 86  Heart Rate:  [80-84] 84  Resp:  [10-16] 16  BP: (131-164)/(59-82) 151/68  SpO2:  [98 %-100 %] 100 %     Temp (24hrs), Av.1  F (34.5  C), Min:93.5  F (34.2  C), Max:94.6  F (34.8  C)    Wt Readings from Last 5 Encounters:   18 83 kg (182 lb 15.7 oz)   18 77.9 kg (171 lb 12.8 oz)   18 69.9 kg (154 lb 1.6 oz)   16 83.9 kg (185 lb)   16 76.7 kg (169 lb 3.2 oz)        PHYSICAL EXAM:  General:encephalopathic, non verbal, spontaneous eye opening.   HEENT: AT/NC, anicteric, dry MM  Neck: Supple, no jvd.   Respi/Chest: Non labored. Mild basal crackles.   CVS/Heart: S1S2 regular,   Gi/Abd: Soft,  non distended  MSK/Ext: Distally wwf. bl bl LE pedal edema upto thighs.     Neuro: Encephalopathic.   RIJ: pressure dressing.slight blood oozing.      Medications:   I have reviewed this patient's current medications.    Data:   All laboratory and imaging data in the past 24 hours reviewed:    LABS:  CMP    Recent Labs  Lab 18  0549 18  0825 09/15/18  0705 18  1736 18  0733 18  0405  18  1657    143 141 142 142 142  < > 136   POTASSIUM 4.3 4.1 4.2  --  4.4 4.1  < > 4.2   CHLORIDE 107 111* 110*   --  111* 110*  < > 106   CO2 19* 18* 18*  --  19* 23  < > 18*   ANIONGAP 13 14 13  --  13 8  < > 11   * 118* 145*  --  145* 112*  < > 165*   BUN 27 42* 41*  --  40* 34*  < > 29   CR 2.59* 3.31* 2.89* 2.69* 2.51* 1.76*  < > 1.28*   GFRESTIMATED 24* 18* 21*  --  25* 37*  < > 54*   GFRESTBLACK 29* 22* 26*  --  30* 45*  < > 65   JOSHUA 7.9* 7.9* 7.8*  --  7.9* 7.8*  < > 8.0*   PROTTOTAL  --   --   --   --  6.7* 6.1*  --  6.9   ALBUMIN  --   --   --   --  2.2* 2.1*  --  2.3*   BILITOTAL  --   --   --   --  0.3 0.3  --  0.4   ALKPHOS  --   --   --   --  78 77  --  90   AST  --   --   --   --  25 19  --  26   ALT  --   --   --   --  17 15  --  18   < > = values in this interval not displayed.  CBC    Recent Labs  Lab 09/17/18  1607 09/17/18  0549 09/15/18  0705 09/14/18  0733 09/13/18  0405   WBC  --  5.1 7.3 9.0 5.2   RBC  --  2.73* 2.95* 2.97* 2.95*   HGB 8.3* 8.0* 8.6* 8.8* 8.6*   HCT  --  25.5* 27.0* 26.7* 27.3*   MCV  --  93 92 90 93   MCH  --  29.3 29.2 29.6 29.2   MCHC  --  31.4* 31.9 33.0 31.5   RDW  --  17.7* 17.4* 17.0* 16.8*   PLT  --  170 242 255 246     INR    Recent Labs  Lab 09/17/18  0549 09/16/18  0825 09/15/18  0705 09/14/18  0733   INR 2.16* 2.08* 1.96* 2.06*     Unresulted Labs Ordered in the Past 30 Days of this Admission     Date and Time Order Name Status Description    9/14/2018 2103 Blood culture Preliminary     9/14/2018 2103 Blood culture Preliminary           Chest  XR, 1 view portable    Narrative    XR CHEST PORT 1 VW  9/11/2018 5:05 PM      IMPRESSION:   1. Cardiomegaly with mild pulmonary edema and small pleural effusions.  2. Bibasilar patchy opacities which may represent pulmonary edema,  infection or aspiration.     Head CT w/o contrast    Narrative    CT HEAD W/O CONTRAST 9/11/2018 5:20 PM       Impression:   1. No acute intracranial pathology.  2. Stable chronic right thalamic infarct and moderate to advanced  chronic small vessel ischemic disease.  3. Unchanged moderate generalized  parenchymal volume loss.  4. Decreased left mastoid effusion.     CTA Head Neck with Contrast    Narrative    CTA HEAD NECK WITH CONTRAST 9/11/2018 7:20 PM      Impression:    1. Head CTA demonstrates moderate atherosclerotic narrowing of the  left P2 segment, nearly the entire right V4 segment, and of left M2  and M3 branches (predominantly in the inferior division).  2. Neck CTA demonstrates focal kinking of the tortuous left common  carotid artery with mild to moderate narrowing. No extracranial  internal carotid artery or vertebral artery stenosis.  3. No intracranial hemorrhage on the noncontrast head CT.   4. Stable chronic right limb infarct in moderate to advanced chronic  small vessel ischemic disease.  5. Bilateral pleural effusions, left greater than right, with  multifocal groundglass opacities concerning for infection.        MR Brain 9/12/2018  1. No acute infarct.  2. Progression of moderate to advanced chronic small vessel ischemic  disease and moderate generalized parenchymal volume loss since  11/6/2010.    ASSESSMENT & PLAN :    Priscilla Way is a 78 year old male admitted on 9/11/2018. He has a history of dementia (vascular plus Alzheimers), liver transplant in 2000, kidney transplant in 2000, DVT, DM II and CVA and is admitted for aspiration and possible seizure episode.      # History of liver and kidney transplant  # LILIAM on CKD- anuric.   # Proteinuria-nephrotic range.  # Vol overload      Renal Transplant US- reviewed. No hydro.   Etiology for LILIAM: suspect MF: related to intravascular dehydration, sepsis on admission, plus  immunosuppresants (high tacro lvl). Contrast.  Tx Nephrology on board.   No response to iv fluid challenge, lasix plus albumin challenge. Remained anuric, worsening Cr.   Lytes : wnl. Vol overload.   Pt oxygenating well on RA.     9/16/2018: Nephrology discussed in detail with POA regarding dialysis. She made informed decision to proceed with dialysis  RIJ placed by  renal. HD initiated.     9/17/2018: bleeding around RIJ site. Hold Anticoagulation. Follow-up Hgb stable.   Nephrology aware. Will try to stitch. Ct pressure dressing.   Rest per Nephrology.     - patient unable to get po or SL prograf. Family (POA) does not want NG . Iv steroid 09.13  - @ current iv Mycophenolate. No iv tacro given zaki    - patino : strict I/o  - Renally dose meds (pharm consult)  - daily bmp.        # Seizure-like activity - New Onset.   # Acute Encephalopathy    Patient presents following an episode of full body convulsions while eating on day of admission.  Reportedly this lasted one minute.  He has no prior history of seizures.  CT head and CTA as above. MR brain:  No e/o acute stroke.   Pt remains encephalopathic. Likely related to seizure plus pna, c.diff. Hospitalization. Hx of likely advanced dementia.    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   Video EEg; no ongoing seizure activity. Dc. 09.12.   Neurology on board. Appreciate consult.     - Loaded and started on Keppra 500 bid iv, continue.   9/14/2018: patient's daughter Cruz asked to stop seizure medicines. Explained her the risk of recurrent seizure, neuro recs, she still wanted me to stop. Discontinued.     - NPO except meds until SLP clearance, gentle ivf. (daughter declined NG tube, says family can feed him/ give him pills)  - ct neuro checks, Vitals. I/o  - fall, seizure precautions.       # Possible aspiration pneumonia vs Pneumonitis - CXR from today shows bibasilar patchy opacities concerning for pneumonia vs aspiration.  - Started on Unasyn in the ED, switched to iv Zosyn. 09.14, will complete total 7 days course.   - ID consult appreciated.     # Recurrent C.diff colitis:   - IV metronidazole. With plan to switch to po Vanco when able, Continue 7 days after done with antibiotics.      # History of DM II  - Not on any medications.  Follow daily blood sugars       Recent Labs  Lab 09/17/18  0549 09/16/18  0825 09/15/18  1245  09/15/18  0705 09/15/18  0625 09/14/18  0733 09/13/18  0405 09/12/18  0356   * 118*  --  145*  --  145* 112* 181*   BGM  --   --  138*  --  135*  --   --   --      # History of hypertension  - Continue home norvasc, Coreg as able to take po   - iv Hydralazine prn for sbp>170  - dc scheduled hydralazine.   - Monitor.      # History of DVT  - inr sub therapeutic. 09.12 Started on heparin gtt (standard with no loading dose)  - pharmacy consult for coumadin.      # History of dementia, CVA - At baseline he is nonverbal and minimally interactive, at times making eye contact.  He is total cares and taken care of at home by his family.  - discussed about goals of care.   - Daughter has declined palliative consultation.   - She expects him to be able to get to baseline and take home.   - Declined NG tube. Does not want seizure medications at current.   - Family at times have given meds and food to patient. (despite SLP rec of NPO) and risk of aspiration explained.     # LE edema: holding diuretics 2.2 zaki.   - Lymphedema therapy consult     Diet: npo per SLP. Dc ivf  DVT Prophylaxis: On anticoagulation. (holding for bleeding RIJ)    Goals of Care: 9/16/2018: per POA : DNR/DNI. Dialysis YES. Feeding tube: NO.   Declined Palliative Consult.       # Pain Assessment:  Current Pain Score 9/17/2018   Patient currently in pain? yes   Pain score (0-10) -   Pain location -   Pain descriptors -   CPOT pain score -   difficult to assess.   Per daughter, he could be in pain.   Iv hydromorphone 0.2 mg prn.   Tylenol when able to take po.       Dispo: Disposition Plan   Expected discharge in 3-5 days to prior living arrangement once tolerating HD, outpt HD set up. Etc.       Entered: Ryan Aponte 09/17/2018, 6:46 PM       Plan discussed with patient, bedside RN , Sw. CC  [facility].        Ryan Aponte MD   Hospitalist (Internal Medicine)  Pager: [phone].

## 2018-09-17 NOTE — PLAN OF CARE
Problem: Patient Care Overview  Goal: Plan of Care/Patient Progress Review  Outcome: No Change  VSS on RA. Lethargic, nonverbal. Unable to assess orientation status. Pt is DNR/DNI. Pt had RIJ  placed yesterday for dialysis access. The site has been actively bleeding and saturating through dressings. Quick clots and dressing changed x4 this shift. MD aware of bleeding status. Hgb 8.0. Atropine given x1 for increased secretions in airway; oral suctioning done as needed. O2 sats remain at 100%. 4+ edema in lower extremities and +2 in upper extremities. Heparin infusing into R PIV 700units/hr- Heparin 10A level is therapeutic. Left PIV TKO with NS for scheduled IV abx. No BM this shift. Repositioned Q2 with pillows. IV Dilaudid given x2 for nonverbal indicators of pain. Strict NPO; no PO meds- for risk of aspiration.Family attentive at bedside. Will continue to monitor and follow POC.

## 2018-09-17 NOTE — PROVIDER NOTIFICATION
Notified the [facility] team that the patient continues to bleed from his HD line. Reinforced the dressing. Will continue to monitor. Also, notified the gold team that the pt's daughter who is the POA told nursing staff that she is going to fed the patient tomorrow. Tried to educate the daughter about aspiration but the daughter was adamant that the patient does not aspirate. The daughter also told nursing staff that the MD is also in favor of having the patient eat. The daughter was told that the patient is NPO/ nothing to eat and that his oral medications are currently on hold. Also, told the daughter that nursing witnessed the patient aspirate on his oral medications yesterday, but she did not believe that happened.

## 2018-09-17 NOTE — PLAN OF CARE
Problem: Patient Care Overview  Goal: Plan of Care/Patient Progress Review  ST session cancelled. Per discussion with RN, pt with declining status and inability to manage own secretions. Pt not appropriate for participation in ST session d/t high aspiration risk. Will follow up as appropriate.

## 2018-09-17 NOTE — PLAN OF CARE
Problem: Patient Care Overview  Goal: Plan of Care/Patient Progress Review  Outcome: No Change  VSS on RA. DNR/DNI. NPO. Assist of 2- turn q2. No BM on shift. Pt has patino to monitor urine output- no urine output on shift. Pt had internal jugular tube placed yesterday to start dialysis. Active bleeding at site all night and this AM. Nephrology was paged and came to put a sterile dressing on. Pt bled through new dressing- nephrology placed a stitch at the site and applied a new dressing. Pt has no signs of bleeding since new dressing placed. Pt's dialysis was canceled today due to bleeding from site. Pt had abd distention- x-ray performed and results pending. Pt received IV antibiotics today. Will continue to monitor and follow POC.

## 2018-09-17 NOTE — PROGRESS NOTES
Assessed dialysis access post application of quick clot. Dressing saturated with blood. Congealed clot at site. Previous quick clot pad left in place. Additional quick clot added. Dressing reinforced. Continue to monitor.

## 2018-09-17 NOTE — PLAN OF CARE
Problem: Patient Care Overview  Goal: Plan of Care/Patient Progress Review  Edema 5A: Orders received and appreciated.  Per chart review and discussion with RN, pt is nonverbal and minimally interactive at baseline.  He is non-ambulatory and receives total cares from family.  Pt is lethargic, not following commands, unable to indicate pain.  Compression is contraindicated, no skilled edema intervention is necessary.  Discussed using elevation to manage with RN.  Pt not appropriate for inpatient edema services.  Edema to sign off and complete orders.

## 2018-09-18 NOTE — PHARMACY-ANTICOAGULATION SERVICE
Clinical Pharmacy - Warfarin Dosing Consult     Pharmacy has been consulted to manage this patient s warfarin therapy.  Indication: DVT/ PE Treatment  Therapy Goal: INR 2-2.5  OP Anticoag Clinic: [facility]  Warfarin Prior to Admission: Yes  Warfarin PTA Regimen: 2.5 mg on Tues, Thurs, Sun; 1.25 mg daily rest of week  Significant drug interactions: metronidazole  Recent documented change in oral intake/nutrition:  (NPO for aspiration risk)  Dose Comments: No dose 9/17 per MD    INR   Date Value Ref Range Status   09/17/2018 2.16 (H) 0.86 - 1.14 Final   09/16/2018 2.08 (H) 0.86 - 1.14 Final       Recommend no warfarin today per discussion with MD.  Patient has had bleeding at [facility] site. However, MD wants consult to be active to ensure that plan for warfarin is followed up daily. Pharmacy will monitor Priscilla Way daily and order warfarin doses to achieve specified goal.      Please contact pharmacy as soon as possible if the warfarin needs to be held for a procedure or if the warfarin goals change.

## 2018-09-18 NOTE — PLAN OF CARE
Problem: Patient Care Overview  Goal: Plan of Care/Patient Progress Review  Outcome: No Change  VSS except /78 on RA. Lethargic, nonverbal; unable to assess orientation status. Pt is DNR/DNI. Enteric precautions maintained for C.diff. Pt tolerated dialysis this morning. Heparin drip restarted today at 1250 Units/hr; 10a heparin level lab check scheduled for 2000. Patient's hgb trending down; 7.9 this morning and 7.4 this afternoon.No signs of bleeding from the right internal jugular site. Pt has oral secretions that he can't cough up, so is suctioned PRN. Continues to have low urine output; 50mL recorded from [id number]. Pt repositioned Q2 hours. +3/+4 generalized edema; legs elevated on pillows. Pt increasingly resistant to cares today; upper extremities very contracted and hard to move. Mitt is on L hand as pt tries to mess with the dialysis catheter. IV dilaudid given x1 for generalized pain. Family attentive at bedside. Educated about why we are not doing PO meds as he has a high aspiration risk. Pt continues to be on IV Abx, flagyl, and mycophenolate.

## 2018-09-18 NOTE — PLAN OF CARE
Problem: Patient Care Overview  Goal: Plan of Care/Patient Progress Review  ST session cancelled. Discussed role of SLP and educated pt and family on ongoing high aspiration risk. Pt and family report they have elected for DNR/DNI code status with plan to allow pt to eat and drink despite aspiration risk. Educated pt on ongoing risk and strategies to minimize risk given decision for comfort eating. Pts family demonstrated good understanding; updated MD on family decision to comfort eat. Will complete ST orders.     Speech Language Therapy Discharge Summary    Reason for therapy discharge:    Patient/family request discontinuation of services.    Progress towards therapy goal(s). See goals on Care Plan in [facility] electronic health record for goal details.  Goals not met.  Barriers to achieving goals:   limited tolerance for therapy.    Therapy recommendation(s):    No further therapy is recommended.

## 2018-09-18 NOTE — PROGRESS NOTES
HEMODIALYSIS TREATMENT NOTE    Date: 9/18/2018  Time: 10:48 AM    Data:  Pre Wt:  Unable bed scale broken   Desired Wt:  (1 gk net) kg   Post Wt:  U/A  Weight gain: 0  kg   Weight change:  1 kg  Ultrafiltration - Post Run Net Total Removed (mL): 1000 mL  Vascular Access Status: Yes, secured and visible  Dialyzer Rinse: Streaked  Total Blood Volume Processed: 38  Total Dialysis (Treatment) Time: 2.5 hr     Lab:   Yes, BMP    Interventions:  Pt arrived for HD tx from , RN reported that unable to do bed wt as scale in bed is broken. Goal set for net 1 kg. Ran on crit line, noted -6.9% blood vol change. Pt hypertensive upon arriveal, quite combative, attempted to bite transporter. Able to access CVC with assist, no noted bleeding, dressing CDI. Lowest 's when pt calm and resting. Did develop some oral secretions managed with yankaur, no O2 needed and cont O2 sat at 100%. Hypertensive at end of tx 2/2 combativeness, also attempting to bite RN.     Assessment:  Pt on dialysis via temporary CVC to see if baseline renal function returns post IV contrast.  Pt not good candidate for chronic RRT.       Plan:    Stable run, more fluid available to pull, ongoing edema and HTN.  Nest ts per renal team

## 2018-09-18 NOTE — PLAN OF CARE
Problem: Patient Care Overview  Goal: Plan of Care/Patient Progress Review  Outcome: No Change  Pt is disoriented x 4, pt does not recognize his daughter.  Nonverbal. No indications of pain. Pt is resistant to cares and often pushes caregivers hands away. Incontinent of stool x 1. Turning every 2 hours. Barrier paste applied to scab on buttocks. Pt did not eat or drink and oral medications were held due to aspiration risk. Scheduled for HD at 0730 and transport has been arranged.    Mitt applied to L hand as pt was trying to reach up to manipulate his dialysis catheter.

## 2018-09-18 NOTE — PROGRESS NOTES
"CLINICAL NUTRITION SERVICES - ASSESSMENT NOTE     Nutrition Prescription    RECOMMENDATIONS FOR MDs/PROVIDERS TO ORDER:  1. Recommend diet advancement to >> NPO/CL within the next 24 hours vs. Initiating TF support ( post pyloric feeding tube )     2. Once TF is in place and access is ready to be used, Order to begin TF with Nepro @ 10 ml/hr and adv by 10 ml Q 12 hrs, ( ONLY advance if K+/mg++ WNL and Phos >1.9) to goal @ 50 ml/hr.     -Nepro @ goal 50 ml/hr (1200 ml/day) to provide 2160 kcals ( 26 kcal/kg/day), 97 gm PRO (1.2 gm/kg/day), 876 ml free H2O, 193 gm CHO and 15 gm Fiber daily.    3. With tube feed start,Water flushes 30 ml every 4 hours for tube patency.    4. Recommend the following micronutrient supplementation given refeeding risk and HD therapy:   -- Nephronex (5 ml/day via FT)  --Thiamine 100 mg/day (continue until pt tolerating goal TF and no sx of refeeding).      Malnutrition Status:    Unable to determine due to patient off floor     Recommendations already ordered by Registered Dietitian (RD):  None       REASON FOR ASSESSMENT  Priscilla Way is a/an 78 year old male assessed by the dietitian for LOSx7 and NPO status x 7 days     Chart reviewed:   PMH: Admitted on 9/11/2018. He has a history of dementia (vascular plus Alzheimer's), liver transplant in 2000, kidney transplant in 2000, DVT, DM II and CVA and is admitted for aspiration and possible seizure episode, now with LILIAM starting on Hd.     --Patient is bedridden, non-verbal, respond to tactile stimuli by arm movements per RN.   --Temporary Dialysis line today and initiation on HD    NUTRITION HISTORY  Deferred due to patient off floor for HD     CURRENT NUTRITION ORDERS  Diet: NPO since admit, failed swallow study, No TF in place  Intake/Tolerance: 7 days of NPO status     LABS  Urine ketones: 10 ( 9/14)   K+: normal, No Mg++/Phos this admission    MEDICATIONS  On Immunosuppression meds     ANTHROPOMETRICS  Height: 177.8 cm (5' 10\")  Most " Recent Weight: 83 kg (182 lb 15.7 oz) most recent wt on 9/17    IBW: 75.5 kg ( 110% IBW)   BMI: 26.26 kg /m2 - Overweight BMI 25-29.9  Weight History:   Wt Readings from Last 10 Encounters:   09/17/18 83 kg (182 lb 15.7 oz)   06/06/18 77.9 kg (171 lb 12.8 oz)   03/24/18 69.9 kg (154 lb 1.6 oz)   11/11/16 83.9 kg (185 lb)   09/12/16 76.7 kg (169 lb 3.2 oz)   08/31/16 76.7 kg (169 lb 1.6 oz)   07/24/15 77.1 kg (170 lb)   10/03/14 69.3 kg (152 lb 11.2 oz)   07/25/14 71.9 kg (158 lb 9.6 oz)   07/25/14 70.8 kg (156 lb)       Dosing Weight: 83 kg most recent wt on 9/17    ASSESSED NUTRITION NEEDS  Estimated Energy Needs: 1042-2334 kcals/day ( 20-25  kcals/kg)  Justification: Maintenance, modest needs with immobility   Estimated Protein Needs: 100 - 125  grams protein/day (1.2 - 1.5 grams of pro/kg)  Justification: Dialysis  Estimated Fluid Needs: HD  Justification: Per provider pending fluid status    PHYSICAL FINDINGS  See malnutrition section below.    MALNUTRITION  % Intake: </= 50% for >/= 5 days (severe)  % Weight Loss: Unable to assess  Subcutaneous Fat Loss: Unable to assess  Muscle Loss: Unable to assess  Fluid Accumulation/Edema: Unable to assess  Malnutrition Diagnosis: Unable to determine due to patient off floor     NUTRITION DIAGNOSIS  Inadequate oral intake related to difficulty swallowing / dysphagia as evidenced by kept NPO status x 7 days since admit.     INTERVENTIONS  Implementation  Nutrition Education: Not appropriate at this time due to patient condition   Enteral Nutrition - recs above      Goals  Diet adv v nutrition support within 2-3 days.     Monitoring/Evaluation  Progress toward goals will be monitored and evaluated per protocol.    Iveth Mann RD/LANCE  Pager [phone]

## 2018-09-18 NOTE — PLAN OF CARE
Problem: Patient Care Overview  Goal: Plan of Care/Patient Progress Review  Pt alert, nonverbal and disoriented x4. VSS on RA. Daughter at bedside. Repositioned q2h, A2. L PIV-TKO. R PIV-SL. R internal jugular-CDI. No BM. Barrier cream applied to bottom. IV dilaudid given x1 for generalized pain. Virk in place w minimal output. NPO. Report given to dialysis RN. Pt went to dialysis.

## 2018-09-18 NOTE — PROGRESS NOTES
[facility], [place]   Transplant Nephrology Progress Note  Date of Admission:  9/11/2018    Assessment & Plan   # SLK:   - Basline Cr ~ 1.2- 1,4; Increased likely due to contrast which he received on 9/11/2018. Patient required HD due to LILIAM.  - Plan for HD today for 2.5 hours, No fluid removal.   - No bleeding from the dialysis cath site. INR: 1.86 this morning.   - UA  With 34 WBC and 7 RBC and large LE. Urine Cx NGTD. On zosyn renal dose. UA with no hydronephrosis.         Had an extensive family meeting with elder daughter who is POA. Explained in detail that dailysis will not improve his quality of life and will likely only prolong his survival for unclear amount of time. Also explained that death secondary to uremic failure will be painless. Discussed futility of initiating him on RRT without feeding tube But POA would like to be initiated him on RRT and believes she can feed him orally.  Will plan temporary line today and initiation on HD.        - Proteinuria: Nephrotic range - new which is likely due to concentrated urine in the setting of acute renal failure with background DM nephropathy.   - Latest DSA: No                   Date of DSA last checked: 8/2016  - BK: No  - Kidney Tx Biopsy: Yes- 8/2015- ATN, DM nephropathy with features of FSGS and moderate to severe arterisclerosis         # Liver transplant :  - LFTs and Alk phos normal WNL.          # Immunosuppression:   - Continue IV Mycophenolate mofetil 500 gram BID and continue IV methylprednisone 16 mg IV daily.   - Was on tacrolimus montherapy  but not tolerating oral now. Would not given IV tacrolimus.          # Hypertension:   - Controled today; BP today: 130s/80s  - Continue with amlodipine 10 mg daily and coreg 6.25 mg BID.         # Anemia in chronic renal disease:  - Hgb: slightly decrease today. Hgb: 7.9 due to bleeding from the dialysis cath site, continue monitoring.   - Iron studies: Low. Replete when patient  "more stable         # Mineral Bone Disorder:   - Secondary renal hyperparathyroidism; PTH level is:  Normal at 78 on 2018  - Need Vitamin D level  - Calcium; level is:  Normal when corrected for albumin   - Need Phosphorus level   - Need Mg level.              # Electrolytes:   - Potassium, Na: level, normal        # Other Medical Issues:   # seziure disorder- admitted with AMS. Seen by neurology. On kera.   # h/o CVA:  Minimally interactive at baseline. Palliative care on consult. Family does not want feeding tube.   # aspiration PNA-   # Cdiff- pn iV flagyl  # DM- management per primary.       # Transplant History:  Etiology of kidney failure: Hepatitis C  Transplant: 2000 (Kidney), 2000 (Liver)  Donor Type:  - Brain Death                      Donor Class: Standard Criteria Donor  Significant changes in immunosuppression: see above  Significant Complications: Now anuric LILIAM      Recommendations were communicated to the primary team via this note    Seen and discussed with Dr. Chet Augustin MD  Pager: [phone]  Transplant Office Phone Number: [phone]    Interval History   Patient was seen and examined in the hemodialysis unit this morning, his daughter ,the POA, was at bedside. Patient is bedridden, non-verbal, respond to tactile stimuli by arm movements. Patient was tolerating HD when I examined him. Dialysis cath with No bleeding.       Physical Exam   Temp  Av.4  F (35.2  C)  Min: 93  F (33.9  C)  Max: 98.7  F (37.1  C)      Pulse  Av.9  Min: 74  Max: 93 Resp  Av.4  Min: 10  Max: 24  SpO2  Av.5 %  Min: 91 %  Max: 100 %     /80  Pulse 86  Temp 97.3  F (36.3  C) (Axillary)  Resp 18  Ht 1.778 m (5' 10\")  Wt 83 kg (182 lb 15.7 oz)  SpO2 100%  BMI 26.26 kg/m2     Admit Weight: 83.9 kg (185 lb)   GENERAL APPEARANCE: Bedridden, not able to do any communication, respond to tactile stimuli by arm contraction.   HENT: Sluggish pupil, head in " normocephalic/atrumatic, dialysis cath place to right internal jugular connecting to HD machine.   LYMPHATICS: no cervical or supraclavicular nodes  RESP: lungs clear to auscultation anteriorly, no rales, rhonchi or wheezes  CV: regular rhythm, normal rate, no rub, no murmur  EDEMA: Trace LE edema bilaterally  ABDOMEN: soft, nondistended, nontender, bowel sounds normal  MS: upper extremities in half contraction position, overall muscle atrophy and joint stiffness  SKIN: head dandruff.     Data   All labs reviewed by me.  CMP  Recent Labs  Lab 09/18/18  0754 09/17/18  0549 09/16/18  0825 09/15/18  0705  09/14/18  0733 09/13/18  0405  09/11/18  1657    139 143 141  < > 142 142  < > 136   POTASSIUM 4.2 4.3 4.1 4.2  --  4.4 4.1  < > 4.2   CHLORIDE 106 107 111* 110*  --  111* 110*  < > 106   CO2 18* 19* 18* 18*  --  19* 23  < > 18*   ANIONGAP 15* 13 14 13  --  13 8  < > 11   * 143* 118* 145*  --  145* 112*  < > 165*   BUN 32* 27 42* 41*  --  40* 34*  < > 29   CR 2.91* 2.59* 3.31* 2.89*  < > 2.51* 1.76*  < > 1.28*   GFRESTIMATED 21* 24* 18* 21*  --  25* 37*  < > 54*   GFRESTBLACK 25* 29* 22* 26*  --  30* 45*  < > 65   JOSHUA 8.0* 7.9* 7.9* 7.8*  --  7.9* 7.8*  < > 8.0*   PROTTOTAL  --   --   --   --   --  6.7* 6.1*  --  6.9   ALBUMIN  --   --   --   --   --  2.2* 2.1*  --  2.3*   BILITOTAL  --   --   --   --   --  0.3 0.3  --  0.4   ALKPHOS  --   --   --   --   --  78 77  --  90   AST  --   --   --   --   --  25 19  --  26   ALT  --   --   --   --   --  17 15  --  18   < > = values in this interval not displayed.  CBC  Recent Labs  Lab 09/18/18  0754 09/17/18  1607 09/17/18  0549 09/15/18  0705 09/14/18  0733   HGB 7.9* 8.3* 8.0* 8.6* 8.8*   WBC 5.4  --  5.1 7.3 9.0   RBC 2.68*  --  2.73* 2.95* 2.97*   HCT 24.8*  --  25.5* 27.0* 26.7*   MCV 93  --  93 92 90   MCH 29.5  --  29.3 29.2 29.6   MCHC 31.9  --  31.4* 31.9 33.0   RDW 17.5*  --  17.7* 17.4* 17.0*     --  170 242 255     INR  Recent Labs  Lab  09/18/18  0754 09/17/18  0549 09/16/18  0825 09/15/18  0705   INR 1.86* 2.16* 2.08* 1.96*     ABGNo lab results found in last 7 days.   Urine Studies  Recent Labs   Lab Test  09/14/18   1418  05/31/18   1643  03/21/18   1139  11/08/16   1446   COLOR  Yellow  Yellow  Yellow  Yellow   APPEARANCE  Cloudy  Clear  Slightly Cloudy  Clear   URINEGLC  150*  Negative  Negative  Negative   URINEBILI  Negative  Negative  Negative  Negative   URINEKETONE  10*  Negative  Negative  Negative   SG  1.028  1.012  1.011  1.014   UBLD  Moderate*  Trace*  Trace*  Negative   URINEPH  6.5  5.5  5.0  5.0   PROTEIN  >600*  30*  10*  10*   NITRITE  Negative  Negative  Negative  Negative   LEUKEST  Large*  Negative  Large*  Negative   RBCU  7*  9*  2  1   WBCU  34*  2  159*  1     Recent Labs   Lab Test  09/14/18   1418  05/27/16   1409  04/29/11   1348  09/14/10   1134   UTPG  24.91*  Specimen not received  NOTIFIED [facility]  WHO PAGED  RN, LOPEZ AT 1355. NO URINE   RECIEVED WITH BLOOD SPECIMENS. AB    0.04  <0.02     PTH  Recent Labs   Lab Test  06/05/18   0548  08/24/16   0852   PTHI  78  333*     Iron Studies  Recent Labs   Lab Test  11/08/16   1209  08/24/16   0852   IRON  17*  20*   FEB  146*  197*   IRONSAT  11*  10*   BARTOLO   --   1025*       IMAGING:  All imaging studies reviewed by me.

## 2018-09-18 NOTE — PLAN OF CARE
Problem: Patient Care Overview  Goal: Plan of Care/Patient Progress Review  Edema 5A: Orders received and appreciated.  Per chart review and discussion with RN, pt is nonverbal and minimally interactive at baseline.  He is non-ambulatory and receives total cares from family.  Pt is lethargic, not following commands, unable to indicate pain.  Compression is contraindicated, no skilled inpatient edema intervention is indicated. Edema to sign off and complete orders.

## 2018-09-18 NOTE — PROGRESS NOTES
[facility], [place]   Internal Medicine Daily Note          Assessment and Plan:    Priscilla Way is a 78 year old male admitted on 9/11/2018. He has a history of dementia (vascular plus Alzheimers), liver transplant in 2000, kidney transplant in 2000, DVT, DM II and CVA and is admitted for aspiration and possible seizure episode.       # History of liver and kidney transplant  # LILIAM on CKD- anuric.   Renal Transplant US- Reviewed. No hydronephjrosis.   Etiology for LILIAM: suspect MF: related to intravascular dehydration, sepsis on admission, plus  immunosuppresants (high tacro lvl). Contrast.  Tx Nephrology on board.   No response to iv fluid challenge, lasix plus albumin challenge. Remained anuric with worsening creatinine   9/16/2018: Nephrology discussed in detail with POA regarding dialysis. She made informed decision to proceed with dialysis. RIJ placed by renal. HD initiated.    9/17/2018: bleeding around RIJ site. Hold Anticoagulation. Follow-up Hgb stable. 2 stiches applied around the site   - Patient unable to get po or SL prograf. Family (POA) does not want NG feeding .  - Current immunosuppressant is only IV mycophenolate and IV prednisone    In the grand scheme of things, given patient's co-morbidities, he is not an deal candidate for long term dialysis  Significant differences in opinion in the family. POA agrees to DNR/DNI, refuses feeding tube and refuses anti epeleptics, but does want dialysis   For now continue dialysis till we have a conversation with POA Abah   1 lts fluid removed today at dialysis           # Seizure-like activity - New Onset.   # Acute Encephalopathy  Patient presented following an episode of full body convulsions while eating on day of admission.  Reportedly this lasted one minute.  He has no prior history of seizures.  CT head and CTA as above. MR brain:  No e/o acute stroke.   Pt remains encephalopathic. Likely related to seizure plus pna,  c.diff. Hospitalization. Hx of likely advanced dementia.    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   Video EEg; no ongoing seizure activity. Neurology on board. Appreciate consult.      - Loaded and started on Keppra 500 bid iv, continue.   9/14/2018: patient's daughter Cruz asked to stop seizure medicines. Explained her the risk of recurrent seizure, neuro recs, she still wanted me to stop. Discontinued.   - NPO except meds until SLP clearance, gentle ivf. (daughter declined NG tube, says family can feed him/ give him pills)  - ct neuro checks, Vitals. I/o  - fall, seizure precautions.         # Possible aspiration pneumonia vs Pneumonitis    CXR from today shows bibasilar patchy opacities concerning for pneumonia vs aspiration.  - Started on Unasyn in the ED, switched to iv Zosyn. 09.14, will complete total 7 days course. ( Last day 9/21)  - ID consult appreciated.     # Recurrent C.diff colitis:   - IV metronidazole. With plan to switch to po Vanco when able, Continue 7 days after done with antibiotics.       # History of DM II  - Not on any medications.  Follow daily blood sugars   -Stable    # History of hypertension  - Continue home norvasc, Coreg as able to take po   - iv Hydralazine prn for sbp>170  - dc scheduled hydralazine.   - Monitor.       # History of DVT  - inr sub therapeutic. 09.12 Started on heparin gtt (standard with no loading dose)  - Was on warfarin, but now at significant risk for aspiration, and hence will restart Heparin infusion       # History of dementia, CVA -  At baseline he is nonverbal and minimally interactive, at times making eye contact.  He is total cares and taken care of at home by his family.  - discussed about goals of care.   - Daughter has declined palliative consultation.   - She expects him to be able to get to baseline and take home.   - Declined NG tube. Does not want seizure medications at current.   - Family at times have given meds and food to patient. (despite SLP  "rec of NPO) and risk of aspiration explained.     # LE edema: holding diuretics 2.2 zaki.   - Lymphedema therapy consult      Diet: npo per SLP. Dc ivf  DVT Prophylaxis: On anticoagulation. (holding for bleeding RIJ)         Consulting teams: Transplant Nephrology, ID, Neurology   Code status:DNR/DNI  DVT Prophylaxis: Was on Warfarin ( now on hold)   Gastric prophylaxis: Not required   Diet: NPO ( Family feeding patient against medical advice)   Disposition: To be determined       Patient seen and examined with RN         In the grand scheme of things, patient has an extremely poor prognosis. He is not a candidate for long term dialysis and is bound to have complications as a result of this  Furthermore, patient is unable to swallow food. Family declines NG feeding, but offering home food putting him a further risk of aspiration and respiratory compromise   Family refuses anti epileptic medications, knowing he had a seizure  However, POA's wishes to pursue dialysis  I have left a message with MIMI Arroyo to come back to the hospital to have a discussion          Interval History:   Progress notes in the last 24 hrs by MDT team has been reviewed.   This is the first time I am meeting with Priscilla. His H&P and progress of events has been reviewed.  Sign out received from Dr Aponte.   Patient barely responsive with minimal spontaneous movements except for pain  Was difficult to initiate dialysis today, but finally achieved.  Daughter May at bedside. Advocating for her father that he should really be considered for comfort care. Unfortunately, the POA is  The other daughter Cruz, who agrees with DNR/DNi status, but wants dialysis  No fevers or chills        Review of Systems:   A comprehensive review of systems was performed and found to be negative except: Those that are outlined in interval history              Medications:   I have reviewed this patient's current medications which are outlined in the \"current medication\" " section of [facility]             Physical Exam:   Vitals were reviewed  Temp: 95.3  F (35.2  C) Temp src: Axillary BP: 163/78 Pulse: 89 Heart Rate: 89 Resp: 16 SpO2: 99 % O2 Device: None (Room air)    Constitutional:   Spontaneously opens eyes. Not in distress. Non verbal      Lungs:   No increased work of breathing, good air exchange, Mild basal crackles      Cardiovascular:   Normal apical impulse, regular rate and rhythm, normal S1 and S2, no S3 or S4, and no murmur noted     Abdomen:   No scars, normal bowel sounds, soft, non-distended, non-tender, no masses palpated, no hepatosplenomegally     Musculoskeletal:   Bilateral lower extremity edema      Neurologic:   Encephalopathic. Spontaneous eye movements            Data:     Most recent labs have been evaluated and relevant labs are outlined below :  BMP    Recent Labs  Lab 09/18/18 0754 09/17/18 0549 09/16/18  0825 09/15/18  0705    139 143 141   POTASSIUM 4.2 4.3 4.1 4.2   CHLORIDE 106 107 111* 110*   JOSHUA 8.0* 7.9* 7.9* 7.8*   CO2 18* 19* 18* 18*   BUN 32* 27 42* 41*   CR 2.91* 2.59* 3.31* 2.89*   * 143* 118* 145*     CBC  Recent Labs  Lab 09/18/18  0754  09/17/18  0549 09/15/18  0705 09/14/18  0733   WBC 5.4  --  5.1 7.3 9.0   RBC 2.68*  --  2.73* 2.95* 2.97*   HGB 7.9*  < > 8.0* 8.6* 8.8*   HCT 24.8*  --  25.5* 27.0* 26.7*   MCV 93  --  93 92 90   MCH 29.5  --  29.3 29.2 29.6   MCHC 31.9  --  31.4* 31.9 33.0   RDW 17.5*  --  17.7* 17.4* 17.0*     --  170 242 255   < > = values in this interval not displayed.  INR    Recent Labs  Lab 09/18/18 0754 09/17/18 0549 09/16/18  0825 09/15/18  0705   INR 1.86* 2.16* 2.08* 1.96*     LFTs    Recent Labs  Lab 09/14/18  0733 09/13/18  0405 09/11/18  1657   ALKPHOS 78 77 90   AST 25 19 26   ALT 17 15 18   BILITOTAL 0.3 0.3 0.4   PROTTOTAL 6.7* 6.1* 6.9   ALBUMIN 2.2* 2.1* 2.3*      PANCNo lab results found in last 7 days.            Case d/wnephrology     Dr ADITYA Dumont MD  Hospitalist ( Internal  medicine)  Pager: [phone]

## 2018-09-19 NOTE — PROVIDER NOTIFICATION
[employer] cross cover notified regarding critical 10a of 1.19. Heparin held at 2200 for Xa of 0.93 and restarted at 1150units/hr per protocol (see MAR). 0400 recheck 1.19. Will continue POC.     Spoke with provider KERRIE Josue to restart Heparin gtt at 700 units/hr and recheck 10a at 1230. Pt had previously been therapeutic at 700 units/hr.

## 2018-09-19 NOTE — PROGRESS NOTES
[facility], [place]   Internal Medicine Daily Note          Assessment and Plan:    Priscilla Way is a 78 year old male admitted on 9/11/2018. He has a history of dementia (vascular plus Alzheimers), liver transplant in 2000, kidney transplant in 2000, DVT, DM II and CVA and is admitted for aspiration and possible seizure episode.       # History of liver and kidney transplant  # LILIAM on CKD- anuric.   Etiology for LILIAM: suspect MF: related to intravascular dehydration, sepsis on admission, plus  immunosuppresants (high tacro lvl). Contrast.  No obvious hydronephrosis noted on transplanted kidney ultrasound   Tx Nephrology on board.   No response to IV fluid challenge, lasix plus albumin challenge. Remained anuric with worsening creatinine   9/16/2018: Nephrology discussed in detail with POA regarding dialysis. She made informed decision to proceed with dialysis. RIJ placed by renal. HD initiated.    9/17/2018: bleeding around RIJ site. Hold Anticoagulation. Follow-up Hgb stable. 2 stiches applied around the site   - Patient unable to get po or SL prograf. Family (POA) does not want NG feeding .  - Current immunosuppressant is only IV mycophenolate and IV prednisone    In the grand scheme of things, given patient's co-morbidities, he is not an deal candidate for long term dialysis  Significant differences in opinion in the family. POA agrees to DNR/DNI, refuses feeding tube and refuses anti epeleptics, but does want dialysis   For now continue dialysis till we have a conversation with POA Abah   1 lts fluid removed today at dialysis on 9/18    # Seizure-like activity - New Onset.   # Acute Encephalopathy  Patient presented following an episode of full body convulsions while eating on day of admission.  Reportedly this lasted one minute.  He has no prior history of seizures.  CT head and CTA as above. MR brain:  No e/o acute stroke.   Pt remains encephalopathic. Likely related to seizure  plus pna, c.diff. Hospitalization. Hx of likely advanced dementia.    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   Video EEg; no ongoing seizure activity. Neurology on board. Appreciate consult.      - Loaded and started on Keppra 500 bid IV  9/14/2018: patient's daughter Cruz asked to stop seizure medicines. Explained her the risk of recurrent seizure, neuro recs, she still wanted me to stop. Discontinued.   - NPO except meds until SLP clearance, gentle ivf. (daughter declined NG tube, says family can feed him/ give him pills)  - ct neuro checks, Vitals. I/o  - fall, seizure precautions.         # Possible aspiration pneumonia vs Pneumonitis    CXR from today shows bibasilar patchy opacities concerning for pneumonia vs aspiration.  - Started on Unasyn in the ED, switched to iv Zosyn. 09.14, will complete total 7 days course. ( Last day 9/21)  - ID consult appreciated.     # Recurrent C.diff colitis:   - IV metronidazole. With plan to switch to po Vanco when able, Continue 7 days after done with antibiotics.       # History of DM II  - Not on any medications.  Follow daily blood sugars   -Stable    # History of hypertension  - Continue home norvasc, Coreg as able to take po   - iv Hydralazine prn for sbp>170  - dc scheduled hydralazine.   - Monitor.       # History of DVT  - inr sub therapeutic. 09.12 Started on heparin gtt (standard with no loading dose)  - Was on warfarin, but now at significant risk for aspiration, we started patient on Heparin infusion      # History of dementia, CVA   At baseline he is nonverbal and minimally interactive, at times making eye contact.  He is total cares and taken care of at home by his family.  - discussed about goals of care.   - Daughter has declined palliative consultation.   - She expects him to be able to get to baseline and take home.   - Declined NG tube. Does not want seizure medications at current.   - Family at times have given meds and food to patient. (despite SLP rec of  NPO) and risk of aspiration explained.     # LE edema: holding diuretics 2.2 zaki.   - Lymphedema therapy consult      Diet: npo per SLP.  DVT Prophylaxis: On anticoagulation. (holding for bleeding RI)         Consulting teams: Transplant Nephrology, ID, Neurology   Code status:DNR/DNI  DVT Prophylaxis: Was on Warfarin ( now on hold)   Gastric prophylaxis: Not required   Diet: NPO ( Family feeding patient against medical advice)   Disposition: To be determined       Patient seen and examined with RN         In the grand scheme of things, patient has an extremely poor prognosis. He is not a candidate for long term dialysis and is bound to have complications as a result of this  Furthermore, patient is unable to swallow food. Family declines NG feeding, but offering home food putting him a further risk of aspiration and respiratory compromise   Family refuses anti epileptic medications, knowing he had a seizure  I discussed with Cruz ( the daughter who is presumed to be the POA). She refuses to discuss anything about the POA document. Says that the family will make decisions. It is unclear if there is a POA document  She was not ready for me to pursue any conversation, cutting me off multiple times. She was supposed to be here between 9-10, but said that she she got stuck in traffic.  She declined to talk about POA document when asked to bring it in. Kept alluding that all of family including her brothers and other extended members will make decision.She also said that she will bring them all in tomorrow, but could not give me the time. I broached the subject of futility of long term dialysis. She sounded very angry and upset   Finally, she hung up on me.  Party to the phone conversation was bed side RN and Nephrology shirley          Interval History:   Progress notes in the last 24 hrs by MDT team has been reviewed.  No acute vents over night  No distress  No fevers or chills  Family continues to feed        Review of  "Systems:   A comprehensive review of systems was performed and found to be negative except: Those that are outlined in interval history              Medications:   I have reviewed this patient's current medications which are outlined in the \"current medication\" section of EPIC             Physical Exam:   Vitals were reviewed  Temp: 95.1  F (35.1  C) Temp src: Axillary BP: 180/79 Pulse: 94 Heart Rate: 102 Resp: 16 SpO2: 100 % O2 Device: None (Room air)    Constitutional:   Spontaneously opens eyes. Not in distress. Non verbal      Lungs:   No increased work of breathing, good air exchange, Mild basal crackles      Cardiovascular:   Normal apical impulse, regular rate and rhythm, normal S1 and S2, no S3 or S4, and no murmur noted     Abdomen:   No scars, normal bowel sounds, soft, non-distended, non-tender, no masses palpated, no hepatosplenomegally     Musculoskeletal:   Bilateral lower extremity edema      Neurologic:   Encephalopathic. Spontaneous eye movements            Data:     Most recent labs have been evaluated and relevant labs are outlined below :  BMP    Recent Labs  Lab 09/19/18  0431 09/18/18  0754 09/17/18  0549 09/16/18  0825    139 139 143   POTASSIUM 3.7 4.2 4.3 4.1   CHLORIDE 104 106 107 111*   JOSHUA 8.1* 8.0* 7.9* 7.9*   CO2 21 18* 19* 18*   BUN 21 32* 27 42*   CR 2.05* 2.91* 2.59* 3.31*   * 149* 143* 118*     CBC  Recent Labs  Lab 09/18/18  1458 09/18/18  0754  09/17/18  0549 09/15/18  0705   WBC 10.4 5.4  --  5.1 7.3   RBC 2.52* 2.68*  --  2.73* 2.95*   HGB 7.4* 7.9*  < > 8.0* 8.6*   HCT 22.2* 24.8*  --  25.5* 27.0*   MCV 88 93  --  93 92   MCH 29.4 29.5  --  29.3 29.2   MCHC 33.3 31.9  --  31.4* 31.9   RDW 17.2* 17.5*  --  17.7* 17.4*    195  --  170 242   < > = values in this interval not displayed.  INR    Recent Labs  Lab 09/19/18  0431 09/18/18  0754 09/17/18  0549 09/16/18  0825   INR 1.70* 1.86* 2.16* 2.08*     LFTs    Recent Labs  Lab 09/14/18  0733 09/13/18  0405 "   ALKPHOS 78 77   AST 25 19   ALT 17 15   BILITOTAL 0.3 0.3   PROTTOTAL 6.7* 6.1*   ALBUMIN 2.2* 2.1*      PANCNo lab results found in last 7 days.            Case d/wnephrology     Dr ADITYA Dumont MD  Hospitalist ( Internal medicine)  Pager: [phone]

## 2018-09-19 NOTE — PROGRESS NOTES
[facility], [place]   Transplant Nephrology Progress Note  Date of Admission:  9/11/2018    Assessment & Plan   # DDKT and liver transplant: basline Cr ~ 1.2-1.4; the patient has anuric LILIAM on on dialysis since 9/16/18.  Her LILIAM is likely multifactorial but related to contrast induced nephropathy with underlying ckd.  The creatinine of 1.2-1.4 is an overestimation of his renal function and a kidney function 8/2016 is significant for early diabetic changes with secondary focal segmental sclerosis.  There is some potential for renal recovery after the insulting agent of IV contrast.  After a lengthy discussion about use of dialysis to bridge him to potential recovery of the acute insult, the patients' daughter, Cruz, did want to proceed with dialysis.  He has several factors that limit his ability to potentially recover from this severe LILIAM episode including chronic aspiration, prior CKD, lack of adequate nutrition (25 kcal/kg/day necessary in LILIAM).  Dialysis has been started but he has had several intolerance episodes including severe hypotension on dialysis, bleeding from the dialysis catheter (now fixed), worsening breathing with dialysis, and constantly trying to remove his dialysis catheter which has prompted restraints for his safety.  I will plan for dialysis tomorrow with the aim to see if he can have meaningful recovery within the next week.    I do not recommend chronic dialysis as his mortality rate is very high no matter what we do and he has not had meaningful benefit from dialysis thus far.  Additionally, chronic dialysis does not appear to confer with his other wishes of not wanting management of his chronic failure of other organs (i.e. His ability to eat has failed and he does not want a feeding tube, he does not want intubation if the lungs failed, and he does not want compression if his heart failed).  His decisions are currently being made by his daughter Cruz and she  will need to provide documentation of her being the healthcare proxy.  I do recommend an Ethics consultation given the difficulty in having discussions with her.  She has been available briefly on  when the decision was made to start dialysis but she did not want to discuss long term decisions.  In addition, today morning she did not want to discuss the risk / benefits / and details of Mr. Solano' current status.  As an advocate for Mr. Way, I am concerned about secondary gain in this situation and would appreciate an Ethics consultation in this matter.    # Immunosuppression: Tacrolimus immediate release (goal  4-6) however the patient cannot tolerate oral medications due to risk of aspiration.  He is on iv immunosuppression with mmf 500 bid and iv steroids.  No changes at this time.    # Hypertension: Inadequate control; Goal BP: 130/80.  Ok to keep bp 130-150 systolic while recovering from LILIAM.  Will try to ultrafilter on dialysis as tolerated.   - Changes: No    # Anemia in chronic renal disease: Hgb: Decreased   - Iron studies: Not checked recently    # Electrolytes:  Within normal limits    # Other Medical Issues:  # seziure disorder- admitted with AMS. Seen by neurology. On keppra.   # h/o CVA:  Minimally interactive at baseline. Palliative care on consult. Family does not want feeding tube.   # aspiration PNA-   # Cdiff- pn iV flagyl  # DM- management per primary.       # Transplant History:  Etiology of kidney failure: Hepatitis C  Transplant: 2000 (Kidney), 2000 (Liver)  Donor Type:  - Brain Death                      Donor Class: Standard Criteria Donor  Significant changes in immunosuppression: see above  Significant Complications: Now anuric LILIAM     Recommendations were communicated to the primary team via this note    Viral Jeff Rosenberg MD  Pager: [phone]  Transplant Office Phone Number: [phone]    Interval History   Patient did not tolerate dialysis with worsening  "respiratory status during dialysis yesterday.  He remains with restraint.  No bleeding from the catheter.  He is not on o2 today.  He has 100 ml of urine output.    Physical Exam   Temp  Av.6  F (35.3  C)  Min: 93  F (33.9  C)  Max: 98.7  F (37.1  C)      Pulse  Av.1  Min: 74  Max: 104 Resp  Av.4  Min: 10  Max: 24  SpO2  Av.5 %  Min: 91 %  Max: 100 %     /80 (BP Location: Left arm)  Pulse 94  Temp 96.1  F (35.6  C) (Axillary)  Resp 16  Ht 1.778 m (5' 10\")  Wt 83 kg (182 lb 15.7 oz)  SpO2 100%  BMI 26.26 kg/m2   Date 18 0700 - 18 0659   Shift 1236-3106 6840-3809 4512-4598 24 Hour Total   I  N  T  A  K  E   I.V. 574   574    Shift Total  (mL/kg) 574  (6.92)   574  (6.92)   O  U  T  P  U  T   Shift Total  (mL/kg)       Weight (kg) 83 83 83 83        Admit Weight: 83.9 kg (185 lb)   GENERAL APPEARANCE: alert and no distress  HENT: mouth without ulcers or lesions  LYMPHATICS: no cervical or supraclavicular nodes  RESP: lungs clear to auscultation - no rales, rhonchi or wheezes  CV: regular rhythm, normal rate, no rub, no murmur  EDEMA: no LE edema bilaterally  ABDOMEN: soft, nondistended, nontender, bowel sounds normal  MS: extremities normal - no gross deformities noted, no evidence of inflammation in joints, no muscle tenderness  SKIN: no rash  TX KIDNEY: normal    Data   All labs reviewed by me.  CMP  Recent Labs  Lab 18  0431 18  0754 18  0549 18  0825  18  0733 18  0405    139 139 143  < > 142 142   POTASSIUM 3.7 4.2 4.3 4.1  < > 4.4 4.1   CHLORIDE 104 106 107 111*  < > 111* 110*   CO2 21 18* 19* 18*  < > 19* 23   ANIONGAP 15* 15* 13 14  < > 13 8   * 149* 143* 118*  < > 145* 112*   BUN 21 32* 27 42*  < > 40* 34*   CR 2.05* 2.91* 2.59* 3.31*  < > 2.51* 1.76*   GFRESTIMATED 31* 21* 24* 18*  < > 25* 37*   GFRESTBLACK 38* 25* 29* 22*  < > 30* 45*   JOSHUA 8.1* 8.0* 7.9* 7.9*  < > 7.9* 7.8*   PROTTOTAL  --   --   --   --   --  6.7* " 6.1*   ALBUMIN  --   --   --   --   --  2.2* 2.1*   BILITOTAL  --   --   --   --   --  0.3 0.3   ALKPHOS  --   --   --   --   --  78 77   AST  --   --   --   --   --  25 19   ALT  --   --   --   --   --  17 15   < > = values in this interval not displayed.  CBC  Recent Labs  Lab 09/18/18  1458 09/18/18  0754 09/17/18  1607 09/17/18  0549 09/15/18  0705   HGB 7.4* 7.9* 8.3* 8.0* 8.6*   WBC 10.4 5.4  --  5.1 7.3   RBC 2.52* 2.68*  --  2.73* 2.95*   HCT 22.2* 24.8*  --  25.5* 27.0*   MCV 88 93  --  93 92   MCH 29.4 29.5  --  29.3 29.2   MCHC 33.3 31.9  --  31.4* 31.9   RDW 17.2* 17.5*  --  17.7* 17.4*    195  --  170 242     INR  Recent Labs  Lab 09/19/18  0431 09/18/18  0754 09/17/18  0549 09/16/18  0825   INR 1.70* 1.86* 2.16* 2.08*     ABGNo lab results found in last 7 days.   Urine Studies  Recent Labs   Lab Test  09/14/18   1418  05/31/18   1643  03/21/18   1139  11/08/16   1446   COLOR  Yellow  Yellow  Yellow  Yellow   APPEARANCE  Cloudy  Clear  Slightly Cloudy  Clear   URINEGLC  150*  Negative  Negative  Negative   URINEBILI  Negative  Negative  Negative  Negative   URINEKETONE  10*  Negative  Negative  Negative   SG  1.028  1.012  1.011  1.014   UBLD  Moderate*  Trace*  Trace*  Negative   URINEPH  6.5  5.5  5.0  5.0   PROTEIN  >600*  30*  10*  10*   NITRITE  Negative  Negative  Negative  Negative   LEUKEST  Large*  Negative  Large*  Negative   RBCU  7*  9*  2  1   WBCU  34*  2  159*  1     Recent Labs   Lab Test  09/14/18   1418  05/27/16   1409  04/29/11   1348  09/14/10   1134   UTPG  24.91*  Specimen not received  NOTIFIED [facility]  WHO PAGED  RN, LOPEZ AT 1355. NO URINE   RECIEVED WITH BLOOD SPECIMENS. AB    0.04  <0.02     PTH  Recent Labs   Lab Test  06/05/18   0548  08/24/16   0852   PTHI  78  333*     Iron Studies  Recent Labs   Lab Test  11/08/16   1209  08/24/16   0852   IRON  17*  20*   FEB  146*  197*   IRONSAT  11*  10*   BARTOLO   --   1025*       IMAGING:  All imaging  studies reviewed by me.

## 2018-09-19 NOTE — PLAN OF CARE
Problem: Patient Care Overview  Goal: Plan of Care/Patient Progress Review  VSS except /74 on RA. 10mg IV Hydralazine given for BP. Pt is DNR/DNI. Pt is more alert today and increasingly resistant to cares- grabbing at staff and tries to bite. Soft mitt on left hand as pt tries to pull at dialysis catheter and bite his hand. Enteric precautions maintained for C.diff. Heparin drip running at 700units/hr. Pt has oral secretions that he can't cough up, so is suctioned PRN. Continues to have low urine output; 125mL recorded from 3816-3895; patino catheter patent. Pt repositioned Q2 hours. +3/+4 generalized edema; legs elevated on pillows. No signs of pain. Family attentive at bedside. Educated about why we are not doing PO meds and food as he has a high aspiration risk. Per pt's daughter, May, pt had 50% of an ensure and reported no coughing from pt. MD spoke with other daughter Cruz on the phone earlier and she was very difficult to speak to and did not listen or let the MD speak. MD tried to get her to bring advanced directive to the hospital, but she said the papers do not matter, the family will make the decision. Fredisabel said she will get her family to the hospital tomorrow for a care conference, but would not give a time. Plan to keep monitoring patient and follow POC.

## 2018-09-20 NOTE — PROGRESS NOTES
[facility] Nurse Inpatient Wound Assessment   Reason for consultation: Evaluate and treat head wound     Assessment  Head wounds due to Skin Tear 2/2 EEG leads  Status: improving    Treatment Plan  Head wounds from EEG leads: Every shift cleanse with saline and pat dry.  Apply thin layer of vaseline, no cover dressing needed.   Orders Written  WO Nurse follow-up plan:weekly  Nursing to notify the Provider(s) and re-consult the WO Nurse if wound(s) deteriorates or new skin concern.    Patient History  According to provider note(s):  Priscilla Way is a 78 year old male admitted on 9/11/2018. He has a history of dementia, liver transplant in 2000, kidney transplant in 2000, DVT, DM II and CVA and is admitted for aspiration and possible seizure    Objective Data  Containment of urine/stool: Incontinence Protocol    Active Diet Order    Active Diet Order      NPO for Medical/Clinical Reasons Except for: Meds, Ice Chips    Output:   I/O last 3 completed shifts:  In: 694 [P.O.:120; I.V.:574]  Out: 400 [Urine:400]    Risk Assessment:   Sensory Perception: 2-->very limited  Moisture: 3-->occasionally moist  Activity: 1-->bedfast  Mobility: 1-->completely immobile  Nutrition: 1-->very poor  Friction and Shear: 1-->problem  Stuart Score: 9                          Labs:   Recent Labs  Lab 09/19/18  0431 09/18/18  1458  09/14/18  0733   ALBUMIN  --   --   --  2.2*   HGB  --  7.4*  < > 8.8*   INR 1.70*  --   < > 2.06*   WBC  --  10.4  < > 9.0   < > = values in this interval not displayed.    Physical Exam  Skin inspection: focused head         Wound Location:  head  Date of last photo 9/13/18  Wound History: EEG lasting 2.5 hrs yesterday.  Likely skin tear 2/2 removal of EEG leads and not pressure. 3 wound on left side of face/temple and one over right temple.  9/20: two smallest wounds have healed.  Largest are resurfaced but fragile. Updated RN.    Measurements (length x width x depth, in cm) Largest of left measures 0.8 x 0.5 x  <0.1 cm, Right side measures 1 x 0.5 x 0.1 cm  Wound Base:  dermis  Palpation of the wound bed: normal   Periwound skin: intact  Color: normal and consistent with surrounding tissue  Temperature: normal   Drainage:, none  Description of drainage: none  Odor: none  Pain: no grimacing or signs of discomfort    Interventions  Current support surface: Standard  Low air loss mattress  Current off-loading measures: Pillows under calves  Visual inspection of wound(s) completed  Wound Care: done per plan of care  Supplies: at bedside and floor stock  Education provided: importance of repositioning, plan of care and wound progress  Discussed plan of care with Nurse    Holli Magallanes RN

## 2018-09-20 NOTE — PLAN OF CARE
Problem: Patient Care Overview  Goal: Plan of Care/Patient Progress Review  Pt lethargic, nonverbal and disoriented x4. On RA .HTN, prn hydralazine given x1. Tachy, HRs 100-105.  Triggered sepsis this AM- lactic 0.6. Daughter and wife at bedside. Repositioned q2h w/ A2. Mitts on as pt is combative and resistance to cares. No signs or indications of pain. Hep gtt at 700u/hr through L PIV, hep xa check in AM. R PIV-SL. R internal jugular-CDI. 1 incontinent BM. Barrier cream applied to bottom. Virk in place w low output. NPO. Plan for dialysis and ethic consult today.

## 2018-09-20 NOTE — PROGRESS NOTES
HEMODIALYSIS TREATMENT NOTE    Date: 9/20/2018  Time: 12:33 PM    Data:  Pre Wt: 84.9 kg (187 lb 2.7 oz)   Desired Wt: 82.7 kg   Post Wt: 83.2 kg (183 lb 6.8 oz)  Weight gain: -1.7 kg   Weight change: 1.7 kg  Ultrafiltration - Post Run Net Total Removed (mL): 1700 mL  Ultrafiltration - Post Run Net Total Gain (mL): 0 mL  Vascular Access Status: Yes, secured and visible  Dialyzer Rinse: Streaked  Total Blood Volume Processed: 47  Total Dialysis (Treatment) Time:  3 hr    Lab:   Yes, BMP    Interventions:  Pt arrived for HD run goal to be 1-2 kg in 3 hrs. Goal set for 2 kg but last hour pt became sl tachy at 111, goal lowered to net 1.7 HR kelly to baseline. Rafaela run well.    Assessment:  Renal here to see pt and stated this may be his last HD 2/2 increasing UOP     Plan:    Next tx per renal team

## 2018-09-20 NOTE — PROGRESS NOTES
[facility], [place]   Internal Medicine Daily Note          Assessment and Plan:    Priscilla Way is a 78 year old male admitted on 9/11/2018. He has a history of dementia (vascular plus Alzheimers), liver transplant in 2000, kidney transplant in 2000, DVT, DM II and CVA and is admitted for aspiration and possible seizure episode.       # History of liver and kidney transplant  # LILIAM on CKD- anuric.   Etiology for LILIAM: suspect MF: related to intravascular dehydration, sepsis on admission, plus  immunosuppresants (high tacro lvl). Contrast.  No obvious hydronephrosis noted on transplanted kidney ultrasound   Tx Nephrology on board.   No response to IV fluid challenge, lasix plus albumin challenge. Remained anuric with worsening creatinine   9/16/2018: Nephrology discussed in detail with POA regarding dialysis. She made informed decision to proceed with dialysis. RIJ placed by renal. HD initiated.    9/17/2018: bleeding around RIJ site. Hold Anticoagulation. Follow-up Hgb stable. 2 stiches applied around the site   - Patient unable to get po or SL prograf. Family (POA) does not want NG feeding .  - Current immunosuppressant is only IV mycophenolate and IV prednisone    In the grand scheme of things, given patient's co-morbidities, he is not an deal candidate for long term dialysis ( If his renal function does not recover)   Significant differences in opinion in the family. POA agrees to DNR/DNI, refuses feeding tube and refuses anti epeleptics, but does want dialysis   There has been some moderate urine output in the last 24 hrs,  For now continue dialysis till we have a conversation with POA Cruz   1.7 lts fluid removed today at dialysis on 9/20    # Seizure-like activity - New Onset.   # Acute Encephalopathy  Patient presented following an episode of full body convulsions while eating on day of admission.  Reportedly this lasted one minute.  He has no prior history of seizures.  CT  head and CTA as above. MR brain:  No e/o acute stroke.   Pt remains encephalopathic. Likely related to seizure plus pna, c.diff. Hospitalization. Hx of likely advanced dementia.    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   Video EEg; no ongoing seizure activity. Neurology on board. Appreciate consult.      - Loaded and started on Keppra 500 bid IV  9/14/2018: patient's daughter Cruz asked to stop seizure medicines. Explained her the risk of recurrent seizure, neuro recs, she still wanted me to stop. Discontinued.   - NPO except meds until SLP clearance, gentle ivf. (daughter declined NG tube, says family can feed him/ give him pills)  - ct neuro checks, Vitals. I/o  - fall, seizure precautions.         # Possible aspiration pneumonia vs Pneumonitis    CXR from today shows bibasilar patchy opacities concerning for pneumonia vs aspiration.  - Started on Unasyn in the ED, switched to iv Zosyn. 09.14, will complete total 7 days course. ( Last day 9/21)  - ID consult appreciated.     # Recurrent C.diff colitis:   - IV metronidazole. With plan to switch to po Vanco when able, Continue 7 days after done with antibiotics.       # History of DM II  - Not on any medications.  Follow daily blood sugars   -Stable    # History of hypertension  - Continue home norvasc, Coreg as able to take po   - iv Hydralazine prn for sbp>170  - dc scheduled hydralazine.   - Monitor.       # History of DVT  - inr sub therapeutic. 09.12 Started on heparin gtt (standard with no loading dose)  - Was on warfarin, but now at significant risk for aspiration, we started patient on Heparin infusion      # History of dementia, CVA   At baseline he is nonverbal and minimally interactive, at times making eye contact.  He is total cares and taken care of at home by his family.  - discussed about goals of care.   - Daughter has declined palliative consultation.   - She expects him to be able to get to baseline and take home.   - Declined NG tube. Does not  want seizure medications at current.   - Family at times have given meds and food to patient. (despite SLP rec of NPO) and risk of aspiration explained.     # LE edema: holding diuretics 2.2 zaki.   - Lymphedema therapy consult      Diet: npo per SLP.  DVT Prophylaxis: On anticoagulation. Heparin infusion     Consulting teams: Transplant Nephrology, ID, Neurology   Code status:DNR/DNI  DVT Prophylaxis: On Heparin infusion   Gastric prophylaxis: Not required   Diet: NPO ( Family feeding patient against medical advice)   Disposition: To be determined       Patient seen and examined with RN         In the grand scheme of things, patient has an extremely poor prognosis. He is not a candidate for long term dialysis ( if Kidney function does not return)  and is bound to have complications as a result of this  Furthermore, patient is unable to swallow food. Family declines NG feeding, but offering home food putting him a further risk of aspiration and respiratory compromise   Family refuses anti epileptic medications, knowing he had a seizure  I discussed with Cruz ( the daughter who is presumed to be the POA) on 9/19. She refuses to discuss anything about the POA document. Says that the family will make decisions. It is unclear if there is a POA document  She was not ready for me to pursue any conversation, cutting me off multiple times. She was supposed to be here between 9-10, but said that she she got stuck in traffic.  She declined to talk about POA document when asked to bring it in. Kept alluding that all of family including her brothers and other extended members will make decision.She also said that she will bring them all in tomorrow, but could not give me the time. I broached the subject of futility of long term dialysis. She sounded very angry and upset   Finally, she hung up on me.  Party to the phone conversation was bed side RN and Nephrology remanisha      Ethics team aware         Interval History:   Progress  "notes in the last 24 hrs by MDT team has been reviewed.  No acute vents over night   Patient making some urine . 425 mls of urine in the last 24 hrs  Daughter May at bedside  2nd daughter Abah not here with family. Unable to connect  May advocates for dialysis for a limited period of 2 weeks. If no improvement, does not want long term dialysis          Review of Systems:   A comprehensive review of systems was performed and found to be negative except: Those that are outlined in interval history              Medications:   I have reviewed this patient's current medications which are outlined in the \"current medication\" section of EPIC             Physical Exam:   Vitals were reviewed  Temp: 96  F (35.6  C) Temp src: Axillary BP: 133/53   Heart Rate: 97 Resp: 20 SpO2: 100 % O2 Device: None (Room air)    Constitutional:   Spontaneously opens eyes. Not in distress. Non verbal      Lungs:   No increased work of breathing, good air exchange, Mild basal crackles      Cardiovascular:   Normal apical impulse, regular rate and rhythm, normal S1 and S2, no S3 or S4, and no murmur noted     Abdomen:   No scars, normal bowel sounds, soft, non-distended, non-tender, no masses palpated, no hepatosplenomegally     Musculoskeletal:   Bilateral lower extremity edema      Neurologic:   Encephalopathic. Spontaneous eye movements            Data:     Most recent labs have been evaluated and relevant labs are outlined below :  BMP    Recent Labs  Lab 09/19/18  0431 09/18/18  0754 09/17/18  0549 09/16/18  0825    139 139 143   POTASSIUM 3.7 4.2 4.3 4.1   CHLORIDE 104 106 107 111*   JOSHUA 8.1* 8.0* 7.9* 7.9*   CO2 21 18* 19* 18*   BUN 21 32* 27 42*   CR 2.05* 2.91* 2.59* 3.31*   * 149* 143* 118*     CBC  Recent Labs  Lab 09/18/18  1458 09/18/18  0754  09/17/18  0549 09/15/18  0705   WBC 10.4 5.4  --  5.1 7.3   RBC 2.52* 2.68*  --  2.73* 2.95*   HGB 7.4* 7.9*  < > 8.0* 8.6*   HCT 22.2* 24.8*  --  25.5* 27.0*   MCV 88 93  --  " 93 92   MCH 29.4 29.5  --  29.3 29.2   MCHC 33.3 31.9  --  31.4* 31.9   RDW 17.2* 17.5*  --  17.7* 17.4*    195  --  170 242   < > = values in this interval not displayed.  INR    Recent Labs  Lab 09/20/18  0915 09/19/18  0431 09/18/18  0754 09/17/18  0549   INR 1.48* 1.70* 1.86* 2.16*     LFTs    Recent Labs  Lab 09/14/18  0733   ALKPHOS 78   AST 25   ALT 17   BILITOTAL 0.3   PROTTOTAL 6.7*   ALBUMIN 2.2*      PANCNo lab results found in last 7 days.            Case d/wnephrology  And Ethics     Dr ADITYA Dumont MD  Hospitalist ( Internal medicine)  Pager: [phone]

## 2018-09-20 NOTE — PROGRESS NOTES
[facility], [place]   Transplant Nephrology Progress Note  Date of Admission:  2018    Assessment & Plan   # DDKT and liver transplant: basline Cr ~ 1.2-1.4; the patient has anuric LILIAM on on dialysis since 18. He has signs of improving renal function with 400 ml of uop yesterday.  Will continue trial of acute dialysis to see if he has renal recovery.    # Immunosuppression: Tacrolimus immediate release (goal  4-6) however the patient cannot tolerate oral medications due to risk of aspiration.  He is on iv immunosuppression with mmf 500 bid and iv steroids.  No changes at this time.    # Hypertension: Inadequate control; Goal BP: 130/80.  Ok to keep bp 130-150 systolic while recovering from LILIAM.  Will try to ultrafilter on dialysis as tolerated.   - Changes: No    # Anemia in chronic renal disease: Hgb: Decreased   - Iron studies: Not checked recently    # Electrolytes:  Within normal limits    # Other Medical Issues:  # seziure disorder- admitted with AMS. Seen by neurology. On keppra.   # h/o CVA:  Minimally interactive at baseline. Palliative care on consult. Family does not want feeding tube.   # aspiration PNA  # Cdiff- pn iV flagyl  # DM- management per primary.       # Transplant History:  Etiology of kidney failure: Hepatitis C  Transplant: 2000 (Kidney), 2000 (Liver)  Donor Type:  - Brain Death                      Donor Class: Standard Criteria Donor  Significant changes in immunosuppression: see above  Significant Complications: Now anuric LILIAM     Recommendations were communicated to the primary team via this note    Viral Jeff Rosenberg MD  Pager: [phone]  Transplant Office Phone Number: [phone]    Interval History   Improved uop today  Patient with ongoing hypotension on dialysis  Labs pending from today  Possible family meeting today    Physical Exam   Temp  Av.6  F (35.3  C)  Min: 93  F (33.9  C)  Max: 98.7  F (37.1  C)      Pulse   "Av.1  Min: 74  Max: 104 Resp  Av.4  Min: 10  Max: 24  SpO2  Av.5 %  Min: 91 %  Max: 100 %     BP 95/63  Pulse 90  Temp 94  F (34.4  C) (Axillary)  Resp 18  Ht 1.778 m (5' 10\")  Wt 84.9 kg (187 lb 1.6 oz)  SpO2 100%  BMI 26.85 kg/m2   Date 18 07 - 18 0659   Shift 1991-9492 0323-1761 5735-7098 24 Hour Total   I  N  T  A  K  E   I.V. 574   574    Shift Total  (mL/kg) 574  (6.92)   574  (6.92)   O  U  T  P  U  T   Shift Total  (mL/kg)       Weight (kg) 83 83 83 83        Admit Weight: 83.9 kg (185 lb)   GENERAL APPEARANCE: alert and no distress  HENT: mouth without ulcers or lesions  LYMPHATICS: no cervical or supraclavicular nodes  RESP: lungs clear to auscultation - no rales, rhonchi or wheezes  CV: regular rhythm, normal rate, no rub, no murmur  EDEMA: no LE edema bilaterally  ABDOMEN: soft, nondistended, nontender, bowel sounds normal  MS: extremities normal - no gross deformities noted, no evidence of inflammation in joints, no muscle tenderness  SKIN: no rash  TX KIDNEY: normal    Data   All labs reviewed by me.  CMP  Recent Labs  Lab 18  0431 18  0754 18  0549 18  0825  18  0733    139 139 143  < > 142   POTASSIUM 3.7 4.2 4.3 4.1  < > 4.4   CHLORIDE 104 106 107 111*  < > 111*   CO2 21 18* 19* 18*  < > 19*   ANIONGAP 15* 15* 13 14  < > 13   * 149* 143* 118*  < > 145*   BUN 21 32* 27 42*  < > 40*   CR 2.05* 2.91* 2.59* 3.31*  < > 2.51*   GFRESTIMATED 31* 21* 24* 18*  < > 25*   GFRESTBLACK 38* 25* 29* 22*  < > 30*   JOSHUA 8.1* 8.0* 7.9* 7.9*  < > 7.9*   PROTTOTAL  --   --   --   --   --  6.7*   ALBUMIN  --   --   --   --   --  2.2*   BILITOTAL  --   --   --   --   --  0.3   ALKPHOS  --   --   --   --   --  78   AST  --   --   --   --   --  25   ALT  --   --   --   --   --  17   < > = values in this interval not displayed.  CBC    Recent Labs  Lab 18  1458 18  0754 18  1607 18  0549 09/15/18  0705   HGB 7.4* 7.9* 8.3* " 8.0* 8.6*   WBC 10.4 5.4  --  5.1 7.3   RBC 2.52* 2.68*  --  2.73* 2.95*   HCT 22.2* 24.8*  --  25.5* 27.0*   MCV 88 93  --  93 92   MCH 29.4 29.5  --  29.3 29.2   MCHC 33.3 31.9  --  31.4* 31.9   RDW 17.2* 17.5*  --  17.7* 17.4*    195  --  170 242     INR    Recent Labs  Lab 09/20/18  0915 09/19/18  0431 09/18/18  0754 09/17/18  0549   INR 1.48* 1.70* 1.86* 2.16*     ABGNo lab results found in last 7 days.   Urine Studies  Recent Labs   Lab Test  09/14/18   1418  05/31/18   1643  03/21/18   1139  11/08/16   1446   COLOR  Yellow  Yellow  Yellow  Yellow   APPEARANCE  Cloudy  Clear  Slightly Cloudy  Clear   URINEGLC  150*  Negative  Negative  Negative   URINEBILI  Negative  Negative  Negative  Negative   URINEKETONE  10*  Negative  Negative  Negative   SG  1.028  1.012  1.011  1.014   UBLD  Moderate*  Trace*  Trace*  Negative   URINEPH  6.5  5.5  5.0  5.0   PROTEIN  >600*  30*  10*  10*   NITRITE  Negative  Negative  Negative  Negative   LEUKEST  Large*  Negative  Large*  Negative   RBCU  7*  9*  2  1   WBCU  34*  2  159*  1     Recent Labs   Lab Test  09/14/18   1418  05/27/16   1409  04/29/11   1348  09/14/10   1134   UTPG  24.91*  Specimen not received  NOTIFIED [facility]  WHO PAGED  RN, LOPEZ AT 1355. NO URINE   RECIEVED WITH BLOOD SPECIMENS. AB    0.04  <0.02     PTH  Recent Labs   Lab Test  06/05/18   0548  08/24/16   0852   PTHI  78  333*     Iron Studies  Recent Labs   Lab Test  11/08/16   1209  08/24/16   0852   IRON  17*  20*   FEB  146*  197*   IRONSAT  11*  10*   BARTOLO   --   1025*       IMAGING:  All imaging studies reviewed by me.

## 2018-09-20 NOTE — PLAN OF CARE
Problem: Patient Care Overview  Goal: Plan of Care/Patient Progress Review  Outcome: No Change  VSS on RA- ex HTN. PRN hydralazine given for high BP. Pt received 0.2 mg dilaudid for pain. Pt on heparin drip and IV antibiotics. Mycophenolate was not on unit when med due and was given at 21:45 pm. Pt's daughter noticed swelling in RUE- PIV flushed and patent, will continue to monitor for possible infiltration.  Pt NPO including meds. Assist of 2- turn q2. Pt showing signs of increased resistance to care- upper extremities rigid- pt swats at RN when performing cares. Mittens in place on both hands to prevent pt from grabbing at IV's. 175 mL's of urine output on shift- urine is reed colored. Virk cares performed during shift. Will continue to monitor and follow POC.

## 2018-09-20 NOTE — PLAN OF CARE
Problem: Patient Care Overview  Goal: Plan of Care/Patient Progress Review  Outcome: No Change  2429-0516: VSS on room air, AO- ADRIA- baseline dementia. Lift pt- turned q 2hrs. No bm's today. Virk care given total of 100cc yellow urine out. Pt had HD today- per HD RN- this would be last dialysis run. Pt's given Flagyl as ordered following HD and Mycophenolate given this am. Pt has 2 PIV's 1 running Hep gtt at 700 units/hr- am Hep 10a 0.57- recheck ordered in am. Daughter at bedside offers supportive cares- suctions pt as needed. BLE- edema/into flanks.   Pt's eldest daughter arrived tonight around 1900- requested to speak with writer in hallway and proceeded to explain concerns of plan of care- pt's eldest daughter reassured she is POA - writer encouraged family to come during day to discuss concerns with primary team but daughter stated family could not come in before 1800- maybe on Saturday family could come in during day. Writer offered Patient Relations number but daughter stated not needed at this time. Evan cover called and listened to daughters concerns.

## 2018-09-21 NOTE — PROGRESS NOTES
[facility], [place]   Transplant Nephrology Progress Note  Date of Admission:  9/11/2018    Assessment & Plan :  Priscilla Way is a 78 year old male with h/o simultaneous liver and kidney transplant in 2000 for hepatitis C.He was admitted  for AMS and seizures. Underwent CTA with contrast of brain neck for evaluation complicated by ROMANA required HD. Also c/b aspiration PNA and c diff. We are on consult now for anuric renal failure.   basline Cr ~ 1.2-1.4; the patient has anuric LILIAM on on dialysis since 9/16/18.  Her LILIAM is likely multifactorial but related to contrast induced nephropathy with underlying ckd.  The creatinine of 1.2-1.4 is an overestimation of his renal function and a kidney function 8/2016 is significant for early diabetic changes with secondary focal segmental sclerosis.    There is some potential for renal recovery after the insulting agent of IV contrast.   Dialysis has been started but he has had several intolerance episodes including severe hypotension on dialysis, worsening breathing with dialysis, and constantly trying to remove his dialysis catheter which has prompted restraints for his safety.   Not recommend chronic dialysis, ( he does not want a feeding tube, he does not want intubation if the lungs failed, and he does not want compression if his heart failed).  His decisions are currently being made by his daughter Cruz and she will need to provide documentation of her being the healthcare proxy.    Need Ethics consultation given the difficulty in having discussions with her.    Plan for HD tomorrow.     # DDKT and liver transplant:  - Basline Cr ~ 1.2- 1,4; Increased likely due to contrast which he received on 9/11/2018.   - He has signs of improving renal function with 225 ml of UOP yesterday and more than 400 ml the day before.  Will continue trial of acute dialysis to see if he has renal recovery. Scheduled for HD TTS for now.  - Plan for HD today for 3  hours with fluid removal 2-3 liters as tolerated.   - No bleeding from the dialysis cath site. INR: 1.28 this morning, patient is on  Heparin gtt.   - UA  With 34 WBC and 7 RBC and large LE. Urine Cx NGTD. UA with no hydronephrosis.          - Proteinuria: Nephrotic range - new which is likely due to concentrated urine in the setting of acute renal failure with background DM nephropathy.   - Latest DSA: No                   Date of DSA last checked: 8/2016  - BK: No  - Kidney Tx Biopsy: Yes- 8/2015- ATN, DM nephropathy with features of FSGS and moderate to severe arterisclerosis          # Liver transplant :  - LFTs and Alk phos normal WNL.          # Immunosuppression:   - Continue IV Mycophenolate mofetil 500 gram BID and continue IV methylprednisone 16 mg IV daily.   - Was on tacrolimus montherapy  but not tolerating oral now. Would not given IV tacrolimus.           # Hypertension:   - BP today is elevated 160s/80s, will keep it on high normal side for today and plan for HD tomorrow with fluid removal 1-2 liters as tolerated.   - Home medication amlodipine 10 mg daily and coreg 6.25 mg BID were on held, we could restart with Amlodipine 5 mg daily if SBP>170 mmHg and continue monitoring and adjust as needed.          # Anemia in chronic renal disease:  - Hgb: Continue trending down slowly. Hgb: 7.2 today, continue monitoring.   - Iron studies: Low. Replete when patient more stable          # Mineral Bone Disorder:   - Secondary renal hyperparathyroidism; PTH level is:  Normal at 78 on 6/2018  - Need Vitamin D level  - Calcium; level is:  Normal when corrected for albumin   - Need Phosphorus and Mg level, added to the morning lab, to follow up and replace as needed.               # Electrolytes:   - Potassium: Low, on HD.  - Na: level, normal         # Other Medical Issues:   # seziure disorder- admitted with AMS. Seen by neurology. On keppra.   # h/o CVA:  Minimally interactive at baseline. Palliative care on  "consult. Family does not want feeding tube.   # aspiration PNA-   # Cdiff- pn iV flagyl  # DM- management per primary.       # Transplant History:  Etiology of kidney failure: Hepatitis C  Transplant: 2000 (Kidney), 2000 (Liver)  Donor Type:  - Brain Death                      Donor Class: Standard Criteria Donor  Significant changes in immunosuppression: see above  Significant Complications: Now anuric LILIAM         Recommendations were communicated to the primary team via this note    Seen and discussed with Dr. Chet Augustin MD  Pager: [phone]  Transplant Office Phone Number: [phone]    Interval History   Patient was seen and examined at bedside this morning, his daughter, was at bedside. Patient is bedridden, non-verbal, respond to tactile stimuli by arm movements. Patient tolerated HD yesterday,  ml yesterday, was more than 100 ml of urine collected in the urine bag this morning. Plan for HD tomorrow for 3 hours.      Physical Exam   Temp  Av.5  F (35.3  C)  Min: 93  F (33.9  C)  Max: 98.7  F (37.1  C)      Pulse  Av.3  Min: 74  Max: 104 Resp  Av.7  Min: 10  Max: 24  SpO2  Av.6 %  Min: 91 %  Max: 100 %     /77 (BP Location: Left arm)  Pulse 90  Temp 95.5  F (35.3  C) (Axillary)  Resp 18  Ht 1.778 m (5' 10\")  Wt 83.2 kg (183 lb 6.8 oz)  SpO2 100%  BMI 26.32 kg/m2     Admit Weight: 83.9 kg (185 lb)   GENERAL APPEARANCE: Bedridden, not able to do any communication, respond to tactile stimuli by arm contraction.   HENT: Sluggish pupil, head in normocephalic/atrumatic, Dialysis cath placed to right internal jugular, clean, no bleeding.    LYMPHATICS: no cervical or supraclavicular nodes  RESP: Rales to lung base B/L, no wheezes  CV: regular rhythm, normal rate, no rub, no murmur  EDEMA: +3 LE edema bilaterally  ABDOMEN: soft, nondistended, nontender, bowel sounds normal  MS: upper extremities in half contraction position, overall muscle " atrophy and joint stiffness  SKIN: head dandruff.       Data   All labs reviewed by me.  CMP  Recent Labs  Lab 09/21/18  0750 09/19/18  0431 09/18/18  0754 09/17/18  0549    140 139 139   POTASSIUM 3.3* 3.7 4.2 4.3   CHLORIDE 102 104 106 107   CO2 24 21 18* 19*   ANIONGAP 11 15* 15* 13   * 131* 149* 143*   BUN 17 21 32* 27   CR 1.50* 2.05* 2.91* 2.59*   GFRESTIMATED 45* 31* 21* 24*   GFRESTBLACK 55* 38* 25* 29*   JOSHUA 8.3* 8.1* 8.0* 7.9*     CBC  Recent Labs  Lab 09/21/18  0750 09/18/18  1458 09/18/18  0754 09/17/18  1607 09/17/18  0549   HGB 7.2* 7.4* 7.9* 8.3* 8.0*   WBC 7.4 10.4 5.4  --  5.1   RBC 2.41* 2.52* 2.68*  --  2.73*   HCT 21.6* 22.2* 24.8*  --  25.5*   MCV 90 88 93  --  93   MCH 29.9 29.4 29.5  --  29.3   MCHC 33.3 33.3 31.9  --  31.4*   RDW 17.5* 17.2* 17.5*  --  17.7*    170 195  --  170     INR  Recent Labs  Lab 09/21/18  0750 09/20/18  0915 09/19/18  0431 09/18/18  0754   INR 1.28* 1.48* 1.70* 1.86*     ABGNo lab results found in last 7 days.   Urine Studies  Recent Labs   Lab Test  09/14/18   1418  05/31/18   1643  03/21/18   1139  11/08/16   1446   COLOR  Yellow  Yellow  Yellow  Yellow   APPEARANCE  Cloudy  Clear  Slightly Cloudy  Clear   URINEGLC  150*  Negative  Negative  Negative   URINEBILI  Negative  Negative  Negative  Negative   URINEKETONE  10*  Negative  Negative  Negative   SG  1.028  1.012  1.011  1.014   UBLD  Moderate*  Trace*  Trace*  Negative   URINEPH  6.5  5.5  5.0  5.0   PROTEIN  >600*  30*  10*  10*   NITRITE  Negative  Negative  Negative  Negative   LEUKEST  Large*  Negative  Large*  Negative   RBCU  7*  9*  2  1   WBCU  34*  2  159*  1     Recent Labs   Lab Test  09/14/18   1418  05/27/16   1409  04/29/11   1348  09/14/10   1134   UTPG  24.91*  Specimen not received  NOTIFIED [facility]  WHO PAGED  RNLOPEZ AT 1355. NO URINE   RECIEVED WITH BLOOD SPECIMENS. AB    0.04  <0.02     PTH  Recent Labs   Lab Test  06/05/18   0548  08/24/16   0852    PTHI  78  333*     Iron Studies  Recent Labs   Lab Test  11/08/16   1209  08/24/16   0852   IRON  17*  20*   FEB  146*  197*   IRONSAT  11*  10*   BARTOLO   --   1025*       IMAGING:  All imaging studies reviewed by me.

## 2018-09-21 NOTE — PLAN OF CARE
Problem: Patient Care Overview  Goal: Plan of Care/Patient Progress Review  Pt lethargic, nonverbal and disoriented x4. VSS on RA except for HTN.  Daughter and wife at bedside. Repositioned q2h w/ A2. Mitts on to prevent pt from pulling at lines. IV dilaudid given x1 for pain/discomfort. Hep gtt at 700u/hr through L PIV, hep xa check in AM. R PIV-SL w/ antibiotics in between. R internal jugular-CDI. No BM. Barrier cream applied to bottom. Virk in place w low output. NPO. Family requested for pt to be deep suction- MD aware. Will cont to monitor.

## 2018-09-21 NOTE — PROGRESS NOTES
[facility], [place]   Internal Medicine Daily Note          Assessment and Plan:    Priscilla Way is a 78 year old male admitted on 9/11/2018. He has a history of dementia (vascular plus Alzheimers), liver transplant in 2000, kidney transplant in 2000, DVT, DM II and CVA and is admitted for aspiration and possible seizure episode.       # History of liver and kidney transplant  # LILIAM on CKD- anuric.   Etiology for LILIAM: suspect MF: related to intravascular dehydration, sepsis on admission, plus  immunosuppresants (high tacro lvl). Contrast.  No obvious hydronephrosis noted on transplanted kidney ultrasound   Tx Nephrology on board.   No response to IV fluid challenge, lasix plus albumin challenge. Remained anuric with worsening creatinine   9/16/2018: Nephrology discussed in detail with POA regarding dialysis. She made informed decision to proceed with dialysis. RIJ placed by renal. HD initiated.    9/17/2018: bleeding around RIJ site. Hold Anticoagulation. Follow-up Hgb stable. 2 stiches applied around the site   - Patient unable to get po or SL prograf. Family (POA) does not want NG feeding .  - Current immunosuppressant is only IV mycophenolate and IV prednisone    In the grand scheme of things, given patient's co-morbidities, he is not an ideal candidate for long term dialysis ( If his renal function does not recover)   Significant differences in opinion in the family. POA agrees to DNR/DNI, refuses feeding tube, but does want dialysis   There has been some moderate urine output in the last 24 hrs,  For now continue trial of dialysis to see if recovers. Will need care conference early next week     # Seizure-like activity - New Onset.   # Acute Encephalopathy  Patient presented following an episode of full body convulsions while eating on day of admission.  Reportedly this lasted one minute.  He has no prior history of seizures.  CT head and CTA as above. MR brain:  No e/o acute  stroke.   Pt remains encephalopathic. Likely related to seizure plus pna, c.diff. Hospitalization. Hx of likely advanced dementia.    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   Video EEg; no ongoing seizure activity. Neurology on board. Appreciate consult.    - Loaded and started on Keppra 500 bid IV  9/14/2018: patient's daughter Cruz asked to stop seizure medicines. Explained her the risk of recurrent seizure, neuro recs, she still wanted me to stop. Discontinued.   - NPO except meds until SLP clearance, gentle ivf. (daughter declined NG tube, says family can feed him/ give him pills)  - ct neuro checks, Vitals. I/o  - fall, seizure precautions.       # Possible aspiration pneumonia vs Pneumonitis   - Started on Unasyn in the ED, switched to iv Zosyn. 09.14, will complete total 7 days course. ( Last day 9/21)  - ID consult appreciated.     # Recurrent C.diff colitis:   - IV metronidazole. With plan to switch to po Vanco when able, Continue 7 days after done with antibiotics.       # History of DM II  - Not on any medications.  Follow daily blood sugars   -Stable    # History of hypertension: PTA norvasc, Coreg were on hold   9/21- resume Norvasc at 5mg and titrate up    - iv Hydralazine prn for sbp>170  - Monitor.       # History of DVT  -- Was on warfarin, but now at significant risk for aspiration, Ct patient on Heparin infusion      # History of dementia, CVA   At baseline he is nonverbal and minimally interactive, at times making eye contact.  He is total cares and taken care of at home by his family.  - discussed about goals of care.   - Daughter has declined palliative consultation.   - She expects him to be able to get to baseline and take home.   - Declined NG tube. Does not want seizure medications at current.   - Family at times have given meds and food to patient. (despite SLP rec of NPO) and risk of aspiration explained.   Ethics team     # LE edema: holding diuretics 2.2 zaki.   - Lymphedema therapy  "consult      Diet: npo per SLP.  DVT Prophylaxis: On anticoagulation. Heparin infusion     Consulting teams: Transplant Nephrology, ID, Neurology   Code status:DNR/DNI  DVT Prophylaxis: On Heparin infusion   Gastric prophylaxis: Not required   Diet: NPO ( Family feeding patient against medical advice)   Disposition: To be determined     Patient seen and examined with RN         Interval History:   Progress notes in the last 24 hrs by MDT team has been reviewed.  No acute events over night   Patient making some urine  Daughter May at bedside         Review of Systems:   A comprehensive review of systems was performed and found to be negative except: Those that are outlined in interval history              Medications:   I have reviewed this patient's current medications which are outlined in the \"current medication\" section of EPIC             Physical Exam:   Vitals were reviewed  Temp: 95.5  F (35.3  C) Temp src: Axillary BP: 145/60   Heart Rate: 88 Resp: 18 SpO2: 100 % O2 Device: None (Room air)    Constitutional:   Spontaneously opens eyes. Not in distress. Non verbal      Lungs:   No increased work of breathing, good air exchange, Mild basal crackles      Cardiovascular:   Normal apical impulse, regular rate and rhythm, normal S1 and S2, no S3 or S4, and no murmur noted     Abdomen:   No scars, normal bowel sounds, soft, non-distended, non-tender, no masses palpated, no hepatosplenomegally     Musculoskeletal:   Bilateral lower extremity edema      Neurologic:   Encephalopathic. Spontaneous eye movements            Data:     Most recent labs have been evaluated and relevant labs are outlined below :  BMP    Recent Labs  Lab 09/21/18  0750 09/19/18  0431 09/18/18  0754 09/17/18  0549    140 139 139   POTASSIUM 3.3* 3.7 4.2 4.3   CHLORIDE 102 104 106 107   JOSHUA 8.3* 8.1* 8.0* 7.9*   CO2 24 21 18* 19*   BUN 17 21 32* 27   CR 1.50* 2.05* 2.91* 2.59*   * 131* 149* 143*     CBC  Recent Labs  Lab " 09/21/18  0750 09/18/18  1458 09/18/18  0754  09/17/18  0549   WBC 7.4 10.4 5.4  --  5.1   RBC 2.41* 2.52* 2.68*  --  2.73*   HGB 7.2* 7.4* 7.9*  < > 8.0*   HCT 21.6* 22.2* 24.8*  --  25.5*   MCV 90 88 93  --  93   MCH 29.9 29.4 29.5  --  29.3   MCHC 33.3 33.3 31.9  --  31.4*   RDW 17.5* 17.2* 17.5*  --  17.7*    170 195  --  170   < > = values in this interval not displayed.  INR    Recent Labs  Lab 09/21/18  0750 09/20/18  0915 09/19/18  0431 09/18/18  0754   INR 1.28* 1.48* 1.70* 1.86*

## 2018-09-21 NOTE — PROVIDER NOTIFICATION
Cross cover paged to request Md team to come and see pt's eldest daughter and family concerning plan of care

## 2018-09-21 NOTE — PLAN OF CARE
Problem: Patient Care Overview  Goal: Plan of Care/Patient Progress Review  Outcome: No Change  Pt intermittently alert, responds to voice and discomfort. [facility] orientation - daughter reports his mental status is baseline. VSS on room air, plan to restart PO amlodipine today but pt infrequently tolerates PO intake. Virk catheter in place with minimal UOP - 75 mL on 8 hour day shift. Continuing IV flagyl. Mag replaced per protocol, plan for recheck tomorrow AM. Phos replacement running at this time. Hep 10a stable this AM at 0.57, hep gtt running at 700 unit(s)/hr. Pt turned/repositioned q2h. Family at bedside, attentive to cares. Attempting to feed patient intermittently using large syringe. Educated about aspiration risk - family verbalized understanding. Plan for HD tomorrow AM at 0730. Will continue to monitor and follow POC.

## 2018-09-21 NOTE — PROGRESS NOTES
"Social Work: Assessment with Discharge Plan    Patient Name:  Priscilla Way  :  1940  Age:  78 year old  MRN:  [id number]  Completed assessment with:  Pt's daughter May    Presenting Information   Reason for Referral:  Assessment  Date of Intake:  2018  Referral Source:  Chart Review  Decision Maker:  Unclear. Patient unable to make own decisions. Pt has a daughter claiming to be \"POA\" but there is no healthcare directive on file. Unclear if pt is legally .   Alternate Decision Maker:  Unclear, see above. Per next of kin policy, if pt has legal spouse, spouse is medical decision-maker. If pt does not have legal spouse, in absence of healthcare directive all adult children are co-decision makers.  Health Care Directive:  None on file- daughter Cruz claiming there is a document; daughter May claims there is no document. Pt unable to create document at this time.  Living Situation:  Apartment  Previous Functional Status:  Total cares  Patient and family understanding of hospitalization:  Conflict amongst family members regarding pt's hospitalization and goals of care  Cultural/Language/Spiritual Considerations:  Pt and family are [place]  Adjustment to Illness:  Unable to assess    Physical Health  Reason for Admission:    1. Kidney replaced by transplant    2. Aspiration pneumonia due to regurgitated food, unspecified laterality, unspecified part of lung (H)      Services Needed/Recommended:  Home with homecare    Mental Health/Chemical Dependency  Diagnosis:  None  Support/Services in Place:  NA  Services Needed/Recommended:  NA    Support System  Significant relationship at present time:  Per facesheet pt is . Conflicting responses from family on if pt is legally .  Family of origin is available for support:  Yes  Other support available:  Caretaker, home care staff  Gaps in support system:  None identified  Patient is caregiver to:  None     Provider Information " "  Primary Care Physician:  Randy Fuentes   [phone]   Clinic:  [address] [place] [address] / [facility] [address]        Financial   Income Source:  Did not discuss  Financial Concerns:  None noted  Insurance:    Payor/Plan Subscriber Name Rel Member # Group #   [employer] - [employer]* YADIRA BREAUX  [id number]      [address]       Discharge Plan   Patient and family discharge goal:  Home  Barriers to discharge:  Medical stability    Discharge Recommendations   Anticipated Disposition:  Home with services  Transportation Needs:  Medical:  Stretcher  Name of Transportation Company and Phone:  [employer] ( [phone])    Additional comments   Pt is a 78-year-old male admitted for aspiration and possible seizure episode. Conflict this hospitalization over who is able to make medical decisions for pt. Met with pt's daughter May who was present in pt's room. May states that pt lives in an apartment with a caretaker and that family members take turns living in the apartment with pt. Pt's daughter states that the caretaker is a hired caretaker and that home care nurses also come into the home.     Asked May if the patient is  and she responded yes, but that his wife is not involved, so the rest of the family makes the pt's medical decisions. Asked if the pt's wife is at all involved in pt's life and she stated no. Asked if pt is legally  and May stated yes. There was a woman present in room sleeping on a bed in the corner. Asked May who the woman is and she stated \"my mother.\" Asked if this is the woman who is  to the patient and she replied \"yes and no.\" Asked if pt is legally  and May replied \"no,\" although minutes earlier had stated that he is legally .     Asked May if she is aware if her father ever created a healthcare directive. She states that he did not, and \"that's why we're in this mess.\" May states that she and her siblings make the pt's medical " "decisions. Asked how many siblings there are in the family, and she replied \"we are a large family.\" Asked again how many brothers and sisters are in the family and she replied \"me and my sister, and the rest.\"     Per chart review, social work note from 12/30/2011 states: \"Today, pt's daughters clarified that--while Cruz has apparently sometimes referred to herself as pt's POA to hospital staff--there is not actually a document giving [facility] the right to decide medical care for pt.\"    HAMLET Parra, [facility]  [address]   Pager [phone]  Phone [phone]    "

## 2018-09-21 NOTE — PROGRESS NOTES
Brief Medicine Note    Contacted by RN regarding patients family requesting to talk to provider. Writer and bedside RN met with multiple family members who expressed great concern and frustration regarding care of their father. They would like to continue to aggressively treat their father and state that eldest daughter Cruz is POA. Cruz reports that her father assigned her to be POA when he was able to do so and the document is on file in [facility] system. Family concerns validated and discussed that writer would relay this information to the Primary Team.     Katharine Medina PA-C  Internal Medicine Hospitalist Service  [facility]  Pager: [phone]

## 2018-09-21 NOTE — PROGRESS NOTES
RN called to room regarding pt coughing and sounding like having trouble with secretions. Sats 100% on RA. Family at bedside, writer raised HOB, attempted to suction and gave oral atropine gtts. Pt then had improved sounding respirations. RN then called again to room by family d/t coughing up what looks like food. RN then suctioned again though patient refused and bit down on suction catheter multiple times. Family then stated they wanted to try and feed pt but were concerned about his breathing. Writer explained to family risks of giving patient anything orally as well as the DNR/DNI status. Family member present agreed that nothing should be given orally at this time. Upon leaving family member did state that someone else was going to come and feed patient despite conversation with RN. Geovanny cross cover notified regarding RN concern and family insistence on giving pt PO supplementation despite education. Will continue POC.

## 2018-09-22 NOTE — PROGRESS NOTES
[facility], [place]   Internal Medicine Daily Note          Assessment and Plan:    Priscilla Way is a 78 year old male admitted on 9/11/2018. He has a history of dementia (vascular plus Alzheimers), liver transplant in 2000, kidney transplant in 2000, DVT, DM II and CVA and is admitted for aspiration and possible seizure episode.       9.22- no change    # History of liver and kidney transplant  # LILIAM on CKD- anuric.   Etiology for LILIAM: suspect MF: related to intravascular dehydration, sepsis on admission, plus  immunosuppresants (high tacro lvl). Contrast.  No obvious hydronephrosis noted on transplanted kidney ultrasound   Tx Nephrology on board.   No response to IV fluid challenge, lasix plus albumin challenge. Remained anuric with worsening creatinine   9/16/2018: Nephrology discussed in detail with POA regarding dialysis. She made informed decision to proceed with dialysis. RIJ placed by renal. HD initiated.    9/17/2018: bleeding around RIJ site. Hold Anticoagulation. Follow-up Hgb stable. 2 stiches applied around the site   - Patient unable to get po or SL prograf. Family (POA) does not want NG feeding .  - Current immunosuppressant is only IV mycophenolate and IV prednisone    In the grand scheme of things, given patient's co-morbidities, he is not an ideal candidate for long term dialysis ( If his renal function does not recover)   Significant differences in opinion in the family. POA agrees to DNR/DNI, refuses feeding tube, but does want dialysis   There has been some moderate urine output in the last 24 hrs,  For now continue trial of dialysis to see if recovers. Will need care conference early next week     # Seizure-like activity - New Onset.   # Acute Encephalopathy  Patient presented following an episode of full body convulsions while eating on day of admission.  Reportedly this lasted one minute.  He has no prior history of seizures.  CT head and CTA as above. MR  brain:  No e/o acute stroke.   Pt remains encephalopathic. Likely related to seizure plus pna, c.diff. Hospitalization. Hx of likely advanced dementia.    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   Video EEg; no ongoing seizure activity. Neurology on board. Appreciate consult.    - Loaded and started on Keppra 500 bid IV  9/14/2018: patient's daughter Cruz asked to stop seizure medicines. Explained her the risk of recurrent seizure, neuro recs, she still wanted me to stop. Discontinued.   - NPO except meds until SLP clearance, gentle ivf. (daughter declined NG tube, says family can feed him/ give him pills)  - ct neuro checks, Vitals. I/o  - fall, seizure precautions.       # Possible aspiration pneumonia vs Pneumonitis   - Started on Unasyn in the ED, switched to iv Zosyn. 09.14,completed total 7 days course. ( Last day 9/21)  - ID consult appreciated.     # Recurrent C.diff colitis:   - IV metronidazole. With plan to switch to po Vanco when able, Continue 7 days after done with antibiotics.       # History of DM II  - Not on any medications.  Follow daily blood sugars   -Stable    # History of hypertension: PTA norvasc, Coreg were on hold   9/21- resumed Norvasc at 5mg and titrate up    - iv Hydralazine prn for sbp>170  - Monitor.       # History of DVT  -- Was on warfarin, but now at significant risk for aspiration, Ct patient on Heparin infusion      # History of dementia, CVA   At baseline he is nonverbal and minimally interactive, at times making eye contact.  He is total cares and taken care of at home by his family.  - discussed about goals of care.   - Daughter has declined palliative consultation.   - She expects him to be able to get to baseline and take home.   - Declined NG tube. Does not want seizure medications at current.   - Family at times have given meds and food to patient. (despite SLP rec of NPO) and risk of aspiration explained.   Ethics team     # LE edema: holding diuretics 2.2 zaki.   -  "Lymphedema therapy consult      Diet: npo per SLP.  DVT Prophylaxis: On anticoagulation. Heparin infusion     Consulting teams: Transplant Nephrology, ID, Neurology   Code status:DNR/DNI  DVT Prophylaxis: On Heparin infusion   Gastric prophylaxis: Not required   Diet: NPO ( Family feeding patient against medical advice)   Disposition: To be determined     Patient seen and examined with RN         Interval History:   Progress notes in the last 24 hrs by MDT team has been reviewed.  No acute events over night         Review of Systems:   A comprehensive review of systems was performed and found to be negative except: Those that are outlined in interval history              Medications:   I have reviewed this patient's current medications which are outlined in the \"current medication\" section of EPIC             Physical Exam:   Vitals were reviewed  Temp: 96.3  F (35.7  C) Temp src: Axillary BP: 155/78   Heart Rate: 93 Resp: 18 SpO2: 100 % O2 Device: None (Room air)    Constitutional:   Spontaneously opens eyes. Not in distress. Non verbal      Lungs:   No increased work of breathing, good air exchange, Mild basal crackles      Cardiovascular:   SI+II+ no m/r/g     Abdomen:   No scars, normal bowel sounds, soft, non-distended, non-tender, no masses palpated, no hepatosplenomegally     Musculoskeletal:   Bilateral lower extremity edema      Neurologic:   Encephalopathic. Spontaneous eye movements            Data:     Most recent labs have been evaluated and relevant labs are outlined below :  BMP    Recent Labs  Lab 09/22/18  1009 09/21/18  0750 09/19/18  0431 09/18/18  0754    137 140 139   POTASSIUM 3.4 3.3* 3.7 4.2   CHLORIDE 102 102 104 106   JOSHUA 7.2* 8.3* 8.1* 8.0*   CO2 26 24 21 18*   BUN 9 17 21 32*   CR 0.85 1.50* 2.05* 2.91*   * 156* 131* 149*     CBC  Recent Labs  Lab 09/21/18  0750 09/18/18  1458 09/18/18  0754  09/17/18  0549   WBC 7.4 10.4 5.4  --  5.1   RBC 2.41* 2.52* 2.68*  --  2.73*   HGB " 7.2* 7.4* 7.9*  < > 8.0*   HCT 21.6* 22.2* 24.8*  --  25.5*   MCV 90 88 93  --  93   MCH 29.9 29.4 29.5  --  29.3   MCHC 33.3 33.3 31.9  --  31.4*   RDW 17.5* 17.2* 17.5*  --  17.7*    170 195  --  170   < > = values in this interval not displayed.  INR    Recent Labs  Lab 09/21/18  0750 09/20/18  0915 09/19/18  0431 09/18/18  0754   INR 1.28* 1.48* 1.70* 1.86*

## 2018-09-22 NOTE — PROGRESS NOTES
HEMODIALYSIS TREATMENT NOTE    Date: 9/22/2018  Time: 12:16 PM    Data:  Pre Wt: 83.1 kg (183 lb 3.2 oz)   Desired Wt: 80.1 kg   Post Wt: 82.9 kg (182 lb 12.2 oz)  Weight gain: -0.2 kg   Weight change: 0.2 kg  Ultrafiltration - Post Run Net Total Removed (mL): 200 mL  Ultrafiltration - Post Run Net Total Gain (mL): 0 mL  Vascular Access Status: Yes, secured and visible  Dialyzer Rinse: Streaked  Total Blood Volume Processed: 63.8  Total Dialysis (Treatment) Time:  3hours    Lab:   Yes, INR    Interventions:Assessment:  Tx completed. Pt arrived with mitts on both hands. Pt's grandson arrived with pt. 5% 250 mL of albumin administered in efforts to keep BP within parameters. 300 mL of NS given during tx to keep systolic BP up too 100. A total of 200 mL obtained after fluid received. Pt slept through entire tx. Pt received Epogen during tx (see MAR). New tegos applied. Hand off report given to primary nurse.      Plan:    Per nephrology team.

## 2018-09-22 NOTE — PLAN OF CARE
Problem: Patient Care Overview  Goal: Plan of Care/Patient Progress Review  Outcome: No Change  Shift: [id number]  Unable to assess orientation, VSS on RA- temp runs low, assist of 2 for repositions and all cares, using mechanical lifts for transfers, NPO d/t aspiration risk, R. CVC SL, 2 PIV- L. PIV infusing Heparin @ 700 units/hr, R. PIV TKO with D5, pending 10a this AM, on IV Flagyl, refused Phos labs, will recheck all labs this AM. 1 loose stool last night, repositioned throughout the night. Grandson at bedside. Pt is unable to make needs known, responds to his grandson's voice.  Will continue to monitor and follow POC.

## 2018-09-22 NOTE — PLAN OF CARE
Problem: Patient Care Overview  Goal: Plan of Care/Patient Progress Review  Pt was transferred to dialysis at the beginning of the day shift and did not get back to unit until early afternoon. Per dialysis nurse's report, pt only had 200 mL of fluid taken off during today's run. Cr 0.85 this am, down from 1.50 yesterday. Mag 1.5 and phos 1.2 with lab draw in dialysis. Pt writer has been struggling with pharmacy to have medication delivered to unit today. Mag is currently being replaced, sodium phosphate will be transfused next. Pt has been lethargic since came back from dialysis, still remained encephalopathic  but appeared comfortable. Pt had one episode of loose stool today. Virk has minimal urine output. BG values stayed within acceptable range. Family members are aware of pt's NPO status but daughter had asked this writer if she can give pt insure. This writer explained to daughter and risks of feeding pt and it's up to family members to decide if they wanted to feed pt or not. Heparin drip has reached therapeutic level. Hep 10A was 0.34 this am, no change in heparin drip rate. Pt has been turned q. 2 hrs, skin intact, appeared contracted. Family members have been taking turns to be at the bedside, involved in cares.

## 2018-09-22 NOTE — PLAN OF CARE
Problem: Patient Care Overview  Goal: Plan of Care/Patient Progress Review  Outcome: No Change  [id number]: Pt alert, [facility] orientation, nonverbal throughout shift, no nonverbal indicators of pain present.  Family at bedside.  Pt remains NPO, family attempts to feed pt with syringe, MDs aware.  Turned and repo Q2hrs.  Patino in place, patino cares completed.  No BM at this time.  Generalized edema present, BLE elevated with pillows.  Both PIVs infusing.  Heparin gtts at 700 units/hr.  Pt also receiving intermittent IV abx.  Phos replacement completed.  Recheck overnight.  IV hydralazine given x 1 for BP of 178/90. Will continue to monitor and follow POC.

## 2018-09-22 NOTE — CONSULTS
Ethics Consult Service (late entry - consult was done on 9/20 and discussed with Dr. Dumont).    Consult requested by: Aron Dumont MD at the recommendation of the nephology service  Reason for consult: Goals of care and determination of surrogate decision - maker in the context of family conflict  Information obtained from: Dr. Dumont and chart. I also discussed the case with Marcell Sen, a respiratory therapist and member of the ethics committee for her insights.   Decisional capacity: No  Advance directive: No  Code status: DNR/DNI  Background: The patient is a 78 year old [place] man with multiple co-morbidities, including vascular dementia and Alzheimer s Disease, s/p stroke, s/p liver and kidney transplants in 2000, acute LILIAM on top of chronic kidney disease, DVT, and DM II admitted for aspiration pneumonia and seizure episode. The patient is bed bound and cannot speak.   He lives at home with 24/7 care between PCAs and family. He has a wife from whom he is  and two daughters, Cruz, who claims to be the health care power of , and May, who is a respiratory therapist in another hospital in the city.   Goals of care  The patient has a severe LILIAM episode and is anuric. According to the nephology consult of 9/19, the patient has several factors that limit his ability to potentially recover. Dialysis was started but he has had several instances of intolerance. Nephrology does not recommend chronic dialysis  as his mortality rate is very high no matter what we do and he has not had meaningful benefit from dialysis thus far.  Additionally, nephology notes that chronic dialysis does not appear to be in keeping with his other wishes of not wanting a feeding tube, intubation. or chest compression. It is not clear to me on what basis nephrology is saying this - I am assuming these were expressed by Cruz as representing her father s wishes. Nephrology noted that Cruz is very difficult to talk  with. Cruz refuses to talk about longer term decisions and nephology sees dialysis only as a bridge to see if the patient improves over the next week.   Cruz has refused a feeding tube and insists that the patient be fed and that medications be given orally. She uses a syringe although the staff has advised against this. Some meds remain ordered in oral form but nurses are not comfortable giving them and so they are being withheld. Other meds are given IV.  Cruz has also refused seizure medication.   Determination of decision-maker  Cruz insists that she is the health care proxy but refuses to provide documentation or even to talk about it. At one point she hung up on Dr. Dumont when he raised the issue. She is the one directing care - her sister May desires comfort care for her father but does not have equal authority in expressing her views.   Assessment:   Goals of care Overall, it seems incoherent to insist on dialysis but refuse a feeding tube and seizure medication. I don t know what to make of this. Regarding dialysis, given nephology s assessment of both the prognosis and hazards of long term dialysis, it is ethically acceptable to limit dialysis to a trial period to see if the patient improves. This should be told to the family as well as the benchmarks that would indicate improvement.   Courtney and [place] provide a four-stage approach to communication for helping families in situations of longshot care. Longshot care is an outcome of treatment that is conceivable but unlikely. Although specifically written with a pediatric audience in mind, the communication model is applicable to all age groups. See, From  Longshot  to  Fantasy : Obligations to Pediatric Patients and Families When Last-Ditch Medical Efforts Fail by PAULA Valdez and LUCIO Ku in The American Journal of Bioethics (AJOB), 18(1), 2018, pp. 3-11.     In this case, the model may be useful in explaining to the daughters why dialysis will not be  continued if the patient fails to improve.     Stage 1:     Properly frame care plan as low-odds  longshot  at outset    Articulate high likelihood of failure of curative treatment    Lay out clinical benchmarks indicating favorable or unfavorable clinical responseIntroduce palliative care component of treatment plan concurrently with curative therapies  Stage 2:    Schedule regular family meetings to provide clinical update and review goals of care    Schedule regular multidisciplinary team meetings to determine current stage of longshot care and develop plan for direct, open communication with families    Allow team members to discuss their concerns, moral distress, etc.  Stage 3:     Communicate with families when the situation is approaching the line between low-odds and no-odds of success of curative therapy    Articulate the change in clinical picture by reference to the established benchmarks  Stage 4:    Inform families when consensus emerges that the original goals are no longer achievable    Use the established benchmarks to explain the current clinical picture    Discuss the family s goals of care in light of this information    Reinforce the palliative care component of the treatment plan    Determination of decision-maker  Ms. Sen said that in the [facility] community, age is a major way that determines authority, thus, it is likely that Cruz is the older daughter and therefore has the social authority to make decisions. Ms. Sen also said that men have more social authority and that a neutral, male family member could be helpful in negotiating agreement between the daughters. Cruz, however, cannot be permitted to dictate unsafe care and this is concerning for feeding. Once the patient's ability to swallow deteriorates further, oral feeding will have to stop completely. Swallowing ability should be assessed regularly by speech therapy. The nurses are correct in withholding the administration of oral meds  if, in their judgment, the risk of harm is too great.   Recommendations:  1) As above, use the stage model of communication to present the fact that dialysis will not be continued if the patient does not show improvement.  2) See if there is a neutral, male family member who can help negotiate consensus between the daughters.  3) On-going monitoring of feeding practices with on-going assessment of swallowing ability and adjustment accordingly.  If we can be of any further assistance, please do not hesitate to call - service pager [phone].Thank you for this consult.  Stella Ackerman, PhD, RN  Director, Ethics Consult Service

## 2018-09-22 NOTE — PROVIDER NOTIFICATION
Pt refused lab draw for phos recheck.  Will need to check with AM labs.  [employer] cross cover notified.  Will continue to monitor

## 2018-09-23 NOTE — PLAN OF CARE
Problem: Patient Care Overview  Goal: Plan of Care/Patient Progress Review  Outcome: No Change  Shift: [id number]    [facility] orientation, responds to voice at times, withdraws from pain, NPO, assist of 2 with all cares and lifts for transfers, patino has minimal output, R. CVC SL, L. PIV infusing Heparin @ 700 units, R. PIV TKO. R. PIV bleeding at site d/t pt bending arm throughout the night, IV dressing changed and NONO placed. IV Flagyl and IV Mycophenolate given, BG stable, repositioned throughout the night. 1 BM this shift. Pt's linen changed d/t excessive sweating. Most recent labs, M.7, Phos: 2.4. Grandson at bedside, pt responds to grandson's voice.     Will continue to monitor and follow POC.

## 2018-09-23 NOTE — PROGRESS NOTES
[facility], [place]   Transplant Nephrology Progress Note  Date of Admission:  9/11/2018    Assessment & Plan :  Priscilla Way is a 78 year old male with h/o simultaneous liver and kidney transplant in 2000 for hepatitis C.He was admitted  for AMS and seizures. Underwent CTA with contrast of brain neck for evaluation complicated by ROMANA required HD. Also c/b aspiration PNA and c diff. We are on consult now for anuric renal failure.   basline Cr ~ 1.2-1.4; the patient has anuric LILIAM on on dialysis since 9/16/18.  His LILIAM is likely multifactorial but related to contrast induced nephropathy with underlying ckd.  The creatinine of 1.2-1.4 is an overestimation of his renal function and a kidney function 8/2016 is significant for early diabetic changes with secondary focal segmental sclerosis.    There is some potential for renal recovery after the insulting agent of IV contrast.   Dialysis has been started but he has had several intolerance episodes including severe hypotension on dialysis, worsening breathing with dialysis, and constantly trying to remove his dialysis catheter which has prompted restraints for his safety.   Poor candidate for chronic dialysis, (he does not want a feeding tube, he does not want intubation if needed, and he does not want compression in case of arrest).  His decisions are currently being made by his daughter Cruz and she will need to provide documentation of her being the healthcare proxy.      # DDKT and liver transplant:  - Basline Cr ~ 1.2- 1,4; Increased likely due to contrast which he received on 9/11/2018.   - He has minimal UOP,  Scheduled for HD TTS for now.  - Plan for HD today for 3 hours with fluid removal 2-3 liters as tolerated.   - No bleeding from the dialysis cath site. INR: 1.28 this morning, patient is on  Heparin gtt.   - UA  With 34 WBC and 7 RBC and large LE. Urine Cx NGTD. UA with no hydronephrosis.          - Proteinuria: Nephrotic  range  - new which is likely due to concentrated urine in the setting of acute renal failure with background DM nephropathy.   - Latest DSA: No                   Date of DSA last checked: 2016  - BK: No  - Kidney Tx Biopsy: Yes- 2015- ATN, DM nephropathy with features of FSGS and moderate to severe arterisclerosis  - Will give a single dose of lasix to help with volume management       # Liver transplant :  - LFTs and Alk phos normal WNL.          # Immunosuppression:   - Continue IV Mycophenolate mofetil 500 gram BID and continue IV methylprednisone 16 mg IV daily.   - Was on tacrolimus montherapy  but not tolerating oral now. Would avoid IV tacrolimus.           # Hypertension:   - BP today is better, will keep it on high normal side for today and plan for HD tomorrow with fluid removal 1-2 liters as tolerated.   -  Back on amlodipine           # Anemia in chronic renal disease:  - Hgb: Continue trending down slowly. Hgb: 7.2, continue monitoring.   - Iron studies: Low. Replete when patient more stable          # Mineral Bone Disorder:   - Secondary renal hyperparathyroidism; PTH level is:  Normal at 78 on 2018  - Calcium; level is:  Normal when corrected for albumin          # Electrolytes:   - Potassium: Low, on HD.  - Na: level, normal         # Other Medical Issues:   # seziure disorder- admitted with AMS. Seen by neurology. On keppra.   # h/o CVA:  Minimally interactive at baseline. Palliative care on consult. Family does not want feeding tube.   # aspiration PNA-   # Cdiff- pn iV flagyl  # DM- management per primary.       # Transplant History:  Etiology of kidney failure: Hepatitis C  Transplant: 2000 (Kidney), 2000 (Liver)  Donor Type:  - Brain Death                      Donor Class: Standard Criteria Donor  Significant changes in immunosuppression: see above  Significant Complications: Now anuric LILIAM         Recommendations were communicated to the primary team via this  "note    Zachary Reed MD    Transplant Office Phone Number: [phone]    Interval History   Patient was seen and examined at bedside this morning, his grandson, was at bedside. Patient is bedridden, non-verbal, respond to tactile stimuli by arm movements. Patient tolerated HD yesterday.   Physical Exam   Temp  Av.5  F (35.3  C)  Min: 93  F (33.9  C)  Max: 98.7  F (37.1  C)      Pulse  Av.3  Min: 74  Max: 104 Resp  Av.7  Min: 10  Max: 24  SpO2  Av.6 %  Min: 91 %  Max: 100 %     /52 (BP Location: Left arm)  Pulse 65  Temp 94.6  F (34.8  C) (Axillary)  Resp 18  Ht 1.778 m (5' 10\")  Wt 83.1 kg (183 lb 4.8 oz)  SpO2 100%  BMI 26.3 kg/m2     Admit Weight: 83.9 kg (185 lb)   GENERAL APPEARANCE: Bedridden, not able to do any communication, respond to tactile stimuli by arm contraction.   HENT: Sluggish pupil, head in normocephalic/atrumatic, Dialysis cath placed to right internal jugular, clean, no bleeding.    LYMPHATICS: no cervical or supraclavicular nodes  RESP: Rales to lung base B/L, no wheezes  CV: regular rhythm, normal rate, no rub, no murmur  EDEMA: +1 LE edema bilaterally  ABDOMEN: soft, nondistended, nontender, bowel sounds normal  MS: upper extremities in half contraction position, overall muscle atrophy and joint stiffness  SKIN: intact, warm and dry       Data   All labs reviewed by me.  CMP    Recent Labs  Lab 18  0550 18  2254 18  1009 18  2330 18  0750 18  0431     --  135  --  137 140   POTASSIUM 4.0  --  3.4  --  3.3* 3.7   CHLORIDE 101  --  102  --  102 104   CO2 23  --  26  --  24 21   ANIONGAP 11  --  7  --  11 15*   *  --  171*  --  156* 131*   BUN 9  --  9  --  17 21   CR 0.92  --  0.85  --  1.50* 2.05*   GFRESTIMATED 79  --  87  --  45* 31*   GFRESTBLACK >90  --  >90  --  55* 38*   JOSHUA 8.5  --  7.2*  --  8.3* 8.1*   MAG  --  2.7* 1.5*  --  1.6  --    PHOS 2.4*  --  1.2* Canceled, Test credited 1.6*  --  "     CBC    Recent Labs  Lab 09/21/18  0750 09/18/18  1458 09/18/18  0754 09/17/18  1607 09/17/18  0549   HGB 7.2* 7.4* 7.9* 8.3* 8.0*   WBC 7.4 10.4 5.4  --  5.1   RBC 2.41* 2.52* 2.68*  --  2.73*   HCT 21.6* 22.2* 24.8*  --  25.5*   MCV 90 88 93  --  93   MCH 29.9 29.4 29.5  --  29.3   MCHC 33.3 33.3 31.9  --  31.4*   RDW 17.5* 17.2* 17.5*  --  17.7*    170 195  --  170     INR    Recent Labs  Lab 09/23/18  0758 09/22/18  1009 09/21/18  0750 09/20/18  0915   INR 1.06 1.22* 1.28* 1.48*     ABGNo lab results found in last 7 days.   Urine Studies  Recent Labs   Lab Test  09/14/18   1418  05/31/18   1643  03/21/18   1139  11/08/16   1446   COLOR  Yellow  Yellow  Yellow  Yellow   APPEARANCE  Cloudy  Clear  Slightly Cloudy  Clear   URINEGLC  150*  Negative  Negative  Negative   URINEBILI  Negative  Negative  Negative  Negative   URINEKETONE  10*  Negative  Negative  Negative   SG  1.028  1.012  1.011  1.014   UBLD  Moderate*  Trace*  Trace*  Negative   URINEPH  6.5  5.5  5.0  5.0   PROTEIN  >600*  30*  10*  10*   NITRITE  Negative  Negative  Negative  Negative   LEUKEST  Large*  Negative  Large*  Negative   RBCU  7*  9*  2  1   WBCU  34*  2  159*  1     Recent Labs   Lab Test  09/14/18   1418  05/27/16   1409  04/29/11   1348  09/14/10   1134   UTPG  24.91*  Specimen not received  NOTIFIED [facility]  WHO PAGED  RN, LOPEZ AT 1355. NO URINE   RECIEVED WITH BLOOD SPECIMENS. AB    0.04  <0.02     PTH  Recent Labs   Lab Test  06/05/18   0548  08/24/16   0852   PTHI  78  333*     Iron Studies  Recent Labs   Lab Test  11/08/16   1209  08/24/16   0852   IRON  17*  20*   FEB  146*  197*   IRONSAT  11*  10*   BARTOLO   --   1025*       IMAGING:  All imaging studies reviewed by me.

## 2018-09-23 NOTE — PROGRESS NOTES
[facility], [place]   Internal Medicine Daily Note          Assessment and Plan:    Priscilla Way is a 78 year old male admitted on 9/11/2018. He has a history of dementia (vascular plus Alzheimers), liver transplant in 2000, kidney transplant in 2000, DVT, DM II and CVA and is admitted for aspiration and possible seizure episode.       9.23- no change. D/W Renal - dose of lasix today    # History of liver and kidney transplant  # LILIAM on CKD- anuric.   Etiology for LILIAM: suspect MF: related to intravascular dehydration, sepsis on admission, plus  immunosuppresants (high tacro lvl). Contrast.  No obvious hydronephrosis noted on transplanted kidney ultrasound   Tx Nephrology on board.   No response to IV fluid challenge, lasix plus albumin challenge. Remained anuric with worsening creatinine   9/16/2018: Nephrology discussed in detail with POA regarding dialysis. She made informed decision to proceed with dialysis. RIJ placed by renal. HD initiated.    9/17/2018: bleeding around RIJ site. Hold Anticoagulation. Follow-up Hgb stable. 2 stiches applied around the site   - Patient unable to get po or SL prograf. Family (POA) does not want NG feeding .  - Current immunosuppressant is only IV mycophenolate and IV prednisone    In the grand scheme of things, given patient's co-morbidities, he is not an ideal candidate for long term dialysis ( If his renal function does not recover)   Significant differences in opinion in the family. POA agrees to DNR/DNI, refuses feeding tube, but does want dialysis   There has been some moderate urine output in the last 24 hrs,  For now continue trial of dialysis to see if recovers. Will need care conference early next week     # Seizure-like activity - New Onset.   # Acute Encephalopathy  Patient presented following an episode of full body convulsions while eating on day of admission.  Reportedly this lasted one minute.  He has no prior history of  seizures.  CT head and CTA as above. MR brain:  No e/o acute stroke.   Pt remains encephalopathic. Likely related to seizure plus pna, c.diff. Hospitalization. Hx of likely advanced dementia.    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   Video EEg; no ongoing seizure activity. Neurology on board. Appreciate consult.    - Loaded and started on Keppra 500 bid IV  9/14/2018: patient's daughter Cruz asked to stop seizure medicines. Explained her the risk of recurrent seizure, neuro recs, she still wanted me to stop. Discontinued.   - NPO except meds until SLP clearance, gentle ivf. (daughter declined NG tube, says family can feed him/ give him pills)  - ct neuro checks, Vitals. I/o  - fall, seizure precautions.       # Possible aspiration pneumonia vs Pneumonitis   - Started on Unasyn in the ED, switched to iv Zosyn. 09.14,completed total 7 days course. ( Last day 9/21)  - ID consult appreciated.     # Recurrent C.diff colitis:   - IV metronidazole. With plan to switch to po Vanco when able, Continue 7 days after done with antibiotics.       # History of DM II  - Not on any medications.  Follow daily blood sugars   -Stable    # History of hypertension: PTA norvasc, Coreg were on hold   9/21- resumed Norvasc at 5mg and titrate up    - iv Hydralazine prn for sbp>170  - Monitor.       # History of DVT  -- Was on warfarin, but now at significant risk for aspiration, Ct patient on Heparin infusion      # History of dementia, CVA   At baseline he is nonverbal and minimally interactive, at times making eye contact.  He is total cares and taken care of at home by his family.  - discussed about goals of care.   - Daughter has declined palliative consultation.   - She expects him to be able to get to baseline and take home.   - Declined NG tube. Does not want seizure medications at current.   - Family at times have given meds and food to patient. (despite SLP rec of NPO) and risk of aspiration explained.   Ethics team     # DEBORAH  "edema: holding diuretics 2.2 zaki.   - Lymphedema therapy consult      Diet: npo per SLP.  DVT Prophylaxis: On anticoagulation. Heparin infusion     Consulting teams: Transplant Nephrology, ID, Neurology   Code status:DNR/DNI  DVT Prophylaxis: On Heparin infusion   Gastric prophylaxis: Not required   Diet: NPO ( Family feeding patient against medical advice)   Disposition: To be determined     Patient seen and examined with RN         Interval History:   Progress notes in the last 24 hrs by MDT team has been reviewed.  No acute events over night         Review of Systems:   A comprehensive review of systems was performed and found to be negative except: Those that are outlined in interval history              Medications:   I have reviewed this patient's current medications which are outlined in the \"current medication\" section of EPIC             Physical Exam:   Vitals were reviewed  Temp: 94.6  F (34.8  C) Temp src: Axillary BP: 149/52 Pulse: 65 Heart Rate: 81 Resp: 18 SpO2: 100 % O2 Device: None (Room air)    Constitutional:   Spontaneously opens eyes. Not in distress. Non verbal      Lungs:   No increased work of breathing, good air exchange, Mild basal crackles      Cardiovascular:   SI+II+ no m/r/g     Abdomen:   No scars, normal bowel sounds, soft, non-distended, non-tender, no masses palpated, no hepatosplenomegally     Musculoskeletal:   Bilateral lower extremity edema      Neurologic:   Encephalopathic. Spontaneous eye movements            Data:     Most recent labs have been evaluated and relevant labs are outlined below :  BMP    Recent Labs  Lab 09/23/18  0550 09/22/18  1009 09/21/18  0750 09/19/18  0431    135 137 140   POTASSIUM 4.0 3.4 3.3* 3.7   CHLORIDE 101 102 102 104   JOSHUA 8.5 7.2* 8.3* 8.1*   CO2 23 26 24 21   BUN 9 9 17 21   CR 0.92 0.85 1.50* 2.05*   * 171* 156* 131*     CBC  Recent Labs  Lab 09/21/18  0750 09/18/18  1458 09/18/18  0754  09/17/18  0549   WBC 7.4 10.4 5.4  --  5.1 "   RBC 2.41* 2.52* 2.68*  --  2.73*   HGB 7.2* 7.4* 7.9*  < > 8.0*   HCT 21.6* 22.2* 24.8*  --  25.5*   MCV 90 88 93  --  93   MCH 29.9 29.4 29.5  --  29.3   MCHC 33.3 33.3 31.9  --  31.4*   RDW 17.5* 17.2* 17.5*  --  17.7*    170 195  --  170   < > = values in this interval not displayed.  INR    Recent Labs  Lab 09/23/18  0758 09/22/18  1009 09/21/18  0750 09/20/18  0915   INR 1.06 1.22* 1.28* 1.48*

## 2018-09-24 NOTE — PLAN OF CARE
Problem: Patient Care Overview  Goal: Plan of Care/Patient Progress Review  Outcome: No Change  ADRIA orientation. VSS on RA. 1 unit RBCs given for hgb of 6.9. Heparin drip at 1000 units/hr, 10a check in process. BGs stable. Repositioning q2 hrs. Started on scheduled nebs and suctioning for oral secretions and weak cough. SLP eval today, okay for DD1 with thins. Transitioned to oral vanco from IV Flagyl. Edema in BLE and RUE. Virk in place, cath cares done. No BM this shift. Plan for care conference in the near future to evaluate treatment plan.

## 2018-09-24 NOTE — PROGRESS NOTES
09/24/18 1522   General Information   Onset Date 09/11/18   Start of Care Date 09/24/18   Referring Physician Shahbaz Pizarro MD   Patient Profile Review/OT: Additional Occupational Profile Info See Profile for full history and prior level of function   Patient/Family Goals Statement Pt unable to provide personal goal. Pt's daughter present and involved throughout evaluation. Wants Pt to be able to continue PO intake as able with reliance on family for diet modifications, feeding techniques, and to determine appropriateness of PO given Pt's variable status   Swallowing Evaluation Bedside swallow evaluation   Behaviorial Observations (advanced dementia)   Mode of current nutrition NPO  (Pt has been taking some PO w/ family assist)   Respiratory Status Room air   Comments Orders received for swallow evaluation. Pt  admitted on 9/11/2018. He has a history of dementia (vascular plus Alzheimers), liver transplant in 2000, kidney transplant in 2000, DVT, DM II and CVA and is admitted for aspiration and possible seizure episode.    Clinical Swallow Evaluation   Oral Musculature unable to assess due to poor participation/comprehension   Dentition present and adequate   Laryngeal Function (weak cough, clear voicing noted w/ attempts to speak)   Swallow Eval   Feeding Assistance dependent  (Pt's daughter feeding throughout session)   Clinical Swallow Eval: Thin Liquid Texture Trial   Mode of Presentation, Thin Liquids cup  (syringe)   Volume of Liquid or Food Presented very small amounts at a time via syringe (~3oz total), and one larger sip via cup   Oral Phase of Swallow Poor AP movement;Residue in oral cavity  (poor oral awareness and bolus control; holding of bolus)   Oral Residue (anterior loss of bolus bilaterally)   Pharyngeal Phase of Swallow (delayed pharyngeal swallow)   Diagnostic Statement elevated aspiration risk. weak coughing noted x1, but difficult to determine if related to PO intake. Increased ease of  feeding Pt and controlling bolus size when presenting liquids via syringe vs. cup   Clinical Swallow Eval: Puree Solid Texture Trial   Mode of Presentation, Puree spoon   Volume of Puree Presented 1/2 tsp x3   Oral Phase, Puree Poor AP movement;Residue in oral cavity  (bolus holding; reduced bolus formation/control)   Oral Residue, Puree left anterior lateral sulci;right anterior lateral sulci  (bilateral anterior loss)   Pharyngeal Phase, Puree (delayed pharyngeal swallow)   Diagnostic Statement elevated aspiration risk   Swallow Compensations   Swallow Compensations Pacing;Reduce amounts   Esophageal Phase of Swallow   Patient reports or presents with symptoms of esophageal dysphagia No   General Therapy Interventions   Planned Therapy Interventions Dysphagia Treatment   Dysphagia treatment Modified diet education;Instruction of safe swallow strategies   Swallow Eval: Clinical Impressions   Skilled Criteria for Therapy Intervention Skilled criteria met.  Treatment indicated.   Functional Assessment Scale (FAS) 2   Treatment Diagnosis moderate-severe dysphagia   Diet texture recommendations Dysphagia diet level 1;Thin liquids   Recommended Feeding/Eating Techniques alternate between small bites and sips of food/liquid;maintain upright posture during/after eating for 30 mins;small sips/bites   Therapy Frequency 3 times/wk   Predicted Duration of Therapy Intervention (days/wks) 1 week   Anticipated Discharge Disposition home w/ assist   Risks and Benefits of Treatment have been explained. Yes   Patient, family and/or staff in agreement with Plan of Care Yes   Clinical Impression Comments Clinical swallow evaluation completed per MD order. Pt presents with high aspiration risk in the setting of advanced dementia. Pt with poor oral awareness, reduced bolus formation/control, bolus holding, oral residuals, poor A-P movement, and pharyngeal swallow delay. Pt's daughter present and involved in evaluation. Feeding Pt  "liquids via syringe, very small amounts at a time, and small bites of purees via spoon. Pt alert throughout most of session, and Pt's daughter reported this was a \"good day\" for PO intake. with spontaneous swallows with all presented trials, though variable amounts of time required for oral phase and to trigger reflexive swallow. Pt's daughter is careful to make sure swallow was achieved before presenting next trial. Weak coughing noted x1 during session, but did not appear directly correlated with PO intake. Vocal quality was clear with attempts to communicate between bites. Cannot rule out silent aspiration at bedside, however, at this time, given lack of alternative nutrition source, and plan to continue PO diet, objective testing is not warranted. Recommend cautiously continue dysphagia diet level 1 (pureed) with thin liquids as tolerated with supervision/assist. Pt should be seated upright for all PO intake, and slowly fed small bites/sip, ensuring swallow before presenting next bite/sip. Pt with waxing/waning mental status and likely waxing/waning swallow function, so PO should be held if Pt demonstrating reduced LIZZY or s/sx of aspiration with PO intake. Family is very familiar with Pt's functional status and have provided cares throughout progressive illness. Demonstrate independence with implementation of strategies discussed this date. ST to follow for PO tolerance and to provide ongoing education to family/caregivers as needed   Total Evaluation Time   Total Evaluation Time (Minutes) 33     "

## 2018-09-24 NOTE — PROGRESS NOTES
Cross Cover Note:    Called to bedside due to hypothermia. Temp 92.7 axillary this evening. On chart review, patient consistently hypothermic, but I have not noted temp <93 recently. On exam, patient appears comfortable. Family member is bedside and attempting to feed him. HR RRR, S1, S2. Breathing comfortably on room air. Diminished lung sounds with mild crackles. Abdomen soft, non-tender with active bowel sounds. He is currently on IV Flagyl for c diff. Ddx of hypothermia is wide and includes infection vs neurologic etiology vs hypothyroidism vs malnutrition vs adrenal insufficiency vs ingestion vs other. Temp recheck is 93.6.   - CBC, BMP, TSH, BCx, lactic acid, TSH, CXR, UA (if able)  - Lissy keene   - Given overall chronic nature of this, will defer further workup to primary team unless acute decompensation  ** Addendum: Hgb 6.7. No HD today. No active s/s bleeding. I will type and screen tonight. Plan for blood with next HD unless patient becomes hemodynamically unable    Phoebe Lamb PA-C  Internal Medicine [facility]  [phone]

## 2018-09-24 NOTE — PROGRESS NOTES
[facility], [place]   Internal Medicine Daily Note          Assessment and Plan:    Priscilla Way is a 78 year old male admitted on 9/11/2018. He has a history of dementia (vascular plus Alzheimers), liver transplant in 2000, kidney transplant in 2000, DVT, DM II and CVA and is admitted for aspiration and possible seizure episode.       # History of liver and kidney transplant  # LILIAM on CKD- anuric.   Etiology for LILIAM: suspect MF: related to intravascular dehydration, sepsis on admission, plus  immunosuppresants (high tacro lvl). Contrast.  No obvious hydronephrosis noted on transplanted kidney ultrasound   Tx Nephrology on board.   No response to IV fluid challenge, lasix plus albumin challenge. Remained anuric with worsening creatinine   9/16/2018: Nephrology discussed in detail with POA regarding dialysis. She made informed decision to proceed with dialysis. RIJ placed by renal. HD initiated.    9/17/2018: bleeding around RIJ site. Hold Anticoagulation. Follow-up Hgb stable. 2 stiches applied around the site   - Patient unable to get po or SL prograf. Family (POA) does not want NG feeding .  - Current immunosuppressant is only IV mycophenolate and IV prednisone : Change to Prednisone 5mg every day, resume PTA Tacrolimus and reduce mycophenolate to 250mg bid as per renal    In the grand scheme of things, given patient's co-morbidities, he is not an ideal candidate for long term dialysis ( If his renal function does not recover)   Significant differences in opinion in the family. POA agrees to DNR/DNI, refuses feeding tube, but does want dialysis   -  trial of dialysis to see if recovers. Discussed with renal. Cr better today, no need for dialysis for now. Will start lasix 40 mg IV bid     #Anemia- Hb 6.9 today- will transfuse 1 U PRBC. 9/24    # Seizure-like activity - New Onset.   # Acute Encephalopathy  Patient presented following an episode of full body convulsions while eating on  day of admission.  Reportedly this lasted one minute.  He has no prior history of seizures.  CT head and CTA as above. MR brain:  No e/o acute stroke.   Pt remains encephalopathic. Likely related to seizure plus pna, c.diff. Hospitalization. Hx of likely advanced dementia.    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   Video EEg; no ongoing seizure activity. Neurology on board. Appreciate consult.    - Loaded and started on Keppra 500 bid IV  9/14/2018: patient's daughter Cruz asked to stop seizure medicines. Explained her the risk of recurrent seizure, neuro recs, she still wanted me to stop. Discontinued.   - NPO except meds until SLP clearance, gentle ivf. (daughter declined NG tube, says family can feed him/ give him pills)  - ct neuro checks, Vitals. I/o  - fall, seizure precautions.   * Long discussion with both daughters 9/24- Feels mental status is back to baseline now and usually at home has good days and bad days    # Possible aspiration pneumonia vs Pneumonitis   - Started on Unasyn in the ED, switched to iv Zosyn. 09.14,completed total 7 days course. ( Last day 9/21)  - ID consult appreciated.     # Recurrent C.diff colitis:   - had IV metronidazole.  switch to po Vanco 9.24. Continue 7 days after done with antibiotics.       # History of DM II  - Not on any medications. Follow daily blood sugars   -Stable    # History of hypertension: PTA norvasc, Coreg were on hold   9/21- resumed Norvasc at 5mg and titrate up    - iv Hydralazine prn for sbp>170  - Monitor.       # History of DVT  -- Was on warfarin, but now at significant risk for aspiration, Ct patient on Heparin infusion. Start coumadin 9/24      # History of dementia, CVA   At baseline he is nonverbal and minimally interactive, at times making eye contact.  He is total cares and taken care of at home by his family.  - discussed about goals of care.   - Daughter has declined palliative consultation.   - She expects him to be able to get to baseline and  "take home.   - Declined NG tube. Does not want seizure medications at current.   - Family at times have given meds and food to patient. (despite SLP rec of NPO) and risk of aspiration explained.   Ethics team     # LE edema:   - Lymphedema therapy consult      Diet: npo per SLP.  DVT Prophylaxis: On anticoagulation. Heparin infusion     Consulting teams: Transplant Nephrology, ID, Neurology   Code status:DNR/DNI  DVT Prophylaxis: On Heparin infusion   Gastric prophylaxis: Not required   Diet: NPO ( Family feeding patient against medical advice)   Disposition: To be determined     Patient seen and examined with RN      Interval History:   Progress notes in the last 24 hrs by MDT team has been reviewed.  No acute events over night. Patient was given prograf from home last night.           Review of Systems:   A comprehensive review of systems was performed and found to be negative except: Those that are outlined in interval history              Medications:   I have reviewed this patient's current medications which are outlined in the \"current medication\" section of EPIC          Physical Exam:   Vitals were reviewed  Temp: 95.8  F (35.4  C) Temp src: Axillary BP: 147/75 Pulse: 83 Heart Rate: 81 Resp: 16 SpO2: 99 % O2 Device: None (Room air)    Constitutional:   Spontaneously opens eyes. Not in distress. Non verbal      Lungs:   No increased work of breathing, good air exchange, Mild basal crackles      Cardiovascular:   SI+II+ no m/r/g     Abdomen:   No scars, normal bowel sounds, soft, non-distended, non-tender, no masses palpated, no hepatosplenomegally     Musculoskeletal:   Bilateral lower extremity edema      Neurologic:   Encephalopathic. Spontaneous eye movements            Data:     Most recent labs have been evaluated and relevant labs are outlined below :  BMP    Recent Labs  Lab 09/24/18  0812 09/23/18  2218 09/23/18  0550 09/22/18  1009    136 135 135   POTASSIUM 3.6 3.3* 4.0 3.4   CHLORIDE 102 101 " 101 102   JOSHUA 7.8* 7.8* 8.5 7.2*   CO2 23 27 23 26   BUN 10 10 9 9   CR 1.11 1.03 0.92 0.85   * 161* 159* 171*     CBC    Recent Labs  Lab 09/24/18  0812 09/23/18  2218 09/21/18  0750 09/18/18  1458   WBC 9.1 8.0 7.4 10.4   RBC 2.32* 2.23* 2.41* 2.52*   HGB 6.9* 6.7* 7.2* 7.4*   HCT 21.8* 20.2* 21.6* 22.2*   MCV 94 91 90 88   MCH 29.7 30.0 29.9 29.4   MCHC 31.7 33.2 33.3 33.3   RDW 20.0* 18.5* 17.5* 17.2*    233 184 170     INR    Recent Labs  Lab 09/24/18  0812 09/23/18  0758 09/22/18  1009 09/21/18  0750   INR 1.07 1.06 1.22* 1.28*

## 2018-09-24 NOTE — PLAN OF CARE
"Problem: Patient Care Overview  Goal: Plan of Care/Patient Progress Review  7am - 7 pm  Pt opens eyes spontaneously, pt does not respond to questions, pt at times says some words and family stated that he is saying \" leave me alone\". AVSS. No non verbal indicator of pain. Heparin is infusing at 700 units /hr, no change needed today. Next 10 a level should be in the morning.Pt did not received his daily dose of solu-medrol  IV as daughter refused pt not to have it and Daughter requested for prograf po to be started immediately stating that pt can swallow. got notified of daughter's request and steroid refusal.Family also gives pt something to drink even family was reinforced pt's diet status is NPO. Dr. Pizarro placed order for  speech evaluation.Pt had lasix 40 mg IV x1, patino with 400 ml output after lasix.Family in room, involved in care, supportive of pt. Continue with plan of care.      "

## 2018-09-24 NOTE — PROGRESS NOTES
[facility], [place]   Transplant Nephrology Progress Note  Date of Admission:  9/11/2018    Assessment & Plan :  Priscilla Way is a 78 year old male with h/o simultaneous liver and kidney transplant in 2000 for hepatitis C.He was admitted  for AMS and seizures. Underwent CTA with contrast of brain neck for evaluation complicated by ROMANA required HD. Also c/b aspiration PNA and c diff. We are on consult now for anuric renal failure.   basline Cr ~ 1.2-1.4; the patient has anuric LILIAM on on dialysis since 9/16/18.  His LILIAM is likely multifactorial but related to contrast induced nephropathy with underlying ckd.  The creatinine of 1.2-1.4 is an overestimation of his renal function and a kidney function 8/2016 is significant for early diabetic changes with secondary focal segmental sclerosis.    There is some potential for renal recovery after the insulting agent of IV contrast.   Dialysis has been started but he has had several intolerance episodes including severe hypotension on dialysis, worsening breathing with dialysis, and constantly trying to remove his dialysis catheter which has prompted restraints for his safety.   Poor candidate for chronic dialysis, (he does not want a feeding tube, he does not want intubation if needed, and he does not want compression in case of arrest).  His decisions are currently being made by his daughter Cruz and she will need to provide documentation of her being the healthcare proxy.      # DDKT and liver transplant:  - Basline Cr ~ 1.2- 1,4; Increased likely due to contrast which he received on 9/11/2018. Cr improving, oliguric, no need for further HD for now, will give diuretics   - No bleeding from the dialysis cath site. INR: 1.07 this morning, patient is on heparin gtt.   - UA with 34 WBC and 7 RBC and large LE. Urine Cx NGTD. UA with no hydronephrosis.        - Proteinuria: Nephrotic range  - new which is likely due to concentrated urine in the  setting of acute renal failure with background DM nephropathy.   - Latest DSA: No                   Date of DSA last checked: 2016  - BK: No  - Kidney Tx Biopsy: Yes- 2015- ATN, DM nephropathy with features of FSGS and moderate to severe arterisclerosis      # Liver transplant :  - LFTs and Alk phos normal WNL.        # Immunosuppression:   - Reduce IV Mycophenolate mofetil to 250g BID and reduce steroids to 5mg prednisone. When he is stable on this regimen, stop myfortic.   - Was on tacrolimus monotherapy, will restart 1mg BID.         # Hypertension:   - BP today is better, no plan for further HD. (Cr stable and making urine)  - Will start lasix 40mg IV BID        # Anemia in chronic renal disease:  - Hgb:6.9 Continue trending down slowly.  - Iron studies: Low. Can give oral ferrous sulphate        # Mineral Bone Disorder:   - Secondary renal hyperparathyroidism; PTH level 78 on 2018  - Calcium: 7.8  Normal when corrected for albumin        # Electrolytes:   - Potassium: 3.6  - Na: 134       # Other Medical Issues:   # seziure disorder- admitted with AMS. Seen by neurology. On keppra.   # h/o CVA:  Minimally interactive at baseline. Palliative care on consult. Family does not want feeding tube.   # Cdiff- oral vancomycin started   # DM- management per primary.       # Transplant History:  Etiology of kidney failure: Hepatitis C  Transplant: 2000 (Kidney), 2000 (Liver)  Donor Type:  - Brain Death                      Donor Class: Standard Criteria Donor  Significant changes in immunosuppression: see above  Significant Complications: Oliguric LILIAM     Recommendations were communicated to the primary team via this note    Neil Watkins MD    Interval History   Patient was seen and examined this morning. Patient is non-verbal, daughter at the bedside. Tolerated HD Saturday, Cr stable today. Will not do HD today, plan for diuretics and immunosuppression medication to restart.     Physical  "Exam      /84  Pulse 83  Temp 95.4  F (35.2  C) (Axillary)  Resp 18  Ht 1.778 m (5' 10\")  Wt 83.1 kg (183 lb 4.8 oz)  SpO2 100%  BMI 26.3 kg/m2         GENERAL APPEARANCE: Bedridden, not able to do any communication, respond to tactile stimuli by arm contraction.   HENT: Sluggish pupil, head in normocephalic/atrumatic, Dialysis cath placed to right internal jugular, clean, no bleeding.    LYMPHATICS: no cervical or supraclavicular nodes  RESP: Rales to lung base B/L, no wheezes  CV: regular rhythm, normal rate, no rub, no murmur  EDEMA: +1 LE edema bilaterally  ABDOMEN: soft, nondistended, nontender, bowel sounds normal  MS: upper extremities in half contraction position, overall muscle atrophy and joint stiffness  SKIN: intact, warm and dry       Data   All labs reviewed by me.  CMP    Recent Labs  Lab 09/24/18 0812 09/23/18 2218 09/23/18  0550 09/22/18  2254 09/22/18  1009 09/21/18  2330 09/21/18  0750    136 135  --  135  --  137   POTASSIUM 3.6 3.3* 4.0  --  3.4  --  3.3*   CHLORIDE 102 101 101  --  102  --  102   CO2 23 27 23  --  26  --  24   ANIONGAP 9 7 11  --  7  --  11   * 161* 159*  --  171*  --  156*   BUN 10 10 9  --  9  --  17   CR 1.11 1.03 0.92  --  0.85  --  1.50*   GFRESTIMATED 64 70 79  --  87  --  45*   GFRESTBLACK 77 84 >90  --  >90  --  55*   JOSHUA 7.8* 7.8* 8.5  --  7.2*  --  8.3*   MAG  --   --   --  2.7* 1.5*  --  1.6   PHOS 3.0  --  2.4*  --  1.2* Canceled, Test credited 1.6*   ALBUMIN 2.2*  --   --   --   --   --   --      CBC    Recent Labs  Lab 09/24/18  0812 09/23/18 2218 09/21/18  0750 09/18/18  1458   HGB 6.9* 6.7* 7.2* 7.4*   WBC 9.1 8.0 7.4 10.4   RBC 2.32* 2.23* 2.41* 2.52*   HCT 21.8* 20.2* 21.6* 22.2*   MCV 94 91 90 88   MCH 29.7 30.0 29.9 29.4   MCHC 31.7 33.2 33.3 33.3   RDW 20.0* 18.5* 17.5* 17.2*    233 184 170     INR    Recent Labs  Lab 09/24/18  0812 09/23/18  0758 09/22/18  1009 09/21/18  0750   INR 1.07 1.06 1.22* 1.28*     ABGNo lab " results found in last 7 days.   Urine Studies  Recent Labs   Lab Test  09/24/18   0100  09/14/18   1418  05/31/18   1643  03/21/18   1139   COLOR  Yellow  Yellow  Yellow  Yellow   APPEARANCE  Clear  Cloudy  Clear  Slightly Cloudy   URINEGLC  Negative  150*  Negative  Negative   URINEBILI  Negative  Negative  Negative  Negative   URINEKETONE  Negative  10*  Negative  Negative   SG  1.020  1.028  1.012  1.011   UBLD  Moderate*  Moderate*  Trace*  Trace*   URINEPH  6.0  6.5  5.5  5.0   PROTEIN  100*  >600*  30*  10*   NITRITE  Negative  Negative  Negative  Negative   LEUKEST  Trace*  Large*  Negative  Large*   RBCU  5  7*  9*  2   WBCU  2  34*  2  159*     Recent Labs   Lab Test  09/14/18   1418  05/27/16   1409  04/29/11   1348  09/14/10   1134   UTPG  24.91*  Specimen not received  NOTIFIED [facility]  WHO PAGED  RN, LOPEZ AT 1355. NO URINE   RECIEVED WITH BLOOD SPECIMENS. AB    0.04  <0.02     PTH  Recent Labs   Lab Test  06/05/18   0548  08/24/16   0852   PTHI  78  333*     Iron Studies  Recent Labs   Lab Test  11/08/16   1209  08/24/16   0852   IRON  17*  20*   FEB  146*  197*   IRONSAT  11*  10*   BARTOLO   --   1025*     IMAGING:  All imaging studies reviewed by me.   Patient was seen and evaluated by me, Zachary Reed MD. I have reviewed the note and agree with the the plan of care as documented by the fellow.

## 2018-09-24 NOTE — PLAN OF CARE
"Problem: Patient Care Overview  Goal: Plan of Care/Patient Progress Review  Discharge Planner SLP   Patient plan for discharge: home with assist  Current status: Clinical swallow evaluation completed per MD order. Pt presents with high aspiration risk in the setting of advanced dementia. Pt with poor oral awareness, reduced bolus formation/control, bolus holding, oral residuals, poor A-P movement, and pharyngeal swallow delay. Pt's daughter present and involved in evaluation. Feeding Pt liquids via syringe, very small amounts at a time, and small bites of purees via spoon. Pt alert throughout most of session, and Pt's daughter reported this was a \"good day\" for PO intake. Pt demonstrated spontaneous swallows with all presented trials, though variable amounts of time required for oral phase and to trigger reflexive swallow. Pt's daughter is careful to make sure swallow was achieved before presenting next trial. Weak coughing noted x1 during session, but did not appear directly correlated with PO intake. Vocal quality was clear with attempts to communicate between bites. Cannot rule out silent aspiration at bedside, however, at this time, given lack of alternative nutrition source, and plan to continue PO diet, objective testing is not warranted. Recommend cautiously continue dysphagia diet level 1 (pureed) with thin liquids as tolerated with supervision/assist. Pt should be seated upright for all PO intake, and slowly fed small bites/sip, ensuring swallow before presenting next bite/sip. Pt with waxing/waning mental status and likely waxing/waning swallow function, so PO should be held if Pt demonstrating reduced LIZZY or s/sx of aspiration with PO intake. Family is very familiar with Pt's functional status and have provided cares throughout progressive illness. Demonstrate independence with implementation of strategies discussed this date. ST to follow for PO tolerance and to provide ongoing education to " family/caregivers as needed  Barriers to return to prior living situation: adequate nutrition  Recommendations for discharge: goal is for home with assist; ST intervention home care as appropriate  Rationale for recommendations: Anticipate progressive dysphagia       Entered by: Emma Limon 09/24/2018 3:39 PM

## 2018-09-24 NOTE — PLAN OF CARE
Problem: Patient Care Overview  Goal: Plan of Care/Patient Progress Review  Pt has been alert but this writer was unable to assess mental status. Pt has remained non-verbal but attempted to pinch this writer a few times while this writer was doing tono cares. Per daughter, that behavior was very normal. VSS, pt did not appear to be in pain. Hep drip is currently running at 1000 unit/hr after change of drip rate after hep 10A came back at 0.17 this am, next hep 10A will be at 1500. Hgb 6.9 this am. Since pt won't be having dialysis today, blood transfusion will be done on the unit instead of in dialysis. Skin intact, there was minimal urine output in Virk. Daughter at the bedside, very involved in cares.

## 2018-09-24 NOTE — PLAN OF CARE
Problem: Patient Care Overview  Goal: Plan of Care/Patient Progress Review  Outcome: No Change  Pt is disoriented x 4 per family. No nonverbal indicators of pain. Crackles in lungs, productive cough with clear sputum. Pt resists cares. Turning every 2 hours and checking for incontinence, heels elevated. Maintaining sats in high 90's on RA. Virk is patent with good output. NPO. Pt has +2-4 edema with scrotum being especially swollen. Phos replaced    Temp dropped to 92.7, gold team notified. CXR, labs, UA, ordered and performed. Fecal occult sample sent. Pt wrapped in warm blankets until josie keene arrived and then that was applied. Temp up to 97.7. Hgb found to be 6.7 and gold team notified. Dr Glez relayed plan to transfuse during HD in the am.     Pt's daughter arrived at 2 am and mentioned that the pt received oral prograf. Writer responded that oral prograf was not given and isn't ordered. Daughter made some phone calls and relayed that a previous guest gave the pt a dose of prograf from home supply. Dr. Glez notified of this and said it would be okay to proceed with mycophenolate IV as ordered. Pt's daughter understands that giving meds unauthorized is potentially dangerous and not allowed. Daughter will speak with the rest of family about this.

## 2018-09-24 NOTE — PHARMACY-ANTICOAGULATION SERVICE
Clinical Pharmacy - Warfarin Dosing Consult     Pharmacy has been consulted to manage this patient s warfarin therapy.  Indication: Atrial Fibrillation  Therapy Goal: INR 2-3  Warfarin Prior to Admission: Yes  Warfarin PTA Regimen: 2.5 mg on Tues, Thurs, Sun; 1.25 mg daily rest of week  Significant drug interactions: heparin drip, prednisone  Recent documented change in oral intake/nutrition:  (Has been NPO d/t aspiration risk)  Dose Comments:     INR   Date Value Ref Range Status   09/24/2018 1.07 0.86 - 1.14 Final   09/23/2018 1.06 0.86 - 1.14 Final       Recommend warfarin 2.5 mg today.  Pharmacy will monitor [address] daily and order warfarin doses to achieve specified goal.      Please contact pharmacy as soon as possible if the warfarin needs to be held for a procedure or if the warfarin goals change.

## 2018-09-25 NOTE — PLAN OF CARE
Problem: Patient Care Overview  Goal: Plan of Care/Patient Progress Review  Pt admitted with seizure. VSS on RA. Disoriented x4 per family. Hx of dementia and seizures. Pupils dilated and sluggish. Sammarinese speaking but illogical to family. Mits on to prevent scratching. Switched from DD1 diet with thins to NPO d/t episode aspiration on evening meds.Repo q 2 hrs. Required 1 unit RBC yesterday for hgb of 6.9. Noted black tarry BMs x3 overnight. MD informed. Ordered IV Protonix BID and hgb recheck @ 8.3. Virk intact with adequate output. Right internal jugular line intended for dialysis intact. Left arm 2 PIV infusing TKO and with hep gtt @ 900 units/hr. Awaiting 10A this AM. Lab called for redraw x3. Pt is difficult to stick and fights blood draws. Started on scheduled IV lasix and anti rejection meds per Nephrology yesterday. No current plans for dialysis. Pt has generalized edema. Legs and arms elevated on pillows. Lung sounds, crackle coarse. Switched to PO Vanco yesterday for c diff infection. Gave IV Dilaudid for pain per family's request. Family declining palliative cares at this time. Care conference to be planned in near future to determine care. Continue to monitor and follow the POC.

## 2018-09-25 NOTE — PLAN OF CARE
Problem: Patient Care Overview  Goal: Plan of Care/Patient Progress Review  Discharge Planner SLP   Patient plan for discharge: home with family  Current status: The patient was seen for dysphagia f/u this afternoon with his daughter present. The patient continues to present with variable alertness and swallowing function. The patient's daughter understands that he is at risk for aspiration, but does not wish to pursue a TF, and does demonstrate good understanding about safe swallowing precautions to make oral feeding as safe as possible. Recommend dysphagia diet 1 and nectar thick liquids (daughter is feeding liquids via syringe). Recommend the patient ONLY be fed when alert and positioned upright. Please provide thorough oral cares after all PO. SLP will f/u for one more session to reinforce education and safety precautions.   Barriers to return to prior living situation: will require modified diet level long term  Recommendations for discharge: defer to medical team  Rationale for recommendations: expect patient to reach SLP goals in the next session       Entered by: Marya Sanchez 09/25/2018 2:19 PM

## 2018-09-25 NOTE — PROGRESS NOTES
CLINICAL NUTRITION SERVICES - REASSESSMENT NOTE     Nutrition Prescription    RECOMMENDATIONS FOR MDs/PROVIDERS TO ORDER:  1. Recommend diet advancement to >> NPO/CL within the next 24 hours vs. Initiating TF support ( post pyloric feeding tube )      2. Once TF is in place and access is ready to be used, Order to begin TF with Nepro @ 10 ml/hr and adv by 10 ml Q 12 hrs, ( ONLY advance if K+/mg++ WNL and Phos >1.9) to goal @ 50 ml/hr.      -Nepro @ goal 50 ml/hr (1200 ml/day) to provide 2160 kcals ( 26 kcal/kg/day), 97 gm PRO (1.2 gm/kg/day), 876 ml free H2O, 193 gm CHO and 15 gm Fiber daily.     3. With tube feed start,Water flushes 30 ml every 4 hours for tube patency.     4. Recommend the following micronutrient supplementation given refeeding risk and HD therapy:   -- Nephronex (5 ml/day via FT)  --Thiamine 100 mg/day (continue until pt tolerating goal TF and no sx of refeeding).    Malnutrition Status:    Severe malnutrition in the context of chronic illness          EVALUATION OF THE PROGRESS TOWARD GOALS   Diet:   NPO essentially since admit. Diet advanced briefly from NPO to dysphagia 1 with thin liquids per SLP evaluation on 9/24, Switched back to NPO due to aspiration.     Nutrition Support:  None. Per chart review, Family declined NG tube placement.       Intake:   ---NPO status since admit ( ~ 2 weeks).   ---Per SLP note on 9/24:Family wants Pt to be able to continue PO intake as able with reliance on family for diet modifications, feeding techniques, and to determine appropriateness of PO given Pt's variable status.    ---Per SLP note on 9/24: Pt with waxing/waning mental status and likely waxing/waning swallow function, so PO should be held if Pt demonstrating reduced LIZZY or s/sx of aspiration with PO intake.        NEW FINDINGS   Wt trend: 83.1 kg ( 9/22) -- similar to admit wt     HD line in place, HD initiated 9/16, On hold due to improve in kidney function test.     Labs: BUN: 11, Cr: 1.28 (slight  increase), K+: 3.4, Mg++: 2.5, Phos: 3.0 ( 9/24) and within normal range.      PMH: Dementia ( vascular + Alzheimer), Liver and kidney transplant 2000 and is on immunosuppressant medication, DM2, CVA. Admitted for aspiration and possible seizure.      MALNUTRITION  % Intake: </=50% for >/= 1 month (severe)  % Weight Loss: Unable to determine due to wt fluctuation  Subcutaneous Fat Loss: Unable to assess  Muscle Loss: Unable to assess  Fluid Accumulation/Edema: Moderate edema   Malnutrition Diagnosis: Severe malnutrition in the context of chronic illness     Previous Goals   Diet adv v nutrition support within 2-3 days.  Evaluation: Not met    Previous Nutrition Diagnosis  Inadequate oral intake related to difficulty swallowing / dysphagia as evidenced by kept NPO status x 7 days since admit.      Evaluation: Declining    CURRENT NUTRITION DIAGNOSIS  Inadequate oral intake related to high aspiration risk in the setting of advanced dementia / poor oral awareness as evidenced by 2 weeks of essentially NPO/Minimal intake     INTERVENTIONS  Implementation  Enteral Nutrition - Recs above     Goals  Diet adv v nutrition support    Monitoring/Evaluation  Progress toward goals will be monitored and evaluated per protocol.    Iveth Mann RD/LANCE  Pager [phone]

## 2018-09-25 NOTE — PLAN OF CARE
Problem: Patient Care Overview  Goal: Plan of Care/Patient Progress Review  Outcome: No Change  Neuro: Patient is only oriented to self. Intermittently patient is alert, but majority of the time is pretty lethargic.   Cardiac: VSS.   Respiratory: Sating 100% on RA. Lung sounds coarse. Patient has intermittent weak cough.   GI/: Very minimal urine output to patino. No BM.  Diet/appetite: Patient dysphagia level 1 diet is alert and sitting up right, otherwise NPO per nursing.   Activity:  Assist of 2. Patient repositioned every two hours.   Pain: Patient did not display any signs of pain with assessment. Patient resting comfortably.   Skin: No new deficits noted.  LDA's: PIV infusing x 2.   Right CVC    Plan: Continue with POC. Notify primary team with changes.

## 2018-09-25 NOTE — PROGRESS NOTES
Patient not given any medications by mouth due to NPO recommendations from SLP, lung sounds, and patient's intolerance to daughter giving liquids even when not advised by staff. Patient is extremely edematous. Output this shift was 125mL with 40 mg of Lasix given. Patient is agitated and swings at staff when attempting cares. Patient has mitts on. Family refuses turning at times.    MD notified of all of the above

## 2018-09-25 NOTE — PROGRESS NOTES
Medicine Cross Cover Note    Contacted by nursing regarding black tarry stool. Has previously had black/green appearing stools documented, but never tarry. No new N/V or abdominal pain and is hemodynamically stable. Did get a unit of blood today for hgb 6.9 (was 6.7 yesterday, 7.2 on 9/21, 7.9 on 9/18). Is on a heparin gtt for DVT. RN also concerned that swallow is poor and he should not be getting a diet due to aspiration risk (SLP following).     - NPO.   - IV PPI Q12hr.   - Stat hgb now.  - Monitor stools. If recurrent evidence of dark/tarry stool will need to d/w GI and likely stop heparin gtt.     Janie Cedillo PA-C  Hospitalist Service  Cross cover pager [phone]

## 2018-09-25 NOTE — PROGRESS NOTES
"  Care Coordinator Progress Note    Admission Date/Time:  9/11/2018  Attending MD:  Ryan Aponte MD    Data  Chart reviewed, discussed with interdisciplinary team.   Patient was admitted for:    Aspiration pneumonia due to regurgitated food, unspecified laterality, unspecified part of lung (H)  Kidney replaced by transplant.    Concerns with insurance coverage for discharge needs: None.  Current Living Situation: Patient lives alone; has 24/7 care at home between PCA and family.  Support System: Supportive and Involved  Services Involved: Home Care and PCA  Transportation at Discharge: [facility] stretcher  Transportation to Medical Appointments:  - Name of caregiver: Benjamin (daughter)  Barriers to Discharge: medical needs    Coordination of Care    9/27: Pt aspirated on evening shift yesterday. Pt is unable to take oral medications (antirejection medications, tacrolimus). Per Nephrology dialysis again today. Dr. Mitchell contacting Ethics.   9/26: 5A Charge RN reports stools were not black tarry. Per Nephrology diuretics not helping with urination, dialysis today.  9/25: Overnight new finding black tarry stools, still on hep gtt, unstable.  9/24: Pt continuing to make urine, trial off HD w/ IV diuretics. SLP eval completed: NDD1 w/thin (supervision, asst, and upright/slow).  9/20: Patient started making urine, continue HD trail.  9/19: Ethics Consult  9/18: Per Dr. Dumont \"In the grand scheme of things, given patient's co-morbidities, he is not an deal candidate for long term dialysis. Significant differences in opinions in the family. Daughter Cruz agrees to DNR/DNI, refuses feeding tube and refuses anti epeleptics, but does want dialysis.\"     **Daughter Cruz claims to be POA: no legal documentation has been received or on file that proves she is patients POA. Dr. Dumont asked Cruz directly about the documentation and she did not answer the question. Estranged/seperated spouse is legal decision maker if no POA " paperwork is produced.**    9/17: D: Chart reviewed and plan of care discussed with Dr Aponte.   I/A: Patient had RIJ placed for dialysis access (pt does not have HD at baseline), dialysis today, Hep gtt, receiving Zosyn IV for aspiration pneumonia, strict NPO due to aspiration (nursing notes indicate pt daughters is not in agreement that pt is aspirating at all, wants pt to be eating and drinking). As with all previous admissions anticipate pt will dc home with family.    Baseline Home Assessment  pt lives alone but has 11.5 hours of PCA services and family supplements the rest of this time therefore 24/7 care at home. Pt is also open to [facility] for skilled nursing visits twice weekly. Chronic Coumadin; INR labs drawn by [facility], monitored by [facility].  Baseline pt is nonverbal, minimally interactive, bed bound/total cares, has a lift, wheelchair and hospital bed at home (through [facility]).      Referrals: Patients daughter requesting resumption of [facility] services at [facility]    [facility]  Phone  [phone]  Fax  [phone]      [facility]  Phone: [phone]  Fax: [phone]    For INR and Warfarin monitoring (Goal = 2-3)  Indication for Anticoagulation: A fib, DVT/PE Treatment  MD Following: MANUELA Fuentes  Expected duration of therapy: indefinate  Next INR lab draw due on:       Plan  Anticipated Discharge Date:  TBD  Anticipated Discharge Plan:  Home      Radha Ahuja RN, BSN, PHN  Medicine Care Coordinator  [facility]  Desk Phone: [phone]  Pager: [phone]    To contact Weekend RN[facility], dial [phone] and enter job code [phone] at prompt.   This pager can not be contacted by text page or outside line.

## 2018-09-25 NOTE — PROGRESS NOTES
[facility], [place]   Transplant Nephrology Progress Note  Date of Admission:  9/11/2018    Assessment & Plan   Priscilla Way is a 78 year old male with h/o simultaneous liver and kidney transplant in 2000 for hepatitis C.He was admitted  for AMS and seizures. Underwent CTA with contrast of brain neck for evaluation complicated by ROMANA required HD. Also c/b aspiration PNA and c diff. We are on consult now for anuric renal failure.     Basline Cr ~ 1.2-1.4; the patient has anuric LILIAM on on dialysis since 9/16/18.  His LILIAM is likely multifactorial but related to contrast induced nephropathy with underlying ckd.  The creatinine of 1.2-1.4 is an overestimation of his renal function and a kidney function 8/2016 is significant for early diabetic changes with secondary focal segmental sclerosis. There is some potential for renal recovery after the insulting agent of IV contrast.     Dialysis has been started but he has had several intolerance episodes including severe hypotension on dialysis, worsening breathing with dialysis, and constantly trying to remove his dialysis catheter which has prompted restraints for his safety.     # DDKT and liver transplant:  - Basline Cr ~ 1.2- 1,4; Cr stable, oliguric, no need for further HD for now, will increase diuretics to 80mg BID   - No bleeding from the dialysis cath site. INR: 1.24 this morning, patient is on heparin gtt.   - UA with 34 WBC and 7 RBC and large LE. Urine Cx NGTD. UA with no hydronephrosis.   - Proteinuria: Nephrotic range  - new which is likely due to concentrated urine in the setting of acute renal failure with background DM nephropathy.   - Latest DSA: No                   Date of DSA last checked: 8/2016  - BK: No  - Kidney Tx Biopsy: Yes- 8/2015- ATN, DM nephropathy with features of FSGS and moderate to severe arterisclerosis      # Liver transplant :  - LFTs and Alk phos normal WNL.        # Immunosuppression:   - Reduce IV  "Mycophenolate mofetil to 250g BID. Hold tacrolimus given swallowing issues. and reduce steroids to 5mg prednisone (or 4mg methylpred if he cant take oral).   - Restart tacrolimus when the patient is able to eat again        # Hypertension:   - BP remains high today. May need HD temporarily. (Cr stable and making urine)  - Increase lasix to 80mg BID.       # Anemia in chronic renal disease:  - Hgb:8.3   - Iron studies: Low. Can give oral ferrous sulphate        # Mineral Bone Disorder:   - Secondary renal hyperparathyroidism; PTH level 78 on 2018  - Calcium: 7.6  Normal when corrected for albumin        # Electrolytes:   - Potassium: 3.4  - Na: 136       # Other Medical Issues:   # seziure disorder- admitted with AMS. Seen by neurology. On keppra.   # h/o CVA:  Minimally interactive at baseline. Palliative care on consult. Family does not want feeding tube.   # Cdiff- oral vancomycin started   # DM- management per primary.       # Transplant History:  Etiology of kidney failure: Hepatitis C  Transplant: 2000 (Kidney), 2000 (Liver)  Donor Type:  - Brain Death                      Donor Class: Standard Criteria Donor  Significant changes in immunosuppression: see above  Significant Complications: Oliguric LILIAM     Recommendations were communicated to the primary team via this note    Neil Watkins MD    Interval History   Patient was seen and examined this morning. Patient is non-verbal, daughter at the bedside. Cr stable today. Oliguric. Plan to increase lasix to 80mg BID     Physical Exam      /68 (BP Location: Right arm)  Pulse 83  Temp 95.1  F (35.1  C) (Axillary)  Resp 18  Ht 1.778 m (5' 10\")  Wt 83.1 kg (183 lb 4.8 oz)  SpO2 100%  BMI 26.3 kg/m2       GENERAL APPEARANCE: Bedridden, not able to do any communication, respond to tactile stimuli by arm contraction.   HENT: Sluggish pupil, head in normocephalic/atrumatic, Dialysis cath placed to right internal jugular, clean, no " bleeding.    LYMPHATICS: no cervical or supraclavicular nodes  RESP: Rales to lung base B/L, no wheezes  CV: regular rhythm, normal rate, no rub, no murmur  EDEMA: +1 LE edema bilaterally  ABDOMEN: soft, nondistended, nontender, bowel sounds normal  MS: upper extremities in half contraction position, overall muscle atrophy and joint stiffness  SKIN: intact, warm and dry     Data   All labs reviewed by me.  CMP    Recent Labs  Lab 09/25/18  0538 09/24/18  0812 09/23/18 2218 09/23/18  0550 09/22/18  2254 09/22/18  1009 09/21/18  2330 09/21/18  0750    134 136 135  --  135  --  137   POTASSIUM 3.4 3.6 3.3* 4.0  --  3.4  --  3.3*   CHLORIDE 102 102 101 101  --  102  --  102   CO2 25 23 27 23  --  26  --  24   ANIONGAP 9 9 7 11  --  7  --  11   * 145* 161* 159*  --  171*  --  156*   BUN 11 10 10 9  --  9  --  17   CR 1.28* 1.11 1.03 0.92  --  0.85  --  1.50*   GFRESTIMATED 54* 64 70 79  --  87  --  45*   GFRESTBLACK 65 77 84 >90  --  >90  --  55*   JOSHUA 7.6* 7.8* 7.8* 8.5  --  7.2*  --  8.3*   MAG 2.2  --   --   --  2.7* 1.5*  --  1.6   PHOS  --  3.0  --  2.4*  --  1.2* Canceled, Test credited 1.6*   ALBUMIN  --  2.2*  --   --   --   --   --   --      CBC    Recent Labs  Lab 09/25/18  1255 09/24/18  2356 09/24/18  0812 09/23/18 2218 09/21/18  0750   HGB 8.0* 8.3* 6.9* 6.7* 7.2*   WBC  --   --  9.1 8.0 7.4   RBC  --   --  2.32* 2.23* 2.41*   HCT  --   --  21.8* 20.2* 21.6*   MCV  --   --  94 91 90   MCH  --   --  29.7 30.0 29.9   MCHC  --   --  31.7 33.2 33.3   RDW  --   --  20.0* 18.5* 17.5*   PLT  --   --  237 233 184     INR    Recent Labs  Lab 09/25/18  0538 09/24/18  0812 09/23/18  0758 09/22/18  1009   INR 1.24* 1.07 1.06 1.22*     ABGNo lab results found in last 7 days.   Urine Studies  Recent Labs   Lab Test  09/24/18   0100  09/14/18   1418  05/31/18   1643  03/21/18   1139   COLOR  Yellow  Yellow  Yellow  Yellow   APPEARANCE  Clear  Cloudy  Clear  Slightly Cloudy   URINEGLC  Negative  150*   Negative  Negative   URINEBILI  Negative  Negative  Negative  Negative   URINEKETONE  Negative  10*  Negative  Negative   SG  1.020  1.028  1.012  1.011   UBLD  Moderate*  Moderate*  Trace*  Trace*   URINEPH  6.0  6.5  5.5  5.0   PROTEIN  100*  >600*  30*  10*   NITRITE  Negative  Negative  Negative  Negative   LEUKEST  Trace*  Large*  Negative  Large*   RBCU  5  7*  9*  2   WBCU  2  34*  2  159*     Recent Labs   Lab Test  09/14/18   1418  05/27/16   1409  04/29/11   1348  09/14/10   1134   UTPG  24.91*  Specimen not received  NOTIFIED [facility]  WHO PAGED  RN, LOPEZ AT 1355. NO URINE   RECIEVED WITH BLOOD SPECIMENS. AB    0.04  <0.02     PTH  Recent Labs   Lab Test  06/05/18   0548  08/24/16   0852   PTHI  78  333*     Iron Studies  Recent Labs   Lab Test  11/08/16   1209  08/24/16   0852   IRON  17*  20*   FEB  146*  197*   IRONSAT  11*  10*   BARTOLO   --   1025*     IMAGING:  All imaging studies reviewed by me.     I have seen and examined this patient with the resident.  This note reflects our joint assessment and plan.     Shelly Washington MD

## 2018-09-25 NOTE — PROGRESS NOTES
During my attempted, visit, pt. /family was busy.     Will continue to provide support to pt/family during their hospitalization at least 1x/wk.

## 2018-09-26 NOTE — PLAN OF CARE
Problem: Patient Care Overview  Goal: Plan of Care/Patient Progress Review  ST 5A: Cx- Pt off unit to dialysis. Will reschedule

## 2018-09-26 NOTE — PLAN OF CARE
Problem: Patient Care Overview  Goal: Plan of Care/Patient Progress Review  Outcome: No Change  [id number]     Reason for Admission: Concerns for aspiration and possible seizure.   PMHx: Dementia, Liver txp (2000), Kidney txp (2000), DVT, DM2, CVA and poor airway protection.   Barriers to D/C: Pain management, able to tolerate PO medications and goals of care addressed.     Vitals: VSS on RA. Cont pulse ox in place.   Activity: Turn and reposition Q2H. However, pt family declining repositioning for most hours over night to allow pt to sleep, despite education.   Pain: No nonverbal indicators of pain this shift. PT family endorses that when pt in pain, he will withdraw and HR will elevate   Neuro: Mostly non-verbal, says 1-2 words to family. Unable to follow commands. Withdraws from pain. Pupil reactivity sluggish. R sided mouth droop-baseline for patient per pt family  Cardiac: No acute changes this shift.   Respiratory: LS coarse. Stable on RA. Weak, ineffective cough. No signs of aspiration this shift.   GI/: Virk in place. Incontinent of bowel Pt having dark stools. Nursing assistant reported black stool to writer, however, stools appearing brown.   Diet: DD1 Pureed diet. Nec tar thickened liquids.   Skin: Scabbed over wounds to head from EEG leads. Coccyx wound-pink. Scars. R Heel wound-open to air.   LDAs: R PIV SL. L PIV with heparin gtt.   New this shift: MD paged regarding switching PO medications to IV. MD changed mycophenolate to IV.  Blood Glucose: Checked Q4H. Stable.     Plan: Possible care conference today. Will continue to monitor and follow POC.

## 2018-09-26 NOTE — PROGRESS NOTES
[facility] Nurse Inpatient Wound Assessment   Reason for consultation: Evaluate and treat head wound   New consult for penis PI    Assessment  Head wounds due to Skin Tear 2/2 EEG leads  Status: resolved    Penis: intact depigmented skin at 12 o'clock, not a pressure injury at this time.  Status: initial assessment    Treatment Plan  Head wounds from EEG leads: Every shift cleanse with saline and pat dry.  Apply thin layer of vaseline, no cover dressing needed.   Catheter: Continue routine cath cares.  Depigmented area is not a pressure injury at this time.  When placing Stat lock for Virk make sure there catheter is positioned midline with no tension.    Orders Written  WO Nurse follow-up plan:signing off Updated RN  Nursing to notify the Provider(s) and re-consult the WOC Nurse if wound(s) deteriorates or new skin concern.    Patient History  According to provider note(s):  Priscilla Way is a 78 year old male admitted on 9/11/2018. He has a history of dementia, liver transplant in 2000, kidney transplant in 2000, DVT, DM II and CVA and is admitted for aspiration and possible seizure    Objective Data  Containment of urine/stool: Incontinence Protocol    Active Diet Order    Active Diet Order      Dysphagia Diet Level 1 Pureed Nectar Thickened Liquids (pre-thickened or use instant food thickener)    Output:   I/O last 3 completed shifts:  In: 131 [I.V.:131]  Out: 700 [Urine:700]    Risk Assessment:   Sensory Perception: 3-->slightly limited  Moisture: 3-->occasionally moist  Activity: 1-->bedfast  Mobility: 2-->very limited  Nutrition: 1-->very poor  Friction and Shear: 2-->potential problem  Stuart Score: 12                          Labs:   Recent Labs  Lab 09/26/18  0730 09/25/18  1255  09/24/18  0812   ALBUMIN  --   --   --  2.2*   HGB  --  8.0*  < > 6.9*   INR 1.40*  --   < > 1.07   WBC  --   --   --  9.1   < > = values in this interval not displayed.    Physical Exam  Skin inspection: focused head         Wound  Location:  head  Date of last photo 9/13/18  Wound History: EEG lasting 2.5 hrs yesterday.  Likely skin tear 2/2 removal of EEG leads and not pressure. 3 wound on left side of face/temple and one over right temple.  9/20: two smallest wounds have healed.  Largest are resurfaced but fragile. Updated RN.    9/26: resurfaced.  Some areas remain depigmented    Penis: Pt has had indwelling catheter.  Intact pink depigmented area located at 12 o'clock approx 0.2 x0.2 x 0 cm consistent with previous pressure injury. Not a pressure injury at this time.  Continue routine cath cares.      Interventions  Current support surface: Standard  Low air loss mattress  Current off-loading measures: Pillows under calves  Visual inspection of wound(s) completed  Wound Care: done per plan of care  Supplies: at bedside and floor stock  Education provided: importance of repositioning, plan of care and wound progress  Discussed plan of care with Nurse    Holli Magallanes RN

## 2018-09-26 NOTE — PROGRESS NOTES
[facility], [place]    Internal Medicine Progress Note - Gold Service      Assessment & Plan   Priscilla Way is a 78 year old male with a complex past medical history including dementia (Alzheimer and vascular), liver transplant and kidney transplants in 2000, DVT, T2DM, CVA, and poor airway protection admitted with possible seizure and aspiration.  He is currently hemodynamically stable with ongoing concerns for aspirations/airway protection, melanotic stools, and resuming hemodialysis     # DDKT and Liver Transplant  # Anuric Renal Failure s/p HD with baseline CKD  Suspected secondary to contrast and now improving.  Currently holding HD and trialing IV diuretics.  - Transplant nephrology consulted, appreciate assistance  - Discontinue IV furosemide  - Hemodialysis planned fortoday  - Strict I/O  - All medications by IV as able at present given tenuous PO status                        - Mycophenolate IV                        - Tacro PO only available (take if able)     # Acute on Chronic Normocytic Anemia  # Possible slow GI blood loss  Suspect multifactorial including related to chronic disease and LILIAM as well as iatrogenic though patient reportedly had melanotic stools though hemoglobins have since been stable.  - Continue IV PPI BID at present  - Daily hemoglobins and as needed for signs of bleeding  - Transfuse for hemoglobin <7     # Aspiration Pneumonia and poor Airway Protection  Recurrent small aspirations per speech evaluation and signficant aspiration event with possible seizure PTA.  Discussed with family regarding concerns for high risk of recurrent aspirations and per family, tube feeding has been declined, family feels strongly about feeding by mouth, and they understand the risks at present.  Per SLP eval 9/25 a diet was ordered with strict parameters for when to give PO.  We will monitor and continue to discuss safe discharge planning as well as goals of care.  -  Completed 7 day course of IV antibiotics   - Restart if any concerns for recurrence of aspiration  - ID previously involved     # Recurrent C. Diff Colitis  On IV metronidazole, will switch to PO vanco if able to tolerate PO.  - Continue for 7 days post aspiration Abx.     # Possible Seizure Activity  # Baseline Dementia  Full body convulsions witnessed PTA with reported associated aspiration.  CT head and CTA without acute changes.  Family feels patient is back to baseline mental status.  - Neurology consulted, appreciate assistance  - Levetiracetam initiated but per family this is not consistent with goals of care so this has been discontinued  - Seizure precautions     # DVT  On warfarin PTA but unable to tolerate PO well at present  - Heparin gtt  - Warfarin when able to take PO     # T2DM  Follow daily BG. Currently stable     # HTN  Home amlodipine and carvedilol if able to tolerate PO.  Hydralazine PRN     # LE Edema  - Lymphedema consult  - Diuresis as above    Diet: Dysphagia Diet Level 1 Pureed Nectar Thickened Liquids (pre-thickened or use instant food thickener)  Fluids: PO fluids if alert, sitting up, and tolerating  Virk Catheter: in place, indication: strict I/o   DVT Prophylaxis: heparin gtt  Code Status: DNR/DNI    Disposition Plan   Expected discharge: 4 - 7 days, recommended to prior living arrangement once trail of PO fluids/medications completed and goals of care addressed.     Entered: Katharine Mitchell MD 09/26/2018, 8:14 AM   Information in the above section will display in the discharge planner report.      The patient's care was discussed with the Bedside Nurse, Care Coordinator/, Patient, Patient's Family and nephrology Consultant.    Katharine Mitchell MD  Internal Medicine Staff Hospitalist Service  [facility]  Pager: [phone]  Please see sticky note for cross cover information    Interval History   Nursing notes reviewed.  No acute events overnight but high  blood pressure this morning.  Family was initially concerned about IV PRN medication for blood pressure but are amenable.  Intermittently alert and taking fluids.  Per nursing assessment has not been adequately alert and able to be seated upright to tolerate PO medications.    The remainder of a 4 point ROS is negative unless otherwise noted above.    Data reviewed today: I reviewed all medications, new labs and imaging results over the last 24 hours.    Physical Exam   Vital Signs: Temp: 95.9  F (35.5  C) Temp src: Axillary BP: (!) 195/92 Pulse: 92 Heart Rate: 71 Resp: 20 SpO2: 100 % O2 Device: None (Room air)    Weight: 183 lbs 4.8 oz  General Appearance: Awake, alert male laying in bed in no acute distress  Respiratory: Mild bibasilar crackles with no increased work of breathing on room air. Rare rhonchi bilaterally but improved from prior.  Cardiovascular: RRR, no m/r/g  GI: Soft, non-tender, non-distended  Skin: No rashes  Other: Bilateral LE edema present.  Patient non-verbal with writer but interacting with daughter and following her commands.

## 2018-09-26 NOTE — PROGRESS NOTES
HEMODIALYSIS TREATMENT NOTE    Date: 9/26/2018  Time: 5:33 PM    Data:  Pre Wt: 80.5 kg (177 lb 7.5 oz)   Desired Wt: 79.5 kg   Post Wt: 78.5 kg (173 lb 1 oz)  Weight gain: -2 kg off  Weight change: 2 kg  Ultrafiltration - Post Run Net Total Removed (mL): 2000 mL  Ultrafiltration - Post Run Net Total Gain (mL): 0 mL  Vascular Access Status: Yes, secured and visible  Dialyzer Rinse: Streaked, Light  Total Blood Volume Processed: 60.3 L  Total Dialysis (Treatment) Time:  3 ghrs    Lab:   YES: Heparin 10 A     Assessment:  Patent Rt internal jugular CVC.      Interventions:  Initiated HD with K4/3 bath , BFR at 350 ml/mins. Tolerable for 2 kg off/ 3 hrs. Kept SBP above 100 mmHg during HD. Finished HD with rinse back and CVC locked with NS 10 ml each port via new Tego cap. Gave new CVC dressing with bio-patch.     Plan:    Next run per renal team.

## 2018-09-26 NOTE — PLAN OF CARE
Problem: Patient Care Overview  Goal: Plan of Care/Patient Progress Review  Outcome: No Change  Reason for admission: Aspiration pneumonia, seizures.   History: Vascular dementia, liver and kidney transplant (2000), CVA, Poor airway protection, Type 2 Diabetes, DVT.   Neuro: Alert, ADRIA orientation- pt mostly nonverbal. R facial droop at baseline.   Activity: Assist of 2 with repositioning in bed.  Mechanical lift when up. Q2H repositioning schedule. Family declined repositioning from [id number].   Vitals: VSS on RA. HTN.      LDAS: PIV on L infusing heparin at 900 units/hr.  PIV on R S/L. Patino catheter secure to left leg.   Cardiac: Elevated bp this morning.  Daughter declined Hydralazine, MD notified.   Respiratory:  Crackles ausculted - pt on lasix. O2 Sat good.       GI/: Sub-optimal urine output from patino cath- total of 200ml this shift.  Smear of bm x2. Stool dark brown.   Skin: No new changes this shift.    Pain: Dilaudid PRN given at 0215 for nonverbal indicators of pain- grimacing, rigidity.   Diet: Dysphagia puree, nectar thick liquids.   Labs/Imaging: Recheck 10A labs this AM.   Plan: Possible Care conference today.  Continue with POC.

## 2018-09-26 NOTE — PROGRESS NOTES
[facility], [place]   Transplant Nephrology Progress Note  Date of Admission:  9/11/2018    Assessment & Plan   Priscilla Way is a 78 year old male with h/o simultaneous liver and kidney transplant in 2000 for hepatitis C.He was admitted  for AMS and seizures. Underwent CTA with contrast of brain neck for evaluation complicated by ROMANA required HD. Also c/b aspiration PNA and c diff. We are on consult now for anuric renal failure.     Basline Cr ~ 1.2-1.4; the patient has anuric LILIAM on on dialysis since 9/16/18.  His LILIAM is likely multifactorial but related to contrast induced nephropathy with underlying ckd.  The creatinine of 1.2-1.4 is an overestimation of his renal function and a kidney function 8/2016 is significant for early diabetic changes with secondary focal segmental sclerosis. There is some potential for renal recovery after the insulting agent of IV contrast.     Dialysis has been started but he has had several intolerance episodes including severe hypotension on dialysis, worsening breathing with dialysis, and constantly trying to remove his dialysis catheter which has prompted restraints for his safety.     # DDKT and liver transplant:  - Basline Cr ~ 1.2- 1,4; Cr stable, oliguric, diuretics are not helping with urination. Will plan for HD today, UF run tomorrow  - No bleeding from the dialysis cath site. INR: 1.4 this morning, patient is on heparin gtt.   - UA with 34 WBC and 7 RBC and large LE. Urine Cx NGTD. UA with no hydronephrosis.   - Proteinuria: Nephrotic range, likely due to concentrated urine in the setting of acute renal failure with background DM nephropathy.   - Latest DSA: No                   Date of DSA last checked: 8/2016  - BK: No  - Kidney Tx Biopsy: Yes- 8/2015- ATN, DM nephropathy with features of FSGS and moderate to severe arterisclerosis      # Liver transplant :                     - LFTs and Alk phos normal WNL.        # Immunosuppression:       "              - Can continue IV Mycophenolate mofetil 250g BID. Hold tacrolimus given swallowing issues. and reduce steroids to 5mg prednisone (or 4mg methylpred if he cant take oral).                      - Restart tacrolimus when the patient is able to eat again        # Hypertension:                    - BP remains high today. Unable to take oral medications due to swallowing difficulty.                   - Lasix IV does not seem to be helping with volume removal                   - Will plan for HD today to remove volume, and a UF run tomorrow for ~4L total over 2 days       # Anemia in chronic renal disease:                  - Hgb: 9.2                  - Iron studies: Low. Can give oral ferrous sulphate        # Mineral Bone Disorder:   - Secondary renal hyperparathyroidism; PTH level 78 on 2018  - Calcium: 8.0,  Normal when corrected for albumin        # Electrolytes:   - Potassium: 3.4  - Na: 135       # Other Medical Issues:   # seziure disorder- admitted with AMS. Seen by neurology. On keppra.   # h/o CVA:  Minimally interactive at baseline. Palliative care on consult. Family does not want feeding tube. Ethics consult placed  # Cdiff- oral vancomycin started   # DM- management per primary.       # Transplant History:  Etiology of kidney failure: Hepatitis C  Transplant: 2000 (Kidney), 2000 (Liver)  Donor Type:  - Brain Death                      Donor Class: Standard Criteria Donor  Significant changes in immunosuppression: see above  Significant Complications: Oliguric LILIAM     Recommendations were communicated to the primary team via this note    Neil Watkins MD    Interval History   Patient was seen and examined this morning. Low urine output. Appears more volume loaded than yesterday. Cr increased.     Physical Exam      /64  Pulse 92  Temp 97.5  F (36.4  C) (Axillary)  Resp 18  Ht 1.778 m (5' 10\")  Wt 80.5 kg (177 lb 8 oz)  SpO2 100%  BMI 25.47 kg/m2       GENERAL " APPEARANCE: Bedridden, not able to do any communication   HENT: Sluggish pupil, head in normocephalic/atrumatic, Dialysis cath placed to right internal jugular, clean, no bleeding.    LYMPHATICS: no cervical or supraclavicular nodes  RESP: Rales to lung base B/L, no wheezes  CV: regular rhythm, normal rate, no rub, no murmur  EDEMA: +1 LE edema bilaterally  ABDOMEN: soft, nondistended, nontender, bowel sounds normal  MS: upper extremities in half contraction position, overall muscle atrophy and joint stiffness  SKIN: intact, warm and dry     Data   All labs reviewed by me.  CMP    Recent Labs  Lab 09/26/18  0730 09/25/18  0538 09/24/18  0812 09/23/18  2218 09/23/18  0550 09/22/18  2254 09/22/18  1009 09/21/18  2330 09/21/18  0750    136 134 136 135  --  135  --  137   POTASSIUM 3.4 3.4 3.6 3.3* 4.0  --  3.4  --  3.3*   CHLORIDE 100 102 102 101 101  --  102  --  102   CO2 23 25 23 27 23  --  26  --  24   ANIONGAP 12 9 9 7 11  --  7  --  11   * 168* 145* 161* 159*  --  171*  --  156*   BUN 14 11 10 10 9  --  9  --  17   CR 1.47* 1.28* 1.11 1.03 0.92  --  0.85  --  1.50*   GFRESTIMATED 46* 54* 64 70 79  --  87  --  45*   GFRESTBLACK 56* 65 77 84 >90  --  >90  --  55*   JOSHUA 8.0* 7.6* 7.8* 7.8* 8.5  --  7.2*  --  8.3*   MAG  --  2.2  --   --   --  2.7* 1.5*  --  1.6   PHOS  --   --  3.0  --  2.4*  --  1.2* Canceled, Test credited 1.6*   ALBUMIN  --   --  2.2*  --   --   --   --   --   --      CBC    Recent Labs  Lab 09/26/18  0730 09/25/18  1255 09/24/18  2356 09/24/18  0812 09/23/18  2218 09/21/18  0750   HGB 9.2* 8.0* 8.3* 6.9* 6.7* 7.2*   WBC 9.7  --   --  9.1 8.0 7.4   RBC 3.02*  --   --  2.32* 2.23* 2.41*   HCT 27.6*  --   --  21.8* 20.2* 21.6*   MCV 91  --   --  94 91 90   MCH 30.5  --   --  29.7 30.0 29.9   MCHC 33.3  --   --  31.7 33.2 33.3   RDW 18.8*  --   --  20.0* 18.5* 17.5*   *  --   --  237 233 184     INR    Recent Labs  Lab 09/26/18  0730 09/25/18  0538 09/24/18  0812 09/23/18  0758    INR 1.40* 1.24* 1.07 1.06     ABGNo lab results found in last 7 days.   Urine Studies  Recent Labs   Lab Test  09/24/18   0100  09/14/18   1418  05/31/18   1643  03/21/18   1139   COLOR  Yellow  Yellow  Yellow  Yellow   APPEARANCE  Clear  Cloudy  Clear  Slightly Cloudy   URINEGLC  Negative  150*  Negative  Negative   URINEBILI  Negative  Negative  Negative  Negative   URINEKETONE  Negative  10*  Negative  Negative   SG  1.020  1.028  1.012  1.011   UBLD  Moderate*  Moderate*  Trace*  Trace*   URINEPH  6.0  6.5  5.5  5.0   PROTEIN  100*  >600*  30*  10*   NITRITE  Negative  Negative  Negative  Negative   LEUKEST  Trace*  Large*  Negative  Large*   RBCU  5  7*  9*  2   WBCU  2  34*  2  159*     Recent Labs   Lab Test  09/14/18   1418  05/27/16   1409  04/29/11   1348  09/14/10   1134   UTPG  24.91*  Specimen not received  NOTIFIED [facility]  WHO PAGED  RN, LOPEZ AT 1355. NO URINE   RECIEVED WITH BLOOD SPECIMENS. AB    0.04  <0.02     PTH  Recent Labs   Lab Test  06/05/18   0548  08/24/16   0852   PTHI  78  333*     Iron Studies  Recent Labs   Lab Test  11/08/16   1209  08/24/16   0852   IRON  17*  20*   FEB  146*  197*   IRONSAT  11*  10*   BARTOLO   --   1025*     IMAGING:  All imaging studies reviewed by me.     I have seen and examined this patient with the resident.  This note reflects our joint assessment and plan.     Shelly Washington MD

## 2018-09-26 NOTE — PROGRESS NOTES
[facility], [place]    Internal Medicine Progress Note - Gold Service      Assessment & Plan   Priscilla Way is a 78 year old male with a complex past medical history including dementia (Alzheimer and vascular), liver transplant and kidney transplants in 2000, DVT, T2DM, CVA, and poor airway protection admitted with possible seizure and aspiration.  He is currently hemodynamically stable with ongoing concerns for aspirations/airway protection, melanotic stools, and trial basis of diuresis.    # DDKT and Liver Transplant  # Anuric Renal Failure s/p HD with baseline CKD  Suspected secondary to contrast and now improving.  Currently holding HD and trialing IV diuretics.  - Transplant nephrology consulted, appreciate assistance  - Per nephro, will increase furosemide today  - Strict I/O  - All medications by IV as able at present given tenuous PO status   - Mycophenolate IV   - Tacro PO only available (take if able)    # Acute on Chronic Normocytic Anemia  # Possible slow GI blood loss  Suspect multifactorial including related to chronic disease and LILIAM as well as iatrogenic though patient reportedly had melanotic stools though hemoglobins have since been stable.  - Continue IV PPI BID at present  - Daily hemoglobins and as needed for signs of bleeding  - Transfuse for hemoglobin <7    # Aspiration Pneumonia and poor Airway Protection  Recurrent small aspirations per speech evaluation and signficant aspiration event with possible seizure PTA.  Discussed with family regarding concerns for high risk of recurrent aspirations and per family, tube feeding has been declined, family feels strongly about feeding by mouth, and they understand the risks at present.  Per SLP eval 9/25 a diet was ordered with strict parameters for when to give PO.  We will monitor and continue to discuss safe discharge planning as well as goals of care.  - Completed 7 day course of IV antibiotics  - ID previously  involved    # Recurrent C. Diff Colitis  On IV metronidazole, will switch to PO vanco if able to tolerate PO.  - Continue for 7 days post aspiration Abx.    # Possible Seizure Activity  # Baseline Dementia  Full body convulsions witnessed PTA with reported associated aspiration.  CT head and CTA without acute changes.  Family feels patient is back to baseline mental status.  - Neurology consulted, appreciate assistance  - Levetiracetam initiated but per family this is not consistent with goals of care so this has been discontinued  - Seizure precautions    # DVT  On warfarin PTA but unable to tolerate PO well at present  - Heparin gtt  - Warfarin when able to take PO    # T2DM  Follow daily BG. Currently stable    # HTN  Home amlodipine and carvedilol if able to tolerate PO.  Hydralazine PRN    # LE Edema  - Lymphedema consult  - Diuresis as above    Diet: Dysphagia Diet Level 1 Pureed Nectar Thickened Liquids (pre-thickened or use instant food thickener)  Fluids: Diuresing, PO fluids if tolerated  DVT Prophylaxis: Heparin gtt  Code Status: DNR/DNI    Disposition Plan   Expected discharge: 2 - 3 days, recommended to prior living arrangement once adequate pain management/ tolerating PO medications and goals of care addressed.     Entered: Katharine Mitchell MD 09/25/2018, 10:06 PM   Information in the above section will display in the discharge planner report.      The patient's care was discussed with the Bedside Nurse, Care Coordinator/, Patient, Patient's Family and transplant nephrology Consultant.    Katharine iMtchell MD  Internal Medicine Staff Hospitalist Service  [facility]  Pager: [phone]  Please see sticky note for cross cover information    Interval History   Nursing notes reviewed.  Some dark stools overnight concerning for melena but has otherwise been stable.  Per May patient is still coughing after taking PO fluids but otherwise she feels he is at baseline.    A ROS was unable  to be completed due to patient mental status but no acute concerns per family at bedside.    Data reviewed today: I reviewed all medications, new labs and imaging results over the last 24 hours.    Physical Exam   Vital Signs: Temp: 95.1  F (35.1  C) Temp src: Axillary BP: 129/68   Heart Rate: 71 Resp: 18 SpO2: 100 % O2 Device: None (Room air)    Weight: 183 lbs 4.8 oz  General Appearance: Awake, alert male participating with cares with daughter  Respiratory: Mild bibasilar crackles with no increased work of breathing on room air. Rare rhonchi bilaterally.  Cardiovascular: RRR, no m/r/g  GI: Soft, non-tender, non-distended  Skin: No Rashes  Other: Bilateral LE edema present.  Patient non-verbal with writer but interacting with daughter and following her commands.

## 2018-09-26 NOTE — PROGRESS NOTES
Problem: Patient Care Overview  Goal: Plan of Care/Patient Progress Review  Outcome: No Change  Neuro: Patient is only oriented to self. Mostly lethargic.   Cardiac: VSS. HTN Hydralazine x1  Respiratory: Sating 100% on RA. Lung sounds coarse. Patient has intermittent weak cough.   GI/: Very minimal urine output to patino. No BM.  Diet/appetite: Patient dysphagia level 1 diet is alert and sitting up right, otherwise NPO per nursing.   Activity:  Assist of 2. Patient repositioned every two hours.   Pain: Patient did not display any signs of pain with assessment. Patient resting comfortably.   Skin: No new deficits noted.  LDA's: PIV infusing x 2.   Right CVC     Plan: Continue with POC. Notify primary team with changes.    6pm-Patient returned from dialysis started coughing on secretions and vomited large amount on bed. Likely aspiration. Lung still coarse. Will call RT to perform deep NT suctioning because patient cannot clear his own secretions.  MD Notified and at bedside to assess patient.    Family is addiment that he is not aspirating. Nursing is keeping patient NPO

## 2018-09-27 NOTE — PROGRESS NOTES
[facility], [place]    Internal Medicine Progress Note - Gold Service      Assessment & Plan   Priscilla Way is a 78 year old male with a complex past medical history including dementia (Alzheimer and vascular), liver transplant and kidney transplants in 2000, DVT, T2DM, CVA, and poor airway protection admitted with possible seizure and aspiration.  He is currently hemodynamically stable with ongoing concerns for aspirations/airway protection, melanotic stools, and resuming temporary hemodialysis      # DDKT and Liver Transplant  # Anuric Renal Failure s/p HD with baseline CKD  Suspected secondary to contrast and now improving.  HD on 9/26 with UF 9/27 then plan to monitor over the weekend with no HD or UF.  - Transplant nephrology consulted, appreciate assistance  - Ultrafiltration planned for today  - Strict I/O  - All medications by IV as able at present given tenuous PO status                        - Mycophenolate IV                        - Tacro PO only available (take if able)    # Aspiration Pneumonia and poor Airway Protection  Recurrent small aspirations per speech evaluation and signficant aspiration event with possible seizure PTA.  Discussed with family regarding concerns for high risk of recurrent aspirations and per family, tube feeding has been declined, family feels strongly about feeding by mouth, and they understand the risks at present.  Per SLP eval 9/25 a diet was ordered with strict parameters for when to give PO.  Recurrent aspiration event on 9/26 but with no ongoing respiratory distress. Will continue to monitor  - Completed 7 day course of IV antibiotics previously  - Amp/Sulbactam restarted 9/26, will continue  - CXR in AM  - NPO strict if any recurrence of aspiration, PO at present only if meeting parameters  - ID previously involved  - Ongoing discussion with family regarding goals of care   - Ethics updated, previously involved      # Acute on Chronic  Normocytic Anemia  # Possible slow GI blood loss  Suspect multifactorial including related to chronic disease and LILIAM as well as iatrogenic though patient reportedly had melanotic stools though hemoglobins have since been stable.  - Continue IV PPI BID at present  - Daily hemoglobins and as needed for signs of bleeding  - Transfuse for hemoglobin <7      # Recurrent C. Diff Colitis  On IV metronidazole, will switch to PO vanco if able to tolerate PO.  - Continue for 7 days post aspiration Abx.      # Possible Seizure Activity  # Baseline Dementia  Full body convulsions witnessed PTA with reported associated aspiration.  CT head and CTA without acute changes.  Family feels patient is back to baseline mental status.  - Neurology consulted, appreciate assistance  - Levetiracetam initiated but per family this is not consistent with goals of care so this has been discontinued  - Seizure precautions      # DVT  On warfarin PTA but unable to tolerate PO well at present  - Heparin gtt  - Warfarin when able to take PO      # T2DM  Follow daily BG. Currently stable      # HTN  Home amlodipine and carvedilol if able to tolerate PO.  Hydralazine PRN      # LE Edema  - Lymphedema consult  - Diuresis as above    Diet: Dysphagia Diet Level 1 Pureed Nectar Thickened Liquids (pre-thickened or use instant food thickener)  Snacks/Supplements Adult: Boost Plus; With Meals  Fluids: No IVF  Virk Catheter: in place, indication: Strict 1-2 Hour I&O  DVT Prophylaxis: heparin gtt  Code Status: DNR/DNI    Disposition Plan   Expected discharge: 4 - 7 days, recommended to prior living arrangement once trial off HD complete, stable off of IV antibiotics, and goals of care set.     Entered: Katharine Mitchell MD 09/27/2018, 6:51 PM   Information in the above section will display in the discharge planner report.      The patient's care was discussed with the Bedside Nurse, Care Coordinator/, Patient, Patient's Family and nephrology and  ethics Consultant.    Katharine Mitchell MD  Internal Medicine Staff Hospitalist Service  [facility]  Pager: [phone]  Please see sticky note for cross cover information    Interval History   Nursing notes reviewed.  Patient with no reported complaints today.  Daughter May at bedside and disappointed in aspiration events yesterday.  She reports attempting to give tea via syringe by mouth today but patient did not take much.  We reviewed ongoing concerns for airway protection and inability to safely give PO medication at present.  We explicitly discussed that without PO medications such as tacrolimus, we would expect his condition to worsen and rejection of his transplant to follow.  May voiced understanding and wishes to see how he does off of dialysis.    Patient was unable to give additional review of systems due to mental status.    Data reviewed today: I reviewed all medications, new labs and imaging results over the last 24 hours.     Physical Exam   Vital Signs: Temp: 95.2  F (35.1  C) Temp src: Axillary BP: 145/74 Pulse: 77 Heart Rate: 77 Resp: 16 SpO2: 100 % O2 Device: None (Room air)    Weight: 167 lbs 12.32 oz  General Appearance: Awake, alert male laying in bed in no acute distress  Respiratory: Mild bibasilar crackles with no increased work of breathing on room air. Rare rhonchi bilaterally unchanged from prior.  Cardiovascular: RRR, no m/r/g  GI: Soft, non-tender, non-distended  Skin: No rashes  Other: Bilateral LE edema present.  Patient non-verbal with writer, moving all 4 extremities, per daughter is less cooperative with cares and less active today.

## 2018-09-27 NOTE — PLAN OF CARE
Problem: Patient Care Overview  Goal: Plan of Care/Patient Progress Review  Outcome: No Change  Temp: 95.1  F (35.1  C) Temp src: Axillary BP: 144/75 Pulse: 77 Heart Rate: 77 Resp: 18 SpO2: 100 % O2 Device: None (Room air)      Neuro: Non-responsive at baseline, opens eyes spontaneously.   Cardiac: VSS ex HTN, unable to take BP meds d/t high risk of aspiration.   Respiratory: Sating % on RA.  GI/: Anuric 2/2 HD. BM 9/26  Diet/appetite: DD2 thickened liquids, daughter attempts to feed pt with syringe but pt not really swallowing anything.  Activity:  Assist of 2, bedrest  Pain: Dilaudid given x2 for nonverbal signs of pain   Skin: Wound to L heel, barrier ointment to coccyx  LDA's: PIV bilateral arms, heparin gtt 650units/hr, therapuetic - no rate change today    Plan: Pt had two hour run in dialysis today, given zofran prior to HD as pt had emesis yesterday after HD. Reposition q2h as family allows. No oral meds given as pt is not swallowing and isat high risk of aspiration, is admitted for aspiration PNA. Continue with POC. Notify primary team with changes.      Problem: Feed Dysfunction/Swallow Impair (Infant)  Goal: Identify Related Risk Factors and Signs and Symptoms  Related risk factors and signs and symptoms are identified upon initiation of Human Response Clinical Practice Guideline (CPG).   Outcome: No Change   09/27/18 1750   Feed Dysfunction/Swallow Impair (Infant)   Related Risk Factors (Feeding Dysfunction) disease process;age/developmental level   Signs and Symptoms (Feeding Dysfunction) feeding/eating disinterest/refusal;decreased muscle tone/function of involved muscles

## 2018-09-27 NOTE — PROGRESS NOTES
HEMODIALYSIS TREATMENT NOTE    Date: 9/27/2018  Time: 2:58 PM    Data:  Pre Wt: (P) 78 kg (171 lb 15.3 oz)   Desired Wt: 76 kg   Post Wt: (P) 76.1 kg (167 lb 12.3 oz)  Weight change: (P) 1.9 kg  Ultrafiltration - Post Run Net Total Removed (mL): 2000 mL  Ultrafiltration - Post Run Net Total Gain (mL): 0 mL  Vascular Access Status: Yes, secured and visible  Dialyzer Rinse: Streaked  Total Blood Volume Processed: 0  Total Dialysis (Treatment) Time:  2 hours    Lab:   Yes    Interventions:  Patient dialyzed 2hrs of UF only run via CVC with blood flow rate of 350ml/min. Net fluid removal 2kg today. Patient denied any discomfort. No issue noted VSS. Hand off report given to primary RN.     Assessment:  Patient tolerated HD run well.     Plan:    Next HD run per renal team.

## 2018-09-27 NOTE — PLAN OF CARE
Problem: Patient Care Overview  Goal: Plan of Care/Patient Progress Review  Outcome: No Change  [id number]      Reason for Admission: Concerns for aspiration and possible seizure.   PMHx: Dementia, Liver txp (2000), Kidney txp (2000), DVT, DM2, CVA and poor airway protection.   Barriers to D/C: Pain management, able to tolerate PO medications and goals of care addressed.      Vitals: VSS on RA ex HTN PRN hydralazine available. Cont pulse ox in place.   Activity: Turn and reposition Q2H. However, pt family declining repositioning for most hours over night to allow pt to sleep, despite education. Repositioning with BG checks and medication administration.   Pain: Pt guarding this shift, PRN dilaudid given x1.  Neuro: Mostly non-verbal, says 1-2 words to family. Unable to follow commands. Withdraws from pain. Pupil reactivity sluggish. R sided mouth droop-baseline for patient per pt family  Cardiac: No acute changes this shift.   Respiratory: LS coarse. Stable on RA. Weak, ineffective cough. Called by RT after nasopharyngeal suctioning, and informed that pt had episode of emesis, pt high risk for aspiration. MD notified, and aware of aspiration and on IV ABX for aspiration PNA.   GI/: Virk in place- with minimal UO. No BM this shift. Emesis x1 this shift post nasopharyngeal suctioning.   Diet: DD1 Pureed diet. Nectar thickened liquids. No medications given per writer d/t  Aspiration risk, this is okay per ethics note.    Skin: Scabbed over wounds to head from EEG leads. Coccyx wound-pink. Scars. R Heel wound-open to air.   LDAs: R PIV SL. L PIV with heparin gtt at 650 units/hr.   Blood Glucose: Checked Q4H. Stable.      Plan: Dialysis today. Will continue to monitor and follow POC.

## 2018-09-27 NOTE — PLAN OF CARE
[id number]   Problem: Patient Care Overview  Goal: Plan of Care/Patient Progress Review  Outcome: No Change  Reason for admission: Aspiration Pneumonia, seizures.   Hx: Vascular dementia, liver and kidney transplant (2000), CVA, poor airway protection, type 2 diabetes, DVT.   Neuro: Alert, ADRIA orientation.  Pt is mostly nonverbal. Right facial droop at baseline. Pt family refused seizure pads on bed.      Activity: Ax2 for turns. Mechanical lift for transfers. Q2repo schedule. Family declines repositions during the night to decrease sleep disturbances.   Vitals: VSS on RA ex. HTN.      LDAS: L PIV infusing heparin at 650 units/hr. R PIV S/L. Virk cath secured to L leg.   Cardiac: No acute changes this shift.   Respiratory: Tolerating RA well. Cont. Pulse ox applied. LS coarse. Weak cough.      GI/: Virk cath putting out minimal urine. No bm this shift. No emesis this shift.     Skin: No changes this shift. Bilateral scabs on head. Surgical scar lower abdomen. Erythema on coccyx. R Heel wound- elevated with pillows.     Pain: Dilaudid PRN given at 0120 for nonverbal indicators of pain- grimacing while repositioning and lab draw.   Diet: Dysphagia puree diet and nectar thickened liquids. Per ethics note, no PO meds to be given if nursing deems inappropriate.   Labs/Imaging: Hep 10A results at 0130 are 0.50.  No rate change per protocol. Heparin remaining at 650 units/hr.   Blood Glucose: Q4H BG checks. BG stable.   Plan: Dialysis again today. Possible care conference. Will continue with POC.

## 2018-09-27 NOTE — PROGRESS NOTES
[facility], [place]   Transplant Nephrology Progress Note  Date of Admission:  9/11/2018    Assessment & Plan   Priscilla Way is a 78 year old male with h/o simultaneous liver and kidney transplant in 2000 for hepatitis C.He was admitted  for AMS and seizures. Underwent CTA with contrast of brain neck for evaluation complicated by ROMANA required HD. Also c/b aspiration PNA and c diff. We are on consult now for anuric renal failure.     Basline Cr ~ 1.2-1.4; the patient has anuric LILIAM on on dialysis since 9/16/18.  His LILIAM is likely multifactorial but related to contrast induced nephropathy with underlying ckd.  The creatinine of 1.2-1.4 is an overestimation of his renal function and a kidney function 8/2016 is significant for early diabetic changes with secondary focal segmental sclerosis. There is some potential for renal recovery after the insulting agent of IV contrast.     Dialysis has been started but he has had several intolerance episodes including severe hypotension on dialysis, worsening breathing with dialysis, and constantly trying to remove his dialysis catheter which has prompted restraints for his safety.     # DDKT and liver transplant:  - Basline Cr ~ 1.2- 1,4; Cr stable, oliguric, diuretics are not helping with urination. Will plan for UF today, no dialysis until at least Monday to see what if his kidneys will recover.   - Vomiting may be linked to osmole shifts during dialysis, and we may consider a slower, longer run moving forward. I did not note any hypotension during his run yesterday.  - No bleeding from the dialysis cath site. Patient is on heparin gtt.   - UA with 34 WBC and 7 RBC and large LE. Urine Cx NGTD. UA with no hydronephrosis.   - Proteinuria: Nephrotic range, likely due to concentrated urine in the setting of acute renal failure with background DM nephropathy.   - Latest DSA: No                   Date of DSA last checked: 8/2016  - BK: No  - Kidney Tx  Biopsy: Yes- 2015- ATN, DM nephropathy with features of FSGS and moderate to severe arterisclerosis      # Liver transplant :                     - LFTs and Alk phos normal WNL.        # Immunosuppression:                    - Can continue IV Mycophenolate mofetil 250g BID. Hold tacrolimus given swallowing issues. and reduce steroids to 5mg prednisone (or 4mg methylpred if he cant take oral).                      - Restart tacrolimus when the patient is able to eat again        # Hypertension:                    - /75 today. Lasix IV does not seem to be helping with volume removal                   - Will plan for UF today to remove volume.       # Anemia in chronic renal disease:                  - Hgb: 9.2                  - Iron studies: Low. Can give oral ferrous sulphate when able to eat        # Mineral Bone Disorder:   - Secondary renal hyperparathyroidism; PTH level 78 on 2018  - Calcium: 8.0,  Normal when corrected for albumin        # Electrolytes:   - Potassium: 3.4  - Na: 135       # Other Medical Issues:   # seziure disorder- admitted with AMS. Seen by neurology. On keppra.   # h/o CVA:  Minimally interactive at baseline. Palliative care on consult. Family does not want feeding tube. Ethics consult placed  # Cdiff- oral vancomycin started   # DM- management per primary.       # Transplant History:  Etiology of kidney failure: Hepatitis C  Transplant: 2000 (Kidney), 2000 (Liver)  Donor Type:  - Brain Death                      Donor Class: Standard Criteria Donor  Significant changes in immunosuppression: see above  Significant Complications: Oliguric LILIAM     Recommendations were communicated to the primary team via this note    Neil Watkins MD     [phone]    Interval History   Patient was seen and examined this morning. Continues to have low urine output. Multiple vomiting episodes yesterday, with possible aspiration. Deep suctioning removed only a small amount of  "sputum (not much stomach contents or vomitus). Appears comfortable. BP is better controlled today.     Physical Exam      /56  Pulse 77  Temp 96.2  F (35.7  C) (Axillary)  Resp 18  Ht 1.778 m (5' 10\")  Wt 78 kg (171 lb 15.3 oz)  SpO2 100%  BMI 24.67 kg/m2       GENERAL APPEARANCE: Bedridden, not able to do any communication   HENT: Sluggish pupil, head in normocephalic/atrumatic, Dialysis cath placed to right internal jugular, clean, no bleeding.    LYMPHATICS: no cervical or supraclavicular nodes  RESP: Rales to lung base B/L, no wheezes  CV: regular rhythm, normal rate, no rub, no murmur  EDEMA: +1 LE edema bilaterally  ABDOMEN: soft, nondistended, nontender, bowel sounds normal  MS: upper extremities in half contraction position, overall muscle atrophy and joint stiffness  SKIN: intact, warm and dry     Data   All labs reviewed by me.  CMP    Recent Labs  Lab 09/26/18  0730 09/25/18  0538 09/24/18  0812 09/23/18  2218 09/23/18  0550 09/22/18  2254 09/22/18  1009 09/21/18  2330 09/21/18  0750    136 134 136 135  --  135  --  137   POTASSIUM 3.4 3.4 3.6 3.3* 4.0  --  3.4  --  3.3*   CHLORIDE 100 102 102 101 101  --  102  --  102   CO2 23 25 23 27 23  --  26  --  24   ANIONGAP 12 9 9 7 11  --  7  --  11   * 168* 145* 161* 159*  --  171*  --  156*   BUN 14 11 10 10 9  --  9  --  17   CR 1.47* 1.28* 1.11 1.03 0.92  --  0.85  --  1.50*   GFRESTIMATED 46* 54* 64 70 79  --  87  --  45*   GFRESTBLACK 56* 65 77 84 >90  --  >90  --  55*   JOSHUA 8.0* 7.6* 7.8* 7.8* 8.5  --  7.2*  --  8.3*   MAG  --  2.2  --   --   --  2.7* 1.5*  --  1.6   PHOS  --   --  3.0  --  2.4*  --  1.2* Canceled, Test credited 1.6*   ALBUMIN  --   --  2.2*  --   --   --   --   --   --      CBC    Recent Labs  Lab 09/26/18  0730 09/25/18  1255 09/24/18  2356 09/24/18  0812 09/23/18  2218 09/21/18  0750   HGB 9.2* 8.0* 8.3* 6.9* 6.7* 7.2*   WBC 9.7  --   --  9.1 8.0 7.4   RBC 3.02*  --   --  2.32* 2.23* 2.41*   HCT 27.6*  --   -- "  21.8* 20.2* 21.6*   MCV 91  --   --  94 91 90   MCH 30.5  --   --  29.7 30.0 29.9   MCHC 33.3  --   --  31.7 33.2 33.3   RDW 18.8*  --   --  20.0* 18.5* 17.5*   *  --   --  237 233 184     INR    Recent Labs  Lab 09/26/18  0730 09/25/18  0538 09/24/18  0812 09/23/18  0758   INR 1.40* 1.24* 1.07 1.06     ABGNo lab results found in last 7 days.   Urine Studies  Recent Labs   Lab Test  09/24/18   0100  09/14/18   1418  05/31/18   1643  03/21/18   1139   COLOR  Yellow  Yellow  Yellow  Yellow   APPEARANCE  Clear  Cloudy  Clear  Slightly Cloudy   URINEGLC  Negative  150*  Negative  Negative   URINEBILI  Negative  Negative  Negative  Negative   URINEKETONE  Negative  10*  Negative  Negative   SG  1.020  1.028  1.012  1.011   UBLD  Moderate*  Moderate*  Trace*  Trace*   URINEPH  6.0  6.5  5.5  5.0   PROTEIN  100*  >600*  30*  10*   NITRITE  Negative  Negative  Negative  Negative   LEUKEST  Trace*  Large*  Negative  Large*   RBCU  5  7*  9*  2   WBCU  2  34*  2  159*     Recent Labs   Lab Test  09/14/18   1418  05/27/16   1409  04/29/11   1348  09/14/10   1134   UTPG  24.91*  Specimen not received  NOTIFIED [facility]  WHO PAGED  RN, LOPEZ AT 1355. NO URINE   RECIEVED WITH BLOOD SPECIMENS. AB    0.04  <0.02     PTH  Recent Labs   Lab Test  06/05/18   0548  08/24/16   0852   PTHI  78  333*     Iron Studies  Recent Labs   Lab Test  11/08/16   1209  08/24/16   0852   IRON  17*  20*   FEB  146*  197*   IRONSAT  11*  10*   BARTOLO   --   1025*     IMAGING:  All imaging studies reviewed by me.     I have seen and examined this patient with the resident.  This note reflects our joint assessment and plan.     Shelly Washington MD

## 2018-09-27 NOTE — PLAN OF CARE
Problem: Patient Care Overview  Goal: Plan of Care/Patient Progress Review  SLP: Dysphagia therapy cancelled.  Pt off unit at dialysis at the time of session attempt.  ST to follow.

## 2018-09-28 NOTE — PLAN OF CARE
Problem: Patient Care Overview  Goal: Plan of Care/Patient Progress Review  Patient admitted for aspiration pneumonia. Patient is nonverbal and accompanied by daughter during the week. NPO for most of hospital stay, yesterday diet switched to dysphagia 1. However daughter reported patient requiring oral suction intermittently and coughed frequently during the night and declined morning PO meds. BP slightly elevated throughout day, but not high enough to meet criteria to use PRN medication. Monitored blood glucose throughout day and due to lack of PO intake, BG 77 at 1300. 25mg of D50 was given to increase blood glucose, in addition to daughter then giving a small amount of applesauce via syringe. Patient on heparin drip running at 650units/hour overnight, last heparin 10A therapeutic since last lab draw at 1245 yesterday. However daughter refused lab draw in the morning and requested ultrasound guided draw. Vascular access was called three times between 900 and 1400. Lab draw occurred at 1430. Med team was updated throughout day about blood glucose and ultrasound guided lab draw. 50ml of reed urine out by 1500.

## 2018-09-28 NOTE — PLAN OF CARE
Problem: Patient Care Overview  Goal: Plan of Care/Patient Progress Review  Outcome: No Change  Pt VSS on RA. ADRIA orientation, hx of dementia. Nonverbal. Not following commands, although alert and making eye contact with staff. IV dilaudid given x1 for grimace/moaning. Turned/repo as family allows. Virk in place with small amt reed UOP. No BM. Pt edematous, BLE 3+, elevated on pillows. Hep gtt @ 650 u/hr through PIV. IV unasyn and cellcept infused. Orders for DDI and nectar thick liquids, has maintained NPO per RN discretion, pt is not alert and not able to tolerate swallowing d/t aspiration risk. BGs q4h, see results. Oral suctioning PRN and nasopharyngeal deep suction per RT x1. Pt coughing with some production. Plan for CXR this AM. D/c pending goals of care set. Will continue to monitor and follow POC.

## 2018-09-28 NOTE — PLAN OF CARE
Problem: Patient Care Overview  Goal: Plan of Care/Patient Progress Review  Vascular team was notified of patient need for ultrasound guided lab draw. Lab staff called back, stated that the lab supervisor needed to be contacted and no time frame was available for when they would be able to see patient.

## 2018-09-28 NOTE — PLAN OF CARE
Problem: Patient Care Overview  Goal: Plan of Care/Patient Progress Review  Patient family refused seizure pads on bed rails.

## 2018-09-28 NOTE — PROGRESS NOTES
[facility], [place]    Internal Medicine Progress Note - Gold Service      Assessment & Plan   Priscilla Way is a 78 year old male with a complex past medical history including dementia (Alzheimer and vascular), liver transplant and kidney transplants in 2000, DVT, T2DM, CVA, and poor airway protection admitted with possible seizure and aspiration.  He is currently hemodynamically stable with ongoing concerns for aspirations/airway protection, nutritional support, and monitoring renal function off of HD.    # DDKT and Liver Transplant  # Anuric Renal Failure s/p HD with baseline CKD  Suspected secondary to contrast and now improving.  HD on 9/26 with UF 9/27 then plan to monitor over the weekend with no HD or UF.  - Transplant nephrology consulted, appreciate assistance  - No HD or UF this weekend  - Strict I/O  - All medications by IV as able at present given tenuous PO status                        - Mycophenolate IV, prednisone IV                        - Tacro PO only available (take if able)     # Aspiration Pneumonia and poor Airway Protection  Recurrent small aspirations per speech evaluation and signficant aspiration event with possible seizure PTA.  Discussed with family regarding concerns for high risk of recurrent aspirations and per family, tube feeding has been declined, family feels strongly about feeding by mouth, and they understand the risks at present.  Per SLP eval 9/25 a diet was ordered with strict parameters for when to give PO.  Recurrent aspiration event on 9/26 but with no ongoing respiratory distress. Will continue to monitor  - Completed 7 day course of IV antibiotics previously  - Amp/Sulbactam restarted 9/26-9/28 but stopped with no changes on CXR  - NPO strict if any recurrence of aspiration, PO at present only if meeting parameters  - ID previously involved  - Ongoing discussion with family regarding goals of care, see below                        -  Ethics updated, previously involved  - Calorie Counts ordered      # Acute on Chronic Normocytic Anemia  # Possible slow GI blood loss  Suspect multifactorial including related to chronic disease and LILIAM as well as iatrogenic though patient reportedly had melanotic stools though hemoglobins have since been stable.  - Continue IV PPI BID at present  - Daily hemoglobins and as needed for signs of bleeding  - Transfuse for hemoglobin <7      # Recurrent C. Diff Colitis  On IV metronidazole, PO vanco if able to tolerate PO.  - Continue for 7 days post aspiration Abx.      # Possible Seizure Activity  # Baseline Dementia  Full body convulsions witnessed PTA with reported associated aspiration.  CT head and CTA without acute changes.  Family feels patient is back to baseline mental status.  - Neurology consulted, appreciate assistance  - Levetiracetam initiated but per family this is not consistent with goals of care so this has been discontinued  - Seizure precautions      # DVT  On warfarin PTA but unable to tolerate PO well at present  - Heparin gtt  - Warfarin when able to take PO      # T2DM  Follow daily BG. Currently stable      # HTN  Home amlodipine and carvedilol if able to tolerate PO.  Hydralazine PRN      # LE Edema  - Lymphedema consult    # Social and Goals of Care  Highly difficult situation with multiple family members and inconsistent participation.  Daughter May is present daily and communicating with medical team including a consistent voicing of her understanding of Priscilla's condition and medical recommendations.  Concurrently the older daughter Cruz reports that she has POA paperwork but on previous occasions has refused to provide this paperwork.  Ethics consulted but tensions and medical decision difficulties among family members are ongoing.  On 9/28 May asked about a temporary feeding tube but did not wish to proceed with it if Cruz declined.  On review with Cruz, she reports that she and family  "agree with DNR/DNI but want all measures taken otherwise including a feeding tube \"at some point\" but she does not feel that point is now.  I explicitly reviewed options for types of feeding tubes, Priscilla's recurrent aspirations, his inability to take PO medications safely as well as the need for nutritional and medication support unless goals of care are transitioned.  Cruz refused to acknowledge these statements and instead denied his recent inability to take PO.  She did agree that yesterday and possibly today he may be weak but she rejected all assessments from the speech team and asked that they be removed from further care involvement.  When I informed her I could not do that safely Cruz acknowledged but continued to voice her difference of opinion.  To bridge the gap I asked that she come in to feed Priscilla with myself and speech present and she declined.  I additionally requested her presence Monday for a care conference but she said she would only be available in the evening.  Finally when asking about how Cruz feels Priscilla will do, she note that in the past \"he only gets better at home, not in the hospital,\" and she is hopeful she will be able to bring him home soon.  I reviewed with Cruz that per the family's decision we thus would at present hold on feeding tube placement and would watch closely but will need to continually revisit the issue with which she voiced agreement.    Diet: Dysphagia Diet Level 1 Pureed Nectar Thickened Liquids (pre-thickened or use instant food thickener)  Snacks/Supplements Adult: Boost Plus; With Meals  Fluids: No IVF given tenuous fluid status  Virk Catheter: in place, indication: Strict 1-2 Hour I&O  DVT Prophylaxis: heparin gtt  Code Status: DNR/DNI    Disposition Plan   Expected discharge: 4 - 7 days, recommended to prior living arrangement once renal function known, nutritional support managed, and goals of care clear.     Entered: Katharine Mitchell MD 09/28/2018, 8:12 AM   " Information in the above section will display in the discharge planner report.      The patient's care was discussed with the Bedside Nurse, Care Coordinator/, Patient, Patient's Family and Nephrology Consultant.    Katharine Mitchell MD  Internal Medicine Staff Hospitalist Service  [facility]  Pager: [phone]  Please see sticky note for cross cover information    Interval History   No acute events overnight. Patient awake but tired today per family.  May reports difficulty with giving him PO fluids.  Otherwise she has no complaints but voices concern about ongoing goals of care, family tensions, and worries that he will end up in a facility.  I spoke with her and Abah each at length today, please see above.    The remainder of a 4 point ROS is negative unless otherwise noted above.    Data reviewed today: I reviewed all medications, new labs and imaging results over the last 24 hours.    Physical Exam   Vital Signs: Temp: 97.4  F (36.3  C) Temp src: Axillary BP: 155/66   Heart Rate: 89 Resp: 20 SpO2: 98 % O2 Device: None (Room air)    Weight: 167 lbs 12.32 oz  General Appearance: Awake, somnolent male laying in bed in no acute distress  Respiratory: Mild bibasilar crackles with no increased work of breathing on room air. No rhonchi or wheezing  Cardiovascular: RRR, no m/r/g  GI: Soft, non-tender, non-distended  Skin: No rashes  Other: Bilateral LE edema present.  Patient non-verbal with writer, awaking to voice but not following commands.

## 2018-09-28 NOTE — PLAN OF CARE
Problem: Patient Care Overview  Goal: Plan of Care/Patient Progress Review  Discharge Planner SLP   Patient plan for discharge: Unknown  Current status: Pt continues to present with variable levels of alertness and swallow function; pt assessed with nectar-thick liquids and pureed solids, both administered by pt's daughter via syringe. Swallow initiated across all boluses presented. No overt s/sx of aspiration in today's session, however, cannot rule out possibility of silent aspiration. Extensive education provided for pt's daughter re: reducing risk for aspiration, including positioning pt upright for all PO, ensuring pt is alert, and clearing mouth of all oral residue. Pt's daughter verbalized understanding that aspiration risk is high, however, she would like to continue feeding pt by mouth when he is alert. Given pt/family choice to continue PO feedings, recommend dysphagia diet 1 and nectar thick liquids ONLY when alert and positioned upright. Provide thorough oral cares after all PO.   Barriers to return to prior living situation: will require modified diet in the long term  Recommendations for discharge: Defer to med team  Rationale for recommendations: No additional SLP services indicated; current diet is least restrictive. Pt's family has been educated re: aspiration risk       Entered by: Cralene Power 09/28/2018 1:47 PM     Speech Language Therapy Discharge Summary    Reason for therapy discharge:    No further expectations of functional progress.    Progress towards therapy goal(s). See goals on Care Plan in [facility] electronic health record for goal details.  Goals met    Therapy recommendation(s):    No further therapy is recommended. Pt/pt's family understand risk for aspiration with all PO. Education has been provided re: strategies to decrease risk for aspiration. Given pt's family decision to continue with PO feedings, dysphagia 1 (puree) with nectar-thick liquids is least restrictive.

## 2018-09-28 NOTE — PLAN OF CARE
Problem: Patient Care Overview  Goal: Plan of Care/Patient Progress Review  This pt was both cared for today by this writer and Christy Jesus RN. This writer agreed with assessment and notes written by Ann Marie JEAN.

## 2018-09-29 NOTE — PROGRESS NOTES
[facility], [place]   Transplant Nephrology Progress Note  Date of Admission:  9/11/2018    Assessment & Plan   Priscilla Way is a 78 year old male with h/o simultaneous liver and kidney transplant in 2000 for hepatitis C.He was admitted  for AMS and seizures. Underwent CTA with contrast of brain neck for evaluation complicated by ROMANA required HD. Also c/b aspiration PNA and c diff. We are on consult now for anuric renal failure.     Baseline Cr ~ 1.2-1.4; the patient has anuric LILIAM on on dialysis since 9/16/18.  His LILIAM is likely multifactorial but related to contrast induced nephropathy with underlying ckd.  The creatinine of 1.2-1.4 is an overestimation of his renal function and a kidney function 8/2016 is significant for early diabetic changes with secondary focal segmental sclerosis. There is some potential for renal recovery after the insulting agent of IV contrast.     Dialysis has been started but he has had several intolerance episodes including severe hypotension on dialysis, worsening breathing with dialysis, and constantly trying to remove his dialysis catheter which has prompted restraints for his safety.     # DDKT and liver transplant:  - Basline Cr ~ 1.2- 1,4; Cr stable, oliguric. No HD moving forward. We can try lasix for volume removal (although this proved futile at 80g IV earlier this week)  -  Will need an ethics conference (planned for Monday) to discuss the plan moving forward  - No bleeding from the dialysis cath site. Patient is on heparin gtt.   - UA with 34 WBC and 7 RBC and large LE. Urine Cx NGTD. UA with no hydronephrosis.   - Proteinuria: Nephrotic range, likely due to concentrated urine in the setting of acute renal failure with background DM nephropathy.   - Latest DSA: No                   Date of DSA last checked: 8/2016  - BK: No  - Kidney Tx Biopsy: Yes- 8/2015- ATN, DM nephropathy with features of FSGS and moderate to severe arterisclerosis      #  Liver transplant :                     - LFTs and Alk phos normal WNL.        # Immunosuppression:                    - Can continue IV Mycophenolate mofetil 250g BID. Hold tacrolimus given swallowing issues. 5mg prednisone (or 4mg methylpred if he cant take oral).                      - Restart tacrolimus when the patient is able to eat again. Pharmacy report that IV tacrolimus is available, however measuring levels with his route is nor ideal and results in skewed values. We can continue with MMF IV for now.        # Hypertension:                    - /66 today. Lasix IV does not seem to be helping with volume removal                   - No UF today.       # Anemia in chronic renal disease:                  - Hgb: 8.1                  - Iron studies: Low. Can give oral ferrous sulphate when able to eat        # Mineral Bone Disorder:   - Secondary renal hyperparathyroidism; PTH level 78 on 2018  - Calcium: 8.0,  Normal when corrected for albumin        # Electrolytes:   - Potassium: 3.6  - Na: 138       # Other Medical Issues:   # seziure disorder- admitted with AMS. Seen by neurology. On [facility].   # AMS - Would suggest supplementation with thiamine and folate  # h/o CVA:  Minimally interactive at baseline. Palliative care on consult. Family does not want permanent feeding tube, but are open to an NG. Ethics consult placed  # Cdiff- oral vancomycin started   # DM- management per primary.       # Transplant History:  Etiology of kidney failure: Hepatitis C  Transplant: 2000 (Kidney), 2000 (Liver)  Donor Type:  - Brain Death                      Donor Class: Standard Criteria Donor  Significant changes in immunosuppression: see above  Significant Complications: Oliguric LILIAM     Recommendations were communicated to the primary team via this note    Neil Watkins MD   [phone]    Interval History   Patient was seen and examined this morning. No changes from yesterday. Remains minimally  "verbal. No HD today. No emesis yesterday. Continues to have low urine output. Appears comfortable. BP is better controlled today.     Physical Exam      /73 (BP Location: Left arm)  Pulse 77  Temp 97.3  F (36.3  C) (Axillary)  Resp 18  Ht 1.778 m (5' 10\")  Wt 76.1 kg (167 lb 12.3 oz)  SpO2 100%  BMI 24.07 kg/m2       GENERAL APPEARANCE: Bedridden, not able to do any communication   HENT: Sluggish pupil, head in normocephalic/atrumatic, Dialysis cath placed to right internal jugular, clean, no bleeding.    LYMPHATICS: no cervical or supraclavicular nodes  RESP: Rales to lung base B/L, no wheezes  CV: regular rhythm, normal rate, no rub, no murmur  EDEMA: +1 LE edema bilaterally  ABDOMEN: soft, nondistended, nontender, bowel sounds normal  MS: upper extremities in half contraction position, overall muscle atrophy and joint stiffness  SKIN: intact, warm and dry     Data   All labs reviewed by me.  CMP    Recent Labs  Lab 09/28/18  1419 09/27/18  1245 09/26/18  0730 09/25/18  0538 09/24/18  0812  09/23/18  0550 09/22/18  2254 09/22/18  1009    138 135 136 134  < > 135  --  135   POTASSIUM 3.8 3.6 3.4 3.4 3.6  < > 4.0  --  3.4   CHLORIDE 100 101 100 102 102  < > 101  --  102   CO2 30 29 23 25 23  < > 23  --  26   ANIONGAP 5 8 12 9 9  < > 11  --  7   * 114* 186* 168* 145*  < > 159*  --  171*   BUN 13 8 14 11 10  < > 9  --  9   CR 1.28* 1.07 1.47* 1.28* 1.11  < > 0.92  --  0.85   GFRESTIMATED 54* 67 46* 54* 64  < > 79  --  87   GFRESTBLACK 65 81 56* 65 77  < > >90  --  >90   JOSHUA 8.0* 8.1* 8.0* 7.6* 7.8*  < > 8.5  --  7.2*   MAG 1.8  --   --  2.2  --   --   --  2.7* 1.5*   PHOS 3.1  --   --   --  3.0  --  2.4*  --  1.2*   ALBUMIN  --   --   --   --  2.2*  --   --   --   --    < > = values in this interval not displayed.  CBC    Recent Labs  Lab 09/28/18  1419 09/27/18  1245 09/26/18  0730 09/25/18  1255  09/24/18  0812   HGB 8.1* 8.3* 9.2* 8.0*  < > 6.9*   WBC 9.2 9.6 9.7  --   --  9.1   RBC 2.72* " 2.75* 3.02*  --   --  2.32*   HCT 25.8* 26.1* 27.6*  --   --  21.8*   MCV 95 95 91  --   --  94   MCH 29.8 30.2 30.5  --   --  29.7   MCHC 31.4* 31.8 33.3  --   --  31.7   RDW 20.0* 19.7* 18.8*  --   --  20.0*    212 133*  --   --  237   < > = values in this interval not displayed.  INR    Recent Labs  Lab 09/28/18  1419 09/27/18  1245 09/26/18  0730 09/25/18  0538   INR 1.24* 1.27* 1.40* 1.24*     ABGNo lab results found in last 7 days.   Urine Studies  Recent Labs   Lab Test  09/24/18   0100  09/14/18   1418  05/31/18   1643  03/21/18   1139   COLOR  Yellow  Yellow  Yellow  Yellow   APPEARANCE  Clear  Cloudy  Clear  Slightly Cloudy   URINEGLC  Negative  150*  Negative  Negative   URINEBILI  Negative  Negative  Negative  Negative   URINEKETONE  Negative  10*  Negative  Negative   SG  1.020  1.028  1.012  1.011   UBLD  Moderate*  Moderate*  Trace*  Trace*   URINEPH  6.0  6.5  5.5  5.0   PROTEIN  100*  >600*  30*  10*   NITRITE  Negative  Negative  Negative  Negative   LEUKEST  Trace*  Large*  Negative  Large*   RBCU  5  7*  9*  2   WBCU  2  34*  2  159*     Recent Labs   Lab Test  09/14/18   1418  05/27/16   1409  04/29/11   1348  09/14/10   1134   UTPG  24.91*  Specimen not received  NOTIFIED [facility]  WHO PAGED  RN, LOPEZ AT 1355. NO URINE   RECIEVED WITH BLOOD SPECIMENS. AB    0.04  <0.02     PTH  Recent Labs   Lab Test  06/05/18   0548  08/24/16   0852   PTHI  78  333*     Iron Studies  Recent Labs   Lab Test  11/08/16   1209  08/24/16   0852   IRON  17*  20*   FEB  146*  197*   IRONSAT  11*  10*   BARTOLO   --   1025*     IMAGING:  All imaging studies reviewed by me.     I have seen and examined this patient with the resident.  This note reflects our joint assessment and plan.     Shelly Washington MD

## 2018-09-29 NOTE — PLAN OF CARE
Problem: Patient Care Overview  Goal: Plan of Care/Patient Progress Review  Pt has remained the same the last 2 days under this writer's care. Alert at times but unable to assess mental status due to inability to speak. Pt has not been communicating with family members at the bedside either. Appeared comfortable. Pt was sleepy most of the day shift, all po med was held. Pt had a small window of time this afternoon when he was wide awake. With the help of daughter, this writer was able to give some of the po med. Daughter (RT) has been at the bedside all day, oral suctioned pt PRN. Hep drip has been running at 650 units/hr since over night. Hep 10A has been theraputic, 3.2 this am. Family members are aware that pt is on lis ct, daughter was able to give pt some Ensure and apple sauce using syringe. BG value have been within acceptable range. Virk has minimal amt of reed output. No BM this shift, skin intact.

## 2018-09-29 NOTE — PROGRESS NOTES
[facility], [place]    Internal Medicine Progress Note - Gold Service      Assessment & Plan   Priscilla Way is a 78 year old male with a complex past medical history including dementia (Alzheimer and vascular), liver transplant and kidney transplants in 2000, DVT, T2DM, CVA, and poor airway protection admitted with possible seizure and aspiration.  He is currently hemodynamically stable with ongoing concerns for aspirations/airway protection, nutritional support, and monitoring renal function off of HD.     # DDKT and Liver Transplant  # Anuric Renal Failure s/p HD with baseline CKD  Suspected secondary to contrast and now improving.  HD on 9/26 with UF 9/27 then plan to monitor over the weekend with no HD or UF.  - Transplant nephrology consulted, appreciate assistance  - No HD or UF this weekend  - Resume IV Furosemide 40 mg BID per nephro  - Strict I/O  - All medications by IV as able at present given tenuous PO status                        - Mycophenolate IV, prednisone IV                        - Tacro PO only available (take if able)      # Aspiration Pneumonia and poor Airway Protection  Recurrent small aspirations per speech evaluation and signficant aspiration event with possible seizure PTA.  Discussed with family regarding concerns for high risk of recurrent aspirations and per family, tube feeding has been declined, family feels strongly about feeding by mouth, and they understand the risks at present.  Per SLP eval 9/25 a diet was ordered with strict parameters for when to give PO.  Recurrent aspiration event on 9/26 but with no ongoing respiratory distress. Will continue to monitor  - Completed 7 day course of IV antibiotics previously  - Amp/Sulbactam restarted 9/26-9/28 but stopped with no changes on CXR  - NPO strict if any recurrence of aspiration, PO at present only if meeting parameters  - ID previously involved  - Ongoing discussion with family regarding goals of  care, see below                        - Ethics updated, previously involved  - Calorie Counts ordered  - Family declining feeding tube at present      # Acute on Chronic Normocytic Anemia  # Possible slow GI blood loss  Suspect multifactorial including related to chronic disease and LILIAM as well as iatrogenic though patient reportedly had melanotic stools though hemoglobins have since been stable.  - Continue IV PPI BID at present  - Daily hemoglobins and as needed for signs of bleeding  - Transfuse for hemoglobin <7      # Recurrent C. Diff Colitis  On IV metronidazole, PO vanco if able to tolerate PO.  - Continue for 7 days post aspiration Abx.      # Possible Seizure Activity  # Baseline Dementia  Full body convulsions witnessed PTA with reported associated aspiration.  CT head and CTA without acute changes.  Family feels patient is back to baseline mental status.  - Neurology consulted, appreciate assistance  - Levetiracetam initiated but per family this is not consistent with goals of care so this has been discontinued  - Seizure precautions      # DVT  On warfarin PTA but unable to tolerate PO well at present  - Heparin gtt  - Warfarin when able to take PO      # T2DM  Follow daily BG. Currently stable      # HTN  Home amlodipine and carvedilol if able to tolerate PO.  Hydralazine PRN      # LE Edema  - Lymphedema consult     # Social and Goals of Care  Highly difficult situation with multiple family members and inconsistent participation.  Daughter May is present daily and communicating with medical team including a consistent voicing of her understanding of Priscilla's condition and medical recommendations.  Concurrently the older daughter Cruz reports that she has POA paperwork but on previous occasions has refused to provide this paperwork.  Ethics consulted but tensions and medical decision difficulties among family members are ongoing.  On 9/28 May asked about a temporary feeding tube but did not wish to  "proceed with it if Cruz declined.  On review with Fred, she reports that she and family agree with DNR/DNI but want all measures taken otherwise including a feeding tube \"at some point\" but she does not feel that point is now.  I explicitly reviewed options for types of feeding tubes, Priscilla's recurrent aspirations, his inability to take PO medications safely as well as the need for nutritional and medication support unless goals of care are transitioned.  Cruz refused to acknowledge these statements and instead denied his recent inability to take PO.  She did agree that yesterday and possibly today he may be weak but she rejected all assessments from the speech team and asked that they be removed from further care involvement.  When I informed her I could not do that safely Cruz acknowledged but continued to voice her difference of opinion.  To bridge the gap I asked that she come in to feed Priscilla with myself and speech present and she declined.  I additionally requested her presence Monday for a care conference but she said she would only be available in the evening.  Finally when asking about how Cruz feels Priscilla will do, she note that in the past \"he only gets better at home, not in the hospital,\" and she is hopeful she will be able to bring him home soon.  I reviewed with Cruz that per the family's decision we thus would at present hold on feeding tube placement and would watch closely but will need to continually revisit the issue with which she voiced agreement. Per 9/29 discussion, [facility] has voiced that she will not be communicating with her sister regarding her father to avoid further tension with their relationship.  She voices concern about Priscilla's nutrition but is amenable to Fred's wishes at present and will work with calorie counts.  On phone update with Fred, I voiced concern about Priscilla's swallowing and PO intake with poor nutrition and she noted she was sending someone with something to eat.  I " discussed calorie counts with her and asked that the care team be informed when he is going to be fed so that it may be observed and charted for calorie counts.  Cruz voiced that she was in agreement and would inform those coming with food/drink for Priscilla to let the care team know.    Diet: Dysphagia Diet Level 1 Pureed Nectar Thickened Liquids (pre-thickened or use instant food thickener)  Snacks/Supplements Adult: Boost Plus; With Meals  Calorie Counts  Fluids: PO hydration as able, currently volume overloaded  Virk Catheter: in place, indication: Strict 1-2 Hour I&O  DVT Prophylaxis: Heparin gtt  Code Status: DNR/DNI    Disposition Plan   Expected discharge: > 7 days, recommended to TBD once safely tolerating PO, renal function assessment completed, and safe dispo found.     Entered: Katharine Mitchell MD 09/29/2018, 8:34 AM   Information in the above section will display in the discharge planner report.      The patient's care was discussed with the Bedside Nurse, Patient, Patient's Family and Nephrology Consultant.    Katharine Mitchell MD  Internal Medicine Staff Hospitalist Service  [facility]  Pager: [phone]  Please see sticky note for cross cover information    Interval History   Nursing notes reviewed.  No acute events overnight.  Daughter May is at bedside and voices concern with Priscilla's weakness and taking less PO.  Cruz reports no concerns by phone and plans to send in family to help feed Priscilla.    Unable to complete ROS due to patient mental status.    Data reviewed today: I reviewed all medications, new labs and imaging results over the last 24 hours.     Physical Exam   Vital Signs: Temp: 96  F (35.6  C) Temp src: Axillary BP: 140/80   Heart Rate: 75 Resp: 18 SpO2: 100 % O2 Device: None (Room air)    Weight: 167 lbs 12.32 oz  General Appearance: Awake, somnolent male laying in bed in no acute distress  Respiratory: Mild bibasilar crackles with no increased work of breathing on room air.  No rhonchi or wheezing  Cardiovascular: RRR, no m/r/g  GI: Soft, non-tender, non-distended  Skin: No rashes  Other: Bilateral LE edema present and increased from prior.  Patient non-verbal with writer, awaking to voice but not following commands.

## 2018-09-29 NOTE — PLAN OF CARE
Problem: Goal/Outcome  Goal: Goal Outcome Summary  Outcome: No Change  Nonverbal at baseline. Not showing any signs of pain. Left and right PIV's infusing Heparin gtt at 800 units/hr. Next 10a 2130. Magnesium replacement and intermittent IV abx. Monitoring BG's q4h. Daughter Caryl at bedside and attempts to give apple sauce with syringe. Pt not taking PO meds due to cough with po intake. Giving Flagyl IV in place of PO vanco for C dif. Ampicillin discontinued at this time unless pt shows signs of pneumonia. Turning and repositioning Q2H. Low UOP with patino. Continue with POC.

## 2018-09-29 NOTE — PLAN OF CARE
"Problem: Patient Care Overview  Goal: Plan of Care/Patient Progress Review  Outcome: No Change  /80 (BP Location: Right arm)  Pulse 77  Temp 96  F (35.6  C) (Axillary)  Resp 18  Ht 1.778 m (5' 10\")  Wt 76.1 kg (167 lb 12.3 oz)  SpO2 100%  BMI 24.07 kg/m2    Time:  1256-3127    Reason for admission:  Aspiration PNA  Neuro:  Nonverbal @ baseline.    Behavior:  Nonverbal @ baseline. Cooperative and calm, but fights when being repositioned.  Does not use call light.  Activity:  Bedrest.  Vitals:  VSS on RA.   Lines:  L PIV w/ hep gtt @ 650 units.  R PIV w/ IV Abx.  R internal jugular for dialysis.    Cardiac:  NA  Respiratory:  Coarse lungs, with crackles. Sats 100% on RA.  GI/:  Virk w/ low UOP.  No BM this shift.  Skin:  BLE edema 3+ and scrotal edema.  Endo:  BG= 123.  Pain:  Dilaudid given x 1 for pain.  Diet:  DD1 w/ nectar, no appetite.  Labs/Imaging:  Xa=1.4/8, Mg=1.8 and replaced.  Phos=3.1, Hgb=8.1.  Consults:  Trans Neph    New changes this shift:  Pt nonverbal.  Virk w/ low UOP.  Hep gtt @ 650 units, Xa=1.48, recheck @ 0800.  Dilaudid given x 1.  IV Flagyl.  IV protonix.GB=262.      Plan:  Ethics consult w/ family.  [place]an cts to document pt not eating.  Discharge in 4-7 days.    Continue to monitor and follow POC.        "

## 2018-09-29 NOTE — PLAN OF CARE
Problem: Patient Care Overview  Goal: Plan of Care/Patient Progress Review  Pt and daughter have been sleeping soundly since change of day shift. VSS. Only IV med given, held all po med for now since pt has high risk for aspiration. Hep 10 A was 0.32 this am, no change in heparin drip rate. Hep 10A level will be tomorrow am.

## 2018-09-29 NOTE — PROGRESS NOTES
[facility], Only   Transplant Nephrology Progress Note  Date of Admission:  9/11/2018    Assessment & Plan   Priscilla Way is a 78 year old male with h/o simultaneous liver and kidney transplant in 2000 for hepatitis C.He was admitted  for AMS and seizures. Underwent CTA with contrast of brain neck for evaluation complicated by ROMANA required HD. Also c/b aspiration PNA and c diff. We are on consult now for anuric renal failure.     Baseline Cr ~ 1.2-1.4; the patient has anuric LILIAM on on dialysis since 9/16/18.  His LILIAM is likely multifactorial but related to contrast induced nephropathy with underlying ckd.  The creatinine of 1.2-1.4 is an overestimation of his renal function and a kidney function 8/2016 is significant for early diabetic changes with secondary focal segmental sclerosis. There is some potential for renal recovery after the insulting agent of IV contrast.     Dialysis has been started but he has had several intolerance episodes including severe hypotension on dialysis, worsening breathing with dialysis, and constantly trying to remove his dialysis catheter which has prompted restraints for his safety.     # DDKT and liver transplant:  - Basline Cr ~ 1.2- 1,4; Cr stable, oliguric. No HD moving forward. We can try lasix for volume removal (40 mg IV BID)   - No bleeding from the dialysis cath site. Patient is on heparin gtt.   - UA with 34 WBC and 7 RBC and large LE. Urine Cx NGTD. UA with no hydronephrosis.   - Proteinuria: Nephrotic range, likely due to concentrated urine in the setting of acute renal failure with background DM nephropathy.   - Latest DSA: No                   Date of DSA last checked: 8/2016  - BK: No  - Kidney Tx Biopsy: Yes- 8/2015- ATN, DM nephropathy with features of FSGS and moderate to severe arterisclerosis      # Liver transplant :                     - LFTs and Alk phos normal WNL.        # Immunosuppression:                    - Can continue  "IV Mycophenolate mofetil 250g BID. Hold tacrolimus given swallowing issues. 5mg prednisone (or 4mg methylpred if he cant take oral).                      - Restart tacrolimus when the patient is able to eat again. Can give sublingual liquid tacrolimus (50% of the dose)        # Hypertension:                    - Stable no changes                     # Anemia in chronic renal disease:                  - Hgb: 8.1                  - Iron studies: Low. Can give oral ferrous sulphate when able to eat        # Mineral Bone Disorder:   - Secondary renal hyperparathyroidism; PTH level 78 on 2018  - Calcium: 8.0,  Normal when corrected for albumin        # Electrolytes:   - Potassium: 3.6  - Na: 138       # Other Medical Issues:   # seziure disorder- admitted with AMS. Seen by neurology. On keppra.   # h/o CVA:  Minimally interactive at baseline. Palliative care on consult. Family does not want permanent feeding tube, but are open to an NG. Ethics consult placed  # Cdiff- oral vancomycin started   # DM- management per primary.       # Transplant History:  Etiology of kidney failure: Hepatitis C  Transplant: 2000 (Kidney), 2000 (Liver)  Donor Type:  - Brain Death                      Donor Class: Standard Criteria Donor  Significant changes in immunosuppression: see above  Significant Complications: Oliguric LILIAM     Recommendations were communicated to the primary team via this note    Zachary Reed MD     Interval History   Patient was seen and examined this afternoon. No changes from yesterday. Remains minimally interactive. No HD today. No emesis yesterday. Continues to have low urine output. Appears comfortable. BP is better controlled today.     Physical Exam      /80 (BP Location: Right arm)  Pulse 77  Temp 96  F (35.6  C) (Axillary)  Resp 18  Ht 1.778 m (5' 10\")  Wt 76.1 kg (167 lb 12.3 oz)  SpO2 98%  BMI 24.07 kg/m2       GENERAL APPEARANCE: Bedridden, not able to do any " communication   HENT: Sluggish pupil, head in normocephalic/atrumatic, Dialysis cath placed to right internal jugular, clean, no bleeding.    LYMPHATICS: no cervical or supraclavicular nodes  RESP: Rales to lung base B/L, no wheezes  CV: regular rhythm, normal rate, no rub, no murmur  EDEMA: +2 LE edema bilaterally  ABDOMEN: soft, nondistended, nontender, bowel sounds normal  MS: upper extremities in half contraction position, overall muscle atrophy and joint stiffness  SKIN: intact, warm and dry     Data   All labs reviewed by me.  CMP    Recent Labs  Lab 09/29/18  0752 09/28/18  1419 09/27/18  1245 09/26/18  0730 09/25/18  0538 09/24/18  0812  09/23/18  0550 09/22/18  2254 09/22/18  1009    135 138 135 136 134  < > 135  --  135   POTASSIUM 4.4 3.8 3.6 3.4 3.4 3.6  < > 4.0  --  3.4   CHLORIDE 102 100 101 100 102 102  < > 101  --  102   CO2 24 30 29 23 25 23  < > 23  --  26   ANIONGAP 9 5 8 12 9 9  < > 11  --  7   * 151* 114* 186* 168* 145*  < > 159*  --  171*   BUN 14 13 8 14 11 10  < > 9  --  9   CR 1.37* 1.28* 1.07 1.47* 1.28* 1.11  < > 0.92  --  0.85   GFRESTIMATED 50* 54* 67 46* 54* 64  < > 79  --  87   GFRESTBLACK 61 65 81 56* 65 77  < > >90  --  >90   JOSHUA 8.0* 8.0* 8.1* 8.0* 7.6* 7.8*  < > 8.5  --  7.2*   MAG  --  1.8  --   --  2.2  --   --   --  2.7* 1.5*   PHOS  --  3.1  --   --   --  3.0  --  2.4*  --  1.2*   ALBUMIN  --   --   --   --   --  2.2*  --   --   --   --    < > = values in this interval not displayed.  CBC    Recent Labs  Lab 09/28/18  1419 09/27/18  1245 09/26/18  0730 09/25/18  1255  09/24/18  0812   HGB 8.1* 8.3* 9.2* 8.0*  < > 6.9*   WBC 9.2 9.6 9.7  --   --  9.1   RBC 2.72* 2.75* 3.02*  --   --  2.32*   HCT 25.8* 26.1* 27.6*  --   --  21.8*   MCV 95 95 91  --   --  94   MCH 29.8 30.2 30.5  --   --  29.7   MCHC 31.4* 31.8 33.3  --   --  31.7   RDW 20.0* 19.7* 18.8*  --   --  20.0*    212 133*  --   --  237   < > = values in this interval not displayed.  INR    Recent  Labs  Lab 09/29/18  0752 09/28/18  1419 09/27/18  1245 09/26/18  0730   INR 1.22* 1.24* 1.27* 1.40*     ABGNo lab results found in last 7 days.   Urine Studies  Recent Labs   Lab Test  09/24/18   0100  09/14/18   1418  05/31/18   1643  03/21/18   1139   COLOR  Yellow  Yellow  Yellow  Yellow   APPEARANCE  Clear  Cloudy  Clear  Slightly Cloudy   URINEGLC  Negative  150*  Negative  Negative   URINEBILI  Negative  Negative  Negative  Negative   URINEKETONE  Negative  10*  Negative  Negative   SG  1.020  1.028  1.012  1.011   UBLD  Moderate*  Moderate*  Trace*  Trace*   URINEPH  6.0  6.5  5.5  5.0   PROTEIN  100*  >600*  30*  10*   NITRITE  Negative  Negative  Negative  Negative   LEUKEST  Trace*  Large*  Negative  Large*   RBCU  5  7*  9*  2   WBCU  2  34*  2  159*     Recent Labs   Lab Test  09/14/18   1418  05/27/16   1409  04/29/11   1348  09/14/10   1134   [facility]  24.91*  Specimen not received  NOTIFIED [facility]  WHO PAGED  RN, LOPEZ AT 1355. NO URINE   RECIEVED WITH BLOOD SPECIMENS. AB    0.04  <0.02     PTH  Recent Labs   Lab Test  06/05/18   0548  08/24/16   0852   PTHI  78  333*     Iron Studies  Recent Labs   Lab Test  11/08/16   1209  08/24/16   0852   IRON  17*  20*   FEB  146*  197*   IRONSAT  11*  10*   BARTOLO   --   1025*     IMAGING:  All imaging studies reviewed by me.

## 2018-09-30 NOTE — PROGRESS NOTES
[facility], [place]    Internal Medicine Progress Note - Gold Service      Assessment & Plan   Priscilla Way is a 78 year old male with a complex past medical history including dementia (Alzheimer and vascular), liver transplant and kidney transplants in 2000, DVT, T2DM, CVA, and poor airway protection admitted with possible seizure and aspiration.  He is currently hemodynamically stable with ongoing concerns for aspirations/airway protection, nutritional support, and monitoring renal function off of HD.      # DDKT and Liver Transplant  # Anuric Renal Failure s/p HD with baseline CKD  Suspected secondary to contrast and now improving.  HD on 9/26 with UF 9/27 then plan to monitor over the weekend with no HD or UF.   - Transplant nephrology consulted, appreciate assistance  - No HD or UF this weekend  - Continue IV Furosemide 40 mg BID per nephro  - Strict I/O  - All medications by IV as able at present given tenuous PO status                        - Mycophenolate IV, prednisone IV                        - Tacro PO only available (take if able, will trial buccal)      # Aspiration Pneumonia and poor Airway Protection  Recurrent small aspirations per speech evaluation and signficant aspiration event with possible seizure PTA.  Discussed with family regarding concerns for high risk of recurrent aspirations and per family, tube feeding has been declined, family feels strongly about feeding by mouth, and they understand the risks at present.  Per SLP eval 9/25 a diet was ordered with strict parameters for when to give PO.  Recurrent aspiration event on 9/26 but with no ongoing respiratory distress. Will continue to monitor  - Completed 7 day course of IV antibiotics previously  - Amp/Sulbactam restarted 9/26-9/28 but stopped with no changes on CXR  - NPO strict if any recurrence of aspiration, PO at present only if meeting parameters  - ID previously involved  - Ongoing discussion with  family regarding goals of care, see below                        - Ethics updated, previously involved  - Calorie Counts ordered  - Family declining feeding tube at present      # Acute on Chronic Normocytic Anemia  # Possible slow GI blood loss  Suspect multifactorial including related to chronic disease and LILIAM as well as iatrogenic though patient reportedly had melanotic stools though hemoglobins have since been stable.  - Continue IV PPI BID at present  - Daily hemoglobins and as needed for signs of bleeding  - Transfuse for hemoglobin <7      # Recurrent C. Diff Colitis  On IV metronidazole, PO vanco if able to tolerate PO.  - Continue for 7 days post aspiration Abx.      # Possible Seizure Activity  # Baseline Dementia  Full body convulsions witnessed PTA with reported associated aspiration.  CT head and CTA without acute changes.  Family feels patient is back to baseline mental status.  - Neurology consulted, appreciate assistance  - Levetiracetam initiated but per family this is not consistent with goals of care so this has been discontinued  - Seizure precautions      # DVT  On warfarin PTA but unable to tolerate PO well at present  - Heparin gtt  - Warfarin when able to take PO      # T2DM  Follow daily BG. Currently stable      # HTN  Home amlodipine and carvedilol if able to tolerate PO.  Hydralazine PRN      # LE Edema  - Lymphedema consult      # Social and Goals of Care  Please see previous notes for details on recent conversations.  On 9/30 I called [facility] for an update and again to discuss my concerns about his nutrition status and prognosis.  The following is a summary of our conversation to the best of my memory, quotations are around statements I took notes on immediately after the conversation and are reproduced to the best of my ability.    On speaking to [facility] on the phone, she stated that there was a misunderstanding and she wants the nasal feeding tube.  We again reviewed risks and benefits as  "well as the \"trial\" nature with the feeding tube to provide nutritional and medication support for no more than 7 days with which Cruz was in agreement.  I recommended an NJ placement with radiology tomorrow and said I would review with the other medical teams involved as well with which she was supportive.  Given a plan for nutrition and medication support I also discussed Priscilla's condition today which is more somnolent and edematous, though with no respiratory difficulty or hemodynamic instability at present.  When discussing the trial off of hemodialysis this weekend and my concerns that he is showing signs that he continues to have difficulty with his renal function, Cruz began to speak about restarting dialysis.  I told her I would discuss with the nephrology team tomorrow since I was not involved in the prior planning of the recent trial of short term hemodialysis, but noted that not all patients are deemed candidates for long term hemodialysis and I was uncertain of their plans for him moving forward.  At this discussion Cruz became very heated and upset stating that this was brought up before with the \"other doctors\" and that she and her family strongly feel that Priscilla would want dialysis.  She went on at length stating that she felt that in these previous conversations the family felt that their father was not respected and that by withholding dialysis the doctors showed they felt \"he doesn't deserve to live.\"  She and her family feel \"if he needs dialysis we will give it, we don't want him to have pain.\" She did state that they know he is dying and if it is his time that is ok but they want to be certain that he is treated with respect and treated fully.  I acknowledge her goals for Priscilla's treatment and voiced my hope that she feels he is being treated with respect which she noted profound respect for and appreciation of the [place] system in general and the vast majority of his medical team including " "the writer.  She noted that there are 2 physicians from prior with which she has objection though she did not identify them.  I told her I would speak with the nephrology team regarding his candidacy for hemodialysis and that the decision was not mine with which she voiced no objection, but she comment \"not a candidate, he doesn't have cancer, I know how this works.\"  She also voiced concern that the perception that he doesn't feel pain from not eating or withholding hemodialysis is discrimination.  She is very concerned about his treatment going forward and finally reported that \"If he [does not get further dialysis and his kidneys are not functioning well] we will be going after the licenses of the two doctors before, they were not respectful.\"  I apologized for their negative experiences and told her I would talk to the nephrology team tomorrow, again reviewed our other plans and asked if there was anything else I could help her with.  Cruz was very kind and said no also thanking the writer.  I told her I would document our conversation to allow record of her concerns going forward and would be talking to all the teams involved in Abduls care to help with the plan moving forward but that her presence to discuss his care during the day this week may be needed.  She reported that she could take the day off to come in if necessary.    Diet: Dysphagia Diet Level 1 Pureed Nectar Thickened Liquids (pre-thickened or use instant food thickener)  Snacks/Supplements Adult: Boost Plus; With Meals  Calorie Counts  Fluids: PO hydration as able today, enteral support tomorrow  Virk Catheter: in place, indication: Strict 1-2 Hour I&O    DVT Prophylaxis: heparin gtt  Code Status: DNR/DNI    Disposition Plan   Expected discharge: 4 - 7 days, recommended to TBD once renal function stabilized, nutriton/medication support plan, and safe dispo.     Entered: Katharine Mitchell MD 09/30/2018, 10:26 AM   Information in the above section " "will display in the discharge planner report.      The patient's care was discussed with the Bedside Nurse, Patient, Patient's Family and Nephrology Consultant.    Katharine Mitchell MD  Internal Medicine Staff Hospitalist Service  [facility]  Pager: [phone]  Please see sticky note for cross cover information    Interval History   Nursing notes reviewed.  No acute events overnight.  Patient slightly more somnolent today.  On review with daughter Cruz by phone who as been discussing with family members present, they are concerned he is more tired but also note that even at home he has \"days like this.\"  On discussing nutrition Cruz is concerned there has been a misunderstanding and clarifies that she wants the nasal feeding tube.  Please see above for further details of our conversation.    The remainder of a 4 point ROS is negative unless otherwise noted above.    Data reviewed today: I reviewed all medications, new labs and imaging results over the last 24 hours.     Physical Exam   Vital Signs: Temp: 98.9  F (37.2  C) Temp src: Axillary BP: 124/59   Heart Rate: 93 Resp: 16 SpO2: 100 % O2 Device: None (Room air)    Weight: 167 lbs 12.32 oz  General Appearance: Asleep male laying in bed in no acute distress, spontaneously opening eyes  Respiratory: Mild bibasilar crackles with no increased work of breathing on room air. Bilateral rhonchi with no wheezing  Cardiovascular: RRR, no m/r/g  GI: Soft, non-tender, non-distended  Skin: No rashes  Other: Bilateral LE edema present and increased from prior.  Patient non-verbal with writer, daughters not present at bedside, grandsons present in room but not interacting with patient at present.        "

## 2018-09-30 NOTE — PLAN OF CARE
"Problem: Patient Care Overview  Goal: Plan of Care/Patient Progress Review  Outcome: No Change  /79 (BP Location: Right arm)  Pulse 77  Temp 98.4  F (36.9  C) (Axillary)  Resp 18  Ht 1.778 m (5' 10\")  Wt 76.1 kg (167 lb 12.3 oz)  SpO2 100%  BMI 24.07 kg/m2    Time:  5287-9065     Reason for admission:  Aspiration PNA  Neuro:  Nonverbal @ baseline.    Behavior:  Nonverbal @ baseline. Cooperative and calm this shift.  Does not use call light.  Pt has been more responsive this shift, communicating sounds for yes and no.    Activity:  Bedrest.  Vitals:  VSS on RA, except hypertensive.   Lines:  L PIV w/ hep gtt @ 650 units.  R PIV w/ IV Abx.  R internal jugular for dialysis.    Cardiac:  NA  Respiratory:  Coarse lungs, with crackles. Sats 100% on RA.  GI/:  Virk w/ low UOP.  No BM this shift.  Skin:  BLE edema 3+ and scrotal edema.  Endo:  BG= 139.  Pain:  No nonverbal indicators of pain this shift.  Diet:  DD1 w/ nectar.  Pt taking PO intake through syringe w/ family members, 160 ml this shift.  Labs/Imaging:  Xa=0.32, Mg=1.8, Phos=3.1, Hgb=8.1.  Consults:  Trans Neph     New changes this shift:  Pt nonverbal, but more responsive this shift.  Virk w/ low UOP.  Hep gtt @ 650 units, Xa thera, recheck this am. No nonverbal indicators of pain this shift.  IV Flagyl.  IV protonix.  IV Lasix.  JM=464.       Plan:  Ethics consult w/ family.  [place]an cts to document pt not eating. No dialysis through w/e, only Lasix, then re-eval on Monday.  Discharge in 4-7 days.     Continue to monitor and follow POC.              "

## 2018-09-30 NOTE — PROGRESS NOTES
Calorie Count  Intake recorded for: 9/29                   Kcals: 624                  Protein: 20g  # Meals Recorded: 200% apple sauce   # Supplements Recorded: 150% Ensure

## 2018-09-30 NOTE — PROGRESS NOTES
[facility], [place]   Transplant Nephrology Progress Note  Date of Admission:  9/11/2018    Assessment & Plan   Priscilla Way is a 78 year old male with h/o simultaneous liver and kidney transplant in 2000 for hepatitis C.He was admitted  for AMS and seizures. Underwent CTA with contrast of brain neck for evaluation complicated by ROMANA required HD. Also c/b aspiration PNA and c diff. We are on consult now for anuric renal failure.     Baseline Cr ~ 1.2-1.4; the patient has anuric LILIAM on on dialysis since 9/16/18.  His LILIAM is likely multifactorial but related to contrast induced nephropathy with underlying ckd.  The creatinine of 1.2-1.4 is an overestimation of his renal function and a kidney function 8/2016 is significant for early diabetic changes with secondary focal segmental sclerosis. There is some potential for renal recovery after the insulting agent of IV contrast.     Dialysis has been started but he has had several intolerance episodes including severe hypotension on dialysis, worsening breathing with dialysis, and constantly trying to remove his dialysis catheter which has prompted restraints for his safety.     # DDKT and liver transplant:  - Basline Cr ~ 1.2- 1,4; Cr stable, nonoliguric.   - No bleeding from the dialysis cath site. Patient is on heparin gtt.   - UA with 34 WBC and 7 RBC and large LE. Urine Cx NGTD. UA with no hydronephrosis.   - Proteinuria: Nephrotic range, likely due to concentrated urine in the setting of acute renal failure with background DM nephropathy.   - Latest DSA: No                   Date of DSA last checked: 8/2016  - BK: No  - Kidney Tx Biopsy: Yes- 8/2015- ATN, DM nephropathy with features of FSGS and moderate to severe arterisclerosis      # Liver transplant :                     - LFTs and Alk phos normal WNL.        # Immunosuppression:                    - Can continue IV Mycophenolate mofetil 250g BID. 5mg prednisone (or 4mg methylpred  "if he cant take oral).                      - Restart tacrolimus when the patient is able to eat again. Can give sublingual liquid tacrolimus (50% of the dose)        # Hypertension:                    - Stable no changes                     # Anemia in chronic renal disease:                  - Hgb: 7.8                  - Iron studies: Low. Can give oral ferrous sulphate when able to eat        # Mineral Bone Disorder:   - Secondary renal hyperparathyroidism; PTH level 78 on 2018  - Calcium: 7.7,  Normal when corrected for albumin        # Electrolytes:   - Potassium: 3.9  - Na: 136       # Other Medical Issues:   # seziure disorder- admitted with AMS. Seen by neurology. On keppra.   # h/o CVA:  Minimally interactive at baseline. Palliative care on consult. Family does not want permanent feeding tube, but are open to an NG. Ethics consult placed  # Cdiff- oral vancomycin started   # DM- management per primary.       # Transplant History:  Etiology of kidney failure: Hepatitis C  Transplant: 2000 (Kidney), 2000 (Liver)  Donor Type:  - Brain Death                      Donor Class: Standard Criteria Donor  Significant changes in immunosuppression: see above  Significant Complications: Oliguric LILIAM     Recommendations were communicated to the primary team via this note    Zachary Reed MD     Interval History   Patient was seen and examined this afternoon. No changes from yesterday. Remains minimally interactive. No emesis yesterday. Continues to have low urine output. Appears comfortable. BP is better controlled today.     Physical Exam      /59 (BP Location: Left arm)  Pulse 77  Temp 98.9  F (37.2  C) (Axillary)  Resp 16  Ht 1.778 m (5' 10\")  Wt 76.1 kg (167 lb 12.3 oz)  SpO2 100%  BMI 24.07 kg/m2       GENERAL APPEARANCE: Bedridden, not able to do any communication   HENT: Sluggish pupil, head in normocephalic/atrumatic, Dialysis cath placed to right internal jugular, clean, " no bleeding.    LYMPHATICS: no cervical or supraclavicular nodes  RESP: Rales to lung base B/L, no wheezes  CV: regular rhythm, normal rate, no rub, no murmur  EDEMA: +2 LE edema bilaterally  ABDOMEN: soft, nondistended, nontender, bowel sounds normal  MS: upper extremities in half contraction position, overall muscle atrophy and joint stiffness  SKIN: intact, warm and dry     Data   All labs reviewed by me.  CMP    Recent Labs  Lab 09/30/18  0729 09/29/18  0752 09/28/18  1419 09/27/18  1245  09/25/18  0538 09/24/18  0812    135 135 138  < > 136 134   POTASSIUM 3.9 4.4 3.8 3.6  < > 3.4 3.6   CHLORIDE 103 102 100 101  < > 102 102   CO2 25 24 30 29  < > 25 23   ANIONGAP 8 9 5 8  < > 9 9   GLC 90 122* 151* 114*  < > 168* 145*   BUN 17 14 13 8  < > 11 10   CR 1.51* 1.37* 1.28* 1.07  < > 1.28* 1.11   GFRESTIMATED 45* 50* 54* 67  < > 54* 64   GFRESTBLACK 54* 61 65 81  < > 65 77   JOSHUA 7.7* 8.0* 8.0* 8.1*  < > 7.6* 7.8*   MAG 2.0  --  1.8  --   --  2.2  --    PHOS 3.4  --  3.1  --   --   --  3.0   ALBUMIN  --   --   --   --   --   --  2.2*   < > = values in this interval not displayed.  CBC    Recent Labs  Lab 09/30/18  0729 09/28/18  1419 09/27/18  1245 09/26/18  0730   HGB 7.8* 8.1* 8.3* 9.2*   WBC 5.9 9.2 9.6 9.7   RBC 2.60* 2.72* 2.75* 3.02*   HCT 24.4* 25.8* 26.1* 27.6*   MCV 94 95 95 91   MCH 30.0 29.8 30.2 30.5   MCHC 32.0 31.4* 31.8 33.3   RDW 19.6* 20.0* 19.7* 18.8*    160 212 133*     INR    Recent Labs  Lab 09/30/18  0729 09/29/18  0752 09/28/18  1419 09/27/18  1245   INR 1.40* 1.22* 1.24* 1.27*     ABGNo lab results found in last 7 days.   Urine Studies  Recent Labs   Lab Test  09/24/18   0100  09/14/18   1418  05/31/18   1643  03/21/18   1139   COLOR  Yellow  Yellow  Yellow  Yellow   APPEARANCE  Clear  Cloudy  Clear  Slightly Cloudy   URINEGLC  Negative  150*  Negative  Negative   URINEBILI  Negative  Negative  Negative  Negative   URINEKETONE  Negative  10*  Negative  Negative   SG  1.020  1.028   1.012  1.011   UBLD  Moderate*  Moderate*  Trace*  Trace*   URINEPH  6.0  6.5  5.5  5.0   PROTEIN  100*  >600*  30*  10*   NITRITE  Negative  Negative  Negative  Negative   LEUKEST  Trace*  Large*  Negative  Large*   RBCU  5  7*  9*  2   WBCU  2  34*  2  159*     Recent Labs   Lab Test  09/14/18   1418  05/27/16   1409  04/29/11   1348  09/14/10   1134   UTPG  24.91*  Specimen not received  NOTIFIED [facility]  WHO PAGED  RN, LOPEZ AT 1355. NO URINE   RECIEVED WITH BLOOD SPECIMENS. AB    0.04  <0.02     PTH  Recent Labs   Lab Test  06/05/18   0548  08/24/16   0852   PTHI  78  333*     Iron Studies  Recent Labs   Lab Test  11/08/16   1209  08/24/16   0852   IRON  17*  20*   FEB  146*  197*   IRONSAT  11*  10*   BARTOLO   --   1025*     IMAGING:  All imaging studies reviewed by me.

## 2018-10-01 NOTE — PLAN OF CARE
"Problem: Patient Care Overview  Goal: Plan of Care/Patient Progress Review  Outcome: No Change  VSS, 100% on RA. Non-verbal, daughter at bedside and very involved/helpful. Pt alert for evening meds- placed syringe in mouth to administer and pt swallowed immediately following. LS diminished, weak non-prod cough. Had some apple sauce from daughter, daughter then refused repositioning \"until midnight\" d/t risk/history of aspiration. Heparin gtt at 650, 10a in am. BS check 102. Dysphagia pureed diet with nectar thick liquids. Calorie counts. HD catheter in place, PIV x2 infusing. BLE and scrotal edema. Prn supp ordered but not given at this time. Virk in place with concentrated yellow output.       "

## 2018-10-01 NOTE — PROVIDER NOTIFICATION
Patient arrived back to unit after placement of NJ in xray. Patient coughing up bloody sputum. Provider notified and came to assess patient at bedside. Continue to monitor and use oral suction as needed. Orders to stop heparin drip for 1 hour. Continue to monitor

## 2018-10-01 NOTE — PROGRESS NOTES
[facility], [place]    Internal Medicine Progress Note - Gold Service      Assessment & Plan   Priscilla Way is a 78 year old male with a complex past medical history including dementia (Alzheimer and vascular), liver transplant and kidney transplants in 2000, DVT, T2DM, CVA, and poor airway protection admitted with possible seizure and aspiration.  He is currently hemodynamically stable with ongoing concerns for aspirations/airway protection, nutritional support, and monitoring renal function off of HD.  NJ was placed on 10/1 and will trial medications through NJ and work on tube feeds.      # DDKT and Liver Transplant  # Anuric Renal Failure s/p HD with baseline CKD  Suspected secondary to contrast and now improving.  HD on 9/26 with UF 9/27 then plan to monitor over the weekend with no HD or UF.   - Transplant nephrology consulted, appreciate assistance  - No HD required at present  - Continue IV Furosemide 40 mg BID per nephro  - Strict I/O  - Trial of medications by NJ this evening and if well tolerated will transition all to enteral                        - Mycophenolate IV, prednisone IV                        - Tacro PO only available (take if able, will trial buccal)      # Aspiration Pneumonia and poor Airway Protection  Recurrent small aspirations per speech evaluation and signficant aspiration event with possible seizure PTA.  Discussed with family regarding concerns for high risk of recurrent aspirations.  Per both daugthers, trial of NJ was started on 10/2 (placement on 10/1) with plans for 4-7 days of enteral nutrition and medication support followed by a re-evaluation  - Completed 7 day course of IV antibiotics previously  - Amp/Sulbactam restarted 9/26-9/28 but stopped with no changes on CXR  - NPO strict if any recurrence of aspiration, PO at present only if meeting parameters  - NJ placed and nutrition consulted for TF  - ID previously involved  - Ongoing discussion  "with family regarding goals of care, see below                        - Ethics updated, previously involved  - Calorie Counts ordered      # Acute on Chronic Normocytic Anemia  # Possible slow GI blood loss  Suspect multifactorial including related to chronic disease and LILIAM as well as iatrogenic though patient reportedly had melanotic stools though hemoglobins have since been stable.  - Continue IV PPI BID at present  - Daily hemoglobins and as needed for signs of bleeding  - Transfuse for hemoglobin <7      # Recurrent C. Diff Colitis  On IV metronidazole, pending tolerance of NJ medications will stop IV.  - Continue for 7 days post aspiration Abx. (last stopped 9/28)      # Possible Seizure Activity  # Baseline Dementia  Full body convulsions witnessed PTA with reported associated aspiration.  CT head and CTA without acute changes.  Family feels patient is back to baseline mental status.  - Neurology consulted, appreciate assistance  - Levetiracetam initiated but per family this is not consistent with goals of care so this has been discontinued  - Seizure precautions      # DVT  On warfarin PTA but unable to tolerate PO well at present, NJ placed 10/1  - Heparin gtt  - Warfarin by NJ      # T2DM  Follow daily BG. Currently stable      # HTN  Home amlodipine and carvedilol trial via NJ.  Hydralazine PRN      # LE Edema  - Lymphedema consult      # Social and Goals of Care  Please see previous notes for documentation of prior conversations.  Following discussion with risk management and ethics on 10/1, currently unless documentation is provided demonstrating one legal POA, next of kin are considered to have equal rights.  Nonetheless in discussion with May on 10/1 she stated that she is \"removing\" herself from the situation and at present plans to be supportive of Aba's wishes and decisions for Priscilla.  She notes that while she does not agree with all of these decisions she does not wish to cause Abah distress " "with a different approach and she recognizes that Cruz is also trying to do what is best for their father as well.  [facility] expressly does support the trial basis of the NJ tube.    On phone call with Cruz on 10/1 I updated her about placement of the NJ tube and minor associated bleeding that is currently being monitored.  I discussed plans to trial meds through the tube overnight and consult with nutrition for tube feeding recommendations, and if going well discontinue redundant IV medications/transition to enteral for medications tomorrow.  Cruz was supportive of this as well.  Finally I reviewed my discussion with Dr. Reed today who noted that Priscilla's renal function appears to be stable at present and he does not feel hemodialysis is warranted.  He further noted that should it be required, they would ask the family to determine if Priscilla would want ongoing/long term hemodialysis but that they would ultimately support the family decision either way.  Cruz expressed relief at this statement and noted her confusion about the possibility of \"candidacy\" for long term dialysis.  We discussed that there are times when people cannot tolerate dialysis, others who do not wish to undergo dialysis as it give some people pain or they may feel sick while on hemodialysis, and others as well who are determined to be unlikely to benefit from it in the long term.  However at present per Dr. Reed, Priscilla would be a candidate if necessary (though it does not appear necessary at this time) if that was the family's decision.  As this is not the case we will continue to monitor Priscilla and support him as noted above.    Diet: Dysphagia Diet Level 1 Pureed Nectar Thickened Liquids (pre-thickened or use instant food thickener)  Snacks/Supplements Adult: Boost Plus; With Meals  Calorie Counts  Fluids: As above  Virk Catheter: in place, indication: Strict 1-2 Hour I&O  DVT Prophylaxis: Heparin gtt bridging to warfarin  Code Status: " DNR/DNI    Disposition Plan   Expected discharge: 4 - 7 days, recommended to prior living arrangement once enteral nutrition/medication plan in place and renal function stable.     Entered: Katharine Mitchell MD 10/01/2018, 6:37 PM   Information in the above section will display in the discharge planner report.      The patient's care was discussed with the Bedside Nurse, Patient, Patient's Family and Nephrology Consultant.    Katharine Mitchell MD  Internal Medicine Staff Hospitalist Service  [facility]  Pager: [phone]  Please see sticky note for cross cover information    Interval History   Nursing notes reviewed.  No acute events overnight.  May present at bed this AM but not at recheck, see above for discussion.  No acute concerns.      A ROS was unable to be completed due to patient mental status.    Data reviewed today: I reviewed all medications, new labs and imaging results over the last 24 hours.    Physical Exam   Vital Signs: Temp: 97.3  F (36.3  C) Temp src: Axillary BP: 144/71 Pulse: 80 Heart Rate: 76 Resp: 18 SpO2: 99 % O2 Device: None (Room air)    Weight: 167 lbs 12.32 oz  General Appearance: Asleep male laying in bed in no acute distress, spontaneously opening eyes, NJ in place.  Respiratory: Mild bibasilar crackles with no increased work of breathing on room air. Bilateral rhonchi with no wheezing  Cardiovascular: RRR, no m/r/g  GI: Soft, non-tender, non-distended  Skin: No rashes  Other: Bilateral LE edema present and stable from prior.  Patient non-verbal with writer, family not present in room

## 2018-10-01 NOTE — PROGRESS NOTES
[facility], [place]   Transplant Nephrology Progress Note  Date of Admission:  9/11/2018    Assessment & Plan   Priscilla Way is a 78 year old male with h/o simultaneous liver and kidney transplant in 2000 for hepatitis C.He was admitted  for AMS and seizures. Underwent CTA with contrast of brain neck for evaluation complicated by ROMANA required HD. Also c/b aspiration PNA and c diff. We are on consult now for anuric renal failure.     Baseline Cr ~ 1.2-1.4; the patient has anuric LILIAM on on dialysis since 9/16/18.  His LILIAM is likely multifactorial but related to contrast induced nephropathy with underlying ckd.  The creatinine of 1.2-1.4 is an overestimation of his renal function and a kidney function 8/2016 is significant for early diabetic changes with secondary focal segmental sclerosis. There is some potential for renal recovery after the insulting agent of IV contrast.     Dialysis has been started but he has had several intolerance episodes including severe hypotension on dialysis, worsening breathing with dialysis, and constantly trying to remove his dialysis catheter which has prompted restraints for his safety.     # DDKT and liver transplant:  - Basline Cr ~ 1.2- 1,4; Cr stable, nonoliguric.   - No bleeding from the dialysis cath site. Patient is on heparin gtt.   - UA with 34 WBC and 7 RBC and large LE. Urine Cx NGTD. UA with no hydronephrosis.   - Proteinuria: Nephrotic range, likely due to concentrated urine in the setting of acute renal failure with background DM nephropathy.   - Latest DSA: No                   Date of DSA last checked: 8/2016  - BK: No  - Kidney Tx Biopsy: Yes- 8/2015- ATN, DM nephropathy with features of FSGS and moderate to severe arteriosclerosis  - No further HD will be done, unless there is clear indication for cause.      # Liver transplant :                     - LFTs and Alk phos normal WNL.        # Immunosuppression:                    - Can  "continue IV Mycophenolate mofetil 500g BID + 5mg prednisone (or 4mg methylpred if he cant take oral).                      - Can give sublingual liquid tacrolimus (50% of the home dose). Will get daily tacrolimus level to assess his absorption. Can wean down MMF when tac becomes therapeutic (4-6)        # Hypertension:                    - Stable no changes                  # Anemia in chronic renal disease:                  - Hgb: 7.8 ()                  - Iron studies: Low. Can give oral ferrous sulphate when able to eat        # Mineral Bone Disorder:   - Secondary renal hyperparathyroidism; PTH level 78 on 2018  - Calcium: 7.7,  Normal when corrected for albumin        # Electrolytes:   - Potassium: 3.7  - Na: 137       # Other Medical Issues:   # seziure disorder- admitted with AMS. Seen by neurology. On keppra.   # h/o CVA:  Minimally interactive at baseline. Palliative care on consult. Family does not want permanent feeding tube, but are open to an NG. Ethics consult placed  # Cdiff- oral vancomycin started   # DM- management per primary.       # Transplant History:  Etiology of kidney failure: Hepatitis C  Transplant: 2000 (Kidney), 2000 (Liver)  Donor Type:  - Brain Death                      Donor Class: Standard Criteria Donor  Significant changes in immunosuppression: see above  Significant Complications: Oliguric LILIAM     Recommendations were communicated to the primary team via this note    Neil Watkins MD     Interval History   Patient was seen and examined this morning. He is eating better than yesterday (according to the daughter). BP is more stable, UOP is improved.     Physical Exam      /71 (BP Location: Right arm)  Pulse 80  Temp 97.3  F (36.3  C) (Axillary)  Resp 18  Ht 1.778 m (5' 10\")  Wt 76.1 kg (167 lb 12.3 oz)  SpO2 99%  BMI 24.07 kg/m2       GENERAL APPEARANCE: Bedridden, not able to do any communication   HENT: Sluggish pupil, head in " normocephalic/atrumatic, Dialysis cath placed to right internal jugular, clean, no bleeding.    LYMPHATICS: no cervical or supraclavicular nodes  RESP: Rales to lung base B/L, no wheezes  CV: regular rhythm, normal rate, no rub, no murmur  EDEMA: +2 LE edema bilaterally  ABDOMEN: soft, nondistended, nontender, bowel sounds normal  MS: upper extremities in half contraction position, overall muscle atrophy and joint stiffness  SKIN: intact, warm and dry     Data   All labs reviewed by me.  CMP    Recent Labs  Lab 10/01/18  0803 09/30/18  0729 09/29/18  0752 09/28/18  1419  09/25/18  0538    136 135 135  < > 136   POTASSIUM 3.7 3.9 4.4 3.8  < > 3.4   CHLORIDE 104 103 102 100  < > 102   CO2 24 25 24 30  < > 25   ANIONGAP 9 8 9 5  < > 9   GLC 91 90 122* 151*  < > 168*   BUN 19 17 14 13  < > 11   CR 1.68* 1.51* 1.37* 1.28*  < > 1.28*   GFRESTIMATED 40* 45* 50* 54*  < > 54*   GFRESTBLACK 48* 54* 61 65  < > 65   JOSHUA 7.7* 7.7* 8.0* 8.0*  < > 7.6*   MAG 1.8 2.0  --  1.8  --  2.2   PHOS 3.3 3.4  --  3.1  --   --    PROTTOTAL 5.5*  --   --   --   --   --    ALBUMIN 1.9*  --   --   --   --   --    BILITOTAL 0.3  --   --   --   --   --    ALKPHOS 80  --   --   --   --   --    AST 13  --   --   --   --   --    ALT 8  --   --   --   --   --    < > = values in this interval not displayed.  CBC    Recent Labs  Lab 09/30/18  0729 09/28/18  1419 09/27/18  1245 09/26/18  0730   HGB 7.8* 8.1* 8.3* 9.2*   WBC 5.9 9.2 9.6 9.7   RBC 2.60* 2.72* 2.75* 3.02*   HCT 24.4* 25.8* 26.1* 27.6*   MCV 94 95 95 91   MCH 30.0 29.8 30.2 30.5   MCHC 32.0 31.4* 31.8 33.3   RDW 19.6* 20.0* 19.7* 18.8*    160 212 133*     INR    Recent Labs  Lab 10/01/18  0803 09/30/18  0729 09/29/18  0752 09/28/18  1419   INR 1.69* 1.40* 1.22* 1.24*     ABGNo lab results found in last 7 days.   Urine Studies  Recent Labs   Lab Test  09/24/18   0100  09/14/18   1418  05/31/18   1643  03/21/18   1139   COLOR  Yellow  Yellow  Yellow  Yellow   APPEARANCE  Clear   Cloudy  Clear  Slightly Cloudy   URINEGLC  Negative  150*  Negative  Negative   URINEBILI  Negative  Negative  Negative  Negative   URINEKETONE  Negative  10*  Negative  Negative   SG  1.020  1.028  1.012  1.011   UBLD  Moderate*  Moderate*  Trace*  Trace*   URINEPH  6.0  6.5  5.5  5.0   PROTEIN  100*  >600*  30*  10*   NITRITE  Negative  Negative  Negative  Negative   LEUKEST  Trace*  Large*  Negative  Large*   RBCU  5  7*  9*  2   WBCU  2  34*  2  159*     Recent Labs   Lab Test  09/14/18   1418  05/27/16   1409  04/29/11   1348  09/14/10   1134   UTPG  24.91*  Specimen not received  NOTIFIED [facility]  WHO PAGED  RN, LOPEZ AT 1355. NO URINE   RECIEVED WITH BLOOD SPECIMENS. AB    0.04  <0.02     PTH  Recent Labs   Lab Test  06/05/18   0548  08/24/16   0852   PTHI  78  333*     Iron Studies  Recent Labs   Lab Test  11/08/16   1209  08/24/16   0852   IRON  17*  20*   FEB  146*  197*   IRONSAT  11*  10*   BARTOLO   --   1025*     IMAGING:  All imaging studies reviewed by me.   Patient was seen and evaluated by me, Zachary Reed MD. I have reviewed the note and agree with the the plan of care as documented by the fellow.

## 2018-10-01 NOTE — PLAN OF CARE
"Problem: Patient Care Overview  Goal: Plan of Care/Patient Progress Review  Outcome: No Change  /70 (BP Location: Right arm)  Pulse 80  Temp 98.6  F (37  C) (Oral)  Resp 20  Ht 1.778 m (5' 10\")  Wt 76.1 kg (167 lb 12.3 oz)  SpO2 100%  BMI 24.07 kg/m2  Continue on Heparin at 650unit/hr. Repositioning as needed. Urethral patino care done/small open penial wound noted on assessment-barrier cream and interdry applied.  No noted sign of discomfort. Pulling on lines/mittens on. Small stool. Generalized edema. Baseline neuro level. Blood sugar with parameters. Daughter by the bedside.  Continue with plan of care.         "

## 2018-10-01 NOTE — PROGRESS NOTES
[facility] Nurse Inpatient Pressure Injury Assessment   Reason for consultation: Evaluate and treat penile skin      ASSESSMENT  Pressure Injury: on penis foreskin , hospital acquired ,   This is a Medical Device Related Pressure Injury (MDRPI) due to patino  Pressure Injury is Stage 2   Contributing factor of the pressure injury: pressure, immobility and moisture  Status: initial assessment       TREATMENT PLAN  Penile skin wound: BID Catheter: Continue routine cath cares.  May apply small amount of barrier paste on wounds.  When placing Stat lock for Patino make sure there catheter is positioned so catheter is straight midline with no tension.  Orders Written  WO Nurse follow-up plan:weekly  Nursing to notify the Provider(s) and re-consult the WO Nurse if wound(s) deteriorates or new skin concern.    Patient History  According to provider note(s):  Priscilla Way is a 78 year old male with a complex past medical history including dementia (Alzheimer and vascular), liver transplant and kidney transplants in 2000, DVT, T2DM, CVA, and poor airway protection admitted with possible seizure and aspiration.  He is currently hemodynamically stable with ongoing concerns for aspirations/airway protection, nutritional support, and monitoring renal function off of HD.    Objective Data  Containment of urine/stool: Indwelling catheter    Current Diet/ Nutrition:    Active Diet Order      Dysphagia Diet Level 1 Pureed Nectar Thickened Liquids (pre-thickened or use instant food thickener)    Output:   I/O last 3 completed shifts:  In: 1300.9 [P.O.:45; I.V.:1255.9]  Out: 550 [Urine:550]    Risk Assessment:   Sensory Perception: 2-->very limited  Moisture: 3-->occasionally moist  Activity: 1-->bedfast  Mobility: 1-->completely immobile  Nutrition: 1-->very poor  Friction and Shear: 1-->problem  Stuart Score: 9      Labs:   Recent Labs  Lab 10/01/18  0803 09/30/18  0729   ALBUMIN 1.9*  --    HGB  --  7.8*   INR 1.69* 1.40*   WBC  --  5.9        Physical Exam  Skin inspection: focused penile foreskin          Wound Location: penile foreskin  Date of last Photo 10/1/18  Wound History: pt has indwelling Virk catheter, swelling of skin on uncircumcised penis.  Saw 9/26 concern for pressure injury and skin was intact at that time. Moved cath securement device at that time to decrease pressure from Virk tubing on skin.  However pt's skin has swelled since then. Two small wounds.    Measurements (length x width x depth, in cm) Two samll wounds: 0.5 x 0.5 x <0.1 cm and 0.4 x 0.5 x <0.1 cm    Wound Base:  100% dermis  Palpation of the wound bed: normal   Periwound skin: intact and edematous  Color: normal and consistent with surrounding tissue  Temperature: normal   Drainage:, scant  Description of drainage: bloody  Odor: none  Pain: no grimacing or signs of discomfort    Interventions  Current support surface: Standard  Atmos Air mattress  Current off-loading measures: Foam padding and Pillows under calves  Repositioning aid: Pillows  Visual inspection of wound(s) completed   Tube Securement: cath securement device  Wound Care: was done per plan of care.  Supplies: gathered and at bedside  Educated provided: importance of repositioning, plan of care and wound progress  Education provided to: family member son  Discussed importance of:repositioning every 2 hours, off-loading pressure to wound, their role in pressure injury prevention, head elevation <30 degrees, nutrition on wound healing and moisture management  Discussed plan of care with Patient and Nurse    Holli Magallanes RN

## 2018-10-01 NOTE — PLAN OF CARE
Problem: Feed Dysfunction/Swallow Impair (Infant)  Goal: Identify Related Risk Factors and Signs and Symptoms  Related risk factors and signs and symptoms are identified upon initiation of Human Response Clinical Practice Guideline (CPG).   NEURO: Pt somnolent, did not respond to verbal cues from staff but occasionally did from family. Did not follow commands. MD notified of pts somnolence, as family felt that this was increased from previous. Resisted cares.  RESPIRATORY: LS diminished. SpO2 >90% on RA.   CARDIO: HR regular. 3+ pitting edema BLE.   GI/: BS active, no BM. MD aware. Virk patent. Pt was not alert enough for oral intake per staff recommendation most of the day. Family was made aware of these assessments.   SKIN: Repositioned q 2 hours. Intact. Interdry placed under abdominal  folds.   ACTIVITY: bed bound. Requires lift for transfers  PAIN: Nonverbal signs of pain absent throughout the day.   LINES: L PIV infusing heparin. R PIV intermittent infusions. Virk patent.     Family at bedside throughout day. Will continue to monitor pt closely. Heparin xa within range, dose stayed the same.

## 2018-10-01 NOTE — PROGRESS NOTES
Calorie Count  Intake recorded for 9/30/2018   Kcals: 613  Protein: 21g  # Meals Recorded: 100% 1 apple sauce, 4oz pureed chicken w/ veggies (from home), 50% 1 apple sauce  # Supplements Recorded: 100% 1 Ensure, 25% 1 Ensure

## 2018-10-02 NOTE — PLAN OF CARE
Problem: Patient Care Overview  Goal: Plan of Care/Patient Progress Review  Outcome: No Change  [id number]: Pt lethargic this shift, [facility] orientation, family present at bedside.  Seems to be resting comfortably.  VSS on RA, temp low during overnight hours; beir hugger replaced.  Temp improved this AM.  NJ in place; tolerating medications via NJ well.  BG checked q4hrs; lowest of 68, 25 mL of D50 given.  BG stable since.  PIVs infusing.  Heparin gtts remains at 800 units/hr; unable to obtain 10A level, pt refusing recheck.  Also receiving intermittent IV flagyl.  Virk patent; adequate urine output, IV lasix given this AM.  Small skin tear present in groin area, wound care completed per orders.  Incontinent of stool x 2.  Turned/repositioned q2hrs.   Will continue to monitor and follow POC

## 2018-10-02 NOTE — PROVIDER NOTIFICATION
BG 68 at 2015.  Gold cross cover paged.  25 mL of D50 given.  Recheck of .  Will continue to monitor BG Q4hrs.

## 2018-10-02 NOTE — PROGRESS NOTES
Calorie Counts  Intake recorded for: 10/1  Kcals: 38  Protein: 0g  # Meals Recorded: 75% applesauce  # Supplements Recorded: 0

## 2018-10-02 NOTE — PROGRESS NOTES
[facility], [place]    Internal Medicine Progress Note - Gold Service      Assessment & Plan  Priscilla Way is a 78 year old male with a complex past medical history including dementia (Alzheimer and vascular), liver transplant and kidney transplants in 2000, DVT, T2DM, CVA, and poor airway protection admitted with possible seizure and aspiration.  He is currently hemodynamically stable with ongoing concerns for aspirations/airway protection, nutritional support, and monitoring renal function off of HD.  NJ was placed on 10/1 and will trial medications through NJ and work on tube feeds.      # DDKT and Liver Transplant  # Anuric Renal Failure s/p HD with baseline CKD: Suspected secondary to contrast and now improving.  HD on 9/26 with UF 9/27 then plan to monitor over the weekend with no HD or UF.   - Transplant nephrology consulted, appreciate assistance  - No HD required at present  - Continue IV Furosemide 40 mg BID per nephro  - Strict I/O  - Trial of medications by NJ this evening and if well tolerated will transition all to enteral  - Mycophenolate IV, prednisone IV  - Tacro PO only available (take if able, will trial buccal)      # Aspiration Pneumonia and poor Airway Protection  Recurrent small aspirations per speech evaluation and signficant aspiration event with possible seizure PTA.  Discussed with family regarding concerns for high risk of recurrent aspirations.  Per both daugthers, trial of NJ was started on 10/2 (placement on 10/1) with plans for 4-7 days of enteral nutrition and medication support followed by a re-evaluation    - Completed 7 day course of IV antibiotics previously  - Amp/Sulbactam restarted 9/26-9/28 but stopped with no changes on CXR  - NPO strict if any recurrence of aspiration, PO at present only if meeting parameters  - NJ placed and nutrition consulted for TF  - ID previously involved  - Ongoing discussion with family regarding goals of care, see  below  - Ethics updated, previously involved  - Calorie Counts ordered    # Acute on Chronic Normocytic Anemia  # Possible slow GI blood loss: Suspect multifactorial including related to chronic disease and LILIAM as well as iatrogenic though patient reportedly had melanotic stools though hemoglobins have since been stable.  - Continue IV PPI BID at present  - Daily hemoglobins and as needed for signs of bleeding  - Transfuse for hemoglobin <7  # Recurrent C. Diff Colitis: On IV metronidazole. Change to PO  - Continue for 7 days post aspiration Abx. (last stopped 9/28)  # Possible Seizure Activity  # Baseline Dementia: Full body convulsions witnessed PTA with reported associated aspiration.  CT head and CTA without acute changes.  Family feels patient is back to baseline mental status.  - Neurology consulted, appreciate assistance  - Levetiracetam initiated but per family this is not consistent with goals of care so this has been discontinued  - Seizure precautions  # DVTOn warfarin PTA but unable to tolerate PO well at present, NJ placed 10/1  - Heparin gtt  - Warfarin by NJ  # T2DM: Follow daily BG. Currently stable  # HTN: Home amlodipine and carvedilol trial via NJ.  Hydralazine PRN  # LE Edema- Lymphedema consult    Diet: Dysphagia Diet Level 1 Pureed Nectar Thickened Liquids (pre-thickened or use instant food thickener)  Snacks/Supplements Adult: Boost Plus; With Meals  Adult Formula Drip Feeding: Continuous TwoCal HN; Nasojejunal; Goal Rate: 40; mL/hr; Medication - Tube Feeding Flush Frequency: At least 15-30 mL water before and after medication administration and with tube clogging; Amount to Send (Nutrition us...  Fluids: As above  Virk Catheter: in place, indication: Strict 1-2 Hour I&O  DVT Prophylaxis: Heparin gtt bridging to warfarin  Code Status: DNR/DNI    Disposition Plan   Expected discharge: 4 - 7 days, recommended to prior living arrangement once enteral nutrition/medication plan in place and renal  function stable.     Entered: Michel Maynard MD 10/02/2018, 5:31 PM   Information in the above section will display in the discharge planner report.      The patient's care was discussed with the Bedside Nurse, Patient, Patient's Family and Nephrology Consultant.    Michel Maynard MD  Internal Medicine Staff Hospitalist Service  [facility]  Pager: [phone]  Please see sticky note for cross cover information    Interval History     He is sleepy, non verbal. Not sure what is his baseline. I called and spoke with family. They feel that that is his baseline. He reconizes only to family members and sometimes speaks few words with them. Nurse used bare hugger as his temp was running low.     Data reviewed today: I reviewed all medications, new labs and imaging results over the last 24 hours.    Physical Exam   Vital Signs: Temp: 97.4  F (36.3  C) Temp src: Axillary BP: 125/68   Heart Rate: 67 Resp: 16 SpO2: 100 % O2 Device: None (Room air)    Weight: 167 lbs 12.32 oz  General Appearance: Asleep male laying in bed in no acute distress, spontaneously opening eyes, NJ in place.  Respiratory: Mild bibasilar crackles with no increased work of breathing on room air. Bilateral rhonchi with no wheezing  Cardiovascular: RRR, no m/r/g  GI: Soft, non-tender, non-distended  Skin: No rashes  Other: Bilateral LE edema present and stable from prior.  Patient non-verbal with writer, family not present in room

## 2018-10-02 NOTE — PROGRESS NOTES
CLINICAL NUTRITION SERVICES - REASSESSMENT NOTE    Nutrition Prescription    RECOMMENDATIONS FOR MDs/PROVIDERS TO ORDER:  1. Close Lyte monitoring with TF start and advancement   2. Further Free water flush adjustment per MD   3. May consider Thiamine 100 mg/day micronutrient supplementation given refeeding risk (continue until pt tolerating goal TF and no sx of refeeding).    Malnutrition Status:    Severe malnutrition in the context of chronic illness     Recommendations already ordered by Registered Dietitian (RD):  Access: NJ tube ( placed 10/1/2018)  Dosing wt: 76 kg dry wt this admit on 9/27    1. Ordered to begin TF with TwoCal HN @ 10 ml/hr and adv by 10 ml Q 12 hrs, ( ONLY advance if K+/mg++ WNL and Phos >1.9) to goal @ 40 ml/hr.      -TwoCal HN @ 40 mL/hr (960 mL/day) to provide 1920 kcals (25 kcal/kg/day), 81 gm PRO (1.1 gm/kg/day), 672 mL H2O, 210 gm CHO and 5 gm Fiber daily.     2. Free water flushes 60 ml every 4 hours for tube patency. For full hydration needs, patient would require 45 ml/hr free water flushes via feed and flush at continuous rate pending providers order ( additional 1080 ml total free water daily )    3. K+/mg++/Phos monitoring with TF start and advancement ordered.   4. Ordered Prosource, 1 packet daily via FT ( to provide additional 40 kcal and 11 gm protein).     Ordered the following wound care protocol for stage II-IV pressure injury and on HD:     -Renal MVI w/ minerals ( Nephrocaps daily)   -Vitamin C (250 mg suspension/day x10 days via FT)  -Zn Sulfate (220 mg suspension/day x 10 days via FT)    Future/Additional Recommendations:  Monitor for need to change TF formula to renal base Nepro       Provider consult received: Registered dietitian to assess and order TF per MNT protocol:      EVALUATION OF THE PROGRESS TOWARD GOALS   Diet:   Advanced to DD1 pureed with nectar thickened liquids on 9/25. ---Per SLP note on (9/24), Family wants Pt to be able to continue PO intake as  able with reliance on family for diet modifications, feeding techniques, and to determine appropriateness of PO given Pt's variable status.     --Previously: NPO essentially from 9/12--9/24 ( ~ 2 weeks), Diet advanced briefly from NPO to dysphagia 1 with thin liquids per SLP evaluation on 9/24, Switched back to NPO due to aspiration.     Nutrition Support:  Family initially declined NG tube placement. Visited with patient and noted NJ tube is in place now.       Intake:   Calorie count:   10/1: 38 kcal and 0 gm protein from 75% of applesauce recorded  9/30: 613 kcal and 21 gm protein from 1 meal + 1.25 thickened ensure recorded  9/29: 624 kcal and 20 gm protein from applesauce and 1.5 container of Ensure recorded.    --2 day average intake: 620 kcal and 21 gm protein.   --Met 37% of estimated kcal and 21% estimated protein needs.        Updated 10/2: ASSESSED NUTRITION NEEDS based on lowest wt this admit of 76 kg on 9/27:   Estimated Energy Needs: 1900 - 2280 kcals/day ( 25-30 kcals/kg)  Justification: Maintenance, aim at lower modest needs with immobility   Estimated Protein Needs: 76 - 114 grams protein/day (1.0 - 1.5 grams of pro/kg)  Justification: Higher end Pending ongoing Dialysis, Lower end while HD on hold   Estimated Fluid Needs: HD ( Justification: Per provider pending fluid status)      NEW FINDINGS   Wt trend: 83.9 kg (185 lb) admit wt on 9/11 --> down trended to 76.1 kg (167 lb 12.3 oz) on 9/27. No daily wt to better assess trend. (7.8 kg wt loss this admission, 9.3% net wt loss in 3 weeks since admit).    HD line in place, HD initiated 9/16, Last HD was on 9/26 and 9/27. On hold since then, due to improve in kidney function test.     Labs as of 10/2: BUN:20, Cr: 1.67 (slight increase), K+: 3.8, Mg++: 2.3,   -- Phos: 3.3 on 10/1 and normal range  --Albumin: 1.9 (L),       PMH: Dementia ( vascular + Alzheimer), Liver and kidney transplant 2000 and is on immunosuppressant medication, DM2, CVA. Admitted  for aspiration and possible seizure.    FEEDING TUBE PLACEMENT 10/1/2018 3:33 PM: Feeding tube tip was in the  jejunum at the ligament of Treitz.  Small volume barium contrast was  injected to confirm placement.  There were no complications of the  Procedure.    10/1/18: WOC RN assessment:  Pressure Injury: on penis foreskin , hospital acquired  Medical Device Related Pressure Injury (MDRPI) due to patino: Pressure Injury is Stage 2: Contributing factor of the pressure injury: pressure, immobility and moisture Status: initial assessment     MALNUTRITION  % Intake: </=50% for >/= 1 month (severe)  % Weight Loss: >5% in 1 month ( Severe)   Subcutaneous Fat Loss: Unable to assess   Muscle Loss: Unable to assess  Fluid Accumulation/Edema: Moderate edema   Malnutrition Diagnosis: Severe malnutrition in the context of chronic illness       Previous Goals   Diet adv v nutrition support within 2-3 days.  Evaluation: Met     Previous Nutrition Diagnosis  Inadequate oral intake related to high aspiration risk in the setting of advanced dementia / poor oral awareness as evidenced by 2 weeks of essentially NPO/Minimal intake      Evaluation: Declining    CURRENT NUTRITION DIAGNOSIS  Inadequate oral intake related to reduced LIZZY with s/sx of aspiration with PO intake in the setting of advanced dementia with waxing and waning metal status, poor oral awareness / poor swallow function as evidenced by 3 weeks of essentially NPO/Minimal intake since admit with plan to start TF support today.     INTERVENTIONS  Implementation  Enteral Nutrition - Initiated     Goals  Total average intake to provide minimum     Monitoring/Evaluation  Progress toward goals will be monitored and evaluated per protocol.    Iveth Mann RD/LANCE  Pager [phone]

## 2018-10-02 NOTE — PLAN OF CARE
Problem: Feed Dysfunction/Swallow Impair (Infant)  Goal: Reduced Risk of Aspiration  Patient will demonstrate the desired outcomes by discharge/transition of care.   Outcome: Improving  NJ placed this afternoon.

## 2018-10-02 NOTE — PROVIDER NOTIFICATION
Pt refusing lab draw.  Heparin 10A level will not be able to be collected.  Gold cross cover paged at [phone].  Will continue to monitor and follow POC.

## 2018-10-02 NOTE — PROGRESS NOTES
[facility], [place]   Transplant Nephrology Progress Note  Date of Admission:  9/11/2018    Assessment & Plan   Priscilla Way is a 78 year old male with h/o simultaneous liver and kidney transplant in 2000 for hepatitis C.He was admitted  for AMS and seizures. Underwent CTA with contrast of brain neck for evaluation complicated by ROMANA required HD. Also c/b aspiration PNA and c diff. We are on consult now for anuric renal failure.     Baseline Cr ~ 1.2-1.4; the patient has anuric LILIAM on on dialysis since 9/16/18.  His LILIAM is likely multifactorial but related to contrast induced nephropathy with underlying ckd.  The creatinine of 1.2-1.4 is an overestimation of his renal function and a kidney function 8/2016 is significant for early diabetic changes with secondary focal segmental sclerosis. There is some potential for renal recovery after the insulting agent of IV contrast.     Dialysis has been started but he has had several intolerance episodes including severe hypotension on dialysis, worsening breathing with dialysis, and constantly trying to remove his dialysis catheter which has prompted restraints for his safety.     # DDKT and liver transplant:  - Basline Cr ~ 1.2- 1,4; Cr stable, nonoliguric.   - No bleeding from the dialysis cath site. Patient is on heparin gtt.   - UA with 34 WBC and 7 RBC and large LE. Urine Cx NGTD. UA with no hydronephrosis.   - Proteinuria: Nephrotic range, likely due to concentrated urine in the setting of acute renal failure with background DM nephropathy.   - Latest DSA: No                   Date of DSA last checked: 8/2016  - BK: No  - Kidney Tx Biopsy: Yes- 8/2015- ATN, DM nephropathy with features of FSGS and moderate to severe arteriosclerosis  - No further HD will be done, unless there is clear indication for cause.      # Liver transplant :                     - LFTs and Alk phos normal WNL.        # Immunosuppression:                     - Can  "transition to oral Mycophenolate mofetil 500g BID + 5mg prednisone (or 4mg methylpred if he cant take oral).                     - Can give liquid tacrolimus. Will get daily tacrolimus level to assess his absorption. Can wean down MMF when tac becomes therapeutic (4-6)        # Hypertension:                    - Stable no changes                  # Anemia in chronic renal disease:                  - Hgb: 8.3 (10/2)                  - Iron studies: Low. Oral ferrous sulfate may clog NG tube. IV iron may be an option        # Mineral Bone Disorder:   - Secondary renal hyperparathyroidism; PTH level 78 on 2018  - Calcium: 7.6,  Normal when corrected for albumin        # Electrolytes:   - Potassium: 3.8  - Na: 136       # Other Medical Issues:   # seziure disorder- admitted with AMS. Seen by neurology. On keppra.   # h/o CVA:  Minimally interactive at baseline. Palliative care on consult. NG tube placed today.   # Cdiff- oral vancomycin started   # DM- management per primary.       # Transplant History:  Etiology of kidney failure: Hepatitis C  Transplant: 2000 (Kidney), 2000 (Liver)  Donor Type:  - Brain Death                      Donor Class: Standard Criteria Donor  Significant changes in immunosuppression: see above  Significant Complications: Oliguric LILIAM     Recommendations were communicated to the primary team via this note    Neil Watkins MD     Interval History   Patient was seen and examined this morning. His blood pressure is controlled. His NG tube has been placed, medications are being given but no tube feeds yet. Urine output is ~500cc.     Physical Exam      /68 (BP Location: Left arm)  Pulse 80  Temp 97.4  F (36.3  C) (Axillary)  Resp 16  Ht 1.778 m (5' 10\")  Wt 76.1 kg (167 lb 12.3 oz)  SpO2 100%  BMI 24.07 kg/m2       GENERAL APPEARANCE: Bedridden, not able to do any communication   HENT: Sluggish pupil, head in normocephalic/atraumatic   LYMPHATICS: no cervical or " supraclavicular nodes  RESP: Rales to lung base B/L, no wheezes  CV: regular rhythm, normal rate, no rub, no murmur  EDEMA: +2 LE edema bilaterally  ABDOMEN: soft, nondistended, nontender, bowel sounds normal  MS: upper extremities in half contraction position, overall muscle atrophy and joint stiffness  SKIN: intact, warm and dry     Data   All labs reviewed by me.  CMP    Recent Labs  Lab 10/02/18  0650 10/01/18  0803 09/30/18  0729 09/29/18  0752 09/28/18  1419    137 136 135 135   POTASSIUM 3.8 3.7 3.9 4.4 3.8   CHLORIDE 104 104 103 102 100   CO2 23 24 25 24 30   ANIONGAP 10 9 8 9 5   * 91 90 122* 151*   BUN 20 19 17 14 13   CR 1.67* 1.68* 1.51* 1.37* 1.28*   GFRESTIMATED 40* 40* 45* 50* 54*   GFRESTBLACK 48* 48* 54* 61 65   JOSHUA 7.6* 7.7* 7.7* 8.0* 8.0*   MAG 2.3 1.8 2.0  --  1.8   PHOS  --  3.3 3.4  --  3.1   PROTTOTAL  --  5.5*  --   --   --    ALBUMIN  --  1.9*  --   --   --    BILITOTAL  --  0.3  --   --   --    ALKPHOS  --  80  --   --   --    AST  --  13  --   --   --    ALT  --  8  --   --   --      CBC    Recent Labs  Lab 10/02/18  0650 09/30/18  0729 09/28/18  1419 09/27/18  1245   HGB 8.3* 7.8* 8.1* 8.3*   WBC 3.4* 5.9 9.2 9.6   RBC 2.73* 2.60* 2.72* 2.75*   HCT 26.0* 24.4* 25.8* 26.1*   MCV 95 94 95 95   MCH 30.4 30.0 29.8 30.2   MCHC 31.9 32.0 31.4* 31.8   RDW 19.0* 19.6* 20.0* 19.7*    166 160 212     INR    Recent Labs  Lab 10/02/18  0650 10/01/18  0803 09/30/18  0729 09/29/18  0752   INR 2.12* 1.69* 1.40* 1.22*     ABGNo lab results found in last 7 days.   Urine Studies  Recent Labs   Lab Test  09/24/18   0100  09/14/18   1418  05/31/18   1643  03/21/18   1139   COLOR  Yellow  Yellow  Yellow  Yellow   APPEARANCE  Clear  Cloudy  Clear  Slightly Cloudy   URINEGLC  Negative  150*  Negative  Negative   URINEBILI  Negative  Negative  Negative  Negative   URINEKETONE  Negative  10*  Negative  Negative   SG  1.020  1.028  1.012  1.011   UBLD  Moderate*  Moderate*  Trace*  Trace*    URINEPH  6.0  6.5  5.5  5.0   PROTEIN  100*  >600*  30*  10*   NITRITE  Negative  Negative  Negative  Negative   LEUKEST  Trace*  Large*  Negative  Large*   RBCU  5  7*  9*  2   WBCU  2  34*  2  159*     Recent Labs   Lab Test  09/14/18   1418  05/27/16   1409  04/29/11   1348  09/14/10   1134   UTPG  24.91*  Specimen not received  NOTIFIED [facility]  WHO PAGED  RN, LOPEZ AT 1355. NO URINE   RECIEVED WITH BLOOD SPECIMENS. AB    0.04  <0.02     PTH  Recent Labs   Lab Test  06/05/18   0548  08/24/16   0852   PTHI  78  333*     Iron Studies  Recent Labs   Lab Test  11/08/16   1209  08/24/16   0852   IRON  17*  20*   FEB  146*  197*   IRONSAT  11*  10*   BARTOLO   --   1025*     IMAGING:  All imaging studies reviewed by me.   Patient was seen and evaluated by me, Zachary Reed MD. I have reviewed the note and agree with the the plan of care as documented by the fellow.

## 2018-10-02 NOTE — PROVIDER NOTIFICATION
Patient's temperature this AM was low; 94.4 and 94.3 axillary. Lissy hugger applied. Re-check at 1235 was 96.7F. Patient also triggered sepsis this morning around 1030. Lactic acid drawn at 1120. Results still not pended by 1230 so RN contacted main lab. Will continue to monitor.    Jodee Lopez RN on 10/2/2018 at 12:43 PM

## 2018-10-02 NOTE — PLAN OF CARE
Problem: Patient Care Overview  Goal: Plan of Care/Patient Progress Review  Outcome: No Change  VSS on RA. Afebrile. ADRIA orientation. Heparin running at 800units/hour. L PIV TKO for ABX. Patient has wound on L side of penis. Assessed by WOC today. Daughter requested that tono cares be done 2-3 times daily. Interdry and barrier cream applied to groin. Virk catheter patent and draining. Patient had NJ tube placed in xray today d/t dysphagia. Turned/repoed every 4 hours. Tono care done 2x this shift. Weak, gurgly cough. Oral suction as needed. Mittens on hands to prevent pulling at tubing. No nonverbal indicators of pain noted.  Family at bedside. Continue to monitor.    Jodee Lopez RN on 10/1/2018 at 8:11 PM

## 2018-10-03 NOTE — PLAN OF CARE
Problem: Patient Care Overview  Goal: Plan of Care/Patient Progress Review  Outcome: No Change  VSS on room air, pt is somulent, arouses to voice, reposition and changed q 2 hours, pt having frequent loose watery stools, patino intact draining little UOP, tubefeed at 10 ml to be increased at 0400 to 20 ml with goal of 40 cc

## 2018-10-03 NOTE — PROGRESS NOTES
Rec'd call from 5A TED [facility] that pt has fever and internal jugular has not been used for HD for several days. Blood culture, aerobic only,  ordered from ea limb.  Labs drawn, new Tegos placed and flushed with NS. Site care done.

## 2018-10-03 NOTE — PROGRESS NOTES
[facility], [place]   Transplant Nephrology Progress Note  Date of Admission:  9/11/2018    Assessment & Plan   Priscilla Way is a 78 year old male with h/o simultaneous liver and kidney transplant in 2000 for hepatitis C.He was admitted  for AMS and seizures. Underwent CTA with contrast of brain neck for evaluation complicated by ROMANA required HD. Also c/b aspiration PNA and c diff. We are on consult now for anuric renal failure.     Baseline Cr ~ 1.2-1.4; the patient has anuric LILIAM on on dialysis since 9/16/18.  His LILIAM is likely multifactorial but related to contrast induced nephropathy with underlying ckd.  The creatinine of 1.2-1.4 is an overestimation of his renal function and a kidney function 8/2016 is significant for early diabetic changes with secondary focal segmental sclerosis. There is some potential for renal recovery after the insulting agent of IV contrast.     Dialysis has been started but he has had several intolerance episodes including severe hypotension on dialysis, worsening breathing with dialysis, and constantly trying to remove his dialysis catheter which has prompted restraints for his safety.     # DDKT and liver transplant:  - Basline Cr ~ 1.2- 1,4; Cr stable, oliguric.   - No bleeding from the dialysis cath site. Patient is on heparin gtt.   - UA with 34 WBC and 7 RBC and large LE. Urine Cx NGTD. UA with no hydronephrosis.   - Proteinuria: Nephrotic range, likely due to concentrated urine in the setting of acute renal failure with background DM nephropathy.   - Latest DSA: No                   Date of DSA last checked: 8/2016  - BK: No  - Kidney Tx Biopsy: Yes- 8/2015- ATN, DM nephropathy with features of FSGS and moderate to severe arteriosclerosis  - No further HD will be done, unless there is clear indication for cause.      # Liver transplant :                     - LFTs and Alk phos normal WNL.        # Immunosuppression:                     - Tac level  "4 (goal 4-6). Will continue liquid tacrolimus at the same dose, and can begin to titrate the MMF down to 250mg BID IV + 5mg prednisone.                        # Hypertension:                    - Stable no changes                  # Anemia in chronic renal disease:                  - Hgb: 7.7                  - Iron studies: Low. Oral ferrous sulfate may clog NG tube. IV iron may be an option        # Mineral Bone Disorder:   - Secondary renal hyperparathyroidism; PTH level 78 on 2018  - Calcium: 7.2,  Normal when corrected for albumin        # Electrolytes:   - Potassium: 3.6  - Na: 138       # Other Medical Issues:   # seziure disorder- admitted with AMS. Seen by neurology. On kera.   # h/o CVA:  Minimally interactive at baseline. Palliative care on consult. NG tube placed and TF started   # Cdiff- oral vancomycin started   # DM- management per primary.       # Transplant History:  Etiology of kidney failure: Hepatitis C  Transplant: 2000 (Kidney), 2000 (Liver)  Donor Type:  - Brain Death                      Donor Class: Standard Criteria Donor  Significant changes in immunosuppression: see above  Significant Complications: Oliguric LILIAM     Recommendations were communicated to the primary team verbally    Neil Watkins MD     Interval History   Patient was seen and examined this morning. He has started tube feeds and tolerating well. Medications are being administered via NG tube. Tacrolimus level is 4 ([id number] level). His blood pressure is controlled today. His urine output has dropped, and his Cr is increasing.     Physical Exam      /66 (BP Location: Right arm)  Pulse 65  Temp 97  F (36.1  C) (Axillary)  Resp 18  Ht 1.778 m (5' 10\")  Wt 78.6 kg (173 lb 3.2 oz)  SpO2 99%  BMI 24.85 kg/m2       GENERAL APPEARANCE: not able to do any communication   HENT: head in normocephalic/atraumatic   LYMPHATICS: no cervical or supraclavicular nodes  RESP: Rales to lung base B/L, no " wheezes  CV: regular rhythm, normal rate, no rub, no murmur  EDEMA: +2 LE edema bilaterally  ABDOMEN: soft, nondistended, nontender, bowel sounds normal  MS: upper extremities in half contraction position, overall muscle atrophy and joint stiffness  SKIN: intact, warm and dry     Data   All labs reviewed by me.  CMP    Recent Labs  Lab 10/03/18  0752 10/02/18  1339 10/02/18  0650 10/01/18  0803 09/30/18  0729     --  136 137 136   POTASSIUM 3.6  --  3.8 3.7 3.9   CHLORIDE 106  --  104 104 103   CO2 21  --  23 24 25   ANIONGAP 10  --  10 9 8   *  --  102* 91 90   BUN 21  --  20 19 17   CR 1.84*  --  1.67* 1.68* 1.51*   GFRESTIMATED 36*  --  40* 40* 45*   GFRESTBLACK 43*  --  48* 48* 54*   JOSHUA 7.2*  --  7.6* 7.7* 7.7*   MAG 2.2 2.2 2.3 1.8 2.0   PHOS 3.4 4.3  --  3.3 3.4   PROTTOTAL  --   --   --  5.5*  --    ALBUMIN  --   --   --  1.9*  --    BILITOTAL  --   --   --  0.3  --    ALKPHOS  --   --   --  80  --    AST  --   --   --  13  --    ALT  --   --   --  8  --      CBC    Recent Labs  Lab 10/03/18  0752 10/02/18  0650 09/30/18  0729 09/28/18  1419   HGB 7.7* 8.3* 7.8* 8.1*   WBC 3.7* 3.4* 5.9 9.2   RBC 2.58* 2.73* 2.60* 2.72*   HCT 25.0* 26.0* 24.4* 25.8*   MCV 97 95 94 95   MCH 29.8 30.4 30.0 29.8   MCHC 30.8* 31.9 32.0 31.4*   RDW 18.5* 19.0* 19.6* 20.0*   * 153 166 160     INR    Recent Labs  Lab 10/03/18  0752 10/02/18  0650 10/01/18  0803 09/30/18  0729   INR 4.28* 2.12* 1.69* 1.40*     ABGNo lab results found in last 7 days.   Urine Studies  Recent Labs   Lab Test  09/24/18   0100  09/14/18   1418  05/31/18   1643  03/21/18   1139   COLOR  Yellow  Yellow  Yellow  Yellow   APPEARANCE  Clear  Cloudy  Clear  Slightly Cloudy   URINEGLC  Negative  150*  Negative  Negative   URINEBILI  Negative  Negative  Negative  Negative   URINEKETONE  Negative  10*  Negative  Negative   SG  1.020  1.028  1.012  1.011   UBLD  Moderate*  Moderate*  Trace*  Trace*   URINEPH  6.0  6.5  5.5  5.0   PROTEIN  100*   >600*  30*  10*   NITRITE  Negative  Negative  Negative  Negative   LEUKEST  Trace*  Large*  Negative  Large*   RBCU  5  7*  9*  2   WBCU  2  34*  2  159*     Recent Labs   Lab Test  09/14/18   1418  05/27/16   1409  04/29/11   1348  09/14/10   1134   UTPG  24.91*  Specimen not received  NOTIFIED [facility]  WHO PAGED  RN, LOPEZ AT 1355. NO URINE   RECIEVED WITH BLOOD SPECIMENS. AB    0.04  <0.02     PTH  Recent Labs   Lab Test  06/05/18   0548  08/24/16   0852   PTHI  78  333*     Iron Studies  Recent Labs   Lab Test  11/08/16   1209  08/24/16   0852   IRON  17*  20*   FEB  146*  197*   IRONSAT  11*  10*   BARTOLO   --   1025*     IMAGING:  All imaging studies reviewed by me.     Patient was seen and evaluated by me, Zachary Reed MD. I have reviewed the note and agree with the the plan of care as documented by the fellow.

## 2018-10-03 NOTE — PLAN OF CARE
Problem: Feed Dysfunction/Swallow Impair (Infant)  Goal: Reduced Risk of Aspiration  Patient will demonstrate the desired outcomes by discharge/transition of care.   Outcome: Improving  Avoiding oral intake. Patient has NJ tube for feedings and oral meds.  Goal: Adequate Nutrition and Hydration  Patient will demonstrate the desired outcomes by discharge/transition of care.   Outcome: Improving  Tube feedings running

## 2018-10-03 NOTE — PHARMACY-ANTICOAGULATION SERVICE
Clinical Pharmacy - Warfarin Dosing Consult     Pharmacy has been consulted to manage this patient s warfarin therapy.  Significant drug interactions: on Flagyl  Dose Comments: INR with significant uptrend today    INR   Date Value Ref Range Status   10/03/2018 4.28 (H) 0.86 - 1.14 Final   10/02/2018 2.12 (H) 0.86 - 1.14 Final       Recommend HOLD warfarin today.  Pharmacy will monitor Priscilla Way daily and order warfarin doses to achieve specified goal.      Please contact pharmacy as soon as possible if the warfarin needs to be held for a procedure or if the warfarin goals change.      Mayra Muir, PharmD

## 2018-10-03 NOTE — PROGRESS NOTES
[facility], [place]    Internal Medicine Progress Note - Gold Service      Assessment & Plan  Priscilla Way is a 78 year old male with a complex past medical history including dementia (Alzheimer and vascular), liver transplant and kidney transplants in 2000, DVT, T2DM, CVA, and poor airway protection admitted with possible seizure and aspiration.  He is currently hemodynamically stable with ongoing concerns for aspirations/airway protection, nutritional support, and monitoring renal function off of HD.  NJ was placed on 10/1 and will trial medications through NJ and work on tube feeds.      # DDKT and Liver Transplant  # Anuric Renal Failure s/p HD with baseline CKD: Suspected secondary to contrast and now improving.  HD on 9/26 with UF 9/27 then plan to monitor over the weekend with no HD or UF.   - Transplant nephrology consulted, appreciate assistance  - No HD since 9/26  - per nephrology cut back on lasix to 40 mg daily IV from BID  - Trial of medications by NJ this evening and if well tolerated will transition all to enteral  - Mycophenolate IV, prednisone IV changed to per tube  - Tacro PO only available (take if able, will trial buccal)      # Aspiration Pneumonia and poor Airway Protection  Recurrent small aspirations per speech evaluation and signficant aspiration event with possible seizure PTA.  Discussed with family regarding concerns for high risk of recurrent aspirations.  Per both daugthers, trial of NJ was started on 10/2 (placement on 10/1) with plans for 4-7 days of enteral nutrition and medication support followed by a re-evaluation    - Completed 7 day course of IV antibiotics previously  - Amp/Sulbactam restarted 9/26-9/28 but stopped with no changes on CXR  - NPO strict if any recurrence of aspiration, PO at present only if meeting parameters  - NJ placed and nutrition consulted for TF  - ID previously involved  - Ongoing discussion with family regarding goals of  care, see below  - Ethics updated, previously involved    # Hypothermia.     -sepsis work up ordered. Free T4 before has been fine.   -I wonder if this is neurological, he has ho Stroke and Bad Dementia, he is nonverbal.   -use bare hugger to keep the temp in normal range.  -check for adrenal insufficiency by cosyntropin stim test    # Acute on Chronic Normocytic Anemia  # Possible slow GI blood loss: Suspect multifactorial including related to chronic disease and LILIAM as well as iatrogenic though patient reportedly had melanotic stools though hemoglobins have since been stable.  - Continue PPI BID   - Daily hemoglobins and as needed for signs of bleeding  - Transfuse for hemoglobin <7  # Recurrent C. Diff Colitis: stop IV metronidazole. Change to PO vanc  - Continue for 10 days post aspiration Abx. (last stopped 9/28), it will be till 10/8  # Possible Seizure Activity  # Baseline Dementia: Full body convulsions witnessed PTA with reported associated aspiration.  CT head and CTA without acute changes.  Family feels patient is back to baseline mental status.  - Neurology consulted, appreciate assistance  - Levetiracetam initiated but per family this is not consistent with goals of care so this has been discontinued  - Seizure precautions  # DVTOn warfarin PTA but unable to tolerate PO well at present, NJ placed 10/1  - stop Heparin gtt, now that INR is therapeutic  - Warfarin by NJ  # T2DM: Follow daily BG. Currently stable  # HTN: Home amlodipine and carvedilol trial via NJ.  Hydralazine PRN  # LE Edema- Lymphedema consult    Diet: Dysphagia Diet Level 1 Pureed Nectar Thickened Liquids (pre-thickened or use instant food thickener)  Snacks/Supplements Adult: Boost Plus; With Meals  Adult Formula Drip Feeding: Continuous TwoCal HN; Nasojejunal; Goal Rate: 40; mL/hr; Medication - Tube Feeding Flush Frequency: At least 15-30 mL water before and after medication administration and with tube clogging; Amount to Send  (Nutrition us...     Fluids: As above  Virk Catheter: in place, indication: Strict 1-2 Hour I&O  DVT Prophylaxis: Heparin gtt bridging to warfarin  Code Status: DNR/DNI    Disposition Plan   Expected discharge: 4 - 7 days, recommended to prior living arrangement once enteral nutrition/medication plan in place and renal function stable.     Entered: Michel Maynard MD 10/03/2018, 5:12 PM   Information in the above section will display in the discharge planner report.      The patient's care was discussed with the Bedside Nurse, Patient, Patient's Family and Nephrology Consultant.    Michel Maynard MD  Internal Medicine Staff Hospitalist Service  [facility]  Pager: [phone]  Please see sticky note for cross cover information    Interval History     Non verbal. Has been using bare hugger for low temperatures. Has been tolerating oral feeding fine.     Data reviewed today: I reviewed all medications, new labs and imaging results over the last 24 hours.    Physical Exam      Vital Signs: Temp: 97  F (36.1  C) Temp src: Axillary BP: 137/66 Pulse: 65 Heart Rate: 76 Resp: 18 SpO2: 99 % O2 Device: None (Room air)    Weight: 173 lbs 3.2 oz  General Appearance: Asleep male laying in bed in no acute distress, spontaneously opening eyes, NJ in place.  Respiratory: Mild bibasilar crackles with no increased work of breathing on room air. Bilateral rhonchi with no wheezing  Cardiovascular: RRR, no m/r/g  GI: Soft, non-tender, non-distended  Skin: No rashes  Other: Bilateral LE edema present and stable from prior. Patient non-verbal with writer, family not present in room

## 2018-10-03 NOTE — PLAN OF CARE
Problem: Patient Care Overview  Goal: Plan of Care/Patient Progress Review  Outcome: No Change  Reason for admission: Aspiration pneumonia   Hx: Dementia, seizures, LILIAM, dialysis.   Neuro: Pt arouses to stimulation. Facial drooping.   Activity: Ax2 with repositions in bed. Q2H repo schedule.   Vitals: VSS on RA ex slightly hypothermic.  Lissy hugger on.      LDAS: Foly cath in place.  L PIV infusing heparin gtt at 800 units/ hr. R PIV at TKO with intermittent antibiotics. R internal jugular.  NJ infusing tube feed at 20ml/hr. Scheduled Vanco PO given   Cardiac: No acute changes this shift.       Respiratory: Stable on RA. BS coarse.   GI/: Incont. BM x1. Virk cath w/ small urine output.   Skin: Wound on L side of penis. +3 Edema in LE and scrotum. Turning Q2H, heels suspended, incontinents care.   Pain: No nonverbal signs of pain.   Diet: Increased rate of TF from 10mL/hr to 20mL/hr at 0400. Goal rate is 40mL/hr. Calorie counts.   Plan: Bridging heparin gtt to warfarin. Continue monitoring and follow POC.

## 2018-10-03 NOTE — PLAN OF CARE
Problem: Feed Dysfunction/Swallow Impair (Infant)  Goal: Reduced Risk of Aspiration  Patient will demonstrate the desired outcomes by discharge/transition of care.   Outcome: Improving  NJ tube  Goal: Adequate Nutrition and Hydration  Patient will demonstrate the desired outcomes by discharge/transition of care.   Outcome: Improving  Free water flush increased    Problem: Infection, Risk/Actual (Adult)  Goal: Infection Prevention/Resolution  Patient will demonstrate the desired outcomes by discharge/transition of care.  Outcome: No Change  Running blood cultures and urine culture

## 2018-10-03 NOTE — PLAN OF CARE
Problem: Feed Dysfunction/Swallow Impair (Infant)  Goal: Reduced Risk of Aspiration  Patient will demonstrate the desired outcomes by discharge/transition of care.   Outcome: Improving  Patient  Now getting meds through NJ tube.

## 2018-10-03 NOTE — PLAN OF CARE
Problem: Patient Care Overview  Goal: Plan of Care/Patient Progress Review  Outcome: No Change  [id number]: VSS on RA ex for low temperatures this morning .Lowest was 94.3F axillary. Lissy hugger applied and patient improved to 97.0. Temperatures stable the remainder of the shift. Unable to assess orientation. Patient is very lethargic and somnolent today. Team is aware of change. Heparin drip therapeutic; still running at 800units/hr. RPIV TKO for IV ABX. Started tube feeding through NJ around 1600. Running at 10mL/hr. Goal is 40mL. Tolerating well so far. Oral meds to go through G-tube. Blood sugars on the low end but stable today. Gave small amounts of apple juice through NJ tube.Virk catheter patent and draining small amounts of tea colored urine. Groin wound on L side of penis. Paula-care done 2x today and barrier cream applied. Incontinent of 3-4 soft/loose BMs. Skin on bottom is CDI. Repositioned every few hours to protect skin. +3 edema in BLE. Legs elevated on pillows. Mittens on hands for safety with tubing. No signs of pain. Family at bedside. Continue to monitor.    Jodee Lopez RN on 10/2/2018 at 8:37 PM

## 2018-10-04 NOTE — PLAN OF CARE
Problem: Patient Care Overview  Goal: Plan of Care/Patient Progress Review  Outcome: No Change  [id number]: VSS ex for low temperatures this morning. Lissy hugger applied and temperatures steadily improved throughout the day. Lissy hugger removed around 1800 when temp was 98.7 but left in room in case needed again. Unable to assess orientation. Patient is lethargic. Triggered sepsis this AM. Blood cultures ordered. Dialysis nurse obtained blood culture from dialysis line and changed dressing. Heparin drip DC'd today. RPIV TKO for IV ABX. NJ with TF running at 30mL/hr.  Goal is 40mL. Patient is having a lot of diarrhea today. Citrucell ordered to help bulk up stools. Oral meds to go through G-tube. Blood sugars stable. Virk catheter patent and draining small amounts of tea colored urine. Groin wound on L side of penis. Paula-care done 3x today and barrier cream applied. Incontinent of 3-4 loose BMs. Skin on bottom is CDI. Repositioned every few hours to protect skin. +3 edema in BLE. Legs elevated on pillows + soft boots applied. Mittens on hands for safety with tubing. No signs of pain. Family at bedside. Continue to monitor.    Jodee Lopez RN on 10/3/2018 at 8:03 PM

## 2018-10-04 NOTE — PROGRESS NOTES
[facility], [place]    Internal Medicine Progress Note - Gold Service      Assessment & Plan  Priscilla Way is a 78 year old male with a complex past medical history including dementia (Alzheimer and vascular), liver transplant and kidney transplants in 2000, DVT, T2DM, CVA, and poor airway protection admitted with possible seizure and aspiration.  He is currently hemodynamically stable with ongoing concerns for aspirations/airway protection, nutritional support, and monitoring renal function off of HD.  NJ was placed on 10/1 and will trial medications through NJ and work on tube feeds.      # DDKT and Liver Transplant  # Anuric Renal Failure s/p HD with baseline CKD: Suspected secondary to contrast and now improving.  HD on 9/26 with UF 9/27 then plan to monitor over the weekend with no HD or UF.   - Transplant nephrology consulted, appreciate assistance  - No HD since 9/26  - per nephrology cut back on lasix to 40 mg daily IV from BID, but they are also giving him fluid bolus, yesterday I had increased free water flushing.   - Trial of medications by NJ this evening and if well tolerated will transition all to enteral  - Mycophenolate IV, prednisone IV changed to per tube  - Tacro PO only available (take if able, will trial buccal)      # Aspiration Pneumonia and poor Airway Protection  Recurrent small aspirations per speech evaluation and signficant aspiration event with possible seizure PTA.  Discussed with family regarding concerns for high risk of recurrent aspirations.  Per both daugthers, trial of NJ was started on 10/2 (placement on 10/1) with plans for 4-7 days of enteral nutrition and medication support followed by a re-evaluation    - Completed 7 day course of IV antibiotics previously  - Amp/Sulbactam restarted 9/26-9/28 but stopped with no changes on CXR  - NPO strict if any recurrence of aspiration, PO at present only if meeting parameters  - NJ placed and nutrition  consulted for TF  - ID previously involved  - Ongoing discussion with family regarding goals of care, see below  - Ethics updated, previously involved    # Hypothermia, May have UTI    -sepsis work up ordered. Free T4 before has been fine.   -UA is positive, may have UTI. Start Vanc and [place]pehin   -use bare hugger to keep the temp in normal range.  -check for adrenal insufficiency by cosyntropin stim test    # Acute on Chronic Normocytic Anemia  # Possible slow GI blood loss: Suspect multifactorial including related to chronic disease and LILIAM as well as iatrogenic though patient reportedly had melanotic stools though hemoglobins have since been stable.  - Continue PPI BID   - Daily hemoglobins and as needed for signs of bleeding  - Transfuse for hemoglobin <7  # Recurrent C. Diff Colitis: stop IV metronidazole. Change to PO vanc  - Continue for 10 days post aspiration Abx. (last stopped 9/28), it will be till 10/8  # Possible Seizure Activity  # Baseline Dementia: Full body convulsions witnessed PTA with reported associated aspiration.  CT head and CTA without acute changes.  Family feels patient is back to baseline mental status.  - Neurology consulted, appreciate assistance  - Levetiracetam initiated but per family this is not consistent with goals of care so this has been discontinued  - Seizure precautions  # DVTOn warfarin PTA but unable to tolerate PO well at present, NJ placed 10/1  - stop Heparin gtt, now that INR is therapeutic  - Warfarin by NJ  # T2DM: Follow daily BG. Currently stable  # HTN: Home amlodipine and carvedilol trial via NJ.  Hydralazine PRN  # LE Edema- Lymphedema consult    Diet: Dysphagia Diet Level 1 Pureed Nectar Thickened Liquids (pre-thickened or use instant food thickener)  Snacks/Supplements Adult: Boost Plus; With Meals  Adult Formula Drip Feeding: Continuous TwoCal HN; Nasojejunal; Goal Rate: 40; mL/hr; Medication - Tube Feeding Flush Frequency: At least 15-30 mL water before and  after medication administration and with tube clogging; Amount to Send (Nutrition us...     Fluids: As above  Virk Catheter: in place, indication: Strict 1-2 Hour I&O  DVT Prophylaxis: Heparin gtt bridging to warfarin  Code Status: DNR/DNI    Disposition Plan   Expected discharge: 4 - 7 days, recommended to prior living arrangement once enteral nutrition/medication plan in place and renal function stable.     Entered: Michel Maynard MD 10/04/2018, 6:51 PM   Information in the above section will display in the discharge planner report.      The patient's care was discussed with the Bedside Nurse, Patient, Patient's Family and Nephrology Consultant.    Michel Maynard MD  Internal Medicine Staff Hospitalist Service  [facility]  Pager: [phone]  Please see sticky note for cross cover information    Interval History     Non verbal.  He is sleeping most of the time    Data reviewed today: I reviewed all medications, new labs and imaging results over the last 24 hours.    Physical Exam      Vital Signs: Temp: 95.3  F (35.2  C) Temp src: Axillary BP: 98/41   Heart Rate: 75 Resp: 16 SpO2: 99 % O2 Device: None (Room air)    Weight: 173 lbs 3.2 oz  General Appearance: Asleep male laying in bed in no acute distress, spontaneously opening eyes, NJ in place.  Respiratory: Mild bibasilar crackles with no increased work of breathing on room air. Bilateral rhonchi with no wheezing  Cardiovascular: RRR, no m/r/g  GI: Soft, non-tender, non-distended  Skin: No rashes  Other: Bilateral LE edema present and stable from prior. Patient non-verbal with writer, family not present in room

## 2018-10-04 NOTE — PLAN OF CARE
Problem: Patient Care Overview  Goal: Plan of Care/Patient Progress Review  Outcome: No Change  Pt mentation hard to assess due to dementia. VSS on room air, not using callight. Pt on specialty air mattress, reposition and changed q 2 hours. Pt having frequent loose watery stools, patino intact draining little UOP. TF currently at 30 ml/hr. Meds through the tube. On PO Vanco. Will continue to assess per POC.

## 2018-10-04 NOTE — PHARMACY-ANTICOAGULATION SERVICE
Clinical Pharmacy - Warfarin Dosing Consult     Pharmacy has been consulted to manage this patient s warfarin therapy.  Significant drug interactions: on Flagyl  Dosing notes: remains supratherapeutic    INR   Date Value Ref Range Status   10/04/2018 4.17 (H) 0.86 - 1.14 Final   10/03/2018 4.28 (H) 0.86 - 1.14 Final       1. Recommend HOLD warfarin today.  Pharmacy will monitor Priscilla Way daily and order warfarin doses to achieve specified goal.    2. Noted that patient has some hematuria however a stable hemoglobin. Please monitor this and if worsens or patient experiences significant hemoglobin drop, can consider warfarin reversal.    Please contact pharmacy as soon as possible if the warfarin needs to be held for a procedure or if the warfarin goals change.        Mayra Muir, PharmD

## 2018-10-04 NOTE — PHARMACY-VANCOMYCIN DOSING SERVICE
Pharmacy Vancomycin Initial Note  Date of Service 2018  Patient's  1940  78 year old, male    Indication: Urinary Tract Infection    Current estimated CrCl = Estimated Creatinine Clearance: 32.8 mL/min (based on Cr of 2.03).    Creatinine for last 3 days  10/2/2018:  6:50 AM Creatinine 1.67 mg/dL  10/3/2018:  7:52 AM Creatinine 1.84 mg/dL  10/4/2018:  8:05 AM Creatinine 2.03 mg/dL    Recent Vancomycin Level(s) for last 3 days  No results found for requested labs within last 72 hours.      Vancomycin IV Administrations (past 72 hours)      No vancomycin orders with administrations in past 72 hours.                Nephrotoxins and other renal medications (Future)    Start     Dose/Rate Route Frequency Ordered Stop    10/04/18 0800  furosemide (LASIX) injection 40 mg      40 mg  over 1-2 Minutes Intravenous DAILY 10/03/18 1706      18 2100  tacrolimus (PROGRAF BRAND) suspension 1.5 mg      1.5 mg Oral 2 TIMES DAILY. 18 20518 1700  vancomycin (FIRVANQ) oral solution 125 mg      125 mg Oral 4 TIMES DAILY 18 1656            Contrast Orders - past 72 hours (72h ago through future)    Start     Dose/Rate Route Frequency Ordered Stop    10/01/18 1330  barium sulfate (EZ PAQUE) oral suspension 96%       Oral ONCE 10/01/18 1329 10/01/18 1523                Plan:  1.  Start vancomycin  1250 mg IV ONCE (intermittent dosing based on levels due to LILIAM).   2.  Goal Trough Level: 10-15 mg/L   3.  Pharmacy will check trough levels as appropriate in 1-3 Days.    4. Serum creatinine levels will be ordered daily for the first week of therapy and at least twice weekly for subsequent weeks.        Mayra Muir, PharmD

## 2018-10-05 NOTE — PROGRESS NOTES
[facility], [place]   Transplant Nephrology Progress Note  Date of Admission:  9/11/2018    Assessment & Plan   Priscilla Way is a 78 year old male with h/o simultaneous liver and kidney transplant in 2000 for hepatitis C.He was admitted  for AMS and seizures. Underwent CTA with contrast of brain neck for evaluation complicated by ROMANA required HD. Also c/b aspiration PNA and c diff. We are on consult now for anuric renal failure.     Baseline Cr ~ 1.2-1.4; the patient has anuric LILIAM on on dialysis since 9/16/18.  His LILIAM is likely multifactorial but related to contrast induced nephropathy with underlying ckd.  The creatinine of 1.2-1.4 is an overestimation of his renal function and a kidney function 8/2016 is significant for early diabetic changes with secondary focal segmental sclerosis. There is some potential for renal recovery after the insulting agent of IV contrast.     Dialysis has been started but he has had several intolerance episodes including severe hypotension on dialysis, worsening breathing with dialysis, and constantly trying to remove his dialysis catheter which has prompted restraints for his safety.     # DDKT and liver transplant:  - Basline Cr ~ 1.2- 1,4; Cr rising, oliguric. May be due to rising Tacrolimus level   - No bleeding from the dialysis cath site. Patient is on heparin gtt.   - UA with 34 WBC and 7 RBC and large LE. Urine Cx NGTD. UA with no hydronephrosis.   - Proteinuria: Nephrotic range, likely due to concentrated urine in the setting of acute renal failure with background DM nephropathy.   - Latest DSA: No                   Date of DSA last checked: 8/2016  - BK: No  - Kidney Tx Biopsy: Yes- 8/2015- ATN, DM nephropathy with features of FSGS and moderate to severe arteriosclerosis  - No further HD will be done, unless there is clear indication for cause.      # Liver transplant :                     - LFTs and Alk phos normal WNL.        #  "Immunosuppression:                     - Tac level 4 (goal 4-6). Will continue liquid tacrolimus at the same dose, and can decrease MMF to 250mg BID IV + 5mg prednisone.                        # Hypertension:                    - Stable no changes                  # Anemia in chronic renal disease:                  - Hgb: 8.1                  - Iron studies: Low. Oral ferrous sulfate can be started. IV iron may be an option also if unable to take through TF        # Mineral Bone Disorder:   - Secondary renal hyperparathyroidism; PTH level 78 on 2018  - Calcium: 7.3,  Normal when corrected for albumin        # Electrolytes:   - Potassium: 3.7  - Na: 138       # Other Medical Issues:   # seziure disorder- admitted with AMS. Seen by neurology. On keppra.   # h/o CVA:  Minimally interactive at baseline. Palliative care on consult. NG tube placed and TF started   # Cdiff- oral vancomycin started , still having diarrhea.   #Hypothermia - might be from aspiration event and unresolved pneumonia. Urine positive for infectious etiology. Vancomycin + rocephin started.  # DM- management per primary.       # Transplant History:  Etiology of kidney failure: Hepatitis C  Transplant: 2000 (Kidney), 2000 (Liver)  Donor Type:  - Brain Death                      Donor Class: Standard Criteria Donor  Significant changes in immunosuppression: see above  Significant Complications: Oliguric LILIAM     Recommendations were communicated to the primary team verbally    Neil Watkins MD     Interval History   Patient was seen and examined this morning. Blood pressure is controlled, urine output is decreased compared to previous days. TF well tolerated.     Physical Exam      BP 98/41  Pulse 65  Temp 95.3  F (35.2  C) (Axillary)  Resp 16  Ht 1.778 m (5' 10\")  Wt 78.6 kg (173 lb 3.2 oz)  SpO2 99%  BMI 24.85 kg/m2       GENERAL APPEARANCE: not able to do any communication   HENT: head in normocephalic/atraumatic "   LYMPHATICS: no cervical or supraclavicular nodes  RESP: Rales to lung base B/L, no wheezes  CV: regular rhythm, normal rate, no rub, no murmur  EDEMA: +2 LE edema bilaterally  ABDOMEN: soft, nondistended, nontender, bowel sounds normal  MS: upper extremities in half contraction position, overall muscle atrophy and joint stiffness  SKIN: intact, warm and dry     Data   All labs reviewed by me.  CMP    Recent Labs  Lab 10/04/18  0805 10/03/18  0752 10/02/18  1339 10/02/18  0650 10/01/18  0803    138  --  136 137   POTASSIUM 3.7 3.6  --  3.8 3.7   CHLORIDE 108 106  --  104 104   CO2 21 21  --  23 24   ANIONGAP 10 10  --  10 9   * 134*  --  102* 91   BUN 24 21  --  20 19   CR 2.03* 1.84*  --  1.67* 1.68*   GFRESTIMATED 32* 36*  --  40* 40*   GFRESTBLACK 38* 43*  --  48* 48*   JOSHUA 7.3* 7.2*  --  7.6* 7.7*   MAG 2.2 2.2 2.2 2.3 1.8   PHOS 2.3* 3.4 4.3  --  3.3   PROTTOTAL  --   --   --   --  5.5*   ALBUMIN  --   --   --   --  1.9*   BILITOTAL  --   --   --   --  0.3   ALKPHOS  --   --   --   --  80   AST  --   --   --   --  13   ALT  --   --   --   --  8     CBC    Recent Labs  Lab 10/04/18  0805 10/03/18  0752 10/02/18  0650 09/30/18  0729   HGB 8.1* 7.7* 8.3* 7.8*   WBC 4.6 3.7* 3.4* 5.9   RBC 2.70* 2.58* 2.73* 2.60*   HCT 25.4* 25.0* 26.0* 24.4*   MCV 94 97 95 94   MCH 30.0 29.8 30.4 30.0   MCHC 31.9 30.8* 31.9 32.0   RDW 18.8* 18.5* 19.0* 19.6*    142* 153 166     INR    Recent Labs  Lab 10/04/18  0805 10/03/18  0752 10/02/18  0650 10/01/18  0803   INR 4.17* 4.28* 2.12* 1.69*     ABGNo lab results found in last 7 days.   Urine Studies  Recent Labs   Lab Test  10/04/18   1330  10/03/18   2056  09/24/18   0100  09/14/18   1418   COLOR  Rhea  Dark Brown  Yellow  Yellow   APPEARANCE  Cloudy  Cloudy  Clear  Cloudy   URINEGLC  Negative  Negative  Negative  150*   URINEBILI  Negative  Negative  Negative  Negative   URINEKETONE  Negative  10*  Negative  10*   SG  1.010  1.019  1.020  1.028   UBLD   Large*  Moderate*  Moderate*  Moderate*   URINEPH  6.0  5.0  6.0  6.5   PROTEIN  30*  30*  100*  >600*   NITRITE  Negative  Negative  Negative  Negative   LEUKEST  Moderate*  Large*  Trace*  Large*   RBCU  182*  >182*  5  7*   WBCU  >182*  >182*  2  34*     Recent Labs   Lab Test  09/14/18   1418  05/27/16   1409  04/29/11   1348  09/14/10   1134   UTPG  24.91*  Specimen not received  NOTIFIED [facility]  WHO PAGED  RN, LOPEZ AT 1355. NO URINE   RECIEVED WITH BLOOD SPECIMENS. AB    0.04  <0.02     PTH  Recent Labs   Lab Test  06/05/18   0548  08/24/16   0852   PTHI  78  333*     Iron Studies  Recent Labs   Lab Test  11/08/16   1209  08/24/16   0852   IRON  17*  20*   FEB  146*  197*   IRONSAT  11*  10*   BARTOLO   --   1025*     IMAGING:  All imaging studies reviewed by me.   Patient was seen and evaluated by me, Zachary Reed MD. I have reviewed the note and agree with the the plan of care as documented by the fellow.

## 2018-10-05 NOTE — PHARMACY-ANTICOAGULATION SERVICE
Clinical Pharmacy - Warfarin Dosing Consult     Pharmacy has been consulted to manage this patient s warfarin therapy.  Significant drug interactions: on Flagyl    INR   Date Value Ref Range Status   10/05/2018 4.22 (H) 0.86 - 1.14 Final   10/04/2018 4.17 (H) 0.86 - 1.14 Final       Recommend HOLD warfarin today.  Pharmacy will monitor Priscilla Way daily and order warfarin doses to achieve specified goal.      Please contact pharmacy as soon as possible if the warfarin needs to be held for a procedure or if the warfarin goals change.      Mayra Muir, PharmD

## 2018-10-05 NOTE — PROGRESS NOTES
[facility], [place]    Internal Medicine Progress Note - Gold Service      Assessment & Plan  Priscilla Way is a 78 year old male with a complex past medical history including dementia (Alzheimer and vascular), liver transplant and kidney transplants in 2000, DVT, T2DM, CVA, and poor airway protection admitted with possible seizure and aspiration.  He is currently hemodynamically stable with ongoing concerns for aspirations/airway protection, nutritional support, and monitoring renal function off of HD.  NJ was placed on 10/1 and will trial medications through NJ and work on tube feeds.      # DDKT and Liver Transplant  # Anuric Renal Failure s/p HD with baseline CKD: Suspected secondary to contrast and now improving.  HD on 9/26 with UF 9/27 then plan to monitor over the weekend with no HD or UF.   - Transplant nephrology consulted, appreciate assistance  - No HD since 9/26  - change lasix to 80 mg po daily from 40 mg daily IV. He is getting free water flushing.   - Trial of medications by NJ this evening and if well tolerated will transition all to enteral  - Tacro PO only available (take if able, will trial buccal), once level is from 4-6, plan is to stop MMF  - added iron supplements per nephrology recs      # Aspiration Pneumonia and poor Airway Protection  Recurrent small aspirations per speech evaluation and signficant aspiration event with possible seizure PTA.  Discussed with family regarding concerns for high risk of recurrent aspirations.  Per both daugthers, trial of NJ was started on 10/2 (placement on 10/1) with plans for 4-7 days of enteral nutrition and medication support followed by a re-evaluation    - Completed 7 day course of IV antibiotics previously  - Amp/Sulbactam restarted 9/26-9/28 but stopped with no changes on CXR  - NPO strict if any recurrence of aspiration, PO at present only if meeting parameters  - NJ placed and nutrition consulted for TF  - ID  previously involved  - Ongoing discussion with family regarding goals of care, see below  - Ethics updated, previously involved    # Hypothermia, May have UTI    -Sepsis work up ordered. Free T4 before has been fine.   -UA is positive, may have UTI. UC showed yeast, otherwise negative. Continue Vanc and Rocpehin   -Use bare hugger to keep the temp in normal range.  -Check for adrenal insufficiency by cosyntropin stim test if hypothermia is persistent.     # Acute on Chronic Normocytic Anemia  # Possible slow GI blood loss: Suspect multifactorial including related to chronic disease and LILIAM as well as iatrogenic though patient reportedly had melanotic stools though hemoglobins have since been stable.  - Continue PPI BID   - Daily hemoglobins and as needed for signs of bleeding  - Transfuse for hemoglobin <7  # Recurrent C. Diff Colitis: stop IV metronidazole. Change to PO vanc  - Continue for 10 days post aspiration Abx. (last stopped 9/28), it will be till 10/8  # Possible Seizure Activity  # Baseline Dementia: Full body convulsions witnessed PTA with reported associated aspiration.  CT head and CTA without acute changes.  Family feels patient is back to baseline mental status.  - Neurology consulted, appreciate assistance  - Levetiracetam initiated but per family this is not consistent with goals of care so this has been discontinued  - Seizure precautions  # DVT On warfarin PTA but unable to tolerate PO well at present, NJ placed 10/1  - off Heparin gtt, now that INR is therapeutic  - Warfarin by NJ  # T2DM: Follow daily BG. Currently stable. Start BG checks.   # HTN: If BP drops, plan is to cut back on amlodipine. Continue carvedilol trial via NJ.  Hydralazine PRN  # LE Edema- Lymphedema consult    Diet: Dysphagia Diet Level 1 Pureed Nectar Thickened Liquids (pre-thickened or use instant food thickener)  Snacks/Supplements Adult: Boost Plus; With Meals  Adult Formula Drip Feeding: Continuous TwoCal HN;  Nasojejunal; Goal Rate: 40; mL/hr; Medication - Tube Feeding Flush Frequency: At least 15-30 mL water before and after medication administration and with tube clogging; Amount to Send (Nutrition us...     Fluids: As above  Virk Catheter: in place, indication: Strict 1-2 Hour I&O, Strict 1-2 Hour I&O  DVT Prophylaxis: Heparin gtt bridging to warfarin  Code Status: DNR/DNI    Disposition Plan   Expected discharge: 4 - 7 days, recommended to prior living arrangement once enteral nutrition/medication plan in place and renal function stable.     Entered: Michel Maynard MD 10/05/2018, 4:37 PM   Information in the above section will display in the discharge planner report.      The patient's care was discussed with the Bedside Nurse, Patient, Patient's Family and Nephrology Consultant.    Michel Maynard MD  Internal Medicine Staff Hospitalist Service  [facility]  Pager: [phone]  Please see sticky note for cross cover information    Interval History     She is unchanged from before.     Data reviewed today: I reviewed all medications, new labs and imaging results over the last 24 hours.    Physical Exam      Vital Signs: Temp: 95.1  F (35.1  C) Temp src: Axillary BP: 105/62   Heart Rate: 78 Resp: 16 SpO2: 93 % O2 Device: None (Room air)    Weight: 185 lbs 6.4 oz  General Appearance: Asleep male laying in bed in no acute distress, spontaneously opening eyes, NJ in place.  Respiratory: Mild bibasilar crackles with no increased work of breathing on room air. Bilateral rhonchi with no wheezing  Cardiovascular: RRR, no m/r/g  GI: Soft, non-tender, non-distended  Skin: No rashes  Other: Bilateral LE edema present and stable from prior. Patient non-verbal with writer, family not present in room

## 2018-10-05 NOTE — PROGRESS NOTES
Antimicrobial Stewardship Team Note    Antimicrobial Stewardship Program - A joint venture between [facility] and  Physicians to optimize antibiotic management.  NOT a formal consult - Restricted Antimicrobial Review     Patient: Priscilla Way  MRN: [id number]  Allergies: Review of patient's allergies indicates no known allergies.    Brief Summary: Priscilla Way is a 78 year old male admitted on 9/11/2018. He has a history of dementia, liver transplant in 2000, kidney transplant in 2000, DVT, DM II and CVA and was admitted on 9/11 aspiration and possible seizure. On admission pt had an Txp ID consult (s/o 9/12), and was treated for Asp PNA (Unasyn 9/12-14->Zosyn 9/15-19; Unasyn 9/26-28), is currently being treated for C diff (Continuing for 10 days post Asp abx ending 10/8), and is now receiving treatment for a UTI.    HPI: On 10/3 pt became hypothermic (Tmin 93.1) and sepsis workup was ordered. Pt had a LA of 1. UA w/ large LE, moderate bacteria, >182 RBCs and WBCs. Repeat UA on 10/4 gave similar results, and pt was started on ceftriaxone and vanco for suspected UTI. Right internal jugular port BCx and peripheral BCx ngtd. UCx collected 10/3 + for 50-100K candida tropicalis. Pt does not appear to be symptomatic from a  perspective, though is oliguric. Of note, pt has been regularly hypothermic throughout his admission requiring a bear hugger for temperature normalization. Pt had a patino in place since 9/14 which was removed yesterday 1 hour prior to repeat UA.            Active Anti-infective Medications                Start     Stop      10/04/18 1215  Vancomycin Place Nance - Receiving Intermittent Dosing  1 each,   Does not apply,   SEE ADMIN INSTRUCTIONS     Urinary Tract Infection        --    10/04/18 1215  cefTRIAXone  1 g,   Intravenous,   EVERY 24 HOURS     uti        --    09/24/18 1700  vancomycin  125 mg,   Oral,   4 TIMES DAILY     Clostridium difficile        --           Assessment: Asymptomatic candiduria. Based on pt's clinical status, it appears that the only systemic sign of a potential infection is pt's hypothermia, which has been ongoing throughout the course of pt's admission and is likely not due to an infectious process. Since pt is asymptomatic from a  perspective, and pt is not at risk for invasive candidiasis, pt does not require treatment for candiduria at this time. Pt is not growing any bacteria on his UCx or BCx to date and does not require IV antibiotics at this time. Recommend getting a repeat UA, and if it is not improved get a renal US and start fluconazole.     Recommendations:  1) Discontinue vancomycin IV and ceftriaxone.  2) Repeat UA.     Discussed with ID Staff Dr. Ray Pruett MD and Esther Ruiz PharmD    [facility]  Pharmacy Student  [employer]  Pager: [phone]    Vital Signs/Clinical Features:  Vitals       10/03 0700  -  10/04 0659 10/04 0700  -  10/05 0659 10/05 0700  -  10/05 1337   Most Recent    Temp ( F) 93.6 -  98.6    95.2 -  97.2      95.2     95.2 (35.1)    Heart Rate 70 -  85    75 -  83      86     86    Resp 12 -  20    16 -  18      16     16    BP (!)89/43 -  137/66    92/42 -  147/67    107/52 -  140/66     107/52    SpO2 (%) 97 -  100    94 -  100      99     99          Labs  Estimated Creatinine Clearance: 37.1 mL/min (based on Cr of 1.92).  Recent Labs   Lab Test  09/30/18   0729  10/01/18   0803  10/02/18   0650  10/03/18   0752  10/04/18   0805  10/05/18   1122   CR  1.51*  1.68*  1.67*  1.84*  2.03*  1.92*       Recent Labs   Lab Test  03/21/18   1148   05/01/18   1036  05/15/18   0815   05/31/18   1122   09/11/18   1657   09/13/18   0405   09/27/18   1245  09/28/18   1419  09/30/18   0729  10/02/18   0650  10/03/18   0752  10/04/18   0805   WBC  7.4   < >  5.8  5.1   < >  15.2*   < >  8.0   < >  5.2   < >  9.6  9.2  5.9  3.4*  3.7*  4.6   ANEU  5.0   --   3.2  1.6   --   11.0*   --    3.8   --   3.2   --    --    --    --    --    --    --    ALYM  1.2   --   1.6  2.3   --   2.3   --   3.2   --   1.0   --    --    --    --    --    --    --    LEONEL  1.0   --   0.8  0.7   --   1.6*   --   0.7   --   0.7   --    --    --    --    --    --    --    AEOS  0.2   --   0.2  0.4   --   0.3   --   0.1   --   0.3   --    --    --    --    --    --    --    HGB  9.0*   < >  9.4*  10.0*   < >  9.8*   < >  9.5*   < >  8.6*   < >  8.3*  8.1*  7.8*  8.3*  7.7*  8.1*   HCT  27.6*   < >  29.2*  31.0*   < >  29.8*   < >  30.0*   < >  27.3*   < >  26.1*  25.8*  24.4*  26.0*  25.0*  25.4*   MCV  95   < >  96  94   < >  93   < >  92   < >  93   < >  95  95  94  95  97  94   PLT  292   < >  302  265   < >  278   < >  302   < >  246   < >  212  160  166  153  142*  160    < > = values in this interval not displayed.       Recent Labs   Lab Test  05/31/18   1122  07/02/18   0947  09/11/18   1657  09/13/18   0405  09/14/18   0733  09/24/18   0812  10/01/18   0803   BILITOTAL  0.3  0.3  0.4  0.3  0.3   --   0.3   ALKPHOS  94  102  90  77  78   --   80   ALBUMIN  2.5*  2.0*  2.3*  2.1*  2.2*  2.2*  1.9*   AST  24  28  26  19  25   --   13   ALT  32  21  18  15  17   --   8       Recent Labs   Lab Test  12/01/10   2355  12/20/11   1538  07/25/14   1019   08/27/16   0551  05/31/18   1122  09/11/18   1705  09/11/18   2211  09/13/18   0405  09/14/18   0733  09/14/18   2353  09/23/18   2218  10/02/18   1339   PCAL   --    --    --    --    --    --    --    --   0.87  1.04   --    --    --    LACT   --    --    --    < >  0.5*  1.2  2.5*  1.2   --    --   0.8  1.0   --    CRP   --    --    --    --    --    --    --    --    --    --    --    --   23.0*   SED  64*  39*  55*   --    --    --    --    --    --    --    --    --    --     < > = values in this interval not displayed.       Recent Labs   Lab Test  10/03/18   0752   TACROL  4.0*       Culture Results:  7-Day Micro Results     Procedure Component Value Units Date/Time     Urine Culture Aerobic Bacterial [[id number]] Collected:  10/04/18 1330    Order Status:  Completed Lab Status:  Preliminary result Updated:  10/05/18 0946    Specimen:  Catheterized Urine      Specimen Description Catheterized Urine     Special Requests Specimen received in preservative     Culture Micro Culture negative < 24 hours, reincubate    Urine Culture Aerobic Bacterial [[id number]]  (Abnormal) Collected:  10/03/18 2056    Order Status:  Completed Lab Status:  Final result Updated:  10/05/18 0840    Specimen:  Catheterized Urine      Specimen Description Catheterized Urine     Culture Micro 50,000 to 100,000 colonies/mL  Candida tropicalis   (A)      Susceptibility testing not routinely done    Blood culture [[id number]] Collected:  10/03/18 1800    Order Status:  Completed Lab Status:  Preliminary result Updated:  10/05/18 0238    Specimen:  Blood      Specimen Description Blood Blue port     Special Requests Received in aerobic bottle only     Culture Micro No growth after 2 days    Blood culture [[id number]] Collected:  10/03/18 1733    Order Status:  Completed Lab Status:  Preliminary result Updated:  10/05/18 0238    Specimen:  Blood      Specimen Description Blood Left Hand     Special Requests Received in aerobic bottle only     Culture Micro No growth after 2 days          Recent Labs   Lab Test  05/31/18   1643  09/14/18   1418  09/24/18   0100  10/03/18   2056  10/04/18   1330   URINEPH  5.5  6.5  6.0  5.0  6.0   NITRITE  Negative  Negative  Negative  Negative  Negative   LEUKEST  Negative  Large*  Trace*  Large*  Moderate*   WBCU  2  34*  2  >182*  >182*                   Recent Labs   Lab Test  09/11/18   2140   CDBPCT  Positive*       Imaging: Xr Chest Port 1 View    Result Date: 10/4/2018  XR CHEST PORT 1 VW  10/4/2018 3:13 PM  HISTORY: hypothermia; COMPARISON: 9/20/2018 FINDINGS: Single AP supine view of the chest. Patient is rotated. Stable position of right IJ central venous catheter with tip at superior  cavoatrial junction. Enteric tube courses below the level of the diaphragm and the inferior margin of the field-of-view. Cardiac mediastinal silhouette is stable, exaggerated by patient rotation and AP technique. Pulmonary vasculature is engorged. No focal consolidation or pneumothorax. Costophrenic angles are blunted similar to prior.     IMPRESSION: Stable pulmonary vascular congestion. Trace bilateral pleural effusions. I have personally reviewed the examination and initial interpretation and I agree with the findings. HARRIET WEINER MD    Xr Feeding Tube Placement    Result Date: 10/1/2018  FEEDING TUBE PLACEMENT 10/1/2018 3:33 PM HISTORY: Nutritional needs. COMPARISON: None FINDINGS: 0.3 minutes of fluoroscopy were utilized for placement of a feeding tube.  At the final position, the feeding tube tip was in the jejunum at the ligament of Treitz.  Small volume barium contrast was injected to confirm placement.  There were no complications of the procedure.     IMPRESSION: Successful feeding tube placement. I, TANYA LEMON MD, attest that I was present for all critical portions of the procedure and was immediately available to provide guidance and assistance during the remainder of the procedure. I have personally reviewed the examination and initial interpretation and I agree with the findings. TANYA LEMON MD

## 2018-10-05 NOTE — PLAN OF CARE
Problem: Infection, Risk/Actual (Adult)  Goal: Identify Related Risk Factors and Signs and Symptoms  Related risk factors and signs and symptoms are identified upon initiation of Human Response Clinical Practice Guideline (CPG).   NEURO: Pt resistant to cares, became rigid and tense up. Did not respond to staff or follow commands. Opened eyes spontansously.   RESPIRATORY:  Pt did not follow commands, did not breathe deeply - LS diminished. SpO2 >90% on RA. Had chest xray- stable pleural effusion and pulmonary congestion.   CARDIO: BP soft - 92/42. Bolus given . HR 80s-90s.  GI/: BS active, + 3 loose BMs, + gas. Pt not alert/following commands enough for oral intake. TF bridled and patent- running continuous at goal 40 ml/hr with 60 ml of free water q 2 hours.  Very little urinary output. Tea colored. UA abnormal. MD aware. Virk changed and new sample of urine sent- also abnormal. IV abx (rocephin and vancomycin) given to pt.  IV bolus of 250 LR given to pt.   SKIN: Paula-anal area inflamed with scant blood. MD aware. Barrier cream applied and pt turned q 2 hour. Rooke boots applied. Skin tear on penis cleaned and skin prep applied.   ACTIVITY: Lift pt. Assist of 2 bed repositioning.  PAIN: Nonverbal signs of pain absent.  LINES:  Right PIV infusing  PLAN: Continue POC.

## 2018-10-05 NOTE — PLAN OF CARE
Problem: Patient Care Overview  Goal: Plan of Care/Patient Progress Review  Outcome: No Change  Orientation unable to be assessed as pt non english speaking, and has dementia at baseline. Pt occasionally combative with turning repositioning and brief changes.  Refused to have oral suctioning. No s/s pain noted.  PIV removed d/t infiltration.  L PIV infusing @ TKO in between IV abx.  Pt had large incontinent stool this morning.  Family at bedside to assist with cares.  TF running via NJ @ GR 40 ml/hr and 60 ml flushes Q4 hours.  mepilex applied to sacrum for PI prevention.  Virk had small urine output, pt oliguric. Virk cares completed.  Pt requiring assist 2 w/ turning and repositioning.  Nursing will continue to monitor and follow POC.

## 2018-10-05 NOTE — PLAN OF CARE
"Problem: Patient Care Overview  Goal: Plan of Care/Patient Progress Review  Outcome: No Change  Blood pressure 147/67, pulse 65, temperature 95.3  F (35.2  C), temperature source Axillary, resp. rate 18, height 1.778 m (5' 10\"), weight 78.6 kg (173 lb 3.2 oz), SpO2 94 %.    Pt mentation hard to assess due to dementia. VSS on room air, not using callight. Pt on specialty air mattress, needs to be repositioned and changed q 2 hours. Pt having less frequent loose stools. New Virk placed yesterday, intact draining little UOP. TF currently at 40 ml/hr with water flushes. Meds through the tube. On PO Vanco. Intermittent IV antibiotics for UA+. Phosphorus replaced, recheck this AM. Will continue to assess per POC.  "

## 2018-10-05 NOTE — PLAN OF CARE
Problem: Diabetes Comorbidity  Goal: Diabetes  Patient comorbidity will be monitored for signs and symptoms of hyperglycemia or hypoglycemia. Problems will be absent, minimized or managed by discharge/transition of care.   Outcome: Declining  Blood glucose elevated now that pt on continuous tube feeding.

## 2018-10-05 NOTE — PROGRESS NOTES
[facility], [place]   Transplant Nephrology Progress Note  Date of Admission:  9/11/2018    Assessment & Plan   Priscilla Way is a 78 year old male with h/o simultaneous liver and kidney transplant in 2000 for hepatitis C.He was admitted  for AMS and seizures. Underwent CTA with contrast of brain neck for evaluation complicated by ROMANA required HD. Also c/b aspiration PNA and c diff. We are on consult now for anuric renal failure.     Baseline Cr ~ 1.2-1.4; the patient has anuric LILIAM on on dialysis since 9/16/18.  His LILIAM is likely multifactorial but related to contrast induced nephropathy with underlying ckd.  The creatinine of 1.2-1.4 is an overestimation of his renal function and a kidney function 8/2016 is significant for early diabetic changes with secondary focal segmental sclerosis. There is some potential for renal recovery after the insulting agent of IV contrast.     Dialysis has been started but he has had several intolerance episodes including severe hypotension on dialysis, worsening breathing with dialysis, and constantly trying to remove his dialysis catheter which has prompted restraints for his safety.     # DDKT and liver transplant:  - Basline Cr ~ 1.2- 1,4; Cr today is slightly improved compared to yesterday (1.92). Remains oliguric (180cc over last 24 hours). We will not be doing HD on him in the foreseeable future (according to the family, he would not have wanted long-term dialysis) unless he becomes grossly dyspneic and in need of acute volume removal. He may be turning a corner now with his Cr trending down, and if this is sustained, then he may be able to maintain his electrolytes in the outpatient setting.  - No tacrolimus level ordered for today. We can get a level tomorrow morning (12 hours after his evening dose)   - No bleeding from the dialysis cath site. Patient is off heparin now.   - UA with 34 WBC and 7 RBC and large LE. Urine Cx NGTD. UA with no  hydronephrosis.   - Proteinuria: Nephrotic range, likely due to concentrated urine in the setting of acute renal failure with background DM nephropathy.   - Latest DSA: No                   Date of DSA last checked: 8/2016  - BK: No  - Kidney Tx Biopsy: Yes- 8/2015- ATN, DM nephropathy with features of FSGS and moderate to severe arteriosclerosis      # Liver transplant :                     - LFTs and Alk phos normal WNL.        # Immunosuppression:                     - Tac level 4 on 10/3 (goal 4-6). Will continue liquid tacrolimus at the same dose (1.5mg BID), and can decrease MMF to 250mg BID IV + 5mg prednisone.                     - Once tacrolimus level is between 5-6 for several days, we can remove his MMF completely                        # Hypertension:                    - At goal, stable and no changes to be made. Goal 130/80 while avoiding hypotension (BP <100 systolic)                   - Can give him LR bolus 250cc prn if his BP drops below 100. We may need to stop his amlodipine. If BP remains 's overnight, then recommend stopping amlodipine.                 # Anemia in chronic renal disease:                  - Hgb: 8.1                  - Iron studies: Low. Oral ferrous sulfate should be started. IV iron may be an option also if unable to take through TF        # Mineral Bone Disorder:   - Secondary renal hyperparathyroidism; PTH level 78 on 6/2018  - Calcium: 7.3,  Normal when corrected for albumin        # Electrolytes:   - Potassium: 3.8  - Na: 138       # Other Medical Issues:   # seziure disorder- admitted with AMS. Seen by neurology.  # h/o CVA:  Minimally interactive at baseline. Palliative care on consult. NG tube placed and TF started   # Cdiff- oral vancomycin started 9/24, still having diarrhea.   #Hypothermia - might be from aspiration event and unresolved pneumonia. Urine positive for infectious etiology. Vancomycin + rocephin started.  # DM- management per primary.       #  "Transplant History:  Etiology of kidney failure: Hepatitis C  Transplant: 2000 (Kidney), 2000 (Liver)  Donor Type:  - Brain Death                      Donor Class: Standard Criteria Donor  Significant changes in immunosuppression: see above  Significant Complications: Oliguric LILIAM     Recommendations were communicated to the primary team verbally    Neil Watkins MD     Interval History   Patient was seen and examined this morning. Urine output has been 225cc overnight. Blood pressure is low (100's). He remains minimally interactive.     Physical Exam      /62 (BP Location: Left arm)  Pulse 65  Temp 95.1  F (35.1  C) (Axillary)  Resp 16  Ht 1.778 m (5' 10\")  Wt 84.1 kg (185 lb 6.4 oz)  SpO2 93%  BMI 26.6 kg/m2       GENERAL APPEARANCE: not able to do any communication   HENT: head in normocephalic/atraumatic   LYMPHATICS: no cervical or supraclavicular nodes  RESP: Rales to lung base B/L, no wheezes  CV: regular rhythm, normal rate, no rub, no murmur  EDEMA: +2 LE edema bilaterally  ABDOMEN: soft, nondistended, nontender, bowel sounds normal  MS: upper extremities in half contraction position, overall muscle atrophy and joint stiffness  SKIN: intact, warm and dry     Data   All labs reviewed by me.  CMP    Recent Labs  Lab 10/05/18  1122 10/04/18  0805 10/03/18  0752 10/02/18  1339 10/02/18  0650 10/01/18  0803    138 138  --  136 137   POTASSIUM 3.8 3.7 3.6  --  3.8 3.7   CHLORIDE 107 108 106  --  104 104   CO2 20 21 21  --  23 24   ANIONGAP 11 10 10  --  10 9   * 152* 134*  --  102* 91   BUN 31* 24 21  --  20 19   CR 1.92* 2.03* 1.84*  --  1.67* 1.68*   GFRESTIMATED 34* 32* 36*  --  40* 40*   GFRESTBLACK 41* 38* 43*  --  48* 48*   JOSHUA 7.3* 7.3* 7.2*  --  7.6* 7.7*   MAG  --  2.2 2.2 2.2 2.3 1.8   PHOS 2.4* 2.3* 3.4 4.3  --  3.3   PROTTOTAL  --   --   --   --   --  5.5*   ALBUMIN  --   --   --   --   --  1.9*   BILITOTAL  --   --   --   --   --  0.3   ALKPHOS  --   --   " --   --   --  80   AST  --   --   --   --   --  13   ALT  --   --   --   --   --  8     CBC    Recent Labs  Lab 10/04/18  0805 10/03/18  0752 10/02/18  0650 09/30/18  0729   HGB 8.1* 7.7* 8.3* 7.8*   WBC 4.6 3.7* 3.4* 5.9   RBC 2.70* 2.58* 2.73* 2.60*   HCT 25.4* 25.0* 26.0* 24.4*   MCV 94 97 95 94   MCH 30.0 29.8 30.4 30.0   MCHC 31.9 30.8* 31.9 32.0   RDW 18.8* 18.5* 19.0* 19.6*    142* 153 166     INR    Recent Labs  Lab 10/05/18  1122 10/04/18  0805 10/03/18  0752 10/02/18  0650   INR 4.22* 4.17* 4.28* 2.12*     ABGNo lab results found in last 7 days.   Urine Studies  Recent Labs   Lab Test  10/04/18   1330  10/03/18   2056  09/24/18   0100  09/14/18   1418   COLOR  Rhea  Dark Brown  Yellow  Yellow   APPEARANCE  Cloudy  Cloudy  Clear  Cloudy   URINEGLC  Negative  Negative  Negative  150*   URINEBILI  Negative  Negative  Negative  Negative   URINEKETONE  Negative  10*  Negative  10*   SG  1.010  1.019  1.020  1.028   UBLD  Large*  Moderate*  Moderate*  Moderate*   URINEPH  6.0  5.0  6.0  6.5   PROTEIN  30*  30*  100*  >600*   NITRITE  Negative  Negative  Negative  Negative   LEUKEST  Moderate*  Large*  Trace*  Large*   RBCU  182*  >182*  5  7*   WBCU  >182*  >182*  2  34*     Recent Labs   Lab Test  09/14/18   1418  05/27/16   1409  04/29/11   1348  09/14/10   1134   UTPG  24.91*  Specimen not received  NOTIFIED [facility]  WHO PAGED  RN, LOPEZ AT 1355. NO URINE   RECIEVED WITH BLOOD SPECIMENS. AB    0.04  <0.02     PTH  Recent Labs   Lab Test  06/05/18   0548  08/24/16   0852   PTHI  78  333*     Iron Studies  Recent Labs   Lab Test  11/08/16   1209  08/24/16   0852   IRON  17*  20*   FEB  146*  197*   IRONSAT  11*  10*   BARTOLO   --   1025*     IMAGING:  All imaging studies reviewed by me.   Patient was seen and evaluated by me, Zachary Reed MD. I have reviewed the note and agree with the the plan of care as documented by the fellow.  Tried to address goals of care of several  occasion. As of now continue current management until we are able to have a family meeting

## 2018-10-06 NOTE — PLAN OF CARE
Problem: Patient Care Overview  Goal: Plan of Care/Patient Progress Review  Outcome: No Change  Neuro: ADRIA due to dementia at baseline and non-english speaking. Pt is uncooperative with cares (repositioning, glucose checks, suction) and tries to bite/fight cares although comfortable at rest. No pain noted, xray confirmed placement of NJ and is running at 60 ml flushes, 40 ml/hr feeds. L PIV running TKO. Large loose incontinent stool at 0930, mepilex in place. Daughter in room, family was updated on plan of care. Virk cares done, very low output draining.   Continue to monitor and administer feedings and antibiotics.

## 2018-10-06 NOTE — PHARMACY-VANCOMYCIN DOSING SERVICE
Pharmacy Vancomycin Note  Date of Service 2018  Patient's  1940   78 year old, male    Indication: Urinary Tract Infection  Goal Trough Level: 10-15 mg/L  Day of Therapy: 2  Current Vancomycin regimen: intermittent dosing based on levels    Current estimated CrCl = Estimated Creatinine Clearance: 37.1 mL/min (based on Cr of 1.92).    Creatinine for last 3 days  10/3/2018:  7:52 AM Creatinine 1.84 mg/dL  10/4/2018:  8:05 AM Creatinine 2.03 mg/dL  10/5/2018: 11:22 AM Creatinine 1.92 mg/dL    Recent Vancomycin Levels (past 3 days)  10/5/2018:  3:50 PM Vancomycin Level 11.2 mg/L    Vancomycin IV Administrations (past 72 hours)      No vancomycin orders with administrations in past 72 hours.                Nephrotoxins and other renal medications (Future)    Start     Dose/Rate Route Frequency Ordered Stop    10/06/18 0800  furosemide (LASIX) tablet 80 mg      80 mg Per Feeding Tube DAILY 10/05/18 1641      10/04/18 1215  vancomycin place hogue - receiving intermittent dosing      1 each Does not apply SEE ADMIN INSTRUCTIONS 10/04/18 1215      18 2100  tacrolimus (PROGRAF BRAND) suspension 1.5 mg      1.5 mg Oral 2 TIMES DAILY. 187      18 1700  vancomycin (FIRVANQ) oral solution 125 mg      125 mg Oral 4 TIMES DAILY 18 1656               Contrast Orders - past 72 hours     None          Interpretation of levels and current regimen:  Trough level is  Therapeutic, indicates patient is ready for redose.    Has serum creatinine changed > 50% in last 72 hours: No    Urine output:  unable to determine    Renal Function: Worsening - slightly improved from yesterday    Plan:  1.  Give vancomycin 1250 mg IV x1 now  2.  Pharmacy will check trough levels as appropriate in 1-3 Days.    3. Serum creatinine levels will be ordered every other day for at least 10 days while on concomitant nephrotoxins.      Selene Power, PharmD        .

## 2018-10-06 NOTE — PLAN OF CARE
"Problem: Patient Care Overview  Goal: Plan of Care/Patient Progress Review  Outcome: No Change  Reason for admission/Dx:   Aspiration pneumonia due to regurgitated food, unspecified laterality, unspecified part of lung (H) [J69.0]  Seizure (H) [R56.9]     [Vitals]: /54 (BP Location: Left arm)  Pulse 73  Temp 95.6  F (35.3  C) (Axillary)  Resp 18  Ht 1.778 m (5' 10\")  Wt 84.1 kg (185 lb 6.4 oz)  SpO2 100%  BMI 26.6 kg/m2  [Labs/Lactic]: Cr 1.92, unchanged   [BG]: Between 147-178 w/ insulin coverage Q4hrs.     [Electrolytes]: No replacements needed   [Imaging]: Abd XR confirmed placement of NG, per team tube is still in place and OK to use    [Cardiac]: WDL  [Pain/PRN/s]: Some discomfort noted with brief changes and repositioning, pt otherwise appears comfortable after repositioning    [Respiratory]: Pt has bilateral fine crackles throughout lungs  [Lines]:  L PIV infusing TKO   [Infusions]: N/A    [Neuros]: Pt has dementia at baseline, unable to assess orientation.  Pt occasionally attempting to hit at staff with mitts on.  Mitts on for protection, and pt pulling at tubes/lines         [GI]:  Active Diet Order      Dysphagia Diet Level 1 Pureed Nectar Thickened Liquids (pre-thickened or use instant food thickener)   -Pt unable to swallow.  -Abd XR completed, tube currently in place.  -TF running @ 60 mL/hr, w/ Q4hr flushes 60 ml  -Pt had incontinent loose stools.  [Activity]: Pt assist 2 w/changing+repositioning, and on bedrest.      []: Pt oliguric, scant reed urine produced.     [Skin/Wounds]: Perianal skin excoriation noted, barrier cream applied after cares.  Coccyx mepilex in place for PI prevention           Shift changes: Oral lasix (crushed and put via NG) changed from IV.  Pt no longer receiving Q2 hour flushes, and now back @4hrs.  Plan: Plan to  restart anticoagulant tonight and to further discuss POC w/ family tomorrow         "

## 2018-10-06 NOTE — PLAN OF CARE
"Problem: Patient Care Overview  Goal: Plan of Care/Patient Progress Review  /72  Pulse 65  Temp 97.3  F (36.3  C) (Oral)  Resp 16  Ht 1.778 m (5' 10\")  Wt 84.1 kg (185 lb 6.4 oz)  SpO2 99%  BMI 26.6 kg/m2     AVSS on RA. Pt lethargic, unable to assess orientation due to nonverbal status and dementia. Lift patient, turn/repo q2h. No nonverbal signs of pain present. Pt uncooperative/combative  w/ cares; mitts in place, daughter at bedside, supportive, is sometimes able to soothe and calm pt during cares. Virk in place, minimal output of dark, tea-colored urine, no BM this shift, brief in place; prophylactic mepilex in place on sacrum. R IJ intact, remains hep locked. NG tube infused TF @ goal of 40mL/hr until 0400; pt had tugged on NG, developed frequent wet cough; TF stopped, MD notified, CXR ordered, result pending. Continue to monitor and follow POC.        "

## 2018-10-06 NOTE — PLAN OF CARE
Problem: Patient Care Overview  Goal: Plan of Care/Patient Progress Review  Outcome: No Change  Patient orientation unable to assess rt dementia and nonverbal status. VSS on room air. No nonverbal signs of pain. Frequently combative with any cares or medications. Mittens on. Virk catheter in place, 100ml voided heather shift. Virk cares completed. Incontinent of stool, 2 loose BMs in brief. PIV running TKO in between antibiotics. NJ running TF at GR 40ml/hr with 60ml flushes Q4hrs. Blood sugars stable. Repositioning Q2hrs per provider X2. Mepilex applied to sacrum for pressure injury prevention. Coccyx site blanchable. Abdomen site dry and intact. Penile wound pink and moist. Family continues to assist with cares at bedside.

## 2018-10-06 NOTE — PROVIDER NOTIFICATION
"Pt family notified nurse that pt pulled on NG tube, now coughing. TF stopped.     MD paged: \"[phone]. [phone]. A.W. Pt pulled on NG, coughing; need X-ray to check placement. Thanks TED George [phone]\"  "

## 2018-10-06 NOTE — PROGRESS NOTES
"[facility], [place]   Transplant Nephrology Progress Note  Date of Admission:  2018    Assessment & Plan   # SLK: creatinine stable around 2 mg / dl despite oliguria. Electrolytes stable. Will increase diuretics to maintain I/O or ideally get net negative.     # Immunosuppression: Tacrolimus immediate release (goal  4-6), Mycophenolate mofetil (goal  250 mg po bid) and Prednisone (dose pred 5 mg daily)     Last tac trough 4 on 10/3. Will f/u tac level today. If ok, we will stop mmf.     # Hypertension: Controlled; Goal BP: 130/80   - Changes: No    Anemia: multifactorial from blood draws and mmf effect. Goal will be to get off mmf.     # volume overload: increase furosemide to 80 mg po BID.     # goals of care: per primary team.     Recommendations were communicated to the primary team via this note    Ciaran Rdz MD  Transplant Office Phone Number: [phone]    Interval History   No changes. Vitals stable. I/O: 1173/280. Tmax 97.3. No change to mental status. No other complaints today.     Physical Exam   Temp  Av.6  F (35.9  C)  Min: 92.7  F (33.7  C)  Max: 99.5  F (37.5  C)      Pulse  Av.8  Min: 62  Max: 108 Resp  Av  Min: 6  Max: 26  SpO2  Av.2 %  Min: 72 %  Max: 100 %     /52 (BP Location: Right arm)  Pulse 76  Temp 95.6  F (35.3  C) (Axillary)  Resp 18  Ht 1.778 m (5' 10\")  Wt 84.1 kg (185 lb 6.4 oz)  SpO2 100%  BMI 26.6 kg/m2   Date 10/06/18 0700 - 10/07/18 0659   Shift 9571-0491 4990-9570 1438-9391 24 Hour Total   I  N  T  A  K  E   I.V. 3   3    Shift Total  (mL/kg) 3  (0.04)   3  (0.04)   O  U  T  P  U  T   Urine 75   75    Shift Total  (mL/kg) 75  (0.89)   75  (0.89)   Weight (kg) 84.1 84.1 84.1 84.1     Admit Weight: 83.9 kg (185 lb)   GENERAL APPEARANCE: not interactive  HENT: mouth without ulcers or lesions  LYMPHATICS: no cervical or supraclavicular nodes  RESP: lungs clear to auscultation - no rales, rhonchi or wheezes  CV: " regular rhythm, normal rate, no rub, no murmur  EDEMA: 1+ LE edema bilaterally  ABDOMEN: soft, nondistended, nontender, bowel sounds normal  MS: extremities normal - no gross deformities noted, no evidence of inflammation in joints, no muscle tenderness  SKIN: no rash    Data   All labs reviewed by me.  CMP  Recent Labs  Lab 10/06/18  0639 10/05/18  1122 10/04/18  0805 10/03/18  0752 10/02/18  1339  10/01/18  0803    138 138 138  --   < > 137   POTASSIUM 3.9 3.8 3.7 3.6  --   < > 3.7   CHLORIDE 104 107 108 106  --   < > 104   CO2 22 20 21 21  --   < > 24   ANIONGAP 11 11 10 10  --   < > 9   * 177* 152* 134*  --   < > 91   BUN 34* 31* 24 21  --   < > 19   CR 1.92* 1.92* 2.03* 1.84*  --   < > 1.68*   GFRESTIMATED 34* 34* 32* 36*  --   < > 40*   GFRESTBLACK 41* 41* 38* 43*  --   < > 48*   JOSHUA 7.8* 7.3* 7.3* 7.2*  --   < > 7.7*   MAG 2.2  --  2.2 2.2 2.2  < > 1.8   PHOS 3.1 2.4* 2.3* 3.4 4.3  --  3.3   PROTTOTAL  --   --   --   --   --   --  5.5*   ALBUMIN  --   --   --   --   --   --  1.9*   BILITOTAL  --   --   --   --   --   --  0.3   ALKPHOS  --   --   --   --   --   --  80   AST  --   --   --   --   --   --  13   ALT  --   --   --   --   --   --  8   < > = values in this interval not displayed.  CBC  Recent Labs  Lab 10/06/18  0639 10/04/18  0805 10/03/18  0752 10/02/18  0650   HGB 8.6* 8.1* 7.7* 8.3*   WBC 4.6 4.6 3.7* 3.4*   RBC 2.87* 2.70* 2.58* 2.73*   HCT 26.7* 25.4* 25.0* 26.0*   MCV 93 94 97 95   MCH 30.0 30.0 29.8 30.4   MCHC 32.2 31.9 30.8* 31.9   RDW 19.0* 18.8* 18.5* 19.0*    160 142* 153     INR  Recent Labs  Lab 10/06/18  0639 10/05/18  1122 10/04/18  0805 10/03/18  0752   INR 3.14* 4.22* 4.17* 4.28*     ABGNo lab results found in last 7 days.   Urine Studies  Recent Labs   Lab Test  10/04/18   1330  10/03/18   2056  09/24/18   0100  09/14/18   1418   COLOR  Rhea  Dark Brown  Yellow  Yellow   APPEARANCE  Cloudy  Cloudy  Clear  Cloudy   URINEGLC  Negative  Negative  Negative  150*    URINEBILI  Negative  Negative  Negative  Negative   URINEKETONE  Negative  10*  Negative  10*   SG  1.010  1.019  1.020  1.028   UBLD  Large*  Moderate*  Moderate*  Moderate*   URINEPH  6.0  5.0  6.0  6.5   PROTEIN  30*  30*  100*  >600*   NITRITE  Negative  Negative  Negative  Negative   LEUKEST  Moderate*  Large*  Trace*  Large*   RBCU  182*  >182*  5  7*   WBCU  >182*  >182*  2  34*     Recent Labs   Lab Test  09/14/18   1418  05/27/16   1409  04/29/11   1348  09/14/10   1134   UTPG  24.91*  Specimen not received  NOTIFIED [facility]OM[facility]ARE  WHO PAGED  RN, LOPEZ AT 1355. NO URINE   RECIEVED WITH BLOOD SPECIMENS. AB    0.04  <0.02     PTH  Recent Labs   Lab Test  06/05/18   0548  08/24/16   0852   PTHI  78  333*     Iron Studies  Recent Labs   Lab Test  11/08/16   1209  08/24/16   0852   IRON  17*  20*   FEB  146*  197*   IRONSAT  11*  10*   BARTOLO   --   1025*

## 2018-10-06 NOTE — PROGRESS NOTES
[facility], [place]    Internal Medicine Progress Note - Gold Service      Assessment & Plan  Priscilla Way is a 78 year old male with a complex past medical history including dementia (Alzheimer and vascular), liver transplant and kidney transplants in 2000, DVT, T2DM, CVA, and poor airway protection admitted with possible seizure and aspiration.  He is currently hemodynamically stable with ongoing concerns for aspirations/airway protection, nutritional support, and monitoring renal function off of HD.  NJ was placed on 10/1 and will trial medications through NJ and work on tube feeds.      # DDKT and Liver Transplant  # Anuric Renal Failure s/p HD with baseline CKD: Suspected secondary to contrast and now improving.  HD on 9/26 with UF 9/27 then plan to monitor over the weekend with no HD or UF.   - Transplant nephrology consulted, appreciate assistance  - No HD since 9/26  - Change lasix to 80 mg po BID from 40 mg daily IV. He is getting free water flushing.   - Trial of medications by NJ this evening and if well tolerated will transition all to enteral  - Tacro PO only available (take if able, will trial buccal), once level is from 4-6, plan is to stop MMF  - added iron supplements per nephrology recs      # Aspiration Pneumonia and poor Airway Protection  Recurrent small aspirations per speech evaluation and signficant aspiration event with possible seizure PTA.  Discussed with family regarding concerns for high risk of recurrent aspirations.  Per both daugthers, trial of NJ was started on 10/2 (placement on 10/1) with plans for 4-7 days of enteral nutrition and medication support followed by a re-evaluation    - Completed 7 day course of IV antibiotics previously  - Amp/Sulbactam restarted 9/26-9/28 but stopped with no changes on CXR  - NPO strict if any recurrence of aspiration, PO at present only if meeting parameters  - NJ placed and nutrition consulted for TF  - ID previously  involved  - Ongoing discussion with family regarding goals of care, see below  - Ethics updated, previously involved    # Hypothermia, May have UTI    -Sepsis work up ordered. Free T4 before has been fine.   -UA is positive, may have UTI. UC showed yeast, otherwise negative. Continue Vanc and Rocpehin   -Use bare hugger to keep the temp in normal range.  -Check for adrenal insufficiency by cosyntropin stim test if hypothermia is persistent.     # Acute on Chronic Normocytic Anemia  # Possible slow GI blood loss: Suspect multifactorial including related to chronic disease and LILIAM as well as iatrogenic though patient reportedly had melanotic stools though hemoglobins have since been stable.  - Continue PPI BID   - Daily hemoglobins and as needed for signs of bleeding  - Transfuse for hemoglobin <7  # Recurrent C. Diff Colitis: stop IV metronidazole. Change to PO vanc  - Continue for 10 days post aspiration Abx. (last stopped 9/28), it will be till 10/8  # Possible Seizure Activity  # Baseline Dementia: Full body convulsions witnessed PTA with reported associated aspiration.  CT head and CTA without acute changes.  Family feels patient is back to baseline mental status.  - Neurology consulted, appreciate assistance  - Levetiracetam initiated but per family this is not consistent with goals of care so this has been discontinued  - Seizure precautions  # DVT On warfarin PTA but unable to tolerate PO well at present, NJ placed 10/1  - off Heparin gtt, now that INR is therapeutic  - Warfarin by NJ  # T2DM: Follow daily BG. Currently stable. Start BG checks.   # HTN: If BP drops, plan is to cut back on amlodipine. Continue carvedilol trial via NJ.  Hydralazine PRN  # LE Edema- Lymphedema consult    Diet: Dysphagia Diet Level 1 Pureed Nectar Thickened Liquids (pre-thickened or use instant food thickener)  Snacks/Supplements Adult: Boost Plus; With Meals  Adult Formula Drip Feeding: Continuous TwoCal HN; Nasojejunal; Goal  Rate: 40; mL/hr; Medication - Tube Feeding Flush Frequency: At least 15-30 mL water before and after medication administration and with tube clogging; Amount to Send (Nutrition us...     Fluids: As above  Virk Catheter: in place, indication: Strict 1-2 Hour I&O, Strict 1-2 Hour I&O;Retention  DVT Prophylaxis: Heparin gtt bridging to warfarin  Code Status: DNR/DNI    Disposition Plan   Expected discharge: 4 - 7 days, recommended to prior living arrangement once enteral nutrition/medication plan in place and renal function stable.     Entered: Michel Maynard MD 10/06/2018, 4:34 PM   Information in the above section will display in the discharge planner report.      The patient's care was discussed with the Bedside Nurse, Patient, Patient's Family and Nephrology Consultant.    Michel Maynard MD  Internal Medicine Staff Hospitalist Service  [facility]  Pager: [phone]  Please see sticky note for cross cover information    Interval History     he is unchanged from before. He is non verbal. Weight is going up.     Data reviewed today: I reviewed all medications, new labs and imaging results over the last 24 hours.    Physical Exam      Vital Signs: Temp: 95.6  F (35.3  C) Temp src: Axillary BP: 118/54 Pulse: 73 Heart Rate: 83 Resp: 18 SpO2: 100 % O2 Device: None (Room air)    Weight: 185 lbs 6.4 oz  General Appearance: Asleep male laying in bed in no acute distress, spontaneously opening eyes, NJ in place.  Respiratory: Mild bibasilar crackles with no increased work of breathing on room air. Bilateral rhonchi with no wheezing  Cardiovascular: RRR, no m/r/g  GI: Soft, non-tender, non-distended  Skin: No rashes  Other: Bilateral LE edema present and stable from prior. Patient non-verbal with writer, family not present in room

## 2018-10-07 NOTE — PHARMACY-VANCOMYCIN DOSING SERVICE
Pharmacy Vancomycin Note  Date of Service 2018  Patient's  1940   78 year old, male    Indication: Urinary Tract Infection  Goal Trough Level: 10-15 mg/L  Day of Therapy: 3  Current Vancomycin regimen: intermittently dosing. Last dose of 1250 mg IV @ 2100 on 10/5    Current estimated CrCl = Estimated Creatinine Clearance: 33.1 mL/min (based on Cr of 1.92).    Creatinine for last 3 days  10/5/2018: 11:22 AM Creatinine 1.92 mg/dL  10/6/2018:  6:39 AM Creatinine 1.92 mg/dL    Recent Vancomycin Levels (past 3 days)  10/5/2018:  3:50 PM Vancomycin Level 11.2 mg/L  10/7/2018:  6:19 AM Vancomycin Level 20.0 mg/L ~33 hour trough    Vancomycin IV Administrations (past 72 hours)      No vancomycin orders with administrations in past 72 hours.                Nephrotoxins and other renal medications (Future)    Start     Dose/Rate Route Frequency Ordered Stop    10/07/18 1545  bumetanide (BUMEX) injection 4 mg      4 mg Intravenous EVERY 12 HOURS 10/07/18 1538      18 2100  tacrolimus (PROGRAF BRAND) suspension 1.5 mg      1.5 mg Oral 2 TIMES DAILY. 18 1700  vancomycin (FIRVANQ) oral solution 125 mg      125 mg Oral 4 TIMES DAILY 18 1656               Contrast Orders - past 72 hours     None          Interpretation of levels and current regimen:  Trough level is  Supratherapeutic    Has serum creatinine changed > 50% in last 72 hours: No    Urine output:  diminished urine output    Renal Function: Stable    Plan:  1.  Will hold on dosing at this time due to supratherapeutic level. Continue intermittent vancomycin dosing.  2.  Pharmacy will check trough levels as appropriate in 1-3 Days.    3. Serum creatinine levels will be ordered daily for the first week of therapy and at least twice weekly for subsequent weeks.      Juan Suggs, PGY1 Pharmacy Resident        .

## 2018-10-07 NOTE — PROGRESS NOTES
[facility], [place]   Transplant Nephrology Progress Note  Date of Admission:  2018    Assessment & Plan   # SLK: creatinine not ordered today. Electrolytes stable yesterday. Will increase diuretics to maintain I/O or ideally get net negative. See volume overload plan.     # Immunosuppression: Tacrolimus immediate release (goal  4-6), Mycophenolate mofetil (goal  250 mg po bid) and Prednisone (dose pred 5 mg daily)     Last tac trough 4 on 10/3. Follow up todays.    # Hypertension: Controlled; Goal BP: 130/80   - Changes: No    Anemia: multifactorial from blood draws and mmf effect. Goal will be to get off mmf.     # volume overload: change to bumex 4 mg IV BID and give metolazone 5 mg x 1 now to effect diuresis.  If this is ineffective with diuresis, the likelihood that we will be able to maintain euvolemia medically is low. Goals of care would need to be discussed. Currently the lower extremity edema is cosmetic, but if fluid accumulates in the lungs, this would lead to hypoxia and eventual death.     Even with initiation of longterm dialysis, the likelihood that he would have meaningful quality of life is dubious. As such, continued discussion about goals of care with his family are very important.     Flush patino to make sure there is no dysfunction with catheter.     # goals of care: per primary team.     Recommendations were communicated to the primary team via this note    Ciaran Rdz MD  Transplant Office Phone Number: [phone]    Interval History   No changes. Vitals stable. I/O: 1003/75. Tmax 97.4. No change to mental status. No other complaints today.     Physical Exam   Temp  Av.6  F (35.9  C)  Min: 92.7  F (33.7  C)  Max: 99.5  F (37.5  C)      Pulse  Av.8  Min: 62  Max: 108 Resp  Av  Min: 6  Max: 26  SpO2  Av.2 %  Min: 72 %  Max: 100 %     /70 (BP Location: Right arm)  Pulse 73  Temp 94.2  F (34.6  C) (Axillary)  Resp 16  Ht 1.778  "m (5' 10\")  Wt 84.1 kg (185 lb 6.4 oz)  SpO2 100%  BMI 26.6 kg/m2   Date 10/06/18 0700 - 10/07/18 0659   Shift 4901-0972 8264-8049 6591-7110 24 Hour Total   I  N  T  A  K  E   I.V. 3   3    Shift Total  (mL/kg) 3  (0.04)   3  (0.04)   O  U  T  P  U  T   Urine 75   75    Shift Total  (mL/kg) 75  (0.89)   75  (0.89)   Weight (kg) 84.1 84.1 84.1 84.1     Admit Weight: 83.9 kg (185 lb)   GENERAL APPEARANCE: not interactive  HENT: mouth without ulcers or lesions  LYMPHATICS: no cervical or supraclavicular nodes  RESP: lungs clear to auscultation - no rales, rhonchi or wheezes  CV: regular rhythm, normal rate, no rub, no murmur  EDEMA: 1+ LE edema bilaterally  ABDOMEN: soft, nondistended, nontender, bowel sounds normal  MS: extremities normal - no gross deformities noted, no evidence of inflammation in joints, no muscle tenderness  SKIN: no rash    Data   All labs reviewed by me.  CMP  Recent Labs  Lab 10/06/18  0639 10/05/18  1122 10/04/18  0805 10/03/18  0752 10/02/18  1339  10/01/18  0803    138 138 138  --   < > 137   POTASSIUM 3.9 3.8 3.7 3.6  --   < > 3.7   CHLORIDE 104 107 108 106  --   < > 104   CO2 22 20 21 21  --   < > 24   ANIONGAP 11 11 10 10  --   < > 9   * 177* 152* 134*  --   < > 91   BUN 34* 31* 24 21  --   < > 19   CR 1.92* 1.92* 2.03* 1.84*  --   < > 1.68*   GFRESTIMATED 34* 34* 32* 36*  --   < > 40*   GFRESTBLACK 41* 41* 38* 43*  --   < > 48*   JOSHUA 7.8* 7.3* 7.3* 7.2*  --   < > 7.7*   MAG 2.2  --  2.2 2.2 2.2  < > 1.8   PHOS 3.1 2.4* 2.3* 3.4 4.3  --  3.3   PROTTOTAL  --   --   --   --   --   --  5.5*   ALBUMIN  --   --   --   --   --   --  1.9*   BILITOTAL  --   --   --   --   --   --  0.3   ALKPHOS  --   --   --   --   --   --  80   AST  --   --   --   --   --   --  13   ALT  --   --   --   --   --   --  8   < > = values in this interval not displayed.  CBC    Recent Labs  Lab 10/06/18  0639 10/04/18  0805 10/03/18  0752 10/02/18  0650   HGB 8.6* 8.1* 7.7* 8.3*   WBC 4.6 4.6 3.7* 3.4* "   RBC 2.87* 2.70* 2.58* 2.73*   HCT 26.7* 25.4* 25.0* 26.0*   MCV 93 94 97 95   MCH 30.0 30.0 29.8 30.4   MCHC 32.2 31.9 30.8* 31.9   RDW 19.0* 18.8* 18.5* 19.0*    160 142* 153     INR    Recent Labs  Lab 10/07/18  0619 10/06/18  0639 10/05/18  1122 10/04/18  0805   INR 3.13* 3.14* 4.22* 4.17*     Urine Studies  Recent Labs   Lab Test  10/04/18   1330  10/03/18   2056  09/24/18   0100  09/14/18   1418   COLOR  Rhea  Dark Brown  Yellow  Yellow   APPEARANCE  Cloudy  Cloudy  Clear  Cloudy   URINEGLC  Negative  Negative  Negative  150*   URINEBILI  Negative  Negative  Negative  Negative   URINEKETONE  Negative  10*  Negative  10*   SG  1.010  1.019  1.020  1.028   UBLD  Large*  Moderate*  Moderate*  Moderate*   URINEPH  6.0  5.0  6.0  6.5   PROTEIN  30*  30*  100*  >600*   NITRITE  Negative  Negative  Negative  Negative   LEUKEST  Moderate*  Large*  Trace*  Large*   RBCU  182*  >182*  5  7*   WBCU  >182*  >182*  2  34*     Recent Labs   Lab Test  09/14/18   1418  05/27/16   1409  04/29/11   1348  09/14/10   1134   UTPG  24.91*  Specimen not received  NOTIFIED [facility]OM[facility]ARE  WHO PAGED  RN, LOPEZ AT 1355. NO URINE   RECIEVED WITH BLOOD SPECIMENS. AB    0.04  <0.02     PTH  Recent Labs   Lab Test  06/05/18   0548  08/24/16   0852   PTHI  78  333*     Iron Studies  Recent Labs   Lab Test  11/08/16   1209  08/24/16   0852   IRON  17*  20*   FEB  146*  197*   IRONSAT  11*  10*   BARTOLO   --   1025*

## 2018-10-07 NOTE — PLAN OF CARE
Problem: Patient Care Overview  Goal: Plan of Care/Patient Progress Review  Pt more alert tonight per family, unable to assess orientation. VSS on RA except for hypothermia, josie hugger used.  Grandson at bedside, helpful w/ cares. Repositioned q2hr. NJ clogged during evening, unclogged w/ clog zapper. TF at 40 ml/hr via NJ. Meds through NJ. Virk patent w/ low output. Mepilex on bottom. 1 loose BM. L PIV-TKO. BG checks Q4h. R internal jugular. Will cont to monitor.

## 2018-10-08 NOTE — PLAN OF CARE
Problem: Patient Care Overview  Goal: Plan of Care/Patient Progress Review  VSS Except for low temp. Bear hugger placed on pt. Alert yet ADRIA mental state. Mitts on. Pt would answer some yes/no questions for family. Denies pain. Turning every 2 hours. Combative at times when repositioning. Loose bm x3. Incontinent. Stool sample sent for c-diff. Currently on po vanco. Virk patent. Pt oliguric. interdry between folds. Barrier applied to bottom. Generalized edema present. +3 to flank, thighs, and scrotum. Tf at 40 ml/hr via NJ tube. Nothing by mouth this shift. Family at bedside. Left piv and internal jugular sl. Continue with poc.     Notified provider, would like us to stop bear hugger and check rectal temp to check core temp.

## 2018-10-08 NOTE — PLAN OF CARE
Problem: Patient Care Overview  Goal: Plan of Care/Patient Progress Review  Pt confused and combative, unable to assess orientation. VSS on RA except for HTN. Family at bedside. Repositioned q2hr.  TF at 40 ml/hr via NJ. Meds through NJ. Virk patent w/ low output. Mepilex changed on bottom. Interdry between groin.  2 loose BMs. L PIV-TKO. BG checks Q4h. R internal jugular. Will cont to monitor.

## 2018-10-08 NOTE — PROGRESS NOTES
[facility] Nurse Inpatient Pressure Injury Assessment   Reason for consultation: Evaluate and treat penile skin      ASSESSMENT  Pressure Injury: on penis foreskin , hospital acquired ,   This is a Medical Device Related Pressure Injury (MDRPI) due to patino  Pressure Injury is Stage 2   Contributing factor of the pressure injury: pressure, immobility and moisture  Status: follow up assessment, improving     TREATMENT PLAN  Penile skin wound: BID Catheter: Continue routine cath cares.  May apply small amount of barrier paste on wounds.  When placing Stat lock for Patino make sure there catheter is positioned so catheter is straight midline with no tension.  Orders Written  WO Nurse follow-up plan:weekly  Nursing to notify the Provider(s) and re-consult the WOC Nurse if wound(s) deteriorates or new skin concern.    Patient History  According to provider note(s):  Priscilla Way is a 78 year old male with a complex past medical history including dementia (Alzheimer and vascular), liver transplant and kidney transplants in 2000, DVT, T2DM, CVA, and poor airway protection admitted with possible seizure and aspiration.  He is currently hemodynamically stable with ongoing concerns for aspirations/airway protection, nutritional support, and monitoring renal function off of HD.    Objective Data  Containment of urine/stool: Indwelling catheter    Current Diet/ Nutrition:    Active Diet Order      Dysphagia Diet Level 1 Pureed Nectar Thickened Liquids (pre-thickened or use instant food thickener)    Output:   I/O last 3 completed shifts:  In: 1220 [NG/GT:300]  Out: 100 [Urine:100]    Risk Assessment:   Sensory Perception: 2-->very limited  Moisture: 3-->occasionally moist  Activity: 1-->bedfast  Mobility: 2-->very limited  Nutrition: 2-->probably inadequate  Friction and Shear: 1-->problem  Stuart Score: 11      Labs:   Recent Labs  Lab 10/08/18  0722  10/05/18  1122  10/02/18  1339   PREALB  --   --   --   --  8*   HGB 8.7*  < >   --   < >  --    INR 2.44*  < > 4.22*  < >  --    WBC 4.6  < >  --   < >  --    A1C  --   --  5.4  --   --    CRP  --   --   --   --  23.0*   < > = values in this interval not displayed.    Physical Exam  Skin inspection: focused penile foreskin          Wound Location: penile foreskin  Date of last Photo 10/1/18  10/8 unable to obtain new picture - pt uncooperative with assessment  Wound History: pt has indwelling Virk catheter, swelling of skin on uncircumcised penis.  Saw 9/26 concern for pressure injury and skin was intact at that time. Moved cath securement device at that time to decrease pressure from Virk tubing on skin.  However pt's skin has swelled since then. Two small wounds.    Measurements (length x width x depth, in cm) Now one small wound:  and 0.4 x 0.5 x <0.1 cm; second wound resurfaced but remains depigmented.    Wound Base:  100% dermis  Palpation of the wound bed: normal   Periwound skin: intact and edematous  Color: normal and consistent with surrounding tissue  Temperature: normal   Drainage:, scant  Description of drainage: bloody  Odor: none  Pain: no grimacing or signs of discomfort    Interventions  Current support surface: Standard  Low air loss mattress with pulsation   Current off-loading measures: Foam padding and Pillows under calves  Repositioning aid: Pillows  Visual inspection of wound(s) completed   Tube Securement: cath securement device  Wound Care: was done per plan of care.  Supplies: gathered and at bedside  Educated provided: importance of repositioning, plan of care and wound progress  Education provided to: family member son  Discussed importance of:repositioning every 2 hours, off-loading pressure to wound, their role in pressure injury prevention, head elevation <30 degrees, nutrition on wound healing and moisture management  Discussed plan of care with Patient and Nurse    Holli Magallanes RN

## 2018-10-08 NOTE — PLAN OF CARE
"Problem: Patient Care Overview  Goal: Plan of Care/Patient Progress Review  Outcome: No Change  Reason for admission/Dx:   Aspiration pneumonia due to regurgitated food, unspecified laterality, unspecified part of lung (H) [J69.0]  Seizure (H) [R56.9]     [Vitals]: /80 (BP Location: Right arm)  Pulse 82  Temp 97.9  F (36.6  C) (Axillary)  Resp 18  Ht 1.778 m (5' 10\")  Wt 74.9 kg (165 lb 1.6 oz)  SpO2 100%  BMI 23.69 kg/m2  [Labs/Lactic]: Cr 1.92, unchanged   [BG]:  BG between 134-188 w/ insulin coverage    [Electrolytes]: BMP not ordered for today       [Cardiac]: WDL  [Pain/PRN/s]: PRN tylenol given for nonverbal pain s/s grimacing, and restlessness.      [Respiratory]: pt has fine crackles throughout LS.  [Lines]:  PIV running @TKO  internal jugular heparin locked and currently not in use       [Neuros]: Pt has dementia at baseline, unable to assess orientation/  Pt frequently combative and not easily redirectable. Mitts and bed alarm on for safety         [GI]:  Active Diet Order      Dysphagia Diet Level 1 Pureed Nectar Thickened Liquids (pre-thickened or use instant food thickener)   -Pt not eating  -TF running @ GR 40 ml/hr. 60 ml flushes Q4hr  -Pt incontinent of stool Q2 hours  [Activity]: Pt requiring assist 2 w/ turning and re postioning.  Family assisting w/ cares    []: Pt producing scant reed urine.  -Increase swelling noted in pt's feet and scrotum.  -IV bumex and Zaroxolyn added to patient's regimen.         [Skin/Wounds]: Pt has excoriated area on perianal.  Barrier cream applied along with perineum lotion.  Sacral mepilex applied for PI prevention.  Feet elevated on pillows.             Plan: Plan for team to further discuss POC w/ family.        "

## 2018-10-08 NOTE — PROGRESS NOTES
[facility], [place]    Internal Medicine Progress Note - Gold Service      Assessment & Plan  Priscilla Way is a 78 year old male with a complex past medical history including dementia (Alzheimer and vascular), liver transplant and kidney transplants in 2000, DVT, T2DM, CVA, and poor airway protection admitted with possible seizure and aspiration.  He has ongoing care for nutritional support, and monitoring renal function off of HD.  NJ was placed on 10/1 and will trial medications through NJ and work on tube feeds.    # Possible Seizure Activity  # Baseline Dementia: Full body convulsions witnessed PTA with reported associated aspiration.  CT head and CTA without acute changes.  Family feels patient is back to baseline mental status.  - Neurology consulted, appreciate assistance  - Levetiracetam initiated but per family this is not consistent with goals of care so this has been discontinued  - Seizure precautions      # DDKT and Liver Transplant  # Anuric Renal Failure s/p HD with baseline CKD: Suspected secondary to contrast.  HD on 9/26 with UF 9/27. His urine out put improved around 9/30. Then has declined from 10/1 on wards. He was hypothermic, septic work up done showed UTI. He was given empiric Vanc, Rocephin from 10/4 onwards. But UO was declining even before that. CNI toxicity in DD. Contrast induced nephropathy may not have recovered. He has gain weight gradually. Difficult to know what is his actually weight as these are bed weights, so they are fluctuating.     - Transplant nephrology consulted, appreciate assistance  - Vanc less likely caese as kidney function was declining well before that. He got only two doses of vanc and it was stopped. The level was not high.   - he has diarrhea, likely that's keeping the volume down  - on Bumex 4 mg bid, but not making much urine, got metolazone 5 mg yesterday too as one time dose  - I spoke with Daughter Cruz today. She wants  dialysis. Nephrology team does not think he needs dialysis right now. Not sure if he is a long term good dialysis candidate given her overall health.   - Medications are by NJ  - Tacro PO only available (take if able, will trial buccal), once level is from 4-6, plan is to stop MMF  - cut back dose today to 1.5 in am and 1 mg pm  - continue Cellcept and prednisone. Dr Washington wanted to have him just on Cellcept and prednisone, but they will let us know once that decision is made.   - added iron supplements per nephrology recs  - creat is around 2      # Aspiration Pneumonia and poor Airway Protection  Recurrent small aspirations per speech evaluation and signficant aspiration event with possible seizure PTA.  Discussed with family regarding concerns for high risk of recurrent aspirations.  Per both daugthers, trial of NJ was started on 10/2 (placement on 10/1) with plans short trial of feeding tube and medication support followed by a re-evaluation to see If his oral intake improves.     - Completed 7 day course of IV antibiotics previously  - Amp/Sulbactam restarted 9/26-9/28 but stopped with no changes on CXR  - NPO strict if any recurrence of aspiration, PO at present only if meeting parameters  - ID previously involved  - Ongoing discussion with family regarding goals of care  - Ethics updated, previously involved    # Hypothermia (Axillary temps), May have UTI.     -First we will get core temperatures. If low, start bare hugger  -Sepsis work up done he had UTI. Temp improved with AB treatment. Free T4 before has been fine.   -Check for adrenal insufficiency by cosyntropin stim test if hypothermia is persistent.   -UC growing enterobacter. Rocephin changed to ciprofloxacin through TF. Total 10 day course.   -Virk has been changed.     # Acute on Chronic Normocytic Anemia  # Possible slow GI blood loss: Suspect multifactorial including related to chronic disease and LILIAM as well as iatrogenic though patient  reportedly had melanotic stools though hemoglobins have since been stable.  - Continue PPI BID   - Peirodic hemoglobin checks and as needed for signs of bleeding  - Transfuse for hemoglobin <7     # Recurrent C. Diff Colitis:  He has ho recurrent Cdiff. Has been trreated for aspiration and now getting ciprofloxacin for UTI. On oral Vancomycin. Has stool incontinence. After Feeding tube developed diarrhea. Repeat Cdiff on stool is negative. So we have added Nutrisource fiber I packet TID. Methylcellulose did not work.   - Continue for 10 -14 days post Abx at least given ho recurrent Cdiff. May need suppressive therapy.     # DVT On warfarin PTA.   - off Heparin gtt, now that we have NJ for feeding. INR is therapeutic. Heparin is off.   - Warfarin by NJ  # T2DM: Follow daily BG. Currently stable. Start BG checks.   # HTN: If BP drops, plan is to cut back on amlodipine. Continue carvedilol trial via NJ.  Hydralazine PRN  # LE Edema- Lymphedema consult    Diet: Dysphagia Diet Level 1 Pureed Nectar Thickened Liquids (pre-thickened or use instant food thickener)  Snacks/Supplements Adult: Boost Plus; With Meals  Adult Formula Drip Feeding: Continuous TwoCal HN; Nasojejunal; Goal Rate: 40; mL/hr; Medication - Tube Feeding Flush Frequency: At least 15-30 mL water before and after medication administration and with tube clogging; Amount to Send. 60 ml every 4 hr    Goals of Care: He is DNR and DNI. Several discussions with family. I spoke with Daughter Cruz Today again. She is medical medical decisions for him. She wants to pursue dialysis. Feeding tube will be reassessed based on how he does. She declined to come for another family conference.     Fluids: As above  Virk Catheter: in place, indication: Strict 1-2 Hour I&O, Strict 1-2 Hour I&O  DVT Prophylaxis: Heparin gtt bridging to warfarin  Code Status: DNR/DNI    Disposition Plan   Expected discharge: 4 - 7 days, recommended to prior living arrangement once enteral  nutrition/medication plan in place and renal function stable.     Entered: Michel Maynard MD 10/08/2018, 3:51 PM   Information in the above section will display in the discharge planner report.      The patient's care was discussed with the Bedside Nurse, Patient, Patient's Family and Nephrology Consultant.    Michel Maynard MD  Internal Medicine Staff Hospitalist Service  [facility]  Pager: [phone]  Please see sticky note for cross cover information    Interval History     He speaks to his caregiver in his native language. Patient refuses to speak to doctors or nurses or other providers. She speaks to the family member.     I used his caregiver as the . The  does not have good english skills unfortunately, so its limited. But what ever we could get out from the patient, he is not sob or does not have pain.      Nursing reports, he had diarrhea. Quiet a few times. At times his axillary temp goes down.      Data reviewed today: I reviewed all medications, new labs and imaging results over the last 24 hours.    Physical Exam      Vital Signs: Temp: 94.8  F (34.9  C) Temp src: Axillary BP: 124/61 Pulse: 74 Heart Rate: 77 Resp: 16 SpO2: 94 % O2 Device: None (Room air)    Weight: 177 lbs 4.8 oz  General Appearance: right internal jugular dialysis line. NO distress. Feeding tube in place.   Respiratory:  AE heard in the back. He has mild inspiratory crackles noted in bases. No wheezing.   Cardiovascular: RRR, no m/r/g  GI: Abdomen mild disteded. Patient wont let anyone touch the abdomen, she tries to kick them out. Exam limited. Has mild subcut edema  Skin: BL feet distal aspect skin is dark. Flank, thigh and leg edema noted  Other: Bilateral LE edema present and stable from prior.

## 2018-10-08 NOTE — PROGRESS NOTES
CLINICAL NUTRITION SERVICES - Brief NOTE:    Contacted by MD: to add fiber to pt's TF regimen, Pt is having liquid stools:     Diet: Dysphagia level 1 pureed with nectar thickened liquids. Patient has minimal po intake. Patient has dementia at baseline and is at risk for aspiration.     TF: Started on 10/2 with TwoCal HN, via NJ and it is infusing at 40 ml/hr ( on concentrated TF due to volume overload and HD on hold).     --Has history of Kidney/liver transplant and is on Immune suppressant therapy.  --C.diff toxin: Positive on 9/11, noted patient is on antibiotics,  Vancomycin and Cipro started today.       Interventions: Ordered Nutrisource Fiber, 1 pkt TID via FT.  This will provide 9 additional grams of soluble fiber daily, to help bind stools.     Recommend:   1) If not getting desired results from 3 pkt of fiber daily, can increase to 1 pkt QID.   2) May start Cultural with history of C.diff       Iveth Mann RD/LANCE  Pager [phone]

## 2018-10-08 NOTE — PROGRESS NOTES
Nephrology Progress Note  10/08/2018         Today Recommendations:  - Tacrolimus 1.5 mg every morning and 1.0 mg every evening.     Assessment & Recommendations:   Priscilla Way is a 78 year old year old male  with h/o simultaneous liver and kidney transplant in  for hepatitis C.He was admitted  for AMS and seizures.    # DDKT and liver transplant:  - Basline Cr ~ 1.2- 1,4; Increased likely due to contrast which he received on 2018.  - Most recent Crea: 2.06  - Last HD/UF was in   - Oliguric, fail diuresis challenging,   Received 4 mg of Bumex BID with Metolazone 5 mg on 10/7 with 75 ml of urine output.   -  Ineffective diuresis,  we are not able to maintain euvolemia medically. Goals of care would need to be discussed. Currently the lower extremity edema is cosmetic, but if fluid accumulates in the lungs, this would lead to hypoxia and eventual death.  - Patient is fragile           # Liver transplant :  - Most recent LFTs and Alk phos WNL.           # Immunosuppression:   - Continue with Mycophenolate 250 mg suspension BID via NG tube.   - Continue with Prednisone 5 mg daily.   - Decrease Tacrolimus to 1.5 mg in every morning and 1.0 mg every evening. Tac level on 10/7 was 5.7        # Hypertension- Volume status:   - -140/60-70  - Continue with Amlodipine 5 mg daily and Coreg 6.25 mg BID.   - Volume overload, oliguric, failed dieresis challenging.          # Anemia:  - multifactorial from blood draws and mmf effect.   - Hgb: 8.7, continue monitoring.   - On Ferrous sulfate supplementation          # Mineral Bone Disorder:   - Secondary renal hyperparathyroidism; PTH level is:  Normal at 78 on 2018  - Calcium; level is Normal when corrected for albumin   - Phosphorus: 3.1 and M.3          # Electrolytes:   - Potassium: normal  - Na: level, normal          # Other Medical Issues:   # seziure disorder- admitted with AMS. Seen by neurology. On keppra.   # h/o CVA:  Minimally interactive  "at baseline. Palliative care on consult. Family does not want feeding tube.   # Cdiff- on PO Vancomycin  # DM- management per primary.         # Transplant History:  Etiology of kidney failure: Hepatitis C  Transplant: 2000 (Kidney), 2000 (Liver)  Donor Type:  - Brain Death                      Donor Class: Standard Criteria Donor  Significant changes in immunosuppression: see above  Significant Complications: Now anuric LILIAM     # goals of care: per primary team:  Even with initiation of longterm dialysis, the likelihood that he would have meaningful quality of life is dubious. As such, continued discussion about goals of care with his family are very important.        Recommendations were communicated to primary team via Note    Seen and discussed with Dr. Padmini Augustin MD   [phone]    Interval History :   Nursing and provider notes from last 24 hours reviewed.  In the last 24 hours Priscilla Way has been stable at his baseline, bedridden, fragile, NG tube in place. No events overnight.      Review of Systems:   Not able to obtain due to advanced dementia.       Physical Exam:   I/O last 3 completed shifts:  In: 1110 [NG/GT:150]  Out: 250 [Urine:250]   /61 (BP Location: Left arm)  Pulse 74  Temp 94.8  F (34.9  C) (Axillary)  Resp 16  Ht 1.778 m (5' 10\")  Wt 80.4 kg (177 lb 4.8 oz)  SpO2 94%  BMI 25.44 kg/m2     GENERAL APPEARANCE: Fragile, bedridden, four limbs contraction   EYES:  Sluggish pupils  HENT: NG tube in place, poor oral hygiene.   PULM: Rales to lung base B/L  CV: regular rhythm, normal rate, no rub     -edema to lower Extrs B/L +2  GI: soft, non tender, bowel sounds are positive.   INTEGUMENT: no cyanosis, no rash  NEURO:  Bedridden, 4 limbs contractions        Labs:   All labs reviewed by me  Electrolytes/Renal -   Recent Labs   Lab Test  10/08/18   0722  10/07/18   0619  10/06/18   0639  10/05/18   1122  10/04/18   0805   NA  135   --   136  138  " 138   POTASSIUM  4.5   --   3.9  3.8  3.7   CHLORIDE  104   --   104  107  108   CO2  20   --   22  20  21   BUN  47*   --   34*  31*  24   CR  2.00*  2.06*  1.92*  1.92*  2.03*   GLC  181*   --   178*  177*  152*   JOSHUA  8.0*   --   7.8*  7.3*  7.3*   MAG  2.3   --   2.2   --   2.2   PHOS   --    --   3.1  2.4*  2.3*       CBC -   Recent Labs   Lab Test  10/08/18   0722  10/06/18   0639  10/04/18   0805   WBC  4.6  4.6  4.6   HGB  8.7*  8.6*  8.1*   PLT  250  199  160       LFTs -   Recent Labs   Lab Test  10/01/18   0803  09/24/18   0812  09/14/18   0733  09/13/18   0405   ALKPHOS  80   --   78  77   BILITOTAL  0.3   --   0.3  0.3   ALT  8   --   17  15   AST  13   --   25  19   PROTTOTAL  5.5*   --   6.7*  6.1*   ALBUMIN  1.9*  2.2*  2.2*  2.1*       Iron Panel -   Recent Labs   Lab Test  11/08/16   1209  08/24/16   0852   IRON  17*  20*   IRONSAT  11*  10*   BARTOLO   --   1025*         Imaging:  All imaging studies reviewed by me.     Current Medications:    acetylcysteine  2 mL Nebulization Daily     albuterol  2.5 mg Nebulization Daily     amLODIPine  5 mg Oral Daily     B and C vitamin Complex with folic acid  5 mL Per Feeding Tube Daily     bumetanide  4 mg Intravenous Q12H     carvedilol  6.25 mg Oral BID     ciprofloxacin  500 mg Per Feeding Tube Q12H JUNIOR     ferrous sulfate  325 mg Per Feeding Tube Daily     fiber modular (NUTRISOURCE FIBER)  1 packet Per Feeding Tube TID     influenza Vac Split High-Dose  0.5 mL Intramuscular Prior to discharge     insulin aspart  1-6 Units Subcutaneous Q4H     mycophenolate  250 mg Per Feeding Tube Q12H JUNIOR     pantoprazole  40 mg Per Feeding Tube BID AC     predniSONE  5 mg Per Feeding Tube Q24H     protein modular  1 packet Per Feeding Tube Daily     sodium chloride (PF)  3 mL Intracatheter Q8H     tacrolimus  1 mg Oral QPM     [START ON 10/9/2018] tacrolimus  1.5 mg Oral QAM     thiamine  100 mg Per Feeding Tube Daily     vancomycin  125 mg Oral 4x Daily     warfarin   0.5 mg Oral ONCE at 18:00     zinc sulfate  220 mg Per Feeding Tube Daily       IV fluid REPLACEMENT ONLY       - MEDICATION INSTRUCTIONS -       Warfarin Therapy Reminder       Joaquín Augustin MD     I have seen and examined this patient with the resident.  This note reflects our joint assessment and plan.     Shelly Washington MD

## 2018-10-09 NOTE — PROGRESS NOTES
[address] Medicine Service Daily Note  Date of Service: 10/9/2018    Patient: Priscilla Way   MRN: [id number]   Admission Date: 9/11/2018   Hospital Day # 28    Primary care provider: Randy Fuentes  ___________________________________   Priscilla Way is a 78 year old male with a complex past medical history including dementia (Alzheimer and vascular), liver transplant and kidney transplants in 2000, DVT, T2DM, CVA, and poor airway protection admitted with possible seizure and aspiration.  He has ongoing care for nutritional support, and monitoring renal function off of HD.  NJ was placed on 10/1 and will trial medications through NJ and work on tube feeds.     # Altered mental status: Full body convulsions witnessed PTA with reported associated aspiration.  CT head and CTA without acute changes.  Family feels patient is back to baseline mental status (known dementia).  - Neurology consulted, appreciate assistance  - Levetiracetam initiated but per family this is not consistent with goals of care so this has been discontinued  - Seizure precautions       # DDKT and Liver Transplant  # Anuric Renal Failure s/p HD with baseline CKD: Working dx is multifactorial with contrast, CNI toxicity.  HD on 9/26 with UF 9/27. His urine output improved around 9/30. Then has declined from 10/1 on wards. He was hypothermic, septic work up done showed UTI. He was given empiric Vanc, Rocephin from 10/4 onwards. But UO was declining even before that.   --> Renal txp team involved and do not recommend HD. Family had many questions about this and have been concerned about the volume overload. Discussed that his severe hypoalbuminemia is contributing would cause intravascular volume depletion with anasarca. HD in that setting would be complicated by hypotension and volume shifts.      # Aspiration Pneumonia and poor Airway Protection  Recurrent small aspirations per speech evaluation  and signficant aspiration event with possible seizure PTA. Discussed with family regarding concerns for high risk of recurrent aspirations.  Per both daugthers, trial of NJ was started on 10/2 (placement on 10/1) with plans short trial of feeding tube and medication support followed by a re-evaluation to see If his oral intake improves.      - Completed 7 day course of IV antibiotics previously  - Amp/Sulbactam restarted 9/26-9/28 but stopped with no changes on CXR  - NPO strict if any recurrence of aspiration, PO at present only if meeting parameters  - ID previously involved  - Ongoing discussion with family regarding goals of care  - Ethics updated, previously involved     # Hypothermia (Axillary temps), May have UTI.      -First we will get core temperatures. If low, start bare hugger  -Sepsis work up done he had UTI. Temp improved with AB treatment. Free T4 before has been fine.   -Check for adrenal insufficiency by cosyntropin stim test if hypothermia is persistent.   -UC growing enterobacter. Rocephin changed to ciprofloxacin through TF. Total 10 day course.   -Virk has been changed.      # Acute on Chronic Normocytic Anemia  # Possible slow GI blood loss: Suspect multifactorial including related to chronic disease and LILIAM as well as iatrogenic though patient reportedly had melanotic stools though hemoglobins have since been stable.  - Continue PPI BID   - Peirodic hemoglobin checks and as needed for signs of bleeding  - Transfuse for hemoglobin <7      # Recurrent C. Diff Colitis:  He has ho recurrent Cdiff. Has been trreated for aspiration and now getting ciprofloxacin for UTI. On oral Vancomycin. Has stool incontinence. After Feeding tube developed diarrhea. Repeat Cdiff on stool is negative. So we have added Nutrisource fiber I packet TID. Methylcellulose did not work.   - Continue for 10 -14 days post Abx at least given ho recurrent Cdiff. May need suppressive therapy.      # DVT On warfarin PTA.   -  off Heparin gtt, now that we have NJ for feeding. INR is therapeutic. Heparin is off.   - Warfarin by NJ  # T2DM: Follow daily BG. Currently stable. Start BG checks.   # HTN: If BP drops, plan is to cut back on amlodipine. Continue carvedilol trial via NJ.  Hydralazine PRN  # LE Edema- Lymphedema consult     Diet: Dysphagia Diet Level 1 Pureed Nectar Thickened Liquids (pre-thickened or use instant food thickener)  Snacks/Supplements Adult: Boost Plus; With Meals  Adult Formula Drip Feeding: Continuous TwoCal HN; Nasojejunal; Goal Rate: 40; mL/hr; Medication - Tube Feeding Flush Frequency: At least 15-30 mL water before and after medication administration and with tube clogging; Amount to Send. 60 ml every 4 hr     Goals of Care: He is DNR and DNI. Several discussions with family. I spoke with son today.   Fluids: As above  Virk Catheter: in place, indication: Strict 1-2 Hour I&O, Strict 1-2 Hour I&O  DVT Prophylaxis: Heparin gtt bridging to warfarin  Code Status: DNR/DNI    Disposition Plan     Expected discharge: 4 - 7 days, recommended to prior living arrangement once enteral nutrition/medication plan in place and renal function stable.Pt's care was discussed with bedside RN, CC/SW, patient and renal transplant attending (Dr. Washington) during PFCR.    Joe Dean   Internal Medicine Staff Hospitalist Service    [facility]    Pager: [phone]    Team: Scott Small 2   Page Cross Cover after 5 pm: pager [phone] or job code [phone]  ___________________________________________________________________    Subjective & Interval Hx: Unable to obtain history from patient but no acute events overnight.    Last 24 hr care team notes reviewed.    ROS: Cannot obtain ROS due to patients altered mentation.  Medications: Reviewed in EPIC. List below for reference    Physical Exam:  Blood pressure 135/75, pulse 79, temperature 96.3  F (35.7  C), temperature source Rectal, resp. rate 18, height 1.778 m  "(5' 10\"), weight 80.4 kg (177 lb 4.8 oz), SpO2 100 %.     Intake/Output Summary (Last 24 hours) at 10/09/18 0855  Last data filed at 10/09/18 0700   Gross per 24 hour   Intake              330 ml   Output              425 ml   Net              -95 ml      General: stable respiratory status with NJ in nares lying supine   HEENT: NJ in nares, no skin breakdown  Neck: nl   Chest/Resp: anterior breath sounds normal   Heart/CV: RRR   Abdomen/GI: soft abdomen   Extremities/MSK: no acute synovitis, anasarca   Skin: no new rashes   Neuro/Psych: dementia, not interactive    Lines/Tubes:   Peripheral IV 10/05/18 Left;Anterior Lower forearm (Active)   Site Assessment WDL 10/9/2018  1:00 AM   Line Status Saline locked 10/9/2018  1:00 AM   Phlebitis Scale 0-->no symptoms 10/9/2018  1:00 AM   Infiltration Scale 0 10/8/2018  3:00 PM   Infiltration Site Treatment Method  None 10/8/2018 10:00 AM   Extravasation? No 10/9/2018  1:00 AM   Number of days:4       CVC Double Lumen 09/16/18 Right Internal jugular (Active)   Site Assessment WDL 10/9/2018  1:00 AM   Lumen Soln/Vol REFERENCE 1.3/1.3 10/3/2018  6:00 PM   External Cath Length (cm) 0.5 cm 10/3/2018  6:00 PM   Extravasation? No 10/6/2018 12:00 AM   Dressing Intervention Chlorhexidine sponge;New dressing 10/3/2018  6:00 PM   Dressing Change Due 10/10/18 10/3/2018  6:00 PM   CVC Comment CDI 10/7/2018 10:00 AM   CVC Lumen Assessment Red;Blue 10/3/2018  6:00 PM   Number of days:23         Labs & Studies of Note: I personally reviewed the following studies: see below  Cultures:  See below  CMP  Recent Labs  Lab 10/09/18  0639 10/08/18  0722 10/07/18  0619 10/06/18  0639 10/05/18  1122 10/04/18  0805 10/03/18  0752    135  --  136 138 138 138   POTASSIUM 4.3 4.5  --  3.9 3.8 3.7 3.6   CHLORIDE 105 104  --  104 107 108 106   CO2 22 20  --  22 20 21 21   ANIONGAP 11 11  --  11 11 10 10   * 181*  --  178* 177* 152* 134*   BUN 50* 47*  --  34* 31* 24 21   CR 2.06* 2.00* 2.06* " 1.92* 1.92* 2.03* 1.84*   GFRESTIMATED 31* 32* 31* 34* 34* 32* 36*   GFRESTBLACK 38* 39* 38* 41* 41* 38* 43*   JOSHUA 8.1* 8.0*  --  7.8* 7.3* 7.3* 7.2*   MAG  --  2.3  --  2.2  --  2.2 2.2   PHOS  --   --   --  3.1 2.4* 2.3* 3.4    CBC  Recent Labs  Lab 10/09/18  0639 10/08/18  0722 10/06/18  0639 10/04/18  0805   WBC 4.0 4.6 4.6 4.6   RBC 2.66* 2.90* 2.87* 2.70*   HGB 8.0* 8.7* 8.6* 8.1*   HCT 24.9* 27.6* 26.7* 25.4*   MCV 94 95 93 94   MCH 30.1 30.0 30.0 30.0   MCHC 32.1 31.5 32.2 31.9   RDW 20.1* 20.1* 19.0* 18.8*    250 199 160    INR  Recent Labs  Lab 10/09/18  0639 10/08/18  0722 10/07/18  0619 10/06/18  0639   INR 1.96* 2.44* 3.13* 3.14*    Arterial Blood GasNo lab results found in last 7 days.    All cultures:    Recent Labs  Lab 10/04/18  1330 10/03/18  2056 10/03/18  1800 10/03/18  1733   CULT >100,000 colonies/mLCandida tropicalis*  <10,000 colonies/mLEnterobacter cloacae complex* 50,000 to 100,000 colonies/mLCandida tropicalis*  Susceptibility testing not routinely done No growth No growth       Current Facility-Administered Medications   Medication     acetaminophen (TYLENOL) tablet 650 mg     acetylcysteine (MUCOMYST) 20 % nebulizer solution 2 mL     albuterol neb solution 2.5 mg     amLODIPine (NORVASC) tablet 5 mg     atropine 1 % ophthalmic solution 1-2 drop     B and C vitamin Complex with folic acid (NEPHRONEX) liquid 5 mL     bisacodyl (DULCOLAX) Suppository 10 mg     bumetanide (BUMEX) injection 4 mg     carvedilol (COREG) suspension 6.25 mg     ciprofloxacin (CIPRO) tablet 500 mg     dextrose 10 % 1,000 mL infusion     glucose gel 15-30 g    Or     dextrose 50 % injection 25-50 mL    Or     glucagon injection 1 mg     ferrous sulfate (IRON) tablet 325 mg     fiber modular (NUTRISOURCE FIBER) (NUTRISOURCE FIBER) packet 1 packet     heparin bolus from infusion pump     hydrALAZINE (APRESOLINE) injection 10-20 mg     HYDROmorphone (DILAUDID) injection 0.2 mg     influenza Vac Split  High-Dose (FLUZONE) injection 0.5 mL     insulin aspart (NovoLOG) inj (RAPID ACTING)     lidocaine (LMX4) cream     lidocaine 1 % 1 mL     magnesium hydroxide (MILK OF MAGNESIA) suspension 30 mL     magnesium sulfate 2 g in NS intermittent infusion (PharMEDium or FV Cmpd)     magnesium sulfate 4 g in 100 mL sterile water (premade)     melatonin tablet 1 mg     mycophenolate (CELLCEPT BRAND) suspension 250 mg     naloxone (NARCAN) injection 0.1-0.4 mg     ondansetron (ZOFRAN-ODT) ODT tab 4 mg    Or     ondansetron (ZOFRAN) injection 4 mg     pantoprazole (PROTONIX) 2 mg/mL suspension 40 mg     Patient is already receiving anticoagulation with heparin, enoxaparin (LOVENOX), warfarin (COUMADIN)  or other anticoagulant medication     predniSONE (DELTASONE) tablet 5 mg     prochlorperazine (COMPAZINE) injection 5 mg    Or     prochlorperazine (COMPAZINE) tablet 5 mg    Or     prochlorperazine (COMPAZINE) Suppository 12.5 mg     protein modular (PROSource TF) 1 packet     QUEtiapine (SEROquel) tablet 25 mg     senna-docusate (SENOKOT-S;PERICOLACE) 8.6-50 MG per tablet 1 tablet    Or     senna-docusate (SENOKOT-S;PERICOLACE) 8.6-50 MG per tablet 2 tablet     sodium chloride (PF) 0.9% PF flush 3 mL     sodium chloride (PF) 0.9% PF flush 3 mL     sodium phosphate 15 mmol in D5W intermittent infusion     sodium phosphate 20 mmol in D5W intermittent infusion     sodium phosphate 25 mmol in D5W intermittent infusion     tacrolimus (PROGRAF BRAND) suspension 1 mg     tacrolimus (PROGRAF BRAND) suspension 1.5 mg     thiamine 100 MG/ML suspension 100 mg     vancomycin (FIRVANQ) oral solution 125 mg     Warfarin Therapy Reminder (Check START DATE - warfarin may be starting in the FUTURE)     zinc sulfate (ZINCATE) capsule 220 mg

## 2018-10-09 NOTE — PLAN OF CARE
Problem: Patient Care Overview  Goal: Plan of Care/Patient Progress Review  Outcome: No Change  VSS on RA. Pt is Nonverbal. Unable to assess orientation or mentation. Per NOC nurse; NJ was previously clogged, but became unclogged this shift. Restarted TF at goal rate of 40ml/hr. BGL stable with sliding scale insulin coverage. PIV saline locked. R internal jugular WNL. Repositioned Q2 hours with pillows. No BM this shift. Low urine output. Plans to change TF formula to Nutren 1.5 with goal rate of 55mL/hr this evening shift. Family at bedside and attentive to cares. Will continue to monitor and follow POC.

## 2018-10-09 NOTE — PLAN OF CARE
Problem: Patient Care Overview  Goal: Plan of Care/Patient Progress Review  Outcome: No Change  Reason for admission: Aspiration pneumonia   Hx: Liver and kidney txp, dementia, DVT, T2DM, CVA, poor airway protection.   Activity: Q2H repo schedule. Ax2 in bed, mechanical lift when up.   Vitals: VSS on RA. Current rectal temp 96.3, bare hugger applied per MD.     LDAS: Virk cath in place. Double lumen internal jugular. L PIV S/L. sapphire GREGORIO MD notified.   Cardiac: No acute changes this shift.    Respiratory: Stable on RA.       GI/: Poor urine output. Incontinent of bowel x1.   Skin: No skin changes this shift.  Edema, IV Bumex given.   Pain: No nonverbal indicators of pain.   Blood Glucose: 137 at 0300.   Plan: Continue to monitor rectal temp.  Will continue to monitor and follow POC.

## 2018-10-09 NOTE — PLAN OF CARE
Problem: Goal/Outcome  Goal: Goal Outcome Summary  Outcome: No Change  Nonverbal pt. Left PIV SL. NJ clogged, clog zapper used x 3. Float nurse asked to assess. TF was running at 40 ml/hr prior to 2000 med pass when NJ clogged. The writer has been working to unclog the tube since. The overnight nurse will page float nurse for assistance. Rectal temp has been done x 2 this shift. MD notified. BG stable. Pt having frequent BM's. Turning and changing Q2H. Low urinary output, IV Bumex given. Continue with POC.

## 2018-10-09 NOTE — PROGRESS NOTES
CLINICAL NUTRITION SERVICES - REASSESSMENT NOTE     Nutrition Prescription    RECOMMENDATIONS FOR MDs/PROVIDERS TO ORDER:  Continue with TF support -     Malnutrition Status:    Severe malnutrition in the context of chronic illness     Recommendations already ordered by Registered Dietitian (RD):  1. Discontinued Prosource, 1 packet daily via FT given not on HD     2. Nutrisource fiber 1 packet, TID via FT to provide 9 gm soluble fiber    3. Given significant diarrhea, Ordered to begin trial of Nutren 1.5 ( fiber free, maintenance formula) to run @ goal 55 ml/hr.    --Nutren 1.5 @ 55 mL/hr to provide 1980 kcals (26 kcal/kg/day), 90 g PRO (1.2 gm/kg/day), 1003 mL H2O, 232 gm CHO and no fiber daily.      Future/Additional Recommendations:  --Monitor for improvement in diarrhea        EVALUATION OF THE PROGRESS TOWARD GOALS   Diet:   On dysphagia Diet Level 1 Pureed Nectar Thickened Liquids + thickened boost plus       Nutrition Support started on 10/2:   Access: NJ tube ( placed 10/1/2018) (FEEDING TUBE PLACEMENT 10/1/2018: Feeding tube tip was in the jejunum at the ligament of Treitz).     Dosing wt: 76 kg dry wt this admit on 9/27,    Regimen: TwoCal HN @ 40 ml/hr  TwoCal HN @ 40 mL/hr (960 mL/day) to provide 1920 kcals (25 kcal/kg/day), 81 gm PRO (1.1 gm/kg/day), 672 mL H2O, 210 gm CHO and 5 gm Fiber daily.    --Prosource, 1 packet daily via FT ( to provide additional 40 kcal and 11 gm protein).  --Free water flushes: 60 ml every 4 hours   --Started on Nutrisource fiber, 1 packet TID        Intake:   PO: minimal given AMS    TF: Average 6 day TF support provided: 670 ml/day ( 70% estimated goal volume)  Provided patient with 1344 kcal/day ( 18 kcal /kg) and 57 gm protein ( 0.7 gm/kg).     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 On immunosuppression treatment.      --Admitted for AMS and seizure, has history of Alzheimer, DM2, CVA    --Last HD: 9/27, Volume overload, oliguric, failed dieresis challenging. Per providers note, \" HD on 9/26 with UF 9/27. His urine out put improved around 9/30. Then has declined from 10/1 on wards\".    --C.diff: Per MD note, Repeat Cdiff on stool is negative, On PO vanco, has a rectal tube due to having frequent BM. Started on Nutrisource fiber 1 packet TID on 10/8.     --NJ was placed on 10/1 and receiving all med's and nutrition via NJ, ( Intermittent TF on hold due to NJ clogging , clog zapper used x 3  yesterday).     Labs noted:  BUN: 50(H),Cr:2.06 (H), K+: 4.3 (normal), recent phos/mg++ N/A  UOP: 425 yesterday      10/1/18: WOC RN assessment:  Pressure Injury: on penis foreskin , hospital acquired  Medical Device Related Pressure Injury (MDRPI) due to patino: Pressure Injury is Stage 2: Contributing factor of the pressure injury: pressure, immobility and moisture Status: initial assessment -- Started on 10 day vitamin / mineral supplementation      MALNUTRITION  % Intake: </=75% for >/= 1 month (severe)  % Weight Loss: Unable to assess due to volume up   Subcutaneous Fat Loss: Unable to assess  Muscle Loss: Bedridden, 4 limbs contractions  Fluid Accumulation/Edema: Edema to lower Extrs B/L +2 - moderate   Malnutrition Diagnosis: Severe malnutrition in the context of chronic illness     Previous Goals   Total avg nutritional intake to meet a minimum of 25 kcal/kg and 1.0 gm PRO/kg daily (per dosing wt 76 kg actual wt, lower wt on 9/27).  Evaluation: Not " met    Previous Nutrition Diagnosis  Inadequate oral intake related to reduced LIZZY with s/sx of aspiration with PO intake in the setting of advanced dementia and waxing and waning mental status, poor oral awareness / poor swallow function as evidenced by 3 weeks of essentially NPO/Minimal intake since admit with plan to start TF support today on 10/2.     Evaluation: No change    CURRENT NUTRITION DIAGNOSIS  Inadequate oral intake related to reduced AMS with s/sx of aspiration with PO intake in the setting of advanced dementia,  poor oral awareness as evidenced by 3 weeks of essentially NPO/Minimal intake since admit, Started on TF support on 10/2, with less than adequate TF received over the past 6 days: 1344 kcal/day ( 18 kcal /kg) and 57 gm protein ( 0.7 gm/kg).     INTERVENTIONS  Implementation  Enteral Nutrition - Modify composition    Goals  Total avg nutritional intake to meet a minimum of 25 kcal/kg and 1.0 gm PRO/kg daily (per dosing wt 76 kg actual wt, lower wt on 9/27).    Monitoring/Evaluation  Progress toward goals will be monitored and evaluated per protocol.    Iveth Mann RD/LANCE  Pager [phone]

## 2018-10-10 NOTE — PLAN OF CARE
Problem: Patient Care Overview  Goal: Plan of Care/Patient Progress Review  Outcome: No Change  VSS,pt.is non verbal, resistant and aggressive with cares.incontinent of BM x 2 ,had 300 ml urinary output.LS coarse ,has infrequent nonproductive weak cough.BS+,BLE and generalized +3 edema., Hgb 7.7, tube feeding infusing at 55 cc/hr.Family member sleeping in the room not involved with cares. Patient repositioned Q 2 hrs.Will continue to monitor.

## 2018-10-10 NOTE — PROGRESS NOTES
[facility], [place]   Transplant Nephrology Progress Note  Date of Admission:  9/11/2018    Today recommendation:  - HD for 2 hours.     Assessment & Plan    Priscilla Way is a 78 year old year old male  with h/o simultaneous liver and kidney transplant in 2000 for hepatitis C.He was admitted  for AMS and seizures.     # DDKT and liver transplant:  - Basline Cr ~ 1.2- 1,4; Increased likely due to contrast which he received on 9/11/2018.  - Today Crea: 2.08  - Last HD/UF was in 9/27  - UOP yesterday 300 ml, today 350~ ml till noon  - So far ineffective diuresis,  we are not able to maintain euvolemia medically. Goals of care would need to be discussed. Currently the lower extremity edema is cosmetic, but if fluid accumulates in the lungs, this would lead to hypoxia and eventual death.  - Patient still has diarrhea that partially helps with volume status.  - Patient is fragile.   - Will run HD for 2 hours today. Patient is not qualified for chronic HD, if patient has LILIAM, we expect the kidney function to start improving now which has not happened,  anyway due to volume overload and high normal K level, will run HD for 2 hours today and re-evaluate tomorrow.           # Liver transplant :  - Most recent LFTs and Alk phos WNL.           # Immunosuppression:   - Continue with Mycophenolate 250 mg suspension BID via NG tube.   - Continue with Prednisone 5 mg daily.   - Tacrolimus to 1.5 mg in every morning and 1.0 mg every evening. Tac level on 10/7 was 5.7         # Hypertension- Volume status:   - -140/60-70  - Continue with Amlodipine 5 mg daily and Coreg 6.25 mg BID.   - Volume overload, oliguric, failed dieresis challenging.           # Anemia:  - multifactorial from blood draws and mmf effect.   - Hgb: 7.7 today, continue monitoring.   - On Ferrous sulfate supplementation          # Mineral Bone Disorder:   - Secondary renal hyperparathyroidism; PTH level is:  Normal at 78 on  "2018  - Calcium; level is Normal when corrected for albumin   - Phosphorus: 3.1 and M.3          # Electrolytes:   - Potassium: high normal today  - Na: level, normal          # Other Medical Issues:   # seziure disorder- admitted with AMS. Seen by neurology. On keppra.   # h/o CVA:  Minimally interactive at baseline. Palliative care on consult. Family does not want feeding tube.   # Cdiff- on PO Vancomycin  # DM- management per primary.          # Transplant History:  Etiology of kidney failure: Hepatitis C  Transplant: 2000 (Kidney), 2000 (Liver)  Donor Type:  - Brain Death                      Donor Class: Standard Criteria Donor  Significant changes in immunosuppression: see above  Significant Complications: Now anuric LILIAM      # goals of care:   - Patient is not qualified for chronic HD.  - If LILIAM, we should notice kidey function improvement now but that has not happened.      Recommendations were communicated to the primary team via this note.    Seen and discussed with Dr. Adal Augustin MD   Nephrology Fellow  Pager: [phone]    Interval History   Nursing and provider notes from last 24 hours reviewed.  In the last 24 hours Priscilla Way has been stable at his baseline, bedridden, fragile, NG tube in place. No events overnight. UOP  300-400 ml per day for the past 2 days.    Review of Systems   Not able to obtain due to advanced dementia and non-verbal.     Physical Exam   Temp  Av  F (35.6  C)  Min: 92.7  F (33.7  C)  Max: 98.9  F (37.2  C)      Pulse  Av.3  Min: 65  Max: 104 Resp  Av  Min: 10  Max: 24  SpO2  Av.6 %  Min: 91 %  Max: 100 %     /55 (BP Location: Left arm)  Pulse 79  Temp 97  F (36.1  C) (Axillary)  Resp 16  Ht 1.778 m (5' 10\")  Wt 82.6 kg (182 lb)  SpO2 100%  BMI 26.11 kg/m2     Admit Weight: 83.9 kg (185 lb)   GENERAL APPEARANCE: Fragile, bedridden, four limbs contraction   EYES:  Sluggish pupils  HENT: NG tube in " place, poor oral hygiene.   PULM: Rales to lung base B/L  CV: regular rhythm, normal rate, no rub     -edema to lower Extrs B/L +3  GI: soft, non tender, bowel sounds are positive.   INTEGUMENT: no cyanosis, no rash  NEURO:  Bedridden, 4 limbs contractions      Data   All labs reviewed by me.  CMP  Recent Labs  Lab 10/10/18  1122 10/09/18  0639 10/08/18  0722 10/07/18  0619 10/06/18  0639 10/05/18  1122 10/04/18  0805    138 135  --  136 138 138   POTASSIUM 5.0 4.3 4.5  --  3.9 3.8 3.7   CHLORIDE 106 105 104  --  104 107 108   CO2 22 22 20  --  22 20 21   ANIONGAP 10 11 11  --  11 11 10   * 127* 181*  --  178* 177* 152*   BUN 59* 50* 47*  --  34* 31* 24   CR 2.08* 2.06* 2.00* 2.06* 1.92* 1.92* 2.03*   GFRESTIMATED 31* 31* 32* 31* 34* 34* 32*   GFRESTBLACK 37* 38* 39* 38* 41* 41* 38*   JOSHUA 8.0* 8.1* 8.0*  --  7.8* 7.3* 7.3*   MAG  --   --  2.3  --  2.2  --  2.2   PHOS  --   --   --   --  3.1 2.4* 2.3*   PROTTOTAL 5.9*  --   --   --   --   --   --    ALBUMIN 2.0*  --   --   --   --   --   --    BILITOTAL 0.2  --   --   --   --   --   --    ALKPHOS 115  --   --   --   --   --   --    AST 24  --   --   --   --   --   --    ALT 15  --   --   --   --   --   --      CBC  Recent Labs  Lab 10/10/18  1122 10/09/18  0639 10/08/18  0722 10/06/18  0639 10/04/18  0805   HGB 7.7* 8.0* 8.7* 8.6* 8.1*   WBC  --  4.0 4.6 4.6 4.6   RBC  --  2.66* 2.90* 2.87* 2.70*   HCT  --  24.9* 27.6* 26.7* 25.4*   MCV  --  94 95 93 94   MCH  --  30.1 30.0 30.0 30.0   MCHC  --  32.1 31.5 32.2 31.9   RDW  --  20.1* 20.1* 19.0* 18.8*   PLT  --  287 250 199 160     INR  Recent Labs  Lab 10/10/18  1122 10/09/18  0639 10/08/18  0722 10/07/18  0619   INR 1.44* 1.96* 2.44* 3.13*     ABGNo lab results found in last 7 days.   Urine Studies  Recent Labs   Lab Test  10/04/18   1330  10/03/18   2056  09/24/18   0100  09/14/18   1418   COLOR  Rhea  Dark Brown  Yellow  Yellow   APPEARANCE  Cloudy  Cloudy  Clear  Cloudy   URINEGLC  Negative  Negative   Negative  150*   URINEBILI  Negative  Negative  Negative  Negative   URINEKETONE  Negative  10*  Negative  10*   SG  1.010  1.019  1.020  1.028   UBLD  Large*  Moderate*  Moderate*  Moderate*   URINEPH  6.0  5.0  6.0  6.5   PROTEIN  30*  30*  100*  >600*   NITRITE  Negative  Negative  Negative  Negative   LEUKEST  Moderate*  Large*  Trace*  Large*   RBCU  182*  >182*  5  7*   WBCU  >182*  >182*  2  34*     Recent Labs   Lab Test  09/14/18   1418  05/27/16   1409  04/29/11   1348  09/14/10   1134   UTPG  24.91*  Specimen not received  NOTIFIED [facility]  WHO PAGED  RN, LOPEZ AT 1355. NO URINE   RECIEVED WITH BLOOD SPECIMENS. AB    0.04  <0.02     PTH  Recent Labs   Lab Test  06/05/18   0548  08/24/16   0852   PTHI  78  333*     Iron Studies  Recent Labs   Lab Test  11/08/16   1209  08/24/16   0852   IRON  17*  20*   FEB  146*  197*   IRONSAT  11*  10*   BARTOLO   --   1025*       IMAGING:  All imaging studies reviewed by me.     MEDICATIONS:  Current Facility-Administered Medications   Medication     acetaminophen (TYLENOL) tablet 650 mg     acetylcysteine (MUCOMYST) 20 % nebulizer solution 2 mL     albuterol neb solution 2.5 mg     amLODIPine (NORVASC) tablet 5 mg     atropine 1 % ophthalmic solution 1-2 drop     B and C vitamin Complex with folic acid (NEPHRONEX) liquid 5 mL     bisacodyl (DULCOLAX) Suppository 10 mg     bumetanide (BUMEX) injection 4 mg     carvedilol (COREG) suspension 6.25 mg     ciprofloxacin (CIPRO) tablet 500 mg     dextrose 10 % 1,000 mL infusion     glucose gel 15-30 g    Or     dextrose 50 % injection 25-50 mL    Or     glucagon injection 1 mg     ferrous sulfate (IRON) tablet 325 mg     fiber modular (NUTRISOURCE FIBER) (NUTRISOURCE FIBER) packet 1 packet     heparin bolus from infusion pump     hydrALAZINE (APRESOLINE) injection 10-20 mg     HYDROmorphone (DILAUDID) injection 0.2 mg     influenza Vac Split High-Dose (FLUZONE) injection 0.5 mL     insulin aspart (NovoLOG)  inj (RAPID ACTING)     lidocaine (LMX4) cream     lidocaine 1 % 1 mL     magnesium hydroxide (MILK OF MAGNESIA) suspension 30 mL     magnesium sulfate 2 g in NS intermittent infusion (PharMEDium or FV Cmpd)     magnesium sulfate 4 g in 100 mL sterile water (premade)     melatonin tablet 1 mg     mycophenolate (CELLCEPT BRAND) suspension 250 mg     naloxone (NARCAN) injection 0.1-0.4 mg     ondansetron (ZOFRAN-ODT) ODT tab 4 mg    Or     ondansetron (ZOFRAN) injection 4 mg     pantoprazole (PROTONIX) 2 mg/mL suspension 40 mg     Patient is already receiving anticoagulation with heparin, enoxaparin (LOVENOX), warfarin (COUMADIN)  or other anticoagulant medication     predniSONE (DELTASONE) tablet 5 mg     prochlorperazine (COMPAZINE) injection 5 mg    Or     prochlorperazine (COMPAZINE) tablet 5 mg    Or     prochlorperazine (COMPAZINE) Suppository 12.5 mg     QUEtiapine (SEROquel) tablet 25 mg     senna-docusate (SENOKOT-S;PERICOLACE) 8.6-50 MG per tablet 1 tablet    Or     senna-docusate (SENOKOT-S;PERICOLACE) 8.6-50 MG per tablet 2 tablet     sodium chloride (PF) 0.9% PF flush 3 mL     sodium chloride (PF) 0.9% PF flush 3 mL     sodium phosphate 15 mmol in D5W intermittent infusion     sodium phosphate 20 mmol in D5W intermittent infusion     sodium phosphate 25 mmol in D5W intermittent infusion     tacrolimus (PROGRAF BRAND) suspension 1 mg     tacrolimus (PROGRAF BRAND) suspension 1.5 mg     thiamine 100 MG/ML suspension 100 mg     vancomycin (FIRVANQ) oral solution 125 mg     Warfarin Therapy Reminder (Check START DATE - warfarin may be starting in the FUTURE)     zinc sulfate (ZINCATE) capsule 220 mg       Emad Taurus Augustin MD  Nephrology Fellow  Pager: [phone]    Attestation:  This patient has been seen and evaluated by me, Alfred Galeas MD.  I have reviewed the note and agree with plan of care as documented by the fellow.

## 2018-10-10 NOTE — PLAN OF CARE
Problem: Goal/Outcome  Goal: Goal Outcome Summary  Outcome: No Change  Nonverbal pt. VSS on RA. NJ with continuous enteral feedings at 55 ml/hr. Left PIV SL. Pt having frequent loose/watery stools this shift. Son at bedside wishing to speak with MD regarding HD. Paged MD. Sister called and was very upset about fluid overload and overall care per her brother. The writer explained it would be important to come in the morning for rounds in order to have important conversations regarding pt care plan. Turning and changing Q2H. Low UOP. Continue with POC.

## 2018-10-10 NOTE — PROGRESS NOTES
[address] Medicine Service Daily Note  Date of Service: 10/10/2018    Patient: Priscilla Way   MRN: [id number]   Admission Date: 2018   Hospital Day # 29    Primary care provider: Randy Fuentes  ___________________________________  Priscilla Way is a 78 year old male with a complex past medical history including dementia (Alzheimer and vascular), liver transplant and kidney transplants in , DVT, T2DM, CVA, and poor airway protection admitted with possible seizure and aspiration.  He has ongoing care for nutritional support, and monitoring renal function off of HD.  NJ was placed on 10/1 and will trial medications through NJ and work on tube feeds.      # Altered mental status: Initially admitted with possible seizures- neurology involved and recommended keppra but family felt not c/w his wishes and goals of care so not given. Now with continued altered mentation in setting of ongoing renal insufficiency.    # DDKT and Liver Transplant  # Anuric Renal Failure s/p HD with baseline CKD: Working dx is multifactorial with contrast, CNI toxicity.  HD on  with UF . His urine output improved around . Then has declined from 10/1 on wards. He was hypothermic, septic work up done showed UTI. He was given empiric Vanc, Rocephin from 10/4 onwards. But UO was declining even before that.   --> Renal txp team involved and do not recommend HD  --> Family members divided on the need for HD- long discussion with son-in-law today about the fact that Mr. Way is slowly dying. His renal function is not adequate to sustain fluid balance and he is not a long-term dialysis candidate so he will eventually progress to pulmonary edema and  from this.   --> Family requests renal team clarify under what circumstances, if any, they would do HD, and whether any outpatient HD facilities would be willing to provide OP HD.      # Aspiration Pneumonia and poor Airway  Protection  Recurrent small aspirations per speech evaluation and signficant aspiration event with possible seizure PTA. Discussed with family regarding concerns for high risk of recurrent aspirations.  Per both daugthers, trial of NJ was started on 10/2 (placement on 10/1) with plans short trial of feeding tube and medication support followed by a re-evaluation to see If his oral intake improves.       - Completed 7 day course of IV antibiotics previously  - Amp/Sulbactam restarted 9/26-9/28 but stopped with no changes on CXR  - NPO strict if any recurrence of aspiration, PO at present only if meeting parameters  - ID previously involved  - Ongoing discussion with family regarding goals of care  - Ethics updated, previously involved          # Acute on Chronic Normocytic Anemia  # Possible slow GI blood loss: Suspect multifactorial including related to chronic disease and LILIAM as well as iatrogenic though patient reportedly had melanotic stools though hemoglobins have since been stable.  - Continue PPI BID   - Peirodic hemoglobin checks and as needed for signs of bleeding  - Transfuse for hemoglobin <7       # Recurrent C. Diff Colitis:  He has ho recurrent Cdiff. Has been trreated for aspiration and now getting ciprofloxacin for UTI. On oral Vancomycin. Has stool incontinence. After Feeding tube developed diarrhea. Repeat Cdiff on stool is negative. So we have added Nutrisource fiber I packet TID. Methylcellulose did not work.   - Continue for 10 -14 days post Abx at least given ho recurrent Cdiff. May need suppressive therapy.       # DVT On warfarin with INR goal 2-3. Low today at 1.44--> balancing bleeding and clotting risk given concern for GI blood loss.    # T2DM: Follow daily BG. Currently stable. Start BG checks.   # HTN: In light of LE edema, amlodipine discontinued today. Continue carvedilol trial via NJ.  Hydralazine PRN        Diet: Dysphagia Diet Level 1 Pureed Nectar Thickened Liquids (pre-thickened  "or use instant food thickener)  Snacks/Supplements Adult: Boost Plus; With Meals  Adult Formula Drip Feeding: Continuous TwoCal HN; Nasojejunal; Goal Rate: 40; mL/hr; Medication - Tube Feeding Flush Frequency: At least 15-30 mL water before and after medication administration and with tube clogging; Amount to Send. 60 ml every 4 hr      Goals of Care: He is DNR and DNI. Several discussions with family. I spoke with son today.   Fluids: As above  Virk Catheter: in place, indication: Strict 1-2 Hour I&O, Strict 1-2 Hour I&O  DVT Prophylaxis: Heparin gtt bridging to warfarin  Code Status: DNR/DNI     Pt's care was discussed with bedside RN, CC/SW, and son-in-law during rounds.   Joe Dean   Internal Medicine Staff Hospitalist Service    [facility]    Pager: [phone]    Team: Scott Small 2   Page Cross Cover after 5 pm: pager [phone] or job code [phone]  ___________________________________________________________________    Subjective & Interval Hx:  No acute changes overnight. Cannot get further direct history. No acute indication for HD per Renal and he has progressive renal decline. Not a candidate for outpatient HD.  Last 24 hr care team notes reviewed.    ROS:  Cannot obtain ROS  Medications: Reviewed in EPIC. List below for reference    Physical Exam:    Blood pressure 115/61, pulse 79, temperature 97.1  F (36.2  C), temperature source Axillary, resp. rate 16, height 1.778 m (5' 10\"), weight 82.6 kg (182 lb), SpO2 100 %.     Intake/Output Summary (Last 24 hours) at 10/10/18 1740  Last data filed at 10/10/18 1600   Gross per 24 hour   Intake             1065 ml   Output              450 ml   Net              615 ml      General: somnolent lying sypine in bed   HEENT: NJ in nares   Neck: supple   Chest/Resp: clear lungs   Heart/CV: RRR   Abdomen/GI: soft, no grimace with palpation, BS present   Extremities/MSK: 3+ LE edema   Skin: no rashes   Neuro/Psych: altered mentation but " baseline dementia    Lines/Tubes:   Peripheral IV 10/10/18 Left;Anterior Upper forearm (Active)   Site Assessment WDL 10/10/2018  2:13 PM   Line Status Saline locked 10/10/2018  2:13 PM   Phlebitis Scale 0-->no symptoms 10/10/2018  2:13 PM   Infiltration Scale 0 10/10/2018  2:13 PM   Extravasation? No 10/10/2018  2:13 PM   Dressing Intervention New dressing  10/10/2018  2:13 PM   Number of days:0       CVC Double Lumen 09/16/18 Right Internal jugular (Active)   Site Assessment WDL 10/10/2018  9:00 AM   Lumen Soln/Vol REFERENCE 1.3/1.3 10/3/2018  6:00 PM   External Cath Length (cm) 0.5 cm 10/3/2018  6:00 PM   Extravasation? No 10/6/2018 12:00 AM   Dressing Intervention Transparent 10/10/2018  9:00 AM   Dressing Change Due 10/10/18 10/3/2018  6:00 PM   CVC Comment CDI 10/7/2018 10:00 AM   CVC Lumen Assessment Red;Blue 10/3/2018  6:00 PM   Number of days:24         Labs & Studies of Note: I personally reviewed the following studies: see below  Cultures:  reviewed   CMP  Recent Labs  Lab 10/10/18  1122 10/09/18  0639 10/08/18  0722 10/07/18  0619 10/06/18  0639 10/05/18  1122 10/04/18  0805    138 135  --  136 138 138   POTASSIUM 5.0 4.3 4.5  --  3.9 3.8 3.7   CHLORIDE 106 105 104  --  104 107 108   CO2 22 22 20  --  22 20 21   ANIONGAP 10 11 11  --  11 11 10   * 127* 181*  --  178* 177* 152*   BUN 59* 50* 47*  --  34* 31* 24   CR 2.08* 2.06* 2.00* 2.06* 1.92* 1.92* 2.03*   GFRESTIMATED 31* 31* 32* 31* 34* 34* 32*   GFRESTBLACK 37* 38* 39* 38* 41* 41* 38*   JOSHUA 8.0* 8.1* 8.0*  --  7.8* 7.3* 7.3*   MAG  --   --  2.3  --  2.2  --  2.2   PHOS  --   --   --   --  3.1 2.4* 2.3*   PROTTOTAL 5.9*  --   --   --   --   --   --    ALBUMIN 2.0*  --   --   --   --   --   --    BILITOTAL 0.2  --   --   --   --   --   --    ALKPHOS 115  --   --   --   --   --   --    AST 24  --   --   --   --   --   --    ALT 15  --   --   --   --   --   --     CBC  Recent Retrevo  Lab 10/10/18  1122 10/09/18  0639 10/08/18  0722  10/06/18  0639 10/04/18  0805   WBC  --  4.0 4.6 4.6 4.6   RBC  --  2.66* 2.90* 2.87* 2.70*   HGB 7.7* 8.0* 8.7* 8.6* 8.1*   HCT  --  24.9* 27.6* 26.7* 25.4*   MCV  --  94 95 93 94   MCH  --  30.1 30.0 30.0 30.0   MCHC  --  32.1 31.5 32.2 31.9   RDW  --  20.1* 20.1* 19.0* 18.8*   PLT  --  287 250 199 160    INR  Recent Labs  Lab 10/10/18  1122 10/09/18  0639 10/08/18  0722 10/07/18  0619   INR 1.44* 1.96* 2.44* 3.13*    Arterial Blood GasNo lab results found in last 7 days.    Current Facility-Administered Medications   Medication     acetaminophen (TYLENOL) tablet 650 mg     acetylcysteine (MUCOMYST) 20 % nebulizer solution 2 mL     albuterol neb solution 2.5 mg     amLODIPine (NORVASC) tablet 5 mg     atropine 1 % ophthalmic solution 1-2 drop     B and C vitamin Complex with folic acid (NEPHRONEX) liquid 5 mL     bisacodyl (DULCOLAX) Suppository 10 mg     bumetanide (BUMEX) injection 4 mg     carvedilol (COREG) suspension 6.25 mg     ciprofloxacin (CIPRO) tablet 500 mg     dextrose 10 % 1,000 mL infusion     glucose gel 15-30 g    Or     dextrose 50 % injection 25-50 mL    Or     glucagon injection 1 mg     ferrous sulfate (IRON) tablet 325 mg     fiber modular (NUTRISOURCE FIBER) (NUTRISOURCE FIBER) packet 1 packet     heparin bolus from infusion pump     hydrALAZINE (APRESOLINE) injection 10-20 mg     HYDROmorphone (DILAUDID) injection 0.2 mg     influenza Vac Split High-Dose (FLUZONE) injection 0.5 mL     insulin aspart (NovoLOG) inj (RAPID ACTING)     lidocaine (LMX4) cream     lidocaine 1 % 1 mL     magnesium hydroxide (MILK OF MAGNESIA) suspension 30 mL     magnesium sulfate 2 g in NS intermittent infusion (PharMEDium or FV Cmpd)     magnesium sulfate 4 g in 100 mL sterile water (premade)     melatonin tablet 1 mg     mycophenolate (CELLCEPT BRAND) suspension 250 mg     naloxone (NARCAN) injection 0.1-0.4 mg     ondansetron (ZOFRAN-ODT) ODT tab 4 mg    Or     ondansetron (ZOFRAN) injection 4 mg      pantoprazole (PROTONIX) 2 mg/mL suspension 40 mg     Patient is already receiving anticoagulation with heparin, enoxaparin (LOVENOX), warfarin (COUMADIN)  or other anticoagulant medication     predniSONE (DELTASONE) tablet 5 mg     prochlorperazine (COMPAZINE) injection 5 mg    Or     prochlorperazine (COMPAZINE) tablet 5 mg    Or     prochlorperazine (COMPAZINE) Suppository 12.5 mg     QUEtiapine (SEROquel) tablet 25 mg     senna-docusate (SENOKOT-S;PERICOLACE) 8.6-50 MG per tablet 1 tablet    Or     senna-docusate (SENOKOT-S;PERICOLACE) 8.6-50 MG per tablet 2 tablet     sodium chloride (PF) 0.9% PF flush 3 mL     sodium chloride (PF) 0.9% PF flush 3 mL     sodium phosphate 15 mmol in D5W intermittent infusion     sodium phosphate 20 mmol in D5W intermittent infusion     sodium phosphate 25 mmol in D5W intermittent infusion     tacrolimus (PROGRAF BRAND) suspension 1 mg     tacrolimus (PROGRAF BRAND) suspension 1.5 mg     thiamine 100 MG/ML suspension 100 mg     vancomycin (FIRVANQ) oral solution 125 mg     warfarin (COUMADIN) half-tab 1.5 mg     Warfarin Therapy Reminder (Check START DATE - warfarin may be starting in the FUTURE)     zinc sulfate (ZINCATE) capsule 220 mg

## 2018-10-10 NOTE — PLAN OF CARE
Problem: Patient Care Overview  Goal: Plan of Care/Patient Progress Review  Outcome: No Change  VSS on RA. Pt nonverbal. ADRIA orientation. Left PIV SL. TF Nutren 1.5 @ goal of 55 mL/hr. 60 mL flush Q4 hours. Virk catheter patent. Output 175. BG stable with insulin coverage. Loose, watery stools. Right internal jugular WNL. Hand mitts in place. Pt agitated. Pt sounds congested. RT deep suctioned secretions. Tolerated well. Repositioned Q2 hours with pillows. Prevalon boots on. Pillows between knees. Son at bedside.

## 2018-10-10 NOTE — PROVIDER NOTIFICATION
MD web paged regarding blood draw. Lab  technician reported to nursing staff that  blood draw is not done. because pt. is aggressive  he is biting and hitting.

## 2018-10-10 NOTE — PROGRESS NOTES
Care Coordinator Progress Note    Admission Date/Time:  9/11/2018  Attending MD:  Ryan Aponte MD    Data  Chart reviewed, discussed with interdisciplinary team.   Patient was admitted for:    Aspiration pneumonia due to regurgitated food, unspecified laterality, unspecified part of lung (H)  Kidney replaced by transplant.    Concerns with insurance coverage for discharge needs: None.  Current Living Situation: Patient lives alone; has 24/7 care at home between PCA and family.  Support System: Supportive and Involved  Services Involved: Home Care and PCA  Transportation at Discharge: Perk stretcher  Transportation to Medical Appointments:  - Name of caregiver: Benjamin (daughter)  Barriers to Discharge: chronically ill and medical needs    Coordination of Care  10/12: Nephrology progress note by Dr Galeas on 10/11 reviewed. Spoke with [facility] Admissions Central Office and received verbal denial for patient to receive OP HD at all/any [facility] facility. [facility] Admissions Rep, Dacia, volunteered to call daughter Mark and explain that [facility] doesn't provide hard copy paper denials; acceptance or denials for OP HD have always completed verbally. If questions Dacia can be reached at [phone]  .  10/10: Summary of understanding: Last dialysis 9/27/18. Patient is not a candidate for long term (outpatient) dialysis also ineffective oral diuresis. Nephrology recommended Goals of Care discussion 10/7/18. Family is not in agreement with Nephrology Physician(s) assessment(s) wants/demanding dialysis. Medicine Physician(s) attempting Goals of Care conversations since 9/19/18. Difficult family dynamics, lack of acceptance and medical understanding, and cultural differences are resulting in extended length of stay.    9/27: Pt aspirated on evening shift yesterday. Pt is unable to take oral medications (antirejection medications, tacrolimus), NJ to be placed for tube feeds. Per Nephrology dialysis again today.  "Dr. Mitchell reconsulting Ethics.    9/26: 5A Charge RN reports stools were not black tarry. Per Nephrology diuretics not helping with urination, dialysis today.  9/25: Overnight new finding black tarry stools, still on hep gtt, unstable.  9/24: Pt continuing to make urine, trial off HD w/ IV diuretics. SLP tahmina completed: NDD1 w/thin (supervision, asst, and upright/slow).  9/20: Patient started making urine, continue HD trail.  9/19: Initial Ethics Consult  9/18: Per Dr. Dumont \"In the grand scheme of things, given patient's co-morbidities, he is not an ideal candidate for long term dialysis. Significant differences in opinions in the family. Daughter Cruz agrees to DNR/DNI, refuses feeding tube and refuses anti epeleptics, but does want dialysis.\"     **Daughter Cruz claims to be POA: no legal documentation has been received or on file that proves she is patients POA. Dr. Dumont asked Cruz directly about the documentation and she did not answer the question. Estranged/seperated spouse is legal decision maker if no POA paperwork is produced.**    9/17: D: Chart reviewed and plan of care discussed with Dr Aponte.   I/A: Patient had RIJ placed for dialysis access (pt does not have HD at baseline), dialysis today, Hep gtt, receiving Zosyn IV for aspiration pneumonia, strict NPO due to aspiration (nursing notes indicate pt daughters is not in agreement that pt is aspirating at all, wants pt to be eating and drinking). As with all previous admissions anticipate pt will dc home with family.    Baseline Home Assessment  pt lives alone but has 11.5 hours of PCA services and family supplements the rest of this time therefore 24/7 care at home. Pt is also open to [facility] for skilled nursing visits twice weekly. Chronic Coumadin; INR labs drawn by [facility], monitored by [facility].  Baseline pt is nonverbal, minimally interactive, bed bound/total cares, has a lift, wheelchair and hospital bed " at home (through [facility]).      Referrals: Patients daughter requesting resumption of existing services at [facility]  Phone  [phone]  Fax  [phone]      [facility]  Phone: [phone]  Fax: [phone]    For INR and Warfarin monitoring (Goal = 2-3)  Indication for Anticoagulation: A fib, DVT/PE Treatment  MD Following: MANUELA Fuentes  Expected duration of therapy: indefinate  Next INR lab draw due on:       Plan  Anticipated Discharge Date:  TBD  Anticipated Discharge Plan:  Home      Radha Ahuja RN, BSN, PHN  Medicine Care Coordinator  [facility] [address], [address], [address] 9 and [facility]  Desk Phone: [phone]  Pager: [phone]    To contact Weekend RN[facility], dial [phone] and enter job code [phone] at prompt.   This pager can not be contacted by text page or outside line.

## 2018-10-10 NOTE — PROGRESS NOTES
Visited with pt/family on the basis of spiritual support for the pt/family. Reflected with pt/ family around their hospital experience, sources of spiritual and emotional support and current spiritual health needs.  Pt s nephew talked about his uncle current situation and what it means for him and his uncle. During my conversation with him, he reported that, he worries about the health of his uncle.  I let him know that I could be of support to the pt and his family during his hospitalization. I would be able to coordinate and participate as a spiritual supporter for both pt and his family. Pt s nephew reported that his ally is important to him and hope for the future and trust in God. Pt receives support from his children.   Emotional support. Reflective conversation integrating illness elements and family spiritual narratives. I shared reading and conversation that invite God into the room and to bless those present, support them in their suffering. I provided special prayer asking God to help him and to ease and eliminate any suffering and pain that he feels. I gave Islamic Prayer Booklet and several of Islamic Prayer Bookmarkers.  I introduced spiritual Health Services that the hospital offers. I also, introduced myself as Gnosticism  in the hospital.    Pt/family received spiritual support and reflective conversation in the context of this hospitalization. Pt s nephew expressed appreciation for the visit and the encouragement that he felt that he has God s support in their struggles. Pt s nephew agreed to turning over their worries to God and that is an important part of their own healings.  Will continue to provide support to pt/family during their hospitalization at least 1x/wk.

## 2018-10-10 NOTE — PROGRESS NOTES
Nephrology Progress Note  10/09/2018             Today Recommendations:  - Metolazone 5 mg BID half an hour prior to Bumex.  - Continue with Bumex 4 mg BID    Assessment & Recommendations:   Priscilla Way is a 78 year old year old male  with h/o simultaneous liver and kidney transplant in  for hepatitis C.He was admitted  for AMS and seizures.    # DDKT and liver transplant:  - Basline Cr ~ 1.2- 1,4; Increased likely due to contrast which he received on 2018.  - Today Crea: 2.06  - Last HD/UF was in   - UOP yesterday 425 ml, today 200 ml  - So far ineffective diuresis,  we are not able to maintain euvolemia medically. Goals of care would need to be discussed. Currently the lower extremity edema is cosmetic, but if fluid accumulates in the lungs, this would lead to hypoxia and eventual death.  - Patient is fragile           # Liver transplant :  - Most recent LFTs and Alk phos WNL.           # Immunosuppression:   - Continue with Mycophenolate 250 mg suspension BID via NG tube.   - Continue with Prednisone 5 mg daily.   - Tacrolimus to 1.5 mg in every morning and 1.0 mg every evening. Tac level on 10/7 was 5.7        # Hypertension- Volume status:   - -140/60-70  - Continue with Amlodipine 5 mg daily and Coreg 6.25 mg BID.   - Volume overload, oliguric, failed dieresis challenging.         # Anemia:  - multifactorial from blood draws and mmf effect.   - Hgb: 8.0, continue monitoring.   - On Ferrous sulfate supplementation          # Mineral Bone Disorder:   - Secondary renal hyperparathyroidism; PTH level is:  Normal at 78 on 2018  - Calcium; level is Normal when corrected for albumin   - Phosphorus: 3.1 and M.3          # Electrolytes:   - Potassium: normal  - Na: level, normal          # Other Medical Issues:   # seziure disorder- admitted with AMS. Seen by neurology. On keppra.   # h/o CVA:  Minimally interactive at baseline. Palliative care on consult. Family does not want feeding  "tube.   # Cdiff- on PO Vancomycin  # DM- management per primary.         # Transplant History:  Etiology of kidney failure: Hepatitis C  Transplant: 2000 (Kidney), 2000 (Liver)  Donor Type:  - Brain Death                      Donor Class: Standard Criteria Donor  Significant changes in immunosuppression: see above  Significant Complications: Now anuric LILIAM     # goals of care: per primary team:  Even with initiation of longterm dialysis, the likelihood that he would have meaningful quality of life is dubious. As such, continued discussion about goals of care with his family are very important.        Recommendations were communicated to primary team via Note    Seen and discussed with Dr. Padmini Augustin MD   Nephrology Fellow   [phone]    Interval History :   Nursing and provider notes from last 24 hours reviewed.  In the last 24 hours Priscilla Way has been stable at his baseline, bedridden, fragile, NG tube in place. No events overnight. I updated his son at bedside this morning.     Review of Systems:   Not able to obtain due to advanced dementia.       Physical Exam:   I/O last 3 completed shifts:  In: 220 [NG/GT:60]  Out: 375 [Urine:375]   /49  Pulse 79  Temp 98.9  F (37.2  C) (Axillary)  Resp 20  Ht 1.778 m (5' 10\")  Wt 81 kg (178 lb 8 oz)  SpO2 100%  BMI 25.61 kg/m2     GENERAL APPEARANCE: Fragile, bedridden, four limbs contraction   EYES:  Sluggish pupils  HENT: NG tube in place, poor oral hygiene.   PULM: Rales to lung base B/L  CV: regular rhythm, normal rate, no rub     -edema to lower Extrs B/L +2  GI: soft, non tender, bowel sounds are positive.   INTEGUMENT: no cyanosis, no rash  NEURO:  Bedridden, 4 limbs contractions        Labs:   All labs reviewed by me  Electrolytes/Renal -   Recent Labs   Lab Test  10/09/18   0639  10/08/18   0722  10/07/18   0619  10/06/18   0639  10/05/18   1122  10/04/18   0805   NA  138  135   --   136  138  138   POTASSIUM "  4.3  4.5   --   3.9  3.8  3.7   CHLORIDE  105  104   --   104  107  108   CO2  22  20   --   22  20  21   BUN  50*  47*   --   34*  31*  24   CR  2.06*  2.00*  2.06*  1.92*  1.92*  2.03*   GLC  127*  181*   --   178*  177*  152*   JOSHUA  8.1*  8.0*   --   7.8*  7.3*  7.3*   MAG   --   2.3   --   2.2   --   2.2   PHOS   --    --    --   3.1  2.4*  2.3*       CBC -   Recent Labs   Lab Test  10/09/18   0639  10/08/18   0722  10/06/18   0639   WBC  4.0  4.6  4.6   HGB  8.0*  8.7*  8.6*   PLT  287  250  199       LFTs -   Recent Labs   Lab Test  10/01/18   0803  09/24/18   0812  09/14/18   0733  09/13/18   0405   ALKPHOS  80   --   78  77   BILITOTAL  0.3   --   0.3  0.3   ALT  8   --   17  15   AST  13   --   25  19   PROTTOTAL  5.5*   --   6.7*  6.1*   ALBUMIN  1.9*  2.2*  2.2*  2.1*       Iron Panel -   Recent Labs   Lab Test  11/08/16   1209  08/24/16   0852   IRON  17*  20*   IRONSAT  11*  10*   BARTOLO   --   1025*         Imaging:  All imaging studies reviewed by me.     Current Medications:    acetylcysteine  2 mL Nebulization Daily     albuterol  2.5 mg Nebulization Daily     amLODIPine  5 mg Oral Daily     B and C vitamin Complex with folic acid  5 mL Per Feeding Tube Daily     bumetanide  4 mg Intravenous Q12H     carvedilol  6.25 mg Oral BID     ciprofloxacin  500 mg Per Feeding Tube Q12H JUNIOR     ferrous sulfate  325 mg Per Feeding Tube Daily     fiber modular (NUTRISOURCE FIBER)  1 packet Per Feeding Tube TID     influenza Vac Split High-Dose  0.5 mL Intramuscular Prior to discharge     insulin aspart  1-6 Units Subcutaneous Q4H     mycophenolate  250 mg Per Feeding Tube Q12H JUNIOR     pantoprazole  40 mg Per Feeding Tube BID AC     predniSONE  5 mg Per Feeding Tube Q24H     sodium chloride (PF)  3 mL Intracatheter Q8H     tacrolimus  1 mg Oral QPM     tacrolimus  1.5 mg Oral QAM     thiamine  100 mg Per Feeding Tube Daily     vancomycin  125 mg Oral 4x Daily     zinc sulfate  220 mg Per Feeding Tube Daily       IV  fluid REPLACEMENT ONLY       - MEDICATION INSTRUCTIONS -       Warfarin Therapy Reminder       Joaquín Augustin MD   Nephrology Fellow   [phone]    I have seen and examined this patient with the resident.  This note reflects our joint assessment and plan.     Shelly Washington MD

## 2018-10-10 NOTE — PROVIDER NOTIFICATION
"Writer spoke with patient's daughter Cruz this evening in regards to the concerns she has with the care being provided to her father.  Very upset during phone call, requesting MD license numbers and stating \"I am going to start suing people\" \"this is discrimination, what you are doing\", \"i'm taping all of this\".  Writer attempted to reassure family member that her name and number would be passed on to night cross cover team but that they are not the primary day team.  Daughter was not pleased with not getting a direct number of provider but agreeable to have night team contact her.  Writer offered Cruz the phone number for Patient Relations but she declined the need for that.  Patient Relations number was given to another family member before they left for the evening.       Writer spoke with Mica from [facility] and updated her on situation.  Determination was made that this should be addressed when primary team arrives in AM.  Sticky note left for attending MD for the morning.  Daughter's number is also in emergency contacts section of chart.    "

## 2018-10-11 NOTE — PROGRESS NOTES
[address] Medicine Service Daily Note  Date of Service: 10/11/2018    Patient: Priscilla Way   MRN: [id number]   Admission Date: 9/11/2018   Hospital Day # 30    Primary care provider: Randy Fuentes  ___________________________________  Priscilla Way is a 78 year old male with a complex past medical history including dementia (Alzheimer and vascular), liver transplant and kidney transplants in 2000, DVT, T2DM, CVA, and poor airway protection admitted with possible seizure and aspiration.  Progressive renal decline after LILIAM.      1) Altered mental status: delirium related to underlying dementia, severe illness, metabolic changes from HD and renal insufficiency. Daily orienting communication, nutrition, hydration, limiting psychotropic meds.    2)  DDKT and Liver Transplant now with progressive renal insufficiency: Long discussion with Renal Attending, Dr. Galeas today. They elected to dialyze him for removal of 2l yesterday without problems. They reiterate this was an attempt to give his kidneys more time to improve but that we should see return of function if it is going to happen. They do not plan to offer further HD as he is not a long-term HD candidate so inpatient HD without hope for return of function would just prolong the dying process. They will meet with family to relate this message. Ethics team has been involved and recommend against any ongoing HD.      # Aspiration Pneumonia and poor Airway Protection  7 d abx course completed for pneumonia. Recurrent small aspirations per speech evaluation and signficant aspiration event with possible seizure PTA. Discussed with family regarding concerns for high risk of recurrent aspirations.  Per both daugthers, trial of NJ was started on 10/2 (placement on 10/1) with plans short trial of feeding tube and medication support followed by a re-evaluation to see If his oral intake improves.   --> NJ feeds ongoing but  in severe dementia, this has not been shown to improve outcome      # Acute on Chronic Normocytic Anemia  # Possible slow GI blood loss: Suspect multifactorial including related to chronic disease and LILIAM as well as iatrogenic though patient reportedly had melanotic stools though hemoglobins have since been stable.  - Continue PPI BID   - Peirodic hemoglobin checks and as needed for signs of bleeding  - Transfuse for hemoglobin <7       # Recurrent C. Diff Colitis:  Toxin positive 9/11/18. Ongoing rx with oral vanco qid. Systemic abx d/tamra >10d ago. Cut Vanco to bid for 2 more days then discontinue.      # DVT On warfarin with INR goal 2-3. Low today at 1.2--> balancing bleeding and clotting risk given concern for GI blood loss. Given he's had DVT and is bedbound, will increase warfarin to INR goal of 1.8-2.5 with pharmacy dosing. Hold on heparin overlap given bleeding risk.     # T2DM: Follow daily BG. Currently stable. Start BG checks.   # HTN: In light of LE edema, amlodipine discontinued today. Continue carvedilol trial via NJ.  Hydralazine PRN         Diet: Dysphagia diet but risk of aspiration still high - NPO while AMS  Adult Formula Drip Feeding: Continuous TwoCal HN; Nasojejunal; Goal Rate: 40; mL/hr; Medication - Tube Feeding Flush Frequency: At least 15-30 mL water before and after medication administration and with tube clogging; Amount to Send. 60 ml every 4 hr      Goals of Care: He is DNR and DNI. Several discussions with family. I spoke with son today.   Fluids: As above  Virk Catheter: in place, indication: Strict 1-2 Hour I&O, Strict 1-2 Hour I&O  DVT Prophylaxis: Heparin gtt bridging to warfarin  Code Status: DNR/DNI    Disposition Plan   Expected discharge in 3--5 days to home care vs home hospice care once renal status is clarified and family conference held to set goals of care.     Entered: Joe Dean 10/11/2018, 8:43 AM        Pt's care was discussed with bedside RN, CC/SW, patient  "and Renal Transplant team during PFCR.    Jeo Dean   Internal Medicine Staff Hospitalist Service    [facility]    Pager: [phone]    Team: Scott Small 2   Page Cross Cover after 5 pm: pager [phone] or job code [phone]  ___________________________________________________________________    Subjective & Interval Hx: No acute events overnight. He was dialyzed for 2h due to rising K and volume overload. Mentation remains altered. Comfortable breathing with nl sats on room air. No evident bleeding.   Last 24 hr care team notes reviewed.    ROS:  Cannot obtain ROS due to AMS    Medications: Reviewed in EPIC. List below for reference    Physical Exam:    Blood pressure 142/71, pulse 79, temperature 96.6  F (35.9  C), temperature source Axillary, resp. rate 18, height 1.778 m (5' 10\"), weight 82.6 kg (182 lb), SpO2 100 %.     Intake/Output Summary (Last 24 hours) at 10/11/18 0843  Last data filed at 10/11/18 0730   Gross per 24 hour   Intake              680 ml   Output             2600 ml   Net            -1920 ml      General: Not alert or oriented    HEENT: OP clear   Neck: supple   Chest/Resp: clear lungs   Heart/CV: RRR   Abdomen/GI: abd soft, nl BS   Extremities/MSK: 3+ edema unchanged   Skin: no new rashes   Neuro/Psych: delirium    Lines/Tubes:   Peripheral IV 10/10/18 Left;Anterior Upper forearm (Active)   Site Assessment [facility] 10/10/2018  8:48 PM   Line Status Saline locked 10/10/2018  8:48 PM   Phlebitis Scale 0-->no symptoms 10/10/2018  8:48 PM   Infiltration Scale 0 10/10/2018  8:48 PM   Infiltration Site Treatment Method  None 10/10/2018  8:48 PM   Extravasation? No 10/10/2018  8:48 PM   Dressing Intervention New dressing  10/10/2018  2:13 PM   Number of days:1       CVC Double Lumen 09/16/18 Right Internal jugular (Active)   Site Assessment [facility] 10/10/2018  8:48 PM   Lumen Soln/Vol REFERENCE 1.3/1.3 10/3/2018  6:00 PM   External Cath Length (cm) 2 cm 10/10/2018  6:45 PM "   Extravasation? No 10/6/2018 12:00 AM   Dressing Intervention New dressing;Chlorhexidine sponge;Transparent 10/10/2018  6:45 PM   Dressing Change Due 10/17/18 10/10/2018  6:45 PM   CVC Comment CDI 10/10/2018  8:48 PM   CVC Lumen Assessment Red;Blue 10/10/2018  8:48 PM   Number of days:25         Labs & Studies of Note: I personally reviewed the following studies: see below  Cultures:    All cultures:    Recent Labs  Lab 10/04/18  1330   CULT >100,000 colonies/mLCandida tropicalis*  <10,000 colonies/mLEnterobacter cloacae complex*     Plan to repeat UA/UC-- if this is persistent with pyuria, will consider fluconazole Rx and discussion with ID team. No evidence of disseminated candidiasis.  CMP  Recent Labs  Lab 10/11/18  0701 10/10/18  2155 10/10/18  1122 10/09/18  0639 10/08/18  0722  10/06/18  0639 10/05/18  1122   NA  --  136 138 138 135  --  136 138   POTASSIUM  --  4.5 5.0 4.3 4.5  --  3.9 3.8   CHLORIDE  --  101 106 105 104  --  104 107   CO2  --  26 22 22 20  --  22 20   ANIONGAP  --  9 10 11 11  --  11 11   GLC  --  228* 176* 127* 181*  --  178* 177*   BUN  --  32* 59* 50* 47*  --  34* 31*   CR  --  1.26* 2.08* 2.06* 2.00*  < > 1.92* 1.92*   GFRESTIMATED  --  55* 31* 31* 32*  < > 34* 34*   GFRESTBLACK  --  67 37* 38* 39*  < > 41* 41*   JOSHUA  --  8.1* 8.0* 8.1* 8.0*  --  7.8* 7.3*   MAG 1.8 1.6  --   --  2.3  --  2.2  --    PHOS 2.8 1.2*  --   --   --   --  3.1 2.4*   PROTTOTAL  --   --  5.9*  --   --   --   --   --    ALBUMIN  --   --  2.0*  --   --   --   --   --    BILITOTAL  --   --  0.2  --   --   --   --   --    ALKPHOS  --   --  115  --   --   --   --   --    AST  --   --  24  --   --   --   --   --    ALT  --   --  15  --   --   --   --   --    < > = values in this interval not displayed. CBC  Recent Labs  Lab 10/10/18  1122 10/09/18  0639 10/08/18  0722 10/06/18  0639   WBC  --  4.0 4.6 4.6   RBC  --  2.66* 2.90* 2.87*   HGB 7.7* 8.0* 8.7* 8.6*   HCT  --  24.9* 27.6* 26.7*   MCV  --  94 95 93   MCH   --  30.1 30.0 30.0   MCHC  --  32.1 31.5 32.2   RDW  --  20.1* 20.1* 19.0*   PLT  --  287 250 199    INR  Recent Labs  Lab 10/11/18  0701 10/10/18  1122 10/09/18  0639 10/08/18  0722   INR 1.20* 1.44* 1.96* 2.44*    Arterial Blood GasNo lab results found in last 7 days.    Current Facility-Administered Medications   Medication     acetaminophen (TYLENOL) tablet 650 mg     acetylcysteine (MUCOMYST) 20 % nebulizer solution 2 mL     albuterol neb solution 2.5 mg     atropine 1 % ophthalmic solution 1-2 drop     B and C vitamin Complex with folic acid (NEPHRONEX) liquid 5 mL     bisacodyl (DULCOLAX) Suppository 10 mg     bumetanide (BUMEX) injection 4 mg     carvedilol (COREG) suspension 6.25 mg     ciprofloxacin (CIPRO) tablet 500 mg     dextrose 10 % 1,000 mL infusion     glucose gel 15-30 g    Or     dextrose 50 % injection 25-50 mL    Or     glucagon injection 1 mg     ferrous sulfate (IRON) tablet 325 mg     fiber modular (NUTRISOURCE FIBER) (NUTRISOURCE FIBER) packet 1 packet     heparin bolus from infusion pump     hydrALAZINE (APRESOLINE) injection 10-20 mg     HYDROmorphone (DILAUDID) injection 0.2 mg     influenza Vac Split High-Dose (FLUZONE) injection 0.5 mL     insulin aspart (NovoLOG) inj (RAPID ACTING)     lidocaine (LMX4) cream     lidocaine 1 % 1 mL     magnesium hydroxide (MILK OF MAGNESIA) suspension 30 mL     magnesium sulfate 2 g in NS intermittent infusion (PharMEDium or FV Cmpd)     magnesium sulfate 4 g in 100 mL sterile water (premade)     melatonin tablet 1 mg     mycophenolate (CELLCEPT BRAND) suspension 250 mg     naloxone (NARCAN) injection 0.1-0.4 mg     ondansetron (ZOFRAN-ODT) ODT tab 4 mg    Or     ondansetron (ZOFRAN) injection 4 mg     pantoprazole (PROTONIX) 2 mg/mL suspension 40 mg     Patient is already receiving anticoagulation with heparin, enoxaparin (LOVENOX), warfarin (COUMADIN)  or other anticoagulant medication     predniSONE (DELTASONE) tablet 5 mg     prochlorperazine  (COMPAZINE) injection 5 mg    Or     prochlorperazine (COMPAZINE) tablet 5 mg    Or     prochlorperazine (COMPAZINE) Suppository 12.5 mg     QUEtiapine (SEROquel) tablet 25 mg     senna-docusate (SENOKOT-S;PERICOLACE) 8.6-50 MG per tablet 1 tablet    Or     senna-docusate (SENOKOT-S;PERICOLACE) 8.6-50 MG per tablet 2 tablet     sodium chloride (PF) 0.9% PF flush 3 mL     sodium chloride (PF) 0.9% PF flush 3 mL     sodium phosphate 15 mmol in D5W intermittent infusion     sodium phosphate 20 mmol in D5W intermittent infusion     sodium phosphate 25 mmol in D5W intermittent infusion     tacrolimus (PROGRAF BRAND) suspension 1 mg     tacrolimus (PROGRAF BRAND) suspension 1.5 mg     thiamine 100 MG/ML suspension 100 mg     vancomycin (FIRVANQ) oral solution 125 mg     warfarin (COUMADIN) tablet 2.5 mg     Warfarin Therapy Reminder (Check START DATE - warfarin may be starting in the FUTURE)     Warfarin Therapy Reminder (Check START DATE - warfarin may be starting in the FUTURE)     zinc sulfate (ZINCATE) capsule 220 mg

## 2018-10-11 NOTE — PROGRESS NOTES
Brief Medicine Note    Contacted by RN regarding patient with Phos of 1.2 (family request for post HD labs- BMP, Mg, Phos ordered per request.) TF started 10/9. Post HD labs: Mg 1.6, K 4.5 (hemolyzed), phos 1.2. Will hold TF in setting of Refeeding and replace Phos x1 (not per protocol in setting of Renal disease).   - Hold TF- placed Patient care order   - Replace Phos- 20 mmol x1  - Repeat Phos 2 hours after dose complete, per order      Katharine Medina PA-C  Internal Medicine Hospitalist Service  [facility]  Pager: [phone]

## 2018-10-11 NOTE — PROGRESS NOTES
HEMODIALYSIS TREATMENT NOTE    Date: 10/10/2018  Time: 9:11 PM    Data:  Pre Wt: 82.6 kg (182 lb 1.6 oz)   Desired Wt: 80.6 kg   Weight change: - 2 kg  Ultrafiltration - Post Run Net Total Removed (mL): 2000 mL  Ultrafiltration - Post Run Net Total Gain (mL): 0 mL  Vascular Access Status: Yes, secured and visible  Dialyzer Rinse: Streaked, Light  Total Blood Volume Processed: 42.6 Liters  Total Dialysis (Treatment) Time:  2 hrs    Interventions/Assessment:  Stable 2 hr HD tx via RIJ catheter on K2 Ca3 bath with . Keep SBP > 90 per order. Pt non-verbal, confused and mitts in place for patient safety. Catheter dressing changed per protocol. Pt slept for majority of treatment. 2.0 kg removed and CVC saline locked with clear guard caps. Post /72. Daughter beside and report given to primary 5A RN.     Plan:    Next HD tx per renal team.

## 2018-10-11 NOTE — PROGRESS NOTES
[facility], [place]   Transplant Nephrology Progress Note  Date of Admission:  9/11/2018    Assessment & Plan    Priscilla Way is a 78 year old year old male  with h/o simultaneous liver and kidney transplant in 2000 for hepatitis C.He was admitted  for AMS and seizures.      # DDKT and liver transplant:  - Basline Cr ~ 1.2- 1,4; Increased likely due to contrast which he received on 9/11/2018.  - Today Crea: 1.26  - Patient was off HD from 9/27 till 10/10 when K elevated to 5 severe anasarca so HD was done  - UOP today 350~ ml   - So far ineffective diuresis,  we are not able to maintain euvolemia medically. Goals of care would need to be discussed. Currently the lower extremity edema is cosmetic, but if fluid accumulates in the lungs, this would lead to hypoxia and eventual death.  - Patient still has diarrhea that partially helps with volume status.  - Patient is fragile.   - Today I had a long phone call with his daughter Mrs. Leigh, I asked her for family  Meeting as soon as possible, I confirmed her that her father is not qualified for out patient hemodialysis, I explained to her that HD at this point causes more harm than benefits with no changes in the outcome that we expect. His daughter asked me for some hard copy of medical information that confirm her father is not qualified for outpatient HD. I explained to her that hospital is a place to provide treatment not a place to offer comfort care and if the family accept that the patient is at the level of comfort care then patient should be discharge to Home Hospice or to Hospice Center. Mrs. Leigh will call the family members and will set a family meeting. I will provide the family with the information Mrs. Leigh asked for. Also primary team, ethical committee and our comfort care will be in the meeting.       # Liver transplant :  - Most recent LFTs and Alk phos WNL.       # Immunosuppression:   - Continue with Mycophenolate  250 mg suspension BID via NG tube.   - Continue with Prednisone 5 mg daily.   - Tacrolimus to 1.5 mg in every morning and 1.0 mg every evening. Tac level on 10/7 was 5.7    # Hypertension- Volume status:   - -140/60-70  - Continue with Amlodipine 5 mg daily and Coreg 6.25 mg BID.   - Volume overload, oliguric, failed dieresis challenging.   - Continue with Bumex 4 mg BID and Metolazone 5 mg BID half an hour prior to Bumex..        # Anemia:  - multifactorial from blood draws and mmf effect.   - Hgb: 7.7 today, continue monitoring.   - On Ferrous sulfate supplementation     # Mineral Bone Disorder:   - Secondary renal hyperparathyroidism; PTH level is:  Normal at 78 on 2018  - Calcium; level is Normal when corrected for albumin   - Phosphorus: 3.1 and M.3      # Electrolytes:   - Potassium: 4.2  - Na: 134  # Other Medical Issues:   # seziure disorder- admitted with AMS. Seen by neurology. On keppra.   # h/o CVA:  Minimally interactive at baseline. Palliative care on consult. Family does not want feeding tube.   # Cdiff- on PO Vancomycin  # DM- management per primary.      # Transplant History:  Etiology of kidney failure: Hepatitis C  Transplant: 2000 (Kidney), 2000 (Liver)  Donor Type:  - Brain Death                      Donor Class: Standard Criteria Donor  Significant changes in immunosuppression: see above  Significant Complications: Now anuric LILIAM      # goals of care:   - Patient is not qualified for chronic HD.  - If LILIAM, we should notice kidey function improvement now but that has not happened.       Recommendations were communicated to the primary team via this note and verbal.    Seen and discussed with Dr. Adal Augustin MD  Pager: [phone]    Interval History       Nursing and provider notes from last 24 hours reviewed.  In the last 24 hours Priscilla Way has been stable at his baseline, bedridden, fragile, NG tube in place. No events overnight. UOP  " 300-400 ml per day.     Review of Systems   Not able to obtain due to advanced dementia and non-verbal.     Physical Exam   Temp  Av.1  F (35.6  C)  Min: 92.7  F (33.7  C)  Max: 98.9  F (37.2  C)      Pulse  Av.3  Min: 65  Max: 104 Resp  Av  Min: 10  Max: 24  SpO2  Av.6 %  Min: 91 %  Max: 100 %     /63 (BP Location: Left arm)  Pulse 79  Temp 97.4  F (36.3  C) (Axillary)  Resp 18  Ht 1.778 m (5' 10\")  Wt 82.6 kg (182 lb)  SpO2 100%  BMI 26.11 kg/m2   Date 10/11/18 0700 - 10/12/18 0659   Shift 4928-9781 9931-3930 1606-0389 24 Hour Total   I  N  T  A  K  E   P.O. 0   0    I.V.  5  5    NG/GT 60 30  90    Enteral 275   275    Shift Total  (mL/kg) 335  (4.06) 35  (0.42)  370  (4.48)   O  U  T  P  U  T   Urine 300   300    Shift Total  (mL/kg) 300  (3.63)   300  (3.63)   Weight (kg) 82.55 82.55 82.55 82.55        Admit Weight: 83.9 kg (185 lb)   GENERAL APPEARANCE: Fragile, bedridden, four limbs contraction   EYES:  Sluggish pupils  HENT: NG tube in place, poor oral hygiene.   PULM: Rales to lung base B/L  CV: regular rhythm, normal rate, no rub     -edema to lower Extrs B/L +3  GI: soft, non tender, bowel sounds are positive.   INTEGUMENT: no cyanosis, no rash  NEURO:  Bedridden, 4 limbs contractions      Data   All labs reviewed by me.  CMP  Recent Labs  Lab 10/11/18  0701 10/10/18  2155 10/10/18  1122 10/09/18  0639 10/08/18  0722  10/06/18  0639 10/05/18  1122    136 138 138 135  --  136 138   POTASSIUM 4.2 4.5 5.0 4.3 4.5  --  3.9 3.8   CHLORIDE 101 101 106 105 104  --  104 107   CO2 26 26 22 22 20  --  22 20   ANIONGAP 8 9 10 11 11  --  11 11   * 228* 176* 127* 181*  --  178* 177*   BUN 36* 32* 59* 50* 47*  --  34* 31*   CR 1.53* 1.26* 2.08* 2.06* 2.00*  < > 1.92* 1.92*   GFRESTIMATED 44* 55* 31* 31* 32*  < > 34* 34*   GFRESTBLACK 53* 67 37* 38* 39*  < > 41* 41*   JOSHUA 8.1* 8.1* 8.0* 8.1* 8.0*  --  7.8* 7.3*   MAG 1.8 1.6  --   --  2.3  --  2.2  --    PHOS 2.8 1.2*  --   " --   --   --  3.1 2.4*   PROTTOTAL  --   --  5.9*  --   --   --   --   --    ALBUMIN  --   --  2.0*  --   --   --   --   --    BILITOTAL  --   --  0.2  --   --   --   --   --    ALKPHOS  --   --  115  --   --   --   --   --    AST  --   --  24  --   --   --   --   --    ALT  --   --  15  --   --   --   --   --    < > = values in this interval not displayed.  CBC  Recent Labs  Lab 10/10/18  1122 10/09/18  0639 10/08/18  0722 10/06/18  0639   HGB 7.7* 8.0* 8.7* 8.6*   WBC  --  4.0 4.6 4.6   RBC  --  2.66* 2.90* 2.87*   HCT  --  24.9* 27.6* 26.7*   MCV  --  94 95 93   MCH  --  30.1 30.0 30.0   MCHC  --  32.1 31.5 32.2   RDW  --  20.1* 20.1* 19.0*   PLT  --  287 250 199     INR  Recent Labs  Lab 10/11/18  0701 10/10/18  1122 10/09/18  0639 10/08/18  0722   INR 1.20* 1.44* 1.96* 2.44*     ABGNo lab results found in last 7 days.   Urine Studies  Recent Labs   Lab Test  10/11/18   1300  10/04/18   1330  10/03/18   2056  09/24/18   0100   COLOR  Yellow  Rhea  Dark Brown  Yellow   APPEARANCE  Clear  Cloudy  Cloudy  Clear   URINEGLC  Negative  Negative  Negative  Negative   URINEBILI  Negative  Negative  Negative  Negative   URINEKETONE  Negative  Negative  10*  Negative   SG  1.009  1.010  1.019  1.020   UBLD  Small*  Large*  Moderate*  Moderate*   URINEPH  5.5  6.0  5.0  6.0   PROTEIN  30*  30*  30*  100*   NITRITE  Negative  Negative  Negative  Negative   LEUKEST  Large*  Moderate*  Large*  Trace*   RBCU  19*  182*  >182*  5   WBCU  38*  >182*  >182*  2     Recent Labs   Lab Test  09/14/18   1418  05/27/16   1409  04/29/11   1348  09/14/10   1134   UTPG  24.91*  Specimen not received  NOTIFIED [facility]  WHO PAGED  RN, LOPEZ AT 1355. NO URINE   RECIEVED WITH BLOOD SPECIMENS. AB    0.04  <0.02     PTH  Recent Labs   Lab Test  06/05/18   0548  08/24/16   0852   PTHI  78  333*     Iron Studies  Recent Labs   Lab Test  11/08/16   1209  08/24/16   0852   IRON  17*  20*   FEB  146*  197*   IRONSAT  11*  10*    BARTOLO   --   1025*       IMAGING:  All imaging studies reviewed by me.     MEDICATIONS:  Current Facility-Administered Medications   Medication     acetaminophen (TYLENOL) tablet 650 mg     acetylcysteine (MUCOMYST) 20 % nebulizer solution 2 mL     albuterol neb solution 2.5 mg     atropine 1 % ophthalmic solution 1-2 drop     B and C vitamin Complex with folic acid (NEPHRONEX) liquid 5 mL     bisacodyl (DULCOLAX) Suppository 10 mg     bumetanide (BUMEX) injection 4 mg     carvedilol (COREG) suspension 6.25 mg     ciprofloxacin (CIPRO) tablet 500 mg     dextrose 10 % 1,000 mL infusion     glucose gel 15-30 g    Or     dextrose 50 % injection 25-50 mL    Or     glucagon injection 1 mg     ferrous sulfate (IRON) tablet 325 mg     fiber modular (NUTRISOURCE FIBER) (NUTRISOURCE FIBER) packet 1 packet     heparin bolus from infusion pump     hydrALAZINE (APRESOLINE) injection 10-20 mg     HYDROmorphone (DILAUDID) injection 0.2 mg     influenza Vac Split High-Dose (FLUZONE) injection 0.5 mL     insulin aspart (NovoLOG) inj (RAPID ACTING)     lidocaine (LMX4) cream     lidocaine 1 % 1 mL     magnesium hydroxide (MILK OF MAGNESIA) suspension 30 mL     magnesium sulfate 2 g in NS intermittent infusion (PharMEDium or FV Cmpd)     magnesium sulfate 4 g in 100 mL sterile water (premade)     melatonin tablet 1 mg     mycophenolate (CELLCEPT BRAND) suspension 250 mg     naloxone (NARCAN) injection 0.1-0.4 mg     ondansetron (ZOFRAN-ODT) ODT tab 4 mg    Or     ondansetron (ZOFRAN) injection 4 mg     pantoprazole (PROTONIX) 2 mg/mL suspension 40 mg     Patient is already receiving anticoagulation with heparin, enoxaparin (LOVENOX), warfarin (COUMADIN)  or other anticoagulant medication     predniSONE (DELTASONE) tablet 5 mg     prochlorperazine (COMPAZINE) injection 5 mg    Or     prochlorperazine (COMPAZINE) tablet 5 mg    Or     prochlorperazine (COMPAZINE) Suppository 12.5 mg     QUEtiapine (SEROquel) tablet 25 mg      senna-docusate (SENOKOT-S;PERICOLACE) 8.6-50 MG per tablet 1 tablet    Or     senna-docusate (SENOKOT-S;PERICOLACE) 8.6-50 MG per tablet 2 tablet     sodium chloride (PF) 0.9% PF flush 3 mL     sodium chloride (PF) 0.9% PF flush 3 mL     sodium phosphate 15 mmol in D5W intermittent infusion     sodium phosphate 20 mmol in D5W intermittent infusion     sodium phosphate 25 mmol in D5W intermittent infusion     tacrolimus (PROGRAF BRAND) suspension 1 mg     tacrolimus (PROGRAF BRAND) suspension 1.5 mg     thiamine 100 MG/ML suspension 100 mg     vancomycin (FIRVANQ) oral solution 125 mg     warfarin (COUMADIN) tablet 2.5 mg     Warfarin Therapy Reminder (Check START DATE - warfarin may be starting in the FUTURE)     Attestation:  This patient has been seen and evaluated by me, Alfred Galeas MD.  I have reviewed the note and agree with plan of care as documented by the fellow.

## 2018-10-11 NOTE — PLAN OF CARE
Problem: Patient Care Overview  Goal: Plan of Care/Patient Progress Review  Outcome: No Change  VSS on RA. Pt nonverbal; Unable to assess orientation or mentation. TF running at goal rate of 55mL/hr through NJ tube. Magnesium 1.8; replaced and recheck ordered for AM. BG stable and covered with sliding scale insulin. 2 loose and watery BM. Repositioned Q2 hours. PIV saline locked. R internal jugular WNL.  Virk patent with low urine output. Family at bedside. Will continue to monitor and follow POC.

## 2018-10-11 NOTE — PROVIDER NOTIFICATION
Pt's phosp-1.2, mag-1.6, K-4.5.  Katharine BERNAL notified. Per order to hold TF for now. RN to replace phosp and not mag per order. Recheck in the AM.

## 2018-10-12 NOTE — PLAN OF CARE
Problem: Diabetes Comorbidity  Goal: Diabetes  Patient comorbidity will be monitored for signs and symptoms of hyperglycemia or hypoglycemia. Problems will be absent, minimized or managed by discharge/transition of care.    Outcome: No Change  Blood sugars stable. Patient only needing 1 unit insulin.    Problem: Renal Insufficiency Comorbidity  Goal: Renal Insufficiency  Patient comorbidity will be monitored for signs and symptoms of Renal Insufficiency (Chronic) condition.  Problems will be absent, minimized or managed by discharge/transition of care.   Outcome: No Change  Pt voiding small reed amounts of urine per patino catheter. Decision to not do dialysis    Problem: Mood Alteration Comorbidity  Goal: Mood Alteration Comorbidity  Patient comorbidity will be monitored for signs and symptoms of Mood Alteration condition.  Problems will be absent, minimized or managed by discharge/transition of care.   Outcome: No Change  ADRIA. Pt lethargic at times.

## 2018-10-12 NOTE — PLAN OF CARE
Problem: Patient Care Overview  Goal: Plan of Care/Patient Progress Review  Pt confused and combative, unable to assess orientation, more lethargic this shift. VSS on RA . Family at bedside. Repositioned q2hr.  TF through NJ at goal of 55ml/hr. Meds through NJ. Suctioned PRN. Virk patent w/ low output. Mepilex applied on bottom.  2 loose BMs. L PIV-SL. BG checks Q4h. R internal jugular. Mag recheck this AM. Will cont to monitor.

## 2018-10-12 NOTE — PROGRESS NOTES
[facility], [place]   Transplant Nephrology Progress Note  Date of Admission:  9/11/2018    Today Recommendation:   - To continue to not do hemodialysis.   - Kayexalate 15 g per NG tube (was ordered).    Assessment & Plan    Priscilla Way is a 78 year old year old male  with h/o simultaneous liver and kidney transplant in 2000 for hepatitis C.He was admitted  for AMS and seizures.      # DDKT and liver transplant:  - Basline Cr ~ 1.2- 1,4; Increased likely due to contrast which he received on 9/11/2018.  - Today Crea: 1.69  - Patient was off HD from 9/27 till 10/10 when K elevated to 5 severe anasarca so HD was done  - UOP today 350~ ml   - So far ineffective diuresis,  we are not able to maintain euvolemia medically. Goals of care would need to be discussed. Currently the lower extremity edema is cosmetic, but if fluid accumulates in the lungs, this would lead to hypoxia and eventual death.  - Patient still has diarrhea that partially helps with volume status.  - Patient is fragile.   - The daughter Mrs. Leigh was at bedside this morning. I had long discussion with her again, she is working on establishing the family meeting as soon as possible. I updated her about her father health status, she understands that we are holding HD.       # Liver transplant :  - Most recent LFTs and Alk phos WNL.       # Immunosuppression:   - Continue with Mycophenolate 250 mg suspension BID via NG tube.   - Continue with Prednisone 5 mg daily.   - Tacrolimus to 1.5 mg in every morning and 1.0 mg every evening. Tac level on 10/7 was 5.7     # Hypertension- Volume status:   - -140/60-70  - Continue with Amlodipine 5 mg daily and Coreg 6.25 mg BID.   - Volume overload, oliguric, failed dieresis challenging.   - Continue with Bumex 4 mg BID and Metolazone 5 mg BID half an hour prior to Bumex..        # Anemia:  - multifactorial from blood draws and mmf effect.   - Hgb: 8.2 today, continue monitoring.    - On Ferrous sulfate supplementation      # Mineral Bone Disorder:   - Secondary renal hyperparathyroidism; PTH level is:  Normal at 78 on 2018  - Calcium; level is Normal when corrected for albumin   - Phosphorus: 2.5 and M.0      # Electrolytes:   - Hyperkalemia today,  5.2 from 4.2, Kayexalate 15 g per NG tube (was ordered). To re-evalutae tomorrow and re-order Kayexalate as needed. Diarrhea was masking hyperkalemia, diarrhea has resolved yesterday so potassium elevates.    - Na: 134, stable.     # Other Medical Issues:   # seziure disorder- admitted with AMS. Seen by neurology. On keppra.   # h/o CVA:  Minimally interactive at baseline. Palliative care on consult. Family does not want feeding tube.   # Cdiff- on PO Vancomycin  # DM- management per primary.       # Transplant History:  Etiology of kidney failure: Hepatitis C  Transplant: 2000 (Kidney), 2000 (Liver)  Donor Type:  - Brain Death                      Donor Class: Standard Criteria Donor  Significant changes in immunosuppression: see above  Significant Complications: Now anuric LILIAM      # goals of care:   - Patient is not qualified for chronic HD.  - If ILLIAM, we should notice kidey function improvement now but that has not happened.       Recommendations were communicated to the primary team via this note.    Seen and discussed with Dr. Adal Augustin MD   Nephrology Fellow  Pager: [phone]    Interval History       Nursing and provider notes from last 24 hours reviewed.  In the last 24 hours Priscilla Way has been stable at his baseline, bedridden, fragile, NG tube in place, diarrhea resolved.  No events overnight. UOP  300-400 ml per day. Hyperkalemia, Kayexalate was ordered.      Review of Systems   Not able to obtain due to advanced dementia and non-verbal.     Physical Exam   Temp  Av.1  F (35.6  C)  Min: 92.3  F (33.5  C)  Max: 98.9  F (37.2  C)      Pulse  Av.3  Min: 65  Max: 104 Resp  Avg:  "17  Min: 10  Max: 24  SpO2  Av.6 %  Min: 91 %  Max: 100 %     /66 (BP Location: Right arm)  Pulse 79  Temp 92.3  F (33.5  C) (Axillary)  Resp 16  Ht 1.778 m (5' 10\")  Wt 82.6 kg (182 lb)  SpO2 100%  BMI 26.11 kg/m2   Date 10/12/18 0700 - 10/13/18 0659   Shift 9173-8625 0785-9923 2034-6019 24 Hour Total   I  N  T  A  K  E   P.O. 0   0    I.V. 50   50    NG/   680    Enteral 55   55    Shift Total  (mL/kg) 785  (9.51)   785  (9.51)   O  U  T  P  U  T   Urine  100  100    Shift Total  (mL/kg)  100  (1.21)  100  (1.21)   Weight (kg) 82.55 82.55 82.55 82.55        Admit Weight: 83.9 kg (185 lb)   GENERAL APPEARANCE: Fragile, bedridden, four limbs contraction   EYES:  Sluggish pupils  HENT: NG tube in place, poor oral hygiene.   PULM: Rales to lung base B/L  CV: regular rhythm, normal rate, no rub     -edema to lower Extrs B/L +3  GI: soft, non tender, bowel sounds are positive.   INTEGUMENT: no cyanosis, no rash  NEURO:  Bedridden, 4 limbs contractions      Data   All labs reviewed by me.  CMP  Recent Labs  Lab 10/12/18  0639 10/11/18  0701 10/10/18  2155 10/10/18  1122  10/08/18  0722  10/06/18  0639    134 136 138  < > 135  --  136   POTASSIUM 5.2 4.2 4.5 5.0  < > 4.5  --  3.9   CHLORIDE 102 101 101 106  < > 104  --  104   CO2 24 26 26 22  < > 20  --  22   ANIONGAP 7 8 9 10  < > 11  --  11   * 129* 228* 176*  < > 181*  --  178*   BUN 44* 36* 32* 59*  < > 47*  --  34*   CR 1.69* 1.53* 1.26* 2.08*  < > 2.00*  < > 1.92*   GFRESTIMATED 39* 44* 55* 31*  < > 32*  < > 34*   GFRESTBLACK 48* 53* 67 37*  < > 39*  < > 41*   JOSHUA 8.2* 8.1* 8.1* 8.0*  < > 8.0*  --  7.8*   MAG 2.0 1.8 1.6  --   --  2.3  --  2.2   PHOS 2.5 2.8 1.2*  --   --   --   --  3.1   PROTTOTAL  --   --   --  5.9*  --   --   --   --    ALBUMIN  --   --   --  2.0*  --   --   --   --    BILITOTAL  --   --   --  0.2  --   --   --   --    ALKPHOS  --   --   --  115  --   --   --   --    AST  --   --   --  24  --   --   --   --  "   ALT  --   --   --  15  --   --   --   --    < > = values in this interval not displayed.  CBC  Recent Labs  Lab 10/12/18  0639 10/10/18  1122 10/09/18  0639 10/08/18  0722 10/06/18  0639   HGB 8.2* 7.7* 8.0* 8.7* 8.6*   WBC 4.7  --  4.0 4.6 4.6   RBC 2.69*  --  2.66* 2.90* 2.87*   HCT 25.4*  --  24.9* 27.6* 26.7*   MCV 94  --  94 95 93   MCH 30.5  --  30.1 30.0 30.0   MCHC 32.3  --  32.1 31.5 32.2   RDW 19.8*  --  20.1* 20.1* 19.0*     --  287 250 199     INR  Recent Labs  Lab 10/12/18  0639 10/11/18  0701 10/10/18  1122 10/09/18  0639   INR 1.10 1.20* 1.44* 1.96*     ABGNo lab results found in last 7 days.   Urine Studies  Recent Labs   Lab Test  10/11/18   1300  10/04/18   1330  10/03/18   2056  09/24/18   0100   COLOR  Yellow  Rhea  Dark Brown  Yellow   APPEARANCE  Clear  Cloudy  Cloudy  Clear   URINEGLC  Negative  Negative  Negative  Negative   URINEBILI  Negative  Negative  Negative  Negative   URINEKETONE  Negative  Negative  10*  Negative   SG  1.009  1.010  1.019  1.020   UBLD  Small*  Large*  Moderate*  Moderate*   URINEPH  5.5  6.0  5.0  6.0   PROTEIN  30*  30*  30*  100*   NITRITE  Negative  Negative  Negative  Negative   LEUKEST  Large*  Moderate*  Large*  Trace*   RBCU  19*  182*  >182*  5   WBCU  38*  >182*  >182*  2     Recent Labs   Lab Test  09/14/18   1418  05/27/16   1409  04/29/11   1348  09/14/10   1134   UTPG  24.91*  Specimen not received  NOTIFIED [facility]  WHO PAGED  RNLOPEZ AT 1355. NO URINE   RECIEVED WITH BLOOD SPECIMENS. AB    0.04  <0.02     PTH  Recent Labs   Lab Test  06/05/18   0548  08/24/16   0852   PTHI  78  333*     Iron Studies  Recent Labs   Lab Test  11/08/16   1209  08/24/16   0852   IRON  17*  20*   FEB  146*  197*   IRONSAT  11*  10*   BARTOLO   --   1025*       IMAGING:  All imaging studies reviewed by me.     MEDICATIONS:  Current Facility-Administered Medications   Medication     acetaminophen (TYLENOL) tablet 650 mg     acetylcysteine  (MUCOMYST) 20 % nebulizer solution 2 mL     albuterol neb solution 2.5 mg     atropine 1 % ophthalmic solution 1-2 drop     B and C vitamin Complex with folic acid (NEPHRONEX) liquid 5 mL     bisacodyl (DULCOLAX) Suppository 10 mg     bumetanide (BUMEX) injection 4 mg     carvedilol (COREG) suspension 6.25 mg     ciprofloxacin (CIPRO) tablet 500 mg     dextrose 10 % 1,000 mL infusion     glucose gel 15-30 g    Or     dextrose 50 % injection 25-50 mL    Or     glucagon injection 1 mg     ferrous sulfate (IRON) tablet 325 mg     fiber modular (NUTRISOURCE FIBER) (NUTRISOURCE FIBER) packet 1 packet     heparin bolus from infusion pump     hydrALAZINE (APRESOLINE) injection 10-20 mg     HYDROmorphone (DILAUDID) injection 0.2 mg     influenza Vac Split High-Dose (FLUZONE) injection 0.5 mL     insulin aspart (NovoLOG) inj (RAPID ACTING)     lidocaine (LMX4) cream     lidocaine 1 % 1 mL     magnesium hydroxide (MILK OF MAGNESIA) suspension 30 mL     magnesium sulfate 2 g in NS intermittent infusion (PharMEDium or FV Cmpd)     magnesium sulfate 4 g in 100 mL sterile water (premade)     melatonin tablet 1 mg     mycophenolate (CELLCEPT BRAND) suspension 250 mg     naloxone (NARCAN) injection 0.1-0.4 mg     ondansetron (ZOFRAN-ODT) ODT tab 4 mg    Or     ondansetron (ZOFRAN) injection 4 mg     pantoprazole (PROTONIX) 2 mg/mL suspension 40 mg     Patient is already receiving anticoagulation with heparin, enoxaparin (LOVENOX), warfarin (COUMADIN)  or other anticoagulant medication     predniSONE (DELTASONE) tablet 5 mg     prochlorperazine (COMPAZINE) injection 5 mg    Or     prochlorperazine (COMPAZINE) tablet 5 mg    Or     prochlorperazine (COMPAZINE) Suppository 12.5 mg     QUEtiapine (SEROquel) tablet 25 mg     senna-docusate (SENOKOT-S;PERICOLACE) 8.6-50 MG per tablet 1 tablet    Or     senna-docusate (SENOKOT-S;PERICOLACE) 8.6-50 MG per tablet 2 tablet     sodium chloride (PF) 0.9% PF flush 3 mL     sodium chloride (PF)  0.9% PF flush 3 mL     sodium phosphate 15 mmol in D5W intermittent infusion     sodium phosphate 20 mmol in D5W intermittent infusion     sodium phosphate 25 mmol in D5W intermittent infusion     sodium polystyrene (KAYEXALATE) suspension 15 g     tacrolimus (PROGRAF BRAND) suspension 1 mg     tacrolimus (PROGRAF BRAND) suspension 1.5 mg     thiamine 100 MG/ML suspension 100 mg     vancomycin (FIRVANQ) oral solution 125 mg     warfarin (COUMADIN) half-tab 3.5 mg     Warfarin Therapy Reminder (Check START DATE - warfarin may be starting in the FUTURE)     Attestation:  This patient has been seen and evaluated by me, Alfred Galeas MD.  I have reviewed the note and agree with plan of care as documented by the fellow.

## 2018-10-12 NOTE — PLAN OF CARE
Problem: Patient Care Overview  Goal: Plan of Care/Patient Progress Review  Outcome: No Change  [id number]: Afebrile, VSS on RA. ADRIA orientation. Lethargic. Tube feeding running through NJ at 55mL/hr. Oral meds given through tube. LPIV SL. Virk catheter draining small amounts of reed urine. Virk cares done. Incontinent of loose, watery stools. Turned q2 hours. Mepilex on sacrum for pressure ulcer prevention. Skin tear on penis cleansed and dried. Blood sugars stable. Family at bedside updated with POC by med team. Continue to monitor and follow POC.    Jodee Lopez RN on 10/11/2018 at 8:10 PM

## 2018-10-12 NOTE — PROGRESS NOTES
[address] Medicine Service Daily Note  Date of Service: 10/12/2018    Patient: Priscilla Way   MRN: [id number]   Admission Date: 9/11/2018   Hospital Day # 31    Primary care provider: Randy Fuentes  ___________________________________  Priscilla Way is a 78 year old male with a complex past medical history including dementia (Alzheimer and vascular), liver transplant and kidney transplants in 2000, DVT, T2DM, CVA, and poor airway protection admitted with possible seizure and aspiration.  Progressive renal decline after LILIAM.      1) Altered mental status: delirium related to underlying dementia, severe illness, metabolic changes from HD and renal insufficiency. Daily orienting communication, nutrition, hydration, limiting psychotropic meds.      2)  DDKT and Liver Transplant now with progressive renal insufficiency: Appreciate renal note from yesterday. Need clear communication of a clear plan. Ethics consult from 9/20 reviewed and noted that trial of HD to see if renal function improves sufficiently to support life. At this point, he does not have adequate renal function to maintain fluid balance and will eventually have respiratory and cardiac compromise related to volume overload without chronic HD. Long discussion with Cruz yesterday- she expresses frustration as she wants him to get ongoing HD and a G-tube. This is a major ethical dilemma as the data on GT use in aspirating patients with severe dementia does not support their use for improving outcomes. Also, we have started the process of determining whether any ambulatory HD unit would accept him but this is very unlikely given his severe dementia, inability to protect his airway while eating/drinking, and very poor functional status. Long discussion with daughter this morning (not Abah) and she understands and agrees with the assessment that if his kidneys do not improve to the point where they can  support life, he would be best served with hospice care.      # Aspiration Pneumonia and poor Airway Protection  Pneumonia resolved after 7d abx.    --> Strict NPO. NJ feeds ongoing but in severe dementia, this has not been shown to improve outcome      # Possible slow GI blood loss: Suspect multifactorial including related to chronic disease and LILIAM as well as iatrogenic though patient reportedly had melanotic stools though hemoglobins have since been stable. Higher risk of ongoing bleeding as he has a h/o DVT and needs warfarin anticoagulation.  - Continue PPI BID   - Peirodic hemoglobin checks and as needed for signs of bleeding  - Transfuse for hemoglobin <7       # Recurrent C. Diff Colitis:  Toxin positive 9/11/18. Ongoing rx with oral vanco qid. Systemic abx d/tamra >10d ago. Cut Vanco to bid for 2 more days then discontinue.      # DVT On warfarin with INR goal 2-3. Low today at 1.2--> balancing bleeding and clotting risk given concern for GI blood loss. Given he's had DVT and is bedbound, will continue warfarin mgmt by pharmacy with target INR 2-3. Hold on heparin overlap given bleeding risk.      # T2DM: Follow daily BG. Currently stable. Start BG checks.   # HTN: Stable BP on carvedilol with PRN hydralazine.          Diet: NPO with NJ feeds.  Adult Formula Drip Feeding: Continuous TwoCal HN; Nasojejunal; Goal Rate: 40; mL/hr; Medication - Tube Feeding Flush Frequency: At least 15-30 mL water before and after medication administration and with tube clogging; Amount to Send. 60 ml every 4 hr      Goals of Care: He is DNR and DNI   Fluids: As above  Virk Catheter: in place, indication: Strict 1-2 Hour I&O, Strict 1-2 Hour I&O  DVT Prophylaxis: Heparin gtt bridging to warfarin  Code Status: DNR/DNI       Pt's care was discussed with bedside RN, CC/SW, patient and daughter during PFCR.    Joe Dean   Internal Medicine Staff Hospitalist Service    [facility]    Pager: [phone]   "  Team: Medicine Gold 2   Page Cross Cover after 5 pm: pager [phone] or job code [phone]  ___________________________________________________________________    Subjective & Interval Hx: Improved alertness this am. Agitated though, punches and hits when I examined him. Long discussion with family today as summarized above. No acute events overnight.  Last 24 hr care team notes reviewed.    ROS:  Unable to obtain ROS due to dementia    Medications: Reviewed in EPIC. List below for reference    Physical Exam:    Blood pressure 121/69, pulse 79, temperature 96.5  F (35.8  C), temperature source Axillary, resp. rate 18, height 1.778 m (5' 10\"), weight 82.6 kg (182 lb), SpO2 100 %.     Intake/Output Summary (Last 24 hours) at 10/12/18 0818  Last data filed at 10/12/18 0700   Gross per 24 hour   Intake             1030 ml   Output              125 ml   Net              905 ml      General: awake and making eye contact   HEENT: clear op   Neck: supple   Chest/Resp: clear lungs today   Heart/CV: RRR   Abdomen/GI: soft, but he gets agitated with palpation. Does not grimace and BS normal   Extremities/MSK: 3+ edema unchanged   Skin: no rashes   Neuro/Psych: severe dementia    Lines/Tubes:   Peripheral IV 10/10/18 Left;Anterior Upper forearm (Active)   Site Assessment [facility] 10/12/2018 12:00 AM   Line Status Saline locked 10/12/2018 12:00 AM   Phlebitis Scale 0-->no symptoms 10/12/2018 12:00 AM   Infiltration Scale 0 10/12/2018 12:00 AM   Infiltration Site Treatment Method  None 10/12/2018 12:00 AM   Extravasation? No 10/11/2018  6:57 PM   Dressing Intervention New dressing  10/10/2018  2:13 PM   Number of days:2       CVC Double Lumen 09/16/18 Right Internal jugular (Active)   Site Assessment [facility] 10/12/2018 12:00 AM   Lumen Soln/Vol REFERENCE 1.3/1.3 10/3/2018  6:00 PM   External Cath Length (cm) 2 cm 10/10/2018  6:45 PM   Extravasation? No 10/6/2018 12:00 AM   Dressing Intervention New dressing;Chlorhexidine sponge;Transparent " 10/10/2018  6:45 PM   Dressing Change Due 10/17/18 10/10/2018  6:45 PM   CVC Comment CDI 10/10/2018  8:48 PM   CVC Lumen Assessment Red;Blue 10/10/2018  8:48 PM   Number of days:26         Labs & Studies of Note: I personally reviewed the following studies: see below  Cultures:    All cultures:    Recent Labs  Lab 10/11/18  1300   CULT Culture in progress        CMP  Recent Labs  Lab 10/12/18  0639 10/11/18  0701 10/10/18  2155 10/10/18  1122  10/08/18  0722  10/06/18  0639    134 136 138  < > 135  --  136   POTASSIUM 5.2 4.2 4.5 5.0  < > 4.5  --  3.9   CHLORIDE 102 101 101 106  < > 104  --  104   CO2 24 26 26 22  < > 20  --  22   ANIONGAP 7 8 9 10  < > 11  --  11   * 129* 228* 176*  < > 181*  --  178*   BUN 44* 36* 32* 59*  < > 47*  --  34*   CR 1.69* 1.53* 1.26* 2.08*  < > 2.00*  < > 1.92*   GFRESTIMATED 39* 44* 55* 31*  < > 32*  < > 34*   GFRESTBLACK 48* 53* 67 37*  < > 39*  < > 41*   JOSHUA 8.2* 8.1* 8.1* 8.0*  < > 8.0*  --  7.8*   MAG 2.0 1.8 1.6  --   --  2.3  --  2.2   PHOS 2.5 2.8 1.2*  --   --   --   --  3.1   PROTTOTAL  --   --   --  5.9*  --   --   --   --    ALBUMIN  --   --   --  2.0*  --   --   --   --    BILITOTAL  --   --   --  0.2  --   --   --   --    ALKPHOS  --   --   --  115  --   --   --   --    AST  --   --   --  24  --   --   --   --    ALT  --   --   --  15  --   --   --   --    < > = values in this interval not displayed. CBC  Recent Labs  Lab 10/12/18  0639 10/10/18  1122 10/09/18  0639 10/08/18  0722 10/06/18  0639   WBC 4.7  --  4.0 4.6 4.6   RBC 2.69*  --  2.66* 2.90* 2.87*   HGB 8.2* 7.7* 8.0* 8.7* 8.6*   HCT 25.4*  --  24.9* 27.6* 26.7*   MCV 94  --  94 95 93   MCH 30.5  --  30.1 30.0 30.0   MCHC 32.3  --  32.1 31.5 32.2   RDW 19.8*  --  20.1* 20.1* 19.0*     --  287 250 199    INR  Recent Labs  Lab 10/12/18  0639 10/11/18  0701 10/10/18  1122 10/09/18  0639   INR 1.10 1.20* 1.44* 1.96*    Arterial Blood GasNo lab results found in last 7 days.    Current  Facility-Administered Medications   Medication     acetaminophen (TYLENOL) tablet 650 mg     acetylcysteine (MUCOMYST) 20 % nebulizer solution 2 mL     albuterol neb solution 2.5 mg     atropine 1 % ophthalmic solution 1-2 drop     B and C vitamin Complex with folic acid (NEPHRONEX) liquid 5 mL     bisacodyl (DULCOLAX) Suppository 10 mg     bumetanide (BUMEX) injection 4 mg     carvedilol (COREG) suspension 6.25 mg     ciprofloxacin (CIPRO) tablet 500 mg     dextrose 10 % 1,000 mL infusion     glucose gel 15-30 g    Or     dextrose 50 % injection 25-50 mL    Or     glucagon injection 1 mg     ferrous sulfate (IRON) tablet 325 mg     fiber modular (NUTRISOURCE FIBER) (NUTRISOURCE FIBER) packet 1 packet     heparin bolus from infusion pump     hydrALAZINE (APRESOLINE) injection 10-20 mg     HYDROmorphone (DILAUDID) injection 0.2 mg     influenza Vac Split High-Dose (FLUZONE) injection 0.5 mL     insulin aspart (NovoLOG) inj (RAPID ACTING)     lidocaine (LMX4) cream     lidocaine 1 % 1 mL     magnesium hydroxide (MILK OF MAGNESIA) suspension 30 mL     magnesium sulfate 2 g in NS intermittent infusion (PharMEDium or [facility] Cmpd)     magnesium sulfate 4 g in 100 mL sterile water (premade)     melatonin tablet 1 mg     mycophenolate (CELLCEPT BRAND) suspension 250 mg     naloxone (NARCAN) injection 0.1-0.4 mg     ondansetron (ZOFRAN-ODT) ODT tab 4 mg    Or     ondansetron (ZOFRAN) injection 4 mg     pantoprazole (PROTONIX) 2 mg/mL suspension 40 mg     Patient is already receiving anticoagulation with heparin, enoxaparin (LOVENOX), warfarin (COUMADIN)  or other anticoagulant medication     predniSONE (DELTASONE) tablet 5 mg     prochlorperazine (COMPAZINE) injection 5 mg    Or     prochlorperazine (COMPAZINE) tablet 5 mg    Or     prochlorperazine (COMPAZINE) Suppository 12.5 mg     QUEtiapine (SEROquel) tablet 25 mg     senna-docusate (SENOKOT-S;PERICOLACE) 8.6-50 MG per tablet 1 tablet    Or     senna-docusate  (SENOKOT-S;PERICOLACE) 8.6-50 MG per tablet 2 tablet     sodium chloride (PF) 0.9% PF flush 3 mL     sodium chloride (PF) 0.9% PF flush 3 mL     sodium phosphate 15 mmol in D5W intermittent infusion     sodium phosphate 20 mmol in D5W intermittent infusion     sodium phosphate 25 mmol in D5W intermittent infusion     tacrolimus (PROGRAF BRAND) suspension 1 mg     tacrolimus (PROGRAF BRAND) suspension 1.5 mg     thiamine 100 MG/ML suspension 100 mg     vancomycin (FIRVANQ) oral solution 125 mg     Warfarin Therapy Reminder (Check START DATE - warfarin may be starting in the FUTURE)         HTN  Hx of coma

## 2018-10-12 NOTE — PROGRESS NOTES
[facility] Nurse Inpatient Pressure Injury Assessment   Reason for consultation: Evaluate and treat penile skin       ASSESSMENT  Pressure Injury: on penis foreskin , hospital acquired ,   This is a Medical Device Related Pressure Injury (MDRPI) due to patino  Pressure Injury is Stage 2   Contributing factor of the pressure injury: pressure, immobility and moisture  Status: follow up assessment, improving      TREATMENT PLAN  Penile skin wound: BID Catheter: Continue routine cath cares.  May apply small amount of barrier paste on wounds, none observed on 10/12/18.  When placing Stat lock for Patino make sure there catheter is positioned so catheter is straight midline with no tension.  Orders Written  WOC Nurse follow-up plan:weekly  Nursing to notify the Provider(s) and re-consult the WOC Nurse if wound(s) deteriorates or new skin concern.     Patient History  According to provider note(s):  Priscilla Way is a 78 year old male with a complex past medical history including dementia (Alzheimer and vascular), liver transplant and kidney transplants in 2000, DVT, T2DM, CVA, and poor airway protection admitted with possible seizure and aspiration.  He is currently hemodynamically stable with ongoing concerns for aspirations/airway protection, nutritional support, and monitoring renal function off of HD.     Objective Data  Containment of urine/stool: Indwelling catheter     Current Diet/ Nutrition:     Active Diet Order      Dysphagia Diet Level 1 Pureed Nectar Thickened Liquids (pre-thickened or use instant food thickener)     I/O last 3 completed shifts:  In: 975 [I.V.:5; NG/GT:90]  Out: 375 [Urine:375]     Risk Assessment:   Sensory Perception: 2-->very limited  Moisture: 3-->occasionally moist  Activity: 1-->bedfast  Mobility: 2-->very limited  Nutrition: 2-->probably inadequate  Friction and Shear: 1-->problem  Stuart Score: 11      I/O last 3 completed shifts:  In: 975 [I.V.:5; NG/GT:90]  Out: 375 [Urine:375]    Physical  Exam  Skin inspection: focused penile foreskin             Wound Location: penile foreskin  Date of last Photo 10/1/18  10/12/18 phone unable to obtain service, no photo taken  Wound History: pt has indwelling Virk catheter, swelling of skin on uncircumcised penis, caregiver reports swelling has improved.     Measurements (length x width x depth, in cm) one small wound 10/12/18:   0.3 x 0.4 x <0.1 cm    Wound Base:  100% dermis pale pink  Palpation of the wound bed: normal   Periwound skin: intact, and edematous  Color: normal and consistent with surrounding tissue  Temperature: normal   Drainage: none  Description of drainage: none  Odor: none  Pain: no grimacing or signs of discomfort  Pt combative during assessment and nurse as well as caregiver had to assist for WOC to assess     Interventions  Current support surface: Standard  Low air loss mattress with pulsation   Current off-loading measures: Foam padding and Pillows under calves  Repositioning aid: Pillows  Visual inspection of wound(s) completed   Tube Securement: cath securement device  Wound Care: was done per plan of care.  Supplies: gathered and at bedside  Educated provided: importance of repositioning, plan of care and wound progress  Education provided to: caregiver  Discussed importance of:repositioning every 2 hours, off-loading pressure to wound, their role in pressure injury prevention, head elevation <30 degrees, nutrition on wound healing and moisture management  Discussed plan of care with Patient and Nurse

## 2018-10-12 NOTE — PLAN OF CARE
Problem: Patient Care Overview  Goal: Plan of Care/Patient Progress Review  Outcome: No Change  Pt admitted for aspiration pneumonia and seizure like activity. Confused and agitated throughout shift, becomes combative with cares, continued use of hand mits. VSS with baseline low temps and on RA. No signs of pain or nausea. Tube feeds maintained at 55 ml/hr and med admin through NG. Rhea colored urine output of 100ml via patino catheter. Two BM's this shift. Mg of 2.0, replaced and recheck for AM ordered. Family at bedside and involved with cares. Will continue with POC.

## 2018-10-13 NOTE — PROGRESS NOTES
[facility], [place]   Transplant Nephrology Progress Note  Date of Admission:  9/11/2018    Today Recommendation:   - To continue to not do hemodialysis.   - Kayexalate 15 g per NG tube (was ordered).    Assessment & Plan    Priscilla Way is a 78 year old year old male  with h/o simultaneous liver and kidney transplant in 2000 for hepatitis C.He was admitted  for AMS and seizures.      # DDKT and liver transplant:  - Basline Cr ~ 1.2- 1,4; Increased likely due to contrast which he received on 9/11/2018.  - Slow trend up in serum creatinine to ~ 1.8 today  - Patient was off HD from 9/27 till 10/10 when K elevated to 5 severe anasarca so HD was done  - UOP decreasing  - So far ineffective diuresis,  we are not able to maintain euvolemia medically. Goals of care would need to be discussed. Currently the lower extremity edema is cosmetic, but if fluid accumulates in the lungs, this would lead to hypoxia and eventual death.  - Patient still has diarrhea that partially helps with volume status.  - Patient is fragile.   - The daughter Mrs. Leigh was at bedside this morning. I had long discussion with her again, she is working on establishing the family meeting as soon as possible. I updated her about her father health status, she understands that we are holding HD.  Plan to have family meeting at 1 pm on Monday, October 15.      # Liver transplant :  - Most recent LFTs and Alk phos WNL.       # Immunosuppression:   - Continue with Mycophenolate 250 mg suspension BID via NG tube.   - Continue with Prednisone 5 mg daily.   - Tacrolimus to 1.5 mg in every morning and 1.0 mg every evening. Tac level on 10/7 was 5.7     # Hypertension- Volume status:   - -140/60-70  - Continue with Amlodipine 5 mg daily and Coreg 6.25 mg BID.   - Volume overload, oliguric, failed dieresis challenging.   - Continue with Bumex 4 mg BID and Metolazone 5 mg BID half an hour prior to Bumex..        # Anemia:  -  multifactorial from blood draws and MMF effect.   - Hgb: 8.2 yesterday, continue monitoring.   - On Ferrous sulfate supplementation      # Mineral Bone Disorder:   - Secondary renal hyperparathyroidism; PTH level is:  Normal at 78 on 2018  - Calcium; level is Normal when corrected for albumin   - Phosphorus: 2.5 and M.0      # Electrolytes:   - Hyperkalemia today,  5.1, stable.  - Would continue with Kayexalate 15 g per NG tube as needed.  - Na: 134, stable.     # Other Medical Issues:   # seziure disorder- admitted with AMS. Seen by neurology. On keppra.   # h/o CVA:  Minimally interactive at baseline. Palliative care on consult. Family does not want feeding tube.   # Cdiff- on PO Vancomycin  # DM- management per primary.       # Transplant History:  Etiology of kidney failure: Hepatitis C  Transplant: 2000 (Kidney), 2000 (Liver)  Donor Type:  - Brain Death                      Donor Class: Standard Criteria Donor  Significant changes in immunosuppression: see above  Significant Complications: Now anuric LILIAM      # goals of care:   - Patient is not qualified for chronic HD.  - If LILIAM, we should notice kidey function improvement now but that has not happened.    Recommendations were communicated to the primary team via this note.    Alfred Galeas MD    Interval History   Slight increase in creatinine to ~ 1.9.  Patient remains oliguric with urine output ~ 275 ml yesterday.  Stable serum potassium at 5.1 and received Kayexalate last night.  Patient remains non-responsive.  Talked to patient's daughter who was present in the room, as well as his son-in-law on the phone.  Explained that patient is dying and dialysis has potential to do more harm than good and that we would not offer further dialysis.    Review of Systems   Not able to obtain due to advanced dementia and non-verbal.     Physical Exam   Temp  Av.1  F (35.6  C)  Min: 92.3  F (33.5  C)  Max: 98.9  F (37.2  C)     "  Pulse  Av.3  Min: 65  Max: 104 Resp  Av  Min: 10  Max: 24  SpO2  Av.6 %  Min: 91 %  Max: 100 %     /78 (BP Location: Right arm)  Pulse 79  Temp 97.5  F (36.4  C) (Axillary)  Resp 18  Ht 1.778 m (5' 10\")  Wt 82.6 kg (182 lb)  SpO2 99%  BMI 26.11 kg/m2   Date 10/12/18 07 - 10/13/18 0659   Shift 6111-6415 6004-8368 8722-5621 24 Hour Total   I  N  T  A  K  E   P.O. 0   0    I.V. 50   50    NG/   680    Enteral 55   55    Shift Total  (mL/kg) 785  (9.51)   785  (9.51)   O  U  T  P  U  T   Urine  100  100    Shift Total  (mL/kg)  100  (1.21)  100  (1.21)   Weight (kg) 82.55 82.55 82.55 82.55        Admit Weight: 83.9 kg (185 lb)   GENERAL APPEARANCE: Fragile, bedridden, four limbs contraction   EYES:  Sluggish pupils  HENT: NG tube in place, poor oral hygiene.   PULM: Rales to lung base B/L  CV: regular rhythm, normal rate, no rub     -Edema: 2+ LE edema bilaterally  GI: soft, non tender, bowel sounds are positive.   INTEGUMENT: no cyanosis, no rash  NEURO:  Bedridden, 4 limbs contractions, occasional shaking  TX KIDNEY: normal    Data   All labs reviewed by me.  CMP    Recent Labs  Lab 10/13/18  0801 10/12/18  0639 10/11/18  0701 10/10/18  2155 10/10/18  1122    134 134 136 138   POTASSIUM 5.1 5.2 4.2 4.5 5.0   CHLORIDE 102 102 101 101 106   CO2 26 24 26 26 22   ANIONGAP 6 7 8 9 10   * 155* 129* 228* 176*   BUN 54* 44* 36* 32* 59*   CR 1.86* 1.69* 1.53* 1.26* 2.08*   GFRESTIMATED 35* 39* 44* 55* 31*   GFRESTBLACK 43* 48* 53* 67 37*   JOSHUA 8.1* 8.2* 8.1* 8.1* 8.0*   MAG 2.5* 2.0 1.8 1.6  --    PHOS 1.9* 2.5 2.8 1.2*  --    PROTTOTAL  --   --   --   --  5.9*   ALBUMIN  --   --   --   --  2.0*   BILITOTAL  --   --   --   --  0.2   ALKPHOS  --   --   --   --  115   AST  --   --   --   --  24   ALT  --   --   --   --  15     CBC    Recent Labs  Lab 10/12/18  0639 10/10/18  1122 10/09/18  0639 10/08/18  0722   HGB 8.2* 7.7* 8.0* 8.7*   WBC 4.7  --  4.0 4.6   RBC 2.69*  --  " 2.66* 2.90*   HCT 25.4*  --  24.9* 27.6*   MCV 94  --  94 95   MCH 30.5  --  30.1 30.0   MCHC 32.3  --  32.1 31.5   RDW 19.8*  --  20.1* 20.1*     --  287 250     INR    Recent Labs  Lab 10/13/18  0801 10/12/18  0639 10/11/18  0701 10/10/18  1122   INR 1.16* 1.10 1.20* 1.44*     ABGNo lab results found in last 7 days.   Urine Studies  Recent Labs   Lab Test  10/11/18   1300  10/04/18   1330  10/03/18   2056  09/24/18   0100   COLOR  Yellow  Rhea  Dark Brown  Yellow   APPEARANCE  Clear  Cloudy  Cloudy  Clear   URINEGLC  Negative  Negative  Negative  Negative   URINEBILI  Negative  Negative  Negative  Negative   URINEKETONE  Negative  Negative  10*  Negative   SG  1.009  1.010  1.019  1.020   UBLD  Small*  Large*  Moderate*  Moderate*   URINEPH  5.5  6.0  5.0  6.0   PROTEIN  30*  30*  30*  100*   NITRITE  Negative  Negative  Negative  Negative   LEUKEST  Large*  Moderate*  Large*  Trace*   RBCU  19*  182*  >182*  5   WBCU  38*  >182*  >182*  2     Recent Labs   Lab Test  09/14/18   1418  05/27/16   1409  04/29/11   1348  09/14/10   1134   UTPG  24.91*  Specimen not received  NOTIFIED [facility]  WHO PAGED  RN, LOPEZ AT 1355. NO URINE   RECIEVED WITH BLOOD SPECIMENS. AB    0.04  <0.02     PTH  Recent Labs   Lab Test  06/05/18   0548  08/24/16   0852   PTHI  78  333*     Iron Studies  Recent Labs   Lab Test  11/08/16   1209  08/24/16   0852   IRON  17*  20*   FEB  146*  197*   IRONSAT  11*  10*   BARTOLO   --   1025*       IMAGING:  All imaging studies reviewed by me.     MEDICATIONS:  Current Facility-Administered Medications   Medication     acetaminophen (TYLENOL) tablet 650 mg     acetylcysteine (MUCOMYST) 20 % nebulizer solution 2 mL     albuterol neb solution 2.5 mg     atropine 1 % ophthalmic solution 1-2 drop     B and C vitamin Complex with folic acid (NEPHRONEX) liquid 5 mL     bisacodyl (DULCOLAX) Suppository 10 mg     bumetanide (BUMEX) injection 4 mg     carvedilol (COREG) suspension  6.25 mg     ciprofloxacin (CIPRO) tablet 500 mg     dextrose 10 % 1,000 mL infusion     glucose gel 15-30 g    Or     dextrose 50 % injection 25-50 mL    Or     glucagon injection 1 mg     ferrous sulfate (IRON) tablet 325 mg     fiber modular (NUTRISOURCE FIBER) (NUTRISOURCE FIBER) packet 1 packet     heparin bolus from infusion pump     hydrALAZINE (APRESOLINE) injection 10-20 mg     HYDROmorphone (DILAUDID) injection 0.2 mg     influenza Vac Split High-Dose (FLUZONE) injection 0.5 mL     insulin aspart (NovoLOG) inj (RAPID ACTING)     lidocaine (LMX4) cream     lidocaine 1 % 1 mL     magnesium hydroxide (MILK OF MAGNESIA) suspension 30 mL     magnesium sulfate 2 g in NS intermittent infusion (PharMEDium or [facility] Cmpd)     magnesium sulfate 4 g in 100 mL sterile water (premade)     melatonin tablet 1 mg     metolazone (ZAROXOLYN) suspension 5 mg     mycophenolate (CELLCEPT BRAND) suspension 250 mg     naloxone (NARCAN) injection 0.1-0.4 mg     ondansetron (ZOFRAN-ODT) ODT tab 4 mg    Or     ondansetron (ZOFRAN) injection 4 mg     pantoprazole (PROTONIX) 2 mg/mL suspension 40 mg     Patient is already receiving anticoagulation with heparin, enoxaparin (LOVENOX), warfarin (COUMADIN)  or other anticoagulant medication     predniSONE (DELTASONE) tablet 5 mg     prochlorperazine (COMPAZINE) injection 5 mg    Or     prochlorperazine (COMPAZINE) tablet 5 mg    Or     prochlorperazine (COMPAZINE) Suppository 12.5 mg     QUEtiapine (SEROquel) tablet 25 mg     senna-docusate (SENOKOT-S;PERICOLACE) 8.6-50 MG per tablet 1 tablet    Or     senna-docusate (SENOKOT-S;PERICOLACE) 8.6-50 MG per tablet 2 tablet     sodium chloride (PF) 0.9% PF flush 3 mL     sodium chloride (PF) 0.9% PF flush 3 mL     sodium phosphate 15 mmol in D5W intermittent infusion     sodium phosphate 20 mmol in D5W intermittent infusion     sodium phosphate 25 mmol in D5W intermittent infusion     tacrolimus (PROGRAF BRAND) suspension 1 mg     tacrolimus  (PROGRAF BRAND) suspension 1.5 mg     thiamine 100 MG/ML suspension 100 mg     warfarin (COUMADIN) tablet 5 mg     Warfarin Therapy Reminder (Check START DATE - warfarin may be starting in the FUTURE)

## 2018-10-13 NOTE — PROGRESS NOTES
[facility]  Hospital Medicine Service Daily Note  Date of Service: 10/13/2018    Patient: Priscilla Way   MRN: [id number]   Admission Date: 9/11/2018   Hospital Day # 32    Primary care provider: Randy Fuentes  ___________________________________  Priscilla Way is a 78 year old male with a complex past medical history including dementia (Alzheimer and vascular), liver transplant and kidney transplants in 2000, DVT, T2DM, CVA, and poor airway protection admitted with possible seizure and aspiration.  Progressive renal decline after LILIAM.      1) Altered mental status: delirium related to underlying dementia, severe illness, metabolic changes from HD and renal insufficiency. Daily orienting communication, nutrition, hydration, limiting psychotropic meds.       2)  DDKT and Liver Transplant now with progressive renal insufficiency: Appreciate renal note from yesterday. Need clear communication of a clear plan. Ethics consult from 9/20 reviewed and noted that trial of HD to see if renal function improves sufficiently to support life. At this point, he does not have adequate renal function to maintain fluid balance and will eventually have respiratory and cardiac compromise related to volume overload without chronic HD. Discussed care recommendations again with Mrs. Leigh (daughter) with Dr. Galeas at the bedside. Renal Team are not offering HD due to the ethical considerations in this gentleman with severe dementia, inability to swallow/protect airway, agitation with pulling of tubes, and very poor overall prognosis for any meaningful recovery.       3)  Aspiration Pneumonia and poor Airway Protection  Pneumonia resolved after 7d abx.    --> Strict NPO. NJ feeds ongoing but in severe dementia, this has not been shown to improve outcome      # Possible slow GI blood loss: Suspect multifactorial including related to chronic disease and LILIAM as well as iatrogenic though patient  reportedly had melanotic stools though hemoglobins have since been stable. Higher risk of ongoing bleeding as he has a h/o DVT and needs warfarin anticoagulation.  - Continue PPI BID   - Peirodic hemoglobin checks and as needed for signs of bleeding  - Transfuse for hemoglobin <7       # Recurrent C. Diff Colitis:  Toxin positive 9/11/18. Systemic abx d/tamra >10d ago. Overlap and taper done. Vanco now completed.      # DVT On warfarin with INR goal 2-3. Low today at 1.16--> balancing bleeding and clotting risk given concern for GI blood loss. Given he's had DVT and is bedbound, will continue warfarin mgmt by pharmacy with target INR 2-3. Hold on heparin overlap given bleeding risk. Pharmacy managing.      # T2DM: Follow daily BG. Currently stable. Start BG checks.   # HTN: Stable BP on carvedilol with PRN hydralazine.          Diet: NPO with NJ feeds.  Adult Formula Drip Feeding: Continuous TwoCal HN; Nasojejunal; Goal Rate: 40; mL/hr; Medication - Tube Feeding Flush Frequency: At least 15-30 mL water before and after medication administration and with tube clogging; Amount to Send. 60 ml every 4 hr      Goals of Care: He is DNR and DNI   Fluids: As above  Virk Catheter: in place, indication: Strict 1-2 Hour I&O, Strict 1-2 Hour I&O  DVT Prophylaxis: Heparin gtt bridging to warfarin  Code Status: DNR/DNI  Pt's care was discussed with bedside RN, CC/SW, patient and his wife and daughter Mrs. Leigh as well as with Otto Galeas of renal team during rounds.    Joe Dean   Internal Medicine Staff Hospitalist Service    [facility]    Pager: [phone]    Team: Scott Small 2   Page Cross Cover after 5 pm: pager [phone] or job code [phone]  ___________________________________________________________________    Subjective & Interval Hx: No acute events overnight but some ongoing agitation. No bleeding noted. No fevers noted. Long discussion with Mrs. Leigh at bedside with Renal Attending.  "Reiterated that he is slowly dying. Current aggressive cares prolong this process. He is uncomfortable. Also reiterated the need for the family to identify a single spokesperson, decision-maker. Mrs. Leigh recognizes there is not uniform agreement on the care plan by all. Most agree with comfort cares given his severe illness and long-standing dementia.  Last 24 hr care team notes reviewed.    ROS: Unable to obtain ROS.  Medications: Reviewed in EPIC. List below for reference    Physical Exam:   Blood pressure 144/78, pulse 79, temperature 97.5  F (36.4  C), temperature source Axillary, resp. rate 18, height 1.778 m (5' 10\"), weight 82.6 kg (182 lb), SpO2 99 %.     Intake/Output Summary (Last 24 hours) at 10/13/18 1355  Last data filed at 10/13/18 0644   Gross per 24 hour   Intake             1405 ml   Output              320 ml   Net             1085 ml      General: agitation  HEENT: NJ in nares- no skin breakdown   Neck: supple   Chest/Resp: clear today anteriorly   Heart/CV: RRR   Abdomen/GI: soft, no grimace with exam   Extremities/MSK: 3+ edema unchanged   Skin: no rashes   Neuro/Psych: severe dementia, not oriented or communicative    Lines/Tubes:   Peripheral IV 10/10/18 Left;Anterior Upper forearm (Active)   Site Assessment [facility] 10/13/2018 12:45 AM   Line Status Saline locked 10/13/2018 12:45 AM   Phlebitis Scale 0-->no symptoms 10/13/2018 12:45 AM   Infiltration Scale 0 10/12/2018  5:00 PM   Infiltration Site Treatment Method  None 10/12/2018 10:30 AM   Extravasation? No 10/12/2018 10:30 AM   Dressing Intervention New dressing  10/10/2018  2:13 PM   Number of days:3       CVC Double Lumen 09/16/18 Right Internal jugular (Active)   Site Assessment [facility] 10/13/2018 12:45 AM   Lumen Soln/Vol REFERENCE 1.3/1.3 10/3/2018  6:00 PM   External Cath Length (cm) 2 cm 10/10/2018  6:45 PM   Extravasation? No 10/12/2018 10:30 AM   Dressing Intervention Transparent 10/13/2018 12:45 AM   Dressing Change Due 10/17/18 " 10/12/2018 10:30 AM   CVC Comment CDI 10/10/2018  8:48 PM   CVC Lumen Assessment Red;Blue 10/10/2018  8:48 PM   Number of days:27         Labs & Studies of Note: I personally reviewed the following studies: See below  Cultures:    Recent Labs  Lab 10/11/18  1300   CULT 50,000 to 100,000 colonies/mLmixed urogenital elise  Susceptibility testing not routinely done      CMP  Recent Labs  Lab 10/13/18  0801 10/12/18  0639 10/11/18  0701 10/10/18  2155 10/10/18  1122    134 134 136 138   POTASSIUM 5.1 5.2 4.2 4.5 5.0   CHLORIDE 102 102 101 101 106   CO2 26 24 26 26 22   ANIONGAP 6 7 8 9 10   * 155* 129* 228* 176*   BUN 54* 44* 36* 32* 59*   CR 1.86* 1.69* 1.53* 1.26* 2.08*   GFRESTIMATED 35* 39* 44* 55* 31*   GFRESTBLACK 43* 48* 53* 67 37*   JOSHUA 8.1* 8.2* 8.1* 8.1* 8.0*   MAG 2.5* 2.0 1.8 1.6  --    PHOS 1.9* 2.5 2.8 1.2*  --    PROTTOTAL  --   --   --   --  5.9*   ALBUMIN  --   --   --   --  2.0*   BILITOTAL  --   --   --   --  0.2   ALKPHOS  --   --   --   --  115   AST  --   --   --   --  24   ALT  --   --   --   --  15    CBC  Recent Labs  Lab 10/12/18  0639 10/10/18  1122 10/09/18  0639 10/08/18  0722   WBC 4.7  --  4.0 4.6   RBC 2.69*  --  2.66* 2.90*   HGB 8.2* 7.7* 8.0* 8.7*   HCT 25.4*  --  24.9* 27.6*   MCV 94  --  94 95   MCH 30.5  --  30.1 30.0   MCHC 32.3  --  32.1 31.5   RDW 19.8*  --  20.1* 20.1*     --  287 250    INR  Recent Labs  Lab 10/13/18  0801 10/12/18  0639 10/11/18  0701 10/10/18  1122   INR 1.16* 1.10 1.20* 1.44*    Arterial Blood GasNo lab results found in last 7 days.    Current Facility-Administered Medications   Medication     acetaminophen (TYLENOL) tablet 650 mg     acetylcysteine (MUCOMYST) 20 % nebulizer solution 2 mL     albuterol neb solution 2.5 mg     atropine 1 % ophthalmic solution 1-2 drop     B and C vitamin Complex with folic acid (NEPHRONEX) liquid 5 mL     bisacodyl (DULCOLAX) Suppository 10 mg     bumetanide (BUMEX) injection 4 mg     carvedilol (COREG)  suspension 6.25 mg     ciprofloxacin (CIPRO) tablet 500 mg     dextrose 10 % 1,000 mL infusion     glucose gel 15-30 g    Or     dextrose 50 % injection 25-50 mL    Or     glucagon injection 1 mg     ferrous sulfate (IRON) tablet 325 mg     fiber modular (NUTRISOURCE FIBER) (NUTRISOURCE FIBER) packet 1 packet     heparin bolus from infusion pump     hydrALAZINE (APRESOLINE) injection 10-20 mg     HYDROmorphone (DILAUDID) injection 0.2 mg     influenza Vac Split High-Dose (FLUZONE) injection 0.5 mL     insulin aspart (NovoLOG) inj (RAPID ACTING)     lidocaine (LMX4) cream     lidocaine 1 % 1 mL     magnesium hydroxide (MILK OF MAGNESIA) suspension 30 mL     magnesium sulfate 2 g in NS intermittent infusion (PharMEDium or [facility] Cmpd)     magnesium sulfate 4 g in 100 mL sterile water (premade)     melatonin tablet 1 mg     metolazone (ZAROXOLYN) suspension 5 mg     mycophenolate (CELLCEPT BRAND) suspension 250 mg     naloxone (NARCAN) injection 0.1-0.4 mg     ondansetron (ZOFRAN-ODT) ODT tab 4 mg    Or     ondansetron (ZOFRAN) injection 4 mg     pantoprazole (PROTONIX) 2 mg/mL suspension 40 mg     Patient is already receiving anticoagulation with heparin, enoxaparin (LOVENOX), warfarin (COUMADIN)  or other anticoagulant medication     predniSONE (DELTASONE) tablet 5 mg     prochlorperazine (COMPAZINE) injection 5 mg    Or     prochlorperazine (COMPAZINE) tablet 5 mg    Or     prochlorperazine (COMPAZINE) Suppository 12.5 mg     QUEtiapine (SEROquel) tablet 25 mg     senna-docusate (SENOKOT-S;PERICOLACE) 8.6-50 MG per tablet 1 tablet    Or     senna-docusate (SENOKOT-S;PERICOLACE) 8.6-50 MG per tablet 2 tablet     sodium chloride (PF) 0.9% PF flush 3 mL     sodium chloride (PF) 0.9% PF flush 3 mL     sodium phosphate 15 mmol in D5W intermittent infusion     sodium phosphate 20 mmol in D5W intermittent infusion     sodium phosphate 25 mmol in D5W intermittent infusion     tacrolimus (PROGRAF BRAND) suspension 1 mg      tacrolimus (PROGRAF BRAND) suspension 1.5 mg     thiamine 100 MG/ML suspension 100 mg     warfarin (COUMADIN) tablet 5 mg     Warfarin Therapy Reminder (Check START DATE - warfarin may be starting in the FUTURE)

## 2018-10-13 NOTE — PLAN OF CARE
Problem: Patient Care Overview  Goal: Plan of Care/Patient Progress Review  Outcome: No Change  Pt is disoriented to all but self per daughter. Pt did not speak overnight. Sleeping between cares. Pt is resistant to cares and attempts to punch and bite caregivers at times. Incontinent of stool. Virk is patent with low urine output. Lissy hugger on. Turning every 2 hours. TF infusing. Daughter May is at bedside.

## 2018-10-13 NOTE — PLAN OF CARE
Problem: Goal/Outcome  Goal: Goal Outcome Summary  Outcome: No Change  Nonverbal confused pt dementia at baseline. VSS except low temp. Lissy warming blanket in place. Left PIV SL. NJ with continuous enteral feedings. All meds through the tube. Kayexalate given for K 5.2. Having frequent diarrhea. Cleaning/turning and repositioning Q2H. Family calls to make needs known. Wound at foreskin with barrier cream and careful positioning of catheter. Virk with 100 ml UOP. Continue with POC.

## 2018-10-14 NOTE — PLAN OF CARE
Problem: Patient Care Overview  Goal: Plan of Care/Patient Progress Review  Outcome: No Change  Pt is oriented only to self. Rarely speaks but may say one word from time to time. Combative with cares, attempting to push, kick and bite caregivers. Turning every 2 hours. Pt is usually incontinent of stool every 2 hours with watery diarrhea. Interdry between groin and scrotum. Barrier pasted lightly applied to tono-area. Virk is patent with low urine output. Pt has  Generalized +3 edema. Pt's cough is more productive. Oral suctioning regularly. Pt able to maintain temps around 98 without josie hugger.

## 2018-10-14 NOTE — PROGRESS NOTES
[facility]  Hospital Medicine Service Daily Note  Date of Service: 10/14/2018    Patient: Priscilla Way   MRN: [id number]   Admission Date: 9/11/2018   Hospital Day # 33    Primary care provider: Randy Fuentes  ___________________________________  Priscilla Way is a 78 year old male with a complex past medical history including dementia (Alzheimer and vascular), liver transplant and kidney transplants in 2000, DVT, T2DM, CVA, and poor airway protection admitted with possible seizure and aspiration.  Progressive renal decline after LILIAM.      1) Altered mental status: delirium related to underlying dementia, severe illness, metabolic changes from HD and renal insufficiency. Daily orienting communication, nutrition, hydration, limiting psychotropic meds. Lactulose given today.      2)  DDKT and Liver Transplant now with progressive renal insufficiency: Stable Cr now about 1.9 but fluid/electrolyte status remain problems. K 5.6 today- tacro contributing. Discussed with Dr. Galeas and agreed to stop tacro and increase cellcept from 250 mg to 500 mg/d and prednisone from 5 to 10 mg/d. Also wrote for 3 more doses of kayexalate for K mgmt. Hemodialysis will not change the outcome--> he has severe dementia with inability to swallow his food and protect his airway from aspiration. He is agitated requiring hand mitts. He is slowly dying. Family conference at 1 pm tomorrow.    3)  Aspiration Pneumonia and poor Airway Protection  Pneumonia resolved after 7d abx.    --> Strict NPO. NJ feeds ongoing but in severe dementia, this has not been shown to improve outcome      # Possible slow GI blood loss: Suspect multifactorial including related to chronic disease and LILIAM as well as iatrogenic though patient reportedly had melanotic stools though hemoglobins have since been stable. Higher risk of ongoing bleeding as he has a h/o DVT and needs warfarin anticoagulation.  - Continue PPI BID   -  "Peirodic hemoglobin checks and as needed for signs of bleeding  - Transfuse for hemoglobin <7       # Recurrent C. Diff Colitis:  Toxin positive 9/11/18. Systemic abx d/tamra >10d ago. Overlap and taper done. Vanco now completed.      # DVT On warfarin with INR goal 2-3. Significant bleeding risk but clotting risk outweighs this. Pharmacy dosing of warfarin wihtout heparin overlap.    # T2DM: Follow daily BG. Currently stable. Start BG checks.   # HTN: Stable BP on carvedilol with PRN hydralazine.          Diet: NPO with NJ feeds.  Adult Formula Drip Feeding: Continuous TwoCal HN; Nasojejunal; Goal Rate: 40; mL/hr; Medication - Tube Feeding Flush Frequency: At least 15-30 mL water before and after medication administration and with tube clogging; Amount to Send. 60 ml every 4 hr      Goals of Care: He is DNR and DNI   Fluids: As above  Virk Catheter: in place, indication: Strict 1-2 Hour I&O, Strict 1-2 Hour I&O  DVT Prophylaxis: Heparin gtt bridging to warfarin  Code Status: DNR/DNI  Pt's care was discussed with bedside RN, CC/SW, patient and his wife and daughter Mrs. Leigh as well as with Otto Galeas of renal team during rounds.  Joe Dean   Internal Medicine Staff Hospitalist Service    [facility]    Pager: [phone]    Team: Scott Small 2   Page Cross Cover after 5 pm: pager [phone] or job code [phone]  ___________________________________________________________________    Subjective & Interval Hx: No acute events overnight. 1 dose kayexalate yesterday. Ms. Leigh in room- answered her questions.   Last 24 hr care team notes reviewed.    ROS: Cannot obtain ROS- non-communicative    Medications: Reviewed in EPIC. List below for reference    Physical Exam:   Blood pressure (!) 175/93, pulse 80, temperature 96.3  F (35.7  C), temperature source Axillary, resp. rate 16, height 1.778 m (5' 10\"), weight 82.6 kg (182 lb), SpO2 100 %.     Intake/Output Summary (Last 24 hours) at 10/14/18 " "1150  Last data filed at 10/14/18 0656   Gross per 24 hour   Intake             1750 ml   Output              400 ml   Net             1350 ml      General: awake, non-communicative   HEENT: NJ in place- no nares skin breakdown   Neck: supple   Chest/Resp: clear lungs   Heart/CV: RRR   Abdomen/GI: soft   Extremities/MSK: 3+ edema in LEs   Skin: no rashes   Neuro/Psych: severe dementia- \"locked in\"    Lines/Tubes:   Peripheral IV 10/10/18 Left;Anterior Upper forearm (Active)   Site Assessment WDL 10/13/2018  8:30 PM   Line Status Saline locked 10/13/2018  8:30 PM   Phlebitis Scale 0-->no symptoms 10/13/2018  9:00 AM   Infiltration Scale 0 10/13/2018  9:00 AM   Infiltration Site Treatment Method  None 10/13/2018  9:00 AM   Extravasation? No 10/13/2018  9:00 AM   Dressing Intervention New dressing  10/10/2018  2:13 PM   Number of days:4       CVC Double Lumen 09/16/18 Right Internal jugular (Active)   Site Assessment WDL 10/13/2018  8:30 PM   Lumen Soln/Vol REFERENCE 1.3/1.3 10/3/2018  6:00 PM   External Cath Length (cm) 2 cm 10/10/2018  6:45 PM   Extravasation? No 10/13/2018  9:00 AM   Dressing Intervention Transparent 10/13/2018 12:45 AM   Dressing Change Due 10/17/18 10/12/2018 10:30 AM   CVC Comment CDI 10/10/2018  8:48 PM   CVC Lumen Assessment Red;Blue 10/10/2018  8:48 PM   Number of days:28         Labs & Studies of Note: I personally reviewed the following studies: See below  Cultures:    Recent Labs  Lab 10/11/18  1300   CULT 50,000 to 100,000 colonies/mLmixed urogenital elise  Susceptibility testing not routinely done      CMP  Recent Labs  Lab 10/14/18  0920 10/13/18  1912 10/13/18  0801 10/12/18  0639 10/11/18  0701 10/10/18  2155  10/10/18  1122     --  134 134 134 136  --  138   POTASSIUM 5.6*  --  5.1 5.2 4.2 4.5  --  5.0   CHLORIDE 102  --  102 102 101 101  --  106   CO2 24  --  26 24 26 26  --  22   ANIONGAP 7  --  6 7 8 9  --  10   *  --  112* 155* 129* 228*  --  176*   BUN 60*  --  54* " 44* 36* 32*  --  59*   CR 1.92*  --  1.86* 1.69* 1.53* 1.26*  --  2.08*   GFRESTIMATED 34*  --  35* 39* 44* 55*  --  31*   GFRESTBLACK 41*  --  43* 48* 53* 67  --  37*   JOSHUA 8.2*  --  8.1* 8.2* 8.1* 8.1*  --  8.0*   MAG  --   --  2.5* 2.0 1.8 1.6  --   --    PHOS 2.5 3.1 1.9* 2.5 2.8 1.2*  < >  --    PROTTOTAL  --   --   --   --   --   --   --  5.9*   ALBUMIN  --   --   --   --   --   --   --  2.0*   BILITOTAL  --   --   --   --   --   --   --  0.2   ALKPHOS  --   --   --   --   --   --   --  115   AST  --   --   --   --   --   --   --  24   ALT  --   --   --   --   --   --   --  15   < > = values in this interval not displayed. CBC  Recent Labs  Lab 10/12/18  0639 10/10/18  1122 10/09/18  0639 10/08/18  0722   WBC 4.7  --  4.0 4.6   RBC 2.69*  --  2.66* 2.90*   HGB 8.2* 7.7* 8.0* 8.7*   HCT 25.4*  --  24.9* 27.6*   MCV 94  --  94 95   MCH 30.5  --  30.1 30.0   MCHC 32.3  --  32.1 31.5   RDW 19.8*  --  20.1* 20.1*     --  287 250    INR  Recent Labs  Lab 10/14/18  0920 10/13/18  0801 10/12/18  0639 10/11/18  0701   INR 1.55* 1.16* 1.10 1.20*    Arterial Blood GasNo lab results found in last 7 days.    Current Facility-Administered Medications   Medication     acetaminophen (TYLENOL) tablet 650 mg     acetylcysteine (MUCOMYST) 20 % nebulizer solution 2 mL     albuterol neb solution 2.5 mg     atropine 1 % ophthalmic solution 1-2 drop     B and C vitamin Complex with folic acid (NEPHRONEX) liquid 5 mL     bisacodyl (DULCOLAX) Suppository 10 mg     bumetanide (BUMEX) injection 4 mg     carvedilol (COREG) suspension 6.25 mg     ciprofloxacin (CIPRO) tablet 500 mg     dextrose 10 % 1,000 mL infusion     glucose gel 15-30 g    Or     dextrose 50 % injection 25-50 mL    Or     glucagon injection 1 mg     ferrous sulfate (IRON) tablet 325 mg     fiber modular (NUTRISOURCE FIBER) (NUTRISOURCE FIBER) packet 1 packet     heparin bolus from infusion pump     hydrALAZINE (APRESOLINE) injection 10-20 mg     HYDROmorphone  (DILAUDID) injection 0.2 mg     influenza Vac Split High-Dose (FLUZONE) injection 0.5 mL     insulin aspart (NovoLOG) inj (RAPID ACTING)     lactulose (CHRONULAC) solution 20 g     lidocaine (LMX4) cream     lidocaine 1 % 1 mL     magnesium hydroxide (MILK OF MAGNESIA) suspension 30 mL     magnesium sulfate 2 g in NS intermittent infusion (PharMEDium or FV Cmpd)     magnesium sulfate 4 g in 100 mL sterile water (premade)     melatonin tablet 1 mg     metolazone (ZAROXOLYN) suspension 5 mg     mycophenolate (CELLCEPT BRAND) suspension 500 mg     naloxone (NARCAN) injection 0.1-0.4 mg     ondansetron (ZOFRAN-ODT) ODT tab 4 mg    Or     ondansetron (ZOFRAN) injection 4 mg     pantoprazole (PROTONIX) 2 mg/mL suspension 40 mg     Patient is already receiving anticoagulation with heparin, enoxaparin (LOVENOX), warfarin (COUMADIN)  or other anticoagulant medication     predniSONE (DELTASONE) tablet 10 mg     prochlorperazine (COMPAZINE) injection 5 mg    Or     prochlorperazine (COMPAZINE) tablet 5 mg    Or     prochlorperazine (COMPAZINE) Suppository 12.5 mg     QUEtiapine (SEROquel) tablet 25 mg     senna-docusate (SENOKOT-S;PERICOLACE) 8.6-50 MG per tablet 1 tablet    Or     senna-docusate (SENOKOT-S;PERICOLACE) 8.6-50 MG per tablet 2 tablet     sodium chloride (PF) 0.9% PF flush 3 mL     sodium chloride (PF) 0.9% PF flush 3 mL     sodium phosphate 15 mmol in D5W intermittent infusion     sodium phosphate 20 mmol in D5W intermittent infusion     sodium phosphate 25 mmol in D5W intermittent infusion     sodium polystyrene (KAYEXALATE) suspension 15 g     thiamine 100 MG/ML suspension 100 mg     warfarin (COUMADIN) tablet 2.5 mg     Warfarin Therapy Reminder (Check START DATE - warfarin may be starting in the FUTURE)

## 2018-10-14 NOTE — PROGRESS NOTES
[facility], [place]   Transplant Nephrology Progress Note  Date of Admission:  9/11/2018    Today Recommendation:   - To continue to not do hemodialysis.   - Kayexalate 15 g per NG tube (was ordered).    Assessment & Plan    Priscilla Way is a 78 year old year old male  with h/o simultaneous liver and kidney transplant in 2000 for hepatitis C.He was admitted  for AMS and seizures.      # DDKT and liver transplant: baseline Cr ~ 1.2-1,4; Increased likely due to contrast which he received on 9/11/2018.  Mostly stable serum creatinine at ~ 1.9 today.  Patient was off HD from 9/27 till 10/10 when K elevated to 5 severe anasarca so HD was done.  He remains oliguric.  So far ineffective diuresis,  we are not able to maintain euvolemia medically. Goals of care would need to be discussed. Currently the lower extremity edema is cosmetic, but if fluid accumulates in the lungs, this would lead to hypoxia and eventual death.  Patient still has diarrhea that partially helps with volume status.  Patient is fragile.    - The daughter Mrs. Leigh was at bedside this morning. I had long discussion with her again, she is working on establishing the family meeting as soon as possible. I updated her about her father health status, she understands that we are holding HD.  Plan to have family meeting at 1 pm on Monday, October 15.   - Would not offer further dialysis as it is futile      # Liver transplant :   - Most recent LFTs and Alk phos WNL.       # Immunosuppression:    - Recommend increasing mycophenolate mofetil to 500 mg suspension BID via NG tube.    - Recommend increasing prednisone to 10 mg daily.    - Would stop tacrolimus as this could be contributing to decreased kidney function and hyperkalemia.     # Hypertension- Volume status:    - -140/60-70   - Continue with Amlodipine 5 mg daily and Coreg 6.25 mg BID.    - Volume overload, oliguric, failed dieresis challenging.    - Continue with  Bumex 4 mg BID and Metolazone 5 mg BID half an hour prior to Bumex.      # Anemia:   - multifactorial from blood draws and MMF effect.    - Hgb: 8.2 with last check   - Would be okay holding oral iron      # Mineral Bone Disorder:   - Secondary renal hyperparathyroidism; PTH level is:  Normal at 78 on 2018  - Calcium; level is Normal when corrected for albumin   - Phosphorus: 2.5 and M.0      # Electrolytes:   - Hyperkalemia today,  5.6, increased.  Agree with increasing Kayexalate to tid.  Will stop tacrolimus.  - Na: 134, stable.     # Other Medical Issues:   # seziure disorder- admitted with AMS. Seen by neurology. On keppra.   # h/o CVA:  Minimally interactive at baseline. Palliative care on consult. Family does not want feeding tube.   # Cdiff- on PO Vancomycin  # DM- management per primary.       # Transplant History:  Etiology of kidney failure: Hepatitis C  Transplant: 2000 (Kidney), 2000 (Liver)  Donor Type:  - Brain Death                      Donor Class: Standard Criteria Donor  Significant changes in immunosuppression: see above  Significant Complications: Now anuric LILIAM      # goals of care:    - Patient is not qualified for chronic HD.   - If LILIAM, we should notice kidey function improvement now but that has not happened.    Recommendations were communicated to the primary team via this note.    Alfred Galeas MD    Interval History   Stable creatinine at ~ 1.9.  Patient remains oliguric with urine output ~ 320 ml yesterday.  Slight increase in serum potassium at 5.6.  Patient remains non-responsive.  Talked to patient's daughter who was present in the room and no new issues.  Continued to explain that patient is dying and dialysis has potential to do more harm than good and that we would not offer further dialysis.    Review of Systems   Not able to obtain due to advanced dementia and non-verbal.     Physical Exam   Temp  Av.1  F (35.6  C)  Min: 92.3  F (33.5  C)   "Max: 98.9  F (37.2  C)      Pulse  Av.3  Min: 65  Max: 104 Resp  Av  Min: 10  Max: 24  SpO2  Av.6 %  Min: 91 %  Max: 100 %     BP (!) 175/93  Pulse 80  Temp 96.3  F (35.7  C) (Axillary)  Resp 16  Ht 1.778 m (5' 10\")  Wt 82.6 kg (182 lb)  SpO2 100%  BMI 26.11 kg/m2   Date 10/12/18 07 - 10/13/18 0659   Shift 0610-9647 5365-1676 9785-9759 24 Hour Total   I  N  T  A  K  E   P.O. 0   0    I.V. 50   50    NG/   680    Enteral 55   55    Shift Total  (mL/kg) 785  (9.51)   785  (9.51)   O  U  T  P  U  T   Urine  100  100    Shift Total  (mL/kg)  100  (1.21)  100  (1.21)   Weight (kg) 82.55 82.55 82.55 82.55        Admit Weight: 83.9 kg (185 lb)   GENERAL APPEARANCE: Fragile, bedridden, four limbs contraction   EYES:  Sluggish pupils  HENT: NG tube in place, poor oral hygiene.   PULM: Rales to lung base B/L  CV: regular rhythm, normal rate, no rub     -Edema: 2+ LE edema bilaterally  GI: soft, non tender, bowel sounds are positive.   INTEGUMENT: no cyanosis, no rash  NEURO:  Bedridden, 4 limbs contractions, occasional shaking  TX KIDNEY: normal    Data   All labs reviewed by me.  CMP    Recent Labs  Lab 10/14/18  0920 10/13/18  1912 10/13/18  0801 10/12/18  0639 10/11/18  0701 10/10/18  2155  10/10/18  1122     --  134 134 134 136  --  138   POTASSIUM 5.6*  --  5.1 5.2 4.2 4.5  --  5.0   CHLORIDE 102  --  102 102 101 101  --  106   CO2 24  --  26 24 26 26  --  22   ANIONGAP 7  --  6 7 8 9  --  10   *  --  112* 155* 129* 228*  --  176*   BUN 60*  --  54* 44* 36* 32*  --  59*   CR 1.92*  --  1.86* 1.69* 1.53* 1.26*  --  2.08*   GFRESTIMATED 34*  --  35* 39* 44* 55*  --  31*   GFRESTBLACK 41*  --  43* 48* 53* 67  --  37*   JOSHUA 8.2*  --  8.1* 8.2* 8.1* 8.1*  --  8.0*   MAG  --   --  2.5* 2.0 1.8 1.6  --   --    PHOS 2.5 3.1 1.9* 2.5 2.8 1.2*  < >  --    PROTTOTAL  --   --   --   --   --   --   --  5.9*   ALBUMIN  --   --   --   --   --   --   --  2.0*   BILITOTAL  --   --   --   --   -- "   --   --  0.2   ALKPHOS  --   --   --   --   --   --   --  115   AST  --   --   --   --   --   --   --  24   ALT  --   --   --   --   --   --   --  15   < > = values in this interval not displayed.  CBC    Recent Labs  Lab 10/12/18  0639 10/10/18  1122 10/09/18  0639 10/08/18  0722   HGB 8.2* 7.7* 8.0* 8.7*   WBC 4.7  --  4.0 4.6   RBC 2.69*  --  2.66* 2.90*   HCT 25.4*  --  24.9* 27.6*   MCV 94  --  94 95   MCH 30.5  --  30.1 30.0   MCHC 32.3  --  32.1 31.5   RDW 19.8*  --  20.1* 20.1*     --  287 250     INR    Recent Labs  Lab 10/14/18  0920 10/13/18  0801 10/12/18  0639 10/11/18  0701   INR 1.55* 1.16* 1.10 1.20*     ABGNo lab results found in last 7 days.   Urine Studies  Recent Labs   Lab Test  10/11/18   1300  10/04/18   1330  10/03/18   2056  09/24/18   0100   COLOR  Yellow  Rhea  Dark Brown  Yellow   APPEARANCE  Clear  Cloudy  Cloudy  Clear   URINEGLC  Negative  Negative  Negative  Negative   URINEBILI  Negative  Negative  Negative  Negative   URINEKETONE  Negative  Negative  10*  Negative   SG  1.009  1.010  1.019  1.020   UBLD  Small*  Large*  Moderate*  Moderate*   URINEPH  5.5  6.0  5.0  6.0   PROTEIN  30*  30*  30*  100*   NITRITE  Negative  Negative  Negative  Negative   LEUKEST  Large*  Moderate*  Large*  Trace*   RBCU  19*  182*  >182*  5   WBCU  38*  >182*  >182*  2     Recent Labs   Lab Test  09/14/18   1418  05/27/16   1409  04/29/11   1348  09/14/10   1134   UTPG  24.91*  Specimen not received  NOTIFIED [facility]  WHO PAGED  RNLOPEZ AT 1355. NO URINE   RECIEVED WITH BLOOD SPECIMENS. AB    0.04  <0.02     PTH  Recent Labs   Lab Test  06/05/18   0548  08/24/16   0852   PTHI  78  333*     Iron Studies  Recent Labs   Lab Test  11/08/16   1209  08/24/16   0852   IRON  17*  20*   FEB  146*  197*   IRONSAT  11*  10*   BARTOLO   --   1025*       IMAGING:  All imaging studies reviewed by me.     MEDICATIONS:  Current Facility-Administered Medications   Medication      acetaminophen (TYLENOL) tablet 650 mg     acetylcysteine (MUCOMYST) 20 % nebulizer solution 2 mL     albuterol neb solution 2.5 mg     atropine 1 % ophthalmic solution 1-2 drop     B and C vitamin Complex with folic acid (NEPHRONEX) liquid 5 mL     bisacodyl (DULCOLAX) Suppository 10 mg     bumetanide (BUMEX) injection 4 mg     carvedilol (COREG) suspension 6.25 mg     ciprofloxacin (CIPRO) tablet 500 mg     dextrose 10 % 1,000 mL infusion     glucose gel 15-30 g    Or     dextrose 50 % injection 25-50 mL    Or     glucagon injection 1 mg     ferrous sulfate (IRON) tablet 325 mg     fiber modular (NUTRISOURCE FIBER) (NUTRISOURCE FIBER) packet 1 packet     heparin bolus from infusion pump     hydrALAZINE (APRESOLINE) injection 10-20 mg     HYDROmorphone (DILAUDID) injection 0.2 mg     influenza Vac Split High-Dose (FLUZONE) injection 0.5 mL     insulin aspart (NovoLOG) inj (RAPID ACTING)     lactulose (CHRONULAC) solution 20 g     lidocaine (LMX4) cream     lidocaine 1 % 1 mL     magnesium hydroxide (MILK OF MAGNESIA) suspension 30 mL     magnesium sulfate 2 g in NS intermittent infusion (PharMEDium or [facility]V Cmpd)     magnesium sulfate 4 g in 100 mL sterile water (premade)     melatonin tablet 1 mg     metolazone (ZAROXOLYN) suspension 5 mg     mycophenolate (CELLCEPT BRAND) suspension 500 mg     naloxone (NARCAN) injection 0.1-0.4 mg     ondansetron (ZOFRAN-ODT) ODT tab 4 mg    Or     ondansetron (ZOFRAN) injection 4 mg     pantoprazole (PROTONIX) 2 mg/mL suspension 40 mg     Patient is already receiving anticoagulation with heparin, enoxaparin (LOVENOX), warfarin (COUMADIN)  or other anticoagulant medication     predniSONE (DELTASONE) tablet 10 mg     prochlorperazine (COMPAZINE) injection 5 mg    Or     prochlorperazine (COMPAZINE) tablet 5 mg    Or     prochlorperazine (COMPAZINE) Suppository 12.5 mg     QUEtiapine (SEROquel) tablet 25 mg     senna-docusate (SENOKOT-S;PERICOLACE) 8.6-50 MG per tablet 1 tablet    Or      senna-docusate (SENOKOT-S;PERICOLACE) 8.6-50 MG per tablet 2 tablet     sodium chloride (PF) 0.9% PF flush 3 mL     sodium chloride (PF) 0.9% PF flush 3 mL     sodium phosphate 15 mmol in D5W intermittent infusion     sodium phosphate 20 mmol in D5W intermittent infusion     sodium phosphate 25 mmol in D5W intermittent infusion     sodium polystyrene (KAYEXALATE) suspension 15 g     thiamine 100 MG/ML suspension 100 mg     warfarin (COUMADIN) tablet 2.5 mg     Warfarin Therapy Reminder (Check START DATE - warfarin may be starting in the FUTURE)

## 2018-10-14 NOTE — PLAN OF CARE
Problem: Patient Care Overview  Goal: Plan of Care/Patient Progress Review  Outcome: No Change  Pt admitted with aspiration pneumonia. Lethargic this shift and combative with cares. VSS on RA. Turned and brief changed every 2 hours. Moderate edema throughout legs and arms. Moisture barrier cream applied to penile lesion. K+ level of 5.6 this AM, kayexalate given. Tube feeds maintained at goal rate of 55 ml/hr, patient tolerating well. 3 loose stools this shift. Daughter at bedside, assisting with cares. Will continue with POC.

## 2018-10-14 NOTE — PLAN OF CARE
Problem: Patient Care Overview  Goal: Plan of Care/Patient Progress Review  Outcome: No Change  Pt admitted with aspiration pneumonia. Lethargic and confused. VSS except baseline low temps. Two loose stools this shift. Turning q 2hrs. Phosphorus of 1.9, replaced with 20 mmol, recheck for 1800 and AM ordered. Pt tolerating tube feeds at goal rate of 55ml/hr. UO of 125ml this shift. Care conference scheduled for Monday at 1pm with family and care team. Daughter and spouse at bedside. Will continue with POC.

## 2018-10-15 NOTE — PROGRESS NOTES
[facility] Medicine Service Daily Note  Date of Service: 10/15/2018    Patient: Priscilla Way   MRN: [id number]   Admission Date: 9/11/2018   Hospital Day # 34    Primary care provider: Randy Fuentes  ___________________________________  Priscilla Way is a 78 year old male with a complex past medical history including dementia (Alzheimer and vascular), liver transplant and kidney transplants in 2000, DVT, T2DM, CVA, and poor airway protection admitted with possible seizure and aspiration.  Progressive renal decline after LILIAM.      1) Altered mental status: delirium related to underlying long-standing and severe dementia, pain requiring IV opiate analgesia, severe illness, and metabolic changes from renal insufficiency. He is non-communicative now at baseline. Pain has been worse over the past weeks.    2)  DDKT and Liver Transplant now with progressive renal insufficiency: Stable Cr now about 1.9 but fluid/electrolyte status remain problems. K 5.6 yesterday- tacro contributing. Discussed with Dr. Galeas and agreed to stop tacro and increase cellcept from 250 mg to 500 mg/d and prednisone from 5 to 10 mg/d. 3 doses of kayexalate for K mgmt. Hemodialysis will not change the outcome--> he has severe dementia with inability to swallow his food and protect his airway from aspiration. He is agitated requiring hand mitts and in significant pain requiring opiate analgesia (family requested an increase in the dose overnight). He is slowly dying. Family conference at 1 pm today.     3)  Aspiration Pneumonia and poor Airway Protection  Pneumonia resolved after 7d abx.    --> Strict NPO. NJ feeds ongoing but in severe dementia, this has not been shown to improve outcome      # Possible slow GI blood loss: Suspect multifactorial including related to chronic disease and LILIAM as well as iatrogenic though patient reportedly had melanotic stools though hemoglobins have since been  stable. Higher risk of ongoing bleeding as he has a h/o DVT and needs warfarin anticoagulation.  - Continue PPI BID   - Peirodic hemoglobin checks and as needed for signs of bleeding  - Transfuse for hemoglobin <7       # Resolved recurrent C. Diff Colitis:  Toxin positive 9/11/18. Systemic abx d/tamra >10d ago. Overlap and taper done. Vanco now completed.      # DVT On warfarin with INR goal 2-3. Significant bleeding risk but clotting risk outweighs this. Pharmacy dosing of warfarin wihtout heparin overlap.    INR   Date Value Ref Range Status   10/14/2018 1.55 (H) 0.86 - 1.14 Final        # T2DM: Follow daily BG. Currently stable. Start BG checks.     Recent Labs  Lab 10/15/18  0815 10/15/18  0351 10/15/18  0018 10/14/18  1952 10/14/18  1551 10/14/18  1114 10/14/18  0920  10/13/18  0801  10/12/18  0639  10/11/18  0701  10/10/18  2155  10/10/18  1122   GLC  --   --   --   --   --   --  135*  --  112*  --  155*  --  129*  --  228*  --  176*   * 142* 197* 167* 168* 137*  --   < >  --   < >  --   < >  --   < >  --   < >  --    < > = values in this interval not displayed.    # HTN: Stable BP on carvedilol with PRN hydralazine.      Diet: NPO with NJ feeds.  Adult Formula Drip Feeding: Continuous TwoCal HN; Nasojejunal; Goal Rate: 40; mL/hr; Medication - Tube Feeding Flush Frequency: At least 15-30 mL water before and after medication administration and with tube clogging; Amount to Send. 60 ml every 4 hr      Goals of Care: He is DNR and DNI   Fluids: As above  Virk Catheter: in place, indication: Strict 1-2 Hour I&O, Strict 1-2 Hour I&O  DVT Prophylaxis: Heparin gtt bridging to warfarin  Code Status: DNR/DNI    Disposition Plan   He will not likely be able to be discharged unless to hospice given the progressive renal decline as he is not a chronic HD candidate.     Entered: Joe Dean 10/15/2018, 8:40 AM        Pt's care was discussed with bedside RN, CC/SW, patient and family during rounds.    Joe  "Kevyn Dean   Internal Medicine Staff Hospitalist Service    [facility]    Pager: [phone]    Team: Scott Small 2   Page Cross Cover after 5 pm: pager [phone] or job code [phone]  ___________________________________________________________________    Subjective & Interval Hx: No acute events overnight but more pain. Family asked for more IV opiate analgesia scheduled for comfort.   Last 24 hr care team notes reviewed.    ROS: unable to obtain ROS  Medications: Reviewed in EPIC. List below for reference    Physical Exam:   Blood pressure 143/61, pulse 76, temperature 98  F (36.7  C), temperature source Axillary, resp. rate 18, height 1.778 m (5' 10\"), weight 82.6 kg (182 lb), SpO2 98 %.     Intake/Output Summary (Last 24 hours) at 10/15/18 0840  Last data filed at 10/15/18 0300   Gross per 24 hour   Intake              270 ml   Output              525 ml   Net             -255 ml      General: uncomfortable, NJ in nose   HEENT: NJ in place   Neck: nl   Chest/Resp: clear anteriorly   Heart/CV: RRR   Abdomen/GI: soft   Extremities/MSK: edema 3+   Skin: no rashes   Neuro/Psych: non-communicative, dementia    Lines/Tubes:   Peripheral IV 10/10/18 Left;Anterior Upper forearm (Active)   Site Assessment [facility] 10/15/2018  3:00 AM   Line Status Saline locked 10/15/2018  3:00 AM   Phlebitis Scale 0-->no symptoms 10/15/2018  3:00 AM   Infiltration Scale 0 10/15/2018  3:00 AM   Infiltration Site Treatment Method  None 10/14/2018 12:00 PM   Extravasation? No 10/14/2018 12:00 PM   Dressing Intervention New dressing  10/10/2018  2:13 PM   Number of days:5       CVC Double Lumen 09/16/18 Right Internal jugular (Active)   Site Assessment [facility] 10/15/2018  3:00 AM   Lumen Soln/Vol REFERENCE 1.3/1.3 10/3/2018  6:00 PM   External Cath Length (cm) 2 cm 10/10/2018  6:45 PM   Extravasation? No 10/15/2018  3:00 AM   Dressing Intervention Transparent 10/13/2018 12:45 AM   Dressing Change Due 10/17/18 10/12/2018 10:30 AM "   CVC Comment CDI 10/10/2018  8:48 PM   CVC Lumen Assessment Red;Blue 10/10/2018  8:48 PM   Number of days:29         Labs & Studies of Note: I personally reviewed the following studies: See below  Cultures:    Recent Labs  Lab 10/11/18  1300   CULT 50,000 to 100,000 colonies/mLmixed urogenital elise  Susceptibility testing not routinely done      CMP  Recent Labs  Lab 10/14/18  0920 10/13/18  1912 10/13/18  0801 10/12/18  0639 10/11/18  0701 10/10/18  2155  10/10/18  1122     --  134 134 134 136  --  138   POTASSIUM 5.6*  --  5.1 5.2 4.2 4.5  --  5.0   CHLORIDE 102  --  102 102 101 101  --  106   CO2 24  --  26 24 26 26  --  22   ANIONGAP 7  --  6 7 8 9  --  10   *  --  112* 155* 129* 228*  --  176*   BUN 60*  --  54* 44* 36* 32*  --  59*   CR 1.92*  --  1.86* 1.69* 1.53* 1.26*  --  2.08*   GFRESTIMATED 34*  --  35* 39* 44* 55*  --  31*   GFRESTBLACK 41*  --  43* 48* 53* 67  --  37*   JOSHUA 8.2*  --  8.1* 8.2* 8.1* 8.1*  --  8.0*   MAG  --   --  2.5* 2.0 1.8 1.6  --   --    PHOS 2.5 3.1 1.9* 2.5 2.8 1.2*  < >  --    PROTTOTAL  --   --   --   --   --   --   --  5.9*   ALBUMIN  --   --   --   --   --   --   --  2.0*   BILITOTAL  --   --   --   --   --   --   --  0.2   ALKPHOS  --   --   --   --   --   --   --  115   AST  --   --   --   --   --   --   --  24   ALT  --   --   --   --   --   --   --  15   < > = values in this interval not displayed. CBC  Recent Labs  Lab 10/12/18  0639 10/10/18  1122 10/09/18  0639   WBC 4.7  --  4.0   RBC 2.69*  --  2.66*   HGB 8.2* 7.7* 8.0*   HCT 25.4*  --  24.9*   MCV 94  --  94   MCH 30.5  --  30.1   MCHC 32.3  --  32.1   RDW 19.8*  --  20.1*     --  287    INR  Recent Labs  Lab 10/14/18  0920 10/13/18  0801 10/12/18  0639 10/11/18  0701   INR 1.55* 1.16* 1.10 1.20*    Arterial Blood GasNo lab results found in last 7 days.    Current Facility-Administered Medications   Medication     acetaminophen (TYLENOL) tablet 650 mg     acetylcysteine (MUCOMYST) 20 %  nebulizer solution 2 mL     albuterol neb solution 2.5 mg     atropine 1 % ophthalmic solution 1-2 drop     B and C vitamin Complex with folic acid (NEPHRONEX) liquid 5 mL     bisacodyl (DULCOLAX) Suppository 10 mg     bumetanide (BUMEX) injection 4 mg     carvedilol (COREG) suspension 6.25 mg     ciprofloxacin (CIPRO) tablet 500 mg     dextrose 10 % 1,000 mL infusion     glucose gel 15-30 g    Or     dextrose 50 % injection 25-50 mL    Or     glucagon injection 1 mg     ferrous sulfate (IRON) tablet 325 mg     fiber modular (NUTRISOURCE FIBER) (NUTRISOURCE FIBER) packet 1 packet     heparin bolus from infusion pump     hydrALAZINE (APRESOLINE) injection 10-20 mg     HYDROmorphone (DILAUDID) injection 0.2-0.4 mg     influenza Vac Split High-Dose (FLUZONE) injection 0.5 mL     insulin aspart (NovoLOG) inj (RAPID ACTING)     lidocaine (LMX4) cream     lidocaine 1 % 1 mL     magnesium hydroxide (MILK OF MAGNESIA) suspension 30 mL     magnesium sulfate 2 g in NS intermittent infusion (PharMEDium or FV Cmpd)     magnesium sulfate 4 g in 100 mL sterile water (premade)     melatonin tablet 1 mg     metolazone (ZAROXOLYN) suspension 5 mg     mycophenolate (CELLCEPT BRAND) suspension 500 mg     naloxone (NARCAN) injection 0.1-0.4 mg     ondansetron (ZOFRAN-ODT) ODT tab 4 mg    Or     ondansetron (ZOFRAN) injection 4 mg     pantoprazole (PROTONIX) 2 mg/mL suspension 40 mg     Patient is already receiving anticoagulation with heparin, enoxaparin (LOVENOX), warfarin (COUMADIN)  or other anticoagulant medication     predniSONE (DELTASONE) tablet 10 mg     prochlorperazine (COMPAZINE) injection 5 mg    Or     prochlorperazine (COMPAZINE) tablet 5 mg    Or     prochlorperazine (COMPAZINE) Suppository 12.5 mg     QUEtiapine (SEROquel) tablet 25 mg     senna-docusate (SENOKOT-S;PERICOLACE) 8.6-50 MG per tablet 1 tablet    Or     senna-docusate (SENOKOT-S;PERICOLACE) 8.6-50 MG per tablet 2 tablet     sodium chloride (PF) 0.9% PF  flush 3 mL     sodium chloride (PF) 0.9% PF flush 3 mL     sodium phosphate 15 mmol in D5W intermittent infusion     sodium phosphate 20 mmol in D5W intermittent infusion     sodium phosphate 25 mmol in D5W intermittent infusion     sodium polystyrene (KAYEXALATE) suspension 15 g     thiamine 100 MG/ML suspension 100 mg     Warfarin Therapy Reminder (Check START DATE - warfarin may be starting in the FUTURE)

## 2018-10-15 NOTE — PLAN OF CARE
Problem: Goal/Outcome  Goal: Goal Outcome Summary  Outcome: No Change  Nonverbal pt arouses to gentle touch. Becomes combative with cares. Left PIV SL. Virk with 275 out this shift. Having frequent diarrhea (kayexalate). Monitoring K. TF at goal rate. Changing and repositioning Q2H. Daughter May at bedside and helpful. Continue with POC.

## 2018-10-15 NOTE — CONSULTS
[facility], [place]    Palliative Care Consultation Note    Patient: Priscilla Way  Date of Admission:  9/11/2018    Requesting provider/team: Dr. Dean  Reason for consult: Goals of care  Decisional support    Recommendations:  Please see assessment below for rationale.  - Start hydromorphone 1-2 mg via tube q4h PRN   - Start lorazepam 0.5 mg q4h via tube PRN for myoclonus  - Will continue to discuss home hospice and readdress NG tube with family as their goal is for him to be home    Monique Lopez  Pager: [phone]  [facility] Inpatient Team Consult pager [phone] (M-F 8-4:30)  After-hours Answering Service [phone]   Palliative Clinic: [phone]       Assessment:  Priscilla Way is a 78 year old male with severe vascular dementia, as well as liver and kidney transplant in 2000 admitted for aspiration and possible seizure on 9/11/18, with course complicated by contrast-induced LILIAM on CKD requiring HD, last on 10/10/18 and poor PO intake with aspiration now with NJ tube present.  Tense family dynamic with several care conferences, today family agreed for comfort-oriented measures.  Palliative care consulted to discuss next steps, possible hospice.      Met with daughter May at bedside and introduced palliative care and the services we provide; such as symptom management, assistance navigating chronic illness and goals for treatment, counseling, psychosocial and spiritual support.  She said her main goal was to get her father home.  Discussed the role of the hospice team to support family at home.  Says she would want him home where family could be with him, and would not want to transfer to the hospital again.   She asked what would happen to his feeding tube, because she had been feeding him with a syringe at home and he would clench his teeth and not accept.  Discussed that if goal is comfort, we generally prefer to avoid temporary feeding tubes because they do not eliminate  "aspiration, prolong life in advanced dementia, and often require restraints to keep patients from pulling, which is not in line with comfort.  She would not want to remove the feeding tube because feels like she would be watching her father \"starve\" and could not accept that. Would accept if it became dislodged that it wouldn't be replaced, but would not feel like she were making a decision to stop feeding her father in that case.  Would be in favor of removal if were causing more problems, such as contributing to fluid overload or respiratory distress.  Discussed that people in dying process often don't feel \"hunger\" and that many comfort medications can be given sublingually, but still feels that they would not be able to accept leaving hospital without tube.   Says does not think her sister would be able to accept hospice without the feeding tube because she feels very angry at this point with her father's condition.      Discussed case with hospice liason Stella, who said NG tube would not be barrier to enrollment, but that they would continue to readdress removal once at home.  Attempted to call daughter Cruz, but no answer.       Symptoms:   - Pain - daughter says pt cries out whenever he is moved.  No grimacing or nonverbal signs of pain at rest.    -  Will start hydromorphone solution 1-2 mg q4h PRN pain through tube, can keep 0.2-0.4 mg IV as back-up    - Jerking - Daughter has noticed worsening over last 3 days, said had occurred previously and improved with HD.  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    - Start lorazepam 0.5 mg solution via NG tube q4h PRN for myoclonus    Social:         Living situation: Lives alone in apartment with live-in PCA, daughters assist daily with care       Support system: Daughters May and Cruz very involved in care       Functional status: Bedbound, nonverbal, per May had stopped accepting food at home    Coping: May says she is \"at peace\" with her father's condition " because she works as a respiratory therapist and knew that this would come, says her sister has significantly struggled with accepting that he is nearing the end of his life.      Spiritual/Mormon:    Spiritual background: Zoroastrian  Spiritual needs:  Appreciates  involvement    Advance Care Planning: No advanced directive on file.  Daughters making decisions jointly.  Code status DNR/DNI.      History of Present Illness   Sources of History:electronic health record and patient's daughter    Priscilla Way is a 78 year old male with severe vascular dementia, as well as liver and kidney transplant in , admitted 18 with possible seizure and aspiration, with course complicated by poor PO intake and LILIAM requiring HD.  Daughter feels at peace with decision from today and wishes patient to come home.  Daughters previously against feeding tube and do not wish for further procedures, but do not want to remove NG tube at this time for comfort.      See above.     ROS:  A comprehensive ROS has been negative other than stated in the HPI and below:   Unable to obtain as patient is nonverbal     Past Medical History:   Past Medical History:   Diagnosis Date     LILIAM (acute kidney injury) (H) 2016    kidney biopsy showed ATN     Dementia     multiple acute infarcts, SV dis on CT     Diabetes type 2, controlled (H)      H/O Clostridium difficile infection 2018    treated wiht vanco     Hepatitis C      Kidney transplanted      Liver transplant      Unspecified cerebral artery occlusion with cerebral infarction           Past Surgical History:   Past Surgical History:   Procedure Laterality Date     TRANSPLANT KIDNEY RECIPIENT  DONOR       TRANSPLANT LIVER RECIPIENT  DONOR               Family History:   No family history on file.  Family history not discussed       Allergies:   No Known Allergies         Medications:   I have reviewed this patient's medication profile and  medications given in the past 24 hours.  Of note,     Hydromorphone IV 0.2 mg x1, 0.4 mg x3  Melatonin 1 mg PRN (not given)  Seroquel 25 mg at bedtime (not given)           Physical Exam:   Vital Signs: Temp: 98.2  F (36.8  C) Temp src: Axillary BP: 120/50 Pulse: 85 Heart Rate: 87 Resp: 16 SpO2: 100 % O2 Device: None (Room air)    Weight: 182 lbs 0 oz    Physical Exam:  Constitutional: Frail, chronically ill appearing male laying in bed under air warming blanket  HEENT: No icterus, arcus senilis present.  NG in place  Neck: Supple.   Cardiovascular: Normal rate, regular rhythm.    Respiratory:  Mild rhonchi bilaterally  GI:  Soft, nondistended  Musculoskeletal: Extremities contracted, +edema bilaterally to thighs  Neuro: Unable to follow commands.  Mild spontaneous jerking movements in bilateral arms.   Skin: Warm, dry skin     Data reviewed:     Recent Labs   Lab Test  10/15/18   1214  10/14/18   0920   NA  136  134   POTASSIUM  5.6*  5.6*   CHLORIDE  103  102   CO2  24  24   ANIONGAP  10  7   GLC  157*  135*   BUN  64*  60*   CR  1.90*  1.92*   JOSHUA  8.3*  8.2*     Alb 2    MRI Brain 9/11/18  Impression:  1. No acute infarct.  2. Progression of moderate to advanced chronic small vessel ischemic  disease and moderate generalized parenchymal volume loss since  11/6/2010.      Monique Lopez  Pager: [phone]  [facility] Inpatient Team Consult pager [phone] (M-F 8-4:30)  After-hours Answering Service [phone]  Palliative Clinic: [phone]     Total time spent was 75 minutes,  >50% of time was spent counseling and/or coordination of care regarding symptoms and hospice.

## 2018-10-15 NOTE — PROGRESS NOTES
"Social Work Services Progress Note    Hospital Day: 35  Date of Initial Social Work Evaluation:  9/21/18  Collaborated with:  Pt's daughter Cruz, pt's son-in-law, primary team Gold 2, nephrology team    Data:  Pt is a 78-year-old male admitted to [facility] 9/11/18.     Intervention:  Care conference held today with pt's daughter Cruz, pt's son-in-law, primary team Gold 2, and nephrology team. Pt's other daughter is present at hospital but did not wish to be part of care conference. Cruz states that the other siblings in the family have all deferred decision-making to her and states that this took place in front of a physician here at [facility], but was unable to identify the physician.    Primary team and nephrology discussed that continuing hemodialysis would cause more harm that good to pt in current state. Pt's daughter Cruz seemed to be under the impression that continued HD would alleviate pt's pain and nephrology team clarified that this is not the case. Cruz stated \"we are not here to keep my father alive, everyone dies.\" Cruz believes that pt would want \"full cares,\" but also states that he would not want resuscitation. She states that her goal is for pt to have his pain treated and be kept comfortable. Teams explained that this would involve treating pain with medications and not continuing HD. Cruz is in agreement with this. Cruz stated she \"does not want to talk about where he will go\" right now. Palliative team to meet with pt and family.     Assessment:  Pt to transition to comfort cares    Plan:    Anticipated Disposition:  TBD    Barriers to d/c plan:  Medical stability, discharge plan    Follow Up:  SW to follow and assist as needed    HAMLET Parra, ANDIESW  [address]   Pager [phone]  Phone [phone]        "

## 2018-10-15 NOTE — PLAN OF CARE
Problem: Patient Care Overview  Goal: Plan of Care/Patient Progress Review  Patient with h/o simultaneous liver and kidney transplant in 2000 for hepatitis C. He was admitted  for AMS and seizures. On tube feeding of Zayda, , 143, 157, L PIV is saline locked, incontinent of stool, on kayaxalate, potassium 5.6, patino patent with clear yellow urine, tip of penis has small skin breakdown,foly care provided, and scrotum is swollen, gave IV pain med before cleaning and repositioning, since it seems like he was pain when cleaning, has mittens on,  family had care conference today and they agreed on palliative care which MD from the team has come by to see the family too, lower legs edema, continue to monitor.

## 2018-10-15 NOTE — PROGRESS NOTES
[facility], [place]   Transplant Nephrology Progress Note  Date of Admission:  9/11/2018    Today Recommendation:   - After family meeting, patient will be comfort care.    - We will sign off the case.     Assessment & Plan    Priscilla Way is a 78 year old year old male  with h/o simultaneous liver and kidney transplant in 2000 for hepatitis C.He was admitted  for AMS and seizures.      # DDKT and liver transplant: baseline Cr ~ 1.2-1,4; Increased likely due to contrast which he received on 9/11/2018.  Mostly stable serum creatinine at ~ 1.92 today.  Patient was off HD from 9/27 till 10/10 when K elevated to 5 severe anasarca so HD was done.  He remains oliguric.  So far ineffective diuresis,  we are not able to maintain euvolemia medically. Goals of care would need to be discussed. Currently the lower extremity edema is cosmetic, but if fluid accumulates in the lungs, this would lead to hypoxia and eventual death.  Patient still has diarrhea that partially helps with volume status.  Patient is fragile.    - The daughter Mrs. Leigh was at bedside this morning. I had long discussion with her again, she is working on establishing the family meeting as soon as possible. I updated her about her father health status, she understands that we are holding HD.  Plan to have family meeting at 1 pm on Monday, October 15.   - We attended family meeting today with primary team and the . His Daughter Mrs. Alexander and his son in law Mr. Marilyn Simpson were at the meeting. We explained them the goal of HD and HD is not qualified for futile case when the harms of HD is more than the benefits. They agreed about our decision regarding stop HD and the agreed for the comfort care. We will sign off the case for now. We highly appreciate help from . Primary team on board to establish comfort care and to discontinue medications that not compatible with comfort care.        # Liver  transplant :   - Most recent LFTs and Alk phos WNL.       # Immunosuppression:    -  mycophenolate mofetil to 500 mg suspension BID via NG tube.    - prednisone to 10 mg daily.    - Would stop tacrolimus as this could be contributing to decreased kidney function and hyperkalemia.     # Hypertension- Volume status:    - -140/60-70   - Continue with Amlodipine 5 mg daily and Coreg 6.25 mg BID.    - Volume overload, oliguric, failed dieresis challenging.    - Continue with Bumex 4 mg BID and Metolazone 5 mg BID half an hour prior to Bumex.      # Anemia:   - multifactorial from blood draws and MMF effect.    - Hgb: 8.2 with last check   - Would be okay holding oral iron      # Mineral Bone Disorder:   - Secondary renal hyperparathyroidism; PTH level is:  Normal at 78 on 2018  - Calcium; level is Normal when corrected for albumin   - Phosphorus: 2.5 and M.0      # Electrolytes:   - Hyperkalemia 5.6 yesterday,  increased.   Kayexalate  tid.    - Na: 134, stable.     # Other Medical Issues:   # seziure disorder- admitted with AMS. Seen by neurology. On keppra.   # h/o CVA:  Minimally interactive at baseline. Palliative care on consult. Family does not want feeding tube.   # Cdiff- on PO Vancomycin  # DM- management per primary.       # Transplant History:  Etiology of kidney failure: Hepatitis C  Transplant: 2000 (Kidney), 2000 (Liver)  Donor Type:  - Brain Death                      Donor Class: Standard Criteria Donor  Significant changes in immunosuppression: see above  Significant Complications: Now anuric LILIAM      # goals of care:    - Family meeting was done today 10/15. No HD, decision for comfort care.        Recommendations were communicated to the primary team via this note.     Seen and discussed with Dr. Adal Augustin MD   Nephrology Fellow  Pager: [phone]    Interval History   Stable creatinine at ~ 1.9.  Patient remains oliguric with urine output ~ 320 ml  "yesterday.  Slight increase in serum potassium at 5.6.  Patient remains non-responsive.  Talked to patient's daughter who was present in the room and no new issues.  Continued to explain that patient is dying and dialysis has potential to do more harm than good and that we would not offer further dialysis.    Review of Systems   Not able to obtain due to advanced dementia and non-verbal.     Physical Exam   Temp  Av.1  F (35.6  C)  Min: 92.3  F (33.5  C)  Max: 98.9  F (37.2  C)      Pulse  Av.3  Min: 65  Max: 104 Resp  Av  Min: 10  Max: 24  SpO2  Av.6 %  Min: 91 %  Max: 100 %     /61 (BP Location: Right arm)  Pulse 85  Temp 98.2  F (36.8  C) (Axillary)  Resp 18  Ht 1.778 m (5' 10\")  Wt 82.6 kg (182 lb)  SpO2 100%  BMI 26.11 kg/m2   Date 10/12/18 07 - 10/13/18 0659   Shift 0277-2979 4160-1143 7558-1149 24 Hour Total   I  N  T  A  K  E   P.O. 0   0    I.V. 50   50    NG/   680    Enteral 55   55    Shift Total  (mL/kg) 785  (9.51)   785  (9.51)   O  U  T  P  U  T   Urine  100  100    Shift Total  (mL/kg)  100  (1.21)  100  (1.21)   Weight (kg) 82.55 82.55 82.55 82.55        Admit Weight: 83.9 kg (185 lb)   GENERAL APPEARANCE: Fragile, bedridden, four limbs contraction   EYES:  Sluggish pupils  HENT: NG tube in place, poor oral hygiene.   PULM: Rales to lung base B/L  CV: regular rhythm, normal rate, no rub     -Edema: 2+ LE edema bilaterally  GI: soft, non tender, bowel sounds are positive.   INTEGUMENT: no cyanosis, no rash  NEURO:  Bedridden, 4 limbs contractions, occasional shaking  TX KIDNEY: normal    Data   All labs reviewed by me.  CMP    Recent Labs  Lab 10/14/18  0920 10/13/18  1912 10/13/18  0801 10/12/18  0639 10/11/18  0701 10/10/18  2155  10/10/18  1122     --  134 134 134 136  --  138   POTASSIUM 5.6*  --  5.1 5.2 4.2 4.5  --  5.0   CHLORIDE 102  --  102 102 101 101  --  106   CO2 24  --  26 24 26 26  --  22   ANIONGAP 7  --  6 7 8 9  --  10   *  --  " 112* 155* 129* 228*  --  176*   BUN 60*  --  54* 44* 36* 32*  --  59*   CR 1.92*  --  1.86* 1.69* 1.53* 1.26*  --  2.08*   GFRESTIMATED 34*  --  35* 39* 44* 55*  --  31*   GFRESTBLACK 41*  --  43* 48* 53* 67  --  37*   JOSHUA 8.2*  --  8.1* 8.2* 8.1* 8.1*  --  8.0*   MAG  --   --  2.5* 2.0 1.8 1.6  --   --    PHOS 2.5 3.1 1.9* 2.5 2.8 1.2*  < >  --    PROTTOTAL  --   --   --   --   --   --   --  5.9*   ALBUMIN  --   --   --   --   --   --   --  2.0*   BILITOTAL  --   --   --   --   --   --   --  0.2   ALKPHOS  --   --   --   --   --   --   --  115   AST  --   --   --   --   --   --   --  24   ALT  --   --   --   --   --   --   --  15   < > = values in this interval not displayed.  CBC    Recent Labs  Lab 10/12/18  0639 10/10/18  1122 10/09/18  0639   HGB 8.2* 7.7* 8.0*   WBC 4.7  --  4.0   RBC 2.69*  --  2.66*   HCT 25.4*  --  24.9*   MCV 94  --  94   MCH 30.5  --  30.1   MCHC 32.3  --  32.1   RDW 19.8*  --  20.1*     --  287     INR    Recent Labs  Lab 10/14/18  0920 10/13/18  0801 10/12/18  0639 10/11/18  0701   INR 1.55* 1.16* 1.10 1.20*     ABGNo lab results found in last 7 days.   Urine Studies  Recent Labs   Lab Test  10/11/18   1300  10/04/18   1330  10/03/18   2056  09/24/18   0100   COLOR  Yellow  Rhea  Dark Brown  Yellow   APPEARANCE  Clear  Cloudy  Cloudy  Clear   URINEGLC  Negative  Negative  Negative  Negative   URINEBILI  Negative  Negative  Negative  Negative   URINEKETONE  Negative  Negative  10*  Negative   SG  1.009  1.010  1.019  1.020   UBLD  Small*  Large*  Moderate*  Moderate*   URINEPH  5.5  6.0  5.0  6.0   PROTEIN  30*  30*  30*  100*   NITRITE  Negative  Negative  Negative  Negative   LEUKEST  Large*  Moderate*  Large*  Trace*   RBCU  19*  182*  >182*  5   WBCU  38*  >182*  >182*  2     Recent Labs   Lab Test  09/14/18   1418  05/27/16   1409  04/29/11   1348  09/14/10   1134   UTPG  24.91*  Specimen not received  NOTIFIED [facility]  WHO PAGED  RN, LOPEZ AT 1355. NO  URINE   RECIEVED WITH BLOOD SPECIMENS. AB    0.04  <0.02     PTH  Recent Labs   Lab Test  06/05/18   0548  08/24/16   0852   PTHI  78  333*     Iron Studies  Recent Labs   Lab Test  11/08/16   1209  08/24/16   0852   IRON  17*  20*   FEB  146*  197*   IRONSAT  11*  10*   BARTOLO   --   1025*       IMAGING:  All imaging studies reviewed by me.     MEDICATIONS:  Current Facility-Administered Medications   Medication     acetaminophen (TYLENOL) tablet 650 mg     acetylcysteine (MUCOMYST) 20 % nebulizer solution 2 mL     albuterol neb solution 2.5 mg     atropine 1 % ophthalmic solution 1-2 drop     B and C vitamin Complex with folic acid (NEPHRONEX) liquid 5 mL     bisacodyl (DULCOLAX) Suppository 10 mg     bumetanide (BUMEX) injection 4 mg     carvedilol (COREG) suspension 6.25 mg     ciprofloxacin (CIPRO) tablet 500 mg     dextrose 10 % 1,000 mL infusion     glucose gel 15-30 g    Or     dextrose 50 % injection 25-50 mL    Or     glucagon injection 1 mg     ferrous sulfate (IRON) tablet 325 mg     fiber modular (NUTRISOURCE FIBER) (NUTRISOURCE FIBER) packet 1 packet     heparin bolus from infusion pump     hydrALAZINE (APRESOLINE) injection 10-20 mg     HYDROmorphone (DILAUDID) injection 0.2-0.4 mg     influenza Vac Split High-Dose (FLUZONE) injection 0.5 mL     insulin aspart (NovoLOG) inj (RAPID ACTING)     lidocaine (LMX4) cream     lidocaine 1 % 1 mL     magnesium hydroxide (MILK OF MAGNESIA) suspension 30 mL     magnesium sulfate 2 g in NS intermittent infusion (PharMEDium or FV Cmpd)     magnesium sulfate 4 g in 100 mL sterile water (premade)     melatonin tablet 1 mg     metolazone (ZAROXOLYN) suspension 5 mg     mycophenolate (CELLCEPT BRAND) suspension 500 mg     naloxone (NARCAN) injection 0.1-0.4 mg     ondansetron (ZOFRAN-ODT) ODT tab 4 mg    Or     ondansetron (ZOFRAN) injection 4 mg     pantoprazole (PROTONIX) 2 mg/mL suspension 40 mg     Patient is already receiving anticoagulation with heparin, enoxaparin  (LOVENOX), warfarin (COUMADIN)  or other anticoagulant medication     predniSONE (DELTASONE) tablet 10 mg     prochlorperazine (COMPAZINE) injection 5 mg    Or     prochlorperazine (COMPAZINE) tablet 5 mg    Or     prochlorperazine (COMPAZINE) Suppository 12.5 mg     QUEtiapine (SEROquel) tablet 25 mg     senna-docusate (SENOKOT-S;PERICOLACE) 8.6-50 MG per tablet 1 tablet    Or     senna-docusate (SENOKOT-S;PERICOLACE) 8.6-50 MG per tablet 2 tablet     sodium chloride (PF) 0.9% PF flush 3 mL     sodium chloride (PF) 0.9% PF flush 3 mL     sodium phosphate 15 mmol in D5W intermittent infusion     sodium phosphate 20 mmol in D5W intermittent infusion     sodium phosphate 25 mmol in D5W intermittent infusion     thiamine 100 MG/ML suspension 100 mg     Warfarin Therapy Reminder (Check START DATE - warfarin may be starting in the FUTURE)     We are signing off the case.     Attestation:  This patient has been seen and evaluated by me, Alfred Galeas MD.  I have reviewed the note and agree with plan of care as documented by the fellow.

## 2018-10-15 NOTE — PLAN OF CARE
Problem: Patient Care Overview  Goal: Plan of Care/Patient Progress Review  Outcome: Improving  VSS. Pt awake but non-verbal. Daughter, Kranthi, in room overnight. She states that pt becomes combative and yells out whenever he is repositioned. She states that she feels pt is in pain whenever he is moved. Appears comfortable at rest but does grimace and call out with movements. 0.2 mg PRN IV dilaudid given with some relief noted. Still was grimacing and tensing with movements but pt daughter feels that he was not calling out as much and not as combative with cares. MD contacted and IV dilaudid dose changed to 0.2-0.4 mg every 2 hours PRN. 0.4 mg dose given before repositioning. Pt did not yell out with repositioning and did not try to hit or pinch. Still having frequent loose stools.  Mits on patient. TF continues at 55 ml/hr which he appears to tolerate well. NPO.  LS clear but diminished. Having a lot of secretions. Oral suctioning. PRN atropine given with a marked decrease in secretions noted. Continue to repo every 2 hours.

## 2018-10-15 NOTE — PROGRESS NOTES
CLINICAL NUTRITION SERVICES     Nutrition Prescription    Recommendations already ordered by Registered Dietitian (RD):  Modified TFs    Future/Additional Recommendations:  For all recommendations, see prior nutrition notes.     Diet: DD I + puree nectar-thick liquid diet and oral supplements. No meals ordered via room service for several days. Oral supplements (thickened) are scheduled to be delivered to the nursing unit desk.   TFs: Nutren 1.5 at 55 mL/hr (via NJT) providing 1980 kcals, 90 g PRO, 1003 mL H2O, 232 g CHO and 0+ g fiber daily. Pt is ordered to receive Nutrisource fiber, 1 pkt TID. Each packet of Nutrisource Fiber provides 3 g soluble fiber, 15 kcals, 4 g CHO, and 60 mL H2O.     INTERVENTIONS:  Implementation:  Collaboration with other providers: Discussed change to Nepro TF with team due to Nepro TF formula is lower in K+ (K+ was 5.6 on 10/14). Team in agreement with the change.   Enteral Nutrition: Changed TFs to Nepro at 45 ml/hr (1080 ml/day) to provide 1944 kcals, 87 g PRO, 788 ml free H2O, 174 g CHO and 14+ g Fiber daily.     Follow up/Monitoring:  Nutrition will continue to follow.      Karen Quezada, MS, RD, LD, [facility]     5B/5A RD pager: [phone]

## 2018-10-15 NOTE — PLAN OF CARE
Problem: Patient Care Overview  Goal: Plan of Care/Patient Progress Review  Tube feeding changed to Nepro at 45 mls an hour with water flush..

## 2018-10-16 NOTE — CARE CONFERENCE
Care conference today with myself, Otto Galeas (Renal Attending), Ariadna Rowe (SANJUANA), Cruz and her . Long discussion regarding current medical status. Progressive increase in pain with no improvement after NJ feeds and 3 runs of HD since 9/27. Not an outpatient HD candidate (Jose declined) and HD futile with respect to improving overall quality of life. He has severe dementia now unable to eat without aspiration. Dr. Galeas made clear that HD would not improve these conditions. Cruz and her  asked excellent questions and at the end of the discussion agreed that Mr. Way is suffering and that we could not heal him. They reiterated his wish to be DNR/DNI. We discussed that changing the goals of care to focus on Comfort would be appropriate. All treatments will be reviewed for their impact on his comfort. Palliative Care will be involved. Goal will be to discuss next steps including home hospice if the family wishes.   Joe Dean MD (pager [phone])

## 2018-10-16 NOTE — PROGRESS NOTES
[facility], [place]    Palliative Care Progress Note    Patient: Priscilla Way  Date of Admission:  9/11/2018    Recommendations:  -continue current comfort measures  -would continue warfarin at this time, generally reasons to stop would include if bleeding became an issue or if he no longer is able to take in nutrition (enterally or orally)  -appreciate unit SW assistance with disposition planning and coordination  -recommend POLST prior to discharge      Assessment  Priscilla Way is a 78 year old male with severe vascular dementia, as well as liver and kidney transplant in 2000 admitted for aspiration and possible seizure on 9/11/18, with course complicated by contrast-induced LILIAM on CKD requiring HD, last on 10/10/18 and poor PO intake with aspiration now with NJ tube present.  Tense family dynamic with several care conferences, today family agreed for comfort-oriented measures.  Palliative care consulted to discuss next steps, possible hospice.      Goals of care: patient's daughter, May, reiterates the plan to discharge home and not return to the hospital should he get sicker, and that he is DNR/DNI. Discussed that hospice was ok with the tube feedings at current, and also discussed conversation ongoing of risk versus benefit. She says today she also does not want to do anything that makes him die sooner. Discussed the things we look for including fluid issues and ongoing risk of aspiration with the temporary tube feedings. Daughter is wondering about medication coverage, and after discussion with unit SW and  hospice, he can remain on his transplant medications. Daughter is also requesting teaching for his tube feedings and additional assistance with equipment such as suctioning equipment. She also is interested in a different mattress for him. Daughter says today she hopes to get him home soon since they told her it would likely only be weeks. Discussed that the hospice  liaison is out of the hospital for the week and discussed that if they discharge home that hospice intake would occur at their home. Daughter says today that they have 8 hours a day of PCA services and she plans to take care of him, discussed that might be a lot for her to take on.  Discussed with the daughter that after we have all the questions answered that her sister, Cruz, should be updated and it should be discussed with her.    These recommendations have been discussed with primary team.     VEL Zhao CNS  Palliative Care Consult Team  Pager: [phone]    [facility] Inpatient Team Consult pager [phone] (M-F 8-4:30)  After-hours Answering Service [phone]   Palliative Clinic: [phone]       Interval History:      Notes reviewed, ativan is helping       Medications:   I have reviewed this patient's medication profile and medications during this hospitalization  acetylcysteine 2 mL daily  Albuterol 2.5 mg daily  bumex 4 mg q12h  protonix 40 mg q12h  Prednisone 10 mg q24h  Acetaminophen PRN  Atropine PRN  Hydromorphone 0.2-0.4 mg IV q2h PRN- 1.4 mg over last 24 hours  Hydromorphone 1-2 mg q4h PRN- 2 mg over last 24 hours  Lorazepam 0.5 mg q4h PRN- 1.5 mg over last 24 hours        Review of Systems:   A comprehensive ROS has been negative other than stated in the HPI and below:   Palliative Symptom Review (0=no symptom/no concern, 1=mild, 2=moderate, 3=severe):    ADRIA given patient AMS          Physical Exam:     Vital Signs: Temp: 98.2  F (36.8  C) Temp src: Axillary BP: 140/70 Pulse: 85 Heart Rate: 88 Resp: 16 SpO2: 100 % O2 Device: None (Room air)    Weight: 182 lbs 0 oz    Physical Exam:  Constitutional: fail, chronically ill appearing, no apparent distress  Lungs: No increased work of breathing  Neurologic: unable to follow commands  Neuropsychiatric: ADRIA given AMS    Data Reviewed:      ROUTINE ICU LABS (Last four results)  CMP  Recent Labs  Lab 10/15/18  1214 10/14/18  0994  10/13/18  1912 10/13/18  0801 10/12/18  0639 10/11/18  0701 10/10/18  2155  10/10/18  1122    134  --  134 134 134 136  --  138   POTASSIUM 5.6* 5.6*  --  5.1 5.2 4.2 4.5  --  5.0   CHLORIDE 103 102  --  102 102 101 101  --  106   CO2 24 24  --  26 24 26 26  --  22   ANIONGAP 10 7  --  6 7 8 9  --  10   * 135*  --  112* 155* 129* 228*  --  176*   BUN 64* 60*  --  54* 44* 36* 32*  --  59*   CR 1.90* 1.92*  --  1.86* 1.69* 1.53* 1.26*  --  2.08*   GFRESTIMATED 34* 34*  --  35* 39* 44* 55*  --  31*   GFRESTBLACK 42* 41*  --  43* 48* 53* 67  --  37*   JOSHUA 8.3* 8.2*  --  8.1* 8.2* 8.1* 8.1*  --  8.0*   MAG  --   --   --  2.5* 2.0 1.8 1.6  --   --    PHOS  --  2.5 3.1 1.9* 2.5 2.8 1.2*  < >  --    PROTTOTAL  --   --   --   --   --   --   --   --  5.9*   ALBUMIN  --   --   --   --   --   --   --   --  2.0*   BILITOTAL  --   --   --   --   --   --   --   --  0.2   ALKPHOS  --   --   --   --   --   --   --   --  115   AST  --   --   --   --   --   --   --   --  24   ALT  --   --   --   --   --   --   --   --  15   < > = values in this interval not displayed.  CBC  Recent Labs  Lab 10/12/18  0639 10/10/18  1122   WBC 4.7  --    RBC 2.69*  --    HGB 8.2* 7.7*   HCT 25.4*  --    MCV 94  --    MCH 30.5  --    MCHC 32.3  --    RDW 19.8*  --      --      INR  Recent Labs  Lab 10/14/18  0920 10/13/18  0801 10/12/18  0639 10/11/18  0701   INR 1.55* 1.16* 1.10 1.20*     Arterial Blood GasNo lab results found in last 7 days.    VEL Zhao CNS  Palliative Care Consult Team  Pager: [phone]    [facility] Inpatient Team Consult pager [phone] (M-F 8-4:30)  After-hours Answering Service [phone]  Palliative Clinic: [phone]     Total time spent was 35 minutes,  >50% of time was spent counseling and/or coordination of care regarding goals of care and symptom management.

## 2018-10-16 NOTE — PROGRESS NOTES
"[facility], [place]   Internal Medicine Daily Note           Interval History/Events     Hand off taken from Dr. Dean  Overnight events reviewed  Patient non verbal. Daughter at bedside  No acute issues reported.          Review of Systems        4 point ROS including Respiratory, CV, GI and , other than that noted above is negative      Medications   I have reviewed current medications  in the \"current medication\" section of Epic.  Relevant changes include:     Physical Exam   General:       Vital signs:    Blood pressure 140/70, pulse 85, temperature 98.2  F (36.8  C), temperature source Axillary, resp. rate 16, height 1.778 m (5' 10\"), weight 82.6 kg (182 lb), SpO2 100 %.  Estimated body mass index is 26.11 kg/(m^2) as calculated from the following:    Height as of this encounter: 1.778 m (5' 10\").    Weight as of this encounter: 82.6 kg (182 lb).      Intake/Output Summary (Last 24 hours) at 10/16/18 1527  Last data filed at 10/16/18 1400   Gross per 24 hour   Intake              420 ml   Output             1580 ml   Net            -1160 ml      HEENT: NJ in place.   Cardiovascular: S1, S2 normal.   Respiratory:  B/l CTA  GI/Abdomen: Soft, mild distended.   Neurology: No tremors.   Extremities: 2-3 + pre tibial edea.   Skin:      Laboratory and Imaging Studies     I have reviewed  laboratory and imaging studies in the Epic. Pertinent findings are as below:    BMP  Recent Labs  Lab 10/15/18  1214 10/14/18  0920 10/13/18  0801 10/12/18  0639    134 134 134   POTASSIUM 5.6* 5.6* 5.1 5.2   CHLORIDE 103 102 102 102   JOSHUA 8.3* 8.2* 8.1* 8.2*   CO2 24 24 26 24   BUN 64* 60* 54* 44*   CR 1.90* 1.92* 1.86* 1.69*   * 135* 112* 155*     CBC  Recent Labs  Lab 10/12/18  0639   WBC 4.7   RBC 2.69*   HGB 8.2*   HCT 25.4*   MCV 94   MCH 30.5   MCHC 32.3   RDW 19.8*        INR  Recent Labs  Lab 10/14/18  0920 10/13/18  0801 10/12/18  0639 10/11/18  0701   INR 1.55* 1.16* 1.10 " 1.20*     LFTs  Recent Labs  Lab 10/10/18  1122   ALKPHOS 115   AST 24   ALT 15   BILITOTAL 0.2   PROTTOTAL 5.9*   ALBUMIN 2.0*      PANCNo lab results found in last 7 days.        Impression/Plan          Priscilla Way is a 78 year old male with a complex past medical history including dementia (Alzheimer and vascular), liver transplant and kidney transplants in 2000, DVT, T2DM, CVA, and poor airway protection admitted with possible seizure and aspiration.  Progressive renal decline after LILIAM.   Care conference was conducted on 10/15/2018, for the best interest of patient's overall care decision was made for comfort care. Palliative care on board.   Continue current treatment plan.   - No lab draws  - Continue current immunosuppression   - Palliative team working with hospice care team.        #  Altered mental status: delirium related to underlying long-standing and severe dementia, pain requiring IV opiate analgesia, severe illness, and metabolic changes from renal insufficiency. He is non-communicative now at baseline. Pain has been worse over the past weeks.        #  DDKT and Liver Transplant now with progressive renal insufficiency: Stable Cr now about 1.9 but fluid/electrolyte status remain problems. K 5.6 yesterday- tacro contributing. Discussed with Dr. Galeas and agreed to stop tacro and increase cellcept from 250 mg to 500 mg/d and prednisone from 5 to 10 mg/d. 3 doses of kayexalate for K mgmt. Hemodialysis will not change the outcome--> he has severe dementia with inability to swallow his food and protect his airway from aspiration. He is agitated requiring hand mitts and in significant pain requiring opiate analgesia (family requested an increase in the dose overnight). He is slowly dying.    # Enterobacter UTI on 10/04: Started on Ceftriaxone and transitioned to Ciprofloxacin. Completed 14 day treatment.       #   Aspiration Pneumonia and poor Airway Protection  Pneumonia resolved after 7d abx.     --> Strict NPO. NJ feeds ongoing but in severe dementia, this has not been shown to improve outcome      # Possible slow GI blood loss: Suspect multifactorial including related to chronic disease and LILIAM as well as iatrogenic though patient reportedly had melanotic stools though hemoglobins have since been stable. Higher risk of ongoing bleeding as he has a h/o DVT and needs warfarin anticoagulation.  - Continue PPI BID   - Peirodic hemoglobin checks and as needed for signs of bleeding  - Transfuse for hemoglobin <7       # Resolved recurrent C. Diff Colitis:  Toxin positive 9/11/18. Systemic abx d/tamra >10d ago. Overlap and taper done. Vanco now completed.      # DVT On warfarin with INR goal 2-3. Significant bleeding risk but clotting risk outweighs this. Pharmacy dosing of warfarin wihtout heparin overlap.    Continue Warfarin for now, most likely discontinue as not INR check in future. Discussed with palliative care team who is  working with hospice team to finalize medications.   - Continue warfarin for now. Discussed with [facility] on 10/16       # T2DM: Blood sugar controlled.         # HTN: Stable BP on carvedilol with PRN hydralazine.      Diet: NPO with NJ feeds.  Adult Formula Drip Feeding: Continuous TwoCal HN; Nasojejunal; Goal Rate: 40; mL/hr; Medication - Tube Feeding Flush Frequency: At least 15-30 mL water before and after medication administration and with tube clogging; Amount to Send. 60 ml every 4 hr      Fluids: As above  Virk Catheter: in place, indication: Strict 1-2 Hour I&O, Strict 1-2 Hour I&O  DVT Prophylaxis: Warfarin.   Code Status: DNR/DNI         Disposition Plan   Expected discharge in 1-2 days to hospice care      Entered: Denny Ye 10/16/2018, 3:27 PM            Pt's care was discussed with bedside RN, patient and  during Care Team Rounds.           Denny Ye MD  Hospitalist ( Internal medicine)  Pager: [phone]

## 2018-10-16 NOTE — PROGRESS NOTES
Social Work Services Progress Note     Hospital Day: 36  Date of Initial Social Work Evaluation:  9/21/18  Collaborated with:  Palliative service, pt's daughter May     Data:  Pt is a 78-year-old male admitted to [facility] 9/11/18.      Intervention:  Palliative team has been meeting with pt's daughter May, who is agreeable to hospice if pt can keep the NJ and continue his transplant medications. Per palliative team, May would like to take pt home tomorrow if possible. Much family conflict between pt's daughters May and Cruz this admission. Per palliative team yesterday, May would not agree to pursue hospice cares without Abah also agreeing to plan.     Confirmed with Stella from  Hospice (ph [phone]) that pt would be able to continue transplant medications on hospice and these would still be covered by insurance. Updated pt's daughter May at bedside. May is agreeable with discharge to home with  Hospice services. She states that family members and PCAs are with pt 24/7 at home. Explained that SW left a voicemail for pt's daughter Cruz (ph [phone]) asking for a call back to discuss plan and confirm that daughters are both in agreement with plan. Awaiting call back.     Assessment:  Pursuing discharge home with hospice services     Plan:    Anticipated Disposition:  Home with hospice services    Barriers to d/c plan:  Family agreement with discharge plan    Follow Up:  SW to follow and assist as needed     HAMLET Parra, LGSW  5A Unit   Pager [phone]  Phone [phone]

## 2018-10-16 NOTE — PLAN OF CARE
Problem: Patient Care Overview  Goal: Plan of Care/Patient Progress Review   10/16/18 0556   Plan of Care Review   Progress no change     Pt admitted with seizure and aspiration pneumonia. VSS on RA. Comfort cares. Pulse ox on. Daugther @ bedside. Nonverbal. Disoriented x4. Repo q 2 hrs.Multiple incontinent stools. Virk catheter patent and with adequate output. On scheduled IV Bumex. Continuing home meds for now. No BG checks for comfort. NPO. NJ infusing w/ TF @ 45 mL/hr. Meds given through NJ. Gave PO Ativan x2 and PO Dilaudid x1. Plan to start the process of hospice care @ home. Continue to monitor and follow the POC.

## 2018-10-16 NOTE — PLAN OF CARE
Problem: Patient Care Overview  Goal: Plan of Care/Patient Progress Review  Outcome: No Change  VSS on RA. Pt nonverbal. Unable to assess orientation status. TF running at goal rate of 45ml.hr. Medications given via feeding tube. BG checked Q4 hours; no insulin needed. Pt incontinent of stool x2. Virk catheter patent with good output. Repositioned Q2 hours with pillows. Ativan given x1 for comfort. PIV saline locked. Plan for comfort cares and hospice unclear as family disagrees. Continue to monitor and follow the POC.

## 2018-10-16 NOTE — PLAN OF CARE
Problem: Goal/Outcome  Goal: Goal Outcome Summary  Outcome: Declining  Pt now on comfort cares. NJ with TF continuing to run at goal rate. Turning, changing and repositioning Q2H. Daughter at bedside and helpful. Continuing with comfort measures only.

## 2018-10-16 NOTE — PROGRESS NOTES
CLINICAL NUTRITION SERVICES - REASSESSMENT NOTE     Nutrition Prescription    RECOMMENDATIONS FOR MDs/PROVIDERS TO ORDER:  --Continue with TF support with Nepro @ 45 ml/hr.  --For full hydration needs, pending fluid needs and improve in UOP, patient would require 40 ml/hr free water flushes via feed and flush.     Malnutrition Status:    Severe malnutrition in the context of chronic illness     Recommendations already ordered by Registered Dietitian (RD):  1. Continue with Nutrisource fiber 1 packet, TID via FT to provide 9 gm soluble fiber  2. Continue with Nepro at 45 ml/hr, lower in Phos/K+, via NJ    Future/Additional Recommendations:  --Monitor for improvement in diarrhea        EVALUATION OF THE PROGRESS TOWARD GOALS   Diet:   On dysphagia Diet Level 1 Pureed Nectar Thickened Liquids + thickened boost plus with meals        Nutrition Support started on 10/2:   --Access: NJ tube ( placed 10/1/2018) (FEEDING TUBE PLACEMENT 10/1/2018: Feeding tube tip was in the jejunum at the ligament of Treitz).     --Dosing wt: 76 kg dry wt this admit on 9/27    --Regimen: Tf switched to Nepro at 45 ml/hr due to elevated K+, ( was on Nutren 1.5 for the past week):    --Nepro @ goal 45 ml/hr (1080 ml/day) to provide 1944 kcals (26 kcal/kg/day), 87 g PRO (1.1 gm/kg/day), 788 ml free H2O, 174 gm CHO and 14 gm Fiber daily.    --Free water flushes: 60 ml every 4 hours   --Started on Nutrisource fiber, 1 packet TID ( Stool out put continues average 3-9 daily).        Intake:   PO: per patients daughter, they have not been feeding patient much. TF currently is patients main source of nutrition.     TF: Average 7 day TF provided: ~ 500 ml/day ( ~ 46% estimated goal volume)  Provided patient with 900 kcal/day (12 kcal /kg) and 40 gm protein ( 0.7 gm/kg).       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 On immunosuppression treatment.      --Admitted for AMS and seizure, has history of Alzheimer, DM2, CVA    --Last HD: 9/27, Volume overload, oliguric, failed dieresis challenging. Per providers note, \" HD on 9/26 with UF 9/27. His urine out put improved around 9/30. Then has declined from 10/1 on wards\".    --C.diff: Per MD note, Repeat Cdiff on stool is negative, On PO vanco, has a rectal tube due to having frequent BM. Started on Nutrisource fiber 1 packet TID on 10/8.     --NJ was placed on 10/1 and receiving all med's and nutrition via NJ, ( Intermittent TF on hold due to NJ clogging , clog zapper used x 3  yesterday).     --Plan for discharge with hospice, patient will remain on TF + anti rejection meds. No HD    Labs noted as o 10/15: No daily labs are being done related to hospice status per daughters report:     BUN: 64(H),Cr:1.90 (H), K+:5.6 (H), Phos: 2.5 ( 10/14), Mg++: 2.5 (H)on 10/13  UOP: 425 yesterday      10/12/18: WOC RN Re-assessment:  Pressure Injury: on penis foreskin , hospital acquired ,   This is a Medical Device Related Pressure Injury (MDRPI) due to patino  Pressure Injury is Stage 2   Contributing factor of the pressure injury: pressure, immobility and moisture  Status: follow up assessment, improving      MALNUTRITION  % Intake: </=75% for >/= 1 month (severe)  % Weight Loss: Unable to assess due to volume up   Subcutaneous Fat Loss: Unable to assess  Muscle Loss: Bedridden, 4 limbs contractions  Fluid Accumulation/Edema: Edema to lower Extrs B/L +2 - moderate "   Malnutrition Diagnosis: Severe malnutrition in the context of chronic illness       Previous Goals   Total avg nutritional intake to meet a minimum of 25 kcal/kg and 1.0 gm PRO/kg daily (per dosing wt 76 kg actual wt, lower wt on 9/27).  Evaluation: Not met    Previous Nutrition Diagnosis  Inadequate oral intake related to reduced AMS with s/sx of aspiration with PO intake in the setting of advanced dementia,  poor oral awareness as evidenced by 3 weeks of essentially NPO/Minimal intake since admit, Started on TF support on 10/2, with less than adequate TF received over the past 6 days: 1344 kcal/day ( 18 kcal /kg) and 57 gm protein ( 0.7 gm/kg).     Evaluation: No change,     CURRENT NUTRITION DIAGNOSIS  Inadequate oral intake related to reduced AMS with s/sx of aspiration with PO intake in the setting of advanced dementia,  poor oral awareness as evidenced by reliance on NJ tube feed support to provide for full nutrition / hydration needs    INTERVENTIONS  Implementation  Enteral Nutrition - Modify composition    Goals  Total avg nutritional intake to meet a minimum of 25 kcal/kg and 1.0 gm PRO/kg daily (per dosing wt 76 kg actual wt, lower wt on 9/27).    Monitoring/Evaluation  Progress toward goals will be monitored and evaluated per protocol.    Iveth Mann RD/LANCE  Pager [phone]

## 2018-10-17 NOTE — PLAN OF CARE
Problem: Patient Care Overview  Goal: Individualization & Mutuality  Outcome: No Change  Patient is on comfort care.Neuro unchanged. Non communicating. On room air. Appears comfortable. NPO. On Tube feeding. Patino has good urine output. Incontinent of stool twice this shift, care completed per protocol.Completed patino care. Changed linen. Repositioned Q 2 hours. Daughter present at the bedside. Plan:Continue care.

## 2018-10-17 NOTE — PLAN OF CARE
Problem: Patient Care Overview  Goal: Plan of Care/Patient Progress Review  Outcome: No Change  VSS on RA- family wanted to spot check them. Pt on comfort cares. One time dose of coumadin given at 18:00. Pt turned q2. Will continue to monitor and follow POC.

## 2018-10-17 NOTE — PROGRESS NOTES
Called Cruz (patient's daughter) at [phone] to discuss plan of care  Phone directly went to voice mail. I have left my name, and phone number of [phone] on that message  Will continue to reach tomorrow.       Denny Ye  Internal Medicine  Hospitalist  Pager no: [phone]

## 2018-10-17 NOTE — PROGRESS NOTES
Social Work Services Progress Note      Hospital Day: 37  Date of Initial Social Work Evaluation:  9/21/18  Collaborated with:  Primary team Gold 2, pt's daughter Cruz (ph [phone])      Data:  Pt is a 78-year-old male admitted to [facility] 9/11/18.       Intervention:  Left 2nd voicemail for pt's daughter Cruz to discuss plan for discharge to home with hospice. Awaiting call back.       Assessment:  Pursuing discharge home with hospice services      Plan:    Anticipated Disposition:  Home with hospice services    Barriers to d/c plan:  Family agreement with discharge plan    Follow Up:  SW to follow and assist as needed      HAMLET Parra, LGSW  5A Unit   Pager [phone]  Phone [phone]

## 2018-10-17 NOTE — PROGRESS NOTES
"[facility], [place]   Internal Medicine Daily Note           Interval History/Events     Overnight events reviewed  Patient non verbal. Daughter at bedside  Daughter reports he is making urine and would like to talk with nephrology for further information  No other concerns.        Review of Systems        4 point ROS including Respiratory, CV, GI and , other than that noted above is negative      Medications   I have reviewed current medications  in the \"current medication\" section of Epic.  Relevant changes include:     Physical Exam   General:       Vital signs:    Blood pressure 122/48, pulse 82, temperature 98.8  F (37.1  C), temperature source Axillary, resp. rate 16, height 1.778 m (5' 10\"), weight 82.6 kg (182 lb), SpO2 100 %.  Estimated body mass index is 26.11 kg/(m^2) as calculated from the following:    Height as of this encounter: 1.778 m (5' 10\").    Weight as of this encounter: 82.6 kg (182 lb).      Intake/Output Summary (Last 24 hours) at 10/16/18 1527  Last data filed at 10/16/18 1400   Gross per 24 hour   Intake              420 ml   Output             1580 ml   Net            -1160 ml      HEENT: NJ in place.   Cardiovascular: S1, S2 normal.   Respiratory:  B/l CTA  GI/Abdomen: Soft, mild distended.   Neurology: No tremors.   Extremities: 2-3 + pre tibial edea.   Skin:      Laboratory and Imaging Studies     I have reviewed  laboratory and imaging studies in the Epic. Pertinent findings are as below:    BMP    Recent Labs  Lab 10/15/18  1214 10/14/18  0920 10/13/18  0801 10/12/18  0639    134 134 134   POTASSIUM 5.6* 5.6* 5.1 5.2   CHLORIDE 103 102 102 102   JOSHUA 8.3* 8.2* 8.1* 8.2*   CO2 24 24 26 24   BUN 64* 60* 54* 44*   CR 1.90* 1.92* 1.86* 1.69*   * 135* 112* 155*     CBC    Recent Labs  Lab 10/12/18  0639   WBC 4.7   RBC 2.69*   HGB 8.2*   HCT 25.4*   MCV 94   MCH 30.5   MCHC 32.3   RDW 19.8*        INR    Recent Labs  Lab 10/14/18  0920 " 10/13/18  0801 10/12/18  0639 10/11/18  0701   INR 1.55* 1.16* 1.10 1.20*     LFTsNo lab results found in last 7 days.   PANCNo lab results found in last 7 days.        Impression/Plan          Priscilla Way is a 78 year old male with a complex past medical history including dementia (Alzheimer and vascular), liver transplant and kidney transplants in 2000, DVT, T2DM, CVA, and poor airway protection admitted with possible seizure and aspiration.  Progressive renal decline after LILIAM.   Care conference was conducted on 10/15/2018, for the best interest of patient's overall care decision was made for comfort care. Palliative care on board.   Continue current treatment plan.   - No lab draws  - Continue current immunosuppression   - Palliative team working with hospice care team.    - Discussed with Nephrology team on 10/17 about increased urine output. Most likely not significant.       #  Altered mental status: delirium related to underlying long-standing and severe dementia, pain requiring IV opiate analgesia, severe illness, and metabolic changes from renal insufficiency. He is non-communicative now at baseline. Pain has been worse over the past weeks.        #  DDKT and Liver Transplant now with progressive renal insufficiency: Stable Cr now about 1.9 but fluid/electrolyte status remain problems. K 5.6 yesterday- tacro contributing. Discussed with Dr. Galeas and agreed to stop tacro and increase cellcept from 250 mg to 500 mg/d and prednisone from 5 to 10 mg/d. 3 doses of kayexalate for K mgmt. Hemodialysis will not change the outcome--> he has severe dementia with inability to swallow his food and protect his airway from aspiration. He is agitated requiring hand mitts and in significant pain requiring opiate analgesia (family requested an increase in the dose overnight). He is slowly dying.    # Enterobacter UTI on 10/04: Started on Ceftriaxone and transitioned to Ciprofloxacin. Completed 14 day treatment.        #   Aspiration Pneumonia and poor Airway Protection  Pneumonia resolved after 7d abx.    --> Strict NPO. NJ feeds ongoing but in severe dementia, this has not been shown to improve outcome      # Possible slow GI blood loss: Suspect multifactorial including related to chronic disease and LILIAM as well as iatrogenic though patient reportedly had melanotic stools though hemoglobins have since been stable. Higher risk of ongoing bleeding as he has a h/o DVT and needs warfarin anticoagulation.  - Continue PPI BID   - Peirodic hemoglobin checks and as needed for signs of bleeding  - Transfuse for hemoglobin <7       # Resolved recurrent C. Diff Colitis:  Toxin positive 9/11/18. Systemic abx d/tamra >10d ago. Overlap and taper done. Vanco now completed.      # DVT On warfarin with INR goal 2-3. Significant bleeding risk but clotting risk outweighs this. Pharmacy dosing of warfarin wihtout heparin overlap.    Continue Warfarin for now, most likely discontinue as not INR check in future. Discussed with palliative care team who is  working with hospice team to finalize medications.   - Continue warfarin for now. Discussed with [facility] on 10/16       # T2DM: Blood sugar controlled.         # HTN: On carvedilol with PRN hydralazine.      Diet: NPO with NJ feeds.  Adult Formula Drip Feeding: Continuous TwoCal HN; Nasojejunal; Goal Rate: 40; mL/hr; Medication - Tube Feeding Flush Frequency: At least 15-30 mL water before and after medication administration and with tube clogging; Amount to Send. 60 ml every 4 hr      Fluids: As above  Virk Catheter: in place, indication: Strict 1-2 Hour I&O, Strict 1-2 Hour I&O  DVT Prophylaxis: Warfarin.   Code Status: DNR/DNI         Disposition Plan   Expected discharge in 1-2 days to hospice care      Entered: Denny Ye 10/17/2018, 11:29 AM            Pt's care was discussed with bedside RN, patient and  during Care Team Rounds.           Denny Ye MD  Hospitalist ( Internal  medicine)  Pager: [phone]

## 2018-10-17 NOTE — PLAN OF CARE
Problem: Patient Care Overview  Goal: Plan of Care/Patient Progress Review   10/17/18 0642   Plan of Care Review   Progress no change     Pt admitted seizure and aspiration pneumonia. On comfort cares. Family requesting VS and BG checks. Right PIV SL. Continuous pulse ox on. Nonverbal. Mitts on for protection. Family @ bedside. Repo q 2 hrs. NPO. NF infusing with TF @ 45 mL/hr. Diarrhea with multiple BMs. Virk catheter, patent with adequate output. IV Bumex given. SW following. Plan for home hospice cares. Continue to monitor and follow the POC.

## 2018-10-18 NOTE — PHARMACY-ANTICOAGULATION SERVICE
Clinical Pharmacy - Warfarin Dosing Consult     Pharmacy has been consulted to manage this patient s warfarin therapy.  Indication: Atrial Fibrillation  Therapy Goal: INR 2-3  Warfarin Prior to Admission: Yes  Warfarin PTA Regimen: 2.5 mg SuTuTh, 1.25 mg ROW  Recent documented change in oral intake/nutrition: Yes (cont TF since 10/15, but NJ clogged this AM)    INR   Date Value Ref Range Status   10/18/2018 1.33 (H) 0.86 - 1.14 Final   10/14/2018 1.55 (H) 0.86 - 1.14 Final       Recommend warfarin 2.5 mg today.  Pharmacy will monitor Priscilla Way daily and order warfarin doses to achieve specified goal.      Please contact pharmacy as soon as possible if the warfarin needs to be held for a procedure or if the warfarin goals change.      Dayron Gallo, PharmD, BCPS

## 2018-10-18 NOTE — PROVIDER NOTIFICATION
0815: RN noticed patient's NJ tube was clogged. RN stopped tube feed and attempted to flush tube:  with warm water with no success.    0910: RN used warm coca cola to try to break up any clogs in tube. Allowed coke to sit in tube for 30-45 min.    1000: RN used clot zapper product on patient's tube and let sit.    1040: RN checked tube. Still unable to clear clog. FYI page sent to provider. Will continue to work on tube. RN to administer morning meds once tube is accessible.     Jodee Lopez RN on 10/18/2018 at 10:43 AM

## 2018-10-18 NOTE — PLAN OF CARE
Problem: Patient Care Overview  Goal: Plan of Care/Patient Progress Review  Outcome: No Change  [id number]    COMFORT CARE (ordered 10/15/18). Patient somnolent, arouses to touch; unable to assess orientation. Patient not speaking and/or following commands. Turned and repositioned Q2hrs per daughter's request. Pulsate mattress in use. NPO. TF infusing at goal rate of 45 mL/hr to nasojejunal tube. No signs/symptoms of nausea, no emesis. Blood sugars 199, 144 and 100; covered with ordered sliding scale NovoLOG. Saline locked to left PIV. Right hemodialysis CVC CDI. Virk remains in place for strict I&O's; good urine output. Incontinent of 2 loose stools. Blanchable redness noted to coccyx; barrier cream applied. Muscle tension and restlessness noted intermittently; PRN ativan 0.5 mg solution and PRN dilaudid 0.4 mg IV given. Pursuing discharge home with hospice services.

## 2018-10-18 NOTE — PROGRESS NOTES
[facility], [place]    Palliative Care Progress Note    Patient: Priscilla Way  Date of Admission:  9/11/2018    Recommendations:  -DNR/DNI  -daughter would like nephrology's opinion about the current status of his kidneys  -pending this discussion will make further plans for discharge    Assessment  Priscilla Way is a 78 year old male with severe vascular dementia, as well as liver and kidney transplant in 2000 admitted for aspiration and possible seizure on 9/11/18, with course complicated by contrast-induced LILIAM on CKD requiring HD, last on 10/10/18 and poor PO intake with aspiration now with NJ tube present.  Tense family dynamic with several care conferences, today family agreed for comfort-oriented measures.  Palliative care consulted to discuss next steps, possible hospice.      Goals of care: discussed with May, daughter, today difficulty in reaching [facility], and she thinks her phone is having trouble, but she was at the hospital. May was going to reach out for [facility] to call the unit SW for getting her involved in the planning. May noticed that her father is putting out more urine and is wondering if his kidneys are getting better as a result of that, discussed with her that I really dont know that since we havent been checking his lab values, and also it would be helpful for nephrology to comment on what medically it happening with his kidneys now. May also very much does not want the dialysis catheter in anymore because its so uncomfortable and she reports that he had a bad night because it seemed to be bothering him. Discussed that it might make sense to remove the dialysis catheter, especially if that intervention is one we are not going to do anymore.     May also discussed that if nothing has changed that she would continue with the plan for hospice and is ok with that. Discussed this with Dr. Ye the above.    These recommendations have been discussed with primary  team.    VEL Zhao CNS  Palliative Care Consult Team  Pager: [phone]    [facility] Inpatient Team Consult pager [phone] (M-F 8-4:30)  After-hours Answering Service [phone]   Palliative Clinic: [phone]       Interval History:      Notes reviewed, no acute events, daughter noticed he is making more urine now and wonders if that means his kidneys are improving.       Medications:   I have reviewed this patient's medication profile and medications during this hospitalization    bumex 4 mg BID  Metolazone 5 mg q12h  Acetaminophen PRN  hydromorphone 0.2-0.4 mg q2h PRN- x2 (0.6 mg)  Hydromorphone 1-2 mg q4h PRN- x1 (1 mg)  Lorazepam PRN        Review of Systems:   A comprehensive ROS has been negative other than stated in the HPI and below:   Palliative Symptom Review (0=no symptom/no concern, 1=mild, 2=moderate, 3=severe):    ADRIA given AMS        Physical Exam:     Vital Signs:     BP: 120/58   Heart Rate: 76   SpO2: 100 % O2 Device: None (Room air)    Weight: 182 lbs 0 oz    Physical Exam:  Constitutional: resting in bed, chronically ill appearing  ENT: NG tube secure  Lungs: No increased work of breathing  Neurologic: nonverbal, does not communicate verbally  Neuropsychiatric: ADRIA given AMS        Data Reviewed:     ROUTINE ICU LABS (Last four results)  CMP  Recent Labs  Lab 10/18/18  0835 10/15/18  1214 10/14/18  0920 10/13/18  1912 10/13/18  0801 10/12/18  0639   NA  --  136 134  --  134 134   POTASSIUM  --  5.6* 5.6*  --  5.1 5.2   CHLORIDE  --  103 102  --  102 102   CO2  --  24 24  --  26 24   ANIONGAP  --  10 7  --  6 7   GLC  --  157* 135*  --  112* 155*   BUN  --  64* 60*  --  54* 44*   CR  --  1.90* 1.92*  --  1.86* 1.69*   GFRESTIMATED  --  34* 34*  --  35* 39*   GFRESTBLACK  --  42* 41*  --  43* 48*   JOSHUA  --  8.3* 8.2*  --  8.1* 8.2*   MAG 2.1  --   --   --  2.5* 2.0   PHOS 4.0  --  2.5 3.1 1.9* 2.5     CBC  Recent Labs  Lab 10/12/18  0639   WBC 4.7   RBC 2.69*   HGB 8.2*   HCT 25.4*    MCV 94   MCH 30.5   MCHC 32.3   RDW 19.8*        INR  Recent Labs  Lab 10/14/18  0920 10/13/18  0801 10/12/18  0639   INR 1.55* 1.16* 1.10     Arterial Blood GasNo lab results found in last 7 days.    VEL Zhao CNS  Palliative Care Consult Team  Pager: [phone]    [facility] Inpatient Team Consult pager [phone] (M-F 8-4:30)  After-hours Answering Service [phone]  Palliative Clinic: [phone]     Total time spent was 35 minutes,  >50% of time was spent counseling and/or coordination of care regarding goals of care.

## 2018-10-18 NOTE — PROGRESS NOTES
Social Work Services Progress Note      Hospital Day: 38  Date of Initial Social Work Evaluation:  9/21/18  Collaborated with:  Primary team Gold Fausto, pt's daughter May, palliative team      Data:  Pt is a 78-year-old male admitted to [facility] 9/11/18.       Intervention:  Met with pt's daughter May with palliative team. May expresses interest in speaking with nephrology regarding pt's urine output as this may change her decision between pursuing hospice cares or not. Discussed that pt's dtr [facility] has not returned calls to  or medical team. May states that she thinks something is wrong with [facility]'s phone. She states that [facility] is in agreement with pt coming home. Asked if [facility] is in agreement with hospice care and May did not know. Provided phone number to [facility] to pass on message to [facility] to call.       Assessment:  Possible discharge home with hospice      Plan:    Anticipated Disposition:  Home with hospice services    Barriers to d/c plan:  Family agreement with discharge plan    Follow Up:   to follow and assist as needed      HAMLET Parra, LGSW  5A Unit   Pager [phone]  Phone [phone]

## 2018-10-18 NOTE — PROGRESS NOTES
"[facility], [place]   Internal Medicine Daily Note           Interval History/Events     Overnight events reviewed  Patient non verbal. Daughter at bedside  Daughter expressed that patient is making urine now, and wondering if his renal function is improved. She states that she would like to talk to Nephrology. She also wanted labs to be checked to see where creatinine is.         Review of Systems        4 point ROS including Respiratory, CV, GI and , other than that noted above is negative      Medications   I have reviewed current medications  in the \"current medication\" section of Epic.  Relevant changes include:     Physical Exam   General:       Vital signs:    Blood pressure 120/58, pulse 82, temperature 98.8  F (37.1  C), temperature source Axillary, resp. rate 16, height 1.778 m (5' 10\"), weight 82.6 kg (182 lb), SpO2 100 %.  Estimated body mass index is 26.11 kg/(m^2) as calculated from the following:    Height as of this encounter: 1.778 m (5' 10\").    Weight as of this encounter: 82.6 kg (182 lb).      Intake/Output Summary (Last 24 hours) at 10/16/18 1527  Last data filed at 10/16/18 1400   Gross per 24 hour   Intake              420 ml   Output             1580 ml   Net            -1160 ml      HEENT: NJ in place.   Cardiovascular: S1, S2 normal.   Respiratory:  B/l CTA  GI/Abdomen: Soft, mild distended.   Neurology: No tremors.   Extremities: 2-3 + pre tibial edea.   Skin:      Laboratory and Imaging Studies     I have reviewed  laboratory and imaging studies in the Epic. Pertinent findings are as below:    BMP    Recent Labs  Lab 10/18/18  0835 10/15/18  1214 10/14/18  0920 10/13/18  0801    136 134 134   POTASSIUM 4.7 5.6* 5.6* 5.1   CHLORIDE 101 103 102 102   JOSHUA 8.8 8.3* 8.2* 8.1*   CO2 30 24 24 26   BUN 82* 64* 60* 54*   CR 1.72* 1.90* 1.92* 1.86*   GLC 92 157* 135* 112*     CBC    Recent Labs  Lab 10/18/18  0835 10/12/18  0639   WBC 5.7 4.7   RBC 2.58* 2.69* "   HGB 7.9* 8.2*   HCT 25.4* 25.4*   MCV 98 94   MCH 30.6 30.5   MCHC 31.1* 32.3   RDW 18.8* 19.8*    272     INR    Recent Labs  Lab 10/14/18  0920 10/13/18  0801 10/12/18  0639   INR 1.55* 1.16* 1.10     LFTsNo lab results found in last 7 days.   PANCNo lab results found in last 7 days.        Impression/Plan          Priscilla Way is a 78 year old male with a complex past medical history including dementia (Alzheimer and vascular), liver transplant and kidney transplants in 2000, DVT, T2DM, CVA, and poor airway protection admitted with possible seizure and aspiration.  Progressive renal decline after LILIAM.   Care conference was conducted on 10/15/2018, for the best interest of patient's overall care decision was made for comfort care. Palliative care on board.   Continue current treatment plan.   - No lab draws was planned per comfort care. Daughter requested routine lab check on 10/18. CBC, BMP  - Continue current immunosuppression   - Palliative team working with hospice care team.    - Discussed with Nephrology team on 10/17 and 10/18 about increased urine output, and daughter wanting to talk to the team.       #  Altered mental status: delirium related to underlying long-standing and severe dementia, pain requiring IV opiate analgesia, severe illness, and metabolic changes from renal insufficiency. He is non-communicative now at baseline.      #  DDKT and Liver Transplant now with progressive renal insufficiency: Stable Cr now about 1.9 but fluid/electrolyte status remain problems. K 5.6 yesterday- tacro contributing. Discussed with Dr. Galeas and agreed to stop tacro and increase cellcept from 250 mg to 500 mg/d and prednisone from 5 to 10 mg/d. 3 doses of kayexalate for K mgmt. Hemodialysis will not change the outcome--> he has severe dementia with inability to swallow his food and protect his airway from aspiration. He is agitated requiring hand mitts and in significant pain requiring opiate  analgesia (family requested an increase in the dose overnight). He is slowly dying.  Dialysis catheter in place, likely needs to come out.     # Enterobacter UTI on 10/04: Started on Ceftriaxone and transitioned to Ciprofloxacin. Completed 14 day treatment.       #   Aspiration Pneumonia and poor Airway Protection  Pneumonia resolved after 7d abx.    --> Strict NPO. NJ feeds ongoing but in severe dementia, this has not been shown to improve outcome      # Possible slow GI blood loss: Suspect multifactorial including related to chronic disease and LILIAM as well as iatrogenic though patient reportedly had melanotic stools though hemoglobins have since been stable. Higher risk of ongoing bleeding as he has a h/o DVT and needs warfarin anticoagulation.  - Continue PPI BID   - CBC on 10/18 per daughter request      # Resolved recurrent C. Diff Colitis:  Toxin positive 9/11/18. Systemic abx d/tamra >10d ago. Overlap and taper done. Vanco now completed.      # DVT On warfarin with INR goal 2-3. Significant bleeding risk but clotting risk outweighs this. Pharmacy dosing of warfarin wihtout heparin overlap.    Continue Warfarin for now, most likely discontinue as not INR check in future. Discussed with palliative care team who is  working with hospice team to finalize medications.   - Continue warfarin for now.        # T2DM: Blood sugar controlled.         # HTN: On carvedilol with PRN hydralazine.      Diet: NPO with NJ feeds.  Adult Formula Drip Feeding: Continuous TwoCal HN; Nasojejunal; Goal Rate: 40; mL/hr; Medication - Tube Feeding Flush Frequency: At least 15-30 mL water before and after medication administration and with tube clogging; Amount to Send. 60 ml every 4 hr      Fluids: As above  Virk Catheter: in place, indication: Strict 1-2 Hour I&O, Strict 1-2 Hour I&O  DVT Prophylaxis: Warfarin.   Code Status: DNR/DNI         Disposition Plan   Expected discharge in 1-2 days to hospice care vs home care if plan gets  changed.      Entered: Denny Ye 10/18/2018, 12:37 PM            Pt's care was discussed with bedside RN, patient and  during Care Team Rounds.           Denny Ye MD  Hospitalist ( Internal medicine)  Pager: [phone]

## 2018-10-19 NOTE — PROGRESS NOTES
"Social Work Services Progress Note      Hospital Day: 39  Date of Initial Social Work Evaluation:  9/21/18  Collaborated with:  Primary team Gold 2      Data:  Pt is a 78-year-old male admitted to [facility] 9/11/18. Pt's discharge plan complicated by family indecision about potential discharge with hospice and conflict with decision-making between pt's two daughters.       Intervention: Updated by primary team that there is a need for another care conference and pt's two daughters must be present. Primary team spoke with pt's daughter May today at length and she apparently stated that [facility] is \"legal decision maker.\" There is no healthcare directive for pt. [facility]'s phone has been going straight to voicemail since care conference on 10/15. Multiple voicemails have been left for [facility] by SANJUANA and primary team. [facility] requested to primary team that  try calling [facility] again. Called [facility] (ph [phone]) and phone went straight to voicemail with mailbox full- unable to leave message. Asked [facility] yesterday to pass on 's number to [facility] and have [facility] call.       Assessment:  Need for care conference; one daughter currently unreachable      Plan:    Anticipated Disposition:  Home with hospice services vs home with homecare    Barriers to d/c plan:  Family agreement with discharge plan    Follow Up:   to follow and assist as needed      HAMLET Parra, LGSW  5A Unit   Pager [phone]  Phone [phone]  "

## 2018-10-19 NOTE — PLAN OF CARE
Problem: Patient Care Overview  Goal: Plan of Care/Patient Progress Review  Outcome: No Change  [id number]: VSS on RA. Afebrile. Unable to assess orientation. Lethargic. Turned every few hours. Barrier cream applied for skin protection.  1 large, loose BM this evening. Virk catheter patent and draining yellow urine. Patient had full bed bath today. NG tube with continuous feedings running at goal rate of 45mL/hr. Meds through tube. Unable to assess pain; PRN dilaudid given x1 to pre-medicate before bed bath. Blood sugars stable. Skin CDI ex cut on L side penis. Continue to monitor and follow POC.    Jodee Lopez RN on 10/18/2018 at 8:28 PM

## 2018-10-19 NOTE — PLAN OF CARE
Problem: Diabetes Comorbidity  Goal: Diabetes  Patient comorbidity will be monitored for signs and symptoms of hyperglycemia or hypoglycemia. Problems will be absent, minimized or managed by discharge/transition of care.    Outcome: No Change  Pt on continuous tube feeds. Blood sugars stable.     Problem: Infection, Risk/Actual (Adult)  Goal: Identify Related Risk Factors and Signs and Symptoms  Related risk factors and signs and symptoms are identified upon initiation of Human Response Clinical Practice Guideline (CPG).   Outcome: No Change  Afebrile, VSS.    Problem: Renal Insufficiency Comorbidity  Goal: Renal Insufficiency  Patient comorbidity will be monitored for signs and symptoms of Renal Insufficiency (Chronic) condition.  Problems will be absent, minimized or managed by discharge/transition of care.   Outcome: No Change  No significant changes in renal labs, but urinary output has increased.     Problem: Mood Alteration Comorbidity  Intervention: Promote Anxiety Reduction/Resolution  Family presence promoted.    Goal: Mood Alteration Comorbidity  Patient comorbidity will be monitored for signs and symptoms of Mood Alteration condition.  Problems will be absent, minimized or managed by discharge/transition of care.   Outcome: No Change  ADRIA mood. ADRIA orientation.

## 2018-10-19 NOTE — PLAN OF CARE
Problem: Patient Care Overview  Goal: Plan of Care/Patient Progress Review  Outcome: No Change  [id number]: No vitals obtained as family not present to request. Pt on comfort cares. Turned and fecal incontinence cares q 2 hours. Air bed in place. PIV saline locked. Pt very lethargic and does not speak. Some restlessness - given PRN Dilaudid x 1. Mitts in place on both hands to prevent pulling on lines. HD cath removed from R neck - dressing CDI. Virk intact. BGs <140. TF continued at goal rate and all meds via feeding tube. SLP consult tomorrow. Eventual discharge to hospice.

## 2018-10-19 NOTE — PROGRESS NOTES
"[facility], [place]   Internal Medicine Daily Note           Interval History/Events     Overnight events reviewed  Patient non verbal. Daughter at bedside  No acute concerns noted.   Daughter would like to see patient can eat. He was eating at home before he came to hospital.        Review of Systems        4 point ROS including Respiratory, CV, GI and , other than that noted above is negative      Medications   I have reviewed current medications  in the \"current medication\" section of Epic.  Relevant changes include:     Physical Exam   General:       Vital signs:    Blood pressure 145/61, pulse 82, temperature 95.5  F (35.3  C), temperature source Axillary, resp. rate 16, height 1.778 m (5' 10\"), weight 82.6 kg (182 lb), SpO2 99 %.  Estimated body mass index is 26.11 kg/(m^2) as calculated from the following:    Height as of this encounter: 1.778 m (5' 10\").    Weight as of this encounter: 82.6 kg (182 lb).      Intake/Output Summary (Last 24 hours) at 10/16/18 1527  Last data filed at 10/16/18 1400   Gross per 24 hour   Intake              420 ml   Output             1580 ml   Net            -1160 ml      HEENT: NJ in place.   Cardiovascular: S1, S2 normal.   Respiratory:  B/l CTA  GI/Abdomen: Soft, mild distended.   Neurology: No tremors.   Extremities: 2-3 + pre tibial edea.   Skin:      Laboratory and Imaging Studies     I have reviewed  laboratory and imaging studies in the Epic. Pertinent findings are as below:    BMP    Recent Labs  Lab 10/19/18  0932 10/18/18  0835 10/15/18  1214 10/14/18  0920    141 136 134   POTASSIUM 4.6 4.7 5.6* 5.6*   CHLORIDE 101 101 103 102   JOSHUA 8.4* 8.8 8.3* 8.2*   CO2 29 30 24 24   BUN 85* 82* 64* 60*   CR 1.59* 1.72* 1.90* 1.92*   * 92 157* 135*     CBC    Recent Labs  Lab 10/18/18  0835   WBC 5.7   RBC 2.58*   HGB 7.9*   HCT 25.4*   MCV 98   MCH 30.6   MCHC 31.1*   RDW 18.8*        INR    Recent Labs  Lab 10/19/18  0932 " "10/18/18  1218 10/14/18  0920 10/13/18  0801   INR 1.27* 1.33* 1.55* 1.16*     LFTsNo lab results found in last 7 days.   PANCNo lab results found in last 7 days.        Impression/Plan          Priscilla Way is a 78 year old male with a complex past medical history including dementia (Alzheimer and vascular), liver transplant and kidney transplants in 2000, DVT, T2DM, CVA, and poor airway protection admitted with possible seizure and aspiration.  Progressive renal decline after LILIAM.   Care conference was conducted on 10/15/2018, for the best interest of patient's overall care decision was made for comfort care. Palliative care on board.     Events since initiation of comfort care  has taken a significant turn. Patient's daughter May ( who is always at bedside) reports that patient is making urine out and wanted to check labs, and talk with Nephrology. She is convinced that things have turned around. She states \" we decided to make him comfort care because he was not making urine, and he is not a dialysis candidate. If kidney function is better, then we should do like before. Checking labs medication\" Writer (primary team) spoke with the Nephrology and notified them of patient's concerns about talking to them about kidney issues.   Patient's creatinine was checked on 10/18which came at 1.72 which was better than expected and improvement upon 1.9 on 10/15.  He was started back on Tacrolimus per Nephrology. Mycophenolate dose was adjusted.     Per May, patients legal decision maker is her sister Beryl. Social work and writer tried to connect with her but unable to connect as ring goes to voice mail. Messages left to call back. Per May, her sisters phone is broken. As patient's legal decision maker is not here, and there is not consistent plan of care I believe we should have care conference early next week for determining the plan going forward.  I have also explained to the daughter, given Mr. Way overall " medical condition hospice care is recommended. Daughter is open to meet with hospice team early next week.         #  Altered mental status: delirium related to underlying long-standing and severe dementia, pain requiring IV opiate analgesia, severe illness, and metabolic changes from renal insufficiency. He is non-communicative now at baseline.      #  DDKT and Liver Transplant now with progressive renal insufficiency: Stable Cr now about 1.9 but fluid/electrolyte status remain problems. K 5.6 yesterday- tacro contributing. Discussed with Dr. Galeas and agreed to stop tacro and increase cellcept from 250 mg to 500 mg/d and prednisone from 5 to 10 mg/d. 3 doses of kayexalate for K mgmt. Hemodialysis will not change the outcome--> he has severe dementia with inability to swallow his food and protect his airway from aspiration. He is agitated requiring hand mitts and in significant pain requiring opiate analgesia (family requested an increase in the dose overnight). He is slowly dying. He was made comfort care on 10/ 15. Since then course of event has made a significant turn. Patient is making some urine, and daughter wanting to change plan of action.   Creatinine has improved to 1.59 on 10/19, and patient is making urine.   He is being restarted on Tacrolimus. Dose of Mycophenolate reduced to 250 mg BID. Prednisone at 5 mg daily  - Continue Tacrolimus, MMF, and Prednisone  - Tacrolimus level on Monday    # Enterobacter UTI on 10/04: Started on Ceftriaxone and transitioned to Ciprofloxacin. Completed 14 day treatment.       #   Aspiration Pneumonia and poor Airway Protection  Pneumonia resolved after 7d abx.    --> Strict NPO. NJ feeds ongoing but in severe dementia, this has not been shown to improve outcome  - SLP evaluation ordered on 10/19      # Possible slow GI blood loss: Suspect multifactorial including related to chronic disease and LILIAM as well as iatrogenic though patient reportedly had melanotic stools  though hemoglobins have since been stable. Higher risk of ongoing bleeding as he has a h/o DVT and needs warfarin anticoagulation.  Hg 7.9 gm/dl on 10/19  - Continue PPI BID         # Resolved recurrent C. Diff Colitis:  Toxin positive 9/11/18. Systemic abx d/tamra >10d ago. Overlap and taper done. Vanco now completed.      # DVT On warfarin with INR goal 2-3. Significant bleeding risk but clotting risk outweighs this. Pharmacy dosing of warfarin wihtout heparin overlap.    Continue Warfarin for now, most likely discontinue as not INR check in future.   INR subtherapeutic at 1.27 on 10/19 Not bridging with heparin due to significant bleeding risk  - Continue warfarin . Pharmacy to dose Warfarin       # T2DM: Blood sugar controlled.         # HTN: On carvedilol with PRN hydralazine.      Diet: NPO with NJ feeds.  Adult Formula Drip Feeding: Continuous TwoCal HN; Nasojejunal; Goal Rate: 40; mL/hr; Medication - Tube Feeding Flush Frequency: At least 15-30 mL water before and after medication administration and with tube clogging; Amount to Send. 60 ml every 4 hr      Fluids: As above  Virk Catheter: in place, indication: Strict 1-2 Hour I&O, Strict 1-2 Hour I&O  DVT Prophylaxis: Warfarin.   Code Status: DNR/DNI         Disposition Plan   Expected discharge most likely next week to hospice care vs home care if plan gets changed.      Entered: Denny Ye 10/19/2018, 12:39 PM            Pt's care was discussed with bedside RN, patient and  during Care Team Rounds.           Denny Ye MD  Hospitalist ( Internal medicine)  Pager: [phone]

## 2018-10-19 NOTE — PLAN OF CARE
Problem: Patient Care Overview  Goal: Plan of Care/Patient Progress Review  Outcome: No Change  VSS on RA. Pt non-verbal. Arouses to touch/voice. Temp of 99.6 axillary. PRN Tylenol given. TF running at goal of 45 with 30 mL flush Q4. Meds through NJ. NPO. SLP consult tomorrow. Left PIV SL. Pulsate mattress in use. Mesh lift sheet under pt. Bear hugger off. Pt turned Q2. BG's stable. No insulin coverage given this shift. 1 occurrence of loose stool. Sore on penis and scrotum with sanguinous drainage. Open to air. WOCN following. Blanchable redness on coccyx. Barrier cream applied. HD CVC removed today by physician. Pt muscles rigid. Plan to discharge home with hospice care. Daughter(s) at bedside.

## 2018-10-19 NOTE — PLAN OF CARE
Problem: Patient Care Overview  Goal: Plan of Care/Patient Progress Review  Outcome: No Change  [id number]     COMFORT CARE (ordered 10/15/18). Patient somnolent, arouses to touch; unable to assess orientation. Patient not speaking and/or following commands. Turned and repositioned Q2hrs per daughter's request. Pulsate mattress in use. NPO. TF infusing at goal rate of 45 mL/hr to nasojejunal tube. No signs/symptoms of nausea, no emesis. Blood sugars 189, 110 and 130; covered with ordered sliding scale NovoLOG. Saline locked to left PIV. Right hemodialysis CVC CDI. Virk remains in place for strict I&O's; 1275 mL ouput. Incontinent of loose stools x2. Blanchable redness noted to coccyx; barrier cream applied. Muscle tension and restlessness noted intermittently; PRN dilaudid 1 mg solution given. Pursuing discharge home with hospice services.

## 2018-10-19 NOTE — PROGRESS NOTES
[facility] Nurse Inpatient Pressure Injury Assessment   Reason for consultation: Evaluate and treat penile skin        ASSESSMENT  Pressure Injury: on penis foreskin , hospital acquired   This is a Medical Device Related Pressure Injury (MDRPI) due to patino  Pressure Injury is Stage 2   Contributing factor of the pressure injury: pressure, immobility and moisture  Status: follow up assessment, improving       TREATMENT PLAN  Penile skin wound: BID Catheter: Continue routine cath cares.  May apply small amount of barrier paste on wounds,  When placing Stat lock for Patino make sure there catheter is positioned so catheter is straight midline with no tension.  Orders reviewed  WO Nurse follow-up plan:weekly  Nursing to notify the Provider(s) and re-consult the WO Nurse if wound(s) deteriorates or new skin concern.      Patient History  Priscilla Way is a 76 year old male Lithuanian speaking only with a history of vascular dementia, multiple CVAs with left-sided weakness, kidney/liver transplant 2000 secondary to end stage liver disease from Hepatitis C, DM, osteoarthritis, HTN, hyperlipidemia, DVT s/p IVC filter 9/16, and urinary incontinence who presents to ED after 3 day history of increased confusion, lethargy, and bilateral lower extremity edema R > L increased from prior discharge 9/1/16.   CXR with increased right pleural effusion since last admission.         Objective Data  Containment of urine/stool: Indwelling catheter      Current Diet/ Nutrition:      Active Diet Order      Dysphagia Diet Level 1 Pureed Nectar Thickened Liquids (pre-thickened or use instant food thickener)      I/O last 3 completed shifts:  In: 1450 [P.O.:60; I.V.:10; NG/GT:270]  Out: 1800 [Urine:1800]        Risk Assessment:   Sensory Perception: 2-->very limited  Moisture: 3-->occasionally moist  Activity: 1-->bedfast  Mobility: 2-->very limited  Nutrition: 2-->probably inadequate  Friction and Shear: 1-->problem  Stuart Score: 11       I/O last  3 completed shifts:  In: 975 [I.V.:5; NG/GT:90]  Out: 375 [Urine:375]     Physical Exam  Skin inspection: focused penile foreskin  Wound Location: penile foreskin  Date of last Photo 10/1/18  10/12/18 phone unable to obtain service, no photo taken  Wound History: pt has indwelling Virk catheter, swelling of skin on uncircumcised penis was improving, however slightly worse again today to lower aspect of uncircumcised tissue  Measurements (length x width x depth, in cm) one small wound 10/18/18:   0.3 x 0.2 x <0.1 cm , small erosion at 12 O'clock of urethral opening 0.4cm x 0.2cm x <0.1cm  Wound Base: pale pink viable tissue  Palpation of the wound bed: normal   Periwound skin: intact, moist and edematous  Color: normal and consistent with surrounding tissue  Temperature: normal   Drainage: none  Description of drainage: none  Odor: none  Pain: no grimacing or signs of discomfort  Pt combative during assessment and nurse as well as caregiver had to assist for WOC to assess, unable to take clear picture 10/18      Interventions  Current support surface: Standard  Low air loss mattress with pulsation   Current off-loading measures: Foam padding and Pillows under calves  Repositioning aid: Pillows  Visual inspection of wound(s) completed   Tube Securement: cath securement device  Wound Care: was done per plan of care.  Supplies: gathered and at bedside  Educated provided: importance of repositioning, plan of care and wound progress  Education provided to: caregiver  Discussed importance of:repositioning every 2 hours, off-loading pressure to wound, their role in pressure injury prevention, head elevation <30 degrees, nutrition on wound healing and moisture management  Discussed plan of care with Patient and Nurse

## 2018-10-20 NOTE — PLAN OF CARE
Problem: Patient Care Overview  Goal: Plan of Care/Patient Progress Review  SLP consult received. Chart reviewed and case discussed with MD. The patient is known to the SLP caseload with a long hx of dypshagia. In the past, his family has chosen to feed him using safety precautions despite his ongoing dysphagia and aspiration risk. He has been on dysphagia diet 1 (pureed) with nectar thick liquids, with family only feeding when alert and upright with small bites/sips. MD reports the patient has initiated comfort cares. SLP to sign off as family education about dysphagia has been completed on previous admissions and no further skilled SLP intervention is indicated at this time.

## 2018-10-20 NOTE — PROGRESS NOTES
"[facility], [place]   Internal Medicine Daily Note           Interval History/Events     Overnight events reviewed  Patient non verbal. Daughter at bedside  Daughter reported ongoing issue with twitching not worse but persistent problem. Thought to be due to renal failure and was advised to treat with Ativan in the past   Also discussed about oral feeding is high risk for aspiration.        Review of Systems        4 point ROS including Respiratory, CV, GI and , other than that noted above is negative      Medications   I have reviewed current medications  in the \"current medication\" section of Epic.  Relevant changes include:     Physical Exam   General:       Vital signs:    Blood pressure 156/62, pulse 80, temperature 96.8  F (36  C), temperature source Axillary, resp. rate 16, height 1.778 m (5' 10\"), weight 82.6 kg (182 lb), SpO2 99 %.  Estimated body mass index is 26.11 kg/(m^2) as calculated from the following:    Height as of this encounter: 1.778 m (5' 10\").    Weight as of this encounter: 82.6 kg (182 lb).      Intake/Output Summary (Last 24 hours) at 10/16/18 1527  Last data filed at 10/16/18 1400   Gross per 24 hour   Intake              420 ml   Output             1580 ml   Net            -1160 ml      HEENT: NJ in place.   Cardiovascular: S1, S2 normal.   Respiratory:  B/l CTA  GI/Abdomen: Soft, mild distended.   Neurology: No tremors.   Extremities: 2-3 + pre tibial edea.   Skin:      Laboratory and Imaging Studies     I have reviewed  laboratory and imaging studies in the Epic. Pertinent findings are as below:    BMP    Recent Labs  Lab 10/20/18  0740 10/19/18  0932 10/18/18  0835 10/15/18  1214    140 141 136   POTASSIUM 4.2 4.6 4.7 5.6*   CHLORIDE 98 101 101 103   JOSHUA 8.3* 8.4* 8.8 8.3*   CO2 31 29 30 24   BUN 87* 85* 82* 64*   CR 1.59* 1.59* 1.72* 1.90*   * 131* 92 157*     CBC    Recent Labs  Lab 10/18/18  0835   WBC 5.7   RBC 2.58*   HGB 7.9*   HCT " "25.4*   MCV 98   MCH 30.6   MCHC 31.1*   RDW 18.8*        INR    Recent Labs  Lab 10/20/18  0740 10/19/18  0932 10/18/18  1218 10/14/18  0920   INR 1.21* 1.27* 1.33* 1.55*     LFTsNo lab results found in last 7 days.   PANCNo lab results found in last 7 days.        Impression/Plan          Priscilla Way is a 78 year old male with a complex past medical history including dementia (Alzheimer and vascular), liver transplant and kidney transplants in 2000, DVT, T2DM, CVA, and poor airway protection admitted with possible seizure and aspiration.  Progressive renal decline after LILIAM.   Care conference was conducted on 10/15/2018, for the best interest of patient's overall care decision was made for comfort care. Palliative care on board.     Events since initiation of comfort care  has taken a significant turn. Patient's daughter May ( who is always at bedside) reports that patient is making urine out and wanted to check labs, and talk with Nephrology. She is convinced that things have turned around. She states \" we decided to make him comfort care because he was not making urine, and he is not a dialysis candidate. If kidney function is better, then we should do things before. Checking labs, starting trasnplant medication\" Writer (primary team) spoke with the Nephrology and notified them of patient's concerns about talking to them about kidney issues.   Patient's creatinine was checked on 10/18which came at 1.72 which was better than expected and improvement upon 1.9 on 10/15.  He was started back on Tacrolimus per Nephrology. Mycophenolate dose was adjusted.     Per May, patients legal decision maker is her sister Beryl. Social work and writer tried to connect with her but unable to connect as ring goes to voice mail. Messages left to call back. Per [facility], her sisters phone is broken. As patient's legal decision maker is not here, and there is not consistent plan of care I believe we should have care " conference early next week for determining the plan going forward.  I have also explained to the daughter, given Mr. Way overall medical condition hospice care is recommended. Daughter is open to meet with hospice team early next week.         #  Altered mental status: delirium related to underlying long-standing and severe dementia, pain requiring IV opiate analgesia, severe illness, and metabolic changes from renal insufficiency. He is non-communicative now at baseline.      #  DDKT and Liver Transplant now with progressive renal insufficiency: Stable Cr now about 1.9 but fluid/electrolyte status remain problems. K 5.6 yesterday- tacro contributing. Discussed with Dr. Galeas and agreed to stop tacro and increase cellcept from 250 mg to 500 mg/d and prednisone from 5 to 10 mg/d. 3 doses of kayexalate for K mgmt. Hemodialysis will not change the outcome--> he has severe dementia with inability to swallow his food and protect his airway from aspiration. He is agitated requiring hand mitts and in significant pain requiring opiate analgesia (family requested an increase in the dose overnight). Per prior assessment, he was thought to be slowly  dying. He was made comfort care on 10/ 15. Since then course of event has made a significant turn. Patient is making some urine, and daughter wanting to change plan of action.   Creatinine has improved to 1.59 on 10/20, and patient is making urine.   He is being restarted on Tacrolimus. Dose of Mycophenolate reduced to 250 mg BID. Prednisone at 5 mg daily  - Continue Tacrolimus, MMF, and Prednisone  - Tacrolimus level on Monday    # Enterobacter UTI on 10/04: Started on Ceftriaxone and transitioned to Ciprofloxacin. Completed 14 day treatment.       #   Aspiration Pneumonia and poor Airway Protection  Pneumonia resolved after 7d abx.    --> Strict NPO. NJ feeds ongoing but in severe dementia, this has not been shown to improve outcome  - SLP evaluation ordered on 10/19. Case  "discussed. They advised that patient should be NPO because of high aspiration risk. If family wants to feed patient despite the known risks, then they can try DD1 diet with nectar thick liquids.       # Possible slow GI blood loss: Suspect multifactorial including related to chronic disease and LILIAM as well as iatrogenic though patient reportedly had melanotic stools though hemoglobins have since been stable. Higher risk of ongoing bleeding as he has a h/o DVT and needs warfarin anticoagulation.  Hg 7.9 gm/dl on 10/19  - Continue PPI BID   - CBC in AM        # Resolved recurrent C. Diff Colitis:  Toxin positive 9/11/18. Systemic abx d/tamra >10d ago. Overlap and taper done. Vanco now completed.      # DVT On warfarin with INR goal 2-3. Significant bleeding risk but clotting risk outweighs this. Pharmacy dosing of warfarin wihtout heparin overlap.    Continue Warfarin for now, most likely discontinue as not INR check in future.   INR subtherapeutic at 1.21 on 10/20 Not bridging with heparin due to significant bleeding risk  - Continue warfarin . Pharmacy to dose Warfarin       # T2DM: Blood sugar controlled.         # HTN: On carvedilol with PRN hydralazine.      Diet: NPO with NJ feeds.  Adult Formula Drip Feeding: Continuous TwoCal HN; Nasojejunal; Goal Rate: 40; mL/hr; Medication - Tube Feeding Flush Frequency: At least 15-30 mL water before and after medication administration and with tube clogging; Amount to Send. 60 ml every 4 hr      Fluids: As above  Virk Catheter: In place, consider removing in next 1-2 days if urine output stable.   DVT Prophylaxis: Warfarin.   Code Status: DNR/DNI  Social: Spoke with daughter Beryl on 10/19. She would like \"full care\".  Discussed with Palliative care, and . We need to have care conference on Monday for plan going forward.       Disposition Plan   Expected discharge most likely next week to hospice care vs home care if plan gets changed.      Entered: Denny " Cassi 10/20/2018, 11:55 AM            Pt's care was discussed with bedside RN, patient and  during Care Team Rounds.           Denny Ye MD  Hospitalist ( Internal medicine)  Pager: [phone]

## 2018-10-20 NOTE — PLAN OF CARE
"Problem: Patient Care Overview  Goal: Plan of Care/Patient Progress Review  Outcome: Improving    /62 (BP Location: Right arm)  Pulse 80  Temp 96.8  F (36  C) (Axillary)  Resp 16  Ht 1.778 m (5' 10\")  Wt 82.6 kg (182 lb)  SpO2 99%  BMI 26.11 kg/m2    Neuro: withdrawn to pain, baseline end stage alzhimers.  Activity:Pt. Strict bedrest.   Behavior: Occasional tremors in upper extremities.  Lines: L PIV saline lock, CDI. NJ tube feeds running at 45 ml/hr. Free water flush 30 ml Q4hr  Cardiac: S1, S2,  Respritory: No SOB or difficulty breathing  GI/:  No bm this shift. Virk cares completed. 450 ml urine output. sanguinous drainage from tip of penis. Interdry placed in pelvic crease.  Skin: Calazime cream place on tono area. No noticeable redness on coccyx. Generalized edema, +2 BLE. Q2 turning maintained.   Endo:  ,118 no insulin given due to parameter not being met.  Pain: PRN Dilaudid 2 mg given prior to pericare.   Labs: Creatinine 1.59, GFR 42, BUN 87    Tono care provided along with Q2 turns to avoid skin breakdown. Pt urine output increased with light yellow urine.   Plan: Continue to monitor urine output and blood chemistries.  Discharge to home TBD  "

## 2018-10-20 NOTE — PLAN OF CARE
Problem: Patient Care Overview  Goal: Plan of Care/Patient Progress Review  Outcome: No Change  Time  [id number]    Pt on comfort cares, family at bedside entire shift. Pt disoriented and non-verbal. Turns q2 hours, incontinence care x2, including large bowel movement. Virk putting out about 50 mL/hr of urine. Virk cares completed. Interdry applied between abdominal folds, groin. Miconazole powder applied to groin area. Bacitracin for penis sores. PRN dilaudid x1 for pain associated with turns. BGs stable. TF at goal with all meds going through feeding tube. Family reeducated and agreeable to speech therapy recs to not feed patient orally. Will continue to monitor and follow POC.

## 2018-10-20 NOTE — PLAN OF CARE
Problem: Patient Care Overview  Goal: Plan of Care/Patient Progress Review  Outcome: No Change  9613-6024     Patient is on comfort cares, family at bedside all shift. Patient somnolent, arouses to touch; unable to assess orientation. Patient not speaking and/or following commands. Turned and repositioned Q 2 hrs per daughter's request. Pulsate mattress in use. Pt has TF running at goal rate of 45 mL/hr to nasojejunal tube. No signs/symptoms of nausea, no emesis. Blood sugars checked q 4 hours and covered with sliding scale insulin. L PIV is saline locked. Dressing intact to R neck. CVC removed. Virk remains in place for strict I&Os; good ouput this shift. Incontinent of loose stools x 2. Blanchable redness noted to coccyx; barrier cream applied. Muscle tension and restlessness noted intermittently, PRN dilaudid 2 mg solution given per daughter request with turning and repositioning. Possible discharge home with hospice services when arrangements made.

## 2018-10-21 NOTE — PROGRESS NOTES
"[facility], [place]   Internal Medicine Daily Note           Interval History/Events      I have known Priscilla from before. Progress of events noted.  No new complaints  Making adequate amount of urine  Tolerating Tube feeds which is at goal  No fever or chills  Grand son at bed side  Also had conversation with Cruz, patient's daughter          Review of Systems        4 point ROS including Respiratory, CV, GI and , other than that noted above is negative      Medications   I have reviewed current medications  in the \"current medication\" section of Epic.  Relevant changes include:     Physical Exam   General:       Vital signs:    Blood pressure 161/69, pulse 79, temperature 97.7  F (36.5  C), temperature source Axillary, resp. rate 16, height 1.778 m (5' 10\"), weight 77.3 kg (170 lb 6.4 oz), SpO2 98 %.  Estimated body mass index is 24.45 kg/(m^2) as calculated from the following:    Height as of this encounter: 1.778 m (5' 10\").    Weight as of this encounter: 77.3 kg (170 lb 6.4 oz).      Intake/Output Summary (Last 24 hours) at 10/16/18 1527  Last data filed at 10/16/18 1400   Gross per 24 hour   Intake              420 ml   Output             1580 ml   Net            -1160 ml      HEENT: NJ in place.   Cardiovascular: S1, S2 normal.   Respiratory:  B/l CTA  GI/Abdomen: Soft, mild distended.   Neurology: No tremors.   Extremities: 2-3 + pre tibial edea.   Skin: No bruising/ bleeding      Laboratory and Imaging Studies     I have reviewed  laboratory and imaging studies in the Epic. Pertinent findings are as below:    BMP    Recent Labs  Lab 10/21/18  0748 10/20/18  0740 10/19/18  0932 10/18/18  0835    139 140 141   POTASSIUM 4.2 4.2 4.6 4.7   CHLORIDE 98 98 101 101   JOSHUA 8.5 8.3* 8.4* 8.8   CO2 31 31 29 30   BUN 92* 87* 85* 82*   CR 1.60* 1.59* 1.59* 1.72*   * 110* 131* 92     CBC    Recent Labs  Lab 10/21/18  0748 10/18/18  0835   WBC 4.5 5.7   RBC 2.75* 2.58*   HGB " "8.3* 7.9*   HCT 27.2* 25.4*   MCV 99 98   MCH 30.2 30.6   MCHC 30.5* 31.1*   RDW 18.3* 18.8*    449     INR    Recent Labs  Lab 10/21/18  0748 10/20/18  0740 10/19/18  0932 10/18/18  1218   INR 1.34* 1.21* 1.27* 1.33*     LFTsNo lab results found in last 7 days.   PANCNo lab results found in last 7 days.        Impression/Plan          Priscilla Way is a 78 year old male with a complex past medical history including dementia (Alzheimer and vascular), liver transplant and kidney transplants in 2000, DVT, T2DM, CVA, and poor airway protection admitted with possible seizure and aspiration.  Progressive renal decline after LILIAM.   Care conference was conducted on 10/15/2018, for the best interest of patient's overall care decision was made for comfort care. Palliative care on board.     Events since initiation of comfort care  has taken a significant turn. Patient's daughter May ( who is always at bedside) reports that patient is making urine out and wanted to check labs, and talk with Nephrology. She is convinced that things have turned around. She states \" we decided to make him comfort care because he was not making urine, and he is not a dialysis candidate. If kidney function is better, then we should do things before. Checking labs, starting trasnplant medication\" Writer (primary team) spoke with the Nephrology and notified them of patient's concerns about talking to them about kidney issues.   Patient's creatinine was checked on 10/18which came at 1.72 which was better than expected and improvement upon 1.9 on 10/15.  He was started back on Tacrolimus per Nephrology. Mycophenolate dose was adjusted.     Per May, patients legal decision maker is her sister Beryl. Social work and writer tried to connect with her but unable to connect as ring goes to voice mail. Messages left to call back. Per May, her sisters phone is broken. As patient's legal decision maker is not here, and there is not consistent plan " of care I believe we should have care conference early next week for determining the plan going forward.  However, it appears that since patient's renal function has improved, they would continue with aggressive care for now        #  Altered mental status:   delirium related to underlying long-standing and severe dementia, pain requiring IV opiate analgesia, severe illness, and metabolic changes from renal insufficiency. He is non-communicative now at baseline.   Per Grandson, who is at bedside, patient has at his baseline       #  DDKT and Liver Transplant now with progressive renal insufficiency: Stable Cr now about 1.9 but fluid/electrolyte status remain problems. K 5.6 yesterday- tacro contributing. Discussed with Dr. Galeas and agreed to stop tacro and increase cellcept from 250 mg to 500 mg/d and prednisone from 5 to 10 mg/d. 3 doses of kayexalate for K mgmt. Hemodialysis will not change the outcome--> he has severe dementia with inability to swallow his food and protect his airway from aspiration. He is agitated requiring hand mitts and in significant pain requiring opiate analgesia (family requested an increase in the dose overnight). Per prior assessment, he was thought to be slowly  dying. He was made comfort care on 10/ 15. Since then course of event has made a significant turn. Patient is making some urine, and daughter wanting to change plan of action.   Creatinine has improved to 1.59 on 10/20, and patient is making urine.   He is being restarted on Tacrolimus. Dose of Mycophenolate reduced to 250 mg BID. Prednisone at 5 mg daily  - Continue Tacrolimus, MMF, and Prednisone  - Tacrolimus level on Monday    # Enterobacter UTI on 10/04: Started on Ceftriaxone and transitioned to Ciprofloxacin. Completed 14 day treatment.       #   Aspiration Pneumonia and poor Airway Protection  Pneumonia resolved after 7d abx.    --> Strict NPO. NJ feeds ongoing but in severe dementia, this has not been shown to improve  "outcome  - SLP evaluation ordered on 10/19. Case discussed. They advised that patient should be NPO because of high aspiration risk. If family wants to feed patient despite the known risks, then they can try DD1 diet with nectar thick liquids.   Currently at goal with tube feeding. We may try to cycle feeds.      # Possible slow GI blood loss: Suspect multifactorial including related to chronic disease and LILIAM as well as iatrogenic though patient reportedly had melanotic stools though hemoglobins have since been stable. Higher risk of ongoing bleeding as he has a h/o DVT and needs warfarin anticoagulation.  Hg 7.9 gm/dl on 10/19  - Continue PPI BID   - CBC in AM        # Resolved recurrent C. Diff Colitis:  Toxin positive 9/11/18. Systemic abx d/tamra >10d ago. Overlap and taper done. Vanco now completed.      # DVT On warfarin with INR goal 2-3. Significant bleeding risk but clotting risk outweighs this. Pharmacy dosing of warfarin wihtout heparin overlap.    Continue Warfarin for now, most likely discontinue as not INR check in future.   INR subtherapeutic at 1.21 on 10/20 Not bridging with heparin due to significant bleeding risk  - Continue warfarin . Pharmacy to dose Warfarin       # T2DM: Blood sugar controlled.         # HTN: On carvedilol with PRN hydralazine.      Diet: NPO with NJ feeds.  Adult Formula Drip Feeding: Continuous TwoCal HN; Nasojejunal; Goal Rate: 40; mL/hr; Medication - Tube Feeding Flush Frequency: At least 15-30 mL water before and after medication administration and with tube clogging; Amount to Send. 60 ml every 4 hr      Fluids: As above  Virk Catheter: In place, consider removing in next 1-2 days if urine output stable.   DVT Prophylaxis: Warfarin.   Code Status: DNR/DNI  Social:  Dr Ye Spoke with daughter Beryl on 10/19. She would like \"full care\".  Discussed with Palliative care, and . We need to have care conference on Monday for plan going forward.       Disposition " Plan   Expected discharge most likely next week to hospice care vs home care if plan gets changed.      Entered: Julia Jessica 10/21/2018, 1:19 PM            Pt's care was discussed with bedside RN, patient and  during Care Team Rounds.         Dr ADITYA Dumont MD, [facility]  Hospitalist ( Internal medicine)  Pager: [phone]

## 2018-10-21 NOTE — PLAN OF CARE
Problem: Patient Care Overview  Goal: Plan of Care/Patient Progress Review  Outcome: No Change  Pt on comfort cares. Non verbal. Pt appears comfortable.  TF running at goal rate of 45 ml/hr.  Miconazole powder applied to groin area. Bacitracin applied to penis sores.  BG overnight was 155. Medications given in NG tube.  Pt to discharge to hospice, family needs to decide placement. Will continue to monitor and follow POC.

## 2018-10-21 NOTE — PLAN OF CARE
"Problem: Patient Care Overview  Goal: Plan of Care/Patient Progress Review  Outcome: No Change  Time   1497-3410    Pt disoriented, non-verbal except a few words in [place]. On comfort cares. Grandson at bedside entire shift. Turns q2, incontinent of stool x2. Barrier cream, miconazole powder applied to bottom and groin. Bacitracin for penis sores. Virk put out 625 mL of urine this shift. TF running at goal. All meds through feeding tube. BGs stable. Cr stable. Daughter Beryl would like \"full care\".Anticipate care conference Monday. Will continue to monitor and follow POC.       "

## 2018-10-21 NOTE — PLAN OF CARE
Problem: Patient Care Overview  Goal: Plan of Care/Patient Progress Review  Outcome: No Change  Pt. On comfort cares.  Max temp 99.7 axillary.  Pt. Non verbal.  No indications of pain.  TF running at goal rate of 45 ml/hr.  Turned q2 hours.  Interdry applied between abdominal folds, groin. Miconazole powder applied to groin area. Bacitracin applied to penis sores.   and 167. TF at goal with all meds going through feeding tube. Family continue plan of care.

## 2018-10-21 NOTE — PLAN OF CARE
"Problem: Patient Care Overview  Goal: Plan of Care/Patient Progress Review  Outcome: Improving  /69 (BP Location: Left arm)  Pulse 79  Temp 97.7  F (36.5  C) (Axillary)  Resp 16  Ht 1.778 m (5' 10\")  Wt 77.3 kg (170 lb 6.4 oz)  SpO2 98%  BMI 24.45 kg/m2    VSS. Pt. Awake this morning. Appeared comfortable. Pt. Verbally told grandson he is not in pain. NJ medications given this AM along with bacitracin for penial sore. Barrier cream on bottom around sphincter and coccyx. Full bed bath with skin incpection. Oral cares provided with suction. Virk cares completed. Miconazole applied to groin and abdominal folds. Urine output at 50 ml/hr for shift. NJ tube feeds maintained at 45 ml/hr. , 124 no insulin required. No PRN analgesic given this shift. Grandson at bedside during entire shift. Family involved in cares.       "

## 2018-10-22 NOTE — PLAN OF CARE
Problem: Patient Care Overview  Goal: Plan of Care/Patient Progress Review  Outcome: No Change  Pt speaks only Guyanese. On comfort cares.  Turns q2, incontinent of stool x 3. Barrier cream, miconazole powder applied to bottom and groin.   TF running at goal at 45 ml/hr. All meds given through the feeding tube. Last BG was 109. Anticipate care conference Monday. Will continue to monitor and follow POC.  Pt to discharge to hospice, family needs to decide placement. Will continue to monitor and follow POC.

## 2018-10-22 NOTE — PROGRESS NOTES
"[facility], [place]   Internal Medicine Daily Note           Interval History/Events     Uneventful night. No acute events  Daughter May was present when I went to see patient  Per May, she wants to be able to train with tube feeding and take her dad home  They still do not want anti epileptics  I did discuss that the tube feeding with NG tube is not a long term measure. She wants to discuss with the family about tube feeding again.       Review of Systems        4 point ROS including Respiratory, CV, GI and , other than that noted above is negative      Medications   I have reviewed current medications  in the \"current medication\" section of Epic.  Relevant changes include:     Physical Exam   General:       Vital signs:    Blood pressure (!) 148/128, pulse 79, temperature 99  F (37.2  C), temperature source Axillary, resp. rate 16, height 1.778 m (5' 10\"), weight 78.4 kg (172 lb 12.8 oz), SpO2 98 %.  Estimated body mass index is 24.79 kg/(m^2) as calculated from the following:    Height as of this encounter: 1.778 m (5' 10\").    Weight as of this encounter: 78.4 kg (172 lb 12.8 oz).      Intake/Output Summary (Last 24 hours) at 10/16/18 1527  Last data filed at 10/16/18 1400   Gross per 24 hour   Intake              420 ml   Output             1580 ml   Net            -1160 ml      HEENT: NJ in place.   Cardiovascular: S1, S2 normal.   Respiratory:  B/l CTA  GI/Abdomen: Soft, mild distended.   Neurology: No tremors.   Extremities: 2-3 + pre tibial edea.   Skin: No bruising/ bleeding      Laboratory and Imaging Studies     I have reviewed  laboratory and imaging studies in the Epic. Pertinent findings are as below:    BMP    Recent Labs  Lab 10/22/18  0535 10/21/18  0748 10/20/18  0740 10/19/18  0932    141 139 140   POTASSIUM 4.1 4.2 4.2 4.6   CHLORIDE 96 98 98 101   JOSHUA 8.4* 8.5 8.3* 8.4*   CO2 35* 31 31 29   BUN 91* 92* 87* 85*   CR 1.57* 1.60* 1.59* 1.59*   * 129* " "110* 131*     CBC    Recent Labs  Lab 10/22/18  0535 10/21/18  0748 10/18/18  0835   WBC 4.8 4.5 5.7   RBC 2.76* 2.75* 2.58*   HGB 8.3* 8.3* 7.9*   HCT 27.8* 27.2* 25.4*   * 99 98   MCH 30.1 30.2 30.6   MCHC 29.9* 30.5* 31.1*   RDW 17.9* 18.3* 18.8*    372 449     INR    Recent Labs  Lab 10/22/18  0535 10/21/18  0748 10/20/18  0740 10/19/18  0932   INR 1.51* 1.34* 1.21* 1.27*     LFTsNo lab results found in last 7 days.   PANCNo lab results found in last 7 days.        Impression/Plan        Priscilla Way is a 78 year old male with a complex past medical history including dementia (Alzheimer and vascular), liver transplant and kidney transplants in 2000, DVT, T2DM, CVA, and poor airway protection admitted with possible seizure and aspiration.  Progressive renal decline after LILIAM.   Care conference was conducted on 10/15/2018, for the best interest of patient's overall care decision was made for comfort care. Palliative care on board.     Events since initiation of comfort care  has taken a significant turn. Patient's daughter May ( who is always at bedside) reports that patient is making urine out and wanted to check labs, and talk with Nephrology. She is convinced that things have turned around. She states \" we decided to make him comfort care because he was not making urine, and he is not a dialysis candidate. If kidney function is better, then we should do things before. Checking labs, starting trasnplant medication\" Writer (primary team) spoke with the Nephrology and notified them of patient's concerns about talking to them about kidney issues.   Patient's creatinine was checked on 10/18which came at 1.72 which was better than expected and improvement upon 1.9 on 10/15.  He was started back on Tacrolimus per Nephrology. Mycophenolate dose was adjusted.     The plan of action now is to continue with full care, but maintain DNR/ DNI status and discharge patinet home       #  Altered mental status: "   delirium related to underlying long-standing and severe dementia, pain requiring IV opiate analgesia, severe illness, and metabolic changes from renal insufficiency. He is non-communicative now at baseline.  Currently at baseline mental status per daughter May       #  DDKT and Liver Transplant now with progressive renal insufficiency: Stable Cr now about 1.9 but fluid/electrolyte status remain problems. K 5.6 yesterday- tacro contributing. Discussed with Dr. Galeas and agreed to stop tacro and increase cellcept from 250 mg to 500 mg/d and prednisone from 5 to 10 mg/d. 3 doses of kayexalate for K mgmt. Hemodialysis will not change the outcome--> he has severe dementia with inability to swallow his food and protect his airway from aspiration. He is agitated requiring hand mitts and in significant pain requiring opiate analgesia (family requested an increase in the dose overnight). Per prior assessment, he was thought to be slowly  dying. He was made comfort care on 10/ 15. Since then course of event has made a significant turn. Patient is making some urine, and daughter wanting to change plan of action.   Creatinine has improved to 1.59 on 10/20, and patient is making urine.  Creatinine has since been stable   He is being restarted on Tacrolimus. Dose of Mycophenolate reduced to 250 mg BID. Prednisone at 5 mg daily  - Continue Tacrolimus, MMF, and Prednisone  - Tacrolimus level today ( 10/22) at < 3.  Dicussed with Renal transplant team to adjust dose. Current dose at 1/1.5 mg . Increased to 1.5 mg BID   Repeat check on Wednesday     # Enterobacter UTI on 10/04: Started on Ceftriaxone and transitioned to Ciprofloxacin. Completed 14 day treatment.       #   Aspiration Pneumonia and poor Airway Protection  Pneumonia resolved after 7d abx.    --> Strict NPO. NJ feeds ongoing but in severe dementia, this has not been shown to improve outcome  - SLP evaluation ordered on 10/19. Case discussed. They advised that patient  should be NPO because of high aspiration risk. If family wants to feed patient despite the known risks, then they can try DD1 diet with nectar thick liquids.   Currently at goal with tube feeding. We may try to cycle feeds.      # Possible slow GI blood loss: Suspect multifactorial including related to chronic disease and LILIAM as well as iatrogenic though patient reportedly had melanotic stools though hemoglobins have since been stable. Higher risk of ongoing bleeding as he has a h/o DVT and needs warfarin anticoagulation.  Hg 7.9 gm/dl on 10/19  - Continue PPI BID   - Hgb stable at 8.3 on 10/22       # Resolved recurrent C. Diff Colitis:  Toxin positive 9/11/18. Systemic abx d/tamra >10d ago. Overlap and taper done. Vanco now completed.      # DVT On warfarin with INR goal 2-3. Significant bleeding risk but clotting risk outweighs this. Pharmacy dosing of warfarin wihtout heparin overlap.    Continue Warfarin for now, most likely discontinue as not INR check in future.   INR subtherapeutic at 1.21 on 10/20 Not bridging with heparin due to significant bleeding risk  - Continue warfarin . Pharmacy to dose Warfarin  -INR at 1.51 on 10/22        # T2DM: Blood sugar controlled.         # HTN: On carvedilol with PRN hydralazine.      Diet: NPO with NJ feeds.  Adult Formula Drip Feeding: Continuous TwoCal HN; Nasojejunal; Goal Rate: 40; mL/hr; Medication - Tube Feeding Flush Frequency: At least 15-30 mL water before and after medication administration and with tube clogging; Amount to Send. 60 ml every 4 hr      Fluids: As above  Virk Catheter: In place, consider removing in next 1-2 days if urine output stable.   DVT Prophylaxis: Warfarin.   Code Status: DNR/DNI  Social:  Family would like to take patient home with NG tube feeding. They will pursue full care, but code status will still be DNR/ DNI        Disposition Plan   Expected discharge to home mid this week after increased PCA and tube feed teaching is taught to the  patient's family      Entered: Julia Jessica 10/22/2018, 11:58 AM            Pt's care was discussed with bedside RN, patient and  during Care Team Rounds.         Dr ADITYA Dumont MD, [facility]  Hospitalist ( Internal medicine)  Pager: [phone]

## 2018-10-22 NOTE — PLAN OF CARE
Problem: Patient Care Overview  Goal: Plan of Care/Patient Progress Review  Outcome: No Change  [id number]:  T max 99.6; improved slightly without intervention but pt's daughter states he has been having low grade fevers at night and requested Tylenol to be given. Tylenol also given for discomfort. Dilaudid x1 (0.2 mg IV) for grimace and moaning with activity. Turn and reposition every 2 hours. Incontinent of stool x1. Barrier cream applied to bottom. Miconazole powder to groin, Inter-Dry between abdominal folds; Bacitracin as ordered to sores on penis. Virk intact and catheter care wipes done;  for shift and after receiving 4 mg Bumex. Meds given via NG tube; NG tube was sluggish to push. Continuous TF at 45 ml/hr with programmed free water flushes 30 ml Q4H. Additional flushes of 120 ml given with meds; despite these flushes and yet NG became clogged after 1930 med administration. Clog zapper used to return flow to NG and TF resumed. NPO.  (1 unit Novolog coverage) & HS . Family at bedside around the clock. Daughter spending the night; anticipate need for care conference as pt is reportedly on comfort cares, but daughter requests total care. Continue to monitor and follow POC.

## 2018-10-22 NOTE — PROGRESS NOTES
Care Coordinator Progress Note    Admission Date/Time:  9/11/2018  Attending MD:  Cat Perla*    Data  Chart reviewed, discussed with interdisciplinary team.   Patient was admitted for:    Aspiration pneumonia due to regurgitated food, unspecified laterality, unspecified part of lung (H)  Kidney replaced by transplant  LILIAM (acute kidney injury) (H)  Liver replaced by transplant (H)  Anemia in stage 3 chronic kidney disease (H)  Seizure (H)  CKD (chronic kidney disease) stage 3, GFR 30-59 ml/min (H)  Delirium.      Coordination of Care and Referrals: Writer met with daughter at the bedside to discuss discharge planning. Per family request, they would like to discharge to home with resumption of [facility], resumption order placed. Patient will discharge with NG for enteral tube feeds, [facility] referral placed, has 100% coverage and no deductible, HI order placed. Order entered for patient learning center for daughter for tomorrow, appt scheduled for 1:30pm on 10/23. Writer called patient's [facility]  and left a VM regarding discharge planning and potential for increase of PCA hours.      Assessment  Previous full assessment completed. Daughter May at the beside and involved in discharge planning to home.      Plan  Anticipated Discharge Date:  TBD  Anticipated Discharge Plan: Home w/resumption of [facility], resumption of PCA services, [facility] for feedings.     Luann Rosas, GLORIA, BSN    [facility]    Medicine Group  [address] [place] [id number]    [email or web address]    Office: [phone] Pager: [phone]  To contact weekend RNCC, dial [phone] and enter pager number [phone] at prompt. This pager can not be contacted by text page or outside line.

## 2018-10-22 NOTE — PROGRESS NOTES
[facility], [place]   Palliative Care Daily Progress Note          Discussed updates from the weekend with Dr. Dumont today and family decided to pursue restorative goals at this time. Palliative Care was consulted for decisional support and goals of care. At this time goals are clear, so we will sign off. Please feel free to reconsult us if we can be helpful in the future. Thank you for the opportunity to be involved in the care of this patient.    VEL Zhao CNS  Palliative Care Consult Team  Pager: [phone]

## 2018-10-23 NOTE — PROGRESS NOTES
EEG CLINICAL NEUROPHYSIOLOGY PRELIMINARY REPORT    First hour of video-EEG monitoring reviewed. Mixed theta alpha background. Near continuous generalized periodic sharp waves noted at rate of 1.8 to 2.2 Hz. These do not really have morphology of triphasic activity. Infrequent myoclonic jerks are noted that are not associate with the generalized periodic sharp waves. Organized electrographic seizures are not recorded.    Findings are consistente with moderate to severe diffuse encephalopathy. The near continuous generalized periodic sharp waves may be a manfiestation of renal or hepatic failure or of advanced dementia. Atypical nonconvulsive status is also possible. These findings were not seen I nlast EEG approximately one month ago increasing likelihood that they represent atypical nonconvulsive status following a convulsive seizure. Levetiracetam was recommended in September but I do not see this on patient's current MAR.    Paged managing service but have not yet heard back. If aggressive treatment is thought appropriate, administering low dose midazolam or other benzo and observing for EEG changes could be considered. Levetiracetam load with appropriate adjustment for renal status could be considered. Formal neurology input could be considered.    Barrington Parker MD  Pager [phone]    Called floor and connected with cross covering physician; above discussed.    Barrington Parker MD  Pager [phone]

## 2018-10-23 NOTE — PROGRESS NOTES
"CLINICAL NUTRITION SERVICES - REASSESSMENT NOTE     Nutrition Prescription    RECOMMENDATIONS FOR MDs/PROVIDERS TO ORDER:  --Plan to begin cycle TFs tonight 10/22. Patient has diabetes and is on insulin. Please adjust patients insulin with start of cycle TFs.   ---Watch for hypoglycemia signs during down time from TFs.     Malnutrition Status:    Severe malnutrition in the context of chronic condition    Recommendations already ordered by Registered Dietitian (RD):    ---Once able to use TF access today, continue with Nepro @ 45 ml/hr via NJ tube.     Once able to tolerate TF x 24 hours, then begin to Cycle TFs per following:    Day 1: @ 6:00 pm, increase TFs rate to new goal @ 60 ml/hr and infuse x 18 hours ( from 6:00 pm - 12:00 noon).  ---Free water flushes: Run via feed and flush @ 45 ml/hr x 18 hours +  120 ml free water flushes before and after each feeds.       Day 2: IF tolerated, increase TF rate to final goal @75 ml/hr x 14 hour ( from 6:00 pm -- 8:00 am) Receiving around 170 gm CHO/day ( 12 gm carb /hour).     --Free water flushes: via feed and flush @ 55 ml/hr x 14 hours + 120 ml free water flushes before and after each cycle feeds.       Water flushes: patient would need 960-1200 ml free water daily to provide for full hydration needs or per MD decision. Recommend feed and flush system to assure patient is receiving appropriate hydration need and avoid FT clogging.       Future/Additional Recommendations:  None        EVALUATION OF THE PROGRESS TOWARD GOALS   Diet:  NPO. SLP evaluation ordered on 10/19. They advised that patient should be NPO because of high aspiration risk. Per MD note, \"If family wants to feed patient despite the known risks, then they can try DD1 diet with nectar thick liquids\".       Nutrition Support started on 10/2:   --Access: NJ tube ( placed 10/1/2018) (FEEDING TUBE PLACEMENT 10/1/2018: Feeding tube tip was in the jejunum at the ligament of Treitz).      --Dosing wt: 76 kg dry " wt this admit on 9/27     --Regimen: Tf switched to Nepro at 45 ml/hr due to elevated K+, ( was on Nutren 1.5 for the past week and TwoCal HN previous to that):      --Nepro @ goal 45 ml/hr (1080 ml/day) to provide 1944 kcals (26 kcal/kg/day), 87 gm PRO (1.1 gm/kg/day), 788 ml free H2O, 174 gm CHO and 14 gm Fiber daily.     --Free water flushes: 60 ml every 4 hours   --Nutrisource fiber, 1 packet TID ( Stool out put continues average 3-9 daily).           Intake:   PO: Strict NPO for aspiration precaution. Has severe dementia with inability to swallow his food and protect his airway from aspiration. Per daughters report, not feeding patient any food.    TF: Average 7 day TF provided: ~ 670 ml/day ( ~ 62% estimated goal volume)  Provided patient with 1205 kcal/day (16 kcal /kg) and 54 gm protein ( 0.7 gm/kg).     --Feeding tube has been clogged all day. Per nursing unable to get it to work with clog zapper. No TF at this time.        NEW FINDINGS   --Extremities: 2-3 + pre tibial edema.  -Past medical history including dementia (Alzheimer and vascular), liver transplant and kidney transplants in 2000, DVT, T2DM, CVA, and poor airway protection.     --Admitted with possible seizure and aspiration.  Progressive renal decline after LILIAM.   --Kidney functions improving, UOP increasing, patient started back on immune suppressant medication.   ---Resolved recurrent C. Diff Colitis:  Toxin positive 9/11/18. Abx d/tamra >10 days ago.  ---Family would like to take patient home with NG tube feeding. They will pursue full care, but code status will still be DNR/ DNI    --Labs noted: BUN: 98, Cr: 1.61 - H      MALNUTRITION  % Intake: </=75% for >/= 1 month (severe)  % Weight Loss: > 5% in 1 month (severe) + previously volume up   Subcutaneous Fat Loss: Unable to assess  Muscle Loss: Bedridden, 4 limbs contractions  Fluid Accumulation/Edema: Moderate  Malnutrition Diagnosis: Severe malnutrition in the context of chronic  condition    Previous Goals   Total avg nutritional intake to meet a minimum of 25 kcal/kg and 1.0 gm PRO/kg daily (per dosing wt 76 kg actual wt, lower wt on 9/27).     Evaluation: Not met    Previous Nutrition Diagnosis  Inadequate oral intake related to reduced AMS with s/sx of aspiration with PO intake in the setting of advanced dementia,  poor oral awareness as evidenced by reliance on NJ tube feed support to provide for full nutrition / hydration needs  Evaluation: No change    CURRENT NUTRITION DIAGNOSIS  Inadequate oral intake related to s/sx of aspiration with PO intake in the setting of advanced dementia and poor oral awareness as evidenced by reliance on NJ tube feed support to provide for full nutrition / hydration needs.     INTERVENTIONS  Implementation  Education:   --Spoke with daughter today. Family interested in cycling patients feeding given plan to discharge home. Will begin an 18 hour cycle feeds today, if tolerated, will advance to 14 hour goal cycle within the next 1-2 days. Discussed with family, possibility of long term tube access such as G/J tube? Family is hopeful that patient can get strong enough to be able to take food orally.     Enteral Nutrition - Initiated cycle feeds 18 hour  Feeding tube flush - adjusted       Goals  Total avg nutritional intake to meet a minimum of 25 kcal/kg and 1.0 gm PRO/kg daily (per dosing wt 76 kg baseline UBW ).    Monitoring/Evaluation  Progress toward goals will be monitored and evaluated per protocol.    Iveth Mann RD/LANCE  Pager [phone]

## 2018-10-23 NOTE — PLAN OF CARE
Problem: Patient Care Overview  Goal: Plan of Care/Patient Progress Review  Outcome: No Change  Unable to assess orientation. VSS on RA. No nonverbal signs of pain. Moves via lift. TF at the goal rate of 45 ml/ hr with 30 ml q4h until attempted med administration this morning. NJ tube became clogged. No meds administered this morning. Float float called to administer clog zapper. Attempted to flush tube after three separate administrations of tube zapper. Clog still present as of 1530. Daughter at bedside, went to patient education session this afternoon. Patient penile wound present, ointment and powder applied to tono area. Patient incontinent of stool and urine. Continue to monitor and follow POC.

## 2018-10-23 NOTE — PLAN OF CARE
Problem: Patient Care Overview  Goal: Plan of Care/Patient Progress Review  Outcome: No Change  Pt on comfort cares. Unable to assess orientation. Pt's stepdaughter at bedside. TF at the goal rate of 45 ml/ hr with 30 ml q4h. Pt felt warm, axillary temp was 99.3, Tylenol was given. Pt was turned every 2 hours. Pt had an excoriated penis and bottom, cream and ointment applied.  Pt incontinent of stool. Pt voiding after patino removed. Bladder scan was 38. Plan for PLC tomorrow for the daughter to learn about TFs at 1330. Continue with plan of care.

## 2018-10-23 NOTE — PLAN OF CARE
Problem: Patient Care Overview  Goal: Plan of Care/Patient Progress Review  Outcome: No Change  0993-1606: Vitals stable on room air. BGs <120 - no sliding scale insulin. Feeding tube is clogged. 2 administrations of clog zapper and several RN attempts at dislodging clog were unsuccessful. MD was notified and xray feeding tube replacement ordered. Xray originally planned to replaced today but moved to tomorrow. [employer] notified and Mycophenolate changed to IV version. Turn q 2 hours with incontinence cares. Pt has had at least one very soaked with urine brief and some liquid stool. EEG set up at bedside to monitor patient's twitching. Mitts on hands bilaterally for line pulling.

## 2018-10-23 NOTE — PLAN OF CARE
Problem: Patient Care Overview  Goal: Discharge Needs Assessment  10/23/18 9385     Katharine Rutherford-Registered Nurse (Nursing)  Patients daughter attended feeding tube class alone. RD on model with all cares including site care, anchoring tube, flushing, medication administration and use of Alexi infusion pump. Daughter had many questions. States she would prefer feedings to infuse in morning for a few hours and in evening, not over night since she will not be staying overnight with pt. States pt has a PCA but will need several training sessions. Received written material related to all content taught. States she appreciated class and hopes to practice skills on pt before discharge.

## 2018-10-23 NOTE — PROGRESS NOTES
Care Coordinator Progress Note    Admission Date/Time:  9/11/2018  Attending MD:  Cat Perla*    Data  Chart reviewed, discussed with interdisciplinary team.   Patient was admitted for:    Aspiration pneumonia due to regurgitated food, unspecified laterality, unspecified part of lung (H)  Kidney replaced by transplant  LILIAM (acute kidney injury) (H)  Liver replaced by transplant (H)  Anemia in stage 3 chronic kidney disease (H)  Seizure (H)  CKD (chronic kidney disease) stage 3, GFR 30-59 ml/min (H)  Delirium.      Coordination of Care:   Writer met with daughter May at the bedside to discuss discharge planning. Per May patient will discharge to home with resumption of [facility], resumption order and MD face to face completed.     Patient will discharge with NG for enteral tube feeds, [facility] referral placed, has 100% coverage and no deductible, [facility] order placed. Daughter May attending PLC for tube feeding today 1:30pm. May has spoken to dietitian regarding cycling TF today. May does not want to dc home until TF are fully cycled and patient is tolerating. Per Dietitian note cycling begins today and will be at goal on Saturday. Plan for dc Saturday if patient is tolerating.    Writer spoke with patient's [facility]  Jodi (Ph: [phone]) regarding discharge planning. There is no increase in PCA hours if TF is cycled to twelve hours on and twelve hours off (May is aware and okay with that, wants to continue with cycling TF anyway).     Assessment  Previous full assessment completed. Daughter May at the beside and involved in discharge planning to home.     Referrals:     [facility]  Phone  [phone]  Fax  [phone]    [facility]  Phone # [phone]  Fax # [phone]        Plan  Anticipated Discharge Date:  TBD  Anticipated Discharge Plan: Home w/resumption of [facility], resumption of PCA services, [facility] for feedings.     Radha Ahuja RN, BSN,  [facility]  Medicine Care Coordinator  [address], [address], [address] and [facility]  Desk Phone: [phone]  Pager: [phone]    To contact Weekend [facility], dial [phone] and enter job code [phone] at prompt.   This pager can not be contacted by text page or outside line.

## 2018-10-23 NOTE — PLAN OF CARE
Pt on comfort cares. Pt mostly nonverbal but spoke a few words in Taiwanese. Turns q2. Incontinent of stool x 2. Virk removed today. Voided x 2. Bladder scan showed 38 mL. One unit of insulin given per sliding scale at 1800. Pt rigid with agitation during cares and calms and looks relaxed in between cares. Tylenol given for temperature of 99.3. NG tube in place. TFs running at 45 mL/hr. Barrier cream applied to groin and buttocks. Micanozale powder applied to groin. Bacitracin applied to penis sores. Granddaughter at bedside. Plan for daughter to receive education on tube feeds tomorrow. Possible discharge to home this week.

## 2018-10-23 NOTE — PROGRESS NOTES
"[facility], [place]   Internal Medicine Daily Note           Interval History/Events     Daughter May present at bedside.   Reports twitchy movements of head and upper extremity.   Having BM- loose x 2-3 since last night attributed to tube feeding.   Good UOP  No fever or chills  No emesis.  paz tube feeding.   No other concern.          Review of Systems        4 point ROS including Respiratory, CV, GI and , other than that noted above is negative      Medications   I have reviewed current medications  in the \"current medication\" section of Epic.  Relevant changes include:     Physical Exam       Vital signs:    Blood pressure 163/63, pulse 85, temperature 97.9  F (36.6  C), temperature source Axillary, resp. rate 20, height 1.778 m (5' 10\"), weight 75.4 kg (166 lb 4.8 oz), SpO2 95 %.  Estimated body mass index is 23.86 kg/(m^2) as calculated from the following:    Height as of this encounter: 1.778 m (5' 10\").    Weight as of this encounter: 75.4 kg (166 lb 4.8 oz).      Gen: intermittent twitching to jerky movements of upper part of the body, mostly UE.   HEENT: NJ in place.   Cardiovascular: S1, S2  Regular.    Respiratory:  B/l CTA. Non labored.   GI/Abdomen: Soft, mild distended. BS+.   Neurology: alert. Twitching (as above, new per May)  Extremities: 2+ pre tibial edema.   Skin: No obvious bruising/ bleeding      Laboratory and Imaging Studies     I have reviewed  laboratory and imaging studies in the Epic. Pertinent findings are as below:    BMP    Recent Labs  Lab 10/23/18  0641 10/22/18  0535 10/21/18  0748 10/20/18  0740    142 141 139   POTASSIUM 4.1 4.1 4.2 4.2   CHLORIDE 97 96 98 98   JOSHUA 8.3* 8.4* 8.5 8.3*   CO2 34* 35* 31 31   BUN 98* 91* 92* 87*   CR 1.61* 1.57* 1.60* 1.59*   * 123* 129* 110*     CBC    Recent Labs  Lab 10/23/18  0641 10/22/18  0535 10/21/18  0748 10/18/18  0835   WBC 4.9 4.8 4.5 5.7   RBC 2.74* 2.76* 2.75* 2.58*   HGB 8.3* 8.3* 8.3* " "7.9*   HCT 27.8* 27.8* 27.2* 25.4*   * 101* 99 98   MCH 30.3 30.1 30.2 30.6   MCHC 29.9* 29.9* 30.5* 31.1*   RDW 17.8* 17.9* 18.3* 18.8*    407 372 449     INR    Recent Labs  Lab 10/23/18  0641 10/22/18  0535 10/21/18  0748 10/20/18  0740   INR 1.76* 1.51* 1.34* 1.21*     LFTsNo lab results found in last 7 days.   PANCNo lab results found in last 7 days.        Impression/Plan        Priscilla Way is a 78 year old male with a complex past medical history including dementia (Alzheimer and vascular), liver transplant and kidney transplants in 2000, DVT, T2DM, CVA, and poor airway protection admitted with possible seizure and aspiration.  Progressive renal decline after LILIAM.   Care conference was conducted on 10/15/2018, for the best interest of patient's overall care decision was made for comfort care. Palliative care on board.     Events since initiation of comfort care  has taken a significant turn. Patient's daughter May ( who is always at bedside) reports that patient is making urine out and wanted to check labs, and talk with Nephrology. She is convinced that things have turned around. She states \" we decided to make him comfort care because he was not making urine, and he is not a dialysis candidate. If kidney function is better, then we should do things as before. Checking labs, starting transplant medication\" Writer (primary team) spoke with the Nephrology and notified them of patient's concerns about talking to them about kidney issues.   Patient's creatinine was checked on 10/18 which came at 1.72 which was better than expected and improvement upon 1.9 on 10/15.    He was started back on Tacrolimus per Nephrology. Mycophenolate dose was adjusted.     The plan of action now is to continue with full care, but maintain DNR[facility] DNI status [facility]       #  Encephalopathy:   ~delirium related to underlying long-standing and severe dementia, pain requiring IV opiate analgesia, " severe illness, and metabolic changes from renal insufficiency. He is non-communicative now at baseline.  Currently at close to baseline mental status per daughter May.      10/23/2018: pt with new twitching or jerky movements of upper part mostly UE. Per daughter he had similar movements before when he was thought to have seizure.   Will do Video EEG  Consider neurology consult prn.   Noted Urea - trending up. ? Uremic symptoms. Ct hydration. Added  q6hr.   Nutrition made aware. To adjust TF .   Transplant Nephrology text paged regarding IS lvls & labs w up trending BUN.       #  DDKT and Liver Transplant now with progressive renal insufficiency: Stable Cr now about 1.9 but fluid/electrolyte status remain problems. K 5.6 yesterday- tacro contributing. Discussed with Dr. Galeas and agreed to stop tacro and increase cellcept from 250 mg to 500 mg/d and prednisone from 5 to 10 mg/d. 3 doses of kayexalate for K mgmt. Hemodialysis will not change the outcome--> he has severe dementia with inability to swallow his food and protect his airway from aspiration. He is agitated requiring hand mitts and in significant pain requiring opiate analgesia (family requested an increase in the dose overnight). Per prior assessment, he was thought to be slowly  dying. He was made comfort care on 10/ 15. Since then course of event has made a significant turn. Patient is making some urine, and daughter wanting to change plan of action.   Creatinine has improved and patient is making urine.      10/23/2018: BUN, Cr up trending. Keep hydrated.     - Continue Tacrolimus, MMF, and Prednisone  - Tacrolimus level today ( 10/23/2018) at < 3.  Text paged transplant Nephrology. Further dose adjustment per Nephrology.   - Monitor I/o, bun, is lvl.     # Enterobacter UTI on 10/04: Started on Ceftriaxone and transitioned to Ciprofloxacin. Completed 14 day treatment.     #   Aspiration Pneumonia and poor Airway Protection  Pneumonia resolved  after 7d abx.      - SLP evaluation ordered on 10/19. Case discussed. They advised that patient should be NPO because of high aspiration risk. If family wants to feed patient despite the known risks, then they can try DD1 diet with nectar thick liquids.   Currently at goal with tube feeding. We may try to cycle feeds per family request.     Addendum: Tube clogged. Clog zapper tried few times with no success per RN. XR NJ tube placement requested.       # Possible slow GI blood loss: Suspect multifactorial including related to chronic disease and LILIAM as well as iatrogenic though patient reportedly had melanotic stools though hemoglobins have since been stable. Higher risk of ongoing bleeding as he has a h/o DVT and needs warfarin anticoagulation.  Hgb stable at current.   - Continue PPI BID    - Hgb stable at 8.3 on 10/22       # Resolved recurrent C. Diff Colitis:  Toxin positive 9/11/18. Systemic abx d/tamra >10d ago. Overlap and taper done. Vanco now completed.      # DVT On warfarin with INR goal 2-3. Significant bleeding risk but clotting risk outweighs this. Pharmacy dosing of warfarin wihtout heparin overlap.    Continue Warfarin for now, most likely discontinue as not INR check in future.   INR subtherapeutic at 1.21 on 10/20 Not bridging with heparin due to significant bleeding risk  - Continue warfarin . Pharmacy to dose Warfarin  -INR at 1.51 on 10/22        # T2DM: Blood sugar controlled. On sliding scale insulin.      Recent Labs  Lab 10/23/18  1201 10/23/18  0815 10/23/18  0710 10/23/18  0641 10/23/18  0252 10/22/18  2212 10/22/18  1816  10/22/18  0535  10/21/18  0748  10/20/18  0740  10/19/18  0932  10/18/18  0835   GLC  --   --   --  124*  --   --   --   --  123*  --  129*  --  110*  --  131*  --  92   * 118* 121*  --  116* 140* 157*  < >  --   < >  --   < >  --   < >  --   < >  --    < > = values in this interval not displayed.     # HTN: On carvedilol with PRN hydralazine.      Diet: NPO with  NJ feeds.      Active Diet Order      Dysphagia Diet Level 1 Pureed Nectar Thickened Liquids (pre-thickened or use instant food thickener)   Tube feeding.       Fluids: TF flush.   DVT Prophylaxis: Warfarin.   Code Status: DNR/DNI  Social:  Family would like to take patient home with NG tube feeding. They will pursue full care, but code status will still be DNR/ DNI        Disposition Plan   Expected discharge to home likely 2-3 days after tube feeding cycled. Work up for jerky movements completed.      Entered: Ryan Aponte 10/23/2018, 2:25 PM            Pt's care was discussed with bedside RN, patient and  during Care Team Rounds.       Ryan Aponte MD   Hospitalist (Internal Medicine)  [facility]  Pager: [phone].

## 2018-10-23 NOTE — MR AVS SNAPSHOT
After Visit Summary   10/23/2018    Priscilla Way    MRN: [id number]           Patient Information     Date Of Birth          1940        Visit Information        Provider Department      10/23/2018 6:00 PM [facility] EEG TECH 4 [facility]        Today's Diagnoses     Seizure (H)    -  1       Follow-ups after your visit        Your next 10 appointments already scheduled     Oct 24, 2018  7:00 AM CDT   24 Hour Video Visit with [facility] EEG TECH 4   [facility] ([facility])    [facility]  [address] [facility] [id number] [phone]           [place]: Your appointment is scheduled at [facility]. [address] [place] [address]              Who to contact     Please call your clinic at [phone] to:    Ask questions about your health    Make or cancel appointments    Discuss your medicines    Learn about your test results    Speak to your doctor            Additional Information About Your Visit        MyChart Information     [employer] gives you secure access to your electronic health record. If you see a primary care provider, you can also send messages to your care team and make appointments. If you have questions, please call your primary care clinic.  If you do not have a primary care provider, please call [phone] and they will assist you.      [employer] is an electronic gateway that provides easy, online access to your medical records. With [employer], you can request a clinic appointment, read your test results, renew a prescription or communicate with your care team.     To access your existing account, please contact your [facility] or call [phone] for assistance.        Care EveryWhere ID     This is your Care EveryWhere ID. This could be used by other organizations to access your [place] medical records  [id number]         Blood Pressure from Last 3 Encounters:   10/23/18  167/57   06/07/18 135/60   05/01/18 199/79    Weight from Last 3 Encounters:   10/23/18 75.4 kg (166 lb 4.8 oz)   06/06/18 77.9 kg (171 lb 12.8 oz)   03/24/18 69.9 kg (154 lb 1.6 oz)              Today, you had the following     No orders found for display         Today's Medication Changes      Notice     This visit is during an admission. Changes to the med list made in this visit will be reflected in the After Visit Summary of the admission.             Primary Care Provider Office Phone # Fax #    Randy Fuentes [phone]       0 [address] [address]        Equal Access to Services     EDUARDO PALACIOS : Eduardo awano Tika, waaxda luqadaha, qaybta kaalmada adeegyada, annmarie cline . So [facility] [phone].    ATENCIÓN: Si habla español, tiene a staples disposición servicios gratuitos de asistencia lingüística. [facility] [phone].    We comply with applicable federal civil rights laws and [place] laws. We do not discriminate on the basis of race, color, national origin, age, disability, sex, sexual orientation, or gender identity.            Thank you!     Thank you for choosing [facility]  for your care. Our goal is always to provide you with excellent care. Hearing back from our patients is one way we can continue to improve our services. Please take a few minutes to complete the written survey that you may receive in the mail after your visit with us. Thank you!             Your Updated Medication List - Protect others around you: Learn how to safely use, store and throw away your medicines at www.disposemymeds.org.      Notice     This visit is during an admission. Changes to the med list made in this visit will be reflected in the After Visit Summary of the admission.

## 2018-10-24 NOTE — MR AVS SNAPSHOT
After Visit Summary   10/24/2018    Priscilla Way    MRN: [id number]           Patient Information     Date Of Birth          1940        Visit Information        Provider Department      10/24/2018 7:00 AM [facility] EEG TECH 4 [facility] EEG        Today's Diagnoses     Transient alteration of awareness    -  1       Follow-ups after your visit        Who to contact     Please call your clinic at [phone] to:    Ask questions about your health    Make or cancel appointments    Discuss your medicines    Learn about your test results    Speak to your doctor            Additional Information About Your Visit        MyChart Information     [employer] gives you secure access to your electronic health record. If you see a primary care provider, you can also send messages to your care team and make appointments. If you have questions, please call your primary care clinic.  If you do not have a primary care provider, please call [phone] and they will assist you.      [employer] is an electronic gateway that provides easy, online access to your medical records. With [employer], you can request a clinic appointment, read your test results, renew a prescription or communicate with your care team.     To access your existing account, please contact your [facility] or call [phone] for assistance.        Care EveryWhere ID     This is your Care EveryWhere ID. This could be used by other organizations to access your [place] medical records  [id number]         Blood Pressure from Last 3 Encounters:   10/24/18 120/58   06/07/18 135/60   05/01/18 199/79    Weight from Last 3 Encounters:   10/23/18 75.4 kg (166 lb 4.8 oz)   06/06/18 77.9 kg (171 lb 12.8 oz)   03/24/18 69.9 kg (154 lb 1.6 oz)              We Performed the Following     Glucose by meter     Glucose by meter     Glucose by meter          Today's Medication Changes      Notice     This visit is during an admission. Changes to  the med list made in this visit will be reflected in the After Visit Summary of the admission.             Primary Care Provider Office Phone # Fax #    Randy Fuentes [phone]       [address] [address]        Equal Access to Services     EDUARDO PALACIOS : Hadii yefri ku hadasho Soomaali, waaxda luqadaha, qaybta kaalmada adeegyada, waxay claudio haylean luis e allenfredmaria l boateng. So [facility] [phone].    ATENCIÓN: Si habla español, tiene a staples disposición servicios gratuitos de asistencia lingüística. Kaye al [phone].    We comply with applicable federal civil rights laws and [place] laws. We do not discriminate on the basis of race, color, national origin, age, disability, sex, sexual orientation, or gender identity.            Thank you!     Thank you for choosing [facility]  for your care. Our goal is always to provide you with excellent care. Hearing back from our patients is one way we can continue to improve our services. Please take a few minutes to complete the written survey that you may receive in the mail after your visit with us. Thank you!             Your Updated Medication List - Protect others around you: Learn how to safely use, store and throw away your medicines at www.disposemymeds.org.      Notice     This visit is during an admission. Changes to the med list made in this visit will be reflected in the After Visit Summary of the admission.

## 2018-10-24 NOTE — PROGRESS NOTES
[facility]  Neurology Seizure Consultation follow up    Patient Name:  Priscilla Way  MRN:  [id number]    :  1940  Date of Service:  2018  Primary care provider:  Randy Feuntes was asked to see Priscilla Way as part of a prior ordered consultation for the evaluation of concern for seizure.    ASSESSMENT AND PLAN BY PROBLEM:  Pt had history of multiple stroke w generalized brain atrophy on CTH but no acute pathology was noticed this time on imaging. The pt had GTC that lasted for 15 seconds on admission that was witnessed by his PCA with presumed tongue biting. Based on the description of the son in law, he was more confused when he was seen in the ED after the seizure which would suggest postictal state.     Seizures could be related to his old strokes(non doicumented to be cortical though)  righ UE weakness that pw seizure vs his chest infection makes him at risk too.     Patient has had long and somewhat difficult hospitalization since initial consultation.  He was placed on comfort care toward end of September with plans ultimately for hospice discharge.  His Keppra 500mg BID was discontinued by family in light of this and concern over his kidneys, although GFR is 59.  It is in line with goals of care for patient to have any potential seizures broken and thus the first step would be IV load keppra then re-initiate 500mg BID keppra on 10/24.      At 1950 IV ativan and half load of keppra were given to some effect, via EEG.     Recommendations  -Load w keppra 1.5 gm and then start 500 mg BID (communicated with Dr. Gillespie)  -IV ativan 2mg once  -VEEG follow up this evening to determine effect of medication on EEG, will consider loading vimpat next if this load is unsuccessful  -no further imaging at this time      I reviewed with the patie  Thank you for involving neurology in the care of this patient.  Please do not hesitate to call with  "questions or concerns.  General neurology pager [phone].    Patient was d/w staff Dr. Esteves.      HISTORY OF PRESENT ILLNESS:       Per prior:  Mr. Priscilla Way is 78 year old male h/o advanced vascular dementia, liver/kidney transplant in 2000, DVT on warfarin, DMII, right thalamic stroke in 2008 w residual left UE weakness and left facial droop who presents with seizure.      At baseline, the pt is non verbal, spontaneously moving 4 extremities, weaker on the left UE. Not following commands. It was very difficult to take history. All of the history was taken from his Son in law.      Per his Son in law, Today at 4 PM, the pt was sitting on the chair, and his PCA was trying to give him a drink then it was noticed that the pt started to have tonic clonic jerks of both legs and arms for around 15 seconds then he started to bleed from his mouth. no h/o seizure per son in law or per chart review. When the pt was seen in the ED, the son in law noticed that the pt is more confused than his baseline. As he used to be more interactive during the day and afternoon than he was when he was seen in the ED.      In term of the pt dementia, The son in law thinks that over the past 2 years the cognitive function of the patient got much worse and further deteriorated over the past 8 months to the level that he was not verbal like now.      Per chart review, the pt was admitted on 6/2018 w acute AMS. At that time he was found to have C diff that was treated. CTH no acute pathology. EEG showed generalized delta and theta slowing, but no seizure discharges.       In the ED, CTH with no acute IC pathology, but generalized atrophy and old right thalamic stroke. CXR showed bilateral chest infiltrates. Afebrile. SBP 190s. Cr 1.3. He started on IV antibiotics and the plan to admit the pt into medicine team.\"    Patient denies any history of previous seizures including febrile seizures, CNS infections, TBI, CNS tumors.       PAST " MEDICAL HISTORY:        Past Medical History:   Diagnosis Date     LILIAM (acute kidney injury) (H) 2016    kidney biopsy showed ATN     Dementia 2004    multiple acute infarcts, SV dis on CT     Diabetes type 2, controlled (H)      H/O Clostridium difficile infection 2018    treated wiht vanco     Hepatitis C      Kidney transplanted      Liver transplant      Unspecified cerebral artery occlusion with cerebral infarction            Past Surgical History:   Procedure Laterality Date     TRANSPLANT KIDNEY RECIPIENT  DONOR       TRANSPLANT LIVER RECIPIENT  DONOR         MEDICATIONS:   Current Facility-Administered Medications   Medication     acetaminophen (TYLENOL) solution 650 mg     acetylcysteine (MUCOMYST) 20 % nebulizer solution 2 mL     albuterol neb solution 2.5 mg     atropine 1 % ophthalmic solution 1-2 drop     bacitracin ointment     barium sulfate (EZ PAQUE) oral suspension 96%     bisacodyl (DULCOLAX) Suppository 10 mg     bumetanide (BUMEX) tablet 4 mg     carvedilol (COREG) suspension 6.25 mg     dextrose 10 % 1,000 mL infusion     glucose gel 15-30 g    Or     dextrose 50 % injection 25-50 mL    Or     glucagon injection 1 mg     fiber modular (NUTRISOURCE FIBER) (NUTRISOURCE FIBER) packet 1 packet     hydrALAZINE (APRESOLINE) injection 10-20 mg     HYDROmorphone (DILAUDID) injection 0.2-0.4 mg     HYDROmorphone (STANDARD CONC) (DILAUDID) liquid 1-2 mg     influenza Vac Split High-Dose (FLUZONE) injection 0.5 mL     insulin aspart (NovoLOG) inj (RAPID ACTING)     levETIRAcetam (KEPPRA) 750 mg in sodium chloride 0.9 % 100 mL intermittent infusion     [START ON 10/24/2018] levETIRAcetam (KEPPRA) intermittent infusion 500 mg     lidocaine (LMX4) cream     lidocaine (viscous) (XYLOCAINE) 2 % solution 5 mL     lidocaine 1 % 1 mL     LORazepam (ATIVAN) 1 mg/0.5 mL (HIGH CONC) solution 0.5 mg     LORazepam (ATIVAN) injection 2 mg     magnesium hydroxide (MILK OF MAGNESIA)  suspension 30 mL     magnesium sulfate 2 g in NS intermittent infusion ([employer] or [facility] Cmpd)     magnesium sulfate 4 g in 100 mL sterile water (premade)     melatonin tablet 1 mg     metolazone (ZAROXOLYN) suspension 5 mg     miconazole (MICATIN; MICRO GUARD) 2 % powder     mycophenolate mofetil (CELLCEPT) 250 mg in D5W intermittent infusion     naloxone (NARCAN) injection 0.1-0.4 mg     ondansetron (ZOFRAN-ODT) ODT tab 4 mg    Or     ondansetron (ZOFRAN) injection 4 mg     pantoprazole (PROTONIX) 2 mg/mL suspension 40 mg     Patient is already receiving anticoagulation with heparin, enoxaparin (LOVENOX), warfarin (COUMADIN)  or other anticoagulant medication     predniSONE (DELTASONE) tablet 5 mg     prochlorperazine (COMPAZINE) injection 5 mg    Or     prochlorperazine (COMPAZINE) tablet 5 mg    Or     prochlorperazine (COMPAZINE) Suppository 12.5 mg     QUEtiapine (SEROquel) tablet 25 mg     senna-docusate (SENOKOT-S;PERICOLACE) 8.6-50 MG per tablet 1 tablet    Or     senna-docusate (SENOKOT-S;PERICOLACE) 8.6-50 MG per tablet 2 tablet     sodium chloride (PF) 0.9% PF flush 3 mL     sodium chloride (PF) 0.9% PF flush 3 mL     sodium chloride 0.9% infusion     sodium phosphate 15 mmol in D5W intermittent infusion     sodium phosphate 20 mmol in D5W intermittent infusion     sodium phosphate 25 mmol in D5W intermittent infusion     tacrolimus (GENERIC EQUIVALENT) suspension 1 mg     tacrolimus (GENERIC EQUIVALENT) suspension 1.5 mg     warfarin (COUMADIN) tablet 2.5 mg     Warfarin Therapy Reminder (Check START DATE - warfarin may be starting in the FUTURE)     Prescriptions Prior to Admission   Medication Sig Dispense Refill Last Dose     amLODIPine (NORVASC) 10 MG tablet Take 10 mg by mouth daily   9/11/2018 at AM     aspirin 81 MG EC tablet Take 81 mg by mouth daily   9/11/2018 at AM     carvedilol (COREG) 6.25 MG tablet Take 1 tablet (6.25 mg) by mouth 2 times daily 180 tablet 1 9/11/2018 at AM     ferrous  "gluconate (FERGON) 324 (38 Fe) MG tablet Take 648 mg by mouth daily (with breakfast)   9/11/2018 at AM     furosemide (LASIX) 20 MG tablet Take 20 mg by mouth daily   9/10/2018 at PM     Multiple Vitamins-Minerals (MULTIVITAMIN ADULT) TABS Take 1 tablet by mouth daily   9/11/2018 at AM     omeprazole (PRILOSEC) 20 MG CR capsule Take 20 mg by mouth daily   9/11/2018 at AM     PROGRAF (BRAND) 0.5 MG CAPSULE Take 1.5 mg by mouth 2 times daily   9/11/2018 at AM     QUEtiapine (SEROQUEL) 25 MG tablet Take 1 tablet (25 mg) by mouth nightly as needed (Agitation) - Oral 30 tablet 1 9/10/2018 at PM     Vitamin D, Cholecalciferol, 400 units TABS Take 800 Units by mouth daily   9/11/2018 at AM     warfarin (COUMADIN) 2.5 MG tablet TAKE 2.5 MG BY MOUTH SUN, TUES, THURS. TAKE 1.25 MG BY MOUTH ALL OTHER DAYS.   9/10/2018 at PM     docusate sodium (COLACE) 100 MG capsule Take 100 mg by mouth 2 times daily as needed for constipation   More than a month at Unknown time                  ALLERGIES:    No Known Allergies    SOCIAL HISTORY  Social History     Social History     Marital status:      Spouse name: N/A     Number of children: N/A     Years of education: N/A     Occupational History     Not on file.     Social History Main Topics     Smoking status: Former Smoker     Types: Cigarettes     Smokeless tobacco: Never Used      Comment: quit 10 years ago     Alcohol use No     Drug use: No     Sexual activity: No     Other Topics Concern     Not on file     Social History Narrative       PHYSICAL EXAMINATIONS:  Vital Signs: Blood pressure 167/57, pulse 85, temperature 98.1  F (36.7  C), temperature source Axillary, resp. rate 16, height 1.778 m (5' 10\"), weight 75.4 kg (166 lb 4.8 oz), SpO2 98 %.  HEENT: NC/AT, no icterus, op pink and moist, NGT in place  Cardiac: RRR, Distal pulses intact  Chest: CTAB, no respiratory distress  Abdomen: S/NT/ND  Extremities: +2 LL edema  Skin: No rash or lesion.   Psych: Mood pleasant, " affect congruent  Neuro:  Mental status: Non verbal. Not following commands(baseline), less interactive per daughter(new).  Cranial nerves: left facial droop (old), VOR intact. PERRLA. Tongue in midline.   Motor: Moving 4 extremities spontaneously . Was not wd the right arm to painful stimuli as much as the left arm did(supposed to be the weaker one). Myoclonic movements in b/l UE ~6-8 times per minute  Reflexes: +2/4 right biceps and brachioradialis. +2/4 left biceps. +1/4 bilateral patellar and achilles. babinski absence.   Sensory: wd all extremities to painful stimuli and localizes w the left arm.  Coordination: UAT  Gait: UAT        REVIEW OF LABORATORY, PATHOLOGY, AND RADIOLOGY DATA:      Lab results:   No results found for: PH, PHARTERIAL, PO2, CO9NYMTGWEI, SAT, PCO2, HCO3, BASEEXCESS, ANASTASIYA, BEB       Lab Results   Component Value Date     10/23/2018    Lab Results   Component Value Date    CHLORIDE 97 10/23/2018    Lab Results   Component Value Date    BUN 98 10/23/2018      Lab Results   Component Value Date    POTASSIUM 4.1 10/23/2018    Lab Results   Component Value Date    CO2 34 10/23/2018    Lab Results   Component Value Date    CR 1.61 10/23/2018        Lab Results   Component Value Date    NTBNP 229 (H) 10/04/2013     Lab Results   Component Value Date    WBC 4.9 10/23/2018    HGB 8.3 (L) 10/23/2018    HCT 27.8 (L) 10/23/2018     (H) 10/23/2018     10/23/2018     Lab Results   Component Value Date    CR 1.61 (H) 10/23/2018     No results found for: DD, DIMER  Lab Results   Component Value Date     10/23/2018    POTASSIUM 4.1 10/23/2018    CHLORIDE 97 10/23/2018    CO2 34 (H) 10/23/2018     (H) 10/23/2018     Lab Results   Component Value Date    SED 55 (H) 07/25/2014     Lab Results   Component Value Date    GFRESTIMATED 42 (L) 10/23/2018    GFRESTBLACK 50 (L) 10/23/2018     Lab Results   Component Value Date     (H) 10/23/2018     Lab Results   Component Value  Date    HGB 8.3 (L) 10/23/2018     Lab Results   Component Value Date    AST 24 10/10/2018    ALT 15 10/10/2018    ALKPHOS 115 10/10/2018    BILITOTAL 0.2 10/10/2018    BILICONJ 0.0 07/25/2014    NAVEEN 29 09/11/2018     Lab Results   Component Value Date    INR 1.76 (H) 10/23/2018     No results found for: BILINEONATAL, TCBIL  Lab Results   Component Value Date     10/23/2018     Lab Results   Component Value Date    BUN 98 (H) 10/23/2018    CR 1.61 (H) 10/23/2018     Lab Results   Component Value Date    TSH 5.44 (H) 09/23/2018     Lab Results   Component Value Date    TROPONIN 0.01 09/11/2018    TROPI 0.018 09/11/2018     Lab Results   Component Value Date    URINEKETONE Negative 10/11/2018     Lab Results   Component Value Date    WBC 4.9 10/23/2018            Head CT results: MR BRAIN W/O CONTRAST 9/12/2018 5:10 PM     Provided History: look for stroke or acute intracranial abnormality;      Comparison: Head CT 9/11/2018, brain MRI 11/6/2010     Technique: Sagittal T1-weighted and axial T2-weighted, turboFLAIR and  diffusion-weighted with ADC map images of the brain were obtained  without intravenous contrast.     Findings:   Multiple images are degraded by patient motion artifact.     No acute infarct. Patent major intracranial vascular flow voids.     Punctate chronic microhemorrhages within the cerebellar hemispheres,  vermis, ingris, right occipital lobe, and left caudate body are new  since 11/6/2010. Increased moderate to advanced leukoaraiosis and  moderate generalized parenchymal volume loss. Stable chronic right  thalamic lacunar infarct.      The ventricles are not enlarged out of proportion to the cerebral  sulci. No mass effect, midline shift or abnormal extra axial fluid  collection. Bilateral mastoid effusions. The paranasal sinuses are  clear. Orbits are unremarkable.         Impression:  1. No acute infarct.  2. Progression of moderate to advanced chronic small vessel ischemic  disease and  moderate generalized parenchymal volume loss since  11/6/2010.            Primary care physician: Randy Fuentes    Attending Physician: Ryan Aponte MD [facility]  Neurology G2  [id number]      I saw and evaluated the patient on 10/24/2018 and agree with the findings and the plan of care as documented in the resident's note.      Re-paged after consult much earlier this admission.   78 year old male with advanced dementia initially seen as seizure.  Seizure work up not particularly remarkable but Keppra recommended due to advanced neurodegenerative disease and felt to be high risk for seizures.  Family decided against Keppra at that time.  Since then, he has had prolonged admission with transition to comfort cares.  However worsening mental status over past few days prompted primary team to order VEEG which showed probable Non convulsive status, which quickly aborted with ativan and Keppra. We recommend VEEG monitoring throughout day today and keppra 750mg BID.   If no seizures will likely discontinue EEG tomorrow.  Plan of care discussed with granddaughter who is in agreement.      Cindy Esteves DO   of Neurology

## 2018-10-24 NOTE — PROGRESS NOTES
EEG CLINICAL NEUROPHYSIOLOGY PRELIMINARY REPORT    Dramatic improvement of epileptiform activity and near normalization within 45 seconds of lorazapam injection. 9 Hz posterior dominant rhythm. Discussed with neurology.    Barrington Parker MD  Pager [phone]

## 2018-10-24 NOTE — PROGRESS NOTES
"[facility], [place]   Internal Medicine Daily Note           Interval History/Events     Daughter May present at bedside.   Pt found to have seizure in EEG. Seen by Neurology. Loaded and started on keppra.   Sleeping w no more twitchy or jerky movements.   Feeding tube came out. To be placed new one today.     BM+  Good UOP  No fever or chills  No emesis.  No other concern.          Review of Systems        4 point ROS including Respiratory, CV, GI and , other than that noted above is negative      Medications   I have reviewed current medications  in the \"current medication\" section of Epic.  Relevant changes include:     Physical Exam       Vital signs:    Blood pressure 110/70, pulse 85, temperature 97.3  F (36.3  C), temperature source Oral, resp. rate 16, height 1.778 m (5' 10\"), weight 75.4 kg (166 lb 4.8 oz), SpO2 100 %.  Estimated body mass index is 23.86 kg/(m^2) as calculated from the following:    Height as of this encounter: 1.778 m (5' 10\").    Weight as of this encounter: 75.4 kg (166 lb 4.8 oz).      Gen: NAD. Sleepy  HEENT: anicteric. Dry mm.   Cardiovascular: S1, S2  Regular.    Respiratory:  B/l CTA. Non labored.   GI/Abdomen: Soft, mild distended. BS+.   Neurology: sleepy.   Extremities: 2+ pre tibial edema.   Skin: No obvious bruising/ bleeding      Laboratory and Imaging Studies     I have reviewed  laboratory and imaging studies in the Epic. Pertinent findings are as below:    BMP    Recent Labs  Lab 10/24/18  0709 10/23/18  0641 10/22/18  0535 10/21/18  0748   * 140 142 141   POTASSIUM 3.6 4.1 4.1 4.2   CHLORIDE 103 97 96 98   JOSHUA 8.5 8.3* 8.4* 8.5   CO2 31 34* 35* 31   BUN 84* 98* 91* 92*   CR 1.41* 1.61* 1.57* 1.60*   * 124* 123* 129*     CBC    Recent Labs  Lab 10/24/18  0709 10/23/18  0641 10/22/18  0535 10/21/18  0748   WBC 5.0 4.9 4.8 4.5   RBC 2.74* 2.74* 2.76* 2.75*   HGB 8.2* 8.3* 8.3* 8.3*   HCT 27.4* 27.8* 27.8* 27.2*    102* 101* " "99   MCH 29.9 30.3 30.1 30.2   MCHC 29.9* 29.9* 29.9* 30.5*   RDW 18.0* 17.8* 17.9* 18.3*    415 407 372     INR    Recent Labs  Lab 10/24/18  0709 10/23/18  0641 10/22/18  0535 10/21/18  0748   INR 1.71* 1.76* 1.51* 1.34*     LFTsNo lab results found in last 7 days.   PANCNo lab results found in last 7 days.        Impression/Plan        Priscilla Way is a 78 year old male with a complex past medical history including dementia (Alzheimer and vascular), liver transplant and kidney transplants in 2000, DVT, T2DM, CVA, and poor airway protection admitted with possible seizure and aspiration.  Progressive renal decline after LILIAM.   Care conference was conducted on 10/15/2018, for the best interest of patient's overall care decision was made for comfort care. Palliative care on board.     Events since initiation of comfort care  has taken a significant turn. Patient's daughter May ( who is always at bedside) reports that patient is making urine out and wanted to check labs, and talk with Nephrology. She is convinced that things have turned around. She states \" we decided to make him comfort care because he was not making urine, and he is not a dialysis candidate. If kidney function is better, then we should do things as before. Checking labs, starting transplant medication\" Writer (primary team) spoke with the Nephrology and notified them of patient's concerns about talking to them about kidney issues.   Patient's creatinine was checked on 10/18 which came at 1.72 which was better than expected and improvement upon 1.9 on 10/15.    He was started back on Tacrolimus per Nephrology. Mycophenolate dose was adjusted.     The plan of action now is to continue with full care, but maintain DNR/ DNI status and discharge patinet home     10/24/2018  # Seizure activity:     Overnight, EEG monitoring revealed atypical nonconvulsive status epilepticus aborted by intravenous lorazepam and levetiracetam.  Neurology on " board  Etiology unclear.      likely related to his ongoing medical conditions in the setting of known neurodegenerative disease.      Neuro recs:  We strongly recommend ongoing treatment with levetiracetam, which his daughter is in full agreement with.  Previously, this medication was stopped as it was not clear whether or not he had seizure disorder and attempts were being made to limit renally cleared medications.  If renal clearance becomes a question again, the dose of levetiracetam can be reduced appropriately directed by pharmacy.     -Continue levetiracetam 750 mg every 12 hours, can be given via IV or feeding tube at same dose  - Continue EEG for at least 1 more day to observe treatment effect    > Ct seizure precautions. Pads.   > Call Neurology prn.       #  Encephalopathy:   ~delirium related to underlying long-standing and severe dementia, pain requiring IV opiate analgesia, severe illness, and metabolic changes from renal insufficiency plus likely seizure as above. He is non-communicative now at baseline.  Somnolent at current. Likely 2/2 seizure, meds, besides others.       #  DDKT and Liver Transplant now with progressive renal insufficiency: Stable Cr now about 1.9 but fluid/electrolyte status remain problems. K 5.6 yesterday- tacro contributing. Discussed with Dr. Galeas and agreed to stop tacro and increase cellcept from 250 mg to 500 mg/d and prednisone from 5 to 10 mg/d. 3 doses of kayexalate for K mgmt. Hemodialysis will not change the outcome--> he has severe dementia with inability to swallow his food and protect his airway from aspiration. He is agitated requiring hand mitts and in significant pain requiring opiate analgesia (family requested an increase in the dose overnight). Per prior assessment, he was thought to be slowly  dying. He was made comfort care on 10/ 15. Since then course of event has made a significant turn. Patient is making some urine, and daughter wanting to change plan of  action.   Creatinine has improved and patient is making urine.      10/23/2018: BUN, Cr up trending. Keep hydrated.   10/24/2018: better today.     - Continue Tacrolimus, MMF, and Prednisone  - Tacrolimus level  ( 10/23/2018) at < 3.  Text paged transplant Nephrology. Further dose adjustment per Nephrology.   - Monitor I/o, bun, is lvl.     # Enterobacter UTI on 10/04: Started on Ceftriaxone and transitioned to Ciprofloxacin. Completed 14 day treatment.     #   Aspiration Pneumonia and poor Airway Protection  Pneumonia resolved after 7d abx.      - SLP evaluation ordered on 10/19. Case discussed. They advised that patient should be NPO because of high aspiration risk. If family wants to feed patient despite the known risks, then they can try DD1 diet with nectar thick liquids.   Currently at goal with tube feeding. We may try to cycle feeds per family request.     Addendum: 10.23: Tube clogged. Clog zapper tried few times with no success per RN. XR NJ tube placement requested. Tube came out later.   10/24/2018: NJ new tube to be placed.       # Possible slow GI blood loss: Suspect multifactorial including related to chronic disease and LILIAM as well as iatrogenic though patient reportedly had melanotic stools though hemoglobins have since been stable. Higher risk of ongoing bleeding as he has a h/o DVT and needs warfarin anticoagulation.  Hgb stable at current.   - Continue PPI BID    - Hgb stable at 8.3 on 10/22       # Resolved recurrent C. Diff Colitis:  Toxin positive 9/11/18. Systemic abx d/tamra >10d ago. Overlap and taper done. Vanco now completed.      # DVT On warfarin with INR goal 2-3. Significant bleeding risk but clotting risk outweighs this. Pharmacy dosing of warfarin wihtout heparin overlap.    Continue Warfarin for now, most likely discontinue as not INR check in future.   INR subtherapeutic at 1.21 on 10/20 Not bridging with heparin due to significant bleeding risk  - Continue warfarin . Pharmacy to  dose Warfarin  -INR at 1.51 on 10/22        # T2DM: Blood sugar controlled. On sliding scale insulin.      Recent Labs  Lab 10/24/18  1623 10/24/18  1222 10/24/18  0816 10/24/18  0709 10/24/18  0421 10/24/18  0010 10/23/18  2023  10/23/18  0641  10/22/18  0535  10/21/18  0748  10/20/18  0740  10/19/18  0932   GLC  --   --   --  114*  --   --   --   --  124*  --  123*  --  129*  --  110*  --  131*   * 115* 110*  --  118* 150* 114*  < >  --   < >  --   < >  --   < >  --   < >  --    < > = values in this interval not displayed.     # HTN: On carvedilol with PRN hydralazine.      Diet: NPO with NJ feeds.       Active Diet Order      NPO for Medical/Clinical Reasons Except for: Meds, Ice Chips     Tube feeding.       Fluids: TF flush.   DVT Prophylaxis: Warfarin.   Code Status: DNR/DNI    Social:  Family would like to take patient home with NG tube feeding. They will pursue full care, but code status will still be DNR/ DNI        Disposition Plan   Expected discharge to home likely 3-4 days after tube feeding cycled. Cleared by Neurology     Entered: Ryan Aponte 10/24/2018, 5:32 PM            Pt's care was discussed with bedside RN, patient and  during Care Team Rounds.       Ryan Aponte MD   Hospitalist (Internal Medicine)  [facility]  Pager: [phone].

## 2018-10-24 NOTE — PROCEDURES
EEG #:  -1   TYPE OF STUDY:  Video EEG monitoring.   DATE OF RECORDING/SERVICE DATE:  10/23/2018    DURATION OF RECORDIN:02:38      CLINICAL HISTORY:  This is day #1 of video EEG monitoring for a 78-year-old patient with ongoing dementia, previous liver and kidney transplants.  The patient has had a right thalamic stroke in  with residual left upper extremity weakness and facial droop and was admitted for reported generalized tonic-clonic event lasting around 15 seconds.  Video EEG was started out of concern for possible non-convulsive status, convulsive status, encephalopathy monitoring.      MEDICATIONS:  The patient was given 2 mg Ativan at 1950 and loaded with 500 mg of Keppra prior to video EEG being started and was given 2 mg Ativan during recording.     TECHNICAL SUMMARY: This continuous video- EEG monitoring procedure was performed with 23 scalp electrodes in 10-20 electrode system placement, and additional scalp, precordial and other surface electrodes used for electrical referencing and artifact detection.  Video monitoring was utilized and periodically reviewed by EEG technologists and the physician for electroclinical correlations.     FINDINGS:   INTERICTAL ACTIVITY:  In the beginning of the recording the patient does have clinical findings of myoclonic jerks that appear to be full body, arm and leg bilateral, but not necessarily head that are occurring spontaneously and continue throughout recording until following Ativan delivery.  Background activity consists of generalized slowing that is most predominantly theta activity.  However, there are periods of more prominent delta activity that are shorter in duration and generalized.  There is beta activity that is superimposed throughout recording and no specific localization.      EPILEPTIFORM ACTIVITY: Throughout beginning of recording, there are a multitude of generalized periodic discharges occurring at 2-3 Hz, and are synchronous.   There are times where either hemisphere can have a larger voltage predominance.        ICTAL ACTIVITY:  Generalized pattern of discharge previously mentioned starts to occur continuously after building in periodic runs of increasing frequency 3Hz, in a generalized manner.  Morphologically there is spike and polyspike waves, especially during periods of faster activity.  There is no clinical correlation to these specific rhythmic bursts, nor to the continuous pattern.   Ativan  to dissolution of the continuous pattern within 2 minutes, and a return to generalized slowing theta rhythm.     IMPRESSION:  Abnormal. EEG indicates a persistent somewhat irregular periodic sharp activity at study onset that raises suspicion of nonconvulsive status epilepticus. Pattern clears completely following administration of 2 mg of lorazapam confirming this suspicion. Degree of encephalopathy also improves following lorazapam adminsitration tho evidece of mild to moderate diffuse encephalopathy persists at study's end.     Results discussed with managing service. More aggressive treatment and follow-up recordings should be considered as clinically appropriate.     This report is dictated by Dr. Milo Fish DO, CNP Fellow.      I personally reviewed this study and report and ammended dictation as appropriate.  Barrington Parker MD  .              D: 10/24/2018   T: 10/24/2018   MT: RITA      Name:     YADIRA BREAUX   MRN:      [id number]        Account:        [id number]   :      1940           Procedure Date: 10/23/2018      Document: [id number]

## 2018-10-24 NOTE — PLAN OF CARE
Problem: Patient Care Overview  Goal: Plan of Care/Patient Progress Review  Outcome: No Change  Cared for pt 8822-0060    VSS on RA. VEEG monitoring continues. 2 mg IV ativan given for possible seizures and twitching improved. 2 doses of 750 mg Keppra given. NJ remains clogged. NPO. MMF given through IV. Incontinent of urine and stool. Turned/repo q2h. Pt appears to be resting comfortably. Report given to oncoming RN. Continue to monitor and follow POC

## 2018-10-24 NOTE — PROGRESS NOTES
"Mr Way was seen with staff during morning rounds.  He is asleep in bed and does not interact with the team or make any verbalizations.  His daughter is present at bedside and provides history that he has seemed more \"out of it\" over the past few days.  She is also noticed some jerking movements in his arms and face.  She thought that these may be related to his electrolyte imbalances in the past.    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  The reason for him developing seizures is unclear, but likely related to his ongoing medical conditions in the setting of known neurodegenerative disease.  We strongly recommend ongoing treatment with levetiracetam, which his daughter is in full agreement with.  Previously, this medication was stopped as it was not clear whether or not he had seizure disorder and attempts were being made to limit renally cleared medications.  If renal clearance becomes a question again, the dose of levetiracetam can be reduced appropriately directed by pharmacy.    Recommendations:  -Continue levetiracetam 750 mg every 12 hours, can be given via IV or feeding tube at same dose  - Continue EEG for at least 1 more day to observe treatment effect    This patient was seen and discussed with staff neurologist, Dr. Danielito Farias DO  PGY4 Neurology      Patient seen with resident team today 10/24/2018.  Please see full attestation on progress note dated last evening by resident Dr. Stewart from this writer.      Cindy Esteves DO  Neurology  "

## 2018-10-24 NOTE — PROGRESS NOTES
Medicine cross cover note -    Called by EEG monitoring neurologist with concern for encephalopathy vs non-convulsive status.  I spoke with the neurology team overnight and they recommended ativan and re-starting Keppra.  Will need to follow up with family in the am as previously the patient was on Keppra and they wanted it discontinued, although this was in the setting of renal failure and anticipated comfort care.

## 2018-10-24 NOTE — PLAN OF CARE
Problem: Patient Care Overview  Goal: Plan of Care/Patient Progress Review  Outcome: No Change  8422-2085: Vitals stable on room air. Unable to assess orientation as patient nonverbal. EEG in place. No nonverbal indicators of pain when resting. PIV infusing IV fluids till tube feeding restarted at 1800. Starting cycled tube feeding overnight and new H20 flushes. All meds via recently replaced NJ tube. Turn q 2 hours with incontinence cares. Large liquid BM this afternoon.

## 2018-10-24 NOTE — PLAN OF CARE
"Problem: Patient Care Overview  Goal: Plan of Care/Patient Progress Review  Outcome: No Change    /58  Pulse 85  Temp 98.1  F (36.7  C) (Axillary)  Resp 16  Ht 1.778 m (5' 10\")  Wt 75.4 kg (166 lb 4.8 oz)  SpO2 100%  BMI 23.86 kg/m2    Time: 8039-6212.  Reason for admission: Seizures, aspiration.    VS: VSS on RA with O2 sats in high 90s, pulse ox in place. Afebrile.   Activity: No OOB activity, repo/turn q2hrs. Daughter at bedside.   Neuros: No seizure observed or reported by pt's daughter. vEEG leads in place. Alert but lethargic this afternoon. Orientation ADRIA d/t nonverbal status.   Cardiac: WDL. HR stable. BP stable.   Respiratory: LS clear but diminished. Unable to perform IS. No signs of difficulty of breathing. Pulse ox in place with O2 sats at 100%.   GI/: Incontinent of bowel & bladder. Incontinent of urine multiple times today. No BM on this shift, LBM 10/24. +BS, ADRIA passing gas. No signs of nausea, no vomiting. Old NJT removed this morning. Pt went to XR for new NJT placement, left around 1015, returned around 1230, tolerated well. NJT used for flushes and meds, flushes well, no signs of clogs.   Diet: NPO. Tube feeds to begin this evening, orders updated. All meds down NJT, clamped in between.   Skin: Redness in groin area, barrier cream, powder, and interdry applied. Skin tears on bilateral inner thighs, mepilex in place. Open sores on penis and scrotum, bacitracin applied.   Lines: Right PIV infusing NS at 75 mL/hr. Left PIV SL'd.   Labs: Hgb stable at 8.2. Creatinine down trending, 1.41 today.   New changes this shift: New NJT placed.   Plan: Resume NJT feeds this evening. Monitor skin tears/openings & redness, continue repo q2hrs. Monitor for seizures, PRN Ativan available if needed.   Will continue to monitor & follow POC.      "

## 2018-10-25 NOTE — PLAN OF CARE
Problem: Patient Care Overview  Goal: Plan of Care/Patient Progress Review  Outcome: No Change  Time:  9171-1053    Reason for admission:  Seizures  Neuro:  Lethargic.  Nonverbal.    Behavior:  Calm while resting, but fights when repositioned and changed.    Activity:  Ax2 w/ mechanical lift.  Vitals:  VSS on RA.  Lines:  R PIV TKO w/ IV Keppra.  Cardiac:  HTN  Respiratory:  Sats 100% on RA.  Diminished lung sounds.  GI/:  Incontinent of bowel and bladder.  BM x 1 this shift, watery and yellow.  Skin:  Dry.  Skin tears w/ mepliex x 2 on R and L inner thighs.  Wound on penis, CLAUDETTE, pink and moist.  Blanchable redness on bottom, barrier applied.  Endo:  HI=029, 127, and 145.  Insulin given per order.  Pain:  IV Dilaudid given x 1 this shift per family req.  Diet:  NPO w/ TF @ goal of 60, cycled, tolerating.  Labs/Imaging:  Creat=1.41, Hgb=8.2, INR=1.71.  Consults:  Neph, Neuro, Palliative    New changes this shift:  HD line d/c'd, kidneys function improved, Creat=1.41.  NG bridled w/ cycled TF @ goal of 60.  BM x 1, watery and yellow.  Incontinent of bowel and bladder.  EEG in place.  No noted seizures this shift.  BG stable, insulin given per order.  Turn Q2H. Daughter @ bedside and helpful. Lethargic and slept most of shift.    Plan:  Discharge to home w/ family in 3-4 days w/ TF.      Continue to monitor and follow POC.

## 2018-10-25 NOTE — MR AVS SNAPSHOT
After Visit Summary   10/25/2018    Priscilla Way    MRN: [id number]           Patient Information     Date Of Birth          1940        Visit Information        Provider Department      10/25/2018 7:00 AM [facility] EEG TECH 4 [facility] EEG        Today's Diagnoses     Altered mental status, unspecified altered mental status type    -  1       Follow-ups after your visit        Who to contact     Please call your clinic at [phone] to:    Ask questions about your health    Make or cancel appointments    Discuss your medicines    Learn about your test results    Speak to your doctor            Additional Information About Your Visit        MyChart Information     [employer] gives you secure access to your electronic health record. If you see a primary care provider, you can also send messages to your care team and make appointments. If you have questions, please call your primary care clinic.  If you do not have a primary care provider, please call [phone] and they will assist you.      [employer] is an electronic gateway that provides easy, online access to your medical records. With [employer], you can request a clinic appointment, read your test results, renew a prescription or communicate with your care team.     To access your existing account, please contact your [facility] or call [phone] for assistance.        Care EveryWhere ID     This is your Care EveryWhere ID. This could be used by other organizations to access your [place] [facility] medical records  [id number]         Blood Pressure from Last 3 Encounters:   10/25/18 97/46   06/07/18 135/60   05/01/18 199/79    Weight from Last 3 Encounters:   10/25/18 73.8 kg (162 lb 12.8 oz)   06/06/18 77.9 kg (171 lb 12.8 oz)   03/24/18 69.9 kg (154 lb 1.6 oz)              We Performed the Following     Glucose by meter          Today's Medication Changes      Notice     This visit is during an admission. Changes to the med list  made in this visit will be reflected in the After Visit Summary of the admission.             Primary Care Provider Office Phone # Fax #    Randy Fuentes [phone]       [address] [address]        Equal Access to Services     EDUARDO PALACIOS : Hadii yefri ku hadasho Soomaali, waaxda luqadaha, qaybta kaalmada adeegyada, waxamy snyder haylean luis e allenfredmaria l boateng. So [facility] [phone].    ATENCIÓN: Si habla español, tiene a staples disposición servicios gratuitos de asistencia lingüística. Llame al [phone].    We comply with applicable federal civil rights laws and [place] laws. We do not discriminate on the basis of race, color, national origin, age, disability, sex, sexual orientation, or gender identity.            Thank you!     Thank you for choosing [facility]  for your care. Our goal is always to provide you with excellent care. Hearing back from our patients is one way we can continue to improve our services. Please take a few minutes to complete the written survey that you may receive in the mail after your visit with us. Thank you!             Your Updated Medication List - Protect others around you: Learn how to safely use, store and throw away your medicines at www.disposemymeds.org.      Notice     This visit is during an admission. Changes to the med list made in this visit will be reflected in the After Visit Summary of the admission.

## 2018-10-25 NOTE — PROCEDURES
EEG #-2  TYPE OF STUDY: Inpatient video-EEG monitoring    DATE OF RECORDING/SERVICE DATE:  10/24/2018   SOURCE FILE DURATION:  22:17:39      HISTORY:  This is day #2 of video-EEG monitoring for a 78-year-old patient with ongoing dementia, previous liver and kidney transplants, who also has had a right thalamic stroke in 2008 and was admitted earlier this week after having altered mental status with biting of tongue.  Video-EEG was started out of concern for possible ongoing seizures or encephalopathy.      MEDICATIONS:  Keppra 750 mg b.i.d.      TECHNICAL SUMMARY: This continuous video- EEG monitoring procedure was performed with 23 scalp electrodes in 10-20 electrode system placement, and additional scalp, precordial and other surface electrodes used for electrical referencing and artifact detection.  Video monitoring was utilized and periodically reviewed by EEG technologists and the physician for electroclinical correlations.    FINDINGS:   INTERICTAL ACTIVITY:  There is generalized slowing in all leads of theta activity with no prominent asymmetries in background rhythm.  There is no prominent sleep architecture seen nor sleep-wake transitions.  There is reactivity to vocal stimuli and physical stimuli with increased myogenic artifact and some increase in frequency of background activity to more of an alpha pattern in posterior leads, but this is intermittent and infrequent with different stimulations.  There are no prominent lateralizing or generalized rhythms that are periodic in nature seen throughout this recording.      EPILEPTIFORM ACTIVITY:  None.      ICTAL ACTIVITY:  No seizures seen during this monitoring session.      IMPRESSION:  Abnormal. Findings indicate a moderate diffuse encephalopathy. No further indications of nonconvulsive status epilepticus in this recording session.    This report is dictated by Dr. Milo Fish DO, CNP fellow.      I personally reviewed this study and  report and ammended dictation as appropriate.  Barrington Parker MD                  D: 10/25/2018   T: 10/25/2018   MT: NIDIA      Name:     YADIRA BREAUX   MRN:      [id number]        Account:        [id number]   :      1940           Procedure Date: 10/24/2018      Document: [id number]

## 2018-10-25 NOTE — PROGRESS NOTES
Mr Way was seen and examined with staff during rounds.  He is nonverbal and makes no efforts to participate in interview.  His daughter is present at bedside and states that overnight he had several brief episodes during which he moved his head back and forth.  During these episodes she noted that his oxygen saturation had decreased and his heart rate had decreased as well.    On exam he is sleeping comfortably, but arouses to vigorous verbal stimulation.  He opens his eyes to the sound of his name.  He otherwise makes no attempts to speak or interact with the examiner.  Pupils are symmetric and round and respond to light.  Eye movements are not able to be assessed due to encephalopathy and lack of cooperation with exam.  There is no apparent facial asymmetry.  All four limbs are observed to move in a purposeful and antigravity manner.  Reflexes are normal and symmetric throughout.  Plantar reflex neutral.    EEG revealed that Mr Way was in fact in nonconvulsive status epilepticus on October 23 that was thankfully abruptly aborted with administration of intravenous lorazepam.  He was then started on levetiracetam which has worked well to keep him seizure free since that time. There have been no further seizures or epileptic activity on his EEG since administration of lorazepam and levetiracetam.  Given these findings, we recommend continued treatment with levetiracetam if it is in line with his goals of care.  We believe, and his daughter agrees, that prevention of seizures is in line with a comfort based care approach.    Recommendations:  - Continue levetiracetam 750 mg twice per day  - Stop EEG  -Neurology will sign off at this time.  Please do not hesitate to reach out to our team if there are further questions regarding his care    This patient was seen and discussed with staff neurologist, Dr. Danielito Farias, DO  PGY4 Neurology      I saw and evaluated the patient on 10/25/2018 and agree  with the findings and the plan of care as documented in the resident's note.          Cindy Esteves DO   of Neurology

## 2018-10-25 NOTE — PROGRESS NOTES
"[facility], [place]   Internal Medicine Daily Note           Interval History/Events     Daughter May present at bedside.   No new concern overnight  Pt back on tF paz well  Remains sleepy after dilaudid this am  No more significant twitchy or jerky movements.   Still on Video EEG  Loose stools x 2 overnight.   Still w Good UOP    No fever or chills  No emesis.  No other concern.          Review of Systems        4 point ROS including Respiratory, CV, GI and , other than that noted above is negative      Medications   I have reviewed current medications  in the \"current medication\" section of Epic.  Relevant changes include:     Physical Exam       Vital signs:    Blood pressure 97/46, pulse 85, temperature 96.5  F (35.8  C), temperature source Axillary, resp. rate 18, height 1.778 m (5' 10\"), weight 73.8 kg (162 lb 12.8 oz), SpO2 97 %.  Estimated body mass index is 23.36 kg/(m^2) as calculated from the following:    Height as of this encounter: 1.778 m (5' 10\").    Weight as of this encounter: 73.8 kg (162 lb 12.8 oz).      Gen: NAD. Sleepy during my eval. Video eeg on scalp.   HEENT: anicteric. Dry mm.   Cardiovascular: S1, S2  Regular.    Respiratory:  B/l CTA. Non labored.   GI/Abdomen: Soft, mild distended. BS+.   Neurology: sleepy.   Extremities: 1-2+ pre tibial edema.   Skin: No obvious bruising/ bleeding      Laboratory and Imaging Studies     I have reviewed  laboratory and imaging studies in the Epic. Pertinent findings are as below:    BMP    Recent Labs  Lab 10/25/18  0702 10/24/18  0709 10/23/18  0641 10/22/18  0535    145* 140 142   POTASSIUM 3.7 3.6 4.1 4.1   CHLORIDE 103 103 97 96   JOSHUA 8.3* 8.5 8.3* 8.4*   CO2 25 31 34* 35*   BUN 85* 84* 98* 91*   CR 1.43* 1.41* 1.61* 1.57*   * 114* 124* 123*     CBC    Recent Labs  Lab 10/25/18  0702 10/24/18  0709 10/23/18  0641 10/22/18  0535   WBC 5.3 5.0 4.9 4.8   RBC 2.91* 2.74* 2.74* 2.76*   HGB 8.8* 8.2* 8.3* 8.3* "   HCT 29.2* 27.4* 27.8* 27.8*    100 102* 101*   MCH 30.2 29.9 30.3 30.1   MCHC 30.1* 29.9* 29.9* 29.9*   RDW 17.9* 18.0* 17.8* 17.9*    361 415 407     INR    Recent Labs  Lab 10/25/18  0702 10/24/18  0709 10/23/18  0641 10/22/18  0535   INR 1.95* 1.71* 1.76* 1.51*     LFTsNo lab results found in last 7 days.   PANCNo lab results found in last 7 days.        Impression/Plan        Priscilla Way is a 78 year old male with a complex past medical history including dementia (Alzheimer and vascular), liver transplant and kidney transplants in 2000, DVT, T2DM, CVA, and poor airway protection admitted 9/11/2018 with AMS,  possible seizure and aspiration pneumonia. Developed LILIAM on CKD, anuric, requiring HD.   Care conference on 10/15/2018, decision was made for comfort care. Nephrology, Palliative care on board.     Events since initiation of comfort care  has taken a significant turn. Since pt started to make urine on his own, not needing HD any longer, per family request, discontinued comfort care, Nephrology aware. Resumed tacrolimus and Mycophenolate. Pt remains DNR/DNI  Now with e/o status epilepticus, non convulsive 10.23.   Neurology on board.   On Tube feeding via NJ.       # Seizure : atypical nonconvulsive status epilepticus     10.23: Overnight, EEG monitoring revealed atypical nonconvulsive status epilepticus aborted by intravenous lorazepam and levetiracetam.  Neurology on board  Etiology unclear.     -likely related to his ongoing medical conditions in the setting of known neurodegenerative disease.      Neuro recs 10.24:  We strongly recommend ongoing treatment with levetiracetam, which his daughter is in full agreement with.  Previously, this medication was stopped as it was not clear whether or not he had seizure disorder and attempts were being made to limit renally cleared medications.  If renal clearance becomes a question again, the dose of levetiracetam can be reduced appropriately  directed by pharmacy.     -Continue levetiracetam 750 mg every 12 hours, can be given via IV or feeding tube at same dose  -Continue EEG until discontinued by Neurology.     > Ct seizure precautions. Pads.   > Call Neurology prn.       #  Encephalopathy: acute on chronic.   ~delirium related to underlying long-standing and severe dementia, pain requiring  opiate analgesia, severe illness, and metabolic changes from renal insufficiency, C.diff plus likely seizure as above. He is non-communicative at baseline.    - remains somnolent often  - NPO. On TF.   - fall. Seizure precautions  - Seroquel HS.       #  DDKT and Liver Transplant now with progressive renal insufficiency:  Basline Cr ~ 1.2- 1,4  LILIAM, oliguric. Vol overload. S/p HD. Now off HD.  Stable Cr & lytes now with improved UOP.Last HD: 10.10.18.   Nephrology on board.   likely due to contrast which he received on 9/11/2018.    - Secondary renal hyperparathyroidism; PTH level is:  Normal at 78 on 6/2018  - Calcium; level is Normal when corrected for albumin   - Recent LFT stable.     10/25/2018: Cr, lytes remains stable.     - Continue Tacrolimus, MMF, and Prednisone  - 10/25/2018: will re consult transplant nephrology to help with IS adjustment, management. Text paged nephrology team.     - Monitor BP, I/o, bun, is lvl.   - Further per Nephrology.     # HTN, Vol overload- improving.   On carvedilol 6.25 bid-  10/25/2018: resume amlodipine 05   Ct PRN hydralazine.  - Ct current diuretics, bumex, metolazone as tolerated.       # Enterobacter UTI on 10/04: Started on Ceftriaxone and transitioned to Ciprofloxacin. Completed 14 day treatment.     #   Aspiration Pneumonia and poor Airway Protection  Pneumonia resolved after 7d abx.      - SLP evaluation ordered on 10/19. Case discussed. They advised that patient should be NPO because of high aspiration risk. If family wants to feed patient despite the known risks, then they can try DD1 diet with nectar thick  liquids.   - on TF  - 10.24: made NPO 2.2 increased encephalopathy likely 2/2 seizure.    - 10.24: NJ changed. Earlier one clogged and came out 10.23.        # Possible slow GI blood loss: Suspect multifactorial including related to chronic disease and LILIAM as well as iatrogenic though patient reportedly had melanotic stools though hemoglobins have since been stable. Higher risk of ongoing bleeding as he has a h/o DVT and needs warfarin anticoagulation.  Hgb stable at current.   - Continue PPI BID    - Hgb stable at 8.3 on 10/22       # Resolved recurrent C. Diff Colitis:  Toxin positive 9/11/18. Systemic abx d/tamra. Overlap and taper done. Vanco now completed.  - loose stools now also could be related to TF.   - monitor.       # DVT On warfarin with INR goal 2-3. Significant bleeding risk but clotting risk outweighs this. Pharmacy dosing of warfarin without heparin bridge.     - Continue warfarin . Follow-up INR  - monitor for s/s of bleeding.        # T2DM: Blood sugar controlled. On sliding scale insulin.      Recent Labs  Lab 10/25/18  0759 10/25/18  0702 10/25/18  0432 10/25/18  0017 10/24/18  2029 10/24/18  1623 10/24/18  1222  10/24/18  0709  10/23/18  0641  10/22/18  0535  10/21/18  0748  10/20/18  0740   GLC  --  132*  --   --   --   --   --   --  114*  --  124*  --  123*  --  129*  --  110*   *  --  143* 127* 158* 116* 115*  < >  --   < >  --   < >  --   < >  --   < >  --    < > = values in this interval not displayed.         Diet: NPO with NJ feeds.       Active Diet Order      NPO for Medical/Clinical Reasons Except for: Meds, Ice Chips     Tube feeding.   Nutrition on board.       Fluids: TF flush 100 Q6hr   DVT Prophylaxis: Warfarin.   Code Status: DNR/DNI    Social:  Family would like to take patient home with NG tube feeding. They will pursue full care, but code status remains DNR/ DNI  NOT Comfort Care.       Disposition Plan   Expected discharge to home likely 3-4 days after tube feeding  cycled. Cleared by Neurology,      Entered: Ryan Aponte 10/25/2018, 12:43 PM            Pt's care was discussed with bedside RN, patient and  during Care Team Rounds.       Ryan Aponte MD   Hospitalist (Internal Medicine)  [facility]  Pager: [phone].

## 2018-10-25 NOTE — PROCEDURES
[facility] #-3  TYPE OF STUDY: Inpatient video-EEG monitoring   DATE OF RECORDING/SERVICE DATE:  10/25/2018   SOURCE FILE DURATION: 11 hours, 27 minutes, 24 seconds.      HISTORY:  This is day 3 of video-EEG monitoring on a 78-year-old patient brought into the hospital after having altered mental status with tongue biting.  The patient has history of dementia with worsening severity over the last 6 months and is nonvocal at baseline.  The patient has had a history of right thalamic stroke causing some degree of left-sided hemiparalysis.  The patient was placed on video-EEG monitoring for further seizures/encephalopathy workup.      MEDICATIONS:  Keppra 750 mg b.i.d.     TECHNICAL SUMMARY: This continuous video- EEG monitoring procedure was performed with 23 scalp electrodes in 10-20 electrode system placement, and additional scalp, precordial and other surface electrodes used for electrical referencing and artifact detection.  Video monitoring was utilized and periodically reviewed by EEG technologists and the physician for electroclinical correlations.     FINDINGS:   INTERICTAL ACTIVITY:  There is generalized theta slowing throughout the recording with some degree of delta activity as well as intermittent superimposed beta activity seen in all leads.  There is no prominent sleep to wake cycling but there is a greater degree of reactivity with myogenic artifact as compared to previous studies.  There are intermittent periods of higher amplitude delta that is generalized, lasting briefly, under 5 seconds.  There are no lateralizing findings to any specific hemisphere, nor are there generalized periodic discharges seen on recording today.      EPILEPTIFORM ACTIVITY:  None.      ICTAL ACTIVITY:  No seizures seen today.      IMPRESSION:  Abnormal. Moderate diffuse encephalopathy. No electrographic seizures and no indication of nonconvulsive status epilepticus.      SUMMARY OF 3 DAYS OF RECORDING:  At study onset EEG  indicated atypical nonconvulsive status epilepticus. Administration of lorazapam terminated the EEG features indicating nonconvulsive status epilepticus. Improvement of mental status was difficult to document given patient's baseline dementia and fact that he spoke only Anguillan; however family reportedly informed neurological caregivers that mental status improved to baseline. In subsequent recordings, evidence of moderate diffuse encephalopathy noted, consistent with known dementia. No further indications of nonconvulsive status epilepticus and no interictal epileptiform activity.    Results reviewed with neurologic caregivers daily and more frequently as appropriate.     This report is dictated by Dr. Milo Fish DO, CNP fellow.      I personally reviewed this study and report and ammended dictation as appropriate.  Barrington Parker MD                   D: 10/25/2018   T: 10/25/2018   MT: NIDIA      Name:     YADIRA BREAUX   MRN:      [id number]        Account:        [id number]   :      1940           Procedure Date: 10/25/2018      Document: [id number]

## 2018-10-25 NOTE — PROGRESS NOTES
Brief Transplant nephrology:    I was asked to review immunosuppression regimen and adjust as needed.     S: patient remains bed ridden nearly vegetative state no interaction   ROS: non verbal   Vitals: reviewed    Exam:   General: non verbal, non interactive  Heent: NG in place   Chest: fair air entry and exchange   Abd soft benign + BS  Ext some edema   Neuro: non interactive     A/p     - SLK  - Immunosuppression management   - Debility/vegitative state   - Seizures controlled     Reviewed isx regimen. Will increase tacrolimus to a goal of 4 micrograms/l. Continue prednisone and MMF.   Once tacrolimus around 4 and stable may stop the MMF and finalize the regimen to Tac/prednisone.  Overall very poor prognosis, owing to mental status and current state high risk of infection. Discussed with the family member at bedside.     Zachary Reed

## 2018-10-26 NOTE — PLAN OF CARE
Problem: Patient Care Overview  Goal: Plan of Care/Patient Progress Review  Time   2955-0002    Pt disoriented and non-verbal except a few words in Uzbek. VSS on RA except mild HTN. Continuing with incontinence cares and turns q2 hrs. Bilateral leg skin tears changed per WOC orders, barrier cream applied to coccyx, miconazole powder and interdry in skin folds. Cycled TFs turned off at 8am. To be restarted tonight at 6pm at goal. Scheduled IV keppra given this morning, no signs of seizure activity. Cr 1.55. Plan to discharge late this weekend or early next week. Pt will go home with PCA services and TFs. Will continue to monitor and follow POC.

## 2018-10-26 NOTE — PROGRESS NOTES
"Brief Transplant nephrology:    S: patient remains bed ridden nearly vegetative state no interaction. No family present at bedside this afternoon. Discussed recommendations with hospitalist.     ROS: non verbal     Vitals: Blood pressure 133/55, pulse 84, temperature 96.5  F (35.8  C), temperature source Axillary, resp. rate 16, height 1.778 m (5' 10\"), weight 67.7 kg (149 lb 4.8 oz), SpO2 97 %.    Exam:   General: non verbal, non interactive  Heent: NG in place   Chest: fair air entry and exchange   Abd soft benign + BS  Ext some edema   Neuro: non interactive     A/p     - SLK  - Immunosuppression management   - Debility/vegitative state   - Seizures controlled     Reviewed isx regimen. Will increase tacrolimus to a goal of 4 micrograms/l. Continue prednisone switched MMF to liquid per g tube. Once tac is stable at 4 micrograms/ L we can stop MMF. (home regimen was single agent tac)               "

## 2018-10-26 NOTE — PROGRESS NOTES
"[facility], [place]   Internal Medicine Daily Note           Interval History/Events     Daughter May present at bedside.   No new concern overnight  Tolerating Tf well.   Pt alert. Calm.   Off eeg.   Loose stools x 2 overnight.   Still w Good UOP  No fever or chills  No emesis.  No other concern.          Review of Systems        4 point ROS including Respiratory, CV, GI and , other than that noted above is negative      Medications   I have reviewed current medications  in the \"current medication\" section of Epic.  Relevant changes include:     Physical Exam       Vital signs:    Blood pressure 160/69, pulse 89, temperature 97.4  F (36.3  C), temperature source Axillary, resp. rate 18, height 1.778 m (5' 10\"), weight 67.7 kg (149 lb 4.8 oz), SpO2 100 %.  Estimated body mass index is 21.42 kg/(m^2) as calculated from the following:    Height as of this encounter: 1.778 m (5' 10\").    Weight as of this encounter: 67.7 kg (149 lb 4.8 oz).      Gen: NAD.  HEENT: anicteric. Dry mm.   Cardiovascular: S1, S2  Regular.    Respiratory:  B/l CTA. Non labored.   GI/Abdomen: Soft, mild distended.  Neurology: alert.   Extremities: 1-2+ pre tibial edema.   Skin: No obvious bruising/ bleeding      Laboratory and Imaging Studies     I have reviewed  laboratory and imaging studies in the Epic. Pertinent findings are as below:    BMP    Recent Labs  Lab 10/26/18  0602 10/25/18  0702 10/24/18  0709 10/23/18  0641    141 145* 140   POTASSIUM 3.9 3.7 3.6 4.1   CHLORIDE 100 103 103 97   JOSHUA 8.2* 8.3* 8.5 8.3*   CO2 26 25 31 34*   BUN 88* 85* 84* 98*   CR 1.55* 1.43* 1.41* 1.61*   * 132* 114* 124*     CBC    Recent Labs  Lab 10/26/18  0602 10/25/18  0702 10/24/18  0709 10/23/18  0641   WBC 5.5 5.3 5.0 4.9   RBC 2.78* 2.91* 2.74* 2.74*   HGB 8.4* 8.8* 8.2* 8.3*   HCT 27.8* 29.2* 27.4* 27.8*    100 100 102*   MCH 30.2 30.2 29.9 30.3   MCHC 30.2* 30.1* 29.9* 29.9*   RDW 17.2* 17.9* 18.0* " 17.8*    364 361 415     INR    Recent Labs  Lab 10/26/18  0602 10/25/18  0702 10/24/18  0709 10/23/18  0641   INR 2.92* 1.95* 1.71* 1.76*     LFTsNo lab results found in last 7 days.   PANCNo lab results found in last 7 days.        Impression/Plan        Priscilla Way is a 78 year old male with a complex past medical history including dementia (Alzheimer and vascular), liver transplant and kidney transplants in 2000, DVT, T2DM, CVA, and poor airway protection admitted 9/11/2018 with AMS,  possible seizure and aspiration pneumonia. Developed LILIAM on CKD, anuric, requiring HD.   Care conference on 10/15/2018, decision was made for comfort care. Nephrology, Palliative care on board.     Events since initiation of comfort care  has taken a significant turn. Since pt started to make urine on his own, not needing HD any longer, per family request, discontinued comfort care, Nephrology aware. Resumed tacrolimus and Mycophenolate. Pt remains DNR/DNI  Now with e/o status epilepticus, non convulsive 10.23.   Neurology on board.   On Tube feeding via NJ.       # Seizure : Atypical nonconvulsive status epilepticus     10.23: Overnight, EEG monitoring revealed atypical nonconvulsive status epilepticus aborted by intravenous lorazepam and levetiracetam.  Neurology on board  Etiology unclear.     -likely related to his ongoing medical conditions in the setting of known neurodegenerative disease.      Neuro recs 10.24:  We strongly recommend ongoing treatment with levetiracetam, which his daughter is in full agreement with.  Previously, this medication was stopped as it was not clear whether or not he had seizure disorder and attempts were being made to limit renally cleared medications.  If renal clearance becomes a question again, the dose of levetiracetam can be reduced appropriately directed by pharmacy.     10.25:   Recommendations:  - Continue levetiracetam 750 mg twice per day  - Stop EEG    > Ct seizure  precautions. Pads.   > Call Neurology prn.       #  Encephalopathy: acute on chronic.   ~delirium related to underlying long-standing and severe dementia, pain requiring  opiate analgesia, severe illness, and metabolic changes from renal insufficiency, C.diff plus likely seizure as above. He is non-communicative at baseline.    - alert now  - NPO. On TF.   - fall. Seizure precautions  - Seroquel HS.       #  DDKT and Liver Transplant now with progressive renal insufficiency:  Basline Cr ~ 1.2- 1,4  LILIAM, oliguric. Vol overload. S/p HD. Now off HD.  Stable Cr & lytes now with improved UOP.Last HD: 10.10.18.   Nephrology on board.   likely due to contrast which he received on 9/11/2018.    - Secondary renal hyperparathyroidism; PTH level is:  Normal at 78 on 6/2018  - Calcium; level is Normal when corrected for albumin   - Recent LFT stable.     10/25/2018: Cr, lytes remains stable.     - Continue Tacrolimus, MMF, and Prednisone  - 10/25/2018: re consulted transplant nephrology to help with IS adjustment, management.   - further med/dose adjustment per Nephrology. D.w Nephrology again 10/26/2018    - Monitor BP, I/o, bun, is lvl.   - Further per Nephrology.     # HTN, Vol overload- improving.   On carvedilol 6.25 bid-  10/25/2018: resume amlodipine 05   Ct PRN hydralazine.  - Ct current diuretics, bumex, metolazone as tolerated.       # Enterobacter UTI on 10/04: Started on Ceftriaxone and transitioned to Ciprofloxacin. Completed 14 day treatment.     #   Aspiration Pneumonia and poor Airway Protection  Pneumonia resolved after 7d abx.      - SLP evaluation ordered on 10/19. Case discussed. They advised that patient should be NPO because of high aspiration risk. If family wants to feed patient despite the known risks, then they can try DD1 diet with nectar thick liquids.   - on TF  - 10.24: made NPO 2.2 increased encephalopathy likely 2/2 seizure.    - 10.24: NJ changed. Earlier one clogged and came out 10.23.        #  Possible slow GI blood loss: Suspect multifactorial including related to chronic disease and LILIAM as well as iatrogenic though patient reportedly had melanotic stools though hemoglobins have since been stable. Higher risk of ongoing bleeding as he has a h/o DVT and needs warfarin anticoagulation.  Hgb stable at current.   - Continue PPI BID    - Hgb stable at 8.3 on 10/22       # Resolved recurrent C. Diff Colitis:  Toxin positive 9/11/18. Systemic abx d/tamra. Overlap and taper done. Vanco now completed.  - loose stools now also could be related to TF.   - monitor.       # DVT On warfarin with INR goal 2-3. Significant bleeding risk but clotting risk outweighs this. Pharmacy dosing of warfarin without heparin bridge.     - Continue warfarin . Follow-up INR  - monitor for s/s of bleeding.        # T2DM: Blood sugar controlled. On sliding scale insulin.      Recent Labs  Lab 10/26/18  1225 10/26/18  0800 10/26/18  0602 10/26/18  0424 10/25/18  2357 10/25/18  2019 10/25/18  1606  10/25/18  0702  10/24/18  0709  10/23/18  0641  10/22/18  0535  10/21/18  0748   GLC  --   --  116*  --   --   --   --   --  132*  --  114*  --  124*  --  123*  --  129*   * 131*  --  102* 191* 152* 102*  < >  --   < >  --   < >  --   < >  --   < >  --    < > = values in this interval not displayed.         Diet: NPO with NJ feeds.       Active Diet Order      NPO for Medical/Clinical Reasons Except for: Meds, Ice Chips     Tube feeding.   Nutrition on board.       Fluids: TF flush 100 Q6hr   DVT Prophylaxis: Warfarin.   Code Status: DNR/DNI    Social:  Family would like to take patient home with NG tube feeding. They will pursue full care, but code status remains DNR/ DNI  NOT Comfort Care.       Disposition Plan   Expected discharge to home likely 3-4 days after tube feeding cycled. Cleared by Neurology,      Entered: Ryan Aponte 10/26/2018, 5:00 PM            Pt's care was discussed with bedside RN, patient and  during Care Team  Rounds.   D/w Daughter.     Ryan Aponte MD   Hospitalist (Internal Medicine)  [facility]  Pager: [phone].

## 2018-10-26 NOTE — PLAN OF CARE
Problem: Patient Care Overview  Goal: Plan of Care/Patient Progress Review  Outcome: No Change  Pt is disoriented and nonverbal. Sleeping between cares. Wakes up when cares performed and generally resists. Paula-rectal area is raw and excoriated. Cleaning with incontinence cleanser spray and applying barrier paste. Interdry placed in groin. Mepilex on R inner thigh replaced. L inner thigh intact. TF started at 1800 at 75 mL/hr with 55 mL/hr flushes. Turning every 2 hours.

## 2018-10-26 NOTE — PLAN OF CARE
Problem: Patient Care Overview  Goal: Plan of Care/Patient Progress Review  Outcome: No Change  8247-9183: Pt VSS on RA. ADRIA orientation, pt nonverbal. No nonverbal signs of pain. TFs cycled from 3043-6363 @ 75 ml/hr through NJ, flushes scheduled q1h. BGs q4h, SSI per MAR. Pt is NPO. Repo q2h. Incontinent BM x2, urine x1. L PIV SL. Mepilex on bilateral inner thighs CDI. Paula rectal area excoriated, applying barrier cream PRN. Interdry placed in groin area. EEG from yesterday shows moderate generalized encephalopathy. Plan for pt to d/c home with family on cycled tube feeds in a few days. Will continue to monitor and follow POC.

## 2018-10-26 NOTE — PROGRESS NOTES
Care Coordinator Progress Note    Admission Date/Time:  9/11/2018  Attending MD:  Cat Perla*    Data  Chart reviewed, discussed with interdisciplinary team.   Patient was admitted for:    Aspiration pneumonia due to regurgitated food, unspecified laterality, unspecified part of lung (H)  Kidney replaced by transplant  LILIAM (acute kidney injury) (H)  Liver replaced by transplant (H)  Anemia in stage 3 chronic kidney disease (H)  Seizure (H)  CKD (chronic kidney disease) stage 3, GFR 30-59 ml/min (H)  Delirium.      Coordination of Care:     10/26 @ 1513: Spoke with May at bedside, she is uncomfortable with a weekend discharge due to limited availability of PCA's and Home Care staff. Additionally, May was told the TF would be cycled to 12hrs on and 12hrs off; presently he is on 14hrs. Paged Dietitian who will speak to May directly (they will make a decision together). Plan for discharge Monday 10/29.    10/26 @ 2834:  Attempted to meet with daughter May again (to discuss/make dc plans) she has not returned to the hospital yet. Home Care and Home Infusion DC orders are ready, they have been updated today that dc is very soon. Could dc over the weekend if family allows and Home Care/Home Infusion is notified soon enough to allow for home visit same day or next day.    10/26 @ 1056: Patient had seizure activity which has now resolved after intervention. Neurology recommending patient stay on Keppra at dc and May is in agreement. Tube feeding is at cycled goal. Attempted to meet with May at bedside this morning to discuss discharge as patient is medically ready (bedside RN is in agreement); May is not here, will try again later today.    10/23: Writer met with daughter May at the bedside to discuss discharge planning. Per May patient will discharge to home with resumption of [facility], resumption order and MD face to face completed.     Patient will discharge with NG for enteral tube  feeds, [facility] referral placed, has 100% coverage and no deductible, [facility] order placed. Daughter May attending [facility] for tube feeding today 1:30pm. May has spoken to dietitian regarding cycling TF today. May does not want to dc home until TF are fully cycled and patient is tolerating. Per Dietitian note cycling begins today and will be at goal on Saturday. Plan for dc Saturday if patient is tolerating.    Writer spoke with patient's [facility]  Jodi (Ph: [phone]) regarding discharge planning. There is no increase in PCA hours if TF is cycled to twelve hours on and twelve hours off (May is aware and okay with that, wants to continue with cycling TF anyway).     Assessment  Previous full assessment completed. Daughter May at the [facility] and involved in discharge planning to home.     Referrals:     [facility]  Phone  [phone]  Fax  [phone]    [facility]  Phone # [phone]  Fax # [phone]        Plan  Anticipated Discharge Date:  TBD  Anticipated Discharge Plan: Home w/resumption of [facility], resumption of PCA services, [facility] for feedings.     Radha Ahuja RN, BSN, PHN  Medicine Care Coordinator  [facility]  Desk Phone: [phone]  Pager: [phone]    To contact Weekend RNCC, dial [phone] and enter job code [phone] at prompt.   This pager can not be contacted by text page or outside line.

## 2018-10-26 NOTE — PROGRESS NOTES
[facility] Nurse Inpatient Pressure Injury Assessment   Reason for consultation: Evaluate and treat penile skin  NEW: skin tears bilateral thighs       ASSESSMENT  Pressure Injury: on penis foreskin , hospital acquired   This is a Medical Device Related Pressure Injury (MDRPI) due to patino  Pressure Injury is Stage 2   Contributing factor of the pressure injury: pressure, immobility and moisture  Status:resolved    Bilateral thigh wounds due to: Skin tear  Status: initial assessment       TREATMENT PLAN  Bilateral Thigh skin tears: Every third day or as needed cleanse with microklenz and pat dry.  Apply mepilex border.    Orders reviewed  WO Nurse follow-up plan:weekly  Nursing to notify the Provider(s) and re-consult the [facility] Nurse if wound(s) deteriorates or new skin concern.      Patient History  Priscilla Way is a 76 year old male Citizen of [place] speaking only with a history of vascular dementia, multiple CVAs with left-sided weakness, kidney/liver transplant 2000 secondary to end stage liver disease from Hepatitis C, DM, osteoarthritis, HTN, hyperlipidemia, DVT s/p IVC filter 9/16, and urinary incontinence who presents to ED after 3 day history of increased confusion, lethargy, and bilateral lower extremity edema R > L increased from prior discharge 9/1/16.   CXR with increased right pleural effusion since last admission.         Objective Data  Containment of urine/stool: Indwelling catheter      Current Diet/ Nutrition:      Active Diet Order      Dysphagia Diet Level 1 Pureed Nectar Thickened Liquids (pre-thickened or use instant food thickener)      I/O last 3 completed shifts:  In: 2060 [NG/GT:1085]  Out: -         Risk Assessment:   Sensory Perception: 2-->very limited  Moisture: 3-->occasionally moist  Activity: 1-->bedfast  Mobility: 2-->very limited  Nutrition: 2-->probably inadequate  Friction and Shear: 1-->problem  Stuart Score: 11       I/O last 3 completed shifts:  In: 975 [I.V.:5; NG/GT:90]  Out: 375  [Urine:375]     Physical Exam  Skin inspection: focused penile foreskin  Wound Location: penile foreskin  Date of last Photo 10/1/18  Wound History: pt has indwelling Virk catheter, swelling of skin on uncircumcised penis was improving, however slightly worse again today to lower aspect of uncircumcised tissue  10/26: catheter removed.  Skin is resurfaced and depigmented.  Updated family and RN.         Left medial leg                                                   Right posterior leg    Wound Location: Right posterior and Left medial Thigh  Date of last photo 10/26/18  Wound History: Per family skin tears occurred when incontinence pad was pulled from between legs.    Measurements (length x width x depth, in cm)   Left: 2 x 1.5 x 0.1 cm  Right: 2.5 x 2.5 x 0.1 cm   Wound Base:  100%  dermis  Palpation of the wound bed: normal   Periwound skin: intact  Color: normal and consistent with surrounding tissue  Temperature: normal   Drainage:, scant  Description of drainage: serous  Odor: none  Pain: during dressing change, combative  Pt combative during assessment and nurse as well as caregiver had to assist for WOC to assess and take pictures          Interventions  Current support surface: Standard  Low air loss mattress with pulsation   Current off-loading measures: Foam padding and Pillows under calves  Repositioning aid: Pillows  Visual inspection of wound(s) completed   Tube Securement: cath securement device  Wound Care: was done per plan of care.  Supplies: gathered and at bedside  Educated provided: importance of repositioning, plan of care and wound progress  Education provided to: caregiver  Discussed importance of:repositioning every 2 hours, off-loading pressure to wound, their role in pressure injury prevention, head elevation <30 degrees, nutrition on wound healing and moisture management  Discussed plan of care with Patient and Nurse

## 2018-10-27 NOTE — PROGRESS NOTES
"[facility], [place]   Transplant Nephrology Progress Note  Date of Admission:  2018    Assessment & Plan      #AMS / goals of care: per medicine.     # DDKT: baseline Cr ~ 1.4-1.6mg/dl; Variable   - Proteinuria: Nephrotic range   - Latest DSA: 2016 negative   - BK Viremia: No   - Kidney Tx Biopsy: No    The patient has reasonably stable kidney function with a creatinine that is now 1.4-1.6 mg/dL.  He has had multiple acute kidney injury episodes during this hospitalization in the setting of contrast administration.  He is currently on immunosuppression as below.  Electrolytes and volume are reasonably well managed.    # Immunosuppression: Tacrolimus immediate release (goal  4-6) and pred 5 mg daily; mmf 250 mg po bid until tac therapeutic, then discontinue mmf.    - Changes: No     I ordered the next tacrolimus trough for Monday morning.    # Hypertension/Volume Status: acceptable; Goal BP: < 150/90   - Changes: No    # Anemia in chronic renal disease: Hgb: 7.9 g / dl. Slow dwindle. Will discontinue mmf once tac at goal.     #Hypocalcemia: corrects to normal for degree of hypoalbuminemia.     Recommendations were communicated to the primary team via this note.    We will sign off. Call with any questions.     Ciaran Rdz MD    Interval History   No changes. Tmax: 98.1. I/O: /. bp acceptable this am. No changes in mental status.     Review of Systems   4 point ROS was obtained and negative except as noted in the interval history    Physical Exam   Temp  Av.7  F (35.9  C)  Min: 92.3  F (33.5  C)  Max: 99.7  F (37.6  C)      Pulse  Av.4  Min: 62  Max: 108 Resp  Av  Min: 6  Max: 26  SpO2  Av.3 %  Min: 72 %  Max: 100 %     /58 (BP Location: Left leg)  Pulse 78  Temp 96.9  F (36.1  C) (Axillary)  Resp 16  Ht 1.778 m (5' 10\")  Wt 67.7 kg (149 lb 4.8 oz)  SpO2 100%  BMI 21.42 kg/m2   Date 10/27/18 0700 - 10/28/18 0659   Shift 6201-1167 " 5478-0262 5761-6332 24 Hour Total   I  N  T  A  K  E   NG/   370    Shift Total  (mL/kg) 370  (5.46)   370  (5.46)   O  U  T  P  U  T   Shift Total  (mL/kg)       Weight (kg) 67.72 67.72 67.72 67.72     Admit Weight: 83.9 kg (185 lb)   GENERAL APPEARANCE: alert and no distress  HENT: mouth without ulcers or lesions  LYMPHATICS: no cervical or supraclavicular nodes  RESP: lungs clear to auscultation - no rales, rhonchi or wheezes  CV: regular rhythm, normal rate, no rub, no murmur  EDEMA: no LE edema bilaterally  ABDOMEN: soft, nondistended, nontender, bowel sounds normal  MS: extremities normal - no gross deformities noted, no evidence of inflammation in joints, no muscle tenderness  SKIN: no rash    Data   All labs reviewed by me.  CMP  Recent Labs  Lab 10/27/18  0701 10/26/18  0602 10/25/18  0702 10/24/18  0709    137 141 145*   POTASSIUM 3.6 3.9 3.7 3.6   CHLORIDE 99 100 103 103   CO2 26 26 25 31   ANIONGAP 10 10 13 11   * 116* 132* 114*   BUN 90* 88* 85* 84*   CR 1.51* 1.55* 1.43* 1.41*   GFRESTIMATED 45* 43* 48* 48*   GFRESTBLACK 54* 52* 58* 59*   JOSHUA 8.0* 8.2* 8.3* 8.5     CBC  Recent Labs  Lab 10/27/18  0701 10/26/18  0602 10/25/18  0702 10/24/18  0709   HGB 7.9* 8.4* 8.8* 8.2*   WBC 5.2 5.5 5.3 5.0   RBC 2.59* 2.78* 2.91* 2.74*   HCT 25.6* 27.8* 29.2* 27.4*   MCV 99 100 100 100   MCH 30.5 30.2 30.2 29.9   MCHC 30.9* 30.2* 30.1* 29.9*   RDW 17.1* 17.2* 17.9* 18.0*    369 364 361     INR  Recent Labs  Lab 10/27/18  0701 10/26/18  0602 10/25/18  0702 10/24/18  0709   INR 2.71* 2.92* 1.95* 1.71*     Urine Studies  Recent Labs   Lab Test  10/11/18   1300  10/04/18   1330  10/03/18   2056  09/24/18   0100   COLOR  Yellow  Rhea  Dark Brown  Yellow   APPEARANCE  Clear  Cloudy  Cloudy  Clear   URINEGLC  Negative  Negative  Negative  Negative   URINEBILI  Negative  Negative  Negative  Negative   URINEKETONE  Negative  Negative  10*  Negative   SG  1.009  1.010  1.019  1.020   UBLD  Small*   Large*  Moderate*  Moderate*   URINEPH  5.5  6.0  5.0  6.0   PROTEIN  30*  30*  30*  100*   NITRITE  Negative  Negative  Negative  Negative   LEUKEST  Large*  Moderate*  Large*  Trace*   RBCU  19*  182*  >182*  5   WBCU  38*  >182*  >182*  2     Recent Labs   Lab Test  09/14/18   1418  05/27/16   1409  04/29/11   1348  09/14/10   1134   UTPG  24.91*  Specimen not received  NOTIFIED [facility]OM[facility]ARE  WHO PAGED  RN, LOPEZ AT 1355. NO URINE   RECIEVED WITH BLOOD SPECIMENS. AB    0.04  <0.02     PTH  Recent Labs   Lab Test  06/05/18   0548  08/24/16   0852   PTHI  78  333*     Iron Studies  Recent Labs   Lab Test  11/08/16   1209  08/24/16   0852   IRON  17*  20*   FEB  146*  197*   IRONSAT  11*  10*   BARTOLO   --   1025*     MEDICATIONS:  Current Facility-Administered Medications   Medication     acetaminophen (TYLENOL) solution 650 mg     albuterol neb solution 2.5 mg     amLODIPine (NORVASC) suspension 5 mg     atropine 1 % ophthalmic solution 1-2 drop     bisacodyl (DULCOLAX) Suppository 10 mg     carvedilol (COREG) suspension 6.25 mg     dextrose 10 % 1,000 mL infusion     glucose gel 15-30 g    Or     dextrose 50 % injection 25-50 mL    Or     glucagon injection 1 mg     fiber modular (NUTRISOURCE FIBER) (NUTRISOURCE FIBER) packet 1 packet     HYDROmorphone (DILAUDID) injection 0.2-0.4 mg     HYDROmorphone (STANDARD CONC) (DILAUDID) liquid 1-2 mg     influenza Vac Split High-Dose (FLUZONE) injection 0.5 mL     insulin aspart (NovoLOG) inj (RAPID ACTING)     lactobacillus rhamnosus (GG) (CULTURELL) capsule 1 capsule     levETIRAcetam (KEPPRA) solution 750 mg     lidocaine (LMX4) cream     lidocaine (viscous) (XYLOCAINE) 2 % solution 5 mL     lidocaine 1 % 1 mL     LORazepam (ATIVAN) 1 mg/0.5 mL (HIGH CONC) solution 0.5-1 mg     magnesium hydroxide (MILK OF MAGNESIA) suspension 30 mL     magnesium sulfate 2 g in NS intermittent infusion ([facility] or [facility])     magnesium sulfate 4 g in 100 mL sterile  water (premade)     melatonin tablet 1 mg     miconazole (MICATIN; MICRO GUARD) 2 % powder     mycophenolate (CELLCEPT BRAND) suspension 250 mg     naloxone (NARCAN) injection 0.1-0.4 mg     ondansetron (ZOFRAN-ODT) ODT tab 4 mg    Or     ondansetron (ZOFRAN) injection 4 mg     pantoprazole (PROTONIX) 2 mg/mL suspension 40 mg     Patient is already receiving anticoagulation with heparin, enoxaparin (LOVENOX), warfarin (COUMADIN)  or other anticoagulant medication     predniSONE (DELTASONE) tablet 5 mg     prochlorperazine (COMPAZINE) injection 5 mg    Or     prochlorperazine (COMPAZINE) tablet 5 mg    Or     prochlorperazine (COMPAZINE) Suppository 12.5 mg     QUEtiapine (SEROquel) tablet 25 mg     senna-docusate (SENOKOT-S;PERICOLACE) 8.6-50 MG per tablet 1 tablet    Or     senna-docusate (SENOKOT-S;PERICOLACE) 8.6-50 MG per tablet 2 tablet     sodium chloride (PF) 0.9% PF flush 3 mL     sodium chloride (PF) 0.9% PF flush 3 mL     sodium phosphate 15 mmol in D5W intermittent infusion     sodium phosphate 20 mmol in D5W intermittent infusion     sodium phosphate 25 mmol in D5W intermittent infusion     tacrolimus (GENERIC EQUIVALENT) suspension 1.5 mg     warfarin (COUMADIN) tablet 2 mg     Warfarin Therapy Reminder (Check START DATE - warfarin may be starting in the FUTURE)

## 2018-10-27 NOTE — PLAN OF CARE
Problem: Patient Care Overview  Goal: Plan of Care/Patient Progress Review  Outcome: No Change   10/26/18 2225   OTHER   Plan Of Care Reviewed With patient   Plan of Care Review   Progress no change     Temp: 97.4  F (36.3  C) Temp src: Axillary BP: 170/74 Pulse: 83 Heart Rate: 82 Resp: 18 SpO2: 93 % O2 Device: None (Room air)       Patient is here for AMS and seizure:    -tolerating cyclic tube feeding at 75 ml/hr with 55 ml H2O flush every hour   - & 170; on sliding scale  -old PIV infiltrated; new PIV placed by VAS  -non-verbal and unable to follow command  -bed-bound and needs to be turned every 2 hour  -skin tears on inner thighs with Mipelex  -groin reddened; miconazole powder and barrier cream applied  -tip of penis has wounds that are pink, no discharge and open to air    Plan: reposition every 2 hours.  Stop TF at 8 am.

## 2018-10-27 NOTE — PROGRESS NOTES
"[facility], [place]   Internal Medicine Daily Note           Interval History/Events     Daughter May present at bedside.   No new concern overnight  Tolerating Tf well.   Pt alert. Calm.   Loose stools few times overnight.   Frequent urination- incontinence often    No fever or chills  No emesis.  No other concern.          Review of Systems        4 point ROS including Respiratory, CV, GI and , other than that noted above is negative      Medications   I have reviewed current medications  in the \"current medication\" section of Epic.  Relevant changes include:     Physical Exam       Vital signs:    Blood pressure 130/63, pulse 84, temperature 98.1  F (36.7  C), temperature source Oral, resp. rate 16, height 1.778 m (5' 10\"), weight 67.7 kg (149 lb 4.8 oz), SpO2 99 %.  Estimated body mass index is 21.42 kg/(m^2) as calculated from the following:    Height as of this encounter: 1.778 m (5' 10\").    Weight as of this encounter: 67.7 kg (149 lb 4.8 oz).      Gen: NAD.  HEENT: anicteric. Dry mm.   Cardiovascular: S1, S2  Regular.    Respiratory:  B/l CTA. Non labored.   GI/Abdomen: Soft, mild distended.  Neurology: alert.   Extremities: 1+ pre tibial edema.   Skin: No obvious bruising/ bleeding      Laboratory and Imaging Studies     I have reviewed  laboratory and imaging studies in the Epic. Pertinent findings are as below:    BMP    Recent Labs  Lab 10/27/18  0701 10/26/18  0602 10/25/18  0702 10/24/18  0709    137 141 145*   POTASSIUM 3.6 3.9 3.7 3.6   CHLORIDE 99 100 103 103   JOSHUA 8.0* 8.2* 8.3* 8.5   CO2 26 26 25 31   BUN 90* 88* 85* 84*   CR 1.51* 1.55* 1.43* 1.41*   * 116* 132* 114*     CBC    Recent Labs  Lab 10/27/18  0701 10/26/18  0602 10/25/18  0702 10/24/18  0709   WBC 5.2 5.5 5.3 5.0   RBC 2.59* 2.78* 2.91* 2.74*   HGB 7.9* 8.4* 8.8* 8.2*   HCT 25.6* 27.8* 29.2* 27.4*   MCV 99 100 100 100   MCH 30.5 30.2 30.2 29.9   MCHC 30.9* 30.2* 30.1* 29.9*   RDW 17.1* " 17.2* 17.9* 18.0*    369 364 361     INR    Recent Labs  Lab 10/27/18  0701 10/26/18  0602 10/25/18  0702 10/24/18  0709   INR 2.71* 2.92* 1.95* 1.71*     LFTsNo lab results found in last 7 days.   PANCNo lab results found in last 7 days.        Impression/Plan        Priscilla Way is a 78 year old male with a complex past medical history including dementia (Alzheimer and vascular), liver transplant and kidney transplants in 2000, DVT, T2DM, CVA, and poor airway protection admitted 9/11/2018 with AMS,  possible seizure and aspiration pneumonia. Developed LILIAM on CKD, anuric, requiring HD.   Care conference on 10/15/2018, decision was made for comfort care. Nephrology, Palliative care on board.     Events since initiation of comfort care  has taken a significant turn. Since pt started to make urine on his own, not needing HD any longer, per family request, discontinued comfort care, Nephrology aware. Resumed tacrolimus and Mycophenolate. Pt remains DNR/DNI  Now with e/o status epilepticus, non convulsive 10.23.   Neurology on board.   On Tube feeding via NJ.       # Seizure : Atypical nonconvulsive status epilepticus     10.23: Overnight, EEG monitoring revealed atypical nonconvulsive status epilepticus aborted by intravenous lorazepam and levetiracetam.  Neurology on board  Etiology unclear.     -likely related to his ongoing medical conditions in the setting of known neurodegenerative disease.      Neuro recs 10.24:  We strongly recommend ongoing treatment with levetiracetam, which his daughter is in full agreement with.  Previously, this medication was stopped as it was not clear whether or not he had seizure disorder and attempts were being made to limit renally cleared medications.  If renal clearance becomes a question again, the dose of levetiracetam can be reduced appropriately directed by pharmacy.     10.25:   Recommendations:  - Continue levetiracetam 750 mg twice per day  - Stop EEG    > Ct  seizure precautions. Pads.   > Call Neurology prn.       #  Encephalopathy: acute on chronic.   ~delirium related to underlying long-standing and severe dementia, pain requiring  opiate analgesia, severe illness, and metabolic changes from renal insufficiency, C.diff plus likely seizure as above. He is non-communicative at baseline.    - more alert now  - NPO. On TF.   - fall. Seizure precautions  - Seroquel HS.       #  DDKT and Liver Transplant now with progressive renal insufficiency:  Basline Cr ~ 1.2- 1,4  LILIAM, oliguric. Vol overload. S/p HD. Now off HD.  Stable Cr & lytes now with improved UOP.Last HD: 10.10.18.   Nephrology on board.   likely due to contrast which he received on 9/11/2018.    - Secondary renal hyperparathyroidism; PTH level is:  Normal at 78 on 6/2018  - Calcium; level is Normal when corrected for albumin   - Recent LFT stable.     10/25/2018: Cr, lytes remains stable.     10.26: d/w Tx nephrology. Tacro dose adjusted. MMF changed to  bid, plan to stop after tacro lvl >4.0.Ct prednisone.     - Monitor BP, I/o, bun, is lvl.   - Further per Nephrology.     # HTN, Vol overload- improving.   On carvedilol 6.25 bid-  10/25/2018: resume amlodipine 05   - Ct PRN hydralazine.  -On diuretics, bumex, metolazone as tolerated.     10/27/2018  Noted pt wt significantly down. Clinically appears more euvolemic. Edema much better. Good UOP. Suspect increased UOP as renal function recovers as well.   Discontinue diuretics  Monitor.     # Enterobacter UTI on 10/04: Started on Ceftriaxone and transitioned to Ciprofloxacin. Completed 14 day treatment.     #   Aspiration Pneumonia and poor Airway Protection  Pneumonia resolved after 7d abx.      - SLP evaluation ordered on 10/19. Case discussed. They advised that patient should be NPO because of high aspiration risk. If family wants to feed patient despite the known risks, then they can try DD1 diet with nectar thick liquids.   - on TF  - 10.24: made NPO  2.2 increased encephalopathy likely 2/2 seizure.    - 10.24: NJ changed. Earlier one clogged and came out 10.23.        # Possible slow GI blood loss: Suspect multifactorial including related to chronic disease and LILIAM as well as iatrogenic though patient reportedly had melanotic stools though hemoglobins have since been stable. Higher risk of ongoing bleeding as he has a h/o DVT and needs warfarin anticoagulation.  Hgb stable at current.   - Continue PPI BID    - Hgb stable at 8.3 on 10/22       # Resolved recurrent C. Diff Colitis:  Toxin positive 9/11/18. Systemic abx d/tamra. Overlap and taper done. Vanco now completed.  - loose stools now also could be related to TF.   - monitor.       # DVT On warfarin with INR goal 2-3. Significant bleeding risk but clotting risk outweighs this. Pharmacy dosing of warfarin without heparin bridge.     - Continue warfarin . Follow-up INR  - monitor for s/s of bleeding.        # T2DM: Blood sugar controlled. On sliding scale insulin.      Recent Labs  Lab 10/27/18  0801 10/27/18  0701 10/27/18  0405 10/27/18  0027 10/26/18  2119 10/26/18  1729 10/26/18  1225  10/26/18  0602  10/25/18  0702  10/24/18  0709  10/23/18  0641  10/22/18  0535   GLC  --  121*  --   --   --   --   --   --  116*  --  132*  --  114*  --  124*  --  123*   *  --  146* 156* 170* 118* 116*  < >  --   < >  --   < >  --   < >  --   < >  --    < > = values in this interval not displayed.         Diet: NPO with NJ feeds.       Active Diet Order      NPO for Medical/Clinical Reasons Except for: Meds, Ice Chips     Tube feeding.   Nutrition on board.       Fluids: TF flush 100 Q6hr   DVT Prophylaxis: Warfarin.   Code Status: DNR/DNI    Social:  Family would like to take patient home with NG tube feeding. They will pursue full care, but code status remains DNR/ DNI  NOT Comfort Care.       Disposition Plan   Expected discharge to home likely 2-3 days after tube feeding cycled. Cleared by Neurology,       Entered: Ryan Aponte 10/27/2018, 8:26 AM            Pt's care was discussed with bedside RN, patient and  during Care Team Rounds.   D/w Daughter.     Ryan Aponte MD   Hospitalist (Internal Medicine)  [facility]  Pager: [phone].

## 2018-10-27 NOTE — PLAN OF CARE
Problem: Patient Care Overview  Goal: Plan of Care/Patient Progress Review  Outcome: No Change  VSS on RA- ex /78 at 05:00. Turn q2. Pt has tube feedings running at 75 mL/hr with 55 mL water flush every hour- feedings to be stopped at 08:00 AM. PIV SL. Pt incontinent of urine and stool- 2 BM's overnight. BG checks q4h. Possible discharge Monday- Will continue to monitor and follow POC.

## 2018-10-28 NOTE — PLAN OF CARE
Problem: Patient Care Overview  Goal: Plan of Care/Patient Progress Review  Outcome: No Change  AVSS. 100% RA. Oriented only to self. Pt is non-verbal, does not follow commands. Mitts on so he does not pull on tubing. Resistant and combative with repositioning and incontinence cares. Incontinent of bowel and bladder, BMx2 overnight. Turn Q2hr. Barrier cream applied to tono area, miconazole cream to groin. Mepilex on pankaj inner thigh - R changed because of drainage, L c/d/i. No s/s of pain. NG with TF infusing @75/hr, with water flush Q1hr. TF started 1hr late, to run till 0900. BS checks Q4hr, covered per SSI. Daughter at bedside, attentive to cares.    Cont with POC. Plan to DC home Monday with family.

## 2018-10-28 NOTE — PROGRESS NOTES
"[facility], [place]   Internal Medicine Daily Note           Interval History/Events       No new concern overnight  Tolerating Tf well.   Pt alert. Calm.   Loose stools few times overnight.   Good UOP  No fever or chills  No emesis.  No other concern.          Review of Systems        4 point ROS including Respiratory, CV, GI and , other than that noted above is negative      Medications   I have reviewed current medications  in the \"current medication\" section of Epic.  Relevant changes include:     Physical Exam       Vital signs:    Blood pressure 129/50, pulse 73, temperature 97.9  F (36.6  C), temperature source Axillary, resp. rate 16, height 1.778 m (5' 10\"), weight 69.6 kg (153 lb 6.4 oz), SpO2 100 %.  Estimated body mass index is 22.01 kg/(m^2) as calculated from the following:    Height as of this encounter: 1.778 m (5' 10\").    Weight as of this encounter: 69.6 kg (153 lb 6.4 oz).      Gen: NAD.  HEENT: anicteric. Dry mm.   Cardiovascular: S1, S2  Regular.    Respiratory:  B/l CTA. Non labored.   GI/Abdomen: Soft, mild distended.  Neurology: alert.   Extremities: 1+ pre tibial edema.   Skin: No obvious bruising/ bleeding      Laboratory and Imaging Studies     I have reviewed  laboratory and imaging studies in the Epic. Pertinent findings are as below:    BMP    Recent Labs  Lab 10/28/18  0818 10/27/18  0701 10/26/18  0602 10/25/18  0702    135 137 141   POTASSIUM 4.3 3.6 3.9 3.7   CHLORIDE 97 99 100 103   JOSHUA 8.4* 8.0* 8.2* 8.3*   CO2 25 26 26 25   BUN 94* 90* 88* 85*   CR 1.60* 1.51* 1.55* 1.43*   * 121* 116* 132*     CBC    Recent Labs  Lab 10/28/18  0818 10/27/18  0701 10/26/18  0602 10/25/18  0702   WBC 5.1 5.2 5.5 5.3   RBC 2.71* 2.59* 2.78* 2.91*   HGB 8.3* 7.9* 8.4* 8.8*   HCT 26.8* 25.6* 27.8* 29.2*   MCV 99 99 100 100   MCH 30.6 30.5 30.2 30.2   MCHC 31.0* 30.9* 30.2* 30.1*   RDW 17.1* 17.1* 17.2* 17.9*    382 369 364     INR    Recent " Labs  Lab 10/28/18  0818 10/27/18  0701 10/26/18  0602 10/25/18  0702   INR 2.29* 2.71* 2.92* 1.95*     LFTs    Recent Labs  Lab 10/28/18  0818   ALKPHOS 130   AST 34   ALT 27   BILITOTAL 0.4   PROTTOTAL 6.8   ALBUMIN 2.4*      PANCNo lab results found in last 7 days.        Impression/Plan        Priscilla Way is a 78 year old male with a complex past medical history including dementia (Alzheimer and vascular), liver transplant and kidney transplants in 2000, DVT, T2DM, CVA, and poor airway protection admitted 9/11/2018 with AMS,  possible seizure and aspiration pneumonia. Developed LILIAM on CKD, anuric, requiring HD.   Care conference on 10/15/2018, decision was made for comfort care. Nephrology, Palliative care on board.     Events since initiation of comfort care  has taken a significant turn. Since pt started to make urine on his own, not needing HD any longer, per family request, discontinued comfort care, Nephrology aware. Resumed tacrolimus and Mycophenolate. Pt remains DNR/DNI  Now with e/o status epilepticus, non convulsive 10.23.   Neurology on board.   On Tube feeding via NJ.       # Seizure : Atypical nonconvulsive status epilepticus     10.23: Overnight, EEG monitoring revealed atypical nonconvulsive status epilepticus aborted by intravenous lorazepam and levetiracetam.  Neurology on board  Etiology unclear.     -likely related to his ongoing medical conditions in the setting of known neurodegenerative disease.      Neuro recs 10.24:  We strongly recommend ongoing treatment with levetiracetam, which his daughter is in full agreement with.  Previously, this medication was stopped as it was not clear whether or not he had seizure disorder and attempts were being made to limit renally cleared medications.  If renal clearance becomes a question again, the dose of levetiracetam can be reduced appropriately directed by pharmacy.     10.25:   Recommendations:  - Continue levetiracetam 750 mg twice per day  -  Stop EEG    > Ct seizure precautions. Pads.   > Call Neurology prn.       #  Encephalopathy: acute on chronic.   ~delirium related to underlying long-standing and severe dementia, pain requiring  opiate analgesia, severe illness, and metabolic changes from renal insufficiency, C.diff plus likely seizure as above. He is non-communicative at baseline.    - more alert now  - NPO. On TF.   - fall. Seizure precautions  - Seroquel HS.       #  DDKT and Liver Transplant now with progressive renal insufficiency:  Basline Cr ~ 1.2- 1,4  LILIAM, oliguric. Vol overload. S/p HD. Now off HD.  Stable Cr & lytes now with improved UOP.Last HD: 10.10.18.   Nephrology on board.   likely due to contrast which he received on 9/11/2018.    - Secondary renal hyperparathyroidism; PTH level is:  Normal at 78 on 6/2018  - Calcium; level is Normal when corrected for albumin   - Recent LFT stable.     10/25/2018: Cr, lytes remains stable.     10.26: d/w Tx nephrology. Tacro dose adjusted. MMF changed to  bid, plan to stop after tacro lvl >4.0.Ct prednisone.     - Monitor BP, I/o, bun, is lvl.   - Further per Nephrology.     # HTN, Vol overload- improving.   On carvedilol 6.25 bid-  10/25/2018: resume amlodipine 05   - Ct PRN hydralazine.  -On diuretics, bumex, metolazone as tolerated.     10/27/2018  Noted pt wt significantly down. Clinically appears more euvolemic. Edema much better. Good UOP. Suspect increased UOP as renal function recovers as well.   Discontinue diuretics  Monitor.     # Enterobacter UTI on 10/04: Started on Ceftriaxone and transitioned to Ciprofloxacin. Completed 14 day treatment.     #   Aspiration Pneumonia and poor Airway Protection  Pneumonia resolved after 7d abx.      - SLP evaluation ordered on 10/19. Case discussed. They advised that patient should be NPO because of high aspiration risk. If family wants to feed patient despite the known risks, then they can try DD1 diet with nectar thick liquids.   - on TF  -  10.24: made NPO 2.2 increased encephalopathy likely 2/2 seizure.    - 10.24: NJ changed. Earlier one clogged and came out 10.23.        # Possible slow GI blood loss: Suspect multifactorial including related to chronic disease and LILIAM as well as iatrogenic though patient reportedly had melanotic stools though hemoglobins have since been stable. Higher risk of ongoing bleeding as he has a h/o DVT and needs warfarin anticoagulation.  Hgb stable at current.   - Continue PPI BID    - Hgb stable at 8.3 on 10/22       # Resolved recurrent C. Diff Colitis:  Toxin positive 9/11/18. Systemic abx d/tamra. Overlap and taper done. Vanco now completed.  - loose stools now also could be related to TF.   - monitor.       # DVT On warfarin with INR goal 2-3. Significant bleeding risk but clotting risk outweighs this. Pharmacy dosing of warfarin without heparin bridge.     - Continue warfarin . Follow-up INR  - monitor for s/s of bleeding.        # T2DM: Blood sugar controlled. On sliding scale insulin.      Recent Labs  Lab 10/28/18  1139 10/28/18  0818 10/28/18  0749 10/28/18  0424 10/28/18  0002 10/27/18  2030 10/27/18  1555  10/27/18  0701  10/26/18  0602  10/25/18  0702  10/24/18  0709  10/23/18  0641   GLC  --  130*  --   --   --   --   --   --  121*  --  116*  --  132*  --  114*  --  124*   BGM 96  --  125* 129* 154* 181* 117*  < >  --   < >  --   < >  --   < >  --   < >  --    < > = values in this interval not displayed.         Diet: NPO with NJ feeds.       Active Diet Order      NPO for Medical/Clinical Reasons Except for: Meds, Ice Chips     Tube feeding.   Nutrition on board.       Fluids: none   DVT Prophylaxis: Warfarin.   Code Status: DNR/DNI    Social:  Family would like to take patient home with NG tube feeding. They will pursue full care, but code status remains DNR/ DNI  NOT Comfort Care.       Disposition Plan   Expected discharge to home likely 2-3 days after tube feeding cycled. Cleared by Neurology,  nephrology     Entered: Ryan Aponte 10/28/2018, 12:45 PM            Pt's care was discussed with  RN, patient and  during Care Team Rounds.       Ryan Aponte MD   Hospitalist (Internal Medicine)  [facility]  Pager: [phone].

## 2018-10-28 NOTE — PLAN OF CARE
"Problem: Patient Care Overview  Goal: Plan of Care/Patient Progress Review  /50 (BP Location: Left leg)  Pulse 73  Temp 96.8  F (36  C) (Oral)  Resp 16  Ht 1.778 m (5' 10\")  Wt 69.6 kg (153 lb 6.4 oz)  SpO2 100%  BMI 22.01 kg/m2    VSS. RA. Pt is non verbal. Gave tylenol x1 for generalized body pain. Had 3 loose bm's. Repositioned q 2 hrs, last turned at 2:30pm. Daughter at bedside and helped with cares. Family hoping to go home tomorrow.       "

## 2018-10-29 NOTE — DISCHARGE INSTRUCTIONS
Bilateral Thigh skin tears: Every third day or as needed cleanse with microklenz and pat dry.  Apply mepilex border.  Please change dressings day of discharge.  Family given 2 dressing changes, please send microklenz home with family.  If skin has not healed may use large band-aid (over the counter) until healed.

## 2018-10-29 NOTE — DISCHARGE SUMMARY
Discharge Summary    Priscilla Way MRN# [id number]   YOB: 1940 Age: 78 year old     Date of Admission:  9/11/2018  Date of Discharge:  10/29/2018  2:53 PM  Admitting Physician:  Crystal Santiago DO  Discharge Physician:  Ryan Aponte MD   Discharging Service:  Internal Medicine     Primary Provider: Randy Fuentes           Discharge Diagnosis:     Acute Encephalopathy  Seizure- nonconvulsive status epilepticus   Aspiration Pneumonia  recurrent C. Diff Colitis  LILIAM on CKD, anuric, requiring HD.  DDKT and Liver Transplant now with progressive renal insufficiency:  HTN, Vol overload  Enterobacter UTI   Anemia, chronic  DVT on Warfarin.   T2DM         Procedures:     Hemodialysis  NJ tube placement  PICC Line placement.                Consultations:   Consultation during this admission received from infectious disease, nephrology and Palliative, Ethics, Endocrine educator, Neurology. Sw. Nutrition. Speech pathology. wocn               Brief History of Illness:   For details please refer to H and P dated 9/11/2018  Briefly,   Priscilla Way is a 78 year old male with a complex past medical history including dementia (Alzheimer and vascular), liver transplant and kidney transplants in 2000, DVT, T2DM, CVA, and poor airway protection admitted 9/11/2018 with AMS,  possible seizure and aspiration pneumonia.          Hospital Course:   Issues:     Priscilla Way is a 78 year old male with a complex past medical history including dementia (Alzheimer and vascular), liver transplant and kidney transplants in 2000, DVT, T2DM, CVA, and poor airway protection admitted 9/11/2018 with AMS,  possible seizure and aspiration pneumonia. Developed LILIAM on CKD, anuric, requiring HD.   Care conference on 10/15/2018, decision was made for comfort care. Nephrology, Palliative care on board.      Events since initiation of comfort care  has taken a significant turn. Since pt started to make urine on his own, not  needing HD any longer, per family request, discontinued comfort care, Nephrology aware. Resumed tacrolimus and Mycophenolate. Pt remains DNR/DNI  Now with e/o status epilepticus, non convulsive 10.23.   On Tube feeding via NJ.   All medications via NJ.         # Seizure : Atypical nonconvulsive status epilepticus      10.23: Overnight, EEG monitoring revealed atypical nonconvulsive status epilepticus aborted by intravenous lorazepam and levetiracetam.  Neurology on board  Etiology unclear.     -likely related to his ongoing medical conditions in the setting of known neurodegenerative disease.       Neuro recs 10.24:  We strongly recommend ongoing treatment with levetiracetam, which his daughter is in full agreement with.  Previously, this medication was stopped as it was not clear whether or not he had seizure disorder and attempts were being made to limit renally cleared medications.  If renal clearance becomes a question again, the dose of levetiracetam can be reduced appropriately directed by pharmacy.      10.25:   Recommendations:  - Continue levetiracetam 750 mg twice per day  - Stop EEG     > seizure precautions. Pads.   > follow-up with Neurology prn.       #  Encephalopathy: acute on chronic.   ~delirium related to underlying long-standing and severe dementia, pain requiring  opiate analgesia, severe illness, and metabolic changes from renal insufficiency, C.diff plus likely seizure as above. He is non-communicative at baseline.     - more alert at the time of discharge, almost at baseline per daughter.   - pt NPO- given aspiration risk. On TF.   - fall. Seizure precautions  - Seroquel HS- discontinued. Did not require for several days prior to discharge.         #  DDKT and Liver Transplant now with progressive renal insufficiency:  Basline Cr ~ 1.2- 1,4  LILIAM, oliguric. Vol overload. S/p HD. Now off HD.  Stable Cr & lytes now with improved UOP.Last HD: 10.10.18.   Nephrology on board.   likely due to  contrast which he received on 9/11/2018.     - Secondary renal hyperparathyroidism; PTH level is:  Normal at 78 on 6/2018  - Calcium; level is Normal when corrected for albumin   - Recent LFT stable.      10/25/2018: Cr, lytes remains stable.      10.26: d/w Tx nephrology.   Tacrolimus dose adjusted.     MMF stopped prior to discharge, as Tacro lvl >4.0   Ct prednisone. (new)     - Monitor BP, I/o, bun, is lvl.   - Further per Nephrology.      # HTN, Vol overload- improving.   On carvedilol 6.25 bid-  10/25/2018: resume amlodipine 05     10/27/2018  Noted pt wt significantly down. Clinically appears more euvolemic. Edema much better. Good UOP. Suspect increased UOP as renal function recovers as well.   Discontinued diuretics    Noted wt started creeping up. Started back on Bumex 1 mg daily at discharge.   Follow-up w pcp, titrate as needed.      # Enterobacter UTI on 10/04: Started on Ceftriaxone and transitioned to Ciprofloxacin. Completed 14 day treatment.      #   Aspiration Pneumonia and poor Airway Protection  Pneumonia resolved after 7d abx.       - SLP evaluation ordered on 10/19. Case discussed. They advised that patient should be NPO because of high aspiration risk. If family wants to feed patient despite the known risks, then they can try DD1 diet with nectar thick liquids.   - on TF  - made NPO 2.2 increased encephalopathy likely 2/2 seizure.    - 10.24: NJ changed. Earlier one clogged and came out 10.23.          # Possible slow GI blood loss: Suspect multifactorial including related to chronic disease and LILIAM as well as iatrogenic though patient reportedly had melanotic stools though hemoglobins have since been stable. Higher risk of ongoing bleeding as he has a h/o DVT and needs warfarin anticoagulation.  Hgb stable at current.   - Continue PPI BID    - Hgb stable at 8.3 on 10/22       # Resolved recurrent C. Diff Colitis:  Toxin positive 9/11/18. Systemic abx d/tamra. Overlap and taper done. Vanco now  completed.  - loose stools now also could be related to TF.   - monitor.       # DVT On warfarin with INR goal 2-3. Significant bleeding risk but clotting risk outweighs this. Pharmacy dosing of warfarin without heparin bridge.     - Continue warfarin . Follow-up INR  - monitor for s/s of bleeding.          # T2DM: Blood sugar controlled. On sliding scale insulin.      Recent Labs  Lab 10/28/18  1139 10/28/18  0818 10/28/18  0749 10/28/18  0424 10/28/18  0002 10/27/18  2030 10/27/18  1555   10/27/18  0701   10/26/18  0602   10/25/18  0702   10/24/18  0709   10/23/18  0641   GLC  --  130*  --   --   --   --   --   --  121*  --  116*  --  132*  --  114*  --  124*   BGM 96  --  125* 129* 154* 181* 117*  < >  --   < >  --   < >  --   < >  --   < >  --    < > = values in this interval not displayed.         Diet: NPO with NJ feeds.       Tube feeding.   Nutrition on board.       Fluids: none   DVT Prophylaxis: Warfarin.   Code Status: DNR/DNI     Social:  Family would like to take patient home with NG tube feeding. They will pursue full care, but code status remains DNR/ DNI  NOT Comfort Care.      WOCN: 10.26    Pressure Injury: on penis foreskin , hospital acquired   This is a Medical Device Related Pressure Injury (MDRPI) due to patino  Pressure Injury is Stage 2   Contributing factor of the pressure injury: pressure, immobility and moisture  Status:resolved     Bilateral thigh wounds due to: Skin tear  Status: initial assessment       TREATMENT PLAN  Bilateral Thigh skin tears: Every third day or as needed cleanse with microklenz and pat dry.  Apply mepilex border.         Discharge Day Exam:    Interval/Subjective:   Daughter at bedside.     No new concern overnight  Tolerating Tf well.   Pt alert. Calm.   Loose stools few times.   Good UOP  No fever or chills  Pain controlled per daughter  No emesis.  No other concern.     Blood pressure 142/54, pulse 73, temperature 96.1  F (35.6  C), temperature source Oral, resp.  "rate 16, height 1.778 m (5' 10\"), weight 70.8 kg (156 lb 1.6 oz), SpO2 100 %.    Wt Readings from Last 5 Encounters:   10/29/18 70.8 kg (156 lb 1.6 oz)   18 77.9 kg (171 lb 12.8 oz)   18 69.9 kg (154 lb 1.6 oz)   16 83.9 kg (185 lb)   16 76.7 kg (169 lb 3.2 oz)       Temp (24hrs), Av.5  F (35.8  C), Min:96.1  F (35.6  C), Max:96.8  F (36  C)    General: Alert , NAD. NJ +  HEENT: AT/NC, PERRLA, moist MM.   Neck: Supple, no obvious JVD or Lymphadenopathy  Chest: Clear BL, non labored.   CVS: S1S2 regular, no m/r/g, bl 1+ pedal edema  Abd: Soft,  mild distention, BS +  MSK: Distal pulses 2+.     Neuro: alert w spontaneous eye opening. Otherwise no meaningful interaction, responds to painful stimuli.   Skin: no new rash on exposed areas.               Significant Results Obtained During Hospitalization:   All relevant data  Reviewed.      Lab Results   Component Value Date    WBC 5.1 10/29/2018    HGB 8.3 (L) 10/29/2018    HCT 26.1 (L) 10/29/2018     10/29/2018     (L) 10/29/2018    POTASSIUM 3.6 10/29/2018    CHLORIDE 92 (L) 10/29/2018    CO2 26 10/29/2018     (H) 10/29/2018    CR 1.59 (H) 10/29/2018     (H) 10/29/2018    SED 55 (H) 2014    NTBNP 229 (H) 10/04/2013    TROPONIN 0.01 2018    TROPI 0.018 2018    AST 34 10/28/2018    ALT 27 10/28/2018    ALKPHOS 130 10/28/2018    BILITOTAL 0.4 10/28/2018    NAVEEN 29 2018    INR 2.0 (A) 10/30/2018      No results found for this or any previous visit (from the past 48 hour(s)).                  Pending Results:     Unresulted Labs Ordered in the Past 30 Days of this Admission     Date and Time Order Name Status Description    2018 2218 T4 free In process                Condition on Discharge:   Discharge condition: Stable   Discharge vitals: Blood pressure 142/54, pulse 73, temperature 96.1  F (35.6  C), temperature source Oral, resp. rate 16, height 1.778 m (5' 10\"), weight 70.8 kg (156 lb " 1.6 oz), SpO2 100 %.     Code status on discharge: DNR / DNI            Discharge Medications:     Current Discharge Medication List      START taking these medications    Details   acetaminophen (TYLENOL) 32 mg/mL solution 20.3 mLs (650 mg) by Oral or Feeding Tube route every 4 hours as needed for mild pain or fever  Qty: 300 mL, Refills: 3    Associated Diagnoses: Kidney replaced by transplant; Aspiration pneumonia due to regurgitated food, unspecified laterality, unspecified part of lung (H)      alcohol swab prep pads Use to swab area of injection/mark anthony as directed.  Qty: 100 each, Refills: 11    Associated Diagnoses: Type 2 diabetes mellitus with complication, without long-term current use of insulin (H)      amLODIPine (NORVASC) 1 mg/mL SUSP 5 mLs (5 mg) by Per Feeding Tube route daily  Qty: 150 mL, Refills: 0    Associated Diagnoses: Kidney replaced by transplant; Essential hypertension      aspirin 10 mg/mL SUSP 8 mLs (80 mg) by Per Feeding Tube route daily  Qty: 240 mL, Refills: 0    Associated Diagnoses: Kidney replaced by transplant      blood glucose (NO BRAND SPECIFIED) lancets standard Use to test blood sugar 6 times daily or as directed.  Qty: 100 each, Refills: 11    Associated Diagnoses: Type 2 diabetes mellitus with complication, without long-term current use of insulin (H)      blood glucose monitoring (NO BRAND SPECIFIED) meter device kit Use to test blood sugar every 4 hours while on Tube feeding.  Qty: 1 kit, Refills: 0    Associated Diagnoses: Type 2 diabetes mellitus with complication, without long-term current use of insulin (H)      blood glucose monitoring (NO BRAND SPECIFIED) test strip Use to test blood sugars 6 times daily or as directed  Qty: 100 strip, Refills: 11    Associated Diagnoses: Type 2 diabetes mellitus with complication, without long-term current use of insulin (H)      bumetanide (BUMEX) 0.25 mg/mL SUSP 4 mLs (1 mg) by Oral or Feeding Tube route daily  Qty: 240 mL,  Refills: 0    Comments: Hold for sbp<110  Associated Diagnoses: Kidney replaced by transplant      carvedilol (COREG) 1 mg/mL SUSP 6.25 mLs (6.25 mg) by Oral or Feeding Tube route 2 times daily  Qty: 375 mL, Refills: 0    Associated Diagnoses: Kidney replaced by transplant; Essential hypertension      cholecalciferol (VITAMIN D/ D-VI-SOL) 400 UNIT/ML LIQD liquid Take 1 mL (400 Units) by mouth daily  Qty: 30 mL, Refills: 0    Associated Diagnoses: Kidney replaced by transplant; On tube feeding diet      fiber modular, NUTRISOURCE FIBER, (NUTRISOURCE FIBER) packet 1 packet by Per Feeding Tube route 3 times daily  Qty: 90 packet, Refills: 0    Associated Diagnoses: Kidney replaced by transplant; On tube feeding diet      HYDROmorphone, STANDARD CONC, (DILAUDID) 1 MG/ML LIQD liquid 1-2 mLs (1-2 mg) by Oral or Feeding Tube route every 6 hours as needed for moderate to severe pain  Qty: 80 mL, Refills: 0    Associated Diagnoses: Kidney replaced by transplant      insulin aspart (NOVOLOG PEN) 100 UNIT/ML injection Inject 1-6 Units Subcutaneous every 4 hours Correction Scale -MEDIUM INSULIN RESISTANCE,    Do Not give if BG less than 140.  For  - 189 give 1 unit.  For  - 239 give 2 units.  For  - 289 give 3 units.  For  - 339 give 4 units.  For  - 399 give 5 units.  For BG > 400 give 6 units.  Check blood glucose Q4H   Notify provider if glucose> 350 mg/dL.  Qty: 9 mL, Refills: 0    Associated Diagnoses: Kidney replaced by transplant; On tube feeding diet; Type 2 diabetes mellitus with complication, without long-term current use of insulin (H)      insulin pen needle (BD SANDRA U/F) 32G X 4 MM Use 6 (every 4 hours while on tube feeding) daily or as directed.  Qty: 100 each, Refills: 11    Associated Diagnoses: Type 2 diabetes mellitus with complication, without long-term current use of insulin (H)      lactobacillus rhamnosus, GG, (CULTURELL) capsule 1 capsule by Per Feeding Tube route 2 times  daily  Qty: 60 capsule, Refills: 0    Associated Diagnoses: Kidney replaced by transplant; On tube feeding diet; H/O Clostridium difficile infection      levETIRAcetam (KEPPRA) 100 MG/ML solution 7.5 mLs (750 mg) by Per Feeding Tube route every 12 hours  Qty: 450 mL, Refills: 0    Associated Diagnoses: Seizure (H)      miconazole (MICATIN; MICRO GUARD) 2 % powder Apply topically 2 times daily  Qty: 90 g, Refills: 3    Comments: Bilateral groins rash.  Associated Diagnoses: Kidney replaced by transplant      multivitamins with minerals (CERTAVITE/CEROVITE) LIQD liquid Take 15 mLs by mouth daily  Qty: 450 mL, Refills: 0    Associated Diagnoses: Kidney replaced by transplant; On tube feeding diet      ondansetron (ZOFRAN-ODT) 4 MG ODT tab Take 1 tablet (4 mg) by mouth every 6 hours as needed for nausea or vomiting  Qty: 30 tablet, Refills: 0    Associated Diagnoses: Kidney replaced by transplant      pantoprazole (PROTONIX) 2 mg/mL SUSP suspension 20 mLs (40 mg) by Per Feeding Tube route 2 times daily (before meals)  Qty: 1200 mL, Refills: 0    Associated Diagnoses: Kidney replaced by transplant      predniSONE 5 MG/5ML solution Take 5 mLs (5 mg) by mouth daily  Qty: 150 mL, Refills: 0    Associated Diagnoses: Kidney replaced by transplant      Sharps Container MISC Sharps container for diabetes needles.  Qty: 1 each, Refills: 0    Associated Diagnoses: Type 2 diabetes mellitus with complication, without long-term current use of insulin (H)      tacrolimus (GENERIC EQUIVALENT) 1 mg/mL suspension 1.5 mLs (1.5 mg) by Oral or Feeding Tube route 2 times daily  Qty: 90 mL, Refills: 0    Associated Diagnoses: Kidney replaced by transplant         CONTINUE these medications which have CHANGED    Details   ferrous gluconate (FERGON) 324 (38 Fe) MG tablet 2 tablets (648 mg) by Per Feeding Tube route daily (with breakfast)  Qty: 100 tablet, Refills: 0    Comments: Can give liquid form if available. Equivalent dosing.  Thanks.  Associated Diagnoses: Kidney replaced by transplant      warfarin (COUMADIN) 2.5 MG tablet 1 tablet (2.5 mg) by Oral or Feeding Tube route daily  Qty: 30 tablet, Refills: 0    Associated Diagnoses: Kidney replaced by transplant; Deep vein thrombosis (DVT) of both lower extremities, unspecified chronicity, unspecified vein (H)         STOP taking these medications       amLODIPine (NORVASC) 10 MG tablet Comments:   Reason for Stopping:         aspirin 81 MG EC tablet Comments:   Reason for Stopping:         carvedilol (COREG) 6.25 MG tablet Comments:   Reason for Stopping:         docusate sodium (COLACE) 100 MG capsule Comments:   Reason for Stopping:         furosemide (LASIX) 20 MG tablet Comments:   Reason for Stopping:         Multiple Vitamins-Minerals (MULTIVITAMIN ADULT) TABS Comments:   Reason for Stopping:         omeprazole (PRILOSEC) 20 MG CR capsule Comments:   Reason for Stopping:         PROGRAF (BRAND) 0.5 MG CAPSULE Comments:   Reason for Stopping:         QUEtiapine (SEROQUEL) 25 MG tablet Comments:   Reason for Stopping:         Vitamin D, Cholecalciferol, 400 units TABS Comments:   Reason for Stopping:                      Discharge Disposition:   Discharged to home             Discharge Instructions:     Discharge Procedure Orders  Home care nursing referral   Referral Type: Home Health Therapies & Aides     Home infusion referral     MD face to face encounter   Order Comments: Documentation of Face to Face and Certification for Home Health Services    I certify that patient: Priscilla Way is under my care and that I, or a nurse practitioner or physician's assistant working with me, had a face-to-face encounter that meets the physician face-to-face encounter requirements with this patient on: 10/29/2018.    This encounter with the patient was in whole, or in part, for the following medical condition, which is the primary reason for home health care: dementia (Alzheimer and vascular),  CVA.    I certify that, based on my findings, the following services are medically necessary home health services: Nursing.    My clinical findings support the need for the above services because: Nurse is needed: To assess vital signs and weight, respiratory and cardiac status, pain level, hydration, nutrition and bowel status and to provide caregiver training to assist with: enteral tube feeding.    Further, I certify that my clinical findings support that this patient is homebound (i.e. absences from home require considerable and taxing effort and are for medical reasons or Spiritism services or infrequently or of short duration when for other reasons) because: Patient is bedbound due to: severe dementia (Alzheimer and vascular).    Based on the above findings. I certify that this patient is confined to the home and needs intermittent skilled nursing care, physical therapy and/or speech therapy.  The patient is under my care, and I have initiated the establishment of the plan of care.  This patient will be followed by a physician who will periodically review the plan of care.  Physician/Provider to provide follow up care: Randy Fuentes    Physician Signature: See electronic signature associated with these discharge orders.     Reason for your hospital stay   Order Comments: Admitted 9/11/2018 for Seizure, altered mental status, aspiration pneumonia.Clostridium difficile colitis, treated.  Acute kidney injury, underwent dialysis. Now kidney function better and urinating well. Off hemodialysis. Started on Tube feeding. Started on Prednisone. Tacrolimus dose adjusted. Diuretics dose adjusted.  Code status changed to DNR/DNI.  Seen and followed by Transplant Nephrology team. Nutrition and others.     Adult [facility] Follow-up and recommended labs and tests   Order Comments: Follow up with primary care provider, Randy Fuentes, or Transplant Nephrology within 7 days for hospital follow- up.  The following  labs/tests are recommended:   Nov 01, 2018: CBC, CMP, Phos. Magnesium, Tacrolimus level. Then per your transplant team.     Follow up with transplant team as scheduled and as needed.     Appointments on [place] and/or [facility] (with [facility] or [facility] provider or service). Call [phone] if you haven't heard regarding these appointments within 7 days of discharge.     Activity   Order Comments: Your activity upon discharge: turn every 2 hours. Further movement as tolerated with assist. Fall precautions.   Order Specific Question Answer Comments   Is discharge order? Yes      Monitor and record   Order Comments: blood glucose every 4 hours while on Tube feeding and as needed.   blood pressure 2-3 times/ week  weight 2-3 times/week     Diet   Order Comments: Follow this diet upon discharge:   Adult Formula Drip Feeding: Continuous Nepro; Nasojejunal; Goal Rate: 75 ml/hr x 14 hours final goal rate from (6:00 pm - 8:00 am); mL/hr; Medication - Tube Feeding Flush Frequency: At least 15-30 mL water before and after medication administratio...     NPO for Medical/Clinical Reasons. Dysphagia +   Order Specific Question Answer Comments   Is discharge order? Yes             Thank you for the opportunity to participate in the care of your patient.  Please do not hesitate to contact our service with questions or concerns.    Total time spent was greater than 30 minutes; Greater than 50% of the time was in counseling and care coordination. Care coordination included discussion of medication changes, lifestyle changes and reviewing instructions to the patient and daughter at bedside.     D/W RN, SW   D/w Nephrology.       Ryan Aponte MD   Hospitalist (Internal Medicine)  Pager: [phone].     DOS : 10.29.18

## 2018-10-29 NOTE — CONSULTS
"Diabetes Education  Received consult request to see the family of this 78 year old male for education on insulin administration for correction scale.  Patient being discharged this afternoon.  Family will be providing cares.  Met with female relative.  Reviewed testing schedule (every 4 hours).  Reviewed insulin correction scale; she stated understanding.  Demonstrated use of insulin pen; she observed closely and stated \"OK\", but continued to pack up patient's belongings.  She said \"that will be easy to do.\"  Discussed that MD ordered a blood glucose monitor and supplies.  Female relative stated \" will know how to use.\"    Margo Timmons MS RN CDE [facility]  [phone]    "

## 2018-10-29 NOTE — TELEPHONE ENCOUNTER
[facility] Call Center    Phone Message    May a detailed message be left on voicemail: yes    Reason for Call: Other: Clairfication for pt's recent changes, please call to discuss     Action Taken: Message routed to:  [facility]

## 2018-10-29 NOTE — PROGRESS NOTES
Care Coordinator Discharge Note    Admission Date/Time:  9/11/2018  Attending MD:  Cat Perla*    Data  Chart reviewed, discussed with interdisciplinary team.   Patient was admitted for:    Aspiration pneumonia due to regurgitated food, unspecified laterality, unspecified part of lung (H)  Kidney replaced by transplant  LILIAM (acute kidney injury) (H)  Liver replaced by transplant (H)  Anemia in stage 3 chronic kidney disease (H)  Seizure (H)  CKD (chronic kidney disease) stage 3, GFR 30-59 ml/min (H)  Delirium.      Coordination of Care:     10/29: Report received from Dr Aponte, patient may discharge today. Spoke with daughter at bedside she is in agreement with discharge today. [facility] stretcher ride arranged for 2pm today, PCS completed and given to 5A charge nurse. RNCC notified [facility], [facility], and Liaison's. Home Care and Home Infusion orders updated and verified.     10/26 @ 1513: Spoke with May at bedside, she is uncomfortable with a weekend discharge due to limited availability of PCA's and Home Care staff. Additionally, May was told the TF would be cycled to 12hrs on and 12hrs off; presently he is on 14hrs. Paged Dietitian who will speak to May directly (they will make a decision together). Plan for discharge Monday 10/29.    10/26 @ 6674:  Attempted to meet with daughter May again (to discuss/make dc plans) she has not returned to the hospital yet. Home Care and Home Infusion DC orders are ready, they have been updated today that dc is very soon. Could dc over the weekend if family allows and Home Care/Home Infusion is notified soon enough to allow for home visit same day or next day.    10/26 @ 5956: Patient had seizure activity which has now resolved after intervention. Neurology recommending patient stay on Keppra at dc and May is in agreement. Tube feeding is at cycled goal. Attempted to meet with May at bedside this morning to discuss discharge as patient is medically ready (bedside  RN is in agreement); May is not here, will try again later today.    10/23: Writer met with daughter May at the bedside to discuss discharge planning. Per May patient will discharge to home with resumption of [facility], resumption order and MD face to face completed.     Patient will discharge with NG for enteral tube feeds, [facility] referral placed, has 100% coverage and no deductible, [facility] order placed. Daughter May attending [facility] for tube feeding today 1:30pm. May has spoken to dietitian regarding cycling TF today. May does not want to dc home until TF are fully cycled and patient is tolerating. Per Dietitian note cycling begins today and will be at goal on Saturday. Plan for dc Saturday if patient is tolerating.    Writer spoke with patient's [facility]  Jodi (Ph: [phone]) regarding discharge planning. There is no increase in PCA hours if TF is cycled to twelve hours on and twelve hours off (May is aware and okay with that, wants to continue with cycling TF anyway).     Assessment  Previous full assessment completed. Daughter May at the beside and involved in discharge planning to home.     Referrals:     [facility]  Phone  [phone]  Fax  [phone]    [facility]  Phone # [phone]  Fax # [phone]        Plan  Anticipated Discharge Date:  today  Anticipated Discharge Plan: Home w/resumption of [facility], resumption of PCA services, [facility] for feedings.     Radha Ahuja RN, BSN, PHN  Medicine Care Coordinator  [facility]  Desk Phone: [phone]  Pager: [phone]    To contact Weekend RNCC, dial [phone] and enter job code [phone] at prompt.   This pager can not be contacted by text page or outside line.

## 2018-10-29 NOTE — PLAN OF CARE
Problem: Patient Care Overview  Goal: Plan of Care/Patient Progress Review  Outcome: Adequate for Discharge Date Met: 10/29/18  Patient is alert but orientation is unknown due to patient being nonverbal. The patient is vitally stable on room air. The patient is incontinent of stool and urine. The patient's tube feed was stopped at 8 AM per order. The patients daughter is at the bedside and very active in the patients care. Discharge orders were received, and prescriptions were sent to the discharge pharmacy unfortunately most of the prescriptions require compounding and they are unsure when the medications will be ready. I informed the patients daughter regarding the medication and she said it was no problem and she would come back to pick them up if she needed to since the patient is being discharged at 2 PM via [facility]. I went over the AVS with the patient daughter she is aware that there are medications that the discharge pharmacy are working on and she will have to come back and pick them up. All questions were answered at that time. The patient left the unit around 2:30 PM via [facility].

## 2018-10-29 NOTE — PLAN OF CARE
Assumed care [id number]    Problem: Patient Care Overview  Goal: Plan of Care/Patient Progress Review    Assessment:  Lethargic and non verbal. VSS on RA.   Neuros: ADRIA, patient does not follow commands, Pulls at NJ tube, and grabs at staff during repositioning, mitts on both hands.  Pain: no nonverbal signs of pain   Skin/Incisions: Penile lesion, pink blanchable redness on coccyx. Barrier cream applied with each change. L medial thigh foam dressing CDI. R medial thigh wound, CLAUDETTE, no drainage.  LDA: PIV saline locked. NG tube with continuous TF.  Diet: NPO, NG tube with continuous TF at 75ml/hr  /GI: incontinent of urine and stool.  Mobility: A2 + lift    Patient daughter is at bedside, says she quit her job as an RT to take care of patient. Is very concerned about NG clogging or getting pulled, also very concerned about patient urine output and fluid intake. Is very eager to discharge tomorrow.    Plan: discharge 2-3 days after TF cycled.

## 2018-10-29 NOTE — PROGRESS NOTES
Home Infusion  Priscilla Way is expected to dc today and will be going home on Enteral formula via Alexi pump/N[place].  Mr Way's daughter May  has been to the [facility] for initial teaching. Met with May at bedside and provided her with information about [facility] services. Explained about administration method, and explained that a home nurse will provide additional teaching needed for enteral formula administration. Informed her about supplies and delivery of supplies, storage and how to contact [facility] and  24/7 availability of [facility] staff while onservice. May was appreciative and verbalized understanding of all information given. She is willing and able to learn and manage home enteral therapy. Questions answered.Will continue to follow until dc and update pt once final orders are determined.    Emma Barrett, MACYN, DAVID  [facility]  [phone]

## 2018-10-29 NOTE — PLAN OF CARE
Problem: Patient Care Overview  Goal: Plan of Care/Patient Progress Review  Outcome: No Change  VSS on RA. Pt non-verbal. Arouses to touch/voice. TF running at goal of 75 with 30 mL flush Q4. NPO. Left PIV SL. Pulsate mattress in use. Lift sheet under pt. Pt turned Q2. BG's stable. 3 occurrences of loose stool. Linen change per nursing. Skin tears bilaterally on legs. Dressings changed. Blanchable redness on coccyx. Barrier cream applied. Pt muscles rigid. Plan to discharge home with home care today. Daughter at bedside.

## 2018-10-30 NOTE — PROGRESS NOTES
Dates of hospitalization: 9/11/18 to 10/29/18  Reason for hospitalization: Seizure, altered mental status, aspiration pneumonia, C Diff  Procedures performed: Started feeding tube, Started Prednisone, Adjusted Tacrolimus and Diuretics  Vitamin K or FFP administered? No  INR Goal Range Confirmed to be:2.0 to 3.0  Inpatient warfarin doses added to calendar? Yes  Warfarin dosing after DC: 2.5mgs daily  Patient discharged on Lovenox? No  Next INR date: 10/30/18  Where is the patient discharging to? (home, TCU, staying locally, etc.): Home  Will patient have home care? Yes [facility]  [phone]

## 2018-10-30 NOTE — PROGRESS NOTES
This is a recent snapshot of the patient's [facility] medical record.  For current drug dose and complete information and questions, call [phone] or In [facility] pool, [facility] home infusion ([id number])  CSN Number:  [id number]

## 2018-10-30 NOTE — MR AVS SNAPSHOT
Priscilla Way   10/30/2018   Anticoagulation Therapy Visit    Description:  78 year old male   Provider:  Jodee Tirado, RN   Department:  [facility]           INR as of 10/30/2018     Today's INR 2.0      Anticoagulation Summary as of 10/30/2018     INR goal 2.0-3.0   Today's INR 2.0   Full warfarin instructions 10/30: 3.75 mg; 10/31: 2.5 mg   Next INR check 11/1/2018    Indications   Acute kidney injury (H) [N17.9]  Deep vein thrombosis (DVT) (H) [I82.409] [I82.409]  Long-term (current) use of anticoagulants [Z79.01] [Z79.01]         October 2018 Details    Sun Mon Tue Wed Thu Fri Sat      1               2               3               4               5               6                 7               8               9               10               11               12               13                 14               15               16               17               18               19               20                 21               22               23               24               25               26               27                 28               29               30      3.75 mg   See details      31      2.5 mg             Date Details   10/30 This INR check               How to take your warfarin dose     To take:  2.5 mg Take 1 of the 2.5 mg tablets.    To take:  3.75 mg Take 1.5 of the 2.5 mg tablets.           November 2018 Details    Sun Mon Tue Wed Thu Fri Sat         1            2               3                 4               5               6               7               8               9               10                 11               12               13               14               15               16               17                 18               19               20               21               22               23               24                 25               26               27               28               29               30                 Date Details    No additional details    Date of next INR:  11/1/2018

## 2018-10-30 NOTE — TELEPHONE ENCOUNTER
Verbal orders given to Giancarlo from [facility], per Dr. Fuentes, for Nursing services 3X1 week, 2X3 weeks and  weekly for 5, 3 prn visits. Jenna Kirkpatrick LPN 10/30/2018 9:32 AM

## 2018-10-30 NOTE — PROGRESS NOTES
Patient has clinic visit within 24-48 hours of Discharge so no post DC follow up call is needed   and has home care  No post discharge call was made

## 2018-10-30 NOTE — TELEPHONE ENCOUNTER
[facility] Call Center    Phone Message    May a detailed message be left on voicemail: yes    Reason for Call: Order(s): Home Care Orders: Skilled Nursing: Giancarlo is requesting a call back to get home care orders    Action Taken: Message routed to:  [facility] ([facility]): HALEY

## 2018-10-30 NOTE — TELEPHONE ENCOUNTER
Left message with staff to call clinic back when manager gets back into clinic. Jenna Kirkpatrick LPN 10/30/2018 10:36 AM

## 2018-10-30 NOTE — MR AVS SNAPSHOT
Priscilla Way   10/30/2018   Anticoagulation Therapy Visit    Description:  78 year old male   Provider:  Alfred Mccallum, [facility]   Department:  [facility]           INR as of 10/30/2018     Today's INR 2.27 (10/29/2018)      Anticoagulation Summary as of 10/30/2018     INR goal 2.0-3.0   Today's INR 2.27 (10/29/2018)   Full warfarin instructions No maintenance plan   Next INR check 10/30/2018    Indications   Acute kidney injury (H) [N17.9]  Deep vein thrombosis (DVT) (H) [I82.409] [I82.409]  Long-term (current) use of anticoagulants [Z79.01] [Z79.01]         October 2018 Details    Sun Mon Tue Wed Thu Fri Sat      1               2               3               4               5               6                 7               8               9               10               11               12               13                 14               15               16               17               18               19               20                 21               22               23               24               25               26               27                 28               29               30      See details      31                   Date Details   10/30 This INR check       Date of next INR:  10/30/2018

## 2018-10-31 NOTE — PROGRESS NOTES
EPIC indicates member discharged and INR report verifies member was discharged on 10/29/18.  It appears member did discharge to home with tube feeding.  Have placed calls to both daughters and left messages requesting return call to discuss member's condition and to discuss adding additional PCA time as was discussed with hospital care coordinator (possible addl hour/day based on additional critical care needs), but need to verify with caregivers.    Carri Anderson RN  [phone]

## 2018-11-02 PROBLEM — A41.9 SEPSIS (H): Status: ACTIVE | Noted: 2018-01-01

## 2018-11-02 NOTE — PROGRESS NOTES
Pt admitted to unit from ER per MD order. Family member present upon arrival. Pt nonverbal. Pulling at NJ tube so mitts were placed. Vital signs stable on RA. Appears comfortable. Will continue to monitor.

## 2018-11-02 NOTE — ED NOTES
Patient's daughter here and updated on plan of care; daughter does not want to have catheter placed.  Dr Fulton spoke with daughter and order for catheter discontinued.

## 2018-11-02 NOTE — IP AVS SNAPSHOT
"    [facility]: [phone]                                              INTERAGENCY TRANSFER FORM - PHYSICIAN ORDERS   2018                    Hospital Admission Date: 2018  YADIRA BREAUX   : 1940  Sex: Male        Attending Provider: (none)    Allergies:  No Known Allergies    Infection:  None   Service:  INTERNAL MED    Ht:  1.778 m (5' 10\")   Wt:  73 kg (160 lb 15 oz)   Admission Wt:  80.7 kg (177 lb 14.4 oz)    BMI:  23.09 kg/m 2   BSA:  1.9 m 2            Patient PCP Information     Provider PCP Type    Randy Fuentes MD General      ED Clinical Impression     Diagnosis Description Comment Added By Time Added    Decreased level of consciousness [R40.4] Decreased level of consciousness [R40.4]  Aidan Fulton DO 2018  2:47 PM    Hyponatremia [E87.1] Hyponatremia [E87.1]  Aidan Fulton DO 2018  2:47 PM    Pneumonia of right middle lobe due to infectious organism (H) [J18.1] Pneumonia of right middle lobe due to infectious organism (H) [J18.1]  Aidan Fulton DO 2018  2:48 PM      Hospital Problems as of 2018              Priority Class Noted POA    Sepsis (H) Medium  2018 Yes      Non-Hospital Problems as of 2018              Priority Class Noted    Memory loss Medium  2012    HCV (hepatitis C virus) Medium  2012    Liver replaced by transplant (H) Medium  2012    Diabetes mellitus, type 2 (H) Medium  2015    LILIAM (acute kidney injury) (H) Medium  2016    Acute kidney injury (H) Medium  2016    Deep vein thrombosis (DVT) (H) [I82.409] Medium  2016    Long-term (current) use of anticoagulants [Z79.01] Medium  2016    Delirium Medium  2016    Generalized edema Medium  2016    Altered mental state Medium  2016    Anemia in stage 3 chronic kidney disease (H) Medium  2016    CKD (chronic kidney disease) stage 3, GFR 30-59 ml/min (H) Medium  2016    H/O Clostridium " difficile infection Medium  3/1/2018    Acute kidney failure (H) Medium  3/21/2018    Altered mental status Medium  5/31/2018    Seizure (H) Medium  9/11/2018      Code Status History     Date Active Date Inactive Code Status Order ID Comments User Context    11/20/2018 12:09 PM  DNR/DNI [id number]  Tana Peoples MD Outpatient    11/2/2018  5:28 PM 11/20/2018 12:09 PM DNR/DNI [id number] Based on previous admission and discussion with family Ramos Mcnair MD Inpatient    9/16/2018 11:46 AM 10/29/2018  5:03 PM DNR/DNI [id number]  Ryan Aponte MD Inpatient    9/11/2018  9:54 PM 9/16/2018 11:46 AM Full Code [id number]  Patel Gillespie APRN CNP Inpatient    6/6/2018 11:47 AM 9/11/2018  9:54 PM Full Code [id number]  Boby Tirado MD Outpatient    5/31/2018  8:05 PM 6/6/2018 11:47 AM Full Code [id number]  Phoebe Lamb PA-C Inpatient    3/24/2018 10:02 AM 5/31/2018  8:05 PM Full Code [id number]  Katharine Mitchell MD Outpatient    3/21/2018  9:01 PM 3/24/2018 10:02 AM Full Code [id number]  Patel Gillespie APRN CNP Inpatient    11/12/2016 12:41 PM 3/21/2018  9:01 PM Full Code [id number]  Caroline Silveira PA-C Outpatient    11/8/2016  8:30 PM 11/12/2016 12:41 PM Full Code [id number]  Patel Gillespie APRN CNP Inpatient    8/23/2016  5:40 PM 9/1/2016  9:55 PM Full Code [id number]  Myesha Maria APRN CNP Inpatient    8/21/2016  3:52 PM 8/23/2016  5:40 PM DNR/DNI [id number]  Caroline Kwan PA-C Inpatient    8/21/2016  3:50 PM 8/21/2016  3:52 PM DNI [id number]  Ciaran Gonzalez MD Inpatient    8/20/2016  9:32 PM 8/21/2016  3:50 PM Full Code [id number]  Monique Sorenson PA-C Inpatient    1/3/2012  2:44 PM 8/20/2016  9:32 PM Full Code [id number]  Maty Reynolds MD Outpatient    12/30/2011 10:05 PM 1/3/2012  2:44 PM Full Code [id number]  Suzette Ramirez Outpatient         Medication Review      START taking        Dose / Directions Comments    EXTENDED TERM ORAL CARE SYSTEM Kit    Used for:  Pneumonia of right middle lobe due to infectious organism (H), Seizure (H), Aspiration pneumonitis (H)        Dose:  1 kit   Take 1 kit by mouth daily   Quantity:  30 kit   Refills:  3        lacosamide 10 MG/ML Soln solution   Commonly known as:  VIMPAT   Used for:  Seizure (H)        Dose:  100 mg   Take 10 mLs (100 mg) by mouth 2 times daily   Quantity:  600 mL   Refills:  5        metolazone 5 MG tablet   Commonly known as:  ZAROXOLYN   Used for:  Generalized edema        Dose:  5 mg   Take 1 tablet (5 mg) by mouth daily   Quantity:  30 tablet   Refills:  0        nystatin [id number] UNIT/ML suspension   Commonly known as:  MYCOSTATIN        Dose:  451368 Units   Take 5 mLs (500,000 Units) by mouth 4 times daily   Quantity:  60 mL   Refills:  0        order for DME   Used for:  Urinary incontinence without sensory awareness        Equipment being ordered: Other: condom catheter Treatment Diagnosis: incontinence at risk for skin breakdown   Quantity:  3 Piece   Refills:  1        order for DME        Equipment being ordered: home suction kit   Quantity:  1 Device   Refills:  1        rifaximin 200 MG tablet   Commonly known as:  XIFAXAN   Indication:  Clostridium difficile Bacteria   Used for:  H/O Clostridium difficile infection        Dose:  400 mg   Take 2 tablets (400 mg) by mouth 3 times daily   Quantity:  30 tablet   Refills:  0        scopolamine 72 hr patch   Commonly known as:  TRANSDERM   Used for:  Aspiration pneumonitis (H)        Dose:  1 patch   Place 1 patch onto the skin every 72 hours   Quantity:  1 patch   Refills:  11          CONTINUE these medications which may have CHANGED, or have new prescriptions. If we are uncertain of the size of tablets/capsules you have at home, strength may be listed as something that might have changed.        Dose / Directions Comments    bumetanide 0.25 mg/mL Susp   Commonly known as:  BUMEX   This may have changed:  how much to take   Used for:  Kidney  replaced by transplant        Dose:  2 mg   8 mLs (2 mg) by Oral or Feeding Tube route daily   Quantity:  240 mL   Refills:  0        levETIRAcetam 100 MG/ML solution   Commonly known as:  KEPPRA   This may have changed:  how much to take   Used for:  Seizure (H)        Dose:  500 mg   5 mLs (500 mg) by Per Feeding Tube route every 12 hours   Quantity:  450 mL   Refills:  0          CONTINUE these medications which have NOT CHANGED        Dose / Directions Comments    acetaminophen 32 mg/mL liquid   Commonly known as:  TYLENOL   Used for:  Kidney replaced by transplant, Aspiration pneumonia due to regurgitated food, unspecified laterality, unspecified part of lung (H)        Dose:  650 mg   20.3 mLs (650 mg) by Oral or Feeding Tube route every 4 hours as needed for mild pain or fever   Quantity:  300 mL   Refills:  3        alcohol swab prep pads   Used for:  Type 2 diabetes mellitus with complication, without long-term current use of insulin (H)        Use to swab area of injection/mark anthony as directed.   Quantity:  100 each   Refills:  11        amLODIPine 1 mg/mL Susp   Commonly known as:  NORVASC   Used for:  Kidney replaced by transplant, Essential hypertension        Dose:  5 mg   5 mLs (5 mg) by Per Feeding Tube route daily   Quantity:  150 mL   Refills:  0        aspirin 10 mg/mL Susp   Used for:  Kidney replaced by transplant        Dose:  80 mg   8 mLs (80 mg) by Per Feeding Tube route daily   Quantity:  240 mL   Refills:  0        blood glucose lancets standard   Commonly known as:  no brand specified   Used for:  Type 2 diabetes mellitus with complication, without long-term current use of insulin (H)        Use to test blood sugar 6 times daily or as directed.   Quantity:  100 each   Refills:  11        blood glucose monitoring meter device kit   Commonly known as:  no brand specified   Used for:  Type 2 diabetes mellitus with complication, without long-term current use of insulin (H)        Use to test  blood sugar every 4 hours while on Tube feeding.   Quantity:  1 kit   Refills:  0        blood glucose monitoring test strip   Commonly known as:  no brand specified   Used for:  Type 2 diabetes mellitus with complication, without long-term current use of insulin (H)        Use to test blood sugars 6 times daily or as directed   Quantity:  100 strip   Refills:  11        carvedilol 1 mg/mL Susp   Commonly known as:  COREG   Used for:  Kidney replaced by transplant, Essential hypertension        Dose:  6.25 mg   6.25 mLs (6.25 mg) by Oral or Feeding Tube route 2 times daily   Quantity:  375 mL   Refills:  0        cholecalciferol 400 UNIT/ML Liqd liquid   Commonly known as:  vitamin D/ D-VI-SOL   Used for:  Kidney replaced by transplant, On tube feeding diet        Dose:  400 Units   Take 1 mL (400 Units) by mouth daily   Quantity:  30 mL   Refills:  0        ferrous gluconate 324 (38 Fe) MG tablet   Commonly known as:  FERGON   Used for:  Kidney replaced by transplant        Dose:  648 mg   2 tablets (648 mg) by Per Feeding Tube route daily (with breakfast)   Quantity:  100 tablet   Refills:  0    Can give liquid form if available. Equivalent dosing. Thanks.       fiber modular (NUTRISOURCE FIBER) packet   Used for:  Kidney replaced by transplant, On tube feeding diet        Dose:  1 packet   1 packet by Per Feeding Tube route 3 times daily   Quantity:  90 packet   Refills:  0        HYDROmorphone (STANDARD CONC) 1 MG/ML Liqd liquid   Commonly known as:  DILAUDID   Used for:  Kidney replaced by transplant        Dose:  1-2 mg   1-2 mLs (1-2 mg) by Oral or Feeding Tube route every 6 hours as needed for moderate to severe pain   Quantity:  80 mL   Refills:  0        insulin aspart 100 UNIT/ML injection   Commonly known as:  NovoLOG PEN   Used for:  Kidney replaced by transplant, On tube feeding diet, Type 2 diabetes mellitus with complication, without long-term current use of insulin (H)        Dose:  1-6 Units    Inject 1-6 Units Subcutaneous every 4 hours Correction Scale -MEDIUM INSULIN RESISTANCE,   Do Not give if BG less than 140. For  - 189 give 1 unit. For  - 239 give 2 units. For  - 289 give 3 units. For  - 339 give 4 units. For  - 399 give 5 units. For BG > 400 give 6 units. Check blood glucose Q4H  Notify provider if glucose> 350 mg/dL.   Quantity:  9 mL   Refills:  0        insulin pen needle 32G X 4 MM miscellaneous   Commonly known as:  BD SANDRA U/F   Used for:  Type 2 diabetes mellitus with complication, without long-term current use of insulin (H)        Use 6 (every 4 hours while on tube feeding) daily or as directed.   Quantity:  100 each   Refills:  11        lactobacillus rhamnosus (GG) capsule   Used for:  Kidney replaced by transplant, On tube feeding diet, H/O Clostridium difficile infection        Dose:  1 capsule   1 capsule by Per Feeding Tube route 2 times daily   Quantity:  60 capsule   Refills:  0        miconazole 2 % powder   Commonly known as:  MICATIN; MICRO GUARD   Used for:  Kidney replaced by transplant        Apply topically 2 times daily   Quantity:  90 g   Refills:  3    Bilateral groins rash.       multivitamins with minerals Liqd liquid   Used for:  Kidney replaced by transplant, On tube feeding diet        Dose:  15 mL   Take 15 mLs by mouth daily   Quantity:  450 mL   Refills:  0        ondansetron 4 MG ODT tab   Commonly known as:  ZOFRAN-ODT   Used for:  Kidney replaced by transplant        Dose:  4 mg   Take 1 tablet (4 mg) by mouth every 6 hours as needed for nausea or vomiting   Quantity:  30 tablet   Refills:  0        pantoprazole 2 mg/mL Susp suspension   Commonly known as:  PROTONIX   Used for:  Kidney replaced by transplant        Dose:  40 mg   20 mLs (40 mg) by Per Feeding Tube route 2 times daily (before meals)   Quantity:  1200 mL   Refills:  0        predniSONE 5 MG/5ML solution   Used for:  Kidney replaced by transplant        Dose:  5 mg    Take 5 mLs (5 mg) by mouth daily   Quantity:  150 mL   Refills:  0        Sharps Container Misc   Used for:  Type 2 diabetes mellitus with complication, without long-term current use of insulin (H)        Sharps container for diabetes needles.   Quantity:  1 each   Refills:  0        tacrolimus 1 mg/mL suspension   Commonly known as:  GENERIC EQUIVALENT   Used for:  Kidney replaced by transplant        Dose:  1.5 mg   1.5 mLs (1.5 mg) by Oral or Feeding Tube route 2 times daily   Quantity:  90 mL   Refills:  0        warfarin 2.5 MG tablet   Commonly known as:  COUMADIN   Used for:  Kidney replaced by transplant, Deep vein thrombosis (DVT) of both lower extremities, unspecified chronicity, unspecified vein (H)        Dose:  2.5 mg   1 tablet (2.5 mg) by Oral or Feeding Tube route daily   Quantity:  30 tablet   Refills:  0                Summary of Visit     Reason for your hospital stay       You was admitted for changing in your mental status. You was seen by neurology, the possible reason for changing in your mental status are seizure and dementia.             After Care     Activity       Your activity upon discharge: bedrest       Condom catheter           Diet       Follow this diet upon discharge: Orders Placed This Encounter      Adult Formula Drip Feeding: Specified Time Suplena; Nasojejunal; Goal Rate: 50, CONTINUOUS; mL/hr; From: 6:00 AM; 6:00 AM; Medication - Tube Feeding Flush Frequency: At least 15-30 mL water before and after medication administration and with tube ...       Supplies       List the supplies the pt needs to go home  Oral care kit 3 per day       Tubes and drains       You are going home with the following tubes or drains: patino catheter.  Tube cares per hospital or home care instructions. [place].             Referrals     Home care nursing referral       [facility]  Phone  [phone]  Fax  [phone]    **RESUMPTION OF HOME CARE SERVICES**     RN skilled nursing visit.   RN to  assess vital signs and weight, respiratory and cardiac status, pain level and activity tolerance, hydration, nutrition and bowel status   RN to complete home safety evaluation.  RN to complete weekly medication management and set-up, please involve family/primary caregiver in med education.  RN to provide lab draws as needed and communicate results to PCP     Your provider has ordered home care nursing services.   If you have not been contacted within 2 days of your discharge please call       Home infusion referral       [facility]  Phone # [phone]  Fax # [phone]     Resumption of Home Enteral Therapy    Recommendations ordered by Registered Dietitian (RD):  Note pt was on this regimen prior to admission (not Nephro)  -Supena @  50 ml/hr (1200 ml/day) to provide 2160 kcals (30 kcal /kg), 56 g PRO (0.8 gm/kg/day), 876 ml free H2O, 242 gm CHO and 15 gm Fiber daily.    Agency Staff to assess nursing needs for Enteral Therapy.    Access Device Management:  Access Type: [place]  Routine site care (per agency protocol) to maintain access device? Yes              MD face to face encounter       Documentation of Face to Face and Certification for Home Health Services    I certify that patient: Priscilla Way is under my care and that I, or a nurse practitioner or physician's assistant working with me, had a face-to-face encounter that meets the physician face-to-face encounter requirements with this patient on: 11/9/2018.    This encounter with the patient was in whole, or in part, for the following medical condition, which is the primary reason for home health care: significant for vascular dementia, CVA multiple sites, DVT on warfarin, HCV cirrhosis s/p liver transplant 2000, HCV ESRD s/p kidney transplant 2000, CKD III of transplanted kidney, HFpEF, recurrent C diff infection, malnutrition on tube feeds and recent seizure disorder/status epilepticus was admitted to the hospital because of worsening  altered mental status and shortness of breath.     I certify that, based on my findings, the following services are medically necessary home health services: Nursing.    My clinical findings support the need for the above services because: Nurse is needed: To provide assessment and oversight required in the home to assure adherence to the medical plan due to: unlicensed primary and assistive care givers, difficulty understanding medical plan of care, medical complexity.    Further, I certify that my clinical findings support that this patient is homebound (i.e. absences from home require considerable and taxing effort and are for medical reasons or [facility] services or infrequently or of short duration when for other reasons) because: Patient is bedbound due to: Vascular dementia, CVA, CKD III, seizures/status epilepticus, unable to protect airway (patient is DNR/DNI), total cares, minimally to not responsive.    Based on the above findings. I certify that this patient is confined to the home and needs intermittent skilled nursing care, physical therapy and/or speech therapy.  The patient is under my care, and I have initiated the establishment of the plan of care.  This patient will be followed by a physician who will periodically review the plan of care.  Physician/Provider to provide follow up care: Randy Fuentes    Physician Signature: See electronic signature associated with these discharge orders.                  Supplies     AIR PRESSURE MATTRESS           SUCTION PUMP, HOME MODEL                 Your next 10 appointments already scheduled     Nov 27, 2018 12:30 PM CST   Return Visit with Ivelisse Nunez MD   [facility] ([facility])    [address] [place] [address]  [address] [facility] [address] [phone]              Follow-Up Appointment Instructions     Future Labs/Procedures    Adult [facility] Follow-up and recommended labs and tests      Comments:    Follow up with primary care provider, Randy Fuentes, within 7 days or as needed to evaluate medication change.     Appointments on [place] and/or [facility] (with [facility] or [facility] provider or service). Call [phone] if you haven't heard regarding these appointments within 7 days of discharge.      Follow-Up Appointment Instructions     Adult [facility] Follow-up and recommended labs and tests       Follow up with primary care provider, Randy Fuentes, within 7 days or as needed to evaluate medication change.     Appointments on [place] and/or [facility] (with [facility] or [facility] provider or service). Call [phone] if you haven't heard regarding these appointments within 7 days of discharge.             Statement of Approval     Ordered          11/22/18 1131  I have reviewed and agree with all the recommendations and orders detailed in this document.  EFFECTIVE NOW     Approved and electronically signed by:  Phi Chaves MD           11/20/18 1341  I have reviewed and agree with all the recommendations and orders detailed in this document.  EFFECTIVE NOW     Approved and electronically signed by:  Tana Peoples MD

## 2018-11-02 NOTE — ED PROVIDER NOTES
History     Chief Complaint   Patient presents with     Shortness of Breath     The history is provided by the EMS personnel and medical records. The history is limited by the condition of the patient (dementia, altered mental status).     Priscilla Way is a 78 year old male with a history of dementia (Alzheimer and vascular), liver and kidney transplants (2000), DVT, DM2, CVA, and poor airway protection w/recurrent aspiration pneumonia, and prior seizures who presents via EMS for evaluation of shortness of breath. Per EMS, patient was brought in due to increased shortness of breath and decreased level of consciousness. The patient has dementia and is non-verbal at baseline. Further history is not attainable at this time due to patient's decreased level of consciousness.     Per chart review, the patient had an extended admission from 09/11/18-10/29/18 (discharged four days ago). He was admitted for altered mental status in the setting of possible seizure and aspiration pneumonia. During his admission he developed an LILIAM, became anuric, and did require hemodialysis. Patient/family had a care conference on 10/15/18 at which time the decision was made to make the patient comfort care, however, this was later discontinued after the patient began making his own urine again and no longer required hemodialysis. The patient has an NJ tube in place which he receives all of his medications and tube feeds through. He is anticoagulated on warfarin. Patient is DNR/DNI.     I have reviewed the Medications, Allergies, Past Medical and Surgical History, and Social History in the [facility] system.  Past Medical History:   Diagnosis Date     LILIAM (acute kidney injury) (H) 08/2016    kidney biopsy showed ATN     Dementia 2004    multiple acute infarcts, SV dis on CT     Diabetes type 2, controlled (H)      H/O Clostridium difficile infection 03/2018    treated [facility] vanco     Hepatitis C      Kidney transplanted 2000     Liver  transplant      Unspecified cerebral artery occlusion with cerebral infarction        Past Surgical History:   Procedure Laterality Date     TRANSPLANT KIDNEY RECIPIENT  DONOR       TRANSPLANT LIVER RECIPIENT  DONOR         No family history on file.    Social History   Substance Use Topics     Smoking status: Former Smoker     Types: Cigarettes     Smokeless tobacco: Never Used      Comment: quit 10 years ago     Alcohol use No       No current facility-administered medications for this encounter.      Current Outpatient Prescriptions   Medication     acetaminophen (TYLENOL) 32 mg/mL solution     alcohol swab prep pads     amLODIPine (NORVASC) 1 mg/mL SUSP     aspirin 10 mg/mL SUSP     blood glucose (NO BRAND SPECIFIED) lancets standard     blood glucose monitoring (NO BRAND SPECIFIED) meter device kit     blood glucose monitoring (NO BRAND SPECIFIED) test strip     bumetanide (BUMEX) 0.25 mg/mL SUSP     carvedilol (COREG) 1 mg/mL SUSP     cholecalciferol (VITAMIN D/ D-VI-SOL) 400 UNIT/ML LIQD liquid     ferrous gluconate (FERGON) 324 (38 Fe) MG tablet     fiber modular, NUTRISOURCE FIBER, (NUTRISOURCE FIBER) packet     HYDROmorphone, STANDARD CONC, (DILAUDID) 1 MG/ML LIQD liquid     insulin aspart (NOVOLOG PEN) 100 UNIT/ML injection     insulin pen needle (BD SANDRA U/F) 32G X 4 MM     lactobacillus rhamnosus, GG, (CULTURELL) capsule     levETIRAcetam (KEPPRA) 100 MG/ML solution     miconazole (MICATIN; MICRO GUARD) 2 % powder     multivitamins with minerals (CERTAVITE/CEROVITE) LIQD liquid     ondansetron (ZOFRAN-ODT) 4 MG ODT tab     pantoprazole (PROTONIX) 2 mg/mL SUSP suspension     predniSONE 5 MG/5ML solution     Sharps Container MISC     tacrolimus (GENERIC EQUIVALENT) 1 mg/mL suspension     warfarin (COUMADIN) 2.5 MG tablet      No Known Allergies    Review of Systems   Unable to perform ROS: Mental status change   Constitutional: Negative for fever.       Physical Exam   BP:  133/76  Pulse: 70  Temp: 98.3  F (36.8  C)  Resp: 16  Weight: 80.7 kg (177 lb 14.4 oz) (bed scale)  SpO2: 92 %      Physical Exam   Constitutional: He is oriented to person, place, and time. He appears well-developed and well-nourished. He appears lethargic. He appears cachectic. He has a sickly appearance. No distress.   Advanced dementia, nonverbal at baseline.   HENT:   Head: Normocephalic and atraumatic.   Mouth/Throat: No oropharyngeal exudate.   NG tube in place     Eyes: Pupils are equal, round, and reactive to light. Right eye exhibits no discharge. Left eye exhibits no discharge. No scleral icterus.   Neck: Normal range of motion. Neck supple.   Cardiovascular: Normal rate, regular rhythm, normal heart sounds and intact distal pulses.  Exam reveals no gallop and no friction rub.    No murmur heard.  Pulmonary/Chest: Effort normal. No accessory muscle usage. No respiratory distress. He has no wheezes. He has rhonchi. He exhibits no tenderness.   Abdominal: Soft. Bowel sounds are normal. He exhibits no distension. There is no tenderness.   Musculoskeletal: Normal range of motion. He exhibits edema. He exhibits no tenderness or deformity.   Neurological: He is oriented to person, place, and time. He appears lethargic. No cranial nerve deficit.   Skin: Skin is warm and dry. No rash noted. He is not diaphoretic. No erythema. No pallor.   Psychiatric: He has a normal mood and affect.   Nursing note and vitals reviewed.      ED Course     ED Course     Results for orders placed or performed during the hospital encounter of 11/02/18   CT Head w/o Contrast    Narrative    CT HEAD W/O CONTRAST 11/2/2018 1:09 PM    Provided History: Decreased LOC;   ICD-10: Decreased level of consciousness    Comparison: CT head 9/11/2018.    Technique: Using multidetector thin collimation helical acquisition  technique, axial, coronal and sagittal CT images from the skull base  to the vertex were obtained without intravenous  contrast.     Findings:    No intracranial hemorrhage, mass effect, or midline shift. The  ventricles are proportionate to the cerebral sulci. There is moderate  periventricular and subcortical hypoattenuation, which likely  represents chronic small vessel ischemic disease in a patient this  age. Chronic appearing lacunar infarction within the right thalamus.  Moderate cerebral atrophy. The gray to white matter differentiation of  the cerebral hemispheres is preserved. The basal cisterns are patent.    The visualized paranasal sinuses are clear. Left mastoid effusion.  A  nasoenteric tube is partially visualized.       Impression    Impression:   1. No acute intracranial pathology.  2. Moderate Leukoaraiosis and cerebral atrophy.  3. Left mastoiditis.   XR Chest 1 View    Narrative    XR CHEST 1 VW  11/2/2018 12:49 PM      HISTORY: Shortness of breath; recurrent aspiration pneumonia    COMPARISON: 9/20/2018    FINDINGS:   Single AP supine view of the chest. Interval removal of right IJ  catheter.. Nasogastric tube looping over upper abdomen, presumably in  stomach. Cardiac mediastinal silhouette is stable, exaggerated by  patient rotation and AP technique. Pulmonary vasculature is engorged.  No focal consolidation or pneumothorax. Decreased blunting of  bilateral costophrenic angles.       Impression    IMPRESSION:   Hazy right midlung, retrocardiac, and diffuse interstitial opacities  concerning for worsening edema and/or infection.   CBC with platelets differential   Result Value Ref Range    WBC 6.3 4.0 - 11.0 10e9/L    RBC Count 2.77 (L) 4.4 - 5.9 10e12/L    Hemoglobin 8.6 (L) 13.3 - 17.7 g/dL    Hematocrit 26.1 (L) 40.0 - 53.0 %    MCV 94 78 - 100 fl    MCH 31.0 26.5 - 33.0 pg    MCHC 33.0 31.5 - 36.5 g/dL    RDW 16.5 (H) 10.0 - 15.0 %    Platelet Count 425 150 - 450 10e9/L    Diff Method Automated Method     % Neutrophils 51.8 %    % Lymphocytes 31.7 %    % Monocytes 13.1 %    % Eosinophils 2.7 %    %  Basophils 0.2 %    % Immature Granulocytes 0.5 %    Nucleated RBCs 0 0 /100    Absolute Neutrophil 3.3 1.6 - 8.3 10e9/L    Absolute Lymphocytes 2.0 0.8 - 5.3 10e9/L    Absolute Monocytes 0.8 0.0 - 1.3 10e9/L    Absolute Eosinophils 0.2 0.0 - 0.7 10e9/L    Absolute Basophils 0.0 0.0 - 0.2 10e9/L    Abs Immature Granulocytes 0.0 0 - 0.4 10e9/L    Absolute Nucleated RBC 0.0     Platelet Estimate Confirming automated cell count    INR   Result Value Ref Range    INR 1.24 (H) 0.86 - 1.14   Partial thromboplastin time   Result Value Ref Range    PTT 22 22 - 37 sec   Comprehensive metabolic panel   Result Value Ref Range    Sodium 121 (L) 133 - 144 mmol/L    Potassium 3.7 3.4 - 5.3 mmol/L    Chloride 83 (L) 94 - 109 mmol/L    Carbon Dioxide 26 20 - 32 mmol/L    Anion Gap 12 3 - 14 mmol/L    Glucose 120 (H) 70 - 99 mg/dL    Urea Nitrogen 124 (H) 7 - 30 mg/dL    Creatinine 2.03 (H) 0.66 - 1.25 mg/dL    GFR Estimate 32 (L) >60 mL/min/1.7m2    GFR Estimate If Black 38 (L) >60 mL/min/1.7m2    Calcium 8.1 (L) 8.5 - 10.1 mg/dL    Bilirubin Total 0.4 0.2 - 1.3 mg/dL    Albumin 2.4 (L) 3.4 - 5.0 g/dL    Protein Total 6.7 (L) 6.8 - 8.8 g/dL    Alkaline Phosphatase 113 40 - 150 U/L    ALT 20 0 - 70 U/L    AST 23 0 - 45 U/L   Lactic acid whole blood   Result Value Ref Range    Lactic Acid 1.1 0.7 - 2.0 mmol/L   Troponin I   Result Value Ref Range    Troponin I ES <0.015 0.000 - 0.045 ug/L   Ammonia   Result Value Ref Range    Ammonia <10 (L) 10 - 50 umol/L   TSH   Result Value Ref Range    TSH 1.46 0.40 - 4.00 mU/L   Procalcitonin   Result Value Ref Range    Procalcitonin 0.26 ng/ml   CBC with platelets   Result Value Ref Range    WBC 6.9 4.0 - 11.0 10e9/L    RBC Count 2.90 (L) 4.4 - 5.9 10e12/L    Hemoglobin 8.9 (L) 13.3 - 17.7 g/dL    Hematocrit 27.8 (L) 40.0 - 53.0 %    MCV 96 78 - 100 fl    MCH 30.7 26.5 - 33.0 pg    MCHC 32.0 31.5 - 36.5 g/dL    RDW 16.5 (H) 10.0 - 15.0 %    Platelet Count 423 150 - 450 10e9/L   Sodium   Result  Value Ref Range    Sodium 123 (L) 133 - 144 mmol/L   Glucose by meter   Result Value Ref Range    Glucose 127 (H) 70 - 99 mg/dL   EKG 12-lead, tracing only   Result Value Ref Range    Interpretation ECG Click View Image link to view waveform and result    Blood culture   Result Value Ref Range    Specimen Description Blood Right Arm     Special Requests Received in aerobic bottle only     Culture Micro PENDING    Blood culture   Result Value Ref Range    Specimen Description Blood Right Arm     Special Requests Received in aerobic bottle only     Culture Micro PENDING      *Note: Due to a large number of results and/or encounters for the requested time period, some results have not been displayed. A complete set of results can be found in Results Review.     Procedures       11:12 AM  The patient was seen and examined by Dr. Fulton in Room 8.          EKG Interpretation:      Interpreted by Aidan Fulton  Time reviewed: 1135  Symptoms at time of EKG: Decreased LOC  Rhythm: normal sinus   Rate: 72  Axis: Normal  Ectopy: none  Conduction: 1st degree AV block  ST Segments/ T Waves: No ST-T wave changes  Q Waves: none  Comparison to prior: Unchanged from 9/11/18    Clinical Impression: no acute changes                Critical Care time:  none             Labs Ordered and Resulted from Time of ED Arrival Up to the Time of Departure from the ED - No data to display         Assessments & Plan (with Medical Decision Making)   This 78-year-old male with decreased LOC.  Patient has advanced dementia and is generally nonverbal at baseline, however he does have occasional speech per daughter.  He had a noted decrease in level of consciousness today.  His daughter notes that his dementia has appeared to become worse recently.  Was some concern about patient aspirating tube feeds last night.  He does have a history of aspiration pneumonia in the past.  Head CT showed no acute changes.  His H&H is decreased but similar to  previous.  His sodium was noted to be 121.  His creatinine is 2.03 but this is similar to previous.  Due to his history of liver disease ammonia was obtained which was negative.  EKG and troponin showed no acute abnormalities.  His chest x-ray showed concern for right midlung opacities.  I believe patient could have aspirated and will start antibiotics.  Patient is DNR and DNI.  We will admit for further monitoring and treatment.    I have reviewed the nursing notes.    I have reviewed the findings, diagnosis, plan and need for follow up with the patient.    New Prescriptions    No medications on file       Final diagnoses:   None   Jodi HANNAH, am serving as a trained medical scribe to document services personally performed by Aidan Fulton DO, based on the provider's statements to me.      Aidan HANNAH DO, was physically present and have reviewed and verified the accuracy of this note documented by Jodi Layne.      11/2/2018   [facility], [place], EMERGENCY DEPARTMENT     Aidan Fulton DO  11/02/18 1942

## 2018-11-02 NOTE — ED NOTES
[facility], [place]   ED Nurse to Floor Handoff     Priscilla Way is a 78 year old male who speaks Iraqi and lives with others,  in a home  They arrived in the ED by ambulance from home    ED Chief Complaint: Shortness of Breath    ED Dx;   Final diagnoses:   Decreased level of consciousness   Hyponatremia   Pneumonia of right middle lobe due to infectious organism (H)         Needed?: Yes    Allergies: No Known Allergies.  Past Medical Hx:   Past Medical History:   Diagnosis Date     LILIAM (acute kidney injury) (H) 08/2016    kidney biopsy showed ATN     Dementia 2004    multiple acute infarcts, SV dis on CT     Diabetes type 2, controlled (H)      H/O Clostridium difficile infection 03/2018    treated wiht vanco     Hepatitis C      Kidney transplanted 2000     Liver transplant 2000     Unspecified cerebral artery occlusion with cerebral infarction       Baseline Mental status: severe dementia  Current Mental Status changes: at basesline    Infection present or suspected this encounter: yes respiratory  Sepsis suspected: Yes  Isolation type: No active isolations     Activity level - Baseline/Home:  Total Care  Activity Level - Current:   Total Care    Bariatric equipment needed?: No    In the ED these meds were given:   Medications   piperacillin-tazobactam (ZOSYN) 3.375 g vial to attach to  mL bag (3.375 g Intravenous New Bag 11/2/18 7352)   sodium chloride 0.9% infusion (not administered)       Drips running?  Yes    Home pump  No    Current LDAs  Peripheral IV 11/02/18 Right;Lateral Upper forearm (Active)   Number of days:0       NG/OG Tube Nasogastric Right nostril (Active)   Number of days:9       Pressure Injury 09/11/18 Coccyx (Active)   Number of days:52       Wound 10/01/18 Left Penis Open Wound on the Penis (Active)   Number of days:32       Wound 10/23/18 Left;Inner Thigh Skin tear quarter sized open pink area (Active)   Number of days:10       Wound  10/24/18 Inner;Right Thigh Shear injury (Active)   Number of days:9       Incision/Surgical Site 08/21/16 Right;Lower Abdomen (Active)   Number of days:803       Labs results:   Labs Ordered and Resulted from Time of ED Arrival Up to the Time of Departure from the ED   CBC WITH PLATELETS DIFFERENTIAL - Abnormal; Notable for the following:        Result Value    RBC Count 2.77 (*)     Hemoglobin 8.6 (*)     Hematocrit 26.1 (*)     RDW 16.5 (*)     All other components within normal limits   INR - Abnormal; Notable for the following:     INR 1.24 (*)     All other components within normal limits   COMPREHENSIVE METABOLIC PANEL - Abnormal; Notable for the following:     Sodium 121 (*)     Chloride 83 (*)     Glucose 120 (*)     Urea Nitrogen 124 (*)     Creatinine 2.03 (*)     GFR Estimate 32 (*)     GFR Estimate If Black 38 (*)     Calcium 8.1 (*)     Albumin 2.4 (*)     Protein Total 6.7 (*)     All other components within normal limits   AMMONIA - Abnormal; Notable for the following:     Ammonia <10 (*)     All other components within normal limits   PARTIAL THROMBOPLASTIN TIME   LACTIC ACID WHOLE BLOOD   TROPONIN I   ROUTINE UA WITH MICROSCOPIC   TSH   BLOOD CULTURE   BLOOD CULTURE       Imaging Studies:   Recent Results (from the past 24 hour(s))   XR Chest 1 View    Narrative    XR CHEST 1 VW  11/2/2018 12:49 PM      HISTORY: Shortness of breath; recurrent aspiration pneumonia    COMPARISON: 9/20/2018    FINDINGS:   Single AP supine view of the chest. Interval removal of right IJ  catheter.. Nasogastric tube looping over upper abdomen, presumably in  stomach. Cardiac mediastinal silhouette is stable, exaggerated by  patient rotation and AP technique. Pulmonary vasculature is engorged.  No focal consolidation or pneumothorax. Decreased blunting of  bilateral costophrenic angles.       Impression    IMPRESSION:   Hazy right midlung, retrocardiac, and diffuse interstitial opacities  concerning for worsening edema  and/or infection.   CT Head w/o Contrast    Narrative    CT HEAD W/O CONTRAST 11/2/2018 1:09 PM    Provided History: Decreased LOC;   ICD-10: Decreased level of consciousness    Comparison: CT head 9/11/2018.    Technique: Using multidetector thin collimation helical acquisition  technique, axial, coronal and sagittal CT images from the skull base  to the vertex were obtained without intravenous contrast.     Findings:    No intracranial hemorrhage, mass effect, or midline shift. The  ventricles are proportionate to the cerebral sulci. There is moderate  periventricular and subcortical hypoattenuation, which likely  represents chronic small vessel ischemic disease in a patient this  age. Chronic appearing lacunar infarction within the right thalamus.  Moderate cerebral atrophy. The gray to white matter differentiation of  the cerebral hemispheres is preserved. The basal cisterns are patent.    The visualized paranasal sinuses are clear. Left mastoid effusion.  A  nasoenteric tube is partially visualized.       Impression    Impression:   1. No acute intracranial pathology.  2. Moderate Leukoaraiosis and cerebral atrophy.  3. Left mastoiditis.       Recent vital signs:   /67  Pulse 70  Temp 98.3  F (36.8  C) (Axillary)  Resp 16  Wt 80.7 kg (177 lb 14.4 oz)  SpO2 99%  BMI 25.53 kg/m2    Cardiac Rhythm: Normal Sinus  Pt needs tele? No  Skin/wound Issues: Patient has left and right thigh and left thigh issues.     Code Status: DNR / DNI    Pain control: pt had none    Nausea control: pt had none    Abnormal labs/tests/findings requiring intervention: none    Family present during ED course? Yes   Family Comments/Social Situation comments: Patient currently resides at home with 24 hour/day PCA and family members caring for him.  Patient has feeding tube in and has recurrent bouts of aspiration pneumonia.  Patient had order for catheter obtained urine, but family asked that this not be done.  Order cancelled  per MD.     Tasks needing completion: Patient currently receiving antibiotic infusion to run over one hour.     RAY CRESPO, RN  [phone] [facility]

## 2018-11-02 NOTE — ED NOTES
Attempted PIV access with 24 gauge catheter; unsuccessful at this time.  Will use ultrasound for access.  Patient's PCA at bedside.

## 2018-11-02 NOTE — IP AVS SNAPSHOT
` ` Patient Information     Patient Name Sex     Priscilla Way ([id number]) Male 1940       Room Bed    17 5217-01      Patient Demographics     Address Phone E-mail Address    [address]  [facility] [address] [phone] (Home)  [phone] (Mobile) *Preferred* [email or web address]      Patient Ethnicity & Race     Ethnic Group Patient Race    Macanese       Emergency Contact(s)     Name Relation Home Work Mobile    Cruz Way Daughter [phone]    May Paige Daughter [phone]      Documents on File        Status Date Received Description       Documents for the Patient    Privacy Notice - [place] Received 11     Insurance Card Received 17 Did not bring     External Medication Information Consent       Patient ID Received 18 Photo on file    Consent for Services - Hospital/Clinic Received () 11     Face Sheet Received () 11/06/10     Consent for Services - Hospital/Clinic Received () 12     CMS IM for Patient Signature Received 18     Consent for Services - Hospital/Clinic Received () 12     Consent for Services - [facility] Received () 10/30/12 GENERAL CONSENT FORM: SHARED EHR - ENGLISH    Consent for Services - [facility]  12 GENERAL CONSENT FORM: SHARED EHR - ENGLISH    Consent to Communicate Received () 02/15/13     Consent to Communicate  () 13 AUTHORIZATION TO DISCUSS PROTECTED HEALTH INFORMATION    Consent for EHR Access  13 Copied from existing Consent for services - IP/ED collected on 2011    [facility] Specified Other       Consent for Services - Hospital/Clinic Received () 10/04/13     Consent for Services - [facility] Received 10/04/13 imaging center general consent    Consent for Services - Hospital/Clinic  () 10/04/13 CONSENT FOR SERVICE    Consent for Services - [facility]  10/17/13 GENERAL CONSENT FORM:  SHARED EHR - ENGLISH    Business/Insurance/Care Coordination/Health Form - Patient  01/13/15 UCARE, PRIOR AUTHORIZATION APPROVAL- METHOCARBAMOL, 14    Consent for Services - Hospital/Clinic Received () 04/14/15     Consent for Services - Hospital/Clinic  () 04/15/15 CONSENT FOR SERVICE    Consent for Services/Privacy Notice - Hospital/Clinic-Esign Received () 16     HIM NKECHI Authorization  10/04/16     HIM NKECHI Authorization  () 17 Authorization for batch UCARE 17-4    Consent for Services/Privacy Notice - Hospital/Clinic Received 18     HIM NKECHI Authorization  () 10/03/17 Authorization for batch [employer] 10-03-17 - 2    Care Everywhere Prospective Auth Received 18     Consent for Services/Privacy Notice - Hospital/Clinic       Consent for Services - Hospital and Clinic Received 18     HIE Auth Received 18     Consent for Services/Privacy Notice - Hospital/Clinic  (Deleted)         Documents for the Encounter    CMS IM for Patient Signature  18       Admission Information     Attending Provider Admitting Provider Admission Type Admission Date/Time    Phi Chaves MD Sapkota, Smarika, MD Emergency 18  1100    Discharge Date Hospital Service Auth/Cert Status Service Area     Internal Medicine Incomplete [facility] SERVICES    Unit Room/Bed Admission Status       UU U5A 5217/5217-01 Admission (Confirmed)       Admission     Complaint    Sepsis (H)      Hospital Account     Name Acct ID Class Status Primary Coverage    Priscilla Way [id number] Inpatient Open UCARE [facility]            Guarantor Account (for Hospital Account #[id number])     Name Relation to Pt Service Area Active? Acct Type    Priscilla Way  FCS Yes Personal/Family    Address Phone          [address]  [place] [address] [phone](H)              Coverage Information (for Hospital Account  #[id number])     F/O Payor/Plan Precert #    UCARE/UCARE-SENIORS [facility]/ PARTNERS     Subscriber Subscriber #    Priscilla Way [id number]    Address Phone    [address]  [place] [address] [phone]

## 2018-11-02 NOTE — H&P
[facility]   General Medicine Service H & P      Patient Name: Priscilla Way   Date of Admission: 11/2/2018            Summary:     77 y/o male with PMHx significant for vascular dementia, CVA multiple sites, DVT on warfarin, HCV cirrhosis s/p liver transplant 2000, HCV ESRD s/p kidney transplant 2000, CKD III of transplanted kidney, HFpEF, recurrent C diff infection, malnutrition on tube feeds and recent seizure disorder/status epilepticus was admitted to the hospital because of worsening altered mental status and shortness of breath.           Assessment and Plan:     #Acute hypoxic respiratory failure  #Volume overload  #Cough  #Aspiration pneumonia vs pneumonitis   #HFpEF (LVEF 55-60%)  According to one the patient's care taker, patient was noted to be coughing more and sounded productive. Due to language barrier unclear if symptoms of viral URI and sick contacts. CXR did show evidence of increased pulmonary congestions suggestive of pulmonary edema and also possible aspiration. He has had other hospitalizations because of aspiration in the setting of TF's and altered mentation. On exam with coarse lung sounds, elevated JVP ~ 15cm and pitting edema on both legs. This favors a component of both aspiration and volume overload. Last echo about a month ago with normal EF but does have diastolic dysfunction. Is hypoalbuminemic therefore more risk of third spacing.   - Strict I/Os  - Bumex 2mg IV once  - Sputum culture and smear, will need induced cough  - DuoNeb  - Urine strep/legionella pending  - Influenza and respiratory panel pending  - Blood cultures pending  - UA/UC  - Continue zosyn for now as patient has been frequently in hospital, might narrow to unasyn  - Continue home coreg  - Not on lisinopril due to renal dysfunction  - Procal pending  - Wean O2 as able  - Enteric panel and c diff    #Acute toxic encephalopathy  #Uremia  #Vascular dementia   He is non-verbal. Head imaging in the past has  shown chronic advanced small vessel disease that is diffuse and atherosclerotic plaques with stenosis along vertebral arteries and MCA. His mentation mainly affected by vascular dementia. GCS today was 6-7. Per previous notes, he can be aroused by verbal stimuli. Current encephalopathy multifactorial by ongoing evolution of cerebrovascular disease vs metabolic (uremia, hyponatremia) vs sepsis vs seizures. TSH and NH3 normal. Furthermore, CT scan on 11/02/18 did not reveal any gross intracranial pathology. Unable to protect airway but he is DNR/DNI.  - Neuro checks    #Hyponatremia, hypervolemic  Patient with signs of hypervolemia. Na on last admission was 130, now 121. This could be also affecting mentation. Low sodium could also have contribution from renal losses due to LILIAM.   - Na TID  - Serum osm pending  - Diuresis as above    #HCV ESRD s/p transplant 2000  #LILIAM on CKD III of transplanted kidney  Patient at one point was to be on dialysis of his transplanted kidney but renal function recovered. Baseline Cr ~ 1.2-1.5. Today was 2.0. Causes could be intrinsic injury from sepsis or UTI or tacro vs cardiorenal type 1.   - FeNa, if low while on diuretics then favors pre-renal  - US of the kidney  - Tacro levels in AM  - Continue tacro and prednisone  - UA/UC    #HCV ESLD s/p transplant 2000  LFTs normal. No evidence of rejection or decompensation.  - Continue tacro and prednisone as above    #Chronic iron deficiency anemia  Hemoglobin stable. There was some concern about slow GI bleed in previous admissions. Patient also with CKD driving down Hgb production. No evidence of melena for now.   - Continue home PPI  - Continue home iron, which might confound appearance of stool    #Hx of DVT on warfarin  DVT bilaterally on US in 2016. INR subtherapeutic.   - Heparin bridge  - INR daily  - Pharmacy to dose warfarin    #Recent diagnosis of seizure disorder with status epilepticus  Was on EEG monitor during last admission  and had readings consistent with non-convulsive status epilepticus.   - Continue home dose of keppra  - Keppra in AM    #Severe malnutrition on TF's  - TF consult placed  - Refeeding syndrome and nutrition labs per dietician    #DM2  - Continue home insulin regimen    Patient evaluated and discussed with Dr. Gillespie.     Code Status: DNR/DNI  FEN: TF's  PPx: DVT on warfarin, GI PPI PO  Dispo: General Medicine, will need to have additional discussion about goals of care as patient with overall poor prognosis, high risk of aspiration    Ramos Mcnair MD PhD  Internal Medicine, PGY-3  Pager: [phone]         HPI:     79 y/o male with PMHx significant for vascular dementia, CVA multiple sites, DVT on warfarin, HCV cirrhosis s/p liver transplant 2000, HCV ESRD s/p kidney transplant 2000, CKD III of transplanted kidney, HFpEF, recurrent C diff infection, malnutrition on tube feeds and recent seizure disorder/status epilepticus was admitted to the hospital because of worsening altered mental status and shortness of breath.     Patient is non-verbal and history obtained from chart. He was in his usual state of health until day of admission when caretaker noticed he was more mentally altered and had shortness of breath with increasing wet cough, but unclear if there is sputum production. Patient was brought in by ambulance.     Vitals of arrival were significant for O2 sats in the low 90s. CXR concerning for aspiration and pulmonary edema. He had a CT scan of the head that was negative for acute intracranial pathology. Transferred to the floor for further work up of sepsis.               Review of Systems:     10 pt ROS neg except for HPI             Past Medical History:   Past medical history discussed with patient.  Past Medical History:   Diagnosis Date     LILIAM (acute kidney injury) (H) 08/2016    kidney biopsy showed ATN     Dementia 2004    multiple acute infarcts, SV dis on CT     Diabetes type 2, controlled (H)       H/O Clostridium difficile infection 2018    treated wiht vanco     Hepatitis C      Kidney transplanted      Liver transplant      Unspecified cerebral artery occlusion with cerebral infarction               Past Surgical History:   Past surgical history discussed with patient.     Past Surgical History:   Procedure Laterality Date     TRANSPLANT KIDNEY RECIPIENT  DONOR       TRANSPLANT LIVER RECIPIENT  DONOR                Social History:   Social history discussed with patient.  Social History   Substance Use Topics     Smoking status: Former Smoker     Types: Cigarettes     Smokeless tobacco: Never Used      Comment: quit 10 years ago     Alcohol use No              Family History:   Family history discussed with patient.  No family history on file.           Immunizations:     Immunization History   Administered Date(s) Administered     Influenza (High Dose) 3 valent vaccine 10/03/2014, 2016, 2018     Influenza (IIV3) PF 2004, 2007, 2008, 10/30/2012, 10/04/2013     Pneumo Conj 13-V (2010&after) 2016     Pneumococcal 23 valent 2007     TDAP Vaccine (Boostrix) 2016              Allergies:   Allergies discussed with patient  No Known Allergies           Medications:       amLODIPine  5 mg Per Feeding Tube Daily     aspirin  80 mg Per Feeding Tube Daily     bumetanide  2 mg Intravenous Once     carvedilol  6.25 mg Oral or Feeding Tube BID     cholecalciferol  400 Units Oral Daily     [START ON 11/3/2018] ferrous gluconate  648 mg Per Feeding Tube Daily with breakfast     fiber modular (NUTRISOURCE FIBER)  1 packet Per Feeding Tube TID     heparin Loading Dose  60 Units/kg Intravenous Once     insulin aspart  1-6 Units Subcutaneous Q4H     ipratropium - albuterol 0.5 mg/2.5 mg/3 mL  3 mL Nebulization 4x daily     lactobacillus rhamnosus (GG)  1 capsule Per Feeding Tube BID     levETIRAcetam  750 mg Per Feeding Tube Q12H     miconazole    Topical BID     multivitamins with minerals  15 mL Oral Daily     pantoprazole  40 mg Per Feeding Tube BID AC     piperacillin-tazobactam  2.25 g Intravenous Q6H     predniSONE  5 mg Oral Daily     tacrolimus  1.5 mg Oral or Feeding Tube BID     warfarin  2.5 mg Oral or Feeding Tube Daily              Medications prior to hospitalization:       No current facility-administered medications on file prior to encounter.   Current Outpatient Prescriptions on File Prior to Encounter:  acetaminophen (TYLENOL) 32 mg/mL solution 20.3 mLs (650 mg) by Oral or Feeding Tube route every 4 hours as needed for mild pain or fever   alcohol swab prep pads Use to swab area of injection/mark anthony as directed.   amLODIPine (NORVASC) 1 mg/mL SUSP 5 mLs (5 mg) by Per Feeding Tube route daily   aspirin 10 mg/mL SUSP 8 mLs (80 mg) by Per Feeding Tube route daily   blood glucose (NO BRAND SPECIFIED) lancets standard Use to test blood sugar 6 times daily or as directed.   blood glucose monitoring (NO BRAND SPECIFIED) meter device kit Use to test blood sugar every 4 hours while on Tube feeding.   blood glucose monitoring (NO BRAND SPECIFIED) test strip Use to test blood sugars 6 times daily or as directed   bumetanide (BUMEX) 0.25 mg/mL SUSP 4 mLs (1 mg) by Oral or Feeding Tube route daily   carvedilol (COREG) 1 mg/mL SUSP 6.25 mLs (6.25 mg) by Oral or Feeding Tube route 2 times daily   cholecalciferol (VITAMIN D/ D-VI-SOL) 400 UNIT/ML LIQD liquid Take 1 mL (400 Units) by mouth daily   ferrous gluconate (FERGON) 324 (38 Fe) MG tablet 2 tablets (648 mg) by Per Feeding Tube route daily (with breakfast)   fiber modular, NUTRISOURCE FIBER, (NUTRISOURCE FIBER) packet 1 packet by Per Feeding Tube route 3 times daily   HYDROmorphone, STANDARD CONC, (DILAUDID) 1 MG/ML LIQD liquid 1-2 mLs (1-2 mg) by Oral or Feeding Tube route every 6 hours as needed for moderate to severe pain   insulin aspart (NOVOLOG PEN) 100 UNIT/ML injection Inject 1-6 Units  Subcutaneous every 4 hours Correction Scale -MEDIUM INSULIN RESISTANCE,  Do Not give if BG less than 140.For  - 189 give 1 unit.For  - 239 give 2 units.For  - 289 give 3 units.For  - 339 give 4 units.For  - 399 give 5 units.For BG > 400 give 6 units.Check blood glucose Q4H Notify provider if glucose> 350 mg/dL.   insulin pen needle (BD SANDRA U/F) 32G X 4 MM Use 6 (every 4 hours while on tube feeding) daily or as directed.   lactobacillus rhamnosus, GG, (CULTURELL) capsule 1 capsule by Per Feeding Tube route 2 times daily   levETIRAcetam (KEPPRA) 100 MG/ML solution 7.5 mLs (750 mg) by Per Feeding Tube route every 12 hours   miconazole (MICATIN; MICRO GUARD) 2 % powder Apply topically 2 times daily   multivitamins with minerals (CERTAVITE/CEROVITE) LIQD liquid Take 15 mLs by mouth daily   ondansetron (ZOFRAN-ODT) 4 MG ODT tab Take 1 tablet (4 mg) by mouth every 6 hours as needed for nausea or vomiting   pantoprazole (PROTONIX) 2 mg/mL SUSP suspension 20 mLs (40 mg) by Per Feeding Tube route 2 times daily (before meals)   predniSONE 5 MG/5ML solution Take 5 mLs (5 mg) by mouth daily   Sharps Container MISC Sharps container for diabetes needles.   tacrolimus (GENERIC EQUIVALENT) 1 mg/mL suspension 1.5 mLs (1.5 mg) by Oral or Feeding Tube route 2 times daily   warfarin (COUMADIN) 2.5 MG tablet 1 tablet (2.5 mg) by Oral or Feeding Tube route daily             Physical Exam:   Vital signs at time of admission note: /48 (BP Location: Left leg)  Pulse 73  Temp 97.3  F (36.3  C) (Axillary)  Resp 16  Wt 80.4 kg (177 lb 4 oz)  SpO2 100%  BMI 25.43 kg/m2  General: lethargic, non-verbal  HEENT: PERRLA, EOMI, anicteric sclera, evidence of cataracts  Neck:  JVP ~15cm  CV: RRR, no murmur, no rubs, no gallops  Resp: Coarse bilateral crackles and ronchi  Abdomen: non tender, non distended, no organomegaly, +bs  Extremities: bilateral pitting edema to the knees, WWP  Skin: no rash, not  jaundice  Psych: unable to assess due to non-responsive  Neuro: GCS 6-7    No intake or output data in the 24 hours ending 11/02/18 3470                Data:     Results for orders placed or performed during the hospital encounter of 11/02/18 (from the past 24 hour(s))   EKG 12-lead, tracing only   Result Value Ref Range    Interpretation ECG Click View Image link to view waveform and result    INR   Result Value Ref Range    INR 1.24 (H) 0.86 - 1.14   Partial thromboplastin time   Result Value Ref Range    PTT 22 22 - 37 sec   Comprehensive metabolic panel   Result Value Ref Range    Sodium 121 (L) 133 - 144 mmol/L    Potassium 3.7 3.4 - 5.3 mmol/L    Chloride 83 (L) 94 - 109 mmol/L    Carbon Dioxide 26 20 - 32 mmol/L    Anion Gap 12 3 - 14 mmol/L    Glucose 120 (H) 70 - 99 mg/dL    Urea Nitrogen 124 (H) 7 - 30 mg/dL    Creatinine 2.03 (H) 0.66 - 1.25 mg/dL    GFR Estimate 32 (L) >60 mL/min/1.7m2    GFR Estimate If Black 38 (L) >60 mL/min/1.7m2    Calcium 8.1 (L) 8.5 - 10.1 mg/dL    Bilirubin Total 0.4 0.2 - 1.3 mg/dL    Albumin 2.4 (L) 3.4 - 5.0 g/dL    Protein Total 6.7 (L) 6.8 - 8.8 g/dL    Alkaline Phosphatase 113 40 - 150 U/L    ALT 20 0 - 70 U/L    AST 23 0 - 45 U/L   Lactic acid whole blood   Result Value Ref Range    Lactic Acid 1.1 0.7 - 2.0 mmol/L   Troponin I   Result Value Ref Range    Troponin I ES <0.015 0.000 - 0.045 ug/L   Ammonia   Result Value Ref Range    Ammonia <10 (L) 10 - 50 umol/L   Blood culture   Result Value Ref Range    Specimen Description Blood Right Arm     Special Requests Received in aerobic bottle only     Culture Micro PENDING    CBC with platelets differential   Result Value Ref Range    WBC 6.3 4.0 - 11.0 10e9/L    RBC Count 2.77 (L) 4.4 - 5.9 10e12/L    Hemoglobin 8.6 (L) 13.3 - 17.7 g/dL    Hematocrit 26.1 (L) 40.0 - 53.0 %    MCV 94 78 - 100 fl    MCH 31.0 26.5 - 33.0 pg    MCHC 33.0 31.5 - 36.5 g/dL    RDW 16.5 (H) 10.0 - 15.0 %    Platelet Count 425 150 - 450 10e9/L    Diff  Method Automated Method     % Neutrophils 51.8 %    % Lymphocytes 31.7 %    % Monocytes 13.1 %    % Eosinophils 2.7 %    % Basophils 0.2 %    % Immature Granulocytes 0.5 %    Nucleated RBCs 0 0 /100    Absolute Neutrophil 3.3 1.6 - 8.3 10e9/L    Absolute Lymphocytes 2.0 0.8 - 5.3 10e9/L    Absolute Monocytes 0.8 0.0 - 1.3 10e9/L    Absolute Eosinophils 0.2 0.0 - 0.7 10e9/L    Absolute Basophils 0.0 0.0 - 0.2 10e9/L    Abs Immature Granulocytes 0.0 0 - 0.4 10e9/L    Absolute Nucleated RBC 0.0     Platelet Estimate Confirming automated cell count    TSH   Result Value Ref Range    TSH 1.46 0.40 - 4.00 mU/L   XR Chest 1 View    Narrative    XR CHEST 1 VW  11/2/2018 12:49 PM      HISTORY: Shortness of breath; recurrent aspiration pneumonia    COMPARISON: 9/20/2018    FINDINGS:   Single AP supine view of the chest. Interval removal of right IJ  catheter.. Nasogastric tube looping over upper abdomen, presumably in  stomach. Cardiac mediastinal silhouette is stable, exaggerated by  patient rotation and AP technique. Pulmonary vasculature is engorged.  No focal consolidation or pneumothorax. Decreased blunting of  bilateral costophrenic angles.       Impression    IMPRESSION:   Hazy right midlung, retrocardiac, and diffuse interstitial opacities  concerning for worsening edema and/or infection.   Blood culture   Result Value Ref Range    Specimen Description Blood Right Arm     Special Requests Received in aerobic bottle only     Culture Micro PENDING    CT Head w/o Contrast    Narrative    CT HEAD W/O CONTRAST 11/2/2018 1:09 PM    Provided History: Decreased LOC;   ICD-10: Decreased level of consciousness    Comparison: CT head 9/11/2018.    Technique: Using multidetector thin collimation helical acquisition  technique, axial, coronal and sagittal CT images from the skull base  to the vertex were obtained without intravenous contrast.     Findings:    No intracranial hemorrhage, mass effect, or midline shift.  The  ventricles are proportionate to the cerebral sulci. There is moderate  periventricular and subcortical hypoattenuation, which likely  represents chronic small vessel ischemic disease in a patient this  age. Chronic appearing lacunar infarction within the right thalamus.  Moderate cerebral atrophy. The gray to white matter differentiation of  the cerebral hemispheres is preserved. The basal cisterns are patent.    The visualized paranasal sinuses are clear. Left mastoid effusion.  A  nasoenteric tube is partially visualized.       Impression    Impression:   1. No acute intracranial pathology.  2. Moderate Leukoaraiosis and cerebral atrophy.  3. Left mastoiditis.     *Note: Due to a large number of results and/or encounters for the requested time period, some results have not been displayed. A complete set of results can be found in Results Review.

## 2018-11-02 NOTE — TELEPHONE ENCOUNTER
Pt is in ED.    I reviewed the chart.  Last OV was 12/23/16. Pt really needs to be seen for getting refills and other orders.

## 2018-11-02 NOTE — ED TRIAGE NOTES
Pt brought in by ambulance with c/o shortness of breath and decrease level of consciousness. Duoneb given en route by medics. Hx of kidney/liver transplants

## 2018-11-02 NOTE — IP AVS SNAPSHOT
[address] [address]    Phone:  [phone]                                       After Visit Summary   11/2/2018    Priscilla Way    MRN: [id number]           After Visit Summary Signature Page     I have received my discharge instructions, and my questions have been answered. I have discussed any challenges I see with this plan with the nurse or doctor.    ..........................................................................................................................................  Patient/Patient Representative Signature      ..........................................................................................................................................  Patient Representative Print Name and Relationship to Patient    ..................................................               ................................................  Date                                   Time    ..........................................................................................................................................  Reviewed by Signature/Title    ...................................................              ..............................................  Date                                               Time          22EPIC Rev 08/18

## 2018-11-02 NOTE — IP AVS SNAPSHOT
MRN:[id number]                      After Visit Summary   11/2/2018    [address]iscilla [address]    MRN: [id number]           Thank you!     Thank you for choosing [facility] for your care. Our goal is always to provide you with excellent care. Hearing back from our patients is one way we can continue to improve our services. Please take a few minutes to complete the written survey that you may receive in the mail after you visit with us. Thank you!        Patient Information     Date Of Birth          1940        Designated Caregiver       Most Recent Value    Caregiver    Will someone help with your care after discharge? yes    Name of designated caregiver 24 hour PCA    Phone number of caregiver na    Caregiver address na      About your hospital stay     You were admitted on:  November 2, 2018 You last received care in the:  [address] [facility] [place]    You were discharged on:  November 22, 2018        Reason for your hospital stay       You was admitted for changing in your mental status. You was seen by neurology, the possible reason for changing in your mental status are seizure and dementia.                  Who to Call     For medical emergencies, please call 911.  For non-urgent questions about your medical care, please call your primary care provider or clinic, [phone]          Attending Provider     Provider Specialty    Aidan Fulton DO Emergency Medicine    Boby Tirado MD Internal Medicine    Jamie Gillespie MD Internal Medicine    Juan, Jonathon Hernández MD Internal Medicine    Dania Alcala MD Internal Medicine    Andie, Phi Mosqueda MD Internal Medicine       Primary Care Provider Office Phone # Fax #    Randy Fuentes [phone]       When to contact your care team       Call your primary doctor if you have any of the following: temperature greater than 100.3, increased shortness of breath, more secretion, seizure or other  concerns.                  After Care Instructions     Activity       Your activity upon discharge: bedrest            Condom catheter           Diet       Follow this diet upon discharge: Orders Placed This Encounter      Adult Formula Drip Feeding: Specified Time Suplena; Nasojejunal; Goal Rate: 50, CONTINUOUS; mL/hr; From: 6:00 AM; 6:00 AM; Medication - Tube Feeding Flush Frequency: At least 15-30 mL water before and after medication administration and with tube ...            Supplies       List the supplies the pt needs to go home  Oral care kit 3 per day            Tubes and drains       You are going home with the following tubes or drains: patino catheter.  Tube cares per hospital or home care instructions. [place].                  Follow-up Appointments     Adult [facility] Follow-up and recommended labs and tests       Follow up with primary care provider, Randy Fuentes, within 7 days or as needed to evaluate medication change.     Appointments on [place] and/or [facility] (with [facility] or [facility] provider or service). Call [phone] if you haven't heard regarding these appointments within 7 days of discharge.                  Your next 10 appointments already scheduled     Nov 27, 2018 12:30 PM CST   Return Visit with Ivelisse Nunez MD   [facility] ([facility])    [address] [place] [address]  [address] [facility] [address] [phone]              Additional Services     Home care nursing referral       [facility]  Phone  [phone]  Fax  [phone]    **RESUMPTION OF HOME CARE SERVICES**     RN skilled nursing visit.   RN to assess vital signs and weight, respiratory and cardiac status, pain level and activity tolerance, hydration, nutrition and bowel status   RN to complete home safety evaluation.  RN to complete weekly medication management and set-up, please involve family/primary caregiver in med education.  RN to provide lab  draws as needed and communicate results to PCP     Your provider has ordered home care nursing services.   If you have not been contacted within 2 days of your discharge please call            Home infusion referral       [facility]  Phone # [phone]  Fax # [phone]     Resumption of Home Enteral Therapy    Recommendations ordered by Registered Dietitian (RD):  Note pt was on this regimen prior to admission (not Nephro)  -Supena @  50 ml/hr (1200 ml/day) to provide 2160 kcals (30 kcal /kg), 56 g PRO (0.8 gm/kg/day), 876 ml free H2O, 242 gm CHO and 15 gm Fiber daily.    Agency Staff to assess nursing needs for Enteral Therapy.    Access Device Management:  Access Type: NJ  Routine site care (per agency protocol) to maintain access device? Yes                  Future tests that were ordered for you     AIR PRESSURE MATTRESS           SUCTION PUMP, HOME MODEL                 Pending Results     No orders found from 10/31/2018 to 11/3/2018.            Statement of Approval     Ordered          11/22/18 1131  I have reviewed and agree with all the recommendations and orders detailed in this document.  EFFECTIVE NOW     Approved and electronically signed by:  Phi Chaves MD           11/20/18 1348  I have reviewed and agree with all the recommendations and orders detailed in this document.  EFFECTIVE NOW     Approved and electronically signed by:  Tana Peoples MD             Admission Information     Date & Time Provider Department Dept. Phone    11/2/2018 Phi Chaves MD [address] [facility] [phone]      Your Vitals Were     Blood Pressure Pulse Temperature Respirations Weight Pulse Oximetry    166/74 (BP Location: Right arm) 76 96.5  F (35.8  C) (Axillary) 12 73 kg (160 lb 15 oz) 99%    BMI (Body Mass Index)                   23.09 kg/m2           MyChart Information     [employer] gives you secure access to your electronic health record. If you see a primary care  provider, you can also send messages to your care team and make appointments. If you have questions, please call your primary care clinic.  If you do not have a primary care provider, please call [phone] and they will assist you.        Care EveryWhere ID     This is your Care EveryWhere ID. This could be used by other organizations to access your [facility] medical records  [id number]        Equal Access to Services     EDUARDO PALACIOS : Hadii yefri ernandez hadanitao Sotruptiali, waaxda luqadaha, qaybta kaalmada adechuckyada, annmarie allenfredmaria l cline . So [facility] [phone].    ATENCIÓN: Si habla español, tiene a staples disposición servicios gratuitos de asistencia lingüística. Llame al [phone].    We comply with applicable federal civil rights laws and [place] laws. We do not discriminate on the basis of race, color, national origin, age, disability, sex, sexual orientation, or gender identity.               Review of your medicines      START taking        Dose / Directions    EXTENDED TERM ORAL CARE SYSTEM Kit   Used for:  Pneumonia of right middle lobe due to infectious organism (H), Seizure (H), Aspiration pneumonitis (H)        Dose:  1 kit   Take 1 kit by mouth daily   Quantity:  30 kit   Refills:  3       lacosamide 10 MG/ML Soln solution   Commonly known as:  VIMPAT   Used for:  Seizure (H)        Dose:  100 mg   Take 10 mLs (100 mg) by mouth 2 times daily   Quantity:  600 mL   Refills:  5       magic mouthwash suspension        Dose:  5-10 mL   Take 5-10 mLs by mouth every 6 hours as needed (thrush)   Quantity:  237 mL   Refills:  1       metolazone 5 MG tablet   Commonly known as:  ZAROXOLYN   Used for:  Generalized edema        Dose:  5 mg   Take 1 tablet (5 mg) by mouth daily   Quantity:  30 tablet   Refills:  0       order for DME   Used for:  Urinary incontinence without sensory awareness        Equipment being ordered: Other: condom catheter Treatment Diagnosis: incontinence at risk for skin  breakdown   Quantity:  3 Piece   Refills:  1       order for DME        Equipment being ordered: home suction kit   Quantity:  1 Device   Refills:  1       rifaximin 200 MG tablet   Commonly known as:  XIFAXAN   Indication:  Clostridium difficile Bacteria   Used for:  H/O Clostridium difficile infection        Dose:  400 mg   Take 2 tablets (400 mg) by mouth 3 times daily   Quantity:  30 tablet   Refills:  0       scopolamine 72 hr patch   Commonly known as:  TRANSDERM   Used for:  Aspiration pneumonitis (H)        Dose:  1 patch   Place 1 patch onto the skin every 72 hours   Quantity:  1 patch   Refills:  11         CONTINUE these medicines which may have CHANGED, or have new prescriptions. If we are uncertain of the size of tablets/capsules you have at home, strength may be listed as something that might have changed.        Dose / Directions    bumetanide 0.25 mg/mL Susp   Commonly known as:  BUMEX   This may have changed:  how much to take   Used for:  Kidney replaced by transplant        Dose:  2 mg   8 mLs (2 mg) by Oral or Feeding Tube route daily   Quantity:  240 mL   Refills:  0       levETIRAcetam 100 MG/ML solution   Commonly known as:  KEPPRA   This may have changed:  how much to take   Used for:  Seizure (H)        Dose:  500 mg   5 mLs (500 mg) by Per Feeding Tube route every 12 hours   Quantity:  450 mL   Refills:  0         CONTINUE these medicines which have NOT CHANGED        Dose / Directions    acetaminophen 32 mg/mL liquid   Commonly known as:  TYLENOL   Used for:  Kidney replaced by transplant, Aspiration pneumonia due to regurgitated food, unspecified laterality, unspecified part of lung (H)        Dose:  650 mg   20.3 mLs (650 mg) by Oral or Feeding Tube route every 4 hours as needed for mild pain or fever   Quantity:  300 mL   Refills:  3       alcohol swab prep pads   Used for:  Type 2 diabetes mellitus with complication, without long-term current use of insulin (H)        Use to swab area  of injection/mark anthony as directed.   Quantity:  100 each   Refills:  11       amLODIPine 1 mg/mL Susp   Commonly known as:  NORVASC   Used for:  Kidney replaced by transplant, Essential hypertension        Dose:  5 mg   5 mLs (5 mg) by Per Feeding Tube route daily   Quantity:  150 mL   Refills:  0       aspirin 10 mg/mL Susp   Used for:  Kidney replaced by transplant        Dose:  80 mg   8 mLs (80 mg) by Per Feeding Tube route daily   Quantity:  240 mL   Refills:  0       blood glucose lancets standard   Commonly known as:  no brand specified   Used for:  Type 2 diabetes mellitus with complication, without long-term current use of insulin (H)        Use to test blood sugar 6 times daily or as directed.   Quantity:  100 each   Refills:  11       blood glucose monitoring meter device kit   Commonly known as:  no brand specified   Used for:  Type 2 diabetes mellitus with complication, without long-term current use of insulin (H)        Use to test blood sugar every 4 hours while on Tube feeding.   Quantity:  1 kit   Refills:  0       blood glucose monitoring test strip   Commonly known as:  no brand specified   Used for:  Type 2 diabetes mellitus with complication, without long-term current use of insulin (H)        Use to test blood sugars 6 times daily or as directed   Quantity:  100 strip   Refills:  11       carvedilol 1 mg/mL Susp   Commonly known as:  COREG   Used for:  Kidney replaced by transplant, Essential hypertension        Dose:  6.25 mg   6.25 mLs (6.25 mg) by Oral or Feeding Tube route 2 times daily   Quantity:  375 mL   Refills:  0       cholecalciferol 400 UNIT/ML Liqd liquid   Commonly known as:  vitamin D/ D-VI-SOL   Used for:  Kidney replaced by transplant, On tube feeding diet        Dose:  400 Units   Take 1 mL (400 Units) by mouth daily   Quantity:  30 mL   Refills:  0       ferrous gluconate 324 (38 Fe) MG tablet   Commonly known as:  FERGON   Used for:  Kidney replaced by transplant        Dose:   648 mg   2 tablets (648 mg) by Per Feeding Tube route daily (with breakfast)   Quantity:  100 tablet   Refills:  0       fiber modular (NUTRISOURCE FIBER) packet   Used for:  Kidney replaced by transplant, On tube feeding diet        Dose:  1 packet   1 packet by Per Feeding Tube route 3 times daily   Quantity:  90 packet   Refills:  0       HYDROmorphone (STANDARD CONC) 1 MG/ML Liqd liquid   Commonly known as:  DILAUDID   Used for:  Kidney replaced by transplant        Dose:  1-2 mg   1-2 mLs (1-2 mg) by Oral or Feeding Tube route every 6 hours as needed for moderate to severe pain   Quantity:  80 mL   Refills:  0       insulin aspart 100 UNIT/ML injection   Commonly known as:  NovoLOG PEN   Used for:  Kidney replaced by transplant, On tube feeding diet, Type 2 diabetes mellitus with complication, without long-term current use of insulin (H)        Dose:  1-6 Units   Inject 1-6 Units Subcutaneous every 4 hours Correction Scale -MEDIUM INSULIN RESISTANCE,   Do Not give if BG less than 140. For  - 189 give 1 unit. For  - 239 give 2 units. For  - 289 give 3 units. For  - 339 give 4 units. For  - 399 give 5 units. For BG > 400 give 6 units. Check blood glucose Q4H  Notify provider if glucose> 350 mg/dL.   Quantity:  9 mL   Refills:  0       insulin pen needle 32G X 4 MM miscellaneous   Commonly known as:  BD SANDRA U/F   Used for:  Type 2 diabetes mellitus with complication, without long-term current use of insulin (H)        Use 6 (every 4 hours while on tube feeding) daily or as directed.   Quantity:  100 each   Refills:  11       lactobacillus rhamnosus (GG) capsule   Used for:  Kidney replaced by transplant, On tube feeding diet, H/O Clostridium difficile infection        Dose:  1 capsule   1 capsule by Per Feeding Tube route 2 times daily   Quantity:  60 capsule   Refills:  0       miconazole 2 % powder   Commonly known as:  MICATIN; MICRO GUARD   Used for:  Kidney replaced by  transplant        Apply topically 2 times daily   Quantity:  90 g   Refills:  3       multivitamins with minerals Liqd liquid   Used for:  Kidney replaced by transplant, On tube feeding diet        Dose:  15 mL   Take 15 mLs by mouth daily   Quantity:  450 mL   Refills:  0       ondansetron 4 MG ODT tab   Commonly known as:  ZOFRAN-ODT   Used for:  Kidney replaced by transplant        Dose:  4 mg   Take 1 tablet (4 mg) by mouth every 6 hours as needed for nausea or vomiting   Quantity:  30 tablet   Refills:  0       pantoprazole 2 mg/mL Susp suspension   Commonly known as:  PROTONIX   Used for:  Kidney replaced by transplant        Dose:  40 mg   20 mLs (40 mg) by Per Feeding Tube route 2 times daily (before meals)   Quantity:  1200 mL   Refills:  0       predniSONE 5 MG/5ML solution   Used for:  Kidney replaced by transplant        Dose:  5 mg   Take 5 mLs (5 mg) by mouth daily   Quantity:  150 mL   Refills:  0       Sharps Container Misc   Used for:  Type 2 diabetes mellitus with complication, without long-term current use of insulin (H)        Sharps container for diabetes needles.   Quantity:  1 each   Refills:  0       tacrolimus 1 mg/mL suspension   Commonly known as:  GENERIC EQUIVALENT   Used for:  Kidney replaced by transplant        Dose:  1.5 mg   1.5 mLs (1.5 mg) by Oral or Feeding Tube route 2 times daily   Quantity:  90 mL   Refills:  0       warfarin 2.5 MG tablet   Commonly known as:  COUMADIN   Used for:  Kidney replaced by transplant, Deep vein thrombosis (DVT) of both lower extremities, unspecified chronicity, unspecified vein (H)        Dose:  2.5 mg   1 tablet (2.5 mg) by Oral or Feeding Tube route daily   Quantity:  30 tablet   Refills:  0            Where to get your medicines      These medications were sent to [facility] - [place], [place] - [address]  [address], [facility] [address]     Phone:  [phone]     bumetanide 0.25 mg/mL Susp    fiber modular  (NUTRISOURCE FIBER) packet    levETIRAcetam 100 MG/ML solution    magic mouthwash suspension    metolazone 5 MG tablet    pantoprazole 2 mg/mL Susp suspension    rifaximin 200 MG tablet    scopolamine 72 hr patch         Some of these will need a paper prescription and others can be bought over the counter. Ask your nurse if you have questions.     Bring a paper prescription for each of these medications     EXTENDED TERM ORAL CARE SYSTEM Kit    lacosamide 10 MG/ML Soln solution    order for DME    order for DME                Protect others around you: Learn how to safely use, store and throw away your medicines at www.disposemymeds.org.        ANTIBIOTIC INSTRUCTION     You've Been Prescribed an Antibiotic - Now What?  Your healthcare team thinks that you or your loved one might have an infection. Some infections can be treated with antibiotics, which are powerful, life-saving drugs. Like all medications, antibiotics have side effects and should only be used when necessary. There are some important things you should know about your antibiotic treatment.      Your healthcare team may run tests before you start taking an antibiotic.    Your team may take samples (e.g., from your blood, urine or other areas) to run tests to look for bacteria. These test can be important to determine if you need an antibiotic at all and, if you do, which antibiotic will work best.      Within a few days, your healthcare team might change or even stop your antibiotic.    Your team may start you on an antibiotic while they are working to find out what is making you sick.    Your team might change your antibiotic because test results show that a different antibiotic would be better to treat your infection.    In some cases, once your team has more information, they learn that you do not need an antibiotic at all. They may find out that you don't have an infection, or that the antibiotic you're taking won't work against your infection.  For example, an infection caused by a virus can't be treated with antibiotics. Staying on an antibiotic when you don't need it is more likely to be harmful than helpful.      You may experience side effects from your antibiotic.    Like all medications, antibiotics have side effects. Some of these can be serious.    Let you healthcare team know if you have any known allergies when you are admitted to the hospital.    One significant side effect of nearly all antibiotics is the risk of severe and sometimes deadly diarrhea caused by Clostridium difficile (C. Difficile). This occurs when a person takes antibiotics because some good germs are destroyed. Antibiotic use allows C. diificile to take over, putting patients at high risk for this serious infection.    As a patient or caregiver, it is important to understand your or your loved one's antibiotic treatment. It is especially important for caregivers to speak up when patients can't speak for themselves. Here are some important questions to ask your healthcare team.    What infection is this antibiotic treating and how do you know I have that infection?    What side effects might occur from this antibiotic?    How long will I need to take this antibiotic?    Is it safe to take this antibiotic with other medications or supplements (e.g., vitamins) that I am taking?     Are there any special directions I need to know about taking this antibiotic? For example, should I take it with food?    How will I be monitored to know whether my infection is responding to the antibiotic?    What tests may help to make sure the right antibiotic is prescribed for me?      Information provided by:  www.cdc.gov/getsmart  U.S. Department of Health and Human Services  Centers for disease Control and Prevention  National Center for Emerging and Zoonotic Infectious Diseases  Division of Healthcare Quality Promotion             Medication List: This is a list of all your medications and when  to take them. Check marks below indicate your daily home schedule. Keep this list as a reference.      Medications           Morning Afternoon Evening Bedtime As Needed    acetaminophen 32 mg/mL liquid   Commonly known as:  TYLENOL   20.3 mLs (650 mg) by Oral or Feeding Tube route every 4 hours as needed for mild pain or fever   Last time this was given:  650 mg on 11/4/2018  6:27 AM                                alcohol swab prep pads   Use to swab area of injection/mark anthony as directed.                                amLODIPine 1 mg/mL Susp   Commonly known as:  NORVASC   5 mLs (5 mg) by Per Feeding Tube route daily                                aspirin 10 mg/mL Susp   8 mLs (80 mg) by Per Feeding Tube route daily   Last time this was given:  80 mg on 11/5/2018  8:43 AM                                blood glucose lancets standard   Commonly known as:  no brand specified   Use to test blood sugar 6 times daily or as directed.                                blood glucose monitoring meter device kit   Commonly known as:  no brand specified   Use to test blood sugar every 4 hours while on Tube feeding.                                blood glucose monitoring test strip   Commonly known as:  no brand specified   Use to test blood sugars 6 times daily or as directed                                bumetanide 0.25 mg/mL Susp   Commonly known as:  BUMEX   8 mLs (2 mg) by Oral or Feeding Tube route daily   Last time this was given:  2 mg on 11/22/2018  9:01 AM                                carvedilol 1 mg/mL Susp   Commonly known as:  COREG   6.25 mLs (6.25 mg) by Oral or Feeding Tube route 2 times daily   Last time this was given:  6.25 mg on 11/22/2018  9:00 AM                                cholecalciferol 400 UNIT/ML Liqd liquid   Commonly known as:  vitamin D/ D-VI-SOL   Take 1 mL (400 Units) by mouth daily   Last time this was given:  400 Units on 11/22/2018  9:00 AM                                EXTENDED TERM ORAL  CARE SYSTEM Kit   Take 1 kit by mouth daily                                ferrous gluconate 324 (38 Fe) MG tablet   Commonly known as:  FERGON   2 tablets (648 mg) by Per Feeding Tube route daily (with breakfast)                                fiber modular (NUTRISOURCE FIBER) packet   1 packet by Per Feeding Tube route 3 times daily                                HYDROmorphone (STANDARD CONC) 1 MG/ML Liqd liquid   Commonly known as:  DILAUDID   1-2 mLs (1-2 mg) by Oral or Feeding Tube route every 6 hours as needed for moderate to severe pain                                insulin aspart 100 UNIT/ML injection   Commonly known as:  NovoLOG PEN   Inject 1-6 Units Subcutaneous every 4 hours Correction Scale -MEDIUM INSULIN RESISTANCE,   Do Not give if BG less than 140. For  - 189 give 1 unit. For  - 239 give 2 units. For  - 289 give 3 units. For  - 339 give 4 units. For  - 399 give 5 units. For BG > 400 give 6 units. Check blood glucose Q4H  Notify provider if glucose> 350 mg/dL.   Last time this was given:  1 Units on 11/22/2018 12:39 PM                                insulin pen needle 32G X 4 MM miscellaneous   Commonly known as:  BD SANDRA U/F   Use 6 (every 4 hours while on tube feeding) daily or as directed.                                lacosamide 10 MG/ML Soln solution   Commonly known as:  VIMPAT   Take 10 mLs (100 mg) by mouth 2 times daily   Last time this was given:  100 mg on 11/22/2018  8:55 AM                                lactobacillus rhamnosus (GG) capsule   1 capsule by Per Feeding Tube route 2 times daily   Last time this was given:  1 capsule on 11/22/2018  9:01 AM                                levETIRAcetam 100 MG/ML solution   Commonly known as:  KEPPRA   5 mLs (500 mg) by Per Feeding Tube route every 12 hours   Last time this was given:  500 mg on 11/22/2018  8:59 AM                                magic mouthwash suspension   Take 5-10 mLs by mouth every 6 hours  as needed (thrush)                                metolazone 5 MG tablet   Commonly known as:  ZAROXOLYN   Take 1 tablet (5 mg) by mouth daily   Last time this was given:  5 mg on 11/22/2018  9:01 AM                                miconazole 2 % powder   Commonly known as:  MICATIN; MICRO GUARD   Apply topically 2 times daily   Last time this was given:  11/22/2018  8:55 AM                                multivitamins with minerals Liqd liquid   Take 15 mLs by mouth daily   Last time this was given:  15 mLs on 11/22/2018  8:56 AM                                ondansetron 4 MG ODT tab   Commonly known as:  ZOFRAN-ODT   Take 1 tablet (4 mg) by mouth every 6 hours as needed for nausea or vomiting                                order for DME   Equipment being ordered: Other: condom catheter Treatment Diagnosis: incontinence at risk for skin breakdown                                order for DME   Equipment being ordered: home suction kit                                pantoprazole 2 mg/mL Susp suspension   Commonly known as:  PROTONIX   20 mLs (40 mg) by Per Feeding Tube route 2 times daily (before meals)   Last time this was given:  40 mg on 11/22/2018  8:57 AM                                predniSONE 5 MG/5ML solution   Take 5 mLs (5 mg) by mouth daily   Last time this was given:  5 mg on 11/22/2018  8:59 AM                                rifaximin 200 MG tablet   Commonly known as:  XIFAXAN   Take 2 tablets (400 mg) by mouth 3 times daily   Last time this was given:  400 mg on 11/22/2018  8:59 AM                                scopolamine 72 hr patch   Commonly known as:  TRANSDERM   Place 1 patch onto the skin every 72 hours   Last time this was given:  1 patch on 11/22/2018 12:06 AM                                Sharps Container Misc   Sharps container for diabetes needles.                                tacrolimus 1 mg/mL suspension   Commonly known as:  GENERIC EQUIVALENT   1.5 mLs (1.5 mg) by Oral or Feeding  Tube route 2 times daily   Last time this was given:  1.5 mg on 11/22/2018  8:58 AM                                warfarin 2.5 MG tablet   Commonly known as:  COUMADIN   1 tablet (2.5 mg) by Oral or Feeding Tube route daily   Last time this was given:  4 mg on 11/21/2018  6:28 PM

## 2018-11-02 NOTE — PHARMACY-ANTICOAGULATION SERVICE
Clinical Pharmacy - Warfarin Dosing Consult     Pharmacy has been consulted to manage this patient s warfarin therapy.  Indication: DVT/PE Prophylaxis  Therapy Goal: INR 2-3  Warfarin Prior to Admission: Yes  Warfarin PTA Regimen: 2.5mg qday; last seen in anticoag clinic 10/30 was instructed to take 3.75mg 10/30, 2.5mg 10/31 and recheck INR 11/1 (does not appear to have been done)  Significant drug interactions: pantoprazole, prednisone, aspirin, heparin drip     INR   Date Value Ref Range Status   11/02/2018 1.24 (H) 0.86 - 1.14 Final   10/30/2018 2.0 (A) 0.8 - 1.2 Final       Recommend warfarin 2.5 mg today.  Patient's family that are present are unsure of last dose of warfarin; given pt is subtherapeutic it is save to give 2.5mg tonight even if pt received dose this AM.  Pharmacy will monitor Priscilla Way daily and order warfarin doses to achieve specified goal.      Please contact pharmacy as soon as possible if the warfarin needs to be held for a procedure or if the warfarin goals change.      Allie Sharma, PharmD  Pager: [phone]

## 2018-11-02 NOTE — LETTER
Transition Communication Hand-off for Care Transitions to Next Level of Care Provider    Name: Priscilla Way  : 1940  MRN #: [id number]  Primary Care Provider: Randy Fuentes     Primary Clinic: [address] [place] [address]  [facility] [id number]     Reason for Hospitalization:  Hyponatremia [E87.1]  Decreased level of consciousness [R40.4]  Pneumonia of right middle lobe due to infectious organism (H) [J18.1]    Admit Date/Time: 2018 11:00 AM  Discharge Date: 2018    Payor Source: Payor: [facility] / Plan: [employer] / Product Type: HMO /     Follow-up plan:  Future Appointments  Date Time Provider Department Center   2018 12:45 PM Ivelisse Nunez MD [facility]       Any outstanding tests or procedures:        Referrals     Future Labs/Procedures    Home care nursing referral     Comments:    [facility]  Phone  [phone]  Fax  [phone]    **RESUMPTION OF HOME CARE SERVICES**     RN skilled nursing visit.   RN to assess vital signs and weight, respiratory and cardiac status, pain level and activity tolerance, hydration, nutrition and bowel status   RN to complete home safety evaluation.  RN to complete weekly medication management and set-up, please involve family/primary caregiver in med education.  RN to provide lab draws as needed and communicate results to PCP     Your provider has ordered home care nursing services.   If you have not been contacted within 2 days of your discharge please call    Home infusion referral     Comments:    [facility]  Phone # [phone]  Fax # [phone]     Resumption of Home Enteral Therapy    Recommendations ordered by Registered Dietitian (RD):  --Changed TF to Suplena @ 45 ml/hr (renal formula for renal patient, lower protein, low lytes)   Dosing wt: 72 kg driest wt this admit  Regimen: Suplena @ goal 45 ml/hr (1080 ml/day) to provide 1944 kcals (27 kcal/kg/day), 49 gm PRO (0.7 gm/kg/day), 788  ml free H2O, 212 gm CHO and 14 gm Fiber daily.    Agency Staff to assess nursing needs for Enteral Therapy.    Access Device Management:  Access Type: NJ  Routine site care (per agency protocol) to maintain access device? Yes        Supplies     Future Labs/Procedures    AIR PRESSURE MATTRESS     SUCTION PUMP, HOME MODEL           Key Recommendations:  Please review AVS    Radha Ahuja RN, BSN, PHN  Medicine Care Coordinator  [facility]  Desk Phone: [phone]  Pager: [phone]    AVS/Discharge Summary is the source of truth; this is a helpful guide for improved communication of patient story

## 2018-11-02 NOTE — TELEPHONE ENCOUNTER
[facility]: Nurse Triage Note  SITUATION/BACKGROUND                                                      Priscilla Way is a 78 year old male who's home care nurse  calls with description of respitory distress - probabable aspiration of emesis.    Description:  gurglly respirations with rattling in the chest  Onset/duration:  8:30 pm last night (11-1-18)  Precip. factors:  Copious emesis after tube feeding stopped - probable aspiration  Associated symptoms:  NA  Improves/worsens symptoms:  nothing  Pain scale (0-10)   NA    MEDICATIONS:   Taking medication(s) as prescribed? N/A  Taking over the counter medication(s?) N/A  Any barriers to taking medication(s) as prescribed?  N/A  Medication(s) improving/managing symptoms?  N/A    Allergies: No Known Allergies    ASSESSMENT      Home care nurse calls stating that the daughter May called this morning wanting an additional nurse to come to the home due to patients condition of difficult gurglly respirations and rattling in chest. This began after the stopped the tube feeding at 8 PM last night and patient had a very copious emesis.    Home Care nurse has instructed the daughter that a home care nurse is not what they need and to call 911. She will call them back in a bit to be sure this was done.    NOTE;  The daughter is requesting orders for 3 things:  1. Home Suction Machine  2. Adult Diapers with tabs  3. Chux    The order needs to go to the home medical supply [employer] ph: [phone]        RECOMMENDATION/PLAN                                                      RECOMMENDED DISPOSITION:  Message relayed to clinic nurse.  Will comply with recommendation: N/A    If further questions/concerns or if symptoms do not improve, worsen or new symptoms develop, call your PCP or [phone] to talk with the Resident on call, as soon as possible.    Guideline used: No protocol necessary - information qabout care relayed to  team.    Telephone Triage Protocols for Nurses, Fifth Edition, Julie Briggs Kathleen M. Doege, RN

## 2018-11-03 NOTE — PLAN OF CARE
Problem: Patient Care Overview  Goal: Plan of Care/Patient Progress Review  Outcome: No Change  Time  0593-9310    Pt disoriented, non-verbal. Vital signs stable except mild HTN on RA. Full lift, requires turns q2, incontinent of watery loose stool three times this shift. Enteric for c.diff. Condom cath in place, making normal amounts of urine. Barrier cream applied to perianal area. New dressings placed on skin tears on bilateral legs. Mild edema in bilat LE. Continues on IV bumex. EEG monitoring initiated.  Non-productive cough present occassionally. RVP panel negative. Zosyn discontinued. Chest xray showed aspiration vs. pleural effusion. Hep10A therapeutic this morning, infusing @ 800 unit(s)/hr.Cycled TFs to be started tonight @ 1800 . Will continue to monitor and follow POC.

## 2018-11-03 NOTE — PHARMACY-CONSULT NOTE
Pharmacy Tube Feeding Consult    Medication reviewed for administration by feeding tube and for potential food/drug interactions.    Recommendation: No changes are needed at this time.     Pharmacy will continue to follow as new medications are ordered.  Dayron HoltD, [facility]    [phone]  Pager [phone]

## 2018-11-03 NOTE — PROGRESS NOTES
"CLINICAL NUTRITION SERVICES - ASSESSMENT NOTE     Nutrition Prescription    RECOMMENDATIONS FOR MDs/PROVIDERS TO ORDER:  - Recommend long-term GJ-tube placement as pt with nasoenteric access x 4 weeks.  - Follow fluid/sodium status, adjust free water flushes as indicated     Malnutrition Status:    Severe malnutrition in the context of chronic illness     Recommendations already ordered by Registered Dietitian (RD):  - Resume home TF regimen of @ 16:00 w/ Nepro @ 25 ml/hr, adv by 25 ml q4h to goal of  75 ml/hr x 14 hrs (6pm-8am).   - Ordered patency flushes of 60 ml free water before and after cycle.    - Discontinued nutrisource fiber modulars d/t C. Diff (+) on 11/2     Future/Additional Recommendations:  - Efficacy of resuming fiber modulars pending C. Diff status        REASON FOR ASSESSMENT  Priscilla Way is a 78 year old male assessed by the dietitian for Admission Nutrition Risk Screen for tube feeding or parenteral nutrition and Provider Order - Registered Dietitian to Assess and Order TF per Medical Nutrition Therapy Protocol    NUTRITION HISTORY  Pt known to nutrition service with recent admission to [facility] (9/11/18 - 10/29/18). Dysphagia s/p NJT (placed 10/1, then replaced 10/24) for enteral nutrition support. Though SLP recommended NPO, per MD \"If family wants to feed pt despite the known risks, then they can try a DD1 diet with nectar thick liquids.\"    Cycled TF regimen: Nepro @ 75 ml/hr x 14 hrs (6pm - 8am) to provide 1050 ml/daym 1890 kcal/day (24 kcal/kg), 85 g PRO (1.1 g/kg), 169 CHO, 767 ml H2O, 13 g Fiber daily.   Free water: Via feed and flush @ 55 ml/hr x 14 hrs + 120 ml free water before and after each cycle.     CURRENT NUTRITION ORDERS  Diet: NPO (r/t history of unsafe swallow)   Nutrition support: None ordered.     LABS  Na 125 (L) --> question if r/t excessive water intake vs setting of LILIAM on CKD   (H)   Cr 1.94 (H)     C. Diff (+) on 11/2    MEDICATIONS  Certavite   Vitamin D3, " "liquid (400 units)   Iron, syrup (60 mg elemental iron)   Nutrisource fiber (1 pkt TID)   Culturell (1 capsule BID)     ANTHROPOMETRICS  Height: 0 cm (Data Unavailable) 5' 10\"  Most Recent Weight: 80.4 kg (177 lb 4 oz)    IBW: 75.5 kg  BMI: Overweight BMI 25-29.9   Weight History: Difficult to evaluate weight trends, suspect r/t fluctuations in fluid status. Ranging ~155-175 lbs over the past 8 months.   Wt Readings from Last 10 Encounters:   11/02/18 80.4 kg (177 lb 4 oz)   10/29/18 70.8 kg (156 lb 1.6 oz)   06/06/18 77.9 kg (171 lb 12.8 oz)   03/24/18 69.9 kg (154 lb 1.6 oz)   11/11/16 83.9 kg (185 lb)   09/12/16 76.7 kg (169 lb 3.2 oz)   08/31/16 76.7 kg (169 lb 1.6 oz)   07/24/15 77.1 kg (170 lb)   10/03/14 69.3 kg (152 lb 11.2 oz)   07/25/14 71.9 kg (158 lb 9.6 oz)         Dosing Weight: 80 kg (actual, based on admission wt of 80.4 kg on 11/2)     ASSESSED NUTRITION NEEDS  Estimated Energy Needs: 2000 - 2400 kcals/day (25 - 30 kcals/kg)  Justification: Maintenance  Estimated Protein Needs: 80 - 96 grams protein/day (1 - 1.2 grams of pro/kg)  Justification: Maintenance  Estimated Fluid Needs: 1 mL/kcal  Justification: Maintenance     PHYSICAL FINDINGS  See malnutrition section below.  Stuart score 11     MALNUTRITION  % Intake: No decreased intake noted  % Weight Loss: Unable to assess d/t changed in fluid status   Subcutaneous Fat Loss: Thoracic/intercostal, upper arm Moderate  Muscle Loss: Scapular bone, Thoracic region (clavicle, acromium bone, deltoid, trapezius, pectoral, Upper arm (bicep, tricep): moderate-severe, Lower arm  (forearm), Patellar region: moderate and Posterior calf: moderate-severe  Fluid Accumulation/Edema: None noted  Malnutrition Diagnosis: Severe malnutrition in the context of chronic illness     NUTRITION DIAGNOSIS  Inadequate protein-energy intake related to NPO status secondary to dysphagia, reliant on nutrition support to meet entire nutrition needs as evidenced by no nutrition " support yet ordered, currently meeting 0% needs.       INTERVENTIONS  Implementation  Nutrition Education: Unable to complete due to pt asleep and writer unable to wake with voice, no family present in room during time of assessment.    Collaboration with other providers - Deferred fluid flush management to [facility] team   Enteral Nutrition - Initiate     Goals  Total avg nutritional intake to meet a minimum of 25 kcal/kg and 1 g PRO/kg daily (per dosing wt 80 kg).     Monitoring/Evaluation  Progress toward goals will be monitored and evaluated per protocol.    Mica Salomon, CEM, LD   Weekday Pager: [phone]  Weekend Pager: [phone]

## 2018-11-03 NOTE — PLAN OF CARE
Problem: Patient Care Overview  Goal: Plan of Care/Patient Progress Review  Outcome: No Change  Vital signs stable on RA. Tq2h. Incontinent of urine x1. No BM. Mitts on due to pulling at NG. Heparin gtt infusing @950. Next 10a at 0030. Blood sugar 127. RSV, influenza a/b, and MRSA swabs sent. Still need urine and stool sample. Will continue to monitor.

## 2018-11-03 NOTE — PROGRESS NOTES
INTERNAL MEDICINE PROGRESS NOTE    Priscilla Way ([id number]) admitted on 11/2/2018 11/03/2018    Assessment & Plan:  79 y/o male with PMHx significant for vascular dementia, CVA multiple sites, DVT on warfarin, HCV cirrhosis s/p liver transplant 2000, HCV ESRD s/p kidney transplant 2000, CKD III of transplanted kidney, HFpEF, recurrent C diff infection, malnutrition on tube feeds and recent seizure disorder/status epilepticus was admitted to the hospital because of worsening altered mental status and shortness of breath.     #Acute hypoxic respiratory failure  #Volume overload  #HFpEF (LVEF 55-60%)  According to one the patient's care taker, patient was noted to be coughing more and sounded productive. Due to language barrier unclear if symptoms of viral URI and sick contacts. On exam with coarse lung sounds, elevated JVP ~ 15cm and pitting edema on both legs. This favors a component of both aspiration and volume overload. CXR had signs of pulmonary edema. Repeat CXR today demonstrated improvement after some diuresis. His last echo about a month ago had normal EF but with diastolic dysfunction. He is hypoalbuminemic therefore more risk of third spacing. - Strict I/Os  - Bumex 2mg IV once  - Net goal -1 to -2L  - BMP BID  - Daily weights  - Continue home coreg  - Not on lisinopril due to renal dysfunction  - Wean O2 as able     #Cough  #Aspiration pneumonia vs pneumonitis   CXR also with show evidence of increased pulmonary congestions suggestive of pulmonary edema and also possible aspiration. He has had other hospitalizations because of aspiration in the setting of TF's and altered mentation. Viral URI panel and influenza were negative. MRSA nares negative. Urine strep and pneumo antigen negative.  Procal not elevated.  Patient not needing supplemental O2, zosyn was discontinued.  - Sputum culture and smear, will need induced cough pending  - DuoNeb  - Blood cultures pending  - Low threshold to start  abx    Zosyn (11/02/18 - 11/03/18)    #Diarrhea  #Cdiff  Patient had had c diff in previous admissions, 03/2018, 05/2018, 06/2018, 09/2018 and now as well. Report from care taker was that patient with increasing stool output. He has been treated with vancomycin long term.  - Start 10 day course of vanc 125mg PO QID  - Then will do 20 days of rifaximin 400 mg PO TID    #Acute toxic encephalopathy  #Uremia  #Vascular dementia   He is non-verbal. Head imaging in the past has shown chronic advanced small vessel disease that is diffuse and atherosclerotic plaques with stenosis along vertebral arteries and MCA. His mentation mainly affected by vascular dementia. GCS today was 6-7. Per previous notes, he can be aroused by verbal stimuli. Current encephalopathy multifactorial by ongoing evolution of cerebrovascular disease vs metabolic (uremia, hyponatremia) vs sepsis vs seizures. TSH and NH3 normal. Furthermore, CT scan on 11/02/18 did not reveal any gross intracranial pathology. Unable to protect airway but he is DNR/DNI.  - Neuro checks  - UA pending  - EEG ordered     #Hyponatremia, hypervolemic  Patient with signs of hypervolemia. Na on last admission was 130, now 121. This could be also affecting mentation. Low sodium could also have contribution from renal losses due to LILIAM. Serum osmolality normal due to elevated BUN.  - Na TID  - Diuresis as above     #HCV ESRD s/p transplant 2000  #LILIAM on CKD III of transplanted kidney  #?Renal vein thrombosis  Patient at one point was to be on dialysis of his transplanted kidney but renal function recovered. Baseline Cr ~ 1.2-1.5.  Now trending down with diuresis. Today was 2.0. Causes could be intrinsic injury from sepsis or UTI or tacro vs cardiorenal type 1. US of the transplanted kidney showing increase in resistive indices suggestive for renal vein thrombus vs LILIAM vs cardiorenal. Tacro level at goal (4-6).   - Discussed with transplant nephrology, will repeat US once LILIAM  improves; no need for interventions at this moment  - Continue tacro and prednisone  - UA/UC     #HCV ESLD s/p transplant 2000  LFTs normal. No evidence of rejection or decompensation.  - Continue tacro and prednisone as above     #Chronic iron deficiency anemia  Hemoglobin stable. There was some concern about slow GI bleed in previous admissions. Patient also with CKD driving down Hgb production. No evidence of melena for now.   - Continue home PPI  - Continue home iron, which might confound appearance of stool     #Hx of DVT on warfarin  DVT bilaterally on US in 2016. INR subtherapeutic.   - Heparin bridge  - INR daily  - Pharmacy to dose warfarin     #Recent diagnosis of seizure disorder with status epilepticus  Was on EEG monitor during last admission and had readings consistent with non-convulsive status epilepticus.   - Continue home dose of keppra  - Keppra in AM     #Severe malnutrition on TF's  - TF consult placed  - Refeeding syndrome and nutrition labs per dietician     #DM2  - Continue home insulin regimen  Patient evaluated and discussed with Dr. Gillespie.      Code Status: DNR/DNI  FEN: TF's  PPx: DVT on warfarin, GI PPI PO  Dispo: pending improvement in mentation     Ramos Mcnair MD PhD  Internal Medicine, PGY-3  Pager: [phone]    Patient was seen and discussed with Dr. Gillespie.     ==================================================================    Interval HistorySubjective:  No overnight events, patient not verbal. Has had loose bowel movements. Per daughter his mentation is not back to its baseline.     Objective:  Most recent vital signs:  /77 (BP Location: Left arm)  Pulse 73  Temp 96.1  F (35.6  C) (Axillary)  Resp 16  Wt 80.4 kg (177 lb 4 oz)  SpO2 100%  BMI 25.43 kg/m2  Temp:  [95.9  F (35.5  C)-97.3  F (36.3  C)] 96.1  F (35.6  C)  Pulse:  [73-81] 73  Resp:  [16-18] 16  BP: (123-165)/(44-77) 165/77  SpO2:  [98 %-100 %] 100 %  Wt Readings from Last 2 Encounters:   11/02/18  80.4 kg (177 lb 4 oz)   10/29/18 70.8 kg (156 lb 1.6 oz)     Intake/Output Summary (Last 24 hours) at 11/03/18 1259  Last data filed at 11/03/18 1100   Gross per 24 hour   Intake              240 ml   Output              950 ml   Net             -710 ml     Physical exam:  General: lethargic, non-verbal, appears more awake today  HEENT: PERRLA, EOMI, anicteric sclera, evidence of cataracts  Neck:  JVP ~15cm  CV: RRR, no murmur, no rubs, no gallops  Resp: Coarse bilateral crackles and ronchi  Abdomen: non tender, non distended, no organomegaly, +bs  Extremities: bilateral pitting edema to the knees, WWP  Skin: no rash, not jaundice  Psych: unable to assess due to non-responsive  Neuro: GCS 6-7    Labs:  Results for orders placed or performed during the hospital encounter of 11/02/18 (from the past 24 hour(s))   Blood culture   Result Value Ref Range    Specimen Description Blood Right Arm     Special Requests Received in aerobic bottle only     Culture Micro No growth after 15 hours    CT Head w/o Contrast    Narrative    CT HEAD W/O CONTRAST 11/2/2018 1:09 PM    Provided History: Decreased LOC;   ICD-10: Decreased level of consciousness    Comparison: CT head 9/11/2018.    Technique: Using multidetector thin collimation helical acquisition  technique, axial, coronal and sagittal CT images from the skull base  to the vertex were obtained without intravenous contrast.     Findings:    No intracranial hemorrhage, mass effect, or midline shift. The  ventricles are proportionate to the cerebral sulci. There is moderate  periventricular and subcortical hypoattenuation, which likely  represents chronic small vessel ischemic disease in a patient this  age. Chronic appearing lacunar infarction within the right thalamus.  Moderate cerebral atrophy. The gray to white matter differentiation of  the cerebral hemispheres is preserved. The basal cisterns are patent.    The visualized paranasal sinuses are clear. Left mastoid  effusion.  A  nasoenteric tube is partially visualized.       Impression    Impression:   1. No acute intracranial pathology.  2. Moderate Leukoaraiosis and cerebral atrophy.  3. Left mastoiditis.   CBC with platelets   Result Value Ref Range    WBC 6.9 4.0 - 11.0 10e9/L    RBC Count 2.90 (L) 4.4 - 5.9 10e12/L    Hemoglobin 8.9 (L) 13.3 - 17.7 g/dL    Hematocrit 27.8 (L) 40.0 - 53.0 %    MCV 96 78 - 100 fl    MCH 30.7 26.5 - 33.0 pg    MCHC 32.0 31.5 - 36.5 g/dL    RDW 16.5 (H) 10.0 - 15.0 %    Platelet Count 423 150 - 450 10e9/L   Methicillin Resistant Staph Aureus PCR   Result Value Ref Range    Specimen Description Nares     Methicillin Resist/Sens S. aureus PCR Negative NEG^Negative   Influenza A and B and RSV PCR   Result Value Ref Range    Specimen Description Nasal     Influenza A PCR Negative NEG^Negative    Influenza B PCR Negative NEG^Negative    Resp Syncytial Virus Negative NEG^Negative   Glucose by meter   Result Value Ref Range    Glucose 127 (H) 70 - 99 mg/dL   Sodium   Result Value Ref Range    Sodium 123 (L) 133 - 144 mmol/L   US Renal Transplant    Narrative    EXAMINATION: US RENAL TRANSPLANT,  11/2/2018 7:21 PM     COMPARISON: 9/14/2018 transplant ultrasound.    HISTORY: please evaluate for renal disease in transplanted patient.;     TECHNIQUE:  Grey-scale, color Doppler and spectral flow analysis.    FINDINGS:  The transplant kidney is located right lower quadrant, and measures  10.2. Parenchyma is of normal thickness and echogenicity. No focal  lesions. No hydronephrosis. No perinephric fluid collection.    Renal artery flow:   54 cm/sec peak systolic at hilum.  85 peak systolic at anastomosis.  Arcuate artery resistive indices (upper to lower): 1.0, 1.0, 1.0  diffusely 0.6-0.7.    Renal Vein Flow:  17at hilum.   25 at anastomosis.    Iliac artery flow:  88 peak systolic above anastomosis.  113 peak systolic below anastomosis.    Iliac vein flow:  Patent above and below the anastomosis.       Impression    IMPRESSION:   1. Normal grayscale appearance of the transplant kidney ultrasound. No  hydronephrosis or hydroureter.  2. Patent transplant Doppler evaluation.  3. Renal vein thrombosis should be a consideration. Significant  increase in the patient's resistive indices that are now 1.0. Although  this can be seen in renal vein thrombosis the kidney has not increased  in size and by color Doppler the renal vein is patent. Can also be  seen in other causes of severe acute renal injury.    I have personally reviewed the examination and initial interpretation  and I agree with the findings.    MAXIM COREY MD   Sodium   Result Value Ref Range    Sodium 122 (L) 133 - 144 mmol/L   Blood gas venous   Result Value Ref Range    Ph Venous 7.40 7.32 - 7.43 pH    PCO2 Venous 47 40 - 50 mm Hg    PO2 Venous 31 25 - 47 mm Hg    Bicarbonate Venous 30 (H) 21 - 28 mmol/L    Base Excess Venous 4.2 mmol/L    FIO2 21.0    Creatinine serum   Result Value Ref Range    Creatinine 1.97 (H) 0.66 - 1.25 mg/dL   UA with Microscopic   Result Value Ref Range    Color Urine Light Yellow     Appearance Urine Clear     Glucose Urine Negative NEG^Negative mg/dL    Bilirubin Urine Negative NEG^Negative    Ketones Urine Negative NEG^Negative mg/dL    Specific Gravity Urine 1.006 1.003 - 1.035    Blood Urine Trace (A) NEG^Negative    pH Urine 6.5 5.0 - 7.0 pH    Protein Albumin Urine Negative NEG^Negative mg/dL    Urobilinogen mg/dL Normal 0.0 - 2.0 mg/dL    Nitrite Urine Negative NEG^Negative    Leukocyte Esterase Urine Moderate (A) NEG^Negative    Source Catheterized Urine     WBC Urine 7 (H) 0 - 5 /HPF    RBC Urine 1 0 - 2 /HPF    Bacteria Urine Few (A) NEG^Negative /HPF    Squamous Epithelial /HPF Urine 1 0 - 1 /HPF    Transitional Epi <1 0 - 1 /HPF    Mucous Urine Present (A) NEG^Negative /LPF   Legionella pneumonia antigen urine   Result Value Ref Range    Specimen Description Catheterized Urine     L Pneumo Urine Antigen        Presumptive negative for Legionella pneumophilia serogroup 1 antigen in urine, suggesting   no recent or current infection.  Infection due to Legionella cannot be ruled out, since   other serogroups and species may cause disease, antigen may not be present in urine in   early infection, and the level of antigen present in the urine may be below detectable   limits of the test.     Enteric Bacteria and Virus Panel by LUKE Stool   Result Value Ref Range    Campylobacter group by LUKE Not Detected NDET^Not Detected    Salmonella species by LUKE Not Detected NDET^Not Detected    Shigella species by LUKE Not Detected NDET^Not Detected    Vibrio group by LUKE Not Detected NDET^Not Detected    Rotavirus A by LUKE Not Detected NDET^Not Detected    Shiga toxin 1 gene by LUKE Not Detected NDET^Not Detected    Shiga toxin 2 gene by LUKE Not Detected NDET^Not Detected    Norovirus I and II by LUKE Not Detected NDET^Not Detected    Yersinia enterocolitica by LUKE Not Detected NDET^Not Detected    Enteric pathogen comment       Testing performed by multiplexed, qualitative PCR using the [employer] SCVNGRigene Enteric   Pathogens Nucleic Acid Test. Results should not be used as the sole basis for diagnosis,   treatment, or other patient management decisions.     Clostridium difficile toxin B PCR   Result Value Ref Range    Specimen Description Feces     C Diff Toxin B PCR Positive (A) NEG^Negative   Fractional excretion of sodium   Result Value Ref Range    Creatinine Urine 17 mg/dL    Sodium Urine mmol/L 54 mmol/L    %FENA 5.1 %   Glucose by meter   Result Value Ref Range    Glucose 164 (H) 70 - 99 mg/dL   Heparin 10a Level   Result Value Ref Range    Heparin 10A Level 0.43 IU/mL   Glucose by meter   Result Value Ref Range    Glucose 149 (H) 70 - 99 mg/dL   Heparin Xa (10a) Level   Result Value Ref Range    Heparin 10A Level 0.24 IU/mL   Tacrolimus level   Result Value Ref Range    Tacrolimus Last Dose Not Provided     Tacrolimus Level 5.3  5.0 - 15.0 ug/L   INR   Result Value Ref Range    INR 1.54 (H) 0.86 - 1.14   CBC with platelets   Result Value Ref Range    WBC 5.4 4.0 - 11.0 10e9/L    RBC Count 2.72 (L) 4.4 - 5.9 10e12/L    Hemoglobin 8.3 (L) 13.3 - 17.7 g/dL    Hematocrit 25.9 (L) 40.0 - 53.0 %    MCV 95 78 - 100 fl    MCH 30.5 26.5 - 33.0 pg    MCHC 32.0 31.5 - 36.5 g/dL    RDW 16.3 (H) 10.0 - 15.0 %    Platelet Count 480 (H) 150 - 450 10e9/L   Basic metabolic panel   Result Value Ref Range    Sodium 125 (L) 133 - 144 mmol/L    Potassium 3.7 3.4 - 5.3 mmol/L    Chloride 86 (L) 94 - 109 mmol/L    Carbon Dioxide 26 20 - 32 mmol/L    Anion Gap 14 3 - 14 mmol/L    Glucose 137 (H) 70 - 99 mg/dL    Urea Nitrogen 120 (H) 7 - 30 mg/dL    Creatinine 1.94 (H) 0.66 - 1.25 mg/dL    GFR Estimate 33 (L) >60 mL/min/1.7m2    GFR Estimate If Black 41 (L) >60 mL/min/1.7m2    Calcium 8.6 8.5 - 10.1 mg/dL   Magnesium   Result Value Ref Range    Magnesium 2.2 1.6 - 2.3 mg/dL   XR Chest Port 1 View    Narrative    Exam: XR CHEST PORT 1 VW, 11/3/2018 8:38 AM    Indication: evolution of possible aspiration pneumonia and pulmonary  infiltrates;     Comparison: 11/2/2018    Findings:   Single portable view of the chest. Enteric tube projecting over the  esophagus and stomach. The cardiac mediastinum and upper abdomen are  unchanged. There is decreased hazy opacities of the bilateral lungs.  Mixed interstitial/airspace opacities. No pleural effusion. No  pneumothorax.      Impression    Impression: Decreased hazy opacities of the lungs are otherwise  persistent mixed interstitial airspace opacities represent interval  improvement of pulmonary edema and/or infection.    I have personally reviewed the examination and initial interpretation  and I agree with the findings.    PATRIA GREY MD     *Note: Due to a large number of results and/or encounters for the requested time period, some results have not been displayed. A complete set of results can be found in Results  Review.

## 2018-11-03 NOTE — PROGRESS NOTES
Preliminary Video EEG impression on November 3, 2018  for the first 60 minutes.  Full report to follow. Please look in inpatient chart, under procedure section.     This video EEG is abnormal due to the presences of continuous generalized polymorphic delta/theta slowing with 1-2 seconds of EEG attentuation. There is no clear parieto-occipital rhythm or well formed sleep architecture. This EEG is consistent with a severe diffuse encephalopathy, he is not on sedative medications to account for this degree of slowing. No epileptiform discharges or electrographic seizures were seen in this record. If events of interest are noted, please press the event button. Clinical correlation is advised.     Tennille Lam MD  Staff Epilepsy Neurologist   [phone]

## 2018-11-03 NOTE — PLAN OF CARE
Problem: Patient Care Overview  Goal: Plan of Care/Patient Progress Review  Outcome: No Change  VSS on RA. Pt nonverbal at baseline; unable to assess mentation. Pt's NG tube clamped and used for meds. Incontinent of stool and has a condom catheter on to measure urine output. Reposition Q2 hours with pillows. Barrier cream on bottom for areas of blanchable redness, PIV infusing heparin drip at  800ml/hrl; 10A level 0.24- no change in rate needed. Enteric precautions for positive c.diff. Droplet precautions maintained respiratory panel that is still processing. MRSA negative. Pt has mitts on both hands as he was pulling at lines and tubes. Blood sugars checked Q4 hours and followed sliding scale insulin accordingly. Continue to monitor and follow POC.

## 2018-11-03 NOTE — PLAN OF CARE
Problem: Patient Care Overview  Goal: Plan of Care/Patient Progress Review  Outcome: Improving  S: Pt was admitted for SOB and decreased LOC.  Patient discharge four days prior to admission and previously had a long-term stay.  Patient is unable to communicate and has a language barrier.      O:  Patient appears lethargic and slept through most of the shift.  The patient would wake up and look around spontaneously during cares and would become combative during some cares.    A: VSS.  Patient had a chest x-ray.  Pt had three liquid diarrheal BM on this shift.  Patient was rotated q2h and had BG and VS q4h. Patient had oral care and new linens (and gowned).  Patient continues on enteric precautions due to C. Diff. Patient has mitts on the hands to prevent picking at NG tube and PIV.  Patient continued heparin drip throughout the shift. The patient has dry, cracked skin and edema 1+ in the lower legs and feet. Currently, patient currently in EEG.    P: The plan for discharge is unknown at this time.  Doctor stated focus of treatment will be on treating the C. Diff.     Student Nurse,   Lana Sanchez

## 2018-11-03 NOTE — MR AVS SNAPSHOT
After Visit Summary   11/3/2018    Priscilla Way    MRN: [id number]           Patient Information     Date Of Birth          1940        Visit Information        Provider Department      11/3/2018 11:00 AM [facility] EEG TECH 4 [facility]        Today's Diagnoses     Seizure (H)    -  1       Follow-ups after your visit        Your next 10 appointments already scheduled     Nov 04, 2018  7:00 AM CST   Video Hookup Visit with [facility] EEG TECH 4   [facility] ([facility])    [facility]  [address] [id number] [phone]           [place]: Your appointment is scheduled at [facility]. [address] [place] [address]              Future tests that were ordered for you today     Open Standing Orders        Priority Remaining Interval Expires Ordered    Daily weights Routine 1/1 DAILY  11/3/2018    Phosphorus Routine 2/2 DAILY  11/3/2018    Magnesium Routine 2/2 DAILY  11/3/2018    RCAT re-assessment (for RT use only) Routine 6/6 EVERY 72 HOURS  11/3/2018    Flutter Valve (Acapella) Routine 60/63 4 TIMES DAILY RT  11/3/2018    Basic metabolic panel Timed 75/77 EVERY 12 HOURS  11/2/2018    Magnesium (AM Draw) Timed 75/77 EVERY 12 HOURS  11/2/2018    INR Routine 15/16 DAILY  11/2/2018    CBC with platelets Routine 76/77 AM DRAW  11/2/2018    Glucose monitor nursing POCT Routine [id number] PRN  11/2/2018    Glucose monitor nursing POCT Routine [id number] PRN  11/2/2018    Heparin Xa (10a) Level STAT 1/1 CONDITIONAL X 1 STAT  11/2/2018    CBC with platelets Routine 5/5 EVERY THREE DAYS  11/2/2018    Heparin Xa (10a) Level Routine 15/16 DAILY  11/2/2018    Activity: Up with assist Routine [id number] PRN  11/2/2018    Oxygen: Nasal cannula, Oxygen mask Routine [id number] CONTINUOUS  11/2/2018            Who to contact     Please call your clinic at [phone] to:    Ask questions about your health    Make or cancel  appointments    Discuss your medicines    Learn about your test results    Speak to your doctor            Additional Information About Your Visit        [employer] Information     [employer] gives you secure access to your electronic health record. If you see a primary care provider, you can also send messages to your care team and make appointments. If you have questions, please call your primary care clinic.  If you do not have a primary care provider, please call [phone] and they will assist you.      [employer] is an electronic gateway that provides easy, online access to your medical records. With [employer], you can request a clinic appointment, read your test results, renew a prescription or communicate with your care team.     To access your existing account, please contact your [facility] or call [phone] for assistance.        Care EveryWhere ID     This is your Care EveryWhere ID. This could be used by other organizations to access your [facility] medical records  [id number]         Blood Pressure from Last 3 Encounters:   11/03/18 146/60   10/29/18 142/54   06/07/18 135/60    Weight from Last 3 Encounters:   11/02/18 80.4 kg (177 lb 4 oz)   10/29/18 70.8 kg (156 lb 1.6 oz)   06/06/18 77.9 kg (171 lb 12.8 oz)              We Performed the Following     Glucose by meter          Today's Medication Changes      Notice     This visit is during an admission. Changes to the med list made in this visit will be reflected in the After Visit Summary of the admission.             Primary Care Provider Office Phone # Fax #    Randy Fuentes [phone]       [address] [address]        Equal Access to Services     [facility]: Hadservando Villela, waaxda luqadaha, qaybta kaalmada annmarie juarez. So [facility] [phone].    ATENCIÓN: Si habla español, tiene a staples disposición servicios gratuitos de  donna lingüísticann marie. Kaye al [phone].    We comply with applicable federal civil rights laws and [place] laws. We do not discriminate on the basis of race, color, national origin, age, disability, sex, sexual orientation, or gender identity.            Thank you!     Thank you for choosing [facility]  for your care. Our goal is always to provide you with excellent care. Hearing back from our patients is one way we can continue to improve our services. Please take a few minutes to complete the written survey that you may receive in the mail after your visit with us. Thank you!             Your Updated Medication List - Protect others around you: Learn how to safely use, store and throw away your medicines at www.disposemymeds.org.      Notice     This visit is during an admission. Changes to the med list made in this visit will be reflected in the After Visit Summary of the admission.

## 2018-11-03 NOTE — PLAN OF CARE
Problem: Patient Care Overview  Goal: Plan of Care/Patient Progress Review    /56 (BP Location: Left leg)  Pulse 77  Temp 95.6  F (35.3  C) (Axillary)  Resp 16  Wt 80.4 kg (177 lb 4 oz)  SpO2 100%  BMI 25.43 kg/m2    Time: 6411-3457.  Reason for admission: Worsening mental status & SOB.   VS: VSS on RA with O2 sats in high 90s. Afebrile.   Activity: Repositioning q2hrs with assist x2. Bed alarm on for safety.   Neuros: Nonverbal, ADRIA most of neuro exam, responds to painful stimuli. EEG leads in place, no reports witnessed or reported.   Cardiac: WDL.   Respiratory: LS coarse & diminished.   GI/: Condom catheter in place with adequate UOP, yellow in color. No BM on this shift, LBM 11/3, incontinent. +BS, +gas. ADRIA nausea, no vomiting.   Diet: NPO. TFs started this evening at 1800 running at 25 mL/hr. Per orders to run overnight until 0800 tomorrow, daughter requesting to run for 6 hours tonight & 6 hours tomorrow morning to switch over to daytime runs. Per MD, this is okay, dietician is the one to manage those orders, on-call/weekends pager paged x2 with no response.   Skin: x2 open areas on bilateral inner thighs, mepilex dressings in place, CDI. Redness in perineum, barrier cream applied.   Lines: Right PIV infusing heparin gtt at 800 units/hr (8 mL/hr), hep10A recheck tomorrow morning.   Labs: K+ 3.7, Mag 2.2. Hep10A 0.24.   New changes this shift: No new changes this shift.   Plan: Continue heparin gtt, monitor for signs of bleeding. Repo q2hrs. Continue TFs, can increase by 25 mL/hr at 2200.  Will continue to monitor & follow POC.

## 2018-11-04 NOTE — PLAN OF CARE
Problem: Patient Care Overview  Goal: Plan of Care/Patient Progress Review  Outcome: Declining  S: Patient admitted a few days ago with SOB and decreased mental status.  Pt  Has a history on this unit.     O: Patient appears to be lethargic and drowsy     A: Heparin gtt was discontinued. Pt had IV vancomycin per orders. TF rate increased at 0900 per order. TF stopped at 12:30pm.  Pt. Noon VS: temp. 93.7 axillary; HR: 69; BP: 128/57; RR: 16; O2 stats started shift at 100% on room air. At 11:30 pt appears more congested upper airway, suctioned orally for scant secretions. Reported to assigned RN. Pt was placed on 2 Liters of O2 due to decreasing SpO2 at 91. Pt went on nasal cannula at 2 L and went up to 96.  RT performed a PRN nebulizer and the patient has SpO2 at 100% on room air. Patient temp has been trending low throughout the shift and bear hugger was ordered and in place.  MD was notified of drop in SpO2 stats and decreasing body temperatures.    P: Continue monitoring patient and follow POC

## 2018-11-04 NOTE — MR AVS SNAPSHOT
After Visit Summary   11/4/2018    Priscilla Way    MRN: [id number]           Patient Information     Date Of Birth          1940        Visit Information        Provider Department      11/4/2018 7:00 AM [facility] EEG TECH 4 [facility]        Today's Diagnoses     Seizure (H)    -  1       Follow-ups after your visit        Your next 10 appointments already scheduled     Nov 05, 2018  7:00 AM CST   24 Hour Video Visit with [facility] TECH 4   [facility] ([facility])    [facility]  [address] [id number] [phone]           Gruetli Laager: Your appointment is scheduled at [facility]. [address] [place] [id number]              Who to contact     Please call your clinic at [phone] to:    Ask questions about your health    Make or cancel appointments    Discuss your medicines    Learn about your test results    Speak to your doctor            Additional Information About Your Visit        MyChart Information     [email or web address] gives you secure access to your electronic health record. If you see a primary care provider, you can also send messages to your care team and make appointments. If you have questions, please call your primary care clinic.  If you do not have a primary care provider, please call [phone] and they will assist you.      [email or web address] is an electronic gateway that provides easy, online access to your medical records. With [email or web address], you can request a clinic appointment, read your test results, renew a prescription or communicate with your care team.     To access your existing account, please contact your [facility] or call [phone] for assistance.        Care EveryWhere ID     This is your Care EveryWhere ID. This could be used by other organizations to access your [place] medical records  [id number]         Blood Pressure from Last 3 Encounters:   11/04/18  128/57   10/29/18 142/54   06/07/18 135/60    Weight from Last 3 Encounters:   11/02/18 80.4 kg (177 lb 4 oz)   10/29/18 70.8 kg (156 lb 1.6 oz)   06/06/18 77.9 kg (171 lb 12.8 oz)              We Performed the Following     Glucose by meter     Glucose by meter          Today's Medication Changes      Notice     This visit is during an admission. Changes to the med list made in this visit will be reflected in the After Visit Summary of the admission.             Primary Care Provider Office Phone # Fax #    Randy Fuentes [phone]        [address] [address]        Equal Access to Services     EDUARDO PALACIOS : Eduardo Villela, wakenneth sibley, qaybta kaalmada larry, annmarie cline . So [facility] [phone].    ATENCIÓN: Si habla español, tiene a staples disposición servicios gratuitos de asistencia lingüística. Llame al [phone].    We comply with applicable federal civil rights laws and [place] laws. We do not discriminate on the basis of race, color, national origin, age, disability, sex, sexual orientation, or gender identity.            Thank you!     Thank you for choosing [facility]  for your care. Our goal is always to provide you with excellent care. Hearing back from our patients is one way we can continue to improve our services. Please take a few minutes to complete the written survey that you may receive in the mail after your visit with us. Thank you!             Your Updated Medication List - Protect others around you: Learn how to safely use, store and throw away your medicines at www.disposemymeds.org.      Notice     This visit is during an admission. Changes to the med list made in this visit will be reflected in the After Visit Summary of the admission.

## 2018-11-04 NOTE — PROGRESS NOTES
Preliminary Video EEG impression on November 3-4, 2018  Until 6am   Full report to follow. Please look in inpatient chart, under procedure section.     This video EEG is abnormal due to the presences of continuous generalized polymorphic delta/theta slowing with 1-2 seconds of EEG attentuations, after 20:00 on 11/3 EEG had more EMG artifact and less EEG attenuation, suggesting less encephalopathic state. But, he continues to have no clear parieto-occipital rhythm or well formed sleep architecture. This EEG is consistent with a severe diffuse encephalopathy with maximum dysfunction in the left and right centro-parietal region, he is not on sedative medications to account for this degree of slowing. No epileptiform discharges or electrographic seizures were seen in this record. If events of interest are noted, please press the event button. Clinical correlation is advised.     Tennille Lam MD  Staff Epilepsy Neurologist   [phone]

## 2018-11-04 NOTE — CONSULTS
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  The patient unlikely has renal vein thrombus with doppler flow noted on the renal ultrasound.  He may have a component of cardiorenal syndrome which he is currently being treated for with the iv diuretics and precipitated after he was discharged on a lower dose of diuretics.      # Liver transplant - liver function tests on admission are stable and notable for a low albumin which is likely due to chronic illness, urine protein losses, volume overload / acute inflammation.    # Immunosuppression: Tacrolimus immediate release (goal  4-6) and Prednisone (dose  5 mg daily)   - Changes: No    # Hypertension/Volume Status: Controlled; Goal BP: < 130/80   - Changes: No    # Anemia in chronic renal disease: Hgb: Stable   - Iron studies: Not checked recently    - Will check iron studies, he may require TOMAS for an increase in hgb    # Electrolytes:   - hyponatremia - hyperolemic and improving with diuretics.  Will check urine sodium, potassium, osm to help calculate free water excretion  - Hyperphosphatemia - mildly elevated with zaki, continue to monitor.    # Transplant History:  Etiology of kidney failure: Liver failure  Tx: DDKT and liver transplant  Transplant: 2000 (Kidney), 2000 (Liver)  Donor Type:  - Brain Death Donor Class: Standard Criteria Donor  Significant changes in immunosuppression: None  Significant transplant-related  complications: EBV viremia    Recommendations were communicated to the primary team via this note.    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  The patient is chronically immunosuppressed on tacrolimus and prednisone -- typically 1.5 mg every 12 hours .  The patient was recently discharged from the hospital after a prolonged stay for seizure / encephalopathy, dialysis dependent acute kidney injury with eventual recovery, volume overload.  The patient lives at home and is currently accompanied by one of his daughters.  He is noted to have worsening edema, worsening cough at home.  He is currently admitted since 11/2 and his creatinine has increased from 1.6 mg/dL up to 2.0 on admission.  The patient notably has a BUN on discharge up from 105 to 124.  He was discharged on bumex for ongoign volume management.  HIs weight was 70 kg on discharge and he is currently 80 kg.  He is restarted on iv diuretics with notable 2.2 L uop yesterday.  A FeNa was checked yesterday and is high and a urinalysis is otherwise unremarkable.  The history is limited from the patient with his underlying mental status.    Review of Systems    The 10 point Review of Systems is negative other than noted in the HPI or here.     Past Medical History    I have reviewed this patient's medical history and updated it with pertinent information if needed.   Past Medical History:   Diagnosis Date     LILIAM (acute kidney injury) (H) 08/2016    kidney biopsy showed ATN     Dementia 2004    multiple acute infarcts, SV dis on CT     Diabetes type 2, controlled (H)      H/O Clostridium difficile infection 03/2018    treated wiht vanco     Hepatitis C      Kidney transplanted 2000     Liver  transplant      Unspecified cerebral artery occlusion with cerebral infarction        Past Surgical History   I have reviewed this patient's surgical history and updated it with pertinent information if needed.  Past Surgical History:   Procedure Laterality Date     TRANSPLANT KIDNEY RECIPIENT  DONOR       TRANSPLANT LIVER RECIPIENT  DONOR         Social History   I have reviewed this patient's social history and updated it with pertinent information if needed. Priscilla Way  reports that he has quit smoking. His smoking use included Cigarettes. He has never used smokeless tobacco. He reports that he does not drink alcohol or use illicit drugs.    Family History   I have reviewed this patient's family history and updated it with pertinent information if needed.     The patient has no family history of kidney disease      Prior to Admission Medications   Prior to Admission Medications   Prescriptions Last Dose Informant Patient Reported? Taking?   HYDROmorphone, STANDARD CONC, (DILAUDID) 1 MG/ML LIQD liquid Past Week at Unknown time  No Yes   Si-2 mLs (1-2 mg) by Oral or Feeding Tube route every 6 hours as needed for moderate to severe pain   Sharps Container MISC   No Yes   Sig: Sharps container for diabetes needles.   acetaminophen (TYLENOL) 32 mg/mL solution Past Week at Unknown time  No Yes   Si.3 mLs (650 mg) by Oral or Feeding Tube route every 4 hours as needed for mild pain or fever   alcohol swab prep pads   No Yes   Sig: Use to swab area of injection/mark anthony as directed.   amLODIPine (NORVASC) 1 mg/mL SUSP 2018 at 2000  No Yes   Si mLs (5 mg) by Per Feeding Tube route daily   aspirin 10 mg/mL SUSP 2018 at   No Yes   Si mLs (80 mg) by Per Feeding Tube route daily   blood glucose (NO BRAND SPECIFIED) lancets standard   No Yes   Sig: Use to test blood sugar 6 times daily or as directed.   blood glucose monitoring (NO BRAND SPECIFIED) meter device kit   No Yes    Sig: Use to test blood sugar every 4 hours while on Tube feeding.   blood glucose monitoring (NO BRAND SPECIFIED) test strip   No Yes   Sig: Use to test blood sugars 6 times daily or as directed   bumetanide (BUMEX) 0.25 mg/mL SUSP 2018 at   No Yes   Si mLs (1 mg) by Oral or Feeding Tube route daily   carvedilol (COREG) 1 mg/mL SUSP 2018 at   No Yes   Si.25 mLs (6.25 mg) by Oral or Feeding Tube route 2 times daily   cholecalciferol (VITAMIN D/ D-VI-SOL) 400 UNIT/ML LIQD liquid 2018 at   No Yes   Sig: Take 1 mL (400 Units) by mouth daily   ferrous gluconate (FERGON) 324 (38 Fe) MG tablet 2018 at   No Yes   Si tablets (648 mg) by Per Feeding Tube route daily (with breakfast)   fiber modular, NUTRISOURCE FIBER, (NUTRISOURCE FIBER) packet 2018 at   No Yes   Si packet by Per Feeding Tube route 3 times daily   insulin aspart (NOVOLOG PEN) 100 UNIT/ML injection 2018 at 0800  No Yes   Sig: Inject 1-6 Units Subcutaneous every 4 hours Correction Scale -MEDIUM INSULIN RESISTANCE,    Do Not give if BG less than 140.  For  - 189 give 1 unit.  For  - 239 give 2 units.  For  - 289 give 3 units.  For  - 339 give 4 units.  For  - 399 give 5 units.  For BG > 400 give 6 units.  Check blood glucose Q4H   Notify provider if glucose> 350 mg/dL.   insulin pen needle (BD SANDRA U/F) 32G X 4 MM   No Yes   Sig: Use 6 (every 4 hours while on tube feeding) daily or as directed.   lactobacillus rhamnosus, GG, (CULTURELL) capsule   No Yes   Si capsule by Per Feeding Tube route 2 times daily   levETIRAcetam (KEPPRA) 100 MG/ML solution 2018 at   No Yes   Si.5 mLs (750 mg) by Per Feeding Tube route every 12 hours   miconazole (MICATIN; MICRO GUARD) 2 % powder 2018 at   No Yes   Sig: Apply topically 2 times daily   multivitamins with minerals (CERTAVITE/CEROVITE) LIQD liquid 2018 at 2000  No Yes   Sig: Take 15 mLs by mouth  daily   ondansetron (ZOFRAN-ODT) 4 MG ODT tab Past Week at Unknown time  No Yes   Sig: Take 1 tablet (4 mg) by mouth every 6 hours as needed for nausea or vomiting   pantoprazole (PROTONIX) 2 mg/mL SUSP suspension Past Week at Unknown time  No Yes   Si mLs (40 mg) by Per Feeding Tube route 2 times daily (before meals)   predniSONE 5 MG/5ML solution 2018 at   No Yes   Sig: Take 5 mLs (5 mg) by mouth daily   tacrolimus (GENERIC EQUIVALENT) 1 mg/mL suspension 2018 at   No Yes   Si.5 mLs (1.5 mg) by Oral or Feeding Tube route 2 times daily   warfarin (COUMADIN) 2.5 MG tablet 2018 at   No Yes   Si tablet (2.5 mg) by Oral or Feeding Tube route daily      Facility-Administered Medications: None     Allergies   No Known Allergies    Physical Exam   Temp  Av.7  F (35.9  C)  Min: 92.3  F (33.5  C)  Max: 99.7  F (37.6  C)      Pulse  Av.7  Min: 62  Max: 108 Resp  Av  Min: 6  Max: 26  SpO2  Av.2 %  Min: 72 %  Max: 100 %     /58 (BP Location: Left arm)  Pulse 76  Temp 95.2  F (35.1  C) (Axillary)  Resp 16  Wt 80.4 kg (177 lb 4 oz)  SpO2 100%  BMI 25.43 kg/m2   Date 18 07 - 18 0659   Shift 9978-0819 8449-4579 8444-8451 24 Hour Total   I  N  T  A  K  E   Enteral 275   275    Shift Total  (mL/kg) 275  (3.42)   275  (3.42)   O  U  T  P  U  T   Shift Total  (mL/kg)       Weight (kg) 80.4 80.4 80.4 80.4        Admit Weight: 80.7 kg (177 lb 14.4 oz) (bed scale)     GENERAL APPEARANCE: ill appearing , unable to communicate, patient spontaneously makes notises  HENT: mouth without ulcers or lesions  LYMPHATICS: no cervical or supraclavicular nodes  RESP: lungs clear to auscultation - no rales, rhonchi or wheezes  CV: regular rhythm, normal rate, no rub, no murmur  EDEMA: 1+ LE edema bilaterally  ABDOMEN: soft, nondistended, nontender, bowel sounds normal  MS: extremities normal - no gross deformities noted, no evidence of inflammation in joints, no  muscle tenderness  SKIN: no rash    Data   CMP  Recent Labs  Lab 11/04/18 0620 11/03/18  1852 11/03/18  0604 11/02/18  2213  11/02/18  1148   * 128* 125* 122*  < > 121*   POTASSIUM 3.7 5.1 3.7  --   --  3.7   CHLORIDE 91* 89* 86*  --   --  83*   CO2 25 24 26  --   --  26   ANIONGAP 12 15* 14  --   --  12   * 144* 137*  --   --  120*   * 115* 120*  --   --  124*   CR 1.85* 1.97* 1.94* 1.97*  --  2.03*   GFRESTIMATED 35* 33* 33*  --   --  32*   GFRESTBLACK 43* 40* 41*  --   --  38*   JOSHUA 8.6 8.8 8.6  --   --  8.1*   MAG 2.2  --  2.2  --   --   --    PHOS 5.4* 6.3*  --   --   --   --    PROTTOTAL  --   --   --   --   --  6.7*   ALBUMIN  --   --   --   --   --  2.4*   BILITOTAL  --   --   --   --   --  0.4   ALKPHOS  --   --   --   --   --  113   AST  --   --   --   --   --  23   ALT  --   --   --   --   --  20   < > = values in this interval not displayed.  CBC  Recent Labs  Lab 11/04/18 0620 11/03/18 0604 11/02/18  1759 11/02/18  1236   HGB 8.7* 8.3* 8.9* 8.6*   WBC 4.4 5.4 6.9 6.3   RBC 2.88* 2.72* 2.90* 2.77*   HCT 27.0* 25.9* 27.8* 26.1*   MCV 94 95 96 94   MCH 30.2 30.5 30.7 31.0   MCHC 32.2 32.0 32.0 33.0   RDW 16.3* 16.3* 16.5* 16.5*    480* 423 425     INR  Recent Labs  Lab 11/04/18 0620 11/03/18  0604 11/02/18  1148 10/30/18   INR 2.16* 1.54* 1.24* 2.0*   PTT  --   --  22  --      ABG  Recent Labs  Lab 11/02/18  2213   O2PER 21.0      Urine Studies  Recent Labs   Lab Test  11/02/18   2307  10/11/18   1300  10/04/18   1330  10/03/18   2056   COLOR  Light Yellow  Yellow  Rhea  Dark Brown   APPEARANCE  Clear  Clear  Cloudy  Cloudy   URINEGLC  Negative  Negative  Negative  Negative   URINEBILI  Negative  Negative  Negative  Negative   URINEKETONE  Negative  Negative  Negative  10*   SG  1.006  1.009  1.010  1.019   UBLD  Trace*  Small*  Large*  Moderate*   URINEPH  6.5  5.5  6.0  5.0   PROTEIN  Negative  30*  30*  30*   NITRITE  Negative  Negative  Negative  Negative   LEUKEST   Moderate*  Large*  Moderate*  Large*   RBCU  1  19*  182*  >182*   WBCU  7*  38*  >182*  >182*     Recent Labs   Lab Test  09/14/18   1418  05/27/16   1409  04/29/11   1348  09/14/10   1134   [facility]  24.91*  Specimen not received  NOTIFIED [facility]  WHO PAGED  RN, LOPEZ AT 1355. NO URINE   RECIEVED WITH BLOOD SPECIMENS. AB    0.04  <0.02     PTH  Recent Labs   Lab Test  06/05/18   0548  08/24/16   0852   PTHI  78  333*     Iron Studies  Recent Labs   Lab Test  11/08/16   1209  08/24/16   0852   IRON  17*  20*   FEB  146*  197*   IRONSAT  11*  10*   BARTOLO   --   1025*       IMAGING:  All imaging studies reviewed by me.

## 2018-11-04 NOTE — PHARMACY-VANCOMYCIN DOSING SERVICE
Pharmacy Vancomycin Initial Note  Date of Service 2018  Patient's  1940  78 year old, male    Indication: Bacteremia    Current estimated CrCl = Estimated Creatinine Clearance: 36.8 mL/min (based on Cr of 1.85).    Creatinine for last 3 days  2018: 11:48 AM Creatinine 2.03 mg/dL; 10:13 PM Creatinine 1.97 mg/dL  11/3/2018:  6:04 AM Creatinine 1.94 mg/dL;  6:52 PM Creatinine 1.97 mg/dL  2018:  6:20 AM Creatinine 1.85 mg/dL    Recent Vancomycin Level(s) for last 3 days  No results found for requested labs within last 72 hours.      Vancomycin IV Administrations (past 72 hours)      No vancomycin orders with administrations in past 72 hours.                Nephrotoxins and other renal medications (Future)    Start     Dose/Rate Route Frequency Ordered Stop    18 0830  vancomycin (VANCOCIN) 1,500 mg in sodium chloride 0.9 % 250 mL intermittent infusion      1,500 mg  over 90 Minutes Intravenous ONCE 18 0816      18 0815  vancomycin place hogue - receiving intermittent dosing      1 each Intravenous SEE ADMIN INSTRUCTIONS 18 0816      18 1600  vancomycin (FIRVANQ) oral solution 125 mg      125 mg Oral or Feeding Tube 4 TIMES DAILY 18 1345 18 0959    18 0730  bumetanide (BUMEX) injection 2 mg      2 mg Intravenous 2 TIMES DAILY BEFORE MEALS 18 0725      18 1830  tacrolimus (GENERIC EQUIVALENT) suspension 1.5 mg      1.5 mg Oral or Feeding Tube 2 TIMES DAILY 18 1728            Contrast Orders - past 72 hours     None                Plan:  1.  Start vancomycin intermittent dosing based on levels. Start with 1500mg IV vanco now  2.  Goal Trough Level: 15-20 mg/L   3.  Pharmacy will check trough levels as appropriate in 11/5 AM.    4. Serum creatinine levels will be ordered daily for the first week of therapy and at least twice weekly for subsequent weeks.    5. Kemmerer method utilized to dose vancomycin therapy: Method 1    Dayron  Winter PharmMIMI, [facility]    [phone]  Pager [phone]

## 2018-11-04 NOTE — PROGRESS NOTES
INTERNAL MEDICINE PROGRESS NOTE    Priscilla Way ([id number]) admitted on 11/2/2018 11/04/2018    Assessment & Plan:  77 y/o male with PMHx significant for vascular dementia, CVA multiple sites, DVT on warfarin, HCV cirrhosis s/p liver transplant 2000, HCV ESRD s/p kidney transplant 2000, CKD III of transplanted kidney, HFpEF, recurrent C diff infection, malnutrition on tube feeds and recent seizure disorder/status epilepticus was admitted to the hospital because of worsening altered mental status and shortness of breath.     #Acute hypoxic respiratory failure  #Volume overload  #HFpEF (LVEF 55-60%)  According to one the patient's care taker, patient was noted to be coughing more and sounded productive. Due to language barrier unclear if symptoms of viral URI and sick contacts. On exam with coarse lung sounds, elevated JVP ~ 15cm and pitting edema on both legs. This favors a component of both aspiration and volume overload. CXR had signs of pulmonary edema. Repeat CXR today demonstrated improvement after some diuresis. His last echo about a month ago had normal EF but with diastolic dysfunction. He is hypoalbuminemic therefore more risk of third spacing. - Strict I/Os  - Bumex 2mg IV BID continue   - Net goal -1 to -2L  - BMP BID  - Daily weights  - Continue home coreg  - Not on lisinopril due to renal dysfunction  - Wean O2 as able    #Staph epidermidis bacteremia;  Hard to assess whether this is real infection or contamination; especially with patient's baseline mentation  Patient was hypothermic today   Plan to repeat blood culture and start on IV vancomycin for now ; if continues to have negative blood culture in next 48 hrs  will likely discontinue     #Cough  #Aspiration pneumonia vs pneumonitis   CXR also with show evidence of increased pulmonary congestions suggestive of pulmonary edema and also possible aspiration. He has had other hospitalizations because of aspiration in the setting of TF's and  altered mentation. Viral URI panel and influenza were negative. MRSA nares negative. Urine strep and pneumo antigen negative.  Procal not elevated.  Patient not needing supplemental O2, zosyn was discontinued.  - Sputum culture and smear, will need induced cough pending  - DuoNeb  - Blood cultures with staph epi (see above)    Zosyn (11/02/18 - 11/03/18)  Vancomycin (11/4/2018- )    #Diarrhea  #Cdiff  Patient had had c diff in previous admissions, 03/2018, 05/2018, 06/2018, 09/2018 and now as well. Report from care taker was that patient with increasing stool output. He has been treated with vancomycin long term.  - Start 10 day course of vanc 125mg PO QID  - Then will do 20 days of rifaximin 400 mg PO TID    #Acute toxic encephalopathy  #Uremia  #Vascular dementia   He is non-verbal. Head imaging in the past has shown chronic advanced small vessel disease that is diffuse and atherosclerotic plaques with stenosis along vertebral arteries and MCA. His mentation mainly affected by vascular dementia. GCS today was 6-7. Per previous notes, he can be aroused by verbal stimuli. Current encephalopathy multifactorial by ongoing evolution of cerebrovascular disease vs metabolic (uremia, hyponatremia) vs sepsis vs seizures. TSH and NH3 normal. Furthermore, CT scan on 11/02/18 did not reveal any gross intracranial pathology. Unable to protect airway but he is DNR/DNI.  - Neuro checks  - UA no evidence of infection   - EEG ordered with severe diffuse encephalopathy      #Hyponatremia, hypervolemic  Patient with signs of hypervolemia. Na on last admission was 130, now 121. This could be also affecting mentation. Low sodium could also have contribution from renal losses due to LILIAM. Serum osmolality normal due to elevated BUN.  - Na TID  - Diuresis as above     #HCV ESRD s/p transplant 2000  #LILIAM on CKD III of transplanted kidney  #?Renal vein thrombosis  Patient at one point was to be on dialysis of his transplanted kidney but  renal function recovered. Baseline Cr ~ 1.2-1.5.  Now trending down with diuresis. Today was 2.0. Causes could be intrinsic injury from sepsis or UTI or tacro vs cardiorenal type 1. US of the transplanted kidney showing increase in resistive indices suggestive for renal vein thrombus vs LILIAM vs cardiorenal. Tacro level at goal (4-6).   - Discussed with transplant nephrology, will repeat US once LILIAM improves; no need for interventions at this moment  - Continue tacro and prednisone; tacrolimus 1.5 mg PO BID  - Renal transplant team following      #HCV ESLD s/p transplant 2000  LFTs normal. No evidence of rejection or decompensation.  - Continue tacro and prednisone as above     #Chronic iron deficiency anemia  Hemoglobin stable. There was some concern about slow GI bleed in previous admissions. Patient also with CKD driving down Hgb production. No evidence of melena for now.   - Continue home PPI  - Continue home iron, which might confound appearance of stool, may require TOMAS     #Hx of DVT on warfarin  DVT bilaterally on US in 2016. INR subtherapeutic.   - INR daily  - Pharmacy to dose warfarin     #Recent diagnosis of seizure disorder with status epilepticus  Was on EEG monitor during last admission and had readings consistent with non-convulsive status epilepticus.   - keppra level is supratherapeutic, discussed with neurology , no utility of trending the level and would decrease dose to 500 mg BID but would not recommend holding it      #Severe malnutrition on TF's  - TF consult placed  - Refeeding syndrome and nutrition labs per dietician     #DM2  - Continue home insulin regimen  Patient discussed with daughter and bedside nurse      Code Status: DNR/DNI  FEN: TF's  PPx: DVT on warfarin, GI PPI PO  Dispo: pending improvement in mentation     ANTWAN Eng  Internal Medicine Hospitalist service  P:[phone]    ==================================================================    Interval  HistorySubjective:  No overnight events, patient not verbal. Patient had positive blood culture for staph epidermidis this morning.   Otherwise no other significant event. During day had more coarse breathing and also hypothermic       Objective:  Most recent vital signs:  /57 (BP Location: Left arm)  Pulse 76  Temp 93.7  F (34.3  C) (Axillary)  Resp 16  Wt 80.4 kg (177 lb 4 oz)  SpO2 96%  BMI 25.43 kg/m2  Temp:  [93.7  F (34.3  C)-96.1  F (35.6  C)] 93.7  F (34.3  C)  Pulse:  [70-79] 76  Heart Rate:  [69] 69  Resp:  [16-18] 16  BP: (128-152)/(29-58) 128/57  SpO2:  [91 %-100 %] 96 %  Wt Readings from Last 2 Encounters:   11/02/18 80.4 kg (177 lb 4 oz)   10/29/18 70.8 kg (156 lb 1.6 oz)     Intake/Output Summary (Last 24 hours) at 11/03/18 1259  Last data filed at 11/03/18 1100   Gross per 24 hour   Intake              240 ml   Output              950 ml   Net             -710 ml     Physical exam:  General: lethargic, non-verbal  HEENT: PERRLA, EOMI, anicteric sclera, evidence of cataracts  CV: RRR, no murmur, no rubs, no gallops  Resp: Coarse bilateral crackles and ronchi  Abdomen: non tender, non distended, no organomegaly, +bs  Extremities: bilateral pitting edema to the knees, WWP  Skin: no rash, not jaundice  Psych: unable to assess due to non-responsive    Labs:  Results for orders placed or performed during the hospital encounter of 11/02/18 (from the past 24 hour(s))   Basic metabolic panel   Result Value Ref Range    Sodium 128 (L) 133 - 144 mmol/L    Potassium 5.1 3.4 - 5.3 mmol/L    Chloride 89 (L) 94 - 109 mmol/L    Carbon Dioxide 24 20 - 32 mmol/L    Anion Gap 15 (H) 3 - 14 mmol/L    Glucose 144 (H) 70 - 99 mg/dL    Urea Nitrogen 115 (H) 7 - 30 mg/dL    Creatinine 1.97 (H) 0.66 - 1.25 mg/dL    GFR Estimate 33 (L) >60 mL/min/1.7m2    GFR Estimate If Black 40 (L) >60 mL/min/1.7m2    Calcium 8.8 8.5 - 10.1 mg/dL   Phosphorus   Result Value Ref Range    Phosphorus 6.3 (H) 2.5 - 4.5 mg/dL    Heparin Xa (10a) Level   Result Value Ref Range    Heparin 10A Level 0.35 IU/mL   INR   Result Value Ref Range    INR 2.16 (H) 0.86 - 1.14   CBC with platelets   Result Value Ref Range    WBC 4.4 4.0 - 11.0 10e9/L    RBC Count 2.88 (L) 4.4 - 5.9 10e12/L    Hemoglobin 8.7 (L) 13.3 - 17.7 g/dL    Hematocrit 27.0 (L) 40.0 - 53.0 %    MCV 94 78 - 100 fl    MCH 30.2 26.5 - 33.0 pg    MCHC 32.2 31.5 - 36.5 g/dL    RDW 16.3 (H) 10.0 - 15.0 %    Platelet Count 444 150 - 450 10e9/L   Basic metabolic panel   Result Value Ref Range    Sodium 128 (L) 133 - 144 mmol/L    Potassium 3.7 3.4 - 5.3 mmol/L    Chloride 91 (L) 94 - 109 mmol/L    Carbon Dioxide 25 20 - 32 mmol/L    Anion Gap 12 3 - 14 mmol/L    Glucose 122 (H) 70 - 99 mg/dL    Urea Nitrogen 116 (H) 7 - 30 mg/dL    Creatinine 1.85 (H) 0.66 - 1.25 mg/dL    GFR Estimate 35 (L) >60 mL/min/1.7m2    GFR Estimate If Black 43 (L) >60 mL/min/1.7m2    Calcium 8.6 8.5 - 10.1 mg/dL   Phosphorus   Result Value Ref Range    Phosphorus 5.4 (H) 2.5 - 4.5 mg/dL   Magnesium   Result Value Ref Range    Magnesium 2.2 1.6 - 2.3 mg/dL   Procalcitonin   Result Value Ref Range    Procalcitonin 0.23 ng/ml   Nephrology Kidney/Pancreas Transplant Adult IP Consult: Patient to be seen: Routine within 24 hrs; Call back #: [phone]; pt with renal transplant, presenting with LILIAM on CKD and hypervolemia, please recommend on IS management; Consultant may ente...    Gaudencio Montez MD     11/4/2018 11:10 AM  [facility] [place]  Transplant Nephrology Consult  Date of Admission:  11/2/2018  Requesting physician: Jamie Gillespie MD    Assessment & Plan   # DDKT: baseline Cr ~ 1.3-1.6; Increased   - Proteinuria: Nephrotic range   - Latest DSA: No Latest DSA: 8/2016   - BK Viremia: Not checked recently   - Kidney Tx Biopsy: Yes - 2016 with diabetic nephropathy, fsgs,   and arteriosclerosis   - Recheck urine protein   - The acute kidney injury (elevated  creatinine) is likely   secondary to an acute process in the setting of incomplete   recovery from ATN.  The patient unlikely has renal vein thrombus   with doppler flow noted on the renal ultrasound.  He may have a   component of cardiorenal syndrome which he is currently being   treated for with the iv diuretics and precipitated after he was   discharged on a lower dose of diuretics.      # Liver transplant - liver function tests on admission are stable   and notable for a low albumin which is likely due to chronic   illness, urine protein losses, volume overload / acute   inflammation.    # Immunosuppression: Tacrolimus immediate release (goal  4-6) and   Prednisone (dose  5 mg daily)   - Changes: No    # Hypertension/Volume Status: Controlled; Goal BP: < 130/80   - Changes: No    # Anemia in chronic renal disease: Hgb: Stable   - Iron studies: Not checked recently    - Will check iron studies, he may require TOMAS for an increase in   hgb    # Electrolytes:   - hyponatremia - hyperolemic and improving with diuretics.  Will   check urine sodium, potassium, osm to help calculate free water   excretion  - Hyperphosphatemia - mildly elevated with zaki, continue to   monitor.    # Transplant History:  Etiology of kidney failure: Liver failure  Tx: DDKT and liver transplant  Transplant: 2000 (Kidney), 2000 (Liver)  Donor Type:  - Brain Death Donor Class: Standard Criteria   Donor  Significant changes in immunosuppression: None  Significant transplant-related complications: EBV viremia    Recommendations were communicated to the primary team via this   note.    Viral Jeff Rosenberg MD  Pager: [phone]    REASON FOR CONSULT   History of kidney transplant    History of Present Illness   Priscilla Way is a 78 year old [place] man with multiple infart   dementia and history of ESLD secondary to hepatitis c status post   simultaneous liver and kidney transplantation 2000.  The   patient is chronically  immunosuppressed on tacrolimus and   prednisone -- typically 1.5 mg every 12 hours .  The patient was   recently discharged from the hospital after a prolonged stay for   seizure / encephalopathy, dialysis dependent acute kidney injury   with eventual recovery, volume overload.  The patient lives at   home and is currently accompanied by one of his daughters.  He is   noted to have worsening edema, worsening cough at home.  He is   currently admitted since  and his creatinine has increased   from 1.6 mg/dL up to 2.0 on admission.  The patient notably has a   BUN on discharge up from 105 to 124.  He was discharged on bumex   for ongoign volume management.  HIs weight was 70 kg on discharge   and he is currently 80 kg.  He is restarted on iv diuretics with   notable 2.2 L uop yesterday.  A FeNa was checked yesterday and is   high and a urinalysis is otherwise unremarkable.  The history is   limited from the patient with his underlying mental status.    Review of Systems    The 10 point Review of Systems is negative other than noted in   the HPI or here.     Past Medical History    I have reviewed this patient's medical history and updated it   with pertinent information if needed.   Past Medical History:   Diagnosis Date     LILIAM (acute kidney injury) (H) 2016    kidney biopsy showed ATN     Dementia     multiple acute infarcts, SV dis on CT     Diabetes type 2, controlled (H)      H/O Clostridium difficile infection 2018    treated [facility] vanco     Hepatitis C      Kidney transplanted      Liver transplant      Unspecified cerebral artery occlusion with cerebral infarction          Past Surgical History   I have reviewed this patient's surgical history and updated it   with pertinent information if needed.  Past Surgical History:   Procedure Laterality Date     TRANSPLANT KIDNEY RECIPIENT  DONOR       TRANSPLANT LIVER RECIPIENT  DONOR         Social History   I have reviewed this  patient's social history and updated it with   pertinent information if needed. Priscilla Way  reports that he   has quit smoking. His smoking use included Cigarettes. He has   never used smokeless tobacco. He reports that he does not drink   alcohol or use illicit drugs.    Family History   I have reviewed this patient's family history and updated it with   pertinent information if needed.     The patient has no family history of kidney disease      Prior to Admission Medications   Prior to Admission Medications   Prescriptions Last Dose Informant Patient Reported? Taking?   HYDROmorphone, STANDARD CONC, (DILAUDID) 1 MG/ML LIQD liquid Past   Week at Unknown time  No Yes   Si-2 mLs (1-2 mg) by Oral or Feeding Tube route every 6 hours   as needed for moderate to severe pain   Sharps Container MISC   No Yes   Sig: Sharps container for diabetes needles.   acetaminophen (TYLENOL) 32 mg/mL solution Past Week at Unknown   time  No Yes   Si.3 mLs (650 mg) by Oral or Feeding Tube route every 4   hours as needed for mild pain or fever   alcohol swab prep pads   No Yes   Sig: Use to swab area of injection/mark anthony as directed.   amLODIPine (NORVASC) 1 mg/mL SUSP 2018 at   No Yes   Si mLs (5 mg) by Per Feeding Tube route daily   aspirin 10 mg/mL SUSP 2018 at   No Yes   Si mLs (80 mg) by Per Feeding Tube route daily   blood glucose (NO BRAND SPECIFIED) lancets standard   No Yes   Sig: Use to test blood sugar 6 times daily or as directed.   blood glucose monitoring (NO BRAND SPECIFIED) meter device kit     No Yes   Sig: Use to test blood sugar every 4 hours while on Tube feeding.     blood glucose monitoring (NO BRAND SPECIFIED) test strip   No Yes     Sig: Use to test blood sugars 6 times daily or as directed   bumetanide (BUMEX) 0.25 mg/mL SUSP 2018 at   No Yes   Si mLs (1 mg) by Oral or Feeding Tube route daily   carvedilol (COREG) 1 mg/mL SUSP 2018 at   No Yes   Sig:  6.25 mLs (6.25 mg) by Oral or Feeding Tube route 2 times   daily   cholecalciferol (VITAMIN D/ D-VI-SOL) 400 UNIT/ML LIQD liquid   2018 at   No Yes   Sig: Take 1 mL (400 Units) by mouth daily   ferrous gluconate (FERGON) 324 (38 Fe) MG tablet 2018 at     No Yes   Si tablets (648 mg) by Per Feeding Tube route daily (with   breakfast)   fiber modular, NUTRISOURCE FIBER, (NUTRISOURCE FIBER) packet   2018 at   No Yes   Si packet by Per Feeding Tube route 3 times daily   insulin aspart (NOVOLOG PEN) 100 UNIT/ML injection 2018 at   0800  No Yes   Sig: Inject 1-6 Units Subcutaneous every 4 hours Correction Scale   -MEDIUM INSULIN RESISTANCE,    Do Not give if BG less than 140.  For  - 189 give 1 unit.  For  - 239 give 2 units.  For  - 289 give 3 units.  For  - 339 give 4 units.  For  - 399 give 5 units.  For BG > 400 give 6 units.  Check blood glucose Q4H   Notify provider if glucose> 350 mg/dL.   insulin pen needle (BD SANDRA U/F) 32G X 4 MM   No Yes   Sig: Use 6 (every 4 hours while on tube feeding) daily or as   directed.   lactobacillus rhamnosus, GG, (CULTURELL) capsule   No Yes   Si capsule by Per Feeding Tube route 2 times daily   levETIRAcetam (KEPPRA) 100 MG/ML solution 2018 at   No   Yes   Si.5 mLs (750 mg) by Per Feeding Tube route every 12 hours   miconazole (MICATIN; MICRO GUARD) 2 % powder 2018 at     No Yes   Sig: Apply topically 2 times daily   multivitamins with minerals (CERTAVITE/CEROVITE) LIQD liquid   2018 at   No Yes   Sig: Take 15 mLs by mouth daily   ondansetron (ZOFRAN-ODT) 4 MG ODT tab Past Week at Unknown time    No Yes   Sig: Take 1 tablet (4 mg) by mouth every 6 hours as needed for   nausea or vomiting   pantoprazole (PROTONIX) 2 mg/mL SUSP suspension Past Week at   Unknown time  No Yes   Si mLs (40 mg) by Per Feeding Tube route 2 times daily   (before meals)   predniSONE 5 MG/5ML  solution 2018 at   No Yes   Sig: Take 5 mLs (5 mg) by mouth daily   tacrolimus (GENERIC EQUIVALENT) 1 mg/mL suspension 2018 at     No Yes   Si.5 mLs (1.5 mg) by Oral or Feeding Tube route 2 times daily     warfarin (COUMADIN) 2.5 MG tablet 2018 at   No Yes   Si tablet (2.5 mg) by Oral or Feeding Tube route daily      Facility-Administered Medications: None     Allergies   No Known Allergies    Physical Exam   Temp  Av.7  F (35.9  C)  Min: 92.3  F (33.5  C)  Max: 99.7    F (37.6  C)      Pulse  Av.7  Min: 62  Max: 108 Resp  Av  Min: 6  Max:   26  SpO2  Av.2 %  Min: 72 %  Max: 100 %     /58 (BP Location: Left arm)  Pulse 76  Temp 95.2  F   (35.1  C) (Axillary)  Resp 16  Wt 80.4 kg (177 lb 4 oz)  SpO2   100%  BMI 25.43 kg/m2   Date 18 0700 - 18 0659   Shift 2968-6923 4736-9085 5176-3836 24 Hour Total   I  N  T  A  K  E   Enteral 275   275    Shift Total  (mL/kg) 275  (3.42)   275  (3.42)   O  U  T  P  U  T   Shift Total  (mL/kg)       Weight (kg) 80.4 80.4 80.4 80.4        Admit Weight: 80.7 kg (177 lb 14.4 oz) (bed scale)     GENERAL APPEARANCE: ill appearing , unable to communicate,   patient spontaneously makes notises  HENT: mouth without ulcers or lesions  LYMPHATICS: no cervical or supraclavicular nodes  RESP: lungs clear to auscultation - no rales, rhonchi or wheezes  CV: regular rhythm, normal rate, no rub, no murmur  EDEMA: 1+ LE edema bilaterally  ABDOMEN: soft, nondistended, nontender, bowel sounds normal  MS: extremities normal - no gross deformities noted, no evidence   of inflammation in joints, no muscle tenderness  SKIN: no rash    Data   CMP  Recent Labs  Lab 18  0620 18  1852 18  0604 18  2213  18  1148   * 128* 125* 122*  < > 121*   POTASSIUM 3.7 5.1 3.7  --   --  3.7   CHLORIDE 91* 89* 86*  --   --  83*   CO2 25 24 26  --   --  26   ANIONGAP 12 15* 14  --   --  12   * 144* 137*   --   --  120*   * 115* 120*  --   --  124*   CR 1.85* 1.97* 1.94* 1.97*  --  2.03*   GFRESTIMATED 35* 33* 33*  --   --  32*   GFRESTBLACK 43* 40* 41*  --   --  38*   JOSHUA 8.6 8.8 8.6  --   --  8.1*   MAG 2.2  --  2.2  --   --   --    PHOS 5.4* 6.3*  --   --   --   --    PROTTOTAL  --   --   --   --   --  6.7*   ALBUMIN  --   --   --   --   --  2.4*   BILITOTAL  --   --   --   --   --  0.4   ALKPHOS  --   --   --   --   --  113   AST  --   --   --   --   --  23   ALT  --   --   --   --   --  20   < > = values in this interval not displayed.  CBC  Recent Labs  Lab 11/04/18  0620 11/03/18  0604 11/02/18  1759 11/02/18  1236   HGB 8.7* 8.3* 8.9* 8.6*   WBC 4.4 5.4 6.9 6.3   RBC 2.88* 2.72* 2.90* 2.77*   HCT 27.0* 25.9* 27.8* 26.1*   MCV 94 95 96 94   MCH 30.2 30.5 30.7 31.0   MCHC 32.2 32.0 32.0 33.0   RDW 16.3* 16.3* 16.5* 16.5*    480* 423 425     INR  Recent Labs  Lab 11/04/18  0620 11/03/18  0604 11/02/18  1148 10/30/18   INR 2.16* 1.54* 1.24* 2.0*   PTT  --   --  22  --      ABG  Recent Labs  Lab 11/02/18  2213   O2PER 21.0      Urine Studies  Recent Labs   Lab Test  11/02/18   2307  10/11/18   1300  10/04/18   1330  10/03/18   2056   COLOR  Light Yellow  Yellow  Rhea  Dark Brown   APPEARANCE  Clear  Clear  Cloudy  Cloudy   URINEGLC  Negative  Negative  Negative  Negative   URINEBILI  Negative  Negative  Negative  Negative   URINEKETONE  Negative  Negative  Negative  10*   SG  1.006  1.009  1.010  1.019   UBLD  Trace*  Small*  Large*  Moderate*   URINEPH  6.5  5.5  6.0  5.0   PROTEIN  Negative  30*  30*  30*   NITRITE  Negative  Negative  Negative  Negative   LEUKEST  Moderate*  Large*  Moderate*  Large*   RBCU  1  19*  182*  >182*   WBCU  7*  38*  >182*  >182*     Recent Labs   Lab Test  09/14/18   1418  05/27/16   1409  04/29/11   1348  09/14/10   1134   [facility]  24.91*  Specimen not received  NOTIFIED [facility]  WHO PAGED  RN, LOPEZ AT   1355. NO URINE   RECIEVED WITH BLOOD  SPECIMENS. AB    0.04  <0.02     PTH  Recent Labs   Lab Test  06/05/18   0548  08/24/16   0852   PTHI  78  333*     Iron Studies  Recent Labs   Lab Test  11/08/16   1209  08/24/16   0852   IRON  17*  20*   FEB  146*  197*   IRONSAT  11*  10*   BARTOLO   --   1025*       IMAGING:  All imaging studies reviewed by me.        XR Chest Port 1 View    Narrative    Exam: XR CHEST PORT 1 VW, 11/4/2018 8:20 AM.    Indication: Concern for pneumonia    Comparison: 11/3/2018, 11/2/2018    Findings:   A single portable supine AP view of the chest was obtained. The  cardiomediastinal silhouette is unchanged. Decreased lung volumes. No  pneumothorax. Stable trace right pleural effusion. No change and  diffuse reticulonodular opacities, most pronounced in the right upper  lobe. The enteric tube tip projects over the stomach in the left upper  quadrant. Partially imaged IVC filter.      Impression    Impression:   Stable nonspecific diffuse reticulonodular opacities, which may  represent infection, aspiration, or pulmonary edema.    I have personally reviewed the examination and initial interpretation  and I agree with the findings.    PATRIA GREY MD     *Note: Due to a large number of results and/or encounters for the requested time period, some results have not been displayed. A complete set of results can be found in Results Review.

## 2018-11-05 NOTE — PROGRESS NOTES
Visited with pt/family on the basis of spiritual support for the pt/family. Reflected with pt/ family around their hospital experience, sources of spiritual and emotional support and current spiritual health needs.  Pt s daughter talked about her dad current situation and what it means for him and her...During my visit, pt, his daughter and one of his relatives were in the room.  During my conversation with them, they reported that, they worry about the health of their love one.  I let them know that I could be of support to the pt and his family during his hospitalization. I would be able to coordinate and participate as a spiritual supporter for both pt and his family. Pt  daughter reported that their ally is important to them and hope for the future and trust in God. Pt receives support from his children.   Emotional support. Reflective conversation integrating illness elements and family spiritual narratives. I shared reading and conversation that invite God into the room and to bless those present, support them in their suffering. I provided special prayer asking God to help him and to ease and eliminate any suffering and pain that he feels. I gave Islamic Prayer Booklet and several of Islamic Prayer Bookmarkers.  Pt/family received spiritual support and reflective conversation in the context of this hospitalization. Pt s daughter expressed appreciation for the visit and the encouragement that she felt that she has God s support in their struggles. Pt s daughter agreed to turning over their worries to God and that is an important part of their own healings.  Will continue to provide support to pt/family during their hospitalization at least 1x/wk.

## 2018-11-05 NOTE — PLAN OF CARE
Problem: Patient Care Overview  Goal: Plan of Care/Patient Progress Review  Outcome: Improving  Neuro: ADRIA   Cardiac:VSS. Temp increasing. Bear Hug on stand by at the end of the shift. BP elevated after we repo and changed him.   /58 (BP Location: Left leg)  Pulse 76  Temp 95.8  F (35.4  C) (Axillary)  Resp 14  Wt 80.4 kg (177 lb 4 oz)  SpO2 100%  BMI 25.43 kg/m2  Respiratory: Sating 90s on RA.  GI/: Adequate urine output from condom cath. BM X2  Loose stool. Condom cath lost at 2200.  Diet/appetite: TF off at 0000.  Activity:  Assist of 2 to turn. Bedrest.  Pain: No pain noted.  Skin: Mepilex cdi  LDA's: Mepilex x2  PIV  Feeding tube.    Plan: Continue with POC. Notify primary team with changes.  TF plan now for day shift. Per daughter. [id number]. Please discuss with primary team tomorrow.   MD orders at this time is for overnight. Daughter not okay with this plan.  TF to go off at 0000 due to not starting on time today.

## 2018-11-05 NOTE — PROGRESS NOTES
Vancomycin discontinued on 6:51 pm; patient received one dose of vancomycin in setting of blood c/s positive for staph epidermidis, unclear if real infection or contamination  Will repeat blood culture in AM; if any evidence of infection or hemodynamic instability to be resumed.       ANTWAN Eng  Internal Medicine Hospitalist service  P:[phone]

## 2018-11-05 NOTE — PROCEDURES
Procedure Date: 2018      EEG #-1       VIDEO EEG Day 1       DATE OF RECORDIN/3/2018.      SOURCE FILE DURATION:  9 hours and 52 minutes.      PATIENT INFORMATION:  A 78-year-old male with history of vascular dementia, end-stage renal disease, and multiple other medical conditions presents with recent seizure disorder, admitted with worsening mental status and shortness of breath.  EEG is being done to evaluate for seizures.       TECHNICAL SUMMARY: This video EEG monitoring procedure was performed with 23 scalp electrodes in 10-20 system placements, and additional scalp, precordial and other surface electrodes used for electrical referencing and artifact detection. Video was reviewed intermittently by EEG technologist and physician for electroclinical seizures.      EEG BACKGROUND:  The patient has diffuse generalized delta slowing with EEG attenuation for 1-2 seconds.  After 2800, the EEG had more diffuse electromyogenic artifact and less EEG segments of attenuation.  The patient does have maximum delta slowing noted in the left temporoparietal and right central-parietal region.  This is seen throughout the record.  Well-formed sleep architecture is not seen nor is parietooccipital rhythm.  We know the patient is awake because of the electromyogenic artifact that has produced this.      ACTIVATION PROCEDURES:  None.     EPILEPTIFORM DISCHARGES: None     ICTAL:  None.      IMPRESSION:  Video EEG day 1 is abnormal due to the presence of diffuse generalized delta slowing, with maximum slowing noted in the left central parietal and right central parietal regions.  This is consistent with a severe encephalopathy with maximum dysfunction in the left and right central parietal region.  The patient is not on an anesthetic drip medication to account for this degree of slowing.  No electrographic seizures nor epileptiform discharges were seen.  Clinical correlation is advised.         NIC COUCH MD              D: 2018   T: 2018   MT: [id number]      Name:     YADIRA BREAUX   MRN:      [id number]        Account:        [id number]   :      1940           Procedure Date: 2018      Document: [id number]

## 2018-11-05 NOTE — PROGRESS NOTES
Updated by RN that patient is making 70 ml since morning despite aggressive diuretic regimen   Discussed with Nephrology team - agreeable that patient is not a HD candidate   Also updated decision maker Beryl regarding this changes     Plan:  -continue one dose of 3 mg IV bumex with IV albumin and then redose with metolazone ; if still anuric then family knows that he is not a dialysis candidate   -will obtain evening BMP and lytes   -continue to monitor strict I and Os strictly   -will need another discussion with family tomorrow if patient is not making progress     ANTWAN Eng  Internal Medicine Hospitalist service  P;[phone]

## 2018-11-05 NOTE — PHARMACY-VANCOMYCIN DOSING SERVICE
Drug Level Evaluation  Patient is currently receiving Vancomycin .  A level was drawn at 0801 on 11/5/2018. The measured level result is 17.8 mg/L  This medication level is invalid because the medication was discontinued.

## 2018-11-05 NOTE — PLAN OF CARE
Problem: Patient Care Overview  Goal: Plan of Care/Patient Progress Review  Outcome: No Change  Time  4626-3997    Pt disoriented, non verbal besides some groaning, harder to arouse today. Not following commands. VSS stable except O2 sats dipping into the high 80s at times, resolved by stimulating cough to produce secretions. Lung sounds are coarse, rhonchorous at times. Currently sating mid 90s on RA. +2-3 edema present in pelvis, legs. Scheduled IV bumex given, plus an additional one time dose of bumex and metolazone. Condom cath put out 75 mL this shift. Bladder scan shows 35 mL. 1 large loose BM. Turns q2, barrier cream applied to perianal, miconazole to groin. Bilat leg mepilex CDI.   TFs running at 75 mL/hr, will be shut off at 2000. All meds through feeding tube. Continues on EEG monitoring, keppra, lacosimide. BGs  123, 134. 174, q4. Will continue to monitor and follow POC.

## 2018-11-05 NOTE — PROGRESS NOTES
Preliminary Video EEG impression on November 4, 2018 update 10:40pm  Full report to follow. Please look in inpatient chart, under procedure section.     This video EEG has changed. There is a generalized periodic pattern 1-2 hz in frequency, rhythmic at times. This pattern is concerning for seizures. We should optimize antiepileptic drug. General team and neurology was called.     Ativan 2mg given at 23:04 and this pattern improved. Optimize antiepileptic drug.         Tennille Lam MD  Staff Epilepsy Neurologist   [phone]

## 2018-11-05 NOTE — PROGRESS NOTES
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 IS with tacrolimus and pred. He has had a significant decline in status and was discharged after a long admission for seizure and encephalopathy, Dialysis dependant LILIAM with recovery, and volume overload discharged on Bumex. Admitted for dyspnea and cough and increased AMS.   Transplant nephrology consulted for LILIAM    SP SLK 12/2000  DDKT with baseline creat 1.3-1.6 -- was discharged on 1.6 and now increased to 1.9  Creatinine is plateau at 1.9 with UO responsive to diuretics but UO not monitored with x2 unmeasured outputs  Creatinine elevation with hx of recent ATN and HD dependant LILIAM , possible renal scarring and worsened baseline functions Vs cardiorenal syndrome like picture with 10kg weight gain on admission. Urine sodium is not reliable since he is on diuretics.   -- Continue with daily renal panel  -- Due to evidence of hypervolemia will recommend to continue diuresis with bumex 2mg BiD, if UO stays low can add metolazone 5mg at 4pm  -- Please weigh him daily since UO has been unmeasured it is difficult to assess volume  -- Strict IO monitoring as possible     Liver Tx-- stable    Immunosuppression on prednisone and low dose tacrolimus     Hypervolemia  -- diuresis with bumex and add metolazone as needed for achieving a net -ve balance  -- Infiltrates on CXR are likely a mix of fluid and rt aspiration related PNA/Pneumonitis    HTN well controlled  On coreg and amlodipine    Electrolytes  Hypervolemic hyponatremia  Improving with diuresis 129-->130   Hyperphosphatemia, 6.2 -->5.2  Please check with labs 2-3 times a wk is likely sec to LILIAM    Anemia   Anemia with iron deficiency and inflammation  Tsat 11% and ferritin 502  -- Hb has been ~8.5  -- can consider starting on iron supplements PO   -- We may consider TOMAS therapy if there is  no improvements    BMD  PTH 78  Calcium wnl and phos elevated sec to LILIAM  No change in management for now    Metabolic encephalopathy/ Seizures / post ictal  He has been on Keppra and with the repeat seizure  Vimpat added by neurology  -- will follow      C.diff on vanco PO    Recommendations were communicated to primary team     Seen and discussed with Dr. Chet Canales MD   [phone]    Interval History :   Nursing and provider notes from last 24 hours reviewed.  In the last 24 hours Priscilla Way has had a seizure on the video monitoring and was started on Vimpat    Review of Systems:   Unable to obtain due to AMS    Physical Exam:   I/O last 3 completed shifts:  In: 1070 [NG/GT:720]  Out: 400 [Emesis/NG output:400]   /54 (BP Location: Right arm)  Pulse 78  Temp 96.1  F (35.6  C) (Axillary)  Resp 16  Wt 80.4 kg (177 lb 4 oz)  SpO2 98%  BMI 25.43 kg/m2     GENERAL APPEARANCE: drowsy and non responsive  EYES:  - scleral icterus, pupils equal  HENT: mouth without ulcers or lesions  PULM: lungs wheezing scattered R>L to auscultation , equal air movement, no clubbing  CV: regular rhythm, normal rate, no rub     -JVD +     -edema +   GI: soft, non tender, -distended, bowel sounds are +  INTEGUMENT: no cyanosis, - rash  NEURO:  NAD, drowsy       Labs:   All labs reviewed by me  Electrolytes/Renal -   Recent Labs   Lab Test  11/05/18   0801  11/04/18   1750  11/04/18   0620  11/03/18   1852   NA  130*  129*  128*  128*   POTASSIUM  3.6  4.0  3.7  5.1   CHLORIDE  92*  93*  91*  89*   CO2  24  24  25  24   BUN  117*  119*  116*  115*   CR  1.98*  1.88*  1.85*  1.97*   GLC  123*  201*  122*  144*   JOSHUA  8.8  8.4*  8.6  8.8   MAG  2.0  2.0  2.2   --    PHOS  5.2*   --   5.4*  6.3*       CBC -   Recent Labs   Lab Test  11/05/18   0801  11/04/18   0620  11/03/18   0604   WBC  5.9  4.4  5.4   HGB  8.5*  8.7*  8.3*   PLT  380  444  480*       LFTs -   Recent Labs   Lab Test  11/02/18   1148  10/28/18   0818   10/10/18   1122   ALKPHOS  113  130  115   BILITOTAL  0.4  0.4  0.2   ALT  20  27  15   AST  23  34  24   PROTTOTAL  6.7*  6.8  5.9*   ALBUMIN  2.4*  2.4*  2.0*       Iron Panel -   Recent Labs   Lab Test  11/05/18   0801  11/08/16   1209  08/24/16   0852   IRON  29*  17*  20*   IRONSAT  11*  11*  10*   BARTOLO  502*   --   1025*         Imaging:  All imaging studies reviewed by me.     Current Medications:    amLODIPine  5 mg Per Feeding Tube Daily     aspirin  80 mg Per Feeding Tube Daily     bumetanide  2 mg Intravenous BID AC     carvedilol  6.25 mg Oral or Feeding Tube BID     cholecalciferol  400 Units Oral or Feeding Tube Daily     ferrous sulfate  600 mg Per Feeding Tube Daily with breakfast     insulin aspart  1-6 Units Subcutaneous Q4H     ipratropium - albuterol 0.5 mg/2.5 mg/3 mL  3 mL Nebulization 4x daily     lacosamide (VIMPAT) intermittent infusion  50 mg Intravenous BID     lactobacillus rhamnosus (GG)  1 capsule Per Feeding Tube BID     levETIRAcetam  500 mg Oral BID     miconazole   Topical BID     multivitamins with minerals  15 mL Per Feeding Tube Daily     pantoprazole  40 mg Per Feeding Tube BID AC     predniSONE  5 mg Oral or Feeding Tube Daily     sodium chloride (PF)  3 mL Intracatheter Q8H     tacrolimus  1.5 mg Oral or Feeding Tube BID     vancomycin  125 mg Oral or Feeding Tube 4x Daily     warfarin  2 mg Oral ONCE at 18:00       IV fluid REPLACEMENT ONLY       Warfarin Therapy Reminder       Jhonathan Canales MD

## 2018-11-05 NOTE — PLAN OF CARE
Problem: Patient Care Overview  Goal: Plan of Care/Patient Progress Review  Outcome: No Change  Pt here with seizure disorder/status epilepticus was admitted to the hospital because of worsening altered mental status and shortness of breath. Pt very lethargic. A dose of vimpat ordered for seizure activity noted on EEG. Bumex given for fluid volume overload. Oral vanco given for c-diff. Pt did have 2 bowel movement overnight. Incontinent of urine unable to replace condom cath, due to placement issues. Friend at bedside and supportive. Pt had intermittent cough, sats are 96% on RA.  BG overnight was 79, Q 4H.   Lissy hugger was on for comfort, pt current temp is 97.6. Turned Q2 H. Tube feeding turned off at midnight and restarted at 0600 per family request. Will continue to monitor and follow POC.

## 2018-11-05 NOTE — PROGRESS NOTES
[facility]  Patient is currently open to home care services with [facility].  The patient is currently receiving RN services to assess wound and tube feeding teaching and assessment.  [facility]  and team have been notified of patient admission.  [facility] liaison will continue to follow patient during stay.  If appropriate provide orders to resume home care at time of discharge.    Thank you  Vonda Rankin RN, BSN  [facility] Liaison  [facility]  [phone]

## 2018-11-05 NOTE — PROGRESS NOTES
Mr Way was seen and examined with staff during rounds.  He remains profoundly encephalopathic and is more sleepy than usual.  He makes no attempts to interact with examiners or speak.  His pupils are symmetric, pinpoint, and unreactive.  No spontaneous eye movements are observed, however he has full range of motion with oculocephalic maneuver.  No facial asymmetry.  Unable to assess for dysarthria.  Spontaneous limb movements are observed with antigravity strength in the arms.  Both arms and both legs respond appropriately to noxious stimuli.  Plantar reflexes are neutral.  Tendon reflexes are normal throughout.    EEG overnight showed generalized epileptiform discharges that did not evolve into overt seizures.  Nevertheless, he was treated with IV lorazepam and started on lacosamide.  Since administration of lorazepam, his EEG has improved.  We will plan to continue him on these medications for the time being.  His sedation is likely secondary to the Lorazepam.    Summary recommendations:  -Continue lacosamide 50 mg twice per day  -Continue levetiracetam 500 mg twice per day  -Continue video EEG for now  - Contact neurology resident on-call if concern for seizure    This patient was seen and discussed with staff neurologist, Jean Carlos Farias,   PGY4 Neurology

## 2018-11-05 NOTE — MR AVS SNAPSHOT
After Visit Summary   11/5/2018    Priscilla Way    MRN: [id number]           Patient Information     Date Of Birth          1940        Visit Information        Provider Department      11/5/2018 7:00 AM [facility] EEG TECH 4 [facility] EEG        Today's Diagnoses     Altered mental status, unspecified altered mental status type    -  1       Follow-ups after your visit        Who to contact     Please call your clinic at [phone] to:    Ask questions about your health    Make or cancel appointments    Discuss your medicines    Learn about your test results    Speak to your doctor            Additional Information About Your Visit        MyChart Information     [employer] gives you secure access to your electronic health record. If you see a primary care provider, you can also send messages to your care team and make appointments. If you have questions, please call your primary care clinic.  If you do not have a primary care provider, please call [phone] and they will assist you.      [employer] is an electronic gateway that provides easy, online access to your medical records. With [employer], you can request a clinic appointment, read your test results, renew a prescription or communicate with your care team.     To access your existing account, please contact your [facility] or call [phone] for assistance.        Care EveryWhere ID     This is your Care EveryWhere ID. This could be used by other organizations to access your [facility] medical records  [id number]         Blood Pressure from Last 3 Encounters:   11/05/18 166/63   10/29/18 142/54   06/07/18 135/60    Weight from Last 3 Encounters:   11/02/18 80.4 kg (177 lb 4 oz)   10/29/18 70.8 kg (156 lb 1.6 oz)   06/06/18 77.9 kg (171 lb 12.8 oz)              We Performed the Following     Glucose by meter     Glucose by meter          Today's Medication Changes      Notice     This visit is during an admission. Changes  to the med list made in this visit will be reflected in the After Visit Summary of the admission.             Primary Care Provider Office Phone # Fax #    Randy Fuentes [phone]       [address] [address]        Equal Access to Services     EDUARDO PALACIOS : Hadii yefri ku hadasho Soomaali, waaxda luqadaha, qaybta kaalmada adeegyada, waxay howardin haylean luis e allenfredmaria l boateng. So [facility] [phone].    ATENCIÓN: Si habla español, tiene a staples disposición servicios gratuitos de asistencia lingüística. Kaye al [phone].    We comply with applicable federal civil rights laws and [place] laws. We do not discriminate on the basis of race, color, national origin, age, disability, sex, sexual orientation, or gender identity.            Thank you!     Thank you for choosing [facility]  for your care. Our goal is always to provide you with excellent care. Hearing back from our patients is one way we can continue to improve our services. Please take a few minutes to complete the written survey that you may receive in the mail after your visit with us. Thank you!             Your Updated Medication List - Protect others around you: Learn how to safely use, store and throw away your medicines at www.disposemymeds.org.      Notice     This visit is during an admission. Changes to the med list made in this visit will be reflected in the After Visit Summary of the admission.

## 2018-11-05 NOTE — PROGRESS NOTES
Pt had some EEG changes and vimpat ordered.  Called both patient's daughters Cruz and May, unable to reach Cruz did speak with May explained that the EEG changed and that ativan was given and vimpat to be given as a one time dose. This writer explained that it was an anticonvulsant medication and that it was ordered to be given once. A new order was placed to be given twice a day this needs to be explained to the daughters. Will continue to monitor and follow POC.

## 2018-11-05 NOTE — CONSULTS
[facility] NEUROLOGIC CONSULTATION     Consult Date:  11/04/2018      We were asked to see Mr. Priscilla Way by Dr. Gillespie, in regards to EEG changes.      HISTORY OF PRESENT ILLNESS:  Mr. Way is a 78-year-old gentleman with extensive past medical history including vascular dementia, CVA, HCV cirrhosis (status post liver transplant in 2000), HCV end-stage renal disease (status post kidney transplant in 2000), now CKD stage III of the transplanted kidney, and recent seizure disorder and nonconvulsive status epilepticus, who was admitted to the hospital on 11/02/2018 due to worsening altered mental status and shortness of breath.  EEG was placed during this hospitalization to evaluate for altered mental status.  Up until this evening, the EEG was abnormal due to the presence of continuous generalized polymorphic delta-theta slowing.  However, on the evening of 11/04/2018 the video-EEG was noted to have a generalized periodic pattern concerning for seizures.  Ativan 2 mg was given and the pattern and improved, which confirmed the seizure activity.  We were asked to evaluate and optimize his antiepileptics.      The patient had a first generalized tonic-clonic seizure in 09/2018.  Keppra was recommended at that point due to advanced neurodegenerative disease and the patient being felt to be at a high risk for seizures.  The family did decide against Keppra at that point.  Since that time in September, he had a prolonged admission and a possible transition to comfort cares.  However, he continued to decline from a mental status standpoint, prompting admission again to the hospital.  The patient was seen by Neurology on 10/23 through 10/25/2018.  EEG revealed likely nonconvulsive status epilepticus in an atypical pattern.  This was aborted by intravenous Ativan and levetiracetam.  The patient was decided to continue levetiracetam at 750 mg every 12 hours.  The patient was then discharged from  the hospital on 750 mg b.i.d.  The patient returned to the hospital on 2018.  The patient's Keppra level was elevated at 95 at the time of admission.  There was concern that this could be contributing to his worsening encephalopathy.  Therefore, his Keppra was held on admission and ultimately decreased to 500 mg b.i.d.      REVIEW OF SYSTEMS:  A ten-point review of systems was unable to be completed due to encephalopathy.      PAST MEDICAL HISTORY:   Past Medical History:   Diagnosis Date     LILIAM (acute kidney injury) (H) 2016    kidney biopsy showed ATN     Dementia     multiple acute infarcts, SV dis on CT     Diabetes type 2, controlled (H)      H/O Clostridium difficile infection 2018    treated wiht vanco     Hepatitis C      Kidney transplanted      Liver transplant      Unspecified cerebral artery occlusion with cerebral infarction         PAST SURGICAL HISTORY:    Past Surgical History:   Procedure Laterality Date     TRANSPLANT KIDNEY RECIPIENT  DONOR       TRANSPLANT LIVER RECIPIENT  DONOR          MEDICATIONS:   Current Facility-Administered Medications   Medication     acetaminophen (TYLENOL) solution 650 mg     acetylcysteine (MUCOMYST) 10 % nebulizer solution 4 mL     amLODIPine (NORVASC) tablet 5 mg     aspirin suspension 80 mg     bumetanide (BUMEX) injection 2 mg     bumetanide (BUMEX) injection 2 mg     carvedilol (COREG) tablet 6.25 mg     cholecalciferol (vitamin D/D-VI-SOL) liquid 400 Units     dextrose 10 % 1,000 mL infusion     glucose gel 15-30 g    Or     dextrose 50 % injection 25-50 mL    Or     glucagon injection 1 mg     ferrous sulfate 300 (60 Fe) MG/5ML syrup 600 mg     heparin bolus from infusion pump     insulin aspart (NovoLOG) inj (RAPID ACTING)     ipratropium - albuterol 0.5 mg/2.5 mg/3 mL (DUONEB) neb solution 3 mL     lacosamide (VIMPAT) 50 mg in sodium chloride 0.9 % 100 mL intermittent infusion     lactobacillus rhamnosus (GG)  (CULTURELL) capsule 1 capsule     levETIRAcetam (KEPPRA) tablet 500 mg     lidocaine (LMX4) cream     lidocaine 1 % 1 mL     melatonin tablet 1 mg     miconazole (MICATIN; MICRO GUARD) 2 % powder     multivitamins with minerals (CERTAVITE/CEROVITE) liquid 15 mL     naloxone (NARCAN) injection 0.1-0.4 mg     ondansetron (ZOFRAN-ODT) ODT tab 4 mg     pantoprazole (PROTONIX) 2 mg/mL suspension 40 mg     predniSONE solution 5 mg     prochlorperazine (COMPAZINE) injection 5 mg    Or     prochlorperazine (COMPAZINE) tablet 5 mg    Or     prochlorperazine (COMPAZINE) Suppository 12.5 mg     sodium chloride (PF) 0.9% PF flush 3 mL     sodium chloride (PF) 0.9% PF flush 3 mL     tacrolimus (GENERIC EQUIVALENT) suspension 1.5 mg     vancomycin (FIRVANQ) oral solution 125 mg     vancomycin place hogue - receiving intermittent dosing     Warfarin Therapy Reminder (Check START DATE - warfarin may be starting in the FUTURE)        ALLERGIES:   No Known Allergies      SOCIAL HISTORY:    Social History     Social History     Marital status:      Spouse name: N/A     Number of children: N/A     Years of education: N/A     Occupational History     Not on file.     Social History Main Topics     Smoking status: Former Smoker     Types: Cigarettes     Smokeless tobacco: Never Used      Comment: quit 10 years ago     Alcohol use No     Drug use: No     Sexual activity: No     Other Topics Concern     Not on file     Social History Narrative        FAMILY HISTORY:  No family history on file.     PHYSICAL EXAMINATION:    VITAL SIGNS:  Blood pressure is 139/61, pulse is 76, temperature is 36.4 degrees Celsius, respirations are 16.  His BMI is 25.43.   GENERAL:  The patient is lying in bed with his eyes open and mouth open.  He does not appear to be in distress.   HEENT:  He has an NG tube in place.  Otherwise, atraumatic.   CHEST:  He is not in respiratory distress.   ABDOMEN:  Nondistended.   EXTREMITIES:  He does have 2+ edema  bilaterally.   SKIN:  No abnormal ecchymoses present.   NEUROLOGIC:     MENTAL STATUS:  The patient is nonverbal, he is groaning on movement.  He does not follow commands.   CRANIAL NERVES:  He does have a left facial droop, which is noted to be old.  His oculocephalics are intact.  His pupils are equal, round, and reactive to light.   MOTOR:  He is moving all four extremities spontaneously.  However, he is moving the left arm more than the right arm.   REFLEXES:  He has 2+ and symmetric reflex at the brachioradialis and biceps bilaterally.  He does have 1+ reflex at the patella bilaterally.   SENSORY:  He does withdraw all extremities to painful stimuli.   COORDINATION:  Unable to assess.   GAIT:  Not tested.      INVESTIGATIONS:  We reviewed all relevant investigations.      IMPRESSION:  Priscilla Way is a 78-year-old gentleman with extensive past medical history, including a seizure disorder as well as CKD stage III of a transplanted kidney and liver transplant in 2000, who is having worsening mental status and changes on EEG, concerning for seizure.  His exam does not appear to be changed from the prior exams available this evening.  The patient was on 750 mg b.i.d. of Keppra.  However, his level was elevated on admission and, therefore, this medication was on hold and ultimately decreased to 500mg BID. This dose is likely proper at this time for Mr. Way; however, if need be it can be increased again. Our preference would be to add a low dose of a second agent in order to better control his seizures.  His liver disease and CKD stage III preclude many antiepileptics.  We do believe the best choice would be Vimpat.  We will give him 100 mg of Vimpat this evening.  He will then start on 50 mg of Vimpat b.i.d.  Video EEG monitoring should be continued.      RECOMMENDATIONS:   1.  Give 100 mg of Vimpat now.   2.  Start Vimpat 50 mg b.i.d. in the a.m.   3.  Continue video EEG monitoring.      The patient was  discussed over the telephone with Dr. Neel Novak.      Patient will be staffed in the morning by attending physician, Dr. Rhea Mcpherson.    Chuckie Espino MD  Neurology resident, PGY-3  (P) [phone]     D: 2018   T: 2018   MT: AL      Name:     YADIRA BREAUX   MRN:      [id number]        Account:       [id number]   :      1940           Consult Date:  2018      Document: [id number]

## 2018-11-05 NOTE — PROCEDURES
Procedure Date: 11/04/2018   EEG NUMBER:  -2.   DATE OF RECORDING/SERVICE DATE:  11/04/2018.   SOURCE FILE DURATION:  23:02:25.      HISTORY:  This is day 2 of video EEG monitoring in a 78-year-old patient with ongoing altered mental status and a history of vascular dementia as well as recent nonconvulsive status.  Video EEG was started for seizure.      MEDICATION:  Keppra 750 mg b.i.d., Ativan at 2259, 2 mg.     TECHNICAL SUMMARY: This continuous video- EEG monitoring procedure was performed with 23 scalp electrodes in 10-20 electrode system placement, and additional scalp, precordial and other surface electrodes used for electrical referencing and artifact detection.  Video monitoring was utilized and periodically reviewed by EEG technologists and the physician for electroclinical correlations.     FINDINGS:     BACKGROUND:  The patient does not have transition state from awake to sleep.  No sleep architecture was seen throughout recording.  Overall, there is a generalized delta slowing with bursts of larger amplitude theta activity occurring intermittently.       EPILEPTIFORM ACTIVITY:  There are pseudo periodic generalized synchronous epileptiform discharges occurring at a frequency of 1 Hz and occurring in runs of varying durations, 3 seconds to over 3 minutes.  Most prominent amplitude can be seen in the P3 lead with some bifrontal predominance, but these epileptiform discharges can also spread to paracentral and temporal leads.  The patient was given Ativan at 2259 with dissolution of this pseudo periodic epileptiform discharge pattern and return to a generalized slowing background activity.       ICTAL ACTIVITY:  None.      IMPRESSION:  This is an abnormal day #2 video EEG due to the presence of generalized epileptiform discharges as well as diffuse slowing.  Given that the epileptiform discharges responded to Ativan, but there is no discreet onset and offset or evolution of the periodic discharges  which never go above 2.5 Hz, these discharges are likely epileptiform, but not considered to be seizures themselves.  The patient has a severe diffuse encephalopathy given his generalized slow background and limited sleep architecture or awakened state reactivity.  It is difficult to indicate that the patient's mental status declines during runs of these GPEDs given his poor baseline, and further clinical correlation is advised.      This report was dictated by Milo Fish DO, CNP fellow.      This report will be reviewed by epilepsy staff/supervisor, Maya Huerta MD.        Dictated By:  Milo Fish DO.  Clinical Neurophysiology Fellow  I agree with the findings as reported.  I personally reviewed this EEG recording.  Maya Huerta M.D Ph.D                 D: 2018   T: 2018   MT: EMILY      Name:     YADIRA BREAUX   MRN:      [id number]        Account:        [id number]   :      1940           Procedure Date: 2018      Document: [id number]

## 2018-11-05 NOTE — PROGRESS NOTES
INTERNAL MEDICINE PROGRESS NOTE    Priscilla Way ([id number]) admitted on 11/2/2018 11/05/2018    Assessment & Plan:  77 y/o male with PMHx significant for vascular dementia, CVA multiple sites, DVT on warfarin, HCV cirrhosis s/p liver transplant 2000, HCV ESRD s/p kidney transplant 2000, CKD III of transplanted kidney, HFpEF, recurrent C diff infection, malnutrition on tube feeds and recent seizure disorder/status epilepticus was admitted to the hospital because of worsening altered mental status and shortness of breath.     #Acute hypoxic respiratory failure  #Volume overload  #HFpEF (LVEF 55-60%)  According to one the patient's care taker, patient was noted to be coughing more and sounded productive. Due to language barrier unclear if symptoms of viral URI and sick contacts. On exam with coarse lung sounds, elevated JVP ~ 15cm and pitting edema on both legs. This favors a component of both aspiration and volume overload. CXR had signs of pulmonary edema. Repeat CXR's demonstrating improvement after diuresis. His last echo about a month ago had normal EF but with diastolic dysfunction. He is hypoalbuminemic therefore more risk of third spacing.   - Strict I/Os  - Bumex 2mg IV once  - Extra dose of bumex 2mg IV today  - One dose of metolazone 2.5mg today  - Net goal -1 to -2L  - BMP BID  - Daily weights  - Continue home coreg  - Not on lisinopril due to renal dysfunction  - Wean O2 as able     #Cough  #Aspiration pneumonia vs pneumonitis   CXR also with show evidence of increased pulmonary congestions suggestive of pulmonary edema and also possible aspiration. He has had other hospitalizations because of aspiration in the setting of TF's and altered mentation. Viral URI panel and influenza were negative. MRSA nares negative. Urine strep and pneumo antigen negative.  Procal not elevated.  Patient not needing supplemental O2, zosyn was discontinued.  - Sputum culture and smear, will need induced cough pending  -  DuoNeb  - Blood cultures pending  - Low threshold to start abx    Zosyn (11/02/18 - 11/03/18)    #Diarrhea  #Cdiff  Patient had had c diff in previous admissions, 03/2018, 05/2018, 06/2018, 09/2018 and now as well. Report from care taker was that patient with increasing stool output. He has been treated with vancomycin long term.  - Start 10 day course of vanc 125mg PO QID  - Then will do 20 days of rifaximin 400 mg PO TID    #Acute toxic encephalopathy  #Uremia  #Vascular dementia  #Uncontrolled seizure activity  #Recent diagnosis of seizure disorder with status epilepticus  Was on EEG monitor during last admission and had readings consistent with non-convulsive status epilepticus, but also had clonic tonic seizure back then. He is non-verbal. Head imaging in the past has shown chronic advanced small vessel disease that is diffuse and atherosclerotic plaques with stenosis along vertebral arteries and MCA. His mentation mainly affected by vascular dementia. GCS continues to be 5-6. Per previous notes, he can be aroused by verbal stimuli. Current encephalopathy multifactorial by ongoing evolution of cerebrovascular disease vs metabolic (uremia, hyponatremia) vs sepsis vs seizures. EEG monitor has indeed detected generalized rhythmic patterns concerning for seizures. This is despite being on keppra and supratherapeutic levels. He received 2mg of keppra which improved this pattern. TSH and NH3 normal. Furthermore, CT scan on 11/02/18 did not reveal any gross intracranial pathology. Unable to protect airway but he is DNR/DNI.  - Neuro checks  - Neurology consulted  - Continue lacosamide 50mg IV BID  - Continue Keppra 500mg POBID   - Continue EEG     #Hyponatremia, hypervolemic  Patient with signs of hypervolemia. Na on last admission was 130, now 121. This could be also affecting mentation. Low sodium could also have contribution from renal losses due to LILIAM. Serum osmolality normal due to elevated BUN.  - Na check  -  Diuresis as above     #HCV ESRD s/p transplant 2000  #LILIAM on CKD III of transplanted kidney  #?Renal vein thrombosis  Patient at one point was to be on dialysis of his transplanted kidney but renal function recovered. Baseline Cr ~ 1.2-1.5.  Now trending down with diuresis. Today was 2.0. Causes could be intrinsic injury from sepsis or UTI or tacro vs cardiorenal type 1. US of the transplanted kidney showing increase in resistive indices suggestive for renal vein thrombus vs LILIAM vs cardiorenal. Tacro level at goal (4-6).   - Discussed with transplant nephrology, will repeat US once LILIAM improves; no need for interventions at this moment  - Continue tacro and prednisone  - UA/UC     #+MRSE blood culture  One out of two bottles from admission was positive for MRSE. Received one dose of vanc. No fever or signs of clinical decompensation. Repeat cultures negative for now.  - Blood cultures x2, negative to date  - Low threshold to start IV abx    #HCV ESLD s/p transplant 2000  LFTs normal. No evidence of rejection or decompensation.  - Continue tacro and prednisone as above     #Chronic iron deficiency anemia  Hemoglobin stable. There was some concern about slow GI bleed in previous admissions. Patient also with CKD driving down Hgb production. No evidence of melena for now.   - Continue home PPI  - Continue home iron, which might confound appearance of stool     #Hx of DVT on warfarin  DVT bilaterally on US in 2016. INR subtherapeutic.   - Heparin bridge  - INR daily  - Pharmacy to dose warfarin      #Severe malnutrition on TF's  - TF consult placed  - Refeeding syndrome and nutrition labs per dietician     #DM2  - Continue home insulin regimen  Patient evaluated and discussed with Dr. Gillespie.      Code Status: DNR/DNI  FEN: TF's  PPx: DVT on warfarin, GI PPI PO  Dispo: pending improvement in mentation     Ramos Mcnair MD PhD  Internal Medicine, PGY-3  Pager: [phone]    Patient was seen and discussed with   Jose Miguel.     ==================================================================    Interval HistorySubjective:  No overnight events, patient not verbal. Has had loose bowel movements. Per daughter his mentation is not back to its baseline. Urinary output decreasing    Objective:  Most recent vital signs:  /54 (BP Location: Right arm)  Pulse 78  Temp 96.1  F (35.6  C) (Axillary)  Resp 16  Wt 80.4 kg (177 lb 4 oz)  SpO2 98%  BMI 25.43 kg/m2  Temp:  [95.8  F (35.4  C)-97.6  F (36.4  C)] 96.1  F (35.6  C)  Pulse:  [78] 78  Heart Rate:  [76-83] 76  Resp:  [14-16] 16  BP: (125-166)/(54-63) 125/54  SpO2:  [98 %-100 %] 98 %  Wt Readings from Last 2 Encounters:   11/02/18 80.4 kg (177 lb 4 oz)   10/29/18 70.8 kg (156 lb 1.6 oz)     Intake/Output Summary (Last 24 hours) at 11/05/18 1515  Last data filed at 11/05/18 1400   Gross per 24 hour   Intake              880 ml   Output              475 ml   Net              405 ml     Physical exam:  General: lethargic, non-verbal, appears more awake today  HEENT: PERRLA, EOMI, anicteric sclera, evidence of cataracts  Neck:  JVP ~10cm  CV: RRR, no murmur, no rubs, no gallops  Resp: Coarse bilateral crackles and ronchi  Abdomen: non tender, non distended, no organomegaly, +bs  Extremities: bilateral pitting edema to the knees, WWP  Skin: no rash, not jaundice  Psych: unable to assess due to non-responsive  Neuro: GCS 6-7    Labs:  Results for orders placed or performed during the hospital encounter of 11/02/18 (from the past 24 hour(s))   Blood culture   Result Value Ref Range    Specimen Description Blood Right Hand     Special Requests Received in aerobic bottle only     Culture Micro No growth after 21 hours    Blood culture   Result Value Ref Range    Specimen Description Blood Left Hand     Special Requests Received in aerobic bottle only     Culture Micro No growth after 21 hours    Basic metabolic panel   Result Value Ref Range    Sodium 129 (L) 133 - 144 mmol/L     Potassium 4.0 3.4 - 5.3 mmol/L    Chloride 93 (L) 94 - 109 mmol/L    Carbon Dioxide 24 20 - 32 mmol/L    Anion Gap 13 3 - 14 mmol/L    Glucose 201 (H) 70 - 99 mg/dL    Urea Nitrogen 119 (H) 7 - 30 mg/dL    Creatinine 1.88 (H) 0.66 - 1.25 mg/dL    GFR Estimate 35 (L) >60 mL/min/1.7m2    GFR Estimate If Black 42 (L) >60 mL/min/1.7m2    Calcium 8.4 (L) 8.5 - 10.1 mg/dL   Magnesium   Result Value Ref Range    Magnesium 2.0 1.6 - 2.3 mg/dL   Protein  random urine with Creat Ratio   Result Value Ref Range    Protein Random Urine 0.11 g/L    Protein Total Urine g/gr Creatinine 0.47 (H) 0 - 0.2 g/g Cr   Sodium random urine   Result Value Ref Range    Sodium Urine mmol/L 70 mmol/L   Potassium random urine   Result Value Ref Range    Potassium Urine mmol/L 9 mmol/L   Osmolality urine   Result Value Ref Range    Urine Osmolality 284 100 - 1200 mmol/kg   Creatinine urine calculation only   Result Value Ref Range    Creatinine Urine 23 mg/dL   CBC with platelets   Result Value Ref Range    WBC 5.9 4.0 - 11.0 10e9/L    RBC Count 2.77 (L) 4.4 - 5.9 10e12/L    Hemoglobin 8.5 (L) 13.3 - 17.7 g/dL    Hematocrit 26.7 (L) 40.0 - 53.0 %    MCV 96 78 - 100 fl    MCH 30.7 26.5 - 33.0 pg    MCHC 31.8 31.5 - 36.5 g/dL    RDW 16.6 (H) 10.0 - 15.0 %    Platelet Count 380 150 - 450 10e9/L   Basic metabolic panel   Result Value Ref Range    Sodium 130 (L) 133 - 144 mmol/L    Potassium 3.6 3.4 - 5.3 mmol/L    Chloride 92 (L) 94 - 109 mmol/L    Carbon Dioxide 24 20 - 32 mmol/L    Anion Gap 13 3 - 14 mmol/L    Glucose 123 (H) 70 - 99 mg/dL    Urea Nitrogen 117 (H) 7 - 30 mg/dL    Creatinine 1.98 (H) 0.66 - 1.25 mg/dL    GFR Estimate 33 (L) >60 mL/min/1.7m2    GFR Estimate If Black 40 (L) >60 mL/min/1.7m2    Calcium 8.8 8.5 - 10.1 mg/dL   Phosphorus   Result Value Ref Range    Phosphorus 5.2 (H) 2.5 - 4.5 mg/dL   Magnesium   Result Value Ref Range    Magnesium 2.0 1.6 - 2.3 mg/dL   Vancomycin level   Result Value Ref Range    Vancomycin Level 17.8  mg/L   Iron and iron binding capacity   Result Value Ref Range    Iron 29 (L) 35 - 180 ug/dL    Iron Binding Cap 271 240 - 430 ug/dL    Iron Saturation Index 11 (L) 15 - 46 %   Ferritin   Result Value Ref Range    Ferritin 502 (H) 26 - 388 ng/mL   CRP inflammation   Result Value Ref Range    CRP Inflammation 16.0 (H) 0.0 - 8.0 mg/L   INR   Result Value Ref Range    INR 2.29 (H) 0.86 - 1.14   Blood culture   Result Value Ref Range    Specimen Description Blood Right Hand     Special Requests Received in aerobic bottle only     Culture Micro No growth after 6 hours      *Note: Due to a large number of results and/or encounters for the requested time period, some results have not been displayed. A complete set of results can be found in Results Review.

## 2018-11-06 NOTE — PLAN OF CARE
Problem: Patient Care Overview  Goal: Plan of Care/Patient Progress Review  Outcome: No Change  Pt. Has  Dementia at baseline, unable to answer orientation questions.  Family at bedside.  Pt has coarse LS, unable to deep suction for sputum sample.  Pt requiring assist 2 w/ turning and repositioning.  Condom catheter on, pt having low urine output. Team made aware, 3 mg bumex ordered.Pt had very small increase in output.  No seizure activity noted this evening.  Pt having loose stools, on vanco for cdiff. Plan for nephrology to follow up w/ pt and family

## 2018-11-06 NOTE — PROGRESS NOTES
Nephrology Progress Note  11/06/2018         Assessment & Recommendations:   Priscilla Way is a 78 year old year old male with Dementia, Hx of ESLD sec to hep C SLT 12/2000. IS with tacrolimus and pred. He has had a significant decline in status and was discharged after a long admission for seizure and encephalopathy, Dialysis dependant LILIAM with recovery, and volume overload discharged on Bumex. Admitted for dyspnea and cough and increased AMS.   Transplant nephrology consulted for LILIAM    SP SLK 12/2000  DDKT with baseline creat 1.3-1.6 -- was discharged on 1.6 and now increased to 1.9  Creatinine is plateau at 1.9 with UO now poor response to diuretics  + hematuria -- new since this morning , no patino, has a condom cath. BK PCR sent yesterday will follow  Creatinine elevation with hx of recent ATN and HD dependant LILIAM , possible renal scarring and worsened baseline functions Vs cardiorenal syndrome like picture with 10kg weight gain on admission. Urine sodium is not reliable since he is on diuretics.   -- he is very close to his dry weight and has edema which can be medically managed as much as possible  -- increase bumex 4mg BiD and keep metolazone to 5mg   With low albumin can try IV albumin with bumex , and monitor UO  -- Continue with daily renal panel  -- Please weigh him daily since UO has been unmeasured it is difficult to assess volume  -- Strict IO monitoring as possible     He is not a candidate for dialysis liseth with poor functional status and encephalopathy with seizures. Will trial with maximal medical support.    Liver Tx-- stable    Immunosuppression on prednisone and low dose tacrolimus   -- Please recheck tacrolimus levels with am labs    Hypervolemia  Dry weght unknown but he has been as low as 68kgs in the past  -- diuresis with bumex and metolazone as needed for achieving a net -ve balance/even balance  -- Infiltrates on CXR are likely a mix of fluid and rt aspiration related  PNA/Pneumonitis    HTN well controlled  On coreg and amlodipine    Electrolytes  Hypervolemic hyponatremia  Improving with diuresis 129-->130-->132   Hyperphosphatemia, 6.2 -->5.2-->4.6  Please check with labs 2-3 times a wk is likely sec to LILIAM    Anemia   Anemia with iron deficiency and inflammation  Tsat 11% and ferritin 502  -- Hb has been ~8.5  -- can consider starting on iron supplements PO   -- We may consider TOMAS therapy if there is no improvements    BMD  PTH 78  Calcium wnl and phos elevated sec to LILIAM  No change in management for now    Metabolic encephalopathy/ Seizures / post ictal  He has been on Keppra and Vimpat added by neurology  On video EEG monitoring, with diffuse slowing suggestive of encephalopathy  -- will follow      C.diff on vanco PO    Recommendations were communicated to primary team     Seen and discussed with Dr. Chet Canales MD   [phone]    Interval History :   Nursing and provider notes from last 24 hours reviewed.  In the last 24 hours Priscilla Way has had hematuria otherwise no events  Hb has been stable    Review of Systems:   Unable to obtain due to AMS    Physical Exam:   I/O last 3 completed shifts:  In: 1400 [NG/GT:350]  Out: 450 [Urine:450]   /60 (BP Location: Left arm)  Pulse 81  Temp 96.5  F (35.8  C) (Axillary)  Resp 16  Wt 72.2 kg (159 lb 2.8 oz)  SpO2 99%  BMI 22.84 kg/m2     GENERAL APPEARANCE: drowsy and non responsive  EYES:  - scleral icterus, pupils equal  HENT: mouth without ulcers or lesions  PULM: lungs wheezing scattered R>L to auscultation , equal air movement, no clubbing  CV: regular rhythm, normal rate, no rub     -JVD +     -edema +   GI: soft, non tender, -distended, bowel sounds are +  INTEGUMENT: no cyanosis, - rash  NEURO:  NAD, drowsy   : condom cath with gross red blood    Labs:   All labs reviewed by me  Electrolytes/Renal -   Recent Labs   Lab Test  11/06/18   0622  11/05/18   1851  11/05/18   0801   11/04/18   0620   NA   132*  130*  130*   < >  128*   POTASSIUM  3.7  4.0  3.6   < >  3.7   CHLORIDE  93*  92*  92*   < >  91*   CO2  26  28  24   < >  25   BUN  126*  124*  117*   < >  116*   CR  1.93*  2.02*  1.98*   < >  1.85*   GLC  139*  183*  123*   < >  122*   JOSHUA  8.8  8.6  8.8   < >  8.6   MAG  2.0  2.1  2.0   < >  2.2   PHOS  4.6*   --   5.2*   --   5.4*    < > = values in this interval not displayed.       CBC -   Recent Labs   Lab Test  11/06/18   0622  11/05/18   0801  11/04/18   0620   WBC  12.8*  5.9  4.4   HGB  8.4*  8.5*  8.7*   PLT  345  380  444       LFTs -   Recent Labs   Lab Test  11/02/18   1148  10/28/18   0818  10/10/18   1122   ALKPHOS  113  130  115   BILITOTAL  0.4  0.4  0.2   ALT  20  27  15   AST  23  34  24   PROTTOTAL  6.7*  6.8  5.9*   ALBUMIN  2.4*  2.4*  2.0*       Iron Panel -   Recent Labs   Lab Test  11/05/18   0801  11/08/16   1209  08/24/16   0852   IRON  29*  17*  20*   IRONSAT  11*  11*  10*   BARTOLO  502*   --   1025*         Imaging:  All imaging studies reviewed by me.     Current Medications:    amLODIPine  5 mg Per Feeding Tube Daily     bumetanide  4 mg Intravenous BID AC     carvedilol  6.25 mg Oral or Feeding Tube BID     cholecalciferol  400 Units Oral or Feeding Tube Daily     ferrous sulfate  600 mg Per Feeding Tube Daily with breakfast     insulin aspart  1-6 Units Subcutaneous Q4H     ipratropium - albuterol 0.5 mg/2.5 mg/3 mL  3 mL Nebulization 4x daily     lacosamide (VIMPAT) intermittent infusion  50 mg Intravenous BID     lactobacillus rhamnosus (GG)  1 capsule Per Feeding Tube BID     levETIRAcetam  500 mg Oral or Feeding Tube BID     metolazone  5 mg Oral Daily     miconazole   Topical BID     multivitamins with minerals  15 mL Per Feeding Tube Daily     pantoprazole  40 mg Per Feeding Tube BID AC     piperacillin-tazobactam  2.25 g Intravenous Q6H     predniSONE  5 mg Oral or Feeding Tube Daily     sodium chloride (PF)  3 mL Intracatheter Q8H     tacrolimus  1.5 mg Oral or Feeding  Tube BID     vancomycin  125 mg Oral or Feeding Tube 4x Daily       IV fluid REPLACEMENT ONLY       Jhonathan Canales MD

## 2018-11-06 NOTE — PLAN OF CARE
Problem: Patient Care Overview  Goal: Plan of Care/Patient Progress Review  Outcome: No Change  VSS on RA. Pt non-verbal. Resistive during cares, otherwise not interactive. EEG monitoring in place, no seizure activity noted. NG tube clamped. NPO. Low urine output, IV bumex and albumin given. Condom cath in place, working well. Small loose BM x 1. Mepilex to b/l groin and coccyx, CDI. Oral cares completed. Family at bedside. PIV, TKO. Plan to monitor urine output and mental status.

## 2018-11-06 NOTE — PROCEDURES
Procedure Date: 2018   EEG #:  -3   TYPE OF STUDY:  Video EEG monitoring   DATE OF RECORDIN2018   DURATION OF RECORDIN:52:03      HISTORY:  This is day #3 of video EEG monitoring in 78-year-old patient with ongoing altered mental status and history of vascular dementia, as well as recent nonconvulsive status.  Video EEG was started for seizure.      MEDICATIONS:   1.  Lacosamide 50 mg b.i.d.   2.  Keppra 500 mg b.i.d.   3.  Prednisone 5 mg daily.     TECHNICAL SUMMARY: This continuous video- EEG monitoring procedure was performed with 23 scalp electrodes in 10-20 electrode system placement, and additional scalp, precordial and other surface electrodes used for electrical referencing and artifact detection.  Video monitoring was utilized and periodically reviewed by EEG technologists and the physician for electroclinical correlations.     FINDINGS:   BACKGROUND:  Throughout the recording, the patient does not have any prominent sleep architecture seen, but does have some degree of transition state from an awakened encephalopathic state to a sleep state due to decreased myogenic artifact seen during resting periods.  Overall, there is no posterior dominant rhythm that is well formed.  There is generalized theta slowing that occurs for prolonged periods and arises out of an attenuated background delta of 1 Hz that is generalized and low amplitude.  This theta slowing can be seen in the central and paracentral leads, but can also spread into the temporal leads.  There is ongoing superimposed beta activity that is best seen during sleep that is focal to the central leads.  During the awakened state, there is increased myogenic artifact, but there is still prolonged periods of attenuation with theta slowing.      EPILEPTIFORM ACTIVITY:  There are brief runs of 0.5-1 Hz generalized pseudoperiodic epileptiform discharges seen, occurring in short durations of 30 seconds or less.  These discharges  have a triphasic morphology but would not be considered triphasic waves.  There is no electroclinical correlate to these periods of discharges.      IMPRESSION:  This is an abnormal day #3 video EEG due to the presence of diffuse slowing in a theta frequency, no posterior dominant rhythm or sleep architecture, background delta slowing, and epileptiform discharges, most predominantly seen bifrontally.  These findings suggest ongoing moderate to severe encephalopathy that is diffuse with some focal cerebral dysfunction in the bifrontal area.  Clinical correlation is advised.        This report is dictated by Milo Fish DO.  CNP Fellow.      This report will be reviewed by epilepsy staff/supervisor, Maya Huerta MD.          Dictated By:  Milo Fish DO.  Clinical Neurophysiology Fellow  I agree with the findings as reported.  I personally reviewed this EEG recording.  Maya Huerta M.D Ph.D              D: 2018   T: 2018   MT: AKUA      Name:     YADIRA BREAUX   MRN:      [id number]        Account:        [id number]   :      1940           Procedure Date: 2018      Document: [id number]

## 2018-11-06 NOTE — PLAN OF CARE
Problem: Patient Care Overview  Goal: Plan of Care/Patient Progress Review  Outcome: No Change  Time 8978-0867    Reason for admission: AMS and fluid overload  Vitals: Stable on room air. Pulse ox in place.   Activity: Bedrest. Turn q 2 hours in bed with incontinence cares.   Pain: No nonverbal indicators present while resting.   Neuro: Non verbal. Unable to assess much of neuro status like orientation. Does not follow commands.      Cardiac/ Respiratory: No acute changes   GI/: condom catheter in place with pink/red urine output. Low urine output. MD team aware of urine color. Nephrology following and IV bumex given as scheduled.   Diet: NPO. All meds via J tube. Tube feeding started at 1000 at goal rate of 75mL/hr.   Lines: PIV infusing IV meds and then can be saline locked.   Skin/Wounds: Mepilex on bilateral thighs and coccyx. Small skin tear/abrasion in groin fold of L leg with some scant bleeding today.   Endocrine: BGs q 4 hours - no sliding scale insulin required during this time.        New changes this shift: Monitoring urine output/color.       Plan: TBD after MD team rounds with family     Continue to monitor and follow POC

## 2018-11-06 NOTE — MR AVS SNAPSHOT
After Visit Summary   11/6/2018    Priscilla Way    MRN: [id number]           Patient Information     Date Of Birth          1940        Visit Information        Provider Department      11/6/2018 7:00 AM [facility] EEG TECH 4 [facility] EEG        Today's Diagnoses     Altered mental status, unspecified altered mental status type    -  1       Follow-ups after your visit        Who to contact     Please call your clinic at [phone] to:    Ask questions about your health    Make or cancel appointments    Discuss your medicines    Learn about your test results    Speak to your doctor            Additional Information About Your Visit        MyChart Information     [employer] gives you secure access to your electronic health record. If you see a primary care provider, you can also send messages to your care team and make appointments. If you have questions, please call your primary care clinic.  If you do not have a primary care provider, please call [phone] and they will assist you.      [employer] is an electronic gateway that provides easy, online access to your medical records. With [employer], you can request a clinic appointment, read your test results, renew a prescription or communicate with your care team.     To access your existing account, please contact your [facility] or call [phone] for assistance.        Care EveryWhere ID     This is your Care EveryWhere ID. This could be used by other organizations to access your [facility] medical records  [id number]         Blood Pressure from Last 3 Encounters:   11/06/18 146/65   10/29/18 142/54   06/07/18 135/60    Weight from Last 3 Encounters:   11/05/18 72.2 kg (159 lb 2.8 oz)   10/29/18 70.8 kg (156 lb 1.6 oz)   06/06/18 77.9 kg (171 lb 12.8 oz)              We Performed the Following     Glucose by meter     Glucose by meter          Today's Medication Changes      Notice     This visit is during an admission.  Changes to the med list made in this visit will be reflected in the After Visit Summary of the admission.             Primary Care Provider Office Phone # Fax #    Randy Fuentes [phone]       [address] [address]        Equal Access to Services     EDUARDO PALACIOS : Hadii yefri ku hadasho Soomaali, waaxda luqadaha, qaybta kaalmada adeegyada, waxamy snyder hayadriane boateng. So [facility] [phone].    ATENCIÓN: Si habla español, tiene a staples disposición servicios gratuitos de asistencia lingüística. [facility] [phone].    We comply with applicable federal civil rights laws and [place] laws. We do not discriminate on the basis of race, color, national origin, age, disability, sex, sexual orientation, or gender identity.            Thank you!     Thank you for choosing [facility]  for your care. Our goal is always to provide you with excellent care. Hearing back from our patients is one way we can continue to improve our services. Please take a few minutes to complete the written survey that you may receive in the mail after your visit with us. Thank you!             Your Updated Medication List - Protect others around you: Learn how to safely use, store and throw away your medicines at www.disposemymeds.org.      Notice     This visit is during an admission. Changes to the med list made in this visit will be reflected in the After Visit Summary of the admission.

## 2018-11-06 NOTE — PROGRESS NOTES
INTERNAL MEDICINE PROGRESS NOTE    Priscilla Way ([id number]) admitted on 11/2/2018 11/06/2018    Assessment & Plan:  79 y/o male with PMHx significant for vascular dementia, CVA multiple sites, DVT on warfarin, HCV cirrhosis s/p liver transplant 2000, HCV ESRD s/p kidney transplant 2000, CKD III of transplanted kidney, HFpEF, recurrent C diff infection, malnutrition on tube feeds and recent seizure disorder/status epilepticus was admitted to the hospital because of worsening altered mental status and shortness of breath.     #Acute hypoxic respiratory failure  #Volume overload  #HFpEF (LVEF 55-60%)  According to one the patient's care taker, patient was noted to be coughing more and sounded productive. Due to language barrier unclear if symptoms of viral URI and sick contacts. On exam with coarse lung sounds, elevated JVP ~ 15cm and pitting edema on both legs. This favors a component of both aspiration and volume overload. CXR had signs of pulmonary edema. Repeat CXR's demonstrating improvement after diuresis. His last echo about a month ago had normal EF but with diastolic dysfunction. He is hypoalbuminemic therefore more risk of third spacing.   - Strict I/Os  - Bumex 4mg BID  - Metolazone 5mg daily for now  - Net goal -1 to -2L  - BMP BID  - Daily weights  - Continue home coreg  - Not on lisinopril due to renal dysfunction  - Wean O2 as able     #Cough  #Aspiration pneumonia vs pneumonitis   #Leukocytosis  CXR also with show evidence of increased pulmonary congestions suggestive of pulmonary edema and also possible aspiration. He has had other hospitalizations because of aspiration in the setting of TF's and altered mentation. Viral URI panel and influenza were negative. MRSA nares negative. Urine strep and pneumo antigen negative.  Procal not elevated.  Patient not needing supplemental O2, zosyn was discontinued. However, patient with low grade temps and WBC elevated as well as procal.   - Start zosyn  renally dosed  - DuoNeb  - Blood cultures pending  - Low threshold to start abx  - Repeat procal     Zosyn (11/02/18 - 11/03/18, 11/06/18 -  currently)    #HCV ESRD s/p transplant 2000  #LILIAM on CKD III of transplanted kidney  #?Renal vein thrombosis  #?Gross hematuria  #Hyperphosphatemia  Patient at one point was to be on dialysis of his transplanted kidney but renal function recovered. Baseline Cr ~ 1.2-1.5, but remains elevated and with not as much urinary output expected for high doses of diuretics. He does look volume up on exam, although less than during admission. Causes could be intrinsic injury from infectious causes, medications, cardiorenal type 1, significant renal compromise at last admission. US of the transplanted kidney showing increase in resistive indices suggestive for renal vein thrombus vs LILIAM vs cardiorenal. Tacro level at goal (4-6). Today with possible gross hematuria. CK was measured due to seizure history, but it was below normal range. His last BK virus check on file from 2008 was negative, which can cause hemorrhagic cystitis.   - Discussed with transplant nephrology, will repeat US once LILIAM improves; no need for interventions at this moment  - Continue tacro and prednisone  - Tacro level in AM  - Diuretics as above  - BK virus pending  - UA/UC  - Trending PO4    #Diarrhea  #Cdiff  Patient had had c diff in previous admissions, 03/2018, 05/2018, 06/2018, 09/2018 and now as well. Report from care taker was that patient with increasing stool output. He has been treated with vancomycin long term.  - Start 10 day course of vanc 125mg PO QID  - Then will do 20 days of rifaximin 400 mg PO TID    #Acute toxic encephalopathy  #Uremia  #Vascular dementia  #Uncontrolled seizure activity  #Recent diagnosis of seizure disorder with status epilepticus  Was on EEG monitor during last admission and had readings consistent with non-convulsive status epilepticus, but also had clonic tonic seizure back  then. He is non-verbal. Head imaging in the past has shown chronic advanced small vessel disease that is diffuse and atherosclerotic plaques with stenosis along vertebral arteries and MCA. His mentation mainly affected by vascular dementia. GCS continues to be 5-6. Per previous notes, he can be aroused by verbal stimuli. Current encephalopathy multifactorial by ongoing evolution of cerebrovascular disease vs metabolic (uremia, hyponatremia) vs sepsis vs seizures. EEG monitor has indeed detected generalized rhythmic patterns concerning for seizures. This is despite being on keppra and supratherapeutic levels. He received 2mg of ativan which improved this pattern, but there is still some background pseudoperiodic epileptiform discharges. TSH and NH3 normal. Furthermore, CT scan on 11/02/18 did not reveal any gross intracranial pathology. Unable to protect airway but he is DNR/DNI.  - Neuro checks  - Neurology consulted  - Continue lacosamide 50mg IV BID  - Continue Keppra 500mg POBID   - Continue EEG     #Hyponatremia, hypervolemic  Patient with signs of hypervolemia. Na on last admission was 130, now 121. This could be also affecting mentation. Low sodium could also have contribution from renal losses due to LILIAM. Serum osmolality normal due to elevated BUN.  - Na check  - Diuresis as above      #+MRSE blood culture  One out of two bottles from admission was positive for MRSE. Received one dose of vanc. No fever or signs of clinical decompensation. Repeat cultures negative for now.  - Blood cultures x2, negative to date  - Low threshold to start IV abx    #HCV ESLD s/p transplant 2000  LFTs normal. No evidence of rejection or decompensation.  - Continue tacro and prednisone as above     #Chronic iron deficiency anemia  Hemoglobin stable. There was some concern about slow GI bleed in previous admissions. Patient also with CKD driving down Hgb production. No evidence of melena for now.   - Continue home PPI  - Continue  home iron, which might confound appearance of stool     #Hx of DVT on warfarin  DVT bilaterally on US in 2016. INR subtherapeutic.   - Heparin bridge  - INR daily  - Pharmacy to dose warfarin      #Severe malnutrition on TF's  - TF consult placed  - Refeeding syndrome and nutrition labs per dietician     #DM2  - Continue home insulin regimen  Patient evaluated and discussed with Dr. Gillespie.      Code Status: DNR/DNI  FEN: TF's  PPx: DVT on warfarin, GI PPI PO  Dispo: pending improvement in mentation     Ramos Mcnair MD PhD  Internal Medicine, PGY-3  Pager: [phone]    Patient was seen and discussed with Dr. Martinez.     ==================================================================    Interval HistorySubjective:  No overnight events, patient not verbal but appears more awake than yesterday. Urine with some evidence of blood. Has had some loose and formed stools. Pt's daughter at bedside, mentation continues to be altered.     Objective:  Most recent vital signs:  /60 (BP Location: Left arm)  Pulse 81  Temp 96.5  F (35.8  C) (Axillary)  Resp 16  Wt 72.2 kg (159 lb 2.8 oz)  SpO2 99%  BMI 22.84 kg/m2  Temp:  [96.5  F (35.8  C)-97.3  F (36.3  C)] 96.5  F (35.8  C)  Pulse:  [81-89] 81  Heart Rate:  [79-81] 79  Resp:  [16] 16  BP: (135-164)/(48-74) 135/60  SpO2:  [97 %-99 %] 99 %  Wt Readings from Last 2 Encounters:   11/05/18 72.2 kg (159 lb 2.8 oz)   10/29/18 70.8 kg (156 lb 1.6 oz)     Intake/Output Summary (Last 24 hours) at 11/06/18 1804  Last data filed at 11/06/18 1748   Gross per 24 hour   Intake              750 ml   Output              885 ml   Net             -135 ml     Physical exam:  General: lethargic, non-verbal, appears more awake today  HEENT: PERRLA, EOMI, anicteric sclera, evidence of cataracts  Neck:  JVP ~7cm above clavicle  CV: RRR, no murmur, no rubs, no gallops  Resp: Coarse bilateral crackles and ronchi  Abdomen: non tender, non distended, no organomegaly, +bs  Extremities:  bilateral pitting edema at the ankles improved from yesterday, WWP  Skin: no rash, not jaundice  Psych: unable to assess due to non-responsive  Neuro: GCS 6-7    Labs:  Results for orders placed or performed during the hospital encounter of 11/02/18 (from the past 24 hour(s))   Basic metabolic panel   Result Value Ref Range    Sodium 130 (L) 133 - 144 mmol/L    Potassium 4.0 3.4 - 5.3 mmol/L    Chloride 92 (L) 94 - 109 mmol/L    Carbon Dioxide 28 20 - 32 mmol/L    Anion Gap 10 3 - 14 mmol/L    Glucose 183 (H) 70 - 99 mg/dL    Urea Nitrogen 124 (H) 7 - 30 mg/dL    Creatinine 2.02 (H) 0.66 - 1.25 mg/dL    GFR Estimate 32 (L) >60 mL/min/1.7m2    GFR Estimate If Black 39 (L) >60 mL/min/1.7m2    Calcium 8.6 8.5 - 10.1 mg/dL   Magnesium   Result Value Ref Range    Magnesium 2.1 1.6 - 2.3 mg/dL   CBC with platelets   Result Value Ref Range    WBC 12.8 (H) 4.0 - 11.0 10e9/L    RBC Count 2.74 (L) 4.4 - 5.9 10e12/L    Hemoglobin 8.4 (L) 13.3 - 17.7 g/dL    Hematocrit 26.1 (L) 40.0 - 53.0 %    MCV 95 78 - 100 fl    MCH 30.7 26.5 - 33.0 pg    MCHC 32.2 31.5 - 36.5 g/dL    RDW 16.8 (H) 10.0 - 15.0 %    Platelet Count 345 150 - 450 10e9/L   Basic metabolic panel   Result Value Ref Range    Sodium 132 (L) 133 - 144 mmol/L    Potassium 3.7 3.4 - 5.3 mmol/L    Chloride 93 (L) 94 - 109 mmol/L    Carbon Dioxide 26 20 - 32 mmol/L    Anion Gap 14 3 - 14 mmol/L    Glucose 139 (H) 70 - 99 mg/dL    Urea Nitrogen 126 (H) 7 - 30 mg/dL    Creatinine 1.93 (H) 0.66 - 1.25 mg/dL    GFR Estimate 34 (L) >60 mL/min/1.7m2    GFR Estimate If Black 41 (L) >60 mL/min/1.7m2    Calcium 8.8 8.5 - 10.1 mg/dL   INR   Result Value Ref Range    INR 2.56 (H) 0.86 - 1.14   Magnesium   Result Value Ref Range    Magnesium 2.0 1.6 - 2.3 mg/dL   Procalcitonin   Result Value Ref Range    Procalcitonin 1.93 ng/ml   Phosphorus   Result Value Ref Range    Phosphorus 4.6 (H) 2.5 - 4.5 mg/dL   WBC Differential   Result Value Ref Range    Diff Method Automated Method      % Neutrophils 80.8 %    % Lymphocytes 10.7 %    % Monocytes 7.9 %    % Eosinophils 0.3 %    % Basophils 0.1 %    % Immature Granulocytes 0.2 %    Absolute Neutrophil 9.7 (H) 1.6 - 8.3 10e9/L    Absolute Lymphocytes 1.3 0.8 - 5.3 10e9/L    Absolute Monocytes 0.9 0.0 - 1.3 10e9/L    Absolute Eosinophils 0.0 0.0 - 0.7 10e9/L    Absolute Basophils 0.0 0.0 - 0.2 10e9/L    Abs Immature Granulocytes 0.0 0 - 0.4 10e9/L   CK total   Result Value Ref Range    CK Total 28 (L) 30 - 300 U/L   XR Chest Port 1 View    Narrative     Examination:  XR CHEST PORT 1 VW     Date:  11/6/2018 12:20 PM      Clinical Information: Interval change; recurrent aspiration pneumonia    Comparison: 11/5/2018, 11/4/2018,.    Findings:   Pulmonary vascular congestion and diffuse patchy opacities. Aortic  tortuosity with cardiomegaly. The feeding tube is partially  nonvisualized off the bottom of the film but may be within the  proximal jejunum. No pneumothorax. Bilateral blunting of costophrenic  angles..      Impression    Impression:  No significant change in bibasilar opacities (could represent  atelectasis or infection), pulmonary interstitial+alveolar edema, or  small bilateral pleural effusions.    I have personally reviewed the examination and initial interpretation  and I agree with the findings.    MAYA YUEN MD     *Note: Due to a large number of results and/or encounters for the requested time period, some results have not been displayed. A complete set of results can be found in Results Review.

## 2018-11-06 NOTE — PLAN OF CARE
Time: 6589-7813    /60 (BP Location: Left arm)  Pulse 89  Temp 96.9  F (36.1  C) (Axillary)  Resp 16  Wt 72.2 kg (159 lb 2.8 oz)  SpO2 97%  BMI 22.84 kg/m2     Reason for admission: Shortness of breath    Neuro: Nonverbal, groaning with pain, can't follow commands, Left sided facial droop, able to move extremities.  Behavior: Lethargic, irritable/agitated.   Activity: Total cares, assistance x2, repositioned q2hr  Lines: PIV R arm.  Cardiac: WDL  Respiratory: No respirtatory distress, rhonchi appreciated, diminished lung sounds in lower lobes.   GI/: Tube feedings at 75 mL/hr, stopped at 2000, BM x2  Skin: Meplilex applied to thighs and sacrum, miconazole powder and barrier cream applied.   Endo: Blood glucose 175, 181, insulin given per orders.   Pain: Responds to pain.   Lab: Chest X-ray completed today  Consults: Nephrology     New changes this shift: Bumex changed to 3mg, family questioned Keppra dosage, requested liquid medications, notified RN.     Plan: Continue with plan of care.

## 2018-11-06 NOTE — PLAN OF CARE
Problem: Patient Care Overview  Goal: Plan of Care/Patient Progress Review   11/06/18 5221   Plan of Care Review   Progress no change     Pt admitted with fluid volume overload and low urine output. VSS on RA. Continuous pulse ox on, sats in mid-high 90's. Lift. Repo q 2 hrs. Mits on. Incontinent of stool. Condom cath on, urine was cranberry colored turning more to reed. Gave IV Bumex x1. NPO. NJ infusing w/ TF @ 25 mL/hr continuously. Orders changed this shift from cycled. Mepilex on bilateral thighs, CDI. Changed Mepilex on coxxys. Right groin skin tear, applied powder. EEG on. BID VIMPAT. Scheduled PO Vanco for c diff. Ordered IV Zosyn, Metolozone, and chest xray this shift. Continue to monitor and follow the POC.

## 2018-11-06 NOTE — PROVIDER NOTIFICATION
MD text page notified of new cranberry colored urine output with clots. 200 ml. Prior urine was clear yellow.

## 2018-11-07 NOTE — PROGRESS NOTES
Nephrology Progress Note  11/07/2018         Assessment & Recommendations:   Priscilla Way is a 78 year old year old male with Dementia, Hx of ESLD sec to hep C SLT 12/2000. IS with tacrolimus and pred. He has had a significant decline in status and was discharged after a long admission for seizure and encephalopathy, Dialysis dependant LILIAM with recovery, and volume overload discharged on Bumex. Admitted for dyspnea and cough and increased AMS.   Transplant nephrology consulted for LILIAM    SP SLK 12/2000  DDKT with baseline creat 1.3-1.6 -- was discharged on 1.6 and now increased to ~1.9-2  + hematuria -- -ve BK  Creatinine elevation with hx of recent ATN and HD dependant LILIAM , possible renal scarring and worsened baseline functions Vs cardiorenal syndrome like picture with 10kg weight gain on admission. Urine sodium is not reliable since he is on diuretics.   -- he is very close to his dry weight and has edema which can be medically managed as much as possible  -- continue on bumex 4mg BiD and keep metolazone to 5mg , UO responsive , discussed with daughter at bedside regarding the guarded prognosis and his risks with sedation with antiepileptics , infection and worsened kidney baseline Vs improvement.  With low albumin can try IV albumin with bumex , and monitor UO  -- Continue with daily renal panel  -- Please weigh him daily since UO has been unmeasured it is difficult to assess volume  -- Strict IO monitoring as possible     He is not a candidate for dialysis liseth with poor functional status and encephalopathy with seizures. Will trial with maximal medical support.    Liver Tx-- stable    Immunosuppression on prednisone and low dose tacrolimus   tacro 1.5mg BiD (level 3.7 11/07, will continue at low dose for now)    Hypervolemia  Dry weght unknown but he has been as low as 68kgs in the past  -- diuresis with bumex and metolazone as needed for achieving a net -ve balance/even balance  -- Infiltrates on CXR are  likely a mix of fluid and rt aspiration related PNA/Pneumonitis    HTN well controlled  On coreg and amlodipine    Electrolytes  Hypervolemic hyponatremia  Improving   Hyperphosphatemia, 6.2 -->5.2-->4.6-->4  Please check with labs 2-3 times a wk is likely sec to LILIAM    Anemia   Anemia with iron deficiency and inflammation  Tsat 11% and ferritin 502  -- Hb has been ~8.5  -- can consider starting on iron supplements PO   -- We may consider TOMAS therapy if there is no improvements    BMD  PTH 78  Calcium wnl and phos elevated sec to LILIAM  No change in management for now    Metabolic encephalopathy/ Seizures / post ictal  He has been on Keppra   On video EEG monitoring, with diffuse slowing suggestive of encephalopathy  -- will follow      C.diff on vanco PO    Recommendations were communicated to primary team     Seen and discussed with Dr. Chet Canales MD   [phone]    Interval History :   Nursing and provider notes from last 24 hours reviewed.  In the last 24 hours Priscilla M Seamus has been stable and seizure free  Hb has been stable    Review of Systems:   Unable to obtain due to AMS    Physical Exam:   I/O last 3 completed shifts:  In: 960 [NG/GT:510]  Out: 760 [Urine:760]   /60 (BP Location: Right leg)  Pulse 81  Temp 97.2  F (36.2  C) (Axillary)  Resp 20  Wt 72.2 kg (159 lb 2.8 oz)  SpO2 100%  BMI 22.84 kg/m2     GENERAL APPEARANCE: drowsy and non responsive  EYES:  - scleral icterus, pupils equal  HENT: mouth without ulcers or lesions  PULM: lungs wheezing scattered R>L to auscultation , equal air movement, no clubbing  CV: regular rhythm, normal rate, no rub     -JVD +     -edema +   GI: soft, non tender, -distended, bowel sounds are +  INTEGUMENT: no cyanosis, - rash  NEURO:  NAD, drowsy     Labs:   All labs reviewed by me  Electrolytes/Renal -   Recent Labs   Lab Test  11/07/18   0655  11/06/18   1832  11/06/18   0622   11/05/18   0801   NA  133  132*  132*   < >  130*   POTASSIUM  3.8  3.8   3.7   < >  3.6   CHLORIDE  95  92*  93*   < >  92*   CO2  26  28  26   < >  24   BUN  125*  122*  126*   < >  117*   CR  2.03*  1.97*  1.93*   < >  1.98*   GLC  132*  155*  139*   < >  123*   JOSHUA  8.7  8.5  8.8   < >  8.8   MAG  2.2  2.1  2.0   < >  2.0   PHOS  4.0   --   4.6*   --   5.2*    < > = values in this interval not displayed.       CBC -   Recent Labs   Lab Test  11/07/18   0655  11/06/18   0622  11/05/18   0801   WBC  8.4  12.8*  5.9   HGB  9.6*  8.4*  8.5*   PLT  331  345  380       LFTs -   Recent Labs   Lab Test  11/02/18   1148  10/28/18   0818  10/10/18   1122   ALKPHOS  113  130  115   BILITOTAL  0.4  0.4  0.2   ALT  20  27  15   AST  23  34  24   PROTTOTAL  6.7*  6.8  5.9*   ALBUMIN  2.4*  2.4*  2.0*       Iron Panel -   Recent Labs   Lab Test  11/05/18   0801  11/08/16   1209  08/24/16   0852   IRON  29*  17*  20*   IRONSAT  11*  11*  10*   BARTOLO  502*   --   1025*         Imaging:  All imaging studies reviewed by me.     Current Medications:    amLODIPine  5 mg Per Feeding Tube Daily     bumetanide  4 mg Intravenous BID AC     carvedilol  6.25 mg Oral or Feeding Tube BID     cholecalciferol  400 Units Oral or Feeding Tube Daily     ferrous sulfate  600 mg Per Feeding Tube Daily with breakfast     insulin aspart  1-6 Units Subcutaneous Q4H     lacosamide (VIMPAT) intermittent infusion  50 mg Intravenous BID     lactobacillus rhamnosus (GG)  1 capsule Per Feeding Tube BID     levETIRAcetam  500 mg Oral or Feeding Tube BID     metolazone  5 mg Oral Daily     miconazole   Topical BID     multivitamins with minerals  15 mL Per Feeding Tube Daily     pantoprazole  40 mg Per Feeding Tube BID AC     piperacillin-tazobactam  2.25 g Intravenous Q6H     predniSONE  5 mg Oral or Feeding Tube Daily     sodium chloride (PF)  3 mL Intracatheter Q8H     tacrolimus  1.5 mg Oral or Feeding Tube BID     vancomycin  125 mg Oral or Feeding Tube 4x Daily       IV fluid REPLACEMENT ONLY       Jhonathan Canales MD

## 2018-11-07 NOTE — MR AVS SNAPSHOT
After Visit Summary   11/7/2018    Priscilla Way    MRN: [id number]           Patient Information     Date Of Birth          1940        Visit Information        Provider Department      11/7/2018 7:00 AM [facility] EEG TECH 4 [facility] EEG        Today's Diagnoses     Altered mental status, unspecified altered mental status type    -  1       Follow-ups after your visit        Who to contact     Please call your clinic at [phone] to:    Ask questions about your health    Make or cancel appointments    Discuss your medicines    Learn about your test results    Speak to your doctor            Additional Information About Your Visit        MyChart Information     [employer] gives you secure access to your electronic health record. If you see a primary care provider, you can also send messages to your care team and make appointments. If you have questions, please call your primary care clinic.  If you do not have a primary care provider, please call [phone] and they will assist you.      [employer] is an electronic gateway that provides easy, online access to your medical records. With [employer], you can request a clinic appointment, read your test results, renew a prescription or communicate with your care team.     To access your existing account, please contact your [facility] or call [phone] for assistance.        Care EveryWhere ID     This is your Care EveryWhere ID. This could be used by other organizations to access your [facility] medical records  [id number]         Blood Pressure from Last 3 Encounters:   11/07/18 140/50   10/29/18 142/54   06/07/18 135/60    Weight from Last 3 Encounters:   11/05/18 72.2 kg (159 lb 2.8 oz)   10/29/18 70.8 kg (156 lb 1.6 oz)   06/06/18 77.9 kg (171 lb 12.8 oz)              We Performed the Following     Glucose by meter          Today's Medication Changes      Notice     This visit is during an admission. Changes to the med list  made in this visit will be reflected in the After Visit Summary of the admission.             Primary Care Provider Office Phone # Fax #    Randy Fuentes [phone]        [address] [address]        Equal Access to Services     EDUARDO PALACIOS : Hadii yefri ku hadasho Soomaali, waaxda luqadaha, qaybta kaalmada adeegyada, waxamy snyder haylean luis e allenfredmaria l boateng. So [facility] [phone].    ATENCIÓN: Si habla español, tiene a staples disposición servicios gratuitos de asistencia lingüística. Llame al [phone].    We comply with applicable federal civil rights laws and [place] laws. We do not discriminate on the basis of race, color, national origin, age, disability, sex, sexual orientation, or gender identity.            Thank you!     Thank you for choosing [facility]  for your care. Our goal is always to provide you with excellent care. Hearing back from our patients is one way we can continue to improve our services. Please take a few minutes to complete the written survey that you may receive in the mail after your visit with us. Thank you!             Your Updated Medication List - Protect others around you: Learn how to safely use, store and throw away your medicines at www.disposemymeds.org.      Notice     This visit is during an admission. Changes to the med list made in this visit will be reflected in the After Visit Summary of the admission.

## 2018-11-07 NOTE — PLAN OF CARE
Problem: Patient Care Overview  Goal: Plan of Care/Patient Progress Review  Outcome: No Change  Pt's VSS on RA. No nonverbal indicators of pain present. [employer] orientation. TFs at 25 ml/hr continuous through NJ tube. BGs q4h, sliding scale insulin per MAR. Turn/repo q2h. Incontinent of BM x3. Condom cath in place with ~ 250 ml UOP throughout shift, see I/Os. Mepilex in place on inner thighs and coccyx. Meds through NJ. EEG monitoring in place. Pt's daughter refusing scheduled vimpat infusion, despite education on importance from this writer. Daughter would like to discuss this with day team today and told this writer to hold the medication until she can speak with a MD. MD beckford paged and notified, he was to talk with daughter about this last evening via phone. See previous note. PIV SL with intermittent IV zosyn. Will continue to monitor and follow POC.

## 2018-11-07 NOTE — PHARMACY-ANTICOAGULATION SERVICE
Clinical Pharmacy - Warfarin Dosing Consult     Pharmacy has been consulted to manage this patient s warfarin therapy.  Indication: DVT/PE Prophylaxis  Therapy Goal: INR 2-3  OP Anticoag Clinic: [facility]  Warfarin Prior to Admission: Yes  Warfarin PTA Regimen: 2.5mg QD  Significant drug interactions: nothing new  Recent documented change in oral intake/nutrition: Unknown  Dose Comments:  Patient's family that are present are unsure of last dose of warfarin; given pt is subtherapeutic it is save to give 2.5mg tonight even if pt received dose this AM.    INR   Date Value Ref Range Status   11/07/2018 2.30 (H) 0.86 - 1.14 Final   11/06/2018 2.56 (H) 0.86 - 1.14 Final       Recommend warfarin 2 mg today.  Pharmacy will monitor Priscilla Way daily and order warfarin doses to achieve specified goal.      Please contact pharmacy as soon as possible if the warfarin needs to be held for a procedure or if the warfarin goals change.

## 2018-11-07 NOTE — PROCEDURES
Procedure Date: 2018   EEG #:  -4   DATE OF RECORDIN2018   DURATION OF RECORDIN:13:49.      HISTORY:  This is day number 4 of video EEG monitoring of a 78-year-old patient with ongoing altered mental status and history of vascular dementia, as well as recent nonconvulsive status epilepticus.  Video EEG was started for altered mental state and possible seizure.      MEDICATIONS:   1.  Keppra 500 mg b.i.d.   2.  Prednisone 5 mg daily.     TECHNICAL SUMMARY: This continuous video- EEG monitoring procedure was performed with 23 scalp electrodes in 10-20 electrode system placement, and additional scalp, precordial and other surface electrodes used for electrical referencing and artifact detection.  Video monitoring was utilized and periodically reviewed by EEG technologists and the physician for electroclinical correlations.     BACKGROUND:  Throughout the recording, there is no transition between sleep and awake states except for increased myogenic activity when patient is stimulated or aroused from what appears to be a restful state.  Throughout both of these states, the patient has periods of attenuation with generalized background activity occurring in a theta frequency 6-7 Hz.  It is moderate in amplitude with underlying delta frequencies of 1-2 Hz occurring as well.  There is no posterior occipital rhythm seen throughout recording.      EPILEPTIFORM ACTIVITY:  There are runs of 1 Hz generalized pseudo-periodic epileptiform discharges (GPDs), occurring between 30 seconds and 1 minute and are only seen in the awakened state.  These generalized discharges can occur with maximum amplitudes in the right parietal area, best seen at 04:56:23 and 01:00:30.  There are no electroclinical events correlated with these discharges.      IMPRESSION:  This is an abnormal day number 4 video EEG due to the presence of generalized slowing, limited sleep or awake architecture, and pseudo-periodic  epileptiform discharges.  These findings suggest ongoing severe encephalopathy with maximum cerebral dysfunction in the parietal area with bifrontal spread.  These epileptiform discharges are different than day 1 of this session of recording, as well as day 1 of earlier video EEG monitoring sessions in that they are of a non-evolving frequency always under or at 1Hz, only occurring out of an awakened agitated state, occurring in short durations, and occurring out of a slowed background that is not chaotic and disorganized.      This report was dictated by Dr. Milo Grant DO.  CNP fellow.      This report will be reviewed by epilepsy staff/supervisor:  Maya Santiago MD.         Dictated By:  Milo Fish DO.  Clinical Neurophysiology Fellow  I agree with the findings as reported.  I personally reviewed this EEG recording.  Maya Huerta M.D Ph.D              D: 2018   T: 2018   MT: SYD      Name:     YADIRA BREAUX   MRN:      [id number]        Account:        [id number]   :      1940           Procedure Date: 2018      Document: [id number]

## 2018-11-07 NOTE — PROCEDURES
Procedure Date: 11/07/2018   EEG NUMBER:  -5.   DATE OF RECORDING/SERVICE DATE:  11/07/2018.   SOURCE FILE DURATION:  10:00:11.      HISTORY:  This is day 5 of video EEG monitoring in a 78-year-old who was admitted with ongoing altered mental status with a history of vascular dementia and was found to be in nonconvulsive status.  Video EEG was restarted after patient had another period of unresponsiveness and change in mental status from poor baseline.  Video EEG was started for seizure.      MEDICATIONS:  Keppra 500 mg b.i.d., prednisone 5 mg daily.     TECHNICAL SUMMARY: This continuous video- EEG monitoring procedure was performed with 23 scalp electrodes in 10-20 electrode system placement, and additional scalp, precordial and other surface electrodes used for electrical referencing and artifact detection.  Video monitoring was utilized and periodically reviewed by EEG technologists and the physician for electroclinical correlations.     FINDINGS:   BACKGROUND:  Throughout the recording, there is no specific sleep architecture seen nor posterior occipital rhythm, but there does seem to be some degree of transition from awake to sleep due to decreased myogenic activity and periods of what seems to be restfulness.  Overall, there is generalized diffuse delta slowing with intermittent moderate amplitude theta frequencies superimposed.  Delta frequency occurs at between 1 and 3 Hz and theta occurs between 5 and 6 Hz.     EPILEPTIFORM ACTIVITY:  There are brief to prolonged runs of pseudo periodic generalized epileptiform discharges (GPD) occurring in the bilateral parietal area with maximum voltage, but are generalized in all leads.  These epileptiform discharges only occur out of an awakened state.  The semi rhythmic frequencies run between 0.5 Hz and 1.5 Hz.  The epileptiform discharges do not appear to be reactive.     ICTAL EVENTS:  None.      IMPRESSION:  This is an abnormal day #5 video EEG due to  continued diffuse slowing as well as pseudo periodic epileptiform discharges seen in an awakened state.  These findings indicate a severe encephalopathy along with bilateral parietal and frontal cerebral dysfunction.  There are no periods of nonconvulsive status, and no seizure events during this recording.        SUMMARY OF 5 DAYS OF VIDEO EEG MONITORING:  Throughout all recording days, the patient had ongoing delta and theta slowing that is generalized and diffuse, indicative of a severe encephalopathy.  There is no prominent posterior occipital rhythm nor is there any sleep architecture seen.  Throughout the recording, there are intermittent runs of generalized periodic epileptiform discharges.  On day 1, the GPDs were more focused in the bilateral parietal with frontal dissemination and had an increased frequency of 1.5-2.5 Hz, which was still semi rhythmic.  These GPDs responded to Ativan.  Throughout the rest of the days of recording, GPDs appeared periodically, but in a more generalized fashion on top of a better organized background activity of delta slowing.  These GPEDs would only occur in the awakened state and abated by themselves after 30 seconds to 2 minutes of duration.  These GPD frequencies never went above 1 Hz.  Overall, there seemed to be an improvement in the patient's pseudo periodic epileptiform discharges that were generalized, but no definitive clinical improvement in the patient.  We would not indicate that these are seizure foci or evolving seizures, given that GPDs can respond to Ativan the same as would an evolving focal seizure.  Given the patient's clinical history and changes from initial video EEG monitoring session to this 5-day recording session, the patient likely has large degree of cerebral dysfunction in parietal and frontal areas bilaterally and should continue on antiepileptic medications.      This report is dictated by Milo Fish DO, CNP fellow.      This report  will be reviewed by epilepsy staff/supervisor Maya Huerta MD.        Dictated By:  Milo Fish DO.  Clinical Neurophysiology Fellow  I agree with the findings as reported.  I personally reviewed this EEG recording.  Maya Huerta M.D Ph.D               D: 2018   T: 2018   MT: EMILY      Name:     YADIRA BREAUX   MRN:      [id number]        Account:        [id number]   :      1940           Procedure Date: 2018      Document: [id number]

## 2018-11-07 NOTE — PROVIDER NOTIFICATION
"Vimpat infusion ordered for 2100. Pt's daughter in room and insisting that we do not give vimpat.  Daughter concerned that pt is getting \"too much anticonvulsant medication\" with both the keppra and the vimpat and the pt has been \"having mini seizures for 7-8 years and only taking one medication.\" This writer explained to daughter what the reasoning for the vimpat medication is and she was still addiment about not giving the medication tonight and speaking with the team tomorrow. She said she did not want her father receiving this medication and was told that the pt was only going to be given vimpat as a one time dose on Sunday night and is very upset that it has been given more than this against her knowledge. MD beckford paged about this and will attempt to get ahold of daughter via phone this evening. Holding medication now.  "

## 2018-11-07 NOTE — PROGRESS NOTES
Mr Way was seen with staff during AM rounds. His clinical status is unchanged in that he is largely unresponsive to most stimuli in his environment. His limbs move spontaneously and rarely purposefully. He makes not attempts to interact with his examiners.    Mr Way's EEG has remained essentially unchanged since the initiation of lacosamide on 11/5. The GPEDs seen currently are likely representative of encephalopathy and have not evolved into seizures despite missing a dose of lacosamide at his family's request. He is still at significant risk of developing seizures, particularly in the setting of an acute infection or other medical illness. His daughter is aware of this. She wishes to continue the course off of lacosamide. We explained to her that there is no way for us to know if he is having subclinical seizures given his poor baseline. She understands this and prefers to stay with levetiracetam monotherapy.    Summary of Recommendations:  - stop EEG  - stop lacosamide at family's request  - continue levetiracetam 500mg BID    This patient was seen and discussed with staff neurologist, Dr Vida Farias, DO  PGY4 Neurology

## 2018-11-07 NOTE — PROGRESS NOTES
INTERNAL MEDICINE PROGRESS NOTE    Priscilla Way ([id number]) admitted on 11/2/2018 11/07/2018    Assessment & Plan:  79 y/o male with PMHx significant for vascular dementia, CVA multiple sites, DVT on warfarin, HCV cirrhosis s/p liver transplant 2000, HCV ESRD s/p kidney transplant 2000, CKD III of transplanted kidney, HFpEF, recurrent C diff infection, malnutrition on tube feeds and recent seizure disorder/status epilepticus was admitted to the hospital because of worsening altered mental status and shortness of breath.     #Acute hypoxic respiratory failure  #Volume overload  #HFpEF (LVEF 55-60%)  According to one the patient's care taker, patient was noted to be coughing more and sounded productive. Due to language barrier unclear if symptoms of viral URI and sick contacts. On exam with coarse lung sounds, elevated JVP ~ 15cm and pitting edema on both legs. This favors a component of both aspiration and volume overload. CXR had signs of pulmonary edema. Repeat CXR's demonstrating improvement after diuresis. His last echo about a month ago had normal EF but with diastolic dysfunction. He is hypoalbuminemic therefore more risk of third spacing.   - Strict I/Os  - Bumex 4mg BID  - Add IV albumin to stimulate output  - Metolazone 5mg daily for now  - Net goal -1 to -2L  - BMP BID  - Daily weights  - Continue home coreg  - Not on lisinopril due to renal dysfunction  - Wean O2 as able     #Cough  #Aspiration pneumonia vs pneumonitis   #Leukocytosis  CXR also with show evidence of increased pulmonary congestions suggestive of pulmonary edema and also possible aspiration. He has had other hospitalizations because of aspiration in the setting of TF's and altered mentation. Viral URI panel and influenza were negative. MRSA nares negative. Urine strep and pneumo antigen negative.  Procal not elevated.  Patient not needing supplemental O2, zosyn was discontinued. However, patient with low grade temps and WBC elevated  as well as procal.   - Continue zosyn renally dosed  - DuoNeb  - Blood cultures pending  - Trend procal     Zosyn (11/02/18 - 11/03/18, 11/06/18 -  currently)    #HCV ESRD s/p transplant 2000  #LILIAM on CKD III of transplanted kidney  #?Renal vein thrombosis  #?Gross hematuria  #Hyperphosphatemia  Patient at one point was to be on dialysis of his transplanted kidney but renal function recovered. Baseline Cr ~ 1.2-1.5, but remains elevated and with not as much urinary output expected for high doses of diuretics. He does look volume up on exam, although less than during admission. Causes could be intrinsic injury from infectious causes, medications, cardiorenal type 1, significant renal compromise at last admission. US of the transplanted kidney showing increase in resistive indices suggestive for renal vein thrombus vs LILIAM vs cardiorenal. Tacro level 3 mildly below goal (4-6). But ok per nephrology for now. He did have  gross hematuria on 11/06/18. CK was measured due to seizure history, but it was below normal range. His last BK virus check on file from 2008 was negative and repeat was undetectable. Urine color as spontaneously resolved to clear yellow. UA did show large presence of RBC and WBC.  - Discussed with transplant nephrology, will repeat US once LILIAM improves; no need for interventions at this moment  - Continue tacro and prednisone  - Diuretics as above  - Urine cultures pending  - Trending PO4  - Holding aspirin for now    #Acute toxic encephalopathy  #Uremia  #Vascular dementia  #Uncontrolled seizure activity  #Recent diagnosis of seizure disorder with status epilepticus  Was on EEG monitor during last admission and had readings consistent with non-convulsive status epilepticus, but also had clonic tonic seizure back then. He is non-verbal. Head imaging in the past has shown chronic advanced small vessel disease that is diffuse and atherosclerotic plaques with stenosis along vertebral arteries and MCA. His  mentation mainly affected by vascular dementia. GCS continues to be 5-6. Per previous notes, he can be aroused by verbal stimuli. Current encephalopathy multifactorial by ongoing evolution of cerebrovascular disease vs metabolic (uremia, hyponatremia) vs sepsis vs seizures. EEG monitor has indeed detected generalized rhythmic patterns concerning for seizures. This is despite being on keppra and supratherapeutic levels. He received 2mg of ativan which improved this pattern, but there is still some background pseudoperiodic epileptiform discharges. TSH and NH3 normal. Furthermore, CT scan on 11/02/18 did not reveal any gross intracranial pathology. Unable to protect airway but he is DNR/DNI. EEG pattern was evaluated by neurology and current activity seems to be chronic of unclear clinical significance and also stable. .   - Neuro checks  - Neurology consulted  - Discontinued lacosamide per neurology  - Continue Keppra 500mg POBID   - Recheck keppra levels in AM  - Discontinue EEG    #Diarrhea  #Cdiff  Patient had had c diff in previous admissions, 03/2018, 05/2018, 06/2018, 09/2018 and now as well. Report from care taker was that patient with increasing stool output. He has been treated with vancomycin long term.  - Start 10 day course of vanc 125mg PO QID  - Then will do 20 days of rifaximin 400 mg PO TID     #Hyponatremia, hypervolemic  Patient with signs of hypervolemia. Na on last admission was 130, now 121. This could be also affecting mentation. Low sodium could also have contribution from renal losses due to LILIAM. Serum osmolality normal due to elevated BUN.  - Na check  - Diuresis as above      #+MRSE blood culture  One out of two bottles from admission was positive for MRSE. Received one dose of vanc. No fever or signs of clinical decompensation. Repeat cultures negative for now.  - Blood cultures x2, negative to date  - Low threshold to start IV abx    #HCV ESLD s/p transplant 2000  LFTs normal. No evidence  of rejection or decompensation.  - Continue tacro and prednisone as above     #Chronic iron deficiency anemia  Hemoglobin stable. There was some concern about slow GI bleed in previous admissions. Patient also with CKD driving down Hgb production. No evidence of melena for now.   - Continue home PPI  - Continue home iron, which might confound appearance of stool     #Hx of DVT on warfarin  DVT bilaterally on US in 2016. INR subtherapeutic. Heparin bridge completed  - INR daily  - Pharmacy to dose warfarin      #Severe malnutrition on TF's  - TF consult placed  - Refeeding syndrome and nutrition labs per dietician     #DM2  - Continue home insulin regimen    Patient evaluated and discussed with Dr. Martinez.      Code Status: DNR/DNI  FEN: TF's  PPx: DVT on warfarin, GI PPI PO  Dispo: pending improvement in mentation    Of note daughter Cruz upset yesterday due to patient being on lacosamide. Was very adamant of discontinuing medication. She threatened to zoë the hospital if medication was not discontinued. Night team reassured her of neurology's recommendation for the medication based on initial EEG's, but daughter did not agree with this and became verbally abusive.      Ramos Mcnair MD PhD  Internal Medicine, PGY-3  Pager: [phone]    Patient was seen and discussed with Dr. Martinez.     ==================================================================    Interval HistorySubjective:  No overnight events, patient not verbal but appears more awake than yesterday. Urine clear yellow.  Has had some loose and formed stools.     Objective:  Most recent vital signs:  /60 (BP Location: Right leg)  Pulse 81  Temp 97.2  F (36.2  C) (Axillary)  Resp 20  Wt 72.2 kg (159 lb 2.8 oz)  SpO2 100%  BMI 22.84 kg/m2  Temp:  [96.1  F (35.6  C)-98.6  F (37  C)] 97.2  F (36.2  C)  Heart Rate:  [79-85] 79  Resp:  [18-22] 20  BP: (129-163)/(50-65) 141/60  SpO2:  [96 %-100 %] 100 %  Wt Readings from Last 2 Encounters:    11/05/18 72.2 kg (159 lb 2.8 oz)   10/29/18 70.8 kg (156 lb 1.6 oz)     Intake/Output Summary (Last 24 hours) at 11/07/18 1739  Last data filed at 11/07/18 1600   Gross per 24 hour   Intake              790 ml   Output              550 ml   Net              240 ml     Physical exam:  General: lethargic, non-verbal, appears more awake today  HEENT: PERRLA, EOMI, anicteric sclera, evidence of cataracts  Neck:  JVP ~7cm above clavicle  CV: RRR, no murmur, no rubs, no gallops  Resp: Coarse bilateral crackles and ronchi  Abdomen: non tender, non distended, no organomegaly, +bs  Extremities: bilateral pitting edema at the ankles improved from yesterday, WWP  Skin: no rash, not jaundice  Psych: unable to assess due to non-responsive  Neuro: GCS 6-7    Labs:  Results for orders placed or performed during the hospital encounter of 11/02/18 (from the past 24 hour(s))   Basic metabolic panel   Result Value Ref Range    Sodium 132 (L) 133 - 144 mmol/L    Potassium 3.8 3.4 - 5.3 mmol/L    Chloride 92 (L) 94 - 109 mmol/L    Carbon Dioxide 28 20 - 32 mmol/L    Anion Gap 13 3 - 14 mmol/L    Glucose 155 (H) 70 - 99 mg/dL    Urea Nitrogen 122 (H) 7 - 30 mg/dL    Creatinine 1.97 (H) 0.66 - 1.25 mg/dL    GFR Estimate 33 (L) >60 mL/min/1.7m2    GFR Estimate If Black 40 (L) >60 mL/min/1.7m2    Calcium 8.5 8.5 - 10.1 mg/dL   Magnesium (AM Draw)   Result Value Ref Range    Magnesium 2.1 1.6 - 2.3 mg/dL   UA with Microscopic reflex to Culture   Result Value Ref Range    Color Urine Yellow     Appearance Urine Clear     Glucose Urine Negative NEG^Negative mg/dL    Bilirubin Urine Negative NEG^Negative    Ketones Urine Negative NEG^Negative mg/dL    Specific Gravity Urine 1.010 1.003 - 1.035    Blood Urine Large (A) NEG^Negative    pH Urine 6.5 5.0 - 7.0 pH    Protein Albumin Urine 10 (A) NEG^Negative mg/dL    Urobilinogen mg/dL Normal 0.0 - 2.0 mg/dL    Nitrite Urine Negative NEG^Negative    Leukocyte Esterase Urine Large (A) NEG^Negative     Source Unspecified Urine     WBC Urine 49 (H) 0 - 5 /HPF    RBC Urine 118 (H) 0 - 2 /HPF    Yeast Urine Few (A) NEG^Negative /HPF    Squamous Epithelial /HPF Urine 2 (H) 0 - 1 /HPF    Transitional Epi <1 0 - 1 /HPF    Hyaline Casts 1 0 - 2 /LPF   Urine Culture Aerobic Bacterial   Result Value Ref Range    Specimen Description Unspecified Urine     Special Requests Specimen received in preservative     Culture Micro Culture in progress    CBC with platelets   Result Value Ref Range    WBC 8.4 4.0 - 11.0 10e9/L    RBC Count 3.15 (L) 4.4 - 5.9 10e12/L    Hemoglobin 9.6 (L) 13.3 - 17.7 g/dL    Hematocrit 30.5 (L) 40.0 - 53.0 %    MCV 97 78 - 100 fl    MCH 30.5 26.5 - 33.0 pg    MCHC 31.5 31.5 - 36.5 g/dL    RDW 16.7 (H) 10.0 - 15.0 %    Platelet Count 331 150 - 450 10e9/L   Basic metabolic panel   Result Value Ref Range    Sodium 133 133 - 144 mmol/L    Potassium 3.8 3.4 - 5.3 mmol/L    Chloride 95 94 - 109 mmol/L    Carbon Dioxide 26 20 - 32 mmol/L    Anion Gap 13 3 - 14 mmol/L    Glucose 132 (H) 70 - 99 mg/dL    Urea Nitrogen 125 (H) 7 - 30 mg/dL    Creatinine 2.03 (H) 0.66 - 1.25 mg/dL    GFR Estimate 32 (L) >60 mL/min/1.7m2    GFR Estimate If Black 38 (L) >60 mL/min/1.7m2    Calcium 8.7 8.5 - 10.1 mg/dL   INR   Result Value Ref Range    INR 2.30 (H) 0.86 - 1.14   Phosphorus   Result Value Ref Range    Phosphorus 4.0 2.5 - 4.5 mg/dL   Magnesium (AM Draw)   Result Value Ref Range    Magnesium 2.2 1.6 - 2.3 mg/dL   Tacrolimus level   Result Value Ref Range    Tacrolimus Last Dose Not Provided     Tacrolimus Level 3.7 (L) 5.0 - 15.0 ug/L   Procalcitonin   Result Value Ref Range    Procalcitonin 2.69 ng/ml     *Note: Due to a large number of results and/or encounters for the requested time period, some results have not been displayed. A complete set of results can be found in Results Review.

## 2018-11-08 NOTE — PROGRESS NOTES
Notified by [facility] report that member was admitted to [facility] on 11/2/18.  Reviewed EPIC and noted member was admitted with possible aspiration.  Caregivers noted member with productive cough and lethargic.  Placed call to  with services that member receives at home and gave contact info for discharge planning.  Carri Anderson, RN  [facility] Care Coordinator  [phone]

## 2018-11-08 NOTE — PLAN OF CARE
Problem: Patient Care Overview  Goal: Plan of Care/Patient Progress Review  Outcome: No Change  Pt's VSS on RA. [facility] orientation, nonverbal at baseline. No nonverbal indicators of pain. Turn/repo q2h. Incontinent of loose stool x2, on vanco for C. diff. Condom cath in place with ~ 350 ml UOP over 12 hour shift. Mepilex intact on bilateral inner thighs and changed on coccyx, interdry between groin for skin tears/raw. R PIV TKO with IV zosyn. TFs continuous @ 25 ml/hr through NJ tube. BGs q4h, SSI per MAR. Family at bedside. Will continue to monitor and follow POC.

## 2018-11-08 NOTE — PROGRESS NOTES
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 IS with tacrolimus and pred. He has had a significant decline in status and was discharged after a long admission for seizure and encephalopathy, Dialysis dependant LILIAM with recovery, and volume overload discharged on Bumex. Admitted for dyspnea and cough and increased AMS.   Transplant nephrology consulted for LILIAM    SP SLK 12/2000  DDKT with baseline creat 1.3-1.6 -- was discharged on 1.6 and now increased to ~2  Hematuria -- -ve BK, resolved   Creatinine elevation with hx of recent ATN and HD dependant LILIAM , possible renal scarring and worsened baseline functions Vs cardiorenal syndrome like picture with 10kg weight gain on admission. Urine sodium is not reliable since he was on diuretics.   -- he is very close to his dry weight and has edema which can be medically managed as much as possible  -- continue on bumex 4mg BiD and held metolazone 5mg , UO is responsive , but he has low diastolic BP and mostly third spaced fluids with stable breathing status and can maintain at net even balance for now, since creatinine has a slow uptrend to 2 now  -- Continue with daily renal panel  -- Please weigh him daily since UO has been unmeasured it is difficult to assess volume  -- Strict IO monitoring as possible     He is not a candidate for dialysis liseth with poor functional status and encephalopathy with seizures. Will trial with maximal medical support.    Liver Tx-- stable    Immunosuppression on prednisone and low dose tacrolimus   tacro 1.5mg BiD (level 3.7 11/07, will continue at low dose for now)    Hypervolemia  Dry weght unknown but he has been as low as 68kgs in the past  -- diuresis with bumex as needed for achieving a net -ve balance/even balance  -- Infiltrates on CXR are likely a mix of fluid and rt aspiration related  PNA/Pneumonitis    HTN well controlled  On coreg and amlodipine    Electrolytes  Hypervolemic hyponatremia  Improving   Hyperphosphatemia, 6.2 -->5.2-->4.6-->4  Please check with labs 2-3 times a wk is likely sec to LILIAM    Anemia   Anemia with iron deficiency and inflammation  Tsat 11% and ferritin 502  -- Hb has been ~8.5  -- can consider starting on iron supplements PO   -- We may consider TOMAS therapy if there is no improvements    BMD  PTH 78  Calcium wnl and phos elevated sec to LILIAM  No change in management for now    Metabolic encephalopathy/ Seizures / post ictal  He has been on Keppra   On video EEG monitoring, with diffuse slowing suggestive of encephalopathy  -- will follow      C.diff on vanco PO    Recommendations were communicated to primary team     Seen and discussed with Dr. Chet Canales MD   [phone]    Interval History :   Nursing and provider notes from last 24 hours reviewed.  In the last 24 hours Priscilla M Seamus has been stable and seizure free  Hb has been stable    Review of Systems:   Unable to obtain due to AMS    Physical Exam:   I/O last 3 completed shifts:  In: 720 [NG/GT:120]  Out: 550 [Urine:550]   /70 (BP Location: Left arm)  Pulse 81  Temp 95.7  F (35.4  C) (Oral)  Resp 16  Wt 73.2 kg (161 lb 6 oz)  SpO2 99%  BMI 23.16 kg/m2     GENERAL APPEARANCE: drowsy and non responsive  EYES:  - scleral icterus, pupils equal  HENT: mouth without ulcers or lesions  PULM: lungs wheezing scattered R>L to auscultation , equal air movement, no clubbing  CV: regular rhythm, normal rate, no rub     -JVD +     -edema +   GI: soft, non tender, -distended, bowel sounds are +  INTEGUMENT: no cyanosis, - rash  NEURO:  NAD, drowsy     Labs:   All labs reviewed by me  Electrolytes/Renal -   Recent Labs   Lab Test  11/08/18   0622 11/07/18   1946  11/07/18   0655   11/06/18   0622   11/05/18   0801   NA  137  136  133   < >  132*   < >  130*   POTASSIUM  3.6  4.1  3.8   < >  3.7   < >  3.6    CHLORIDE  96  98  95   < >  93*   < >  92*   CO2  26  26  26   < >  26   < >  24   BUN  129*  122*  125*   < >  126*   < >  117*   CR  2.12*  2.23*  2.03*   < >  1.93*   < >  1.98*   GLC  142*  199*  132*   < >  139*   < >  123*   JOSHUA  8.9  8.8  8.7   < >  8.8   < >  8.8   MAG  2.3  2.1  2.2   < >  2.0   < >  2.0   PHOS   --    --   4.0   --   4.6*   --   5.2*    < > = values in this interval not displayed.       CBC -   Recent Labs   Lab Test  11/08/18   0622  11/07/18   0655  11/06/18   0622   WBC  8.5  8.4  12.8*   HGB  8.0*  9.6*  8.4*   PLT  352  331  345       LFTs -   Recent Labs   Lab Test  11/02/18   1148  10/28/18   0818  10/10/18   1122   ALKPHOS  113  130  115   BILITOTAL  0.4  0.4  0.2   ALT  20  27  15   AST  23  34  24   PROTTOTAL  6.7*  6.8  5.9*   ALBUMIN  2.4*  2.4*  2.0*       Iron Panel -   Recent Labs   Lab Test  11/05/18   0801  11/08/16   1209  08/24/16   0852   IRON  29*  17*  20*   IRONSAT  11*  11*  10*   BARTOLO  502*   --   1025*         Imaging:  All imaging studies reviewed by me.     Current Medications:    bumetanide  4 mg Intravenous BID AC     carvedilol  6.25 mg Oral or Feeding Tube BID     cholecalciferol  400 Units Oral or Feeding Tube Daily     ferrous sulfate  600 mg Per Feeding Tube Daily with breakfast     insulin aspart  1-6 Units Subcutaneous Q4H     lactobacillus rhamnosus (GG)  1 capsule Per Feeding Tube BID     levETIRAcetam  500 mg Oral or Feeding Tube BID     miconazole   Topical BID     multivitamins with minerals  15 mL Per Feeding Tube Daily     pantoprazole  40 mg Per Feeding Tube BID AC     piperacillin-tazobactam  2.25 g Intravenous Q6H     predniSONE  5 mg Oral or Feeding Tube Daily     sodium chloride (PF)  3 mL Intracatheter Q8H     tacrolimus  1.5 mg Oral or Feeding Tube BID     vancomycin  125 mg Oral or Feeding Tube 4x Daily     warfarin  2 mg Oral ONCE at 18:00       IV fluid REPLACEMENT ONLY       Warfarin Therapy Reminder       Jhonathan Canales MD

## 2018-11-08 NOTE — PROGRESS NOTES
INTERNAL MEDICINE PROGRESS NOTE    Priscilla Way ([id number]) admitted on 11/2/2018 11/08/2018    Assessment & Plan:  79 y/o male with PMHx significant for vascular dementia, CVA multiple sites, DVT on warfarin, HCV cirrhosis s/p liver transplant 2000, HCV ESRD s/p kidney transplant 2000, CKD III of transplanted kidney, HFpEF, recurrent C diff infection, malnutrition on tube feeds and recent seizure disorder/status epilepticus was admitted to the hospital because of worsening altered mental status and shortness of breath.     #Acute hypoxic respiratory failure  #Volume overload  #HFpEF (LVEF 55-60%)  According to one the patient's care taker, patient was noted to be coughing more and sounded productive. Due to language barrier unclear if symptoms of viral URI and sick contacts. On exam during admission, he had coarse lung sounds, elevated JVP ~ 15cm and pitting edema on both legs. CXR had signs of pulmonary edema. Repeat CXR's demonstrating improvement after diuresis. Weight on admission was 80kg, today 73.2kg. Appears close to euvolemia. He is hypoalbuminemic therefore more risk of third spacing.   - Strict I/Os  - Bumex 4mg BID  - Discontinued metolazone  - Net goal -1 to -2L  - BMP BID  - Daily weights  - Continue home coreg  - Not on lisinopril due to renal dysfunction  - Wean O2 as able     #Cough  #Aspiration pneumonia vs pneumonitis   #Leukocytosis  CXR also with show evidence of increased pulmonary congestions suggestive of pulmonary edema and also possible aspiration. He has had other hospitalizations because of aspiration in the setting of TF's and altered mentation. Viral URI panel and influenza were negative. MRSA nares negative. Urine strep and pneumo antigen negative.  Procal not elevated.  Patient not needing supplemental O2, zosyn was discontinued. However, patient with low grade temps and WBC elevated as well as procal.   - Continue zosyn renally dosed until 11/11/18, might switch to oral  augmentin  - DuoNeb  - Blood cultures pending  - Trend procal     Zosyn (11/02/18 - 11/03/18, 11/06/18 -  currently)    #HCV ESRD s/p transplant 2000  #LILIAM on CKD III of transplanted kidney  #?Renal vein thrombosis  #?Gross hematuria  #Hyperphosphatemia  Patient at one point was to be on dialysis of his transplanted kidney but renal function recovered. Baseline Cr ~ 1.2-1.5, but remains elevated and with not as much urinary output expected for high doses of diuretics. He does look volume up on exam, although less than during admission. Causes could be intrinsic injury from infectious causes, medications, cardiorenal type 1, significant renal compromise at last admission. US of the transplanted kidney showing increase in resistive indices suggestive for renal vein thrombus vs LILIAM vs cardiorenal. Tacro level 3 mildly below goal (4-6). But ok per nephrology for now. He did have  gross hematuria on 11/06/18. CK was measured due to seizure history, but it was below normal range. His last BK virus check on file from 2008 was negative and repeat on this admission was undetectable. Urine color spontaneously resolved to clear yellow. UA did show large presence of RBC and WBC. Urine cultures growing GNR.   - Discussed with transplant nephrology, will repeat US once LILIAM improves; no need for interventions at this moment  - Continue tacro and prednisone  - Diuretics as above  - Urine cultures pending  - Trending PO4  - Holding aspirin for now    #Acute toxic encephalopathy  #Uremia  #Vascular dementia  #Uncontrolled seizure activity  #Recent diagnosis of seizure disorder with status epilepticus  Was on EEG monitor during last admission and had readings consistent with non-convulsive status epilepticus, but also had clonic tonic seizure back then. He is non-verbal. Head imaging in the past has shown chronic advanced small vessel disease that is diffuse and atherosclerotic plaques with stenosis along vertebral arteries and MCA.  His mentation mainly affected by vascular dementia. GCS continues to be 5-6. Per previous notes, he can be aroused by verbal stimuli. Current encephalopathy multifactorial by ongoing evolution of cerebrovascular disease vs metabolic (uremia, hyponatremia) vs sepsis vs seizures. EEG monitor has indeed detected generalized rhythmic patterns concerning for seizures. This is despite being on keppra and supratherapeutic levels. He received 2mg of ativan which improved this pattern, but there is still some background pseudoperiodic epileptiform discharges. TSH and NH3 normal. Furthermore, CT scan on 11/02/18 did not reveal any gross intracranial pathology. Unable to protect airway but he is DNR/DNI. EEG pattern was evaluated by neurology and current activity seems to be chronic of unclear clinical significance and also stable. Therefore, lacosamide was discontinued but Keppra was kept.  - Neuro checks  - Neurology consulted  - Continue Keppra 500mg POBID   - Keppra levels pending    #Diarrhea  #Cdiff  Patient had had c diff in previous admissions, 03/2018, 05/2018, 06/2018, 09/2018 and now as well. Report from care taker was that patient with increasing stool output. He has been treated with vancomycin long term.  - Start 10 day course of vanc 125mg PO QID  - Then will do 20 days of rifaximin 400 mg PO TID     #Hyponatremia, hypervolemic  Patient with signs of hypervolemia. Na on last admission was 130, now 121. This could be also affecting mentation. Low sodium could also have contribution from renal losses due to LILIAM. Serum osmolality normal due to elevated BUN. Resolved.  - Diuresis as above      #+MRSE blood culture  One out of two bottles from admission was positive for MRSE. Received one dose of vanc. No fever or signs of clinical decompensation. Repeat cultures negative for now.  - Blood cultures x2, negative to date  - Low threshold to start IV abx    #HCV ESLD s/p transplant 2000  LFTs normal. No evidence of  rejection or decompensation.  - Continue tacro and prednisone as above     #Chronic iron deficiency anemia  Hemoglobin stable. There was some concern about slow GI bleed in previous admissions. Patient also with CKD driving down Hgb production. No evidence of melena for now. He had hemoglobin drop of 1.5g today, unclear of source, but repeat hemoglobin up-trending.  - Continue home PPI  - Continue home iron, which might confound appearance of stool  - Monitor for bleeding     #Hx of DVT on warfarin  DVT bilaterally on US in 2016. INR subtherapeutic. Heparin bridge completed  - INR daily  - Pharmacy to dose warfarin      #Severe malnutrition on TF's  - TF consult placed  - Refeeding syndrome and nutrition labs per dietician     #DM2  - Continue home insulin regimen    Patient evaluated and discussed with Dr. Martinez.      Code Status: DNR/DNI  FEN: TF's  PPx: DVT on warfarin, GI PPI PO  Dispo: pending medical improvement, scheduled for meeting with daughters and family 11/09/18 @ 1800    Of note daughter Cruz upset on 11/06/18 evening, due to patient being on lacosamide. Was very adamant of discontinuing medication. She threatened to zoë the hospital if medication was not discontinued. Night team reassured her of neurology's recommendation for the medication based on initial EEG's, but daughter did not agree with this and became verbally abusive.      Ramos Mcnair MD PhD  Internal Medicine, PGY-3  Pager: [phone]    Patient was seen and discussed with Dr. Martinez.     ==================================================================    Interval HistorySubjective:  No overnight events, patient not verbal but appears more awake than yesterday. Urine clear yellow.  Has had some loose and formed stools.     Objective:  Most recent vital signs:  /70 (BP Location: Left arm)  Pulse 81  Temp 95.7  F (35.4  C) (Oral)  Resp 16  Wt 73.2 kg (161 lb 6 oz)  SpO2 99%  BMI 23.16 kg/m2  Temp:  [95.7  F (35.4   C)-97.5  F (36.4  C)] 95.7  F (35.4  C)  Heart Rate:  [71-81] 77  Resp:  [16] 16  BP: (127-156)/(46-70) 156/70  SpO2:  [99 %-100 %] 99 %  Wt Readings from Last 2 Encounters:   11/08/18 73.2 kg (161 lb 6 oz)   10/29/18 70.8 kg (156 lb 1.6 oz)     Intake/Output Summary (Last 24 hours) at 11/08/18 1429  Last data filed at 11/08/18 1200   Gross per 24 hour   Intake              670 ml   Output              400 ml   Net              270 ml     Physical exam:  General: lethargic, non-verbal  HEENT: PERRLA, EOMI, anicteric sclera, evidence of cataracts  Neck:  JVP ~7cm above clavicle  CV: RRR, no murmur, no rubs, no gallops  Resp: Coarse bilateral crackles and ronchi  Abdomen: non tender, non distended, no organomegaly, +bs  Extremities: bilateral pitting edema at the ankles improved from yesterday, WWP  Skin: no rash, not jaundice  Psych: unable to assess due to non-responsive  Neuro: GCS 6-7    Labs:  Results for orders placed or performed during the hospital encounter of 11/02/18 (from the past 24 hour(s))   Basic metabolic panel   Result Value Ref Range    Sodium 136 133 - 144 mmol/L    Potassium 4.1 3.4 - 5.3 mmol/L    Chloride 98 94 - 109 mmol/L    Carbon Dioxide 26 20 - 32 mmol/L    Anion Gap 12 3 - 14 mmol/L    Glucose 199 (H) 70 - 99 mg/dL    Urea Nitrogen 122 (H) 7 - 30 mg/dL    Creatinine 2.23 (H) 0.66 - 1.25 mg/dL    GFR Estimate 29 (L) >60 mL/min/1.7m2    GFR Estimate If Black 34 (L) >60 mL/min/1.7m2    Calcium 8.8 8.5 - 10.1 mg/dL   Magnesium (AM Draw)   Result Value Ref Range    Magnesium 2.1 1.6 - 2.3 mg/dL   CBC with platelets   Result Value Ref Range    WBC 8.5 4.0 - 11.0 10e9/L    RBC Count 2.64 (L) 4.4 - 5.9 10e12/L    Hemoglobin 8.0 (L) 13.3 - 17.7 g/dL    Hematocrit 25.6 (L) 40.0 - 53.0 %    MCV 97 78 - 100 fl    MCH 30.3 26.5 - 33.0 pg    MCHC 31.3 (L) 31.5 - 36.5 g/dL    RDW 16.7 (H) 10.0 - 15.0 %    Platelet Count 352 150 - 450 10e9/L   Basic metabolic panel   Result Value Ref Range    Sodium 137  133 - 144 mmol/L    Potassium 3.6 3.4 - 5.3 mmol/L    Chloride 96 94 - 109 mmol/L    Carbon Dioxide 26 20 - 32 mmol/L    Anion Gap 14 3 - 14 mmol/L    Glucose 142 (H) 70 - 99 mg/dL    Urea Nitrogen 129 (H) 7 - 30 mg/dL    Creatinine 2.12 (H) 0.66 - 1.25 mg/dL    GFR Estimate 30 (L) >60 mL/min/1.7m2    GFR Estimate If Black 37 (L) >60 mL/min/1.7m2    Calcium 8.9 8.5 - 10.1 mg/dL   INR   Result Value Ref Range    INR 2.31 (H) 0.86 - 1.14   Magnesium (AM Draw)   Result Value Ref Range    Magnesium 2.3 1.6 - 2.3 mg/dL     *Note: Due to a large number of results and/or encounters for the requested time period, some results have not been displayed. A complete set of results can be found in Results Review.

## 2018-11-09 NOTE — PROGRESS NOTES
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 IS with tacrolimus and pred. He has had a significant decline in status and was discharged after a long admission for seizure and encephalopathy, Dialysis dependant LILIAM with recovery, and volume overload discharged on Bumex. Admitted for dyspnea and cough and increased AMS.   Transplant nephrology consulted for LILIAM    SP SLK 12/2000  DDKT with baseline creat 1.3-1.6 -- was discharged on 1.6 and now increased to ~2  Hematuria -- -ve BK, resolved   Creatinine elevation with hx of recent ATN and HD dependant LILIAM , possible renal scarring and worsened baseline functions Vs cardiorenal syndrome like picture with 10kg weight gain on admission. Urine sodium is not reliable since he was on diuretics.   -- he is very close to his dry weight and has edema which can be medically managed as much as possible  -- I recommend ongoing medical management for volume with bumex 4 mg bid.  He has an acute kidney injury now for the past two months.  Although he has some recover since his initial injury he does not have a complete recovery to baseline.  He has biopsy proven advanced diabetic nephropathy on his last biopsy.  I do not recommend dialysis for this acute process (not required and against the wishes per last hospitalization).  In regards to chronic renal replacement needs, he is not a candidate for dialysis liseth with poor functional status and encephalopathy with seizures. Will trial with maximal medical support.  -- Continue with daily renal panel  -- Please weigh him daily since UO has been unmeasured it is difficult to assess volume  -- Strict IO monitoring as possible     Liver Tx-- stable    Immunosuppression on prednisone and low dose tacrolimus   tacro 1.5mg BiD (level 3.7 11/07, will continue at low dose for now)    Hypervolemia  Dry weght  unknown but he has been as low as 68kgs in the past  -- diuresis with bumex as needed for achieving a net -ve balance/even balance  -- Infiltrates on CXR are likely a mix of fluid and rt aspiration related PNA/Pneumonitis    HTN well controlled  On coreg and amlodipine    Electrolytes  Hypervolemic hyponatremia  Improving   Hyperphosphatemia, 6.2 -->5.2-->4.6-->4  Please check with labs 2-3 times a wk is likely sec to LILIAM    Anemia   Anemia with iron deficiency and inflammation  Tsat 11% and ferritin 502  -- Hb has been ~8.5  -- can consider starting on iron supplements PO   -- We may consider TOMAS therapy if there is no improvements    BMD  PTH 78  Calcium wnl and phos elevated sec to LILIAM  No change in management for now    Metabolic encephalopathy/ Seizures / post ictal  He has been on Keppra   On video EEG monitoring, with diffuse slowing suggestive of encephalopathy  -- will follow      C.diff on vanco PO    Recommendations were communicated to primary team     Viral Jeff Rosenberg MD   [phone]    Interval History :   Nursing and provider notes from last 24 hours reviewed.  In the last 24 hours Priscilla M Seamus has been stable and seizure free  Hb has been stable    Review of Systems:   Unable to obtain due to AMS    Physical Exam:   I/O last 3 completed shifts:  In: 535 [NG/GT:60]  Out: 650 [Urine:650]   /67 (BP Location: Left arm)  Pulse 81  Temp 96.4  F (35.8  C) (Axillary)  Resp 20  Wt 74 kg (163 lb 2.3 oz)  SpO2 99%  BMI 23.41 kg/m2     GENERAL APPEARANCE: drowsy and non responsive  EYES:  - scleral icterus, pupils equal  HENT: mouth without ulcers or lesions  PULM: lungs wheezing scattered R>L to auscultation , equal air movement, no clubbing  CV: regular rhythm, normal rate, no rub     -JVD +     -edema +   GI: soft, non tender, -distended, bowel sounds are +  INTEGUMENT: no cyanosis, - rash  NEURO:  NAD, drowsy     Labs:   All labs reviewed by me  Electrolytes/Renal -   Recent Labs   Lab Test   11/09/18   0805  11/09/18   0631  11/08/18   1841  11/08/18   0622   11/07/18   0655   11/06/18   0622   NA   --   136  137  137   < >  133   < >  132*   POTASSIUM  3.6  5.7*  3.9  3.6   < >  3.8   < >  3.7   CHLORIDE   --   98  98  96   < >  95   < >  93*   CO2   --   27  31  26   < >  26   < >  26   BUN   --   134*  132*  129*   < >  125*   < >  126*   CR   --   2.28*  2.25*  2.12*   < >  2.03*   < >  1.93*   GLC   --   143*  195*  142*   < >  132*   < >  139*   JOSHUA   --   9.0  8.9  8.9   < >  8.7   < >  8.8   MAG   --   2.4*  2.3  2.3   < >  2.2   < >  2.0   PHOS   --   5.0*   --    --    --   4.0   --   4.6*    < > = values in this interval not displayed.       CBC -   Recent Labs   Lab Test  11/09/18   0631  11/08/18   1841  11/08/18   0622  11/07/18   0655   WBC  8.3   --   8.5  8.4   HGB  8.6*  8.5*  8.0*  9.6*   PLT  333   --   352  331       LFTs -   Recent Labs   Lab Test  11/02/18   1148  10/28/18   0818  10/10/18   1122   ALKPHOS  113  130  115   BILITOTAL  0.4  0.4  0.2   ALT  20  27  15   AST  23  34  24   PROTTOTAL  6.7*  6.8  5.9*   ALBUMIN  2.4*  2.4*  2.0*       Iron Panel -   Recent Labs   Lab Test  11/05/18   0801  11/08/16   1209  08/24/16   0852   IRON  29*  17*  20*   IRONSAT  11*  11*  10*   BARTOLO  502*   --   1025*         Imaging:  All imaging studies reviewed by me.     Current Medications:    bumetanide  4 mg Intravenous BID AC     carvedilol  6.25 mg Oral or Feeding Tube BID     ceFEPIme (MAXIPIME) IV  2 g Intravenous Q24H     cholecalciferol  400 Units Oral or Feeding Tube Daily     ferrous sulfate  600 mg Per Feeding Tube Daily with breakfast     insulin aspart  1-6 Units Subcutaneous Q4H     lactobacillus rhamnosus (GG)  1 capsule Per Feeding Tube BID     levETIRAcetam  500 mg Oral or Feeding Tube BID     metroNIDAZOLE  500 mg Intravenous Q8H     miconazole   Topical BID     multivitamins with minerals  15 mL Per Feeding Tube Daily     pantoprazole  40 mg Per Feeding Tube BID AC      predniSONE  5 mg Oral or Feeding Tube Daily     sodium chloride (PF)  3 mL Intracatheter Q8H     tacrolimus  1.5 mg Oral or Feeding Tube BID     vancomycin  125 mg Oral or Feeding Tube 4x Daily       IV fluid REPLACEMENT ONLY       Warfarin Therapy Reminder       Viral Jeff Rosenberg MD

## 2018-11-09 NOTE — PROGRESS NOTES
CLINICAL NUTRITION SERVICES - REASSESSMENT NOTE     Nutrition Prescription    RECOMMENDATIONS FOR MDs/PROVIDERS TO ORDER:  None     Malnutrition Status:    Severe malnutrition in the context of chronic illness     Recommendations already ordered by Registered Dietitian (RD):  --Changed TF to Suplena @ 45 ml/hr ( renal formula for renal patient, lower protein, low lytes )    Access: NJ  Dosing wt: 72 kg driest wt this admit  Regimen: Suplena @ goal 45 ml/hr (1080 ml/day) to provide 1944 kcals (27 kcal/kg/day), 49 gm PRO (0.7 gm/kg/day), 788 ml free H2O, 212 gm CHO and 14 gm Fiber daily.    - Start Suplena at 25 ml/hr, advance by 10 ml every 8 hours to goal at 45 ml/hr.     Future/Additional Recommendations:  None       EVALUATION OF THE PROGRESS TOWARD GOALS   Diet:   NPO (r/t history of unsafe swallow)    Nutrition Support  Enteral (home) - Restarted on admit on 11/3:    Type of Feeding Tube: NJ tube Placed 11/2 ??   Enteral Frequency: Cycled x 14 hours   Enteral Regimen: Nepro @ 75 ml/hr x 14 hrs (6pm - 8am)  Enteral Additives: Certavite 15 ml daily   Total Enteral Provisions: 1050 ml/day,  1890 kcal/day (24 kcal/kg), 85 gm PRO (1.1 gm/kg), 169 CHO, 767 ml H2O, 13 gm Fiber daily.    Free Water Flush: currently on patency flush of 30 ml before and after each cycle feed ( home regimen: 55 ml x 14 hours via feed and flush ( 770 ml /day)     Intake:   TF modified by daughter request 3 days ago to run at continuous rate @ 25 ml/hr ( 600 ml/day , 1080 kcal /day) due to volume status / volume up. Meeting 60% of the estimated needs.        ASSESSED NUTRITION NEEDS Dosing Weight: 72 kg (actual, based on driest wt this admission on 11/5 )  Estimated Energy Needs: 1800 - 2160 kcals/day (25 - 30 kcals/kg)  Justification: Maintenance  Estimated Protein Needs: ~ 58 grams protein/day (~  0.8 grams of pro/kg) Justification: Maintenance with CKD3        NEW FINDINGS   --Admitted for dyspnea and cough and increased AMS/ LILIAM.  Undergoing medical management for volume with Bumex 4 mg BID.     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 On tacrolimus and pred, CHF, CKD3 of transplanted kidney, Recurrent C.diff     --He has had a significant decline in status and was discharged after a long admission for seizure and encephalopathy, Dialysis dependant LILIAM with recovery, and volume overload discharged on Bumex    --C.diff positive       Labs:   --K+: 5.7 -- down to 3.6, BUN: 134, Cr: 2.28 (H)  --Mg++: 2.4, Phos: 5.0, K+: 5.7 - all elevated   --Glucose: 143 (H)    --Wt trend:   80.7 kg (177 lb 14.4 oz) on 11/2 admit wt ---> 72.2 kg (159 lb 2.8 oz) driest wt this admit on 11/5 ( patient is on diuretics)     MALNUTRITION  % Intake: < 75% for > 7 days (non-severe)  % Weight Loss: > 2% in 1 week (severe)  Subcutaneous Fat Loss:  Thoracic/intercostal, upper arm Moderate  Muscle Loss: Previously Scapular bone, Thoracic region (clavicle, acromium bone, deltoid, trapezius, pectoral, Upper arm (bicep, tricep): moderate-severe, Lower arm  (forearm), Patellar region: moderate and Posterior calf: moderate-severe  Fluid Accumulation/Edema:None noted   Malnutrition Diagnosis: Severe malnutrition in the context of chronic illness     Previous Goals   Total avg nutritional intake to meet a minimum of 25 kcal/kg and 1 gm PRO/kg daily (per dosing wt 80 kg).  Evaluation: Not met    Previous Nutrition Diagnosis  Inadequate protein-energy intake related to NPO status secondary to dysphagia, reliant on nutrition support to meet entire nutrition needs as evidenced by no nutrition support yet ordered, currently meeting 0% needs.        Evaluation: Improving    CURRENT NUTRITION DIAGNOSIS  Inadequate enteral nutrition infusion related to CKD3/ with volume up as evidenced by TFs rate decreased to 1/2 rate for the past 4 days.      INTERVENTIONS  Implementation  Discussed with patients daughter, will increase rate to  meet lower end estimated need at continuous rate.     Goals  Total avg nutritional intake to meet a minimum of 25 kcal/kg, ( 1800 kcal /day) and 58 gm PRO/kg daily (per dosing wt 72 kg driest wt this admit ).    Monitoring/Evaluation  Progress toward goals will be monitored and evaluated per protocol.    Iveth Mann RD/LANCE  Pager [phone]

## 2018-11-09 NOTE — PROGRESS NOTES
Care Coordinator Progress Note    Admission Date/Time:  11/2/2018  Attending MD:  Jonathon Martinez,*    Data  Chart reviewed, discussed with interdisciplinary team.   Patient was admitted for:    Decreased level of consciousness  Hyponatremia  Pneumonia of right middle lobe due to infectious organism (H).    Concerns with insurance coverage for discharge needs: None.  Current Living Situation: Patient lives with family.  Support System: Supportive and Involved  Services Involved: Home Care and PCA  Transportation at Discharge: [facility] stretcher  Transportation to Medical Appointments: - [facility] Stretcher  Barriers to Discharge: medical needs    Coordination of Care     11/9: open to [facility] - RN (resmption orders completed), has 11.5hrs PCA as well. Patient is not expected to discharge over the weekend, will continue to follow.   11/8: Benjamin agreed to attending a Care Conference 3pm Friday 11/9, Medical staff stated SW and RNCC are not needed at this time.  11/3: Patient is well known to RNCC from last admission (9/11 to 10/29/18) having just discharged last week. Patient is well supported at home, has [facility] nursing visits and 11.5hrs PCA daily. Primary caregiver is daughter May. Also greatly involved in decision making is daughter Benjamin. Neither is primary health care agent; decisions need to be made equally by both daughters which has, in the prior admission, results in a conflict of treatment for the medical and nursing staff. Patient was denied acceptance globally from [facility] during last inpatient admission therefor unnecessary to attempt again due to futility.    Referrals: open to [facility] (resumption orders and Face to Face completed)    [facility]  Phone  [phone]  Fax  [phone]    **RESUMPTION OF HOME CARE SERVICES**     RN skilled nursing visit.   RN to assess vital signs and weight, respiratory and cardiac status, pain level  and activity tolerance, hydration, nutrition and bowel status   RN to complete home safety evaluation.  RN to complete weekly medication management and set-up, please involve family/primary caregiver in med education.  RN to provide lab draws as needed and communicate results to PCP            Plan  Anticipated Discharge Date:  TBD  Anticipated Discharge Plan:  Home w/family    Radha Ahuja RN, BSN, PHN  Medicine Care Coordinator  [facility]  Desk Phone: [phone]  Pager: [phone]    To contact Weekend RNCC, dial [phone] and enter job code [phone] at prompt.   This pager can not be contacted by text page or outside line.

## 2018-11-09 NOTE — PLAN OF CARE
Problem: Patient Care Overview  Goal: Plan of Care/Patient Progress Review  Unable to assess mentation, nonverbal, combatitive. VSS on RA. No signs or indications of pain. Repositioned Q2h. A2 in bed. Having loose BMs. Mepilex changed on bottom, mepilex on L and R thighs. Condom catheter in place.  R PIV-TKO. BG Q4h. Meds through NJ. TF via NJ at goal of 25ml/hr. Will cont to monitor.

## 2018-11-09 NOTE — PLAN OF CARE
Problem: Patient Care Overview  Goal: Plan of Care/Patient Progress Review  Outcome: No Change  VSS on RA. Pt nonverbal at baseline; unable to assess mentation. Pt's TF running continuously @25mL/hr. Repositioned Q2 hours with pillows.  Condom catheter on to measure urine output; see flowsheet for output. Mepilex intact on coccyx and inner thighs. Interdry placed in between skin folds in the groin area for rawness and bleeding. Pt continues to have frequent loose stools. Q4 BG checked and insulin given accordingly to sliding scale. PIV TKO for IV Abx.Family at bedside and helpful with cares. Continue to monitor and follow POC.

## 2018-11-09 NOTE — PROGRESS NOTES
INTERNAL MEDICINE PROGRESS NOTE    Priscilla Way ([id number]) admitted on 11/2/2018 11/09/2018    Assessment & Plan:  77 y/o male with PMHx significant for vascular dementia, CVA multiple sites, DVT on warfarin, HCV cirrhosis s/p liver transplant 2000, HCV ESRD s/p kidney transplant 2000, CKD III of transplanted kidney, HFpEF, recurrent C diff infection, malnutrition on tube feeds and recent seizure disorder/status epilepticus was admitted to the hospital because of worsening altered mental status and shortness of breath.     #Acute hypoxic respiratory failure  #Volume overload  #HFpEF (LVEF 55-60%)  According to one the patient's care taker, patient was noted to be coughing more and sounded productive. Due to language barrier unclear if symptoms of viral URI and sick contacts. On exam during admission, he had coarse lung sounds, elevated JVP ~ 15cm and pitting edema on both legs. CXR had signs of pulmonary edema. Repeat CXR's demonstrating improvement after diuresis. Weight on admission was 80kg, today 73.2kg. Appears close to euvolemia. He is hypoalbuminemic therefore more risk of third spacing.  - Strict I/Os  - Bumex 4mg BID  - Monitor UOP  - BMP BID  - Daily weights  - Continue home coreg  - Not on lisinopril due to renal dysfunction  - Wean O2 as able     #Cough  #Aspiration pneumonia vs pneumonitis   #Leukocytosis  CXR also with show evidence of increased pulmonary congestions suggestive of pulmonary edema and also possible aspiration. He has had other hospitalizations because of aspiration in the setting of TF's and altered mentation. Viral URI panel and influenza were negative. MRSA nares negative. Urine strep and pneumo antigen negative.  Procal not elevated.  Patient not needing supplemental O2, zosyn was discontinued. However, patient with low grade temps and WBC elevated as well as procal. These are downtrending. Abx were narrowed to cover for possible UTI.   - Discontinued Zosyn  - Started  cefepime and flagyl for HAP and anaerobes from possible aspiration  - Flagyl stop date 11/12/18 for aspiration  - DuoNeb  - Blood cultures pending  - Trend procal     Zosyn (11/02/18 - 11/03/18, 11/06/18 -  11/09/18)  Cefepime (11/09/18 - present)  Flagyl (11/09/18 - present)    #HCV ESRD s/p transplant 2000  #LILIAM on CKD III of transplanted kidney  #?Renal vein thrombosis  #?Gross hematuria  #Hyperphosphatemia  #UTI  Patient at one point was to be on dialysis of his transplanted kidney but renal function recovered. Baseline Cr ~ 1.2-1.5, but remains elevated and with not as much urinary output expected for high doses of diuretics. He does look volume up on exam, although less than during admission. Causes could be intrinsic injury from infectious causes, medications, cardiorenal type 1, significant renal compromise at last admission. US of the transplanted kidney showing increase in resistive indices suggestive for renal vein thrombus vs LILIAM vs cardiorenal. Tacro level 3 mildly below goal (4-6). But ok per nephrology for now. He did have  gross hematuria on 11/06/18. CK was measured due to seizure history, but it was below normal range. His last BK virus check on file from 2008 was negative and repeat on this admission was undetectable. Urine color spontaneously resolved to clear yellow. UA did show large presence of RBC and WBC. Urine cultures growing Enterobacter cloacae that is resistant to zosyn. Has grown this organism in the past. Suspect it represents colonization, but in the setting of creatinine not improving then will treat.   - Discussed with transplant nephrology, will repeat US once LILIAM improves; no need for interventions at this moment  - Continue tacro and prednisone  - Diuretics as above  - Urine cultures pending  - Trending PO4  - Holding aspirin for now  - Will assess new baseline of his renal function  - Started cefepime today with stop date 11/19/18 for complicated UTI    #Acute toxic  encephalopathy  #Uremia  #Vascular dementia  #Uncontrolled seizure activity  #Recent diagnosis of seizure disorder with status epilepticus  Was on EEG monitor during last admission and had readings consistent with non-convulsive status epilepticus, but also had clonic tonic seizure back then. He is non-verbal. Head imaging in the past has shown chronic advanced small vessel disease that is diffuse and atherosclerotic plaques with stenosis along vertebral arteries and MCA. His mentation mainly affected by vascular dementia. GCS continues to be 5-6. Per previous notes, he can be aroused by verbal stimuli. Current encephalopathy multifactorial by ongoing evolution of cerebrovascular disease vs metabolic (uremia, hyponatremia) vs sepsis vs seizures. EEG monitor has indeed detected generalized rhythmic patterns concerning for seizures. This is despite being on keppra and supratherapeutic levels. He received 2mg of ativan which improved this pattern, but there is still some background pseudoperiodic epileptiform discharges. TSH and NH3 normal. Furthermore, CT scan on 11/02/18 did not reveal any gross intracranial pathology. Unable to protect airway but he is DNR/DNI. EEG pattern was evaluated by neurology and current activity seems to be chronic of unclear clinical significance and also stable. Therefore, lacosamide was discontinued but Keppra was kept.  - Neuro checks  - Neurology consulted  - Continue Keppra 500mg POBID   - Keppra levels pending    #Diarrhea  #Cdiff  Patient had had c diff in previous admissions, 03/2018, 05/2018, 06/2018, 09/2018 and now as well. Report from care taker was that patient with increasing stool output. He has been treated with vancomycin long term.  - Start 10 day course of vanc 125mg PO QID  - Then will do 20 days of rifaximin 400 mg PO TID     #Hyponatremia, hypervolemic  Patient with signs of hypervolemia. Na on last admission was 130, now 121. This could be also affecting mentation.  Low sodium could also have contribution from renal losses due to LILIAM. Serum osmolality normal due to elevated BUN. Resolved.  - Diuresis as above      #+MRSE blood culture  One out of two bottles from admission was positive for MRSE. Received one dose of vanc. No fever or signs of clinical decompensation. Repeat cultures negative for now.  - Blood cultures x2, negative to date  - Low threshold to start IV abx    #HCV ESLD s/p transplant 2000  LFTs normal. No evidence of rejection or decompensation.  - Continue tacro and prednisone as above     #Chronic iron deficiency anemia  Hemoglobin stable. There was some concern about slow GI bleed in previous admissions. Patient also with CKD driving down Hgb production. No evidence of melena for now. He had hemoglobin drop of 1.5g today, unclear of source, but repeat hemoglobin up-trending.  - Continue home PPI  - Continue home iron, which might confound appearance of stool  - Monitor for bleeding     #Hx of DVT on warfarin  DVT bilaterally on US in 2016. INR subtherapeutic. Heparin bridge completed  - INR daily  - Pharmacy to dose warfarin      #Severe malnutrition on TF's  - TF consult placed  - Refeeding syndrome and nutrition labs per dietician     #DM2  - Continue home insulin regimen    Patient evaluated and discussed with Dr. Martinez.      Code Status: DNR/DNI  FEN: TF's  PPx: DVT on warfarin, GI PPI PO  Dispo: pending improvement in renal function, possible discharge in 24-72hrs    Discussed goal of care with daughters Caryl and Cruz. They both expressed understanding of very poor prognosis and would not want to escalate cares beyond current management. They would not want dialysis either. However, they would not like to pursue comfort cares and their expectation is medical management of his numerous co-morbidities.     Of note daughter Cruz upset on 11/06/18 evening, due to patient being on lacosamide. Was very adamant of discontinuing medication. She threatened to  zoë the hospital if medication was not discontinued. Night team reassured her of neurology's recommendation for the medication based on initial EEG's, but daughter did not agree with this and became verbally abusive.      Ramos Mcnair MD PhD  Internal Medicine, PGY-3  Pager: [phone]    Patient was seen and discussed with Dr. Martinez.     ==================================================================    Interval HistorySubjective:  No overnight events, patient not verbal, lethargic today. Urine clear yellow.  Unable to assess as patient does not communicate.     Objective:  Most recent vital signs:  /66 (BP Location: Left arm)  Pulse 81  Temp 96.5  F (35.8  C) (Axillary)  Resp 18  Wt 74 kg (163 lb 2.3 oz)  SpO2 100%  BMI 23.41 kg/m2  Temp:  [96.4  F (35.8  C)-96.7  F (35.9  C)] 96.5  F (35.8  C)  Heart Rate:  [72-76] 75  Resp:  [16-20] 18  BP: (151-170)/(66-80) 170/66  SpO2:  [96 %-100 %] 100 %  Wt Readings from Last 2 Encounters:   11/09/18 74 kg (163 lb 2.3 oz)   10/29/18 70.8 kg (156 lb 1.6 oz)     Intake/Output Summary (Last 24 hours) at 11/09/18 1734  Last data filed at 11/09/18 1700   Gross per 24 hour   Intake              660 ml   Output              800 ml   Net             -140 ml     Physical exam:  General: lethargic, non-verbal  HEENT: PERRLA, EOMI, anicteric sclera, evidence of cataracts  Neck:  JVP ~7cm above clavicle  CV: RRR, no murmur, no rubs, no gallops  Resp: Coarse bilateral crackles and ronchi  Abdomen: non tender, non distended, no organomegaly, +bs  Extremities: bilateral pitting edema at the ankles improved from yesterday, WWP  Skin: no rash, not jaundice  Psych: unable to assess due to non-responsive  Neuro: GCS 6-7    Labs:  Results for orders placed or performed during the hospital encounter of 11/02/18 (from the past 24 hour(s))   Hemoglobin   Result Value Ref Range    Hemoglobin 8.5 (L) 13.3 - 17.7 g/dL   Basic metabolic panel   Result Value Ref Range    Sodium  137 133 - 144 mmol/L    Potassium 3.9 3.4 - 5.3 mmol/L    Chloride 98 94 - 109 mmol/L    Carbon Dioxide 31 20 - 32 mmol/L    Anion Gap 8 3 - 14 mmol/L    Glucose 195 (H) 70 - 99 mg/dL    Urea Nitrogen 132 (H) 7 - 30 mg/dL    Creatinine 2.25 (H) 0.66 - 1.25 mg/dL    GFR Estimate 28 (L) >60 mL/min/1.7m2    GFR Estimate If Black 34 (L) >60 mL/min/1.7m2    Calcium 8.9 8.5 - 10.1 mg/dL   Magnesium (AM Draw)   Result Value Ref Range    Magnesium 2.3 1.6 - 2.3 mg/dL   CBC with platelets   Result Value Ref Range    WBC 8.3 4.0 - 11.0 10e9/L    RBC Count 2.84 (L) 4.4 - 5.9 10e12/L    Hemoglobin 8.6 (L) 13.3 - 17.7 g/dL    Hematocrit 27.3 (L) 40.0 - 53.0 %    MCV 96 78 - 100 fl    MCH 30.3 26.5 - 33.0 pg    MCHC 31.5 31.5 - 36.5 g/dL    RDW 16.5 (H) 10.0 - 15.0 %    Platelet Count 333 150 - 450 10e9/L   Basic metabolic panel   Result Value Ref Range    Sodium 136 133 - 144 mmol/L    Potassium 5.7 (H) 3.4 - 5.3 mmol/L    Chloride 98 94 - 109 mmol/L    Carbon Dioxide 27 20 - 32 mmol/L    Anion Gap 10 3 - 14 mmol/L    Glucose 143 (H) 70 - 99 mg/dL    Urea Nitrogen 134 (H) 7 - 30 mg/dL    Creatinine 2.28 (H) 0.66 - 1.25 mg/dL    GFR Estimate 28 (L) >60 mL/min/1.7m2    GFR Estimate If Black 34 (L) >60 mL/min/1.7m2    Calcium 9.0 8.5 - 10.1 mg/dL   INR   Result Value Ref Range    INR 2.02 (H) 0.86 - 1.14   Magnesium (AM Draw)   Result Value Ref Range    Magnesium 2.4 (H) 1.6 - 2.3 mg/dL   Procalcitonin   Result Value Ref Range    Procalcitonin 1.67 ng/ml   Phosphorus   Result Value Ref Range    Phosphorus 5.0 (H) 2.5 - 4.5 mg/dL   Potassium   Result Value Ref Range    Potassium 3.6 3.4 - 5.3 mmol/L   US Renal Transplant    Narrative    EXAMINATION: US RENAL TRANSPLANT,  11/9/2018 10:28 AM     COMPARISON: Renal ultrasound dated 11/2/2018    HISTORY: please evaluate for signs of infection/inflammation of  transplanted kidney;     TECHNIQUE:  Grey-scale, color Doppler and spectral flow analysis.    FINDINGS:  The transplant kidney is  located in the right lower quadrant, and  measures 10 cm. Parenchyma is of relatively normal thickness and  slightly increased echogenicity. No focal lesions. No hydronephrosis.  No perinephric fluid collection.    Renal artery flow:   44 cm/sec peak systolic at hilum.  60 peak systolic at anastomosis.  Arcuate artery resistive indices (upper to lower): 1.0, 1.0, 1.0    Renal Vein Flow:  14 at hilum.   17 at anastomosis.    Iliac artery flow:  69 peak systolic above anastomosis.  111 peak systolic below anastomosis.    Iliac vein flow:  Patent above and below the anastomosis.      Impression    IMPRESSION:   1. Mildly increased echogenicity of the right lower quadrant renal  transplant as can be seen with medical renal disease. No  hydronephrosis or perinephric fluid collection.  2. Resistive indices in the arcuate arteries remain elevated at 1.0  likely related to renal disease.   3. Patent transplant vasculature, including the renal vein.    I have personally reviewed the examination and initial interpretation  and I agree with the findings.    NAJMA REAGAN MD     *Note: Due to a large number of results and/or encounters for the requested time period, some results have not been displayed. A complete set of results can be found in Results Review.

## 2018-11-09 NOTE — PLAN OF CARE
Problem: Patient Care Overview  Goal: Plan of Care/Patient Progress Review  Outcome: No Change  Unable to assess mentation, nonverbal, combatitive. VSS on RA. No signs or indications of pain. Repositioned Q2h. Having loose incontinent stools. Condom catheter in place.  R PIV SL'd. BG Q4h. Meds through NJ. TF via NJ at goal of 25ml/hr. Family member present and involved with care.

## 2018-11-10 NOTE — PLAN OF CARE
Problem: Patient Care Overview  Goal: Plan of Care/Patient Progress Review  Outcome: No Change  Pt admitted for volume overload and PNA. Unable to assess mentation and neuros, pt nonverbal. Sometimes pt can be combative with cares. However, most of the time, pt was lethargic and sleeping between cares. IV antibiotics given. VSS on room air. TF were increased to 35 ml/ hr via NJ at 1600, goal rate of 45 ml/ hr. Meds were also given through the NJ. BG and sliding scale insulin every 4 hours. Pt was turned every 2 hours. Pt was incontinent of stool. Pt had a new condom catheter placed. Family at bedside. Continue with plan of care.

## 2018-11-10 NOTE — PROGRESS NOTES
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 IS with tacrolimus and pred. He has had a significant decline in status and was discharged after a long admission for seizure and encephalopathy, Dialysis dependant LILIAM with recovery, and volume overload discharged on Bumex. Admitted for dyspnea and cough and increased AMS.   Transplant nephrology consulted for LILIAM    SP SLK 12/2000  DDKT with baseline creat 1.3-1.6 -- was discharged on 1.6 and now increased to ~2  Hematuria -- -ve BK, resolved   Creatinine elevation with hx of recent ATN and HD dependant LILIAM , possible renal scarring and worsened baseline functions Vs cardiorenal syndrome like picture with 10kg weight gain on admission. Urine sodium is not reliable since he was on diuretics.   -- he is very close to his dry weight and has edema which can be medically managed as much as possible  -- I recommend ongoing medical management for volume with bumex 4 mg bid.  He has an acute kidney injury now for the past two months.  Although he has some recover since his initial injury he does not have a complete recovery to baseline.  He has biopsy proven advanced diabetic nephropathy on his last biopsy.  I do not recommend dialysis for this acute process (not required and against the wishes per last hospitalization).  In regards to chronic renal replacement needs, he is not a candidate for dialysis liseth with poor functional status and encephalopathy with seizures. Will trial with maximal medical support.  -- Continue with daily renal panel  -- Please weigh him daily since UO has been unmeasured it is difficult to assess volume  -- Strict IO monitoring as possible     Liver Tx-- stable    Immunosuppression on prednisone and low dose tacrolimus   tacro 1.5mg BiD (level 3.7 11/07, will continue at low dose for now)    Hypervolemia  Dry weght  unknown but he has been as low as 68kgs in the past  -- diuresis with bumex as needed for achieving a net -ve balance/even balance  -- Infiltrates on CXR are likely a mix of fluid and rt aspiration related PNA/Pneumonitis    HTN well controlled  On coreg and amlodipine    Electrolytes  Hypervolemic hyponatremia  Improving   Hyperphosphatemia, 6.2 -->5.2-->4.6-->4  Please check with labs 2-3 times a wk is likely sec to LILIAM    Anemia   Anemia with iron deficiency and inflammation  Tsat 11% and ferritin 502  -- Hb has been ~8.5  -- can consider starting on iron supplements PO   -- We may consider TOMAS therapy if there is no improvements    BMD  PTH 78  Calcium wnl and phos elevated sec to LILIAM  No change in management for now    Metabolic encephalopathy/ Seizures / post ictal  He has been on Keppra   On video EEG monitoring, with diffuse slowing suggestive of encephalopathy  -- will follow      C.diff on vanco PO    Recommendations were communicated to primary team     Viral Jeff Rosenberg MD   [phone]    Interval History :   Nursing and provider notes from last 24 hours reviewed.  No new events overnight    Review of Systems:   Unable to obtain due to AMS    Physical Exam:   I/O last 3 completed shifts:  In: 820 [I.V.:100; NG/GT:250]  Out: 700 [Urine:700]   /59 (BP Location: Left arm)  Pulse 75  Temp 95.4  F (35.2  C) (Axillary)  Resp 12  Wt 75.2 kg (165 lb 12.6 oz)  SpO2 100%  BMI 23.79 kg/m2     GENERAL APPEARANCE: drowsy and non responsive  EYES:  - scleral icterus, pupils equal  HENT: mouth without ulcers or lesions  PULM: lungs wheezing scattered R>L to auscultation , equal air movement, no clubbing  CV: regular rhythm, normal rate, no rub     -JVD +     -edema +   GI: soft, non tender, -distended, bowel sounds are +  INTEGUMENT: no cyanosis, - rash  NEURO:  NAD, drowsy     Labs:   All labs reviewed by me  Electrolytes/Renal -   Recent Labs   Lab Test  11/10/18   0627  11/09/18   1825  11/09/18   0896   11/09/18   0631   11/07/18   0655   11/06/18   0622   NA  138  140   --   136   < >  133   < >  132*   POTASSIUM  3.4  4.4  3.6  5.7*   < >  3.8   < >  3.7   CHLORIDE  100  100   --   98   < >  95   < >  93*   CO2  29  26   --   27   < >  26   < >  26   BUN  138*  134*   --   134*   < >  125*   < >  126*   CR  2.26*  2.32*   --   2.28*   < >  2.03*   < >  1.93*   GLC  165*  193*   --   143*   < >  132*   < >  139*   JOSHUA  9.1  8.9   --   9.0   < >  8.7   < >  8.8   MAG  2.3  2.3   --   2.4*   < >  2.2   < >  2.0   PHOS   --    --    --   5.0*   --   4.0   --   4.6*    < > = values in this interval not displayed.       CBC -   Recent Labs   Lab Test  11/10/18   0627  11/09/18   0631  11/08/18   1841  11/08/18   0622   WBC  6.4  8.3   --   8.5   HGB  8.8*  8.6*  8.5*  8.0*   PLT  365  333   --   352       LFTs -   Recent Labs   Lab Test  11/02/18   1148  10/28/18   0818  10/10/18   1122   ALKPHOS  113  130  115   BILITOTAL  0.4  0.4  0.2   ALT  20  27  15   AST  23  34  24   PROTTOTAL  6.7*  6.8  5.9*   ALBUMIN  2.4*  2.4*  2.0*       Iron Panel -   Recent Labs   Lab Test  11/05/18   0801  11/08/16   1209  08/24/16   0852   IRON  29*  17*  20*   IRONSAT  11*  11*  10*   BARTOLO  502*   --   1025*         Imaging:  All imaging studies reviewed by me.     Current Medications:    bumetanide  4 mg Intravenous BID AC     carvedilol  6.25 mg Oral or Feeding Tube BID     ceFEPIme (MAXIPIME) IV  2 g Intravenous Q24H     cholecalciferol  400 Units Oral or Feeding Tube Daily     ferrous sulfate  600 mg Per Feeding Tube Daily with breakfast     insulin aspart  1-6 Units Subcutaneous Q4H     lactobacillus rhamnosus (GG)  1 capsule Per Feeding Tube BID     levETIRAcetam  250 mg Oral or Feeding Tube BID     metroNIDAZOLE  500 mg Intravenous Q8H     miconazole   Topical BID     multivitamins with minerals  15 mL Per Feeding Tube Daily     pantoprazole  40 mg Per Feeding Tube BID AC     predniSONE  5 mg Oral or Feeding Tube Daily      sodium chloride (PF)  3 mL Intracatheter Q8H     tacrolimus  1.5 mg Oral or Feeding Tube BID     vancomycin  125 mg Oral or Feeding Tube 4x Daily     warfarin  2.5 mg Oral ONCE at 18:00       IV fluid REPLACEMENT ONLY       Warfarin Therapy Reminder       Viral Jeff Rosenberg MD

## 2018-11-10 NOTE — PROGRESS NOTES
Problem: volume overload and PNA  Goal: Increase urine output  Outcome: Bumex 4 mg given, incontinent x2  Time: 4914-4340    BP: 147/59  Temp: 95.4F  Resp: 12  SpO2: 100%  BMI: 23.79    Neuro: [place] Coma Scale: 9, pt is nonverbal  Behavioral: not speaking, arouses to touch  Activity: Bed rest, sleeping, daughter at bedside  Cardiac: WDL  Respiratory: diminished lung sounds: anterior- all fields, on RA  GI/: NPO tube feed rate @35 ML/hr, Condom catheter in place, scrotal edema  Skin: Reposition Q2hr, dry skin, Stuart Score: 12, atmos air mattress in use,   Endo: BG q4hr,   Pain: Pt non-verbal, slept through shift.  Labs: Urea nitrogen 138, Creatinine 2.26, GFR 34, Hemoglobin 8.8, Hematocrit 28.6  Lines: R PIV     Subjective: pt verbally unresponsive  Objective: incontinent of stool x 3, incontinent of urine x1.  Scrotal edema, pitting 2+ to knees bilaterally, overall skin dry and flaky. Condom catheter replaced x1.  Assessment: potential for skin breakdown  Plan: reposition Q2hr, barrier cream and Miconazole powder applied to perineal area, explore potential for patino catheter.     Former smoker

## 2018-11-10 NOTE — PROGRESS NOTES
INTERNAL MEDICINE PROGRESS NOTE    Priscilla Way ([id number]) admitted on 11/2/2018  11/10/2018    Assessment & Plan:  79 y/o male with PMHx significant for vascular dementia, CVA multiple sites, DVT on warfarin, HCV cirrhosis s/p liver transplant 2000, HCV ESRD s/p kidney transplant 2000, CKD III of transplanted kidney, HFpEF, recurrent C diff infection, malnutrition on tube feeds and recent seizure disorder/status epilepticus was admitted to the hospital because of worsening altered mental status and shortness of breath.     #Acute hypoxic respiratory failure  #Volume overload  #HFpEF (LVEF 55-60%)  On admission, he had coarse lung sounds, elevated JVP ~ 15cm and pitting edema on both legs. CXR had signs of pulmonary edema. Repeat CXR's demonstrating improvement after diuresis. Weight on admission was 80kg, today 75.2kg. Appears close to be euvolemic. He is hypoalbuminemic therefore more risk of third spacing.  - Strict I/Os  - Bumex 4mg BID  - Monitor UOP  - BMP BID  - Daily weights  - Continue home coreg  - Not on lisinopril due to renal dysfunction  - Wean O2 as able     #Cough  #Aspiration pneumonia vs pneumonitis   #Leukocytosis  CXR also with show evidence of increased pulmonary congestions suggestive of pulmonary edema and also possible aspiration. He has had other hospitalizations because of aspiration in the setting of TF's and altered mentation. Viral URI panel and influenza were negative. MRSA nares negative. Urine strep and pneumo antigen negative.  Procal not elevated.  Patient not needing supplemental O2, zosyn was discontinued. However, patient with low grade temps and WBC elevated as well as procal. These are downtrending. Abx were narrowed to cover for possible UTI.   - Discontinued Zosyn  - Started cefepime and flagyl for HAP and anaerobes from possible aspiration  - Flagyl stop date 11/12/18 for aspiration  - DuoNeb  - Blood cultures pending  - Trend procal     Zosyn (11/02/18 - 11/03/18,  11/06/18 -  11/09/18)  Cefepime (11/09/18 - present)  Flagyl (11/09/18 - present)    #UTI  #?Gross hematuria  Patient had  gross hematuria on 11/06/18. CK measured due to seizure history, but it was below normal range. His last BK virus check on file from 2008 was negative and repeat on this admission was undetectable. Urine color spontaneously resolved to clear yellow.UA did show large presence of RBC and WBC. Urine cultures growing Enterobacter cloacae that is resistant to zosyn. Has grown this organism in the past. Suspect it represents colonization, as this organism has grown before. Initially sensitive to multiple antibiotics, but as patient has been admitted several times and treated with abx; organism has become more resistant. Currently, treating to address possible infection as contributor to his poor renal recovery.   - Continue cefepime for now    #HCV ESRD s/p transplant 2000  #LILIAM on CKD III of transplanted kidney  #?Renal vein thrombosis  #Hyperphosphatemia  Patient at one point was to be on dialysis of his transplanted kidney but renal function recovered. Baseline Cr ~ 1.2-1.5, but remains elevated and with not as much urinary output expected for high doses of diuretics. He does look volume up on exam, although less than during admission. Causes could be intrinsic injury from infectious causes, medications, cardiorenal type 1, significant renal compromise at last admission. US of the transplanted kidney showing increase in resistive indices suggestive for renal vein thrombus vs LILIAM vs cardiorenal. Tacro level 3 mildly below goal (4-6). But ok per nephrology for now.    - Discussed US with transplant nephrology, will repeat US once LILIAM improves; no need for interventions at this moment  - Continue tacro and prednisone  - Diuretics as above  - Urine cultures pending  - Trending PO4  - Holding aspirin for now  - Will assess new baseline of his renal function  - Started cefepime today with stop date  11/19/18 for complicated UTI    #Acute toxic encephalopathy  #Uremia  #Vascular dementia  #Uncontrolled seizure activity  #Recent diagnosis of seizure disorder with status epilepticus  Was on EEG monitor during last admission and had readings consistent with non-convulsive status epilepticus, but also had clonic tonic seizure back then. He is non-verbal. Head imaging in the past has shown chronic advanced small vessel disease that is diffuse and atherosclerotic plaques with stenosis along vertebral arteries and MCA. His mentation mainly affected by vascular dementia. GCS continues to be 5-6. Per previous notes, he can be aroused by verbal stimuli. Current encephalopathy multifactorial by ongoing evolution of cerebrovascular disease vs metabolic (uremia, hyponatremia) vs sepsis vs seizures. EEG monitor has indeed detected generalized rhythmic patterns concerning for seizures. This is despite being on keppra and supratherapeutic levels. He received 2mg of ativan which improved this pattern, but there is still some background pseudoperiodic epileptiform discharges. TSH and NH3 normal. Furthermore, CT scan on 11/02/18 did not reveal any gross intracranial pathology. Unable to protect airway but he is DNR/DNI. EEG pattern was evaluated by neurology and current activity seems to be chronic of unclear clinical significance and also stable. Therefore, lacosamide was discontinued but Keppra was kept.  - Neuro checks  - Neurology consulted  - Adjusted Keppra to 250mg PO BID   - Keppra levels pending    #Diarrhea  #Cdiff  Patient had had c diff in previous admissions, 03/2018, 05/2018, 06/2018, 09/2018 and now as well. Report from care taker was that patient with increasing stool output. He has been treated with vancomycin long term.  - Start 10 day course of vanc 125mg PO QID, stop date 11/13/18  - Then will do 20 days of rifaximin 400 mg PO TID      #Hyponatremia, hypervolemic  Patient with signs of hypervolemia. Na on last  admission was 130, now 121. This could be also affecting mentation. Low sodium could also have contribution from renal losses due to LILIAM. Serum osmolality normal due to elevated BUN. Resolved.  - Diuresis as above      #+MRSE blood culture  One out of two bottles from admission was positive for MRSE. Received one dose of vanc. No fever or signs of clinical decompensation. Repeat cultures negative for now.  - Blood cultures x2, negative to date  - Low threshold to start IV abx    #HCV ESLD s/p transplant 2000  LFTs normal. No evidence of rejection or decompensation.  - Continue tacro and prednisone as above     #Chronic iron deficiency anemia  Hemoglobin stable. There was some concern about slow GI bleed in previous admissions. Patient also with CKD driving down Hgb production. No evidence of melena for now. He had hemoglobin drop of 1.5g today, unclear of source, but repeat hemoglobin up-trending.  - Continue home PPI  - Continue home iron, which might confound appearance of stool  - Monitor for bleeding     #Hx of DVT on warfarin  DVT bilaterally on US in 2016. INR subtherapeutic. Heparin bridge completed  - INR daily  - Pharmacy to dose warfarin      #Severe malnutrition on TF's  - TF consult placed  - Refeeding syndrome and nutrition labs per dietician     #DM2  - Continue home insulin regimen    Patient evaluated and discussed with Dr. Martinez.      Code Status: DNR/DNI  FEN: TF's  PPx: DVT on warfarin, GI PPI PO  Dispo: pending improvement in renal function, possible discharge in 24-72hrs    Discussed goal of care with daughters Caryl and Cruz. They both expressed understanding of very poor prognosis and would not want to escalate cares beyond current management. They would not want dialysis either. However, they would not like to pursue comfort cares and their expectation is medical management of his numerous co-morbidities.     Of note daughter Cruz upset on 11/06/18 evening, due to patient being on  lacosamide. Was very adamant of discontinuing medication. She threatened to zoë the hospital if medication was not discontinued. Night team reassured her of neurology's recommendation for the medication based on initial EEG's, but daughter did not agree with this and became verbally abusive.      Ramos Mcnair MD PhD  Internal Medicine, PGY-3  Pager: [phone]    Patient was seen and discussed with Dr. Martinez.     ==================================================================    Interval HistorySubjective:  No overnight events, patient not verbal, lethargic today. Accompanied by one of his daughters who notices no worsening or improvement. Urine clear yellow and had good UOP last night.  Unable to assess as patient does not communicate.     Objective:  Most recent vital signs:  /59 (BP Location: Left arm)  Pulse 75  Temp 95.4  F (35.2  C) (Axillary)  Resp 12  Wt 75.2 kg (165 lb 12.6 oz)  SpO2 100%  BMI 23.79 kg/m2  Temp:  [95.4  F (35.2  C)-96.5  F (35.8  C)] 95.4  F (35.2  C)  Pulse:  [75] 75  Heart Rate:  [66-77] 66  Resp:  [12-18] 12  BP: (131-170)/(59-79) 147/59  SpO2:  [100 %] 100 %  Wt Readings from Last 2 Encounters:   11/10/18 75.2 kg (165 lb 12.6 oz)   10/29/18 70.8 kg (156 lb 1.6 oz)     Intake/Output Summary (Last 24 hours) at 11/10/18 1111  Last data filed at 11/10/18 1055   Gross per 24 hour   Intake              810 ml   Output              700 ml   Net              110 ml     Physical exam:  General: lethargic, non-verbal  HEENT: PERRLA, EOMI, anicteric sclera, evidence of cataracts  Neck:  JVP ~7cm above clavicle  CV: RRR, no murmur, no rubs, no gallops  Resp: Coarse bilateral crackles and ronchi  Abdomen: non tender, non distended, no organomegaly, +bs  Extremities: bilateral pitting edema at the ankles improved from yesterday, WWP  Skin: no rash, not jaundice  Psych: unable to assess due to non-responsive  Neuro: GCS 6-7    Labs:  Results for orders placed or performed during  the hospital encounter of 11/02/18 (from the past 24 hour(s))   Strep pneumo Agn Ur greater or equal to 13yrs or CSF any age   Result Value Ref Range    Specimen Description Urine     S Pneumoniae Antigen       Negative, no Streptococcus pneumoniae antigen detected by immunochromatographic membrane   assay. A negative Streptococcus pneumoniae antigen result does not rule out infection with   Streptococcus pneumoniae.     Basic metabolic panel   Result Value Ref Range    Sodium 140 133 - 144 mmol/L    Potassium 4.4 3.4 - 5.3 mmol/L    Chloride 100 94 - 109 mmol/L    Carbon Dioxide 26 20 - 32 mmol/L    Anion Gap 14 3 - 14 mmol/L    Glucose 193 (H) 70 - 99 mg/dL    Urea Nitrogen 134 (H) 7 - 30 mg/dL    Creatinine 2.32 (H) 0.66 - 1.25 mg/dL    GFR Estimate 27 (L) >60 mL/min/1.7m2    GFR Estimate If Black 33 (L) >60 mL/min/1.7m2    Calcium 8.9 8.5 - 10.1 mg/dL   Magnesium (AM Draw)   Result Value Ref Range    Magnesium 2.3 1.6 - 2.3 mg/dL   Glucose by meter   Result Value Ref Range    Glucose 137 (H) 70 - 99 mg/dL   Glucose by meter   Result Value Ref Range    Glucose 148 (H) 70 - 99 mg/dL   CBC with platelets   Result Value Ref Range    WBC 6.4 4.0 - 11.0 10e9/L    RBC Count 2.95 (L) 4.4 - 5.9 10e12/L    Hemoglobin 8.8 (L) 13.3 - 17.7 g/dL    Hematocrit 28.6 (L) 40.0 - 53.0 %    MCV 97 78 - 100 fl    MCH 29.8 26.5 - 33.0 pg    MCHC 30.8 (L) 31.5 - 36.5 g/dL    RDW 16.5 (H) 10.0 - 15.0 %    Platelet Count 365 150 - 450 10e9/L   Basic metabolic panel   Result Value Ref Range    Sodium 138 133 - 144 mmol/L    Potassium 3.4 3.4 - 5.3 mmol/L    Chloride 100 94 - 109 mmol/L    Carbon Dioxide 29 20 - 32 mmol/L    Anion Gap 10 3 - 14 mmol/L    Glucose 165 (H) 70 - 99 mg/dL    Urea Nitrogen 138 (H) 7 - 30 mg/dL    Creatinine 2.26 (H) 0.66 - 1.25 mg/dL    GFR Estimate 28 (L) >60 mL/min/1.7m2    GFR Estimate If Black 34 (L) >60 mL/min/1.7m2    Calcium 9.1 8.5 - 10.1 mg/dL   INR   Result Value Ref Range    INR 2.20 (H) 0.86 - 1.14    Magnesium (AM Draw)   Result Value Ref Range    Magnesium 2.3 1.6 - 2.3 mg/dL     *Note: Due to a large number of results and/or encounters for the requested time period, some results have not been displayed. A complete set of results can be found in Results Review.

## 2018-11-10 NOTE — PLAN OF CARE
Problem: Patient Care Overview  Goal: Plan of Care/Patient Progress Review  Outcome: No Change  Pt VSS, unable to assess orientation. Lethargic and sedated. Continues to be nonverbal. Intermittently combative with cares. Lift patient and assist of 2 when performing cares. Nursing student performed patient care today. IV antibiotics given. TF rate at 35ml/hr via NJ. BG checks Q4hrs. Turns Q2hrs. Incontinent of stool. Condom catheter in place. Family at bedside. Plan for care meeting today. Continue to monitor and follow POC.

## 2018-11-10 NOTE — PLAN OF CARE
Problem: Patient Care Overview  Goal: Plan of Care/Patient Progress Review  Outcome: No Change  Time: 8702-4301    Reason for admission: Volume overload and PNA.   VSS  Activity: Bedrest. Turns Q2  Pain: Unable to assess  Neuro: Unable to assess. Pt is nonverbal.  Cardiac: WNL  Respiratory: on RA  GI/: Condom cath in place. Inc of stool.   Diet: Tube feeds. GR of 45mL hr.   Lines: R PIV is SL.  Labs.  (gave 1 unit of insulin)      Pt appeared to be sleeping between cares. Occasionally biting at hand mitts when he was turned and repositioned.Daugter is at bedside.Continue to monitor and follow POC.

## 2018-11-11 NOTE — PLAN OF CARE
Problem: Patient Care Overview  Goal: Plan of Care/Patient Progress Review  Outcome: No Change  Pt VSS, unable to assess orientation. Lethargic and sedated. Continues to be nonverbal. Intermittently combative with cares. Lift patient and assist of 2 when performing cares. Nursing student performed patient care today. IV antibiotics given. TF rate at 35ml/hr via NJ. BG checks Q4hrs. Turns Q2hrs. Incontinent of stool. Condom catheter in place. Family at bedside. Plan for discharge to home in 2-3days. Continue to monitor and follow POC.

## 2018-11-11 NOTE — PLAN OF CARE
Problem: Patient Care Overview  Goal: Plan of Care/Patient Progress Review  Outcome: No Change  Time: 3241-4795    Reason for admission: Volume overload and PNA   Vitals: VSS  Activity: Turns Q2.   Pain: Unable to assess  Neuro: Unable to assess. Pt nonverbal.   Cardiac: WNL  Respiratory: on RA. O2 sats 100%  GI/: New condom cath placed this shift. Inc of stool x2  Diet: Tube feeds at45 ml/hr  Lines: R PIV is SL   Skin/Wounds:Skin is dry and peeling.       Pt appeared to be sleeping between care. Occasionally biting at hand mitts. IV antibiotics given. Sliding scale insulin given. Grandson stayed in the room overnight.       Continue to monitor and follow POC.

## 2018-11-11 NOTE — PROGRESS NOTES
INTERNAL MEDICINE PROGRESS NOTE    [address]iscilla [address] ([id number]) admitted on 11/2/2018 11/11/2018    Assessment & Plan:  77 y/o male with PMHx significant for vascular dementia, CVA multiple sites, DVT on warfarin, HCV cirrhosis s/p liver transplant 2000, HCV ESRD s/p kidney transplant 2000, CKD III of transplanted kidney, HFpEF, recurrent C diff infection, malnutrition on tube feeds and recent seizure disorder/status epilepticus was admitted to the hospital because of worsening altered mental status and shortness of breath.     #Acute hypoxic respiratory failure  #Volume overload   #HFpEF (LVEF 55-60%)  On admission, he had coarse lung sounds, elevated JVP ~ 15cm and pitting edema on both legs. CXR had signs of pulmonary edema. Repeat CXR's demonstrating improvement after diuresis. Weight on admission was 80kg, today 75.2kg. Appears close to be euvolemic. He is hypoalbuminemic therefore more risk of third spacing.  - Strict I/Os  - Bumex 4mg BID, change to PO 11/11  - Monitor UOP  - BMP BID  - Daily weights  - Continue home coreg  - Not on lisinopril due to renal dysfunction  - Wean O2 as able     #Cough  #Aspiration pneumonia vs pneumonitis   #Leukocytosis  CXR also with show evidence of increased pulmonary congestions suggestive of pulmonary edema and also possible aspiration. He has had other hospitalizations because of aspiration in the setting of TF's and altered mentation. Viral URI panel and influenza were negative. MRSA nares negative. Urine strep and pneumo antigen negative.  Procal not elevated.  Patient not needing supplemental O2, zosyn was discontinued. However, patient with low grade temps and WBC elevated as well as procal. These are downtrending. Abx were narrowed to cover for possible UTI.   - Started cefepime and flagyl for HAP and anaerobes from possible aspiration  - Flagyl stop date 11/12/18 for aspiration  - DuoNeb  - Blood cultures ngtd  - Trend procal     Zosyn (11/02/18 - 11/03/18,  11/06/18 -  11/09/18)  Cefepime (11/09/18 - present)  Flagyl (11/09/18 - present)    #UTI  #?Gross hematuria  Patient had  gross hematuria on 11/06/18. CK measured due to seizure history, but it was below normal range. His last BK virus check on file from 2008 was negative and repeat on this admission was undetectable. Urine color spontaneously resolved to clear yellow.UA did show large presence of RBC and WBC. Urine cultures growing Enterobacter cloacae that is resistant to zosyn. Has grown this organism in the past. Suspect it represents colonization, as this organism has grown before. Initially sensitive to multiple antibiotics, but as patient has been admitted several times and treated with abx; organism has become more resistant. Currently, treating to address possible infection as contributor to his poor renal recovery.   - Continue cefepime for now    #HCV ESRD s/p transplant 2000  #LILIAM on CKD III of transplanted kidney  #?Renal vein thrombosis  #Hyperphosphatemia  Patient at one point was to be on dialysis of his transplanted kidney but renal function recovered. Baseline Cr ~ 1.2-1.5, but remains elevated and with not as much urinary output expected for high doses of diuretics. He does look volume up on exam, although less than during admission. Causes could be intrinsic injury from infectious causes, medications, cardiorenal type 1, significant renal compromise at last admission. US of the transplanted kidney showing increase in resistive indices suggestive for renal vein thrombus vs LILIAM vs cardiorenal. Tacro level 3 mildly below goal (4-6). But ok per nephrology for now.    - Discussed US with transplant nephrology, will repeat US once LILIAM improves; no need for interventions at this moment  - Continue tacro and prednisone  - Diuretics as above  - Urine cultures with resistant e. cloacea (sensitive to cefepime)  - Trending PO4  - Holding aspirin for now  - cefepime stop date 11/19/18 for complicated  UTI    #Acute toxic encephalopathy  #Uremia  #Vascular dementia  #Uncontrolled seizure activity  #Recent diagnosis of seizure disorder with status epilepticus  Was on EEG monitor during last admission and had readings consistent with non-convulsive status epilepticus, but also had clonic tonic seizure back then. He is non-verbal. Head imaging in the past has shown chronic advanced small vessel disease that is diffuse and atherosclerotic plaques with stenosis along vertebral arteries and MCA. His mentation mainly affected by vascular dementia. GCS continues to be 5-6. Per previous notes, he can be aroused by verbal stimuli. Current encephalopathy multifactorial by ongoing evolution of cerebrovascular disease vs metabolic (uremia, hyponatremia) vs sepsis vs seizures. EEG monitor has indeed detected generalized rhythmic patterns concerning for seizures. This is despite being on keppra and supratherapeutic levels. He received 2mg of ativan which improved this pattern, but there is still some background pseudoperiodic epileptiform discharges. TSH and NH3 normal. Furthermore, CT scan on 11/02/18 did not reveal any gross intracranial pathology. Unable to protect airway but he is DNR/DNI. EEG pattern was evaluated by neurology and current activity seems to be chronic of unclear clinical significance and also stable. Therefore, lacosamide was discontinued but Keppra was kept.  - Neuro checks  - Neurology consulted  - Adjusted Keppra to 250mg PO BID   - Keppra levels pending    #Diarrhea  #Cdiff  Patient had had c diff in previous admissions, 03/2018, 05/2018, 06/2018, 09/2018 and now as well. Report from care taker was that patient with increasing stool output. He has been treated with vancomycin long term.  - Start 10 day course of vanc 125mg PO QID, stop date 11/13/18  - Then will do 20 days of rifaximin 400 mg PO TID      #Hyponatremia, hypervolemic  Patient with signs of hypervolemia. Na on last admission was 130, now  121. This could be also affecting mentation. Low sodium could also have contribution from renal losses due to LILIAM. Serum osmolality normal due to elevated BUN. Resolved.  - Diuresis as above      #+MRSE blood culture  One out of two bottles from admission was positive for MRSE. Received one dose of vanc. No fever or signs of clinical decompensation. Repeat cultures negative for now.  - Blood cultures x2, negative to date  - Low threshold to start IV abx    #HCV ESLD s/p transplant 2000  LFTs normal. No evidence of rejection or decompensation.  - Continue tacro and prednisone as above     #Chronic iron deficiency anemia  Hemoglobin stable. There was some concern about slow GI bleed in previous admissions. Patient also with CKD driving down Hgb production. No evidence of melena for now. He had hemoglobin drop of 1.5g today, unclear of source, but repeat hemoglobin up-trending.  - Continue home PPI  - Continue home iron, which might confound appearance of stool  - Monitor for bleeding     #Hx of DVT on warfarin  DVT bilaterally on US in 2016. INR subtherapeutic. Heparin bridge completed  - INR daily  - Pharmacy to dose warfarin      #Severe malnutrition on TF's  - TF consult placed  - Refeeding syndrome and nutrition labs per dietician     #DM2  - Continue home insulin regimen    Code Status: DNR/DNI  FEN: TF's  PPx: DVT on warfarin, GI PPI PO  Dispo: pending improvement in renal function, possible discharge in 24-72hrs    Jonathon Martinez MD  staff    ==================================================================    Interval HistorySubjective:  No overnight events, patient at baseline non-verbal, non-interactive status.  Discussed with nursing and family.  Will continue with plan as above.  ROS unable be performed given patient's state.    Objective:  Most recent vital signs:  /65 (BP Location: Right leg)  Pulse 76  Temp 96.8  F (36  C) (Axillary)  Resp 16  Wt 77.4 kg (170 lb 10.2 oz)  SpO2 99%  BMI  24.48 kg/m2  Temp:  [95.2  F (35.1  C)-96.8  F (36  C)] 96.8  F (36  C)  Pulse:  [70-76] 76  Heart Rate:  [73] 73  Resp:  [12-20] 16  BP: (132-172)/(56-66) 132/65  SpO2:  [97 %-100 %] 99 %  Wt Readings from Last 2 Encounters:   11/11/18 77.4 kg (170 lb 10.2 oz)   10/29/18 70.8 kg (156 lb 1.6 oz)     Intake/Output Summary (Last 24 hours) at 11/10/18 1111  Last data filed at 11/10/18 1055   Gross per 24 hour   Intake              810 ml   Output              700 ml   Net              110 ml     Physical exam:  General: lethargic, non-verbal  HEENT: PERRLA, EOMI, anicteric sclera, evidence of cataracts  CV: RRR, no murmur, no rubs, no gallops  Resp: Coarse bilateral crackles and ronchi  Abdomen: non tender, non distended, no organomegaly, +bs  Extremities: bilateral pitting edema minimal at ankles, WWP  Skin: no rash, not jaundice  Psych: unable to assess due to non-responsive  Neuro: GCS 6-7    Labs:  Results for orders placed or performed during the hospital encounter of 11/02/18 (from the past 24 hour(s))   Glucose by meter   Result Value Ref Range    Glucose 189 (H) 70 - 99 mg/dL   Glucose by meter   Result Value Ref Range    Glucose 219 (H) 70 - 99 mg/dL   Basic metabolic panel   Result Value Ref Range    Sodium 140 133 - 144 mmol/L    Potassium 3.8 3.4 - 5.3 mmol/L    Chloride 100 94 - 109 mmol/L    Carbon Dioxide 28 20 - 32 mmol/L    Anion Gap 11 3 - 14 mmol/L    Glucose 226 (H) 70 - 99 mg/dL    Urea Nitrogen 137 (H) 7 - 30 mg/dL    Creatinine 2.21 (H) 0.66 - 1.25 mg/dL    GFR Estimate 29 (L) >60 mL/min/1.7m2    GFR Estimate If Black 35 (L) >60 mL/min/1.7m2    Calcium 9.0 8.5 - 10.1 mg/dL   Magnesium (AM Draw)   Result Value Ref Range    Magnesium 2.2 1.6 - 2.3 mg/dL   Glucose by meter   Result Value Ref Range    Glucose 160 (H) 70 - 99 mg/dL   Glucose by meter   Result Value Ref Range    Glucose 174 (H) 70 - 99 mg/dL   Glucose by meter   Result Value Ref Range    Glucose 168 (H) 70 - 99 mg/dL   CBC with  platelets   Result Value Ref Range    WBC 5.0 4.0 - 11.0 10e9/L    RBC Count 3.09 (L) 4.4 - 5.9 10e12/L    Hemoglobin 9.3 (L) 13.3 - 17.7 g/dL    Hematocrit 30.1 (L) 40.0 - 53.0 %    MCV 97 78 - 100 fl    MCH 30.1 26.5 - 33.0 pg    MCHC 30.9 (L) 31.5 - 36.5 g/dL    RDW 16.6 (H) 10.0 - 15.0 %    Platelet Count 306 150 - 450 10e9/L   Basic metabolic panel   Result Value Ref Range    Sodium 141 133 - 144 mmol/L    Potassium 3.5 3.4 - 5.3 mmol/L    Chloride 102 94 - 109 mmol/L    Carbon Dioxide 27 20 - 32 mmol/L    Anion Gap 12 3 - 14 mmol/L    Glucose 192 (H) 70 - 99 mg/dL    Urea Nitrogen 138 (H) 7 - 30 mg/dL    Creatinine 2.13 (H) 0.66 - 1.25 mg/dL    GFR Estimate 30 (L) >60 mL/min/1.7m2    GFR Estimate If Black 36 (L) >60 mL/min/1.7m2    Calcium 9.3 8.5 - 10.1 mg/dL   INR   Result Value Ref Range    INR 2.26 (H) 0.86 - 1.14   Magnesium (AM Draw)   Result Value Ref Range    Magnesium 2.3 1.6 - 2.3 mg/dL   Procalcitonin   Result Value Ref Range    Procalcitonin 0.77 ng/ml   Phosphorus   Result Value Ref Range    Phosphorus 3.9 2.5 - 4.5 mg/dL   Glucose by meter   Result Value Ref Range    Glucose 206 (H) 70 - 99 mg/dL   Glucose by meter   Result Value Ref Range    Glucose 195 (H) 70 - 99 mg/dL     *Note: Due to a large number of results and/or encounters for the requested time period, some results have not been displayed. A complete set of results can be found in Results Review.

## 2018-11-11 NOTE — PROGRESS NOTES
Problem: volume overload and PNA  Goal: Increase urine output  Outcome: Bumex 4 mg given, incontinent x2  Time: 4547-6997     BP: 132/65  Temp: 96.8F  Resp: 16  SpO2: 100%  BMI: 24.48     Neuro: [place] Coma Scale: 8, pt is nonverbal  Behavioral: not speaking, arouses to touch  Activity: Bed rest, sleeping, grandson in room.  Cardiac: WDL  Respiratory: diminished lung sounds: anterior- all fields, on RA  GI/: NPO tube feed rate @45 ML/hr, Condom catheter in place, scrotal edema  Skin: Reposition Q2hr, dry skin, Stuart Score: 12, atmos air mattress in use,   Endo: BG q4hr,   Pain: Pt non-verbal, slept through shift.  Labs: Urea nitrogen 138, Creatinine 2.13, GFR 36, Hemoglobin 9.6, Hematocrit 30.1  Lines: L PIV      Subjective: pt verbally unresponsive  Objective: incontinent of stool x 1, incontinent of urine x1.  Scrotal edema, pitting 2+ to knees bilaterally, overall skin dry and flaky. Condom catheter in place.  Assessment: potential for skin breakdown  Plan: reposition Q2hr, barrier cream and Miconazole powder applied to perineal area, explore potential for patino catheter.

## 2018-11-11 NOTE — PLAN OF CARE
Problem: Patient Care Overview  Goal: Plan of Care/Patient Progress Review  Outcome: No Change  Pt admitted for volume overload and PNA. Unable to assess mentation and neuros, pt nonverbal. Sometimes pt can be combative with cares. However, most of the time, pt was lethargic and sleeping between cares. IV antibiotics given. VSS on room air. TF at the goal rate of 45 ml/ hr. Meds were also given through the [place]. BG and sliding scale insulin every 4 hours. Pt was turned every 2 hours. Pt was incontinent of stool. Pt had a new condom catheter placed. Family at bedside. Continue with plan of care.

## 2018-11-12 NOTE — PROGRESS NOTES
Visited with pt/family on the basis of spiritual support for the pt/family. Reflected with pt/ family around their hospital experience, sources of spiritual and emotional support and current spiritual health needs.  Pt s nephew talked about his uncle current situation and what it means for him and his uncle. During my conversation with him, he reported that, he worries about the health of his uncle.  I let him know that I could be of support to the pt and his family during his hospitalization. I would be able to coordinate and participate as a spiritual supporter for both pt and his family.   Emotional support. Reflective conversation integrating illness elements and family spiritual narratives. I  [place] for the pt and ask God to help him and to ease and eliminate any suffering and pain that he feels.   Pt/family received spiritual support and reflective conversation in the context of this hospitalization. Pt s nephew expressed appreciation for the visit.  Will continue to provide support to pt/family during their hospitalization at least 1x/wk.

## 2018-11-12 NOTE — PLAN OF CARE
Problem: Patient Care Overview  Goal: Plan of Care/Patient Progress Review  Outcome: No Change  Pt's VSS on RA. [facility] orientation, pt nonverbal. No nonverbal signs of pain. Turn/repo q2h. TFs @ 45 ml/hr through [place]. BGs q4h, SSI per MAR. Condom cath in place with good UOP, see I/Os. Incontinent of BM x2. PIV SL with scheduled abx. Plan for IV flagyl through 11/12 and possible d/c home early this week. Will continue to monitor and follow POC.

## 2018-11-12 NOTE — PROGRESS NOTES
Preliminary EEG Impression on 11/12/2018:  Full report to follow. Please look in inpatient chart, under procedure section.      This routine EEG was suggestive for non-convulsive status epilepticus.  There were Generalized Periodic Epileptiform Discharges (GPEDs) seen throughout recording that had a frequency of 1-2Hz and occurred in both sleep and awake states.  The GPEDs evolved at frequent intervals into faster frequencies above 2.5-3Hz, which would be considered non-convulsive status epilepticus as these durations of faster firing GPEDs were greater than 30 seconds.  There were superimposed fast background generalized frequencies in the theta and alpha range as well, which would not be found in triphasic waves which can occur at a similar frequency as GPEDs.      Further treatment with seizure abortive medications would be advised.  The patient has responded to benzodiazepine challenge in the past and has other AED loading options as well (Keppra and/or Vimpat).     Milo Fish D.O.  CNP Fellow    This EEG and patient plan was discussed with Dr. Fili Mercedes, Epilepsy Staff/Supervisor.  This recommendation was communicated with the general neurology attending, Dr. Cindy Esteves.    I agree with this note.  Fili Mercedes M.D.

## 2018-11-12 NOTE — PROGRESS NOTES
INTERNAL MEDICINE PROGRESS NOTE    Priscilla Way ([id number]) admitted on 11/2/2018 11/12/2018    Assessment & Plan:  79 y/o male with PMHx significant for vascular dementia, CVA multiple sites, DVT on warfarin, HCV cirrhosis s/p liver transplant 2000, HCV ESRD s/p kidney transplant 2000, CKD III of transplanted kidney, HFpEF, recurrent C diff infection, malnutrition on tube feeds and recent seizure disorder/status epilepticus was admitted to the hospital because of worsening altered mental status and shortness of breath.     #Acute toxic encephalopathy  #Uremia  #Vascular dementia  #Uncontrolled seizure activity  #Recent diagnosis of seizure disorder with status epilepticus  Was on EEG monitor during last admission and had readings consistent with non-convulsive status epilepticus, but also had clonic tonic seizure back then. He is non-verbal. Head imaging in the past has shown chronic advanced small vessel disease that is diffuse and atherosclerotic plaques with stenosis along vertebral arteries and MCA. His mentation mainly affected by vascular dementia. GCS continues to be 5-6. Per previous notes, he can be aroused by verbal stimuli. Current encephalopathy multifactorial by ongoing evolution of cerebrovascular disease vs metabolic (uremia, hyponatremia) vs sepsis vs seizures. EEG monitor on this admission has detected generalized rhythmic patterns concerning for seizures. This is despite being on keppra and with supratherapeutic levels. He received 2mg of ativan which improved this pattern, but there is still some background pseudoperiodic epileptiform discharges. He was then placed on lacosamide. Further work up for TSH and NH3 normal. CT scan on 11/02/18 did not reveal any gross intracranial pathology. Unable to protect airway but he is DNR/DNI. EEG pattern was evaluated by neurology and current activity seems to be chronic of unclear clinical significance and also stable. Lacosamide was discontinued. On  11/12/18 had to be place again on EEG monitor as he was having clonic activity in upper extremities.  - Neuro checks  - Neurology consulted  - Resumed EEG video  - Adjusted Keppra to 500mg PO BID   - Keppra levels pending    #Acute hypoxic respiratory failure  #Volume overload  #HFpEF (LVEF 55-60%)  On admission, he had coarse lung sounds, elevated JVP ~ 15cm and pitting edema on both legs. CXR had signs of pulmonary edema. Repeat CXR's demonstrating improvement after diuresis. Weight on admission was 80kg, today 77.4kg. Appears euvolemic. He is hypoalbuminemic therefore more risk of third spacing.  - Strict I/Os  - Bumex 4mg PO daily  - Monitor UOP  - BMP daily  - Daily weights  - Continue home coreg  - Not on lisinopril due to renal dysfunction  - Wean O2 as able     #Cough  #Aspiration pneumonia vs pneumonitis   #Leukocytosis  CXR also with show evidence of increased pulmonary congestions suggestive of pulmonary edema and also possible aspiration. He has had other hospitalizations because of aspiration in the setting of TF's and altered mentation. Viral URI panel and influenza were negative. MRSA nares negative. Urine strep and pneumo antigen negative.  Procal not elevated.  Patient not needing supplemental O2, zosyn was discontinued. However, patient with low grade temps and WBC elevated as well as procal. These are downtrending. Abx were narrowed to cover for possible UTI.   - Discontinued Zosyn  - Started cefepime and flagyl for HAP and anaerobes from possible aspiration  - Flagyl stop date 11/12/18 for aspiration  - DuoNeb  - Blood cultures pending  - Trend procal     Zosyn (11/02/18 - 11/03/18, 11/06/18 -  11/09/18)  Cefepime (11/09/18 - present)  Flagyl (11/09/18 - present)    #UTI  #?Gross hematuria  Patient had  gross hematuria on 11/06/18. CK measured due to seizure history, but it was below normal range. His last BK virus check on file from 2008 was negative and repeat on this admission was  undetectable. Urine color spontaneously resolved to clear yellow.UA did show large presence of RBC and WBC. Urine cultures growing Enterobacter cloacae that is resistant to zosyn. Has grown this organism in the past. Suspect it represents colonization, as this organism has grown before. Initially sensitive to multiple antibiotics, but as patient has been admitted several times and treated with abx; organism has become more resistant. Currently, treating to address possible infection as contributor to his poor renal recovery.   - Continue cefepime for now    #HCV ESRD s/p transplant 2000  #LILIAM on CKD III of transplanted kidney  #?Renal vein thrombosis  #Hyperphosphatemia  Patient at one point was to be on dialysis of his transplanted kidney but renal function recovered. Baseline Cr ~ 1.2-1.5, but remains elevated and with not as much urinary output expected for high doses of diuretics. He does look volume up on exam, although less than during admission. Causes could be intrinsic injury from infectious causes, medications, cardiorenal type 1, significant renal compromise at last admission. US of the transplanted kidney showing increase in resistive indices suggestive for renal vein thrombus vs LILIAM vs cardiorenal. Tacro level 3 mildly below goal (4-6). But ok per nephrology for now.    - Discussed US with transplant nephrology, will repeat US once LILIAM improves; no need for interventions at this moment  - Continue tacro and prednisone  - Diuretics as above  - Urine cultures pending  - Trending PO4  - Holding aspirin for now  - Will assess new baseline of his renal function  - Started cefepime today with stop date 11/19/18 for complicated UTI    #Hypernatremia  Patient has been on diuretics for some time and previously decreased free water flushes.   - Diuretics as above  - Increase H2O flushes to 100mL q 4 hrs  - Sodium repeat in PM    #Diarrhea  #Cdiff  Patient had had c diff in previous admissions, 03/2018, 05/2018,  06/2018, 09/2018 and now as well. Report from care taker was that patient with increasing stool output. He has been treated with vancomycin long term.  - Start 10 day course of vanc 125mg PO QID, stop date 11/13/18  - Then will do 20 days of rifaximin 400 mg PO TID      #Hyponatremia, hypervolemic  Patient with signs of hypervolemia. Na on last admission was 130, now 121. This could be also affecting mentation. Low sodium could also have contribution from renal losses due to LILIAM. Serum osmolality normal due to elevated BUN. Resolved.  - Diuresis as above      #+MRSE blood culture  One out of two bottles from admission was positive for MRSE. Received one dose of vanc. No fever or signs of clinical decompensation. Repeat cultures negative for now.  - Blood cultures x2, negative to date  - Low threshold to start IV abx    #HCV ESLD s/p transplant 2000  LFTs normal. No evidence of rejection or decompensation.  - Continue tacro and prednisone as above     #Chronic iron deficiency anemia  Hemoglobin stable. There was some concern about slow GI bleed in previous admissions. Patient also with CKD driving down Hgb production. No evidence of melena for now. He had hemoglobin drop of 1.5g today, unclear of source, but repeat hemoglobin up-trending.  - Continue home PPI  - Continue home iron, which might confound appearance of stool  - Monitor for bleeding     #Hx of DVT on warfarin  DVT bilaterally on US in 2016. INR subtherapeutic. Heparin bridge completed  - INR daily  - Pharmacy to dose warfarin      #Severe malnutrition on TF's  - TF consult placed  - Refeeding syndrome and nutrition labs per dietician     #DM2  - Continue home insulin regimen    Patient evaluated and discussed with Dr. Martinez.      Code Status: DNR/DNI  FEN: TF's  PPx: DVT on warfarin, GI PPI PO  Dispo: pending improvement in renal function, possible discharge in 24-72hrs    Discussed goal of care with daughters Caryl and Cruz. They both expressed  understanding of very poor prognosis and would not want to escalate cares beyond current management. They would not want dialysis either. However, they would not like to pursue comfort cares and their expectation is medical management of his numerous co-morbidities.     Of note daughter Cruz upset on 11/06/18 evening, due to patient being on lacosamide. Was very adamant of discontinuing medication. She threatened to zoë the hospital if medication was not discontinued. Night team reassured her of neurology's recommendation for the medication based on initial EEG's, but daughter did not agree with this and became verbally abusive.      Ramos Mcnair MD PhD  Internal Medicine, PGY-3  Pager: [phone]    Patient was seen and discussed with Dr. Martinez.     ==================================================================    Interval HistorySubjective:  No overnight events, patient not verbal. Had eyes opened today with clonic activity of upper extremities. Urine clear yellow and had good UOP last night.  Unable to assess as patient does not communicate.     Objective:  Most recent vital signs:  /63 (BP Location: Right arm)  Pulse 78  Temp 98.1  F (36.7  C) (Axillary)  Resp 16  Wt 77.4 kg (170 lb 10.2 oz)  SpO2 100%  BMI 24.48 kg/m2  Temp:  [96  F (35.6  C)-98.7  F (37.1  C)] 98.1  F (36.7  C)  Pulse:  [74-83] 78  Heart Rate:  [76-78] 78  Resp:  [12-20] 16  BP: (137-158)/(60-85) 137/63  SpO2:  [98 %-100 %] 100 %  Wt Readings from Last 2 Encounters:   11/11/18 77.4 kg (170 lb 10.2 oz)   10/29/18 70.8 kg (156 lb 1.6 oz)     Intake/Output Summary (Last 24 hours) at 11/12/18 1046  Last data filed at 11/12/18 0600   Gross per 24 hour   Intake              970 ml   Output              825 ml   Net              145 ml     Physical exam:  General: lethargic, non-verbal  HEENT: PERRLA, EOMI, anicteric sclera, evidence of cataracts  Neck:  JVP difficult to assess as patient provides resistance to head turning  CV:  RRR, no murmur, no rubs, no gallops  Resp: Coarse bilateral crackles and ronchi  Abdomen: non tender, non distended, no organomegaly, +bs  Extremities: bilateral pitting edema at the ankles improved from yesterday, WWP  Skin: no rash, not jaundice  Psych: unable to assess due to non-responsive  Neuro: GCS 6-7, eyes opened, clonic movements in upper extremities    Labs:  Results for orders placed or performed during the hospital encounter of 11/02/18 (from the past 24 hour(s))   Glucose by meter   Result Value Ref Range    Glucose 195 (H) 70 - 99 mg/dL   Glucose by meter   Result Value Ref Range    Glucose 200 (H) 70 - 99 mg/dL   Basic metabolic panel   Result Value Ref Range    Sodium 142 133 - 144 mmol/L    Potassium 3.6 3.4 - 5.3 mmol/L    Chloride 103 94 - 109 mmol/L    Carbon Dioxide 28 20 - 32 mmol/L    Anion Gap 11 3 - 14 mmol/L    Glucose 213 (H) 70 - 99 mg/dL    Urea Nitrogen 144 (H) 7 - 30 mg/dL    Creatinine 2.10 (H) 0.66 - 1.25 mg/dL    GFR Estimate 31 (L) >60 mL/min/1.7m2    GFR Estimate If Black 37 (L) >60 mL/min/1.7m2    Calcium 8.7 8.5 - 10.1 mg/dL   Magnesium (AM Draw)   Result Value Ref Range    Magnesium 2.1 1.6 - 2.3 mg/dL   Glucose by meter   Result Value Ref Range    Glucose 192 (H) 70 - 99 mg/dL   Glucose by meter   Result Value Ref Range    Glucose 150 (H) 70 - 99 mg/dL   Glucose by meter   Result Value Ref Range    Glucose 163 (H) 70 - 99 mg/dL   CBC with platelets   Result Value Ref Range    WBC 5.4 4.0 - 11.0 10e9/L    RBC Count 2.70 (L) 4.4 - 5.9 10e12/L    Hemoglobin 8.2 (L) 13.3 - 17.7 g/dL    Hematocrit 26.3 (L) 40.0 - 53.0 %    MCV 97 78 - 100 fl    MCH 30.4 26.5 - 33.0 pg    MCHC 31.2 (L) 31.5 - 36.5 g/dL    RDW 16.4 (H) 10.0 - 15.0 %    Platelet Count 317 150 - 450 10e9/L   Basic metabolic panel   Result Value Ref Range    Sodium 145 (H) 133 - 144 mmol/L    Potassium 3.4 3.4 - 5.3 mmol/L    Chloride 104 94 - 109 mmol/L    Carbon Dioxide 30 20 - 32 mmol/L    Anion Gap 11 3 - 14 mmol/L     Glucose 167 (H) 70 - 99 mg/dL    Urea Nitrogen 138 (H) 7 - 30 mg/dL    Creatinine 2.13 (H) 0.66 - 1.25 mg/dL    GFR Estimate 30 (L) >60 mL/min/1.7m2    GFR Estimate If Black 36 (L) >60 mL/min/1.7m2    Calcium 9.0 8.5 - 10.1 mg/dL   INR   Result Value Ref Range    INR 2.37 (H) 0.86 - 1.14   Magnesium (AM Draw)   Result Value Ref Range    Magnesium 2.1 1.6 - 2.3 mg/dL   Glucose by meter   Result Value Ref Range    Glucose 155 (H) 70 - 99 mg/dL     *Note: Due to a large number of results and/or encounters for the requested time period, some results have not been displayed. A complete set of results can be found in Results Review.

## 2018-11-12 NOTE — MR AVS SNAPSHOT
After Visit Summary   11/12/2018    Priscilla Way    MRN: [id number]           Patient Information     Date Of Birth          1940        Visit Information        Provider Department      11/12/2018 2:00 PM [facility] TECH 4 [facility]        Today's Diagnoses     Seizure (H)    -  1       Follow-ups after your visit        Your next 10 appointments already scheduled     Nov 13, 2018  7:00 AM CST   24 Hour Video Visit with [facility] TECH 4   [facility] ([facility])    [facility]  [address] [id number] [phone]           Elmer: Your appointment is scheduled at [facility]. [address] [place] [id number]              Who to contact     Please call your clinic at [phone] to:    Ask questions about your health    Make or cancel appointments    Discuss your medicines    Learn about your test results    Speak to your doctor            Additional Information About Your Visit        MyChart Information     [employer] gives you secure access to your electronic health record. If you see a primary care provider, you can also send messages to your care team and make appointments. If you have questions, please call your primary care clinic.  If you do not have a primary care provider, please call [phone] and they will assist you.      [employer] is an electronic gateway that provides easy, online access to your medical records. With [employer], you can request a clinic appointment, read your test results, renew a prescription or communicate with your care team.     To access your existing account, please contact your [facility] or call [phone] for assistance.        Care EveryWhere ID     This is your Care EveryWhere ID. This could be used by other organizations to access your [facility] medical records  [id number]         Blood Pressure from Last 3 Encounters:   11/12/18  151/62   10/29/18 142/54   06/07/18 135/60    Weight from Last 3 Encounters:   11/12/18 78.1 kg (172 lb 2.9 oz)   10/29/18 70.8 kg (156 lb 1.6 oz)   06/06/18 77.9 kg (171 lb 12.8 oz)              We Performed the Following     EEG     Glucose by meter          Today's Medication Changes      Notice     This visit is during an admission. Changes to the med list made in this visit will be reflected in the After Visit Summary of the admission.             Primary Care Provider Office Phone # Fax #    Randy Fuentes [phone]       [address] [address]        Equal Access to Services     EDUARDO PALACIOS : Eduardo Villela, wakenneth sibley, qaybta kaalmada larry, annmarie cline . So [facility] [phone].    ATENCIÓN: Si habla español, tiene a staples disposición servicios gratuitos de asistencia lingüística. Llame al [phone].    We comply with applicable federal civil rights laws and [place] laws. We do not discriminate on the basis of race, color, national origin, age, disability, sex, sexual orientation, or gender identity.            Thank you!     Thank you for choosing [facility]  for your care. Our goal is always to provide you with excellent care. Hearing back from our patients is one way we can continue to improve our services. Please take a few minutes to complete the written survey that you may receive in the mail after your visit with us. Thank you!             Your Updated Medication List - Protect others around you: Learn how to safely use, store and throw away your medicines at www.disposemymeds.org.      Notice     This visit is during an admission. Changes to the med list made in this visit will be reflected in the After Visit Summary of the admission.

## 2018-11-12 NOTE — PLAN OF CARE
Problem: Patient Care Overview  Goal: Plan of Care/Patient Progress Review  Outcome: Declining  Pt admitted for sepsis and altered mental status. Pt obtunded, responsive to repeated stimuli and pain. VSS on RA. No non-verbal signs of pain. Has muscle twitching and spacticity, occasionally shakes with activity. One time EEG done today, now on continuous EEG video monitoring d/t to suspected seizure activity, gave one time dose 2mg Ativan.  Continued tube feeds at 45 ml/hr and 100 ml free water flushes q 4hr. One BM this shift. Continued IV Abx, Flagyl and Cefepime. Condom catheter in place, 300 ml output this shift. Turned q 2hrs. Will continue with POC.

## 2018-11-12 NOTE — PROGRESS NOTES
BRIEF NEUROLOGY FOLLOW UP NOTE:    Mr. Way was seen and examined with staff neurologist today. We were paged by primary team about concern for return of seizure activity this morning. Levetiracetam has been weaned down to 250mg BID due to increased levels. Mr. Way is minimally functional at baseline, however has been less responsive this morning. HE was lying with his eyes open to the ceiling and abnormal rhythmic movements of the right limbs per primary team's description.     On exam, he is lying with his eyes open with conjugate gaze, looking up. He does not respond to voice or noxious stimuli. VOR were intact and he had no fixed gaze deviation. There was no evidence of spontaneous rhythmic movements when seen. He did have what looked like jerking movements of the arms that was in response to stimuli (ie his arm jerk when I touched/moved it). No abnormal movements of the legs were observed.    Routine EEG was recommended and results were consistent with nonconvulsive status epilepticus. Recommended giving ativan, levetiracetam load, increasing levetiracetam maintenance dose, and continuous video EEG monitoring.     Recommendations:  -Ativan 2mg IV, if not effective in breaking SE can give additional 2mg  -Levetiracetam 1000mg IV load  -Increase levetiracetam to 750mg BID  -If this is not effective can consider loading lacosamide as he has been on these medications in the past which were effective    Neurology will continue to follow. Patient care discussed with staff neurologist, Dr. Esteves, who agrees with the plan.    Edmond Arguelles MD  Neurology PGY-3      I saw and evaluated the patient on 11/12/2018 and agree with the findings and the plan of care as documented in the resident's note.      Patient well known to neurological service, with advance dementia and non convulsive status epilepticus in past.  Re-consulted for non convulsive status epilepticus on EEG in the setting of decreasing Keppra.   This has responded very well to ativan and Keppra loading in past so we will try this again today.  Family is very concerned about aspiration so will start with single AED to avoid oversedation but after discussion with family they are okay with adding 2nd agent to break seizures.      Cindy Esteves, DO   of Neurology

## 2018-11-12 NOTE — PLAN OF CARE
Problem: Patient Care Overview  Goal: Plan of Care/Patient Progress Review  Outcome: No Change  Pt admitted for volume overload and PNA. Unable to assess mentation and neuros, pt nonverbal. Pt was lethargic and sleeping between cares. IV antibiotics given. VSS on room air, diminished lung sounds. TF at the goal rate of 45 ml/ hr via NJ. Meds were also given through the NJ. BG and sliding scale insulin every 4 hours. Pt was turned every 2 hours. Pt was incontinent of stool. Pt had a new condom catheter placed. New mepilex dressings were placed on the patient's inner thighs. Plan to discharge home early this week. Continue with plan of care.

## 2018-11-13 NOTE — PLAN OF CARE
Problem: Patient Care Overview  Goal: Plan of Care/Patient Progress Review  Outcome: No Change  Time  3224-0237  Pt disoriented, non-verbal except an occasional groan. Vital signs stable on RA except chronic low temps (95.2). Lungs sounds diminished, cough very sparse today. EEG monitoring in place, showing no clinical signs of seizure. Continues with IV lacosamide and oral keppra. Additional one time dose of keppra given this afternoon. Floridalma nurse to check keppra level before 2000 dose administration. TFs turned back on at 1000, now running continuously @ 45/hr, flushes of 200 mL q4. All meds through NJ. Bumex switched to oral, condom cath in place, 150 mL of urine out this shift plus one episode of incontinence. 1 large watery loose/watery stool. Turns q2. New sacral mepilex in place, miconazole powder for groin, barrier cream perianally. BGs 133,204. Daughter Caryl at bedside this afternoon.

## 2018-11-13 NOTE — PROGRESS NOTES
INTERNAL MEDICINE PROGRESS NOTE    [address]iscilla Way ([id number]) admitted on 11/2/2018 11/13/2018    Assessment & Plan:  79 y/o male with PMHx significant for vascular dementia, CVA multiple sites, DVT on warfarin, HCV cirrhosis s/p liver transplant 2000, HCV ESRD s/p kidney transplant 2000, CKD III of transplanted kidney, HFpEF, recurrent C diff infection, malnutrition on tube feeds and recent seizure disorder/status epilepticus was admitted to the hospital because of worsening altered mental status and shortness of breath.       #Acute toxic encephalopathy  #Uremia  #Vascular dementia  #Uncontrolled seizure activity  #Recurrent seizure disorder with status epilepticus  EEG monitor during last admission and readings consistent with non-convulsive status epilepticus, but also had clonic tonic seizure back then. He is non-verbal. Head imaging in the past has shown chronic advanced small vessel disease that is diffuse and atherosclerotic plaques with stenosis along vertebral arteries and MCA. His mentation mainly affected by vascular dementia. GCS continues to be 5-6. Per previous notes, he can be aroused by verbal stimuli. Current encephalopathy multifactorial by ongoing evolution of cerebrovascular disease vs metabolic (uremia) vs sepsis vs seizures. EEG monitor on this admission has found pt to be in status epilepticus despite supratherapeutic keppra levels. At times has received 2mg of ativan which has improved this pattern, but is now requiring second AED. Lacosamide has been added. Further work up for TSH and NH3 normal. CT scan on 11/02/18 did not reveal any gross intracranial pathology. Unable to protect airway but he is DNR/DNI.   - Neuro checks  - Neurology consulted  - Continue EEG video  - Adjusted Keppra to 750mg PO BID   - Keppra levels pending  - Continue with lacosamide 100mg PO  - Ativan 2mg when in status     #Acute hypoxic respiratory failure  #Volume overload  #HFpEF (LVEF 55-60%)  On admission,  he had coarse lung sounds, elevated JVP ~ 15cm and pitting edema on both legs. CXR had signs of pulmonary edema. Repeat CXR's demonstrating improvement after diuresis. Weight on admission was 80kg, today 77.4kg. Appears euvolemic. He is hypoalbuminemic therefore more risk of third spacing.  - Strict I/Os  - Bumex 4mg PO daily  - Monitor UOP  - BMP daily  - Daily weights  - Continue home coreg  - Not on lisinopril due to renal dysfunction  - Wean O2 as able     #Cough  #Aspiration pneumonia vs pneumonitis   #Leukocytosis  CXR also with show evidence of increased pulmonary congestions suggestive of pulmonary edema and also possible aspiration. He has had other hospitalizations because of aspiration in the setting of TF's and altered mentation. Viral URI panel and influenza were negative. MRSA nares negative. Urine strep and pneumo antigen negative.  Procal not elevated.  Patient not needing supplemental O2, zosyn was discontinued. However, patient with low grade temps and WBC elevated as well as procal. These are downtrending. Abx were narrowed to cover for possible UTI.   - Discontinued Zosyn  - Started cefepime and flagyl for HAP and anaerobes from possible aspiration  - Flagyl stop date 11/12/18 for aspiration  - DuoNeb  - Blood cultures pending  - Trend procal     Zosyn (11/02/18 - 11/03/18, 11/06/18 -  11/09/18)  Cefepime (11/09/18 - present)  Flagyl (11/09/18 - present)    #UTI  #?Gross hematuria  Patient had  gross hematuria on 11/06/18. CK measured due to seizure history, but it was below normal range. His last BK virus check on file from 2008 was negative and repeat on this admission was undetectable. Urine color spontaneously resolved to clear yellow.UA did show large presence of RBC and WBC. Urine cultures growing Enterobacter cloacae that is resistant to zosyn. Has grown this organism in the past. Suspect it represents colonization, as this organism has grown before. Initially sensitive to multiple  antibiotics, but as patient has been admitted several times and treated with abx; organism has become more resistant. Currently, treating to address possible infection as contributor to his poor renal recovery.   - Continue cefepime for now    #HCV ESRD s/p transplant 2000  #LILIAM on CKD III of transplanted kidney  #?Renal vein thrombosis  #Hyperphosphatemia  Patient at one point was to be on dialysis of his transplanted kidney but renal function recovered. Baseline Cr ~ 1.2-1.5, but remains elevated and with not as much urinary output expected for high doses of diuretics. He does look volume up on exam, although less than during admission. Causes could be intrinsic injury from infectious causes, medications, cardiorenal type 1, significant renal compromise at last admission. US of the transplanted kidney showing increase in resistive indices suggestive for renal vein thrombus vs LILIAM vs cardiorenal. Tacro level 3 mildly below goal (4-6). But ok per nephrology for now.    - Discussed US with transplant nephrology, will repeat US once LILIAM improves; no need for interventions at this moment  - Continue tacro and prednisone  - Diuretics as above  - Urine cultures pending  - Trending PO4  - Holding aspirin for now  - Will assess new baseline of his renal function  - Continue cefepime with stop date 11/19/18 for complicated UTI    #Hypernatremia  Patient has been on diuretics for some time and previously decreased free water flushes.   - Diuretics as above  - Increase H2O flushes to 200mL q 4 hrs  - Sodium repeat in PM     #Diarrhea  #Cdiff  Patient had had c diff in previous admissions, 03/2018, 05/2018, 06/2018, 09/2018 and now as well. Report from care taker was that patient with increasing stool output. He has been treated with vancomycin long term. Completed 10 day course of vanc .  - On rifaximin 400 mg PO TID for 20 days        #Hyponatremia, hypervolemic  Patient with signs of hypervolemia. Na on last admission was  130, now 121. This could be also affecting mentation. Low sodium could also have contribution from renal losses due to LILIAM. Serum osmolality normal due to elevated BUN. Resolved.  - Diuresis as above      #+MRSE blood culture  One out of two bottles from admission was positive for MRSE. Received one dose of vanc. No fever or signs of clinical decompensation. Repeat cultures negative for now.  - Blood cultures x2, negative to date  - Low threshold to start IV abx    #HCV ESLD s/p transplant 2000  LFTs normal. No evidence of rejection or decompensation.  - Continue tacro and prednisone as above     #Chronic iron deficiency anemia  Hemoglobin stable. There was some concern about slow GI bleed in previous admissions. Patient also with CKD driving down Hgb production. No evidence of melena for now. He had hemoglobin drop of 1.5g today, unclear of source, but repeat hemoglobin up-trending.  - Continue home PPI  - Continue home iron, which might confound appearance of stool  - Monitor for bleeding     #Hx of DVT on warfarin  DVT bilaterally on US in 2016. INR subtherapeutic. Heparin bridge completed  - INR daily  - Pharmacy to dose warfarin      #Severe malnutrition on TF's  - TF consult placed  - Refeeding syndrome and nutrition labs per dietician     #DM2  - Continue home insulin regimen    Patient evaluated and discussed with Dr. Alcala.      Code Status: DNR/DNI  FEN: TF's  PPx: DVT on warfarin, GI PPI PO  Dispo: pending improvement in renal function, possible discharge in 24-72hrs    Discussed goal of care with daughters Caryl and Cruz. They both expressed understanding of very poor prognosis and would not want to escalate cares beyond current management. They would not want dialysis either. However, they would not like to pursue comfort cares and their expectation is medical management of his numerous co-morbidities.     Of note daughter Cruz upset on 11/06/18 evening, due to patient being on lacosamide. Was very  adamant of discontinuing medication. She threatened to zoë the hospital if medication was not discontinued. Night team reassured her of neurology's recommendation for the medication based on initial EEG's, but daughter did not agree with this and became verbally abusive.      Ramos Mcnair MD PhD  Internal Medicine, PGY-3  Pager: [phone]    ==================================================================    Interval HistorySubjective:  No overnight events, patient not verbal. Back to what seems to be his baseline. Urine clear yellow and had good UOP last night.  Unable to assess as patient does not communicate.     Objective:  Most recent vital signs:  /43 (BP Location: Right leg)  Pulse 72  Temp 95.2  F (35.1  C) (Axillary)  Resp 16  Wt 76.2 kg (167 lb 15.9 oz)  SpO2 100%  BMI 24.1 kg/m2  Temp:  [95.2  F (35.1  C)-97  F (36.1  C)] 95.2  F (35.1  C)  Pulse:  [70-72] 72  Heart Rate:  [69-70] 70  Resp:  [14-16] 16  BP: (122-156)/(43-67) 133/43  SpO2:  [98 %-100 %] 100 %  Wt Readings from Last 2 Encounters:   11/13/18 76.2 kg (167 lb 15.9 oz)   10/29/18 70.8 kg (156 lb 1.6 oz)     Intake/Output Summary (Last 24 hours) at 11/13/18 1605  Last data filed at 11/13/18 1500   Gross per 24 hour   Intake             1080 ml   Output              150 ml   Net              930 ml     Physical exam:  General: lethargic, non-verbal  HEENT: PERRLA, EOMI, anicteric sclera, evidence of cataracts  Neck:  JVP difficult to assess as patient provides resistance to head turning  CV: RRR, no murmur, no rubs, no gallops  Resp: Coarse bilateral crackles and ronchi  Abdomen: non tender, non distended, no organomegaly, +bs  Extremities: bilateral pitting edema at the ankles improved from yesterday, WWP  Skin: no rash, not jaundice  Psych: unable to assess due to non-responsive  Neuro: GCS 6-7, withdraws to pain, non-verbal, does not follow commands    Labs:  Results for orders placed or performed during the hospital  encounter of 11/02/18 (from the past 24 hour(s))   Sodium   Result Value Ref Range    Sodium 144 133 - 144 mmol/L   INR   Result Value Ref Range    INR 2.34 (H) 0.86 - 1.14   Procalcitonin   Result Value Ref Range    Procalcitonin 0.47 ng/ml   Phosphorus   Result Value Ref Range    Phosphorus 3.8 2.5 - 4.5 mg/dL   Basic metabolic panel   Result Value Ref Range    Sodium 146 (H) 133 - 144 mmol/L    Potassium 3.3 (L) 3.4 - 5.3 mmol/L    Chloride 105 94 - 109 mmol/L    Carbon Dioxide 28 20 - 32 mmol/L    Anion Gap 12 3 - 14 mmol/L    Glucose 128 (H) 70 - 99 mg/dL    Urea Nitrogen 142 (H) 7 - 30 mg/dL    Creatinine 2.00 (H) 0.66 - 1.25 mg/dL    GFR Estimate 32 (L) >60 mL/min/1.7m2    GFR Estimate If Black 39 (L) >60 mL/min/1.7m2    Calcium 9.3 8.5 - 10.1 mg/dL   Magnesium (AM Draw)   Result Value Ref Range    Magnesium 2.2 1.6 - 2.3 mg/dL   Hemoglobin   Result Value Ref Range    Hemoglobin 9.0 (L) 13.3 - 17.7 g/dL     *Note: Due to a large number of results and/or encounters for the requested time period, some results have not been displayed. A complete set of results can be found in Results Review.

## 2018-11-13 NOTE — PLAN OF CARE
Problem: Patient Care Overview  Goal: Plan of Care/Patient Progress Review  Outcome: No Change  Pt VSS on RA. Lethargic, responsive to repeated stimuli and pain. No nonverbal signs of pain. Intermittently has shaking and muscle twitching, and daughter reports coughing on own secretions. Current EEG video monitoring in place rt seizure activity. Daughter refused initial dose of ativan. After education 2mg of ordered ativan given. Tube feed rate at 45ml/hr and 100ml free flushes until daughter requested it be shut off. One BM this shift. Condom catheter in place. Turned q2hrs. Will continue to monitor and follow POC.

## 2018-11-13 NOTE — PROGRESS NOTES
INITIAL REPORT of Video-EEG Monitoring              DATE OF RECORDIN2018         I reviewed the first 2 hours of video-EEG monitoring of Priscilla Way.        The EEG during stupor initially was abnormal due to ongoing, evolving generalized periodic epileptiform discharges (GPEDs).  This pattern ceased shortly after administration of intravenous lorazepam, and was replaced by generalized irregular delta-theta slowing with occasional brief runs of GPEDs and occasional brief electrodecrements.       These abnormalities are consistent with non-convulsive status epilepticus responding to therapy, followed by moderate-severe electrographic non-epileptic encephalopathy.  Clinical correlation is recommended.   Fili Mercedes M.D., [facility] [phone]

## 2018-11-13 NOTE — MR AVS SNAPSHOT
After Visit Summary   11/13/2018    Priscilla Way    MRN: [id number]           Patient Information     Date Of Birth          1940        Visit Information        Provider Department      11/13/2018 7:00 AM [facility] EEG TECH 4 [facility] EEG        Today's Diagnoses     Altered mental status, unspecified altered mental status type    -  1       Follow-ups after your visit        Who to contact     Please call your clinic at [phone] to:    Ask questions about your health    Make or cancel appointments    Discuss your medicines    Learn about your test results    Speak to your doctor            Additional Information About Your Visit        MyChart Information     [employer] gives you secure access to your electronic health record. If you see a primary care provider, you can also send messages to your care team and make appointments. If you have questions, please call your primary care clinic.  If you do not have a primary care provider, please call [phone] and they will assist you.      [employer] is an electronic gateway that provides easy, online access to your medical records. With [employer], you can request a clinic appointment, read your test results, renew a prescription or communicate with your care team.     To access your existing account, please contact your [facility] or call [phone] for assistance.        Care EveryWhere ID     This is your Care EveryWhere ID. This could be used by other organizations to access your [place] medical records  [id number]         Blood Pressure from Last 3 Encounters:   11/13/18 122/66   10/29/18 142/54   06/07/18 135/60    Weight from Last 3 Encounters:   11/13/18 76.2 kg (167 lb 15.9 oz)   10/29/18 70.8 kg (156 lb 1.6 oz)   06/06/18 77.9 kg (171 lb 12.8 oz)              We Performed the Following     Glucose by meter          Today's Medication Changes      Notice     This visit is during an admission. Changes to the med list  made in this visit will be reflected in the After Visit Summary of the admission.             Primary Care Provider Office Phone # Fax #    Randy Fuentes [phone]       [address] [address]        Equal Access to Services     EDUARDO PALACIOS : Hadii yefri ku hadasho Soomaali, waaxda luqadaha, qaybta kaalmada adeegyada, waxamy snyder haylean luis e allenfredmaria l boateng. So [facility] [phone].    ATENCIÓN: Si habla español, tiene a staples disposición servicios gratuitos de asistencia lingüística. Llame al [phone].    We comply with applicable federal civil rights laws and [place] laws. We do not discriminate on the basis of race, color, national origin, age, disability, sex, sexual orientation, or gender identity.            Thank you!     Thank you for choosing [facility]  for your care. Our goal is always to provide you with excellent care. Hearing back from our patients is one way we can continue to improve our services. Please take a few minutes to complete the written survey that you may receive in the mail after your visit with us. Thank you!             Your Updated Medication List - Protect others around you: Learn how to safely use, store and throw away your medicines at www.disposemymeds.org.      Notice     This visit is during an admission. Changes to the med list made in this visit will be reflected in the After Visit Summary of the admission.

## 2018-11-13 NOTE — PROGRESS NOTES
Transplant nephrology signing off  Continue on bumex 4mg BiD for now and likely creatinine will be ~2 at best  If there are any questions please call

## 2018-11-13 NOTE — PLAN OF CARE
Brief neuro note:    EEG reviewed again showing recurrence of GPED pattern c/f status epilepticus. Ordered 2mg ativan to break the status and loaded with vimpat 200mg once with 100mg BID maintenance.     Harley Etienne  PGY4 Neurology  [phone]

## 2018-11-13 NOTE — PROCEDURES
[facility] EEG #-1 (Day 1 of Video-EEG Monitoring)    DATE OF RECORDIN2018    DURATION OF RECORDIN hours, 41 minutes.    CLINICAL SUMMARY: This diagnostic video-EEG monitoring procedure was performed in evaluation of encephalopathy in [facility].  The patient was reported to be receiving levetiracetam on this day of monitoring.    TECHNICAL SUMMARY:  This continuous EEG monitoring procedure was performed with 23 scalp electrodes in 10-20 system placements, and additional scalp, precordial and other surface electrodes used for electrical referencing and artifact detection.  Video monitoring was utilized and periodically reviewed by EEG technologists and the physician for electroclinical correlation.     EEG ACTIVITIES DURING STUPOR:    During ongoing stupor there was no evidence of wake-sleep cycling.    There was an ongoing pattern of generalized irregular 2-8 Hz delta-theta slowing, with almost continuously superimposed generalized periodic epileptiform discharges (GPEDs).    The GPEDs usually had a frequency of 1-2 Hz and occasionally evolve to slightly slower or faster frequencies, with evolution in amplitude and apparent right frontocentral predominance which was most apparent with lower amplitude GPEDs.  During periods of higher amplitude GPEDs there were increased background alpha and beta activities between epileptiform complexes.   No paroxysmal behavioral events were recorded during this day of monitoring.      SUMMARY OF DAY 1 OF VIDEO-EEG MONITORING:    This was an abnormal EEG recording during continuous stupor due to background generalized delta and theta slowing with superimposed evolving patterns of GPEDs at intermixed faster frequencies.    These findings are consistent with a diagnosis of nonconvulsive status epilepticus.  Clinical correlation is recommended.  Clinical correlation is recommended.  Dictated By: Milo Fish DO, Clinical Neurophysiology Fellow   I agree  with the findings as reported.  I personally reviewed this video-EEG recording.    Fili Mercedes M.D., Professor of Neurology       D: 2018   T: 2018   MT: EMILY      Name:     YADIRA BREAUX   MRN:      [id number]        Account:        [id number]   :      1940           Procedure Date: 2018      Document: [id number]

## 2018-11-13 NOTE — PLAN OF CARE
Problem: Patient Care Overview  Goal: Plan of Care/Patient Progress Review  Outcome: No Change  Time: 1284-1969     Reason for admission:   Vitals: VSS  Activity: Turns Q2  Pain: Unable to Assess  Neuro: Lethargic, responsive to repeated stimuli and pain.   Cardiac: WNL  Respiratory: 100% on RA   GI/: Inc of urine ad stool (cdiff)  Diet: Tube feeds  Lines: PIV is SL   Skin/Wounds: Dry/Flaky. Coccyx wound is covered with foam. IS CDI     Pt on video EEG. Turned Q2. Continue with IV antibiotics and monitor and follow POC

## 2018-11-13 NOTE — PROGRESS NOTES
Problem: volume overload and PNA  Goal: Increase urine output    Time: 1042-3913      BP: 151/62  Temp: 97F  Resp: 16  BMI: 24.48  Weight: 78.1 kg      Neuro: Muscle twitching and spacticity, on continuous EEG video monitoring r/t suspected seizure activity, Mery Coma Scale: 7, pt is nonverbal.  Keppra IV 1000mg given one time at 1741 due to seizures.  Behavioral: not speaking, arouses to touch  Activity: Bed rest, sleeping, daughter in room.  Cardiac: WDL  Respiratory: diminished lung sounds: anterior- all fields, on RA  GI/: Per daughter's request  tube feed  stopped @20:45, Condom catheter in place, scrotal edema  Skin: Reposition Q2hr, dry skin, Stuart Score: 12, atmos air mattress in use,   Endo: BG q4hr,   Pain: Pt non-verbal, slept through shift.  Labs: Urea nitrogen 138, Creatinine 2.13, GFR 36, Hemoglobin 9.6, Hematocrit 30.1  Lines: L PIV       Subjective: pt verbally unresponsive  Objective: incontinent of stool x 1, incontinent of urine x1.  Scrotal edema, pitting 2+ to knees bilaterally, overall skin dry and flaky. Condom catheter in place.  Assessment: potential for skin breakdown  Plan: reposition Q2hr, barrier cream and Miconazole powder applied to perineal area, explore potential for patino catheter.

## 2018-11-13 NOTE — PROCEDURES
Procedure Date: 2018      EEG #.      DATE OF RECORDIN2018.      DURATION OF RECORDIN minutes.      CLINICAL SUMMARY:  This urgent portable EEG recording was performed in evaluation of encephalopathy in Priscilla [address], using 23 scalp electrodes placed in the 10-20 system.  He was reported to be receiving levetiracetam at the time of this recording.      EEG ACTIVITIES DURING STUPOR:  During ongoing stupor there was no evidence of wake-sleep cycling.  There was an ongoing pattern of generalized irregular 2-8 Hz delta-theta slowing, with almost continuously superimposed generalized periodic epileptiform discharges (GPEDs).  The GPEDs usually had a frequency of 1-2 Hz and occasionally evolve to slightly slower or faster frequencies, with evolution in amplitude and apparent right frontocentral predominance which was most apparent with lower amplitude GPEDs.  During periods of higher amplitude GPEDs there were increased background alpha and beta activities between epileptiform complexes.      IMPRESSION:  This was an abnormal EEG recording during continuous stupor due to background generalized delta and theta slowing with superimposed evolving patterns of GPEDs at intermixed faster frequencies.  These findings are consistent with a diagnosis of nonconvulsive status epilepticus.  Clinical correlation is recommended.   Fili Mercedes M.D., Professor of Neurology      D: 2018   T: 2018   MT: BRANDIE      Name:     PRISCILLA WAY   MRN:      [id number]        Account:        [id number]   :      1940           Procedure Date: 2018      Document: [id number]

## 2018-11-13 NOTE — PROGRESS NOTES
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 Neurology was consulted for NCSE.    Mr. Way has had multiple episodes of NSCE that have been precipitated by decreasing AEDs. At baseline he is nonverbal, spontaneously moves all extremities (with weakness on left). It appears that his dementia has worsened particularly over the last eight months. He has developed seizures over the last few months (some with clonic-tonic movement). He has been in NCSE during this hospitalization that was broken with ativan, levetiracetam, and lacosamide.    Interval History: Yesterday he was noted to be in NCSE. He continues on VEEG monitoring and his NSCE is partially treated at this point.            Medications:     Prescription Medications as of 11/13/2018             acetaminophen (TYLENOL) 32 mg/mL solution 20.3 mLs (650 mg) by Oral or Feeding Tube route every 4 hours as needed for mild pain or fever    alcohol swab prep pads Use to swab area of injection/mark anthony as directed.    amLODIPine (NORVASC) 1 mg/mL SUSP 5 mLs (5 mg) by Per Feeding Tube route daily    aspirin 10 mg/mL SUSP 8 mLs (80 mg) by Per Feeding Tube route daily    blood glucose (NO BRAND SPECIFIED) lancets standard Use to test blood sugar 6 times daily or as directed.    blood glucose monitoring (NO BRAND SPECIFIED) meter device kit Use to test blood sugar every 4 hours while on Tube feeding.    blood glucose monitoring (NO BRAND  SPECIFIED) test strip Use to test blood sugars 6 times daily or as directed    bumetanide (BUMEX) 0.25 mg/mL SUSP 4 mLs (1 mg) by Oral or Feeding Tube route daily    carvedilol (COREG) 1 mg/mL SUSP 6.25 mLs (6.25 mg) by Oral or Feeding Tube route 2 times daily    cholecalciferol (VITAMIN D/ D-VI-SOL) 400 UNIT/ML LIQD liquid Take 1 mL (400 Units) by mouth daily    ferrous gluconate (FERGON) 324 (38 Fe) MG tablet 2 tablets (648 mg) by Per Feeding Tube route daily (with breakfast)    fiber modular, NUTRISOURCE FIBER, (NUTRISOURCE FIBER) packet 1 packet by Per Feeding Tube route 3 times daily    HYDROmorphone, STANDARD CONC, (DILAUDID) 1 MG/ML LIQD liquid 1-2 mLs (1-2 mg) by Oral or Feeding Tube route every 6 hours as needed for moderate to severe pain    insulin aspart (NOVOLOG PEN) 100 UNIT/ML injection Inject 1-6 Units Subcutaneous every 4 hours Correction Scale -MEDIUM INSULIN RESISTANCE,    Do Not give if BG less than 140.  For  - 189 give 1 unit.  For  - 239 give 2 units.  For  - 289 give 3 units.  For  - 339 give 4 units.  For  - 399 give 5 units.  For BG > 400 give 6 units.  Check blood glucose Q4H   Notify provider if glucose> 350 mg/dL.    insulin pen needle (BD SANDRA U/F) 32G X 4 MM Use 6 (every 4 hours while on tube feeding) daily or as directed.    lactobacillus rhamnosus, GG, (CULTURELL) capsule 1 capsule by Per Feeding Tube route 2 times daily    levETIRAcetam (KEPPRA) 100 MG/ML solution 7.5 mLs (750 mg) by Per Feeding Tube route every 12 hours    miconazole (MICATIN; MICRO GUARD) 2 % powder Apply topically 2 times daily    multivitamins with minerals (CERTAVITE/CEROVITE) LIQD liquid Take 15 mLs by mouth daily    ondansetron (ZOFRAN-ODT) 4 MG ODT tab Take 1 tablet (4 mg) by mouth every 6 hours as needed for nausea or vomiting    pantoprazole (PROTONIX) 2 mg/mL SUSP suspension 20 mLs (40 mg) by Per Feeding Tube route 2 times daily (before meals)    predniSONE 5 MG/5ML solution  "Take 5 mLs (5 mg) by mouth daily    Sharps Container MISC Sharps container for diabetes needles.    tacrolimus (GENERIC EQUIVALENT) 1 mg/mL suspension 1.5 mLs (1.5 mg) by Oral or Feeding Tube route 2 times daily    warfarin (COUMADIN) 2.5 MG tablet 1 tablet (2.5 mg) by Oral or Feeding Tube route daily      Facility Administered Medications as of 11/13/2018             acetaminophen (TYLENOL) solution 650 mg 20.3 mLs (650 mg) by Oral or Feeding Tube route every 4 hours as needed for mild pain or fever    acetylcysteine (MUCOMYST) 10 % nebulizer solution 4 mL Take 4 mLs by nebulization every 4 hours as needed for mucolysis/respiratory distress    bumetanide (BUMEX) suspension 4 mg 16 mLs (4 mg) by Oral or Feeding Tube route daily    carvedilol (COREG) suspension 6.25 mg 6.25 mLs (6.25 mg) by Oral or Feeding Tube route 2 times daily    ceFEPIme (MAXIPIME) 2 g vial to attach to  ml bag for ADULTS or 50 ml bag for PEDS Inject 2 g into the vein every 24 hours    cholecalciferol (vitamin D/D-VI-SOL) liquid 400 Units 1 mL (400 Units) by Oral or Feeding Tube route daily    dextrose 10 % 1,000 mL infusion Inject into the vein continuous prn (Hypoglycemia prevention)    dextrose 50 % injection 25-50 mL Inject 25-50 mLs into the vein every 15 minutes as needed for low blood sugar    Linked Group 1:  \"Or\" Linked Group Details     ferrous sulfate 300 (60 Fe) MG/5ML syrup 600 mg 10 mLs (600 mg) by Per Feeding Tube route daily (with breakfast)    glucagon injection 1 mg Inject 1 mg Subcutaneous every 15 minutes as needed for low blood sugar (May repeat x 1 only)    Linked Group 1:  \"Or\" Linked Group Details     glucose gel 15-30 g Take 15-30 g by mouth every 15 minutes as needed for low blood sugar    Linked Group 1:  \"Or\" Linked Group Details     insulin aspart (NovoLOG) inj (RAPID ACTING) Inject 1-6 Units Subcutaneous every 4 hours    ipratropium - albuterol 0.5 mg/2.5 mg/3 mL (DUONEB) neb solution 3 mL Take 3 mLs by " nebulization every 4 hours as needed for wheezing    lacosamide (VIMPAT) 100 mg in sodium chloride 0.9 % 100 mL intermittent infusion Inject 100 mg into the vein 2 times daily    lacosamide (VIMPAT) 200 mg in sodium chloride 0.9 % 100 mL intermittent infusion Inject 200 mg into the vein once    lactobacillus rhamnosus (GG) (CULTURELL) capsule 1 capsule 1 capsule by Per Feeding Tube route 2 times daily    levETIRAcetam (KEPPRA) intermittent infusion 1,000 mg Inject 100 mLs (1,000 mg) into the vein once    levETIRAcetam (KEPPRA) solution 500 mg 5 mLs (500 mg) by Oral or Feeding Tube route 2 times daily    lidocaine (LMX4) cream Apply topically every hour as needed for pain (with VAD insertion or accessing implanted port.)    lidocaine 1 % 1 mL 1 mL by Other route every hour as needed (mild pain with VAD insertion or accessing implanted port)    LORazepam (ATIVAN) injection 2 mg Inject 1 mL (2 mg) into the vein once    LORazepam (ATIVAN) injection 2 mg Inject 1 mL (2 mg) into the vein once    melatonin tablet 1 mg Take 1 tablet (1 mg) by mouth nightly as needed for sleep    metroNIDAZOLE (FLAGYL) infusion 500 mg Inject 100 mLs (500 mg) into the vein every 8 hours    miconazole (MICATIN; MICRO GUARD) 2 % powder Apply topically 2 times daily    multivitamins with minerals (CERTAVITE/CEROVITE) liquid 15 mL 15 mLs by Per Feeding Tube route daily    naloxone (NARCAN) injection 0.1-0.4 mg Inject 0.25-1 mLs (0.1-0.4 mg) into the vein every 2 minutes as needed for opioid reversal    ondansetron (ZOFRAN-ODT) ODT tab 4 mg Take 1 tablet (4 mg) by mouth every 6 hours as needed for nausea or vomiting    pantoprazole (PROTONIX) 2 mg/mL suspension 40 mg 20 mLs (40 mg) by Per Feeding Tube route 2 times daily (before meals)    potassium chloride (KLOR-CON) Packet 60 mEq Take 60 mEq by mouth once    predniSONE solution 5 mg 5 mLs (5 mg) by Oral or Feeding Tube route daily    prochlorperazine (COMPAZINE) injection 5 mg Inject 1 mL (5 mg)  "into the vein every 6 hours as needed for nausea or vomiting    Linked Group 2:  \"Or\" Linked Group Details     prochlorperazine (COMPAZINE) Suppository 12.5 mg Place 0.5 suppositories (12.5 mg) rectally every 12 hours as needed for nausea or vomiting    Linked Group 2:  \"Or\" Linked Group Details     prochlorperazine (COMPAZINE) tablet 5 mg Take 1 tablet (5 mg) by mouth every 6 hours as needed for vomiting    Linked Group 2:  \"Or\" Linked Group Details     sodium chloride (PF) 0.9% PF flush 3 mL 3 mLs by Intracatheter route every hour as needed for line flush (for peripheral IV flush post IV meds)    sodium chloride (PF) 0.9% PF flush 3 mL 3 mLs by Intracatheter route every 8 hours    tacrolimus (GENERIC EQUIVALENT) suspension 1.5 mg 1.5 mLs (1.5 mg) by Oral or Feeding Tube route 2 times daily    vancomycin (FIRVANQ) oral solution 125 mg 2.5 mLs (125 mg) by Oral or Feeding Tube route 4 times daily    warfarin (COUMADIN) tablet 2.5 mg Take 1 tablet (2.5 mg) by mouth Once at 6pm    Warfarin Therapy Reminder (Check START DATE - warfarin may be starting in the FUTURE) 1 each continuous prn    bumetanide (BUMEX) suspension 4 mg (Discontinued) 16 mLs (4 mg) by Oral or Feeding Tube route 2 times daily    bumetanide (BUMEX) suspension 4 mg (Discontinued) 16 mLs (4 mg) by Oral or Feeding Tube route daily    levETIRAcetam (KEPPRA) solution 250 mg (Discontinued) 2.5 mLs (250 mg) by Oral or Feeding Tube route 2 times daily    levETIRAcetam (KEPPRA) solution 500 mg (Discontinued) 5 mLs (500 mg) by Oral or Feeding Tube route 2 times daily    levETIRAcetam (KEPPRA) solution 750 mg (Discontinued) 7.5 mLs (750 mg) by Oral or Feeding Tube route 2 times daily                Review of Systems:   Review of systems not obtained due to patient factors - mental status         Physical Exam:   /60 (BP Location: Right arm)  Pulse 75  Temp 97  F (36.1  C) (Axillary)  Resp 14  Wt 78.1 kg (172 lb 2.9 oz)  SpO2 100%  BMI 24.71 kg/m2 "     General: NAD  HEENT: sclera anicteric  Resp: no respiratory distress  Skin: warm and dry  Neurologic: Eyes closed, no response to verbal or noxious stimuli. Eyes conjugate, VOR intact. Face appears symmetric. Continued stimulus induced abnormal jerking of his extremities; otherwise you abnormal movements observed. No voluntary movements observed.            Data:   CBC:  Lab Results   Component Value Date    WBC 5.4 11/12/2018     Lab Results   Component Value Date    HGB 9.0 11/13/2018     Lab Results   Component Value Date    HCT 26.3 11/12/2018     Lab Results   Component Value Date     11/12/2018       Last Basic Metabolic Panel:  Lab Results   Component Value Date     11/13/2018      Lab Results   Component Value Date    POTASSIUM 3.3 11/13/2018     Lab Results   Component Value Date    CHLORIDE 105 11/13/2018     Lab Results   Component Value Date    JOSHUA 9.3 11/13/2018     Lab Results   Component Value Date    CO2 28 11/13/2018     Lab Results   Component Value Date     11/13/2018     Lab Results   Component Value Date    CR 2.00 11/13/2018     Lab Results   Component Value Date     11/13/2018              Assessment and Recommendations:     # Nonconvulsive status epilepticus:  Priscilla Way is a 78 year old male with history of seizures with NCSE who was found to be in NCSE on 11/12 after decreased responsiveness. He was given ativan 2mg x2, levetiracetam 1000mg load, and lacosamide 200mg load. Current AED regimen - levetiracetam was increased yesterday to 500mg BID (renal dosing) and lacosamide 100mg BID was added this morning. Spoke with epilepsy team who indicate that his EEG is still demonstrating evolving GPEDs that is concerning for partially treated NCSE.     Will increase his maintenance AED regimen in order to try to get better control over these episodes. We had a lengthy discussion with his daughter about seizure management. She is aware that adding new medications  will likely cause increased sedation and risk of aspiration and she is on board with the current regimen. We also discussed the possibility that her father may have seizures on the current regimen. She was on board with adding a third agent if needed, however if a fourth agent is needed she was more hesitant about one. For the time being, however, recommend continued monitoring on the current AED regimen.    Recommendations:  -Increase levetiracetam to 750mg BID  -Continue lacosamide 100mg BID  -Check leveitracetam level this afternoon  -Continue VEEG monitoring  -Most recent LFTs were normal on 11/2/18 - if third agent is needed can consider depakote vs phenytoin vs oxcarbazepine    Neurology will continue to follow. Patient seen and discussed with attending physician Dr. Esteves.    Edmond Arguelles  Neurology PGY-3      I saw and evaluated the patient on 11/13/2018 and agree with the findings and the plan of care as documented in the resident's note.      Advanced dementia with recurrent bouts of non convulsive status epilepticus.  EEG initially improved with vimpat, but subsequently worsened this morning although not evolved into velia non convulsive seizures.    We increased Keppra with improvement in EEG per epilepsy team.   We had long discussion with family today regarding goals of treatment and care.  Family is concerned with side effects of medications especially benzos.  We will stay on 2 AED's for now an monitor EEG, and if EEG again worsens would consider 3rd agent discussed above.  However, do not think adding additional agents would be of benefit and if continues to seize may have to further address goals of care.  We will attempt to avoid benzos per family's request.  Education that right hand twitching does not represent seizures and that seizures on EEG are not able to be established clinically.  Per family patient is definitely below baseline and that is reason for trial of 3rd agent if continues to  have seizures.     Total time 35 minutes over half of which was with patient and family counseling or coordinating care    Cindy Esteves DO   of Neurology

## 2018-11-14 NOTE — PLAN OF CARE
Problem: Patient Care Overview  Goal: Plan of Care/Patient Progress Review  Outcome: No Change  Time: 4173-2325  Reason for admission:   Vitals: VSS  Activity: Turns Q2  Pain: Arises to stimulation or pain   Neuro: ADRIA  Cardiac: WNL ex HTN   Respiratory: 100% on RA  GI/: Condom cath in place. Incontinet of stool.   Diet: Tube feeds at 45ml/hr. 200ml water flush every 4hrs.  Lines: R PIV is SL  Skin/Wounds: R and L groin wounds. Dressing in place and are CDI    His son was in the room overnight. Pt appeared lethargic overnight. Continue to monitor and follow POC.

## 2018-11-14 NOTE — MR AVS SNAPSHOT
After Visit Summary   11/14/2018    Priscilla Way    MRN: [id number]           Patient Information     Date Of Birth          1940        Visit Information        Provider Department      11/14/2018 7:00 AM [facility] TECH 4 [facility]        Today's Diagnoses     Seizure (H)    -  1    Altered mental state           Follow-ups after your visit        Who to contact     Please call your clinic at [phone] to:    Ask questions about your health    Make or cancel appointments    Discuss your medicines    Learn about your test results    Speak to your doctor            Additional Information About Your Visit        MyChart Information     [employer] gives you secure access to your electronic health record. If you see a primary care provider, you can also send messages to your care team and make appointments. If you have questions, please call your primary care clinic.  If you do not have a primary care provider, please call [phone] and they will assist you.      [employer] is an electronic gateway that provides easy, online access to your medical records. With [employer], you can request a clinic appointment, read your test results, renew a prescription or communicate with your care team.     To access your existing account, please contact your [facility] or call [phone] for assistance.        Care EveryWhere ID     This is your Care EveryWhere ID. This could be used by other organizations to access your [facility] medical records  [id number]         Blood Pressure from Last 3 Encounters:   11/14/18 132/55   10/29/18 142/54   06/07/18 135/60    Weight from Last 3 Encounters:   11/14/18 75.8 kg (167 lb)   10/29/18 70.8 kg (156 lb 1.6 oz)   06/06/18 77.9 kg (171 lb 12.8 oz)              We Performed the Following     Glucose by meter          Today's Medication Changes      Notice     This visit is during an admission. Changes to the med list made in this visit will be  reflected in the After Visit Summary of the admission.             Primary Care Provider Office Phone # Fax #    Randy Fuentes [phone]       [address] [address]        Equal Access to Services     EDUARDO PALACIOS : Hadservando ernandez hadanitao Soomaali, waaxda luqadaha, qaybta kaalmada adeegyada, annmarie snyder haydenangela allenfredmaria l boateng. So [facility] [phone].    ATENCIÓN: Si habla español, tiene a staples disposición servicios gratuitos de asistencia lingüística. Llame al [phone].    We comply with applicable federal civil rights laws and [place] laws. We do not discriminate on the basis of race, color, national origin, age, disability, sex, sexual orientation, or gender identity.            Thank you!     Thank you for choosing [facility]  for your care. Our goal is always to provide you with excellent care. Hearing back from our patients is one way we can continue to improve our services. Please take a few minutes to complete the written survey that you may receive in the mail after your visit with us. Thank you!             Your Updated Medication List - Protect others around you: Learn how to safely use, store and throw away your medicines at www.disposemymeds.org.      Notice     This visit is during an admission. Changes to the med list made in this visit will be reflected in the After Visit Summary of the admission.

## 2018-11-14 NOTE — PLAN OF CARE
Problem: Patient Care Overview  Goal: Plan of Care/Patient Progress Review  Outcome: No Change  Pt responding to pain, unable to assess orientation. VSS on RA. Pt turned and repositioned Q 2 hr, mechanical lift for transfers. Using condom catheter for voiding with adequate output and incontinent loose BM x 1 this shift. Tube feeding through NJ tube @ 45 mL/hr goal rate with 200 mL flush every 4 hr. Lissy Hugger in place with increased temperature of 99 degrees. Hugger removed and Pt stable temp @ 97.9 F at end of shift. Peripheral IV saline locked with intermittent antibiotic and anticonvulsant infusions. All oral medications administered through feeding tube. Enteric precautions maintained for C-Diff infection. BG managed with subcutaneous Novolog insulin. Rifaxan not in bin for 1400 dose, Pharmacy notified and Evening nurse aware. Evening nurse will follow up and administer Rifaxan this evening.

## 2018-11-14 NOTE — PROGRESS NOTES
INTERNAL MEDICINE PROGRESS NOTE    [address]iscilla [address] ([id number]) admitted on 11/2/2018 11/14/2018    Assessment & Plan:  77 y/o male with PMHx significant for vascular dementia, CVA multiple sites, DVT on warfarin, HCV cirrhosis s/p liver transplant 2000, HCV ESRD s/p kidney transplant 2000, CKD III of transplanted kidney, HFpEF, recurrent C diff infection, malnutrition on tube feeds and recent seizure disorder/status epilepticus was admitted to the hospital because of worsening altered mental status and shortness of breath.       #Acute toxic encephalopathy  #Uremia  #Vascular dementia  #Uncontrolled seizure activity  #Recurrent seizure disorder with status epilepticus  EEG monitor during last admission and readings consistent with non-convulsive status epilepticus, but also had clonic tonic seizure back then. He is non-verbal. Head imaging in the past has shown chronic advanced small vessel disease that is diffuse and atherosclerotic plaques with stenosis along vertebral arteries and MCA. His mentation mainly affected by vascular dementia. GCS continues to be 5-6. Per previous notes, he can be aroused by verbal stimuli. Current encephalopathy multifactorial by ongoing evolution of cerebrovascular disease vs metabolic (uremia) vs sepsis vs seizures. EEG monitor on this admission has found pt to be in status epilepticus despite supratherapeutic keppra levels. At times has received 2mg of ativan which has improved this pattern, but is now requiring second AED. Lacosamide has been added. Further work up for TSH and NH3 normal. CT scan on 11/02/18 did not reveal any gross intracranial pathology. Unable to protect airway but he is DNR/DNI.   - Neuro checks  - Neurology consulted  - Continue EEG video, might be discontinued today  - Adjusted Keppra to 750mg PO BID   - Keppra levels pending  - Continue with lacosamide 100mg PO  - Ativan 2mg when in status     #Acute hypoxic respiratory failure  #Volume overload  #HFpEF  (LVEF 55-60%)  On admission, he had coarse lung sounds, elevated JVP ~ 15cm and pitting edema on both legs. CXR had signs of pulmonary edema. Repeat CXR's demonstrating improvement after diuresis. Weight on admission was 80kg, today 75.8kg. Appears euvolemic. He is hypoalbuminemic therefore more risk of third spacing.  - Strict I/Os, difficult for accurate measurements due to condom patino  - Bumex 4mg PO daily  - Monitor UOP  - BMP daily  - Daily weights  - Continue home coreg  - Not on lisinopril due to renal dysfunction  - Wean O2 as able     #Cough  #Aspiration pneumonia vs pneumonitis   #Leukocytosis  CXR also with show evidence of increased pulmonary congestions suggestive of pulmonary edema and also possible aspiration. He has had other hospitalizations because of aspiration in the setting of TF's and altered mentation. Viral URI panel and influenza were negative. MRSA nares negative. Urine strep and pneumo antigen negative.  Procal not elevated.  Patient not needing supplemental O2, zosyn was discontinued. However, patient with low grade temps and WBC elevated as well as procal. These are downtrending. Abx were narrowed to cover for possible UTI.   - Discontinued Zosyn  - Started cefepime and flagyl for HAP and anaerobes from possible aspiration  - Flagyl stop date 11/12/18 for aspiration  - DuoNeb  - Blood cultures pending  - Trend procal     Zosyn (11/02/18 - 11/03/18, 11/06/18 -  11/09/18)  Cefepime (11/09/18 - present)  Flagyl (11/09/18 - 11/12/18)    #UTI  #?Gross hematuria  Patient had  gross hematuria on 11/06/18. CK measured due to seizure history, but it was below normal range. His last BK virus check on file from 2008 was negative and repeat on this admission was undetectable. Urine color spontaneously resolved to clear yellow.UA did show large presence of RBC and WBC. Urine cultures growing Enterobacter cloacae that is resistant to zosyn. Has grown this organism in the past. Suspect it represents  colonization, as this organism has grown before. Initially sensitive to multiple antibiotics, but as patient has been admitted several times and treated with abx; organism has become more resistant. Currently, treating to address possible infection as contributor to his poor renal recovery.   - Continue cefepime until 11/15/18 for a 7 day course    #HCV ESRD s/p transplant 2000  #LILIAM on CKD III of transplanted kidney  #?Renal vein thrombosis  #Hyperphosphatemia  Patient at one point was to be on dialysis of his transplanted kidney but renal function recovered. Baseline Cr ~ 1.2-1.5, but remains elevated and with not as much urinary output expected for high doses of diuretics. He does look volume up on exam, although less than during admission. Causes could be intrinsic injury from infectious causes, medications, cardiorenal type 1, significant renal compromise at last admission. US of the transplanted kidney showing increase in resistive indices suggestive for renal vein thrombus vs LILIAM vs cardiorenal. Tacro level 3 mildly below goal (4-6). But ok per nephrology for now.   - Continue tacro and prednisone  - Diuretics as above  - Trending PO4  - Holding aspirin for now  - Continue cefepime with stop date as above    #Hypernatremia  Patient has been on diuretics for some time and previously decreased free water flushes. Has improved with free water flushes.  - Diuretics as above  - Continue H2O flushes to 200mL q 4 hrs      #Diarrhea  #Cdiff  Patient had had c diff in previous admissions, 03/2018, 05/2018, 06/2018, 09/2018 and now as well. Report from care taker was that patient with increasing stool output. He has been treated with vancomycin long term. Completed 10 day course of vanc .  - On rifaximin 400 mg PO TID for 20 days          #+MRSE blood culture  One out of two bottles from admission was positive for MRSE. Received one dose of vanc. No fever or signs of clinical decompensation. Repeat cultures negative  for now.  - Blood cultures x2, negative to date  - Low threshold to start IV abx    #HCV ESLD s/p transplant 2000  LFTs normal. No evidence of rejection or decompensation.  - Continue tacro and prednisone as above     #Chronic iron deficiency anemia  Hemoglobin stable. There was some concern about slow GI bleed in previous admissions. Patient also with CKD driving down Hgb production. No evidence of melena for now. He had hemoglobin drop of 1.5g today, unclear of source, but repeat hemoglobin up-trending.  - Continue home PPI  - Continue home iron, which might confound appearance of stool  - Monitor for bleeding     #Hx of DVT on warfarin  DVT bilaterally on US in 2016. INR subtherapeutic. Heparin bridge completed  - INR daily  - Pharmacy to dose warfarin      #Severe malnutrition on TF's  - TF consult placed  - Refeeding syndrome and nutrition labs per dietician     #DM2  - Continue home insulin regimen    Patient evaluated and discussed with Dr. Alcala.      Code Status: DNR/DNI  FEN: TF's  PPx: DVT on warfarin, GI PPI PO  Dispo: pending neurological status back to baseline, possible discharge in 24hrs    Discussed goal of care with daughters Caryl and Cruz. They both expressed understanding of very poor prognosis and would not want to escalate cares beyond current management. They would not want dialysis either. However, they would not like to pursue comfort cares and their expectation is medical management of his numerous co-morbidities.     Of note daughter Cruz upset on 11/06/18 evening, due to patient being on lacosamide. Was very adamant of discontinuing medication. She threatened to zoë the hospital if medication was not discontinued. Night team reassured her of neurology's recommendation for the medication based on initial EEG's, but daughter did not agree with this and became verbally abusive.      Ramos Mcnair MD PhD  Internal Medicine, PGY-3  Pager:  [phone]    ==================================================================    Interval HistorySubjective:  No overnight events, patient not verbal. Per daughter, more sleepy than usual but close to baseline. Urine clear yellow and had good UOP last night.  Unable to assess as patient does not communicate.     Objective:  Most recent vital signs:  /66 (BP Location: Right arm)  Pulse 72  Temp 99  F (37.2  C) (Axillary)  Resp 16  Wt 75.8 kg (167 lb)  SpO2 100%  BMI 23.96 kg/m2  Temp:  [93.5  F (34.2  C)-100.3  F (37.9  C)] 99  F (37.2  C)  Pulse:  [72] 72  Heart Rate:  [70-84] 79  Resp:  [16] 16  BP: (132-185)/(43-77) 152/66  SpO2:  [99 %-100 %] 100 %  Wt Readings from Last 2 Encounters:   11/14/18 75.8 kg (167 lb)   10/29/18 70.8 kg (156 lb 1.6 oz)     Intake/Output Summary (Last 24 hours) at 11/14/18 1334  Last data filed at 11/14/18 0400   Gross per 24 hour   Intake             1095 ml   Output              325 ml   Net              770 ml     Physical exam:  General: lethargic, non-verbal  HEENT: PERRLA, EOMI, anicteric sclera, evidence of cataracts  Neck:  JVP difficult to assess as patient provides resistance to head turning  CV: RRR, no murmur, no rubs, no gallops  Resp: Coarse bilateral crackles and ronchi  Abdomen: non tender, non distended, no organomegaly, +bs  Extremities: bilateral pitting edema, WWP  Skin: no rash, not jaundice  Psych: unable to assess due to non-responsive  Neuro: GCS 6-7, withdraws to pain, non-verbal, does not follow commands    Labs:  Results for orders placed or performed during the hospital encounter of 11/02/18 (from the past 24 hour(s))   Sodium   Result Value Ref Range    Sodium 143 133 - 144 mmol/L   CBC with platelets   Result Value Ref Range    WBC 4.4 4.0 - 11.0 10e9/L    RBC Count 2.86 (L) 4.4 - 5.9 10e12/L    Hemoglobin 8.5 (L) 13.3 - 17.7 g/dL    Hematocrit 28.0 (L) 40.0 - 53.0 %    MCV 98 78 - 100 fl    MCH 29.7 26.5 - 33.0 pg    MCHC 30.4 (L) 31.5 -  36.5 g/dL    RDW 16.7 (H) 10.0 - 15.0 %    Platelet Count 244 150 - 450 10e9/L   INR   Result Value Ref Range    INR 2.91 (H) 0.86 - 1.14   Basic metabolic panel   Result Value Ref Range    Sodium 144 133 - 144 mmol/L    Potassium 4.2 3.4 - 5.3 mmol/L    Chloride 106 94 - 109 mmol/L    Carbon Dioxide 27 20 - 32 mmol/L    Anion Gap 10 3 - 14 mmol/L    Glucose 102 (H) 70 - 99 mg/dL    Urea Nitrogen 137 (H) 7 - 30 mg/dL    Creatinine 1.89 (H) 0.66 - 1.25 mg/dL    GFR Estimate 35 (L) >60 mL/min/1.7m2    GFR Estimate If Black 42 (L) >60 mL/min/1.7m2    Calcium 9.2 8.5 - 10.1 mg/dL   Magnesium (AM Draw)   Result Value Ref Range    Magnesium 2.3 1.6 - 2.3 mg/dL     *Note: Due to a large number of results and/or encounters for the requested time period, some results have not been displayed. A complete set of results can be found in Results Review.

## 2018-11-14 NOTE — PLAN OF CARE
Problem: Patient Care Overview  Goal: Plan of Care/Patient Progress Review  Outcome: No Change  Pt is nonverbal and minimally responsive. Moving arms and legs sometimes when he is turned. Pt having difficulty clearing secretions. Oral suction performed several times. Turning every 2 hours. Barrier paste applied to buttocks/coccyx. Interdry in groin. Condom cath patent with only 100 mL urine out. Pt incontinent of liquid stool. Temp as low as 93 degrees. Lissy hugger applied.

## 2018-11-14 NOTE — PROCEDURES
[facility] EEG #-2 (Day 2 of Video-EEG Monitoring)    DATE OF RECORDIN2018    DURATION OF RECORDIN hours, 54 minutes.    CLINICAL SUMMARY: This video-EEG monitoring procedure was performed in diagnostic evaluation of encephalopathy in [facility].  The patient was reported to be receiving levetiracetam and lacosamide on this day of monitoring.    TECHNICAL SUMMARY:  This continuous EEG monitoring procedure was performed with 23 scalp electrodes in 10-20 system placements, and additional scalp, precordial and other surface electrodes used for electrical referencing and artifact detection.  Video monitoring was utilized and periodically reviewed by EEG technologists and the physician for electroclinical correlation.     EEG ACTIVITIES DURING STUPOR:    During ongoing stupor there was no evidence of wake-sleep cycling.    There was an ongoing pattern of generalized irregular 2-8 Hz delta-theta slowing, with occasional generalized electrodecrements lasting less than 2 seconds and occasional generalized periodic epileptiform discharges (GPEDs).    The GPEDs usually had a frequency of 1-2 Hz and did not evolve to slower or faster frequencies, No electrographic seizures and no paroxysmal behavioral events were recorded during this day of monitoring.      SUMMARY OF DAY 2 OF VIDEO-EEG MONITORING:    This was an abnormal EEG recording during continuous stupor due to background generalized delta and theta slowing with occasional brief generalized electrodecrements and occasional monorhythmic GPEDs at intermixed faster frequencies.    These findings indicate severe electrographic encephalopathy and an elevated risk of epielptic seizures.  Clinical correlation is recommended.  Clinical correlation is recommended.  Dictated By: Milo Fish DO, Clinical Neurophysiology Fellow   I agree with the findings as reported.  I personally reviewed this video-EEG recording.    Fili Mercedes M.D.,   of Neurology       D: 2018   T: 2018   MT: EMILY      Name:     YADIRA BREAUX   MRN:      [id number]        Account:        [id number]   :      1940           Procedure Date: 2018      Document: [id number]

## 2018-11-15 NOTE — PLAN OF CARE
Problem: Patient Care Overview  Goal: Plan of Care/Patient Progress Review  Outcome: No Change  Pt non-verbal. Unresponsive, withdraws to pain. Assist x 2, lift. TF at goal of 45 ml/hr. 200 ml flush q4h. Incontinent of stool x 3. Condom cath in place, working well. Mepilex to inner thighs, CDI. Barrier cream to buttocks. Interdry to groin folds. Oral suctioning PRN. Turned q2h. Plan for discharge home 1-2 days.

## 2018-11-15 NOTE — TELEPHONE ENCOUNTER
Have not received a call back from manager. Will close encounter. Jenna Kirkpatrick LPN 11/15/2018 12:04 PM

## 2018-11-15 NOTE — PROCEDURES
Procedure Date: 2018      Revised      EEG #-1          VIDEO EEG Day 1          DATE OF RECORDIN/3/2018.         SOURCE FILE DURATION:  9 hours and 52 minutes.         PATIENT INFORMATION:  A 78-year-old male with history of vascular dementia, end-stage renal disease, and multiple other medical conditions presents with recent seizure disorder, admitted with worsening mental status and shortness of breath.  EEG is being done to evaluate for seizures.          TECHNICAL SUMMARY: This video EEG monitoring procedure was performed with 23 scalp electrodes in 10-20 system placements, and additional scalp, precordial and other surface electrodes used for electrical referencing and artifact detection. Video was reviewed intermittently by EEG technologist and physician for electroclinical seizures.         EEG BACKGROUND:  The patient has diffuse generalized delta slowing with EEG attenuation for 1-2 seconds.  After 2800, the EEG had more diffuse electromyogenic artifact and less EEG segments of attenuation.  The patient does have maximum delta slowing noted in the left temporoparietal and right central-parietal region.  This is seen throughout the record.  Well-formed sleep architecture is not seen nor is parietooccipital rhythm.  We know the patient is awake because of the electromyogenic artifact that has produced this.         ACTIVATION PROCEDURES:  None.        EPILEPTIFORM DISCHARGES: None        ICTAL:  None.         IMPRESSION:  Video EEG day 1 is abnormal due to the presence of diffuse generalized delta slowing, with maximum slowing noted in the left central parietal and right central parietal regions.  This is consistent with a severe encephalopathy with maximum dysfunction in the left and right central parietal region.  The patient is not on an anesthetic drip medication to account for this degree of slowing.  No electrographic seizures nor epileptiform discharges were seen.  Clinical  correlation is advised.      Revised date/acct lg 11/15/18         NIC COUCH MD             D: 2018   T: 2018   MT: [id number]      Name:     YADIRA BREAUX   MRN:      [id number]        Account:        [id number]   :      1940           Procedure Date: 2018      Document: [id number]

## 2018-11-15 NOTE — PLAN OF CARE
Problem: Patient Care Overview  Goal: Plan of Care/Patient Progress Review  Outcome: No Change  VSS on RA. Unresponsive and nonverbal. Withdraws to pain. Assist x2 with lift. No nonverbal signs of pain. Incontinent of stool x 3, condom cath patent and in place. TF 45ml/hr with 200ml flush q4hrs. Barrier cream to tono area skin folds and interdry in groin. Turned q2hrs. Daughter at bedside. Discussed bumex dosage today rt discharge. Plan to discharge home tomorrow.

## 2018-11-15 NOTE — PLAN OF CARE
Problem: Patient Care Overview  Goal: Plan of Care/Patient Progress Review  Outcome: No Change  Pt is obtunded. Withdraws from pain. Turning every 2 hours. Incontinent of watery diarrhea repeatedly tonight. Condom cath remained patent with low urine output. Accurate urine output charted in EPIC. Pt is unable to effectively clear his secretions. Oral suctioning PRN. Interdry in groin, barrier paste to buttocks, mepilex C/D/I on both thighs.bP elevated tonight, maroon cross-cover notified. BP responded to scheduled coreg. Family at bedside assisting with cares.

## 2018-11-15 NOTE — PROGRESS NOTES
INTERNAL MEDICINE PROGRESS NOTE    [address]iscilla [address] ([id number]) admitted on 11/2/2018  11/15/2018    Assessment & Plan:  79 y/o male with PMHx significant for vascular dementia, CVA multiple sites, DVT on warfarin, HCV cirrhosis s/p liver transplant 2000, HCV ESRD s/p kidney transplant 2000, CKD III of transplanted kidney, HFpEF, recurrent C diff infection, malnutrition on tube feeds and recent seizure disorder/status epilepticus was admitted to the hospital because of worsening altered mental status and shortness of breath.       #Acute toxic encephalopathy  #Uremia  #Vascular dementia  #Uncontrolled seizure activity  #Recurrent seizure disorder with status epilepticus  EEG monitor during last admission and readings consistent with non-convulsive status epilepticus, but also had clonic tonic seizure back then. He is non-verbal. Head imaging in the past has shown chronic advanced small vessel disease that is diffuse and atherosclerotic plaques with stenosis along vertebral arteries and MCA. His mentation mainly affected by vascular dementia. GCS continues to be 5-6. Per previous notes, he can be aroused by verbal stimuli. Current encephalopathy multifactorial by ongoing evolution of cerebrovascular disease vs metabolic (uremia) vs sepsis vs seizures. EEG monitor on this admission has found pt to be in status epilepticus despite supratherapeutic keppra levels. At times has received 2mg of ativan which has improved this pattern, but is now requiring second AED. Lacosamide has been added. Further work up for TSH and NH3 normal. CT scan on 11/02/18 did not reveal any gross intracranial pathology. Unable to protect airway but he is DNR/DNI.   - Neuro checks  - Neurology consulted  - Adjusted Keppra to 500mg PO BID   - Keppra levels pending  - Continue with lacosamide 100mg PO  - Lacosamide levels pending  - Ativan 2mg if in status     #Acute hypoxic respiratory failure  #Volume overload  #HFpEF (LVEF 55-60%)  On  admission, he had coarse lung sounds, elevated JVP ~ 15cm and pitting edema on both legs. CXR had signs of pulmonary edema. Repeat CXR's demonstrating improvement after diuresis. Weight on admission was 80kg, today 75.8kg. Appears euvolemic. He is hypoalbuminemic therefore more risk of third spacing.  - Strict I/Os, difficult for accurate measurements due to condom patino  - Bumex 2mg PO BID  - Monitor UOP  - BMP daily  - Daily weights  - Continue home coreg  - Not on lisinopril due to renal dysfunction  - Wean O2 as able     #Cough  #Aspiration pneumonia vs pneumonitis   #Leukocytosis  CXR also with show evidence of increased pulmonary congestions suggestive of pulmonary edema and also possible aspiration. He has had other hospitalizations because of aspiration in the setting of TF's and altered mentation. Viral URI panel and influenza were negative. MRSA nares negative. Urine strep and pneumo antigen negative.  Procal not elevated.  Patient not needing supplemental O2, zosyn was discontinued. However, patient with low grade temps and WBC elevated as well as procal. These are downtrending. Abx were narrowed to cover for possible UTI.   - Discontinued Zosyn  - Started cefepime and flagyl for HAP and anaerobes from possible aspiration  - DuoNeb  - Blood cultures pending  - Trend procal     Zosyn (11/02/18 - 11/03/18, 11/06/18 -  11/09/18)  Cefepime (11/09/18 - present)  Flagyl (11/09/18 - 11/12/18)    #UTI  #?Gross hematuria  Patient had  gross hematuria on 11/06/18. CK measured due to seizure history, but it was below normal range. His last BK virus check on file from 2008 was negative and repeat on this admission was undetectable. Urine color spontaneously resolved to clear yellow.UA did show large presence of RBC and WBC. Urine cultures growing Enterobacter cloacae that is resistant to zosyn. Has grown this organism in the past. Suspect it represents colonization, as this organism has grown before. Initially  sensitive to multiple antibiotics, but as patient has been admitted several times and treated with abx; organism has become more resistant. Currently, treating to address possible infection as contributor to his poor renal recovery.   - Continue cefepime until 11/15/18 for a 7 day course    #HCV ESRD s/p transplant 2000  #LILIAM on CKD III of transplanted kidney  #?Renal vein thrombosis  #Hyperphosphatemia  Patient at one point was to be on dialysis of his transplanted kidney but renal function recovered. Baseline Cr ~ 1.2-1.5, but remains elevated and with not as much urinary output expected for high doses of diuretics. He does look volume up on exam, although less than during admission. Causes could be intrinsic injury from infectious causes, medications, cardiorenal type 1, significant renal compromise at last admission. US of the transplanted kidney showing increase in resistive indices suggestive for renal vein thrombus vs LILIAM vs cardiorenal. Tacro level 3 mildly below goal (4-6). But ok per nephrology for now.   - Continue tacro and prednisone  - Diuretics as above  - Trending PO4  - Holding aspirin for now  - Continue cefepime as above    #Hypernatremia  Patient has been on diuretics for some time and previously decreased free water flushes. Has improved with free water flushes.  - Diuretics as above  - Continue H2O flushes to 200mL q 4 hrs    #Diarrhea  #Cdiff  Patient had had c diff in previous admissions, 03/2018, 05/2018, 06/2018, 09/2018 and now as well. Report from care taker was that patient with increasing stool output. He has been treated with vancomycin long term. Completed 10 day course of vanc .  - On rifaximin 400 mg PO TID for 20 days          #+MRSE blood culture  One out of two bottles from admission was positive for MRSE. Received one dose of vanc. No fever or signs of clinical decompensation. Repeat cultures negative for now.  - Blood cultures x2, negative to date  - Low threshold to start IV  abx    #HCV ESLD s/p transplant 2000  LFTs normal. No evidence of rejection or decompensation.  - Continue tacro and prednisone as above     #Chronic iron deficiency anemia  Hemoglobin stable. There was some concern about slow GI bleed in previous admissions. Patient also with CKD driving down Hgb production. No evidence of melena for now. He had hemoglobin drop of 1.5g today, unclear of source, but repeat hemoglobin up-trending.  - Continue home PPI  - Continue home iron, which might confound appearance of stool  - Monitor for bleeding     #Hx of DVT on warfarin  DVT bilaterally on US in 2016. INR subtherapeutic. Heparin bridge completed  - INR daily  - Pharmacy to dose warfarin      #Severe malnutrition on TF's  - TF consult placed  - Refeeding syndrome and nutrition labs per dietician     #DM2  - Continue home insulin regimen    Patient evaluated and discussed with Dr. Alcala.      Code Status: DNR/DNI  FEN: TF's  PPx: DVT on warfarin, GI PPI PO  Dispo: anticipate discharge in 24hrs    Discussed goal of care with daughters Caryl and Cruz. They both expressed understanding of very poor prognosis and would not want to escalate cares beyond current management. They would not want dialysis either. However, they would not like to pursue comfort cares and their expectation is medical management of his numerous co-morbidities.     Of note daughter Cruz upset on 11/06/18 evening, due to patient being on lacosamide. Was very adamant of discontinuing medication. She threatened to zoë the hospital if medication was not discontinued. Night team reassured her of neurology's recommendation for the medication based on initial EEG's, but daughter did not agree with this and became verbally abusive.      Ramos Mcnair MD PhD  Internal Medicine, PGY-3  Pager: [phone]    ==================================================================    Interval HistorySubjective:  No overnight events, patient not verbal. Per daughter, more  sleepy than usual but close to baseline but no change since admission.   Unable to assess as patient does not communicate.     Objective:  Most recent vital signs:  /81 (BP Location: Left arm)  Pulse 72  Temp 96.3  F (35.7  C) (Axillary)  Resp 16  Wt 75.8 kg (167 lb)  SpO2 100%  BMI 23.96 kg/m2  Temp:  [95.2  F (35.1  C)-97.4  F (36.3  C)] 96.3  F (35.7  C)  Heart Rate:  [69-75] 69  Resp:  [16-20] 16  BP: (160-186)/(68-81) 165/81  SpO2:  [99 %-100 %] 100 %  Wt Readings from Last 2 Encounters:   11/14/18 75.8 kg (167 lb)   10/29/18 70.8 kg (156 lb 1.6 oz)     Intake/Output Summary (Last 24 hours) at 11/15/18 1626  Last data filed at 11/15/18 1000   Gross per 24 hour   Intake              995 ml   Output              350 ml   Net              645 ml     Physical exam:  General: lethargic, non-verbal  HEENT: PERRLA, EOMI, anicteric sclera, evidence of cataracts  Neck:  JVP difficult to assess as patient provides resistance to head turning  CV: RRR, no murmur, no rubs, no gallops  Resp: Coarse bilateral crackles and ronchi  Abdomen: non tender, non distended, no organomegaly, +bs  Extremities: bilateral pitting edema, WWP  Skin: no rash, not jaundice  Psych: unable to assess due to non-responsive  Neuro: GCS 6-7, withdraws to pain, non-verbal, does not follow commands    Labs:  Results for orders placed or performed during the hospital encounter of 11/02/18 (from the past 24 hour(s))   INR   Result Value Ref Range    INR 2.76 (H) 0.86 - 1.14   Procalcitonin   Result Value Ref Range    Procalcitonin 0.39 ng/ml   Phosphorus   Result Value Ref Range    Phosphorus 3.3 2.5 - 4.5 mg/dL   Basic metabolic panel   Result Value Ref Range    Sodium 143 133 - 144 mmol/L    Potassium 3.9 3.4 - 5.3 mmol/L    Chloride 104 94 - 109 mmol/L    Carbon Dioxide 27 20 - 32 mmol/L    Anion Gap 11 3 - 14 mmol/L    Glucose 180 (H) 70 - 99 mg/dL    Urea Nitrogen 132 (H) 7 - 30 mg/dL    Creatinine 2.14 (H) 0.66 - 1.25 mg/dL    GFR  Estimate 30 (L) >60 mL/min/1.7m2    GFR Estimate If Black 36 (L) >60 mL/min/1.7m2    Calcium 9.1 8.5 - 10.1 mg/dL   Magnesium (AM Draw)   Result Value Ref Range    Magnesium 2.3 1.6 - 2.3 mg/dL     *Note: Due to a large number of results and/or encounters for the requested time period, some results have not been displayed. A complete set of results can be found in Results Review.

## 2018-11-15 NOTE — PROGRESS NOTES
BRIEF NEUROLOGY FOLLOW UP NOTE:    Chart reviewed and patient seen briefly this morning. EEG was discontinued yesterday based on stability of findings. He remains on levetiracetam and lacosamide from an AED standpoint.     On exam he was very somnolent and did not open his eyes to stimuli. His eyes are conjugate and VOR intact. He did not speak but moaned a few times during examination. His arms and legs were in flexion, no spontaneous movements observed. Grimaces and moves to noxious stimuli x4.     From a seizure standpoint it seems that he has stabilized. Levetiracetam level came back elevated, recommend decreasing to 500mg BID. In addition recommend checking lacosamide level. Continue to optimize medical conditions (eg uremia).    Summary of recommendations:  -Decrease levetiracetam to 500mg BID (ordered)  -Check lacosamide level (ordered)  -Continue to optimize medical conditions    Patient care was discussed with staff neurologist, Dr. Esteves.    Edmond Arguelles MD  Neurology PGY-3

## 2018-11-16 NOTE — PROGRESS NOTES
Care Coordinator Progress Note    Admission Date/Time:  11/2/2018  Attending MD:  Jonathon Martinez,*    Data  Chart reviewed, discussed with interdisciplinary team.   Patient was admitted for:    Decreased level of consciousness  Hyponatremia  Pneumonia of right middle lobe due to infectious organism (H).    Concerns with insurance coverage for discharge needs: None.  Current Living Situation: Patient lives with family.  Support System: Supportive and Involved  Services Involved: Home Care and PCA  Transportation at Discharge: [facility] stretcher  Transportation to Medical Appointments: - [facility] Stretcher  Barriers to Discharge: none    Coordination of Care    Addendum 11/16 @1410: Discharge cancelled, stretcher cancelled, notified [facility] and [facility].  11/16: [facility] is medically [facility].  Verified that orders are complete. [facility] and [facility] notified of dc.    11/9: open to [facility] - RN (resmption orders completed), has 11.5hrs PCA as well. Patient is not expected to discharge over the weekend, will continue to follow.     11/8: Benjamin agreed to attending a Care Conference 3pm Friday 11/9, Medical staff stated SW and RNCC are not needed at this time.    11/3: Patient is well known to RNCC from last admission (9/11 to 10/29/18) having just discharged last week. Patient is well supported at home, has [facility] nursing visits and 11.5hrs PCA daily. Primary caregiver is daughter May. Also greatly involved in decision making is daughter Benjamin. Neither is primary health care agent; decisions need to be made equally by both daughters which has, in the prior admission, results in a conflict of treatment for the medical and nursing staff. Patient was denied acceptance globally from [facility] during last inpatient admission therefor unnecessary to attempt again due to futility.    Referrals: open to [facility]  (resumption orders and Face to Face completed)    [facility]  Phone  [phone]  Fax  [phone]    **RESUMPTION OF HOME CARE SERVICES**     RN skilled nursing visit.   RN to assess vital signs and weight, respiratory and cardiac status, pain level and activity tolerance, hydration, nutrition and bowel status   RN to complete home safety evaluation.  RN to complete weekly medication management and set-up, please involve family/primary caregiver in med education.  RN to provide lab draws as needed and communicate results to PCP            Plan  Anticipated Discharge Date:  Today  Anticipated Discharge Plan:  Home w/family    Radha Ahuja RN, BSN, PHN  Medicine Care Coordinator  [facility] [address]  Desk Phone: [phone]  Pager: [phone]    To contact Weekend RNCC, dial [phone] and enter job code [phone] at prompt.   This pager can not be contacted by text page or outside line.

## 2018-11-16 NOTE — PLAN OF CARE
Problem: Patient Care Overview  Goal: Plan of Care/Patient Progress Review  Outcome: No Change  [id number]: No significant change. Repositioned around 1800, pad dry, condom cath intact. Continue with POC.

## 2018-11-16 NOTE — PROGRESS NOTES
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 Neurology was consulted for NCSE.    Interval History: EEG was discontinued on 11/14 due to no further seizure activity. Clinically he has improved mildly although he remains encephalopathic.             Medications:     Prescription Medications as of 11/16/2018             acetaminophen (TYLENOL) 32 mg/mL solution 20.3 mLs (650 mg) by Oral or Feeding Tube route every 4 hours as needed for mild pain or fever    alcohol swab prep pads Use to swab area of injection/mark anthony as directed.    amLODIPine (NORVASC) 1 mg/mL SUSP 5 mLs (5 mg) by Per Feeding Tube route daily    aspirin 10 mg/mL SUSP 8 mLs (80 mg) by Per Feeding Tube route daily    blood glucose (NO BRAND SPECIFIED) lancets standard Use to test blood sugar 6 times daily or as directed.    blood glucose monitoring (NO BRAND SPECIFIED) meter device kit Use to test blood sugar every 4 hours while on Tube feeding.    blood glucose monitoring (NO BRAND SPECIFIED) test strip Use to test blood sugars 6 times daily or as directed    bumetanide (BUMEX) 0.25 mg/mL SUSP 4 mLs (1 mg) by Oral or Feeding Tube route daily    carvedilol (COREG) 1 mg/mL SUSP 6.25 mLs (6.25 mg) by Oral or Feeding Tube route 2 times daily    cholecalciferol (VITAMIN D/ D-VI-SOL) 400 UNIT/ML LIQD liquid Take 1 mL (400 Units) by mouth daily    ferrous gluconate (FERGON) 324 (38 Fe) MG tablet 2 tablets (648 mg) by Per Feeding  Tube route daily (with breakfast)    fiber modular, NUTRISOURCE FIBER, (NUTRISOURCE FIBER) packet 1 packet by Per Feeding Tube route 3 times daily    HYDROmorphone, STANDARD CONC, (DILAUDID) 1 MG/ML LIQD liquid 1-2 mLs (1-2 mg) by Oral or Feeding Tube route every 6 hours as needed for moderate to severe pain    insulin aspart (NOVOLOG PEN) 100 UNIT/ML injection Inject 1-6 Units Subcutaneous every 4 hours Correction Scale -MEDIUM INSULIN RESISTANCE,    Do Not give if BG less than 140.  For  - 189 give 1 unit.  For  - 239 give 2 units.  For  - 289 give 3 units.  For  - 339 give 4 units.  For  - 399 give 5 units.  For BG > 400 give 6 units.  Check blood glucose Q4H   Notify provider if glucose> 350 mg/dL.    insulin pen needle (BD SANDRA U/F) 32G X 4 MM Use 6 (every 4 hours while on tube feeding) daily or as directed.    lactobacillus rhamnosus, GG, (CULTURELL) capsule 1 capsule by Per Feeding Tube route 2 times daily    levETIRAcetam (KEPPRA) 100 MG/ML solution 7.5 mLs (750 mg) by Per Feeding Tube route every 12 hours    miconazole (MICATIN; MICRO GUARD) 2 % powder Apply topically 2 times daily    multivitamins with minerals (CERTAVITE/CEROVITE) LIQD liquid Take 15 mLs by mouth daily    ondansetron (ZOFRAN-ODT) 4 MG ODT tab Take 1 tablet (4 mg) by mouth every 6 hours as needed for nausea or vomiting    pantoprazole (PROTONIX) 2 mg/mL SUSP suspension 20 mLs (40 mg) by Per Feeding Tube route 2 times daily (before meals)    predniSONE 5 MG/5ML solution Take 5 mLs (5 mg) by mouth daily    Sharps Container MISC Sharps container for diabetes needles.    tacrolimus (GENERIC EQUIVALENT) 1 mg/mL suspension 1.5 mLs (1.5 mg) by Oral or Feeding Tube route 2 times daily    warfarin (COUMADIN) 2.5 MG tablet 1 tablet (2.5 mg) by Oral or Feeding Tube route daily      Facility Administered Medications as of 11/16/2018             acetaminophen (TYLENOL) solution 650 mg 20.3 mLs (650 mg) by Oral or Feeding  "Tube route every 4 hours as needed for mild pain or fever    acetylcysteine (MUCOMYST) 10 % nebulizer solution 4 mL Take 4 mLs by nebulization every 4 hours as needed for mucolysis/respiratory distress    bumetanide (BUMEX) suspension 2 mg 8 mLs (2 mg) by Oral or Feeding Tube route 2 times daily    carvedilol (COREG) suspension 6.25 mg 6.25 mLs (6.25 mg) by Oral or Feeding Tube route 2 times daily    ceFEPIme (MAXIPIME) 2 g vial to attach to  ml bag for ADULTS or 50 ml bag for PEDS Inject 2 g into the vein once    cholecalciferol (vitamin D/D-VI-SOL) liquid 400 Units 1 mL (400 Units) by Oral or Feeding Tube route daily    dextrose 10 % 1,000 mL infusion Inject into the vein continuous prn (Hypoglycemia prevention)    dextrose 50 % injection 25-50 mL Inject 25-50 mLs into the vein every 15 minutes as needed for low blood sugar    Linked Group 1:  \"Or\" Linked Group Details     ferrous sulfate 300 (60 Fe) MG/5ML syrup 600 mg 10 mLs (600 mg) by Per Feeding Tube route daily (with breakfast)    glucagon injection 1 mg Inject 1 mg Subcutaneous every 15 minutes as needed for low blood sugar (May repeat x 1 only)    Linked Group 1:  \"Or\" Linked Group Details     glucose gel 15-30 g Take 15-30 g by mouth every 15 minutes as needed for low blood sugar    Linked Group 1:  \"Or\" Linked Group Details     insulin aspart (NovoLOG) inj (RAPID ACTING) Inject 1-6 Units Subcutaneous every 4 hours    ipratropium - albuterol 0.5 mg/2.5 mg/3 mL (DUONEB) neb solution 3 mL Take 3 mLs by nebulization every 4 hours as needed for wheezing    lacosamide (VIMPAT) solution 100 mg Take 10 mLs (100 mg) by mouth 2 times daily    lactobacillus rhamnosus (GG) (CULTURELL) capsule 1 capsule 1 capsule by Per Feeding Tube route 2 times daily    levETIRAcetam (KEPPRA) solution 500 mg 5 mLs (500 mg) by Oral or Feeding Tube route 2 times daily    lidocaine (LMX4) cream Apply topically every hour as needed for pain (with VAD insertion or accessing " "implanted port.)    lidocaine 1 % 1 mL 1 mL by Other route every hour as needed (mild pain with VAD insertion or accessing implanted port)    melatonin tablet 1 mg Take 1 tablet (1 mg) by mouth nightly as needed for sleep    miconazole (MICATIN; MICRO GUARD) 2 % powder Apply topically 2 times daily    multivitamins with minerals (CERTAVITE/CEROVITE) liquid 15 mL 15 mLs by Per Feeding Tube route daily    naloxone (NARCAN) injection 0.1-0.4 mg Inject 0.25-1 mLs (0.1-0.4 mg) into the vein every 2 minutes as needed for opioid reversal    ondansetron (ZOFRAN-ODT) ODT tab 4 mg Take 1 tablet (4 mg) by mouth every 6 hours as needed for nausea or vomiting    pantoprazole (PROTONIX) 2 mg/mL suspension 40 mg 20 mLs (40 mg) by Per Feeding Tube route 2 times daily (before meals)    predniSONE solution 5 mg 5 mLs (5 mg) by Oral or Feeding Tube route daily    prochlorperazine (COMPAZINE) injection 5 mg Inject 1 mL (5 mg) into the vein every 6 hours as needed for nausea or vomiting    Linked Group 2:  \"Or\" Linked Group Details     prochlorperazine (COMPAZINE) Suppository 12.5 mg Place 0.5 suppositories (12.5 mg) rectally every 12 hours as needed for nausea or vomiting    Linked Group 2:  \"Or\" Linked Group Details     prochlorperazine (COMPAZINE) tablet 5 mg Take 1 tablet (5 mg) by mouth every 6 hours as needed for vomiting    Linked Group 2:  \"Or\" Linked Group Details     rifaximin (XIFAXAN) tablet 400 mg Take 2 tablets (400 mg) by mouth 3 times daily    scopolamine (TRANSDERM) 72 hr patch 1 patch Place 1 patch onto the skin every 72 hours    Linked Group 3:  \"And\" Linked Group Details     scopolamine (TRANSDERM-SCOP) Patch in Place Place onto the skin every 8 hours    Linked Group 3:  \"And\" Linked Group Details     scopolamine (TRANSDERM-SCOP) patch REMOVAL Starting on 11/18/2018. Place onto the skin every 72 hours    Linked Group 3:  \"And\" Linked Group Details     sodium chloride (PF) 0.9% PF flush 3 mL 3 mLs by Intracatheter " route every hour as needed for line flush (for peripheral IV flush post IV meds)    sodium chloride (PF) 0.9% PF flush 3 mL 3 mLs by Intracatheter route every 8 hours    tacrolimus (GENERIC EQUIVALENT) suspension 1.5 mg 1.5 mLs (1.5 mg) by Oral or Feeding Tube route 2 times daily    warfarin (COUMADIN) tablet 2 mg Take 1 tablet (2 mg) by mouth Once at 6pm    warfarin (COUMADIN) tablet 2.5 mg Take 1 tablet (2.5 mg) by mouth Once at 6pm    Warfarin Therapy Reminder (Check START DATE - warfarin may be starting in the FUTURE) 1 each continuous prn    bumetanide (BUMEX) suspension 1 mg (Discontinued) 4 mLs (1 mg) by Oral or Feeding Tube route daily    levETIRAcetam (KEPPRA) solution 750 mg (Discontinued) 7.5 mLs (750 mg) by Oral or Feeding Tube route 2 times daily    warfarin (COUMADIN) tablet 2.5 mg (Discontinued) Take 1 tablet (2.5 mg) by mouth Once at 6pm                Review of Systems:   Review of systems not obtained due to patient factors - mental status         Physical Exam:   /77 (BP Location: Left arm)  Pulse 70  Temp 95  F (35  C) (Axillary)  Resp 16  Wt 75.8 kg (167 lb)  SpO2 100%  BMI 23.96 kg/m2     General: NAD  Neurologic: Some moaning during exam, no speech. No eye opening, eyes conjugate, VOR intact. Face symmetric. Pupils reactive. Arms in flexion, no spontaneous movement noted, some jerking movements of arms when moved. Knees flexed. Toes mute.            Data:   CBC:  Lab Results   Component Value Date    WBC 5.2 11/16/2018     Lab Results   Component Value Date    HGB 9.5 11/16/2018     Lab Results   Component Value Date    HCT 30.6 11/16/2018     Lab Results   Component Value Date     11/16/2018       Last Basic Metabolic Panel:  Lab Results   Component Value Date     11/16/2018      Lab Results   Component Value Date    POTASSIUM 4.0 11/16/2018     Lab Results   Component Value Date    CHLORIDE 103 11/16/2018     Lab Results   Component Value Date    JOSHUA 9.1 11/16/2018      Lab Results   Component Value Date    CO2 26 11/16/2018     Lab Results   Component Value Date     11/16/2018     Lab Results   Component Value Date    CR 2.06 11/16/2018     Lab Results   Component Value Date     11/16/2018            Assessment and Recommendations:     # Toxic/metabolic encephalopathy:  # Recent h/o NCSE:  Priscilla Way is a 78 year old male with recent NCSE who is profoundly encephalopathic likely due to toxic/metabolic derangements, notably uremia and infections. He was seen by neurology for NCSE which has resolved on dual antiepileptic therapy. Currently stable. Recommend continuing these doses as he has shown to be prone to going back into NCSE when these are weaned. Lacosamide level is pending; we will follow this level to make sure it is not profoundly elevated but advise extreme caution about going down on either of his medications given multiple episodes of NCSE.     Recommendations:  -Continue levetiracetam 500mg BID  -Continue lacosamide 100mg BID  -Neurology will follow peripherally for lacosamide level and will otherwise sign off at this point. Do not hesitate to contact the neurology team with any further questions or concerns.    Patient seen and discussed with attending physician Dr. Esteves.    Edmond Arguelles  Neurology PGY-3      I saw and evaluated the patient on 11/16/2018 and agree with the findings and the plan of care as documented in the resident's note.      Agree with above. Would maintain on 2 AED's as has gone into non convulsive status x3 when AED's weaned. will follow peripherally Lacosamide level.  Neurology inpatient will sign off at this time.     Cindy Esteves DO   of Neurology

## 2018-11-16 NOTE — PLAN OF CARE
Problem: Patient Care Overview  Goal: Plan of Care/Patient Progress Review  Outcome: No Change  /81 (BP Location: Left leg)  Pulse 69  Temp 96.2  F (35.7  C) (Axillary)  Resp 20  Wt 75.8 kg (167 lb)  SpO2 100%  BMI 23.96 kg/m2    Time:  7398-5941    Reason for admission:  Sepsis, FTT, AMS, PNA, Seizures  Neuro:  Nonresponsive, nonverbal.  Pupils fixed.   Behavior:  Nonresponsive and noverbal.   Activity:  Immobile.  Vitals:  VSS on RA, except HTN.  Lines:  R PIV SL.  NG w/ TF @ goal of 45.  Cardiac:  HTN.  Respiratory:  Large amount of secretions. Suctioning frequently and scopolamine patch placed.  RRT called for risk of tongue aspiration this shift.  Sats 100% on RA.   GI/:  Condom cath in place, low UOP, 150cc this 12hr shift.  No BM this shift.  Skin:  Left groin w/ open wound, no drainage, pink and moist.  Miconazole and barrier cream applied.    Endo:  GB=812, 193, and 164, sliding scale insulin given per order.  Pain:  Withdraws to pain.  Repositioned Q2H.    Diet:  TF @ goal of 45.  Labs/Imaging:  Cr=2.14, Hgb=8.5, INR=2.76.  Consults: Neuro     New changes this shift:  Keppra adjusted.  Keppra and Lacosamide levels pending.  PO bumex.  Condom cath w/ 150cc UOP this shift.  No BM.  Diuretics and water flushes for hypernatremia.  Rifaximan for diarrhea.  C.Diff, completed 10 days of vanco.      Shift Notes:  RRT activated this shift.  Patient stopped breathing while laying flat for incontinence cares and turning.  Pt was found to be aspirating on his tongue while laying flat.  Daughter May (RT) requested a bite block to hold down the patient's tongue.  RT was called to bring up block.  Pt was suctioned.  CC ok w/ block being used while patient laying flat for cares.  Pt sats 100% on RA while upright.  Large amount of secretions.  Suctioned frequently and scopolamine patch placed behind left ear.    Plan:  Possible discharge today.    Continue to monitor and follow POC.

## 2018-11-16 NOTE — PROVIDER NOTIFICATION
11/15/18 2300   Call Information   Date of Call 11/15/18   Time of Call 2101   Name of person requesting the team Jillian   Title of person requesting team RN   RRT Arrival time 2104   Time RRT ended 2205   Reason for call   Type of RRT Adult   Primary reason for call Staff concerned   Was patient transferred from the ED, ICU, or PACU within last 24 hours prior to RRT call? No   SBAR   Situation Pt when lying flat tongue is obstructing airway;  did not desat, but unable to mantain open airway or handle secreations   Background PMHx significant for vascular dementia, CVA multiple sites, DVT on warfarin, HCV cirrhosis s/p liver transplant 2000, HCV ESRD s/p kidney transplant 2000, CKD III of transplanted kidney, HFpEF, recurrent C diff infection, malnutrition on tube feeds and recent seizure disorder/status epilepticus was admitted to the hospital because of worsening altered mental status and shortness of breath   Notable History/Conditions Transplant;Neurological;End-Stage disease;Diabetes;Congestive heart failure   Assessment Pt somnolent- increase in last 2 days per daughter's report; pt is DNR/DNI with no plans to escalate cares- currently on TF< antbx, diuresis; daughter (who is respiratory therapist) utilized yankaur to hold tongue open and suction, pt able to maintain airway.  Additionally, pt has no problem maintaing airway when HOB is elevated   Interventions O2 per N/C or mask   Adjustments to Recommend keep HOB elevated at all times except when necssary for turning/cleaning pt;  Plan is to utilize oral airway for pt comfort/maintain secretions when necessary to lie flat    Patient Outcome   Patient Outcome Stabilized on unit   RRT Team   Attending/Primary/Covering Physician Rich   Date Attending Physician notified 11/15/18   Time Attending Physician notified 2106   Physician(s) Herminia Ortiz RN Lilliam Walsh   RT Abimael   Other staff Manisha Paz, Charge RN   Post RRT Intervention  Assessment   Post RRT Assessment Stable/Improved   Date Follow Up Done 11/16/18   Time Follow Up Done 0150   Comments When lying pt down again, bit down on oral airway but utilized danae- pt has had no episodes of desat, no change sin status, appears comfortable

## 2018-11-16 NOTE — PROGRESS NOTES
CLINICAL NUTRITION SERVICES - REASSESSMENT NOTE     Nutrition Prescription    RECOMMENDATIONS FOR MDs/PROVIDERS TO ORDER:  Given patient with minimal TF intake this admit, Please switch TF back to Previous Home TF regimen @ discharge:     Type of Feeding Tube: NJ tube  Enteral Frequency: Cycled x 14 hours ( from 8:00 am - 10:00 pm)   Enteral Regimen: Nepro @ 75 ml/hr x 14 hrs (6pm - 8am)  Enteral Additives: Certavite 15 ml daily   Total Enteral Provisions: 1050 ml/day,  1890 kcal/day (24 kcal/kg), 85 gm PRO (1.1 gm/kg), 169 CHO, 767 ml H2O, 13 gm Fiber daily.     --Free Water Flush: Per MD given patient with CKD3, Not on HD and on diuretics.     Malnutrition Status:    Severe malnutrition in the context of chronic illness       Recommendations already ordered by Registered Dietitian (RD):  Increased Suplena to run @ 50 ml/hr to better meet estimated kcal /protein needs:    ---Supena @ goal 50 ml/hr (1200 ml/day) to provide 2160 kcals (30 kcal /kg), 56 g PRO (0.8 gm/kg/day), 876 ml free H2O, 242 gm CHO and 15 gm Fiber daily.    Future/Additional Recommendations:  @ discharge, may continue with home regimen of Nepro x 14 hours day time @ 75 ml/hr.          EVALUATION OF THE PROGRESS TOWARD GOALS   Diet:   NPO      Nutrition Support:   Access:NJ tube   Dosing wt: 72 kg driest wt this admission   Regimen: Suplena @45 ml/hr, continuous rate.  Regimen to provide: Suplena @ goal 45 ml/hr (1080 ml/day) to provide 1944 kcals (27 kcal/kg/day), 49 gm PRO (0.7 gm/kg/day), 788 ml free H2O, 212 gm CHO and 14 gm Fiber daily.       Intake:   Average 7 day TF provided ~ 530 ml volume /day ( Provided 49% estimated kcal /protein needs.   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    --Admitted for  SOB, Sepsis, FTT, Worsening AMS, PNA, Seizures    --Remains encephalopathic, Per providers note, \" likely due to toxic/metabolic derangements, notably uremia and infections\".     --Goals of care: patient DNR/DNI ( on TFs, abx, diuresis, immune suppressant meds)    --Labs: BUN: 134 ( continues to uptrend despite of switching TF to low protein renal formula), Mg++: 2.4 ( elevated), K+: 4.0 (normal), Phos: 3.3 (normal)        MALNUTRITION  % Intake: </= 50% for >/= 5 days (severe)  % Weight Loss: > 2% in 1 week (severe)  Subcutaneous Fat Loss:Thoracic/intercostal, upper arm Moderate  Muscle Loss: Previously Scapular bone, Thoracic region (clavicle, acromium bone, deltoid, trapezius, pectoral, Upper arm (bicep, tricep): moderate-severe, Lower arm  (forearm), Patellar region: moderate and Posterior calf: moderate-severe  Fluid Accumulation/Edema: None noted  Malnutrition Diagnosis: Severe malnutrition in the context of chronic illness     Previous Goals   Total avg nutritional intake to meet a minimum of 25 kcal/kg, ( 1800 kcal /day) and 58 gm PRO/kg daily (per dosing wt 72 kg driest wt this admit ).     Evaluation: Not met    Previous Nutrition Diagnosis  Inadequate enteral nutrition infusion related to CKD3/ with volume up as evidenced by TFs rate decreased to 1/2 rate for the past 4 days.    Evaluation: No change    CURRENT NUTRITION DIAGNOSIS  Inadequate enteral nutrition infusion related to daily interruption to TF support as evidenced by average 7 day TF infusion met 49% of estimated TF goal volume.       INTERVENTIONS  Implementation  Enteral Nutrition - Modify rate    Goals  Patient to consume % of nutritionally adequate meal trays TID, or the equivalent with supplements/snacks.    Monitoring/Evaluation  Progress toward goals will be monitored and evaluated per " protocol.    Iveth Mann RD/LANCE  Pager [phone]

## 2018-11-16 NOTE — PROCEDURES
[facility] EEG #-3 (Day 3 of Video-EEG Monitoring)    DATE OF RECORDIN2018    DURATION OF RECORDIN hours, 56 minutes.    CLINICAL SUMMARY: This video-EEG monitoring procedure was performed in evaluation of encephalopathy in [facility].  The patient was reported to be receiving levetiracetam and lacosamide on this day of monitoring.    TECHNICAL SUMMARY:  This continuous EEG monitoring procedure was performed with 23 scalp electrodes in 10-20 system placements, and additional scalp, precordial and other surface electrodes used for electrical referencing and artifact detection.  Video monitoring was utilized and periodically reviewed by EEG technologists and the physician for electroclinical correlation.     EEG ACTIVITIES DURING STUPOR:    During ongoing stupor there was no evidence of wake-sleep cycling.    There was an ongoing pattern of generalized irregular 2-8 Hz delta-theta slowing, with occasional generalized electrodecrements lasting less than 2 seconds and occasional generalized periodic epileptiform discharges (GPEDs).    The GPEDs usually had a frequency of 1-2 Hz and did not evolve to slower or faster frequencies, No electrographic seizures and no paroxysmal behavioral events were recorded during this day of monitoring.      SUMMARY OF DAY 3 OF VIDEO-EEG MONITORING:    This was an abnormal EEG recording during continuous stupor due to background generalized delta and theta slowing with occasional brief generalized electrodecrements and occasional monorhythmic GPEDs at intermixed faster frequencies.    No electrographic seizures and no paroxysmal behavioral events were recorded during this day of monitoring.      SUMMARY OF 3 DAYS OF VIDEO-EEG MONITORING:    Initially the patient has a severe diffuse encephalopathy and has GPED activity that can evolve into long runs of 2.5 Hz and above frequencies, considered to be non-convulsive status epilepticus.  The patient was found to be in a  nonconvulsive status epilepticus on day 1 of recording which responded appropriately to Ativan challenge as there was a return to diffuse generalized delta and theta slowing 1-2 minutes after Ativan delivery. Through days 2 and 3, the patient's medications were adjusted and there was an increase in the amount of atypical burst suppression with generalized slowing during bursts.  However, there was a decrease in the amount of GPED activity of frequencies greater than 2 Hz and no further nonconvulsive status epilepticus after Day 1.   These findings indicate severe electrographic encephalopathy, resolved nonconvulsive status epilepticus, and an elevated risk of recurrent epileptic seizures.  Clinical correlation is recommended.  Dictated By: Milo Fish DO, Clinical Neurophysiology Fellow   I agree with the findings as reported.  I personally reviewed this video-EEG recording.    Fili Mercedes M.D., Professor of Neurology       D: 2018   T: 2018   MT: FJ      Name:     YADIRA BREAUX   MRN:      [id number]        Account:        [id number]   :      1940           Procedure Date: 2018      Document: [id number]

## 2018-11-16 NOTE — PROVIDER NOTIFICATION
Pt having aspiration like symptoms after medications given. Frequent oral suctioning required after meds were given and pt coughing. MD pageelise. Ordered Abdominal xray. Holding TF and meds until results from xray.

## 2018-11-17 NOTE — PLAN OF CARE
Problem: Patient Care Overview  Goal: Plan of Care/Patient Progress Review  Outcome: No Change  /60 (BP Location: Left arm)  Pulse 68  Temp 97.4  F (36.3  C) (Axillary)  Resp 16  Wt 74.2 kg (163 lb 9.3 oz)  SpO2 100%  BMI 23.47 kg/m2. TF running at 25 mL/hr.Total intake 245 mL,  mL. Pt. Turned q hours. Pt. Is non-verbal, pupils are fixed. Portable CXR was done this AM, and report states it is difficult to exclude infection/aspiration. Suction with yanker provided with thick/white yellow secretions.     Student nurse- Marielle Eugene

## 2018-11-17 NOTE — PROVIDER NOTIFICATION
MD called [phone] Dr Bright about TF and meds tonight and if Xray read yet.Not read yet and hold TF but can give some meds very slow.Waiting to have read and ok to start using.

## 2018-11-17 NOTE — PROVIDER NOTIFICATION
MD cross cover notified 1914 about TF to restart and meds? NPO now and IV fluid as TF on hold.Will look at chart and will decide.

## 2018-11-17 NOTE — PLAN OF CARE
Problem: Patient Care Overview  Goal: Plan of Care/Patient Progress Review  Outcome: No Change  Pt is nonverbal responds to pain. Pupils fixed. Turning every 2 hours. Incontinent of stool every 2 hours. Voiding in condom cath. Lungs are coarse. Pt having a difficult time clearing secretions, and has a very weak cough. Suctioned pt numerous times with some white/yellow thick output but frequently unable to suction deep enough to clear the problem. tegaderm on bilateral medial thighs. Barrier paste applied to buttocks. TF infusing at 25 mL/hr.

## 2018-11-17 NOTE — PROGRESS NOTES
INTERNAL MEDICINE PROGRESS NOTE    Priscilla Way ([id number]) admitted on 11/2/2018 11/17/2018    Assessment & Plan:  79 y/o male with PMHx significant for vascular dementia, CVA multiple sites, DVT on warfarin, HCV cirrhosis s/p liver transplant 2000, HCV ESRD s/p kidney transplant 2000, CKD III of transplanted kidney, HFpEF, recurrent C diff infection, malnutrition on tube feeds and recent seizure disorder/status epilepticus was admitted to the hospital because of worsening altered mental status and shortness of breath.       #Acute toxic encephalopathy  #Uremia  #Vascular dementia  #Uncontrolled seizure activity  #Recurrent seizure disorder with status epilepticus  EEG monitor during last admission and readings consistent with non-convulsive status epilepticus, but also had clonic tonic seizure back then. He is non-verbal. Head imaging in the past has shown chronic advanced small vessel disease that is diffuse and atherosclerotic plaques with stenosis along vertebral arteries and MCA. His mentation mainly affected by vascular dementia. GCS continues to be 5-6. Per previous notes, he can be aroused by verbal stimuli. Current encephalopathy multifactorial by ongoing evolution of cerebrovascular disease vs metabolic (uremia) vs sepsis vs seizures. EEG monitor on this admission has found pt to be in status epilepticus despite supratherapeutic keppra levels. At times has received 2mg of ativan which has improved this pattern, but is now requiring second AED. Lacosamide has been added. Further work up for TSH and NH3 normal. CT scan on 11/02/18 did not reveal any gross intracranial pathology. Unable to protect airway but he is DNR/DNI.   - Neuro checks  - Neurology consulted  - Continue Keppra to 500mg PO BID  - Keppra level in AM  - Continue with lacosamide 100mg PO  - Lacosamide levels pending  - Ativan 2mg if in status     #Acute hypoxic respiratory failure  #Volume overload  #HFpEF (LVEF 55-60%)  On admission,  he had coarse lung sounds, elevated JVP ~ 15cm and pitting edema on both legs. CXR had signs of pulmonary edema. Repeat CXR's demonstrating improvement after diuresis. Weight on admission was 80kg, today 74.2kg. Appears euvolemic. He is hypoalbuminemic therefore more risk of third spacing. Current diuretic regimen adequate.    - Strict I/Os, difficult for accurate measurements due to condom patino  - Bumex 2mg PO BID  - Metolazone 2.5mg PO daily  - Monitor UOP  - BMP daily  - Daily weights  - Continue home coreg  - Not on lisinopril due to renal dysfunction  - Wean O2 as able     #Cough  #Aspiration pneumonia vs pneumonitis   #Leukocytosis  CXR during admission showed evidence of increased pulmonary congestions suggestive of pulmonary edema and also possible aspiration. He has had other hospitalizations because of aspiration in the setting of TF's and altered mentation. Viral URI panel and influenza were negative. MRSA nares negative. Urine strep and pneumo antigen negative.  Procal not elevated.  However, patient has had low grade temps, elevated WBC and procal. These are downtrending. He has been treated with abx to cover for aspiration pneumonia. He continues to have secretions and coughing as NJ does not eliminate the risk of aspiration and KUB on 11/16/18 showed position in duodenum. Tube feeds have been restarted and patient tolerating well. CXR on 11/17/18 with stable infiltrates which are chronic for him.  Patient not needing supplemental O2 and is stable.    - DuoNeb  - Trend procal     Zosyn (11/02/18 - 11/03/18, 11/06/18 -  11/09/18)  Cefepime (11/09/18 - 11/16/18)  Flagyl (11/09/18 - 11/12/18)    #UTI  #?Gross hematuria  Patient had  gross hematuria on 11/06/18. CK measured due to seizure history, but it was below normal range. His last BK virus check on file from 2008 was negative and repeat on this admission was undetectable. Urine color spontaneously resolved to clear yellow.UA did show large presence  of RBC and WBC. Urine cultures growing Enterobacter cloacae that is resistant to zosyn. Has grown this organism in the past. Suspect it represents colonization, as this organism has grown before. Initially sensitive to multiple antibiotics, but as patient has been admitted several times and treated with abx; organism has become more resistant. Currently, treating to address possible infection as contributor to his poor renal recovery. He completed 7 day course of cefepime on 11/16/18.    #HCV ESRD s/p transplant 2000  #LILIAM on CKD III of transplanted kidney  #?Renal vein thrombosis  #Hyperphosphatemia  Patient at one point was to be on dialysis of his transplanted kidney but renal function recovered. Baseline Cr ~ 1.2-1.5, but remains elevated and with not as much urinary output expected for high doses of diuretics. He does look volume up on exam, although less than during admission. Causes could be intrinsic injury from infectious causes, medications, cardiorenal type 1, significant renal compromise at last admission. US of the transplanted kidney showing increase in resistive indices suggestive for renal vein thrombus vs LILIAM vs cardiorenal. Tacro level 3 mildly below goal (4-6). But ok per nephrology for now.   - Continue tacro and prednisone  - Diuretics as above  - Trending PO4  - Holding aspirin for now  - Continue cefepime as above    #Hypernatremia  Patient has been on diuretics for some time and previously decreased free water flushes. Has improved with free water flushes.  - Diuretics as above  - Continue H2O flushes to 100mL q 4 hrs    #Diarrhea  #Cdiff  Patient had had c diff in previous admissions, 03/2018, 05/2018, 06/2018, 09/2018 and now as well. Report from care taker was that patient with increasing stool output. He has been treated with vancomycin long term. Completed 10 day course of vanc .  - On rifaximin 400 mg PO TID for 20 days          #+MRSE blood culture  One out of two bottles from  admission was positive for MRSE. Received one dose of vanc. No fever or signs of clinical decompensation. Repeat cultures negative for now.  - Blood cultures x2, negative to date  - Low threshold to start IV abx     #HCV ESLD s/p transplant 2000  LFTs normal. No evidence of rejection or decompensation.  - Continue tacro and prednisone as above     #Chronic iron deficiency anemia  Hemoglobin stable. There was some concern about slow GI bleed in previous admissions. Patient also with CKD driving down Hgb production. No evidence of melena for now. He has had episodes of hemoglobin dropping about 1.5g with unclear source and then it rebounds up.   - Continue home PPI  - Continue home iron, which might confound appearance of stool  - Monitor for bleeding  - Repeat hemoglobin tonight  - Peripheral smear, LDH and Haptoglobin pending  - CMP pending     #Hx of DVT on warfarin  DVT bilaterally on US in 2016. INR subtherapeutic. Heparin bridge completed. He had US of the right leg as it was more swollen today, negative for DVT.   - INR daily  - Pharmacy to dose warfarin      #Severe malnutrition on TF's  - TF consult placed  - Refeeding syndrome and nutrition labs per dietician     #DM2  - Continue home insulin regimen    Patient evaluated and discussed with Dr. Alcala.      Code Status: DNR/DNI  FEN: TF's  PPx: DVT on warfarin, GI PPI PO  Dispo: pending improvement in urinary output and safe discharge    Discussed goal of care with daughters Caryl and Cruz. They both expressed understanding of very poor prognosis and would not want to escalate cares beyond current management. They would not want dialysis either. However, they would not like to pursue comfort cares and their expectation is medical management of his numerous co-morbidities.     Of note daughter Cruz upset on 11/06/18 evening, due to patient being on lacosamide. Was very adamant of discontinuing medication. She threatened to zoë the hospital if medication was not  discontinued. Night team reassured her of neurology's recommendation for the medication based on initial EEG's, but daughter did not agree with this and became verbally abusive.      Ramos Mcnair MD PhD  Internal Medicine, PGY-3  Pager: [phone]    ==================================================================    Interval HistorySubjective:  No overnight events, patient not verbal. Continues to have some cough. Tube feeds back on.     Objective:  Most recent vital signs:  /60 (BP Location: Left arm)  Pulse 68  Temp 97.4  F (36.3  C) (Axillary)  Resp 16  Wt 74.2 kg (163 lb 9.3 oz)  SpO2 100%  BMI 23.47 kg/m2  Temp:  [95.3  F (35.2  C)-98.2  F (36.8  C)] 97.4  F (36.3  C)  Pulse:  [68-70] 68  Heart Rate:  [70-77] 75  Resp:  [16-20] 16  BP: (133-166)/(48-77) 133/60  SpO2:  [97 %-100 %] 100 %  Wt Readings from Last 2 Encounters:   11/16/18 74.2 kg (163 lb 9.3 oz)   10/29/18 70.8 kg (156 lb 1.6 oz)     Intake/Output Summary (Last 24 hours) at 11/17/18 1306  Last data filed at 11/17/18 1100   Gross per 24 hour   Intake              600 ml   Output              875 ml   Net             -275 ml     Physical exam:  General: lethargic, non-verbal  HEENT: PERRLA, EOMI, anicteric sclera, evidence of cataracts  Neck:  JVP difficult to assess as patient provides resistance to head turning  CV: RRR, no murmur, no rubs, no gallops  Resp: Coarse bilateral crackles and ronchi  Abdomen: non tender, non distended, no organomegaly, +bs  Extremities: bilateral pitting edema, R>L leg swelling, WWP  Skin: no rash, not jaundice  Psych: unable to assess due to non-responsive  Neuro: GCS 6-7, withdraws to pain, non-verbal, does not follow commands    Labs:  Results for orders placed or performed during the hospital encounter of 11/02/18 (from the past 24 hour(s))   US Lower Extremity Venous Duplex Right    Narrative    EXAMINATION: DOPPLER VENOUS ULTRASOUND OF THE RIGHT LOWER EXTREMITY,  11/16/2018 1:26 PM     COMPARISON:  3/21/2018.    HISTORY: Evaluate for DVT.    TECHNIQUE:  Gray-scale evaluation with compression, spectral flow, and  color Doppler assessment of the deep venous system of the right leg  from groin to knee, and then at the ankle.    FINDINGS:  In the right lower extremity, the common femoral, femoral, popliteal  and posterior tibial veins demonstrate normal compressibility and  blood flow.    The left common femoral vein was evaluated for comparison and is fully  compressible with anechoic lumen and no intraluminal thrombus.    Subcutaneous soft tissue edema along the right lower extremity.        Impression    IMPRESSION:  1.  No evidence of right lower extremity deep venous thrombosis.    PASCUAL LEE MD   XR Abdomen Port 1 View    Narrative    EXAM: XR ABDOMEN PORT 1 VW  11/16/2018 5:52 PM      HISTORY: Please evaluate possition of NJ;     COMPARISON: 10/24/2018 feeding tube placement radiograph    FINDINGS: IVC filter projects on the right. Portions of the right  hemiabdomen were collimated off the field-of-view. Degenerative  changes in the spine. Feeding tube tip projects over the distal  duodenum. Nonobstructive bowel gas pattern. No portal venous gas or  pneumatosis. Scattered clips. Vascular calcifications.      Impression    IMPRESSION: The tip of the feeding tube tip projects over the distal  duodenum, rather than in distended stomach. If there is slight test  concern for malpositioning, radiograph after a small contrast  injection may be obtained.    I have personally reviewed the examination and initial interpretation  and I agree with the findings.    HARRIET WEINER MD   Glucose by meter   Result Value Ref Range    Glucose 130 (H) 70 - 99 mg/dL   Glucose by meter   Result Value Ref Range    Glucose 147 (H) 70 - 99 mg/dL   CBC with platelets   Result Value Ref Range    WBC 5.4 4.0 - 11.0 10e9/L    RBC Count 2.70 (L) 4.4 - 5.9 10e12/L    Hemoglobin 8.1 (L) 13.3 - 17.7 g/dL    Hematocrit 26.2 (L)  40.0 - 53.0 %    MCV 97 78 - 100 fl    MCH 30.0 26.5 - 33.0 pg    MCHC 30.9 (L) 31.5 - 36.5 g/dL    RDW 16.7 (H) 10.0 - 15.0 %    Platelet Count 258 150 - 450 10e9/L   INR   Result Value Ref Range    INR 2.22 (H) 0.86 - 1.14   Procalcitonin   Result Value Ref Range    Procalcitonin 0.31 ng/ml   Phosphorus   Result Value Ref Range    Phosphorus 3.3 2.5 - 4.5 mg/dL   Basic metabolic panel   Result Value Ref Range    Sodium 142 133 - 144 mmol/L    Potassium 4.2 3.4 - 5.3 mmol/L    Chloride 104 94 - 109 mmol/L    Carbon Dioxide 25 20 - 32 mmol/L    Anion Gap 12 3 - 14 mmol/L    Glucose 152 (H) 70 - 99 mg/dL    Urea Nitrogen 130 (H) 7 - 30 mg/dL    Creatinine 2.08 (H) 0.66 - 1.25 mg/dL    GFR Estimate 31 (L) >60 mL/min/1.7m2    GFR Estimate If Black 37 (L) >60 mL/min/1.7m2    Calcium 9.2 8.5 - 10.1 mg/dL   Magnesium (AM Draw)   Result Value Ref Range    Magnesium 2.3 1.6 - 2.3 mg/dL   XR Chest Port 1 View    Narrative    XR CHEST PORT 1 VW 11/17/2018 8:54 AM    Comparison: 11/6/2018    History: please evaluate evolution of pulmonary infiltrates and signs  of aspiration;     Findings: Single AP view of the chest. Feeding tube tip in the left  upper quadrant. IVC filter. Cardiomediastinal silhouette is unchanged.  Stable lung volumes. Stable chronic interstitial prominence. Stable  blunting of the costophrenic angles. The upper abdomen is  unremarkable. Streaky hilar basilar opacities, not significantly  changed.       Impression    Impression: Stable hilar/basilar opacities, not significantly changed  from multiple prior exams, differential includes fibroatelectasis,  difficult to exclude infection/aspiration.    I have personally reviewed the examination and initial interpretation  and I agree with the findings.    NAJMA REAGAN MD   Glucose by meter   Result Value Ref Range    Glucose 136 (H) 70 - 99 mg/dL   Glucose by meter   Result Value Ref Range    Glucose 188 (H) 70 - 99 mg/dL     *Note: Due to a large number of  results and/or encounters for the requested time period, some results have not been displayed. A complete set of results can be found in Results Review.

## 2018-11-17 NOTE — PLAN OF CARE
Problem: Patient Care Overview  Goal: Plan of Care/Patient Progress Review  Outcome: No Change  VSS except temp 95.2 on RA. Bear hugger placed to increase temperature. Pt nonverbal at baseline. Lethargic most of shift. Pt has increased secretions; oral suctioned PRN and scopolamine patch still in place.   Pt had increased secretions and coughing after medications given this afternoon. Abdominal xray taken; waiting for results. TF and medications held until results given. Repositioned Q2 hours with pillow. Incontinent of bowel and bladder; pt continues to have frequent loose stools. Condom catheter on to measure urine output. NJ running TF at 45mL/hr from [id number]. Orders to increase TF to 50mL/hr. Ultrasound completed on right leg for increased swelling; results showed no DVT. Chest xray to be completed in AM.  Family at bedside and attentive to cares. Continue to monitor and follow POC.

## 2018-11-17 NOTE — PLAN OF CARE
Problem: Patient Care Overview  Goal: Plan of Care/Patient Progress Review  Outcome: No Change  /58 (BP Location: Left leg)  Pulse 68  Temp 97.2  F (36.2  C) (Axillary)  Resp 20  Wt 74.2 kg (163 lb 9.3 oz)  SpO2 100%  BMI 23.47 kg/m2RA. TF restarted at 25 ml hr until am and tolerated well.Had 1 loose stool during the night and turned every 2 hrs.Has condom catheter on and voiding small amounts.Lungs have crackles and suction with yanker from mouth with thick white/yellow secretions,Continues to be non verbal and lethargic.Opens eyes to pain and stimulation.Daughter at bedside.Plan today for chest xray today and plan for TF rate.

## 2018-11-17 NOTE — PROGRESS NOTES
INTERNAL MEDICINE PROGRESS NOTE    Priscilla Way ([id number]) admitted on 11/2/2018 11/16/2018    Assessment & Plan:  77 y/o male with PMHx significant for vascular dementia, CVA multiple sites, DVT on warfarin, HCV cirrhosis s/p liver transplant 2000, HCV ESRD s/p kidney transplant 2000, CKD III of transplanted kidney, HFpEF, recurrent C diff infection, malnutrition on tube feeds and recent seizure disorder/status epilepticus was admitted to the hospital because of worsening altered mental status and shortness of breath.       #Acute toxic encephalopathy  #Uremia  #Vascular dementia  #Uncontrolled seizure activity  #Recurrent seizure disorder with status epilepticus  EEG monitor during last admission and readings consistent with non-convulsive status epilepticus, but also had clonic tonic seizure back then. He is non-verbal. Head imaging in the past has shown chronic advanced small vessel disease that is diffuse and atherosclerotic plaques with stenosis along vertebral arteries and MCA. His mentation mainly affected by vascular dementia. GCS continues to be 5-6. Per previous notes, he can be aroused by verbal stimuli. Current encephalopathy multifactorial by ongoing evolution of cerebrovascular disease vs metabolic (uremia) vs sepsis vs seizures. EEG monitor on this admission has found pt to be in status epilepticus despite supratherapeutic keppra levels. At times has received 2mg of ativan which has improved this pattern, but is now requiring second AED. Lacosamide has been added. Further work up for TSH and NH3 normal. CT scan on 11/02/18 did not reveal any gross intracranial pathology. Unable to protect airway but he is DNR/DNI.   - Neuro checks  - Neurology consulted  - Continue Keppra to 500mg PO BID   - Continue with lacosamide 100mg PO  - Lacosamide levels pending  - Ativan 2mg if in status     #Acute hypoxic respiratory failure  #Volume overload  #HFpEF (LVEF 55-60%)  On admission, he had coarse lung  sounds, elevated JVP ~ 15cm and pitting edema on both legs. CXR had signs of pulmonary edema. Repeat CXR's demonstrating improvement after diuresis. Weight on admission was 80kg, today 75.8kg. Appears euvolemic. He is hypoalbuminemic therefore more risk of third spacing.   - Strict I/Os, difficult for accurate measurements due to condom patino  - Bumex 2mg PO BID  - Metolazone 2.5mg PO daily  - Monitor UOP  - BMP daily  - Daily weights  - Continue home coreg  - Not on lisinopril due to renal dysfunction  - Wean O2 as able     #Cough  #Aspiration pneumonia vs pneumonitis   #Leukocytosis  CXR also with show evidence of increased pulmonary congestions suggestive of pulmonary edema and also possible aspiration. He has had other hospitalizations because of aspiration in the setting of TF's and altered mentation. Viral URI panel and influenza were negative. MRSA nares negative. Urine strep and pneumo antigen negative.  Procal not elevated.  Patient not needing supplemental O2, zosyn was discontinued. However, patient with low grade temps and WBC elevated as well as procal. These are downtrending. Abx were narrowed to cover for possible UTI. He continues to have secretions and coughing. NJ does not eliminate the risk of aspiration, patient did have increased cough today.    - Discontinued Zosyn  - Started cefepime and flagyl for HAP and anaerobes from possible aspiration  - DuoNeb  - Blood cultures pending  - Trend procal   - KUB pending for tube feed position  - CXR in AM    Zosyn (11/02/18 - 11/03/18, 11/06/18 -  11/09/18)  Cefepime (11/09/18 - 11/16/18)  Flagyl (11/09/18 - 11/12/18)    #UTI  #?Gross hematuria  Patient had  gross hematuria on 11/06/18. CK measured due to seizure history, but it was below normal range. His last BK virus check on file from 2008 was negative and repeat on this admission was undetectable. Urine color spontaneously resolved to clear yellow.UA did show large presence of RBC and WBC. Urine  cultures growing Enterobacter cloacae that is resistant to zosyn. Has grown this organism in the past. Suspect it represents colonization, as this organism has grown before. Initially sensitive to multiple antibiotics, but as patient has been admitted several times and treated with abx; organism has become more resistant. Currently, treating to address possible infection as contributor to his poor renal recovery.   - Continue cefepime until 11/16/18 for a 7 day course    #HCV ESRD s/p transplant 2000  #LILIAM on CKD III of transplanted kidney  #?Renal vein thrombosis  #Hyperphosphatemia  Patient at one point was to be on dialysis of his transplanted kidney but renal function recovered. Baseline Cr ~ 1.2-1.5, but remains elevated and with not as much urinary output expected for high doses of diuretics. He does look volume up on exam, although less than during admission. Causes could be intrinsic injury from infectious causes, medications, cardiorenal type 1, significant renal compromise at last admission. US of the transplanted kidney showing increase in resistive indices suggestive for renal vein thrombus vs LILIAM vs cardiorenal. Tacro level 3 mildly below goal (4-6). But ok per nephrology for now.   - Continue tacro and prednisone  - Diuretics as above  - Trending PO4  - Holding aspirin for now  - Continue cefepime as above    #Hypernatremia  Patient has been on diuretics for some time and previously decreased free water flushes. Has improved with free water flushes.  - Diuretics as above  - Continue H2O flushes to 100mL q 4 hrs    #Diarrhea  #Cdiff  Patient had had c diff in previous admissions, 03/2018, 05/2018, 06/2018, 09/2018 and now as well. Report from care taker was that patient with increasing stool output. He has been treated with vancomycin long term. Completed 10 day course of vanc .  - On rifaximin 400 mg PO TID for 20 days          #+MRSE blood culture  One out of two bottles from admission was positive  for MASON. Received one dose of vanc. No fever or signs of clinical decompensation. Repeat cultures negative for now.  - Blood cultures x2, negative to date  - Low threshold to start IV abx     #HCV ESLD s/p transplant 2000  LFTs normal. No evidence of rejection or decompensation.  - Continue tacro and prednisone as above     #Chronic iron deficiency anemia  Hemoglobin stable. There was some concern about slow GI bleed in previous admissions. Patient also with CKD driving down Hgb production. No evidence of melena for now. He had hemoglobin drop of 1.5g today, unclear of source, but repeat hemoglobin up-trending.  - Continue home PPI  - Continue home iron, which might confound appearance of stool  - Monitor for bleeding     #Hx of DVT on warfarin  DVT bilaterally on US in 2016. INR subtherapeutic. Heparin bridge completed. He had US of the right leg as it was more swollen today, negative for DVT.   - INR daily  - Pharmacy to dose warfarin      #Severe malnutrition on TF's  - TF consult placed  - Refeeding syndrome and nutrition labs per dietician     #DM2  - Continue home insulin regimen    Patient evaluated and discussed with Dr. Alcala.      Code Status: DNR/DNI  FEN: TF's  PPx: DVT on warfarin, GI PPI PO  Dispo: pending improvement in urinary output and safe discharge    Discussed goal of care with daughters Caryl and Cruz. They both expressed understanding of very poor prognosis and would not want to escalate cares beyond current management. They would not want dialysis either. However, they would not like to pursue comfort cares and their expectation is medical management of his numerous co-morbidities.     Of note daughter Cruz upset on 11/06/18 evening, due to patient being on lacosamide. Was very adamant of discontinuing medication. She threatened to zoë the hospital if medication was not discontinued. Night team reassured her of neurology's recommendation for the medication based on initial EEG's, but daughter  did not agree with this and became verbally abusive.      Ramos Mcnair MD PhD  Internal Medicine, PGY-3  Pager: [phone]    ==================================================================    Interval HistorySubjective:  No overnight events, patient not verbal. More active today, coughing. Per nursing more secreations. Tube feeds turned off due to increase coughing.      Objective:  Most recent vital signs:  /77 (BP Location: Right leg)  Pulse 70  Temp 95.3  F (35.2  C) (Axillary)  Resp 16  Wt 74.2 kg (163 lb 9.3 oz)  SpO2 100%  BMI 23.47 kg/m2  Temp:  [95  F (35  C)-96.2  F (35.7  C)] 95.3  F (35.2  C)  Pulse:  [69-70] 70  Heart Rate:  [71] 71  Resp:  [16-20] 16  BP: (153-175)/(74-81) 153/77  SpO2:  [100 %] 100 %  Wt Readings from Last 2 Encounters:   11/16/18 74.2 kg (163 lb 9.3 oz)   10/29/18 70.8 kg (156 lb 1.6 oz)     Intake/Output Summary (Last 24 hours) at 11/16/18 1830  Last data filed at 11/16/18 1400   Gross per 24 hour   Intake              735 ml   Output              950 ml   Net             -215 ml     Physical exam:  General: lethargic, non-verbal  HEENT: PERRLA, EOMI, anicteric sclera, evidence of cataracts  Neck:  JVP difficult to assess as patient provides resistance to head turning  CV: RRR, no murmur, no rubs, no gallops  Resp: Coarse bilateral crackles and ronchi  Abdomen: non tender, non distended, no organomegaly, +bs  Extremities: bilateral pitting edema, R>L leg swelling, WWP  Skin: no rash, not jaundice  Psych: unable to assess due to non-responsive  Neuro: GCS 6-7, withdraws to pain, non-verbal, does not follow commands    Labs:  Results for orders placed or performed during the hospital encounter of 11/02/18 (from the past 24 hour(s))   INR   Result Value Ref Range    INR 2.63 (H) 0.86 - 1.14   Basic metabolic panel   Result Value Ref Range    Sodium 140 133 - 144 mmol/L    Potassium 4.0 3.4 - 5.3 mmol/L    Chloride 103 94 - 109 mmol/L    Carbon Dioxide 26 20 - 32  mmol/L    Anion Gap 11 3 - 14 mmol/L    Glucose 192 (H) 70 - 99 mg/dL    Urea Nitrogen 134 (H) 7 - 30 mg/dL    Creatinine 2.06 (H) 0.66 - 1.25 mg/dL    GFR Estimate 31 (L) >60 mL/min/1.7m2    GFR Estimate If Black 38 (L) >60 mL/min/1.7m2    Calcium 9.1 8.5 - 10.1 mg/dL   Magnesium (AM Draw)   Result Value Ref Range    Magnesium 2.4 (H) 1.6 - 2.3 mg/dL   CBC with platelets   Result Value Ref Range    WBC 5.2 4.0 - 11.0 10e9/L    RBC Count 3.11 (L) 4.4 - 5.9 10e12/L    Hemoglobin 9.5 (L) 13.3 - 17.7 g/dL    Hematocrit 30.6 (L) 40.0 - 53.0 %    MCV 98 78 - 100 fl    MCH 30.5 26.5 - 33.0 pg    MCHC 31.0 (L) 31.5 - 36.5 g/dL    RDW 16.7 (H) 10.0 - 15.0 %    Platelet Count 255 150 - 450 10e9/L   US Lower Extremity Venous Duplex Right    Narrative    EXAMINATION: DOPPLER VENOUS ULTRASOUND OF THE RIGHT LOWER EXTREMITY,  11/16/2018 1:26 PM     COMPARISON: 3/21/2018.    HISTORY: Evaluate for DVT.    TECHNIQUE:  Gray-scale evaluation with compression, spectral flow, and  color Doppler assessment of the deep venous system of the right leg  from groin to knee, and then at the ankle.    FINDINGS:  In the right lower extremity, the common femoral, femoral, popliteal  and posterior tibial veins demonstrate normal compressibility and  blood flow.    The left common femoral vein was evaluated for comparison and is fully  compressible with anechoic lumen and no intraluminal thrombus.    Subcutaneous soft tissue edema along the right lower extremity.        Impression    IMPRESSION:  1.  No evidence of right lower extremity deep venous thrombosis.    PASCUAL LEE MD     *Note: Due to a large number of results and/or encounters for the requested time period, some results have not been displayed. A complete set of results can be found in Results Review.

## 2018-11-18 NOTE — PROGRESS NOTES
[facility] [place]    Internal Medicine Progress Note - Man[facility] 5 Service    Main Plans for Today   - continue current diuretic and antiepileptic regimen     Assessment & Plan   Priscilla Way is a 78 year old male w hx of severe vascular dementia, multiple CVAs, DVT on coumadin, HCV cirrhosis s/p liver transplant 2000, HCV ESRD s/p kidney transplan 2000 w CKD3 of transplanted kidney, recurrent c diff infection, malnutrition on TF and seizure disorder and status epilepticus admitted on 11/2/2018 because of increased secretion and mental status change.     # toxic encephalopathy  # advanced vascular dementia  # recurrent seizure disorder with status epilepticus  He is unresponsive to stimuli, but has reflux. At this time, this is likely his new baseline in the setting of higher dose antiepileptics and recent status epilepticus.  - continue keppra and lacosamide bid. Most recent lacosamide level 11/15 10.1 ok  - no further seizure noted    # acute on chronic hypoxic respiratory failure  # s/p renal transplant with CKD of transplanted kidney susceptible to volume overload  # HFpEF  His volume status has improved and stable on current diuretic regimen  - continue bumex 2mg bid and metolazone 5mg daily  - he is also at risk for aspiration event given decreased mental status    # hx of liver and renal transplant  - continue tacro and prednisone    # Hx of c diff  On 20 day course of rifaximin (day 1 #11/13), completed 10 day of oral vanc    # hx of DVT on coumadin  On coumadin  - continue therapeutic coumadin    # severe malnutrition on TFs  Goal 50 ml/hr    Diet: Adult Formula Drip Feeding: Specified Time Suplena; Nasojejunal; Goal Rate: 50, CONTINUOUS; mL/hr; From: 6:00 AM; 6:00 AM; Medication - Tube Feeding Flush Frequency: At least 15-30 mL water before and after medication administration and with tube ...  Fluids: na  Lines: PIV  Virk Catheter: not present    DVT Prophylaxis:  Warfarin  Code Status: DNR/DNI  Expected discharge: Tomorrow, recommended to prior living arrangement once remained stable and family able to take him.    The patient's care was discussed with the Bedside Nurse and Patient's Family.    Dania Alcala  [facility] 5  Pager: [phone]  Please see sticky note for cross cover information    Interval History   Unchanged responsiveness, edema improving, unable to elicit ROS    Physical Exam   Vital Signs: Temp: 95.7  F (35.4  C) (Lissy Hugger placed for warming) Temp src: Axillary BP: 124/58 Pulse: 70 Heart Rate: 67 Resp: 16 SpO2: 100 % O2 Device: None (Room air)    Weight: 160 lbs 14.97 oz  General Appearance: Not responding to stimuli  Respiratory: rare rhonchi  Cardiovascular: regular rate  GI: flat  Skin: bilateral leg edema is improving  Other: NJ in place        Data     Recent Labs  Lab 11/18/18  0601 11/17/18  1559 11/17/18  0653 11/16/18  0611   WBC 5.7 5.8 5.4 5.2   HGB 9.2* 8.7* 8.1* 9.5*   MCV 98 98 97 98    286 258 255   INR 1.71*  --  2.22* 2.63*    140 142 140   POTASSIUM 4.1 4.6 4.2 4.0   CHLORIDE 104 103 104 103   CO2 30 27 25 26   * 131* 130* 134*   CR 2.19* 2.16* 2.08* 2.06*   ANIONGAP 7 11 12 11   JOSHUA 9.3 9.1 9.2 9.1   * 206* 152* 192*   ALBUMIN  --  2.5*  --   --    PROTTOTAL  --  7.2  --   --    BILITOTAL  --  0.3  --   --    ALKPHOS  --  80  --   --    ALT  --  18  --   --    AST  --  19  --   --

## 2018-11-18 NOTE — PLAN OF CARE
Problem: Patient Care Overview  Goal: Plan of Care/Patient Progress Review  Outcome: No Change  Time: 7781-9703    Reason for admission: Volume overload and decreased LOC  Vitals: VSS  Activity: Turns Q2  Pain: Pt non verbal  Neuro: pt is semicomatose  Cardiac:WNL  Respiratory: 99% on RA. Snoring overnight.  GI/: Incontinent of stool. Cdiff. Condom Cath present.   Diet: Tube feeds running at 45ml/hr. Tolerating this rate well.  Lines: R PIV is SL.  Skin/Wounds: Skin is dry. Barrier cream applied to coccyx and buttock area during incont. Changes. Tagederm is cdi and placed on bilateral inner knee area.   Labs: , 165    Suctioned inside mouth x1. White-yellow thick secretions. Pt had a visitor stay overnight in his room.Continue to monitor and follow POC.

## 2018-11-18 NOTE — PLAN OF CARE
Problem: Patient Care Overview  Goal: Plan of Care/Patient Progress Review  Outcome: No Change  Time  4223-1392    Pt nonverbal except occasional groaning. Responds to pain. Pupils fixed. VSS on RA except mild HTN. Lungs are course throughout bilaterally. Oral suction performed x3, causing pt to sneeze multiple times. Producing large amount of white-yellow thick secretions. Deeper suction would be beneficial. Turns q2, incontinent of loose/watery stool x3.  Barrier cream applied to bottom, miconazole in skin folds. Bilat mepilex CDI on thighs. Ankles/feet +2 edema, +1 rest of legs. PO diuretics. 400mL of UO this shift plus one small episode of urine incontinence.  TFs advanced to 45 mL/hr, goal of 50. Will continue to monitor and follow POC.

## 2018-11-18 NOTE — PLAN OF CARE
Problem: Patient Care Overview  Goal: Plan of Care/Patient Progress Review  Outcome: Declining  /67 (BP Location: Right arm)  Pulse 70  Temp 96.8  F (36  C) (Oral)  Resp 16  Wt 73 kg (160 lb 15 oz)  SpO2 100%  BMI 23.09 kg/m2  Pt. Turned q 2 hrs, assistance x2. Pt. Non-verbal, pupils are fixed, pt. Is non-responsive. Fine crackles heard throughout lung fields. Suction with yanker provided with thick/white yellow secretions. Rt. PIV was pulled this shift due to occlusion. Bowel sounds positive, Incontinent stool, and condom cath present. Total intake 345 mL,  mL. TF running at 45 mL/ hr until 1300 then changed to 50 mL/hr. Bilateral edema, +2 on left /+3 on right lower limb. BG was 152, 202. Insulin give, see MAR. Khanh doctor discussed with the grandson possible discharge to home tomorrow with palliative care.     Student Nurse: Marielle Eugene

## 2018-11-19 NOTE — PROGRESS NOTES
INTERNAL MEDICINE PROGRESS NOTE    Priscilla Way ([id number]) admitted on 11/2/2018 11/19/2018    Changes Today     - Plan for discharge to home tomorrow   - No significant change today     Assessment & Plan     Priscilla Way is a 78-year-old male with medical history significant for severe vascular dementia, multiple CVAs, DVT on coumadin, HCV cirrhosis s/p liver transplant 2000, ESRD s/p kidney transplan 2000 with CKD stage III of transplanted kidney, recurrent C diff infection, malnutrition on TF and seizure disorder and status epilepticus who was admitted on 11/2/2018 because of increased secretion and mental status change.      # Toxic encephalopathy  # Advanced vascular dementia  # Recurrent seizure disorder with status epilepticus  He is unresponsive to stimuli, but has rflux. At this time, this is likely his new baseline in the setting of higher dose antiepileptics and recent status epilepticus.   - Continue Keppra 500 mg bid and lacosamide 100 mg bid. Most recent lacosamide level 11/15 was 10.1  - No further seizure noted      # Acute on chronic hypoxic respiratory failure  # S/p renal transplant with CKD of transplanted kidney susceptible to volume overload  # HFpEF  His volume status has improved and stable on current diuretic regimen. He is also at risk for aspiration event given decreased mental status  - Continue bumex 2 mg bid and metolazone 5 mg daily     #  HCV cirrhosis s/p liver transplant   - Continue tacrolimus 1.5 mg bid and prednisone 5 mg daily      # History of Clostridium difficile  Completed 10 day of oral vancomycin  - On 20 day course of rifaximin (day 1 #11/13)    # History of DVT on warfarin  - Continue therapeutic warfarin     # Severe malnutrition on TFs  Goal 50 ml/hr     FEN: Adult Formula Drip Feeding  DVT Prophylaxis: Warfarin  GI Prophylaxis: Pantoprazole 40 mg bid   Disposition: Home tomorrow   Code Status: DNR/DNI    Seen and discussed with Dr. Alcala who agrees with above  "assessment and plan.    Supavit \"Mac\" MD Ric[place]le   PGY-2, Internal Medicine  [phone]    Subjective     Nursing note was reviewed, no acute event overnight. No change in status.     Objective     Most recent vital signs:  /68 (BP Location: Right arm)  Pulse 70  Temp 95.2  F (35.1  C) (Axillary)  Resp 14  Wt 73 kg (160 lb 15 oz)  SpO2 100%  BMI 23.09 kg/m2  Temp:  [95.2  F (35.1  C)-97.5  F (36.4  C)] 95.2  F (35.1  C)  Heart Rate:  [65-74] 65  Resp:  [14-20] 14  BP: (127-154)/(54-71) 150/68  SpO2:  [99 %-100 %] 100 %  Wt Readings from Last 2 Encounters:   11/18/18 73 kg (160 lb 15 oz)   10/29/18 70.8 kg (156 lb 1.6 oz)     Intake/Output Summary (Last 24 hours) at 11/19/18 1724  Last data filed at 11/19/18 1500   Gross per 24 hour   Intake              660 ml   Output              525 ml   Net              135 ml     Physical exam:  General: Non responsive to stimuli   Cardiac: RRR. No m/r/g. Normal S1, S2.   Pulm: Rhonchi   Abd: Soft  LE: Lower extremities edema   Skin: No new rash/petechiae  MSK: No new deformities    Labs and imaging were reviewed in Epic.    "

## 2018-11-19 NOTE — PROGRESS NOTES
Care Coordinator Progress Note    Admission Date/Time:  11/2/2018  Attending MD:  Jonathon Martinez,*    Data  Chart reviewed, discussed with interdisciplinary team.   Patient was admitted for:    Decreased level of consciousness  Hyponatremia  Pneumonia of right middle lobe due to infectious organism (H).    Concerns with insurance coverage for discharge needs: None.  Current Living Situation: Patient lives with family.  Support System: Supportive and Involved  Services Involved: Home Care and PCA  Transportation at Discharge: [facility] stretcher  Transportation to Medical Appointments: - [facility] Stretcher  Barriers to Discharge: none    Coordination of Care    Addendum 11/20 @ 1447: Patient daughter agreeable to discharge today. [employer] stretcher transport arranged for 4pm. RNCC will continue to work with [facility] for arrangement of DME equipment.  11/20 @ 1040: DME hardcopy scripts signed by Dr. Chaves and faxed to Jocelin BURGESS at [facility].    Addendum 11/19 @ 1520: Patient daughter requesting DME for home, specifically home suction machine and mouth care kits, three condom catheters per day, and a new mattress for the hospital bed patient has already. Notified Attending Dr Alcala, requested DME order. Explained to daughter May and Dr Alcala that I am quite sure these items will not be available prior to patients discharge tomorrow, Tuesday, due to the late hour of the request and the amount of time it takes DME companies to process new orders and obtain insurance authorization. Daughter May stated she understood.  11/19: DC cancelled, respiratory rate 10 per min.    Addendum 11/16 @1410: Discharge cancelled, stretcher cancelled, notified [facility] and [facility].  11/16: Patient is medically ready to discharge today. [employer] stretcher arranged for 615pm this evening.  Verified that orders are complete. [facility] and [facility] notified of dc.    11/9: open to [facility] - RN (resmption orders completed), has  11.5hrs PCA as well. Patient is not expected to discharge over the weekend, will continue to follow.     11/8: Benjamin agreed to attending a Care Conference 3pm Friday 11/9, Medical staff stated SW and RNCC are not needed at this time.    11/3: Patient is well known to RNCC from last admission (9/11 to 10/29/18) having just discharged last week. Patient is well supported at home, has [facility] nursing visits and 11.5hrs PCA daily. Primary caregiver is daughter May. Also greatly involved in decision making is daughter Benjamin. Neither is primary health care agent; decisions need to be made equally by both daughters which has, in the prior admission, results in a conflict of treatment for the medical and nursing staff. Patient was denied acceptance globally from [facility] during last inpatient admission therefor unnecessary to attempt again due to futility.    Referrals: open to [facility] (resumption orders and Face to Face completed)    [facility]  Phone  [phone]  Fax  [phone]    **RESUMPTION OF HOME CARE SERVICES**     RN skilled nursing visit.   RN to assess vital signs and weight, respiratory and cardiac status, pain level and activity tolerance, hydration, nutrition and bowel status   RN to complete home safety evaluation.  RN to complete weekly medication management and set-up, please involve family/primary caregiver in med education.  RN to provide lab draws as needed and communicate results to PCP            Plan  Anticipated Discharge Date:  11/20/2018  Anticipated Discharge Plan:  Home w/family    CTS Handoff Complete: YES    Radha Ahuja RN, BSN, PHN  Medicine Care Coordinator  [facility]  Desk Phone: [phone]  Pager: [phone]    To contact Weekend RNCC, dial [phone] and enter job code [phone] at prompt.   This pager can not be contacted by text page or outside line.

## 2018-11-19 NOTE — PLAN OF CARE
Problem: Patient Care Overview  Goal: Plan of Care/Patient Progress Review  Outcome: No Change  Time: 8873-1489    Reason for admission: volume overload, decreased LOC  Vitals: VSS  Activity: Turn Q2, immobile   Pain: ADRIA  Neuro: ADRIA; semicomatose  Cardiac: WNL ex HTN  Respiratory: 100% on RA  GI/: Condom cath present. Incont. Of stool. Cdiff.  Diet: Tube feeds at 50ml/hr  Lines: NJ tube, PIV is SL  Skin/Wounds: Dry skin. Inner knee mepilex placed for skin friction   Labs:     Awaiting family to come to for a possible discharge home.      Continue to monitor and follow POC.

## 2018-11-19 NOTE — PLAN OF CARE
Problem: Patient Care Overview  Goal: Plan of Care/Patient Progress Review  Outcome: No Change  Time  9865-4684    Pt non-verbal, pupils fixed. Withdraws to pain in LEs. VSS on RA. Crackles in lungs bilaterally. Required oral suction x2. Cough/sneeze producing moderate amount of thick yellow-white secretions. TFs running continuous at goal of 50/hr. Turns q2, incontinence cares completed for loose brown stools x2. Condom cath in place, 275 mL out this shift. Barrier cream on bottom, miconazole powder in groin. Barrier cream applied to L groin skin tear. Will continue to monitor and follow POC.

## 2018-11-19 NOTE — PLAN OF CARE
Problem: Patient Care Overview  Goal: Plan of Care/Patient Progress Review  Outcome: No Change  Pt is Ghanaian speaking only, family at bedside and supportive. Pt is remains very lethargic, pupils dilated and fixed. Possible discharge today if family ready for discharge. Multiple loose brown stools, pt is incontinent.  Oral suctioning required twice during shift, crackles noted in lung bases on auscultation. Prevalon boots in place to prevent heel ulcer. TF infusing at goal. Mepilex in place in inner thighs are C/D/I. Pt appears comfortable and sats are > 90% on RA. Coccyx blanchable, no skin breakdown noted. Will continue to monitor and follow POC.

## 2018-11-20 NOTE — PLAN OF CARE
Problem: Patient Care Overview  Goal: Plan of Care/Patient Progress Review  Outcome: No Change  VSS on RA. Pt turned q2. Pt had 3 BM's on shift- stool is loose and brown.  Tube feeds running at goal rate of 50 mL/hr. All meds given through NG tube. Condom catheter replaced on shift. Pt will discharge today at 1600. Will continue to monitor and follow POC.

## 2018-11-20 NOTE — PLAN OF CARE
Problem: Patient Care Overview  Goal: Plan of Care/Patient Progress Review  Outcome: No Change  Time: 1900-0730      Reason for admission: Sepsis  Vitals: VSS on RA  Activity: turn/reposition q2h  Pain: No non-verbal signs of pain  Neuro:  Pupils fixed and dilated, non-verbal.   Cardiac: WNL  Respiratory: LS coarse. Deep suctioned orally several times to help clear secretions  GI/: Voiding in condom cath. Several small loose BMs overnight  Diet:  TF via NJ at 50ml/hr, goal rate  Lines: NJ infusing TF.   Skin/Wounds: Barrier paste applied to buttocks. Miconazole powder applied to groin.   Labs: BG stable.       Plan: Plan for possible discharge home with family today.       Continue to monitor and follow POC

## 2018-11-20 NOTE — PLAN OF CARE
Problem: Patient Care Overview  Goal: Plan of Care/Patient Progress Review  Outcome: No Change  Temp: 95.1  I  BP: 168/77  I  RR: 14  I  O2: 100 on RA  I  HR: 71    Activity: turn Q2, immobile  Pain: ADRIA  Neuro: ADRIA, Semicomatose, pupils fixed and dilated, non verbal  Cardiac: WNL ex HTN  Resp: 100% on RA. Suctioned patient orally x4, yellow-white secretions.   GI/: condom cath (replaced x2), incontinence of stool, c.diff, 2 loose BMs on this shift. Pericare performed.  Diet: Tube feeds @ 50 mL/hr, free water flush per order 60 mls every 4hrs.  Lines: Tube feeding  Skin/wounds: Dry skin, inner knee mepilex placed for skin friction  Labs:         Written by student nurse Latasha Thayer.

## 2018-11-21 NOTE — PLAN OF CARE
Problem: Patient Care Overview  Goal: Plan of Care/Patient Progress Review  Outcome: No Change  Pt is unresponsive, withdraws to pain. Pupils fixed. No nonverbal signs of pain overnight . Maintained on NPO. Oral suctioning done. Ongoing tube feeding at 50 ml/hr. Condom cath in place. BG monitored overnight. Turned and repositioned with check and change of pad. Had 2 BMs overnight. For discharge today on hospice. Will continue to monitor and follow plan of care

## 2018-11-21 NOTE — PROGRESS NOTES
Social Work Services Progress Note    Hospital Day: 20  Collaborated with:  Pt's daughter Caryl, primary team Mani Santiago, hospice liaison Clemencia    Data:  Pt is a 78-year-old male admitted to [facility] 11/02/18.     Intervention:  Pt was scheduled to discharge home with resumption of home care services yesterday afternoon. Daughter Caryl present at time that transport arrived and would not consent to discharge because she stated team needed to speak with daughter Cruz prior to d/c. Primary team spoke with Cruz and Caryl together yesterday evening and it was decided that pt would d/c home with home hospice services.     Hospice liaison spoke with pt's daughters today. Per hospice liaison, daughter Cruz now does not want hospice services and states that SW needs to get pt a waiver with 24-hour home care services prior to d/c.     Met with pt's daughter Caryl. Caryl states that family wants an assessment set-up for waiver services for pt to try to get him 24-hr PCA services. Explained that this assessment would not take place prior to discharge and that family can also call [facility] and request assessment. Caryl states that only hospital social workers can request assessment and that the hospital will need to send paperwork for assessment. Caryl also states that family does want hospice services if it will not affect pt's other home services.    Called [facility] services (ph [phone]). Representative states that she can not speak with SW without family's permission. She states that SW and family will have to call together, or family can call independently, as family needs to provide consent. Asked representative if any paperwork needs to be sent from the hospital and representative states no. Representative states that if family calls and completes intake on the phone an assessment will be scheduled in 3-4 weeks, and then to get services in the home will take several more weeks.     Attempted to  meet with Caryl at pt's bedside to provide [facility] number; Caryl not currently at bedside.    Assessment:  Family requesting waiver assessment    Plan:    Anticipated Disposition:  Home with services    Barriers to d/c plan:  Family agreement with d/c plan    Follow Up:  SW to follow for discharge planning    HAMLET Parra, [facility]  [address]   Pager [phone]  Phone [phone]

## 2018-11-21 NOTE — PROGRESS NOTES
INTERNAL MEDICINE PROGRESS NOTE    Priscilla Way ([id number]) admitted on 11/2/2018 11/21/2018    Changes Today     - Patient is medically ready to be discharged. Please see care coordinator note about ongoing discussion with patient's family about disposition plan.                  Assessment & Plan     Priscilla Way is a 78-year-old male with medical history significant for severe vascular dementia, multiple CVAs, DVT on coumadin, HCV cirrhosis s/p liver transplant 2000, ESRD s/p kidney transplan 2000 with CKD stage III of transplanted kidney, recurrent C diff infection, malnutrition on TF and seizure disorder and status epilepticus who was admitted on 11/2/2018 because of increased secretion and mental status change.      # Toxic encephalopathy  # Advanced vascular dementia  # Recurrent seizure disorder with status epilepticus  He is unresponsive to stimuli, but has rflux. At this time, this is likely his new baseline in the setting of higher dose antiepileptics and recent status epilepticus.   - Continue Keppra 500 mg bid and lacosamide 100 mg bid. Most recent lacosamide level 11/15 was 10.1  - No further seizure noted      # Acute on chronic hypoxic respiratory failure  # S/p renal transplant with CKD of transplanted kidney susceptible to volume overload  # HFpEF  His volume status has improved and stable on current diuretic regimen. He is also at risk for aspiration event given decreased mental status  - Continue bumex 2 mg PO bid and metolazone 5 mg PO daily     #  HCV cirrhosis s/p liver transplant   - Continue tacrolimus 1.5 mg bid and prednisone 5 mg daily      # History of Clostridium difficile  Completed 10 day of oral vancomycin  - On 20 day course of rifaximin (day 1 #11/13)    # History of DVT on warfarin  - Continue therapeutic warfarin     # Severe malnutrition on TFs  Goal 50 ml/hr     FEN: Adult Formula Drip Feeding  DVT Prophylaxis: Warfarin  GI Prophylaxis: Pantoprazole 40 mg PO bid  "  Disposition: Pending   Code Status: DNR/DNI    Seen and discussed with Dr. Chaves who agrees with above assessment and plan.    Supavit \"Mac\" MD Richelle   PGY-2, Internal Medicine  [phone]    Subjective     Nursing note was reviewed, no acute event overnight. No change in status.     Objective     Most recent vital signs:  /69 (BP Location: Right arm)  Pulse 66  Temp 96.9  F (36.1  C) (Axillary)  Resp 20  Wt 73 kg (160 lb 15 oz)  SpO2 100%  BMI 23.09 kg/m2  Temp:  [95.7  F (35.4  C)-96.9  F (36.1  C)] 96.9  F (36.1  C)  Pulse:  [66-69] 66  Heart Rate:  [66-72] 72  Resp:  [20] 20  BP: (113-170)/(52-77) 168/69  SpO2:  [99 %-100 %] 100 %  Wt Readings from Last 2 Encounters:   11/18/18 73 kg (160 lb 15 oz)   10/29/18 70.8 kg (156 lb 1.6 oz)     Intake/Output Summary (Last 24 hours) at 11/19/18 1724  Last data filed at 11/19/18 1500   Gross per 24 hour   Intake              660 ml   Output              525 ml   Net              135 ml     Physical exam:  General: Not in acute distress   Cardiac: RRR. No m/r/g. Normal S1, S2.   Pulm: Rhonchi   Abd: Soft  LE: Lower extremities edema   Skin: No new rash/petechiae  Neuro: fix pupils, withdraw from pain      Labs and imaging were reviewed in Epic.    "

## 2018-11-21 NOTE — PROGRESS NOTES
This is a recent snapshot of the patient's [facility] medical record.  For current drug dose and complete information and questions, call [phone] or In Basket pool, [facility] home infusion ([id number])  CSN Number:  [id number]

## 2018-11-21 NOTE — PROGRESS NOTES
"INTERNAL MEDICINE PROGRESS NOTE    Priscilla Way ([id number]) admitted on 11/2/2018 11/20/2018    Changes Today     - Initial plan was to discharge the patient today. However, patient's daughter, May stated that she would like the care team to have the plan in place. When the patient status has changed, she would like to have the service that provide the doctor to come to patient's house to evaluate patient's problems. Explained at length, the only service that will provide this type of care is home hospice. Patient 's daughter stated that the family is not ready for hospice at this time.  - Before the patient was discharged. Patient's daughter, Cruz came to the hospital and insisted that the patient should not be discharged until the care team figure out the \"long term plan\" for the patient. Discussed at length with Dr. Chaves, family agrees to be discharged tomorrow with home hospice.               Assessment & Plan     Priscilla Way is a 78-year-old male with medical history significant for severe vascular dementia, multiple CVAs, DVT on coumadin, HCV cirrhosis s/p liver transplant 2000, ESRD s/p kidney transplan 2000 with CKD stage III of transplanted kidney, recurrent C diff infection, malnutrition on TF and seizure disorder and status epilepticus who was admitted on 11/2/2018 because of increased secretion and mental status change.      # Toxic encephalopathy  # Advanced vascular dementia  # Recurrent seizure disorder with status epilepticus  He is unresponsive to stimuli, but has rflux. At this time, this is likely his new baseline in the setting of higher dose antiepileptics and recent status epilepticus.   - Continue Keppra 500 mg bid and lacosamide 100 mg bid. Most recent lacosamide level 11/15 was 10.1  - No further seizure noted      # Acute on chronic hypoxic respiratory failure  # S/p renal transplant with CKD of transplanted kidney susceptible to volume overload  # HFpEF  His volume status has " "improved and stable on current diuretic regimen. He is also at risk for aspiration event given decreased mental status  - Continue bumex 2 mg bid and metolazone 5 mg daily     #  HCV cirrhosis s/p liver transplant   - Continue tacrolimus 1.5 mg bid and prednisone 5 mg daily      # History of Clostridium difficile  Completed 10 day of oral vancomycin  - On 20 day course of rifaximin (day 1 #11/13)    # History of DVT on warfarin  - Continue therapeutic warfarin     # Severe malnutrition on TFs  Goal 50 ml/hr     FEN: Adult Formula Drip Feeding  DVT Prophylaxis: Warfarin  GI Prophylaxis: Pantoprazole 40 mg bid   Disposition: Possible home hospice tomorrow   Code Status: DNR/DNI    Seen and discussed with Dr. Chaves who agrees with above assessment and plan.    Supavit \"Mac\" MD Richelle   PGY-2, Internal Medicine  [phone]    Subjective     Nursing note was reviewed, no acute event overnight. No change in status.     Objective     Most recent vital signs:  /73 (BP Location: Right arm)  Pulse 70  Temp 96.2  F (35.7  C) (Axillary)  Resp 16  Wt 73 kg (160 lb 15 oz)  SpO2 98%  BMI 23.09 kg/m2  Temp:  [95.6  F (35.3  C)-96.4  F (35.8  C)] 96.2  F (35.7  C)  Heart Rate:  [69-76] 72  Resp:  [14-16] 16  BP: (136-171)/(62-73) 171/73  SpO2:  [98 %-100 %] 98 %  Wt Readings from Last 2 Encounters:   11/18/18 73 kg (160 lb 15 oz)   10/29/18 70.8 kg (156 lb 1.6 oz)     Intake/Output Summary (Last 24 hours) at 11/19/18 1724  Last data filed at 11/19/18 1500   Gross per 24 hour   Intake              660 ml   Output              525 ml   Net              135 ml     Physical exam:  General: Non responsive to stimuli   Cardiac: RRR. No m/r/g. Normal S1, S2.   Pulm: Rhonchi   Abd: Soft  LE: Lower extremities edema   Skin: No new rash/petechiae    Labs and imaging were reviewed in Epic.    "

## 2018-11-21 NOTE — PLAN OF CARE
Problem: Patient Care Overview  Goal: Plan of Care/Patient Progress Review  Outcome: No Change  Turned and repositioned q 2 hrs and oral cares done after oral suctioning. Unresponsive. Pupils fixed and dilated. Incontinent of stools once this shift. Condom cath patent draining reed urine.  Cont TF of Suplena at 50 ml via NJ tube.All meds via NJ tolerating well. BG'S 158, 223, SEE sliding scale insulin ord. INR 1.65 pt will get coumadin 4mg this eves. Daughter May  at bedside Total cares.Bed Alarm and cont plan of care.

## 2018-11-21 NOTE — PLAN OF CARE
Problem: Goal/Outcome  Goal: Goal Outcome Summary  Outcome: No Change  Pt was to discharge home today. HE arrived and daughter May stated he could no longer go home as her sister stated they did not have a home plan for care in place. Family arrived for further conversation and home hospice will be in place for pt to leave hospital tomorrow. No PIV. Condom catheter in place. BM this shift. Turn and reposition Q2H. TF at goal rate. Oral care done by family. Continue with POC.

## 2018-11-21 NOTE — PROGRESS NOTES
Care Coordinator Progress Note    Admission Date/Time:  11/2/2018  Attending MD:  Jonathon Martienz,*    Data  Chart reviewed, discussed with interdisciplinary team.   Patient was admitted for:    Decreased level of consciousness  Hyponatremia  Pneumonia of right middle lobe due to infectious organism (H).    Concerns with insurance coverage for discharge needs: None.  Current Living Situation: Patient lives with family.  Support System: Supportive and Involved  Services Involved: Home Care and PCA  Transportation at Discharge: [facility] stretcher  Transportation to Medical Appointments: - [facility] Stretcher  Barriers to Discharge: none    Coordination of Care    11/21: Family cancelled discharge last evening. RNCC was informed family wants hospice. Hospice evaluated and spoke to family, now they do not want hospice. Spoke with Jocelin at [facility] orders were received and are being processed ASA. Jocelin needed one piece of paperwork signed by MD (done). Orders were sent back to Jocelin. There is high potential equipment will be delivered to patients home today.     SANJUANA Rosenthal spoke with [facility] at the demand of daughter Benjamin in attempt to increase patients PCA hours (to 24 hours per day) prior to discharge home. [facility] workers will not speak with SANJUANA as that is a HIPPA violation, daughters need to call independently.      Care Management does not keep patients in the hospital until they have 24/7 PCA services arranged by the [facility] as it can depending on staffing availability take months and months to arrange.     Patients daughter that is present in the room, May, presented with IMM (in Beninese and English) and notified of impending discharge tomorrow. RNCC is expecting an appeal to be called in for review.     Avoidable days added: beginning Tuesday 11/20/18  ________________________________________________________________    Addendum 11/20 @ 1447: Patient daughter agreeable to discharge today.  [facility] stretcher transport arranged for 4pm. RNCC will continue to work with [facility] for arrangement of DME equipment.  11/20 @ 1040: DME hardcopy scripts signed by Dr. Chaves and faxed to Jcoelin BURGESS at [facility].    Addendum 11/19 @ 1520: Patient daughter requesting DME for home, specifically home suction machine and mouth care kits, three condom catheters per day, and a new mattress for the hospital bed patient has already. Notified Attending Dr Alcala, requested DME order. Explained to daughter May and Dr Alcala that I am quite sure these items will not be available prior to patients discharge tomorrow, Tuesday, due to the late hour of the request and the amount of time it takes DME companies to process new orders and obtain insurance authorization. Daughter May stated she understood.  11/19: DC cancelled, respiratory rate 10 per min.    Addendum 11/16 @1410: Discharge cancelled, stretcher cancelled, notified [facility] and [facility].  11/16: Patient is medically ready to discharge today. [facility] stretcher arranged for 615pm this evening.  Verified that orders are complete. [facility] and [facility] notified of dc.    11/9: open to [facility] - RN (resmption orders completed), has 11.5hrs PCA as well. Patient is not expected to discharge over the weekend, will continue to follow.     11/8: eBnjamin agreed to attending a Care Conference 3pm Friday 11/9, Medical staff stated SW and RNCC are not needed at this time.    11/3: Patient is well known to RNCC from last admission (9/11 to 10/29/18) having just discharged last week. Patient is well supported at home, has [facility] nursing visits and 11.5hrs PCA daily. Primary caregiver is daughter May. Also greatly involved in decision making is daughter Benjamin. Neither is primary health care agent; decisions need to be made equally by both daughters which has, in the prior admission, results in a conflict of treatment for the medical and nursing staff. Patient was denied  acceptance globally from [facility] during last inpatient admission therefor unnecessary to attempt again due to futility.    Referrals: open to [facility] (resumption orders and Face to Face completed)    [facility]  Phone  [phone]  Fax  [phone]    **RESUMPTION OF HOME CARE SERVICES**     RN skilled nursing visit.   RN to assess vital signs and weight, respiratory and cardiac status, pain level and activity tolerance, hydration, nutrition and bowel status   RN to complete home safety evaluation.  RN to complete weekly medication management and set-up, please involve family/primary caregiver in med education.  RN to provide lab draws as needed and communicate results to PCP            Plan  Anticipated Discharge Date:  11/20/2018  Anticipated Discharge Plan:  Home w/family    CTS Handoff Complete: YES    Radha Ahuja RN, BSN, PHN  Medicine Care Coordinator  [facility]  Desk Phone: [phone]  Pager: [phone]    To contact Weekend RNCC, dial [phone] and enter job code [phone] at prompt.   This pager can not be contacted by text page or outside line.

## 2018-11-21 NOTE — PROGRESS NOTES
"[facility]  Met with pt  family to discuss plans for hospice.  Spoke to pt dtr Cruz on the phone to explain the hospice program, explained that hospice would not be able to do deep suctioning as she was wanting at home but could do and order the yanker \"hard\" suction for the mouth like doing in the hospital.  Cruz was wondering about a RN visiting every day and also explained this was not something that hospice would be able to do but we would see pt 1-3 times a week and they would have a 24 hour phone number to call if they had needs. Fred is concerned about [facility] 24 hour care at home and would like to get a reassessment done before she agrees to any other services.  Met with May and Cruz was on speaker phone and this was again the discussion to get the assessment done by the cty for 24 hour care before any other services at home started.  Updated the SW on family wishes.    Pt family has the 24 hour phone number for [facility] for any questions or concerns.     Clemencia Galvan RN Liaison     "

## 2018-11-22 NOTE — PLAN OF CARE
Problem: Patient Care Overview  Goal: Plan of Care/Patient Progress Review  Outcome: Adequate for Discharge Date Met: 11/22/18  Time: 4262-9984    Reason for admission: Pneumonia and decreased LOC   Vitals: VSS  Activity: Turns Q2  Pain: ADRIA  Neuro: semicomatose  Cardiac: WNL ex HTN   Respiratory: 100% on RA  GI/: Condom Cath on, Inc of stool. Cdiff.  Diet:Tube feedings at 50ml/hr  Lines: none    Plan: Patient discharging home with homecare. [facility] transport to pick him up. Daughter May signed for his discharge.

## 2018-11-22 NOTE — DISCHARGE SUMMARY
Medicine Discharge Summary  Priscilla Way MRN: [id number]  1940  ___________________________________          Date of Admission:  11/2/2018  Date of Discharge:  11/22/2018   Admitting Physician:  Jamie Gillespie MD  Discharge Physician:  Phi Chaves MD   Discharging Service:  [place] [address]  Primary Provider:              Randy Fuentes         Reason for Admission:     Priscilla Way is a 78-year-old male with medical history significant for severe vascular dementia, multiple CVAs, DVT on warfarin, HCV cirrhosis s/p liver transplant 2000, ESRD s/p kidney transplan 2000 with CKD stage III of transplanted kidney, recurrent C diff infection, malnutrition on TF and seizure disorder with status epilepticus who was admitted on 11/2/2018 because of increased secretion and mental status change concerning for aspiration pneumonia and volume overload.            Discharge Diagnosis:     1. Toxic encephalopathy  2. Advanced vascular dementia  3. Recurrent seizure disorder with non convulsive status epilepticus  4. Acute on chronic hypoxic respiratory failure  5. HFpEF  6. Volume overload   7. Aspiration pneumonia   8. Possible UTI  9. Transient hematuria   10. LILIAM on CKD III of transplanted kidney  11. HCV cirrhosis s/p liver transplant   12. Recurrent Clostridium difficile  13. History of DVT on warfarin  14. Severe malnutrition in the context of chronic illness   15. Type 2 DM   16. Complex social situation          Procedures & Significant Findings:     CT head without contrast (11/2)   1. No acute intracranial pathology  2. Severe Leukoaraiosis and severe cerebral atrophy. There is also medial temporal lobe atrophy that is severe as well  3. Left mastoiditis    US renal transplant (11/2)   1. Normal grayscale appearance of the transplant kidney ultrasound. No hydronephrosis or hydroureter.  2. Patent transplant Doppler evaluation.  3. Renal vein thrombosis  should be a consideration. Significant increase in the patient's resistive indices that are now 1.0. Although this can be seen in renal vein thrombosis the kidney has not increased in size and by color Doppler the renal vein is patent. Can also be seen in other causes of severe acute renal injury.    US renal transplant (11/9)  1. Mildly increased echogenicity of the right lower quadrant renal transplant as can be seen with medical renal disease. No hydronephrosis or perinephric fluid collection.  2. Resistive indices in the arcuate arteries remain elevated at 1.0 likely related to renal disease.   3. Patent transplant vasculature, including the renal vein.    US lower extremities (11/16)  1.  No evidence of right lower extremity deep venous thrombosis.     EEG - please see discussion below          Consultations:     - Neurology   - Nephrology         Relevant Results:     Blood culture (11/2) Staphylococcus epidermidis (1 out of 2)  Blood culture (11/4) NG  Blood culture (11/5) NG  Urine culture (11/6) 10,000 to 50,000 colonies/mL Enterobacter cloacae complex         Hospital Course by Problem:      # Toxic encephalopathy  # Advanced vascular dementia  # Recurrent seizure disorder with non convulsive status epilepticus  - EEG monitor during last admission (9/2018) and readings consistent with non-convulsive status epilepticus, but also had clonic tonic seizure back then. He is non-verbal. Head imaging in the past has shown chronic advanced small vessel disease that is diffuse and atherosclerotic plaques with stenosis along vertebral arteries and MCA. His mentation mainly affected by vascular dementia and seizure. GCS continues to be 5-6. Per previous notes, he can be aroused by verbal stimuli.   - EEG monitor on this admission has found patient to be in status epilepticus despite supratherapeutic keppra levels. Intracranial pathology was ruled out by CT brain on 11/2.  - Neurology was following during the hospital  stay and adding lacosamide as second line     - New baseline mental status is unresponsive, no spontaneous eye opening, some spontaneous movement with some jerk movement  - Now current anti-epileptic are lacosamide 100 mg bid TF and Keppra 500 mg TF bid     # Acute on chronic hypoxic respiratory failure  # HFpEF  # Volume overload   - On admission, he had coarse lung sounds, elevated JVP ~ 15cm and pitting edema on both legs. CXR had signs of pulmonary edema.  - Post renal transplant with CKD of transplanted kidney susceptible to volume overload  - Increase Bumex to 2 mg TF daily and metolazone 5 mg TF daily    # Aspiration pneumonia   - Patient has had other hospitalizations because of aspiration in the setting of TF's and altered mentation. This admission, respiratory panel and influenza were negative. MRSA nares was negative. Urine strep and pneumo antigen were negative. Procal was not elevated.   - Empirical treatment for aspiration pneumonia: Zosyn (11/02-11/03, 11/06 -11/09), cefepime (11/09-11/16) and metronidazole (11/09 - 11/12)  - Secretion and respiratory status have improved     # Possible UTI  # Transient hematuria   - Patient had gross hematuria on 11/06/18. CK measured due to seizure history, but it was below normal range. His last BK virus check on file from 2008 was negative and repeat on this admission was undetectable. Urine color spontaneously resolved to clear yellow.UA did show large presence of RBC and WBC.  - Urine cultures growing Enterobacter cloacae that is resistant to zosyn. Has grown this organism in the past. Suspect it represents colonization, as this organism has grown before. Initially sensitive to multiple antibiotics, but as patient has been admitted several times and treated with abx; organism has become more resistant. Antibiotic as above     # LILIAM on CKD III of transplanted kidney  # HCV cirrhosis s/p liver transplant   - Patient at one point was to be on dialysis of his  "transplanted kidney but renal function recovered. Baseline Cr ~ 1.2-1.5, but remains elevated and with not as much urinary output expected for high doses of diuretics.   - Causes could be intrinsic injury from infectious causes, medications, cardiorenal type 1 and significant renal compromise at last admission  - Likely new baseline of Cr at 2-2.1    - Continue tacrolimus 1.5 mg bid and prednisone 5 mg daily       # Resurrent Clostridium difficile  - Completed 10 day of oral vancomycin  - Continue rifaximin for total of 20 days (11/13-12/2)     # History of DVT on warfarin  - Continue therapeutic warfarin      # Severe malnutrition in the context of chronic illness   - Continue tube feeding     # Type 2 DM   - No medication change     # Complex social situation   - Please see  and care coordinator note (from 11/20-11/22) for more detail   - Initial plan was to discharge the patient on 11/20, patient was medically ready to be discharged.  - However patient's daughters refused to leave if the \"plan\" is not in place. They requested additional services at home, namely, a doctor or nurse to be available at the house for medication changes and tweaking. They were informed this was not feasible as it was not routinely done. The daughters kept saying that  \"We don't want to come back\" and \"We want him to be comfortable\". Hospice care was offered but the family declined. Patient's family requested 24 hour PCA care which would have to go through the county and is not feasible to be set up prior to discharge (per social works takes 3-4 weeks before even being assessed).  - Medicare Discharge Appeal paperwork was given to daughters.  - The daughters agreed for discharge on 11/22    Physical Exam on day of Discharge:  Blood pressure 166/74, pulse 76, temperature 96.5  F (35.8  C), temperature source Axillary, resp. rate 12, weight 73 kg (160 lb 15 oz), SpO2 99 %.  General: Not in acute distress, breathing on room " air   Cardiac: RRR. No m/r/g. Normal S1, S2.   Pulm: Occasional rhonchi both lungs  Abd: Soft  LE: 2+ Lower extremities edema   Neuro: fix pupils, unresponsive but withdraw from pain with occasional spontaneous movement            Discharge Medications:     Discharge Medication List as of 11/22/2018  2:18 PM      START taking these medications    Details   lacosamide (VIMPAT) 10 MG/ML SOLN solution Take 10 mLs (100 mg) by mouth 2 times daily, Disp-600 mL, R-5, Local Print      metolazone (ZAROXOLYN) 5 MG tablet Take 1 tablet (5 mg) by mouth daily, Disp-30 tablet, R-0, E-Prescribe      Oral Hygiene Products (EXTENDED TERM ORAL CARE SYSTEM) KIT Take 1 kit by mouth daily, Disp-30 kit, R-3, Local Print      !! order for DME Equipment being ordered: Other: condom catheter  Treatment Diagnosis: incontinence at risk for skin breakdownDisp-3 Piece, R-1, Local Print      !! order for DME Equipment being ordered: home suction kitDisp-1 Device, R-1, Local Print      rifaximin (XIFAXAN) 200 MG tablet Take 2 tablets (400 mg) by mouth 3 times daily, Disp-30 tablet, R-0, E-Prescribe      scopolamine (TRANSDERM) 72 hr patch Place 1 patch onto the skin every 72 hours, Disp-1 patch, R-11, E-Prescribe      magic mouthwash (FIRST-JESUSITA MOUTHWASH) suspension Take 5-10 mLs by mouth every 6 hours as needed (thrush), Disp-237 mL, R-1, E-Prescribe       !! - Potential duplicate medications found. Please discuss with provider.      CONTINUE these medications which have CHANGED    Details   bumetanide (BUMEX) 0.25 mg/mL SUSP 8 mLs (2 mg) by Oral or Feeding Tube route daily, Disp-240 mL, R-0, E-Prescribe      fiber modular, NUTRISOURCE FIBER, (NUTRISOURCE FIBER) packet 1 packet by Per Feeding Tube route 3 times daily, Disp-90 packet, R-0, E-Prescribe      levETIRAcetam (KEPPRA) 100 MG/ML solution 5 mLs (500 mg) by Per Feeding Tube route every 12 hours, Disp-450 mL, R-0, E-Prescribe      pantoprazole (PROTONIX) 2 mg/mL SUSP suspension 20 mLs  (40 mg) by Per Feeding Tube route 2 times daily (before meals), Disp-1200 mL, R-0, E-Prescribe         CONTINUE these medications which have NOT CHANGED    Details   acetaminophen (TYLENOL) 32 mg/mL solution 20.3 mLs (650 mg) by Oral or Feeding Tube route every 4 hours as needed for mild pain or fever, Disp-300 mL, R-3, E-Prescribe      alcohol swab prep pads Use to swab area of injection/mark anthony as directed.Disp-100 each, [id number]      amLODIPine (NORVASC) 1 mg/mL SUSP 5 mLs (5 mg) by Per Feeding Tube route daily, Disp-150 mL, R-0, E-Prescribe      aspirin 10 mg/mL SUSP 8 mLs (80 mg) by Per Feeding Tube route daily, Disp-240 mL, R-0, E-Prescribe      blood glucose (NO BRAND SPECIFIED) lancets standard Use to test blood sugar 6 times daily or as directed.Disp-100 each, [id number]      blood glucose monitoring (NO BRAND SPECIFIED) meter device kit Use to test blood sugar every 4 hours while on Tube feeding.Disp-1 kit, [id number]      blood glucose monitoring (NO BRAND SPECIFIED) test strip Use to test blood sugars 6 times daily or as directed, Disp-100 strip, R-11, E-Prescribe      carvedilol (COREG) 1 mg/mL SUSP 6.25 mLs (6.25 mg) by Oral or Feeding Tube route 2 times daily, Disp-375 mL, R-0, E-Prescribe      cholecalciferol (VITAMIN D/ D-VI-SOL) 400 UNIT/ML LIQD liquid Take 1 mL (400 Units) by mouth daily, Disp-30 mL, R-0, E-Prescribe      ferrous gluconate (FERGON) 324 (38 Fe) MG tablet 2 tablets (648 mg) by Per Feeding Tube route daily (with breakfast), Disp-100 tablet, R-0, E-PrescribeCan give liquid form if available. Equivalent dosing. Thanks.      HYDROmorphone, STANDARD CONC, (DILAUDID) 1 MG/ML LIQD liquid 1-2 mLs (1-2 mg) by Oral or Feeding Tube route every 6 hours as needed for moderate to severe pain, Disp-80 mL, R-0, Local Print      insulin aspart (NOVOLOG PEN) 100 UNIT/ML injection Inject 1-6 Units Subcutaneous every 4 hours Correction Scale -MEDIUM INSULIN RESISTANCE,    Do Not give if  BG less than 140.  For  - 189 give 1 unit.  For  - 239 give 2 units.  For  - 289 give 3 units.  For  - 339 give 4 units.  F or  - 399 give 5 units.  For BG > 400 give 6 units.  Check blood glucose Q4H   Notify provider if glucose> 350 mg/dL., Disp-9 mL, R-0, Local Print      insulin pen needle (BD SANDRA U/F) 32G X 4 MM Use 6 (every 4 hours while on tube feeding) daily or as directed.Disp-100 each, [id number]      lactobacillus rhamnosus, GG, (CULTURELL) capsule 1 capsule by Per Feeding Tube route 2 times daily, Disp-60 capsule, R-0, Local Print      miconazole (MICATIN; MICRO GUARD) 2 % powder Apply topically 2 times dailyDisp-90 g, [id number] groins rash.      multivitamins with minerals (CERTAVITE/CEROVITE) LIQD liquid Take 15 mLs by mouth daily, Disp-450 mL, R-0, E-Prescribe      ondansetron (ZOFRAN-ODT) 4 MG ODT tab Take 1 tablet (4 mg) by mouth every 6 hours as needed for nausea or vomiting, Disp-30 tablet, R-0, E-Prescribe      predniSONE 5 MG/5ML solution Take 5 mLs (5 mg) by mouth daily, Disp-150 mL, R-0, E-Prescribe      Sharps Container MISC Sharps container for diabetes needles., Disp-1 each, R-0, E-Prescribe      tacrolimus (GENERIC EQUIVALENT) 1 mg/mL suspension 1.5 mLs (1.5 mg) by Oral or Feeding Tube route 2 times daily, Disp-90 mL, R-0, E-Prescribe      warfarin (COUMADIN) 2.5 MG tablet 1 tablet (2.5 mg) by Oral or Feeding Tube route daily, Disp-30 tablet, R-0, E-Prescribe                Discharge Instructions and Follow-Up:     Discharge Procedure Orders  SUCTION PUMP, HOME MODEL     AIR PRESSURE MATTRESS     Home care nursing referral   Referral Type: Home Health Therapies & Aides     Home infusion referral     MD face to face encounter   Order Comments: Documentation of Face to Face and Certification for Home Health Services    I certify that patient: Priscilla Way is under my care and that I, or a nurse practitioner or physician's assistant working  with me, had a face-to-face encounter that meets the physician face-to-face encounter requirements with this patient on: 11/9/2018.    This encounter with the patient was in whole, or in part, for the following medical condition, which is the primary reason for home health care: significant for vascular dementia, CVA multiple sites, DVT on warfarin, HCV cirrhosis s/p liver transplant 2000, HCV ESRD s/p kidney transplant 2000, CKD III of transplanted kidney, HFpEF, recurrent C diff infection, malnutrition on tube feeds and recent seizure disorder/status epilepticus was admitted to the hospital because of worsening altered mental status and shortness of breath.     I certify that, based on my findings, the following services are medically necessary home health services: Nursing.    My clinical findings support the need for the above services because: Nurse is needed: To provide assessment and oversight required in the home to assure adherence to the medical plan due to: unlicensed primary and assistive care givers, difficulty understanding medical plan of care, medical complexity.    Further, I certify that my clinical findings support that this patient is homebound (i.e. absences from home require considerable and taxing effort and are for medical reasons or [facility] services or infrequently or of short duration when for other reasons) because: Patient is bedbound due to: Vascular dementia, CVA, CKD III, seizures/status epilepticus, unable to protect airway (patient is DNR/DNI), total cares, minimally to not responsive.    Based on the above findings. I certify that this patient is confined to the home and needs intermittent skilled nursing care, physical therapy and/or speech therapy.  The patient is under my care, and I have initiated the establishment of the plan of care.  This patient will be followed by a physician who will periodically review the plan of care.  Physician/Provider to provide follow up care:  Randy Fuentes    Physician Signature: See electronic signature associated with these discharge orders.     Supplies   Order Comments: List the supplies the pt needs to go home  Oral care kit 3 per day     Condom catheter     Reason for your hospital stay   Order Comments: You was admitted for changing in your mental status. You was seen by neurology, the possible reason for changing in your mental status are seizure and dementia.     Activity   Order Comments: Your activity upon discharge: bedrest   Order Specific Question Answer Comments   Is discharge order? Yes      When to contact your care team   Order Comments: Call your primary doctor if you have any of the following: temperature greater than 100.3, increased shortness of breath, more secretion, seizure or other concerns.     Tubes and drains   Order Comments: You are going home with the following tubes or drains: patino catheter.  Tube cares per hospital or home care instructions. NJ.     Adult [facility] Follow-up and recommended labs and tests   Order Comments: Follow up with primary care provider, Randy Fuentes, within 7 days or as needed to evaluate medication change.     Appointments on [facility] and/or [facility] (with [facility] or [facility] provider or service). Call [phone] if you haven't heard regarding these appointments within 7 days of discharge.     DNR/DNI   Order Specific Question Answer Comments   Code status determined by: Discussion with patient/legal decision maker      Diet   Order Comments: Follow this diet upon discharge: Orders Placed This Encounter     Adult Formula Drip Feeding: Specified Time Suplena; Nasojejunal; Goal Rate: 50, CONTINUOUS; mL/hr; From: 6:00 AM; 6:00 AM; Medication - Tube Feeding Flush Frequency: At least 15-30 mL water before and after medication administration and with tube ...   Order Specific Question Answer Comments   Is discharge order? Yes              Discharge Disposition:     Home with home care  "         Condition on Discharge:   Discharge condition: Poor   Code status on discharge: DNR / DNI        Date of service: 11/22/2018    Patient discussed with staff attending, Dr. Chaves and the note reflects our joint plan.     Supavit \"Mac\" MD Richelle   PGY-2, Internal Medicine  Pager: [phone]  "

## 2018-11-22 NOTE — PLAN OF CARE
Problem: Patient Care Overview  Goal: Plan of Care/Patient Progress Review  Outcome: No Change  Pt unresponsive. Pupils fixed. No non verbal signs of pain. Maintained on NPO. Ongoing tube feeding via NJ at 50 ml/hr. Condom cath in place. Suctioned secretions overnight. Turned and repositioned with check and change of pad every 2 hours. Family at bedside. Vital signs stable. Will continue to monitor and follow plan of care

## 2018-11-22 NOTE — PROGRESS NOTES
"  Care Coordinator Progress Note    Admission Date/Time:  11/2/2018  Attending MD:  Phi Chaves, *    Data  Chart reviewed, discussed with interdisciplinary team.   Patient was admitted for:    Decreased level of consciousness  Hyponatremia  Pneumonia of right middle lobe due to infectious organism (H)  Seizure (H)  Urinary incontinence, unspecified type  Urinary incontinence without sensory awareness  Aspiration pneumonitis (H)  Kidney replaced by transplant  On tube feeding diet  Generalized edema  H/O Clostridium difficile infection.      Assessment  RNCC was informed that pt is ready for discharge from [facility].  RNCC attempted to reach daughter Cruz- RNCC left  asking her to return call, then called [facility] who stated that Cruz is the decision maker and this RNCC asked her to reach [facility] to have her call me.  Pt is a medicare pt, RNCC attempting to give them the \"important message from Medicare about your rights\".  RNCC then called [facility] back- no answer.      RNCC will continue to follow up, if a family member arrives to [facility] bedside RN to contact RNCC ASAP.      11/22/2018 11:24 AM [facility] called this RNCC back, discussed pt needs, she requested RNCC to make referral to [facility] front door services so that Priscilla can be re-evaluated for additional services in the home.  RNCC called and left  for [facility] after discussing with [facility] that this will not increase his services as he is on an elderly waiver right now with 11.5 PCA hours daily.  Her intentions is to get 24 hour PCA care.  She agreed to pt discharge home today, PCA is available at the home after 2 pm.  RNCC set up a discharge ride for 2 pm with [facility] via stretcher.  [facility] to follow up with pt and family regarding any additional DME.      [facility]- emailed discharge date.  Phone  [phone]  Fax  [phone]    [facility] -[place], [place]  Phone: [phone]  Fax:  [phone]     Critical access hospital " Transportation [phone] 2 pm stretcher ride.  PCS form in pt chart.      [facility]- TF- aware of discharge   Phone  [phone]  Fax  [phone]  [facility] mobile number: [phone]  Intake: [phone]      Crhissy Anderson RN- Aware of discharge today        Relationship: Registered Nurse       Start: 1/8/14       Comment:  [facility] Managed Care Coordinator: [phone]          Meds to be picked up at discharge pharmacy prior to EMS arriving here.  Bedside RN aware of discharge  time   Addendum: 11/22/2018 12:02 PM [facility] contacted this RNCC to clarify NJ TF orders, pt was not on nephro prior to this admission, pt to resume the following order, updated in AVS  Supena @ goal 50 ml/hr (1200 ml/day) to provide 2160 kcals (30 kcal /kg), 56 g PRO (0.8 gm/kg/day), 876 ml free H2O, 242 gm CHO and 15 gm Fiber daily.     Plan  Anticipated Discharge Date:  11/22/2018 - attempting to arrange with family  Anticipated Discharge Plan:  discharge home with resumption of previous services.      Heather Hoskins, TEDCC, BSN    [facility]  [address] [place] [id number]    [email or web address]    Office: [phone] Pager: [phone]  To contact weekend RNCC, dial [phone] and enter pager number [phone] at prompt. This pager can not be contacted by text page or outside line.

## 2018-11-22 NOTE — PROGRESS NOTES
INTERNAL MEDICINE PROGRESS NOTE    Priscilla Way ([id number]) admitted on 11/2/2018 11/22/2018    Changes Today     Patient is medically ready to be discharged. Please see care coordinator note about ongoing discussion with patient's family about disposition plan.                  Assessment & Plan     Priscilla Way is a 78-year-old male with medical history significant for severe vascular dementia, multiple CVAs, DVT on coumadin, HCV cirrhosis s/p liver transplant 2000, ESRD s/p kidney transplan 2000 with CKD stage III of transplanted kidney, recurrent C diff infection, malnutrition on TF and seizure disorder and status epilepticus who was admitted on 11/2/2018 because of increased secretion and mental status change.      # Toxic encephalopathy  # Advanced vascular dementia  # Recurrent seizure disorder with status epilepticus  He is unresponsive to stimuli. At this time, this is likely his new baseline in the setting of higher dose antiepileptics and recent status epilepticus.   - Continue Keppra 500 mg bid and lacosamide 100 mg bid  - No further seizure noted      # Acute on chronic hypoxic respiratory failure  # S/p renal transplant with CKD of transplanted kidney susceptible to volume overload  # HFpEF  His volume status has improved and stable on current diuretic regimen. He is also at risk for aspiration event given decreased mental status  - Continue bumex 2 mg PO bid and metolazone 5 mg PO daily     #  HCV cirrhosis s/p liver transplant   - Continue tacrolimus 1.5 mg bid and prednisone 5 mg daily      # History of Clostridium difficile  Completed 10 day of oral vancomycin  - On 20 day course of rifaximin (11/13-12/2)    # History of DVT on warfarin  - Continue therapeutic warfarin     # Severe malnutrition on TFs  Tube feed at goal 50 ml/hr     FEN: Adult Formula Drip Feeding  DVT Prophylaxis: Warfarin  GI Prophylaxis: Pantoprazole 40 mg PO bid   Disposition: Pending   Code Status: DNR/DNI    Seen and  "discussed with Dr. Chaves who agrees with above assessment and plan.    Supavit \"Mac\" MD Ric[place]le   PGY-2, Internal Medicine  [phone]    Subjective     Nursing note was reviewed, no acute event overnight. No change in his status.     Objective     Most recent vital signs:  /86 (BP Location: Right arm)  Pulse 76  Temp 96  F (35.6  C) (Axillary)  Resp 16  Wt 73 kg (160 lb 15 oz)  SpO2 99%  BMI 23.09 kg/m2  Temp:  [96  F (35.6  C)-96.9  F (36.1  C)] 96  F (35.6  C)  Pulse:  [71-76] 76  Heart Rate:  [72] 72  Resp:  [16-20] 16  BP: (113-169)/(52-86) 169/86  SpO2:  [97 %-100 %] 99 %  Wt Readings from Last 2 Encounters:   11/18/18 73 kg (160 lb 15 oz)   10/29/18 70.8 kg (156 lb 1.6 oz)     Intake/Output Summary (Last 24 hours) at 11/19/18 1724  Last data filed at 11/19/18 1500   Gross per 24 hour   Intake              660 ml   Output              525 ml   Net              135 ml     Physical exam:  General: Not in acute distress, unresponsive   Cardiac: RRR. No m/r/g. Normal S1, S2.   Pulm: Rhonchi both lungs   Abd: Soft  LE: 3+ lower extremities edema   Skin: No new rash/petechiae  Neuro: Fix pupils, withdraw from pain      Labs and imaging were reviewed in Epic.    "

## 2018-11-23 NOTE — MR AVS SNAPSHOT
Priscilla Way   11/23/2018   Anticoagulation Therapy Visit    Description:  78 year old male   Provider:  Alfred Mccallum, [facility]   Department:  [facility]           INR as of 11/23/2018     Today's INR       Anticoagulation Summary as of 11/23/2018     INR goal 2.0-3.0   Today's INR    Full warfarin instructions 11/23: 2.5 mg; 11/24: 2.5 mg; 11/25: 2.5 mg   Next INR check 11/26/2018    Indications   Acute kidney injury (H) [N17.9]  Deep vein thrombosis (DVT) (H) [I82.409] [I82.409]  Long-term (current) use of anticoagulants [Z79.01] [Z79.01]         November 2018 Details    Sun Mon Tue Wed Thu Fri Sat         1               2               3                 4               5               6               7               8               9               10                 11               12               13               14               15               16               17                 18               19               20               21               22               23      2.5 mg   See details      24      2.5 mg           25      2.5 mg         26            27               28               29               30                 Date Details   11/23 This INR check       Date of next INR:  11/26/2018         How to take your warfarin dose     To take:  2.5 mg Take 1 of the 2.5 mg tablets.

## 2018-11-23 NOTE — PROGRESS NOTES
Patient was contacted by an RN for post DC follow up so no duplicate post DC follow up call will be made    Discharge Disposition:      Home with home care                Condition on Discharge:    Discharge condition: Poor      PCA is available at the home after 2 pm.   [facility]- TF- aware of discharge   MWM to follow up with pt and family regarding any additional DME.         11/21: Family cancelled discharge last evening. RNCC was informed family wants hospice. Hospice evaluated and spoke to family, now they do not want hospice. Spoke with Jocelin at [facility], DME orders were received and are being processed ASAP. Jocelin needed one piece of paperwork signed by MD (done). Orders were sent back to Jocelin. There is high potential equipment will be delivered to patients home today.      SANJUANA Rosenthal spoke with [facility] at the demand of daughter Benjamin in attempt to increase patients PCA hours (to 24 hours per day) prior to discharge home. [facility] workers will not speak with SANJUANA as that is a HIPPA violation, daughters need to call independently.       Care Management does not keep patients in the hospital until they have 24/7 PCA services arranged by the [facility] as it can depending on staffing availability take months and months to arrange.      Patients daughter that is present in the room, May, presented with IMM (in [place] Islander and English) and notified of impending discharge tomorrow. RNCC is expecting an appeal to be called in for review.      Avoidable days added: beginning Tuesday 11/20/18

## 2018-11-23 NOTE — PROGRESS NOTES
Dates of hospitalization: 11/2/18 to 11/22/18  Reason for hospitalization: Change in Mental Status, Seizures and Dementia  Vitamin K or FFP administered?  No  INR Goal Range Confirmed to be: 2.0 to 3.0  Inpatient warfarin doses added to calendar? Yes   Medication changes at discharge: Started Rifaximin  Warfarin dosing after DC: 2.5mgs daily  Patient discharged on Lovenox? No  Next INR date: 11/26 or when Home care can arrange to see Priscilla  Where is the patient discharging to? (home, TCU, staying locally, etc.): Home  Will patient have home care?   Yes    I spoke with patient's daughter Katerin and Home care RN Shelly.  Home care RN said Priscilla is failing.

## 2018-11-23 NOTE — PROGRESS NOTES
This is a recent snapshot of the patient's [facility] medical record.  For current drug dose and complete information and questions, call [phone] or In Basket pool, [facility] ([id number])  CSN Number:  [id number]

## 2018-11-26 NOTE — PROGRESS NOTES
This is a recent snapshot of the patient's [facility] medical record.  For current drug dose and complete information and questions, call [phone] or In Basket pool, [facility] home infusion ([id number])  CSN Number:  [id number]

## 2018-11-26 NOTE — PROGRESS NOTES
ANTICOAGULATION FOLLOW-UP CLINIC VISIT    Patient Name:  Priscilla Way  Date:  11/26/2018  Contact Type:  Telephone    SUBJECTIVE:     Patient Findings     Positives Unexplained INR or factor level change           OBJECTIVE    INR Protime   Date Value Ref Range Status   11/26/2018 1.4 (A) 0.86 - 1.14 Final       ASSESSMENT / PLAN  INR assessment SUB    Recheck INR In: 2 DAYS    INR Location [facility] INR      Anticoagulation Summary as of 11/26/2018     INR goal 2.0-3.0   Today's INR 1.4!   Warfarin maintenance plan No maintenance plan   Full warfarin instructions 11/26: 5 mg; 11/27: 5 mg   Weekly warfarin total 16.25 mg   Plan last modified Dayana Ervin RN (6/13/2018)   Next INR check 11/28/2018   Priority INR   Target end date     Indications   Acute kidney injury (H) [N17.9]  Deep vein thrombosis (DVT) (H) [I82.409] [I82.409]  Long-term (current) use of anticoagulants [Z79.01] [Z79.01]         Anticoagulation Episode Summary     INR check location     Preferred lab     Send INR reminders to [facility]    Comments [facility] to draw INR's  Pt has dementia please contact daughter with any questions. Contact daughter May at [phone].  Has 2.5mg tablets       Anticoagulation Care Providers     Provider Role Specialty Phone number    Randy Fuentes MD [facility] [phone]            See the Encounter Report to view Anticoagulation Flowsheet and Dosing Calendar (Go to Encounters tab in chart review, and find the Anticoagulation Therapy Visit)    Missed doses are denied.  Spoke with home care nurse Margo Bernal RN

## 2018-11-26 NOTE — PROGRESS NOTES
This is a recent snapshot of the patient's [facility] medical record.  For current drug dose and complete information and questions, call [phone] or In Basket pool, [facility] ([id number])  CSN Number:  [id number]

## 2018-11-26 NOTE — PROGRESS NOTES
[facility] now requests orders and shares plan of care/discharge summaries for some patients through [employer].  Please REPLY TO THIS MESSAGE OR ROUTE BACK TO THE AUTHOR in order to give authorization for orders when needed.  This is considered a verbal order, you will still receive a faxed copy of orders for signature.  Thank you for your assistance in improving collaboration for our patients.    ORDER  Nursing 3x/week for 1 week, 2x/week for 2 weeks, 1x/week for 2 weeks. 5 PRN - INR management, Neuro assessment    OT to eval and treat for DME    Margo Starks, RN [facility]  [phone]  [email or web address]      Ok for orders. Jenna Kirkpatrick LPN 11/27/2018 11:32 AM

## 2018-11-26 NOTE — MR AVS SNAPSHOT
Priscilla Way   11/26/2018   Anticoagulation Therapy Visit    Description:  78 year old male   Provider:  Jennifer Bernal, RN   Department:  [facility]           INR as of 11/26/2018     Today's INR 1.4!      Anticoagulation Summary as of 11/26/2018     INR goal 2.0-3.0   Today's INR 1.4!   Full warfarin instructions 11/26: 5 mg; 11/27: 5 mg   Next INR check 11/28/2018    Indications   Acute kidney injury (H) [N17.9]  Deep vein thrombosis (DVT) (H) [I82.409] [I82.409]  Long-term (current) use of anticoagulants [Z79.01] [Z79.01]         November 2018 Details    Sun Mon Tue Wed Thu Fri Sat         1               2               3                 4               5               6               7               8               9               10                 11               12               13               14               15               16               17                 18               19               20               21               22               23               24                 25               26      5 mg   See details      27      5 mg         28            29               30                 Date Details   11/26 This INR check       Date of next INR:  11/28/2018         How to take your warfarin dose     To take:  5 mg Take 2 of the 2.5 mg tablets.

## 2018-11-27 NOTE — TELEPHONE ENCOUNTER
Verbal orders were given in documentation encounter on 11/26/18, encounter routed back to sender. Jenna Kirkpatrick LPN 11/27/2018 11:33 AM

## 2018-11-27 NOTE — PROGRESS NOTES
This is a recent snapshot of the patient's [facility] medical record.  For current drug dose and complete information and questions, call [phone] or In Basket pool, [facility] ([id number])  CSN Number:  [id number]

## 2018-11-27 NOTE — TELEPHONE ENCOUNTER
[facility] Call Center    Phone Message    May a detailed message be left on voicemail: yes    Reason for Call: Other: [facility] now requests orders and shares plan of care/discharge summaries for some patients through [employer].  Please REPLY TO THIS MESSAGE OR ROUTE BACK TO THE AUTHOR in order to give authorization for orders when needed.  This is considered a verbal order, you will still receive a faxed copy of orders for signature.  Thank you for your assistance in improving collaboration for our patients.     Action Taken: Message routed to:  [facility] PCC

## 2018-11-28 NOTE — TELEPHONE ENCOUNTER
[facility] Call Center    Phone Message    May a detailed message be left on voicemail: yes    Reason for Call: Other: Iam called stating pt is need a refill of all medication except for DME equipment from Dr. Fuentes. Pt is out of refills for all medications per Iam. Please call Iam with questions. thank you     Action Taken: Message routed to:  [facility] Primary

## 2018-11-28 NOTE — PROGRESS NOTES
ANTICOAGULATION FOLLOW-UP CLINIC VISIT    Patient Name:  Priscilla Way  Date:  11/28/2018  Contact Type:  Telephone    SUBJECTIVE:     Patient Findings     Positives Change in diet/appetite (TF with water flushes)           OBJECTIVE    INR   Date Value Ref Range Status   11/28/2018 1.2  Corrected       ASSESSMENT / PLAN  INR assessment SUB    Recheck INR In: 5 DAYS    INR Location [facility] INR      Anticoagulation Summary as of 11/28/2018     INR goal 2.0-3.0   Today's INR 1.2!   Warfarin maintenance plan No maintenance plan   Full warfarin instructions 11/28: 5 mg; 11/29: 5 mg; 11/30: 2.5 mg; 12/1: 2.5 mg; 12/2: 2.5 mg   Weekly warfarin total 16.25 mg   Plan last modified Dayana Ervin RN (6/13/2018)   Next INR check 12/3/2018   Priority INR   Target end date     Indications   Acute kidney injury (H) [N17.9]  Deep vein thrombosis (DVT) (H) [I82.409] [I82.409]  Long-term (current) use of anticoagulants [Z79.01] [Z79.01]         Anticoagulation Episode Summary     INR check location     Preferred lab     Send [facility] reminders to [facility]    Comments [facility] to draw INR's  Pt has dementia please contact daughter with any questions. Contact daughter May at [phone].  Has 2.5mg tablets       Anticoagulation Care Providers     Provider Role Specialty Phone number    Randy Fuentes MD [facility] [phone]            See the Encounter Report to view Anticoagulation Flowsheet and Dosing Calendar (Go to Encounters tab in chart review, and find the Anticoagulation Therapy Visit)    Spoke with Iam [facility] nurse.    Sherry Georges RN

## 2018-11-29 NOTE — PROGRESS NOTES
Verified yesterday in [facility] that member had discharged to home on 11/22/18 and apparently family had initially agreed to, then declined hospice for member.  Per discharge note, daughters still requesting 24 hour PCA for member.  This was discussed with daughter-Ubba after previous hospital discharge and although CM was able to add additional temporary hours x 45 days due to additional clinical needs, 24 hour PCA was not possible.  CM at that time had also called [facility] and spoke to staff to see if there was anything else available to help family.  CM placed call to member's RP, Lemuelba today for update on member and left message on , requesting return call, also informing daughter via  that member's 45 day PCA increase was ending 12/13/18, so this CM would like to visit member and do another PCA assessment on 12/6/18 at noon, so that hours will remain increased.  Explained that the increase will end and member will resume previous PCA hours on 12/14/18, so it is important that CM do the assessment in time to process before temp increase end date.    Carri Anderson, RN  [facility] Care Coordinator  [phone]

## 2018-11-29 NOTE — PROCEDURES
Procedure Date: 2018      EEG #-2.      This is day 2 of 24-hour video EEG.      DATE OF RECORDIN2018.      SOURCE FILE DURATION:  11 hours, 50 minutes, 36 seconds.     CLINICAL SUMMARY:  The patient is a 78-year-old male with history of asthma and vascular dementia, liver/kidney transplant, diabetes, right thalamic stroke and history of seizures.  EEG was performed to evaluate for seizures.      TECHNICAL SUMMARY: This continuous video- EEG monitoring procedure was performed with 23 scalp electrodes in 10-20 electrode system placement, and additional scalp, precordial and other surface electrodes used for electrical referencing and artifact detection.  Video monitoring was utilized and periodically reviewed by EEG technologists and the physician for electroclinical correlations.     INTERICTAL ACTIVITY:  The study was somewhat limited due to continuous significant myogenic artifact seen over all the electrodes.  Myogenic artifact significantly confounded electrocerebral activity.  Where there was less myogenic artifact especially in the midline central region theta-delta slowing was seen.  Epileptiform discharges or focal slowing were not seen however, they could be overlooked due to significant myogenic artifact.      CLINICAL/ICTAL EVENTS:  No electrographic or clinical seizures were recorded.      IMPRESSION:  This is a significantly limited study due to severe continuous myogenic artifact over all the electrodes which confounded the electrocerebral activity.  Diffuse theta-delta activity was seen on limited portions of the EEG which is consistent with moderate diffuse nonspecific encephalopathy.  Cannot rule out epileptiform discharges, which could be covered by myogenic artifact.         ANGELES PIÑA MD             D: 2018   T: 2018   MT: BRANDIE      Name:     YADIRA BREAUX   MRN:      [id number]        Account:        [id number]   :      1940            Procedure Date: 09/12/2018      Document: [id number]

## 2018-12-03 NOTE — MR AVS SNAPSHOT
Priscilla Way   12/3/2018   Anticoagulation Therapy Visit    Description:  78 year old male   Provider:  Sherry Georges RN   Department:  [facility]           INR as of 12/3/2018     Today's INR 1.4!      Anticoagulation Summary as of 12/3/2018     INR goal 2.0-3.0   Today's INR 1.4!   Full warfarin instructions 12/3: 5 mg; 12/4: 5 mg; 12/5: 5 mg   Next INR check 12/6/2018    Indications   Acute kidney injury (H) [N17.9]  Deep vein thrombosis (DVT) (H) [I82.409] [I82.409]  Long-term (current) use of anticoagulants [Z79.01] [Z79.01]         December 2018 Details    Sun Mon Tue Wed Thu Fri Sat           1                 2               3      5 mg   See details      4      5 mg         5      5 mg         6            7               8                 9               10               11               12               13               14               15                 16               17               18               19               20               21               22                 23               24               25               26               27               28               29                 30               31                     Date Details   12/03 This INR check       Date of next INR:  12/6/2018         How to take your warfarin dose     To take:  5 mg Take 2 of the 2.5 mg tablets.

## 2018-12-03 NOTE — PROGRESS NOTES
ANTICOAGULATION FOLLOW-UP CLINIC VISIT    Patient Name:  Priscilla Way  Date:  12/3/2018  Contact Type:  Telephone    SUBJECTIVE:     Patient Findings     Positives No Problem Findings           OBJECTIVE    INR   Date Value Ref Range Status   12/03/2018 1.4 (A) 0.8 - 1.1 Final       ASSESSMENT / PLAN  INR assessment SUB    Recheck INR In: 3 DAYS    INR Location [facility] INR      Anticoagulation Summary as of 12/3/2018     INR goal 2.0-3.0   Today's INR 1.4!   Warfarin maintenance plan No maintenance plan   Full warfarin instructions 12/3: 5 mg; 12/4: 5 mg; 12/5: 5 mg   Weekly warfarin total 16.25 mg   Plan last modified Dayana Ervin RN (6/13/2018)   Next INR check 12/6/2018   Priority INR   Target end date     Indications   Acute kidney injury (H) [N17.9]  Deep vein thrombosis (DVT) (H) [I82.409] [I82.409]  Long-term (current) use of anticoagulants [Z79.01] [Z79.01]         Anticoagulation Episode Summary     INR check location     Preferred lab     Send [facility] reminders to [facility]    Comments [facility] to draw INR's  Pt has dementia please contact daughter with any questions. Contact daughter May at [phone].  Has 2.5mg tablets       Anticoagulation Care Providers     Provider Role Specialty Phone number    Randy Fuentes MD [facility] [phone]            See the Encounter Report to view Anticoagulation Flowsheet and Dosing Calendar (Go to Encounters tab in chart review, and find the Anticoagulation Therapy Visit)    Spoke with Iam [facility] nurse.    Sherry Georges RN

## 2018-12-03 NOTE — PROGRESS NOTES
This is a recent snapshot of the patient's [facility] medical record.  For current drug dose and complete information and questions, call [phone] or In Basket pool, [facility] ([id number])  CSN Number:  [id number]

## 2018-12-04 NOTE — PROGRESS NOTES
[facility] now requests orders and shares plan of care/discharge summaries for some patients through [facility].  Please REPLY TO THIS MESSAGE in order to give authorization for orders when needed.  This is considered a verbal order, you will still receive a faxed copy of orders for signature.  Thank you for your assistance in improving collaboration for our patients.    ORDER    Wound care to R hallux trauma wound    Cleanse with NS, apply adaptic, cover with gauze and tape.    Dressing to be changed MWF and PRN.    Vickey Covarrubias RN BSN  [phone]  [email or web address]  Working hours: Mon-Fri 8am-5pm   39 YO S/P patrick 12/03/18, left [facility] at 730 PM. Now C/O worsening abdominal pain.

## 2018-12-04 NOTE — PROGRESS NOTES
This is a recent snapshot of the patient's [facility] medical record.  For current drug dose and complete information and questions, call [phone] or In Basket pool, [facility] home infusion ([id number])  CSN Number:  [id number]

## 2018-12-05 NOTE — TELEPHONE ENCOUNTER
[facility] Call Center    Phone Message    May a detailed message be left on voicemail: yes    Reason for Call: Order(s): Home Care Orders: Occupational Therapy (OT): .two visits in one month, DME needs.    Action Taken: Message routed to:  [facility] PCC     Verbal order given and documented. Jenna Kirkpatrick LPN 12/6/2018 9:17 AM

## 2018-12-06 NOTE — PROGRESS NOTES
ANTICOAGULATION FOLLOW-UP CLINIC VISIT    Patient Name:  Priscilla Way  Date:  12/6/2018  Contact Type:  Telephone    SUBJECTIVE:     Patient Findings     Comments Iam is unable to talk with children to determine if patient has been refusing medication.            OBJECTIVE    INR   Date Value Ref Range Status   12/06/2018 1.1  Final       ASSESSMENT / PLAN  No question data found.  Anticoagulation Summary as of 12/6/2018     INR goal 2.0-3.0   Today's INR 1.1!   Warfarin maintenance plan No maintenance plan   Full warfarin instructions 12/6: 5 mg; 12/7: 5 mg; 12/8: 5 mg; 12/9: 5 mg   Weekly warfarin total 16.25 mg   Plan last modified Dayana Ervin, RN (6/13/2018)   Next INR check 12/10/2018   Priority INR   Target end date     Indications   Acute kidney injury (H) [N17.9]  Deep vein thrombosis (DVT) (H) [I82.409] [I82.409]  Long-term (current) use of anticoagulants [Z79.01] [Z79.01]         Anticoagulation Episode Summary     INR check location     Preferred lab     Send INR reminders to [facility]    Comments [facility] to draw INR's  Pt has dementia please contact daughter with any questions. Contact daughter May at [phone].  Has 2.5mg tablets       Anticoagulation Care Providers     Provider Role Specialty Phone number    Randy Fuentes MD [facility] [phone]            See the Encounter Report to view Anticoagulation Flowsheet and Dosing Calendar (Go to Encounters tab in chart review, and find the Anticoagulation Therapy Visit)    Spoke with Iam PÉREZ.     Dayana Ervin, TED

## 2018-12-06 NOTE — MR AVS SNAPSHOT
Priscilla Way   12/6/2018   Anticoagulation Therapy Visit    Description:  78 year old male   Provider:  Dayana Ervin, RN   [facility]           INR as of 12/6/2018     Today's INR 1.1!      Anticoagulation Summary as of 12/6/2018     INR goal 2.0-3.0   Today's INR 1.1!   Full warfarin instructions 12/6: 5 mg; 12/7: 5 mg; 12/8: 5 mg; 12/9: 5 mg   Next INR check 12/10/2018    Indications   Acute kidney injury (H) [N17.9]  Deep vein thrombosis (DVT) (H) [I82.409] [I82.409]  Long-term (current) use of anticoagulants [Z79.01] [Z79.01]         December 2018 Details    Sun Mon Tue Wed Thu Fri Sat           1                 2               3               4               5               6      5 mg   See details      7      5 mg         8      5 mg           9      5 mg         10            11               12               13               14               15                 16               17               18               19               20               21               22                 23               24               25               26               27               28               29                 30               31                     Date Details   12/06 This INR check       Date of next INR:  12/10/2018         How to take your warfarin dose     To take:  5 mg Take 2 of the 2.5 mg tablets.

## 2018-12-06 NOTE — TELEPHONE ENCOUNTER
Verbal orders given to Poppy from [facility], per , for Order(s): Home Care Orders: Occupational Therapy (OT): two visits in one month, DME needs. Jenna Kirkpatrick LPN 12/6/2018 9:18 AM

## 2018-12-10 NOTE — PROGRESS NOTES
Addendum  Sent refill of Warfarin 2.5mg tablets to [facility]   Shabana Sorto RN    ANTICOAGULATION FOLLOW-UP CLINIC VISIT    Patient Name:  Priscilla Way  Date:  12/10/2018  Contact Type:  Telephone    SUBJECTIVE:     Patient Findings     Comments:   INR is rising nicely            OBJECTIVE    INR Protime   Date Value Ref Range Status   12/10/2018 1.9 (A) 0.86 - 1.14 Final       ASSESSMENT / PLAN  INR assessment THER    Recheck INR In: 4 DAYS    INR Location [facility]      Anticoagulation Summary  As of 12/10/2018    INR goal:   2.0-3.0   TTR:   47.0 % (2 y)   INR used for dosin.9! (12/10/2018)   Warfarin maintenance plan:   No maintenance plan   Full warfarin instructions:   12/10: 5 mg; : 5 mg; : 5 mg; : 5 mg   Weekly warfarin total:   16.25 mg   Plan last modified:   Dayana Ervin RN (2018)   Next INR check:   2018   Priority:   INR   Target end date:       Indications    Acute kidney injury (H) [N17.9]  Deep vein thrombosis (DVT) (H) [I82.409] [I82.409]  Long-term (current) use of anticoagulants [Z79.01] [Z79.01]             Anticoagulation Episode Summary     INR check location:       Preferred lab:       [facility]    Comments:   [facility] to [facility]  Pt has dementia please contact daughter with any questions. Contact daughter May at [phone].  Has 2.5mg tablets       Anticoagulation Care Providers     Provider Role Specialty Phone number    Randy Fuentes MD [facility] [phone]            See the Encounter Report to view Anticoagulation Flowsheet and Dosing Calendar (Go to Encounters tab in chart review, and find the Anticoagulation Therapy Visit)    Spoke with patient's home care nurse Iam . Gave them their new warfarin recommendation.  No changes in health, medication, or diet. No missed doses, no falls. No signs or symptoms of bleed or clotting.      Jennifer Bernal RN

## 2018-12-13 NOTE — PROGRESS NOTES
Visit to the client's home for annual health risk assessment and PCA assessment.  An  was present.  PCA and member's HAWA also present and acting as responsible party this visit.  Member unresponsive- CM attempted to gain response by voice and touching shoulder- no response and eyes remained closed.  This is consistent with report from past hospitalization.    Current situation/living environment  Member lives in his own independent apartment alone, however has 24 hour care provided by PCAs and family and has had for years due to declining physical and cognitive status.  Family is very supportive.  He has had 2 recent extended hospital stays and is home with more complex needs.  Home is neat with clear pathways.    Activities of daily living (ADL)/instrumental activities of daily living (IADL) and functional issues  Member needs help with the following ADL's: dressing, grooming, bathing, eating, bed mobility, transfers, walking, wheeling wheelchair and toileting  Member needs help with the following IADL's: shopping, cooking, housekeeping, laundry, managing finances/bills and transportation  Although unresponsive with CM this visit, CM observed member resist when PCA was attempting to show CM wound on member's upper right thigh.  Member visibly pulled legs together resisting PCA's effort.  RP and PCA report member continues to become combative and resistive at times with cares.  Member receives tube feeding through NG tube and RP reports PCA does occasionally attempt to provide oral thickened items if member appears more responsive.  RP reports member is at times responsive enough for caregivers to get up to standing in an attempt to improve physical status/circulation.  Although member unable to move legs independently, these are reported to be moved by caregivers while member is standing- again in an attempt to improve member's circulation.  Member is reported to be typically hoyered from bed to  wheelchair if needing to leave home.  Caregivers are providing oral clean suctioning due member's history of choking.    Health concerns for today  Has patient fallen 2 or more times in the last year? No  Has patient fallen with injury in the last year? No    Cognition/mental health  Member totally unresponsive verbally this visit and eyes remain closed.      STARS/Med Adherence  Member is comfort care- is not attending preventative appts at this time.  Medications are being administered via NG tube and caregivers report have been trained to administer.    Client's Plan of Care consists of:  Personal care assistance (PCA) (13.5 hours per day) and SNV through [facility].    Carri Anderson RN  [facility] Care Coordinator  [phone]

## 2018-12-14 NOTE — PROGRESS NOTES
ANTICOAGULATION FOLLOW-UP CLINIC VISIT    Patient Name:  Priscilla Way  Date:  2018  Contact Type:  Telephone    SUBJECTIVE:     Patient Findings     Positives:   No Problem Findings           OBJECTIVE    INR   Date Value Ref Range Status   2018 2.3  Final       ASSESSMENT / PLAN  INR assessment THER    Recheck INR In: 1 WEEK    INR Location [facility]      Anticoagulation Summary  As of 2018    INR goal:   2.0-3.0   TTR:   47.2 % (2 y)   INR used for dosin.3 (2018)   Warfarin maintenance plan:   No maintenance plan   Full warfarin instructions:   : 5 mg; 15: 5 mg; : 2.5 mg; : 5 mg; : 5 mg; : 2.5 mg; : 5 mg   Weekly warfarin total:   16.25 mg   Plan last modified:   Dayana Ervin RN (2018)   Next INR check:   2018   Priority:   INR   Target end date:       Indications    Acute kidney injury (H) [N17.9]  Deep vein thrombosis (DVT) (H) [I82.409] [I82.409]  Long-term (current) use of anticoagulants [Z79.01] [Z79.01]             Anticoagulation Episode Summary     INR check location:       Preferred lab:       Send INR reminders to:   [facility]    Comments:   [facility] to draw INR's  Pt has dementia please contact daughter with any questions. Contact daughter May at [phone].  Has 2.5mg tablets       Anticoagulation Care Providers     Provider Role Specialty Phone number    Randy Fuentes MD [facility] [phone]            See the Encounter Report to view Anticoagulation Flowsheet and Dosing Calendar (Go to Encounters tab in chart review, and find the Anticoagulation Therapy Visit)  Spoke with Iam [facility] nurse.    Sherry Georges RN

## 2018-12-18 NOTE — TELEPHONE ENCOUNTER
amLODIPine (NORVASC) 1 mg/mL SUSP  Last Written Prescription Date:  10/30/18   Last Fill Quantity: 150 ml,   # refills: 0  Last Office Visit : 7/12/18  Future Office visit:  None scheduled    Routing refill request to RN for review/approval because:  Failed protocol for abnormal labs and elevated BP    Carvedilol failed protocol for elevated BP, 3 month dionna approved.

## 2018-12-21 NOTE — PROGRESS NOTES
ANTICOAGULATION FOLLOW-UP CLINIC VISIT    Patient Name:  Priscilla Way  Date:  2018  Contact Type:  Telephone    SUBJECTIVE:     Patient Findings     Positives:   Med error (PCA states they have given Priscilla 2.5 mg of coumadin daily--calendar updated)           OBJECTIVE    INR   Date Value Ref Range Status   2018 1.2 0.8 - 1.2 Final       ASSESSMENT / PLAN  INR assessment SUB    Recheck INR In: 1 WEEK    INR Location [facility]R      Anticoagulation Summary  As of 2018    INR goal:   2.0-3.0   TTR:   47.0 % (2 y)   INR used for dosin.2! (2018)   Warfarin maintenance plan:   No maintenance plan   Full warfarin instructions:   : 5 mg; : 5 mg; : 5 mg; : 5 mg; : 5 mg; : 5 mg; : 5 mg   Weekly warfarin total:   16.25 mg   Plan last modified:   Dayana Ervin RN (2018)   Next INR check:   2018   Priority:   INR   Target end date:       Indications    Acute kidney injury (H) [N17.9]  Deep vein thrombosis (DVT) (H) [I82.409] [I82.409]  Long-term (current) use of anticoagulants [Z79.01] [Z79.01]             Anticoagulation Episode Summary     INR check location:       Preferred lab:       Send INR reminders to:   [facility]    Comments:   [facility] to draw INR's  Pt has dementia please contact daughter with any questions. Contact daughter May at [phone].  Has 2.5mg tablets       Anticoagulation Care Providers     Provider Role Specialty Phone number    Randy Fuentes MD [facility] [phone]            See the Encounter Report to view Anticoagulation Flowsheet and Dosing Calendar (Go to Encounters tab in chart review, and find the Anticoagulation Therapy Visit)    Spoke with [facility] nurse, KeciaSloane Wells's PCA reports he has been given warfarin 2.5 mg daily, not the recommended 5 mg daily.  Will recommend 5 mg daily and recheck INR in one week.      Jodee Tirado RN

## 2018-12-21 NOTE — TELEPHONE ENCOUNTER
[facility] Call Center    Phone Message    May a detailed message be left on voicemail: yes    Reason for Call: Symptoms or Concerns     If patient has red-flag symptoms, warm transfer to triage line    Current symptom or concern: Pts skin increased edema on side and scrotum since last night. Skin has thin layer of redness, not bleeding and not oozing     Symptoms have been present for:  1 day(s)    Has patient previously been seen for this? No    By HARRIET CORDOBA    Date: 12/21/2018    Are there any new or worsening symptoms? No      Action Taken: Message routed to:  [facility]od

## 2018-12-21 NOTE — TELEPHONE ENCOUNTER
Spoke with Poppy,  home care OT,.  It is hard to assess what kind of sxs pt has over the phone and our clinic is already closed. I advised to bring the pt to urgent care or ED for further evaluation. Pt's last OV was 12/23/16. Pt needs to be seen. Poppy will relay the message to daughter.

## 2018-12-25 NOTE — PROGRESS NOTES
[facility] now requests orders and shares plan of care/discharge summaries for some patients through [employer].  Please REPLY TO THIS MESSAGE OR ROUTE BACK TO THE AUTHOR in order to give authorization for orders when needed.  This is considered a verbal order, you will still receive a faxed copy of orders for signature.  Thank you for your assistance in improving collaboration for our patients.    ORDER    SN 1 week 9, 3 prn    MD SUMMARY/PLAN OF CARE    RCT SUMMARY TO MD  SITUATION/BACKGROUND ...   Mr. Way is a 77 y/o [place] male [facility] tube management and anticoagulation control. He has a primary hx of Advanced vascular dementia, type 2 diabetes, seizure dx , stage 3 chronic kidney dx, C. Diff, hepatitis C Virus, liver tx 2000, kidney tx 2000 and long term use of anticoagulants. During this certification period, he was was hospitalized in November with reported altered mental status and breathing difficulties. 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 He was started on Lactosamide BID in addition to his Keppra.In the last 60 days, homecare SN has been providing INR assessments, coumadin set up and education, tube feeding assessment and education, respiratory/cv/and skin integrity assessments and education.    He is alert with eyes open, does not respond to commands, moves upper extremities with stimulation. His lungs are clear/dim. He has a strong cough, appears to gag on NGT occasionally. Respirations regular and unlabored. Cough is not productive. He has +1 edema to his ble. Hr regular. He is incontinent of urine and stool. His stools are loose and he has 1-2 a day per his PCA. His skin is intact to all bony prominences. His FLACC is 1. His MAHC 10 score is 9, this indicates a high falls risk, however he is non ambulatory and only lifted out of his hospital bed with alexandrea lift. He is on Supplena at 50mL/hour continuous for nutrition. He lives  in an apartment and has 24 hours supervision for cares. His daughter is his PCA and primary caregiver. The unit is open and clean. It is small, so there are supplies stacked along the walls.     ANALYSIS/RECOMMENDATION   Pt is at risk for aspiration pneumonia, seizure activity, UTIs and CDiff r/t his comoridities. SN is recommended for dx management and assessment.

## 2018-12-26 NOTE — TELEPHONE ENCOUNTER
levETIRAcetam (KEPPRA) 100 MG/ML solution  Last Written Prescription Date:  11/20/18  Last Fill Quantity: 450 ml,   # refills: 0 Written by Dr. Peoples in hospital  Last Office Visit : 5/1/18  Future Office visit:  None scheduled    Routing refill request to provider for review/approval because:  1. Failed protocol for abnormal creat. and not in Dr. Fuentes's last clinic note.  2. Not on med list as of 5/1/18.  Appears to be started inpatient 11/2.  Looks like VEEG monitoring, but no outpatient Neurology appointments.

## 2018-12-26 NOTE — TELEPHONE ENCOUNTER
[facility] Call Center    Phone Message    May a detailed message be left on voicemail: yes, will accept Verbal order or a voicemail    Reason for Call: Order(s): Home Care Orders: Skilled Nursing:  once a week for 9 weeks and 3 PRNs     Action Taken: Message routed to:  [facility]

## 2018-12-27 NOTE — TELEPHONE ENCOUNTER
[facility] Call Center    Phone Message    May a detailed message be left on voicemail: yes    Reason for Call: Other: Priscilla is completely out of all his medications.  Daughter Cruz would like to speak with a nurse to clarify the medication list and get all prescriptions over to the pharmacy.  [facility]     Action Taken: Message routed to:  [facility]

## 2018-12-27 NOTE — TELEPHONE ENCOUNTER
Keppra was prescribed when pt was in the hospital. No neurology appt or any other upcoming appts. It looks like pt is unresponsive according to the hospital notes.

## 2018-12-31 NOTE — TELEPHONE ENCOUNTER
cholecalciferol (VITAMIN D/ D-VI-SOL) 400 UNIT/ML LIQD liquid  Last Written Prescription Date:  10/29/18by Dr. Aponte inpatient  Last Fill Quantity: 30 ml,   # refills: 0  Last Office Visit : 5/1/18  Future Office visit:  None scheduled    Routing refill request to provider for review/approval because:  Appears to have been ordered while he was in the hospital.  I do not see that Dr. Fuentes has ordered in the past.

## 2019-01-01 ENCOUNTER — ANTICOAGULATION THERAPY VISIT (OUTPATIENT)
Dept: ANTICOAGULATION | Facility: CLINIC | Age: 79
End: 2019-01-01

## 2019-01-01 ENCOUNTER — HOME INFUSION (PRE-WILLOW HOME INFUSION) (OUTPATIENT)
Dept: PHARMACY | Facility: CLINIC | Age: 79
End: 2019-01-01

## 2019-01-01 ENCOUNTER — MEDICAL CORRESPONDENCE (OUTPATIENT)
Dept: HEALTH INFORMATION MANAGEMENT | Facility: CLINIC | Age: 79
End: 2019-01-01

## 2019-01-01 ENCOUNTER — TELEPHONE (OUTPATIENT)
Dept: FAMILY MEDICINE | Facility: CLINIC | Age: 79
End: 2019-01-01

## 2019-01-01 DIAGNOSIS — I82.409 DEEP VEIN THROMBOSIS (DVT) (H): ICD-10-CM

## 2019-01-01 DIAGNOSIS — N17.9 ACUTE KIDNEY INJURY (H): ICD-10-CM

## 2019-01-01 DIAGNOSIS — R60.1 GENERALIZED EDEMA: ICD-10-CM

## 2019-01-01 DIAGNOSIS — Z94.0 KIDNEY REPLACED BY TRANSPLANT: ICD-10-CM

## 2019-01-01 LAB
INR POINT OF CARE: 2.5 (ref 0.86–1.14)
INR POINT OF CARE: 5.2 (ref 0.86–1.14)
INR PPP: 2.2 (ref 0.8–1.2)

## 2019-01-01 RX ORDER — METOLAZONE 5 MG/1
5 TABLET ORAL DAILY
Qty: 30 TABLET | Refills: 0 | Status: SHIPPED | OUTPATIENT
Start: 2019-01-01

## 2019-01-04 NOTE — PROGRESS NOTES
ANTICOAGULATION FOLLOW-UP CLINIC VISIT    Patient Name:  Priscilla Way  Date:  2019  Contact Type:  Telephone    SUBJECTIVE:     Patient Findings     Positives:   No Problem Findings    Comments:   Will continue same dosing plan as previous week            OBJECTIVE    INR Protime   Date Value Ref Range Status   2019 2.5 (A) 0.86 - 1.14 Final       ASSESSMENT / PLAN  INR assessment THER    Recheck INR In: 1 WEEK    [facility]R Location [facility]      Anticoagulation Summary  As of 2019    INR goal:   2.0-3.0   TTR:   47.4 % (2.1 y)   INR used for dosin.5 (2019)   Warfarin maintenance plan:   No maintenance plan   Full warfarin instructions:   : 5 mg; : 5 mg; : 5 mg; : 2.5 mg; : 5 mg; : 5 mg; 1/10: 5 mg   Weekly warfarin total:   16.25 mg   Plan last modified:   Dayana Ervin RN (2018)   Next INR check:   2019   Priority:   INR   Target end date:       Indications    Acute kidney injury (H) [N17.9]  Deep vein thrombosis (DVT) (H) [I82.409] [I82.409]  Long-term (current) use of anticoagulants [Z79.01] [Z79.01]             Anticoagulation Episode Summary     INR check location:       Preferred lab:       Send INR reminders to:   [facility]    Comments:   [facility] to draw INR's  Pt has dementia please contact daughter with any questions. Contact daughter May at [phone].  Has 2.5mg tablets       Anticoagulation Care Providers     Provider Role Specialty Phone number    Randy Fuentes MD [facility] [phone]            See the Encounter Report to view Anticoagulation Flowsheet and Dosing Calendar (Go to Encounters tab in chart review, and find the Anticoagulation Therapy Visit)    Spoke with home care nurse Tonia Bernal RN

## 2019-01-06 NOTE — TELEPHONE ENCOUNTER
bumetanide (BUMEX) 0.25 mg/mL SUSP  Last Written Prescription Date:  11/20/18  Last Fill Quantity: 240ML,   # refills: 0  Last Office Visit :  5/1/18  Future Office visit: none      Routing refill request to provider for review/approval because: previously written by other provider. rf?

## 2019-01-07 NOTE — TELEPHONE ENCOUNTER
metolazone (ZAROXOLYN) 5 MG tablet      Last Written Prescription Date:  11-21-18  Last Fill Quantity: 30,   # refills: 0  Last Office Visit : 5-1-18  Future Office visit:  none    Routing refill request to provider for review/approval because:  Medication order by other provider at hospital discharge.

## 2019-01-11 NOTE — PROGRESS NOTES
ANTICOAGULATION FOLLOW-UP CLINIC VISIT    Patient Name:  Priscilla Way  Date:  2019  Contact Type:  Telephone    SUBJECTIVE:     Patient Findings     Positives:   No Problem Findings           OBJECTIVE    INR   Date Value Ref Range Status   2019 2.2 (A) 0.8 - 1.2 Final       ASSESSMENT / PLAN  INR assessment THER    Recheck INR In: 1 WEEK    INR Location [facility]R      Anticoagulation Summary  As of 2019    INR goal:   2.0-3.0   TTR:   47.9 % (2.1 y)   INR used for dosin.2 (2019)   Warfarin maintenance plan:   2.5 mg (2.5 mg x 1) every Mon; 5 mg (2.5 mg x 2) all other days   Full warfarin instructions:   : 5 mg; : 5 mg; : 5 mg; : 2.5 mg; 1/15: 5 mg; : 5 mg; : 5 mg; Otherwise 2.5 mg every Mon; 5 mg all other days   Weekly warfarin total:   32.5 mg   Plan last modified:   Jodee Tirado RN (2019)   Next INR check:   2019   Priority:   INR   Target end date:       Indications    Acute kidney injury (H) [N17.9]  Deep vein thrombosis (DVT) (H) [I82.409] [I82.409]  Long-term (current) use of anticoagulants [Z79.01] [Z79.01]             Anticoagulation Episode Summary     INR check location:       Preferred lab:       Send INR reminders to:   [facility]    Comments:   [facility] to draw INR's  Pt has dementia please contact daughter with any questions. Contact daughter May at [phone].  Has 2.5mg tablets       Anticoagulation Care Providers     Provider Role Specialty Phone number    Randy Fuentes MD [facility] [phone]            See the Encounter Report to view Anticoagulation Flowsheet and Dosing Calendar (Go to Encounters tab in chart review, and find the Anticoagulation Therapy Visit)    Spoke with Tonia PÉREZ.  She reports no changes in health,diet, medications.      Jodee Tirado RN

## 2019-01-13 NOTE — TELEPHONE ENCOUNTER
Four days of low abdominal pain with back pain with nausea and hot flashes.   Iam was called and he will have the pharmacy send the request.      Kathleen M Doege RN

## 2019-01-14 NOTE — PROGRESS NOTES
This is a recent snapshot of the patient's [facility] medical record.  For current drug dose and complete information and questions, call [phone] or In Basket pool, [facility] ([id number])  CSN Number:  [id number]

## 2019-01-18 NOTE — PROGRESS NOTES
ANTICOAGULATION FOLLOW-UP CLINIC VISIT    Patient Name:  Priscilla Way  Date:  2019  Contact Type:  Telephone    SUBJECTIVE:     Patient Findings     Positives:   Other complaints    Comments:   Home care nurse reports pt appears to not feel well today.  He is bed bound, and non communicative.  (this is not new).  He is having loose stools, but no more than usual.  Pt gets nothing by mouth, all nutrition including meds is via tube feeding.           OBJECTIVE    INR Protime   Date Value Ref Range Status   2019 5.2 (A) 0.86 - 1.14 Final       ASSESSMENT / PLAN  INR assessment SUPRA    Recheck INR In: 3 DAYS    INR Location [facility]R      Anticoagulation Summary  As of 2019    INR goal:   2.0-3.0   TTR:   47.7 % (2.1 y)   INR used for dosin.2! (2019)   Warfarin maintenance plan:   2.5 mg (2.5 mg x 1) every Mon; 5 mg (2.5 mg x 2) all other days   Full warfarin instructions:   : Hold; : 2.5 mg; : 2.5 mg; Otherwise 2.5 mg every Mon; 5 mg all other days   Weekly warfarin total:   32.5 mg   Plan last modified:   Jodee Tirado RN (2019)   Next INR check:   2019   Priority:   INR   Target end date:       Indications    Acute kidney injury (H) [N17.9]  Deep vein thrombosis (DVT) (H) [I82.409] [I82.409]  Long-term (current) use of anticoagulants [Z79.01] [Z79.01]             Anticoagulation Episode Summary     INR check location:       Preferred lab:       Send INR reminders to:   [facility]    Comments:   [facility] to draw INR's  Pt has dementia/bedbound/non communicative please contact daughter with any questions. Contact daughter [facility] at [phone].  Has 2.5mg tablets       Anticoagulation Care Providers     Provider Role Specialty Phone number    Randy Fuentes MD [facility] [phone]            See the Encounter Report to view Anticoagulation Flowsheet and Dosing Calendar (Go to Encounters tab in chart review, and find the  Anticoagulation Therapy Visit)    Spoke with home care nurse Tonia Bernal RN

## 2019-01-21 NOTE — TELEPHONE ENCOUNTER
Medical examiners office calling to report the death of the patient. Reported history of patient. Medical examiners office stated they will sign the death certificate. Jenna Kirkpatrick LPN 1/21/2019 2:49 PM

## 2019-01-25 ENCOUNTER — MEDICAL CORRESPONDENCE (OUTPATIENT)
Dept: HEALTH INFORMATION MANAGEMENT | Facility: CLINIC | Age: 79
End: 2019-01-25

## 2019-01-31 ENCOUNTER — POST MORTEM DOCUMENTATION (OUTPATIENT)
Dept: TRANSPLANT | Facility: CLINIC | Age: 79
End: 2019-01-31

## 2019-01-31 ENCOUNTER — DOCUMENTATION ONLY (OUTPATIENT)
Dept: TRANSPLANT | Facility: CLINIC | Age: 79
End: 2019-01-31

## 2019-01-31 NOTE — PROGRESS NOTES
Received notification of patient's death from routine follow up.  Place of death was reported as home.  Graft status at the time of death was reported as Functioning.  Additional information: Per Medical Examiner's office, patient  a natural death of natural causes. Date and place of death verified with ME office.  TIS verification is: Complete

## 2019-01-31 NOTE — PROGRESS NOTES
Received notification on  at 2p of patient's death from natural causes, contact information is TIS.  Place of death was reported as home.  Pt had entered hospice after hospital discharge in Dec.  The Transplant Office has been notified that patient is . The Post Mortem Encounter has been completed. Notifications have been sent to the Care team.   Instructions have been sent to send a sympathy card to the family.

## 2019-02-05 ENCOUNTER — ANTICOAGULATION THERAPY VISIT (OUTPATIENT)
Dept: ANTICOAGULATION | Facility: CLINIC | Age: 79
End: 2019-02-05

## 2019-02-05 DIAGNOSIS — N17.9 ACUTE KIDNEY INJURY (H): ICD-10-CM

## 2019-02-05 DIAGNOSIS — I82.409 DEEP VEIN THROMBOSIS (DVT) (H): ICD-10-CM

## 2019-06-14 ENCOUNTER — MEDICAL CORRESPONDENCE (OUTPATIENT)
Dept: HEALTH INFORMATION MANAGEMENT | Facility: CLINIC | Age: 79
End: 2019-06-14

## 2019-09-20 NOTE — PROGRESS NOTES
ANTICOAGULATION FOLLOW-UP CLINIC VISIT    Patient Name:  Priscilla Way  Date:  10/30/2018  Contact Type:  Telephone    SUBJECTIVE:     Patient Findings     Positives Hospital admission (9/11/18 - 10/29/18 with seizure, altered mental status, aspiration pneumonia, c diff)           OBJECTIVE    INR   Date Value Ref Range Status   10/30/2018 2.0 (A) 0.8 - 1.2 Final       ASSESSMENT / PLAN  INR assessment THER    Recheck INR In: 2 DAYS    INR Location [facility]R      Anticoagulation Summary as of 10/30/2018     INR goal 2.0-3.0   Today's INR 2.0   Warfarin maintenance plan No maintenance plan   Full warfarin instructions 10/30: 3.75 mg; 10/31: 2.5 mg   Weekly warfarin total 16.25 mg   Plan last modified Dayana Ervin RN (6/13/2018)   Next INR check 11/1/2018   Priority INR   Target end date     Indications   Acute kidney injury (H) [N17.9]  Deep vein thrombosis (DVT) (H) [I82.409] [I82.409]  Long-term (current) use of anticoagulants [Z79.01] [Z79.01]         Anticoagulation Episode Summary     INR check location     Preferred lab     Send INR reminders to [facility]    Comments [facility] to draw INR's  Pt has dementia please contact daughter with any questions. Contact daughter May at [phone].  Has 2.5mg tablets       Anticoagulation Care Providers     Provider Role Specialty Phone number    IngridromanRandy chavis MD [facility] [phone]            See the Encounter Report to view Anticoagulation Flowsheet and Dosing Calendar (Go to Encounters tab in chart review, and find the Anticoagulation Therapy Visit)    Spoke with [facility] P[facility].  Priscilla was hospitalized 9/11/18 - 10/29/18 with seizure, altered mental status, aspiration pneumonia and c diff.  [facility] will have [facility].  Home care sees him next on 11/1/18.      Jodee Tirado RN                Writer met with pt to attempt to process today's behavior and result of no outside privs. Reinforced that pt ultimately made a good decision to cooperate. Can go outside Monday if he has a good weekend. Pt not able to process verbally very well, mostly hid in blanket, indicated understanding why.

## 2021-07-01 NOTE — PLAN OF CARE
OFFICE VISIT    Phuong Singh  [id number]    Chief complaint:fu nephrolithiasis     Phuong Singh is a 31 year old female with recurrent nephrolithiasis s/p multiple procedures, last had left URS in 12/2020 at [facility], who presents with recurrent flank pain in setting of 4mm proximal ureteral stone, 1.3cm lower pole stones now s/p cysto, left URS/LL/stent on 6/7/2021.       Patient doing well since procedure.  Denies any fevers chills nausea vomiting.  Intermittent stent discomfort however tolerable.    Patient did see Dr. Queen in 2020, however never completed her metabolic work up.    Prior hx:  Left PCNL in 12/2019, left URS significantly impacted stone  Ca oxalate/ca phosphate       Past medical history reviewed with patient and no significant changes since prior visit.        Summary of your Discharge Medications          Accurate as of July 1, 2021  9:52 AM. Always use your most recent med list.            Take these Medications      Details   acetaminophen 500 MG tablet  Commonly known as: TYLENOL   Take 1,000 mg by mouth every 6 hours as needed for Pain.     docusate sodium-sennosides 50-8.6 MG per tablet  Commonly known as: SENOKOT S   Take 1 tablet by mouth 2 times daily. Hold if loose stool     HYDROcodone-acetaminophen 5-325 MG per tablet  Commonly known as: NORCO   Take 1 tablet by mouth every 6 hours as needed for Pain.     ibuprofen 600 MG tablet  Commonly known as: MOTRIN   Take 1 tablet by mouth every 6 hours as needed for Pain.     ondansetron 4 MG disintegrating tablet  Commonly known as: Zofran ODT   Place 2 tablets onto the tongue every 6 hours.     tamsulosin 0.4 MG Cap  Commonly known as: FLOMAX   Take 1 capsule by mouth daily after a meal. Do not start before June 9, 2021.            ALLERGIES:  No Known Allergies    ROS:  Constitutional: see above  Genitourinary: see above    EXAM:    Vital Signs:   Visit Vitals  LMP 06/04/2021       Constitutional: The patient is well developed,  Problem: Patient Care Overview  Goal: Plan of Care/Patient Progress Review  Outcome: No Change  Time  4342-2741  Pt disoriented, non-verbal. No indicators of pain at this time. Temps down to 93.7, MD notified, josie keene in place. Lung sounds increasingly course, sats down to 88, PRN neb given. Now stable. TFs at goal and should run until 2300 tonight (Restart at 0600 tomorrow, per daughter request). Turns q2, incontinent of stool and urine. Condom cath in place, making normal amounts of urine. Lab unable to draw cultures, other labs, vascular will draw next. Hep discontinued, still on wafarin. Chest xray unchanged from yesterday. Will continue to monitor and follow POC.       well nourished, in no acute distress, appears stated age.  Respiratory: Respiratory effort normal without accessory muscle use  Neuro: No focal neuro deficits.       Labs:    COLOR, URINALYSIS (no units)   Date Value   06/06/2021 Yellow     APPEARANCE, URINALYSIS (no units)   Date Value   06/06/2021 Clear     GLUCOSE, URINALYSIS (mg/dL)   Date Value   06/06/2021 Negative     BILIRUBIN, URINALYSIS (no units)   Date Value   06/06/2021 Negative     KETONES, URINALYSIS (mg/dL)   Date Value   06/06/2021 Trace (A)     SPECIFIC GRAVITY, URINALYSIS (no units)   Date Value   06/06/2021 1.021     OCCULT BLOOD, URINALYSIS (no units)   Date Value   06/06/2021 Negative     PH, URINALYSIS (no units)   Date Value   06/06/2021 6.0     PROTEIN, URINALYSIS (mg/dL)   Date Value   06/06/2021 Negative     UROBILINOGEN, URINALYSIS (mg/dL)   Date Value   06/06/2021 0.2     NITRITE, URINALYSIS (no units)   Date Value   06/06/2021 Negative     LEUKOCYTE ESTERASE, URINALYSIS (no units)   Date Value   06/06/2021 Trace (A)     SQUAMOUS EPITHELIAL, URINALYSIS (/hpf)   Date Value   06/06/2021 1 to 5     ERYTHROCYTES, URINALYSIS (/hpf)   Date Value   06/06/2021 3 to 5 (A)     LEUKOCYTES, URINALYSIS (/hpf)   Date Value   06/06/2021 6 to 10 (A)     BACTERIA, URINALYSIS (/hpf)   Date Value   06/06/2021 Few (A)     HYALINE CASTS, URINALYSIS (/lpf)   Date Value   06/06/2021 None Seen     MUCUS (no units)   Date Value   06/06/2021 Present     Creatinine (mg/dL)   Date Value   06/08/2021 0.78   06/07/2021 1.08 (H)   06/06/2021 0.87         Imaging:    No imaging studies were reviewed today.      Assessment and Plan:    Phuong Singh is a 31 year old female with recurrent nephrolithiasis s/p multiple procedures, last had left URS in 12/2020 at [facility], who presents with recurrent flank pain in setting of 4mm proximal ureteral stone, 1.3cm lower pole stones now s/p cysto, left URS/LL/stent on 6/7/2021. CA oxalate/ ca phosphate stones again.    Based  on the patient’s relatively high risk to become a recurrent stone former, I offered general dietary counseling including:  -Drinking >2L of water per day  -Limiting intake of salt and animal proteins  -Moderate calcium intake  -Avoidance of oxalate-rich foods  -Increasing intake of citrate-containing fluids (e.g. lemon water)    I also offered the patient a 24-hour urine analysis.  I explained that a full 24 hours of urine output, starting with the second morning void until the next morning’s first void, should be collected while the patient is on his or her “normal” diet.  The collection is sent to an outside laboratory and we will schedule a counseling session to discuss the results.  I informed the patient that the goal of this study and any preventive dietary or medical therapy is to reduce the frequency of stone events.      -referral to nephrology, Dr. Queen  -24h urine  -renal us in 6 weeks, will call with results  -f/u in 6 mo with renal us      Coco Dykes MD  [facility]

## 2022-03-15 NOTE — PROGRESS NOTES
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 Likely back to baseline per daughter  2 days PTA increased lethargy and minimal responsiveness with new onset foul, high volume, bloody BMs. 2 hour video EEG w/o seizure activity. Sx improved with treatment of C diff. Now back to severely compromised baseline per daughter May.   - Treating C.diff as below  - Holding Xanax   - Continue Seroquel, Nemenda **Patient IS taking although pharmacy said he is not  - PT/OT consults   - Family will discuss goals going forward including nutrition/hydration plan     #C.diff colitis: Improving. IV flagyl started 6/1 while n.p.o, and stopped on 6/3. Added p.o. Vancomycin 6/1 when able to take po later during the day. Continuing with po vancomycin alone.   GI Consulted. Recs include:  --If unable to take oral nutrition and medications, consider NG tube versus PEG tube  --Continue with IV Flagyl till patient is taking oral Vancomycin x4/day  --Continue with 125mg oral Vancomycin QID x14 days and then taper       -125 mg daily three times daily for one week      -125 mg daily two times daily for one week      -125 mg daily one time daily for one week     # Supratherapeutic INR: No coumadin since admission but INR still trending up. No bleeding. Hgb and plt stable. Unlikely DIC. No stigmata or hx of chronic liver disease. Suspect nutritional component.   - Continue to hold coumadin  - Pharmacy following    # Nutrition  # LILIAM on CKD III:   BL Cr labile around 1.2 to 1.6. Cr elevated to 2.51 on  admission in the setting of high volume GI losses. Improved to 1.2 with fluids. Trending back up off of IV hydration. Concern about poor PO intake going forward. Daughter finds that he will no longer open his mouth for her at times.   - continue to hold IVF. To see if he can maintain his kidney function with po only  - Hold lasix   - Calorie counts  - Assess need for ongoing supplemental nutrition/hydration  - Family having discussions re: whether they would want to pursue need for supplemental nutrition if needed.         # CoNS in blood cx: Only 1/2 BC +. Subsequent cxs negative. No s/s of sepsis. Stopped IV vancomycin on 6/2  (started on 6/1) and monitor BC and clinical s/s of sepsis given concern for unnecessary ab may worsen his C.diff      # Chronic anemia: BL hgb 9.5-10. PTA on Aranesp q28 days for hgb <10. Last dose unknown. Presented with bloody BMs, hgb stable. Bloody stools resolved  - Monitor      H/o liver/kidney transplant: 2000, 2/2 HCT. PTA on Prograf (brand name)  - Continue PTA prograf       DMII: No PTA meds. Glucose stable.   - SSI, hypoglycemia protocol       HTN, CAD: PTA on Coreg, lasix, ASA  - Continue Coreg at increased dose 12.5 mg BID given poorly controlled BPs  - Hold lasix given recent LILIAM and poor po intake  - Hold ASA w/ supratherapeutic INR      Anxiety, depression: In the setting of advanced dementia. PTA on prn Xanax   - Holding Xanax given AMS      GERD: PTA on PPI  - Continue PPI      H/o recurrent DVTs: On chronic coumadin. INR elevated to 4.00 on admission. Has IVC filter in with plan to continue for life. Bloody diarrhea as above.   - Holding coumadin given initial bloody stools and supratherapeutic INR.     CODE: full  Disposition: to home pending finalization of nutrition plan    # Pain Assessment:  Current Pain Score 6/4/2018   Patient currently in pain? ADRIA   Pain score (0-10) -   Pain location -   Pain descriptors -   - Priscilla is unable to participate in a collaborative  plan for pain management due to altered mentation.       Jose Tirado MD   of Medicine  Med-Peds Hospitalist  Pager [phone]    Team: Medicine Gold 4  Page Cross Cover after 5 pm: pager [phone]     ___________________________________________________________________    Subjective & Interval Hx:    No acute events. Was somewhat combative at times overnight. Minimially responsive overnight. No fevers. Diarrhea improving.     Last 24 hr care team notes reviewed.   Unable to obtain ROS due to dementia/AMS    Medications: Reviewed in EPIC. List below for reference    Physical Exam:    Blood pressure 185/77, pulse 81, temperature 98.7  F (37.1  C), temperature source Axillary, resp. rate 16, weight 77.6 kg (171 lb 1.6 oz), SpO2 99 %.    General: sleeping quietly, arouses minimally to shaking/voice. No distress  HEENT: NCAT, PERRL, anicteric  Chest/Resp: CTAB  Heart/CV: RRR, no MRG, 1+ LE edema  Abdomen/GI: soft, ND, +BS  Neuro/Psych: does not follow commands other than to open his eyes to voice    Lines/Tubes:   Peripheral IV 05/31/18 Right;Lateral Lower forearm (Active)   Site Assessment [facility] 6/5/2018 12:00 AM   Line Status Saline locked 6/5/2018 12:00 AM   Phlebitis Scale 0-->no symptoms 6/5/2018 12:00 AM   Infiltration Scale 0 6/5/2018 12:00 AM   Extravasation? No 6/4/2018  4:00 PM   Number of days:5       Peripheral IV 05/31/18 Right (Active)   Site Assessment [facility] 6/5/2018 12:00 AM   Line Status Saline locked 6/5/2018 12:00 AM   Phlebitis Scale 0-->no symptoms 6/5/2018 12:00 AM   Infiltration Scale 0 6/5/2018 12:00 AM   Extravasation? No 6/4/2018  4:00 PM   Number of days:5       Labs & Studies of Note:  I personally reviewed all labs and imaging.  Unresulted Labs Ordered in the Past 30 Days of this Admission     Date and Time Order Name Status Description    6/5/2018 0000 25 Hydroxyvitamin D2 and D3 In process     6/5/2018 0000 Erythropoietin In process     6/5/2018 0000 Tacrolimus level In process      6/1/2018 1714 Blood culture Preliminary     6/1/2018 1714 Blood culture Preliminary     5/31/2018 2005 Blood culture Preliminary           Medications list for Reference   Current Facility-Administered Medications   Medication     acetaminophen (TYLENOL) tablet 650 mg     carvedilol (COREG) tablet 12.5 mg     carvedilol (COREG) tablet 6.25 mg     glucose gel 15-30 g    Or     dextrose 50 % injection 25-50 mL    Or     glucagon injection 1 mg     dimethicone-zinc oxide (EUCERIN) cream     hydrALAZINE (APRESOLINE) injection 10 mg     insulin aspart (NovoLOG) inj (RAPID ACTING)     insulin aspart (NovoLOG) inj (RAPID ACTING)     melatonin tablet 1 mg     memantine XR (NAMENDA XR) 24 hr capsule 21 mg     naloxone (NARCAN) injection 0.1-0.4 mg     omeprazole (priLOSEC) suspension 20 mg     ondansetron (ZOFRAN-ODT) ODT tab 4 mg    Or     ondansetron (ZOFRAN) injection 4 mg     Patient is already receiving anticoagulation with heparin, enoxaparin (LOVENOX), warfarin (COUMADIN)  or other anticoagulant medication     phytonadione (MEPHYTON) tablet 5 mg     polyethylene glycol (MIRALAX/GLYCOLAX) Packet 17 g     QUEtiapine (SEROquel) half-tab 25 mg     tacrolimus (PROGRAF BRAND) suspension 1.5 mg     vancomycin (VANOCIN) solution 125 mg     Warfarin Therapy Reminder (Check START DATE - warfarin may be starting in the FUTURE)              [Excellent] : ~his/her~  mood as  excellent [Never] : Never [Yes] : Yes [No falls in past year] : Patient reported no falls in the past year [0] : 2) Feeling down, depressed, or hopeless: Not at all (0) [PHQ-2 Negative - No further assessment needed] : PHQ-2 Negative - No further assessment needed [With Family] : lives with family [Employed] : employed [Student] : student [Single] : single [Fully functional (bathing, dressing, toileting, transferring, walking, feeding)] : Fully functional (bathing, dressing, toileting, transferring, walking, feeding) [Fully functional (using the telephone, shopping, preparing meals, housekeeping, doing laundry, using] : Fully functional and needs no help or supervision to perform IADLs (using the telephone, shopping, preparing meals, housekeeping, doing laundry, using transportation, managing medications and managing finances) [[id number]] : 0 [de-identified] : with mom [de-identified] : , going to school in the fall, [employer]

## 2022-05-19 NOTE — PLAN OF CARE
Problem: Patient Care Overview  Goal: Plan of Care/Patient Progress Review  Outcome: No Change  Time  3080-4367    Pt disoriented, lethargic, not speaking. VSS except mild /75 on RA. Amlodipine added today. EEG monitoring discontinued, early results shows moderate ongoing encephalopathy. Seizure pads in place, no seizure activity this shift. Continues with scheduled IV Keppra. Patient received 2 mg PRN dilaudid following repositioning because of increase HR and family request. Pt was slightly less responsive, MD recommends sticking with 1 mg. Incontinent of urine and loose yellow stool 3 times. Repositioned q2 hours. Barrier cream applied to coccyx for slight skin breakdown. Cycled tube feeds turned off at noon. To be restarted at 6pm. BGs stable. All meds given separately through NJ tube. Will continue to monitor and follow POC.        DISPLAY PLAN FREE TEXT

## 2022-05-27 NOTE — PLAN OF CARE
ONGOING DISCHARGE PLAN:    Patient is alert and oriented x4. Spoke with patient regarding discharge plan and patient confirms that plan is still home wihtout any needs, lives with daughter. DME: straight cane. Denies VNS.    + influenza A  NS @75        Will continue to follow for additional discharge needs.     Electronically signed by Noa Candelario RN on 5/27/2022 at 1:33 PM Problem: Patient Care Overview  Goal: Plan of Care/Patient Progress Review  Outcome: No Change  VSS on RA. Pt nonverbal at baseline; unable to assess mentation. Pt's TF running continuously @25mL/hr. Repositioned Q2 hours with pillows. EEG discontinued. Condom catheter on to measure urine output; see flowsheet for output. Mepilex intact on coccyx and inner thighs. Interdry placed in between skin folds in the groin area for rawness and bleeding. Pt continues to have frequent loose stools. Q4 BG checked and insulin given accordingly to sliding scale. PIV TKO for IV Abx.Family at bedside and helpful with cares. Continue to monitor and follow POC.

## 2022-07-22 NOTE — PROGRESS NOTES
ANTICOAGULATION FOLLOW-UP CLINIC VISIT    Patient Name:  Priscilla Way  Date:  7/11/2018  Contact Type:  Telephone    SUBJECTIVE:     Patient Findings     Positives No Problem Findings, Unexplained INR or factor level change           OBJECTIVE    INR   Date Value Ref Range Status   07/11/2018 3.80  Final     Comment:     Home Care        ASSESSMENT / PLAN  INR assessment SUPRA    Recheck INR In: 1 WEEK    INR Location Clinic      Anticoagulation Summary as of 7/11/2018     INR goal 2.0-3.0   Today's INR 3.80!   Warfarin maintenance plan No maintenance plan   Full warfarin instructions 7/11: 1.25 mg; 7/12: 2.5 mg; 7/13: 1.25 mg; 7/14: 2.5 mg; 7/15: 2.5 mg; 7/16: 2.5 mg; 7/17: 2.5 mg   Weekly warfarin total 16.25 mg   Plan last modified Dayana Ervin RN (6/13/2018)   Next INR check 7/18/2018   Priority INR   Target end date     Indications   Acute kidney injury (H) [N17.9]  Deep vein thrombosis (DVT) (H) [I82.409] [I82.409]  Long-term (current) use of anticoagulants [Z79.01] [Z79.01]         Anticoagulation Episode Summary     INR check location     Preferred lab     Send INR reminders to [facility]    Comments [facility] to draw INR's  Pt has dementia please contact daughter with any questions. Contact daughter May at [phone].  Has 2.5mg tablets       Anticoagulation Care Providers     Provider Role Specialty Phone number    Randy Fuentes MD [facility] [phone]            See the Encounter Report to view Anticoagulation Flowsheet and Dosing Calendar (Go to Encounters tab in chart review, and find the Anticoagulation Therapy Visit)    Spoke with ELISA Haddad. Gave them new warfarin recommendation.  No changes in health, medication, or diet. No missed doses, no falls. No signs or symptoms of bleed or clotting.     Shabana Sorto, TED                Detail Level: Zone Detail Level: Simple

## 2023-03-15 NOTE — MR AVS SNAPSHOT
Priscilla Way   4/3/2017   Anticoagulation Therapy Visit    Description:  77 year old male   Provider:  Sherry Georges RN   Department:  [facility]           INR as of 4/3/2017     Today's INR 3.3!      Anticoagulation Summary as of 4/3/2017     INR goal 2.0-3.0   Today's INR 3.3!   Full instructions 4/3: 2.5 mg; 4/4: 3.75 mg; 4/5: 2.5 mg; 4/6: 3.75 mg; 4/7: 2.5 mg; 4/8: 3.75 mg; 4/9: 2.5 mg   Next INR check 4/10/2017    Indications   Acute kidney injury (H) [N17.9]  Deep vein thrombosis (DVT) (H) [I82.409] [I82.409]  Long-term (current) use of anticoagulants [Z79.01] [Z79.01]         April 2017 Details    Sun Mon Tue Wed Thu Fri Sat           1                 2               3      2.5 mg   See details      4      3.75 mg         5      2.5 mg         6      3.75 mg         7      2.5 mg         8      3.75 mg           9      2.5 mg         10            11               12               13               14               15                 16               17               18               19               20               21               22                 23               24               25               26               27               28               29                 30                      Date Details   04/03 This INR check       Date of next INR:  4/10/2017         How to take your warfarin dose     To take:  2.5 mg Take 1 of the 2.5 mg tablets.    To take:  3.75 mg Take 1.5 of the 2.5 mg tablets.            No

## 2023-04-25 NOTE — PHARMACY-ANTICOAGULATION SERVICE
Clinical Pharmacy - Warfarin Dosing Consult     Pharmacy has been consulted to manage this patient s warfarin therapy.  Indication: Atrial Fibrillation  Therapy Goal: INR 2-3  Warfarin Prior to Admission: Yes  Warfarin PTA Regimen: 2.5 mg SuTuTh, 1.25 mg ROW  Recent documented change in oral intake/nutrition: Yes (cont TF since 10/15, but NJ clogged this AM)    INR   Date Value Ref Range Status   10/24/2018 1.71 (H) 0.86 - 1.14 Final   10/23/2018 1.76 (H) 0.86 - 1.14 Final       Recommend warfarin 4mg today (dose was not given yesterday due to clogged NJ tube).    Pharmacy will monitor Priscilla Way daily and order warfarin doses to achieve specified goal.      Please contact pharmacy as soon as possible if the warfarin needs to be held for a procedure or if the warfarin goals change.      Mayra Muir, PharmD     Detail Level: Detailed Quality 110: Preventive Care And Screening: Influenza Immunization: Influenza Immunization Administered during Influenza season Quality 111:Pneumonia Vaccination Status For Older Adults: Pneumococcal vaccine (PPSV23) was not administered on or after patient’s 60th birthday and before the end of the measurement period, reason not otherwise specified

## 2023-05-22 NOTE — PROGRESS NOTES
ANTICOAGULATION FOLLOW-UP CLINIC VISIT    Patient Name:  Priscilla Way  Date:  1/30/2018  Contact Type:  Telephone    SUBJECTIVE:     Patient Findings     Positives Unexplained INR or factor level change    Comments Sam reports he is eating OK, no nausea/vomiting, no tylenol use, no symptoms of infection.  No signs of bleeding.  Sam will review signs of bleeding with his family and inform them that Priscilla should be seen urgently in the ER if he develops any.  The INR is a POC INR--she is not able to draw another POC because patient gets abusive/bites.  She is not able to draw a venous draw either for the same reason.             OBJECTIVE    INR   Date Value Ref Range Status   01/30/2018 6.4  Final       ASSESSMENT / PLAN  INR assessment SUPRA    Recheck INR In: 2 DAYS    INR Location [facility]R      Anticoagulation Summary as of 1/30/2018     INR goal 2.0-3.0   Today's INR 6.4!   Maintenance plan 3.75 mg (2.5 mg x 1.5) on Thu; 2.5 mg (2.5 mg x 1) all other days   Full instructions 1/30: Hold; 1/31: Hold; Otherwise 3.75 mg on Thu; 2.5 mg all other days   Weekly total 18.75 mg   Plan last modified Jodee Tirado, RN (11/28/2017)   Next INR check 2/1/2018   Priority INR   Target end date     Indications   Acute kidney injury (H) [N17.9]  Deep vein thrombosis (DVT) (H) [I82.409] [I82.409]  Long-term (current) use of anticoagulants [Z79.01] [Z79.01]         Anticoagulation Episode Summary     INR check location     Preferred lab     [facility]    Comments [facility] to draw INR's  Pt has dementia please contact daughter with any questions. Contact daughter May at [phone].  Has 2.5mg tablets       Anticoagulation Care Providers     Provider Role Specialty Phone number    Randy Fuentes MD [facility] [phone]            See the Encounter Report to view Anticoagulation Flowsheet and Dosing Calendar (Go to Encounters tab in chart review, and find the  Strongly encouraged smoking cessation.   Anticoagulation Therapy Visit)    Spoke with Sam PÉREZ.  See patient findings.    Jodee Tirado RN

## 2023-07-14 NOTE — PROGRESS NOTES
I called the pharmacy and informed/updated the current med list (5 meds).    pooling to pyriform sinus prior to the swallow w/ thin/Uncontrolled bolus/spillover in hypopharynx

## 2024-01-01 NOTE — PLAN OF CARE
"Problem: Patient Care Overview  Goal: Plan of Care/Patient Progress Review  Outcome: No Change  /80 (BP Location: Left arm)  Pulse 94  Temp 96.1  F (35.6  C) (Axillary)  Resp 16  Ht 1.778 m (5' 10\")  Wt 83 kg (182 lb 15.7 oz)  SpO2 100%  BMI 26.26 kg/m2  Pt nonverbal at baseline. ADRIA orientation. Slight R facial droop. IV dilaudid given x 1 for generalized pain. SL atropine given x 1 for excess secretions. Pt strict NPO from nursing standpoint. Family still gives patient PO despite high aspiration risk. Turn and repo q2h. Oliguric, on HD. 100cc urine out for 12h. Hep gtt at 700 per provider order. Recheck 10a at 1230 and nursing to adjust per protocol. Second PIV TKO with intermittent ABX. Will continue to monitor per POC.      " no

## 2024-05-09 NOTE — PLAN OF CARE
Problem: Patient Care Overview  Goal: Plan of Care/Patient Progress Review  Outcome: No Change  Pt is disoriented x 4. Resistant and combative when cares performed. Incontinent of bowel and bladder which were recorded. No signs of pain. NJ patent with TF infusing but was started 1 hour late at 1900. Turning pt every 2 hours. Barrier applied to tono-area. Miconazole applied to groin. Family at bedside off and on and sometimes assisting with cares. Bed zeroed and accurate weight recorded.        - - -

## 2025-07-08 NOTE — PROGRESS NOTES
**For crisis resources, please see the information at the end of this document**   Patient Education    Thank you for coming to the [facility].    Lab Testing:  If you had lab testing today and your results are reassuring or normal they will be mailed to you or sent through "[employer]" within 7 days. If the lab tests need quick action we will call you with the results. The phone number we will call with results is # [phone] (home) . If this is not the best number please call our clinic and change the number.    Medication Refills:  If you need any refills please call your pharmacy and they will contact us. Our fax number for refills is [phone]. Please allow three business for refill processing. If you need to  your refill at a new pharmacy, please contact the new pharmacy directly. The new pharmacy will help you get your medications transferred.     Scheduling:  If you have any concerns about today's visit or wish to schedule another appointment please call our office during normal business hours [phone] (8-5:00 M-F)    Contact Us:  Please call [phone] during business hours (8-5:00 M-F).  If after clinic hours, or on the weekend, please call  [phone].    Financial Assistance [phone]  [facility] Billing [phone]  Central Billing Office, [facility]: [phone]  [place] Billing [phone]  Medical Records [phone]  [place] Patient Bill of Rights [email or web address]        MENTAL HEALTH CRISIS RESOURCES:  For a emergency help, please call 911 or go to the nearest Emergency Department.      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 Underwent CTA with contrast of brain neck for evaluation complicated by ROMANA required HD. Also c/b aspiration PNA and c diff. We are on consult now for anuric renal failure.   basline Cr ~ 1.2-1.4; the patient has anuric LILIAM on on dialysis since 9/16/18.  Her LILIAM is likely multifactorial but related to contrast induced nephropathy with underlying ckd.  The creatinine of 1.2-1.4 is an overestimation of his renal function and a kidney function 8/2016 is significant for early diabetic changes with secondary focal segmental sclerosis.    There is some potential for renal recovery after the insulting agent of IV contrast.   Dialysis has been started but he has had several intolerance episodes including severe hypotension on dialysis, worsening breathing with dialysis, and constantly trying to remove his dialysis catheter which has prompted restraints for his safety.   Poor candidate for chronic dialysis, (he does not want a feeding tube, he does not want intubation if needed, and he does not want compression in case of arrest).  His decisions are currently being made by his daughter Cruz and she will need to provide documentation of her being the healthcare proxy.      # DDKT and liver transplant:  - Basline Cr ~ 1.2- 1,4; Increased likely due to contrast which he received on 9/11/2018.   - He has signs of improving renal function with 225 ml of UOP yesterday and more than 400 ml the day before.  Will continue trial of acute dialysis to see if he has renal recovery. Scheduled for HD TTS for now.  - Plan for HD today for 3 hours with fluid removal 2-3 liters as tolerated.   - No bleeding from the dialysis cath site. INR: 1.28 this  [id number]    Adult Emergency Walk-In Options:  [facility]:  [facility], [place], [address]  [facility] Unit [facility]:  [address] [place] [address]  [facility]:  [address], [place] [address]  [facility] :  [address] [place] [address]    [facility] Crisis Information:   Annette (RIANA) - Adult: [phone]       Child: [phone]  David - Adult: [phone]     Child: [phone]  [place]: [phone]  Carlos: [phone]  [place]: [phone]    List of all [facility] resources:   https://[place].gov/dhs/people-we-serve/adults/health-care/mental-health/resources/crisis-contacts.jsp     National Crisis Information:   Call or text: '988'  National Suicide Prevention Lifeline: 9-855-701-TALK (1-723.885.6041) - for online chat options, visit https://suicidepreventionlifeline.org/chat/  Poison Control Center: 6-259-070-6412  Trans Lifeline: 4-257-157-1666 - Hotline for transgender people of all ages  The [employer] Project: 8-835-238-9159 - Hotline for LGBT youth      For Non-Emergency Support:   Fast Tracker: Mental Health & Substance Use Disorder Resources -   https://www.fasttrackermn.org/        Again thank you for choosing [facility] and please let us know how we can best partner with you to improve you and your family's health.    You may be receiving a survey regarding this appointment. We would love to have your feedback, both positive and negative. The survey is done by an external company, so your answers are anonymous.        **For crisis resources, please see the information at the end of this document**   Patient Education    Thank you for coming to the [facility].    Lab Testing:  If you had lab testing today and your results are reassuring or normal they will be mailed to you or sent through [employer] within 7 days. If the lab tests need quick action we  morning, patient is on  Heparin gtt.   - UA  With 34 WBC and 7 RBC and large LE. Urine Cx NGTD. UA with no hydronephrosis.          - Proteinuria: Nephrotic range  - new which is likely due to concentrated urine in the setting of acute renal failure with background DM nephropathy.   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          # Immunosuppression:   - Continue IV Mycophenolate mofetil 500 gram BID and continue IV methylprednisone 16 mg IV daily.   - Was on tacrolimus montherapy  but not tolerating oral now. Would avoid IV tacrolimus.           # Hypertension:   - BP today is better, will keep it on high normal side for today and plan for HD tomorrow with fluid removal 1-2 liters as tolerated.   -  Back on amlodipine           # Anemia in chronic renal disease:  - Hgb: Continue trending down slowly. Hgb: 7.2, continue monitoring.   - Iron studies: Low. Replete when patient more stable          # Mineral Bone Disorder:   - Secondary renal hyperparathyroidism; PTH level is:  Normal at 78 on 2018  - Need Vitamin D level  - Calcium; level is:  Normal when corrected for albumin   - Need Phosphorus and Mg level, added to the morning lab, to follow up and replace as needed.               # Electrolytes:   - Potassium: Low, on HD.  - Na: level, normal         # Other Medical Issues:   # seziure disorder- admitted with AMS. Seen by neurology. On keppra.   # h/o CVA:  Minimally interactive at baseline. Palliative care on consult. Family does not want feeding tube.   # aspiration PNA-   # Cdiff- pn iV flagyl  # DM- management per primary.       # Transplant History:  Etiology of kidney failure: Hepatitis C  Transplant: 2000 (Kidney), 2000 (Liver)  Donor Type:  - Brain  "Death                      Donor Class: Standard Criteria Donor  Significant changes in immunosuppression: see above  Significant Complications: Now anuric LILIAM         Recommendations were communicated to the primary team via this note    Zachary Reed MD    Transplant Office Phone Number: [phone]    Interval History   Patient was seen and examined at bedside this morning, his daughter, was at bedside. Patient is bedridden, non-verbal, respond to tactile stimuli by arm movements. Patient tolerated HD today.  Physical Exam   Temp  Av.5  F (35.3  C)  Min: 93  F (33.9  C)  Max: 98.7  F (37.1  C)      Pulse  Av.3  Min: 74  Max: 104 Resp  Av.7  Min: 10  Max: 24  SpO2  Av.6 %  Min: 91 %  Max: 100 %     /78 (BP Location: Left arm)  Pulse 90  Temp 96.3  F (35.7  C) (Axillary)  Resp 18  Ht 1.778 m (5' 10\")  Wt 83.1 kg (183 lb 4.8 oz)  SpO2 100%  BMI 26.3 kg/m2     Admit Weight: 83.9 kg (185 lb)   GENERAL APPEARANCE: Bedridden, not able to do any communication, respond to tactile stimuli by arm contraction.   HENT: Sluggish pupil, head in normocephalic/atrumatic, Dialysis cath placed to right internal jugular, clean, no bleeding.    LYMPHATICS: no cervical or supraclavicular nodes  RESP: Rales to lung base B/L, no wheezes  CV: regular rhythm, normal rate, no rub, no murmur  EDEMA: +1 LE edema bilaterally  ABDOMEN: soft, nondistended, nontender, bowel sounds normal  MS: upper extremities in half contraction position, overall muscle atrophy and joint stiffness  SKIN: intact, warm and dry       Data   All labs reviewed by me.  CMP    Recent Labs  Lab 18  1009 18  2330 18  0750 18  0431 18  0754     --  137 140 139   POTASSIUM 3.4  --  3.3* 3.7 4.2   CHLORIDE 102  --  102 104 106   CO2 26  --  24 21 18*   ANIONGAP 7  --  11 15* 15*   *  --  156* 131* 149*   BUN 9  --  17 21 32*   CR 0.85  --  1.50* 2.05* 2.91*   GFRESTIMATED 87  --  45* 31* 21* " will call you with the results. The phone number we will call with results is # [phone] (home) . If this is not the best number please call our clinic and change the number.    Medication Refills:  If you need any refills please call your pharmacy and they will contact us. Our fax number for refills is [phone]. Please allow three business for refill processing. If you need to  your refill at a new pharmacy, please contact the new pharmacy directly. The new pharmacy will help you get your medications transferred.     Scheduling:  If you have any concerns about today's visit or wish to schedule another appointment please call our office during normal business hours [phone] (8-5:00 M-F)    Contact Us:  Please call [phone] during business hours (8-5:00 M-F).  If after clinic hours, or on the weekend, please call  [phone].    Financial Assistance [phone]  [facility] Billing [phone]  Central Billing Office, [facility]: [phone]  [place] Billing [phone]  Medical Records [phone]  [place] Patient Bill of Rights [email or web address]        MENTAL HEALTH CRISIS RESOURCES:  For a emergency help, please call 911 or go to the nearest Emergency Department.      Children's Emergency Walk-In Options:   [facility]:  [facility] [place], [address]  [facility]:   [address] [place], [place], [id number] [place] - [address], [place], [address]    Adult Emergency Walk-In Options:  [facility]:  [facility] [place], [address]  Em[facility] Unit [facility]:  [address] [place] [address]  [facility]:  [address] [place] [address]  [facility] :  [address] [place] [address]    [facility] Crisis Information:   Annette LY) - Adult:    GFRESTBLACK >90  --  55* 38* 25*   JOSHUA 7.2*  --  8.3* 8.1* 8.0*   MAG 1.5*  --  1.6  --   --    PHOS 1.2* Canceled, Test credited 1.6*  --   --      CBC    Recent Labs  Lab 09/21/18  0750 09/18/18  1458 09/18/18  0754 09/17/18  1607 09/17/18  0549   HGB 7.2* 7.4* 7.9* 8.3* 8.0*   WBC 7.4 10.4 5.4  --  5.1   RBC 2.41* 2.52* 2.68*  --  2.73*   HCT 21.6* 22.2* 24.8*  --  25.5*   MCV 90 88 93  --  93   MCH 29.9 29.4 29.5  --  29.3   MCHC 33.3 33.3 31.9  --  31.4*   RDW 17.5* 17.2* 17.5*  --  17.7*    170 195  --  170     INR    Recent Labs  Lab 09/22/18  1009 09/21/18  0750 09/20/18  0915 09/19/18  0431   INR 1.22* 1.28* 1.48* 1.70*     ABGNo lab results found in last 7 days.   Urine Studies  Recent Labs   Lab Test  09/14/18   1418  05/31/18   1643  03/21/18   1139  11/08/16   1446   COLOR  Yellow  Yellow  Yellow  Yellow   APPEARANCE  Cloudy  Clear  Slightly Cloudy  Clear   URINEGLC  150*  Negative  Negative  Negative   URINEBILI  Negative  Negative  Negative  Negative   URINEKETONE  10*  Negative  Negative  Negative   SG  1.028  1.012  1.011  1.014   UBLD  Moderate*  Trace*  Trace*  Negative   URINEPH  6.5  5.5  5.0  5.0   PROTEIN  >600*  30*  10*  10*   NITRITE  Negative  Negative  Negative  Negative   LEUKEST  Large*  Negative  Large*  Negative   RBCU  7*  9*  2  1   WBCU  34*  2  159*  1     Recent Labs   Lab Test  09/14/18   1418  05/27/16   1409  04/29/11   1348  09/14/10   1134   UTPG  24.91*  Specimen not received  NOTIFIED [facility]  WHO PAGED  RN, LOPEZ AT 1355. NO URINE   RECIEVED WITH BLOOD SPECIMENS. AB    0.04  <0.02     PTH  Recent Labs   Lab Test  06/05/18   0548  08/24/16   0852   PTHI  78  333*     Iron Studies  Recent Labs   Lab Test  11/08/16   1209  08/24/16   0852   IRON  17*  20*   FEB  146*  197*   IRONSAT  11*  10*   BARTOLO   --   1025*       IMAGING:  All imaging studies reviewed by me.      [phone]       Child: [phone]  David - Adult: [phone]     Child: [phone]  [place]: [phone]  [place]: [phone]  [place]: [phone]    List of all [facility] resources:   https://[place].[facility]/dhs/people-we-serve/adults/health-care/mental-health/resources/crisis-contacts.jsp     National Crisis Information:   Call or text: '988'  National Suicide Prevention Lifeline: 2-306-582-TALK (1-868.405.2274) - for online chat options, visit https://suicidepreventionlifeline.org/chat/  Poison Control Center: 4-324-849-1906  Trans Lifeline: 8-929-029-5438 - Hotline for transgender people of all ages  The [employer] Project: 9-214-035-4554 - Hotline for LGBT youth      For Non-Emergency Support:   Fast Tracker: Mental Health & Substance Use Disorder Resources -   https://www.fasttrackermn.org/        Again thank you for choosing [facility] and please let us know how we can best partner with you to improve you and your family's health.    You may be receiving a survey regarding this appointment. We would love to have your feedback, both positive and negative. The survey is done by an external company, so your answers are anonymous.

## (undated) RX ORDER — LIDOCAINE HYDROCHLORIDE 10 MG/ML
INJECTION, SOLUTION EPIDURAL; INFILTRATION; INTRACAUDAL; PERINEURAL
Status: DISPENSED
Start: 2018-01-01